# Patient Record
Sex: MALE | Race: WHITE | Employment: OTHER | URBAN - METROPOLITAN AREA
[De-identification: names, ages, dates, MRNs, and addresses within clinical notes are randomized per-mention and may not be internally consistent; named-entity substitution may affect disease eponyms.]

---

## 2017-01-06 RX ORDER — ALBUTEROL SULFATE 90 UG/1
AEROSOL, METERED RESPIRATORY (INHALATION)
Qty: 3 INHALER | Refills: 3 | Status: SHIPPED | OUTPATIENT
Start: 2017-01-06 | End: 2018-04-02 | Stop reason: SDUPTHER

## 2017-01-06 RX ORDER — BUDESONIDE AND FORMOTEROL FUMARATE DIHYDRATE 160; 4.5 UG/1; UG/1
AEROSOL RESPIRATORY (INHALATION)
Qty: 1 INHALER | Refills: 9 | Status: SHIPPED | OUTPATIENT
Start: 2017-01-06 | End: 2017-08-17 | Stop reason: CLARIF

## 2017-01-25 ENCOUNTER — OFFICE VISIT (OUTPATIENT)
Dept: CARDIOLOGY CLINIC | Age: 67
End: 2017-01-25

## 2017-01-25 VITALS
BODY MASS INDEX: 31.28 KG/M2 | SYSTOLIC BLOOD PRESSURE: 130 MMHG | DIASTOLIC BLOOD PRESSURE: 64 MMHG | HEART RATE: 80 BPM | WEIGHT: 218 LBS

## 2017-01-25 DIAGNOSIS — Z98.61 POST PTCA: ICD-10-CM

## 2017-01-25 DIAGNOSIS — I25.10 CAD IN NATIVE ARTERY: Primary | ICD-10-CM

## 2017-01-25 DIAGNOSIS — R00.0 TACHYCARDIA: ICD-10-CM

## 2017-01-25 DIAGNOSIS — I10 ESSENTIAL HYPERTENSION: ICD-10-CM

## 2017-01-25 DIAGNOSIS — I25.2 MI, OLD: ICD-10-CM

## 2017-01-25 PROCEDURE — 99214 OFFICE O/P EST MOD 30 MIN: CPT | Performed by: INTERNAL MEDICINE

## 2017-01-25 RX ORDER — ATORVASTATIN CALCIUM 80 MG/1
TABLET, FILM COATED ORAL
COMMUNITY
Start: 2017-01-19 | End: 2017-05-04 | Stop reason: SDUPTHER

## 2017-01-26 ENCOUNTER — TELEPHONE (OUTPATIENT)
Dept: INTERNAL MEDICINE CLINIC | Age: 67
End: 2017-01-26

## 2017-01-27 RX ORDER — ACLIDINIUM BROMIDE 400 UG/1
POWDER, METERED RESPIRATORY (INHALATION)
Qty: 1 EACH | Refills: 0 | Status: SHIPPED | OUTPATIENT
Start: 2017-01-27 | End: 2017-03-07 | Stop reason: SDUPTHER

## 2017-01-27 RX ORDER — PRAVASTATIN SODIUM 80 MG/1
TABLET ORAL
Qty: 28 TABLET | Refills: 0 | Status: SHIPPED | OUTPATIENT
Start: 2017-01-27 | End: 2017-02-15

## 2017-01-27 RX ORDER — GABAPENTIN 600 MG/1
TABLET ORAL
Qty: 84 TABLET | Refills: 0 | Status: SHIPPED | OUTPATIENT
Start: 2017-01-27 | End: 2017-03-24 | Stop reason: SDUPTHER

## 2017-01-27 RX ORDER — DULOXETIN HYDROCHLORIDE 60 MG/1
CAPSULE, DELAYED RELEASE ORAL
Qty: 28 CAPSULE | Refills: 0 | Status: SHIPPED | OUTPATIENT
Start: 2017-01-27 | End: 2017-03-24 | Stop reason: SDUPTHER

## 2017-01-27 RX ORDER — PANTOPRAZOLE SODIUM 40 MG/1
TABLET, DELAYED RELEASE ORAL
Qty: 28 TABLET | Refills: 0 | Status: SHIPPED | OUTPATIENT
Start: 2017-01-27 | End: 2017-03-07 | Stop reason: SDUPTHER

## 2017-02-15 ENCOUNTER — OFFICE VISIT (OUTPATIENT)
Dept: ORTHOPEDIC SURGERY | Age: 67
End: 2017-02-15

## 2017-02-15 VITALS
SYSTOLIC BLOOD PRESSURE: 127 MMHG | DIASTOLIC BLOOD PRESSURE: 74 MMHG | BODY MASS INDEX: 31.21 KG/M2 | HEART RATE: 79 BPM | HEIGHT: 70 IN | WEIGHT: 218.03 LBS

## 2017-02-15 DIAGNOSIS — M70.62 TROCHANTERIC BURSITIS, LEFT HIP: ICD-10-CM

## 2017-02-15 DIAGNOSIS — Z96.641 STATUS POST TOTAL REPLACEMENT OF RIGHT HIP: Primary | ICD-10-CM

## 2017-02-15 PROCEDURE — 99213 OFFICE O/P EST LOW 20 MIN: CPT | Performed by: ORTHOPAEDIC SURGERY

## 2017-02-15 PROCEDURE — 20610 DRAIN/INJ JOINT/BURSA W/O US: CPT | Performed by: ORTHOPAEDIC SURGERY

## 2017-03-01 ENCOUNTER — OFFICE VISIT (OUTPATIENT)
Dept: PULMONOLOGY | Age: 67
End: 2017-03-01

## 2017-03-01 VITALS
SYSTOLIC BLOOD PRESSURE: 128 MMHG | OXYGEN SATURATION: 95 % | HEART RATE: 71 BPM | HEIGHT: 70 IN | RESPIRATION RATE: 20 BRPM | WEIGHT: 219.6 LBS | DIASTOLIC BLOOD PRESSURE: 82 MMHG | BODY MASS INDEX: 31.44 KG/M2

## 2017-03-01 DIAGNOSIS — J44.9 COPD WITH CHRONIC BRONCHITIS (HCC): Primary | ICD-10-CM

## 2017-03-01 PROCEDURE — 99213 OFFICE O/P EST LOW 20 MIN: CPT | Performed by: INTERNAL MEDICINE

## 2017-03-07 RX ORDER — PANTOPRAZOLE SODIUM 40 MG/1
TABLET, DELAYED RELEASE ORAL
Qty: 28 TABLET | Refills: 0 | Status: SHIPPED | OUTPATIENT
Start: 2017-03-07 | End: 2017-03-24 | Stop reason: SDUPTHER

## 2017-03-07 RX ORDER — ACLIDINIUM BROMIDE 400 UG/1
POWDER, METERED RESPIRATORY (INHALATION)
Qty: 1 EACH | Refills: 0 | Status: SHIPPED | OUTPATIENT
Start: 2017-03-07 | End: 2017-03-24 | Stop reason: SDUPTHER

## 2017-03-07 RX ORDER — FUROSEMIDE 20 MG/1
TABLET ORAL
Qty: 28 TABLET | Refills: 0 | Status: SHIPPED | OUTPATIENT
Start: 2017-03-07 | End: 2017-03-24 | Stop reason: SDUPTHER

## 2017-03-10 ENCOUNTER — TELEPHONE (OUTPATIENT)
Dept: INTERNAL MEDICINE CLINIC | Age: 67
End: 2017-03-10

## 2017-03-10 RX ORDER — CLOPIDOGREL BISULFATE 75 MG/1
TABLET ORAL
Qty: 90 TABLET | Refills: 0 | Status: SHIPPED | OUTPATIENT
Start: 2017-03-10 | End: 2017-05-30 | Stop reason: SDUPTHER

## 2017-03-27 ENCOUNTER — TELEPHONE (OUTPATIENT)
Dept: PULMONOLOGY | Age: 67
End: 2017-03-27

## 2017-03-27 RX ORDER — ALLOPURINOL 100 MG/1
TABLET ORAL
Qty: 60 TABLET | Refills: 2 | Status: SHIPPED | OUTPATIENT
Start: 2017-03-27 | End: 2017-06-19 | Stop reason: SDUPTHER

## 2017-03-27 RX ORDER — PANTOPRAZOLE SODIUM 40 MG/1
TABLET, DELAYED RELEASE ORAL
Qty: 30 TABLET | Refills: 3 | Status: SHIPPED | OUTPATIENT
Start: 2017-03-27 | End: 2017-07-12 | Stop reason: SDUPTHER

## 2017-03-27 RX ORDER — GABAPENTIN 600 MG/1
TABLET ORAL
Qty: 84 TABLET | Refills: 0 | Status: SHIPPED | OUTPATIENT
Start: 2017-03-27 | End: 2017-04-21 | Stop reason: SDUPTHER

## 2017-03-27 RX ORDER — ACLIDINIUM BROMIDE 400 UG/1
POWDER, METERED RESPIRATORY (INHALATION)
Qty: 1 EACH | Refills: 3 | Status: SHIPPED | OUTPATIENT
Start: 2017-03-27 | End: 2017-07-07 | Stop reason: CLARIF

## 2017-03-27 RX ORDER — DULOXETIN HYDROCHLORIDE 60 MG/1
CAPSULE, DELAYED RELEASE ORAL
Qty: 30 CAPSULE | Refills: 3 | Status: SHIPPED | OUTPATIENT
Start: 2017-03-27 | End: 2017-07-12 | Stop reason: SDUPTHER

## 2017-03-27 RX ORDER — FUROSEMIDE 20 MG/1
TABLET ORAL
Qty: 30 TABLET | Refills: 3 | Status: SHIPPED | OUTPATIENT
Start: 2017-03-27 | End: 2017-07-12 | Stop reason: SDUPTHER

## 2017-03-29 ENCOUNTER — OFFICE VISIT (OUTPATIENT)
Dept: ORTHOPEDIC SURGERY | Age: 67
End: 2017-03-29

## 2017-03-29 VITALS
HEIGHT: 70 IN | HEART RATE: 60 BPM | SYSTOLIC BLOOD PRESSURE: 103 MMHG | BODY MASS INDEX: 31.44 KG/M2 | DIASTOLIC BLOOD PRESSURE: 72 MMHG | WEIGHT: 219.58 LBS

## 2017-03-29 DIAGNOSIS — Z96.641 STATUS POST TOTAL REPLACEMENT OF RIGHT HIP: Primary | ICD-10-CM

## 2017-03-29 DIAGNOSIS — M70.62 TROCHANTERIC BURSITIS, LEFT HIP: ICD-10-CM

## 2017-03-29 PROCEDURE — 99213 OFFICE O/P EST LOW 20 MIN: CPT | Performed by: ORTHOPAEDIC SURGERY

## 2017-04-13 ENCOUNTER — OFFICE VISIT (OUTPATIENT)
Dept: INTERNAL MEDICINE CLINIC | Age: 67
End: 2017-04-13

## 2017-04-13 VITALS
HEIGHT: 70 IN | SYSTOLIC BLOOD PRESSURE: 134 MMHG | DIASTOLIC BLOOD PRESSURE: 84 MMHG | HEART RATE: 64 BPM | TEMPERATURE: 97.7 F | WEIGHT: 217 LBS | BODY MASS INDEX: 31.07 KG/M2

## 2017-04-13 DIAGNOSIS — R91.1 PULMONARY NODULE: ICD-10-CM

## 2017-04-13 DIAGNOSIS — Z11.4 SCREENING FOR HIV (HUMAN IMMUNODEFICIENCY VIRUS): ICD-10-CM

## 2017-04-13 DIAGNOSIS — I73.9 PVD (PERIPHERAL VASCULAR DISEASE) (HCC): Chronic | ICD-10-CM

## 2017-04-13 DIAGNOSIS — Z11.59 NEED FOR HEPATITIS C SCREENING TEST: ICD-10-CM

## 2017-04-13 DIAGNOSIS — I10 ESSENTIAL HYPERTENSION: Chronic | ICD-10-CM

## 2017-04-13 DIAGNOSIS — E78.5 HYPERLIPIDEMIA, UNSPECIFIED HYPERLIPIDEMIA TYPE: Chronic | ICD-10-CM

## 2017-04-13 DIAGNOSIS — J43.2 CENTRILOBULAR EMPHYSEMA (HCC): Chronic | ICD-10-CM

## 2017-04-13 DIAGNOSIS — R73.02 GLUCOSE INTOLERANCE (IMPAIRED GLUCOSE TOLERANCE): Primary | ICD-10-CM

## 2017-04-13 LAB — HBA1C MFR BLD: 5.7 %

## 2017-04-13 PROCEDURE — 99214 OFFICE O/P EST MOD 30 MIN: CPT | Performed by: INTERNAL MEDICINE

## 2017-04-13 PROCEDURE — 83036 HEMOGLOBIN GLYCOSYLATED A1C: CPT | Performed by: INTERNAL MEDICINE

## 2017-04-13 ASSESSMENT — ENCOUNTER SYMPTOMS
SHORTNESS OF BREATH: 1
BACK PAIN: 1

## 2017-04-14 LAB
A/G RATIO: 1.5 (ref 1.1–2.2)
ALBUMIN SERPL-MCNC: 4.1 G/DL (ref 3.4–5)
ALP BLD-CCNC: 119 U/L (ref 40–129)
ALT SERPL-CCNC: 33 U/L (ref 10–40)
ANION GAP SERPL CALCULATED.3IONS-SCNC: 19 MMOL/L (ref 3–16)
AST SERPL-CCNC: 32 U/L (ref 15–37)
BILIRUB SERPL-MCNC: 0.7 MG/DL (ref 0–1)
BUN BLDV-MCNC: 11 MG/DL (ref 7–20)
CALCIUM SERPL-MCNC: 9.5 MG/DL (ref 8.3–10.6)
CHLORIDE BLD-SCNC: 100 MMOL/L (ref 99–110)
CO2: 23 MMOL/L (ref 21–32)
CREAT SERPL-MCNC: 0.7 MG/DL (ref 0.8–1.3)
GFR AFRICAN AMERICAN: >60
GFR NON-AFRICAN AMERICAN: >60
GLOBULIN: 2.8 G/DL
GLUCOSE BLD-MCNC: 102 MG/DL (ref 70–99)
HEPATITIS C ANTIBODY INTERPRETATION: NORMAL
HIV-1 AND HIV-2 ANTIBODIES: NORMAL
POTASSIUM SERPL-SCNC: 4 MMOL/L (ref 3.5–5.1)
SODIUM BLD-SCNC: 142 MMOL/L (ref 136–145)
TOTAL PROTEIN: 6.9 G/DL (ref 6.4–8.2)

## 2017-04-21 DIAGNOSIS — R00.0 TACHYCARDIA: ICD-10-CM

## 2017-04-21 DIAGNOSIS — I10 ESSENTIAL HYPERTENSION: ICD-10-CM

## 2017-04-21 DIAGNOSIS — I25.10 CORONARY ARTERY DISEASE INVOLVING NATIVE CORONARY ARTERY OF NATIVE HEART WITHOUT ANGINA PECTORIS: ICD-10-CM

## 2017-04-21 RX ORDER — GABAPENTIN 600 MG/1
TABLET ORAL
Qty: 84 TABLET | Refills: 0 | Status: SHIPPED | OUTPATIENT
Start: 2017-04-21 | End: 2017-05-30 | Stop reason: SDUPTHER

## 2017-04-24 RX ORDER — METOPROLOL TARTRATE 100 MG/1
TABLET ORAL
Qty: 56 TABLET | Refills: 0 | Status: SHIPPED | OUTPATIENT
Start: 2017-04-24 | End: 2017-05-19 | Stop reason: SDUPTHER

## 2017-04-26 ENCOUNTER — OFFICE VISIT (OUTPATIENT)
Dept: CARDIOLOGY CLINIC | Age: 67
End: 2017-04-26

## 2017-04-26 VITALS
WEIGHT: 220 LBS | HEART RATE: 72 BPM | SYSTOLIC BLOOD PRESSURE: 130 MMHG | BODY MASS INDEX: 31.57 KG/M2 | DIASTOLIC BLOOD PRESSURE: 60 MMHG

## 2017-04-26 DIAGNOSIS — Z98.61 POST PTCA: ICD-10-CM

## 2017-04-26 DIAGNOSIS — I25.10 CAD IN NATIVE ARTERY: Primary | ICD-10-CM

## 2017-04-26 DIAGNOSIS — I25.2 MI, OLD: ICD-10-CM

## 2017-04-26 DIAGNOSIS — R00.0 TACHYCARDIA: ICD-10-CM

## 2017-04-26 DIAGNOSIS — I10 ESSENTIAL HYPERTENSION: ICD-10-CM

## 2017-04-26 PROCEDURE — 99214 OFFICE O/P EST MOD 30 MIN: CPT | Performed by: INTERNAL MEDICINE

## 2017-05-02 ENCOUNTER — TELEPHONE (OUTPATIENT)
Dept: INTERNAL MEDICINE CLINIC | Age: 67
End: 2017-05-02

## 2017-05-04 RX ORDER — ATORVASTATIN CALCIUM 80 MG/1
TABLET, FILM COATED ORAL
Qty: 30 TABLET | Refills: 2 | Status: SHIPPED | OUTPATIENT
Start: 2017-05-04 | End: 2017-08-10 | Stop reason: SDUPTHER

## 2017-05-19 DIAGNOSIS — I10 ESSENTIAL HYPERTENSION: ICD-10-CM

## 2017-05-19 DIAGNOSIS — R00.0 TACHYCARDIA: ICD-10-CM

## 2017-05-19 DIAGNOSIS — I25.10 CORONARY ARTERY DISEASE INVOLVING NATIVE CORONARY ARTERY OF NATIVE HEART WITHOUT ANGINA PECTORIS: ICD-10-CM

## 2017-05-22 RX ORDER — METOPROLOL TARTRATE 100 MG/1
TABLET ORAL
Qty: 60 TABLET | Refills: 3 | Status: SHIPPED | OUTPATIENT
Start: 2017-05-22 | End: 2017-09-22 | Stop reason: SDUPTHER

## 2017-05-25 ENCOUNTER — OFFICE VISIT (OUTPATIENT)
Dept: INTERNAL MEDICINE CLINIC | Age: 67
End: 2017-05-25

## 2017-05-25 VITALS
HEIGHT: 70 IN | HEART RATE: 76 BPM | TEMPERATURE: 97.4 F | BODY MASS INDEX: 30.64 KG/M2 | SYSTOLIC BLOOD PRESSURE: 120 MMHG | DIASTOLIC BLOOD PRESSURE: 60 MMHG | WEIGHT: 214 LBS

## 2017-05-25 DIAGNOSIS — L03.116 CELLULITIS OF LEFT LOWER EXTREMITY: Primary | ICD-10-CM

## 2017-05-25 DIAGNOSIS — I87.2 VENOUS STASIS DERMATITIS OF BOTH LOWER EXTREMITIES: ICD-10-CM

## 2017-05-25 PROCEDURE — 99213 OFFICE O/P EST LOW 20 MIN: CPT | Performed by: INTERNAL MEDICINE

## 2017-05-25 RX ORDER — CEPHALEXIN 500 MG/1
500 CAPSULE ORAL 3 TIMES DAILY
COMMUNITY
End: 2017-08-10

## 2017-05-30 RX ORDER — GABAPENTIN 600 MG/1
TABLET ORAL
Qty: 84 TABLET | Refills: 0 | Status: SHIPPED | OUTPATIENT
Start: 2017-05-30 | End: 2017-06-19 | Stop reason: SDUPTHER

## 2017-05-30 RX ORDER — CLOPIDOGREL BISULFATE 75 MG/1
TABLET ORAL
Qty: 90 TABLET | Refills: 2 | Status: SHIPPED | OUTPATIENT
Start: 2017-05-30 | End: 2017-12-14 | Stop reason: SDUPTHER

## 2017-06-01 ENCOUNTER — TELEPHONE (OUTPATIENT)
Dept: INTERNAL MEDICINE CLINIC | Age: 67
End: 2017-06-01

## 2017-06-15 RX ORDER — LOSARTAN POTASSIUM 25 MG/1
TABLET ORAL
Qty: 28 TABLET | Refills: 0 | Status: SHIPPED | OUTPATIENT
Start: 2017-06-15 | End: 2017-07-12 | Stop reason: SDUPTHER

## 2017-07-13 RX ORDER — LOSARTAN POTASSIUM 25 MG/1
TABLET ORAL
Qty: 28 TABLET | Refills: 0 | Status: SHIPPED | OUTPATIENT
Start: 2017-07-13 | End: 2017-08-21 | Stop reason: SDUPTHER

## 2017-08-10 ENCOUNTER — OFFICE VISIT (OUTPATIENT)
Dept: PULMONOLOGY | Age: 67
End: 2017-08-10

## 2017-08-10 VITALS
HEART RATE: 67 BPM | RESPIRATION RATE: 18 BRPM | OXYGEN SATURATION: 97 % | WEIGHT: 217 LBS | HEIGHT: 70 IN | SYSTOLIC BLOOD PRESSURE: 142 MMHG | BODY MASS INDEX: 31.07 KG/M2 | DIASTOLIC BLOOD PRESSURE: 82 MMHG

## 2017-08-10 DIAGNOSIS — F17.219 NICOTINE DEPENDENCE, CIGARETTES, WITH UNSPECIFIED NICOTINE-INDUCED DISORDERS: Primary | ICD-10-CM

## 2017-08-10 DIAGNOSIS — J43.2 CENTRILOBULAR EMPHYSEMA (HCC): Chronic | ICD-10-CM

## 2017-08-10 DIAGNOSIS — G47.33 OSA ON CPAP: ICD-10-CM

## 2017-08-10 DIAGNOSIS — Z99.89 OSA ON CPAP: ICD-10-CM

## 2017-08-10 PROCEDURE — 4004F PT TOBACCO SCREEN RCVD TLK: CPT | Performed by: INTERNAL MEDICINE

## 2017-08-10 PROCEDURE — G8417 CALC BMI ABV UP PARAM F/U: HCPCS | Performed by: INTERNAL MEDICINE

## 2017-08-10 PROCEDURE — G0296 VISIT TO DETERM LDCT ELIG: HCPCS | Performed by: INTERNAL MEDICINE

## 2017-08-10 PROCEDURE — G8598 ASA/ANTIPLAT THER USED: HCPCS | Performed by: INTERNAL MEDICINE

## 2017-08-10 PROCEDURE — 3017F COLORECTAL CA SCREEN DOC REV: CPT | Performed by: INTERNAL MEDICINE

## 2017-08-10 PROCEDURE — G8926 SPIRO NO PERF OR DOC: HCPCS | Performed by: INTERNAL MEDICINE

## 2017-08-10 PROCEDURE — 4040F PNEUMOC VAC/ADMIN/RCVD: CPT | Performed by: INTERNAL MEDICINE

## 2017-08-10 PROCEDURE — 99213 OFFICE O/P EST LOW 20 MIN: CPT | Performed by: INTERNAL MEDICINE

## 2017-08-10 PROCEDURE — 3023F SPIROM DOC REV: CPT | Performed by: INTERNAL MEDICINE

## 2017-08-10 PROCEDURE — G8427 DOCREV CUR MEDS BY ELIG CLIN: HCPCS | Performed by: INTERNAL MEDICINE

## 2017-08-10 PROCEDURE — 1123F ACP DISCUSS/DSCN MKR DOCD: CPT | Performed by: INTERNAL MEDICINE

## 2017-08-10 RX ORDER — FLUTICASONE FUROATE AND VILANTEROL 200; 25 UG/1; UG/1
1 POWDER RESPIRATORY (INHALATION) DAILY
Qty: 1 EACH | Refills: 5 | Status: SHIPPED | OUTPATIENT
Start: 2017-08-10 | End: 2018-03-08 | Stop reason: SDUPTHER

## 2017-08-10 RX ORDER — ATORVASTATIN CALCIUM 80 MG/1
TABLET, FILM COATED ORAL
Qty: 30 TABLET | Refills: 1 | Status: SHIPPED | OUTPATIENT
Start: 2017-08-10 | End: 2017-10-21 | Stop reason: SDUPTHER

## 2017-08-17 ENCOUNTER — OFFICE VISIT (OUTPATIENT)
Dept: INTERNAL MEDICINE CLINIC | Age: 67
End: 2017-08-17

## 2017-08-17 VITALS
DIASTOLIC BLOOD PRESSURE: 70 MMHG | BODY MASS INDEX: 30.38 KG/M2 | SYSTOLIC BLOOD PRESSURE: 130 MMHG | HEIGHT: 70 IN | TEMPERATURE: 98.3 F | WEIGHT: 212.2 LBS | HEART RATE: 68 BPM

## 2017-08-17 DIAGNOSIS — I73.9 PVD (PERIPHERAL VASCULAR DISEASE) (HCC): Chronic | ICD-10-CM

## 2017-08-17 DIAGNOSIS — J43.2 CENTRILOBULAR EMPHYSEMA (HCC): Chronic | ICD-10-CM

## 2017-08-17 DIAGNOSIS — M48.062 LUMBAR STENOSIS WITH NEUROGENIC CLAUDICATION: ICD-10-CM

## 2017-08-17 DIAGNOSIS — Z23 NEEDS FLU SHOT: ICD-10-CM

## 2017-08-17 DIAGNOSIS — I10 ESSENTIAL HYPERTENSION: Chronic | ICD-10-CM

## 2017-08-17 DIAGNOSIS — R73.02 GLUCOSE INTOLERANCE (IMPAIRED GLUCOSE TOLERANCE): ICD-10-CM

## 2017-08-17 DIAGNOSIS — E78.5 HYPERLIPIDEMIA, UNSPECIFIED HYPERLIPIDEMIA TYPE: Primary | Chronic | ICD-10-CM

## 2017-08-17 LAB
ANION GAP SERPL CALCULATED.3IONS-SCNC: 19 MMOL/L (ref 3–16)
BUN BLDV-MCNC: 12 MG/DL (ref 7–20)
CALCIUM SERPL-MCNC: 9.5 MG/DL (ref 8.3–10.6)
CHLORIDE BLD-SCNC: 98 MMOL/L (ref 99–110)
CHOLESTEROL, TOTAL: 155 MG/DL (ref 0–199)
CO2: 22 MMOL/L (ref 21–32)
CREAT SERPL-MCNC: 0.8 MG/DL (ref 0.8–1.3)
GFR AFRICAN AMERICAN: >60
GFR NON-AFRICAN AMERICAN: >60
GLUCOSE BLD-MCNC: 94 MG/DL (ref 70–99)
HBA1C MFR BLD: 5.6 %
HDLC SERPL-MCNC: 27 MG/DL (ref 40–60)
LDL CHOLESTEROL CALCULATED: 86 MG/DL
POTASSIUM SERPL-SCNC: 4.1 MMOL/L (ref 3.5–5.1)
SODIUM BLD-SCNC: 139 MMOL/L (ref 136–145)
TRIGL SERPL-MCNC: 210 MG/DL (ref 0–150)
VLDLC SERPL CALC-MCNC: 42 MG/DL

## 2017-08-17 PROCEDURE — G8427 DOCREV CUR MEDS BY ELIG CLIN: HCPCS | Performed by: INTERNAL MEDICINE

## 2017-08-17 PROCEDURE — 4004F PT TOBACCO SCREEN RCVD TLK: CPT | Performed by: INTERNAL MEDICINE

## 2017-08-17 PROCEDURE — G8598 ASA/ANTIPLAT THER USED: HCPCS | Performed by: INTERNAL MEDICINE

## 2017-08-17 PROCEDURE — 4040F PNEUMOC VAC/ADMIN/RCVD: CPT | Performed by: INTERNAL MEDICINE

## 2017-08-17 PROCEDURE — G8926 SPIRO NO PERF OR DOC: HCPCS | Performed by: INTERNAL MEDICINE

## 2017-08-17 PROCEDURE — 83036 HEMOGLOBIN GLYCOSYLATED A1C: CPT | Performed by: INTERNAL MEDICINE

## 2017-08-17 PROCEDURE — 3023F SPIROM DOC REV: CPT | Performed by: INTERNAL MEDICINE

## 2017-08-17 PROCEDURE — G0008 ADMIN INFLUENZA VIRUS VAC: HCPCS | Performed by: INTERNAL MEDICINE

## 2017-08-17 PROCEDURE — 99214 OFFICE O/P EST MOD 30 MIN: CPT | Performed by: INTERNAL MEDICINE

## 2017-08-17 PROCEDURE — 1123F ACP DISCUSS/DSCN MKR DOCD: CPT | Performed by: INTERNAL MEDICINE

## 2017-08-17 PROCEDURE — 3017F COLORECTAL CA SCREEN DOC REV: CPT | Performed by: INTERNAL MEDICINE

## 2017-08-17 PROCEDURE — G8417 CALC BMI ABV UP PARAM F/U: HCPCS | Performed by: INTERNAL MEDICINE

## 2017-08-17 PROCEDURE — 90662 IIV NO PRSV INCREASED AG IM: CPT | Performed by: INTERNAL MEDICINE

## 2017-08-17 ASSESSMENT — PATIENT HEALTH QUESTIONNAIRE - PHQ9
1. LITTLE INTEREST OR PLEASURE IN DOING THINGS: 0
2. FEELING DOWN, DEPRESSED OR HOPELESS: 0
SUM OF ALL RESPONSES TO PHQ QUESTIONS 1-9: 0
SUM OF ALL RESPONSES TO PHQ9 QUESTIONS 1 & 2: 0

## 2017-08-17 ASSESSMENT — ENCOUNTER SYMPTOMS
WHEEZING: 0
SHORTNESS OF BREATH: 1
BACK PAIN: 1
CHEST TIGHTNESS: 0

## 2017-08-18 ENCOUNTER — OFFICE VISIT (OUTPATIENT)
Dept: CARDIOLOGY CLINIC | Age: 67
End: 2017-08-18

## 2017-08-18 ENCOUNTER — HOSPITAL ENCOUNTER (OUTPATIENT)
Dept: CT IMAGING | Age: 67
Discharge: HOME | End: 2017-08-18
Attending: INTERNAL MEDICINE | Admitting: INTERNAL MEDICINE

## 2017-08-18 VITALS
DIASTOLIC BLOOD PRESSURE: 70 MMHG | SYSTOLIC BLOOD PRESSURE: 132 MMHG | WEIGHT: 212 LBS | BODY MASS INDEX: 30.42 KG/M2 | HEART RATE: 76 BPM

## 2017-08-18 DIAGNOSIS — R00.0 TACHYCARDIA: ICD-10-CM

## 2017-08-18 DIAGNOSIS — Z98.61 POST PTCA: ICD-10-CM

## 2017-08-18 DIAGNOSIS — F17.219 NICOTINE DEPENDENCE, CIGARETTES, WITH UNSPECIFIED NICOTINE-INDUCED DISORDERS: ICD-10-CM

## 2017-08-18 DIAGNOSIS — I10 ESSENTIAL HYPERTENSION: ICD-10-CM

## 2017-08-18 DIAGNOSIS — F17.219 CIGARETTE NICOTINE DEPENDENCE WITH NICOTINE-INDUCED DISORDER: ICD-10-CM

## 2017-08-18 DIAGNOSIS — I25.10 CAD IN NATIVE ARTERY: Primary | ICD-10-CM

## 2017-08-18 DIAGNOSIS — I25.2 MI, OLD: ICD-10-CM

## 2017-08-18 PROCEDURE — 1123F ACP DISCUSS/DSCN MKR DOCD: CPT | Performed by: INTERNAL MEDICINE

## 2017-08-18 PROCEDURE — G8598 ASA/ANTIPLAT THER USED: HCPCS | Performed by: INTERNAL MEDICINE

## 2017-08-18 PROCEDURE — 4004F PT TOBACCO SCREEN RCVD TLK: CPT | Performed by: INTERNAL MEDICINE

## 2017-08-18 PROCEDURE — G8427 DOCREV CUR MEDS BY ELIG CLIN: HCPCS | Performed by: INTERNAL MEDICINE

## 2017-08-18 PROCEDURE — 4040F PNEUMOC VAC/ADMIN/RCVD: CPT | Performed by: INTERNAL MEDICINE

## 2017-08-18 PROCEDURE — G8417 CALC BMI ABV UP PARAM F/U: HCPCS | Performed by: INTERNAL MEDICINE

## 2017-08-18 PROCEDURE — 99214 OFFICE O/P EST MOD 30 MIN: CPT | Performed by: INTERNAL MEDICINE

## 2017-08-18 PROCEDURE — 3017F COLORECTAL CA SCREEN DOC REV: CPT | Performed by: INTERNAL MEDICINE

## 2017-08-18 RX ORDER — NITROGLYCERIN 0.4 MG/1
0.4 TABLET SUBLINGUAL EVERY 5 MIN PRN
Qty: 25 TABLET | Refills: 1 | Status: SHIPPED | OUTPATIENT
Start: 2017-08-18 | End: 2020-02-18 | Stop reason: SDUPTHER

## 2017-08-22 RX ORDER — LOSARTAN POTASSIUM 25 MG/1
TABLET ORAL
Qty: 30 TABLET | Refills: 5 | Status: SHIPPED | OUTPATIENT
Start: 2017-08-22 | End: 2018-01-03 | Stop reason: SDUPTHER

## 2017-08-28 ENCOUNTER — HOSPITAL ENCOUNTER (OUTPATIENT)
Dept: NON INVASIVE DIAGNOSTICS | Age: 67
Discharge: HOME | End: 2017-08-28
Attending: INTERNAL MEDICINE | Admitting: INTERNAL MEDICINE

## 2017-08-28 DIAGNOSIS — I25.10 ATHEROSCLEROTIC HEART DISEASE OF NATIVE CORONARY ARTERY WITHOUT ANGINA PECTORIS: ICD-10-CM

## 2017-08-28 DIAGNOSIS — I25.2 MI, OLD: ICD-10-CM

## 2017-08-28 DIAGNOSIS — Z98.61 POST PTCA: ICD-10-CM

## 2017-08-28 DIAGNOSIS — R00.0 TACHYCARDIA: ICD-10-CM

## 2017-08-28 DIAGNOSIS — I10 ESSENTIAL HYPERTENSION: ICD-10-CM

## 2017-08-28 DIAGNOSIS — I25.10 CAD IN NATIVE ARTERY: ICD-10-CM

## 2017-08-28 LAB
LV EF: 53 %
LVEF MODALITY: NORMAL

## 2017-09-05 ENCOUNTER — OFFICE VISIT (OUTPATIENT)
Dept: PAIN MANAGEMENT | Age: 67
End: 2017-09-05

## 2017-09-05 VITALS
BODY MASS INDEX: 29.78 KG/M2 | HEIGHT: 70 IN | DIASTOLIC BLOOD PRESSURE: 104 MMHG | HEART RATE: 92 BPM | WEIGHT: 208 LBS | SYSTOLIC BLOOD PRESSURE: 162 MMHG

## 2017-09-05 DIAGNOSIS — G89.29 OTHER CHRONIC PAIN: ICD-10-CM

## 2017-09-05 DIAGNOSIS — M25.552 PAIN OF BOTH HIP JOINTS: ICD-10-CM

## 2017-09-05 DIAGNOSIS — M25.551 PAIN OF BOTH HIP JOINTS: ICD-10-CM

## 2017-09-05 DIAGNOSIS — M96.1 POSTLAMINECTOMY SYNDROME, LUMBAR: Primary | ICD-10-CM

## 2017-09-05 PROCEDURE — G8427 DOCREV CUR MEDS BY ELIG CLIN: HCPCS | Performed by: ANESTHESIOLOGY

## 2017-09-05 PROCEDURE — 99204 OFFICE O/P NEW MOD 45 MIN: CPT | Performed by: ANESTHESIOLOGY

## 2017-09-05 PROCEDURE — 4004F PT TOBACCO SCREEN RCVD TLK: CPT | Performed by: ANESTHESIOLOGY

## 2017-09-05 PROCEDURE — 1123F ACP DISCUSS/DSCN MKR DOCD: CPT | Performed by: ANESTHESIOLOGY

## 2017-09-05 PROCEDURE — 3017F COLORECTAL CA SCREEN DOC REV: CPT | Performed by: ANESTHESIOLOGY

## 2017-09-05 PROCEDURE — G8598 ASA/ANTIPLAT THER USED: HCPCS | Performed by: ANESTHESIOLOGY

## 2017-09-05 PROCEDURE — 4040F PNEUMOC VAC/ADMIN/RCVD: CPT | Performed by: ANESTHESIOLOGY

## 2017-09-05 PROCEDURE — G8417 CALC BMI ABV UP PARAM F/U: HCPCS | Performed by: ANESTHESIOLOGY

## 2017-09-05 RX ORDER — HYDROCODONE BITARTRATE AND ACETAMINOPHEN 5; 325 MG/1; MG/1
1 TABLET ORAL DAILY PRN
Qty: 30 TABLET | Refills: 0 | Status: SHIPPED | OUTPATIENT
Start: 2017-09-05 | End: 2017-09-12

## 2017-09-05 ASSESSMENT — ENCOUNTER SYMPTOMS
COUGH: 0
HEMOPTYSIS: 0
EYE DISCHARGE: 0
ORTHOPNEA: 0
EYE PAIN: 0
VOMITING: 0
EYE REDNESS: 0
CONSTIPATION: 0
HEARTBURN: 0
NAUSEA: 0
BACK PAIN: 1
WHEEZING: 0
ABDOMINAL PAIN: 0
SHORTNESS OF BREATH: 0

## 2017-09-05 ASSESSMENT — PATIENT HEALTH QUESTIONNAIRE - PHQ9
SUM OF ALL RESPONSES TO PHQ QUESTIONS 1-9: 1
2. FEELING DOWN, DEPRESSED OR HOPELESS: 0
1. LITTLE INTEREST OR PLEASURE IN DOING THINGS: 1
SUM OF ALL RESPONSES TO PHQ9 QUESTIONS 1 & 2: 1

## 2017-09-13 ENCOUNTER — HOSPITAL ENCOUNTER (OUTPATIENT)
Dept: GENERAL RADIOLOGY | Facility: MEDICAL CENTER | Age: 67
Discharge: HOME | End: 2017-09-13
Attending: ANESTHESIOLOGY | Admitting: ANESTHESIOLOGY

## 2017-09-13 DIAGNOSIS — M25.552 PAIN OF BOTH HIP JOINTS: ICD-10-CM

## 2017-09-13 DIAGNOSIS — M25.551 PAIN OF BOTH HIP JOINTS: ICD-10-CM

## 2017-09-20 ENCOUNTER — HOSPITAL ENCOUNTER (OUTPATIENT)
Dept: PHYSICAL THERAPY | Facility: MEDICAL CENTER | Age: 67
Discharge: HOME | End: 2017-09-30
Attending: ANESTHESIOLOGY | Admitting: ANESTHESIOLOGY

## 2017-09-20 NOTE — FLOWSHEET NOTE
Minutes: 39'   Total Treatment Minutes: 10:15- 11:15  60'     [x] EVAL  [x] MS(99199) x  1   [] IONTO  [] NMR (08835) x      [] VASO  [x] Manual (02105) x  1    [] Other:  [] TA x       [] Mech Traction (97456)  [] ES(attended) (89391)      [] ES (un) (13181):     GOALS:  Patient stated goal: Pt. Would like to be able to sit or stand for longer periods of time without increased pain    Therapist goals for Patient:   Short Term Goals: To be achieved in: 2 weeks  1. Independent in HEP and progression per patient tolerance, in order to prevent re-injury. 2. Patient will have a decrease in pain to facilitate improvement in movement, function, and ADLs as indicated by Functional Deficits. Long Term Goals: To be achieved in: 6 weeks  1. Disability index score of 20% or less for the LEFS to assist with reaching prior level of function. 2. Patient will demonstrate increased AROM to WNL  to allow for proper joint functioning as indicated by patients Functional Deficits. 3. Patient will demonstrate an increase in Strength to 4+/5  to allow for proper functional mobility as indicated by patients Functional Deficits. 4. Patient will return to bending functional activities without increased symptoms or restriction. 5. Patient will be able to sit for 60 minutes without increased symptoms or restriction  6. Patient will be able to stand for 30 minutes without increased symptoms or restriction       Progression Towards Functional goals:  [] Patient is progressing as expected towards functional goals listed. [] Progression is slowed due to complexities listed. [] Progression has been slowed due to co-morbidities. [x] Plan just implemented, too soon to assess goals progression  [] Other:     ASSESSMENT:  See eval    Treatment/Activity Tolerance:  [x] Patient tolerated treatment well [] Patient limited by fatique  [] Patient limited by pain  [] Patient limited by other medical complications  [x] Other: Pt.  Was able

## 2017-09-22 ENCOUNTER — HOSPITAL ENCOUNTER (OUTPATIENT)
Dept: PHYSICAL THERAPY | Facility: MEDICAL CENTER | Age: 67
Discharge: HOME | End: 2017-09-23
Admitting: ANESTHESIOLOGY

## 2017-09-22 DIAGNOSIS — I10 ESSENTIAL HYPERTENSION: ICD-10-CM

## 2017-09-22 DIAGNOSIS — R00.0 TACHYCARDIA: ICD-10-CM

## 2017-09-22 DIAGNOSIS — I25.10 CORONARY ARTERY DISEASE INVOLVING NATIVE CORONARY ARTERY OF NATIVE HEART WITHOUT ANGINA PECTORIS: ICD-10-CM

## 2017-09-22 RX ORDER — ALLOPURINOL 100 MG/1
TABLET ORAL
Qty: 60 TABLET | Refills: 0 | Status: SHIPPED | OUTPATIENT
Start: 2017-09-22 | End: 2017-10-20 | Stop reason: SDUPTHER

## 2017-09-26 RX ORDER — METOPROLOL TARTRATE 100 MG/1
TABLET ORAL
Qty: 60 TABLET | Refills: 6 | Status: SHIPPED | OUTPATIENT
Start: 2017-09-26 | End: 2018-04-19 | Stop reason: SDUPTHER

## 2017-09-26 RX ORDER — GABAPENTIN 600 MG/1
TABLET ORAL
Qty: 90 TABLET | Refills: 0 | Status: SHIPPED | OUTPATIENT
Start: 2017-09-26 | End: 2017-10-23 | Stop reason: SDUPTHER

## 2017-09-27 ENCOUNTER — HOSPITAL ENCOUNTER (OUTPATIENT)
Dept: PHYSICAL THERAPY | Facility: MEDICAL CENTER | Age: 67
Discharge: HOME | End: 2017-09-28
Admitting: ANESTHESIOLOGY

## 2017-09-29 ENCOUNTER — HOSPITAL ENCOUNTER (OUTPATIENT)
Dept: PHYSICAL THERAPY | Facility: MEDICAL CENTER | Age: 67
Discharge: HOME | End: 2017-09-30
Admitting: ANESTHESIOLOGY

## 2017-10-02 ENCOUNTER — HOSPITAL ENCOUNTER (OUTPATIENT)
Dept: PHYSICAL THERAPY | Facility: MEDICAL CENTER | Age: 67
Discharge: HOME | End: 2017-10-03
Admitting: ANESTHESIOLOGY

## 2017-10-09 ENCOUNTER — HOSPITAL ENCOUNTER (OUTPATIENT)
Dept: PHYSICAL THERAPY | Facility: MEDICAL CENTER | Age: 67
Discharge: HOME | End: 2017-10-10
Admitting: ANESTHESIOLOGY

## 2017-10-16 ENCOUNTER — HOSPITAL ENCOUNTER (OUTPATIENT)
Dept: PHYSICAL THERAPY | Facility: MEDICAL CENTER | Age: 67
Discharge: HOME | End: 2017-10-17
Admitting: ANESTHESIOLOGY

## 2017-10-17 ENCOUNTER — OFFICE VISIT (OUTPATIENT)
Dept: PAIN MANAGEMENT | Age: 67
End: 2017-10-17

## 2017-10-17 VITALS
HEART RATE: 75 BPM | DIASTOLIC BLOOD PRESSURE: 81 MMHG | BODY MASS INDEX: 30.61 KG/M2 | WEIGHT: 213.8 LBS | SYSTOLIC BLOOD PRESSURE: 151 MMHG | HEIGHT: 70 IN

## 2017-10-17 DIAGNOSIS — M25.552 PAIN OF BOTH HIP JOINTS: ICD-10-CM

## 2017-10-17 DIAGNOSIS — F11.90 CHRONIC, CONTINUOUS USE OF OPIOIDS: ICD-10-CM

## 2017-10-17 DIAGNOSIS — M96.1 POSTLAMINECTOMY SYNDROME, LUMBAR: Primary | ICD-10-CM

## 2017-10-17 DIAGNOSIS — M25.551 PAIN OF BOTH HIP JOINTS: ICD-10-CM

## 2017-10-17 PROCEDURE — G8417 CALC BMI ABV UP PARAM F/U: HCPCS | Performed by: ANESTHESIOLOGY

## 2017-10-17 PROCEDURE — 4004F PT TOBACCO SCREEN RCVD TLK: CPT | Performed by: ANESTHESIOLOGY

## 2017-10-17 PROCEDURE — 1123F ACP DISCUSS/DSCN MKR DOCD: CPT | Performed by: ANESTHESIOLOGY

## 2017-10-17 PROCEDURE — 3017F COLORECTAL CA SCREEN DOC REV: CPT | Performed by: ANESTHESIOLOGY

## 2017-10-17 PROCEDURE — 4040F PNEUMOC VAC/ADMIN/RCVD: CPT | Performed by: ANESTHESIOLOGY

## 2017-10-17 PROCEDURE — 99214 OFFICE O/P EST MOD 30 MIN: CPT | Performed by: ANESTHESIOLOGY

## 2017-10-17 PROCEDURE — G8598 ASA/ANTIPLAT THER USED: HCPCS | Performed by: ANESTHESIOLOGY

## 2017-10-17 PROCEDURE — G8484 FLU IMMUNIZE NO ADMIN: HCPCS | Performed by: ANESTHESIOLOGY

## 2017-10-17 PROCEDURE — G8427 DOCREV CUR MEDS BY ELIG CLIN: HCPCS | Performed by: ANESTHESIOLOGY

## 2017-10-17 RX ORDER — HYDROCODONE BITARTRATE AND ACETAMINOPHEN 5; 325 MG/1; MG/1
1 TABLET ORAL 2 TIMES DAILY PRN
Qty: 60 TABLET | Refills: 0 | Status: SHIPPED | OUTPATIENT
Start: 2017-10-17 | End: 2017-11-21 | Stop reason: SDUPTHER

## 2017-10-17 ASSESSMENT — ENCOUNTER SYMPTOMS
EYE REDNESS: 0
VOMITING: 0
HEARTBURN: 0
WHEEZING: 0
ABDOMINAL PAIN: 0
ORTHOPNEA: 0
EYE DISCHARGE: 0
HEMOPTYSIS: 0
SHORTNESS OF BREATH: 0
COUGH: 0
BACK PAIN: 1
NAUSEA: 0
CONSTIPATION: 0
EYE PAIN: 0

## 2017-10-17 NOTE — PATIENT INSTRUCTIONS
1. Continue home exercises  2. I have ordered CT for the lower back. 3. May use Norco for severe pain as provided. 4. Follow with us after 1 month      Patient Education        Back Stretches: Exercises  Your Care Instructions  Here are some examples of exercises for stretching your back. Start each exercise slowly. Ease off the exercise if you start to have pain. Your doctor or physical therapist will tell you when you can start these exercises and which ones will work best for you. How to do the exercises  Overhead stretch    1. Stand comfortably with your feet shoulder-width apart. 2. Looking straight ahead, raise both arms over your head and reach toward the ceiling. Do not allow your head to tilt back. 3. Hold for 15 to 30 seconds, then lower your arms to your sides. 4. Repeat 2 to 4 times. Side stretch    1. Stand comfortably with your feet shoulder-width apart. 2. Raise one arm over your head, and then lean to the other side. 3. Slide your hand down your leg as you let the weight of your arm gently stretch your side muscles. Hold for 15 to 30 seconds. 4. Repeat 2 to 4 times on each side. Press-up    1. Lie on your stomach, supporting your body with your forearms. 2. Press your elbows down into the floor to raise your upper back. As you do this, relax your stomach muscles and allow your back to arch without using your back muscles. As your press up, do not let your hips or pelvis come off the floor. 3. Hold for 15 to 30 seconds, then relax. 4. Repeat 2 to 4 times. Relax and rest    1. Lie on your back with a rolled towel under your neck and a pillow under your knees. Extend your arms comfortably to your sides. 2. Relax and breathe normally. 3. Remain in this position for about 10 minutes. 4. If you can, do this 2 or 3 times each day. Follow-up care is a key part of your treatment and safety. Be sure to make and go to all appointments, and call your doctor if you are having problems.  It's also a good idea to know your test results and keep a list of the medicines you take. Where can you learn more? Go to https://chpepiceweb.[email or web address] org and sign in to your [employer] account. Enter P934 in the [employer] box to learn more about \"Back Stretches: Exercises. \"     If you do not have an account, please click on the \"Sign Up Now\" link. Current as of: March 21, 2017  Content Version: 11.3  © 0727-8740 [employer]. Care instructions adapted under license by [facility] ([facility]). If you have questions about a medical condition or this instruction, always ask your healthcare professional. [place] [address] any warranty or liability for your use of this information. Patient Education        Safe Use of Opioid Pain Medicine: Care Instructions  Your Care Instructions  Pain is your body's way of warning you that something is wrong. Pain feels different for everybody. Only you can describe your pain. A doctor can suggest or prescribe many types of medicines for pain. These range from nonprescription medicines like acetaminophen (Tylenol) to powerful medicines called opioids. Opioids work well to relieve pain. But they also can cause problems, especially if they are taken too often or in too large a dose. They can interact with other medicines, or they may make it hard for you to do your job or to think clearly. They can even cause death. For these reasons, doctors are very careful about how they prescribe opioids. The doctor carefully considered what pain medicine is right for you. You may not have received opioid pain medicine if your doctor was concerned about drug interactions or your safety, or if he or she had other concerns. It is best to have one doctor or clinic treat your pain. This way you will get the pain medicine that will help you the most, and a doctor will be able to watch for any problems that the medicine might cause.   The doctor has checked you carefully, but problems can develop later. If you notice any problems or new symptoms, get medical treatment right away. Follow-up care is a key part of your treatment and safety. Be sure to make and go to all appointments, and call your doctor if you are having problems. It's also a good idea to know your test results and keep a list of the medicines you take. How can you care for yourself at home? · Try other ways to reduce pain:  ¨ Relax, and reduce stress. Relaxation techniques such as deep breathing or meditation can help. ¨ Keep moving. Gentle, daily exercise can help reduce pain over the long run. Try low- or no-impact exercises such as walking, swimming, and stationary biking. Do stretches to stay flexible. ¨ Try heat, cold packs, and massage. ¨ Get enough sleep. Pain can make you tired and drain your energy. Talk with your doctor if you have trouble sleeping because of pain. ¨ Think positive. Your thoughts can affect your pain level. Do things that you enjoy to distract yourself when you have pain instead of focusing on the pain. See a movie, read a book, listen to music, or spend time with a friend. · If the doctor gave you a prescription medicine, take it as prescribed. · If you are not taking a prescription pain medicine, ask your doctor if you can take an over-the-counter medicine. When should you call for help? Call your doctor now or seek immediate medical care if:  · You have a new kind of pain. · You have new symptoms, such as a fever or rash, along with the pain. Watch closely for changes in your health, and be sure to contact your doctor if:  · You think you might be using too much pain medicine, and you need help to use less or stop. · Your pain gets worse. · You would like a referral to a doctor or clinic that specializes in pain management. Where can you learn more? Go to https://oswaldo.[email or web address] org and sign in to your [employer] account.  Enter R108 in the Search Health Information box to learn more about \"Safe Use of Opioid Pain Medicine: Care Instructions. \"     If you do not have an account, please click on the \"Sign Up Now\" link. Current as of: November 15, 2016  Content Version: 11.3  © 6118-2038 [employer]. Care instructions adapted under license by [facility] ([facility]). If you have questions about a medical condition or this instruction, always ask your healthcare professional. [address] any warranty or liability for your use of this information.

## 2017-10-17 NOTE — PROGRESS NOTES
[address] [facility] [address], [place], [address]  [phone] (P)  [phone] (f)    10/17/17        Kristian Frazier  1950      Zofia Soler MD      Chief Complain:   Chief Complaint   Patient presents with    Lower Back Pain     The patient complains of Lumbar pain.  Hip Pain     The patient complains of bilateral hip pain. History of Present Illness   HPI    Chronic low back pain for years - no acute changes. s/p Lumbar fusion (Dr Kong Wharton) L4-L5 in 11/2015.  s/p status post right total hip arthroplasty (Dr. Leroy Valenzuela) in 09/2016.  s/p multiple endovascular surgeries - Left iliac thrombectomy and stent in 10/2015 (Dr. Brandin Negro) - on PLAVIX. Low back is progressive, episodically worse, achy, sharp in nature with tingling in left leg; no weakness or acute change in bladder/bowel. Pain worsened by prolonged standing, walking and nothing seems to help.      RED FLAG symptoms (Symptoms may include, but not limited to, acute trauma, fever, drug use, malignancy, weakness, perianal numbness, bladder/bowel changes) since last visit - No    Changes since last visit:   Completed PT      Past Medical History:   Diagnosis Date    Arthritis     Balance problem     CAD (coronary artery disease)     CHF (congestive heart failure) ([address]     followed by cardiology     Chronic ulcer of right leg ([address]     COPD (chronic obstructive pulmonary disease) ([address]     followed by pulmonology     Depressive disorder, not elsewhere classified     DVT (deep venous thrombosis) ([address] 12/2015    ileofemoral    Heartburn     infrequent    History of MI (myocardial infarction) May 2013    \"mild\" in [place] Hyperlipidemia     Hypertension     Neuropathy ([address]     feet    KAVITA (obstructive sleep apnea)     cpap    Primary central sleep apnea     PVD (peripheral vascular disease) ([address]     Unspecified cerebral artery occlusion with cerebral infarction 4/2013 \"pt states mini stroke\"    Urgency of urination     and frequency, chronic    Vertebral fracture        Past Surgical History:   Procedure Laterality Date    APPENDECTOMY      CHOLECYSTECTOMY, LAPAROSCOPIC  6/23/2014    [facility]    CORONARY ANGIOPLASTY WITH STENT PLACEMENT  6/2013    EYE SURGERY      LAMINECTOMY  11/18/2015    Bilateral decompressive lumbar laminectomy L4-5   ; medial facetectomy L4-5 joints  bilaterally; foraminotomies l5   nerve roots bilaterally    LEG DEBRIDEMENT Right 7/23/2013    Dr. Johanna Foster - pretibial wound    OTHER SURGICAL HISTORY Right 6/25/2013    Dr. Johanna Foster - CFA Endarterectomy w/marsupialization; RLE wound excision & debridement     OTHER SURGICAL HISTORY Left 8/27/2013    Dr. Johanna Foster - femoral & profunda femoris endarterectomy w/marsupialization patch angioplasty using SFA    SKIN SPLIT GRAFT Right 7/25/2013    Dr. Johanna Foster - to non-healing R pretibial wound, 9 x 15 cm    TOTAL HIP ARTHROPLASTY Right 09/01/2016    Dr. Mikaela Weathers Left 12/22/2015    Dr. Johanna Foster - CFA exploration, thrombectomy of ileofemoral system, stenting of RAFAL in-stent stenosis w/8 x 37 mm Palmaz        Allergies   Allergen Reactions    Daliresp [Roflumilast] Other (See Comments)     Severe diarrhea and did not help    Asa [Aspirin] Other (See Comments)     Stomach ache       Current Outpatient Prescriptions   Medication Sig Dispense Refill    HYDROcodone-acetaminophen (NORCO) 5-325 MG per tablet Take 1 tablet by mouth 2 times daily as needed for Pain . Earliest Fill Date: 10/17/17 60 tablet 0    metoprolol (LOPRESSOR) 100 MG tablet TAKE ONE (1) TABLET BY MOUTH TWICE A DAY. 60 tablet 6    gabapentin (NEURONTIN) 600 MG tablet TAKE 1 TABLET BY MOUTH EACH MORNING TAKE TWO TABLETS BY MOUTH DAILY IN THE EVENING. 90 tablet 0    allopurinol (ZYLOPRIM) 100 MG tablet TAKE 2 TABLETS BY MOUTH DAILY. 60 tablet 0    losartan (COZAAR) 25 MG tablet TAKE 1 TABLET BY MOUTH ONCE DAILY.  27 tobacco: Never Used      Comment:  1 pack every week    Alcohol use 0.0 oz/week      Comment: 2-3 beers a month    Drug use: No    Sexual activity: No     Other Topics Concern    Not on file     Social History Narrative    No narrative on file       Imaging Studies:   XR Hips (09/13/2017)  \"Impression   1. No evidence of right hip fracture or dislocation status post   right hip arthroplasty. Heterotopic ossification lateral right hip   Joint. \"     \"Impression   1. No abnormality of left hip identified. \"           Review of Systems:   Review of Systems   Constitutional: Negative for chills, fever, malaise/fatigue and weight loss. HENT: Negative for hearing loss and tinnitus. Eyes: Negative for pain, discharge and redness. Respiratory: Negative for cough, hemoptysis, shortness of breath and wheezing. Cardiovascular: Negative for chest pain, palpitations and orthopnea. Gastrointestinal: Negative for abdominal pain, constipation, heartburn, nausea and vomiting. Genitourinary: Negative for frequency, hematuria and urgency. Musculoskeletal: Positive for back pain and joint pain (hips). Negative for falls, myalgias and neck pain. Skin: Negative for itching and rash. Neurological: Negative for dizziness, tingling, sensory change, focal weakness, seizures, weakness and headaches. Endo/Heme/Allergies: Does not bruise/bleed easily. Psychiatric/Behavioral: Negative for depression, substance abuse and suicidal ideas. The patient is not nervous/anxious and does not have insomnia. Physical Exam:   Physical Exam   Constitutional: He is oriented to person, place, and time. No distress. In mild distress, obese  Ambulates with walker   HENT:   Head: Normocephalic. Eyes: EOM are normal. Pupils are equal, round, and reactive to light. Neck: Normal range of motion. Neck supple. No thyromegaly present. Cardiovascular: Normal rate, regular rhythm, normal heart sounds and intact distal pulses. Pulmonary/Chest: Effort normal and breath sounds normal. No respiratory distress. He exhibits no tenderness. Abdominal: Soft. Bowel sounds are normal. He exhibits no mass. There is no tenderness. There is no guarding. Musculoskeletal: Normal range of motion. He exhibits no tenderness or deformity. Lymphadenopathy:     He has no cervical adenopathy. Neurological: He is alert and oriented to person, place, and time. He has normal strength. He displays normal reflexes. A sensory deficit (diminished in right leg) is present. No cranial nerve deficit. He exhibits normal muscle tone. Gait (compensated) abnormal. Coordination normal. He displays no Babinski's sign on the right side. He displays no Babinski's sign on the left side. Reflex Scores:       Tricep reflexes are 2+ on the right side and 2+ on the left side. Bicep reflexes are 2+ on the right side and 2+ on the left side. Brachioradialis reflexes are 2+ on the right side and 2+ on the left side. Patellar reflexes are 1+ on the right side and 1+ on the left side. Achilles reflexes are 1+ on the right side and 1+ on the left side. SLR indeterminate in legs, due to pain   Skin: Skin is warm. No rash noted. He is not diaphoretic. Psychiatric: He has a normal mood and affect. His behavior is normal. Thought content normal.       Vitals:    10/17/17 1356 10/17/17 1359   BP: (!) 157/82 (!) 151/81   Site: Left Arm    Position: Sitting    Cuff Size: Large Adult    Pulse: 75    Weight: 213 lb 12.8 oz (97 kg)    Height: 5' 10\" (1.778 m)        Body mass index is 30.68 kg/m². Pain Score:   8 /10      Medication Effects: Analgesia:     Average level of pain for the past month = 7/10    Percentage of pain relief = <30%    Activities Improved:    Physical functioning = 0%    Family relationships = 0%    Social relationships = 0%    Mood = 0%    Sleep = 0%    Overall functioning = 0%    Adverse Effects:     Any adverse effects: of risks, benefits, and alternatives, prescription of opioid appears appropriate for management of his chronic pain, as benefits outweigh risks. Goals of chronic opioid therapy:    Long-term vs. Short-term. Multi-disciplinary comprehensive pain management with functional improvement and reduced opioid use. Analgesic Plan:   Continue present regimen: Yes   Adjust dose of present analgesic: No   Switch analgesics: No   Add/Adjust concomitant therapy: No     - Encouraged regular home exercises and back strengthening as long-term goals. - Counseled on role and limitations of medications and injections. - Counseled on dual effects, limitations, side effects and possible complications of opioids for chronic pain, including dependence and addiction.  - Educational material provided on back exercises, safe opioid use. Controlled Substances Monitoring: Attestation: The Prescription Monitoring Report for this patient was reviewed today. Mallika Lazcano MD)  Documentation: Possible medication side effects, risk of tolerance and/or dependence, and alternative treatments discussed., Obtaining appropriate analgesic effect of treatment., No signs of potential drug abuse or diversion identified., Existing medication contract. Mallika Lazcaon MD)    The entire treatment plan was discussed with patient and agreed. More than 25 minutes spent on face-to-face encounter with the patient by the provider. More than 50% of the time spent on counseling/coordination of care regarding chronic pain.         Jamir Centeno MD  Board Certified in Anesthesiology and Pain Medicine

## 2017-10-18 ENCOUNTER — HOSPITAL ENCOUNTER (OUTPATIENT)
Dept: PHYSICAL THERAPY | Facility: MEDICAL CENTER | Age: 67
Discharge: HOME | End: 2017-10-19
Admitting: ANESTHESIOLOGY

## 2017-10-20 RX ORDER — ALLOPURINOL 100 MG/1
TABLET ORAL
Qty: 60 TABLET | Refills: 0 | Status: SHIPPED | OUTPATIENT
Start: 2017-10-20 | End: 2017-11-21 | Stop reason: SDUPTHER

## 2017-10-23 RX ORDER — ATORVASTATIN CALCIUM 80 MG/1
TABLET, FILM COATED ORAL
Qty: 30 TABLET | Refills: 5 | Status: SHIPPED | OUTPATIENT
Start: 2017-10-23 | End: 2018-05-08 | Stop reason: SDUPTHER

## 2017-10-23 RX ORDER — GABAPENTIN 600 MG/1
TABLET ORAL
Qty: 90 TABLET | Refills: 3 | Status: SHIPPED | OUTPATIENT
Start: 2017-10-23 | End: 2018-01-16 | Stop reason: SDUPTHER

## 2017-10-27 ENCOUNTER — HOSPITAL ENCOUNTER (OUTPATIENT)
Dept: OTHER | Age: 67
Discharge: HOME | End: 2017-10-27
Attending: ANESTHESIOLOGY | Admitting: ANESTHESIOLOGY

## 2017-10-27 ENCOUNTER — HOSPITAL ENCOUNTER (OUTPATIENT)
Dept: PHYSICAL THERAPY | Facility: MEDICAL CENTER | Age: 67
Discharge: HOME | End: 2017-10-28
Admitting: ANESTHESIOLOGY

## 2017-10-27 DIAGNOSIS — M96.1 POSTLAMINECTOMY SYNDROME, LUMBAR: ICD-10-CM

## 2017-11-01 ENCOUNTER — HOSPITAL ENCOUNTER (OUTPATIENT)
Dept: OTHER | Age: 67
Discharge: HOME | End: 2017-11-30
Attending: ANESTHESIOLOGY | Admitting: ANESTHESIOLOGY

## 2017-11-21 ENCOUNTER — OFFICE VISIT (OUTPATIENT)
Dept: PAIN MANAGEMENT | Age: 67
End: 2017-11-21

## 2017-11-21 VITALS
HEIGHT: 70 IN | SYSTOLIC BLOOD PRESSURE: 184 MMHG | HEART RATE: 61 BPM | BODY MASS INDEX: 31.35 KG/M2 | DIASTOLIC BLOOD PRESSURE: 90 MMHG | WEIGHT: 219 LBS

## 2017-11-21 DIAGNOSIS — M25.551 PAIN OF BOTH HIP JOINTS: ICD-10-CM

## 2017-11-21 DIAGNOSIS — F11.90 CHRONIC, CONTINUOUS USE OF OPIOIDS: ICD-10-CM

## 2017-11-21 DIAGNOSIS — M25.552 PAIN OF BOTH HIP JOINTS: ICD-10-CM

## 2017-11-21 DIAGNOSIS — M96.1 POSTLAMINECTOMY SYNDROME, LUMBAR: Primary | ICD-10-CM

## 2017-11-21 PROCEDURE — G8598 ASA/ANTIPLAT THER USED: HCPCS | Performed by: ANESTHESIOLOGY

## 2017-11-21 PROCEDURE — 4004F PT TOBACCO SCREEN RCVD TLK: CPT | Performed by: ANESTHESIOLOGY

## 2017-11-21 PROCEDURE — 3017F COLORECTAL CA SCREEN DOC REV: CPT | Performed by: ANESTHESIOLOGY

## 2017-11-21 PROCEDURE — G8427 DOCREV CUR MEDS BY ELIG CLIN: HCPCS | Performed by: ANESTHESIOLOGY

## 2017-11-21 PROCEDURE — G8484 FLU IMMUNIZE NO ADMIN: HCPCS | Performed by: ANESTHESIOLOGY

## 2017-11-21 PROCEDURE — 4040F PNEUMOC VAC/ADMIN/RCVD: CPT | Performed by: ANESTHESIOLOGY

## 2017-11-21 PROCEDURE — G8417 CALC BMI ABV UP PARAM F/U: HCPCS | Performed by: ANESTHESIOLOGY

## 2017-11-21 PROCEDURE — 1123F ACP DISCUSS/DSCN MKR DOCD: CPT | Performed by: ANESTHESIOLOGY

## 2017-11-21 PROCEDURE — 99214 OFFICE O/P EST MOD 30 MIN: CPT | Performed by: ANESTHESIOLOGY

## 2017-11-21 RX ORDER — ALLOPURINOL 100 MG/1
TABLET ORAL
Qty: 60 TABLET | Refills: 3 | Status: SHIPPED | OUTPATIENT
Start: 2017-11-21 | End: 2018-03-19 | Stop reason: SDUPTHER

## 2017-11-21 RX ORDER — HYDROCODONE BITARTRATE AND ACETAMINOPHEN 5; 325 MG/1; MG/1
1 TABLET ORAL 2 TIMES DAILY PRN
Qty: 60 TABLET | Refills: 0 | Status: SHIPPED | OUTPATIENT
Start: 2017-11-21 | End: 2018-01-19 | Stop reason: SDUPTHER

## 2017-11-21 ASSESSMENT — ENCOUNTER SYMPTOMS
EYE DISCHARGE: 0
VOMITING: 0
CONSTIPATION: 0
SHORTNESS OF BREATH: 0
COUGH: 0
EYE PAIN: 0
BACK PAIN: 1
HEARTBURN: 0
HEMOPTYSIS: 0
ORTHOPNEA: 0
EYE REDNESS: 0
NAUSEA: 0
WHEEZING: 0
ABDOMINAL PAIN: 0

## 2017-11-21 NOTE — PROGRESS NOTES
[facility]  [address], [place], [address]  [phone] (B)  [phone] (f)    11/21/17        Yueanika Youngblood  1950      Sonido Price MD      Chief Complain:   Chief Complaint   Patient presents with    Lower Back Pain     c/o lower back pain    Hip Pain     c/o b/l hip pain       History of Present Illness   HPI    Seen us since 09/2017 -  Chronic low back pain for years, unimproved by lumbar fusion in 11/2015  Pain worse in the lower back, constant, episodically worse, throbbing, sharp in nature with tingling down the legs; no acute changes. Pain worse with daily activities, standing, walking, changing position and helped somewhat by pain medications. RED FLAG symptoms (Symptoms may include, but not limited to, acute trauma, fever, drug use, malignancy, weakness, perianal numbness, bladder/bowel changes) since last visit - No    Chronic pain in multiple joints due to RA, right hip due to avascular necrosis, legs due to peripheral vascular disease. History of CAD - on PLAVIX therapy.     Changes since last visit:   Tried PT  Recent CT studies      Past Medical History:   Diagnosis Date    Arthritis     Balance problem     CAD (coronary artery disease)     CHF (congestive heart failure) ([address]     followed by cardiology     Chronic ulcer of right leg ([address]     COPD (chronic obstructive pulmonary disease) ([address]     followed by pulmonology     Depressive disorder, not elsewhere classified     DVT (deep venous thrombosis) ([address] 12/2015    ileofemoral    Heartburn     infrequent    History of MI (myocardial infarction) May 2013    \"mild\" in [place] Hyperlipidemia     Hypertension     Neuropathy ([address]     feet    KAVITA (obstructive sleep apnea)     cpap    Primary central sleep apnea     PVD (peripheral vascular disease) ([address]     Unspecified cerebral artery occlusion with cerebral infarction 4/2013    \"pt states mini stroke\"    Urgency of Normal rate, regular rhythm, normal heart sounds and intact distal pulses. Pulmonary/Chest: Effort normal and breath sounds normal. No respiratory distress. He exhibits no tenderness. Abdominal: Soft. Bowel sounds are normal. He exhibits no mass. There is no tenderness. There is no guarding. Musculoskeletal: Normal range of motion. He exhibits no tenderness or deformity. Lymphadenopathy:     He has no cervical adenopathy. Neurological: He is alert and oriented to person, place, and time. He has normal strength. A sensory deficit is present. No cranial nerve deficit (diminished in legs R>L). He exhibits normal muscle tone. Gait (compensated) abnormal. Coordination normal. He displays no Babinski's sign on the right side. He displays no Babinski's sign on the left side. Reflex Scores:       Tricep reflexes are 1+ on the right side and 1+ on the left side. Bicep reflexes are 1+ on the right side and 1+ on the left side. Brachioradialis reflexes are 1+ on the right side and 1+ on the left side. Patellar reflexes are 0 on the right side and 0 on the left side. Achilles reflexes are 0 on the right side and 0 on the left side. Skin: Skin is warm. No rash noted. He is not diaphoretic. Psychiatric: He has a normal mood and affect. His behavior is normal. Thought content normal.       Vitals:    11/21/17 1418 11/21/17 1433   BP: (!) 192/93 (!) 184/90   Site: Left Arm Left Arm   Position: Sitting Sitting   Cuff Size: Large Adult Large Adult   Pulse: 61    Weight: 219 lb (99.3 kg)    Height: 5' 10\" (1.778 m)        Body mass index is 31.42 kg/m². Pain Score:   7 /10      Medication Effects: Analgesia:     Average level of pain for the past month = 8/10    Percentage of pain relief = 30%    Activities Improved:    Physical functioning = 10%    Family relationships = 10%    Social relationships = 10%    Mood = 10%    Sleep = 10%    Overall functioning = 10%    Adverse Effects:     Any adverse effects: No    Type/Severity of side effect: None   Aberrant Behavior:    Requests for frequent early refills: No    Increased dose without authorization: No    Reports lost or stolen prescriptions: No    Attempts to obtain prescriptions from other providers: No    Use of pain medication in response to situational stressor: No     Increasingly unkempt or impaired: No    Negative mood changes: No    Abusing alcohol or illicit drugs: No    Appears intoxicated: No    Other: NA    Benefit from Other Treatments (since last visit):      Physical Therapy: Yes / Benefit <30%   Counseling: No / N/A   Injections: No / N/A      Compliance Monitoring:      ORT Score =  3   Current MEDD = 10   OARRS:    Checked today: Yes    Adverse report: No    Assessment:    1. Postlaminectomy syndrome, lumbar  Chronic.  - HYDROcodone-acetaminophen (NORCO) 5-325 MG per tablet; Take 1 tablet by mouth 2 times daily as needed for Pain . Earliest Fill Date: 11/21/17  Dispense: 60 tablet; Refill: 0    2. Pain of both hip joints  Chronic.  - HYDROcodone-acetaminophen (NORCO) 5-325 MG per tablet; Take 1 tablet by mouth 2 times daily as needed for Pain . Earliest Fill Date: 11/21/17  Dispense: 60 tablet; Refill: 0    3. Chronic, continuous use of opioids  Chronic - Gout, Arthritis, peripheral neuropathy. ORT score = low; risk factors - depression, sleep apnea, obesity. Plan:     1. Chronic low back pain with occasional radiation to legs due to failed back surgeries; no focal changes. 2. Reviewed results of CT lumbar spine with patient in detail - laminectomy at L4-5 and L5-S1 levels, diffuse spondylotic changes with variable spinal/canal stenosis and grade 1 spondylolisthesis of L4 on L5.  3. Discussed management option; continue home exercises, Neurontin and Cymbalta as before. 4. May continue Norco 5 mg BID PRN for severe pain; counseled on opioids. 5. Offered trial of caudal DAVIDA, if pain unimproved.  Discussed procedure benefits and possible complications - information provided. 6. He wishes to continue medications and call us regarding injections (on Plavix); F/U after 2 months. Failed conservative care:    - Failed NSAIDs (on Plavix), anti-depressants, anti-convulsants through other providers. - Based on an analysis of risks, benefits, and alternatives, prescription of opioid appears appropriate for management of chronic pain. Analgesic Plan:   Continue present regimen: Yes   Adjust dose of present analgesic: No   Switch analgesics: No   Add/Adjust concomitant therapy: No    - Reviewed OARRS report with patient in detail, including Narx Scores and Overdose Risk Score. - Encouraged regular home exercises and back strengthening as long-term goals. - Counseled on role and limitations of medications and injections. Counseled on dual effects, limitations, side effects and long-term side effects of opioids including but not limited to opioid induced hormonal suppression, hyperalgesia, altered immune system, elevated cardiac risk, dependence and addiction. Discussed proper use and potential life threatening side effects of inappropriate use. - Educational material provided on back exercises, epidural steroid injection. Controlled Substances Monitoring: Attestation: The Prescription Monitoring Report for this patient was reviewed today. Ghislaine Suazo MD)  Documentation: Possible medication side effects, risk of tolerance and/or dependence, and alternative treatments discussed., No signs of potential drug abuse or diversion identified., Existing medication contract. Ghislaine Suazo MD)    The entire treatment plan was discussed with patient and agreed. More than 25 minutes spent on face-to-face encounter with the patient by the provider. More than 50% of the time spent on counseling/coordination of care regarding chronic pain.         Norm Olivera MD  Board Certified in Anesthesiology and Pain Medicine

## 2017-11-21 NOTE — PATIENT INSTRUCTIONS
1. Continue home exercises, Neurontin and Cymbalta as before  2. May use Norco for severe pain as provided. 3. Follow with us after 2 months, or earlier if needed. Patient Education        Learning About Lumbar Epidural Steroid Injections  What is a lumbar epidural steroid injection? A doctor may give you a lumbar epidural steroid injection to try to decrease pain, tingling, or numbness in your back, buttock, or leg. These might be the result of a back or disc problem. The injection goes directly into your epidural space. This is the area in your back around your spinal cord. This injection may have both a local anesthetic and a steroid medicine. Or it may only have a steroid. Local anesthetic medicines numb your nerves right away for a short time. Steroids reduce swelling and pain. But they take a few days to start working. Some people get a series of these injections over weeks or months. How is a lumbar epidural done? The doctor may use an imaging test before or during your injection. This can be an MRI, a CT scan, or an X-ray. These tests can show where your nerve problems are. After finding the right spot, the doctor may inject a numbing medicine into the skin where you will get the steroid injection. Then he or she puts the needle for the steroid into the numbed area. You may feel some pressure. You could feel some stinging or burning during the injection. It takes about 10 to 15 minutes to get this injection. You will probably go home about 20 to 30 minutes after you get it. You will need someone to drive you home. What can you expect after a lumbar epidural?  If your injection had local anesthetic and a steroid, your legs may feel heavy or numb right after. You will probably be able to walk. But you may need to be extra careful. Take care not to lose your balance and be sure to follow your doctor's instructions. If your injection contained local anesthetic, you may feel better right away.  But this pain relief will last only a few hours. Your pain will probably return. This is because the steroids have not started working yet. Before the steroids start to work, your back may be sore for a few days. These injections don't always work. When they do, it takes 1 to 5 days. This pain relief can last for several days to a few months or longer. You may want to do less than normal for a few days. But you may also be able to return to your daily routine. Some people are dizzy or feel sick to their stomach after getting this injection. These symptoms usually do not last very long. If your pain is better, you may be able to keep doing your normal activities or physical therapy. But try not to overdo it, even if your back pain has improved a lot. If your pain is only a little better or if it comes back, your doctor may recommend another injection in a few weeks. If your pain has not changed, talk to your doctor about other treatment choices. Side effects from an epidural steroid injection include headache, fever, or infection. Serious side effects are rare. But they can include stroke, paralysis, or loss of vision. Follow-up care is a key part of your treatment and safety. Be sure to make and go to all appointments, and call your doctor if you are having problems. It's also a good idea to know your test results and keep a list of the medicines you take. Where can you learn more? Go to https://[email or web address].[email or web address]. org and sign in to your [employer] account. Enter Z802 in the [facility] box to learn more about \"Learning About Lumbar Epidural Steroid Injections. \"     If you do not have an account, please click on the \"Sign Up Now\" link. Current as of: March 21, 2017  Content Version: 11.3  © 9316-5439 [employer]. Care instructions adapted under license by [facility] ([facility]).  If you have questions about a medical condition or this instruction, always ask your healthcare professional. [employer], [facility] disclaims any warranty or liability for your use of this information. Patient Education        Low Back Arthritis: Exercises  Your Care Instructions  Here are some examples of typical rehabilitation exercises for your condition. Start each exercise slowly. Ease off the exercise if you start to have pain. Your doctor or physical therapist will tell you when you can start these exercises and which ones will work best for you. When you are not being active, find a comfortable position for rest. Some people are comfortable on the floor or a medium-firm bed with a small pillow under their head and another under their knees. Some people prefer to lie on their side with a pillow between their knees. Don't stay in one position for too long. Take short walks (10 to 20 minutes) every 2 to 3 hours. Avoid slopes, hills, and stairs until you feel better. Walk only distances you can manage without pain, especially leg pain. How to do the exercises  Pelvic tilt    1. Lie on your back with your knees bent. 2. \"Brace\" your stomachtighten your muscles by pulling in and imagining your belly button moving toward your spine. 3. Press your lower back into the floor. You should feel your hips and pelvis rock back. 4. Hold for 6 seconds while breathing smoothly. 5. Relax and allow your pelvis and hips to rock forward. 6. Repeat 8 to 12 times. Back stretches    1. Get down on your hands and knees on the floor. 2. Relax your head and allow it to droop. Round your back up toward the ceiling until you feel a nice stretch in your upper, middle, and lower back. Hold this stretch for as long as it feels comfortable, or about 15 to 30 seconds. 3. Return to the starting position with a flat back while you are on your hands and knees. 4. Let your back sway by pressing your stomach toward the floor. Lift your buttocks toward the ceiling. 5. Hold this position for 15 to 30 seconds.   6. Repeat 2 to 4 times.  Follow-up care is a key part of your treatment and safety. Be sure to make and go to all appointments, and call your doctor if you are having problems. It's also a good idea to know your test results and keep a list of the medicines you take. Where can you learn more? Go to https://chpepiceweb.[employer]. org and sign in to your [employer] account. Enter L281 in the [employer] box to learn more about \"Low Back Arthritis: Exercises. \"     If you do not have an account, please click on the \"Sign Up Now\" link. Current as of: March 21, 2017  Content Version: 11.3  © 3734-9507 [employer]. Care instructions adapted under license by [facility] ([facility]). If you have questions about a medical condition or this instruction, always ask your healthcare professional. [address] any warranty or liability for your use of this information.

## 2017-11-22 ENCOUNTER — TELEPHONE (OUTPATIENT)
Dept: PULMONOLOGY | Age: 67
End: 2017-11-22

## 2017-11-22 ENCOUNTER — OFFICE VISIT (OUTPATIENT)
Dept: CARDIOLOGY CLINIC | Age: 67
End: 2017-11-22

## 2017-11-22 VITALS
RESPIRATION RATE: 22 BRPM | SYSTOLIC BLOOD PRESSURE: 150 MMHG | HEART RATE: 78 BPM | BODY MASS INDEX: 31.58 KG/M2 | WEIGHT: 220.6 LBS | DIASTOLIC BLOOD PRESSURE: 80 MMHG | HEIGHT: 70 IN

## 2017-11-22 DIAGNOSIS — I25.2 MI, OLD: ICD-10-CM

## 2017-11-22 DIAGNOSIS — Z98.61 POST PTCA: ICD-10-CM

## 2017-11-22 DIAGNOSIS — I25.10 CAD IN NATIVE ARTERY: Primary | ICD-10-CM

## 2017-11-22 DIAGNOSIS — I10 ESSENTIAL HYPERTENSION: ICD-10-CM

## 2017-11-22 PROCEDURE — G8484 FLU IMMUNIZE NO ADMIN: HCPCS | Performed by: INTERNAL MEDICINE

## 2017-11-22 PROCEDURE — G8427 DOCREV CUR MEDS BY ELIG CLIN: HCPCS | Performed by: INTERNAL MEDICINE

## 2017-11-22 PROCEDURE — G8417 CALC BMI ABV UP PARAM F/U: HCPCS | Performed by: INTERNAL MEDICINE

## 2017-11-22 PROCEDURE — 99214 OFFICE O/P EST MOD 30 MIN: CPT | Performed by: INTERNAL MEDICINE

## 2017-11-22 PROCEDURE — 1123F ACP DISCUSS/DSCN MKR DOCD: CPT | Performed by: INTERNAL MEDICINE

## 2017-11-22 PROCEDURE — 3017F COLORECTAL CA SCREEN DOC REV: CPT | Performed by: INTERNAL MEDICINE

## 2017-11-22 PROCEDURE — 4004F PT TOBACCO SCREEN RCVD TLK: CPT | Performed by: INTERNAL MEDICINE

## 2017-11-22 PROCEDURE — 4040F PNEUMOC VAC/ADMIN/RCVD: CPT | Performed by: INTERNAL MEDICINE

## 2017-11-22 PROCEDURE — G8598 ASA/ANTIPLAT THER USED: HCPCS | Performed by: INTERNAL MEDICINE

## 2017-11-22 NOTE — PROGRESS NOTES
Subjective:      Patient ID: Denise Adan is a 74 y.o. male. HPI Mr. Dread Stephens is being seen today for a 3 month follow up for CAD. No changes. Not real active. No palp/tachy/syncope. Watches caffeine. No pnd. No orthopnea.           Past Medical History:   Diagnosis Date    Arthritis     Balance problem     CAD (coronary artery disease)     [facility]     followed by cardiology     [facility]     COPD (chronic obstructive pulmonary disease) ([facility]     followed by pulmonology     Depressive disorder, not elsewhere classified     DVT (deep venous thrombosis) ([facility] 12/2015    ileofemoral    Heartburn     infrequent    History of MI (myocardial infarction) May 2013    \"mild\" in [place] Hyperlipidemia     Hypertension     Neuropathy ([facility]     feet    KAVITA (obstructive sleep apnea)     cpap    Primary central sleep apnea     PVD (peripheral vascular disease) ([facility]     Unspecified cerebral artery occlusion with cerebral infarction 4/2013    \"pt states mini stroke\"    Urgency of urination     and frequency, chronic    Vertebral fracture      Past Surgical History:   Procedure Laterality Date    APPENDECTOMY      CHOLECYSTECTOMY, LAPAROSCOPIC  6/23/2014    with [facility]    CORONARY ANGIOPLASTY WITH STENT PLACEMENT  6/2013    EYE SURGERY      LAMINECTOMY  11/18/2015    Bilateral decompressive lumbar laminectomy L4-5   ; medial facetectomy L4-5 joints  bilaterally; foraminotomies l5   nerve roots bilaterally    LEG DEBRIDEMENT Right 7/23/2013    Dr. Latasha Olsen - pretibial wound    OTHER SURGICAL HISTORY Right 6/25/2013    Dr. Latasha Olsen - CFA Endarterectomy w/marsupialization; RLE wound excision & debridement     OTHER SURGICAL HISTORY Left 8/27/2013    Dr. Latasha Olsen - femoral & profunda femoris endarterectomy w/marsupialization patch angioplasty using SFA    SKIN SPLIT GRAFT Right 7/25/2013    Dr. Latasha Olsen - to non-healing R pretibial wound, 9 x 15 cm    TOTAL HIP ARTHROPLASTY Right 09/01/2016    Dr. Shonna Singh Left 12/22/2015    Dr. Oliveira Arm - CFA exploration, thrombectomy of ileofemoral system, stenting of RAFAL in-stent stenosis w/8 x 37 mm Palmaz        Allergies   Allergen Reactions    Daliresp [Roflumilast] Other (See Comments)     Severe diarrhea and did not help    Asa [Aspirin] Other (See Comments)     Stomach ache        Social History     Social History    Marital status:      Spouse name: N/A    Number of children: N/A    Years of education: N/A     Occupational History    Retired       3 Years     [employer] self employed       Social History Main Topics    Smoking status: Current Every Day Smoker     Packs/day: 0.10     Years: 48.00     Types: Cigarettes     Start date: 1/1/1967    Smokeless tobacco: Never Used      Comment:  1 pack every week    Alcohol use 0.0 oz/week      Comment: 2-3 beers a month    Drug use: No    Sexual activity: No     Other Topics Concern    Not on file     Social History Narrative    No narrative on file        Patient has a family history includes Cancer in his father and mother; Heart Disease in his father and mother. Patient  has a past medical history of Arthritis; Balance problem; CAD (coronary artery disease); CHF (congestive heart failure) ([address] Chronic ulcer of right leg ([address] COPD (chronic obstructive pulmonary disease) ([address] Depressive disorder, not elsewhere classified; DVT (deep venous thrombosis) ([address] Heartburn; History of MI (myocardial infarction); Hyperlipidemia; Hypertension; Neuropathy ([address] KAVITA (obstructive sleep apnea); Primary central sleep apnea; PVD (peripheral vascular disease) ([address] Unspecified cerebral artery occlusion with cerebral infarction; Urgency of urination; and Vertebral fracture.      Current Outpatient Prescriptions   Medication Sig Dispense Refill    HYDROcodone-acetaminophen (NORCO) 5-325 MG per tablet Take 1 tablet by mouth 2 times daily as needed for Pain . Earliest Fill Date: 11/21/17 60 tablet 0    allopurinol (ZYLOPRIM) 100 MG tablet TAKE 2 TABLETS BY MOUTH DAILY. 60 tablet 3    atorvastatin (LIPITOR) 80 MG tablet TAKE ONE TABLET BY MOUTH DAILY 30 tablet 5    gabapentin (NEURONTIN) 600 MG tablet TAKE 1 TABLET BY MOUTH EACH MORNING TAKE TWO TABLETS BY MOUTH DAILY IN THE EVENING. 90 tablet 3    metoprolol (LOPRESSOR) 100 MG tablet TAKE ONE (1) TABLET BY MOUTH TWICE A DAY. 60 tablet 6    losartan (COZAAR) 25 MG tablet TAKE 1 TABLET BY MOUTH ONCE DAILY. 30 tablet 5    nitroGLYCERIN (NITROSTAT) 0.4 MG SL tablet Place 1 tablet under the tongue every 5 minutes as needed for Chest pain 25 tablet 1    Umeclidinium Bromide (INCRUSE ELLIPTA) 62.5 MCG/INH AEPB Inhale 1 Inhaler into the lungs daily 1 each 5    Fluticasone Furoate-Vilanterol 200-25 MCG/INH AEPB Inhale 1 Inhaler into the lungs daily 1 each 5    DULoxetine (CYMBALTA) 60 MG extended release capsule TAKE ONE (1) CAPSULE BY MOUTH DAILY. 30 capsule 5    pantoprazole (PROTONIX) 40 MG tablet TAKE ONE TABLET BY MOUTH DAILY IN THE EVENING 1 HOUR BEFORE BEDTIME. 30 tablet 5    furosemide (LASIX) 20 MG tablet TAKE 1 TABLET BY MOUTH ONCE DAILY. 30 tablet 5    clopidogrel (PLAVIX) 75 MG tablet TAKE ONE TABLET BY MOUTH DAILY 90 tablet 2    albuterol (PROVENTIL) (5 MG/ML) 0.5% nebulizer solution Take 1 mL by nebulization 4 times daily as needed for Wheezing 120 each 3    albuterol sulfate HFA (VENTOLIN HFA) 108 (90 BASE) MCG/ACT inhaler INHALE TWO PUFFS BY MOUTH EVERY 6 HOURS AS NEEDED FOR WHEEZING 3 Inhaler 3    vitamin B-12 (CYANOCOBALAMIN) 1000 MCG tablet Take 1,000 mcg by mouth daily.  magnesium gluconate (MAGONATE) 500 MG tablet Take 500 mg by mouth daily. No current facility-administered medications for this visit. Vitals  Weight: 220 lb 9.6 oz (100.1 kg)    /80  P  78         Review of Systems   Constitutional: Negative for activity change and fatigue. Respiratory: Negative  for chest tightness. Negative for apnea, cough, choking and shortness of breath. Cardiovascular: Negative for chest pain, palpitations and leg swelling. No PND or orthopnea. No tachycardia. Gastrointestinal: Negative for abdominal distention. Musculoskeletal: Negative for myalgias. Neurological: Negative for dizziness, syncope and light-headedness. Psychiatric/Behavioral: Negative for behavioral problems, confusion and agitation. Other systems reviewed negative as done    Objective:   Physical Exam   Constitutional: He is oriented to person, place, and time. He appears well-developed and well-nourished. No distress. HENT:   Head: Normocephalic. Eyes: Conjunctivae and EOM are normal. Right eye exhibits no discharge. Left eye exhibits no discharge. Neck: Normal range of motion. Neck supple. No JVD present. Cardiovascular: Normal rate, regular rhythm, S1 normal, S2 normal and normal heart sounds. Exam reveals no gallop. Pulses:       Carotid pulses are 2+ on the right side, and 2+ on the left side. Decreased BS    Pulmonary/Chest: Effort normal and breath sounds normal. No respiratory distress. He has no wheezes. He has no rales. Abdominal: Soft. Bowel sounds are normal. There is no tenderness. Musculoskeletal: Normal range of motion. He exhibits no edema. Neurological: He is alert and oriented to person, place, and time. Skin: Skin is warm and dry. Psychiatric: He has a normal mood and affect. His behavior is normal. Thought content normal.       Assessment:      1. CAD in native artery     2. Post PTCA     3. MI, old     3. Essential hypertension               Plan:      CV stable. No angina. Watches bp at home. Call if bp remains up. Rhythm  stable. Wt stable. No further tachycardia. Watch caffeine. Smoking. Encouraged stop. Encouraged diet, wt loss. Reviewed previous records and testing including cath 8/13 and myoview 8/17.   No changes. Continue to monitor. Follow up 3 months.

## 2017-11-22 NOTE — TELEPHONE ENCOUNTER
Patient stopped in and would like a script written for a mask that covers his nose/mouth for a CPAP. His is really old and does not fit properly. Next time you are in office would you write a script and we can fax to it [facility]. Patient is also going to call [facility] to see if they will fax a order for mask but he was not confident on it being done.    Thank you

## 2017-12-14 RX ORDER — CLOPIDOGREL BISULFATE 75 MG/1
TABLET ORAL
Qty: 90 TABLET | Refills: 2 | Status: SHIPPED | OUTPATIENT
Start: 2017-12-14 | End: 2018-10-29 | Stop reason: SDUPTHER

## 2018-01-03 ENCOUNTER — OFFICE VISIT (OUTPATIENT)
Dept: INTERNAL MEDICINE CLINIC | Age: 68
End: 2018-01-03

## 2018-01-03 VITALS
TEMPERATURE: 97.4 F | BODY MASS INDEX: 34.69 KG/M2 | OXYGEN SATURATION: 99 % | DIASTOLIC BLOOD PRESSURE: 84 MMHG | WEIGHT: 221 LBS | HEART RATE: 67 BPM | SYSTOLIC BLOOD PRESSURE: 160 MMHG | HEIGHT: 67 IN

## 2018-01-03 DIAGNOSIS — I25.10 CORONARY ARTERY DISEASE INVOLVING NATIVE CORONARY ARTERY OF NATIVE HEART WITHOUT ANGINA PECTORIS: ICD-10-CM

## 2018-01-03 DIAGNOSIS — I73.9 PVD (PERIPHERAL VASCULAR DISEASE) (HCC): Chronic | ICD-10-CM

## 2018-01-03 DIAGNOSIS — L98.499 CHRONIC SKIN ULCER, UNSPECIFIED ULCER STAGE (HCC): ICD-10-CM

## 2018-01-03 DIAGNOSIS — H81.10 BENIGN PAROXYSMAL POSITIONAL VERTIGO, UNSPECIFIED LATERALITY: ICD-10-CM

## 2018-01-03 DIAGNOSIS — J43.9 PULMONARY EMPHYSEMA, UNSPECIFIED EMPHYSEMA TYPE (HCC): ICD-10-CM

## 2018-01-03 DIAGNOSIS — R73.02 GLUCOSE INTOLERANCE (IMPAIRED GLUCOSE TOLERANCE): Primary | ICD-10-CM

## 2018-01-03 DIAGNOSIS — Z23 NEED FOR PNEUMOCOCCAL VACCINATION: ICD-10-CM

## 2018-01-03 DIAGNOSIS — I50.32 HEART FAILURE, DIASTOLIC, CHRONIC (HCC): ICD-10-CM

## 2018-01-03 DIAGNOSIS — J43.2 CENTRILOBULAR EMPHYSEMA (HCC): Chronic | ICD-10-CM

## 2018-01-03 LAB — HBA1C MFR BLD: 5.7 %

## 2018-01-03 PROCEDURE — 4040F PNEUMOC VAC/ADMIN/RCVD: CPT | Performed by: INTERNAL MEDICINE

## 2018-01-03 PROCEDURE — 4004F PT TOBACCO SCREEN RCVD TLK: CPT | Performed by: INTERNAL MEDICINE

## 2018-01-03 PROCEDURE — 3023F SPIROM DOC REV: CPT | Performed by: INTERNAL MEDICINE

## 2018-01-03 PROCEDURE — G8417 CALC BMI ABV UP PARAM F/U: HCPCS | Performed by: INTERNAL MEDICINE

## 2018-01-03 PROCEDURE — G8926 SPIRO NO PERF OR DOC: HCPCS | Performed by: INTERNAL MEDICINE

## 2018-01-03 PROCEDURE — 90732 PPSV23 VACC 2 YRS+ SUBQ/IM: CPT | Performed by: INTERNAL MEDICINE

## 2018-01-03 PROCEDURE — 99214 OFFICE O/P EST MOD 30 MIN: CPT | Performed by: INTERNAL MEDICINE

## 2018-01-03 PROCEDURE — G8484 FLU IMMUNIZE NO ADMIN: HCPCS | Performed by: INTERNAL MEDICINE

## 2018-01-03 PROCEDURE — 1123F ACP DISCUSS/DSCN MKR DOCD: CPT | Performed by: INTERNAL MEDICINE

## 2018-01-03 PROCEDURE — G8427 DOCREV CUR MEDS BY ELIG CLIN: HCPCS | Performed by: INTERNAL MEDICINE

## 2018-01-03 PROCEDURE — G8598 ASA/ANTIPLAT THER USED: HCPCS | Performed by: INTERNAL MEDICINE

## 2018-01-03 PROCEDURE — 3017F COLORECTAL CA SCREEN DOC REV: CPT | Performed by: INTERNAL MEDICINE

## 2018-01-03 PROCEDURE — G0009 ADMIN PNEUMOCOCCAL VACCINE: HCPCS | Performed by: INTERNAL MEDICINE

## 2018-01-03 PROCEDURE — 83036 HEMOGLOBIN GLYCOSYLATED A1C: CPT | Performed by: INTERNAL MEDICINE

## 2018-01-03 RX ORDER — LOSARTAN POTASSIUM 50 MG/1
TABLET ORAL
Qty: 90 TABLET | Refills: 1 | Status: SHIPPED | OUTPATIENT
Start: 2018-01-03 | End: 2018-03-29 | Stop reason: SDUPTHER

## 2018-01-03 ASSESSMENT — ENCOUNTER SYMPTOMS: SHORTNESS OF BREATH: 1

## 2018-01-03 NOTE — PATIENT INSTRUCTIONS
Patient Education        [place] Exercises for Vertigo: Care Instructions  Your Care Instructions  Simple exercises can help you regain your balance when you have vertigo. If you have Ménière's disease, benign paroxysmal positional vertigo (BPPV), or another inner ear problem, you may have vertigo off and on. Do these exercises first thing in the morning and before you go to bed. You might get dizzy when you first start them. If this happens, try to do them for at least 5 minutes. Do a group of exercises at a time, starting at the top of the list. It may take several weeks before you can do all the exercises without feeling dizzy. Follow-up care is a key part of your treatment and safety. Be sure to make and go to all appointments, and call your doctor if you are having problems. It's also a good idea to know your test results and keep a list of the medicines you take. How can you care for yourself at home? Exercise 1  While sitting on the side of the bed and holding your head still:  · Look up as far as you can. · Look down as far as you can. · Look from side to side as far as you can. · Stretch your arm straight out in front of you. Focus on your index finger. Continue to focus on your finger while you bring it to your nose. Exercise 2  While sitting on the side of the bed:  · Bring your head as far back as you can. · Bring your head forward to touch your chin to your chest.  · Turn your head from side to side. · Do these exercises first with your eyes open. Then try with your eyes closed. Exercise 3  While sitting on the side of the bed:  · Shrug your shoulders straight upward, then relax them. · Bend over and try to touch the ground with your fingers. Then go back to a sitting position. · Toss a small ball from one hand to the other. Throw the ball higher than your eyes so you have to look up.   Exercise 4  While standing (with someone close by if you feel uncomfortable):  · Repeat Exercise

## 2018-01-03 NOTE — PROGRESS NOTES
Subjective:      Patient ID: Britt Stout is a 74 y.o. male. HPI  Breathing about the same   He is still smoking a pack to one and a half pack per week    Has been using his rescue inhaler a few tines a week   Review of Systems   Respiratory: Positive for shortness of breath (chronic ). Cardiovascular: Positive for chest pain (chronic but stable). Neurological:        Sometimes when he moves his head suddenly he gets dizzy        Objective:   Physical Exam   Constitutional: He is oriented to person, place, and time. He appears well-developed and well-nourished. Cardiovascular: Normal rate, regular rhythm and normal heart sounds. Exam reveals no gallop and no friction rub. No murmur heard. He has good doppler signal DP decreased but present PT  Both left and right    Foot is warm and also blue color left is gone  Clubbing nails   Pulmonary/Chest: Effort normal and breath sounds normal. No respiratory distress. He has no wheezes. He has no rales. Musculoskeletal: He exhibits no edema. Neurological: He is alert and oriented to person, place, and time. Skin: Skin is warm and dry. Psychiatric: He has a normal mood and affect. Assessment:         1. Glucose intolerance (impaired glucose tolerance)  POCT glycosylated hemoglobin (Hb A1C)    Comprehensive Metabolic Panel    Lipid Panel   2. Centrilobular emphysema ([address]     3. PVD (peripheral vascular disease) ([facility] [address]     4. Coronary artery disease involving native coronary artery of native heart without angina pectoris  Comprehensive Metabolic Panel    Lipid Panel   5. Benign paroxysmal positional vertigo, unspecified laterality     6.  Need for pneumococcal vaccination             Plan:       A 1 C 5.7 stable  Continue efforts diet and exercise    COPD stable    CAD stable  Check lipids    [place] exercises given    Blood pressure elevated   It has been on several other visits to other docs  Increase lorsartan 50mg    Follow up 4 months

## 2018-01-04 LAB
A/G RATIO: 1.6 (ref 1.1–2.2)
ALBUMIN SERPL-MCNC: 4.1 G/DL (ref 3.4–5)
ALP BLD-CCNC: 100 U/L (ref 40–129)
ALT SERPL-CCNC: 29 U/L (ref 10–40)
ANION GAP SERPL CALCULATED.3IONS-SCNC: 16 MMOL/L (ref 3–16)
AST SERPL-CCNC: 26 U/L (ref 15–37)
BILIRUB SERPL-MCNC: 0.5 MG/DL (ref 0–1)
BUN BLDV-MCNC: 10 MG/DL (ref 7–20)
CALCIUM SERPL-MCNC: 9.3 MG/DL (ref 8.3–10.6)
CHLORIDE BLD-SCNC: 103 MMOL/L (ref 99–110)
CHOLESTEROL, TOTAL: 160 MG/DL (ref 0–199)
CO2: 27 MMOL/L (ref 21–32)
CREAT SERPL-MCNC: 0.9 MG/DL (ref 0.8–1.3)
GFR AFRICAN AMERICAN: >60
GFR NON-AFRICAN AMERICAN: >60
GLOBULIN: 2.6 G/DL
GLUCOSE BLD-MCNC: 85 MG/DL (ref 70–99)
HDLC SERPL-MCNC: 25 MG/DL (ref 40–60)
LDL CHOLESTEROL CALCULATED: ABNORMAL MG/DL
LDL CHOLESTEROL DIRECT: 96 MG/DL
POTASSIUM SERPL-SCNC: 4.1 MMOL/L (ref 3.5–5.1)
SODIUM BLD-SCNC: 146 MMOL/L (ref 136–145)
TOTAL PROTEIN: 6.7 G/DL (ref 6.4–8.2)
TRIGL SERPL-MCNC: 372 MG/DL (ref 0–150)
VLDLC SERPL CALC-MCNC: ABNORMAL MG/DL

## 2018-01-16 RX ORDER — FUROSEMIDE 20 MG/1
TABLET ORAL
Qty: 31 TABLET | Refills: 5 | Status: SHIPPED | OUTPATIENT
Start: 2018-01-16 | End: 2018-09-06 | Stop reason: SDUPTHER

## 2018-01-16 RX ORDER — GABAPENTIN 600 MG/1
TABLET ORAL
Qty: 93 TABLET | Refills: 5 | Status: SHIPPED | OUTPATIENT
Start: 2018-01-16 | End: 2018-07-31 | Stop reason: SDUPTHER

## 2018-01-16 RX ORDER — PANTOPRAZOLE SODIUM 40 MG/1
TABLET, DELAYED RELEASE ORAL
Qty: 31 TABLET | Refills: 5 | Status: SHIPPED | OUTPATIENT
Start: 2018-01-16 | End: 2018-07-31 | Stop reason: SDUPTHER

## 2018-01-16 RX ORDER — DULOXETIN HYDROCHLORIDE 60 MG/1
CAPSULE, DELAYED RELEASE ORAL
Qty: 31 CAPSULE | Refills: 5 | Status: SHIPPED | OUTPATIENT
Start: 2018-01-16 | End: 2018-07-31 | Stop reason: SDUPTHER

## 2018-01-19 ENCOUNTER — OFFICE VISIT (OUTPATIENT)
Dept: PAIN MANAGEMENT | Age: 68
End: 2018-01-19

## 2018-01-19 VITALS
WEIGHT: 215.6 LBS | HEART RATE: 65 BPM | DIASTOLIC BLOOD PRESSURE: 104 MMHG | HEIGHT: 70 IN | SYSTOLIC BLOOD PRESSURE: 186 MMHG | BODY MASS INDEX: 30.86 KG/M2

## 2018-01-19 DIAGNOSIS — F11.90 CHRONIC, CONTINUOUS USE OF OPIOIDS: ICD-10-CM

## 2018-01-19 DIAGNOSIS — G89.29 OTHER CHRONIC PAIN: ICD-10-CM

## 2018-01-19 DIAGNOSIS — M96.1 POSTLAMINECTOMY SYNDROME, LUMBAR: Primary | ICD-10-CM

## 2018-01-19 PROCEDURE — G8484 FLU IMMUNIZE NO ADMIN: HCPCS | Performed by: ANESTHESIOLOGY

## 2018-01-19 PROCEDURE — 4004F PT TOBACCO SCREEN RCVD TLK: CPT | Performed by: ANESTHESIOLOGY

## 2018-01-19 PROCEDURE — 3017F COLORECTAL CA SCREEN DOC REV: CPT | Performed by: ANESTHESIOLOGY

## 2018-01-19 PROCEDURE — G8598 ASA/ANTIPLAT THER USED: HCPCS | Performed by: ANESTHESIOLOGY

## 2018-01-19 PROCEDURE — 99213 OFFICE O/P EST LOW 20 MIN: CPT | Performed by: ANESTHESIOLOGY

## 2018-01-19 PROCEDURE — G8427 DOCREV CUR MEDS BY ELIG CLIN: HCPCS | Performed by: ANESTHESIOLOGY

## 2018-01-19 PROCEDURE — G8417 CALC BMI ABV UP PARAM F/U: HCPCS | Performed by: ANESTHESIOLOGY

## 2018-01-19 PROCEDURE — 4040F PNEUMOC VAC/ADMIN/RCVD: CPT | Performed by: ANESTHESIOLOGY

## 2018-01-19 PROCEDURE — 1123F ACP DISCUSS/DSCN MKR DOCD: CPT | Performed by: ANESTHESIOLOGY

## 2018-01-19 RX ORDER — HYDROCODONE BITARTRATE AND ACETAMINOPHEN 5; 325 MG/1; MG/1
TABLET ORAL
Qty: 90 TABLET | Refills: 0 | Status: SHIPPED | OUTPATIENT
Start: 2018-01-19 | End: 2018-02-26 | Stop reason: SDUPTHER

## 2018-01-19 ASSESSMENT — ENCOUNTER SYMPTOMS
NAUSEA: 0
ABDOMINAL PAIN: 0
VOMITING: 0
HEARTBURN: 0
EYE PAIN: 0
WHEEZING: 0
BACK PAIN: 1
SHORTNESS OF BREATH: 0
HEMOPTYSIS: 0
EYE REDNESS: 0
ORTHOPNEA: 0
CONSTIPATION: 0
COUGH: 0
EYE DISCHARGE: 0

## 2018-01-19 NOTE — PROGRESS NOTES
[address] [facility] [address], [place], [address]  [phone] (L)  [phone] (f)    1/19/18        Jarod Glover  1950      Walker Perez MD      Chief Complain:   Chief Complaint   Patient presents with    Lower Back Pain     The patient complains of Lumbar pain that is radiating down both legs. The leg pain started one week ago. History of Present Illness   HPI    Seen us since 09/2017 -  Chronic low back pain, s/p fusion in 2015 - no acute changes. Pain constant in lower back, achy, sharp with tingling down the legs L>R side, no weakness or incontinence. Pain worse with daily activities and helped by pain medications. RED FLAG symptoms (Symptoms may include, but not limited to, acute trauma, fever, drug use, malignancy, weakness, perianal numbness, bladder/bowel changes) since last visit - No    On PLAVIX for CAD/PVD.     Changes since last visit:   None      Past Medical History:   Diagnosis Date    Arthritis     Balance problem     CAD (coronary artery disease)     CHF (congestive heart failure) ([address]     followed by cardiology     Chronic ulcer of right leg ([address]     COPD (chronic obstructive pulmonary disease) ([address]     followed by pulmonology     Depressive disorder, not elsewhere classified     DVT (deep venous thrombosis) ([address] 12/2015    ileofemoral    Heartburn     infrequent    History of MI (myocardial infarction) May 2013    \"mild\" in [place] Hyperlipidemia     Hypertension     Neuropathy ([address]     feet    KAVITA (obstructive sleep apnea)     cpap    Primary central sleep apnea     PVD (peripheral vascular disease) ([address]     Unspecified cerebral artery occlusion with cerebral infarction 4/2013    \"pt states mini stroke\"    Urgency of urination     and frequency, chronic    Vertebral fracture        Past Surgical History:   Procedure Laterality Date    APPENDECTOMY      CHOLECYSTECTOMY, LAPAROSCOPIC  6/23/2014  allopurinol (ZYLOPRIM) 100 MG tablet TAKE 2 TABLETS BY MOUTH DAILY. 60 tablet 3    atorvastatin (LIPITOR) 80 MG tablet TAKE ONE TABLET BY MOUTH DAILY 30 tablet 5    metoprolol (LOPRESSOR) 100 MG tablet TAKE ONE (1) TABLET BY MOUTH TWICE A DAY. 60 tablet 6    nitroGLYCERIN (NITROSTAT) 0.4 MG SL tablet Place 1 tablet under the tongue every 5 minutes as needed for Chest pain 25 tablet 1    Umeclidinium Bromide (INCRUSE ELLIPTA) 62.5 MCG/INH AEPB Inhale 1 Inhaler into the lungs daily 1 each 5    Fluticasone Furoate-Vilanterol 200-25 MCG/INH AEPB Inhale 1 Inhaler into the lungs daily 1 each 5    albuterol (PROVENTIL) (5 MG/ML) 0.5% nebulizer solution Take 1 mL by nebulization 4 times daily as needed for Wheezing 120 each 3    albuterol sulfate HFA (VENTOLIN HFA) 108 (90 BASE) MCG/ACT inhaler INHALE TWO PUFFS BY MOUTH EVERY 6 HOURS AS NEEDED FOR WHEEZING 3 Inhaler 3    vitamin B-12 (CYANOCOBALAMIN) 1000 MCG tablet Take 1,000 mcg by mouth daily.  magnesium gluconate (MAGONATE) 500 MG tablet Take 500 mg by mouth daily. No current facility-administered medications for this visit.         Family History   Problem Relation Age of Onset    Cancer Mother     Heart Disease Mother     Cancer Father     Heart Disease Father        Social History     Social History    Marital status:      Spouse name: N/A    Number of children: N/A    Years of education: N/A     Occupational History    Retired       3 Years     Drywall self employed       Social History Main Topics    Smoking status: Current Every Day Smoker     Packs/day: 0.10     Years: 48.00     Types: Cigarettes     Start date: 1/1/1967    Smokeless tobacco: Never Used      Comment:  1 pack every week    Alcohol use 0.0 oz/week      Comment: 2-3 beers a month    Drug use: No    Sexual activity: No     Other Topics Concern    Not on file     Social History Narrative    No narrative on file     No substance abuse; transportation issues. Imaging Studies:   CT LS (10/27/2017)  \"Impression       Prior laminectomy L4-5 and L5-S1.       L4-5 grade 1 spondylolisthesis.       Mild canal stenosis L2-3 and L3-4. \"      Results of the above studies were personally reviewed by me with the patient in detail along with the images, when available. The patient was instructed to contact the primary care provider regarding other unrelated findings not addressed during today's visit. Review of Systems:   Review of Systems   Constitutional: Negative for chills, fever, malaise/fatigue and weight loss. HENT: Negative for hearing loss and tinnitus. Eyes: Negative for pain, discharge and redness. Respiratory: Negative for cough, hemoptysis, shortness of breath and wheezing. Cardiovascular: Negative for chest pain, palpitations and orthopnea. Gastrointestinal: Negative for abdominal pain, constipation, heartburn, nausea and vomiting. Genitourinary: Negative for frequency, hematuria and urgency. Musculoskeletal: Positive for back pain. Negative for falls, joint pain, myalgias and neck pain. Skin: Negative for itching and rash. Neurological: Positive for tingling. Negative for dizziness, sensory change, focal weakness, seizures, weakness and headaches. Endo/Heme/Allergies: Does not bruise/bleed easily. Psychiatric/Behavioral: Negative for depression, substance abuse and suicidal ideas. The patient is not nervous/anxious and does not have insomnia. The patient was instructed to contact primary care physician regarding ROS positives not being addressed during today's visit. Physical Exam:   Physical Exam   Constitutional: He is oriented to person, place, and time. No distress. In mild distress; obese  Ambulates with walker   HENT:   Head: Normocephalic. Eyes: EOM are normal. Pupils are equal, round, and reactive to light. Neck: Normal range of motion. Neck supple. No thyromegaly present.    Cardiovascular: Normal rate, regular rhythm, normal heart sounds and intact distal pulses. Pulmonary/Chest: Effort normal and breath sounds normal. No respiratory distress. He exhibits no tenderness. Abdominal: Soft. Bowel sounds are normal. He exhibits no mass. There is no tenderness. There is no guarding. Musculoskeletal: Normal range of motion. He exhibits no tenderness or deformity. Lymphadenopathy:     He has no cervical adenopathy. Neurological: He is alert and oriented to person, place, and time. He has normal strength. A sensory deficit (diminished in left leg) is present. No cranial nerve deficit. He exhibits normal muscle tone. Gait (antalgic, compensated) abnormal. Coordination normal. He displays no Babinski's sign on the right side. He displays no Babinski's sign on the left side. Reflex Scores:       Tricep reflexes are 2+ on the right side and 2+ on the left side. Bicep reflexes are 2+ on the right side and 2+ on the left side. Brachioradialis reflexes are 2+ on the right side and 2+ on the left side. Patellar reflexes are 1+ on the right side and 1+ on the left side. Achilles reflexes are 1+ on the right side and 1+ on the left side. Skin: Skin is warm. No rash noted. He is not diaphoretic. Psychiatric: He has a normal mood and affect. His behavior is normal. Thought content normal.       Vitals:    01/19/18 1249 01/19/18 1251   BP: (!) 194/104 (!) 186/104   Site: Right Arm    Position: Sitting    Cuff Size: Large Adult    Pulse: 65    Weight: 215 lb 9.6 oz (97.8 kg)    Height: 5' 10\" (1.778 m)        Body mass index is 30.94 kg/m². VAS 7/10    Goals of chronic pain therapy:      Multi-disciplinary comprehensive pain management with functional improvement and reduced opioid use. Prescription pain medication monitoring:      ORT Score =  3   Risk factors - Depression, sleep apnea, obesity.      MEDD current = 10     OARRS:    Checked today: Yes    Adverse report: No       Medication Effects -        Analgesia:      Average level of pain for the past month = 7/10     Percentage of pain relief = 20%     Activities Improved: Activities of daily living = 0%     Sleep = 0%     Mood = 0%     Social functioning = 0%     Adverse Effects: Any adverse effects: No     Type/Severity of side effect: None    Aberrant Behavior:     Requests for frequent early refills: No     Increased dose without authorization: No     Reports lost or stolen prescriptions: No     Attempts to obtain prescriptions from other providers: No     Use of pain medication in response to situational stressor: No      Increasingly unkempt or impaired: No     Negative mood changes: No     Abusing alcohol or illicit drugs: No     Appears intoxicated: No     Other: NA     Controlled Substances Monitoring:    Attestation: The Prescription Monitoring Report for this patient was reviewed today. Maximino Melgar MD)  Documentation: Possible medication side effects, risk of tolerance and/or dependence, and alternative treatments discussed. , Random urine drug screen sent today., Obtaining appropriate analgesic effect of treatment., No signs of potential drug abuse or diversion identified. Maximino Melgar MD)     Failed NSAIDs (on Plavix), anti-depressants, anti-convulsants through other providers. Based on an analysis of risks, benefits, and alternatives, utilization of prescription drugs is medically reasonable for the treatment of chronic pain. Overall Progress:       PEG Score = 7 / 10     Physical Therapy: Last in 11/2017   Counseling: N/A   Injections: N/A     Patient compliance on plan of care:  Compliant   Progress on current plan:  Appropriate    Assessment:    1. Postlaminectomy syndrome, lumbar  Chronic.  - HYDROcodone-acetaminophen (NORCO) 5-325 MG per tablet; Take 1 tablet by mouth up to three times a day, as needed for severe pain. Abhay Tom Dispense: 90 tablet; Refill: 0    2.  Other chronic pain  Chronic  -

## 2018-01-19 NOTE — PATIENT INSTRUCTIONS
Continue home exercises and gabapentin  Use Norco for severe pain as discussed  Follow with us after 3 months, or earlier if needed    Patient Education        Safe Use of Opioid Pain Medicine: Care Instructions  Your Care Instructions  Pain is your body's way of warning you that something is wrong. Pain feels different for everybody. Only you can describe your pain. A doctor can suggest or prescribe many types of medicines for pain. These range from over-the-counter medicines like acetaminophen (Tylenol) to powerful medicines called opioids. Examples of opioids are fentanyl, hydrocodone, morphine, and oxycodone. Heroin is an illegal opioid  Opioids are strong medicines. They can help you manage pain when you use them the right way. But if you misuse them, they can cause serious harm and even death. For these reasons, doctors are very careful about how they prescribe opioids. If you decide to take opioids, here are some things to remember. · Keep your doctor informed. You can get addicted to opioids. The risk is higher if you have a history of substance use. Your doctor will monitor you closely for signs of misuse and addiction and to figure out when you no longer need to take opioids. · Make a treatment plan. The goal of your plan is to be able to function and do the things you need to do, even if you still have some pain. You might be able to manage your pain with other non-opioid options like physical therapy, relaxation, or over-the-counter pain medicines. · Be aware of the side effects. Opioids can cause serious side effects, such as constipation, dry mouth, and nausea. And over time, you may need a higher dose to get pain relief. This is called tolerance. Your body also gets used to opioids. This is called physical dependence. If you suddenly stop taking them, you may have withdrawal symptoms. The doctor carefully considered what pain medicine is right for you.  You may not have received opioids if your doctor was concerned about drug interactions or your safety, or if he or she had other concerns. It is best to have one doctor or clinic treat your pain. This way you will get the pain medicine that will help you the most. And a doctor will be able to watch for any problems that the medicine might cause. The doctor has checked you carefully, but problems can develop later. If you notice any problems or new symptoms,  get medical treatment right away. Follow-up care is a key part of your treatment and safety. Be sure to make and go to all appointments, and call your doctor if you are having problems. It's also a good idea to know your test results and keep a list of the medicines you take. How can you care for yourself at home? · If you need to take opioids to manage your pain, remember these safety tips. ¨ Follow directions carefully. It's easy to misuse opioids if you take a dose other than what's prescribed by your doctor. This can lead to overdose and even death. Even sharing them with someone they weren't meant for is misuse. ¨ Be cautious. Opioids may affect your judgment and decision making. Do not drive or operate machinery until you can think clearly. Talk with your doctor about when it is safe to drive. ¨ Reduce the risk of drug interactions. Opioids can be dangerous if you take them with alcohol or with certain drugs like sleeping pills and muscle relaxers. Make sure your doctor knows about all the other medicines you take, including over-the-counter medicines. Don't start any new medicines before you talk to your doctor or pharmacist.  Garrison Stevenson Keep others safe. Store opioids in a safe and secure place. Make sure that pets, children, friends, and family can't get to them. When you're done using opioids, make sure to properly dispose of them.  You can either use a community drug take-back program or your drugstore's mail-back program. If one of these programs isn't available, you can flush opioid skin patches and unused opioid pills down the toilet. ¨ Reduce the risk of overdose. Misuse of opioids can be very dangerous. Protect yourself by asking your doctor about a naloxone rescue kit. It can help you-and even save your life-if you take too much of an opioid. · Try other ways to reduce pain. ¨ Relax, and reduce stress. Relaxation techniques such as deep breathing or meditation can help. ¨ Keep moving. Gentle, daily exercise can help reduce pain over the long run. Try low- or no-impact exercises such as walking, swimming, and stationary biking. Do stretches to stay flexible. ¨ Try heat, cold packs, and massage. ¨ Get enough sleep. Pain can make you tired and drain your energy. Talk with your doctor if you have trouble sleeping because of pain. ¨ Think positive. Your thoughts can affect your pain level. Do things that you enjoy to distract yourself when you have pain instead of focusing on the pain. See a movie, read a book, listen to music, or spend time with a friend. · If you are not taking a prescription pain medicine, ask your doctor if you can take an over-the-counter medicine. When should you call for help? Call your doctor now or seek immediate medical care if:  ? · You have a new kind of pain. ? · You have new symptoms, such as a fever or rash, along with the pain. ? Watch closely for changes in your health, and be sure to contact your doctor if:  ? · You think you might be using too much pain medicine, and you need help to use less or stop. ? · Your pain gets worse. ? · You would like a referral to a doctor or clinic that specializes in pain management. Where can you learn more? Go to [email or web address] org and sign in to your [employer] account. Enter R108 in the Shortlist box to learn more about \"Safe Use of Opioid Pain Medicine: Care Instructions. \"     If you do not have an account, please click on the \"Sign Up Now\" link.   Current as of: October 14, 2016  Content Version: 11.5  © 2448-9681 [employer], [employer]. Care instructions adapted under license by [facility] ([facility]). If you have questions about a medical condition or this instruction, always ask your healthcare professional. [address] any warranty or liability for your use of this information.

## 2018-01-25 LAB
6-ACETYLMORPHINE: NOT DETECTED
7-AMINOCLONAZEPAM: NOT DETECTED
ALPHA-OH-ALPRAZOLAM: NOT DETECTED
ALPRAZOLAM: NOT DETECTED
AMPHETAMINE: NOT DETECTED
BARBITURATES: NOT DETECTED
BENZOYLECGONINE: NOT DETECTED
BUPRENORPHINE: NOT DETECTED
CARISOPRODOL: NOT DETECTED
CLONAZEPAM: NOT DETECTED
CODEINE: NOT DETECTED
CREATININE URINE: 115.4 MG/DL (ref 20–400)
DIAZEPAM: NOT DETECTED
DRUGS EXPECTED: NORMAL
EER PAIN MGT DRUG PANEL, HIGH RES/EMIT U: NORMAL
ETHYL GLUCURONIDE: PRESENT
FENTANYL: NOT DETECTED
HYDROCODONE: NOT DETECTED
HYDROMORPHONE: NOT DETECTED
LORAZEPAM: NOT DETECTED
MARIJUANA METABOLITE: NOT DETECTED
MDA: NOT DETECTED
MDEA: NOT DETECTED
MDMA URINE: NOT DETECTED
MEPERIDINE: NOT DETECTED
METHADONE: NOT DETECTED
METHAMPHETAMINE: NOT DETECTED
METHYLPHENIDATE: NOT DETECTED
MIDAZOLAM: NOT DETECTED
MORPHINE: NOT DETECTED
NORBUPRENORPHINE, FREE: NOT DETECTED
NORDIAZEPAM: NOT DETECTED
NORFENTANYL: NOT DETECTED
NORHYDROCODONE, URINE: NOT DETECTED
NOROXYCODONE: NOT DETECTED
NOROXYMORPHONE, URINE: NOT DETECTED
OXAZEPAM: NOT DETECTED
OXYCODONE: NOT DETECTED
OXYMORPHONE: NOT DETECTED
PAIN MANAGEMENT DRUG PANEL: NORMAL
PAIN MANAGEMENT DRUG PANEL: NORMAL
PCP: NOT DETECTED
PHENTERMINE: NOT DETECTED
PROPOXYPHENE: NOT DETECTED
TAPENTADOL, URINE: NOT DETECTED
TAPENTADOL-O-SULFATE, URINE: NOT DETECTED
TEMAZEPAM: NOT DETECTED
TRAMADOL: NOT DETECTED
ZOLPIDEM: NOT DETECTED

## 2018-02-12 ENCOUNTER — OFFICE VISIT (OUTPATIENT)
Dept: PULMONOLOGY | Age: 68
End: 2018-02-12

## 2018-02-12 VITALS
OXYGEN SATURATION: 97 % | WEIGHT: 214 LBS | SYSTOLIC BLOOD PRESSURE: 122 MMHG | RESPIRATION RATE: 16 BRPM | HEART RATE: 65 BPM | BODY MASS INDEX: 30.64 KG/M2 | HEIGHT: 70 IN | DIASTOLIC BLOOD PRESSURE: 68 MMHG

## 2018-02-12 DIAGNOSIS — J43.2 CENTRILOBULAR EMPHYSEMA (HCC): ICD-10-CM

## 2018-02-12 DIAGNOSIS — R07.9 CHEST PAIN, UNSPECIFIED TYPE: ICD-10-CM

## 2018-02-12 DIAGNOSIS — F17.219 NICOTINE DEPENDENCE, CIGARETTES, WITH UNSPECIFIED NICOTINE-INDUCED DISORDERS: ICD-10-CM

## 2018-02-12 DIAGNOSIS — Z99.89 OSA ON CPAP: Primary | ICD-10-CM

## 2018-02-12 DIAGNOSIS — G47.33 OSA ON CPAP: Primary | ICD-10-CM

## 2018-02-12 PROCEDURE — 99214 OFFICE O/P EST MOD 30 MIN: CPT | Performed by: INTERNAL MEDICINE

## 2018-02-12 PROCEDURE — 4004F PT TOBACCO SCREEN RCVD TLK: CPT | Performed by: INTERNAL MEDICINE

## 2018-02-12 PROCEDURE — 1123F ACP DISCUSS/DSCN MKR DOCD: CPT | Performed by: INTERNAL MEDICINE

## 2018-02-12 PROCEDURE — G8484 FLU IMMUNIZE NO ADMIN: HCPCS | Performed by: INTERNAL MEDICINE

## 2018-02-12 PROCEDURE — G8417 CALC BMI ABV UP PARAM F/U: HCPCS | Performed by: INTERNAL MEDICINE

## 2018-02-12 PROCEDURE — G8926 SPIRO NO PERF OR DOC: HCPCS | Performed by: INTERNAL MEDICINE

## 2018-02-12 PROCEDURE — G8598 ASA/ANTIPLAT THER USED: HCPCS | Performed by: INTERNAL MEDICINE

## 2018-02-12 PROCEDURE — G8427 DOCREV CUR MEDS BY ELIG CLIN: HCPCS | Performed by: INTERNAL MEDICINE

## 2018-02-12 PROCEDURE — 3017F COLORECTAL CA SCREEN DOC REV: CPT | Performed by: INTERNAL MEDICINE

## 2018-02-12 PROCEDURE — 3023F SPIROM DOC REV: CPT | Performed by: INTERNAL MEDICINE

## 2018-02-12 PROCEDURE — 4040F PNEUMOC VAC/ADMIN/RCVD: CPT | Performed by: INTERNAL MEDICINE

## 2018-02-12 NOTE — PROGRESS NOTES
Formerly [facility] Critical [facility]    Outpatient Follow Up Note    Subjective:   CHIEF COMPLAINT / HPI:     The patient is 74 y.o. male who presents today for follow up of COPD, CHF and KAVITA. He still smokes about 4-5 cigarettes a week. He is using his CPAP. He is currently on breo and incruse and tolerating well. Says he had a bad weekend where he had stabbing chest pain at his left sternal border which made it difficult to sleep and bothered him when he coughed. He also says he had bad heart burn and while the pain is currently gone he does still have some tenderness when he pushes. He says his blood pressure at the times he was having pain was running in the 200's over 100's. He didn't go to the ED or urgent care because he doesn't drive. Previous history: Back to wearing his CPAP. Has some dyspnea at times, without a clear trigger. He had an issue with his new pharmacy, [facility], and went without symbicort for 3 weeks. He noticed a difference. His hip is recovering well and he's more mobile. He's still smoking anywhere from a half pack to a quarter of a pack per week.     Past Medical History:    Past Medical History:   Diagnosis Date    Arthritis     Balance problem     CAD (coronary artery disease)     CHF (congestive heart failure) ([address]     followed by cardiology     Chronic ulcer of right leg ([address]     COPD (chronic obstructive pulmonary disease) ([address]     followed by pulmonology     Depressive disorder, not elsewhere classified     DVT (deep venous thrombosis) ([address] 12/2015    ileofemoral    Heartburn     infrequent    History of MI (myocardial infarction) May 2013    \"mild\" in [place] Hyperlipidemia     Hypertension     Neuropathy ([address]     feet    KAVITA (obstructive sleep apnea)     cpap    Primary central sleep apnea     PVD (peripheral vascular disease) ([address]     Unspecified cerebral artery occlusion with cerebral infarction 4/2013    \"pt states mini stroke\"    Urgency of urination     and frequency, chronic    Vertebral fracture        Social History:    History   Smoking Status    Current Every Day Smoker    Packs/day: 0.10    Years: 48.00    Types: Cigarettes    Start date: 1/1/1967   Smokeless Tobacco    Never Used     Comment:  1 pack every week       Current Medications:  Current Outpatient Prescriptions on File Prior to Visit   Medication Sig Dispense Refill    HYDROcodone-acetaminophen (NORCO) 5-325 MG per tablet Take 1 tablet by mouth up to three times a day, as needed for severe pain. . 90 tablet 0    DULoxetine (CYMBALTA) 60 MG extended release capsule TAKE ONE (1) CAPSULE BY MOUTH DAILY. 31 capsule 5    pantoprazole (PROTONIX) 40 MG tablet TAKE ONE TABLET BY MOUTH DAILY IN THE EVENING 1 HOUR BEFORE BEDTIME. 31 tablet 5    furosemide (LASIX) 20 MG tablet TAKE 1 TABLET BY MOUTH ONCE DAILY. 31 tablet 5    gabapentin (NEURONTIN) 600 MG tablet TAKE 1 TABLET BY MOUTH EACH MORNING TAKE TWO TABLETS BY MOUTH DAILY IN THE EVENING. 93 tablet 5    losartan (COZAAR) 50 MG tablet TAKE 1 TABLET BY MOUTH ONCE DAILY. 90 tablet 1    clopidogrel (PLAVIX) 75 MG tablet TAKE ONE TABLET BY MOUTH DAILY 90 tablet 2    allopurinol (ZYLOPRIM) 100 MG tablet TAKE 2 TABLETS BY MOUTH DAILY. 60 tablet 3    atorvastatin (LIPITOR) 80 MG tablet TAKE ONE TABLET BY MOUTH DAILY 30 tablet 5    metoprolol (LOPRESSOR) 100 MG tablet TAKE ONE (1) TABLET BY MOUTH TWICE A DAY.  60 tablet 6    nitroGLYCERIN (NITROSTAT) 0.4 MG SL tablet Place 1 tablet under the tongue every 5 minutes as needed for Chest pain 25 tablet 1    Umeclidinium Bromide (INCRUSE ELLIPTA) 62.5 MCG/INH AEPB Inhale 1 Inhaler into the lungs daily 1 each 5    Fluticasone Furoate-Vilanterol 200-25 MCG/INH AEPB Inhale 1 Inhaler into the lungs daily 1 each 5    albuterol (PROVENTIL) (5 MG/ML) 0.5% nebulizer solution Take 1 mL by nebulization 4 times daily as needed for Wheezing 120 each 3    albuterol sulfate moderately reduced diffusing capacity. Ct screenin2017  Lung RADS category 2 benign findings. Small pulmonary nodule right   lung as described unchanged.       Recommendation: Continued annual screening low dose CT       Lung RADS category S significant findings-marked coronary artery   calcification which is a risk factor for acute coronary syndrome. Marked fatty filtration liver. Emphysema. Assessment: This is a 74 y.o. male with COPD and KAVITA    Plan:     COPD:He's on appropriate COPD management with breo and incruse and doesn't appear to be in exacerbation    Patient attempted pulmonary rehab for 2-3 weeks but found he was in too much pain and stopped going. Screening CTs had no changes    The patient is currently smoking less than a pack week. The risks related to smoking were reviewed with the patient. Recommend maintaining a smoke-free lifestyle. Products available for smoking cessation were discussed in detail. Says he's close to quitting. Continue CPAP    CHEST PAIN:  I told Mr. Maren Caro that if he has chest pain and a blood pressure in the 200's again he needs to get to the hospital.  As it stands today his bp is controlled and the pain is essentially resolved. The etiology is unclear as it wasn't worsened with exertion nor relieved with rest.  He took baking soda by mouth which helped his heartburn but not the pain. The reproducibility on palpation is encouraging, as is a recent negative stress test, but patient definitely has risk factors for ischemia. Perhaps the bp of 200+ was causing the pain. More than half the time of this 20 minute visit was spent counseling the patient. RTC 4 months w/ MD. Call or RTC sooner if symptoms persist or worsen acutely.         Shanita Dunlow      Low Dose CT (LDCT) Lung Screening criteria met   Age 50-69   Pack year smoking >30   Still smoking or less than 15 year since quit   No sign or symptoms of lung cancer   > 11 months since last LDCT     Risks and benefits of lung cancer screening with LDCT scans discussed:    Significance of positive screen - False-positive LDCT results often occur. 95% of all positive results do not lead to a diagnosis of cancer. Usually further imaging can resolve most false-positive results; however, some patients may require invasive procedures. Over diagnosis risk - 10% to 12% of screen-detected lung cancer cases are over diagnosedthat is, the cancer would not have been detected in the patient's lifetime without the screening. Need for follow up screens annually to continue lung cancer screening effectiveness     Risks associated with radiation from annual LDCT- Radiation exposure is about the same as for a mammogram, which is about 1/3 of the annual background radiation exposure from everyday life. Starting screening at age 54 is not likely to increase cancer risk from radiation exposure. Patients with comorbidities resulting in life expectancy of < 10 years, or that would preclude treatment of an abnormality identified on CT, should not be screened due to lack of benefit.     To obtain maximal benefit from this screening, smoking cessation and long-term abstinence from smoking is critical

## 2018-02-12 NOTE — Clinical Note
Dr. Bandar Strickland, Just wanted to give you a heads up that Mr. Lepe Cousins spent this past weekend toughing out left sided chest pain and a reported blood pressure in the 200s. All of which was resolved when I saw him today.   Claudia Rhodes

## 2018-02-26 DIAGNOSIS — M96.1 POSTLAMINECTOMY SYNDROME, LUMBAR: ICD-10-CM

## 2018-02-26 DIAGNOSIS — G89.29 OTHER CHRONIC PAIN: ICD-10-CM

## 2018-02-26 RX ORDER — HYDROCODONE BITARTRATE AND ACETAMINOPHEN 5; 325 MG/1; MG/1
TABLET ORAL
Qty: 90 TABLET | Refills: 0 | Status: SHIPPED | OUTPATIENT
Start: 2018-02-26 | End: 2018-03-29 | Stop reason: SDUPTHER

## 2018-02-26 NOTE — TELEPHONE ENCOUNTER
MEDICATION ISSUES    ALL MEDICATION REFILLS NEED 48-HOUR ADVANCED NOTICE - Not filled on weekends or holidays.      Issue:  Medication Refill   Requesting Refill: Yes    Medication requested: Norco 5 mg     Reporting Side effects: No / If other, Describe: None    Last date filled: 1/20/1/   LUCA done: Yes   Follow-up Appointment date: Yes 4/19/18

## 2018-03-08 DIAGNOSIS — J44.9 CHRONIC OBSTRUCTIVE PULMONARY DISEASE, UNSPECIFIED COPD TYPE (HCC): Primary | ICD-10-CM

## 2018-03-09 RX ORDER — UMECLIDINIUM 62.5 UG/1
AEROSOL, POWDER ORAL
Qty: 3 EACH | Refills: 4 | Status: SHIPPED | OUTPATIENT
Start: 2018-03-09 | End: 2019-05-04 | Stop reason: SDUPTHER

## 2018-03-14 ENCOUNTER — TELEPHONE (OUTPATIENT)
Dept: CASE MANAGEMENT | Age: 68
End: 2018-03-14

## 2018-03-19 RX ORDER — ALLOPURINOL 100 MG/1
TABLET ORAL
Qty: 62 TABLET | Refills: 0 | Status: SHIPPED | OUTPATIENT
Start: 2018-03-19 | End: 2018-07-31 | Stop reason: SDUPTHER

## 2018-03-29 ENCOUNTER — TELEPHONE (OUTPATIENT)
Dept: INTERNAL MEDICINE CLINIC | Age: 68
End: 2018-03-29

## 2018-03-29 DIAGNOSIS — F11.90 CHRONIC, CONTINUOUS USE OF OPIOIDS: Primary | ICD-10-CM

## 2018-03-29 DIAGNOSIS — M96.1 POSTLAMINECTOMY SYNDROME, LUMBAR: ICD-10-CM

## 2018-03-29 DIAGNOSIS — G89.29 OTHER CHRONIC PAIN: ICD-10-CM

## 2018-03-29 RX ORDER — LOSARTAN POTASSIUM 100 MG/1
TABLET ORAL
Qty: 90 TABLET | Refills: 1 | Status: SHIPPED | OUTPATIENT
Start: 2018-03-29 | End: 2018-09-25 | Stop reason: SDUPTHER

## 2018-03-29 RX ORDER — HYDROCODONE BITARTRATE AND ACETAMINOPHEN 5; 325 MG/1; MG/1
TABLET ORAL
Qty: 90 TABLET | Refills: 0 | Status: SHIPPED | OUTPATIENT
Start: 2018-03-29 | End: 2018-04-19 | Stop reason: SDUPTHER

## 2018-03-29 NOTE — TELEPHONE ENCOUNTER
Called and let him know to have him increase his losartan to 100 mg a day  He may take 2 of the 50 mg tablets and I will send in new rx for 100 mg tablet  Also he needs office visit for follow up blood pressure in 2 weeks  Continue to monitor

## 2018-03-29 NOTE — TELEPHONE ENCOUNTER
Please have him increase his losartan to 100 mg a day  He may take 2 of the 50 mg tablets and I will send in new rx for 100 mg tablet  Also he needs office visit for follow up blood pressure in 2 weeks  Continue to monitor   This was sent to [facility]

## 2018-04-02 DIAGNOSIS — R06.2 WHEEZING: Primary | ICD-10-CM

## 2018-04-02 RX ORDER — ALBUTEROL SULFATE 90 UG/1
AEROSOL, METERED RESPIRATORY (INHALATION)
Qty: 3 INHALER | Refills: 3 | Status: SHIPPED | OUTPATIENT
Start: 2018-04-02 | End: 2018-05-02 | Stop reason: CLARIF

## 2018-04-05 LAB
6-ACETYLMORPHINE: NOT DETECTED
7-AMINOCLONAZEPAM: NOT DETECTED
ALPHA-OH-ALPRAZOLAM: NOT DETECTED
ALPRAZOLAM: NOT DETECTED
AMPHETAMINE: NOT DETECTED
BARBITURATES: NOT DETECTED
BENZOYLECGONINE: NOT DETECTED
BUPRENORPHINE: NOT DETECTED
CARISOPRODOL: NOT DETECTED
CLONAZEPAM: NOT DETECTED
CODEINE: NOT DETECTED
CREATININE URINE: 170.2 MG/DL (ref 20–400)
DIAZEPAM: NOT DETECTED
DRUGS EXPECTED: NORMAL
EER PAIN MGT DRUG PANEL, HIGH RES/EMIT U: NORMAL
ETHYL GLUCURONIDE: PRESENT
FENTANYL: NOT DETECTED
HYDROCODONE: PRESENT
HYDROMORPHONE: NOT DETECTED
LORAZEPAM: NOT DETECTED
MARIJUANA METABOLITE: NOT DETECTED
MDA: NOT DETECTED
MDEA: NOT DETECTED
MDMA URINE: NOT DETECTED
MEPERIDINE: NOT DETECTED
METHADONE: NOT DETECTED
METHAMPHETAMINE: NOT DETECTED
METHYLPHENIDATE: NOT DETECTED
MIDAZOLAM: NOT DETECTED
MORPHINE: NOT DETECTED
NORBUPRENORPHINE, FREE: NOT DETECTED
NORDIAZEPAM: NOT DETECTED
NORFENTANYL: NOT DETECTED
NORHYDROCODONE, URINE: PRESENT
NOROXYCODONE: NOT DETECTED
NOROXYMORPHONE, URINE: NOT DETECTED
OXAZEPAM: NOT DETECTED
OXYCODONE: NOT DETECTED
OXYMORPHONE: NOT DETECTED
PAIN MANAGEMENT DRUG PANEL: NORMAL
PAIN MANAGEMENT DRUG PANEL: NORMAL
PCP: NOT DETECTED
PHENTERMINE: NOT DETECTED
PROPOXYPHENE: NOT DETECTED
TAPENTADOL, URINE: NOT DETECTED
TAPENTADOL-O-SULFATE, URINE: NOT DETECTED
TEMAZEPAM: NOT DETECTED
TRAMADOL: NOT DETECTED
ZOLPIDEM: NOT DETECTED

## 2018-04-19 ENCOUNTER — OFFICE VISIT (OUTPATIENT)
Dept: PAIN MANAGEMENT | Age: 68
End: 2018-04-19

## 2018-04-19 VITALS
HEIGHT: 70 IN | WEIGHT: 222 LBS | SYSTOLIC BLOOD PRESSURE: 169 MMHG | DIASTOLIC BLOOD PRESSURE: 84 MMHG | BODY MASS INDEX: 31.78 KG/M2 | HEART RATE: 69 BPM

## 2018-04-19 DIAGNOSIS — F11.90 CHRONIC, CONTINUOUS USE OF OPIOIDS: ICD-10-CM

## 2018-04-19 DIAGNOSIS — M96.1 POSTLAMINECTOMY SYNDROME, LUMBAR: Primary | ICD-10-CM

## 2018-04-19 DIAGNOSIS — R00.0 TACHYCARDIA: ICD-10-CM

## 2018-04-19 DIAGNOSIS — I10 ESSENTIAL HYPERTENSION: ICD-10-CM

## 2018-04-19 DIAGNOSIS — I25.10 CORONARY ARTERY DISEASE INVOLVING NATIVE CORONARY ARTERY OF NATIVE HEART WITHOUT ANGINA PECTORIS: ICD-10-CM

## 2018-04-19 DIAGNOSIS — G89.29 OTHER CHRONIC PAIN: ICD-10-CM

## 2018-04-19 PROCEDURE — G8417 CALC BMI ABV UP PARAM F/U: HCPCS | Performed by: ANESTHESIOLOGY

## 2018-04-19 PROCEDURE — 3017F COLORECTAL CA SCREEN DOC REV: CPT | Performed by: ANESTHESIOLOGY

## 2018-04-19 PROCEDURE — 4004F PT TOBACCO SCREEN RCVD TLK: CPT | Performed by: ANESTHESIOLOGY

## 2018-04-19 PROCEDURE — G8427 DOCREV CUR MEDS BY ELIG CLIN: HCPCS | Performed by: ANESTHESIOLOGY

## 2018-04-19 PROCEDURE — 1123F ACP DISCUSS/DSCN MKR DOCD: CPT | Performed by: ANESTHESIOLOGY

## 2018-04-19 PROCEDURE — 99213 OFFICE O/P EST LOW 20 MIN: CPT | Performed by: ANESTHESIOLOGY

## 2018-04-19 PROCEDURE — 4040F PNEUMOC VAC/ADMIN/RCVD: CPT | Performed by: ANESTHESIOLOGY

## 2018-04-19 PROCEDURE — G8598 ASA/ANTIPLAT THER USED: HCPCS | Performed by: ANESTHESIOLOGY

## 2018-04-19 RX ORDER — HYDROCODONE BITARTRATE AND ACETAMINOPHEN 5; 325 MG/1; MG/1
TABLET ORAL
Qty: 90 TABLET | Refills: 0 | Status: SHIPPED | OUTPATIENT
Start: 2018-05-08 | End: 2018-06-07 | Stop reason: SDUPTHER

## 2018-04-24 ENCOUNTER — OFFICE VISIT (OUTPATIENT)
Dept: CARDIOLOGY CLINIC | Age: 68
End: 2018-04-24

## 2018-04-24 VITALS
BODY MASS INDEX: 32 KG/M2 | DIASTOLIC BLOOD PRESSURE: 70 MMHG | SYSTOLIC BLOOD PRESSURE: 122 MMHG | HEART RATE: 72 BPM | WEIGHT: 223 LBS

## 2018-04-24 DIAGNOSIS — Z98.61 HISTORY OF PTCA: ICD-10-CM

## 2018-04-24 DIAGNOSIS — I25.10 CAD IN NATIVE ARTERY: Primary | ICD-10-CM

## 2018-04-24 DIAGNOSIS — I10 ESSENTIAL HYPERTENSION: ICD-10-CM

## 2018-04-24 DIAGNOSIS — I25.2 MI, OLD: ICD-10-CM

## 2018-04-24 PROCEDURE — 3017F COLORECTAL CA SCREEN DOC REV: CPT | Performed by: INTERNAL MEDICINE

## 2018-04-24 PROCEDURE — G8417 CALC BMI ABV UP PARAM F/U: HCPCS | Performed by: INTERNAL MEDICINE

## 2018-04-24 PROCEDURE — 1123F ACP DISCUSS/DSCN MKR DOCD: CPT | Performed by: INTERNAL MEDICINE

## 2018-04-24 PROCEDURE — 4004F PT TOBACCO SCREEN RCVD TLK: CPT | Performed by: INTERNAL MEDICINE

## 2018-04-24 PROCEDURE — 4040F PNEUMOC VAC/ADMIN/RCVD: CPT | Performed by: INTERNAL MEDICINE

## 2018-04-24 PROCEDURE — G8427 DOCREV CUR MEDS BY ELIG CLIN: HCPCS | Performed by: INTERNAL MEDICINE

## 2018-04-24 PROCEDURE — 99214 OFFICE O/P EST MOD 30 MIN: CPT | Performed by: INTERNAL MEDICINE

## 2018-04-24 PROCEDURE — G8598 ASA/ANTIPLAT THER USED: HCPCS | Performed by: INTERNAL MEDICINE

## 2018-04-24 RX ORDER — METOPROLOL TARTRATE 100 MG/1
TABLET ORAL
Qty: 60 TABLET | Refills: 5 | Status: SHIPPED | OUTPATIENT
Start: 2018-04-24 | End: 2018-04-25

## 2018-04-25 RX ORDER — METOPROLOL TARTRATE 100 MG/1
150 TABLET ORAL 2 TIMES DAILY
Qty: 90 TABLET | Refills: 5 | Status: SHIPPED | OUTPATIENT
Start: 2018-04-25 | End: 2018-07-23 | Stop reason: SDUPTHER

## 2018-05-02 ENCOUNTER — OFFICE VISIT (OUTPATIENT)
Dept: INTERNAL MEDICINE CLINIC | Age: 68
End: 2018-05-02

## 2018-05-02 VITALS
BODY MASS INDEX: 31.52 KG/M2 | HEIGHT: 70 IN | TEMPERATURE: 98.1 F | HEART RATE: 73 BPM | DIASTOLIC BLOOD PRESSURE: 74 MMHG | SYSTOLIC BLOOD PRESSURE: 158 MMHG | WEIGHT: 220.2 LBS

## 2018-05-02 DIAGNOSIS — I10 ESSENTIAL HYPERTENSION: Primary | Chronic | ICD-10-CM

## 2018-05-02 DIAGNOSIS — R73.02 GLUCOSE INTOLERANCE (IMPAIRED GLUCOSE TOLERANCE): ICD-10-CM

## 2018-05-02 DIAGNOSIS — E78.5 HYPERLIPIDEMIA, UNSPECIFIED HYPERLIPIDEMIA TYPE: Chronic | ICD-10-CM

## 2018-05-02 DIAGNOSIS — Z23 NEED FOR PROPHYLACTIC VACCINATION AND INOCULATION AGAINST VARICELLA: ICD-10-CM

## 2018-05-02 DIAGNOSIS — J43.9 PULMONARY EMPHYSEMA, UNSPECIFIED EMPHYSEMA TYPE (HCC): Chronic | ICD-10-CM

## 2018-05-02 DIAGNOSIS — I73.9 PVD (PERIPHERAL VASCULAR DISEASE) (HCC): Chronic | ICD-10-CM

## 2018-05-02 LAB
ANION GAP SERPL CALCULATED.3IONS-SCNC: 16 MMOL/L (ref 3–16)
BUN BLDV-MCNC: 12 MG/DL (ref 7–20)
CALCIUM SERPL-MCNC: 9.3 MG/DL (ref 8.3–10.6)
CHLORIDE BLD-SCNC: 102 MMOL/L (ref 99–110)
CHOLESTEROL, TOTAL: 141 MG/DL (ref 0–199)
CO2: 24 MMOL/L (ref 21–32)
CREAT SERPL-MCNC: 0.8 MG/DL (ref 0.8–1.3)
GFR AFRICAN AMERICAN: >60
GFR NON-AFRICAN AMERICAN: >60
GLUCOSE BLD-MCNC: 100 MG/DL (ref 70–99)
HBA1C MFR BLD: 5.5 %
HDLC SERPL-MCNC: 33 MG/DL (ref 40–60)
LDL CHOLESTEROL CALCULATED: 62 MG/DL
POTASSIUM SERPL-SCNC: 3.8 MMOL/L (ref 3.5–5.1)
SODIUM BLD-SCNC: 142 MMOL/L (ref 136–145)
TRIGL SERPL-MCNC: 232 MG/DL (ref 0–150)
VLDLC SERPL CALC-MCNC: 46 MG/DL

## 2018-05-02 PROCEDURE — G8926 SPIRO NO PERF OR DOC: HCPCS | Performed by: INTERNAL MEDICINE

## 2018-05-02 PROCEDURE — G8417 CALC BMI ABV UP PARAM F/U: HCPCS | Performed by: INTERNAL MEDICINE

## 2018-05-02 PROCEDURE — 4040F PNEUMOC VAC/ADMIN/RCVD: CPT | Performed by: INTERNAL MEDICINE

## 2018-05-02 PROCEDURE — G8598 ASA/ANTIPLAT THER USED: HCPCS | Performed by: INTERNAL MEDICINE

## 2018-05-02 PROCEDURE — G8427 DOCREV CUR MEDS BY ELIG CLIN: HCPCS | Performed by: INTERNAL MEDICINE

## 2018-05-02 PROCEDURE — 1123F ACP DISCUSS/DSCN MKR DOCD: CPT | Performed by: INTERNAL MEDICINE

## 2018-05-02 PROCEDURE — 99214 OFFICE O/P EST MOD 30 MIN: CPT | Performed by: INTERNAL MEDICINE

## 2018-05-02 PROCEDURE — 3017F COLORECTAL CA SCREEN DOC REV: CPT | Performed by: INTERNAL MEDICINE

## 2018-05-02 PROCEDURE — 83036 HEMOGLOBIN GLYCOSYLATED A1C: CPT | Performed by: INTERNAL MEDICINE

## 2018-05-02 PROCEDURE — 4004F PT TOBACCO SCREEN RCVD TLK: CPT | Performed by: INTERNAL MEDICINE

## 2018-05-02 PROCEDURE — 3023F SPIROM DOC REV: CPT | Performed by: INTERNAL MEDICINE

## 2018-05-02 RX ORDER — NICOTINE 21 MG/24HR
1 PATCH, TRANSDERMAL 24 HOURS TRANSDERMAL EVERY 24 HOURS
Qty: 30 PATCH | Refills: 3 | Status: SHIPPED | OUTPATIENT
Start: 2018-05-02 | End: 2018-08-30

## 2018-05-02 RX ORDER — ALBUTEROL SULFATE 90 UG/1
2 AEROSOL, METERED RESPIRATORY (INHALATION) EVERY 6 HOURS PRN
Qty: 1 INHALER | Refills: 3 | COMMUNITY
Start: 2018-05-02 | End: 2019-04-17 | Stop reason: SDUPTHER

## 2018-05-02 ASSESSMENT — ENCOUNTER SYMPTOMS
CHOKING: 0
WHEEZING: 0
SHORTNESS OF BREATH: 1

## 2018-05-08 RX ORDER — ATORVASTATIN CALCIUM 80 MG/1
TABLET, FILM COATED ORAL
Qty: 30 TABLET | Refills: 4 | Status: SHIPPED | OUTPATIENT
Start: 2018-05-08 | End: 2018-10-10 | Stop reason: SDUPTHER

## 2018-06-07 DIAGNOSIS — M96.1 POSTLAMINECTOMY SYNDROME, LUMBAR: ICD-10-CM

## 2018-06-07 RX ORDER — HYDROCODONE BITARTRATE AND ACETAMINOPHEN 5; 325 MG/1; MG/1
TABLET ORAL
Qty: 90 TABLET | Refills: 0 | Status: SHIPPED | OUTPATIENT
Start: 2018-06-07 | End: 2018-07-09 | Stop reason: SDUPTHER

## 2018-06-25 ENCOUNTER — OFFICE VISIT (OUTPATIENT)
Dept: PULMONOLOGY | Age: 68
End: 2018-06-25

## 2018-06-25 VITALS
BODY MASS INDEX: 31.5 KG/M2 | SYSTOLIC BLOOD PRESSURE: 132 MMHG | RESPIRATION RATE: 18 BRPM | OXYGEN SATURATION: 96 % | HEIGHT: 70 IN | HEART RATE: 78 BPM | WEIGHT: 220 LBS | DIASTOLIC BLOOD PRESSURE: 70 MMHG

## 2018-06-25 DIAGNOSIS — Z87.891 PERSONAL HISTORY OF TOBACCO USE: Primary | ICD-10-CM

## 2018-06-25 DIAGNOSIS — G47.33 OSA ON CPAP: ICD-10-CM

## 2018-06-25 DIAGNOSIS — Z99.89 OSA ON CPAP: ICD-10-CM

## 2018-06-25 DIAGNOSIS — J44.9 CHRONIC OBSTRUCTIVE PULMONARY DISEASE, UNSPECIFIED COPD TYPE (HCC): ICD-10-CM

## 2018-06-25 PROCEDURE — 4004F PT TOBACCO SCREEN RCVD TLK: CPT | Performed by: INTERNAL MEDICINE

## 2018-06-25 PROCEDURE — 3017F COLORECTAL CA SCREEN DOC REV: CPT | Performed by: INTERNAL MEDICINE

## 2018-06-25 PROCEDURE — 4040F PNEUMOC VAC/ADMIN/RCVD: CPT | Performed by: INTERNAL MEDICINE

## 2018-06-25 PROCEDURE — 3023F SPIROM DOC REV: CPT | Performed by: INTERNAL MEDICINE

## 2018-06-25 PROCEDURE — G8598 ASA/ANTIPLAT THER USED: HCPCS | Performed by: INTERNAL MEDICINE

## 2018-06-25 PROCEDURE — G8427 DOCREV CUR MEDS BY ELIG CLIN: HCPCS | Performed by: INTERNAL MEDICINE

## 2018-06-25 PROCEDURE — G8926 SPIRO NO PERF OR DOC: HCPCS | Performed by: INTERNAL MEDICINE

## 2018-06-25 PROCEDURE — 99213 OFFICE O/P EST LOW 20 MIN: CPT | Performed by: INTERNAL MEDICINE

## 2018-06-25 PROCEDURE — G0296 VISIT TO DETERM LDCT ELIG: HCPCS | Performed by: INTERNAL MEDICINE

## 2018-06-25 PROCEDURE — 1123F ACP DISCUSS/DSCN MKR DOCD: CPT | Performed by: INTERNAL MEDICINE

## 2018-06-25 PROCEDURE — G8417 CALC BMI ABV UP PARAM F/U: HCPCS | Performed by: INTERNAL MEDICINE

## 2018-06-26 ENCOUNTER — OFFICE VISIT (OUTPATIENT)
Dept: CARDIOLOGY CLINIC | Age: 68
End: 2018-06-26

## 2018-06-26 VITALS
HEART RATE: 68 BPM | WEIGHT: 219 LBS | DIASTOLIC BLOOD PRESSURE: 60 MMHG | SYSTOLIC BLOOD PRESSURE: 110 MMHG | BODY MASS INDEX: 31.42 KG/M2

## 2018-06-26 DIAGNOSIS — I10 ESSENTIAL HYPERTENSION: ICD-10-CM

## 2018-06-26 DIAGNOSIS — Z98.61 HISTORY OF PTCA: ICD-10-CM

## 2018-06-26 DIAGNOSIS — I25.10 CAD IN NATIVE ARTERY: Primary | ICD-10-CM

## 2018-06-26 DIAGNOSIS — I25.2 MI, OLD: ICD-10-CM

## 2018-06-26 PROCEDURE — 4004F PT TOBACCO SCREEN RCVD TLK: CPT | Performed by: INTERNAL MEDICINE

## 2018-06-26 PROCEDURE — 4040F PNEUMOC VAC/ADMIN/RCVD: CPT | Performed by: INTERNAL MEDICINE

## 2018-06-26 PROCEDURE — G8417 CALC BMI ABV UP PARAM F/U: HCPCS | Performed by: INTERNAL MEDICINE

## 2018-06-26 PROCEDURE — 1123F ACP DISCUSS/DSCN MKR DOCD: CPT | Performed by: INTERNAL MEDICINE

## 2018-06-26 PROCEDURE — G8427 DOCREV CUR MEDS BY ELIG CLIN: HCPCS | Performed by: INTERNAL MEDICINE

## 2018-06-26 PROCEDURE — G8598 ASA/ANTIPLAT THER USED: HCPCS | Performed by: INTERNAL MEDICINE

## 2018-06-26 PROCEDURE — 99214 OFFICE O/P EST MOD 30 MIN: CPT | Performed by: INTERNAL MEDICINE

## 2018-06-26 PROCEDURE — 3017F COLORECTAL CA SCREEN DOC REV: CPT | Performed by: INTERNAL MEDICINE

## 2018-07-09 ENCOUNTER — TELEPHONE (OUTPATIENT)
Dept: PAIN MANAGEMENT | Age: 68
End: 2018-07-09

## 2018-07-09 DIAGNOSIS — M96.1 POSTLAMINECTOMY SYNDROME, LUMBAR: ICD-10-CM

## 2018-07-09 RX ORDER — HYDROCODONE BITARTRATE AND ACETAMINOPHEN 5; 325 MG/1; MG/1
TABLET ORAL
Qty: 90 TABLET | Refills: 0 | Status: SHIPPED | OUTPATIENT
Start: 2018-07-09 | End: 2018-08-07 | Stop reason: SDUPTHER

## 2018-07-19 ENCOUNTER — OFFICE VISIT (OUTPATIENT)
Dept: PAIN MANAGEMENT | Age: 68
End: 2018-07-19

## 2018-07-19 VITALS
HEART RATE: 70 BPM | SYSTOLIC BLOOD PRESSURE: 150 MMHG | HEIGHT: 70 IN | BODY MASS INDEX: 31.47 KG/M2 | WEIGHT: 219.8 LBS | DIASTOLIC BLOOD PRESSURE: 84 MMHG

## 2018-07-19 DIAGNOSIS — M96.1 POSTLAMINECTOMY SYNDROME, LUMBAR: Primary | ICD-10-CM

## 2018-07-19 DIAGNOSIS — G89.4 CHRONIC PAIN SYNDROME: ICD-10-CM

## 2018-07-19 PROCEDURE — 4004F PT TOBACCO SCREEN RCVD TLK: CPT | Performed by: ANESTHESIOLOGY

## 2018-07-19 PROCEDURE — 99213 OFFICE O/P EST LOW 20 MIN: CPT | Performed by: ANESTHESIOLOGY

## 2018-07-19 PROCEDURE — G8598 ASA/ANTIPLAT THER USED: HCPCS | Performed by: ANESTHESIOLOGY

## 2018-07-19 PROCEDURE — 1101F PT FALLS ASSESS-DOCD LE1/YR: CPT | Performed by: ANESTHESIOLOGY

## 2018-07-19 PROCEDURE — G8427 DOCREV CUR MEDS BY ELIG CLIN: HCPCS | Performed by: ANESTHESIOLOGY

## 2018-07-19 PROCEDURE — 3017F COLORECTAL CA SCREEN DOC REV: CPT | Performed by: ANESTHESIOLOGY

## 2018-07-19 PROCEDURE — G8417 CALC BMI ABV UP PARAM F/U: HCPCS | Performed by: ANESTHESIOLOGY

## 2018-07-19 PROCEDURE — 4040F PNEUMOC VAC/ADMIN/RCVD: CPT | Performed by: ANESTHESIOLOGY

## 2018-07-19 PROCEDURE — 1123F ACP DISCUSS/DSCN MKR DOCD: CPT | Performed by: ANESTHESIOLOGY

## 2018-07-19 RX ORDER — HYDROCODONE BITARTRATE AND ACETAMINOPHEN 5; 325 MG/1; MG/1
TABLET ORAL
Qty: 90 TABLET | Refills: 0 | Status: CANCELLED | OUTPATIENT
Start: 2018-08-09 | End: 2018-09-09

## 2018-07-19 ASSESSMENT — ENCOUNTER SYMPTOMS
COUGH: 0
NAUSEA: 0
EYE REDNESS: 0
BACK PAIN: 1
CONSTIPATION: 0
ABDOMINAL PAIN: 0
SHORTNESS OF BREATH: 0
EYE PAIN: 0
EYE DISCHARGE: 0
HEMOPTYSIS: 0
HEARTBURN: 0
VOMITING: 0
ORTHOPNEA: 0
WHEEZING: 0

## 2018-07-19 NOTE — PROGRESS NOTES
[address] [facility] [address] , [place], [address]  [phone] (C)  [phone] (f)    7/19/18        Dorie Harrell  1950      Paresh Matos MD      Chief Complaint:   Chief Complaint   Patient presents with    Lower Back Pain     3 Month F/u on Lumbar pain that is radiating down both legs. History of Present Illness   HPI    Seen us since 09/2017 -  Chronic low back pain due to failed L4-S1 fusion (2015); on chronic opioid - here for monitored care. Pain constant in lower back L>R side, achy, sharp with tingling down legs occasionally; no new weakness. Pain worse with prolonged sitting, walking and helped by pain medications. RED FLAG symptoms (Symptoms may include, but not limited to, acute trauma, fever, drug use, malignancy, weakness, perianal numbness, bladder/bowel changes) since last visit - No    On Plavix for PVD/CAD    Changes since last visit:   No new changes.       Past Medical History:   Diagnosis Date    Arthritis     Balance problem     CAD (coronary artery disease)     [facility]congest[facility] failure) ([facility])     followed by cardiology     Chronic ulcer of right leg ([address]     COPD (chronic obstructive pulmonary disease) ([address]     followed by pulmonology     Depressive disorder, not elsewhere classified     DVT (deep venous thrombosis) ([address] 12/2015    ileofemoral    Heartburn     infrequent    History of MI (myocardial infarction) May 2013    \"mild\" in [place] Hyperlipidemia     Hypertension     Neuropathy ([address]     feet    KAVITA (obstructive sleep apnea)     cpap    Primary central sleep apnea     PVD (peripheral vascular disease) ([address]     Unspecified cerebral artery occlusion with cerebral infarction 4/2013    \"pt states mini stroke\"    Urgency of urination     and frequency, chronic    Vertebral fracture        Past Surgical History:   Procedure Laterality Date    APPENDECTOMY      CHOLECYSTECTOMY, LAPAROSCOPIC ELLIPTA 62.5 MCG/INH AEPB INHALE ONE PUFF BY MOUTH DAILY 3 each 4    BREO ELLIPTA 200-25 MCG/INH AEPB INHALE ONE DOSE BY MOUTH DAILY 3 each 4    DULoxetine (CYMBALTA) 60 MG extended release capsule TAKE ONE (1) CAPSULE BY MOUTH DAILY. 31 capsule 5    pantoprazole (PROTONIX) 40 MG tablet TAKE ONE TABLET BY MOUTH DAILY IN THE EVENING 1 HOUR BEFORE BEDTIME. 31 tablet 5    furosemide (LASIX) 20 MG tablet TAKE 1 TABLET BY MOUTH ONCE DAILY. 31 tablet 5    clopidogrel (PLAVIX) 75 MG tablet TAKE ONE TABLET BY MOUTH DAILY 90 tablet 2    nitroGLYCERIN (NITROSTAT) 0.4 MG SL tablet Place 1 tablet under the tongue every 5 minutes as needed for Chest pain 25 tablet 1    albuterol (PROVENTIL) (5 MG/ML) 0.5% nebulizer solution Take 1 mL by nebulization 4 times daily as needed for Wheezing 120 each 3    vitamin B-12 (CYANOCOBALAMIN) 1000 MCG tablet Take 1,000 mcg by mouth daily.  magnesium gluconate (MAGONATE) 500 MG tablet Take 500 mg by mouth daily.  gabapentin (NEURONTIN) 600 MG tablet TAKE 1 TABLET BY MOUTH EACH MORNING TAKE TWO TABLETS BY MOUTH DAILY IN THE EVENING. 93 tablet 5     No current facility-administered medications for this visit.         Family History   Problem Relation Age of Onset    Cancer Mother     Heart Disease Mother     Cancer Father     Heart Disease Father        Social History     Social History    Marital status:      Spouse name: N/A    Number of children: N/A    Years of education: N/A     Occupational History    Retired       3 Years     Drywall self employed       Social History Main Topics    Smoking status: Current Every Day Smoker     Packs/day: 0.10     Years: 48.00     Types: Cigarettes     Start date: 1/1/1967    Smokeless tobacco: Never Used      Comment:  1 pack every week    Alcohol use 0.0 oz/week      Comment: 2-3 beers a month    Drug use: No    Sexual activity: No     Other Topics Concern    Not on file     Social History Narrative    No and intact distal pulses. Pulmonary/Chest: Effort normal and breath sounds normal. No respiratory distress. He exhibits no tenderness. Abdominal: Soft. Bowel sounds are normal. He exhibits no mass. There is no tenderness. There is no guarding. Musculoskeletal: Normal range of motion. He exhibits no tenderness or deformity. Lymphadenopathy:     He has no cervical adenopathy. Neurological: He is alert and oriented to person, place, and time. He has normal strength. No cranial nerve deficit or sensory deficit. He exhibits normal muscle tone. Gait (compensated) abnormal. Coordination normal. He displays no Babinski's sign on the right side. He displays no Babinski's sign on the left side. Reflex Scores:       Tricep reflexes are 2+ on the right side and 2+ on the left side. Bicep reflexes are 2+ on the right side and 2+ on the left side. Brachioradialis reflexes are 2+ on the right side and 2+ on the left side. Patellar reflexes are 1+ on the right side and 1+ on the left side. Achilles reflexes are 1+ on the right side and 1+ on the left side. Skin: Skin is warm. No rash noted. He is not diaphoretic. Psychiatric: He has a normal mood and affect. His behavior is normal. Thought content normal.       Vitals:    07/19/18 1246 07/19/18 1247   BP: (!) 142/77 (!) 150/84   Site: Left Arm    Position: Sitting    Cuff Size: Medium Adult    Pulse: 70    Weight: 219 lb 12.8 oz (99.7 kg)    Height: 5' 10\" (1.778 m)        Body mass index is 31.54 kg/m². Pain Score:   6 /10    Goals of chronic pain therapy:      Multi-disciplinary comprehensive pain management with functional improvement and reduced opioid use. Prescription pain medication monitoring:      MEDD current = 15   ORT Score =  3 (LOW)   Other Risk factors - depression, sleep apnea, obesity.       OARRS:    Checked today: Yes    Adverse report: No   UDS:    Date of last UDS: 04/02/2018    Adverse report: No      Medication Effects -        Analgesia:      Average level of pain for the past month = 7/10     Percentage of pain relief = 40%     Activities Improved: Activities of daily living = 30%     Sleep = 30%     Mood = 30%     Social functioning = 30%     Adverse Effects: Any adverse effects: No     Type/Severity of side effect: None    Aberrant Behavior:     Requests for frequent early refills: No     Increased dose without authorization: No     Reports lost or stolen prescriptions: No     Attempts to obtain prescriptions from other providers: No     Use of pain medication in response to situational stressor: No      Increasingly unkempt or impaired: No     Negative mood changes: No     Abusing alcohol or illicit drugs: No     Appears intoxicated: No     Other: NA     Controlled Substances Monitoring:    Attestation: The Prescription Monitoring Report was requested today but not available. Riccardo Ji MD)  Documentation: Possible medication side effects, risk of tolerance/dependence & alternative treatments discussed., No signs of potential drug abuse or diversion identified. Riccardo Ji MD)      Overall Progress:       PEG Score = 6 / 10     Physical Therapy: Benefit <30% - last in 11/2017   Counseling: N/A   Injections: N/A - declines     Patient compliance on plan of care:  Compliant   Progress on current plan:  Poor    Assessment:    1. Postlaminectomy syndrome, lumbar  Chronic    2. Chronic pain syndrome  polyarthropathy    Plan:     1. Chronic low back pain due to failed back surgeries; no acute changes. 2. Pain in legs due to PVD; still smokes and on Plavix therapy  3. Currently on gabapentin and Cymbalta (PCP); uses Norco for severe pain. 4. Reports pain control could be better; encouraged home exercises  5. Offered trial of caudal DAVIDA to help with pain; discussed procedure benefits and complications. 6. He wishes to continue medication and call us back regarding injections.      Analgesic Plan:   Continue present regimen: Yes   Adjust dose of present analgesic: No   Switch analgesics: No   Add/Adjust concomitant therapy: Yes - home exercise    Education:     - Reviewed OARRS report in detail, including Narx Scores and Overdose Risk Score. - Encouraged regular home exercises and strengthening as long-term goals. - Counseled on dual effects, limitations, side effects and long-term complications of opioids. Discussed proper use and potential life threatening side effects of inappropriate use. - Educational material provided on multidisciplinary chronic pain management, safe opioid use. Follow-up: RTC X 3 months    The entire treatment plan was discussed with patient and agreed. Thank you for allowing us to participate in the care of your patient - please do not hesitate to contact us if you have any questions or concerns.         Yasmin Davis MD  Board Certified in Anesthesiology and Pain Medicine

## 2018-07-19 NOTE — PATIENT INSTRUCTIONS
like:  ¨ Antidepressants, anti-seizure medicines, and anti-anxiety medicines. ¨ Nerve block injections. Medicines are the most common treatment for pain. But to feel better, you'll need to do more than take medicine. You can also do things like reducing your stress level and changing how you think. How can you take medicine safely? Medicines can help you get better. But they can also be dangerous, especially if you don't take them the right way. Be safe with medicines. Read and follow all instructions on the label. If the medicine you take causes side effects such as constipation or nausea, you may need to take other medicines for those problems. Talk to your doctor about any side effects you have. If you were prescribed an opioid pain reliever, your care team will give you information on how to use it safely. You will also get directions for how to safely store the medicine and how to get rid of any that's left over. Follow these instructions carefully. What physical treatments can help? Physical treatments can be an important part of managing chronic pain. You may find that combining more than one treatment helps the most.  These treatments can include:  · Heat or cold. This can help arthritis, sore muscles, and other aches. · Hydrotherapy. It uses flowing water to relax muscles. · Massage. Massage involves rubbing the soft tissues of the body. It eases tension and pain. · Transcutaneous electrical nerve stimulation (TENS). This treatment uses a gentle electric current applied to the skin for pain relief. · Acupuncture. This is a form of traditional [facility] medicine. It uses very thin needles inserted into certain points of the body. · Physical therapy. This treatment uses stretches and exercises to reduce pain and help you move better. If you get physical therapy, make sure to do any home exercises or stretching your therapist has prescribed. Stay as active as you can.  Try to get some physical activity every day. What other things can help? You can manage chronic pain by using things other than medicines or physical treatments. For example, you can keep track of your pain in a pain diary. It can help you understand how the things you do affect your pain. Reducing stress and tension can reduce pain. And being more aware of your thought patterns can be helpful. In some cases, shifting how you think about pain can affect how you feel. Here are some options to think about:  · Breathing exercises and meditation. These techniques can help you focus your attention, relax, and get rid of tension. · Guided imagery. This is a series of thoughts and images that can focus your attention away from your pain. · Hypnosis. It's a state of focused concentration that makes you less aware of your surroundings. · Cognitive behavioral therapy. This type of counseling helps you change your thought patterns. · Yoga. Stretching and exercises can reduce stress and improve flexibility. If what you're doing to control your pain isn't working, or if you're feeling depressed, talk to your doctor. He or she can help you change your pain management plan and find resources for emotional support. Where can you learn more? Go to https://VISUAL NAC[employer]Pand[employer] TEK.[employer]. org and sign in to your [employer] account. Enter P119 in the [employer] box to learn more about \"Learning About Managing Chronic Pain. \"     If you do not have an account, please click on the \"Sign Up Now\" link. Current as of: September 10, 2017  Content Version: 11.6  © 2269-2951 [employer], [employer]. Care instructions adapted under license by [facility] [employer] [facility] ([facility]). If you have questions about a medical condition or this instruction, always ask your healthcare professional. [place] [address] any warranty or liability for your use of this information.        Patient Education        Low Back Arthritis: Exercises  Your Care Instructions  Here

## 2018-07-20 ENCOUNTER — TELEPHONE (OUTPATIENT)
Dept: CARDIOLOGY CLINIC | Age: 68
End: 2018-07-20

## 2018-07-23 DIAGNOSIS — I10 ESSENTIAL HYPERTENSION: Primary | Chronic | ICD-10-CM

## 2018-07-23 RX ORDER — METOPROLOL TARTRATE 100 MG/1
150 TABLET ORAL 2 TIMES DAILY
Qty: 90 TABLET | Refills: 5 | Status: SHIPPED | OUTPATIENT
Start: 2018-07-23 | End: 2019-01-21 | Stop reason: SDUPTHER

## 2018-08-07 ENCOUNTER — TELEPHONE (OUTPATIENT)
Dept: PAIN MANAGEMENT | Age: 68
End: 2018-08-07

## 2018-08-07 DIAGNOSIS — M96.1 POSTLAMINECTOMY SYNDROME, LUMBAR: ICD-10-CM

## 2018-08-07 RX ORDER — HYDROCODONE BITARTRATE AND ACETAMINOPHEN 5; 325 MG/1; MG/1
TABLET ORAL
Qty: 90 TABLET | Refills: 0 | Status: SHIPPED | OUTPATIENT
Start: 2018-08-09 | End: 2018-09-07 | Stop reason: SDUPTHER

## 2018-08-13 ENCOUNTER — TELEPHONE (OUTPATIENT)
Dept: CASE MANAGEMENT | Age: 68
End: 2018-08-13

## 2018-08-13 NOTE — TELEPHONE ENCOUNTER
Call made to patient to verify smoking history. Per patient he used to smoke 2-3 packs/day (104 yr pack history) starting around 17years old until he recently became ill. Patient now smokes a few (2-3 cigarettes) per week. Smoking history updated.

## 2018-08-15 ENCOUNTER — HOSPITAL ENCOUNTER (OUTPATIENT)
Dept: CT IMAGING | Age: 68
Discharge: HOME | End: 2018-08-15
Payer: MEDICAID

## 2018-08-15 DIAGNOSIS — Z87.891 PERSONAL HISTORY OF NICOTINE DEPENDENCE: ICD-10-CM

## 2018-08-15 DIAGNOSIS — F17.200 SMOKER: ICD-10-CM

## 2018-08-15 PROCEDURE — G0297 LDCT FOR LUNG CA SCREEN: HCPCS

## 2018-08-24 ENCOUNTER — TELEPHONE (OUTPATIENT)
Dept: CASE MANAGEMENT | Age: 68
End: 2018-08-24

## 2018-08-30 ENCOUNTER — OFFICE VISIT (OUTPATIENT)
Dept: INTERNAL MEDICINE CLINIC | Age: 68
End: 2018-08-30

## 2018-08-30 VITALS
SYSTOLIC BLOOD PRESSURE: 120 MMHG | TEMPERATURE: 97.9 F | BODY MASS INDEX: 30.61 KG/M2 | WEIGHT: 213.8 LBS | DIASTOLIC BLOOD PRESSURE: 66 MMHG | HEART RATE: 68 BPM | HEIGHT: 70 IN

## 2018-08-30 DIAGNOSIS — Z23 NEEDS FLU SHOT: ICD-10-CM

## 2018-08-30 DIAGNOSIS — Z12.11 SCREENING FOR MALIGNANT NEOPLASM OF COLON: ICD-10-CM

## 2018-08-30 DIAGNOSIS — I73.9 PVD (PERIPHERAL VASCULAR DISEASE) (HCC): Chronic | ICD-10-CM

## 2018-08-30 DIAGNOSIS — R73.02 GLUCOSE INTOLERANCE (IMPAIRED GLUCOSE TOLERANCE): ICD-10-CM

## 2018-08-30 DIAGNOSIS — I10 ESSENTIAL HYPERTENSION: Chronic | ICD-10-CM

## 2018-08-30 DIAGNOSIS — J43.9 PULMONARY EMPHYSEMA, UNSPECIFIED EMPHYSEMA TYPE (HCC): Primary | Chronic | ICD-10-CM

## 2018-08-30 DIAGNOSIS — E78.5 HYPERLIPIDEMIA, UNSPECIFIED HYPERLIPIDEMIA TYPE: Chronic | ICD-10-CM

## 2018-08-30 LAB
A/G RATIO: 1.6 (ref 1.1–2.2)
ALBUMIN SERPL-MCNC: 4.4 G/DL (ref 3.4–5)
ALP BLD-CCNC: 106 U/L (ref 40–129)
ALT SERPL-CCNC: 40 U/L (ref 10–40)
ANION GAP SERPL CALCULATED.3IONS-SCNC: 18 MMOL/L (ref 3–16)
AST SERPL-CCNC: 38 U/L (ref 15–37)
BILIRUB SERPL-MCNC: 0.6 MG/DL (ref 0–1)
BUN BLDV-MCNC: 21 MG/DL (ref 7–20)
CALCIUM SERPL-MCNC: 10.1 MG/DL (ref 8.3–10.6)
CHLORIDE BLD-SCNC: 99 MMOL/L (ref 99–110)
CHOLESTEROL, TOTAL: 157 MG/DL (ref 0–199)
CO2: 20 MMOL/L (ref 21–32)
CREAT SERPL-MCNC: 1 MG/DL (ref 0.8–1.3)
GFR AFRICAN AMERICAN: >60
GFR NON-AFRICAN AMERICAN: >60
GLOBULIN: 2.8 G/DL
GLUCOSE BLD-MCNC: 90 MG/DL (ref 70–99)
HBA1C MFR BLD: 5.4 %
HDLC SERPL-MCNC: 30 MG/DL (ref 40–60)
LDL CHOLESTEROL CALCULATED: 74 MG/DL
POTASSIUM SERPL-SCNC: 4.8 MMOL/L (ref 3.5–5.1)
SODIUM BLD-SCNC: 137 MMOL/L (ref 136–145)
TOTAL PROTEIN: 7.2 G/DL (ref 6.4–8.2)
TRIGL SERPL-MCNC: 264 MG/DL (ref 0–150)
VLDLC SERPL CALC-MCNC: 53 MG/DL

## 2018-08-30 PROCEDURE — 3023F SPIROM DOC REV: CPT | Performed by: INTERNAL MEDICINE

## 2018-08-30 PROCEDURE — 90662 IIV NO PRSV INCREASED AG IM: CPT | Performed by: INTERNAL MEDICINE

## 2018-08-30 PROCEDURE — 1101F PT FALLS ASSESS-DOCD LE1/YR: CPT | Performed by: INTERNAL MEDICINE

## 2018-08-30 PROCEDURE — 1123F ACP DISCUSS/DSCN MKR DOCD: CPT | Performed by: INTERNAL MEDICINE

## 2018-08-30 PROCEDURE — G0008 ADMIN INFLUENZA VIRUS VAC: HCPCS | Performed by: INTERNAL MEDICINE

## 2018-08-30 PROCEDURE — 4040F PNEUMOC VAC/ADMIN/RCVD: CPT | Performed by: INTERNAL MEDICINE

## 2018-08-30 PROCEDURE — 99214 OFFICE O/P EST MOD 30 MIN: CPT | Performed by: INTERNAL MEDICINE

## 2018-08-30 PROCEDURE — G8427 DOCREV CUR MEDS BY ELIG CLIN: HCPCS | Performed by: INTERNAL MEDICINE

## 2018-08-30 PROCEDURE — 4004F PT TOBACCO SCREEN RCVD TLK: CPT | Performed by: INTERNAL MEDICINE

## 2018-08-30 PROCEDURE — G8417 CALC BMI ABV UP PARAM F/U: HCPCS | Performed by: INTERNAL MEDICINE

## 2018-08-30 PROCEDURE — 3017F COLORECTAL CA SCREEN DOC REV: CPT | Performed by: INTERNAL MEDICINE

## 2018-08-30 PROCEDURE — G8598 ASA/ANTIPLAT THER USED: HCPCS | Performed by: INTERNAL MEDICINE

## 2018-08-30 PROCEDURE — G8926 SPIRO NO PERF OR DOC: HCPCS | Performed by: INTERNAL MEDICINE

## 2018-08-30 PROCEDURE — 83036 HEMOGLOBIN GLYCOSYLATED A1C: CPT | Performed by: INTERNAL MEDICINE

## 2018-08-30 ASSESSMENT — ENCOUNTER SYMPTOMS
SHORTNESS OF BREATH: 0
COUGH: 0

## 2018-08-30 NOTE — PROGRESS NOTES
Subjective:      Patient ID: Joann Fournier is a 79 y.o. male. HPI here for follow up diabetes COPD, PVD   He uses rescue inhaler every so often    Sees DR Fabio Kearney for chronic pain management       Prior to Visit Medications    Medication Sig Taking? Authorizing Provider   pantoprazole (PROTONIX) 40 MG tablet TAKE ONE TABLET BY MOUTH DAILY IN THE EVENING 1 HOUR BEFORE BEDTIME. Yes Prasanth Howell MD   DULoxetine (CYMBALTA) 60 MG extended release capsule TAKE ONE (1) CAPSULE BY MOUTH DAILY. Yes Prasanth Howell MD   gabapentin (NEURONTIN) 600 MG tablet TAKE 1 TABLET BY MOUTH EACH MORNING TAKE TWO TABLETS BY MOUTH DAILY IN THE EVENING. Yes Prasanth Howell MD   allopurinol (ZYLOPRIM) 100 MG tablet TAKE 2 TABLETS BY MOUTH DAILY. Yes Prasanth Howell MD   metoprolol (LOPRESSOR) 100 MG tablet Take 1.5 tablets by mouth 2 times daily Yes Anthony Evangelista MD   atorvastatin (LIPITOR) 80 MG tablet TAKE ONE TABLET BY MOUTH DAILY Yes Prasanth Howell MD   albuterol sulfate HFA (PROAIR HFA) 108 (90 Base) MCG/ACT inhaler Inhale 2 puffs into the lungs every 6 hours as needed for Wheezing Yes Prasanth Howell MD   losartan (COZAAR) 100 MG tablet TAKE 1 TABLET BY MOUTH ONCE DAILY. Yes Prasanth Howell MD   INCRUSE ELLIPTA 62.5 MCG/INH AEPB INHALE ONE PUFF BY MOUTH DAILY Yes Claude Alfred MD   BREO ELLIPTA 200-25 MCG/INH AEPB INHALE ONE DOSE BY MOUTH DAILY Yes Claude Alfred MD   furosemide (LASIX) 20 MG tablet TAKE 1 TABLET BY MOUTH ONCE DAILY.  Yes Prasanth Howell MD   clopidogrel (PLAVIX) 75 MG tablet TAKE ONE TABLET BY MOUTH DAILY Yes Prasanth Howell MD   nitroGLYCERIN (NITROSTAT) 0.4 MG SL tablet Place 1 tablet under the tongue every 5 minutes as needed for Chest pain Yes Anthony Evangelista MD   albuterol (PROVENTIL) (5 MG/ML) 0.5% nebulizer solution Take 1 mL by nebulization 4 times daily as needed for Wheezing Yes Prasanth Howell MD   vitamin B-12 (CYANOCOBALAMIN) 1000 MCG tablet Take 1,000 mcg by Pulses present via doppler signal   Suspect that leg pain is neurogenic    Blood pressure stable    Check lipids    A 1 c 5.5  No longer prediabetic            Carole Tee MD

## 2018-09-06 ENCOUNTER — TELEPHONE (OUTPATIENT)
Dept: INTERNAL MEDICINE CLINIC | Age: 68
End: 2018-09-06

## 2018-09-06 RX ORDER — FUROSEMIDE 20 MG/1
TABLET ORAL
Qty: 31 TABLET | Refills: 5 | Status: SHIPPED | OUTPATIENT
Start: 2018-09-06 | End: 2019-03-15 | Stop reason: SDUPTHER

## 2018-09-07 ENCOUNTER — TELEPHONE (OUTPATIENT)
Dept: PAIN MANAGEMENT | Age: 68
End: 2018-09-07

## 2018-09-07 DIAGNOSIS — M96.1 POSTLAMINECTOMY SYNDROME, LUMBAR: ICD-10-CM

## 2018-09-07 RX ORDER — HYDROCODONE BITARTRATE AND ACETAMINOPHEN 5; 325 MG/1; MG/1
1 TABLET ORAL EVERY 8 HOURS PRN
Qty: 90 TABLET | Refills: 0 | Status: SHIPPED | OUTPATIENT
Start: 2018-09-08 | End: 2018-10-08 | Stop reason: SDUPTHER

## 2018-09-07 NOTE — TELEPHONE ENCOUNTER
MEDICATION ISSUES    ALL MEDICATION REFILLS NEED 48-HOUR ADVANCED NOTICE - Not filled on weekends or holidays.      Issue:  Medication Refill   Requesting Refill: Yes    Medication requested: Gari Prader   Reporting Side effects: No / If other, Describe: N/A   Last date filled:    OARRS done: Yes   Follow-up Appointment date: Yes, 10/8/18     Correct PHARMACY updated with patient: Yes, [facility] [place] [facility] [place]

## 2018-09-18 ENCOUNTER — OFFICE VISIT (OUTPATIENT)
Dept: PULMONOLOGY | Age: 68
End: 2018-09-18

## 2018-09-18 VITALS
BODY MASS INDEX: 31.5 KG/M2 | HEIGHT: 70 IN | HEART RATE: 69 BPM | WEIGHT: 220 LBS | OXYGEN SATURATION: 94 % | SYSTOLIC BLOOD PRESSURE: 144 MMHG | DIASTOLIC BLOOD PRESSURE: 80 MMHG | RESPIRATION RATE: 16 BRPM

## 2018-09-18 DIAGNOSIS — I50.32 CHRONIC DIASTOLIC CHF (CONGESTIVE HEART FAILURE) (HCC): ICD-10-CM

## 2018-09-18 DIAGNOSIS — J44.9 CHRONIC OBSTRUCTIVE PULMONARY DISEASE, UNSPECIFIED COPD TYPE (HCC): ICD-10-CM

## 2018-09-18 DIAGNOSIS — Z87.891 PERSONAL HISTORY OF TOBACCO USE: Primary | ICD-10-CM

## 2018-09-18 DIAGNOSIS — G47.33 OSA ON CPAP: ICD-10-CM

## 2018-09-18 DIAGNOSIS — Z99.89 OSA ON CPAP: ICD-10-CM

## 2018-09-18 PROCEDURE — 99214 OFFICE O/P EST MOD 30 MIN: CPT | Performed by: INTERNAL MEDICINE

## 2018-09-18 PROCEDURE — 4004F PT TOBACCO SCREEN RCVD TLK: CPT | Performed by: INTERNAL MEDICINE

## 2018-09-18 PROCEDURE — 4040F PNEUMOC VAC/ADMIN/RCVD: CPT | Performed by: INTERNAL MEDICINE

## 2018-09-18 PROCEDURE — G8417 CALC BMI ABV UP PARAM F/U: HCPCS | Performed by: INTERNAL MEDICINE

## 2018-09-18 PROCEDURE — G8427 DOCREV CUR MEDS BY ELIG CLIN: HCPCS | Performed by: INTERNAL MEDICINE

## 2018-09-18 PROCEDURE — 3023F SPIROM DOC REV: CPT | Performed by: INTERNAL MEDICINE

## 2018-09-18 PROCEDURE — G8926 SPIRO NO PERF OR DOC: HCPCS | Performed by: INTERNAL MEDICINE

## 2018-09-18 PROCEDURE — 1123F ACP DISCUSS/DSCN MKR DOCD: CPT | Performed by: INTERNAL MEDICINE

## 2018-09-18 PROCEDURE — 1101F PT FALLS ASSESS-DOCD LE1/YR: CPT | Performed by: INTERNAL MEDICINE

## 2018-09-18 PROCEDURE — 3017F COLORECTAL CA SCREEN DOC REV: CPT | Performed by: INTERNAL MEDICINE

## 2018-09-18 PROCEDURE — G8598 ASA/ANTIPLAT THER USED: HCPCS | Performed by: INTERNAL MEDICINE

## 2018-09-18 RX ORDER — FUROSEMIDE 20 MG/1
20 TABLET ORAL DAILY
Qty: 20 TABLET | Refills: 0 | Status: SHIPPED | OUTPATIENT
Start: 2018-09-18 | End: 2018-11-28

## 2018-09-18 NOTE — PROGRESS NOTES
daily.      gabapentin (NEURONTIN) 600 MG tablet TAKE 1 TABLET BY MOUTH EACH MORNING TAKE TWO TABLETS BY MOUTH DAILY IN THE EVENING. 93 tablet 3     No current facility-administered medications on file prior to visit. REVIEW OF SYSTEMS:    CONSTITUTIONAL: Negative for fevers and chills  HEENT: Negative for oropharyngeal exudate, post nasal drip, sinus pain / pressure, nasal congestion, ear pain  RESPIRATORY:  See HPI  CARDIOVASCULAR: Negative for palpitations, edema  GASTROINTESTINAL: Negative for nausea, vomiting, diarrhea, constipation and abdominal pain  HEMATOLOGICAL: Negative for adenopathy  SKIN: Negative for clubbing, cyanosis, skin lesions  EXTREMITIES: Negative for weakness, decreased ROM  NEUROLOGICAL: Negative for unilateral weakness, speech or gait abnormalities  PSYCH: Negative for anxiety, depression    Objective:   PHYSICAL EXAM:        VITALS:   Vitals:    09/18/18 1344   BP: (!) 150/88   Site: Left Upper Arm   Position: Sitting   Cuff Size: Large Adult   Pulse: 69   Resp: 16   SpO2: 94%   Weight: 220 lb (99.8 kg)   Height: 5' 10\" (1.778 m)       CONSTITUTIONAL:  Awake, alert, cooperative, no apparent distress, and appears stated age  HEENT: No oropharyngeal exudate, PERRL, no cervical adenopathy, no tracheal deviation, thyroid size normal  LUNGS:  No increased work of breathing and clear to auscultation. No crackles. No wheezing. CARDIOVASCULAR:  normal S1 and S2 and no JVD. 1+ edema  ABDOMEN:  Normal bowel sounds, non-distended and non-tender to palpation  EXT: Trace edema, no calf tenderness. Pulses are present bilaterally. NEUROLOGIC:  Mental Status Exam:  Level of Alertness:   awake  Orientation:   person, place, time. SKIN:  Numerous changes to the skin of the arms bilaterally with some dry scales, normal turgor, no redness, warmth. DATA:    Last PFTs:  mild obstructive defect without  bronchodilator response and a moderately reduced diffusing capacity.     Ct screening:

## 2018-09-26 RX ORDER — LOSARTAN POTASSIUM 100 MG/1
TABLET ORAL
Qty: 30 TABLET | Refills: 5 | Status: SHIPPED | OUTPATIENT
Start: 2018-09-26 | End: 2019-03-11 | Stop reason: SDUPTHER

## 2018-10-08 ENCOUNTER — OFFICE VISIT (OUTPATIENT)
Dept: PAIN MANAGEMENT | Age: 68
End: 2018-10-08
Payer: MEDICAID

## 2018-10-08 VITALS
BODY MASS INDEX: 31.64 KG/M2 | DIASTOLIC BLOOD PRESSURE: 90 MMHG | WEIGHT: 221 LBS | HEART RATE: 64 BPM | HEIGHT: 70 IN | SYSTOLIC BLOOD PRESSURE: 172 MMHG

## 2018-10-08 DIAGNOSIS — M96.1 POSTLAMINECTOMY SYNDROME, LUMBAR: Primary | ICD-10-CM

## 2018-10-08 DIAGNOSIS — M13.0 POLYARTHROPATHY, MULTIPLE SITES: ICD-10-CM

## 2018-10-08 DIAGNOSIS — F11.90 CHRONIC, CONTINUOUS USE OF OPIOIDS: ICD-10-CM

## 2018-10-08 PROCEDURE — G8417 CALC BMI ABV UP PARAM F/U: HCPCS | Performed by: ANESTHESIOLOGY

## 2018-10-08 PROCEDURE — 3017F COLORECTAL CA SCREEN DOC REV: CPT | Performed by: ANESTHESIOLOGY

## 2018-10-08 PROCEDURE — G8427 DOCREV CUR MEDS BY ELIG CLIN: HCPCS | Performed by: ANESTHESIOLOGY

## 2018-10-08 PROCEDURE — 4004F PT TOBACCO SCREEN RCVD TLK: CPT | Performed by: ANESTHESIOLOGY

## 2018-10-08 PROCEDURE — 99214 OFFICE O/P EST MOD 30 MIN: CPT | Performed by: ANESTHESIOLOGY

## 2018-10-08 PROCEDURE — G8482 FLU IMMUNIZE ORDER/ADMIN: HCPCS | Performed by: ANESTHESIOLOGY

## 2018-10-08 PROCEDURE — 1123F ACP DISCUSS/DSCN MKR DOCD: CPT | Performed by: ANESTHESIOLOGY

## 2018-10-08 PROCEDURE — 4040F PNEUMOC VAC/ADMIN/RCVD: CPT | Performed by: ANESTHESIOLOGY

## 2018-10-08 PROCEDURE — 1101F PT FALLS ASSESS-DOCD LE1/YR: CPT | Performed by: ANESTHESIOLOGY

## 2018-10-08 PROCEDURE — G8598 ASA/ANTIPLAT THER USED: HCPCS | Performed by: ANESTHESIOLOGY

## 2018-10-08 RX ORDER — HYDROCODONE BITARTRATE AND ACETAMINOPHEN 5; 325 MG/1; MG/1
1 TABLET ORAL EVERY 8 HOURS PRN
Qty: 90 TABLET | Refills: 0 | Status: SHIPPED | OUTPATIENT
Start: 2018-10-08 | End: 2018-11-07 | Stop reason: SDUPTHER

## 2018-10-08 ASSESSMENT — ENCOUNTER SYMPTOMS
WHEEZING: 0
ORTHOPNEA: 0
HEARTBURN: 0
CONSTIPATION: 0
SHORTNESS OF BREATH: 0
COUGH: 0
ABDOMINAL PAIN: 0
EYE PAIN: 0
HEMOPTYSIS: 0
EYE DISCHARGE: 0
VOMITING: 0
NAUSEA: 0
EYE REDNESS: 0
BACK PAIN: 1

## 2018-10-08 NOTE — PROGRESS NOTES
STENT PLACEMENT  6/2013    EYE SURGERY      LAMINECTOMY  11/18/2015    Bilateral decompressive lumbar laminectomy L4-5   ; medial facetectomy L4-5 joints  bilaterally; foraminotomies l5   nerve roots bilaterally    LEG DEBRIDEMENT Right 7/23/2013    Dr. Kendrick Alonzo - pretibial wound    OTHER SURGICAL HISTORY Right 6/25/2013    Dr. Kendrick Alonzo - CFA Endarterectomy w/marsupialization; RLE wound excision & debridement     OTHER SURGICAL HISTORY Left 8/27/2013    Dr. Kendrick Alonzo - femoral & profunda femoris endarterectomy w/marsupialization patch angioplasty using SFA    SKIN SPLIT GRAFT Right 7/25/2013    Dr. Kendrick Alonzo - to non-healing R pretibial wound, 9 x 15 cm    TOTAL HIP ARTHROPLASTY Right 09/01/2016    Dr. Tamara Bee Left 12/22/2015    Dr. Kendrick Alonzo - CFA exploration, thrombectomy of ileofemoral system, stenting of RAFAL in-stent stenosis w/8 x 37 mm Palmaz        Allergies   Allergen Reactions    Daliresp [Roflumilast] Other (See Comments) and Diarrhea     Severe diarrhea and did not help    Asa [Aspirin] Other (See Comments)     Stomach ache       Current Outpatient Prescriptions   Medication Sig Dispense Refill    HYDROcodone-acetaminophen (NORCO) 5-325 MG per tablet Take 1 tablet by mouth every 8 hours as needed for Pain for up to 30 days. [facility] Date: 10/8/18 90 tablet 0    losartan (COZAAR) 100 MG tablet TAKE 1 TABLET BY MOUTH ONCE DAILY. 30 tablet 5    furosemide (LASIX) 20 MG tablet Take 1 tablet by mouth daily for 20 days 20 tablet 0    furosemide (LASIX) 20 MG tablet TAKE 1 TABLET BY MOUTH ONCE DAILY. 31 tablet 5    pantoprazole (PROTONIX) 40 MG tablet TAKE ONE TABLET BY MOUTH DAILY IN THE EVENING 1 HOUR BEFORE BEDTIME. 31 tablet 3    DULoxetine (CYMBALTA) 60 MG extended release capsule TAKE ONE (1) CAPSULE BY MOUTH DAILY. 31 capsule 3    allopurinol (ZYLOPRIM) 100 MG tablet TAKE 2 TABLETS BY MOUTH DAILY.  62 tablet 3    metoprolol (LOPRESSOR) 100 MG tablet Take 1.5 tablets by mouth 2 times daily 90 tablet 5    atorvastatin (LIPITOR) 80 MG tablet TAKE ONE TABLET BY MOUTH DAILY 30 tablet 4    albuterol sulfate HFA (PROAIR HFA) 108 (90 Base) MCG/ACT inhaler Inhale 2 puffs into the lungs every 6 hours as needed for Wheezing 1 Inhaler 3    INCRUSE ELLIPTA 62.5 MCG/INH AEPB INHALE ONE PUFF BY MOUTH DAILY 3 each 4    BREO ELLIPTA 200-25 MCG/INH AEPB INHALE ONE DOSE BY MOUTH DAILY 3 each 4    clopidogrel (PLAVIX) 75 MG tablet TAKE ONE TABLET BY MOUTH DAILY 90 tablet 2    nitroGLYCERIN (NITROSTAT) 0.4 MG SL tablet Place 1 tablet under the tongue every 5 minutes as needed for Chest pain 25 tablet 1    albuterol (PROVENTIL) (5 MG/ML) 0.5% nebulizer solution Take 1 mL by nebulization 4 times daily as needed for Wheezing 120 each 3    vitamin B-12 (CYANOCOBALAMIN) 1000 MCG tablet Take 1,000 mcg by mouth daily.  magnesium gluconate (MAGONATE) 500 MG tablet Take 500 mg by mouth daily.  gabapentin (NEURONTIN) 600 MG tablet TAKE 1 TABLET BY MOUTH EACH MORNING TAKE TWO TABLETS BY MOUTH DAILY IN THE EVENING. 93 tablet 3     No current facility-administered medications for this visit.         Family History   Problem Relation Age of Onset    Cancer Mother     Heart Disease Mother     Cancer Father     Heart Disease Father        Social History     Social History    Marital status:      Spouse name: N/A    Number of children: N/A    Years of education: N/A     Occupational History    Retired       3 Years     Drywall self employed       Social History Main Topics    Smoking status: Current Every Day Smoker     Packs/day: 2.00     Years: 52.00     Types: Cigarettes     Start date: 1/1/1967    Smokeless tobacco: Never Used      Comment: per pt down to 2-3 cigarettes per week since becoming ill     Alcohol use 0.0 oz/week      Comment: 2-3 beers a month    Drug use: No    Sexual activity: No     Other Topics Concern    Not on file     Social History Narrative  No narrative on file         Imaging Studies:   CT LS (10/27/2017)  \"Impression       Prior laminectomy L4-5 and L5-S1.       L4-5 grade 1 spondylolisthesis.       Mild canal stenosis L2-3 and L3-4. \"      Results of the above studies were personally reviewed by me with the patient in detail along with the images, when available. The patient was instructed to contact the primary care provider regarding other unrelated findings not addressed during today's visit. Review of Systems:   Review of Systems   Constitutional: Negative for chills, fever, malaise/fatigue and weight loss. HENT: Negative for hearing loss and tinnitus. Eyes: Negative for pain, discharge and redness. Respiratory: Negative for cough, hemoptysis, shortness of breath and wheezing. Cardiovascular: Negative for chest pain, palpitations and orthopnea. Gastrointestinal: Negative for abdominal pain, constipation, heartburn, nausea and vomiting. Genitourinary: Negative for frequency, hematuria and urgency. Musculoskeletal: Positive for back pain and joint pain. Negative for falls, myalgias and neck pain. Skin: Negative for itching and rash. Neurological: Negative for dizziness, tingling, sensory change, focal weakness, seizures, weakness and headaches. Endo/Heme/Allergies: Does not bruise/bleed easily. Psychiatric/Behavioral: Negative for depression, substance abuse and suicidal ideas. The patient is not nervous/anxious and does not have insomnia. The patient was instructed to contact primary care physician regarding ROS positives not being addressed during today's visit. Physical Exam:   Physical Exam   Constitutional: He is oriented to person, place, and time. No distress. Mild distress, ambulates with walker   HENT:   Head: Normocephalic. Eyes: Pupils are equal, round, and reactive to light. EOM are normal.   Neck: Normal range of motion. Neck supple. No thyromegaly present.    Cardiovascular: Normal rate,

## 2018-10-11 RX ORDER — ATORVASTATIN CALCIUM 80 MG/1
TABLET, FILM COATED ORAL
Qty: 30 TABLET | Refills: 3 | Status: SHIPPED | OUTPATIENT
Start: 2018-10-11 | End: 2019-02-21 | Stop reason: SDUPTHER

## 2018-10-29 RX ORDER — CLOPIDOGREL BISULFATE 75 MG/1
TABLET ORAL
Qty: 90 TABLET | Refills: 2 | Status: SHIPPED | OUTPATIENT
Start: 2018-10-29 | End: 2019-04-05 | Stop reason: SDUPTHER

## 2018-11-02 DIAGNOSIS — Z12.11 SCREENING FOR MALIGNANT NEOPLASM OF COLON: ICD-10-CM

## 2018-11-02 LAB
CONTROL: ABNORMAL
HEMOCCULT STL QL: POSITIVE

## 2018-11-05 DIAGNOSIS — Z12.11 SCREENING FOR MALIGNANT NEOPLASM OF COLON: Primary | ICD-10-CM

## 2018-11-07 DIAGNOSIS — M96.1 POSTLAMINECTOMY SYNDROME, LUMBAR: ICD-10-CM

## 2018-11-07 DIAGNOSIS — M13.0 POLYARTHROPATHY, MULTIPLE SITES: ICD-10-CM

## 2018-11-07 RX ORDER — HYDROCODONE BITARTRATE AND ACETAMINOPHEN 5; 325 MG/1; MG/1
1 TABLET ORAL EVERY 8 HOURS PRN
Qty: 90 TABLET | Refills: 0 | Status: SHIPPED | OUTPATIENT
Start: 2018-11-07 | End: 2018-12-07 | Stop reason: SDUPTHER

## 2018-11-07 NOTE — TELEPHONE ENCOUNTER
MEDICATION ISSUES     Reported Issue:  Medication Refill    Medication Information     - Refill medication requested: Hydrocodone     - Medication dosage and quantity: 5/325 mg # 90     - Reporting side effects, if any: No     - Other issues, if any: NA     Controlled Substances     - OARRS done: Yes     - Last refill date (per OARRS): 10/09/2018      - Follow-up Appointment date: Yes 1/7/2018    Pharmacy Information      - PHARMACY updated with patient: Yes     - PHARMACY updated in Chart: Yes      NOTE for Controlled Substances     PLEASE READ TO PATIENTS:    1. For routine refills: ALL MEDICATION REFILLS NEED 2 WORKING DAYS (48-HOUR) ADVANCED NOTICE - Not filled on weekends or holidays.     - Patient informed about the above policy:  NA    2. If switching or changing opioid pain medications -    PATIENTS NEED TO BRING OLD MEDICATION FOR PILL COUNT AND POSSIBLE DESTRUCTION - before new medication could be filled.      - Patient informed about the above policy:  NA

## 2018-11-14 ENCOUNTER — TELEPHONE (OUTPATIENT)
Dept: PRIMARY CARE CLINIC | Age: 68
End: 2018-11-14

## 2018-11-26 RX ORDER — DULOXETIN HYDROCHLORIDE 60 MG/1
CAPSULE, DELAYED RELEASE ORAL
Qty: 31 CAPSULE | Refills: 0 | Status: SHIPPED | OUTPATIENT
Start: 2018-11-26 | End: 2018-12-24 | Stop reason: SDUPTHER

## 2018-11-26 RX ORDER — GABAPENTIN 600 MG/1
TABLET ORAL
Qty: 93 TABLET | Refills: 0 | Status: SHIPPED | OUTPATIENT
Start: 2018-11-26 | End: 2019-03-11 | Stop reason: SDUPTHER

## 2018-11-26 RX ORDER — ALLOPURINOL 100 MG/1
TABLET ORAL
Qty: 62 TABLET | Refills: 0 | Status: SHIPPED | OUTPATIENT
Start: 2018-11-26 | End: 2018-12-24 | Stop reason: SDUPTHER

## 2018-11-26 RX ORDER — PANTOPRAZOLE SODIUM 40 MG/1
TABLET, DELAYED RELEASE ORAL
Qty: 31 TABLET | Refills: 0 | Status: SHIPPED | OUTPATIENT
Start: 2018-11-26 | End: 2018-12-24 | Stop reason: SDUPTHER

## 2018-11-28 ENCOUNTER — OFFICE VISIT (OUTPATIENT)
Dept: PRIMARY CARE CLINIC | Age: 68
End: 2018-11-28
Payer: MEDICAID

## 2018-11-28 VITALS
DIASTOLIC BLOOD PRESSURE: 76 MMHG | RESPIRATION RATE: 16 BRPM | WEIGHT: 220 LBS | OXYGEN SATURATION: 97 % | SYSTOLIC BLOOD PRESSURE: 126 MMHG | BODY MASS INDEX: 31.5 KG/M2 | HEART RATE: 60 BPM | HEIGHT: 70 IN

## 2018-11-28 DIAGNOSIS — M48.062 LUMBAR STENOSIS WITH NEUROGENIC CLAUDICATION: ICD-10-CM

## 2018-11-28 DIAGNOSIS — E78.5 HYPERLIPIDEMIA, UNSPECIFIED HYPERLIPIDEMIA TYPE: Chronic | ICD-10-CM

## 2018-11-28 DIAGNOSIS — I73.9 PVD (PERIPHERAL VASCULAR DISEASE) (HCC): Chronic | ICD-10-CM

## 2018-11-28 DIAGNOSIS — J43.9 PULMONARY EMPHYSEMA, UNSPECIFIED EMPHYSEMA TYPE (HCC): Primary | Chronic | ICD-10-CM

## 2018-11-28 DIAGNOSIS — I10 ESSENTIAL HYPERTENSION: Chronic | ICD-10-CM

## 2018-11-28 PROCEDURE — 1101F PT FALLS ASSESS-DOCD LE1/YR: CPT | Performed by: INTERNAL MEDICINE

## 2018-11-28 PROCEDURE — 3017F COLORECTAL CA SCREEN DOC REV: CPT | Performed by: INTERNAL MEDICINE

## 2018-11-28 PROCEDURE — G8598 ASA/ANTIPLAT THER USED: HCPCS | Performed by: INTERNAL MEDICINE

## 2018-11-28 PROCEDURE — G8427 DOCREV CUR MEDS BY ELIG CLIN: HCPCS | Performed by: INTERNAL MEDICINE

## 2018-11-28 PROCEDURE — 99213 OFFICE O/P EST LOW 20 MIN: CPT | Performed by: INTERNAL MEDICINE

## 2018-11-28 PROCEDURE — 4040F PNEUMOC VAC/ADMIN/RCVD: CPT | Performed by: INTERNAL MEDICINE

## 2018-11-28 PROCEDURE — G8926 SPIRO NO PERF OR DOC: HCPCS | Performed by: INTERNAL MEDICINE

## 2018-11-28 PROCEDURE — 3023F SPIROM DOC REV: CPT | Performed by: INTERNAL MEDICINE

## 2018-11-28 PROCEDURE — G8482 FLU IMMUNIZE ORDER/ADMIN: HCPCS | Performed by: INTERNAL MEDICINE

## 2018-11-28 PROCEDURE — 1123F ACP DISCUSS/DSCN MKR DOCD: CPT | Performed by: INTERNAL MEDICINE

## 2018-11-28 PROCEDURE — 4004F PT TOBACCO SCREEN RCVD TLK: CPT | Performed by: INTERNAL MEDICINE

## 2018-11-28 PROCEDURE — G8417 CALC BMI ABV UP PARAM F/U: HCPCS | Performed by: INTERNAL MEDICINE

## 2018-11-28 ASSESSMENT — PATIENT HEALTH QUESTIONNAIRE - PHQ9
SUM OF ALL RESPONSES TO PHQ QUESTIONS 1-9: 2
1. LITTLE INTEREST OR PLEASURE IN DOING THINGS: 1
SUM OF ALL RESPONSES TO PHQ QUESTIONS 1-9: 2
2. FEELING DOWN, DEPRESSED OR HOPELESS: 1
SUM OF ALL RESPONSES TO PHQ9 QUESTIONS 1 & 2: 2

## 2018-11-28 ASSESSMENT — ENCOUNTER SYMPTOMS: SHORTNESS OF BREATH: 1

## 2018-11-28 NOTE — PROGRESS NOTES
Subjective:      Patient ID: Grayce Lombard. is a 79 y.o. male. HPI  Here for follow up blood pressure, PVD, COPD   He is not longer prediabetes   He has had his annual CT lung screen    Prior to Visit Medications    Medication Sig Taking? Authorizing Provider   albuterol (PROVENTIL) (5 MG/ML) 0.5% nebulizer solution Take 1 mL by nebulization 4 times daily as needed for Wheezing Yes Rodrick Hamm MD   allopurinol (ZYLOPRIM) 100 MG tablet TAKE 2 TABLETS BY MOUTH DAILY. Yes BRANDI Weinberg CNP   gabapentin (NEURONTIN) 600 MG tablet TAKE 1 TABLET BY MOUTH EACH MORNING TAKE TWO TABLETS BY MOUTH DAILY IN THE EVENING. Yes BRANDI Weinberg CNP   pantoprazole (PROTONIX) 40 MG tablet TAKE ONE TABLET BY MOUTH DAILY IN THE EVENING 1 HOUR BEFORE BEDTIME. Yes BRANDI Weinberg CNP   DULoxetine (CYMBALTA) 60 MG extended release capsule TAKE ONE (1) CAPSULE BY MOUTH DAILY. Yes BRANDI Weinberg CNP   HYDROcodone-acetaminophen (NORCO) 5-325 MG per tablet Take 1 tablet by mouth every 8 hours as needed for Pain for up to 30 days. [facility] Date: 11/7/18 Yes Benson Petit MD   clopidogrel (PLAVIX) 75 MG tablet TAKE ONE TABLET BY MOUTH DAILY Yes Rodrick Hamm MD   atorvastatin (LIPITOR) 80 MG tablet TAKE ONE TABLET BY MOUTH DAILY Yes Rodrick Hamm MD   losartan (COZAAR) 100 MG tablet TAKE 1 TABLET BY MOUTH ONCE DAILY. Yes Rodrick Hamm MD   furosemide (LASIX) 20 MG tablet TAKE 1 TABLET BY MOUTH ONCE DAILY.  Yes Rodrick Hamm MD   metoprolol (LOPRESSOR) 100 MG tablet Take 1.5 tablets by mouth 2 times daily Yes Forrest Hernandez MD   albuterol sulfate HFA (PROAIR HFA) 108 (90 Base) MCG/ACT inhaler Inhale 2 puffs into the lungs every 6 hours as needed for Wheezing Yes Rodrick Hamm MD   INCRUSE ELLIPTA 62.5 MCG/INH AEPB INHALE ONE PUFF BY MOUTH DAILY Yes Aditya Mccallum MD   BREO ELLIPTA 200-25 MCG/INH AEPB INHALE ONE DOSE BY MOUTH DAILY Yes Aditya Mccallum MD nitroGLYCERIN (NITROSTAT) 0.4 MG SL tablet Place 1 tablet under the tongue every 5 minutes as needed for Chest pain Yes Roberto Harrell MD   vitamin B-12 (CYANOCOBALAMIN) 1000 MCG tablet Take 1,000 mcg by mouth daily. Yes Historical Provider, MD   magnesium gluconate (MAGONATE) 500 MG tablet Take 500 mg by mouth daily. Yes Historical Provider, MD         Review of Systems   Respiratory: Positive for shortness of breath (baseline no change). Cardiovascular: Positive for chest pain. Objective:   Physical Exam   Constitutional: He is oriented to person, place, and time. He appears well-developed and well-nourished. Using rolling walker     Cardiovascular: Normal rate, regular rhythm and normal heart sounds. Exam reveals no gallop and no friction rub. No murmur heard. He has good doppler signal DP decreased but present PT  Both left and right    Foot is warm and also blue color left is gone  Clubbing nails        Pulmonary/Chest: Effort normal. No respiratory distress. He has no wheezes. He has no rales. Distant breath sounds     Musculoskeletal: He exhibits no edema. Neurological: He is alert and oriented to person, place, and time. Skin: Skin is warm and dry. Psychiatric: He has a normal mood and affect. Assessment:        Diagnosis Orders   1. Pulmonary emphysema, unspecified emphysema type ([address]     2. PVD (peripheral vascular disease) ([address]     3. Lumbar stenosis with neurogenic claudication     4. Hyperlipidemia, unspecified hyperlipidemia type     5.  Essential hypertension                Plan:       COPD stable  Discussed that best thing he can do for his COPD CAD and PVD is quit smoking  He is no longer prediabetic  On high dose statin and blood pressure is stable    Follow up 4 months         Kirill Perez MD

## 2018-11-28 NOTE — PATIENT INSTRUCTIONS
smoking, you improve the health of everyone who now breathes in your smoke. · Their heart, lung, and cancer risks drop, much like yours. · They are sick less. For babies and small children, living smoke-free means they're less likely to have ear infections, pneumonia, and bronchitis. · If you're a woman who is or will be pregnant someday, quitting smoking means a healthier . · Children who are close to you are less likely to become adult smokers. Where can you learn more? Go to https://P[employer]iessantos.[employer]. org and sign in to your [employer] account. Enter [phone] in the [place] box to learn more about \"Learning About Benefits From Quitting Smoking. \"     If you do not have an account, please click on the \"Sign Up Now\" link. Current as of: 2017  Content Version: 11.8  © 8539-4827 [employer]. Care instructions adapted under license by [facility] ([facility]). If you have questions about a medical condition or this instruction, always ask your healthcare professional. [place] [address] any warranty or liability for your use of this information. Patient Education        Stopping Smoking: Care Instructions  Your Care Instructions  Cigarette smokers crave the nicotine in cigarettes. Giving it up is much harder than simply changing a habit. Your body has to stop craving the nicotine. It is hard to quit, but you can do it. There are many tools that people use to quit smoking. You may find that combining tools works best for you. There are several steps to quitting. First you get ready to quit. Then you get support to help you. After that, you learn new skills and behaviors to become a nonsmoker. For many people, a necessary step is getting and using medicine. Your doctor will help you set up the plan that best meets your needs. You may want to attend a smoking cessation program to help you quit smoking.  When you choose a program, look for one that

## 2018-12-07 DIAGNOSIS — F11.90 CHRONIC, CONTINUOUS USE OF OPIOIDS: Primary | ICD-10-CM

## 2018-12-07 DIAGNOSIS — M13.0 POLYARTHROPATHY, MULTIPLE SITES: ICD-10-CM

## 2018-12-07 DIAGNOSIS — M96.1 POSTLAMINECTOMY SYNDROME, LUMBAR: ICD-10-CM

## 2018-12-07 RX ORDER — HYDROCODONE BITARTRATE AND ACETAMINOPHEN 5; 325 MG/1; MG/1
1 TABLET ORAL EVERY 8 HOURS PRN
Qty: 90 TABLET | Refills: 0 | Status: SHIPPED | OUTPATIENT
Start: 2018-12-07 | End: 2019-01-09 | Stop reason: SDUPTHER

## 2018-12-10 ENCOUNTER — TELEPHONE (OUTPATIENT)
Dept: PRIMARY CARE CLINIC | Age: 68
End: 2018-12-10

## 2018-12-10 RX ORDER — ALBUTEROL SULFATE 1.25 MG/3ML
1 SOLUTION RESPIRATORY (INHALATION) EVERY 6 HOURS PRN
Qty: 120 VIAL | Refills: 3 | Status: SHIPPED | OUTPATIENT
Start: 2018-12-10 | End: 2018-12-24 | Stop reason: SDUPTHER

## 2018-12-14 LAB
6-ACETYLMORPHINE: NOT DETECTED
7-AMINOCLONAZEPAM: NOT DETECTED
ALPHA-OH-ALPRAZOLAM: NOT DETECTED
ALPRAZOLAM: NOT DETECTED
AMPHETAMINE: NOT DETECTED
BARBITURATES: NOT DETECTED
BENZOYLECGONINE: NOT DETECTED
BUPRENORPHINE: NOT DETECTED
CARISOPRODOL: NOT DETECTED
CLONAZEPAM: NOT DETECTED
CODEINE: NOT DETECTED
CREATININE URINE: 157.1 MG/DL (ref 20–400)
DIAZEPAM: NOT DETECTED
DRUGS EXPECTED: NORMAL
EER PAIN MGT DRUG PANEL, HIGH RES/EMIT U: NORMAL
ETHYL GLUCURONIDE: PRESENT
FENTANYL: NOT DETECTED
HYDROCODONE: PRESENT
HYDROMORPHONE: NOT DETECTED
LORAZEPAM: NOT DETECTED
MARIJUANA METABOLITE: NOT DETECTED
MDA: NOT DETECTED
MDEA: NOT DETECTED
MDMA URINE: NOT DETECTED
MEPERIDINE: NOT DETECTED
METHADONE: NOT DETECTED
METHAMPHETAMINE: NOT DETECTED
METHYLPHENIDATE: NOT DETECTED
MIDAZOLAM: NOT DETECTED
MORPHINE: NOT DETECTED
NORBUPRENORPHINE, FREE: NOT DETECTED
NORDIAZEPAM: NOT DETECTED
NORFENTANYL: NOT DETECTED
NORHYDROCODONE, URINE: PRESENT
NOROXYCODONE: NOT DETECTED
NOROXYMORPHONE, URINE: NOT DETECTED
OXAZEPAM: NOT DETECTED
OXYCODONE: NOT DETECTED
OXYMORPHONE: NOT DETECTED
PAIN MANAGEMENT DRUG PANEL: NORMAL
PAIN MANAGEMENT DRUG PANEL: NORMAL
PCP: NOT DETECTED
PHENTERMINE: NOT DETECTED
PROPOXYPHENE: NOT DETECTED
TAPENTADOL, URINE: NOT DETECTED
TAPENTADOL-O-SULFATE, URINE: NOT DETECTED
TEMAZEPAM: NOT DETECTED
TRAMADOL: NOT DETECTED
ZOLPIDEM: NOT DETECTED

## 2018-12-24 RX ORDER — ALLOPURINOL 100 MG/1
TABLET ORAL
Qty: 62 TABLET | Refills: 3 | Status: SHIPPED | OUTPATIENT
Start: 2018-12-24 | End: 2019-04-05 | Stop reason: SDUPTHER

## 2018-12-24 RX ORDER — DULOXETIN HYDROCHLORIDE 60 MG/1
CAPSULE, DELAYED RELEASE ORAL
Qty: 31 CAPSULE | Refills: 3 | Status: SHIPPED | OUTPATIENT
Start: 2018-12-24 | End: 2019-04-05 | Stop reason: SDUPTHER

## 2018-12-24 RX ORDER — PANTOPRAZOLE SODIUM 40 MG/1
TABLET, DELAYED RELEASE ORAL
Qty: 31 TABLET | Refills: 3 | Status: SHIPPED | OUTPATIENT
Start: 2018-12-24 | End: 2019-04-17 | Stop reason: SDUPTHER

## 2018-12-24 RX ORDER — ALBUTEROL SULFATE 1.25 MG/3ML
1 SOLUTION RESPIRATORY (INHALATION) EVERY 6 HOURS PRN
Qty: 120 VIAL | Refills: 3 | Status: SHIPPED | OUTPATIENT
Start: 2018-12-24 | End: 2021-08-25

## 2018-12-28 ENCOUNTER — TELEPHONE (OUTPATIENT)
Dept: PRIMARY CARE CLINIC | Age: 68
End: 2018-12-28

## 2018-12-28 RX ORDER — AMLODIPINE BESYLATE 2.5 MG/1
2.5 TABLET ORAL DAILY
Qty: 30 TABLET | Refills: 3 | Status: SHIPPED | OUTPATIENT
Start: 2018-12-28 | End: 2019-04-05 | Stop reason: SDUPTHER

## 2019-01-09 ENCOUNTER — OFFICE VISIT (OUTPATIENT)
Dept: PAIN MANAGEMENT | Age: 69
End: 2019-01-09
Payer: MEDICAID

## 2019-01-09 VITALS
HEART RATE: 70 BPM | BODY MASS INDEX: 32.35 KG/M2 | SYSTOLIC BLOOD PRESSURE: 154 MMHG | DIASTOLIC BLOOD PRESSURE: 87 MMHG | WEIGHT: 226 LBS | HEIGHT: 70 IN

## 2019-01-09 DIAGNOSIS — M79.605 CHRONIC PAIN OF BOTH LOWER EXTREMITIES: ICD-10-CM

## 2019-01-09 DIAGNOSIS — F11.90 CHRONIC, CONTINUOUS USE OF OPIOIDS: ICD-10-CM

## 2019-01-09 DIAGNOSIS — M96.1 POSTLAMINECTOMY SYNDROME, LUMBAR: Primary | ICD-10-CM

## 2019-01-09 DIAGNOSIS — M13.0 POLYARTHROPATHY, MULTIPLE SITES: ICD-10-CM

## 2019-01-09 DIAGNOSIS — G89.29 CHRONIC PAIN OF BOTH LOWER EXTREMITIES: ICD-10-CM

## 2019-01-09 DIAGNOSIS — M79.604 CHRONIC PAIN OF BOTH LOWER EXTREMITIES: ICD-10-CM

## 2019-01-09 DIAGNOSIS — G89.4 CHRONIC PAIN SYNDROME: ICD-10-CM

## 2019-01-09 PROCEDURE — 1101F PT FALLS ASSESS-DOCD LE1/YR: CPT | Performed by: ANESTHESIOLOGY

## 2019-01-09 PROCEDURE — G8482 FLU IMMUNIZE ORDER/ADMIN: HCPCS | Performed by: ANESTHESIOLOGY

## 2019-01-09 PROCEDURE — 4004F PT TOBACCO SCREEN RCVD TLK: CPT | Performed by: ANESTHESIOLOGY

## 2019-01-09 PROCEDURE — 3017F COLORECTAL CA SCREEN DOC REV: CPT | Performed by: ANESTHESIOLOGY

## 2019-01-09 PROCEDURE — 1123F ACP DISCUSS/DSCN MKR DOCD: CPT | Performed by: ANESTHESIOLOGY

## 2019-01-09 PROCEDURE — 4040F PNEUMOC VAC/ADMIN/RCVD: CPT | Performed by: ANESTHESIOLOGY

## 2019-01-09 PROCEDURE — 99214 OFFICE O/P EST MOD 30 MIN: CPT | Performed by: ANESTHESIOLOGY

## 2019-01-09 PROCEDURE — G8427 DOCREV CUR MEDS BY ELIG CLIN: HCPCS | Performed by: ANESTHESIOLOGY

## 2019-01-09 PROCEDURE — G8598 ASA/ANTIPLAT THER USED: HCPCS | Performed by: ANESTHESIOLOGY

## 2019-01-09 PROCEDURE — G8417 CALC BMI ABV UP PARAM F/U: HCPCS | Performed by: ANESTHESIOLOGY

## 2019-01-09 RX ORDER — HYDROCODONE BITARTRATE AND ACETAMINOPHEN 5; 325 MG/1; MG/1
1 TABLET ORAL EVERY 8 HOURS PRN
Qty: 90 TABLET | Refills: 0 | Status: SHIPPED | OUTPATIENT
Start: 2019-01-09 | End: 2019-02-07 | Stop reason: SDUPTHER

## 2019-01-09 ASSESSMENT — ENCOUNTER SYMPTOMS
ABDOMINAL PAIN: 0
BACK PAIN: 1
VOMITING: 0
EYE DISCHARGE: 0
SHORTNESS OF BREATH: 0
CONSTIPATION: 0
EYE PAIN: 0
COUGH: 0
WHEEZING: 0
EYE REDNESS: 0
NAUSEA: 0

## 2019-01-22 RX ORDER — METOPROLOL TARTRATE 50 MG/1
50 TABLET, FILM COATED ORAL 2 TIMES DAILY
Qty: 180 TABLET | Refills: 1 | Status: SHIPPED | OUTPATIENT
Start: 2019-01-22 | End: 2019-02-23 | Stop reason: SDUPTHER

## 2019-01-24 ENCOUNTER — TELEPHONE (OUTPATIENT)
Dept: PRIMARY CARE CLINIC | Age: 69
End: 2019-01-24

## 2019-01-30 ENCOUNTER — TELEPHONE (OUTPATIENT)
Dept: PRIMARY CARE CLINIC | Age: 69
End: 2019-01-30

## 2019-01-31 ENCOUNTER — TELEPHONE (OUTPATIENT)
Dept: PRIMARY CARE CLINIC | Age: 69
End: 2019-01-31

## 2019-02-07 DIAGNOSIS — M96.1 POSTLAMINECTOMY SYNDROME, LUMBAR: ICD-10-CM

## 2019-02-07 DIAGNOSIS — G89.29 CHRONIC PAIN OF BOTH LOWER EXTREMITIES: ICD-10-CM

## 2019-02-07 DIAGNOSIS — M79.604 CHRONIC PAIN OF BOTH LOWER EXTREMITIES: ICD-10-CM

## 2019-02-07 DIAGNOSIS — M79.605 CHRONIC PAIN OF BOTH LOWER EXTREMITIES: ICD-10-CM

## 2019-02-07 DIAGNOSIS — M13.0 POLYARTHROPATHY, MULTIPLE SITES: ICD-10-CM

## 2019-02-07 RX ORDER — HYDROCODONE BITARTRATE AND ACETAMINOPHEN 5; 325 MG/1; MG/1
1 TABLET ORAL EVERY 8 HOURS PRN
Qty: 90 TABLET | Refills: 0 | Status: SHIPPED | OUTPATIENT
Start: 2019-02-08 | End: 2019-03-06 | Stop reason: SDUPTHER

## 2019-02-08 ENCOUNTER — TELEPHONE (OUTPATIENT)
Dept: PRIMARY CARE CLINIC | Age: 69
End: 2019-02-08

## 2019-02-19 ENCOUNTER — OFFICE VISIT (OUTPATIENT)
Dept: CARDIOLOGY CLINIC | Age: 69
End: 2019-02-19
Payer: MEDICAID

## 2019-02-19 VITALS
SYSTOLIC BLOOD PRESSURE: 144 MMHG | DIASTOLIC BLOOD PRESSURE: 80 MMHG | WEIGHT: 219 LBS | BODY MASS INDEX: 31.42 KG/M2 | HEART RATE: 76 BPM

## 2019-02-19 DIAGNOSIS — I25.10 CAD IN NATIVE ARTERY: Primary | ICD-10-CM

## 2019-02-19 DIAGNOSIS — Z98.61 HISTORY OF PTCA: ICD-10-CM

## 2019-02-19 DIAGNOSIS — I25.2 MI, OLD: ICD-10-CM

## 2019-02-19 DIAGNOSIS — I10 ESSENTIAL HYPERTENSION: ICD-10-CM

## 2019-02-19 PROCEDURE — 99214 OFFICE O/P EST MOD 30 MIN: CPT | Performed by: INTERNAL MEDICINE

## 2019-02-19 PROCEDURE — 1101F PT FALLS ASSESS-DOCD LE1/YR: CPT | Performed by: INTERNAL MEDICINE

## 2019-02-19 PROCEDURE — 3017F COLORECTAL CA SCREEN DOC REV: CPT | Performed by: INTERNAL MEDICINE

## 2019-02-19 PROCEDURE — G8598 ASA/ANTIPLAT THER USED: HCPCS | Performed by: INTERNAL MEDICINE

## 2019-02-19 PROCEDURE — 4004F PT TOBACCO SCREEN RCVD TLK: CPT | Performed by: INTERNAL MEDICINE

## 2019-02-19 PROCEDURE — 4040F PNEUMOC VAC/ADMIN/RCVD: CPT | Performed by: INTERNAL MEDICINE

## 2019-02-19 PROCEDURE — G8417 CALC BMI ABV UP PARAM F/U: HCPCS | Performed by: INTERNAL MEDICINE

## 2019-02-19 PROCEDURE — G8427 DOCREV CUR MEDS BY ELIG CLIN: HCPCS | Performed by: INTERNAL MEDICINE

## 2019-02-19 PROCEDURE — G8482 FLU IMMUNIZE ORDER/ADMIN: HCPCS | Performed by: INTERNAL MEDICINE

## 2019-02-19 PROCEDURE — 1123F ACP DISCUSS/DSCN MKR DOCD: CPT | Performed by: INTERNAL MEDICINE

## 2019-02-22 RX ORDER — ATORVASTATIN CALCIUM 80 MG/1
TABLET, FILM COATED ORAL
Qty: 30 TABLET | Refills: 1 | Status: SHIPPED | OUTPATIENT
Start: 2019-02-22 | End: 2019-04-17 | Stop reason: SDUPTHER

## 2019-02-25 RX ORDER — METOPROLOL TARTRATE 50 MG/1
TABLET, FILM COATED ORAL
Qty: 180 TABLET | Refills: 3 | Status: SHIPPED | OUTPATIENT
Start: 2019-02-25 | End: 2019-03-11 | Stop reason: SDUPTHER

## 2019-03-06 DIAGNOSIS — Z02.89 PAIN MEDICATION AGREEMENT: Primary | ICD-10-CM

## 2019-03-06 DIAGNOSIS — M96.1 POSTLAMINECTOMY SYNDROME, LUMBAR: ICD-10-CM

## 2019-03-06 DIAGNOSIS — G89.29 CHRONIC PAIN OF BOTH LOWER EXTREMITIES: ICD-10-CM

## 2019-03-06 DIAGNOSIS — M13.0 POLYARTHROPATHY, MULTIPLE SITES: ICD-10-CM

## 2019-03-06 DIAGNOSIS — M79.605 CHRONIC PAIN OF BOTH LOWER EXTREMITIES: ICD-10-CM

## 2019-03-06 DIAGNOSIS — M79.604 CHRONIC PAIN OF BOTH LOWER EXTREMITIES: ICD-10-CM

## 2019-03-06 RX ORDER — HYDROCODONE BITARTRATE AND ACETAMINOPHEN 5; 325 MG/1; MG/1
1 TABLET ORAL EVERY 8 HOURS PRN
Qty: 90 TABLET | Refills: 0 | Status: SHIPPED | OUTPATIENT
Start: 2019-03-13 | End: 2019-04-08 | Stop reason: SDUPTHER

## 2019-03-11 ENCOUNTER — TELEPHONE (OUTPATIENT)
Dept: PRIMARY CARE CLINIC | Age: 69
End: 2019-03-11

## 2019-03-11 NOTE — TELEPHONE ENCOUNTER
Spoke with patient, he states he takes 150 mg twice a day (this is made up of 100mg and 50 mg tablets)  He states [facility] only sent him 50 mg last time  I called [facility] they states they sent him 100 mg on 2.23.19. They have no script for the 50s since December, eventhough the computer shows Dr Spike Curtis refilled the 46s in 2.25.19. Mr Maryann Mares says he can see from his pill pack that he has metoprolol 50s. The two strengths of metoprolol plus his other requested meds have been pended to go to [place] [id number]. He will speak with Beau to pull his other scripts over from [facility].

## 2019-03-11 NOTE — TELEPHONE ENCOUNTER
SUZANNA FROM [address] [place] [address] REFILLS THAT DN HAS NOT WRITTEN FOR PT YET. GABAPENTIN  LOSARTAN  LASIX  METOPROLOL--PLEASE CHECK STRENGTH AND DIRECTIONS.   WHAT SHE HAS AND WHAT PT SAYS HE TAKES ARE DIFFERENT

## 2019-03-15 RX ORDER — GABAPENTIN 600 MG/1
TABLET ORAL
Qty: 93 TABLET | Refills: 0 | Status: SHIPPED | OUTPATIENT
Start: 2019-03-15 | End: 2019-05-11 | Stop reason: SDUPTHER

## 2019-03-15 RX ORDER — LOSARTAN POTASSIUM 100 MG/1
TABLET ORAL
Qty: 90 TABLET | Refills: 1 | Status: SHIPPED | OUTPATIENT
Start: 2019-03-15 | End: 2019-08-28 | Stop reason: SDUPTHER

## 2019-03-15 RX ORDER — FUROSEMIDE 20 MG/1
TABLET ORAL
Qty: 31 TABLET | Refills: 5 | Status: SHIPPED | OUTPATIENT
Start: 2019-03-15 | End: 2019-08-02 | Stop reason: SDUPTHER

## 2019-03-15 RX ORDER — METOPROLOL TARTRATE 100 MG/1
100 TABLET ORAL 2 TIMES DAILY
Qty: 180 TABLET | Refills: 3 | Status: SHIPPED | OUTPATIENT
Start: 2019-03-15 | End: 2020-03-09

## 2019-03-15 RX ORDER — METOPROLOL TARTRATE 50 MG/1
TABLET, FILM COATED ORAL
Qty: 180 TABLET | Refills: 3 | Status: SHIPPED | OUTPATIENT
Start: 2019-03-15 | End: 2020-03-09 | Stop reason: SDUPTHER

## 2019-03-19 ENCOUNTER — OFFICE VISIT (OUTPATIENT)
Dept: PULMONOLOGY | Age: 69
End: 2019-03-19
Payer: MEDICAID

## 2019-03-19 VITALS
WEIGHT: 223 LBS | SYSTOLIC BLOOD PRESSURE: 130 MMHG | OXYGEN SATURATION: 95 % | HEART RATE: 63 BPM | BODY MASS INDEX: 31.92 KG/M2 | DIASTOLIC BLOOD PRESSURE: 80 MMHG | HEIGHT: 70 IN

## 2019-03-19 DIAGNOSIS — J44.9 CHRONIC OBSTRUCTIVE PULMONARY DISEASE, UNSPECIFIED COPD TYPE (HCC): ICD-10-CM

## 2019-03-19 DIAGNOSIS — G47.33 OSA ON CPAP: ICD-10-CM

## 2019-03-19 DIAGNOSIS — Z99.89 OSA ON CPAP: ICD-10-CM

## 2019-03-19 DIAGNOSIS — Z87.891 PERSONAL HISTORY OF TOBACCO USE: Primary | ICD-10-CM

## 2019-03-19 PROCEDURE — G8417 CALC BMI ABV UP PARAM F/U: HCPCS | Performed by: INTERNAL MEDICINE

## 2019-03-19 PROCEDURE — 99214 OFFICE O/P EST MOD 30 MIN: CPT | Performed by: INTERNAL MEDICINE

## 2019-03-19 PROCEDURE — 3017F COLORECTAL CA SCREEN DOC REV: CPT | Performed by: INTERNAL MEDICINE

## 2019-03-19 PROCEDURE — G8482 FLU IMMUNIZE ORDER/ADMIN: HCPCS | Performed by: INTERNAL MEDICINE

## 2019-03-19 PROCEDURE — G0296 VISIT TO DETERM LDCT ELIG: HCPCS | Performed by: INTERNAL MEDICINE

## 2019-03-19 PROCEDURE — G8427 DOCREV CUR MEDS BY ELIG CLIN: HCPCS | Performed by: INTERNAL MEDICINE

## 2019-03-19 PROCEDURE — 1101F PT FALLS ASSESS-DOCD LE1/YR: CPT | Performed by: INTERNAL MEDICINE

## 2019-03-19 PROCEDURE — 1123F ACP DISCUSS/DSCN MKR DOCD: CPT | Performed by: INTERNAL MEDICINE

## 2019-03-19 PROCEDURE — G8598 ASA/ANTIPLAT THER USED: HCPCS | Performed by: INTERNAL MEDICINE

## 2019-03-19 PROCEDURE — 3023F SPIROM DOC REV: CPT | Performed by: INTERNAL MEDICINE

## 2019-03-19 PROCEDURE — G8926 SPIRO NO PERF OR DOC: HCPCS | Performed by: INTERNAL MEDICINE

## 2019-03-19 PROCEDURE — 4004F PT TOBACCO SCREEN RCVD TLK: CPT | Performed by: INTERNAL MEDICINE

## 2019-03-19 PROCEDURE — 4040F PNEUMOC VAC/ADMIN/RCVD: CPT | Performed by: INTERNAL MEDICINE

## 2019-04-05 RX ORDER — ALLOPURINOL 100 MG/1
TABLET ORAL
Qty: 62 TABLET | Refills: 3 | Status: SHIPPED | OUTPATIENT
Start: 2019-04-05 | End: 2019-06-06 | Stop reason: SDUPTHER

## 2019-04-05 RX ORDER — AMLODIPINE BESYLATE 2.5 MG/1
2.5 TABLET ORAL DAILY
Qty: 30 TABLET | Refills: 3 | Status: SHIPPED | OUTPATIENT
Start: 2019-04-05 | End: 2019-07-17

## 2019-04-05 RX ORDER — DULOXETIN HYDROCHLORIDE 60 MG/1
CAPSULE, DELAYED RELEASE ORAL
Qty: 31 CAPSULE | Refills: 3 | Status: SHIPPED | OUTPATIENT
Start: 2019-04-05 | End: 2019-06-09 | Stop reason: SDUPTHER

## 2019-04-05 RX ORDER — CLOPIDOGREL BISULFATE 75 MG/1
TABLET ORAL
Qty: 90 TABLET | Refills: 2 | Status: SHIPPED | OUTPATIENT
Start: 2019-04-05 | End: 2019-12-30 | Stop reason: SDUPTHER

## 2019-04-08 ENCOUNTER — OFFICE VISIT (OUTPATIENT)
Dept: PAIN MANAGEMENT | Age: 69
End: 2019-04-08
Payer: MEDICAID

## 2019-04-08 ENCOUNTER — TELEPHONE (OUTPATIENT)
Dept: CASE MANAGEMENT | Age: 69
End: 2019-04-08

## 2019-04-08 VITALS
SYSTOLIC BLOOD PRESSURE: 171 MMHG | DIASTOLIC BLOOD PRESSURE: 87 MMHG | BODY MASS INDEX: 31.5 KG/M2 | HEART RATE: 61 BPM | WEIGHT: 220 LBS | HEIGHT: 70 IN

## 2019-04-08 DIAGNOSIS — M96.1 POSTLAMINECTOMY SYNDROME, LUMBAR: ICD-10-CM

## 2019-04-08 DIAGNOSIS — M79.605 CHRONIC PAIN OF BOTH LOWER EXTREMITIES: ICD-10-CM

## 2019-04-08 DIAGNOSIS — G89.29 CHRONIC PAIN OF BOTH LOWER EXTREMITIES: ICD-10-CM

## 2019-04-08 DIAGNOSIS — M79.604 CHRONIC PAIN OF BOTH LOWER EXTREMITIES: ICD-10-CM

## 2019-04-08 DIAGNOSIS — F11.90 CHRONIC, CONTINUOUS USE OF OPIOIDS: ICD-10-CM

## 2019-04-08 DIAGNOSIS — M54.16 LUMBAR RADICULOPATHY: Primary | ICD-10-CM

## 2019-04-08 DIAGNOSIS — M13.0 POLYARTHROPATHY, MULTIPLE SITES: ICD-10-CM

## 2019-04-08 DIAGNOSIS — Z02.89 PAIN MEDICATION AGREEMENT: ICD-10-CM

## 2019-04-08 PROCEDURE — 4040F PNEUMOC VAC/ADMIN/RCVD: CPT | Performed by: ANESTHESIOLOGY

## 2019-04-08 PROCEDURE — 1123F ACP DISCUSS/DSCN MKR DOCD: CPT | Performed by: ANESTHESIOLOGY

## 2019-04-08 PROCEDURE — 3017F COLORECTAL CA SCREEN DOC REV: CPT | Performed by: ANESTHESIOLOGY

## 2019-04-08 PROCEDURE — G8417 CALC BMI ABV UP PARAM F/U: HCPCS | Performed by: ANESTHESIOLOGY

## 2019-04-08 PROCEDURE — G8427 DOCREV CUR MEDS BY ELIG CLIN: HCPCS | Performed by: ANESTHESIOLOGY

## 2019-04-08 PROCEDURE — G8598 ASA/ANTIPLAT THER USED: HCPCS | Performed by: ANESTHESIOLOGY

## 2019-04-08 PROCEDURE — 4004F PT TOBACCO SCREEN RCVD TLK: CPT | Performed by: ANESTHESIOLOGY

## 2019-04-08 PROCEDURE — 99214 OFFICE O/P EST MOD 30 MIN: CPT | Performed by: ANESTHESIOLOGY

## 2019-04-08 RX ORDER — IBUPROFEN 200 MG
200 TABLET ORAL EVERY 6 HOURS PRN
COMMUNITY
End: 2019-08-06

## 2019-04-08 RX ORDER — HYDROCODONE BITARTRATE AND ACETAMINOPHEN 5; 325 MG/1; MG/1
1 TABLET ORAL EVERY 8 HOURS PRN
Qty: 90 TABLET | Refills: 0 | Status: SHIPPED | OUTPATIENT
Start: 2019-04-11 | End: 2019-05-29 | Stop reason: DRUGHIGH

## 2019-04-08 ASSESSMENT — ENCOUNTER SYMPTOMS
WHEEZING: 0
BACK PAIN: 1
EYE PAIN: 0
SHORTNESS OF BREATH: 0
ABDOMINAL PAIN: 0
NAUSEA: 0
EYE REDNESS: 0
COUGH: 0
VOMITING: 0
EYE DISCHARGE: 0
CONSTIPATION: 0

## 2019-04-08 NOTE — TELEPHONE ENCOUNTER
Patient scheduled for CT Lung screening 04/09/19. Last CT Lung screening completed 08/15/18-Recommended annual screening in 12 months. Called patient to help reschedule screening for Aug. 2019-no answer-left voicemail.

## 2019-04-08 NOTE — PATIENT INSTRUCTIONS
Patient Education     Continue home exercises  Limit use of Norco for severe pain as provided. Call us back regarding epidural steroid injection      Learning About Lumbar Epidural Steroid Injections  What is a lumbar epidural steroid injection? A doctor may give you a lumbar epidural steroid injection to try to decrease pain, tingling, or numbness in your back, buttock, or leg. These might be the result of a back or disc problem. The injection goes directly into your epidural space. This is the area in your back around your spinal cord. This injection may have both a local anesthetic and a steroid medicine. Or it may only have a steroid. Local anesthetic medicines numb your nerves right away for a short time. Steroids reduce swelling and pain. But they take a few days to start working. Some people get a series of these injections over weeks or months. How is a lumbar epidural done? The doctor may use an imaging test before or during your injection. This can be an MRI, a CT scan, or an X-ray. These tests can show where your nerve problems are. After finding the right spot, the doctor may inject a numbing medicine into the skin where you will get the steroid injection. Then he or she puts the needle for the steroid into the numbed area. You may feel some pressure. You could feel some stinging or burning during the injection. It takes about 10 to 15 minutes to get this injection. You will probably go home about 20 to 30 minutes after you get it. You will need someone to drive you home. What can you expect after a lumbar epidural?  If your injection had local anesthetic and a steroid, your legs may feel heavy or numb right after. You will probably be able to walk. But you may need to be extra careful. Take care not to lose your balance and be sure to follow your doctor's instructions. If your injection contained local anesthetic, you may feel better right away.  But this pain relief will last only a few hours. Your pain will probably return. This is because the steroids have not started working yet. Before the steroids start to work, your back may be sore for a few days. These injections don't always work. When they do, it takes 1 to 5 days. This pain relief can last for several days to a few months or longer. You may want to do less than normal for a few days. But you may also be able to return to your daily routine. Some people are dizzy or feel sick to their stomach after getting this injection. These symptoms usually do not last very long. If your pain is better, you may be able to keep doing your normal activities or physical therapy. But try not to overdo it, even if your back pain has improved a lot. If your pain is only a little better or if it comes back, your doctor may recommend another injection in a few weeks. If your pain has not changed, talk to your doctor about other treatment choices. Side effects from an epidural steroid injection include headache, fever, or infection. Serious side effects are rare. But they can include stroke, paralysis, or loss of vision. Follow-up care is a key part of your treatment and safety. Be sure to make and go to all appointments, and call your doctor if you are having problems. It's also a good idea to know your test results and keep a list of the medicines you take. Where can you learn more? Go to https://PresentationTubefidelGarden Mate.[employer]Del[employer]al.Mx. org and sign in to your [employer] account. Enter H406 in the [facility] box to learn more about \"Learning About Lumbar Epidural Steroid Injections. \"     If you do not have an account, please click on the \"Sign Up Now\" link. Current as of: September 20, 2018  Content Version: 11.9  © 7876-6838 [employer]. Care instructions adapted under license by [facility] ([facility]).  If you have questions about a medical condition or this instruction, always ask your healthcare professional. [address] any warranty or liability for your use of this information.

## 2019-04-08 NOTE — PROGRESS NOTES
WITH STENT PLACEMENT  6/2013    EYE SURGERY      LAMINECTOMY  11/18/2015    Bilateral decompressive lumbar laminectomy L4-5   ; medial facetectomy L4-5 joints  bilaterally; foraminotomies l5   nerve roots bilaterally    LEG DEBRIDEMENT Right 7/23/2013    Dr. Tiff Barrientos - pretibial wound    OTHER SURGICAL HISTORY Right 6/25/2013    Dr. Tiff Barrientos - CFA Endarterectomy w/marsupialization; RLE wound excision & debridement     OTHER SURGICAL HISTORY Left 8/27/2013    Dr. Tiff Barrientos - femoral & profunda femoris endarterectomy w/marsupialization patch angioplasty using SFA    SKIN SPLIT GRAFT Right 7/25/2013    Dr. Tiff Barrientos - to non-healing R pretibial wound, 9 x 15 cm    TOTAL HIP ARTHROPLASTY Right 09/01/2016    Dr. Nik Resendez Left 12/22/2015    Dr. Tiff Barrientos - CFA exploration, thrombectomy of ileofemoral system, stenting of RAFAL in-stent stenosis w/8 x 37 mm Palmaz        Allergies   Allergen Reactions    Daliresp [Roflumilast] Other (See Comments) and Diarrhea     Severe diarrhea and did not help    Asa [Aspirin] Other (See Comments)     Stomach ache       Current Outpatient Medications   Medication Sig Dispense Refill    ibuprofen (ADVIL;MOTRIN) 200 MG tablet Take 200 mg by mouth every 6 hours as needed for Pain      [START ON 4/11/2019] HYDROcodone-acetaminophen (NORCO) 5-325 MG per tablet Take 1 tablet by mouth every 8 hours as needed for Pain for up to 30 days. 90 tablet 0    amLODIPine (NORVASC) 2.5 MG tablet Take 1 tablet by mouth daily 30 tablet 3    allopurinol (ZYLOPRIM) 100 MG tablet TAKE 2 TABLETS BY MOUTH DAILY. 62 tablet 3    clopidogrel (PLAVIX) 75 MG tablet TAKE ONE TABLET BY MOUTH DAILY 90 tablet 2    DULoxetine (CYMBALTA) 60 MG extended release capsule TAKE ONE (1) CAPSULE BY MOUTH DAILY. 31 capsule 3    gabapentin (NEURONTIN) 600 MG tablet TAKE 1 TABLET BY MOUTH EACH MORNING TAKE TWO TABLETS BY MOUTH DAILY IN THE EVENING.  93 tablet 0    losartan (COZAAR) 100 MG tablet TAKE 1 TABLET BY MOUTH ONCE DAILY. 90 tablet 1    furosemide (LASIX) 20 MG tablet TAKE 1 TABLET BY MOUTH ONCE DAILY. 31 tablet 5    metoprolol tartrate (LOPRESSOR) 50 MG tablet TAKE ONE (1) TABLET BY MOUTH TWICE A DAY. 180 tablet 3    metoprolol (LOPRESSOR) 100 MG tablet Take 1 tablet by mouth 2 times daily 180 tablet 3    atorvastatin (LIPITOR) 80 MG tablet TAKE ONE TABLET BY MOUTH DAILY 30 tablet 1    vitamin D (CHOLECALCIFEROL) 1000 UNIT TABS tablet Take 1,000 Units by mouth daily      albuterol (ACCUNEB) 1.25 MG/3ML nebulizer solution Inhale 3 mLs into the lungs every 6 hours as needed for Wheezing 120 vial 3    pantoprazole (PROTONIX) 40 MG tablet TAKE ONE TABLET BY MOUTH DAILY IN THE EVENING 1 HOUR BEFORE BEDTIME. 31 tablet 3    albuterol (PROVENTIL) (5 MG/ML) 0.5% nebulizer solution Take 1 mL by nebulization 4 times daily as needed for Wheezing 120 each 3    albuterol sulfate HFA (PROAIR HFA) 108 (90 Base) MCG/ACT inhaler Inhale 2 puffs into the lungs every 6 hours as needed for Wheezing 1 Inhaler 3    INCRUSE ELLIPTA 62.5 MCG/INH AEPB INHALE ONE PUFF BY MOUTH DAILY 3 each 4    BREO ELLIPTA 200-25 MCG/INH AEPB INHALE ONE DOSE BY MOUTH DAILY 3 each 4    nitroGLYCERIN (NITROSTAT) 0.4 MG SL tablet Place 1 tablet under the tongue every 5 minutes as needed for Chest pain 25 tablet 1    vitamin B-12 (CYANOCOBALAMIN) 1000 MCG tablet Take 1,000 mcg by mouth daily.  magnesium gluconate (MAGONATE) 500 MG tablet Take 500 mg by mouth daily. No current facility-administered medications for this visit.         Family History   Problem Relation Age of Onset    Cancer Mother     Heart Disease Mother     Cancer Father     Heart Disease Father        Social History     Socioeconomic History    Marital status:      Spouse name: Not on file    Number of children: Not on file    Years of education: Not on file    Highest education level: Not on file   Occupational History    Occupation: Retired Comment: 2014    Occupation: Drywall self employed    Social Needs    Financial resource strain: Not on file    Food insecurity:     Worry: Not on file     Inability: Not on file   [employer] needs:     Medical: Not on file     Non-medical: Not on file   Tobacco Use    Smoking status: Current Every Day Smoker     Packs/day: 2.00     Years: 52.00     Pack years: 104.00     Types: Cigarettes     Start date: 1/1/1967    Smokeless tobacco: Never Used    Tobacco comment:  one half pack a day   Substance and Sexual Activity    Alcohol use: Yes     Alcohol/week: 0.0 oz     Comment: 2-3 beers a month    Drug use: No    Sexual activity: Never   Lifestyle    Physical activity:     Days per week: Not on file     Minutes per session: Not on file    Stress: Not on file   Relationships    Social connections:     Talks on phone: Not on file     Gets together: Not on file     Attends Nondenominational service: Not on file     Active member of club or organization: Not on file     Attends meetings of clubs or organizations: Not on file     Relationship status: Not on file    Intimate partner violence:     Fear of current or ex partner: Not on file     Emotionally abused: Not on file     Physically abused: Not on file     Forced sexual activity: Not on file   Other Topics Concern    Not on file   Social History Narrative    Not on file         Imaging Studies:   CT LS (10/27/2017)  \"Impression       Prior laminectomy L4-5 and L5-S1.       L4-5 grade 1 spondylolisthesis.       Mild canal stenosis L2-3 and L3-4. \"          Results of the above studies were personally reviewed by me with the patient indetail along with the images, when available. The patient was instructed to contact the primary care provider regarding other unrelated findings not addressed during today's visit. Review of Systems:   Review of Systems   Constitutional: Negative for chills and fever. HENT: Negative for hearing loss and tinnitus. Eyes: Negative for pain, discharge and redness. Respiratory: Negative for cough, shortness of breath and wheezing. Cardiovascular: Negative for chest pain and palpitations. Gastrointestinal: Negative for abdominal pain, constipation, nausea and vomiting. Genitourinary: Negative for frequency, hematuria and urgency. Musculoskeletal: Positive for back pain. Negative for myalgias and neck pain. Skin: Negative for rash. Neurological: Negative for dizziness, seizures, weakness and headaches. Hematological: Does not bruise/bleed easily. Psychiatric/Behavioral: Negative for suicidal ideas. The patient is not nervous/anxious. The patient was instructed to contact primary care physician regarding ROS positives not being addressed during today's visit. Physical Exam:   Physical Exam   Constitutional: He is oriented to person, place, and time. No distress. Mild distress, ambulates with walker   HENT:   Head: Normocephalic. Eyes: Pupils are equal, round, and reactive to light. EOM are normal.   Neck: Normal range of motion. Neck supple. No thyromegaly present. Cardiovascular: Normal rate, regular rhythm, normal heart sounds and intact distal pulses. Pulmonary/Chest: Effort normal and breath sounds normal. No respiratory distress. He exhibits no tenderness. Abdominal: Soft. Bowel sounds are normal. He exhibits no mass. There is no tenderness. There is no guarding. Musculoskeletal: Normal range of motion. He exhibits no tenderness or deformity. Mild lumbar paraspinal tenderness on left side   Lymphadenopathy:     He has no cervical adenopathy. Neurological: He is alert and oriented to person, place, and time. He has normal strength. He displays normal reflexes. A sensory deficit (diminished in left leg) is present. No cranial nerve deficit. He exhibits normal muscle tone. Gait (antalgic, compensated) abnormal. Coordination normal. He displays no Babinski's sign on the right side. He displays no Babinski's sign on the left side. Reflex Scores:       Tricep reflexes are 2+ on the right side and 2+ on the left side. Bicep reflexes are 2+ on the right side and 2+ on the left side. Brachioradialis reflexes are 2+ on the right side and 2+ on the left side. Patellar reflexes are 1+ on the right side and 1+ on the left side. Achilles reflexes are 1+ on the right side and 1+ on the left side. Skin: Skin is warm. No rash noted. He is not diaphoretic. Psychiatric: He has a normal mood and affect. His behavior is normal. Thought content normal.       Vitals:    04/08/19 1257 04/08/19 1303   BP: (!) 185/83 (!) 171/87   Site: Left Upper Arm    Position: Sitting    Cuff Size: Large Adult    Pulse: 61    Weight: 220 lb (99.8 kg)    Height: 5' 10\" (1.778 m)        Body mass index is 31.57 kg/m². Pain Score:   7 /10    Goals of chronic pain therapy:      Multi-disciplinary comprehensive pain management with functional improvement and reduced opioid use. Prescription pain medication monitoring:      MEDD current = 15   ORT Score = 3   LOW     Other Risk factors - COPD, sleep apnea, depression. Date of Last Medication Agreement: 09/05/2017   Date Naloxone prescribed: NA     UDT:    Date of last UDT: 12/10/2018    Adverse report: Yes (EtOH)     OARRS:    Checked today: Yes    Adverse report: No     Medication Effects -        Analgesia:      Average level of pain for the past month = 9/10     Percentage of pain relief = 30%     Activities Improved: Activities of daily living = 0%     Sleep = 0%     Mood = 0%     Social functioning = 0%     Adverse Effects:      Any adverse effects: No     Type/Severity of side effect: None    Aberrant Behavior:     Requests for frequent early refills: No     Increased dose without authorization: No     Reports lost or stolen prescriptions: No     Attempts to obtain prescriptions from other providers: No     Use of pain medication in response to situational stressor: No      Increasingly unkempt or impaired: No     Negative mood changes: No     Abusing alcohol or illicit drugs: No     Appears intoxicated: No     Other: NA     Controlled Substances Monitoring:    Attestation: The Prescription Monitoring Report for this patient was reviewed today. Lolita Fleischer, MD)  Chronic Pain Routine Monitoring: Possible medication side effects, risk of tolerance/dependence & alternative treatments discussed., No signs of potential drug abuse or diversion identified: otherwise, see note documentation Lolita Fleischer, MD)      Overall Progress:       PEG Score = 9 / 10     Physical Therapy: last in 2017   Counseling: N/A   Injections: declines     Patient compliance on plan of care: Fair   Progress on current plan:  Poor    Assessment:      ICD-10-CM    1. Lumbar radiculopathy M54.16 HYDROcodone-acetaminophen (NORCO) 5-325 MG per tablet   2. Postlaminectomy syndrome, lumbar M96.1 HYDROcodone-acetaminophen (NORCO) 5-325 MG per tablet   3. Polyarthropathy, multiple sites M13.0 HYDROcodone-acetaminophen (NORCO) 5-325 MG per tablet   4. Chronic pain of both lower extremities M79.604 HYDROcodone-acetaminophen (NORCO) 5-325 MG per tablet    M79.605     G89.29    5. Chronic, continuous use of opioids F11.90    6. Pain medication agreement Z02.89        Plan:     1. Chronic low back pain s/p L4-S1 fusion in 2015; no acute changes. 2. Other issues - arthritis s/p right THR (2016), gout, PVD. 3. Intolerant of NSAIDs due to cardiac and anticoagulant therapy. 4. Reports pain flared over the past month - offered trial of caudal DAVIDA. Discussed procedure benefits possible complications and alternatives. 5. He is unsure about injections; wishes to check with his Cardiologist - no need to stop Plavix for this procedure. 6. Continues gabapentin and Cymbalta as before; counseled on limitation of opioid and refilled Norco 5 mg TID PRN.       Analgesic Plan:   Continue present regimen: Yes - Norco 5 mg PRN   Adjust dose of present analgesic: No   Switch analgesics: No   Add/Adjust concomitant therapy: Yes - gabapentin, Cymbalta    Education:    - Reviewed OARRS report in detail, including Narx Scores and Overdose Risk Score. - Encouraged regular home exercises and strengthening as long-termgoals. - Counseled on dual effects, limitations, side effects and long-term complications of opioids. Discussed proper use and potential life threatening side effects of inappropriate use. - Educational materialprovided on multidisciplinary chronic pain management, safe opioid use. Follow-up: RTC X 2 months. Patient engaged in shared decision making. The entire treatment plan was discussed with patient and agreed. Thank you for allowing us to participate in the care of your patient - please do not hesitate to contact us if you have any questions or concerns.         Greg Quan MD  Board Certified in Anesthesiology and Pain Medicine

## 2019-04-09 ENCOUNTER — TELEPHONE (OUTPATIENT)
Dept: CASE MANAGEMENT | Age: 69
End: 2019-04-09

## 2019-04-09 ENCOUNTER — TELEPHONE (OUTPATIENT)
Dept: CARDIOLOGY CLINIC | Age: 69
End: 2019-04-09

## 2019-04-17 ENCOUNTER — OFFICE VISIT (OUTPATIENT)
Dept: PRIMARY CARE CLINIC | Age: 69
End: 2019-04-17
Payer: MEDICAID

## 2019-04-17 VITALS
WEIGHT: 220 LBS | HEART RATE: 76 BPM | HEIGHT: 70 IN | BODY MASS INDEX: 31.5 KG/M2 | DIASTOLIC BLOOD PRESSURE: 76 MMHG | OXYGEN SATURATION: 93 % | SYSTOLIC BLOOD PRESSURE: 138 MMHG

## 2019-04-17 DIAGNOSIS — R07.89 LEFT-SIDED CHEST WALL PAIN: ICD-10-CM

## 2019-04-17 DIAGNOSIS — I10 ESSENTIAL HYPERTENSION: Chronic | ICD-10-CM

## 2019-04-17 DIAGNOSIS — E78.2 MIXED HYPERLIPIDEMIA: Primary | Chronic | ICD-10-CM

## 2019-04-17 LAB
A/G RATIO: 1.4 (ref 1.1–2.2)
ALBUMIN SERPL-MCNC: 4.1 G/DL (ref 3.4–5)
ALP BLD-CCNC: 102 U/L (ref 40–129)
ALT SERPL-CCNC: 24 U/L (ref 10–40)
ANION GAP SERPL CALCULATED.3IONS-SCNC: 15 MMOL/L (ref 3–16)
AST SERPL-CCNC: 29 U/L (ref 15–37)
BILIRUB SERPL-MCNC: 0.9 MG/DL (ref 0–1)
BUN BLDV-MCNC: 10 MG/DL (ref 7–20)
CALCIUM SERPL-MCNC: 9.6 MG/DL (ref 8.3–10.6)
CHLORIDE BLD-SCNC: 102 MMOL/L (ref 99–110)
CHOLESTEROL, TOTAL: 144 MG/DL (ref 0–199)
CO2: 24 MMOL/L (ref 21–32)
CREAT SERPL-MCNC: 0.9 MG/DL (ref 0.8–1.3)
GFR AFRICAN AMERICAN: >60
GFR NON-AFRICAN AMERICAN: >60
GLOBULIN: 2.9 G/DL
GLUCOSE BLD-MCNC: 113 MG/DL (ref 70–99)
HDLC SERPL-MCNC: 29 MG/DL (ref 40–60)
LDL CHOLESTEROL CALCULATED: 69 MG/DL
POTASSIUM SERPL-SCNC: 3.7 MMOL/L (ref 3.5–5.1)
SODIUM BLD-SCNC: 141 MMOL/L (ref 136–145)
TOTAL PROTEIN: 7 G/DL (ref 6.4–8.2)
TRIGL SERPL-MCNC: 230 MG/DL (ref 0–150)
VLDLC SERPL CALC-MCNC: 46 MG/DL

## 2019-04-17 PROCEDURE — G8598 ASA/ANTIPLAT THER USED: HCPCS | Performed by: NURSE PRACTITIONER

## 2019-04-17 PROCEDURE — 99213 OFFICE O/P EST LOW 20 MIN: CPT | Performed by: NURSE PRACTITIONER

## 2019-04-17 PROCEDURE — G8417 CALC BMI ABV UP PARAM F/U: HCPCS | Performed by: NURSE PRACTITIONER

## 2019-04-17 PROCEDURE — 4040F PNEUMOC VAC/ADMIN/RCVD: CPT | Performed by: NURSE PRACTITIONER

## 2019-04-17 PROCEDURE — 4004F PT TOBACCO SCREEN RCVD TLK: CPT | Performed by: NURSE PRACTITIONER

## 2019-04-17 PROCEDURE — 3017F COLORECTAL CA SCREEN DOC REV: CPT | Performed by: NURSE PRACTITIONER

## 2019-04-17 PROCEDURE — 36415 COLL VENOUS BLD VENIPUNCTURE: CPT | Performed by: NURSE PRACTITIONER

## 2019-04-17 PROCEDURE — G8427 DOCREV CUR MEDS BY ELIG CLIN: HCPCS | Performed by: NURSE PRACTITIONER

## 2019-04-17 PROCEDURE — 1123F ACP DISCUSS/DSCN MKR DOCD: CPT | Performed by: NURSE PRACTITIONER

## 2019-04-17 RX ORDER — BUPROPION HYDROCHLORIDE 150 MG/1
150 TABLET, EXTENDED RELEASE ORAL 2 TIMES DAILY
Qty: 60 TABLET | Refills: 3 | Status: SHIPPED | OUTPATIENT
Start: 2019-04-17 | End: 2019-06-27 | Stop reason: SDUPTHER

## 2019-04-17 RX ORDER — ATORVASTATIN CALCIUM 80 MG/1
TABLET, FILM COATED ORAL
Qty: 30 TABLET | Refills: 1 | Status: SHIPPED | OUTPATIENT
Start: 2019-04-17 | End: 2019-05-30 | Stop reason: SDUPTHER

## 2019-04-17 RX ORDER — PANTOPRAZOLE SODIUM 40 MG/1
TABLET, DELAYED RELEASE ORAL
Qty: 31 TABLET | Refills: 3 | Status: SHIPPED | OUTPATIENT
Start: 2019-04-17 | End: 2019-06-27 | Stop reason: SDUPTHER

## 2019-04-17 RX ORDER — ALBUTEROL SULFATE 90 UG/1
2 AEROSOL, METERED RESPIRATORY (INHALATION) EVERY 6 HOURS PRN
Qty: 1 INHALER | Refills: 3 | Status: SHIPPED | OUTPATIENT
Start: 2019-04-17 | End: 2020-06-09 | Stop reason: SDUPTHER

## 2019-04-17 RX ORDER — RANITIDINE 150 MG/1
150 TABLET ORAL 2 TIMES DAILY PRN
Qty: 60 TABLET | Refills: 3 | Status: SHIPPED | OUTPATIENT
Start: 2019-04-17 | End: 2019-11-27 | Stop reason: SDUPTHER

## 2019-04-17 NOTE — PROGRESS NOTES
Subjective:      Patient ID: Karyn Acharya is a 79 y.o. male. HPI 78 yo male here for htn f/u and hyperlipidemia f/u. Here to establish care. Previous pcp retired    Pain left side x 2 months. Hurts left chest. Pain with movement, and occasionally breathing. Thought it would go away but it hasnt. Review of Systems   Respiratory:        Pulmonologist Dr. Jesús Wilkerson     Musculoskeletal:        Chest wall/left side pain, just under rib cage   All other systems reviewed and are negative. Objective:   Physical Exam   Constitutional: He is oriented to person, place, and time. He appears well-developed and well-nourished. Cardiovascular: Normal rate, regular rhythm and normal heart sounds. Pulmonary/Chest: Effort normal and breath sounds normal. He exhibits no mass and no tenderness. Neurological: He is alert and oriented to person, place, and time. Skin: Skin is warm and dry. Assessment:       Diagnosis Orders   1. Mixed hyperlipidemia  Lipid Panel   2. Essential hypertension  Comprehensive Metabolic Panel   3.  Left-sided chest wall pain  XR CHEST STANDARD (2 VW)           Plan:      Plain film  Sheldon stable, continue current therapy  Labs as above          Tabitha Stevenson, APRN - CNP

## 2019-04-17 NOTE — PATIENT INSTRUCTIONS
smoking. Bupropion may cause seizures, especially if you have certain medical conditions or use certain drugs. Tell your doctor about all of your medical conditions and the drugs you use. To make sure bupropion is safe for you, tell your doctor if you have:  · a history of head injury, seizures, or brain or spinal cord tumor;  · narrow-angle glaucoma;  · heart disease, high blood pressure, history of heart attack;  · diabetes;  · kidney or liver disease (especially cirrhosis);  · bipolar disorder or other mental illness; or  · if you drink alcohol. Some young people have thoughts about suicide when first taking an antidepressant. Your doctor will need to check your progress at regular visits. Your family or other caregivers should also be alert to changes in your mood or symptoms. It is not known whether this medicine will harm an unborn baby. Tell your doctor if you are pregnant or plan to become pregnant. Bupropion can pass into breast milk and may harm a nursing baby. You should not breast-feed while using this medicine. Bupropion is not approved for use by anyone younger than 25years old. How should I take bupropion? Follow all directions on your prescription label. Do not take this medicine in larger or smaller amounts or for longer than recommended. Too much of this medicine can increase your risk of a seizure. You may take bupropion with or without food. Do not crush, chew, or break an extended-release tablet. Swallow it whole. You should not change your dose or stop using bupropion suddenly, unless you have a seizure while taking this medicine. Stopping suddenly can cause unpleasant withdrawal symptoms. Ask your doctor how to safely stop using bupropion. If you take Zyban to help you stop smoking, you may continue to smoke for about 1 week after you start the medicine. Set a date to quit smoking during the second week of treatment.  Talk to your doctor if you have trouble quitting after taking vitamins, and herbal products. Not all possible interactions are listed in this medication guide. Where can I get more information? Your pharmacist can provide more information about bupropion. Remember, keep this and all other medicines out of the reach of children, never share your medicines with others, and use this medication only for the indication prescribed. Every effort has been made to ensure that the information provided by [facility] is accurate, up-to-date, and complete, but no guarantee is made to that effect. Drug information contained herein may be time sensitive. [facility] information has been compiled for use by healthcare practitioners and consumers in the [place] and therefore [facility] does not warrant that uses outside of the [place] are appropriate, unless specifically indicated otherwise. [facility]'s drug information does not endorse drugs, diagnose patients or recommend therapy. [facility] drug information is an informational resource designed to assist licensed healthcare practitioners in caring for their patients and/or to serve consumers viewing this service as a supplement to, and not a substitute for, the expertise, skill, knowledge and judgment of healthcare practitioners. The absence of a warning for a given drug or drug combination in no way should be construed to indicate that the drug or drug combination is safe, effective or appropriate for any given patient. [facility] does not assume any responsibility for any aspect of healthcare administered with the aid of information [facility] provides. The information contained herein is not intended to cover all possible uses, directions, precautions, warnings, drug interactions, allergic reactions, or adverse effects. If you have questions about the drugs you are taking, check with your doctor, nurse or pharmacist.  Copyright 2874-9678 [employer] [address] [place] [address] Avenue: 20.01. Revision date: 5/9/2017.   Care instructions adapted under license by [facility] ([facility]). If you have questions about a medical condition or this instruction, always ask your healthcare professional. [address] any warranty or liability for your use of this information.

## 2019-04-22 ENCOUNTER — HOSPITAL ENCOUNTER (OUTPATIENT)
Dept: GENERAL RADIOLOGY | Age: 69
Discharge: HOME | End: 2019-04-22
Payer: MEDICAID

## 2019-04-22 ENCOUNTER — HOSPITAL ENCOUNTER (OUTPATIENT)
Age: 69
Discharge: HOME | End: 2019-04-22
Payer: MEDICAID

## 2019-04-22 DIAGNOSIS — R07.89 LEFT-SIDED CHEST WALL PAIN: ICD-10-CM

## 2019-04-22 PROCEDURE — 71046 X-RAY EXAM CHEST 2 VIEWS: CPT

## 2019-05-04 DIAGNOSIS — J44.9 CHRONIC OBSTRUCTIVE PULMONARY DISEASE, UNSPECIFIED COPD TYPE (HCC): ICD-10-CM

## 2019-05-06 RX ORDER — UMECLIDINIUM 62.5 UG/1
AEROSOL, POWDER ORAL
Qty: 1 EACH | Refills: 11 | Status: SHIPPED | OUTPATIENT
Start: 2019-05-06 | End: 2020-06-08

## 2019-05-09 ENCOUNTER — HOSPITAL ENCOUNTER (OUTPATIENT)
Dept: INTERVENTIONAL RADIOLOGY/VASCULAR | Age: 69
Discharge: HOME | End: 2019-05-09
Payer: MEDICAID

## 2019-05-09 DIAGNOSIS — M96.1 POSTLAMINECTOMY SYNDROME, CERVICAL REGION: ICD-10-CM

## 2019-05-09 PROCEDURE — 6360000004 HC RX CONTRAST MEDICATION: Performed by: ANESTHESIOLOGY

## 2019-05-09 PROCEDURE — 62323 NJX INTERLAMINAR LMBR/SAC: CPT

## 2019-05-09 PROCEDURE — 62323 NJX INTERLAMINAR LMBR/SAC: CPT | Performed by: ANESTHESIOLOGY

## 2019-05-09 PROCEDURE — 2709999900 HC NON-CHARGEABLE SUPPLY

## 2019-05-09 RX ADMIN — IOHEXOL 10 ML: 180 INJECTION INTRAVENOUS at 10:06

## 2019-05-13 RX ORDER — GABAPENTIN 600 MG/1
TABLET ORAL
Qty: 93 TABLET | Refills: 0 | Status: SHIPPED | OUTPATIENT
Start: 2019-05-13 | End: 2019-06-27 | Stop reason: SDUPTHER

## 2019-05-23 ENCOUNTER — TELEPHONE (OUTPATIENT)
Dept: PAIN MANAGEMENT | Age: 69
End: 2019-05-23

## 2019-05-23 NOTE — TELEPHONE ENCOUNTER
PROCEDURE FOLLOW-UP     Procedure done: Caudal Epidural steroid injection  under fluorsocopy     Date of procedure: 5/9/19    Pain relief:  No / N/A / 0   Side effects: No   Follow-up Appointment date: None

## 2019-05-29 ENCOUNTER — OFFICE VISIT (OUTPATIENT)
Dept: PAIN MANAGEMENT | Age: 69
End: 2019-05-29
Payer: MEDICAID

## 2019-05-29 VITALS
HEART RATE: 68 BPM | WEIGHT: 220 LBS | BODY MASS INDEX: 31.5 KG/M2 | SYSTOLIC BLOOD PRESSURE: 103 MMHG | HEIGHT: 70 IN | DIASTOLIC BLOOD PRESSURE: 68 MMHG

## 2019-05-29 DIAGNOSIS — G89.4 CHRONIC PAIN SYNDROME: ICD-10-CM

## 2019-05-29 DIAGNOSIS — M47.26 OTHER SPONDYLOSIS WITH RADICULOPATHY, LUMBAR REGION: ICD-10-CM

## 2019-05-29 DIAGNOSIS — M13.0 POLYARTHROPATHY, MULTIPLE SITES: ICD-10-CM

## 2019-05-29 DIAGNOSIS — Z02.89 PAIN MEDICATION AGREEMENT: ICD-10-CM

## 2019-05-29 DIAGNOSIS — M96.1 POSTLAMINECTOMY SYNDROME, LUMBAR: Primary | ICD-10-CM

## 2019-05-29 DIAGNOSIS — F11.90 CHRONIC, CONTINUOUS USE OF OPIOIDS: ICD-10-CM

## 2019-05-29 DIAGNOSIS — F45.42 PAIN DISORDER WITH PSYCHOLOGICAL FACTORS: ICD-10-CM

## 2019-05-29 PROCEDURE — G8417 CALC BMI ABV UP PARAM F/U: HCPCS | Performed by: ANESTHESIOLOGY

## 2019-05-29 PROCEDURE — G8427 DOCREV CUR MEDS BY ELIG CLIN: HCPCS | Performed by: ANESTHESIOLOGY

## 2019-05-29 PROCEDURE — G8598 ASA/ANTIPLAT THER USED: HCPCS | Performed by: ANESTHESIOLOGY

## 2019-05-29 PROCEDURE — 1123F ACP DISCUSS/DSCN MKR DOCD: CPT | Performed by: ANESTHESIOLOGY

## 2019-05-29 PROCEDURE — 99214 OFFICE O/P EST MOD 30 MIN: CPT | Performed by: ANESTHESIOLOGY

## 2019-05-29 PROCEDURE — 4004F PT TOBACCO SCREEN RCVD TLK: CPT | Performed by: ANESTHESIOLOGY

## 2019-05-29 PROCEDURE — 4040F PNEUMOC VAC/ADMIN/RCVD: CPT | Performed by: ANESTHESIOLOGY

## 2019-05-29 PROCEDURE — 3017F COLORECTAL CA SCREEN DOC REV: CPT | Performed by: ANESTHESIOLOGY

## 2019-05-29 RX ORDER — HYDROCODONE BITARTRATE AND ACETAMINOPHEN 7.5; 325 MG/1; MG/1
1 TABLET ORAL EVERY 8 HOURS PRN
Qty: 90 TABLET | Refills: 0 | Status: SHIPPED | OUTPATIENT
Start: 2019-05-29 | End: 2019-06-26 | Stop reason: ALTCHOICE

## 2019-05-29 ASSESSMENT — ENCOUNTER SYMPTOMS
ABDOMINAL PAIN: 0
VOMITING: 0
EYE REDNESS: 0
EYE PAIN: 0
CONSTIPATION: 0
NAUSEA: 0
BACK PAIN: 1
EYE DISCHARGE: 0
WHEEZING: 0
COUGH: 0
SHORTNESS OF BREATH: 0

## 2019-05-29 NOTE — PATIENT INSTRUCTIONS
Patient Education     Continue home exercises  May use Norco for severe pain as provided    Learning About Managing Chronic Pain  What is a plan for pain management? A pain management plan helps you find ways to control pain with side effects you can live with. Some diseases and injuries can cause pain that lasts a long time. Constant pain can make you depressed. It can cause stress and make it hard for you to eat and sleep. But you don't need to live with uncontrolled pain. How can you plan for managing your pain? You and your doctor will work to make your plan. Your plan can include more than one type of pain control. You may take prescription or over-the-counter drugs. You can also try physical treatments, like massage and acupuncture. Other things can help too, such as meditation or a type of therapy to change how you think about your pain. It's important to let your doctor know how you prefer to control your pain. Sometimes the goal of a pain management plan isn't to totally get rid of pain. Instead, it might be to reduce the pain enough that daily activities are easier. If your pain isn't controlled well enough, talk with your doctor. You may need to make a new plan. Or your doctor may refer you to a specialist.  What medicines are used? Your doctor may prescribe medicine to help with your pain. If you aren't taking a prescription medicine, you may be able to take an over-the-counter one. Here are the main types of medicine for chronic pain. · Non-opioids. These are things like acetaminophen, such as Tylenol, and non-steroidal anti-inflammatory drugs (NSAIDs), such as Advil. · Opioids. Morphine, codeine, and oxycodone are some examples. · Other medicines. Antidepressants and anti-seizure medicines may be used. These medicines seem to change the way your brain senses pain. Another option may be a nerve block injection. Medicines are the most common treatment for pain.  But to feel better, you'll need to do more than take medicine. You can also do things like reducing your stress level and changing how you think. How can you take medicine safely? Medicines can help you get better. But they can also be dangerous, especially if you don't take them the right way. Be safe with medicines. Read and follow all instructions on the label. If the medicine you take causes side effects such as constipation or nausea, you may need to take other medicines for those problems. Talk to your doctor about any side effects you have. If you were prescribed an opioid pain reliever, your care team will give you information on how to use it safely. You will also get directions for how to safely store the medicine and how to get rid of any that's left over. Follow these instructions carefully. What physical treatments can help? Physical treatments can be an important part of managing chronic pain. You may find that combining more than one treatment helps the most.  These treatments can include:  · Heat or cold. This can help arthritis, sore muscles, and other aches. · Hydrotherapy. It uses flowing water to relax muscles. · Massage. Massage involves rubbing the soft tissues of the body. It eases tension and pain. · Transcutaneous electrical nerve stimulation (TENS). This treatment uses a gentle electric current applied to the skin for pain relief. · Acupuncture. This is a form of traditional [facility] medicine. It uses very thin needles inserted into certain points of the body. · Physical therapy. This treatment uses stretches and exercises to reduce pain and help you move better. If you get physical therapy, make sure to do any home exercises or stretching your therapist has prescribed. Stay as active as you can. Try to get some physical activity every day. What other things can help? You can manage chronic pain by using things other than medicines or physical treatments.  For example, you can keep track of your pain in a pain diary. It can help you understand how the things you do affect your pain. Reducing stress and tension can reduce pain. And being more aware of your thought patterns can be helpful. In some cases, shifting how you think about pain can affect how you feel. Here are some options to think about:  · Breathing exercises and meditation. These techniques can help you focus your attention, relax, and get rid of tension. · Guided imagery. This is a series of thoughts and images that can focus your attention away from your pain. · Hypnosis. It's a state of focused concentration that makes you less aware of your surroundings. · Cognitive behavioral therapy. This type of counseling helps you change your thought patterns. · Yoga. Stretching and exercises can reduce stress and improve flexibility. If what you're doing to control your pain isn't working, or if you're feeling depressed, talk to your doctor. He or she can help you change your pain management plan and find resources for emotional support. Where can you learn more? Go to [email or web address] org and sign in to your [employer] account. Enter P119 in the [employer] box to learn more about \"Learning About Managing Chronic Pain. \"     If you do not have an account, please click on the \"Sign Up Now\" link. Current as of: Parisa 3, 2018  Content Version: 12.0  © 3788-5309 Healthwise, Incorporated. Care instructions adapted under license by [facility] ([facility]). If you have questions about a medical condition or this instruction, always ask your healthcare professional. [place] [address] any warranty or liability for your use of this information.

## 2019-05-29 NOTE — PROGRESS NOTES
CORONARY ANGIOPLASTY WITH STENT PLACEMENT  6/2013    EYE SURGERY      LAMINECTOMY  11/18/2015    Bilateral decompressive lumbar laminectomy L4-5   ; medial facetectomy L4-5 joints  bilaterally; foraminotomies l5   nerve roots bilaterally    LEG DEBRIDEMENT Right 7/23/2013    Dr. Shree Ellington - pretibial wound    OTHER SURGICAL HISTORY Right 6/25/2013    Dr. Shree Ellington - CFA Endarterectomy w/marsupialization; RLE wound excision & debridement     OTHER SURGICAL HISTORY Left 8/27/2013    Dr. Shree Ellington - femoral & profunda femoris endarterectomy w/marsupialization patch angioplasty using SFA    SKIN SPLIT GRAFT Right 7/25/2013    Dr. Shree Ellington - to non-healing R pretibial wound, 9 x 15 cm    TOTAL HIP ARTHROPLASTY Right 09/01/2016    Dr. Edmundo Castro Left 12/22/2015    Dr. Shree Ellington - CFA exploration, thrombectomy of ileofemoral system, stenting of RAFAL in-stent stenosis w/8 x 37 mm Palmaz        Allergies   Allergen Reactions    Daliresp [Roflumilast] Other (See Comments) and Diarrhea     Severe diarrhea and did not help    Asa [Aspirin] Other (See Comments)     Stomach ache       Current Outpatient Medications   Medication Sig Dispense Refill    HYDROcodone-acetaminophen (NORCO) 7.5-325 MG per tablet Take 1 tablet by mouth every 8 hours as needed for Pain for up to 30 days. Intended supply: 30 days 90 tablet 0    gabapentin (NEURONTIN) 600 MG tablet TAKE 1 TABLET BY MOUTH EACH MORNING AND 2 TABLETS IN THE EVENING 93 tablet 0    INCRUSE ELLIPTA 62.5 MCG/INH AEPB INHALE ONE PUFF BY MOUTH DAILY 1 each 11    atorvastatin (LIPITOR) 80 MG tablet TAKE ONE TABLET BY MOUTH DAILY 30 tablet 1    albuterol sulfate HFA (PROAIR HFA) 108 (90 Base) MCG/ACT inhaler Inhale 2 puffs into the lungs every 6 hours as needed for Wheezing 1 Inhaler 3    pantoprazole (PROTONIX) 40 MG tablet TAKE ONE TABLET BY MOUTH DAILY IN THE EVENING 1 HOUR BEFORE BEDTIME.  31 tablet 3    ranitidine (ZANTAC) 150 MG tablet Take 1 tablet by mouth 2 times daily as needed for Heartburn 60 tablet 3    buPROPion (WELLBUTRIN SR) 150 MG extended release tablet Take 1 tablet by mouth 2 times daily 60 tablet 3    ibuprofen (ADVIL;MOTRIN) 200 MG tablet Take 200 mg by mouth every 6 hours as needed for Pain      amLODIPine (NORVASC) 2.5 MG tablet Take 1 tablet by mouth daily 30 tablet 3    allopurinol (ZYLOPRIM) 100 MG tablet TAKE 2 TABLETS BY MOUTH DAILY. 62 tablet 3    clopidogrel (PLAVIX) 75 MG tablet TAKE ONE TABLET BY MOUTH DAILY 90 tablet 2    DULoxetine (CYMBALTA) 60 MG extended release capsule TAKE ONE (1) CAPSULE BY MOUTH DAILY. 31 capsule 3    losartan (COZAAR) 100 MG tablet TAKE 1 TABLET BY MOUTH ONCE DAILY. 90 tablet 1    furosemide (LASIX) 20 MG tablet TAKE 1 TABLET BY MOUTH ONCE DAILY. 31 tablet 5    metoprolol tartrate (LOPRESSOR) 50 MG tablet TAKE ONE (1) TABLET BY MOUTH TWICE A DAY. 180 tablet 3    metoprolol (LOPRESSOR) 100 MG tablet Take 1 tablet by mouth 2 times daily 180 tablet 3    vitamin D (CHOLECALCIFEROL) 1000 UNIT TABS tablet Take 1,000 Units by mouth daily      albuterol (ACCUNEB) 1.25 MG/3ML nebulizer solution Inhale 3 mLs into the lungs every 6 hours as needed for Wheezing 120 vial 3    albuterol (PROVENTIL) (5 MG/ML) 0.5% nebulizer solution Take 1 mL by nebulization 4 times daily as needed for Wheezing 120 each 3    BREO ELLIPTA 200-25 MCG/INH AEPB INHALE ONE DOSE BY MOUTH DAILY 3 each 4    nitroGLYCERIN (NITROSTAT) 0.4 MG SL tablet Place 1 tablet under the tongue every 5 minutes as needed for Chest pain 25 tablet 1    vitamin B-12 (CYANOCOBALAMIN) 1000 MCG tablet Take 1,000 mcg by mouth daily.  magnesium gluconate (MAGONATE) 500 MG tablet Take 500 mg by mouth daily. No current facility-administered medications for this visit.         Family History   Problem Relation Age of Onset    Cancer Mother     Heart Disease Mother     Cancer Father     Heart Disease Father        Social History Socioeconomic History    Marital status:      Spouse name: Not on file    Number of children: Not on file    Years of education: Not on file    Highest education level: Not on file   Occupational History    Occupation: Retired      Comment: 2014    Occupation: Drywall self employed    Social Needs    Financial resource strain: Not on file    Food insecurity:     Worry: Not on file     Inability: Not on file   PromoteSocial needs:     Medical: Not on file     Non-medical: Not on file   Tobacco Use    Smoking status: Current Every Day Smoker     Packs/day: 2.00     Years: 52.00     Pack years: 104.00     Types: Cigarettes     Start date: 1/1/1967    Smokeless tobacco: Never Used    Tobacco comment:  one half pack a day   Substance and Sexual Activity    Alcohol use: Yes     Alcohol/week: 0.0 oz     Comment: 2-3 beers a month    Drug use: No    Sexual activity: Never   Lifestyle    Physical activity:     Days per week: Not on file     Minutes per session: Not on file    Stress: Not on file   Relationships    Social connections:     Talks on phone: Not on file     Gets together: Not on file     Attends Alevism service: Not on file     Active member of club or organization: Not on file     Attends meetings of clubs or organizations: Not on file     Relationship status: Not on file    Intimate partner violence:     Fear of current or ex partner: Not on file     Emotionally abused: Not on file     Physically abused: Not on file     Forced sexual activity: Not on file   Other Topics Concern    Not on file   Social History Narrative    Not on file         Imaging Studies:   CT LS (10/27/2017)  \"Impression       Prior laminectomy L4-5 and L5-S1.       L4-5 grade 1 spondylolisthesis.       Mild canal stenosis L2-3 and L3-4. \"        Results of the above studies were personally reviewed by me with the patient indetail along with the images, when available.  The patient was instructed to contact the primary care provider regarding other unrelated findings not addressed during today's visit. Review of Systems:   Review of Systems   Constitutional: Negative for chills and fever. HENT: Negative for hearing loss and tinnitus. Eyes: Negative for pain, discharge and redness. Respiratory: Negative for cough, shortness of breath and wheezing. Cardiovascular: Negative for chest pain and palpitations. Gastrointestinal: Negative for abdominal pain, constipation, nausea and vomiting. Genitourinary: Negative for frequency, hematuria and urgency. Musculoskeletal: Positive for back pain. Negative for myalgias and neck pain. Skin: Negative for rash. Neurological: Positive for numbness. Negative for dizziness, seizures, weakness and headaches. Hematological: Does not bruise/bleed easily. Psychiatric/Behavioral: Negative for suicidal ideas. The patient is not nervous/anxious. The patient was instructed to contact primary care physician regarding ROS positives not being addressed during today's visit. Physical Exam:   Physical Exam   Constitutional: He is oriented to person, place, and time. No distress. HENT:   Head: Normocephalic. Eyes: Pupils are equal, round, and reactive to light. EOM are normal.   Neck: Normal range of motion. Neck supple. No thyromegaly present. Cardiovascular: Normal rate, regular rhythm, normal heart sounds and intact distal pulses. Pulmonary/Chest: Effort normal and breath sounds normal. No respiratory distress. He exhibits no tenderness. Abdominal: Soft. Bowel sounds are normal. He exhibits no mass. There is no tenderness. There is no guarding. Musculoskeletal: Normal range of motion. He exhibits no tenderness or deformity. Lymphadenopathy:     He has no cervical adenopathy. Neurological: He is alert and oriented to person, place, and time. He has normal strength and normal reflexes. He displays normal reflexes.  No cranial nerve deficit prescriptions from other providers: No     Use of pain medication in response to situational stressor: No      Increasingly unkempt or impaired: No     Negative mood changes: No     Abusing alcohol or illicit drugs: No     Appears intoxicated: No     Other: NA     Controlled Substances Monitoring:    Attestation: The Prescription Monitoring Report for this patient was reviewed today. Nga Cheng MD)  Chronic Pain Routine Monitoring: Possible medication side effects, risk of tolerance/dependence & alternative treatments discussed., No signs of potential drug abuse or diversion identified: otherwise, see note documentation Nga Cheng MD)      Overall Progress:       PEG Score = 7 / 10     Physical Therapy: last in 2017   Counseling: N/A   Injections: DAVIDA -  caudal in 05/19 (<30% help)     Patient compliance on plan of care: Fair   Progress on current plan:  Poor    Assessment:      ICD-10-CM    1. Postlaminectomy syndrome, lumbar M96.1 HYDROcodone-acetaminophen (NORCO) 7.5-325 MG per tablet   2. Other spondylosis with radiculopathy, lumbar region M47.26    3. Polyarthropathy, multiple sites M13.0 HYDROcodone-acetaminophen (NORCO) 7.5-325 MG per tablet   4. Chronic pain syndrome G89.4    5. Chronic, continuous use of opioids F11.90    6. Pain medication agreement Z02.89    7. Pain disorder with psychological factors F45.41    -  counseling for chronic pain (Dr Zheng Shown)    Plan:     1. Chronic low back pain s/p L4-S1 fusion in 2015  2. Pain in joints deejay right hip (s/p THR in 2016), gout and PVD. 3. Intolerant of NSAIDs due to anticoagulant therapy and cardiac issues; on Neurontin, Cymbalta through PCP. 4. Reports minimal help from the caudal DAVIDA; pain is more axial at this time. 5. Discussed further options - he wishes to continue conservative care with medications. 6. Offered counseling with  (Dr Ewelina Black) in our office - he wishes to try next time.   7. May increase Norco to 7.5 mg TID PRN and follow with us in a month. Analgesic Plan:   Continue present regimen: No   Adjust dose of present analgesic: Yes - increase Norco to 7.5 mg TID PRN   Switch analgesics: No   Add/Adjust concomitant therapy: No - BH, home exercises (Neurontin, Cymbalta)     Education:    - Reviewed OARRS report in detail, including Narx Scores and Overdose Risk Score. - Encouraged regular home exercises and strengthening as long-termgoals. - Counseled on dual effects, limitations, side effects and long-term complications of opioids. Discussed proper use and potential life threatening side effects of inappropriate use. - Educational materialprovided on multidisciplinary chronic pain management, safe opioid use. Follow-up: RTC X 1 month. Patient engaged in shared decision making. The entire treatment plan was discussed with patient and agreed. Thank you for allowing us to participate in the care of your patient - please do not hesitate to contact us if you have any questions or concerns.         Janes Benedict MD  Board Certified in Anesthesiology and Pain Medicine

## 2019-05-29 NOTE — PROGRESS NOTES
Pain Medication Review -    Last Pain Medication Agreement date: 3/11/2019    Last UDT date: 12/10/2018    Last Opioid fill date (per [facility] Report): 4/11/2019   Days filled: 30    Expected Opioid pill count: 0     Actual Opiod pill count: 0    Notes -

## 2019-05-30 RX ORDER — ATORVASTATIN CALCIUM 80 MG/1
TABLET, FILM COATED ORAL
Qty: 30 TABLET | Refills: 2 | Status: SHIPPED | OUTPATIENT
Start: 2019-05-30 | End: 2019-08-29 | Stop reason: SDUPTHER

## 2019-06-06 RX ORDER — ALLOPURINOL 100 MG/1
TABLET ORAL
Qty: 180 TABLET | Refills: 0 | Status: SHIPPED | OUTPATIENT
Start: 2019-06-06 | End: 2019-12-04 | Stop reason: SDUPTHER

## 2019-06-10 RX ORDER — DULOXETIN HYDROCHLORIDE 60 MG/1
CAPSULE, DELAYED RELEASE ORAL
Qty: 90 CAPSULE | Refills: 0 | Status: SHIPPED | OUTPATIENT
Start: 2019-06-10 | End: 2019-11-19 | Stop reason: SDUPTHER

## 2019-06-26 ENCOUNTER — OFFICE VISIT (OUTPATIENT)
Dept: PAIN MANAGEMENT | Age: 69
End: 2019-06-26
Payer: MEDICAID

## 2019-06-26 VITALS
SYSTOLIC BLOOD PRESSURE: 123 MMHG | WEIGHT: 215 LBS | BODY MASS INDEX: 30.78 KG/M2 | HEIGHT: 70 IN | DIASTOLIC BLOOD PRESSURE: 71 MMHG | HEART RATE: 65 BPM

## 2019-06-26 DIAGNOSIS — M47.26 OTHER SPONDYLOSIS WITH RADICULOPATHY, LUMBAR REGION: ICD-10-CM

## 2019-06-26 DIAGNOSIS — F11.90 CHRONIC, CONTINUOUS USE OF OPIOIDS: ICD-10-CM

## 2019-06-26 DIAGNOSIS — F45.42 PAIN DISORDER WITH PSYCHOLOGICAL FACTORS: ICD-10-CM

## 2019-06-26 DIAGNOSIS — Z02.89 PAIN MEDICATION AGREEMENT: ICD-10-CM

## 2019-06-26 DIAGNOSIS — M96.1 POSTLAMINECTOMY SYNDROME, LUMBAR: ICD-10-CM

## 2019-06-26 DIAGNOSIS — G89.4 CHRONIC PAIN SYNDROME: ICD-10-CM

## 2019-06-26 DIAGNOSIS — M46.1 BILATERAL SACROILIITIS (HCC): Primary | ICD-10-CM

## 2019-06-26 PROCEDURE — 4004F PT TOBACCO SCREEN RCVD TLK: CPT | Performed by: ANESTHESIOLOGY

## 2019-06-26 PROCEDURE — 99214 OFFICE O/P EST MOD 30 MIN: CPT | Performed by: ANESTHESIOLOGY

## 2019-06-26 PROCEDURE — 1123F ACP DISCUSS/DSCN MKR DOCD: CPT | Performed by: ANESTHESIOLOGY

## 2019-06-26 PROCEDURE — 3017F COLORECTAL CA SCREEN DOC REV: CPT | Performed by: ANESTHESIOLOGY

## 2019-06-26 PROCEDURE — G8427 DOCREV CUR MEDS BY ELIG CLIN: HCPCS | Performed by: ANESTHESIOLOGY

## 2019-06-26 PROCEDURE — 4040F PNEUMOC VAC/ADMIN/RCVD: CPT | Performed by: ANESTHESIOLOGY

## 2019-06-26 PROCEDURE — G8417 CALC BMI ABV UP PARAM F/U: HCPCS | Performed by: ANESTHESIOLOGY

## 2019-06-26 PROCEDURE — G8598 ASA/ANTIPLAT THER USED: HCPCS | Performed by: ANESTHESIOLOGY

## 2019-06-26 RX ORDER — OXYCODONE HYDROCHLORIDE AND ACETAMINOPHEN 5; 325 MG/1; MG/1
1 TABLET ORAL EVERY 8 HOURS PRN
Qty: 20 TABLET | Refills: 0 | Status: SHIPPED | OUTPATIENT
Start: 2019-06-26 | End: 2019-07-02 | Stop reason: SDUPTHER

## 2019-06-26 ASSESSMENT — ENCOUNTER SYMPTOMS
NAUSEA: 0
COUGH: 0
VOMITING: 0
WHEEZING: 0
SHORTNESS OF BREATH: 0
EYE REDNESS: 0
ABDOMINAL PAIN: 0
BACK PAIN: 1
CONSTIPATION: 0
EYE DISCHARGE: 0
EYE PAIN: 0

## 2019-06-26 NOTE — PATIENT INSTRUCTIONS
Continue home exercises and medications as before. May try Percocet (instead of Norco) for pain - call us back for refill. Sacroiliac Joint Pain: Care Instructions  Your Care Instructions    The sacroiliac joints connect the spine and each side of the pelvis. These joints bear the weight and stress of your torso. This makes them easy to injure. Injury or overuse of these joints may cause low back pain. Stress on these joints can cause joint pain. Sacroiliac joint pain is more common in pregnant women. Certain kinds of arthritis also may cause this type of joint pain. Home treatment may help you feel better. So can avoiding activities that stress your back. Your doctor also may recommend physical therapy. This may include doing exercises and stretches to help with pain. You may also learn to use good posture. Follow-up care is a key part of your treatment and safety. Be sure to make and go to all appointments, and call your doctor if you are having problems. It's also a good idea to know your test results and keep a list of the medicines you take. How can you care for yourself at home? · Ask your doctor about light exercises that may help your back pain. Try to do light activity throughout the day. But make sure to take rests as needed. Find a comfortable position for rest, but don't stay in one position for too long. Avoid activities that cause pain. · To apply heat, put a warm water bottle, a heating pad set on low, or a warm cloth on your back. Do not go to sleep with a heating pad on your skin. · Put ice or a cold pack on your back for 10 to 20 minutes at a time. Put a thin cloth between the ice and your skin. · If the doctor gave you a prescription medicine for pain, take it as prescribed. · If you are not taking a prescription pain medicine, ask your doctor if you can take an over-the-counter pain medicine, such as acetaminophen (Tylenol), ibuprofen (Advil, Motrin), or naproxen (Aleve).  Read and important information I should know about acetaminophen and oxycodone? MISUSE OF OPIOID MEDICINE CAN CAUSE ADDICTION, OVERDOSE, OR DEATH. Keep the medication in a place where others cannot get to it. An overdose of acetaminophen can damage your liver or cause death. Call your doctor at once if you have pain in your upper stomach, loss of appetite, dark urine, or jaundice (yellowing of your skin or eyes). Taking opioid medicine during pregnancy may cause life-threatening withdrawal symptoms in the . Fatal side effects can occur if you use opioid medicine with alcohol, or with other drugs that cause drowsiness or slow your breathing. Stop taking this medicine and call your doctor right away if you have skin redness or a rash that spreads and causes blistering and peeling. What is acetaminophen and oxycodone? Oxycodone is an opioid pain medication, sometimes called a narcotic. Acetaminophen is a less potent pain reliever that increases the effects of oxycodone. Acetaminophen and oxycodone is a combination medicine used to relieve moderate to severe pain. Acetaminophen and oxycodone may also be used for purposes not listed in this medication guide. What should I discuss with my healthcare provider before taking acetaminophen and oxycodone? You should not use this medicine if you are allergic to acetaminophen or oxycodone, or if you have:  · severe asthma or breathing problems; or  · a blockage in your stomach or intestines. Tell your doctor if you have ever had:  · liver disease;  · a drug or alcohol addiction;  · kidney disease;  · a head injury or seizures;  · urination problems; or  · problems with your thyroid, pancreas, or gallbladder. If you use opioid medicine while you are pregnant, your baby could become dependent on the drug. This can cause life-threatening withdrawal symptoms in the baby after it is born. Babies born dependent on opioids may need medical treatment for several weeks.   Do appetite, nausea, vomiting, stomach pain, sweating, and confusion or weakness. Later symptoms may include pain in your upper stomach, dark urine, and yellowing of your skin or the whites of your eyes. Overdose can also cause severe muscle weakness, pinpoint pupils, very slow breathing, extreme drowsiness, or coma. What should I avoid while taking acetaminophen and oxycodone? Avoid driving or operating machinery until you know how this medicine will affect you. Dizziness or drowsiness can cause falls, accidents, or severe injuries. Do not drink alcohol. Dangerous side effects or death could occur. Ask a doctor or pharmacist before using any other medicine that may contain acetaminophen (sometimes abbreviated as APAP). Taking certain medications together can lead to a fatal overdose. What are the possible side effects of acetaminophen and oxycodone? Get emergency medical help if you have signs of an allergic reaction: hives; difficulty breathing; swelling of your face, lips, tongue, or throat. Opioid medicine can slow or stop your breathing, and death may occur. A person caring for you should seek emergency medical attention if you have slow breathing with long pauses, blue colored lips, or if you are hard to wake up. In rare cases, acetaminophen may cause a severe skin reaction that can be fatal. This could occur even if you have taken acetaminophen in the past and had no reaction. Stop taking this medicine and call your doctor right away if you have skin redness or a rash that spreads and causes blistering and peeling.    Call your doctor at once if you have:  · noisy breathing, sighing, shallow breathing;  · a light-headed feeling, like you might pass out;  · weakness, tiredness, fever, unusual bruising or bleeding;  · confusion, unusual thoughts or behavior;  · problems with urination;  · liver problems --nausea, upper stomach pain, tiredness, loss of appetite, dark urine, prabhu-colored stools, jaundice (yellowing of the skin or eyes); or  · low cortisol levels -- nausea, vomiting, loss of appetite, dizziness, worsening tiredness or weakness. Seek medical attention right away if you have symptoms of serotonin syndrome, such as: agitation, hallucinations, fever, sweating, shivering, fast heart rate, muscle stiffness, twitching, loss of coordination, nausea, vomiting, or diarrhea. Serious side effects may be more likely in older adults and those who are overweight, malnourished, or debilitated. Long-term use of opioid medication may affect fertility (ability to have children) in men or women. It is not known whether opioid effects on fertility are permanent. Common side effects include:  · dizziness, drowsiness, feeling tired;  · feelings of extreme happiness or sadness;  · nausea, vomiting, stomach pain;  · constipation; or  · headache. This is not a complete list of side effects and others may occur. Call your doctor for medical advice about side effects. You may report side effects to FDA at 5-743-INB-9298. What other drugs will affect acetaminophen and oxycodone? You may have breathing problems or withdrawal symptoms if you start or stop taking certain other medicines. Tell your doctor if you also use an antibiotic, antifungal medication, heart or blood pressure medication, seizure medication, or medicine to treat HIV or hepatitis C. Opioid medication can interact with many other drugs and cause dangerous side effects or death.  Be sure your doctor knows if you also use:  · cold or allergy medicines, bronchodilator asthma/COPD medication, or a diuretic (\"water pill\");  · medicines for motion sickness, irritable bowel syndrome, or overactive bladder;  · other narcotic medications --opioid pain medicine or prescription cough medicine;  · a sedative like Valium --diazepam, alprazolam, lorazepam, Xanax, Klonopin, Versed, and others;  · drugs that make you sleepy or slow your breathing --a sleeping pill, muscle provides. The information contained herein is not intended to cover all possible uses, directions, precautions, warnings, drug interactions, allergic reactions, or adverse effects. If you have questions about the drugs you are taking, check with your doctor, nurse or pharmacist.  Copyright 6620-4982 [address]. Version: 18.02. Revision date: 11/28/2018. Care instructions adapted under license by [facility] ([facility]). If you have questions about a medical condition or this instruction, always ask your healthcare professional. [place] [address] any warranty or liability for your use of this information.

## 2019-06-26 NOTE — PROGRESS NOTES
[facility] ([place], [place])  [address], [place], [address]  [phone] (X)  [phone] (f)    6/26/19        Sharda Becerra Kiara.  1950      BRANDI Sandoval - CNP      Chief Complaint:   Chief Complaint   Patient presents with    Lower Back Pain     F/u on Low Back pain that is radiating down both legs; left side worse. History of Present Illness   HPI    Seen us since 09/2017 -  Chronic low back pain s/p fusion; on chronic opioid. Pain constant in lower back achy sharp throbbing with occasional radiation to left leg up to knee level. Pain worse with prolonged sitting standing walking and helped minimally by medications. RED FLAG symptoms (Symptoms may include, but not limited to, acute trauma,fever, drug use, malignancy, weakness, perianal numbness, bladder/bowel changes) since last visit - No    Changes since last visit:  Recent flare of left buttock pain; Norco not helping.       Past Medical History:   Diagnosis Date    Arthritis     Balance problem     CAD (coronary artery disease)     [facility]est[facility]     followed by cardiology     [facility] ulcer of right leg [facility]     COPD (chronic obstructive pulmonary disease) ([facility]     followed by pulmonology     Depressive disorder, not elsewhere classified     DVT (deep venous thrombosis) ([facility] 12/2015    ileofemoral    Heartburn     infrequent    History of MI (myocardial infarction) May 2013    \"mild\" in [place] Hyperlipidemia     Hypertension     Neuropathy     feet    KAVITA (obstructive sleep apnea)     cpap    Primary central sleep apnea     PVD (peripheral vascular disease) ([facility]     Unspecified cerebral artery occlusion with cerebral infarction 4/2013    \"pt states mini stroke\"    Urgency of urination     and frequency, chronic    Vertebral fracture        Past Surgical History:   Procedure Laterality Date    APPENDECTOMY      CHOLECYSTECTOMY, LAPAROSCOPIC  6/23/2014    with [facility]    CORONARY ANGIOPLASTY WITH STENT PLACEMENT  6/2013    EYE SURGERY      LAMINECTOMY  11/18/2015    Bilateral decompressive lumbar laminectomy L4-5   ; medial facetectomy L4-5 joints  bilaterally; foraminotomies l5   nerve roots bilaterally    LEG DEBRIDEMENT Right 7/23/2013    Dr. Noemi Flynn - pretibial wound    OTHER SURGICAL HISTORY Right 6/25/2013    Dr. Noemi Flynn - CFA Endarterectomy w/marsupialization; RLE wound excision & debridement     OTHER SURGICAL HISTORY Left 8/27/2013    Dr. Noemi Flynn - femoral & profunda femoris endarterectomy w/marsupialization patch angioplasty using SFA    SKIN SPLIT GRAFT Right 7/25/2013    Dr. Noemi Flynn - to non-healing R pretibial wound, 9 x 15 cm    TOTAL HIP ARTHROPLASTY Right 09/01/2016    Dr. Kel Henderson Left 12/22/2015    Dr. Noemi Flynn - CFA exploration, thrombectomy of ileofemoral system, stenting of RAFAL in-stent stenosis w/8 x 37 mm Palmaz        Allergies   Allergen Reactions    Daliresp [Roflumilast] Other (See Comments) and Diarrhea     Severe diarrhea and did not help    Asa [Aspirin] Other (See Comments)     Stomach ache       Current Outpatient Medications   Medication Sig Dispense Refill    oxyCODONE-acetaminophen (PERCOCET) 5-325 MG per tablet Take 1 tablet by mouth every 8 hours as needed for Pain for up to 7 days.  (STOP NORCO) 20 tablet 0    DULoxetine (CYMBALTA) 60 MG extended release capsule TAKE 1 CAPSULE BY MOUTH DAILY 90 capsule 0    allopurinol (ZYLOPRIM) 100 MG tablet TAKE 2 TABLETS BY MOUTH DAILY 180 tablet 0    BREO ELLIPTA 200-25 MCG/INH AEPB INHALE ONE PUFF BY MOUTH DAILY 1 each 11    atorvastatin (LIPITOR) 80 MG tablet TAKE 1 TABLET BY MOUTH DAILY 30 tablet 2    gabapentin (NEURONTIN) 600 MG tablet TAKE 1 TABLET BY MOUTH EACH MORNING AND 2 TABLETS IN THE EVENING 93 tablet 0    INCRUSE ELLIPTA 62.5 MCG/INH AEPB INHALE ONE PUFF BY MOUTH DAILY 1 each 11    albuterol sulfate HFA (PROAIR HFA) 108 (90 Base) MCG/ACT inhaler Inhale 2 puffs into the lungs every 6 hours as needed for Wheezing 1 Inhaler 3    pantoprazole (PROTONIX) 40 MG tablet TAKE ONE TABLET BY MOUTH DAILY IN THE EVENING 1 HOUR BEFORE BEDTIME. 31 tablet 3    ranitidine (ZANTAC) 150 MG tablet Take 1 tablet by mouth 2 times daily as needed for Heartburn 60 tablet 3    buPROPion (WELLBUTRIN SR) 150 MG extended release tablet Take 1 tablet by mouth 2 times daily 60 tablet 3    ibuprofen (ADVIL;MOTRIN) 200 MG tablet Take 200 mg by mouth every 6 hours as needed for Pain      amLODIPine (NORVASC) 2.5 MG tablet Take 1 tablet by mouth daily 30 tablet 3    clopidogrel (PLAVIX) 75 MG tablet TAKE ONE TABLET BY MOUTH DAILY 90 tablet 2    losartan (COZAAR) 100 MG tablet TAKE 1 TABLET BY MOUTH ONCE DAILY. 90 tablet 1    furosemide (LASIX) 20 MG tablet TAKE 1 TABLET BY MOUTH ONCE DAILY. 31 tablet 5    metoprolol tartrate (LOPRESSOR) 50 MG tablet TAKE ONE (1) TABLET BY MOUTH TWICE A DAY. 180 tablet 3    metoprolol (LOPRESSOR) 100 MG tablet Take 1 tablet by mouth 2 times daily 180 tablet 3    vitamin D (CHOLECALCIFEROL) 1000 UNIT TABS tablet Take 1,000 Units by mouth daily      albuterol (ACCUNEB) 1.25 MG/3ML nebulizer solution Inhale 3 mLs into the lungs every 6 hours as needed for Wheezing 120 vial 3    albuterol (PROVENTIL) (5 MG/ML) 0.5% nebulizer solution Take 1 mL by nebulization 4 times daily as needed for Wheezing 120 each 3    nitroGLYCERIN (NITROSTAT) 0.4 MG SL tablet Place 1 tablet under the tongue every 5 minutes as needed for Chest pain 25 tablet 1    vitamin B-12 (CYANOCOBALAMIN) 1000 MCG tablet Take 1,000 mcg by mouth daily.  magnesium gluconate (MAGONATE) 500 MG tablet Take 500 mg by mouth daily. No current facility-administered medications for this visit.         Family History   Problem Relation Age of Onset    Cancer Mother     Heart Disease Mother     Cancer Father     Heart Disease Father        Social History     Socioeconomic History    Marital status:      Spouse name: Not on file    Number of children: Not on file    Years of education: Not on file    Highest education level: Not on file   Occupational History    Occupation: Retired      Comment: 2014    Occupation: Drywall self employed    Social Needs    Financial resource strain: Not on file    Food insecurity:     Worry: Not on file     Inability: Not on file   [employer] needs:     Medical: Not on file     Non-medical: Not on file   Tobacco Use    Smoking status: Current Every Day Smoker     Packs/day: 2.00     Years: 52.00     Pack years: 104.00     Types: Cigarettes     Start date: 1/1/1967    Smokeless tobacco: Never Used    Tobacco comment:  one half pack a day   Substance and Sexual Activity    Alcohol use: Yes     Alcohol/week: 0.0 oz     Comment: 2-3 beers a month    Drug use: No    Sexual activity: Never   Lifestyle    Physical activity:     Days per week: Not on file     Minutes per session: Not on file    Stress: Not on file   Relationships    Social connections:     Talks on phone: Not on file     Gets together: Not on file     Attends [employer] service: Not on file     Active member of club or organization: Not on file     Attends meetings of clubs or organizations: Not on file     Relationship status: Not on file    Intimate partner violence:     Fear of current or ex partner: Not on file     Emotionally abused: Not on file     Physically abused: Not on file     Forced sexual activity: Not on file   Other Topics Concern    Not on file   Social History Narrative    Not on file         Imaging Studies:   CT LS (10/27/2017)  \"Impression       Prior laminectomy L4-5 and L5-S1.       L4-5 grade 1 spondylolisthesis.       Mild canal stenosis L2-3 and L3-4. \"      Results of the above studies were personally reviewed by me with the patient indetail along with the images, when available.  The patient was instructed to contact the primary care provider regarding other unrelated findings not addressed during today's visit. Review of Systems:   Review of Systems   Constitutional: Negative for chills and fever. HENT: Negative for hearing loss and tinnitus. Eyes: Negative for pain, discharge and redness. Respiratory: Negative for cough, shortness of breath and wheezing. Cardiovascular: Negative for chest pain and palpitations. Gastrointestinal: Negative for abdominal pain, constipation, nausea and vomiting. Genitourinary: Negative for frequency, hematuria and urgency. Musculoskeletal: Positive for back pain. Negative for myalgias and neck pain. Skin: Negative for rash. Neurological: Positive for numbness. Negative for dizziness, seizures, weakness and headaches. Hematological: Does not bruise/bleed easily. Psychiatric/Behavioral: Negative for suicidal ideas. The patient is not nervous/anxious. The patient was instructed to contact primary care physician regarding ROS positives not being addressed during today's visit. Physical Exam:   Physical Exam   Constitutional: He is oriented to person, place, and time. No distress. Mild distress, ambulates with walker   HENT:   Head: Normocephalic. Eyes: Pupils are equal, round, and reactive to light. EOM are normal.   Neck: Normal range of motion. Neck supple. No thyromegaly present. Cardiovascular: Normal rate, regular rhythm, normal heart sounds and intact distal pulses. Pulmonary/Chest: Effort normal and breath sounds normal. No respiratory distress. He exhibits no tenderness. Abdominal: Soft. Bowel sounds are normal. He exhibits no mass. There is no tenderness. There is no guarding. Musculoskeletal: Normal range of motion. He exhibits no tenderness or deformity. Mild to moderate sacroiliac joint tenderness L>R side; Riccardo's test positive on left side   Lymphadenopathy:     He has no cervical adenopathy.    Neurological: He is alert and oriented to person, place, and time. He has normal strength. He displays normal reflexes. No cranial nerve deficit or sensory deficit. He exhibits normal muscle tone. Gait (antalgic) abnormal. Coordination normal. He displays no Babinski's sign on the right side. He displays no Babinski's sign on the left side. Reflex Scores:       Tricep reflexes are 2+ on the right side and 2+ on the left side. Bicep reflexes are 2+ on the right side and 2+ on the left side. Brachioradialis reflexes are 2+ on the right side and 2+ on the left side. Patellar reflexes are 1+ on the right side and 1+ on the left side. Achilles reflexes are 1+ on the right side and 1+ on the left side. Skin: Skin is warm. No rash noted. He is not diaphoretic. Psychiatric: He has a normal mood and affect. His behavior is normal. Thought content normal.       Vitals:    06/26/19 1419   BP: 123/71   Site: Left Upper Arm   Position: Sitting   Cuff Size: Medium Adult   Pulse: 65   Weight: 215 lb (97.5 kg)   Height: 5' 10\" (1.778 m)       Body mass index is 30.85 kg/m². Pain Score:   8 /10    Goals of chronic pain therapy:      Multi-disciplinary comprehensive pain management with functional improvement and reduced opioid use. Prescription pain medication monitoring:      MEDD current = 22.50   ORT Score = 3   LOW     Other Risk factors - COPD, sleep apnea, depression. Date of Last Medication Agreement: 03/11/2019   Date Naloxone prescribed: NA     UDT:    Date of last UDT: 12/10/2018    Adverse report: No     OARRS:    Checked today: Yes    Adverse report: No     Medication Effects -        Analgesia:      Average level of pain for the past month = 7/10     Percentage of pain relief = 30%     Activities Improved: Activities of daily living = 0%     Sleep = 10%     Mood = 10%     Social functioning = 10%     Adverse Effects:      Any adverse effects: No     Type/Severity of side effect: None    Aberrant Behavior:     Requests for frequent early options including home exercises and SI joint injection - provided information. 6. He reports no help from [address]; may switch to Percocet 5 mg TID PRN - trial script provided. 7. He wishes to try medication first, before considering any additional injections. Analgesic Plan:   Continue present regimen: No - stop Norco   Adjust dose of present analgesic: No   Switch analgesics: Yes - trial Percocet 5 mg TID PRN   Add/Adjust concomitant therapy: Yes - home exercises (Neurontin, Cymbalta)    Education:    - Reviewed OARRS report in detail, including Narx Scores and Overdose Risk Score. - Encouraged regular home exercises and strengthening as long-termgoals. - Counseled on dual effects, limitations, side effects and long-term complications of opioids. Discussed proper use and potential life threatening side effects of inappropriate use. - Educational materialprovided on multidisciplinary chronic pain management, safe opioid use. Follow-up: RTC X 1 month. Patient engaged in shared decision making. The entire treatment plan was discussed with patient and agreed. Thank you for allowing us to participate in the care of your patient - please do not hesitate to contact us if you have any questions or concerns.           Greg Quan MD  Board Certified in Anesthesiology and Pain Medicine

## 2019-06-26 NOTE — PROGRESS NOTES
Pain Medication Review -    Last Pain Medication Agreement date: 3/11/2019    Last UDT date: 3/11/2019    Last Opioid fill date (per [facility] Report): NA   Days filled: NA    Expected Opioid pill count: NA     Actual Opiod pill count: 10    Notes -

## 2019-06-27 RX ORDER — GABAPENTIN 600 MG/1
TABLET ORAL
Qty: 93 TABLET | Refills: 0 | Status: SHIPPED | OUTPATIENT
Start: 2019-06-27 | End: 2019-07-24 | Stop reason: SDUPTHER

## 2019-06-27 RX ORDER — BUPROPION HYDROCHLORIDE 150 MG/1
TABLET, EXTENDED RELEASE ORAL
Qty: 180 TABLET | Refills: 0 | Status: SHIPPED | OUTPATIENT
Start: 2019-06-27 | End: 2019-10-17 | Stop reason: ALTCHOICE

## 2019-06-27 RX ORDER — PANTOPRAZOLE SODIUM 40 MG/1
TABLET, DELAYED RELEASE ORAL
Qty: 90 TABLET | Refills: 0 | Status: SHIPPED | OUTPATIENT
Start: 2019-06-27 | End: 2019-12-30 | Stop reason: SDUPTHER

## 2019-06-27 NOTE — TELEPHONE ENCOUNTER
Medication:   Requested Prescriptions     Pending Prescriptions Disp Refills    gabapentin (NEURONTIN) 600 MG tablet [Pharmacy Med Name: GABAPENTIN 600MG TABLETS] 93 tablet 0     Sig: TAKE ONE TABLET BY MOUTH EVERY MORNING AND 2 TABLETS BY MOUTH EVERY EVENING    pantoprazole (PROTONIX) 40 MG tablet [Pharmacy Med Name: PANTOPRAZOLE 40MG TABLETS] 90 tablet 3     Sig: TAKE 1 TABLET BY MOUTH EVERY NIGHT IN THE EVENING 1 HOUR BEFORE BEDTIME    buPROPion (WELLBUTRIN SR) 150 MG extended release tablet [Pharmacy Med Name: BUPROPION SR 150MG TABLETS (12 H)] 180 tablet 3     Sig: TAKE 1 TABLET BY MOUTH TWICE DAILY       Last Filled:      Patient Phone Number: [phone] (home)     Last appt: 4/17/2019   Next appt: 7/17/2019    Last BMP:   Lab Results   Component Value Date     04/17/2019    K 3.7 04/17/2019     04/17/2019    CO2 24 04/17/2019    ANIONGAP 15 04/17/2019    GLUCOSE 113 04/17/2019    BUN 10 04/17/2019    CREATININE 0.9 04/17/2019    LABGLOM >60 04/17/2019    GFRAA >60 04/17/2019    CALCIUM 9.6 04/17/2019      Last CMP:   Lab Results   Component Value Date     04/17/2019    K 3.7 04/17/2019     04/17/2019    CO2 24 04/17/2019    ANIONGAP 15 04/17/2019    GLUCOSE 113 04/17/2019    BUN 10 04/17/2019    CREATININE 0.9 04/17/2019    LABGLOM >60 04/17/2019    GFRAA >60 04/17/2019    PROT 7.0 04/17/2019    LABALBU 4.1 04/17/2019    AGRATIO 1.4 04/17/2019    BILITOT 0.9 04/17/2019    ALKPHOS 102 04/17/2019    ALT 24 04/17/2019    AST 29 04/17/2019    GLOB 2.9 04/17/2019     Last Renal Function:   Lab Results   Component Value Date     04/17/2019    K 3.7 04/17/2019     04/17/2019    CO2 24 04/17/2019    GLUCOSE 113 04/17/2019    BUN 10 04/17/2019    CREATININE 0.9 04/17/2019    PHOS 3.8 12/23/2015    LABALBU 4.1 04/17/2019    CALCIUM 9.6 04/17/2019    GFRAA >60 04/17/2019     Last Labs DM:   Lab Results   Component Value Date    LABA1C 5.4 08/30/2018     Last Lipid:   Lab Results

## 2019-06-29 LAB
6-ACETYLMORPHINE: NOT DETECTED
7-AMINOCLONAZEPAM: NOT DETECTED
ALPHA-OH-ALPRAZOLAM: NOT DETECTED
ALPRAZOLAM: NOT DETECTED
AMPHETAMINE: NOT DETECTED
BARBITURATES: NOT DETECTED
BENZOYLECGONINE: NOT DETECTED
BUPRENORPHINE: NOT DETECTED
CARISOPRODOL: NOT DETECTED
CLONAZEPAM: NOT DETECTED
CODEINE: NOT DETECTED
CREATININE URINE: 272.4 MG/DL (ref 20–400)
DIAZEPAM: NOT DETECTED
DRUGS EXPECTED: NORMAL
EER PAIN MGT DRUG PANEL, HIGH RES/EMIT U: NORMAL
ETHYL GLUCURONIDE: PRESENT
FENTANYL: NOT DETECTED
HYDROCODONE: PRESENT
HYDROMORPHONE: NOT DETECTED
LORAZEPAM: NOT DETECTED
MARIJUANA METABOLITE: NOT DETECTED
MDA: NOT DETECTED
MDEA: NOT DETECTED
MDMA URINE: NOT DETECTED
MEPERIDINE: NOT DETECTED
METHADONE: NOT DETECTED
METHAMPHETAMINE: NOT DETECTED
METHYLPHENIDATE: NOT DETECTED
MIDAZOLAM: NOT DETECTED
MORPHINE: NOT DETECTED
NORBUPRENORPHINE, FREE: NOT DETECTED
NORDIAZEPAM: NOT DETECTED
NORFENTANYL: NOT DETECTED
NORHYDROCODONE, URINE: PRESENT
NOROXYCODONE: NOT DETECTED
NOROXYMORPHONE, URINE: NOT DETECTED
OXAZEPAM: NOT DETECTED
OXYCODONE: NOT DETECTED
OXYMORPHONE: NOT DETECTED
PAIN MANAGEMENT DRUG PANEL: NORMAL
PAIN MANAGEMENT DRUG PANEL: NORMAL
PCP: NOT DETECTED
PHENTERMINE: NOT DETECTED
PROPOXYPHENE: NOT DETECTED
TAPENTADOL, URINE: NOT DETECTED
TAPENTADOL-O-SULFATE, URINE: NOT DETECTED
TEMAZEPAM: NOT DETECTED
TRAMADOL: NOT DETECTED
ZOLPIDEM: NOT DETECTED

## 2019-07-02 ENCOUNTER — TELEPHONE (OUTPATIENT)
Dept: PAIN MANAGEMENT | Age: 69
End: 2019-07-02

## 2019-07-02 DIAGNOSIS — M96.1 POSTLAMINECTOMY SYNDROME, LUMBAR: ICD-10-CM

## 2019-07-02 DIAGNOSIS — M46.1 BILATERAL SACROILIITIS (HCC): ICD-10-CM

## 2019-07-02 DIAGNOSIS — Z02.89 PAIN MEDICATION AGREEMENT: ICD-10-CM

## 2019-07-02 DIAGNOSIS — M47.26 OTHER SPONDYLOSIS WITH RADICULOPATHY, LUMBAR REGION: ICD-10-CM

## 2019-07-02 RX ORDER — OXYCODONE HYDROCHLORIDE AND ACETAMINOPHEN 5; 325 MG/1; MG/1
1 TABLET ORAL EVERY 8 HOURS PRN
Qty: 63 TABLET | Refills: 0 | Status: SHIPPED | OUTPATIENT
Start: 2019-07-03 | End: 2019-07-24 | Stop reason: SDUPTHER

## 2019-07-12 RX ORDER — DULOXETIN HYDROCHLORIDE 60 MG/1
CAPSULE, DELAYED RELEASE ORAL
Qty: 90 CAPSULE | Refills: 0 | Status: SHIPPED | OUTPATIENT
Start: 2019-07-12 | End: 2019-07-24

## 2019-07-17 ENCOUNTER — OFFICE VISIT (OUTPATIENT)
Dept: PRIMARY CARE CLINIC | Age: 69
End: 2019-07-17
Payer: MEDICAID

## 2019-07-17 VITALS
BODY MASS INDEX: 30.92 KG/M2 | HEIGHT: 70 IN | HEART RATE: 60 BPM | DIASTOLIC BLOOD PRESSURE: 81 MMHG | WEIGHT: 216 LBS | SYSTOLIC BLOOD PRESSURE: 152 MMHG

## 2019-07-17 DIAGNOSIS — I10 ESSENTIAL HYPERTENSION: Primary | Chronic | ICD-10-CM

## 2019-07-17 DIAGNOSIS — E55.9 VITAMIN D DEFICIENCY: ICD-10-CM

## 2019-07-17 DIAGNOSIS — E78.5 HYPERLIPIDEMIA, UNSPECIFIED HYPERLIPIDEMIA TYPE: Chronic | ICD-10-CM

## 2019-07-17 DIAGNOSIS — E53.8 VITAMIN B12 DEFICIENCY: ICD-10-CM

## 2019-07-17 DIAGNOSIS — R53.83 FATIGUE, UNSPECIFIED TYPE: ICD-10-CM

## 2019-07-17 PROCEDURE — 36415 COLL VENOUS BLD VENIPUNCTURE: CPT | Performed by: NURSE PRACTITIONER

## 2019-07-17 PROCEDURE — G8427 DOCREV CUR MEDS BY ELIG CLIN: HCPCS | Performed by: NURSE PRACTITIONER

## 2019-07-17 PROCEDURE — 4040F PNEUMOC VAC/ADMIN/RCVD: CPT | Performed by: NURSE PRACTITIONER

## 2019-07-17 PROCEDURE — 99214 OFFICE O/P EST MOD 30 MIN: CPT | Performed by: NURSE PRACTITIONER

## 2019-07-17 PROCEDURE — 1123F ACP DISCUSS/DSCN MKR DOCD: CPT | Performed by: NURSE PRACTITIONER

## 2019-07-17 PROCEDURE — 3017F COLORECTAL CA SCREEN DOC REV: CPT | Performed by: NURSE PRACTITIONER

## 2019-07-17 PROCEDURE — G8417 CALC BMI ABV UP PARAM F/U: HCPCS | Performed by: NURSE PRACTITIONER

## 2019-07-17 PROCEDURE — G8598 ASA/ANTIPLAT THER USED: HCPCS | Performed by: NURSE PRACTITIONER

## 2019-07-17 PROCEDURE — 4004F PT TOBACCO SCREEN RCVD TLK: CPT | Performed by: NURSE PRACTITIONER

## 2019-07-17 RX ORDER — AMLODIPINE BESYLATE 5 MG/1
5 TABLET ORAL DAILY
Qty: 90 TABLET | Refills: 0 | Status: SHIPPED | OUTPATIENT
Start: 2019-07-17 | End: 2019-08-12 | Stop reason: SDUPTHER

## 2019-07-17 ASSESSMENT — PATIENT HEALTH QUESTIONNAIRE - PHQ9
2. FEELING DOWN, DEPRESSED OR HOPELESS: 0
SUM OF ALL RESPONSES TO PHQ9 QUESTIONS 1 & 2: 0
1. LITTLE INTEREST OR PLEASURE IN DOING THINGS: 0
SUM OF ALL RESPONSES TO PHQ QUESTIONS 1-9: 0
SUM OF ALL RESPONSES TO PHQ QUESTIONS 1-9: 0

## 2019-07-18 LAB
CHOLESTEROL, TOTAL: 147 MG/DL (ref 0–199)
FOLATE: 2.94 NG/ML (ref 4.78–24.2)
HDLC SERPL-MCNC: 32 MG/DL (ref 40–60)
IRON SATURATION: 54 % (ref 20–50)
IRON: 167 UG/DL (ref 59–158)
LDL CHOLESTEROL CALCULATED: 74 MG/DL
T4 FREE: 0.9 NG/DL (ref 0.9–1.8)
TOTAL IRON BINDING CAPACITY: 308 UG/DL (ref 260–445)
TRIGL SERPL-MCNC: 204 MG/DL (ref 0–150)
TSH REFLEX: 4.39 UIU/ML (ref 0.27–4.2)
VITAMIN B-12: 1068 PG/ML (ref 211–911)
VITAMIN D 25-HYDROXY: 38.8 NG/ML
VLDLC SERPL CALC-MCNC: 41 MG/DL

## 2019-07-24 ENCOUNTER — OFFICE VISIT (OUTPATIENT)
Dept: PAIN MANAGEMENT | Age: 69
End: 2019-07-24
Payer: MEDICAID

## 2019-07-24 VITALS
BODY MASS INDEX: 31.12 KG/M2 | SYSTOLIC BLOOD PRESSURE: 128 MMHG | HEART RATE: 66 BPM | WEIGHT: 217.4 LBS | DIASTOLIC BLOOD PRESSURE: 67 MMHG | HEIGHT: 70 IN

## 2019-07-24 DIAGNOSIS — M47.26 OTHER SPONDYLOSIS WITH RADICULOPATHY, LUMBAR REGION: ICD-10-CM

## 2019-07-24 DIAGNOSIS — M13.0 POLYARTHROPATHY, MULTIPLE SITES: ICD-10-CM

## 2019-07-24 DIAGNOSIS — M96.1 POSTLAMINECTOMY SYNDROME, LUMBAR: Primary | ICD-10-CM

## 2019-07-24 DIAGNOSIS — G89.4 CHRONIC PAIN SYNDROME: ICD-10-CM

## 2019-07-24 DIAGNOSIS — F45.42 PAIN DISORDER WITH PSYCHOLOGICAL FACTORS: ICD-10-CM

## 2019-07-24 DIAGNOSIS — Z02.89 PAIN MEDICATION AGREEMENT: ICD-10-CM

## 2019-07-24 DIAGNOSIS — F11.90 CHRONIC, CONTINUOUS USE OF OPIOIDS: ICD-10-CM

## 2019-07-24 PROCEDURE — 1123F ACP DISCUSS/DSCN MKR DOCD: CPT | Performed by: ANESTHESIOLOGY

## 2019-07-24 PROCEDURE — G8417 CALC BMI ABV UP PARAM F/U: HCPCS | Performed by: ANESTHESIOLOGY

## 2019-07-24 PROCEDURE — 99214 OFFICE O/P EST MOD 30 MIN: CPT | Performed by: ANESTHESIOLOGY

## 2019-07-24 PROCEDURE — 3017F COLORECTAL CA SCREEN DOC REV: CPT | Performed by: ANESTHESIOLOGY

## 2019-07-24 PROCEDURE — G8427 DOCREV CUR MEDS BY ELIG CLIN: HCPCS | Performed by: ANESTHESIOLOGY

## 2019-07-24 PROCEDURE — 4004F PT TOBACCO SCREEN RCVD TLK: CPT | Performed by: ANESTHESIOLOGY

## 2019-07-24 PROCEDURE — G8598 ASA/ANTIPLAT THER USED: HCPCS | Performed by: ANESTHESIOLOGY

## 2019-07-24 PROCEDURE — 4040F PNEUMOC VAC/ADMIN/RCVD: CPT | Performed by: ANESTHESIOLOGY

## 2019-07-24 RX ORDER — OXYCODONE HYDROCHLORIDE AND ACETAMINOPHEN 5; 325 MG/1; MG/1
1 TABLET ORAL EVERY 8 HOURS PRN
Qty: 90 TABLET | Refills: 0 | Status: SHIPPED | OUTPATIENT
Start: 2019-07-24 | End: 2019-08-21 | Stop reason: SDUPTHER

## 2019-07-24 RX ORDER — GABAPENTIN 600 MG/1
TABLET ORAL
Qty: 90 TABLET | Refills: 1 | Status: SHIPPED | OUTPATIENT
Start: 2019-07-24 | End: 2019-10-22 | Stop reason: SDUPTHER

## 2019-07-24 ASSESSMENT — ENCOUNTER SYMPTOMS
EYE REDNESS: 0
COUGH: 0
SHORTNESS OF BREATH: 0
EYE PAIN: 0
VOMITING: 0
ABDOMINAL PAIN: 0
CONSTIPATION: 0
BACK PAIN: 1
EYE DISCHARGE: 0
NAUSEA: 0
WHEEZING: 0

## 2019-07-24 NOTE — PATIENT INSTRUCTIONS
the most common treatment for pain. But to feel better, you'll need to do more than take medicine. You can also do things like reducing your stress level and changing how you think. How can you take medicine safely? Medicines can help you get better. But they can also be dangerous, especially if you don't take them the right way. Be safe with medicines. Read and follow all instructions on the label. If the medicine you take causes side effects such as constipation or nausea, you may need to take other medicines for those problems. Talk to your doctor about any side effects you have. If you were prescribed an opioid pain reliever, your care team will give you information on how to use it safely. You will also get directions for how to safely store the medicine and how to get rid of any that's left over. Follow these instructions carefully. What physical treatments can help? Physical treatments can be an important part of managing chronic pain. You may find that combining more than one treatment helps the most.  These treatments can include:  · Heat or cold. This can help arthritis, sore muscles, and other aches. · Hydrotherapy. It uses flowing water to relax muscles. · Massage. Massage involves rubbing the soft tissues of the body. It eases tension and pain. · Transcutaneous electrical nerve stimulation (TENS). This treatment uses a gentle electric current applied to the skin for pain relief. · Acupuncture. This is a form of traditional [facility] medicine. It uses very thin needles inserted into certain points of the body. · Physical therapy. This treatment uses stretches and exercises to reduce pain and help you move better. If you get physical therapy, make sure to do any home exercises or stretching your therapist has prescribed. Stay as active as you can. Try to get some physical activity every day. What other things can help?   You can manage chronic pain by using things other than medicines or

## 2019-07-24 NOTE — PROGRESS NOTES
Pain Medication Review -    Last Pain Medication Agreement date: 3/6/19    Last UDT date: 6/26/19    Last Opioid fill date (per [facility]): Na   Days filled:7/02/19    Expected Opioid pill count: 0     Actual Opiod pill count: 2    Notes -
Social History     Socioeconomic History    Marital status:      Spouse name: Not on file    Number of children: Not on file    Years of education: Not on file    Highest education level: Not on file   Occupational History    Occupation: Retired      Comment: 2014    Occupation: Drywall self employed    Social Needs    Financial resource strain: Not on file    Food insecurity:     Worry: Not on file     Inability: Not on file   [employer] needs:     Medical: Not on file     Non-medical: Not on file   Tobacco Use    Smoking status: Current Every Day Smoker     Packs/day: 2.00     Years: 52.00     Pack years: 104.00     Types: Cigarettes     Start date: 1/1/1967    Smokeless tobacco: Never Used    Tobacco comment:  one half pack a day   Substance and Sexual Activity    Alcohol use: Yes     Alcohol/week: 0.0 standard drinks     Comment: 2-3 beers a month    Drug use: No    Sexual activity: Never   Lifestyle    Physical activity:     Days per week: Not on file     Minutes per session: Not on file    Stress: Not on file   Relationships    Social connections:     Talks on phone: Not on file     Gets together: Not on file     Attends Mandaeism service: Not on file     Active member of club or organization: Not on file     Attends meetings of clubs or organizations: Not on file     Relationship status: Not on file    Intimate partner violence:     Fear of current or ex partner: Not on file     Emotionally abused: Not on file     Physically abused: Not on file     Forced sexual activity: Not on file   Other Topics Concern    Not on file   Social History Narrative    Not on file         Imaging Studies:   CT LS (10/27/2017)  \"Impression       Prior laminectomy L4-5 and L5-S1.       L4-5 grade 1 spondylolisthesis.       Mild canal stenosis L2-3 and L3-4. \"         Results of the above studies were personally reviewed by me with the patient indetail along with the images, when available.

## 2019-08-02 RX ORDER — FUROSEMIDE 20 MG/1
TABLET ORAL
Qty: 90 TABLET | Refills: 0 | Status: SHIPPED | OUTPATIENT
Start: 2019-08-02 | End: 2019-08-19 | Stop reason: CLARIF

## 2019-08-06 ENCOUNTER — APPOINTMENT (OUTPATIENT)
Dept: GENERAL RADIOLOGY | Age: 69
End: 2019-08-06
Payer: MEDICAID

## 2019-08-06 ENCOUNTER — HOSPITAL ENCOUNTER (OUTPATIENT)
Age: 69
Setting detail: OBSERVATION
LOS: 1 days | Discharge: HOME | End: 2019-08-08
Attending: EMERGENCY MEDICINE | Admitting: INTERNAL MEDICINE
Payer: MEDICAID

## 2019-08-06 ENCOUNTER — TELEPHONE (OUTPATIENT)
Dept: PRIMARY CARE CLINIC | Age: 69
End: 2019-08-06

## 2019-08-06 DIAGNOSIS — I50.9 ACUTE ON CHRONIC CONGESTIVE HEART FAILURE, UNSPECIFIED HEART FAILURE TYPE (HCC): Primary | ICD-10-CM

## 2019-08-06 PROBLEM — I50.43 CHF (CONGESTIVE HEART FAILURE), NYHA CLASS I, ACUTE ON CHRONIC, COMBINED (HCC): Status: ACTIVE | Noted: 2019-08-06

## 2019-08-06 LAB
A/G RATIO: 1.1 (ref 1.1–2.2)
ALBUMIN SERPL-MCNC: 3.9 G/DL (ref 3.4–5)
ALP BLD-CCNC: 90 U/L (ref 40–129)
ALT SERPL-CCNC: 17 U/L (ref 10–40)
ANION GAP SERPL CALCULATED.3IONS-SCNC: 14 MMOL/L (ref 3–16)
AST SERPL-CCNC: 18 U/L (ref 15–37)
BACTERIA: ABNORMAL /HPF
BILIRUB SERPL-MCNC: 1 MG/DL (ref 0–1)
BILIRUBIN URINE: ABNORMAL
BLOOD, URINE: NEGATIVE
BUN BLDV-MCNC: 20 MG/DL (ref 7–20)
CALCIUM SERPL-MCNC: 9.2 MG/DL (ref 8.3–10.6)
CASTS: ABNORMAL /LPF
CHLORIDE BLD-SCNC: 102 MMOL/L (ref 99–110)
CLARITY: CLEAR
CO2: 25 MMOL/L (ref 21–32)
COLOR: YELLOW
CREAT SERPL-MCNC: 1.1 MG/DL (ref 0.8–1.3)
D DIMER: 275 NG/ML DDU (ref 0–229)
EPITHELIAL CELLS, UA: ABNORMAL /HPF
GFR AFRICAN AMERICAN: >60
GFR NON-AFRICAN AMERICAN: >60
GLOBULIN: 3.4 G/DL
GLUCOSE BLD-MCNC: 110 MG/DL (ref 70–99)
GLUCOSE URINE: NEGATIVE MG/DL
HCT VFR BLD CALC: 43.3 % (ref 40.5–52.5)
HEMOGLOBIN: 14.6 G/DL (ref 13.5–17.5)
KETONES, URINE: NEGATIVE MG/DL
LEUKOCYTE ESTERASE, URINE: ABNORMAL
MCH RBC QN AUTO: 35.9 PG (ref 26–34)
MCHC RBC AUTO-ENTMCNC: 33.6 G/DL (ref 31–36)
MCV RBC AUTO: 106.7 FL (ref 80–100)
MICROSCOPIC EXAMINATION: YES
NITRITE, URINE: NEGATIVE
PDW BLD-RTO: 14.9 % (ref 12.4–15.4)
PH UA: 5.5 (ref 5–8)
PLATELET # BLD: 177 K/UL (ref 135–450)
PMV BLD AUTO: 9.7 FL (ref 5–10.5)
POTASSIUM REFLEX MAGNESIUM: 4.1 MMOL/L (ref 3.5–5.1)
PRO-BNP: 256 PG/ML (ref 0–124)
PROTEIN UA: NEGATIVE MG/DL
RBC # BLD: 4.06 M/UL (ref 4.2–5.9)
RBC UA: ABNORMAL /HPF (ref 0–2)
SODIUM BLD-SCNC: 141 MMOL/L (ref 136–145)
SPECIFIC GRAVITY UA: 1.02 (ref 1–1.03)
TOTAL PROTEIN: 7.3 G/DL (ref 6.4–8.2)
TROPONIN: <0.01 NG/ML
URINE REFLEX TO CULTURE: YES
URINE TYPE: ABNORMAL
UROBILINOGEN, URINE: 0.2 E.U./DL
WBC # BLD: 11.8 K/UL (ref 4–11)
WBC UA: ABNORMAL /HPF (ref 0–5)

## 2019-08-06 PROCEDURE — 96374 THER/PROPH/DIAG INJ IV PUSH: CPT

## 2019-08-06 PROCEDURE — 87086 URINE CULTURE/COLONY COUNT: CPT

## 2019-08-06 PROCEDURE — 93005 ELECTROCARDIOGRAM TRACING: CPT | Performed by: INTERNAL MEDICINE

## 2019-08-06 PROCEDURE — 94640 AIRWAY INHALATION TREATMENT: CPT

## 2019-08-06 PROCEDURE — G0378 HOSPITAL OBSERVATION PER HR: HCPCS

## 2019-08-06 PROCEDURE — 80053 COMPREHEN METABOLIC PANEL: CPT

## 2019-08-06 PROCEDURE — 99284 EMERGENCY DEPT VISIT MOD MDM: CPT

## 2019-08-06 PROCEDURE — 85027 COMPLETE CBC AUTOMATED: CPT

## 2019-08-06 PROCEDURE — 6360000002 HC RX W HCPCS: Performed by: EMERGENCY MEDICINE

## 2019-08-06 PROCEDURE — 71046 X-RAY EXAM CHEST 2 VIEWS: CPT

## 2019-08-06 PROCEDURE — 81001 URINALYSIS AUTO W/SCOPE: CPT

## 2019-08-06 PROCEDURE — 94150 VITAL CAPACITY TEST: CPT

## 2019-08-06 PROCEDURE — 2580000003 HC RX 258: Performed by: STUDENT IN AN ORGANIZED HEALTH CARE EDUCATION/TRAINING PROGRAM

## 2019-08-06 PROCEDURE — 84484 ASSAY OF TROPONIN QUANT: CPT

## 2019-08-06 PROCEDURE — 6370000000 HC RX 637 (ALT 250 FOR IP): Performed by: STUDENT IN AN ORGANIZED HEALTH CARE EDUCATION/TRAINING PROGRAM

## 2019-08-06 PROCEDURE — 83880 ASSAY OF NATRIURETIC PEPTIDE: CPT

## 2019-08-06 PROCEDURE — 85379 FIBRIN DEGRADATION QUANT: CPT

## 2019-08-06 PROCEDURE — 6360000002 HC RX W HCPCS: Performed by: STUDENT IN AN ORGANIZED HEALTH CARE EDUCATION/TRAINING PROGRAM

## 2019-08-06 PROCEDURE — 93005 ELECTROCARDIOGRAM TRACING: CPT | Performed by: EMERGENCY MEDICINE

## 2019-08-06 RX ORDER — DULOXETIN HYDROCHLORIDE 60 MG/1
60 CAPSULE, DELAYED RELEASE ORAL DAILY
Status: DISCONTINUED | OUTPATIENT
Start: 2019-08-07 | End: 2019-08-08 | Stop reason: HOSPADM

## 2019-08-06 RX ORDER — ALLOPURINOL 100 MG/1
200 TABLET ORAL DAILY
Status: DISCONTINUED | OUTPATIENT
Start: 2019-08-07 | End: 2019-08-08 | Stop reason: HOSPADM

## 2019-08-06 RX ORDER — ALBUTEROL SULFATE 2.5 MG/3ML
1.25 SOLUTION RESPIRATORY (INHALATION) EVERY 6 HOURS PRN
Status: DISCONTINUED | OUTPATIENT
Start: 2019-08-06 | End: 2019-08-08 | Stop reason: HOSPADM

## 2019-08-06 RX ORDER — LOSARTAN POTASSIUM 100 MG/1
100 TABLET ORAL DAILY
Status: DISCONTINUED | OUTPATIENT
Start: 2019-08-07 | End: 2019-08-08 | Stop reason: HOSPADM

## 2019-08-06 RX ORDER — ATORVASTATIN CALCIUM 80 MG/1
80 TABLET, FILM COATED ORAL DAILY
Status: DISCONTINUED | OUTPATIENT
Start: 2019-08-07 | End: 2019-08-08 | Stop reason: HOSPADM

## 2019-08-06 RX ORDER — FUROSEMIDE 10 MG/ML
20 INJECTION INTRAMUSCULAR; INTRAVENOUS ONCE
Status: COMPLETED | OUTPATIENT
Start: 2019-08-06 | End: 2019-08-06

## 2019-08-06 RX ORDER — OXYCODONE HYDROCHLORIDE AND ACETAMINOPHEN 5; 325 MG/1; MG/1
1 TABLET ORAL EVERY 8 HOURS PRN
Status: DISCONTINUED | OUTPATIENT
Start: 2019-08-06 | End: 2019-08-08 | Stop reason: HOSPADM

## 2019-08-06 RX ORDER — METOPROLOL TARTRATE 100 MG/1
100 TABLET ORAL 2 TIMES DAILY
Status: DISCONTINUED | OUTPATIENT
Start: 2019-08-06 | End: 2019-08-08 | Stop reason: HOSPADM

## 2019-08-06 RX ORDER — FOLIC ACID 1 MG/1
1 TABLET ORAL DAILY
Status: DISCONTINUED | OUTPATIENT
Start: 2019-08-07 | End: 2019-08-08 | Stop reason: HOSPADM

## 2019-08-06 RX ORDER — SENNA PLUS 8.6 MG/1
1 TABLET ORAL DAILY PRN
Status: DISCONTINUED | OUTPATIENT
Start: 2019-08-06 | End: 2019-08-08 | Stop reason: HOSPADM

## 2019-08-06 RX ORDER — GABAPENTIN 600 MG/1
600 TABLET ORAL DAILY
Status: DISCONTINUED | OUTPATIENT
Start: 2019-08-07 | End: 2019-08-08 | Stop reason: HOSPADM

## 2019-08-06 RX ORDER — FORMOTEROL FUMARATE 20 UG/2ML
20 SOLUTION RESPIRATORY (INHALATION) 2 TIMES DAILY
Status: DISCONTINUED | OUTPATIENT
Start: 2019-08-06 | End: 2019-08-08 | Stop reason: HOSPADM

## 2019-08-06 RX ORDER — ONDANSETRON 2 MG/ML
4 INJECTION INTRAMUSCULAR; INTRAVENOUS EVERY 6 HOURS PRN
Status: DISCONTINUED | OUTPATIENT
Start: 2019-08-06 | End: 2019-08-08 | Stop reason: HOSPADM

## 2019-08-06 RX ORDER — SODIUM CHLORIDE 0.9 % (FLUSH) 0.9 %
10 SYRINGE (ML) INJECTION EVERY 12 HOURS SCHEDULED
Status: DISCONTINUED | OUTPATIENT
Start: 2019-08-06 | End: 2019-08-08 | Stop reason: HOSPADM

## 2019-08-06 RX ORDER — FLUTICASONE FUROATE AND VILANTEROL 200; 25 UG/1; UG/1
1 POWDER RESPIRATORY (INHALATION) DAILY
Status: DISCONTINUED | OUTPATIENT
Start: 2019-08-07 | End: 2019-08-06

## 2019-08-06 RX ORDER — PANTOPRAZOLE SODIUM 40 MG/1
40 TABLET, DELAYED RELEASE ORAL EVERY EVENING
Status: DISCONTINUED | OUTPATIENT
Start: 2019-08-06 | End: 2019-08-08 | Stop reason: HOSPADM

## 2019-08-06 RX ORDER — BUDESONIDE 0.5 MG/2ML
0.5 INHALANT ORAL 2 TIMES DAILY
Status: DISCONTINUED | OUTPATIENT
Start: 2019-08-06 | End: 2019-08-08 | Stop reason: HOSPADM

## 2019-08-06 RX ORDER — BUPROPION HYDROCHLORIDE 150 MG/1
150 TABLET, EXTENDED RELEASE ORAL 2 TIMES DAILY
Status: DISCONTINUED | OUTPATIENT
Start: 2019-08-06 | End: 2019-08-08 | Stop reason: HOSPADM

## 2019-08-06 RX ORDER — SODIUM CHLORIDE 0.9 % (FLUSH) 0.9 %
10 SYRINGE (ML) INJECTION PRN
Status: DISCONTINUED | OUTPATIENT
Start: 2019-08-06 | End: 2019-08-08 | Stop reason: HOSPADM

## 2019-08-06 RX ORDER — NICOTINE 21 MG/24HR
1 PATCH, TRANSDERMAL 24 HOURS TRANSDERMAL DAILY
Status: DISCONTINUED | OUTPATIENT
Start: 2019-08-07 | End: 2019-08-08 | Stop reason: HOSPADM

## 2019-08-06 RX ORDER — CLOPIDOGREL BISULFATE 75 MG/1
75 TABLET ORAL DAILY
Status: DISCONTINUED | OUTPATIENT
Start: 2019-08-07 | End: 2019-08-08 | Stop reason: HOSPADM

## 2019-08-06 RX ORDER — FOLIC ACID 1 MG/1
1 TABLET ORAL DAILY
COMMUNITY
End: 2021-09-16

## 2019-08-06 RX ADMIN — BUPROPION HYDROCHLORIDE 150 MG: 150 TABLET, EXTENDED RELEASE ORAL at 23:42

## 2019-08-06 RX ADMIN — OXYCODONE HYDROCHLORIDE AND ACETAMINOPHEN 1 TABLET: 5; 325 TABLET ORAL at 23:42

## 2019-08-06 RX ADMIN — FUROSEMIDE 20 MG: 10 INJECTION, SOLUTION INTRAMUSCULAR; INTRAVENOUS at 15:24

## 2019-08-06 RX ADMIN — PANTOPRAZOLE SODIUM 40 MG: 40 TABLET, DELAYED RELEASE ORAL at 23:42

## 2019-08-06 RX ADMIN — METOPROLOL TARTRATE 100 MG: 100 TABLET ORAL at 23:42

## 2019-08-06 RX ADMIN — Medication 10 ML: at 23:44

## 2019-08-06 RX ADMIN — FORMOTEROL FUMARATE DIHYDRATE 20 MCG: 20 SOLUTION RESPIRATORY (INHALATION) at 23:19

## 2019-08-06 RX ADMIN — BUDESONIDE 500 MCG: 0.5 SUSPENSION RESPIRATORY (INHALATION) at 23:19

## 2019-08-06 ASSESSMENT — ENCOUNTER SYMPTOMS
NAUSEA: 0
CHEST TIGHTNESS: 1
VOMITING: 0
COUGH: 1
WHEEZING: 0
BACK PAIN: 0
SORE THROAT: 0
ABDOMINAL PAIN: 0
EYE ITCHING: 0
EYE REDNESS: 0
SHORTNESS OF BREATH: 1
DIARRHEA: 0

## 2019-08-06 ASSESSMENT — PAIN SCALES - GENERAL
PAINLEVEL_OUTOF10: 5
PAINLEVEL_OUTOF10: 6

## 2019-08-06 ASSESSMENT — PAIN DESCRIPTION - ONSET: ONSET: ON-GOING

## 2019-08-06 ASSESSMENT — PAIN DESCRIPTION - PAIN TYPE: TYPE: CHRONIC PAIN

## 2019-08-06 ASSESSMENT — PAIN DESCRIPTION - FREQUENCY
FREQUENCY: CONTINUOUS
FREQUENCY: CONTINUOUS

## 2019-08-06 ASSESSMENT — PAIN DESCRIPTION - ORIENTATION
ORIENTATION: RIGHT;LEFT
ORIENTATION: RIGHT;LEFT

## 2019-08-06 ASSESSMENT — PAIN DESCRIPTION - DESCRIPTORS
DESCRIPTORS: ACHING
DESCRIPTORS: ACHING

## 2019-08-06 ASSESSMENT — PAIN DESCRIPTION - LOCATION
LOCATION: FOOT;LEG
LOCATION: FOOT;LEG

## 2019-08-06 ASSESSMENT — PAIN DESCRIPTION - PROGRESSION: CLINICAL_PROGRESSION: NOT CHANGED

## 2019-08-06 ASSESSMENT — PAIN - FUNCTIONAL ASSESSMENT: PAIN_FUNCTIONAL_ASSESSMENT: ACTIVITIES ARE NOT PREVENTED

## 2019-08-06 NOTE — ED PROVIDER NOTES
obstructive pulmonary disease) ([facility]     followed by pulmonology     Depressive disorder, not elsewhere classified     DVT (deep venous thrombosis) ([facility] 12/2015    ileofemoral    Heartburn     infrequent    History of MI (myocardial infarction) May 2013    \"mild\" in [place] Hyperlipidemia     Hypertension     Neuropathy     feet    KAVITA (obstructive sleep apnea)     cpap    Primary central sleep apnea     PVD (peripheral vascular disease) ([facility]     Unspecified cerebral artery occlusion with cerebral infarction 4/2013    \"pt states mini stroke\"    Urgency of urination     and frequency, chronic    Vertebral fracture      Past Surgical History:   Procedure Laterality Date    APPENDECTOMY      CHOLECYSTECTOMY, LAPAROSCOPIC  6/23/2014    with [facility]    CORONARY ANGIOPLASTY WITH STENT PLACEMENT  6/2013    EYE SURGERY      LAMINECTOMY  11/18/2015    Bilateral decompressive lumbar laminectomy L4-5   ; medial facetectomy L4-5 joints  bilaterally; foraminotomies l5   nerve roots bilaterally    LEG DEBRIDEMENT Right 7/23/2013    Dr. Cyn Trejo - pretibial wound    OTHER SURGICAL HISTORY Right 6/25/2013    Dr. Cyn Trejo - CFA Endarterectomy w/marsupialization; RLE wound excision & debridement     OTHER SURGICAL HISTORY Left 8/27/2013    Dr. Cyn Trejo - femoral & profunda femoris endarterectomy w/marsupialization patch angioplasty using SFA    SKIN SPLIT GRAFT Right 7/25/2013    Dr. Cyn Trejo - to non-healing R pretibial wound, 9 x 15 cm    TOTAL HIP ARTHROPLASTY Right 09/01/2016    Dr. Cata Mathews Left 12/22/2015    Dr. Cyn Trejo - CFA exploration, thrombectomy of ileofemoral system, stenting of RAFAL in-stent stenosis w/8 x 37 mm [facility]      Family History   Problem Relation Age of Onset    Cancer Mother     Heart Disease Mother     Cancer Father     Heart Disease Father      Social History     Socioeconomic History    Marital status:      Spouse name: Not on file    Number of NORCO) 90 tablet 0    gabapentin (NEURONTIN) 600 MG tablet TAKE ONE TABLET BY MOUTH EVERY MORNING AND 2 TABLETS BY MOUTH EVERY EVENING 90 tablet 1    amLODIPine (NORVASC) 5 MG tablet Take 1 tablet by mouth daily 90 tablet 0    pantoprazole (PROTONIX) 40 MG tablet TAKE 1 TABLET BY MOUTH EVERY NIGHT IN THE EVENING 1 HOUR BEFORE BEDTIME 90 tablet 0    buPROPion (WELLBUTRIN SR) 150 MG extended release tablet TAKE 1 TABLET BY MOUTH TWICE DAILY 180 tablet 0    DULoxetine (CYMBALTA) 60 MG extended release capsule TAKE 1 CAPSULE BY MOUTH DAILY 90 capsule 0    allopurinol (ZYLOPRIM) 100 MG tablet TAKE 2 TABLETS BY MOUTH DAILY 180 tablet 0    BREO ELLIPTA 200-25 MCG/INH AEPB INHALE ONE PUFF BY MOUTH DAILY 1 each 11    atorvastatin (LIPITOR) 80 MG tablet TAKE 1 TABLET BY MOUTH DAILY 30 tablet 2    INCRUSE ELLIPTA 62.5 MCG/INH AEPB INHALE ONE PUFF BY MOUTH DAILY 1 each 11    albuterol sulfate HFA (PROAIR HFA) 108 (90 Base) MCG/ACT inhaler Inhale 2 puffs into the lungs every 6 hours as needed for Wheezing 1 Inhaler 3    ranitidine (ZANTAC) 150 MG tablet Take 1 tablet by mouth 2 times daily as needed for Heartburn 60 tablet 3    clopidogrel (PLAVIX) 75 MG tablet TAKE ONE TABLET BY MOUTH DAILY 90 tablet 2    losartan (COZAAR) 100 MG tablet TAKE 1 TABLET BY MOUTH ONCE DAILY. 90 tablet 1    metoprolol tartrate (LOPRESSOR) 50 MG tablet TAKE ONE (1) TABLET BY MOUTH TWICE A DAY. 180 tablet 3    metoprolol (LOPRESSOR) 100 MG tablet Take 1 tablet by mouth 2 times daily 180 tablet 3    albuterol (ACCUNEB) 1.25 MG/3ML nebulizer solution Inhale 3 mLs into the lungs every 6 hours as needed for Wheezing 120 vial 3    albuterol (PROVENTIL) (5 MG/ML) 0.5% nebulizer solution Take 1 mL by nebulization 4 times daily as needed for Wheezing 120 each 3    magnesium gluconate (MAGONATE) 500 MG tablet Take 500 mg by mouth daily.       nitroGLYCERIN (NITROSTAT) 0.4 MG SL tablet Place 1 tablet under the tongue every 5 minutes as needed

## 2019-08-07 ENCOUNTER — APPOINTMENT (OUTPATIENT)
Dept: VASCULAR LAB | Age: 69
End: 2019-08-07
Payer: MEDICAID

## 2019-08-07 LAB
ALBUMIN SERPL-MCNC: 4 G/DL (ref 3.4–5)
ANION GAP SERPL CALCULATED.3IONS-SCNC: 13 MMOL/L (ref 3–16)
BASOPHILS ABSOLUTE: 0.1 K/UL (ref 0–0.2)
BASOPHILS RELATIVE PERCENT: 1 %
BUN BLDV-MCNC: 19 MG/DL (ref 7–20)
CALCIUM SERPL-MCNC: 9.2 MG/DL (ref 8.3–10.6)
CHLORIDE BLD-SCNC: 101 MMOL/L (ref 99–110)
CO2: 28 MMOL/L (ref 21–32)
CREAT SERPL-MCNC: 0.9 MG/DL (ref 0.8–1.3)
EKG ATRIAL RATE: 59 BPM
EKG ATRIAL RATE: 71 BPM
EKG DIAGNOSIS: NORMAL
EKG DIAGNOSIS: NORMAL
EKG P AXIS: 39 DEGREES
EKG P AXIS: 41 DEGREES
EKG P-R INTERVAL: 192 MS
EKG P-R INTERVAL: 196 MS
EKG Q-T INTERVAL: 430 MS
EKG Q-T INTERVAL: 468 MS
EKG QRS DURATION: 100 MS
EKG QRS DURATION: 106 MS
EKG QTC CALCULATION (BAZETT): 463 MS
EKG QTC CALCULATION (BAZETT): 467 MS
EKG R AXIS: 51 DEGREES
EKG R AXIS: 53 DEGREES
EKG T AXIS: 62 DEGREES
EKG T AXIS: 69 DEGREES
EKG VENTRICULAR RATE: 59 BPM
EKG VENTRICULAR RATE: 71 BPM
EOSINOPHILS ABSOLUTE: 0.1 K/UL (ref 0–0.6)
EOSINOPHILS RELATIVE PERCENT: 1.8 %
GFR AFRICAN AMERICAN: >60
GFR NON-AFRICAN AMERICAN: >60
GLUCOSE BLD-MCNC: 93 MG/DL (ref 70–99)
HCT VFR BLD CALC: 42.3 % (ref 40.5–52.5)
HEMOGLOBIN: 14.1 G/DL (ref 13.5–17.5)
LV EF: 58 %
LVEF MODALITY: NORMAL
LYMPHOCYTES ABSOLUTE: 1.4 K/UL (ref 1–5.1)
LYMPHOCYTES RELATIVE PERCENT: 20.9 %
MAGNESIUM: 1.7 MG/DL (ref 1.8–2.4)
MCH RBC QN AUTO: 35.8 PG (ref 26–34)
MCHC RBC AUTO-ENTMCNC: 33.4 G/DL (ref 31–36)
MCV RBC AUTO: 107.2 FL (ref 80–100)
MONOCYTES ABSOLUTE: 0.4 K/UL (ref 0–1.3)
MONOCYTES RELATIVE PERCENT: 6.5 %
NEUTROPHILS ABSOLUTE: 4.8 K/UL (ref 1.7–7.7)
NEUTROPHILS RELATIVE PERCENT: 69.8 %
PDW BLD-RTO: 15.1 % (ref 12.4–15.4)
PHOSPHORUS: 3.4 MG/DL (ref 2.5–4.9)
PLATELET # BLD: 145 K/UL (ref 135–450)
PMV BLD AUTO: 9.7 FL (ref 5–10.5)
POTASSIUM SERPL-SCNC: 4.2 MMOL/L (ref 3.5–5.1)
RBC # BLD: 3.95 M/UL (ref 4.2–5.9)
SODIUM BLD-SCNC: 142 MMOL/L (ref 136–145)
TSH REFLEX: 3.44 UIU/ML (ref 0.27–4.2)
WBC # BLD: 6.9 K/UL (ref 4–11)

## 2019-08-07 PROCEDURE — G0378 HOSPITAL OBSERVATION PER HR: HCPCS

## 2019-08-07 PROCEDURE — 85025 COMPLETE CBC W/AUTO DIFF WBC: CPT

## 2019-08-07 PROCEDURE — 93010 ELECTROCARDIOGRAM REPORT: CPT | Performed by: INTERNAL MEDICINE

## 2019-08-07 PROCEDURE — 80069 RENAL FUNCTION PANEL: CPT

## 2019-08-07 PROCEDURE — 94640 AIRWAY INHALATION TREATMENT: CPT

## 2019-08-07 PROCEDURE — 83735 ASSAY OF MAGNESIUM: CPT

## 2019-08-07 PROCEDURE — 97116 GAIT TRAINING THERAPY: CPT

## 2019-08-07 PROCEDURE — 6370000000 HC RX 637 (ALT 250 FOR IP): Performed by: STUDENT IN AN ORGANIZED HEALTH CARE EDUCATION/TRAINING PROGRAM

## 2019-08-07 PROCEDURE — 6360000002 HC RX W HCPCS: Performed by: STUDENT IN AN ORGANIZED HEALTH CARE EDUCATION/TRAINING PROGRAM

## 2019-08-07 PROCEDURE — 96372 THER/PROPH/DIAG INJ SC/IM: CPT

## 2019-08-07 PROCEDURE — 93926 LOWER EXTREMITY STUDY: CPT

## 2019-08-07 PROCEDURE — 84443 ASSAY THYROID STIM HORMONE: CPT

## 2019-08-07 PROCEDURE — 2580000003 HC RX 258: Performed by: STUDENT IN AN ORGANIZED HEALTH CARE EDUCATION/TRAINING PROGRAM

## 2019-08-07 PROCEDURE — 6360000004 HC RX CONTRAST MEDICATION: Performed by: INTERNAL MEDICINE

## 2019-08-07 PROCEDURE — 36415 COLL VENOUS BLD VENIPUNCTURE: CPT

## 2019-08-07 PROCEDURE — C8929 TTE W OR WO FOL WCON,DOPPLER: HCPCS

## 2019-08-07 PROCEDURE — 97162 PT EVAL MOD COMPLEX 30 MIN: CPT

## 2019-08-07 RX ORDER — FUROSEMIDE 40 MG/1
40 TABLET ORAL 2 TIMES DAILY
Status: DISCONTINUED | OUTPATIENT
Start: 2019-08-07 | End: 2019-08-07

## 2019-08-07 RX ORDER — FUROSEMIDE 40 MG/1
40 TABLET ORAL DAILY
Status: DISCONTINUED | OUTPATIENT
Start: 2019-08-08 | End: 2019-08-08 | Stop reason: HOSPADM

## 2019-08-07 RX ADMIN — METOPROLOL TARTRATE 100 MG: 100 TABLET ORAL at 20:07

## 2019-08-07 RX ADMIN — ENOXAPARIN SODIUM 40 MG: 40 INJECTION SUBCUTANEOUS at 08:56

## 2019-08-07 RX ADMIN — GABAPENTIN 600 MG: 600 TABLET, FILM COATED ORAL at 08:56

## 2019-08-07 RX ADMIN — FORMOTEROL FUMARATE DIHYDRATE 20 MCG: 20 SOLUTION RESPIRATORY (INHALATION) at 21:18

## 2019-08-07 RX ADMIN — FUROSEMIDE 40 MG: 40 TABLET ORAL at 08:56

## 2019-08-07 RX ADMIN — CLOPIDOGREL 75 MG: 75 TABLET, FILM COATED ORAL at 08:56

## 2019-08-07 RX ADMIN — TIOTROPIUM BROMIDE 18 MCG: 18 CAPSULE ORAL; RESPIRATORY (INHALATION) at 08:40

## 2019-08-07 RX ADMIN — ATORVASTATIN CALCIUM 80 MG: 80 TABLET, FILM COATED ORAL at 08:56

## 2019-08-07 RX ADMIN — BUPROPION HYDROCHLORIDE 150 MG: 150 TABLET, EXTENDED RELEASE ORAL at 20:07

## 2019-08-07 RX ADMIN — PANTOPRAZOLE SODIUM 40 MG: 40 TABLET, DELAYED RELEASE ORAL at 17:27

## 2019-08-07 RX ADMIN — Medication 10 ML: at 08:57

## 2019-08-07 RX ADMIN — METOPROLOL TARTRATE 100 MG: 100 TABLET ORAL at 08:56

## 2019-08-07 RX ADMIN — LOSARTAN POTASSIUM 100 MG: 100 TABLET ORAL at 08:56

## 2019-08-07 RX ADMIN — PERFLUTREN 2.2 MG: 6.52 INJECTION, SUSPENSION INTRAVENOUS at 13:51

## 2019-08-07 RX ADMIN — DULOXETINE HYDROCHLORIDE 60 MG: 60 CAPSULE, DELAYED RELEASE ORAL at 08:56

## 2019-08-07 RX ADMIN — BUDESONIDE 500 MCG: 0.5 SUSPENSION RESPIRATORY (INHALATION) at 21:18

## 2019-08-07 RX ADMIN — FORMOTEROL FUMARATE DIHYDRATE 20 MCG: 20 SOLUTION RESPIRATORY (INHALATION) at 08:40

## 2019-08-07 RX ADMIN — ALLOPURINOL 200 MG: 100 TABLET ORAL at 08:56

## 2019-08-07 RX ADMIN — FOLIC ACID 1 MG: 1 TABLET ORAL at 08:56

## 2019-08-07 RX ADMIN — BUPROPION HYDROCHLORIDE 150 MG: 150 TABLET, EXTENDED RELEASE ORAL at 08:56

## 2019-08-07 RX ADMIN — OXYCODONE HYDROCHLORIDE AND ACETAMINOPHEN 1 TABLET: 5; 325 TABLET ORAL at 09:09

## 2019-08-07 RX ADMIN — Medication 10 ML: at 20:08

## 2019-08-07 RX ADMIN — OXYCODONE HYDROCHLORIDE AND ACETAMINOPHEN 1 TABLET: 5; 325 TABLET ORAL at 20:07

## 2019-08-07 RX ADMIN — BUDESONIDE 500 MCG: 0.5 SUSPENSION RESPIRATORY (INHALATION) at 08:39

## 2019-08-07 ASSESSMENT — PAIN DESCRIPTION - DESCRIPTORS
DESCRIPTORS: ACHING
DESCRIPTORS: ACHING

## 2019-08-07 ASSESSMENT — PAIN DESCRIPTION - LOCATION
LOCATION: BACK
LOCATION: BACK

## 2019-08-07 ASSESSMENT — PAIN DESCRIPTION - ONSET
ONSET: ON-GOING
ONSET: ON-GOING

## 2019-08-07 ASSESSMENT — ENCOUNTER SYMPTOMS
NAUSEA: 0
SHORTNESS OF BREATH: 0
COUGH: 0
ABDOMINAL PAIN: 0
ABDOMINAL DISTENTION: 0
CHEST TIGHTNESS: 0
VOMITING: 0

## 2019-08-07 ASSESSMENT — PAIN SCALES - GENERAL
PAINLEVEL_OUTOF10: 6
PAINLEVEL_OUTOF10: 0
PAINLEVEL_OUTOF10: 0
PAINLEVEL_OUTOF10: 3
PAINLEVEL_OUTOF10: 6

## 2019-08-07 ASSESSMENT — PULMONARY FUNCTION TESTS: PEFR_L/MIN: 16

## 2019-08-07 ASSESSMENT — PAIN DESCRIPTION - PAIN TYPE
TYPE: CHRONIC PAIN
TYPE: CHRONIC PAIN

## 2019-08-07 ASSESSMENT — PAIN DESCRIPTION - PROGRESSION
CLINICAL_PROGRESSION: NOT CHANGED

## 2019-08-07 ASSESSMENT — PAIN DESCRIPTION - FREQUENCY
FREQUENCY: CONTINUOUS
FREQUENCY: CONTINUOUS

## 2019-08-07 ASSESSMENT — PAIN - FUNCTIONAL ASSESSMENT: PAIN_FUNCTIONAL_ASSESSMENT: PREVENTS OR INTERFERES SOME ACTIVE ACTIVITIES AND ADLS

## 2019-08-07 ASSESSMENT — PAIN DESCRIPTION - ORIENTATION
ORIENTATION: MID
ORIENTATION: LOWER;MID

## 2019-08-07 NOTE — PROGRESS NOTES
[facility]), Chronic ulcer of right leg ([address] COPD (chronic obstructive pulmonary disease) ([address] Depressive disorder, not elsewhere classified, DVT (deep venous thrombosis) ([address] Heartburn, History of MI (myocardial infarction), Hyperlipidemia, Hypertension, Neuropathy, KAVITA (obstructive sleep apnea), Primary central sleep apnea, PVD (peripheral vascular disease) ([address] Unspecified cerebral artery occlusion with cerebral infarction, Urgency of urination, and Vertebral fracture. has a past surgical history that includes Appendectomy; eye surgery; Coronary angioplasty with stent (6/2013); other surgical history (Right, 6/25/2013); Leg Debridement (Right, 7/23/2013); skin split graft (Right, 7/25/2013); other surgical history (Left, 8/27/2013); Cholecystectomy, laparoscopic (6/23/2014); laminectomy (11/18/2015); transluminal angioplasty (Left, 12/22/2015); and Total hip arthroplasty (Right, 09/01/2016). Treatment Diagnosis: decreased ADLS and transfers secondary to CHF      Restrictions  Position Activity Restriction  Other position/activity restrictions: up with assist    Subjective   General  Chart Reviewed: Yes  Patient assessed for rehabilitation services?: Yes  Additional Pertinent Hx: Pt admitted to ED s/p c/o increased L LE swelling and pain. MRI: pending PMHx includes:  has a past medical history of Arthritis, Balance problem, CAD (coronary artery disease), CHF (congestive heart failure) ([address] Chronic ulcer of right leg ([address] COPD (chronic obstructive pulmonary disease) ([address] Depressive disorder, not elsewhere classified, DVT (deep venous thrombosis) ([address] (12/2015), Heartburn, History of MI (myocardial infarction) (May 2013), Hyperlipidemia, Hypertension, Neuropathy, KAVITA (obstructive sleep apnea), Primary central sleep apnea, PVD (peripheral vascular disease) ([address] Unspecified cerebral artery occlusion with cerebral infarction (4/2013), Urgency of urination, and Vertebral fracture.   Family / Caregiver Present: No  Referring Practitioner: Janelle Vazquez MD  Diagnosis: CHF  Subjective  Subjective: Pt just returning from testing, agreeable to OT/ PT eval and treat. Social/Functional History  Social/Functional History  Lives With: Alone  Type of Home: Apartment  Home Layout: One level(laundry on his basement level)  Home Access: Stairs to enter with rails  Entrance Stairs - Number of Steps: 2-3, plus 4-5  Bathroom Shower/Tub: Tub/Shower unit  [employer]: Standard(pt pushes up from tub)  Bathroom Equipment: Grab bars in shower, Shower chair, Hand-held shower  Home Equipment: Aldona Donn, 4 wheeled walker(4ww outdoors, SC in home)  Receives Help From: Neighbor  ADL Assistance: [address] [place] [address] Avenue: Independent  Homemaking Responsibilities: (pt has assistance from [address] [place] [address] for cleaning only)  Ambulation Assistance: Independent  Transfer Assistance: Independent  Active : No(pt was walking to grocery sometimes, at other times taking bus)  Additional Comments: Pt reported multiple falls at home, reported most recent fall was a few weeks ago in his home, unsure how he fell. Objective   Vision: Impaired  Vision Exceptions: (pt reports he has glasses to wear at all times, but recently lost them. wears readers now)  Hearing: Within functional limits    Orientation  Overall Orientation Status: Within Functional Limits     Balance  Sitting Balance: Supervision(seated EOB)  Standing Balance: Stand by assistance  Standing Balance  Time: 10 mins total  Activity: funcitonal mobiltiy in room, in hallway  Comment: with 4ww  Functional Mobility  Functional - Mobility Device: Rolling Walker  Activity: Other; To/from bathroom(in hallway)  Assist Level: Stand by assistance  Functional Mobility Comments: no LOB, noted some limping pt reported R knee pain at baseline  Toilet Transfers  Toilet - Technique: Ambulating  Equipment Used: Standard toilet  Toilet Transfer: Stand by assistance  Toilet Transfers Comments:

## 2019-08-07 NOTE — CARE COORDINATION
The [address] [facility],[address]  CHF Team Conference Note      Patient ID: Niels Zarate. 81 y.o. 1950    Admission Date: 8/6/2019   Admission Weight: 211lb  Discharge Date: 8/8/2019  Discharge Weight: 209lb    30 Day Readmit? No    Pt History and Precipitating Cause of Decompensation:   PMH Chronic low back pain (s/p laminectomy, facetectomy, foraminotomies), s/p Right MELANI (9/2016 for AVN), severe PAD (s/p multiple endovascular surgeries, last was Left Iliac Thrombectomy & stent 10/2015 by Dr. Lee Siemens Mesh -on Plavix), CAD (s/p stent LAD); who presented to [facility] ED with left foot pain and swelling.     Acute onset Sunday during dinner, left foot pain. Called PCP and noted to have swelling of feet, Left greater than right, and was told to go to ED for evaluation. Ejection Fraction: ECHO 8/7/2019  Left ventricular cavity size is normal. There is mild to moderate concentric   left ventricular hypertrophy. Overall left ventricular systolic function   appears normal with an ejection fraction of 55-60%. No regional wall motion   abnormalities are noted. Indeterminate diastolic function. Trivial mitral   and tricuspid regurgitation is present. Estimated pulmonary artery systolic   pressure is normal at 26 mmHg assuming a right atrial pressure of 3 mmHg.   Bi-atrial enlargement.     Has patient been diagnosed with KAVITA: No  Is patient being treated: No    Cardiology Consulted: No  Heart Failure Order Set Used: Yes      BMP:  Lab Results   Component Value Date     08/07/2019     08/06/2019     04/17/2019    K 4.2 08/07/2019    K 4.1 08/06/2019    K 3.7 04/17/2019    K 4.8 08/30/2018     08/07/2019     08/06/2019     04/17/2019    CO2 28 08/07/2019    CO2 25 08/06/2019    CO2 24 04/17/2019    PHOS 3.4 08/07/2019    PHOS 3.8 12/23/2015    PHOS 3.2 12/17/2015    BUN 19 08/07/2019    BUN 20 08/06/2019    BUN 10 04/17/2019    CREATININE 0.9 08/07/2019    CREATININE 1.1 [facility] f/u scheduled  Margarette Opitz, MD  Go on 8/14/2019  [facility] follow up @ 2:45 PM  [address] [place] [address]  [phone]         I approve the established CHF interdisciplinary plan of care as documented within the medical record of [facility]. Nikki Cross RN  EXT [id number]

## 2019-08-07 NOTE — CONSULTS
on file     Relationship status: Not on file    Intimate partner violence:     Fear of current or ex partner: Not on file     Emotionally abused: Not on file     Physically abused: Not on file     Forced sexual activity: Not on file   Other Topics Concern    Not on file   Social History Narrative    Not on file       Family History:  Heart Disease:   Stroke:   Cancer:   Diabetes:   Hypertension:   Aneurysm/PVD:         Problem Relation Age of Onset    Cancer Mother     Heart Disease Mother     Cancer Father     Heart Disease Father        Review of Systems:  Constitutional:  No night sweats, headaches, weight loss. Neurological:  No stroke, TIAs, seizures. Psychiatric:  No depression, anxiety. Eyes:  No glaucoma, cataracts. ENT:  No nosebleeds, deviated septum. Integumentary:  No dermatitis, itching, rash. Cardiovascular:  No arrhythmias, previous MI. Respiratory:  No SOB, emphysema, asthma. GI:  No PUD, heartburn. :  No kidney stones, frequent UTIs  Vascular:  No claudication, varicosities. Hematologic:  No bleeding, easy bruising. Immunologic:  No known cancer, steroid therapies. Musculoskeletal:  No arthritis, gout. Endocrine: No diabetes, thyroid issues. Physical Examination:    BP (!) 155/78   Pulse 61   Temp 97.4 °F (36.3 °C) (Oral)   Resp 18   Ht 5' 10\" (1.778 m)   Wt 205 lb (93 kg)   SpO2 93%   BMI 29.41 kg/m²    BP RUE:  BP LUE:      Admission Weight: 211 lb 9.6 oz (96 kg)   Hand dominance:    General appearance: NAD  Eyes: PERRLA  Neck: no JVD, no lymphadenopathy. Respiratory: effort is unlabored  Cardiovascular: regular, no murmur. Pulses:    carotid brachial radial femoral popliteal DP PT   RIGHT    +  -/+ -/+   LEFT    +  -/+ -/+   GI: abdomen soft, nondistended, no organomegaly. Musculoskeletal: strength and tone normal.  Extremities: warm and pink, no edema or swelling noted   Skin: no dermatitis or ulceration. Neuro/psychiatric: grossly intact.       Labs:   CBC: Recent Labs     08/06/19  1408 08/07/19  0713   WBC 11.8* 6.9   HGB 14.6 14.1   HCT 43.3 42.3   .7* 107.2*    145     BMP:   Recent Labs     08/06/19  1408 08/07/19  0713    142   K 4.1 4.2    101   CO2 25 28   PHOS  --  3.4   BUN 20 19   CREATININE 1.1 0.9   CALCIUM 9.2 9.2   MG  --  1.70*     Cardiac Enzymes:   Recent Labs     08/06/19  1408   TROPONINI <0.01     PT/INR: No results for input(s): PROTIME, INR in the last 72 hours. APTT: No results for input(s): APTT in the last 72 hours. Liver Profile:  Lab Results   Component Value Date    AST 18 08/06/2019    ALT 17 08/06/2019    BILIDIR 0.0 06/22/2013    BILITOT 1.0 08/06/2019    ALKPHOS 90 08/06/2019     Lab Results   Component Value Date    CHOL 147 07/17/2019    HDL 32 07/17/2019    TRIG 204 07/17/2019     TSH:  No results found for: TSH  UA:   Lab Results   Component Value Date    COLORU Yellow 08/06/2019    PHUR 5.5 08/06/2019    LABCAST 10-20 Hyaline 08/06/2019    WBCUA 10-20 08/06/2019    RBCUA 0-2 08/06/2019    RBCUA NEGATIVE 08/10/2016    MUCUS PRESENT 08/10/2016    BACTERIA 1+ 08/06/2019    CLARITYU Clear 08/06/2019    SPECGRAV 1.020 08/06/2019    LEUKOCYTESUR SMALL 08/06/2019    UROBILINOGEN 0.2 08/06/2019    BILIRUBINUR SMALL 08/06/2019    BILIRUBINUR NEGATIVE 08/10/2016    BLOODU Negative 08/06/2019    GLUCOSEU Negative 08/06/2019       Assessment and Plan:   This is a 82 yo male with multiple medical co-morbidities and h/o multiple vascular surgery interventions who presents with left foot pain and swelling     - Will f/u arterial Duplex results   - Ok to follow up with Dr. Angel Hatfield as an outpatient   - Medical management per primary team  - Discussed with staff     Natalia Craft MD PGY 5          Addendum    - Reviewed results of arterial Dupplex and discussed with staff  - Please follow up with Dr. Angel Hatfield as an outpatient for possible surgical intervention/planning   - Please call with questions     Mary Ann Irene

## 2019-08-07 NOTE — PROGRESS NOTES
medical history of Arthritis, Balance problem, CAD (coronary artery disease), CHF (congestive heart failure) ([facility] [address] Chronic ulcer of right leg ([facility] [address] COPD (chronic obstructive pulmonary disease) ([address] Depressive disorder, not elsewhere classified, DVT (deep venous thrombosis) ([facility] [address] Heartburn, History of MI (myocardial infarction), Hyperlipidemia, Hypertension, Neuropathy, KAVITA (obstructive sleep apnea), Primary central sleep apnea, PVD (peripheral vascular disease) ([facility] [address] Unspecified cerebral artery occlusion with cerebral infarction, Urgency of urination, and Vertebral fracture. has a past surgical history that includes Appendectomy; eye surgery; Coronary angioplasty with stent (6/2013); other surgical history (Right, 6/25/2013); Leg Debridement (Right, 7/23/2013); skin split graft (Right, 7/25/2013); other surgical history (Left, 8/27/2013); Cholecystectomy, laparoscopic (6/23/2014); laminectomy (11/18/2015); transluminal angioplasty (Left, 12/22/2015); and Total hip arthroplasty (Right, 09/01/2016). Restrictions  Position Activity Restriction  Other position/activity restrictions: up with assist  Vision/Hearing  Vision: Within Functional Limits(wears glasses, but lost them)  Hearing: Within functional limits     Subjective  General  Chart Reviewed: Yes  Patient assessed for rehabilitation services?: Yes  Additional Pertinent Hx: arthritis, balance problem, CAD, CHF, chronic ulcer right leg, COPD, depression,  DVT, HLD, HTN, MI, neuropathy,  KAVITA, sleep apnea,  PVD,  CVA, vertebral fx,  chronic frequent urination. Referring Practitioner: Collin Lara  Diagnosis: Pt adm 8/6 with CHF exacerbation. Subjective  Subjective: Pt getting off stretcher with tranporter upon PT entry, agreeable to working with PT, but notes he is tired from testing all day.   Pain Screening  Patient Currently in Pain: Denies  Vital Signs  Patient Currently in Pain: Denies       Orientation  Orientation  Overall Orientation Status: Within Functional Limits  Social/Functional History  Social/Functional History  Lives With: Alone  Type of Home: Apartment  Home Layout: One level(laundry on his basement level)  Home Access: Stairs to enter with rails  Entrance Stairs - Number of Steps: 2-3, plus 4-5  Bathroom Shower/Tub: Tub/Shower unit  [employer]: Standard(pt pushes up from tub)  Bathroom Equipment: Grab bars in shower, Shower chair, Hand-held shower  Home Equipment: [place], 4 wheeled walker(4ww outdoors, SC in home)  Receives Help From: Neighbor  ADL Assistance: [address] [place] [address] Avenue: Independent  Homemaking Responsibilities: (pt has assistance from [address] [place] [address] for cleaning only)  Ambulation Assistance: Independent  Transfer Assistance: Independent  Active : No(pt was walking to grocery sometimes, at other times taking bus)  Additional Comments: Pt reported multiple falls at home, reported most recent fall was a few weeks ago in his home, unsure how he fell. Cognition        Objective  AROM RLE (degrees)  RLE AROM: WFL  AROM LLE (degrees)  LLE AROM : WFL  Strength RLE  Strength RLE: WFL  Strength LLE  Strength LLE: WFL     Sensation  Overall Sensation Status: WFL  Bed mobility  Sit to Supine: Stand by assistance  Transfers  Sit to Stand: Stand by assistance(from chair, toilet)  Stand to sit: Stand by assistance(to toilet, chair)  Ambulation  Ambulation?: Yes  Ambulation 1  Surface: level tile  Device: Rollator  Assistance: Stand by assistance  Quality of Gait: initial CG, then SBA, no difficulty with maneuvering  Distance: 100 x 2  Stairs/Curb  Stairs?: No     Balance  Standing - Static: Good  Standing - Dynamic: Good  Comments: good stability with RW      Education  Patient educated regarding safety with functional mobility, transfers and gait. Pt will benefit from reinforcement. Pt also educated regarding use of call light for safety with mobility. Pt expresses understanding.     Treatment rendered and includes bed mobility, transfers and gait training, and education regarding safety with functional mobility. Plan   Plan  Times per week: 2-5  Times per day: Daily  Current Treatment Recommendations: Functional Mobility Training, Endurance Training, Stair training, Gait Training  Safety Devices  Type of devices: Bed alarm in place, Left in bed, Nurse notified, Call light within reach    AM-PAC Score  AM-PAC Inpatient Mobility Raw Score : 21 (08/07/19 1541)  AM-PAC Inpatient T-Scale Score : 50.25 (08/07/19 1541)  Mobility Inpatient CMS 0-100% Score: 28.97 (08/07/19 1541)  Mobility Inpatient CMS G-Code Modifier : Metta David (08/07/19 1541)          Goals  Short term goals  Time Frame for Short term goals: Discharge  Short term goal 1: indep bed mobility  Short term goal 2: indep transfers  Short term goal 3: ambulate 200 ft with rollator and supervision  Short term goal 4: up and down 4-5 steps with rail and SBA  Patient Goals   Patient goals : To return home       Therapy Time   Individual Concurrent Group Co-treatment   Time In 1413         Time Out 1440         Minutes 27               Timed Code Treatment Minutes:   15    Total Treatment Minutes:  [address] [facility], [id number]     This note will serve as a discharge summary in the event that the patient is discharged prior to the next treatment session.

## 2019-08-07 NOTE — H&P
Internal Medicine Resident  [facility] Medicine History & Physical      PCP: Cassi Mcneal, APRN - CNP    Date of Admission: 8/6/2019    Date of Service: Pt seen/examined on 8/6/2019 and Admitted to Inpatient with expected LOS greater than two midnights due to medical therapy. Chief Complaint:   Foot swelling, Left foot pain  Chief Complaint   Patient presents with    Foot Swelling       History Of Present Illness:   81 y.o. male PMH Chronic low back pain (s/p laminectomy, facetectomy, foraminotomies), s/p Right MELANI (9/2016 for AVN), severe PAD (s/p multiple endovascular surgeries, last was Left Iliac Thrombectomy & stent 10/2015 by Dr. Aminta Carmona Mesh -on Plavix), CAD (s/p stent LAD); who presented to [facility] ED with left foot pain and swelling. Acute onset Sunday during dinner, left foot pain. Called PCP and noted to have swelling of feet, Left greater than right, and was told to go to ED for evaluation. ED course: lasix 20mg IV x1. Transfer to [facility] for [address] [place] [address]. The patient denies fever, chills, chest pain, palpitations, shortness of breath, orthopnea, abdominal pain, nausea, vomiting. Has been compliant with home regimen. Current smoker 1 pack per week. Smoking since age 16yo. No EtOH.     Past Medical History:          Diagnosis Date    Arthritis     Balance problem     CAD (coronary artery disease)     CH[facility]congestive heart failure) ([facility])     followed by cardiology     Chronic ulcer of right leg ([facility] [address]     COPD (chronic obstructive pulmonary disease) ([facility] [address]     followed by pulmonology     Depressive disorder, not elsewhere classified     DVT (deep venous thrombosis) ([facility] [address] 12/2015    ileofemoral    Heartburn     infrequent    History of MI (myocardial infarction) May 2013    \"mild\" in [place] Hyperlipidemia     Hypertension     Neuropathy     feet    KAVITA (obstructive sleep apnea)     cpap    Primary central sleep apnea     PVD (peripheral vascular disease) ([address]     Unspecified cerebral artery occlusion with cerebral infarction 4/2013    \"pt states mini stroke\"    Urgency of urination     and frequency, chronic    Vertebral fracture        Past Surgical History:          Procedure Laterality Date    APPENDECTOMY      CHOLECYSTECTOMY, LAPAROSCOPIC  6/23/2014    with [facility]    CORONARY ANGIOPLASTY WITH STENT PLACEMENT  6/2013    EYE SURGERY      LAMINECTOMY  11/18/2015    Bilateral decompressive lumbar laminectomy L4-5   ; medial facetectomy L4-5 joints  bilaterally; foraminotomies l5   nerve roots bilaterally    LEG DEBRIDEMENT Right 7/23/2013    Dr. Roger Betancur - pretibial wound    OTHER SURGICAL HISTORY Right 6/25/2013    Dr. Roger Betancur - CFA Endarterectomy w/marsupialization; RLE wound excision & debridement     OTHER SURGICAL HISTORY Left 8/27/2013    Dr. Roger Betancur - femoral & profunda femoris endarterectomy w/marsupialization patch angioplasty using SFA    SKIN SPLIT GRAFT Right 7/25/2013    Dr. Roger Betancur - to non-healing R pretibial wound, 9 x 15 cm    TOTAL HIP ARTHROPLASTY Right 09/01/2016    Dr. Tereza Blunt Left 12/22/2015    Dr. Roger Betancur - CFA exploration, thrombectomy of ileofemoral system, stenting of RAFAL in-stent stenosis w/8 x 37 mm Palmaz        Medications Prior to Admission:      Prior to Admission medications    Medication Sig Start Date End Date Taking? Authorizing Provider   folic acid (FOLVITE) 1 MG tablet Take 1 mg by mouth daily   Yes Historical Provider, MD   furosemide (LASIX) 20 MG tablet TAKE 1 TABLET BY MOUTH EVERY DAY 8/2/19  Yes Diana Mariscal MD   oxyCODONE-acetaminophen (PERCOCET) 5-325 MG per tablet Take 1 tablet by mouth every 8 hours as needed (severe pain) for up to 30 days.  (STOP [address]) 7/24/19 8/23/19 Yes Constance Dance, MD   gabapentin (NEURONTIN) 600 MG tablet TAKE ONE TABLET BY MOUTH EVERY MORNING AND 2 TABLETS BY MOUTH EVERY EVENING 7/24/19 8/23/19 Yes Constance Dance, MD   amLODIPine (NORVASC) 5 MG tablet Take 1 tablet by tenderness. There is no rebound and no guarding. Musculoskeletal: He exhibits edema. He exhibits no tenderness. Neurological: He is alert and oriented to person, place, and time. No cranial nerve deficit. Skin: He is not diaphoretic. Psychiatric: He has a normal mood and affect. His behavior is normal. Thought content normal.         Labs:     Recent Labs     08/06/19  1408   WBC 11.8*   HGB 14.6   HCT 43.3        Recent Labs     08/06/19  1408      K 4.1      CO2 25   BUN 20   CREATININE 1.1   CALCIUM 9.2     Recent Labs     08/06/19  1408   AST 18   ALT 17   BILITOT 1.0   ALKPHOS 90     No results for input(s): INR in the last 72 hours. Recent Labs     08/06/19  1408   TROPONINI <0.01       Urinalysis:     Lab Results   Component Value Date    NITRU Negative 08/06/2019    WBCUA 10-20 08/06/2019    BACTERIA 1+ 08/06/2019    RBCUA 0-2 08/06/2019    RBCUA NEGATIVE 08/10/2016    BLOODU Negative 08/06/2019    SPECGRAV 1.020 08/06/2019    GLUCOSEU Negative 08/06/2019       Radiology:     CXR: I have reviewed the CXR with the following interpretation: no cardiomegaly, no effusions. EKG:  I have reviewed the EKG with the following interpretation: NSR, normal ecg    XR CHEST STANDARD (2 VW)   Final Result      No acute cardiopulmonary findings. ASSESSMENT:  Patient Active Hospital Problem List:  1. Left foot edema, pain:    Assessment: PMH severe PAD (s/p multiple endovascular surgeries, last was Left Iliac Thrombectomy & stent 10/2015 by Dr. Rickie Major Mesh -on Plavix). Pain is new since Sunday. Edema not new, but worsening. Foot is cold with prolonged capillary refill. DP dopplerable with sluggish flow. No PT on doppler. No phlegmasia cerulea shahla/dolens. Neurovascularly intact. per prior notes this is not far from baseline.  Age corrected D-Dimer negative at <335.    2. CHF (congestive heart failure), NYHA class I, acute on chronic    Assessment:  No chest pain, dizziness, nausea, vomiting. Dyspnea is at baseline. Still smoking. Very mild ADHF. Near euvolemic after 20mg IV lasix prior to transfer. No echo since 2014. 3. Asymptomatic Bacteriuria:    Assessment: no Sx of UTI. No LUTS. Urine culture sent, follow clinically. 4. Tobacco use disorder:    Assessment: will offer NRT. Encourage cessation. 5. Obesity: Body mass index is 30.36 kg/m². Assessment: Complicating assessment and treatment. Placing patient at risk for multiple co-morbidities as well as early death and contributing to the patient's presentation. Counseled on diet, weight loss. 6. Chronic macrocytic anemia:     Assessment: finished course of B12. On folate. 7. KAVITA    Assessment: cont home cpap      PLAN:  -Diuresis  -Echo  -LLE Doppler ROD. Arterial doppler. -CHF nurse consult  -Dietitian consult   -NRT, encourage tobacco cessation  -SW consulted, patient willing to consider SNF if he would benefit from rehabilitation  -follow-up urine culture. No indication for treatment at this time    DVT Prophylaxis: Lovenox  Diet: DIET CARDIAC;  Code Status: Full Code  PT/OT Eval Status: Ordered  Dispo - Telemetry. Will discuss with Attending.   Ivana Castillo MD

## 2019-08-07 NOTE — PROGRESS NOTES
Progress Note    Admit Date: 8/6/2019  Diet: DIET CARDIAC;    CC:  foot swelling, left foot pain    Interval history:    No acute events overnight. Patient continues to complain of pain in the left calf radiating down to left foot. He endorses decreased swelling in his lower extremities bilaterally. He denies any CP, palpitations, SOB, n/v, fevers, chills. HPI:   82 yo M with PMHx of chronic lower back pain (s/p laminectomy, facetectomy, foraminotomies) s/p right MELANI (9/2016 for AVN), severe PAD (s/p multiple endovascular surgeries, last was left iliac thrombectomy & stent 10/2015 by Dr. Noreen Enamorado Mesh - on plavix), CAD (s/p stent LAD) presented to [place] [address] ED with left foot pain and swelling. Per patient, left foot pain was acute onset on Sunday. Called PCP and noted to have swelling of feet (L>R) and was told to go to ED for evaluation. Pt denied any fevers, chills, CP, palpitations, SOB, orthopnea, abdominal pain, n/v. Has been compliant with home regiment. He has a 104 pack year hx of smoking. No EtOH.     Medications:     Scheduled Meds:   [START ON 8/8/2019] furosemide  40 mg Oral Daily    sodium chloride flush  10 mL Intravenous 2 times per day    enoxaparin  40 mg Subcutaneous Daily    nicotine  1 patch Transdermal Daily    allopurinol  200 mg Oral Daily    atorvastatin  80 mg Oral Daily    buPROPion  150 mg Oral BID    clopidogrel  75 mg Oral Daily    DULoxetine  60 mg Oral Daily    folic acid  1 mg Oral Daily    gabapentin  600 mg Oral Daily    losartan  100 mg Oral Daily    metoprolol  100 mg Oral BID    pantoprazole  40 mg Oral QPM    tiotropium  18 mcg Inhalation Daily    budesonide  0.5 mg Nebulization BID    And    formoterol  20 mcg Nebulization BID     Continuous Infusions:  PRN Meds:sodium chloride flush, ondansetron, senna, nicotine polacrilex, albuterol, oxyCODONE-acetaminophen    Objective:   Vitals:   T-max:  Patient Vitals for the past 8 hrs:   BP Temp Temp src Pulse Resp &Os  - telemetry  - outpt cardiologist: Charlie Mejia    ## Bacteruria - UA positive for bacteria; pt asx.  - urine clx pending  - no indication for tx. ## Chronic lower back pain   - Gabapentin 600 mg   - duloxetine 60 mg  - percoset q8h PRN    ## Chronic Obstructive Pulmonary Disease  - nebulizer tx  - Duonebs PRN  - outpt pulmonologist Dr. Weston Curiel    ## Hypertension  - losartan 100 qD    ## Hyperlipidemia  - last lipid panel 07/19; LDL 74, HDL 32,   - lipitor 80 qD    ## Gout  - allopurinol 200 mg    ## Tobacco use  - tobacco cessation counselling  - NRT    ## Obesity - BMI 30.36  - dietician consult    ## Chronic macrocytic anemia - .7, last folate levels (7/19) 294.  - home Folate.     ## KAVITA  - home CPAP    Code Status: full code  FEN: cardiac diet  PPX: lovenox  DISPO: JING Lobo MD, MSIV  08/07/19  6:00 PM    This patient has been staffed and discussed with Mendy Watkins MD.

## 2019-08-07 NOTE — PROGRESS NOTES
PT/OT    CHF, NYHA class I, acute on chronic, combined   - last echo 2014 (EF 04% and no diastolic dysfunction); CXR negative; EKG NSR on admission. Pt denied SOB, orthopnea. - repeat echo pending  - metoprolol 100 BID, losartan 100 qD, lipitor 80 PO  - lasix 40 qD PO  - incentive spirometery  - CHF nurse consult, appreciate recs. - strict I &Os  - telemetry  - outpt cardiologist:     Bacteruria  - UA positive for bacteria; pt asx  - urine clx pending  - no indication for tx. Chronic problems:    Chronic lower back pain   - Gabapentin 600 mg   - duloxetine 60 mg  - percoset q8h PRN    COPD  - nebulizer tx  - Duonebs PRN  - outpt pulmonologist Dr. [facility]    HTN  - losartan 100 qD    HLD  - last lipid panel 07/19; LDL 74, HDL 32,   - lipitor 80 qD    Gout  - allopurinol 200 mg    Tobacco use  - tobacco cessation counselling  - NRT    Obesity   - BMI 30.36  - dietician consult    Chronic macrocytic anemia  - .7  - last folate levels (7/19) 294.  - on folate.     KAVITA  - continue home CPAP    Code Status:full code  FEN: cardiac diet  PPX: lovenox  DISPO: EVA Bowen  08/07/19  11:32 AM    This patient has been staffed and discussed with Jenise Claude, MD.

## 2019-08-07 NOTE — PLAN OF CARE
Problem: Falls - Risk of:  Goal: Will remain free from falls  Description  Will remain free from falls  8/7/2019 0938 by Wil Draper RN  Outcome: Ongoing  Note:   Patient is a risk for falls. Bed locked in the lowest position. Bed alarm in use. Call light and personal belongings within reach. Instructed to call for help before getting up, patient verbalizes understanding. Door to room left open. Wearing non-skid socks. Fall sign posted. Will continue to monitor.

## 2019-08-07 NOTE — PROGRESS NOTES
RESPIRATORY THERAPY ASSESSMENT    Name:  Stefani Bertrand. Medical Record Number:  [id number]  Age: 81 y.o. Gender: male  : 1950  Today's Date:  2019  Room:  6314/6314-01    Assessment     Is the patient being admitted for a COPD or Asthma exacerbation? No   (If yes the patient will be seen every 4 hours for the first 24 hours and then reassessed)    Patient Admission Diagnosis CHF      Allergies  Allergies   Allergen Reactions    Daliresp [Roflumilast] Other (See Comments) and Diarrhea     Severe diarrhea and did not help    Asa [Aspirin] Other (See Comments)     Stomach ache       Minimum Predicted Vital Capacity:     1129          Actual Vital Capacity:      1500              Pulmonary History:COPD and CHF/Pulmonary Edema  Home Oxygen Therapy:  room air  Home Respiratory Therapy:Albuterol and dulera,incruse   Current Respiratory Therapy:  Albuterol HHN q6hr prn, pulmicort/perforomist BID  Treatment Type: HHN  Medications: Budesonide    Respiratory Severity Index(RSI)   Patients with orders for inhalation medications, oxygen, or any therapeutic treatment modality will be placed on Respiratory Protocol. They will be assessed with the first treatment and at least every 72 hours thereafter. The following severity scale will be used to determine frequency of treatment intervention. Smoking History: Pulmonary Disease or Smoking History, Greater than 15 pack year = 2    Social History  Social History     Tobacco Use    Smoking status: Current Every Day Smoker     Packs/day: 2.00     Years: 52.00     Pack years: 104.00     Types: Cigarettes     Start date: 1967    Smokeless tobacco: Never Used    Tobacco comment:  one half pack a day   Substance Use Topics    Alcohol use:  Yes     Alcohol/week: 0.0 standard drinks     Comment: 2-3 beers a month    Drug use: No       Recent Surgical History: None = 0  Past Surgical History  Past Surgical History:   Procedure Laterality Date    APPENDECTOMY bronchospasm worsens (increased RSI), then the bronchodilator frequency can be increased to a maximum of every 4 hours. If greater than every 4 hours is required, the physician will be contacted. 4. If the bronchospasm improves, the frequency of the bronchodilator can be decreased, based on the patient's RSI, but not less than home treatment regimen frequency. 5. Bronchodilator(s) will be discontinued if patient has a RSI less than 9 and has received no scheduled or as needed treatment for 72  Hrs. Patients Ordered on a Mucolytic Agent:    1. Must always be administered with a bronchodilator. 2. Discontinue if patient experiences worsened bronchospasm, or secretions have lessened to the point that the patient is able to clear them with a cough. Anti-inflammatory and Combination Medications:    1. If the patient lacks prior history of lung disease, is not using inhaled anti-inflammatory medication at home, and lacks wheezing by examination or by history for at least 24 hours, contact physician for possible discontinuation.

## 2019-08-08 ENCOUNTER — APPOINTMENT (OUTPATIENT)
Dept: VASCULAR LAB | Age: 69
End: 2019-08-08
Payer: MEDICAID

## 2019-08-08 ENCOUNTER — APPOINTMENT (OUTPATIENT)
Dept: GENERAL RADIOLOGY | Age: 69
End: 2019-08-08
Payer: MEDICAID

## 2019-08-08 VITALS
HEART RATE: 64 BPM | OXYGEN SATURATION: 90 % | HEIGHT: 70 IN | RESPIRATION RATE: 17 BRPM | WEIGHT: 209.88 LBS | DIASTOLIC BLOOD PRESSURE: 82 MMHG | TEMPERATURE: 96.9 F | SYSTOLIC BLOOD PRESSURE: 152 MMHG | BODY MASS INDEX: 30.05 KG/M2

## 2019-08-08 LAB
ALBUMIN SERPL-MCNC: 3.6 G/DL (ref 3.4–5)
ANION GAP SERPL CALCULATED.3IONS-SCNC: 15 MMOL/L (ref 3–16)
BASOPHILS ABSOLUTE: 0 K/UL (ref 0–0.2)
BASOPHILS RELATIVE PERCENT: 0.7 %
BUN BLDV-MCNC: 16 MG/DL (ref 7–20)
CALCIUM SERPL-MCNC: 9.1 MG/DL (ref 8.3–10.6)
CHLORIDE BLD-SCNC: 101 MMOL/L (ref 99–110)
CO2: 24 MMOL/L (ref 21–32)
CREAT SERPL-MCNC: 0.8 MG/DL (ref 0.8–1.3)
EOSINOPHILS ABSOLUTE: 0.1 K/UL (ref 0–0.6)
EOSINOPHILS RELATIVE PERCENT: 1.9 %
GFR AFRICAN AMERICAN: >60
GFR NON-AFRICAN AMERICAN: >60
GLUCOSE BLD-MCNC: 117 MG/DL (ref 70–99)
HCT VFR BLD CALC: 44.1 % (ref 40.5–52.5)
HEMOGLOBIN: 14.5 G/DL (ref 13.5–17.5)
LYMPHOCYTES ABSOLUTE: 1.6 K/UL (ref 1–5.1)
LYMPHOCYTES RELATIVE PERCENT: 26.4 %
MCH RBC QN AUTO: 36 PG (ref 26–34)
MCHC RBC AUTO-ENTMCNC: 32.9 G/DL (ref 31–36)
MCV RBC AUTO: 109.3 FL (ref 80–100)
MONOCYTES ABSOLUTE: 0.4 K/UL (ref 0–1.3)
MONOCYTES RELATIVE PERCENT: 7 %
NEUTROPHILS ABSOLUTE: 4 K/UL (ref 1.7–7.7)
NEUTROPHILS RELATIVE PERCENT: 64 %
ORGANISM: ABNORMAL
PDW BLD-RTO: 15.2 % (ref 12.4–15.4)
PHOSPHORUS: 3.7 MG/DL (ref 2.5–4.9)
PLATELET # BLD: 154 K/UL (ref 135–450)
PMV BLD AUTO: 9.5 FL (ref 5–10.5)
POTASSIUM SERPL-SCNC: 3.7 MMOL/L (ref 3.5–5.1)
RBC # BLD: 4.03 M/UL (ref 4.2–5.9)
SODIUM BLD-SCNC: 140 MMOL/L (ref 136–145)
URINE CULTURE, ROUTINE: ABNORMAL
URINE CULTURE, ROUTINE: ABNORMAL
WBC # BLD: 6.2 K/UL (ref 4–11)

## 2019-08-08 PROCEDURE — 6370000000 HC RX 637 (ALT 250 FOR IP): Performed by: STUDENT IN AN ORGANIZED HEALTH CARE EDUCATION/TRAINING PROGRAM

## 2019-08-08 PROCEDURE — 73590 X-RAY EXAM OF LOWER LEG: CPT

## 2019-08-08 PROCEDURE — 85025 COMPLETE CBC W/AUTO DIFF WBC: CPT

## 2019-08-08 PROCEDURE — 97530 THERAPEUTIC ACTIVITIES: CPT

## 2019-08-08 PROCEDURE — 2580000003 HC RX 258: Performed by: STUDENT IN AN ORGANIZED HEALTH CARE EDUCATION/TRAINING PROGRAM

## 2019-08-08 PROCEDURE — 93970 EXTREMITY STUDY: CPT

## 2019-08-08 PROCEDURE — 96372 THER/PROPH/DIAG INJ SC/IM: CPT

## 2019-08-08 PROCEDURE — G0378 HOSPITAL OBSERVATION PER HR: HCPCS

## 2019-08-08 PROCEDURE — 94640 AIRWAY INHALATION TREATMENT: CPT

## 2019-08-08 PROCEDURE — 80069 RENAL FUNCTION PANEL: CPT

## 2019-08-08 PROCEDURE — 73630 X-RAY EXAM OF FOOT: CPT

## 2019-08-08 PROCEDURE — 6360000002 HC RX W HCPCS: Performed by: STUDENT IN AN ORGANIZED HEALTH CARE EDUCATION/TRAINING PROGRAM

## 2019-08-08 PROCEDURE — 97535 SELF CARE MNGMENT TRAINING: CPT

## 2019-08-08 PROCEDURE — 97110 THERAPEUTIC EXERCISES: CPT

## 2019-08-08 PROCEDURE — 97116 GAIT TRAINING THERAPY: CPT

## 2019-08-08 PROCEDURE — 94150 VITAL CAPACITY TEST: CPT

## 2019-08-08 PROCEDURE — 36415 COLL VENOUS BLD VENIPUNCTURE: CPT

## 2019-08-08 PROCEDURE — 94799 UNLISTED PULMONARY SVC/PX: CPT

## 2019-08-08 RX ORDER — LIDOCAINE 4 G/G
1 PATCH TOPICAL DAILY
Status: DISCONTINUED | OUTPATIENT
Start: 2019-08-08 | End: 2019-08-08 | Stop reason: HOSPADM

## 2019-08-08 RX ORDER — AMLODIPINE BESYLATE 5 MG/1
5 TABLET ORAL DAILY
Status: DISCONTINUED | OUTPATIENT
Start: 2019-08-08 | End: 2019-08-08 | Stop reason: HOSPADM

## 2019-08-08 RX ORDER — IBUPROFEN 200 MG
200 TABLET ORAL EVERY 8 HOURS PRN
Qty: 30 TABLET | Refills: 0 | Status: ON HOLD | OUTPATIENT
Start: 2019-08-08 | End: 2020-12-10 | Stop reason: HOSPADM

## 2019-08-08 RX ADMIN — CLOPIDOGREL 75 MG: 75 TABLET, FILM COATED ORAL at 08:57

## 2019-08-08 RX ADMIN — METOPROLOL TARTRATE 100 MG: 100 TABLET ORAL at 08:57

## 2019-08-08 RX ADMIN — FOLIC ACID 1 MG: 1 TABLET ORAL at 08:57

## 2019-08-08 RX ADMIN — FUROSEMIDE 40 MG: 40 TABLET ORAL at 08:57

## 2019-08-08 RX ADMIN — GABAPENTIN 600 MG: 600 TABLET, FILM COATED ORAL at 08:57

## 2019-08-08 RX ADMIN — FORMOTEROL FUMARATE DIHYDRATE 20 MCG: 20 SOLUTION RESPIRATORY (INHALATION) at 09:20

## 2019-08-08 RX ADMIN — TIOTROPIUM BROMIDE 18 MCG: 18 CAPSULE ORAL; RESPIRATORY (INHALATION) at 09:20

## 2019-08-08 RX ADMIN — OXYCODONE HYDROCHLORIDE AND ACETAMINOPHEN 1 TABLET: 5; 325 TABLET ORAL at 09:12

## 2019-08-08 RX ADMIN — LOSARTAN POTASSIUM 100 MG: 100 TABLET ORAL at 08:57

## 2019-08-08 RX ADMIN — BUDESONIDE 500 MCG: 0.5 SUSPENSION RESPIRATORY (INHALATION) at 09:20

## 2019-08-08 RX ADMIN — Medication 10 ML: at 08:58

## 2019-08-08 RX ADMIN — AMLODIPINE BESYLATE 5 MG: 5 TABLET ORAL at 10:36

## 2019-08-08 RX ADMIN — BUPROPION HYDROCHLORIDE 150 MG: 150 TABLET, EXTENDED RELEASE ORAL at 08:57

## 2019-08-08 RX ADMIN — ALLOPURINOL 200 MG: 100 TABLET ORAL at 08:57

## 2019-08-08 RX ADMIN — DULOXETINE HYDROCHLORIDE 60 MG: 60 CAPSULE, DELAYED RELEASE ORAL at 08:57

## 2019-08-08 RX ADMIN — ATORVASTATIN CALCIUM 80 MG: 80 TABLET, FILM COATED ORAL at 08:57

## 2019-08-08 RX ADMIN — ENOXAPARIN SODIUM 40 MG: 40 INJECTION SUBCUTANEOUS at 08:57

## 2019-08-08 ASSESSMENT — PAIN DESCRIPTION - PROGRESSION
CLINICAL_PROGRESSION: NOT CHANGED

## 2019-08-08 ASSESSMENT — PAIN SCALES - GENERAL
PAINLEVEL_OUTOF10: 7
PAINLEVEL_OUTOF10: 4

## 2019-08-08 ASSESSMENT — PAIN DESCRIPTION - ONSET: ONSET: ON-GOING

## 2019-08-08 ASSESSMENT — PAIN DESCRIPTION - LOCATION: LOCATION: GENERALIZED

## 2019-08-08 ASSESSMENT — PAIN DESCRIPTION - FREQUENCY: FREQUENCY: CONTINUOUS

## 2019-08-08 ASSESSMENT — PAIN DESCRIPTION - DESCRIPTORS: DESCRIPTORS: ACHING

## 2019-08-08 NOTE — DISCHARGE INSTR - COC
stenting of RAFAL in-stent stenosis w/8 x 37 mm Palmaz        Immunization History:   Immunization History   Administered Date(s) Administered    Influenza 10/14/2013    Influenza Virus Vaccine 10/14/2014    Influenza, High Dose (Fluzone 65 yrs and older) 10/22/2015, 11/18/2016, 08/17/2017, 08/30/2018    Pneumococcal Conjugate 13-valent ([id number]) 10/22/2015    Pneumococcal Conjugate 7-valent (Prevnar7) 03/01/2013    Pneumococcal Polysaccharide (Bdgumbrmc22) 01/03/2018    Tdap (Boostrix, Adacel) 10/14/2013    Zoster Live (Zostavax) 06/07/2014    Zoster Recombinant (Shingrix) 05/08/2018       Active Problems:  Patient Active Problem List   Diagnosis Code    CAD (coronary artery disease) I25.10    Peripheral vascular disease ([facility] [address] I73.9    HTN (hypertension) I10    Hyperlipemia E78.5    [facility] L97.209    Chronic diastolic CHF (congestive heart failure) ([facility]) I50.32    Hypoxia R09.02    Status post skin graft Z94.5    Vertebral fracture [id number]    Chronic lung disease J98.4    Acid reflux K21.9    COPD (chronic obstructive pulmonary disease) ([facility]) J44.9    Sleep-disordered breathing G47.30    PVD (peripheral vascular disease) (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) I73.9    Coronary artery disease involving native coronary artery of native heart without angina pectoris I25.10   

## 2019-08-08 NOTE — DISCHARGE SUMMARY
INTERNAL MEDICINE DEPARTMENT AT [address] [place] [address]  DISCHARGE SUMMARY    Patient ID: Amanda Abram. Discharge Date: 8/8/2019   Patient's PCP: BRANDI Balderas - [facility]                                          Discharge Physician: Dr. Alicia Schuster MD  Admit Date: 8/6/2019   Admitting Physician: Art Osei MD    PROBLEMS DURING HOSPITALIZATION:  · Left foot swelling and pain 2/2 to PAD vs DVT   · Acute on Chronic Diastolic Congestive Heart Failure, NYHA class I.  · Bacteruria   · Chronic lower back pain   · Chronic Obstructive Pulmonary Disease  · Hypertension  · Hyperlipidemia  · Gout  · Tobacco use  · Obesity   · Chronic macrocytic anemia   · Obstructive Sleep Apnea    DISCHARGE DIAGNOSES:  1)  Peripheral Artery Disease, bilateral  2)  Lower extremity edema, bilateral  3)  Lower extremity pain, bilateral    Hospital Course:  Patient is a 80 yo M with PMHx of chronic lower back pain (s/p laminectomy, facetectomy, foraminotomies) s/p right MELANI (9/2016 for AVN), severe PAD (s/p multiple endovascular surgeries, last was left iliac thrombectomy & stent 10/2015 by Dr. Vee Mera Mesh - on plavix), CAD (s/p stent LAD) presented to [facility] ED with left foot pain and swelling, HTN, HLD, COPD, CHF. Per patient, left foot pain was acute onset on Sunday. Called PCP and noted to have swelling of feet (L>R) and was told to go to ED for evaluation. Pt denied any fevers, chills, CP, palpitations, SOB, orthopnea, abdominal pain, n/v. Has been compliant with home regiment. He has a 104 pack year hx of smoking. No EtOH. In the ED, he was bradycardic to 58, bp 153/86. His CXR was negative for pulmonary congestion and his EKG showed sinus bradycardia. He was admitted for acute on chronic combined CHF exacerbation and left limb pain. He received a one time dose of Lasix 20 IV in the ED and transferred to the floor.  Vascular surgery was consulted for his limb pain for further

## 2019-08-08 NOTE — PROGRESS NOTES
Occupational Therapy  [address]  Daily Treatment Note  NAME: Dread Carrasquillo. : 1950  MRN: [id number]    Date of Service: 2019       Discharge Recommendations:  Dread Carrasquillo. scored a 21/24 on the AM-PAC ADL Inpatient form. Current research shows that an AM-PAC score of 18 or greater is typically associated with a discharge to the patient's home setting. Based on the patients AM-PAC score and their current ADL deficits, it is recommended that the patient have 2-3 sessions per week of Occupational Therapy at d/c to increase the patients independence. HOME HEALTH CARE: LEVEL 1 STANDARD    - Initial home health evaluation to occur within 24-48 hours, in patient home   - Therapy to evaluate with goal of regaining prior level of functioning   - Therapy to evaluate if patient has [address] needs for personal care    Assessment   Performance deficits / Impairments: Decreased functional mobility ; Decreased high-level IADLs;Decreased ADL status; Decreased endurance;Decreased coordination;Decreased strength;Decreased balance  Assessment: Pt demonstrating functional mobitliy with 4WW and CGA - SBA. Pt reporting falls at home. Pt comleting ADLs in stance at sink with SBA and good endurance for all tasks. Pt lives alone and would benefit from [facility] and increased assist upon return home. Continue OT per POC  Treatment Diagnosis: decreased ADLS and transfers secondary to CHF  Prognosis: Fair  OT Education: OT Role;Plan of Care  Patient Education: safe home set up, safe 4WW use  REQUIRES OT FOLLOW UP: Yes  Activity Tolerance  Activity Tolerance: Patient Tolerated treatment well  Safety Devices  Safety Devices in place: Yes  Type of devices: Bed alarm in place; Left in bed;Call light within reach;Gait belt           Restrictions  Position Activity Restriction  Other position/activity restrictions: up with assist  Subjective   General  Chart Reviewed: Yes  Patient

## 2019-08-08 NOTE — PROGRESS NOTES
Inpatient T-Scale Score : 45.44 (08/08/19 1527)  Mobility Inpatient CMS 0-100% Score: 41.77 (08/08/19 1527)  Mobility Inpatient CMS G-Code Modifier : CK (08/08/19 1527)          Goals  Short term goals  Time Frame for Short term goals: Discharge  Short term goal 1: indep bed mobility. Ongoing  Short term goal 2: indep transfers. Ongoing  Short term goal 3: ambulate 200 ft with rollator and supervision. Ongoing  Short term goal 4: up and down 4-5 steps with rail and SBA. Ongoing  Patient Goals   Patient goals : To return home    Plan    Plan  Times per week: 2-5  Times per day: Daily  Current Treatment Recommendations: Functional Mobility Training, Endurance Training, Stair training, Gait Training  Safety Devices  Type of devices: Bed alarm in place, Left in bed, Nurse notified, Call light within reach     Therapy Time   Individual Concurrent Group Co-treatment   Time In 1502         Time Out 1525         Minutes 23               Timed Code Treatment Minutes: 23      Total Treatment Minutes:  23  If pt d/c'd prior to next treatment, this note serves as a discharge note.     Claudeen Patten, PT

## 2019-08-08 NOTE — DISCHARGE SUMMARY
INTERNAL MEDICINE DEPARTMENT AT [address] [place] [address]  DISCHARGE SUMMARY    Patient ID: Pattricia Guy. Discharge Date: 8/8/2019   Patient's PCP: BRANDI Mistry - CNP                                          Discharge Physician: Dr. Julian Wells MD  Admit Date: 8/6/2019   Admitting Physician: León Rodriguez MD    PROBLEMS DURING HOSPITALIZATION:  Present on Admission:   CHF (congestive heart failure), NYHA class I, acute on chronic, combined ([facility] [address]      DISCHARGE DIAGNOSES:    HPI: 80 yo M with PMHx of chronic lower back pain (s/p laminectomy, facetectomy, foraminotomies) s/p right MELANI (9/2016 for AVN), severe PAD (s/p multiple endovascular surgeries, last was left iliac thrombectomy & stent 10/2015 by Dr. Jolly Sanchez Mesh - on plavix), CAD (s/p stent LAD) presented to [place] [address] ED with left foot pain and swelling, HTN, HLD, COPD, CHF. Per patient, left foot pain was acute onset on Sunday. Called PCP and noted to have swelling of feet (L>R) and was told to go to ED for evaluation. Pt denied any fevers, chills, CP, palpitations, SOB, orthopnea, abdominal pain, n/v. Has been compliant with home regiment. He has a 104 pack year hx of smoking. No EtOH. In the ED, he was bradycardic to 58, bp 153/86. His CXR was negative for pulmonary congestion and his EKG showed sinus bradycardia. He was admitted for acute on chronic combined CHF exacerbation and left limb pain. He received a one time dose of Lasix 20 IV in the ED and transferred to the floor. Vascular surgery was consulted for his limb pain for further management of his PAD. Day 2: He continued to endorse pain in his left foot. He denied any SOB or CP. He was started on lasix 40 BID and transitioned to lasix 40 qD for his CHF. His echocardiogram showed 55-60% EF unchanged from 2014 and indeterminate diastolic function. Arterial dopplers of his Left lower extremity showed R.  ROD 0.6, L ROD 0.6; significant aortoiliac tablet  Commonly known as:  NITROSTAT  Place 1 tablet under the tongue every 5 minutes as needed for Chest pain     oxyCODONE-acetaminophen 5-325 MG per tablet  Commonly known as:  PERCOCET  Take 1 tablet by mouth every 8 hours as needed (severe pain) for up to 30 days. (STOP NORCO)     pantoprazole 40 MG tablet  Commonly known as:  PROTONIX  TAKE 1 TABLET BY MOUTH EVERY NIGHT IN THE EVENING 1 HOUR BEFORE BEDTIME     ranitidine 150 MG tablet  Commonly known as:  ZANTAC  Take 1 tablet by mouth 2 times daily as needed for Heartburn         * This list has 4 medication(s) that are the same as other medications prescribed for you. Read the directions carefully, and ask your doctor or other care provider to review them with you.               Activity: activity as tolerated  Diet: cardiac diet  Wound Care: none needed    Time Spent on discharge is more than {Time; 15 min - 8 hours:22945}    Andrea Oseguera  Pager [phone]   8/8/2019

## 2019-08-12 RX ORDER — AMLODIPINE BESYLATE 5 MG/1
5 TABLET ORAL DAILY
Qty: 90 TABLET | Refills: 1 | Status: SHIPPED | OUTPATIENT
Start: 2019-08-12 | End: 2019-10-17

## 2019-08-15 ENCOUNTER — TELEPHONE (OUTPATIENT)
Dept: CARDIOLOGY CLINIC | Age: 69
End: 2019-08-15

## 2019-08-15 ENCOUNTER — TELEPHONE (OUTPATIENT)
Dept: PRIMARY CARE CLINIC | Age: 69
End: 2019-08-15

## 2019-08-15 NOTE — TELEPHONE ENCOUNTER
Spoke with pt. His BP now is 102/58, HR 82, dizziness has improved. He denies any syncope, CP, or SOB. Advised that he hold his evening dose of metoprolol and take his BP in the morning before he takes his morning meds (metoprolol, amlodipine, and losartan). Advised that he call our office for direction if his SBP is 110 mmHg or less. Pt verbalized understanding.

## 2019-08-19 ENCOUNTER — OFFICE VISIT (OUTPATIENT)
Dept: CARDIOLOGY CLINIC | Age: 69
End: 2019-08-19
Payer: MEDICAID

## 2019-08-19 ENCOUNTER — HOSPITAL ENCOUNTER (OUTPATIENT)
Dept: CT IMAGING | Age: 69
Discharge: HOME | End: 2019-08-19
Payer: MEDICAID

## 2019-08-19 VITALS
WEIGHT: 210 LBS | DIASTOLIC BLOOD PRESSURE: 80 MMHG | HEART RATE: 76 BPM | BODY MASS INDEX: 30.13 KG/M2 | SYSTOLIC BLOOD PRESSURE: 142 MMHG

## 2019-08-19 DIAGNOSIS — I25.10 CAD IN NATIVE ARTERY: ICD-10-CM

## 2019-08-19 DIAGNOSIS — Z98.61 HISTORY OF PTCA: ICD-10-CM

## 2019-08-19 DIAGNOSIS — Z87.891 PERSONAL HISTORY OF TOBACCO USE: ICD-10-CM

## 2019-08-19 DIAGNOSIS — I50.33 ACUTE ON CHRONIC DIASTOLIC CONGESTIVE HEART FAILURE (HCC): Primary | ICD-10-CM

## 2019-08-19 DIAGNOSIS — I10 ESSENTIAL HYPERTENSION: ICD-10-CM

## 2019-08-19 PROCEDURE — 3017F COLORECTAL CA SCREEN DOC REV: CPT | Performed by: INTERNAL MEDICINE

## 2019-08-19 PROCEDURE — 4040F PNEUMOC VAC/ADMIN/RCVD: CPT | Performed by: INTERNAL MEDICINE

## 2019-08-19 PROCEDURE — G8427 DOCREV CUR MEDS BY ELIG CLIN: HCPCS | Performed by: INTERNAL MEDICINE

## 2019-08-19 PROCEDURE — 99214 OFFICE O/P EST MOD 30 MIN: CPT | Performed by: INTERNAL MEDICINE

## 2019-08-19 PROCEDURE — G8598 ASA/ANTIPLAT THER USED: HCPCS | Performed by: INTERNAL MEDICINE

## 2019-08-19 PROCEDURE — G0297 LDCT FOR LUNG CA SCREEN: HCPCS

## 2019-08-19 PROCEDURE — 4004F PT TOBACCO SCREEN RCVD TLK: CPT | Performed by: INTERNAL MEDICINE

## 2019-08-19 PROCEDURE — 1111F DSCHRG MED/CURRENT MED MERGE: CPT | Performed by: INTERNAL MEDICINE

## 2019-08-19 PROCEDURE — G8417 CALC BMI ABV UP PARAM F/U: HCPCS | Performed by: INTERNAL MEDICINE

## 2019-08-19 PROCEDURE — 1123F ACP DISCUSS/DSCN MKR DOCD: CPT | Performed by: INTERNAL MEDICINE

## 2019-08-19 RX ORDER — FUROSEMIDE 40 MG/1
40 TABLET ORAL DAILY
Qty: 90 TABLET | Refills: 3 | Status: SHIPPED | OUTPATIENT
Start: 2019-08-19 | End: 2021-08-25 | Stop reason: SDUPTHER

## 2019-08-19 NOTE — PROGRESS NOTES
w/marsupialization patch angioplasty using SFA    SKIN SPLIT GRAFT Right 7/25/2013    Dr. Erman Libman - to non-healing R pretibial wound, 9 x 15 cm    TOTAL HIP ARTHROPLASTY Right 09/01/2016    Dr. Nancy Botello Left 12/22/2015    Dr. Erman Libman - CFA exploration, thrombectomy of ileofemoral system, stenting of RAFAL in-stent stenosis w/8 x 37 mm Palmaz        Allergies   Allergen Reactions    Daliresp [Roflumilast] Other (See Comments) and Diarrhea     Severe diarrhea and did not help    Asa [Aspirin] Other (See Comments)     Stomach ache        Social History     Socioeconomic History    Marital status:      Spouse name: Not on file    Number of children: Not on file    Years of education: Not on file    Highest education level: Not on file   Occupational History    Occupation: Retired      Comment: 2014    Occupation: [employer] self employed    Social Needs    Financial resource strain: Not on file    Food insecurity:     Worry: Not on file     Inability: Not on file   [employer] needs:     Medical: Not on file     Non-medical: Not on file   Tobacco Use    Smoking status: Current Every Day Smoker     Packs/day: 2.00     Years: 52.00     Pack years: 104.00     Types: Cigarettes     Start date: 1/1/1967    Smokeless tobacco: Never Used    Tobacco comment:  one half pack a day   Substance and Sexual Activity    Alcohol use:  Yes     Alcohol/week: 0.0 standard drinks     Comment: 2-3 beers a month    Drug use: No    Sexual activity: Never   Lifestyle    Physical activity:     Days per week: Not on file     Minutes per session: Not on file    Stress: Not on file   Relationships    Social connections:     Talks on phone: Not on file     Gets together: Not on file     Attends Faith service: Not on file     Active member of club or organization: Not on file     Attends meetings of clubs or organizations: Not on file     Relationship status: Not on file    Intimate TAKE 2 TABLETS BY MOUTH DAILY 180 tablet 0    BREO ELLIPTA 200-25 MCG/INH AEPB INHALE ONE PUFF BY MOUTH DAILY 1 each 11    atorvastatin (LIPITOR) 80 MG tablet TAKE 1 TABLET BY MOUTH DAILY 30 tablet 2    INCRUSE ELLIPTA 62.5 MCG/INH AEPB INHALE ONE PUFF BY MOUTH DAILY 1 each 11    albuterol sulfate HFA (PROAIR HFA) 108 (90 Base) MCG/ACT inhaler Inhale 2 puffs into the lungs every 6 hours as needed for Wheezing 1 Inhaler 3    ranitidine (ZANTAC) 150 MG tablet Take 1 tablet by mouth 2 times daily as needed for Heartburn 60 tablet 3    clopidogrel (PLAVIX) 75 MG tablet TAKE ONE TABLET BY MOUTH DAILY 90 tablet 2    losartan (COZAAR) 100 MG tablet TAKE 1 TABLET BY MOUTH ONCE DAILY. 90 tablet 1    metoprolol tartrate (LOPRESSOR) 50 MG tablet TAKE ONE (1) TABLET BY MOUTH TWICE A DAY. 180 tablet 3    metoprolol (LOPRESSOR) 100 MG tablet Take 1 tablet by mouth 2 times daily 180 tablet 3    albuterol (ACCUNEB) 1.25 MG/3ML nebulizer solution Inhale 3 mLs into the lungs every 6 hours as needed for Wheezing 120 vial 3    nitroGLYCERIN (NITROSTAT) 0.4 MG SL tablet Place 1 tablet under the tongue every 5 minutes as needed for Chest pain 25 tablet 1    magnesium gluconate (MAGONATE) 500 MG tablet Take 500 mg by mouth daily.  ibuprofen (ADVIL;MOTRIN) 200 MG tablet Take 1 tablet by mouth every 8 hours as needed for Pain 30 tablet 0     No current facility-administered medications for this visit. Vitals:    08/19/19 1336   BP: (!) 142/80   Pulse: 76   wt 219       Review of Systems   Constitutional: Negative for activity change and fatigue. Respiratory: Negative  for chest tightness. Negative for apnea, cough, choking and shortness of breath. Cardiovascular: Negative for chest pain, palpitations and leg swelling. No PND or orthopnea. No tachycardia. Gastrointestinal: Negative for abdominal distention. Musculoskeletal: Negative for myalgias.    Neurological: Negative for dizziness, syncope and light-headedness. Psychiatric/Behavioral: Negative for behavioral problems, confusion and agitation. Other systems reviewed negative as done    Objective:   Physical Exam   Constitutional: He is oriented to person, place, and time. He appears well-developed and well-nourished. No distress. HENT:   Head: Normocephalic. Eyes: Conjunctivae and EOM are normal. Right eye exhibits no discharge. Left eye exhibits no discharge. Neck: Normal range of motion. Neck supple. No JVD present. Cardiovascular: Normal rate, regular rhythm, S1 normal, S2 normal and normal heart sounds. Exam reveals no gallop. Pulses:       Carotid pulses are 2+ on the right side, and 2+ on the left side. Decreased BS  Pulmonary/Chest: Effort normal and breath sounds normal. No respiratory distress. He has no wheezes. He has no rales. Abdominal: Soft. Bowel sounds are normal. There is no tenderness. Musculoskeletal: Normal range of motion. He exhibits no edema. Neurological: He is alert and oriented to person, place, and time. Skin: Skin is warm and dry. Psychiatric: He has a normal mood and affect. His behavior is normal. Thought content normal.       Assessment:       Diagnosis Orders   1. Acute on chronic diastolic congestive heart failure ([address]     2. CAD in native artery     3. History of PTCA     4. Essential hypertension             Plan:      CV stable. BP reasonable and good at home off amlodipine and losartan. Decrease losartan to 50 qd and stop norvasc since bp yesterday without either was 120s. Increase lasix to 40 mg qd. EPl in l wk. No angina. Rhythm  stable. Wt stable. Watch caffeine. Smoking. Encouraged stop. Encouraged diet, wt loss. Reviewed previous records and testing including cath 8/13 and myoview 8/17. No changes. Continue to monitor. Follow up 4-6 wks. Lisbet Jameson

## 2019-08-20 ENCOUNTER — TELEPHONE (OUTPATIENT)
Dept: CASE MANAGEMENT | Age: 69
End: 2019-08-20

## 2019-08-21 DIAGNOSIS — M13.0 POLYARTHROPATHY, MULTIPLE SITES: ICD-10-CM

## 2019-08-21 DIAGNOSIS — M47.26 OTHER SPONDYLOSIS WITH RADICULOPATHY, LUMBAR REGION: ICD-10-CM

## 2019-08-21 DIAGNOSIS — M96.1 POSTLAMINECTOMY SYNDROME, LUMBAR: ICD-10-CM

## 2019-08-21 RX ORDER — OXYCODONE HYDROCHLORIDE AND ACETAMINOPHEN 5; 325 MG/1; MG/1
1 TABLET ORAL EVERY 8 HOURS PRN
Qty: 90 TABLET | Refills: 0 | Status: SHIPPED | OUTPATIENT
Start: 2019-08-24 | End: 2019-09-23 | Stop reason: SDUPTHER

## 2019-08-26 DIAGNOSIS — I25.10 CAD IN NATIVE ARTERY: ICD-10-CM

## 2019-08-26 DIAGNOSIS — I10 ESSENTIAL HYPERTENSION: ICD-10-CM

## 2019-08-26 DIAGNOSIS — Z98.61 HISTORY OF PTCA: ICD-10-CM

## 2019-08-26 DIAGNOSIS — I50.33 ACUTE ON CHRONIC DIASTOLIC CONGESTIVE HEART FAILURE (HCC): ICD-10-CM

## 2019-08-26 LAB
ANION GAP SERPL CALCULATED.3IONS-SCNC: 10 MMOL/L (ref 3–16)
BUN BLDV-MCNC: 10 MG/DL (ref 7–20)
CALCIUM SERPL-MCNC: 9.6 MG/DL (ref 8.3–10.6)
CHLORIDE BLD-SCNC: 106 MMOL/L (ref 99–110)
CO2: 27 MMOL/L (ref 21–32)
CREAT SERPL-MCNC: 0.8 MG/DL (ref 0.8–1.3)
GFR AFRICAN AMERICAN: >60
GFR NON-AFRICAN AMERICAN: >60
GLUCOSE BLD-MCNC: 103 MG/DL (ref 70–99)
POTASSIUM SERPL-SCNC: 4.1 MMOL/L (ref 3.5–5.1)
SODIUM BLD-SCNC: 143 MMOL/L (ref 136–145)

## 2019-08-29 RX ORDER — ATORVASTATIN CALCIUM 80 MG/1
TABLET, FILM COATED ORAL
Qty: 90 TABLET | Refills: 1 | Status: SHIPPED | OUTPATIENT
Start: 2019-08-29 | End: 2020-04-06 | Stop reason: SDUPTHER

## 2019-08-29 RX ORDER — ATORVASTATIN CALCIUM 80 MG/1
TABLET, FILM COATED ORAL
Qty: 30 TABLET | Refills: 2 | Status: SHIPPED | OUTPATIENT
Start: 2019-08-29 | End: 2019-08-29 | Stop reason: SDUPTHER

## 2019-08-29 RX ORDER — LOSARTAN POTASSIUM 100 MG/1
TABLET ORAL
Qty: 90 TABLET | Refills: 1 | Status: SHIPPED | OUTPATIENT
Start: 2019-08-29 | End: 2019-10-17

## 2019-09-23 ENCOUNTER — OFFICE VISIT (OUTPATIENT)
Dept: PAIN MANAGEMENT | Age: 69
End: 2019-09-23
Payer: MEDICAID

## 2019-09-23 ENCOUNTER — OFFICE VISIT (OUTPATIENT)
Dept: PAIN MANAGEMENT | Age: 69
End: 2019-09-23

## 2019-09-23 VITALS
WEIGHT: 209 LBS | DIASTOLIC BLOOD PRESSURE: 77 MMHG | BODY MASS INDEX: 29.92 KG/M2 | HEART RATE: 61 BPM | HEIGHT: 70 IN | SYSTOLIC BLOOD PRESSURE: 129 MMHG

## 2019-09-23 DIAGNOSIS — M13.0 POLYARTHROPATHY, MULTIPLE SITES: ICD-10-CM

## 2019-09-23 DIAGNOSIS — G89.4 CHRONIC PAIN SYNDROME: Primary | ICD-10-CM

## 2019-09-23 DIAGNOSIS — F45.42 PAIN DISORDER WITH PSYCHOLOGICAL FACTORS: ICD-10-CM

## 2019-09-23 DIAGNOSIS — F11.90 CHRONIC, CONTINUOUS USE OF OPIOIDS: ICD-10-CM

## 2019-09-23 DIAGNOSIS — M96.1 POSTLAMINECTOMY SYNDROME, LUMBAR: Primary | ICD-10-CM

## 2019-09-23 DIAGNOSIS — G89.4 CHRONIC PAIN SYNDROME: ICD-10-CM

## 2019-09-23 PROCEDURE — G8598 ASA/ANTIPLAT THER USED: HCPCS | Performed by: ANESTHESIOLOGY

## 2019-09-23 PROCEDURE — G8417 CALC BMI ABV UP PARAM F/U: HCPCS | Performed by: ANESTHESIOLOGY

## 2019-09-23 PROCEDURE — G8427 DOCREV CUR MEDS BY ELIG CLIN: HCPCS | Performed by: ANESTHESIOLOGY

## 2019-09-23 PROCEDURE — 3017F COLORECTAL CA SCREEN DOC REV: CPT | Performed by: ANESTHESIOLOGY

## 2019-09-23 PROCEDURE — 4040F PNEUMOC VAC/ADMIN/RCVD: CPT | Performed by: ANESTHESIOLOGY

## 2019-09-23 PROCEDURE — 1123F ACP DISCUSS/DSCN MKR DOCD: CPT | Performed by: ANESTHESIOLOGY

## 2019-09-23 PROCEDURE — 4004F PT TOBACCO SCREEN RCVD TLK: CPT | Performed by: ANESTHESIOLOGY

## 2019-09-23 PROCEDURE — 99214 OFFICE O/P EST MOD 30 MIN: CPT | Performed by: ANESTHESIOLOGY

## 2019-09-23 PROCEDURE — 99999 PR OFFICE/OUTPT VISIT,PROCEDURE ONLY: CPT | Performed by: PSYCHOLOGIST

## 2019-09-23 RX ORDER — OXYCODONE HYDROCHLORIDE AND ACETAMINOPHEN 5; 325 MG/1; MG/1
1 TABLET ORAL EVERY 8 HOURS PRN
Qty: 90 TABLET | Refills: 0 | Status: SHIPPED | OUTPATIENT
Start: 2019-09-23 | End: 2019-10-18 | Stop reason: SDUPTHER

## 2019-09-23 ASSESSMENT — ENCOUNTER SYMPTOMS
EYE REDNESS: 0
COUGH: 0
WHEEZING: 0
VOMITING: 0
ABDOMINAL PAIN: 0
EYE PAIN: 0
BACK PAIN: 1
SHORTNESS OF BREATH: 0
NAUSEA: 0
CONSTIPATION: 0
EYE DISCHARGE: 0

## 2019-09-23 NOTE — PATIENT INSTRUCTIONS
pain in a pain diary. It can help you understand how the things you do affect your pain. Reducing stress and tension can reduce pain. And being more aware of your thought patterns can be helpful. In some cases, shifting how you think about pain can affect how you feel. Here are some options to think about:  · Breathing exercises and meditation. These techniques can help you focus your attention, relax, and get rid of tension. · Guided imagery. This is a series of thoughts and images that can focus your attention away from your pain. · Hypnosis. It's a state of focused concentration that makes you less aware of your surroundings. · Cognitive behavioral therapy. This type of counseling helps you change your thought patterns. · Yoga. Stretching and exercises can reduce stress and improve flexibility. If what you're doing to control your pain isn't working, or if you're feeling depressed, talk to your doctor. He or she can help you change your pain management plan and find resources for emotional support. Where can you learn more? Go to https://PhotowhoapePodio.Fieldbook. org and sign in to your MiniLuxe account. Enter P119 in the Goodybag box to learn more about \"Learning About Managing Chronic Pain. \"     If you do not have an account, please click on the \"Sign Up Now\" link. Current as of: March 28, 2019  Content Version: 12.1  © 4819-0648 Healthwise, Incorporated. Care instructions adapted under license by [facility] ([facility]). If you have questions about a medical condition or this instruction, always ask your healthcare professional. [place] [address] any warranty or liability for your use of this information.

## 2019-09-23 NOTE — PROGRESS NOTES
(antalgic) abnormal. Coordination normal. He displays no Babinski's sign on the right side. He displays no Babinski's sign on the left side. Reflex Scores:       Tricep reflexes are 2+ on the right side and 2+ on the left side. Bicep reflexes are 2+ on the right side and 2+ on the left side. Brachioradialis reflexes are 2+ on the right side and 2+ on the left side. Patellar reflexes are 1+ on the right side and 1+ on the left side. Achilles reflexes are 1+ on the right side and 1+ on the left side. Skin: Skin is warm. No rash noted. He is not diaphoretic. Psychiatric: His speech is normal and behavior is normal. Thought content normal. He exhibits a depressed mood. Vitals:    09/23/19 0925   BP: 129/77   Site: Right Upper Arm   Position: Sitting   Cuff Size: Medium Adult   Pulse: 61   Weight: 209 lb (94.8 kg)   Height: 5' 10\" (1.778 m)       Body mass index is 29.99 kg/m². Pain Score:   5 /10    Goals of chronic pain therapy:      Multi-disciplinary comprehensive pain management with functional improvement and reduced opioid use. Prescription pain medication monitoring:      MEDD current = 22.50   ORT Score = 3   LOW     Other Risk factors - COPD, sleep apnea, depression. Date of Last Medication Agreement: 03/11/2019   Date Naloxone prescribed: NA     UDT:    Date of last UDT: 06/26/2019    Adverse report: Yes     OARRS:    Checked today: Yes    Adverse report: No     Medication Effects -        Analgesia:      Average level of pain for the past month = 6/10     Percentage of pain relief = 30%     Activities Improved: Activities of daily living = 40%     Sleep = 40%     Mood = 40%     Social functioning = 40%     Adverse Effects:      Any adverse effects: No     Type/Severity of side effect: None6    Aberrant Behavior:     Requests for frequent early refills: No     Increased dose without authorization: No     Reports lost or stolen prescriptions: No     Attempts to

## 2019-09-25 ENCOUNTER — OFFICE VISIT (OUTPATIENT)
Dept: CARDIOLOGY CLINIC | Age: 69
End: 2019-09-25
Payer: MEDICAID

## 2019-09-25 VITALS
DIASTOLIC BLOOD PRESSURE: 80 MMHG | SYSTOLIC BLOOD PRESSURE: 124 MMHG | BODY MASS INDEX: 30.13 KG/M2 | HEART RATE: 60 BPM | WEIGHT: 210 LBS

## 2019-09-25 DIAGNOSIS — I25.10 CAD IN NATIVE ARTERY: ICD-10-CM

## 2019-09-25 DIAGNOSIS — I10 ESSENTIAL HYPERTENSION: ICD-10-CM

## 2019-09-25 DIAGNOSIS — I50.32 CHRONIC DIASTOLIC CONGESTIVE HEART FAILURE (HCC): Primary | ICD-10-CM

## 2019-09-25 DIAGNOSIS — Z98.61 HISTORY OF PTCA: ICD-10-CM

## 2019-09-25 PROCEDURE — G8427 DOCREV CUR MEDS BY ELIG CLIN: HCPCS | Performed by: INTERNAL MEDICINE

## 2019-09-25 PROCEDURE — 4040F PNEUMOC VAC/ADMIN/RCVD: CPT | Performed by: INTERNAL MEDICINE

## 2019-09-25 PROCEDURE — 1123F ACP DISCUSS/DSCN MKR DOCD: CPT | Performed by: INTERNAL MEDICINE

## 2019-09-25 PROCEDURE — G8417 CALC BMI ABV UP PARAM F/U: HCPCS | Performed by: INTERNAL MEDICINE

## 2019-09-25 PROCEDURE — 3017F COLORECTAL CA SCREEN DOC REV: CPT | Performed by: INTERNAL MEDICINE

## 2019-09-25 PROCEDURE — 99214 OFFICE O/P EST MOD 30 MIN: CPT | Performed by: INTERNAL MEDICINE

## 2019-09-25 PROCEDURE — G8598 ASA/ANTIPLAT THER USED: HCPCS | Performed by: INTERNAL MEDICINE

## 2019-09-25 PROCEDURE — 4004F PT TOBACCO SCREEN RCVD TLK: CPT | Performed by: INTERNAL MEDICINE

## 2019-09-25 NOTE — PROGRESS NOTES
Not on file     Emotionally abused: Not on file     Physically abused: Not on file     Forced sexual activity: Not on file   Other Topics Concern    Not on file   Social History Narrative    Not on file        Patient has a family history includes Cancer in his father and mother; Heart Disease in his father and mother. Patient  has a past medical history of Arthritis, Balance problem, CAD (coronary artery disease), CHF (congestive heart failure) ([facility] [address] Chronic ulcer of right leg ([facility] [address] COPD (chronic obstructive pulmonary disease) ([facility] [address] Depressive disorder, not elsewhere classified, DVT (deep venous thrombosis) ([facility] [address] Heartburn, History of MI (myocardial infarction), Hyperlipidemia, Hypertension, Neuropathy, KAVITA (obstructive sleep apnea), Primary central sleep apnea, PVD (peripheral vascular disease) ([facility] [address] Unspecified cerebral artery occlusion with cerebral infarction, Urgency of urination, and Vertebral fracture. Current Outpatient Medications   Medication Sig Dispense Refill    oxyCODONE-acetaminophen (PERCOCET) 5-325 MG per tablet Take 1 tablet by mouth every 8 hours as needed (severe pain) for up to 30 days.  (STOP NORCO) 90 tablet 0    losartan (COZAAR) 100 MG tablet TAKE 1 TABLET BY MOUTH EVERY DAY 90 tablet 1    atorvastatin (LIPITOR) 80 MG tablet TAKE 1 TABLET BY MOUTH DAILY 90 tablet 1    furosemide (LASIX) 40 MG tablet Take 1 tablet by mouth daily 90 tablet 3    amLODIPine (NORVASC) 5 MG tablet Take 1 tablet by mouth daily (Patient not taking: Reported on 9/23/2019) 90 tablet 1    ibuprofen (ADVIL;MOTRIN) 200 MG tablet Take 1 tablet by mouth every 8 hours as needed for Pain 30 tablet 0    folic acid (FOLVITE) 1 MG tablet Take 1 mg by mouth daily      gabapentin (NEURONTIN) 600 MG tablet TAKE ONE TABLET BY MOUTH EVERY MORNING AND 2 TABLETS BY MOUTH EVERY EVENING 90 tablet 1    pantoprazole (PROTONIX) 40 MG tablet TAKE 1 TABLET BY MOUTH EVERY NIGHT IN THE EVENING 1 HOUR BEFORE BEDTIME 90 problems, confusion and agitation. All other systems reviewed negative as done    Objective:   Physical Exam   Constitutional: He is oriented to person, place, and time. He appears well-developed and well-nourished. No distress. HENT:   Head: Normocephalic. Eyes: Conjunctivae and EOM are normal. Right eye exhibits no discharge. Left eye exhibits no discharge. Neck: Normal range of motion. Neck supple. No JVD present. Cardiovascular: Normal rate, regular rhythm, S1 normal, S2 normal and normal heart sounds. Exam reveals no gallop. Pulses:       Carotid pulses are 2+ on the right side, and 2+ on the left side. Decreased BS  Pulmonary/Chest: Effort normal and breath sounds normal. No respiratory distress. He has no wheezes. He has no rales. Abdominal: Soft. Bowel sounds are normal. There is no tenderness. Musculoskeletal: Normal range of motion. He exhibits no edema. Neurological: He is alert and oriented to person, place, and time. Skin: Skin is warm and dry. Psychiatric: He has a normal mood and affect. His behavior is normal. Thought content normal.       Assessment:       Diagnosis Orders   1. Chronic diastolic congestive heart failure ([address]     2. CAD in native artery     3. History of PTCA     4. Essential hypertension             Plan:      CV stable. BP reasonable  jContinue losartan to 50 qd and continue off amlodipine. Swelling stable. Continue lasix to 40 mg qd. No angina. Rhythm  stable. Wt down. Watch caffeine. Smoking. Encouraged stop. Encouraged diet, wt loss. Reviewed previous records and testing including cath 8/13 and myoview 8/17. No changes. Continue to monitor.  Follow up 3 months

## 2019-09-26 LAB
6-ACETYLMORPHINE: NOT DETECTED
7-AMINOCLONAZEPAM: NOT DETECTED
ALPHA-OH-ALPRAZOLAM: NOT DETECTED
ALPRAZOLAM: NOT DETECTED
AMPHETAMINE: NOT DETECTED
BARBITURATES: NOT DETECTED
BENZOYLECGONINE: NOT DETECTED
BUPRENORPHINE: NOT DETECTED
CARISOPRODOL: NOT DETECTED
CLONAZEPAM: NOT DETECTED
CODEINE: NOT DETECTED
CREATININE URINE: 271.4 MG/DL (ref 20–400)
DIAZEPAM: NOT DETECTED
DRUGS EXPECTED: NORMAL
EER PAIN MGT DRUG PANEL, HIGH RES/EMIT U: NORMAL
ETHYL GLUCURONIDE: PRESENT
FENTANYL: NOT DETECTED
HYDROCODONE: NOT DETECTED
HYDROMORPHONE: NOT DETECTED
LORAZEPAM: NOT DETECTED
MARIJUANA METABOLITE: NOT DETECTED
MDA: NOT DETECTED
MDEA: NOT DETECTED
MDMA URINE: NOT DETECTED
MEPERIDINE: NOT DETECTED
METHADONE: NOT DETECTED
METHAMPHETAMINE: NOT DETECTED
METHYLPHENIDATE: NOT DETECTED
MIDAZOLAM: NOT DETECTED
MORPHINE: NOT DETECTED
NORBUPRENORPHINE, FREE: NOT DETECTED
NORDIAZEPAM: NOT DETECTED
NORFENTANYL: NOT DETECTED
NORHYDROCODONE, URINE: NOT DETECTED
NOROXYCODONE: PRESENT
NOROXYMORPHONE, URINE: NOT DETECTED
OXAZEPAM: NOT DETECTED
OXYCODONE: PRESENT
OXYMORPHONE: NOT DETECTED
PAIN MANAGEMENT DRUG PANEL: NORMAL
PAIN MANAGEMENT DRUG PANEL: NORMAL
PCP: NOT DETECTED
PHENTERMINE: NOT DETECTED
PROPOXYPHENE: NOT DETECTED
TAPENTADOL, URINE: NOT DETECTED
TAPENTADOL-O-SULFATE, URINE: NOT DETECTED
TEMAZEPAM: NOT DETECTED
TRAMADOL: NOT DETECTED
ZOLPIDEM: NOT DETECTED

## 2019-10-02 ENCOUNTER — TELEPHONE (OUTPATIENT)
Dept: CASE MANAGEMENT | Age: 69
End: 2019-10-02

## 2019-10-10 ENCOUNTER — OFFICE VISIT (OUTPATIENT)
Dept: PULMONOLOGY | Age: 69
End: 2019-10-10
Payer: MEDICAID

## 2019-10-10 VITALS
HEIGHT: 70 IN | BODY MASS INDEX: 31.5 KG/M2 | DIASTOLIC BLOOD PRESSURE: 74 MMHG | WEIGHT: 220 LBS | SYSTOLIC BLOOD PRESSURE: 150 MMHG | OXYGEN SATURATION: 95 % | HEART RATE: 66 BPM

## 2019-10-10 DIAGNOSIS — G47.33 OSA ON CPAP: ICD-10-CM

## 2019-10-10 DIAGNOSIS — R91.1 PULMONARY NODULE SEEN ON IMAGING STUDY: Primary | ICD-10-CM

## 2019-10-10 DIAGNOSIS — I50.32 CHRONIC DIASTOLIC CHF (CONGESTIVE HEART FAILURE) (HCC): ICD-10-CM

## 2019-10-10 DIAGNOSIS — J44.9 CHRONIC OBSTRUCTIVE PULMONARY DISEASE, UNSPECIFIED COPD TYPE (HCC): ICD-10-CM

## 2019-10-10 DIAGNOSIS — Z99.89 OSA ON CPAP: ICD-10-CM

## 2019-10-10 PROCEDURE — 4040F PNEUMOC VAC/ADMIN/RCVD: CPT | Performed by: INTERNAL MEDICINE

## 2019-10-10 PROCEDURE — 4004F PT TOBACCO SCREEN RCVD TLK: CPT | Performed by: INTERNAL MEDICINE

## 2019-10-10 PROCEDURE — 1123F ACP DISCUSS/DSCN MKR DOCD: CPT | Performed by: INTERNAL MEDICINE

## 2019-10-10 PROCEDURE — G8598 ASA/ANTIPLAT THER USED: HCPCS | Performed by: INTERNAL MEDICINE

## 2019-10-10 PROCEDURE — 3023F SPIROM DOC REV: CPT | Performed by: INTERNAL MEDICINE

## 2019-10-10 PROCEDURE — G8427 DOCREV CUR MEDS BY ELIG CLIN: HCPCS | Performed by: INTERNAL MEDICINE

## 2019-10-10 PROCEDURE — 3017F COLORECTAL CA SCREEN DOC REV: CPT | Performed by: INTERNAL MEDICINE

## 2019-10-10 PROCEDURE — G8417 CALC BMI ABV UP PARAM F/U: HCPCS | Performed by: INTERNAL MEDICINE

## 2019-10-10 PROCEDURE — 99214 OFFICE O/P EST MOD 30 MIN: CPT | Performed by: INTERNAL MEDICINE

## 2019-10-10 PROCEDURE — G8926 SPIRO NO PERF OR DOC: HCPCS | Performed by: INTERNAL MEDICINE

## 2019-10-10 PROCEDURE — G8484 FLU IMMUNIZE NO ADMIN: HCPCS | Performed by: INTERNAL MEDICINE

## 2019-10-17 ENCOUNTER — OFFICE VISIT (OUTPATIENT)
Dept: PRIMARY CARE CLINIC | Age: 69
End: 2019-10-17
Payer: MEDICAID

## 2019-10-17 VITALS
WEIGHT: 210 LBS | HEART RATE: 67 BPM | BODY MASS INDEX: 30.06 KG/M2 | HEIGHT: 70 IN | SYSTOLIC BLOOD PRESSURE: 132 MMHG | OXYGEN SATURATION: 95 % | DIASTOLIC BLOOD PRESSURE: 76 MMHG

## 2019-10-17 DIAGNOSIS — G89.4 CHRONIC PAIN SYNDROME: ICD-10-CM

## 2019-10-17 DIAGNOSIS — Z23 NEED FOR IMMUNIZATION AGAINST INFLUENZA: ICD-10-CM

## 2019-10-17 DIAGNOSIS — J43.9 PULMONARY EMPHYSEMA, UNSPECIFIED EMPHYSEMA TYPE (HCC): Chronic | ICD-10-CM

## 2019-10-17 DIAGNOSIS — L98.9 SKIN LESIONS: ICD-10-CM

## 2019-10-17 DIAGNOSIS — I10 ESSENTIAL HYPERTENSION: Primary | ICD-10-CM

## 2019-10-17 DIAGNOSIS — Z72.0 TOBACCO ABUSE: ICD-10-CM

## 2019-10-17 PROCEDURE — G0008 ADMIN INFLUENZA VIRUS VAC: HCPCS | Performed by: FAMILY MEDICINE

## 2019-10-17 PROCEDURE — 90653 IIV ADJUVANT VACCINE IM: CPT | Performed by: FAMILY MEDICINE

## 2019-10-17 PROCEDURE — 99213 OFFICE O/P EST LOW 20 MIN: CPT | Performed by: FAMILY MEDICINE

## 2019-10-17 RX ORDER — LOSARTAN POTASSIUM 100 MG/1
50 TABLET ORAL DAILY
Qty: 90 TABLET | Refills: 1
Start: 2019-10-17 | End: 2020-04-10 | Stop reason: SDUPTHER

## 2019-10-17 ASSESSMENT — ENCOUNTER SYMPTOMS
COUGH: 0
SORE THROAT: 0
ABDOMINAL PAIN: 0
SHORTNESS OF BREATH: 0
NAUSEA: 0

## 2019-10-18 DIAGNOSIS — M13.0 POLYARTHROPATHY, MULTIPLE SITES: ICD-10-CM

## 2019-10-18 DIAGNOSIS — M96.1 POSTLAMINECTOMY SYNDROME, LUMBAR: ICD-10-CM

## 2019-10-18 RX ORDER — OXYCODONE HYDROCHLORIDE AND ACETAMINOPHEN 5; 325 MG/1; MG/1
1 TABLET ORAL EVERY 8 HOURS PRN
Qty: 90 TABLET | Refills: 0 | Status: SHIPPED | OUTPATIENT
Start: 2019-10-21 | End: 2019-11-20 | Stop reason: SDUPTHER

## 2019-10-18 ASSESSMENT — ENCOUNTER SYMPTOMS: BACK PAIN: 1

## 2019-10-21 ENCOUNTER — TELEPHONE (OUTPATIENT)
Dept: PAIN MANAGEMENT | Age: 69
End: 2019-10-21

## 2019-10-21 DIAGNOSIS — G89.4 CHRONIC PAIN SYNDROME: ICD-10-CM

## 2019-10-21 DIAGNOSIS — M96.1 POSTLAMINECTOMY SYNDROME, LUMBAR: ICD-10-CM

## 2019-10-22 RX ORDER — GABAPENTIN 600 MG/1
TABLET ORAL
Qty: 90 TABLET | Refills: 1 | Status: SHIPPED | OUTPATIENT
Start: 2019-10-22 | End: 2019-12-30 | Stop reason: SDUPTHER

## 2019-10-23 ENCOUNTER — TELEPHONE (OUTPATIENT)
Dept: PRIMARY CARE CLINIC | Age: 69
End: 2019-10-23

## 2019-10-24 ENCOUNTER — OFFICE VISIT (OUTPATIENT)
Dept: PAIN MANAGEMENT | Age: 69
End: 2019-10-24
Payer: MEDICAID

## 2019-10-24 DIAGNOSIS — G89.4 CHRONIC PAIN SYNDROME: Primary | ICD-10-CM

## 2019-10-24 DIAGNOSIS — F51.04 PSYCHOPHYSIOLOGICAL INSOMNIA: ICD-10-CM

## 2019-10-24 PROCEDURE — 4004F PT TOBACCO SCREEN RCVD TLK: CPT | Performed by: PSYCHOLOGIST

## 2019-10-24 PROCEDURE — 90832 PSYTX W PT 30 MINUTES: CPT | Performed by: PSYCHOLOGIST

## 2019-10-24 ASSESSMENT — ANXIETY QUESTIONNAIRES
6. BECOMING EASILY ANNOYED OR IRRITABLE: 0-NOT AT ALL
1. FEELING NERVOUS, ANXIOUS, OR ON EDGE: 0-NOT AT ALL
7. FEELING AFRAID AS IF SOMETHING AWFUL MIGHT HAPPEN: 0-NOT AT ALL
3. WORRYING TOO MUCH ABOUT DIFFERENT THINGS: 0-NOT AT ALL
5. BEING SO RESTLESS THAT IT IS HARD TO SIT STILL: 1-SEVERAL DAYS
4. TROUBLE RELAXING: 1-SEVERAL DAYS
2. NOT BEING ABLE TO STOP OR CONTROL WORRYING: 1-SEVERAL DAYS
GAD7 TOTAL SCORE: 3

## 2019-10-24 ASSESSMENT — PATIENT HEALTH QUESTIONNAIRE - PHQ9
10. IF YOU CHECKED OFF ANY PROBLEMS, HOW DIFFICULT HAVE THESE PROBLEMS MADE IT FOR YOU TO DO YOUR WORK, TAKE CARE OF THINGS AT HOME, OR GET ALONG WITH OTHER PEOPLE: 1
2. FEELING DOWN, DEPRESSED OR HOPELESS: 1
7. TROUBLE CONCENTRATING ON THINGS, SUCH AS READING THE NEWSPAPER OR WATCHING TELEVISION: 1
5. POOR APPETITE OR OVEREATING: 0
4. FEELING TIRED OR HAVING LITTLE ENERGY: 1
SUM OF ALL RESPONSES TO PHQ QUESTIONS 1-9: 7
8. MOVING OR SPEAKING SO SLOWLY THAT OTHER PEOPLE COULD HAVE NOTICED. OR THE OPPOSITE, BEING SO FIGETY OR RESTLESS THAT YOU HAVE BEEN MOVING AROUND A LOT MORE THAN USUAL: 1
SUM OF ALL RESPONSES TO PHQ9 QUESTIONS 1 & 2: 2
6. FEELING BAD ABOUT YOURSELF - OR THAT YOU ARE A FAILURE OR HAVE LET YOURSELF OR YOUR FAMILY DOWN: 0
SUM OF ALL RESPONSES TO PHQ QUESTIONS 1-9: 7
9. THOUGHTS THAT YOU WOULD BE BETTER OFF DEAD, OR OF HURTING YOURSELF: 0
1. LITTLE INTEREST OR PLEASURE IN DOING THINGS: 1
3. TROUBLE FALLING OR STAYING ASLEEP: 2

## 2019-11-19 RX ORDER — DULOXETIN HYDROCHLORIDE 60 MG/1
CAPSULE, DELAYED RELEASE ORAL
Qty: 90 CAPSULE | Refills: 1 | Status: SHIPPED | OUTPATIENT
Start: 2019-11-19 | End: 2020-05-22

## 2019-11-20 ENCOUNTER — OFFICE VISIT (OUTPATIENT)
Dept: PAIN MANAGEMENT | Age: 69
End: 2019-11-20
Payer: MEDICAID

## 2019-11-20 VITALS
DIASTOLIC BLOOD PRESSURE: 76 MMHG | SYSTOLIC BLOOD PRESSURE: 150 MMHG | BODY MASS INDEX: 31.75 KG/M2 | HEART RATE: 56 BPM | WEIGHT: 221.8 LBS | HEIGHT: 70 IN

## 2019-11-20 DIAGNOSIS — M96.1 POSTLAMINECTOMY SYNDROME, LUMBAR: ICD-10-CM

## 2019-11-20 DIAGNOSIS — G89.4 CHRONIC PAIN SYNDROME: ICD-10-CM

## 2019-11-20 DIAGNOSIS — F11.90 CHRONIC, CONTINUOUS USE OF OPIOIDS: ICD-10-CM

## 2019-11-20 DIAGNOSIS — F45.42 PAIN DISORDER WITH PSYCHOLOGICAL FACTORS: ICD-10-CM

## 2019-11-20 DIAGNOSIS — G89.29 CHRONIC HIP PAIN, LEFT: Primary | ICD-10-CM

## 2019-11-20 DIAGNOSIS — G89.4 CHRONIC PAIN SYNDROME: Primary | ICD-10-CM

## 2019-11-20 DIAGNOSIS — M13.0 POLYARTHROPATHY, MULTIPLE SITES: ICD-10-CM

## 2019-11-20 DIAGNOSIS — M25.552 CHRONIC HIP PAIN, LEFT: Primary | ICD-10-CM

## 2019-11-20 PROCEDURE — 96152 PR HEAL & BEHAV INTERVENT,EA 15 MIN,INDIV: CPT | Performed by: PSYCHOLOGIST

## 2019-11-20 PROCEDURE — G8598 ASA/ANTIPLAT THER USED: HCPCS | Performed by: ANESTHESIOLOGY

## 2019-11-20 PROCEDURE — 99214 OFFICE O/P EST MOD 30 MIN: CPT | Performed by: ANESTHESIOLOGY

## 2019-11-20 PROCEDURE — G8417 CALC BMI ABV UP PARAM F/U: HCPCS | Performed by: ANESTHESIOLOGY

## 2019-11-20 PROCEDURE — 3017F COLORECTAL CA SCREEN DOC REV: CPT | Performed by: ANESTHESIOLOGY

## 2019-11-20 PROCEDURE — 1123F ACP DISCUSS/DSCN MKR DOCD: CPT | Performed by: ANESTHESIOLOGY

## 2019-11-20 PROCEDURE — G8427 DOCREV CUR MEDS BY ELIG CLIN: HCPCS | Performed by: ANESTHESIOLOGY

## 2019-11-20 PROCEDURE — 4004F PT TOBACCO SCREEN RCVD TLK: CPT | Performed by: ANESTHESIOLOGY

## 2019-11-20 PROCEDURE — 4040F PNEUMOC VAC/ADMIN/RCVD: CPT | Performed by: ANESTHESIOLOGY

## 2019-11-20 PROCEDURE — G8482 FLU IMMUNIZE ORDER/ADMIN: HCPCS | Performed by: ANESTHESIOLOGY

## 2019-11-20 RX ORDER — OXYCODONE HYDROCHLORIDE AND ACETAMINOPHEN 5; 325 MG/1; MG/1
1 TABLET ORAL EVERY 8 HOURS PRN
Qty: 90 TABLET | Refills: 0 | Status: SHIPPED | OUTPATIENT
Start: 2019-11-20 | End: 2019-12-17 | Stop reason: SDUPTHER

## 2019-11-20 ASSESSMENT — ANXIETY QUESTIONNAIRES
GAD7 TOTAL SCORE: 7
4. TROUBLE RELAXING: 1-SEVERAL DAYS
2. NOT BEING ABLE TO STOP OR CONTROL WORRYING: 1-SEVERAL DAYS
7. FEELING AFRAID AS IF SOMETHING AWFUL MIGHT HAPPEN: 0-NOT AT ALL
6. BECOMING EASILY ANNOYED OR IRRITABLE: 2-OVER HALF THE DAYS
5. BEING SO RESTLESS THAT IT IS HARD TO SIT STILL: 2-OVER HALF THE DAYS
1. FEELING NERVOUS, ANXIOUS, OR ON EDGE: 1-SEVERAL DAYS
3. WORRYING TOO MUCH ABOUT DIFFERENT THINGS: 0-NOT AT ALL

## 2019-11-20 ASSESSMENT — ENCOUNTER SYMPTOMS
EYE PAIN: 0
VOMITING: 0
BACK PAIN: 1
NAUSEA: 0
EYE REDNESS: 0
WHEEZING: 0
ABDOMINAL PAIN: 0
CONSTIPATION: 0
EYE DISCHARGE: 0
SHORTNESS OF BREATH: 0
COUGH: 0

## 2019-11-20 ASSESSMENT — PATIENT HEALTH QUESTIONNAIRE - PHQ9
9. THOUGHTS THAT YOU WOULD BE BETTER OFF DEAD, OR OF HURTING YOURSELF: 0
7. TROUBLE CONCENTRATING ON THINGS, SUCH AS READING THE NEWSPAPER OR WATCHING TELEVISION: 1
3. TROUBLE FALLING OR STAYING ASLEEP: 1
5. POOR APPETITE OR OVEREATING: 0
SUM OF ALL RESPONSES TO PHQ9 QUESTIONS 1 & 2: 0
1. LITTLE INTEREST OR PLEASURE IN DOING THINGS: 0
SUM OF ALL RESPONSES TO PHQ QUESTIONS 1-9: 4
4. FEELING TIRED OR HAVING LITTLE ENERGY: 2
6. FEELING BAD ABOUT YOURSELF - OR THAT YOU ARE A FAILURE OR HAVE LET YOURSELF OR YOUR FAMILY DOWN: 0
2. FEELING DOWN, DEPRESSED OR HOPELESS: 0
SUM OF ALL RESPONSES TO PHQ QUESTIONS 1-9: 4
10. IF YOU CHECKED OFF ANY PROBLEMS, HOW DIFFICULT HAVE THESE PROBLEMS MADE IT FOR YOU TO DO YOUR WORK, TAKE CARE OF THINGS AT HOME, OR GET ALONG WITH OTHER PEOPLE: 0
8. MOVING OR SPEAKING SO SLOWLY THAT OTHER PEOPLE COULD HAVE NOTICED. OR THE OPPOSITE, BEING SO FIGETY OR RESTLESS THAT YOU HAVE BEEN MOVING AROUND A LOT MORE THAN USUAL: 0

## 2019-11-27 RX ORDER — RANITIDINE 150 MG/1
150 TABLET ORAL 2 TIMES DAILY PRN
Qty: 60 TABLET | Refills: 5 | Status: SHIPPED | OUTPATIENT
Start: 2019-11-27 | End: 2019-11-29 | Stop reason: SDUPTHER

## 2019-12-02 RX ORDER — RANITIDINE 150 MG/1
TABLET ORAL
Qty: 180 TABLET | Refills: 1 | Status: SHIPPED | OUTPATIENT
Start: 2019-12-02 | End: 2019-12-03

## 2019-12-03 ENCOUNTER — TELEPHONE (OUTPATIENT)
Dept: PRIMARY CARE CLINIC | Age: 69
End: 2019-12-03

## 2019-12-03 DIAGNOSIS — K21.9 GASTROESOPHAGEAL REFLUX DISEASE WITHOUT ESOPHAGITIS: Primary | ICD-10-CM

## 2019-12-04 RX ORDER — ALLOPURINOL 100 MG/1
TABLET ORAL
Qty: 180 TABLET | Refills: 1 | Status: SHIPPED | OUTPATIENT
Start: 2019-12-04 | End: 2020-06-15

## 2019-12-05 ENCOUNTER — HOSPITAL ENCOUNTER (OUTPATIENT)
Dept: GENERAL RADIOLOGY | Age: 69
Discharge: HOME | End: 2019-12-05
Payer: MEDICAID

## 2019-12-05 ENCOUNTER — HOSPITAL ENCOUNTER (OUTPATIENT)
Age: 69
Discharge: HOME | End: 2019-12-05
Payer: MEDICAID

## 2019-12-05 DIAGNOSIS — M25.552 CHRONIC HIP PAIN, LEFT: ICD-10-CM

## 2019-12-05 DIAGNOSIS — G89.29 CHRONIC HIP PAIN, LEFT: ICD-10-CM

## 2019-12-05 PROCEDURE — 73502 X-RAY EXAM HIP UNI 2-3 VIEWS: CPT

## 2019-12-10 RX ORDER — FAMOTIDINE 20 MG/1
20 TABLET, FILM COATED ORAL NIGHTLY PRN
Qty: 30 TABLET | Refills: 3 | Status: SHIPPED | OUTPATIENT
Start: 2019-12-10 | End: 2020-01-16

## 2019-12-11 ENCOUNTER — OFFICE VISIT (OUTPATIENT)
Dept: ORTHOPEDIC SURGERY | Age: 69
End: 2019-12-11
Payer: MEDICAID

## 2019-12-11 VITALS
SYSTOLIC BLOOD PRESSURE: 154 MMHG | WEIGHT: 211 LBS | HEART RATE: 68 BPM | BODY MASS INDEX: 30.21 KG/M2 | DIASTOLIC BLOOD PRESSURE: 81 MMHG | HEIGHT: 70 IN

## 2019-12-11 DIAGNOSIS — M51.36 DEGENERATION OF INTERVERTEBRAL DISC OF LUMBAR REGION: Primary | ICD-10-CM

## 2019-12-11 PROCEDURE — G8427 DOCREV CUR MEDS BY ELIG CLIN: HCPCS | Performed by: PHYSICIAN ASSISTANT

## 2019-12-11 PROCEDURE — G8417 CALC BMI ABV UP PARAM F/U: HCPCS | Performed by: PHYSICIAN ASSISTANT

## 2019-12-11 PROCEDURE — G8598 ASA/ANTIPLAT THER USED: HCPCS | Performed by: PHYSICIAN ASSISTANT

## 2019-12-11 PROCEDURE — 99204 OFFICE O/P NEW MOD 45 MIN: CPT | Performed by: PHYSICIAN ASSISTANT

## 2019-12-11 PROCEDURE — 1123F ACP DISCUSS/DSCN MKR DOCD: CPT | Performed by: PHYSICIAN ASSISTANT

## 2019-12-11 PROCEDURE — 4004F PT TOBACCO SCREEN RCVD TLK: CPT | Performed by: PHYSICIAN ASSISTANT

## 2019-12-11 PROCEDURE — G8482 FLU IMMUNIZE ORDER/ADMIN: HCPCS | Performed by: PHYSICIAN ASSISTANT

## 2019-12-11 PROCEDURE — 4040F PNEUMOC VAC/ADMIN/RCVD: CPT | Performed by: PHYSICIAN ASSISTANT

## 2019-12-11 PROCEDURE — 3017F COLORECTAL CA SCREEN DOC REV: CPT | Performed by: PHYSICIAN ASSISTANT

## 2019-12-12 ENCOUNTER — TELEPHONE (OUTPATIENT)
Dept: PAIN MANAGEMENT | Age: 69
End: 2019-12-12

## 2019-12-17 ENCOUNTER — OFFICE VISIT (OUTPATIENT)
Dept: PAIN MANAGEMENT | Age: 69
End: 2019-12-17
Payer: MEDICAID

## 2019-12-17 VITALS
WEIGHT: 219.2 LBS | BODY MASS INDEX: 31.38 KG/M2 | SYSTOLIC BLOOD PRESSURE: 161 MMHG | DIASTOLIC BLOOD PRESSURE: 84 MMHG | HEART RATE: 70 BPM | HEIGHT: 70 IN

## 2019-12-17 DIAGNOSIS — G89.29 CHRONIC HIP PAIN, LEFT: ICD-10-CM

## 2019-12-17 DIAGNOSIS — M46.1 SACROILIITIS, NOT ELSEWHERE CLASSIFIED (HCC): Primary | ICD-10-CM

## 2019-12-17 DIAGNOSIS — M47.897 OTHER SPONDYLOSIS, LUMBOSACRAL REGION: ICD-10-CM

## 2019-12-17 DIAGNOSIS — M25.552 CHRONIC HIP PAIN, LEFT: ICD-10-CM

## 2019-12-17 DIAGNOSIS — G89.4 CHRONIC PAIN SYNDROME: ICD-10-CM

## 2019-12-17 DIAGNOSIS — F45.42 PAIN DISORDER WITH PSYCHOLOGICAL FACTORS: ICD-10-CM

## 2019-12-17 DIAGNOSIS — F11.90 CHRONIC, CONTINUOUS USE OF OPIOIDS: ICD-10-CM

## 2019-12-17 DIAGNOSIS — M13.0 POLYARTHROPATHY, MULTIPLE SITES: ICD-10-CM

## 2019-12-17 DIAGNOSIS — M96.1 POSTLAMINECTOMY SYNDROME, LUMBAR: ICD-10-CM

## 2019-12-17 PROCEDURE — G8417 CALC BMI ABV UP PARAM F/U: HCPCS | Performed by: ANESTHESIOLOGY

## 2019-12-17 PROCEDURE — 3017F COLORECTAL CA SCREEN DOC REV: CPT | Performed by: ANESTHESIOLOGY

## 2019-12-17 PROCEDURE — 4004F PT TOBACCO SCREEN RCVD TLK: CPT | Performed by: PSYCHOLOGIST

## 2019-12-17 PROCEDURE — 4004F PT TOBACCO SCREEN RCVD TLK: CPT | Performed by: ANESTHESIOLOGY

## 2019-12-17 PROCEDURE — 1123F ACP DISCUSS/DSCN MKR DOCD: CPT | Performed by: ANESTHESIOLOGY

## 2019-12-17 PROCEDURE — 90832 PSYTX W PT 30 MINUTES: CPT | Performed by: PSYCHOLOGIST

## 2019-12-17 PROCEDURE — G8482 FLU IMMUNIZE ORDER/ADMIN: HCPCS | Performed by: ANESTHESIOLOGY

## 2019-12-17 PROCEDURE — 99214 OFFICE O/P EST MOD 30 MIN: CPT | Performed by: ANESTHESIOLOGY

## 2019-12-17 PROCEDURE — G8598 ASA/ANTIPLAT THER USED: HCPCS | Performed by: ANESTHESIOLOGY

## 2019-12-17 PROCEDURE — G8427 DOCREV CUR MEDS BY ELIG CLIN: HCPCS | Performed by: ANESTHESIOLOGY

## 2019-12-17 PROCEDURE — 4040F PNEUMOC VAC/ADMIN/RCVD: CPT | Performed by: ANESTHESIOLOGY

## 2019-12-17 RX ORDER — OXYCODONE HYDROCHLORIDE AND ACETAMINOPHEN 5; 325 MG/1; MG/1
1 TABLET ORAL EVERY 8 HOURS PRN
Qty: 90 TABLET | Refills: 0 | Status: SHIPPED | OUTPATIENT
Start: 2019-12-18 | End: 2020-01-15 | Stop reason: SDUPTHER

## 2019-12-17 ASSESSMENT — PATIENT HEALTH QUESTIONNAIRE - PHQ9
7. TROUBLE CONCENTRATING ON THINGS, SUCH AS READING THE NEWSPAPER OR WATCHING TELEVISION: 1
3. TROUBLE FALLING OR STAYING ASLEEP: 1
SUM OF ALL RESPONSES TO PHQ QUESTIONS 1-9: 6
9. THOUGHTS THAT YOU WOULD BE BETTER OFF DEAD, OR OF HURTING YOURSELF: 0
1. LITTLE INTEREST OR PLEASURE IN DOING THINGS: 1
6. FEELING BAD ABOUT YOURSELF - OR THAT YOU ARE A FAILURE OR HAVE LET YOURSELF OR YOUR FAMILY DOWN: 0
5. POOR APPETITE OR OVEREATING: 0
8. MOVING OR SPEAKING SO SLOWLY THAT OTHER PEOPLE COULD HAVE NOTICED. OR THE OPPOSITE, BEING SO FIGETY OR RESTLESS THAT YOU HAVE BEEN MOVING AROUND A LOT MORE THAN USUAL: 0
SUM OF ALL RESPONSES TO PHQ QUESTIONS 1-9: 6
4. FEELING TIRED OR HAVING LITTLE ENERGY: 2
2. FEELING DOWN, DEPRESSED OR HOPELESS: 1
SUM OF ALL RESPONSES TO PHQ9 QUESTIONS 1 & 2: 2

## 2019-12-17 ASSESSMENT — ENCOUNTER SYMPTOMS
WHEEZING: 0
EYE PAIN: 0
CONSTIPATION: 0
EYE DISCHARGE: 0
NAUSEA: 0
BACK PAIN: 1
COUGH: 0
EYE REDNESS: 0
VOMITING: 0
ABDOMINAL PAIN: 0
SHORTNESS OF BREATH: 0

## 2019-12-17 ASSESSMENT — ANXIETY QUESTIONNAIRES
2. NOT BEING ABLE TO STOP OR CONTROL WORRYING: 1-SEVERAL DAYS
6. BECOMING EASILY ANNOYED OR IRRITABLE: 2-OVER HALF THE DAYS
4. TROUBLE RELAXING: 2-OVER HALF THE DAYS
1. FEELING NERVOUS, ANXIOUS, OR ON EDGE: 0-NOT AT ALL
3. WORRYING TOO MUCH ABOUT DIFFERENT THINGS: 1-SEVERAL DAYS
7. FEELING AFRAID AS IF SOMETHING AWFUL MIGHT HAPPEN: 1-SEVERAL DAYS
5. BEING SO RESTLESS THAT IT IS HARD TO SIT STILL: 1-SEVERAL DAYS
GAD7 TOTAL SCORE: 8

## 2019-12-17 NOTE — PROGRESS NOTES
[address] [facility] [address], [place], [address]  [phone] (o)  [phone] (f)    Emory Daily, Lisandro Clinical Psychologist/ Behavioral Health Consultant  12/17/2019  10:02 AM      Time spent with Patient: 25 minutes  Reason for [address] F/U:       Pt provided informed consent for the behavioral health program. The pt was informed of the psychologists role and availability in the pain clinic. Pt indicated understanding. Feedback given to Dr. Ana Calderón, pain management physician. S:        HPI: The pt has been followed by Dr. Jorge Rivera since 9/2017 for chronic low back and leg pain. The pt has had surgery in the back and hips, PT and injections.  The pt lives alone, has cleaning help on fridays, cooking and laundry independently. Good social support from son and neighbors          Current Visit: Sleep continues to be significant concern. The pt reported difficulty both falling and staying asleep. Dizzy spells have not resolved, has not spoken to PCP yet, but is scheduled to see cardiology 1/3. Irritability has been more manageable, withdraws socially so that it doesn't effect other people. O:  The pt continues to have difficulty sleeping. Irritability has not been effecting communication and behavior with others due to social withdrawal but continues to feel increased irritability.      A:  MSE:  Appearance: good hygiene and appropriate attire  Attitude: cooperative, friendly and no distress  Consciousness: alert  Orientation: oriented to person, place, time, general circumstance  Memory: recent and remote memory intact  Attention/Concentration: intact during session  Psychomotor Activity: normal  Eye Contact: normal  Speech: normal rate and volume, well-articulated  Mood: euthymic  Affect: full range  Perception: within normal limits  Thought Content: within normal limits   Thought Process: logical, goal-directed, coherent  Insight: good  Judgment: intact  Morbid ideation: no  Suicide Assessment: no suicidal ideation    MARKUS 7 SCORE 12/17/2019 11/20/2019 10/24/2019   MARKUS-7 Total Score 8 7 3     Interpretation of MARKUS-7 score: 5-9 = mild anxiety, 10-14 = moderate anxiety, 15+ = severe anxiety. Recommend referral to behavioral health for scores 10 or greater. PHQ Scores 12/17/2019 11/20/2019 10/24/2019 7/17/2019 11/28/2018 9/5/2017 8/17/2017   PHQ2 Score 2 0 2 0 2 1 0   PHQ9 Score 6 4 7 0 2 1 0     Interpretation of Total Score Depression Severity: 1-4 = Minimal depression, 5-9 = Mild depression, 10-14 = Moderate depression, 15-19 = Moderately severe depression, 20-27 = Severe depression    Diagnosis:    1. Chronic pain syndrome    2. Psychophysiological insomnia    3.  Adjustment disorder with mixed anxiety and depressed mood        Patient Active Problem List   Diagnosis    Essential hypertension    Hyperlipemia    Status post skin graft    [facility] obstructive pulmonary disease) ([facility])    S/P PTCA (percutaneous transluminal coronary angioplasty)    Degeneration of intervertebral disc of lumbar region    KAVITA on CPAP    Claudication of left lower extremity ([address]    Coronary artery disease involving native coronary artery of native heart without angina pectoris    Tobacco abuse    Idiopathic peripheral neuropathy    Pulmonary nodule    Postlaminectomy syndrome, lumbar    Chronic, continuous use of opioids    Polyarthropathy, multiple sites    Chronic pain syndrome    Pain medication agreement    Pain disorder with psychological factors    CHF (congestive heart failure), NYHA class I, acute on chronic, combined ([address]    Fracture of vertebra    Gastroesophageal reflux disease    Chronic hip pain, left    Sacroiliitis, not elsewhere classified ([address]    Other spondylosis, lumbosacral region    Actinic keratoses    Skin cancer screening       Plan:  Pt interventions:  Continued to provide the pt with psycho-education regarding biopsychosocial model of pain.   Continued to establish rapport. Normalized and validated pt experience and provided reassurance. Emphasized self-care as important for managing overall health Discussed sleeping pattern, stimulus control, relaxation techniques   Discussed TIPP skills to reduce irritability     Pt Behavioral Change Plan:  Pt set the following goals:     The pt will continue to f/u with Dr. Lira Schaefer recommendations    The pt will follow sleep recommendations and keep a sleep diary    The pt will utilize TIPP skills

## 2019-12-30 DIAGNOSIS — M96.1 POSTLAMINECTOMY SYNDROME, LUMBAR: ICD-10-CM

## 2019-12-30 DIAGNOSIS — G89.4 CHRONIC PAIN SYNDROME: ICD-10-CM

## 2019-12-30 RX ORDER — CLOPIDOGREL BISULFATE 75 MG/1
TABLET ORAL
Qty: 90 TABLET | Refills: 1 | Status: SHIPPED | OUTPATIENT
Start: 2019-12-30 | End: 2020-07-13

## 2019-12-30 RX ORDER — GABAPENTIN 600 MG/1
TABLET ORAL
Qty: 90 TABLET | Refills: 1 | Status: SHIPPED | OUTPATIENT
Start: 2019-12-30 | End: 2020-03-02

## 2019-12-30 RX ORDER — PANTOPRAZOLE SODIUM 40 MG/1
TABLET, DELAYED RELEASE ORAL
Qty: 90 TABLET | Refills: 1 | Status: SHIPPED | OUTPATIENT
Start: 2019-12-30 | End: 2020-06-29

## 2020-01-06 RX ORDER — BUPROPION HYDROCHLORIDE 150 MG/1
150 TABLET, EXTENDED RELEASE ORAL 2 TIMES DAILY
COMMUNITY
Start: 2019-12-20 | End: 2021-09-16

## 2020-01-06 RX ORDER — BUPROPION HYDROCHLORIDE 150 MG/1
150 TABLET, EXTENDED RELEASE ORAL 2 TIMES DAILY
Qty: 180 TABLET | OUTPATIENT
Start: 2020-01-06

## 2020-01-09 ENCOUNTER — HOSPITAL ENCOUNTER (OUTPATIENT)
Dept: INTERVENTIONAL RADIOLOGY/VASCULAR | Age: 70
Discharge: HOME | End: 2020-01-09
Attending: ANESTHESIOLOGY | Admitting: ANESTHESIOLOGY
Payer: MEDICAID

## 2020-01-09 PROCEDURE — 6360000004 HC RX CONTRAST MEDICATION: Performed by: ANESTHESIOLOGY

## 2020-01-09 PROCEDURE — 6360000002 HC RX W HCPCS

## 2020-01-09 PROCEDURE — 27096 INJECT SACROILIAC JOINT: CPT | Performed by: ANESTHESIOLOGY

## 2020-01-09 PROCEDURE — 62323 NJX INTERLAMINAR LMBR/SAC: CPT

## 2020-01-09 PROCEDURE — 2500000003 HC RX 250 WO HCPCS

## 2020-01-09 RX ADMIN — IOHEXOL 10 ML: 180 INJECTION INTRAVENOUS at 08:30

## 2020-01-14 ENCOUNTER — OFFICE VISIT (OUTPATIENT)
Dept: DERMATOLOGY | Age: 70
End: 2020-01-14
Payer: MEDICAID

## 2020-01-14 PROBLEM — Z12.83 SKIN CANCER SCREENING: Status: ACTIVE | Noted: 2020-01-14

## 2020-01-14 PROBLEM — L57.0 ACTINIC KERATOSES: Status: ACTIVE | Noted: 2020-01-14

## 2020-01-14 PROCEDURE — 17000 DESTRUCT PREMALG LESION: CPT | Performed by: DERMATOLOGY

## 2020-01-14 PROCEDURE — 4004F PT TOBACCO SCREEN RCVD TLK: CPT | Performed by: DERMATOLOGY

## 2020-01-14 PROCEDURE — G8482 FLU IMMUNIZE ORDER/ADMIN: HCPCS | Performed by: DERMATOLOGY

## 2020-01-14 PROCEDURE — G8427 DOCREV CUR MEDS BY ELIG CLIN: HCPCS | Performed by: DERMATOLOGY

## 2020-01-14 PROCEDURE — G8417 CALC BMI ABV UP PARAM F/U: HCPCS | Performed by: DERMATOLOGY

## 2020-01-14 PROCEDURE — 4040F PNEUMOC VAC/ADMIN/RCVD: CPT | Performed by: DERMATOLOGY

## 2020-01-14 PROCEDURE — 17003 DESTRUCT PREMALG LES 2-14: CPT | Performed by: DERMATOLOGY

## 2020-01-14 PROCEDURE — 1123F ACP DISCUSS/DSCN MKR DOCD: CPT | Performed by: DERMATOLOGY

## 2020-01-14 PROCEDURE — 3017F COLORECTAL CA SCREEN DOC REV: CPT | Performed by: DERMATOLOGY

## 2020-01-14 PROCEDURE — 99202 OFFICE O/P NEW SF 15 MIN: CPT | Performed by: DERMATOLOGY

## 2020-01-14 NOTE — LETTER
[facility]  [address] [place] [address]  Phone: [phone]  Fax: [phone]    Moraima Thomas MD        January 14, 2020     [place] [address]    Patient: Julia Olvera  MR Number: [id number]  YOB: 1950  Date of Visit: 1/14/2020    Dear Dr. Annmarie Carter: Thank you for the request for consultation for [facility] to me for the evaluation of AKs. Below are the relevant portions of my assessment and plan of care. If you have questions, please do not hesitate to call me. I look forward to following Ernestina Thorne along with you.     Sincerely,        Moraima Thomas MD

## 2020-01-14 NOTE — PATIENT INSTRUCTIONS
Please be mindful of your sun protection strategies, including use of broad spectrum sunscreen with SPF of at least 30, sun guard clothing with UPF (available at most sporting [employer] stores), broad brimmed hat, sunglasses, and sun avoidance during peak hours of the day. ABCDE's of melanoma to take note of:     ASYMMETRY OF MOLE,   BORDER STRANGE,   COLOR CHANGING OR BECOMING DARKER,   DIAMETER ENLARGING,   EVOLUTION (EXISTING MOLE IS CHANGING WITH ANY OF THE AFOREMENTIONED CHANGES, MOLE IS BLEEDING OR TENDER, OR NEW MOLES DEVELOPING SUDDENLY)    Also, please be sure to see dentist twice yearly and ensure someone (whether it's me,  your PCP, or gynecologist) is looking at genitals once yearly. Melanoma and other skin cancers can occur anywhere! Please email me through [place] or call office if any spot or mole changes, defined as becoming larger, darker, multi-colored, or becomes symptomatic (bleeding, tender, etc).

## 2020-01-14 NOTE — PROGRESS NOTES
[address] [facility], [place] [facility] ([facility]) Physicians    Previous clinic visit:  none    CC:  lesions on arms and L ear    HPI:    1.)  Here today for problem focused visit. Has at least a few year duration of several scaly asx lesions on dorsal arms and 1 on L ear helix. No tx to date. Denies h/o skin cancers personally. Retired from [address]:   Personal history of NMSC or MM- no    Family history of NMSC or MM- yes - paternal grandmother MM   Sunburns easily- Yes   Uses sunscreen- No  History of tanning bed use- No    ADDITIONAL HISTORY:    I have reviewed past medical and surgical histories, current medications, allergies, social and family histories as documented in the patient's electronic medical record. Current Outpatient Medications   Medication Sig Dispense Refill    buPROPion (WELLBUTRIN SR) 150 MG extended release tablet Take 150 mg by mouth 2 times daily      clopidogrel (PLAVIX) 75 MG tablet TAKE ONE TABLET BY MOUTH DAILY 90 tablet 1    pantoprazole (PROTONIX) 40 MG tablet TAKE 1 TABLET BY MOUTH EVERY NIGHT IN THE EVENING 1 HOUR BEFORE BEDTIME 90 tablet 1    gabapentin (NEURONTIN) 600 MG tablet TAKE ONE TABLET BY MOUTH EVERY MORNING AND 2 TABLETS BY MOUTH EVERY EVENING 90 tablet 1    oxyCODONE-acetaminophen (PERCOCET) 5-325 MG per tablet Take 1 tablet by mouth every 8 hours as needed (severe pain) for up to 30 days.  90 tablet 0    allopurinol (ZYLOPRIM) 100 MG tablet TAKE 2 TABLETS BY MOUTH DAILY 180 tablet 1    DULoxetine (CYMBALTA) 60 MG extended release capsule TAKE 1 CAPSULE BY MOUTH DAILY 90 capsule 1    losartan (COZAAR) 100 MG tablet Take 0.5 tablets by mouth daily 90 tablet 1    atorvastatin (LIPITOR) 80 MG tablet TAKE 1 TABLET BY MOUTH DAILY 90 tablet 1    furosemide (LASIX) 40 MG tablet Take 1 tablet by mouth daily 90 tablet 3    folic acid (FOLVITE) 1 MG tablet Take 1 mg by mouth daily      BREO ELLIPTA 200-25 MCG/INH AEPB INHALE ONE PUFF BY MOUTH DAILY 1 each 11    INCRUSE ELLIPTA 62.5 MCG/INH AEPB INHALE ONE PUFF BY MOUTH DAILY 1 each 11    albuterol sulfate HFA (PROAIR HFA) 108 (90 Base) MCG/ACT inhaler Inhale 2 puffs into the lungs every 6 hours as needed for Wheezing 1 Inhaler 3    metoprolol tartrate (LOPRESSOR) 50 MG tablet TAKE ONE (1) TABLET BY MOUTH TWICE A DAY. 180 tablet 3    metoprolol (LOPRESSOR) 100 MG tablet Take 1 tablet by mouth 2 times daily 180 tablet 3    albuterol (ACCUNEB) 1.25 MG/3ML nebulizer solution Inhale 3 mLs into the lungs every 6 hours as needed for Wheezing 120 vial 3    nitroGLYCERIN (NITROSTAT) 0.4 MG SL tablet Place 1 tablet under the tongue every 5 minutes as needed for Chest pain 25 tablet 1    magnesium gluconate (MAGONATE) 500 MG tablet Take 500 mg by mouth daily.  famotidine (PEPCID) 20 MG tablet Take 1 tablet by mouth nightly as needed (Uncontrolled GERD symptoms. ) 30 tablet 3    ibuprofen (ADVIL;MOTRIN) 200 MG tablet Take 1 tablet by mouth every 8 hours as needed for Pain 30 tablet 0     No current facility-administered medications for this visit.         ROS:    General: No fevers, chills, sweats, unexplained weight loss or weight gain, fatigue, malaise   Skin: Denies additional lesions    Heme: No history of bleeding diatheses    Allergy: Denies seasonal or environmental allergies or other medication allergies     PHYSICAL EXAM:    General: Well-appearing, NAD    Neurologic/psychiatric: Patient is AOx3; mood and affect are appropriate and congruent  Eyes: conjunctivae and eyelids without erythema, injection, or discharge   Integument: Examination was performed of the following and were unremarkable except as otherwise noted below:scalp, hair, face, ears, mucosa of gums/teeth/cutaneous and mucosal lips, buccal mucosa, oropharynx, neck, breast/axilla/chest, abdomen,back, RUE, LUE, digits/nails, apocrine/eccrine glands; genital exam deferred per pt request    Abnormalities noted include:    1.) Dorsal arms, L ear helix with several ill defined pink scaly macules    ASSESSMENT AND PLAN:    1.)  AKs:  - LN2 x 15 sec x 2 cycles x  7 thicker lesions; edu: irritation, dyspigmentation, redness, possible recurrence, use of Vaseline until healed  - can consider field tx in the future but patient's debility may preclude this  - Edu: patient regarding sun protection strategies, including use of broad spectrum sunscreen with SPF of at least 30, sun guard clothing, broad brimmed hat, sunglasses, and sun avoidance during peak hours of the day. ABCDE's of melanoma also reviewed      Return to Clinic:  1 yr and prn changes; reviewed signs and sx of skin cancer extensively with patient  Discussed plan with patient and/or primary caretaker. Patient to call clinic with any questions / concerns. Reviewed side effects of treatment(s) and/or medication(s) with patient and/or primary caretaker.    AVS provided or is available on [employer]   ____________________________________________________________________________   Lorene Britton MD, MPH, FAAD  [facility], [address] [address]

## 2020-01-15 ENCOUNTER — TELEPHONE (OUTPATIENT)
Dept: PRIMARY CARE CLINIC | Age: 70
End: 2020-01-15

## 2020-01-15 RX ORDER — OXYCODONE HYDROCHLORIDE AND ACETAMINOPHEN 5; 325 MG/1; MG/1
1 TABLET ORAL EVERY 8 HOURS PRN
Qty: 90 TABLET | Refills: 0 | Status: SHIPPED | OUTPATIENT
Start: 2020-01-16 | End: 2020-02-15

## 2020-01-15 NOTE — TELEPHONE ENCOUNTER
Patient is requesting alternative medication be prescribed to replace famotidine (PEPCID) 20 MG tablet . Patient states that pharmacy is all out of Famotidine and they are unsure when they will be back in stock.     Pharmacy: [facility] [address]

## 2020-01-16 RX ORDER — RANITIDINE 150 MG/1
150 TABLET ORAL 2 TIMES DAILY PRN
Qty: 60 TABLET | Refills: 3 | Status: SHIPPED | OUTPATIENT
Start: 2020-01-16 | End: 2020-01-16

## 2020-01-16 RX ORDER — RANITIDINE 150 MG/1
TABLET ORAL
Qty: 180 TABLET | Refills: 1 | Status: SHIPPED | OUTPATIENT
Start: 2020-01-16 | End: 2021-08-25

## 2020-01-22 ENCOUNTER — OFFICE VISIT (OUTPATIENT)
Dept: CARDIOLOGY CLINIC | Age: 70
End: 2020-01-22
Payer: MEDICAID

## 2020-01-22 VITALS
WEIGHT: 215.6 LBS | DIASTOLIC BLOOD PRESSURE: 80 MMHG | SYSTOLIC BLOOD PRESSURE: 139 MMHG | BODY MASS INDEX: 30.94 KG/M2 | HEART RATE: 62 BPM

## 2020-01-22 PROCEDURE — 4040F PNEUMOC VAC/ADMIN/RCVD: CPT | Performed by: NURSE PRACTITIONER

## 2020-01-22 PROCEDURE — 3017F COLORECTAL CA SCREEN DOC REV: CPT | Performed by: NURSE PRACTITIONER

## 2020-01-22 PROCEDURE — 1123F ACP DISCUSS/DSCN MKR DOCD: CPT | Performed by: NURSE PRACTITIONER

## 2020-01-22 PROCEDURE — G8482 FLU IMMUNIZE ORDER/ADMIN: HCPCS | Performed by: NURSE PRACTITIONER

## 2020-01-22 PROCEDURE — 93000 ELECTROCARDIOGRAM COMPLETE: CPT | Performed by: INTERNAL MEDICINE

## 2020-01-22 PROCEDURE — G8427 DOCREV CUR MEDS BY ELIG CLIN: HCPCS | Performed by: NURSE PRACTITIONER

## 2020-01-22 PROCEDURE — G8417 CALC BMI ABV UP PARAM F/U: HCPCS | Performed by: NURSE PRACTITIONER

## 2020-01-22 PROCEDURE — 99214 OFFICE O/P EST MOD 30 MIN: CPT | Performed by: NURSE PRACTITIONER

## 2020-01-22 PROCEDURE — 4004F PT TOBACCO SCREEN RCVD TLK: CPT | Performed by: NURSE PRACTITIONER

## 2020-01-22 NOTE — PROGRESS NOTES
TRANSLUMINAL ANGIOPLASTY Left 12/22/2015    Dr. Asmita Bergeron - [facility] exploration, thrombectomy of ileofemoral system, stenting of RAFAL in-stent stenosis w/8 x 37 mm [place]      Family History   Problem Relation Age of Onset    Cancer Mother         Breast    Heart Disease Mother     Cancer Father         Bladder    Heart Disease Father     Cancer Paternal Grandmother         melanoma      Social History     Tobacco Use    Smoking status: Current Every Day Smoker     Packs/day: 0.50     Years: 52.00     Pack years: 26.00     Types: Cigarettes     Start date: 1/1/1967    Smokeless tobacco: Never Used   Substance Use Topics    Alcohol use: Yes     Alcohol/week: 0.0 standard drinks     Comment: 2-3 beers a month    Drug use: No       Allergies   Allergen Reactions    Asa [Aspirin] Other (See Comments)     Stomach ache    Daliresp [Roflumilast] Diarrhea and Other (See Comments)     Severe diarrhea and did not help     Current Outpatient Medications   Medication Sig Dispense Refill    ranitidine (ZANTAC) 150 MG tablet TAKE 1 TABLET BY MOUTH TWICE DAILY AS NEEDED FOR HEARTBURN 180 tablet 1    oxyCODONE-acetaminophen (PERCOCET) 5-325 MG per tablet Take 1 tablet by mouth every 8 hours as needed (severe pain) for up to 30 days.  90 tablet 0    buPROPion (WELLBUTRIN SR) 150 MG extended release tablet Take 150 mg by mouth 2 times daily      clopidogrel (PLAVIX) 75 MG tablet TAKE ONE TABLET BY MOUTH DAILY 90 tablet 1    pantoprazole (PROTONIX) 40 MG tablet TAKE 1 TABLET BY MOUTH EVERY NIGHT IN THE EVENING 1 HOUR BEFORE BEDTIME 90 tablet 1    gabapentin (NEURONTIN) 600 MG tablet TAKE ONE TABLET BY MOUTH EVERY MORNING AND 2 TABLETS BY MOUTH EVERY EVENING 90 tablet 1    allopurinol (ZYLOPRIM) 100 MG tablet TAKE 2 TABLETS BY MOUTH DAILY 180 tablet 1    DULoxetine (CYMBALTA) 60 MG extended release capsule TAKE 1 CAPSULE BY MOUTH DAILY 90 capsule 1    losartan (COZAAR) 100 MG tablet Take 0.5 tablets by mouth daily 90 tablet nose teeth & gums   Eyes:PERRLA EOM's intact. Neck: Neck supple. No JVD present. Carotids without bruits. No mass and no thyromegaly present. No lymphadenopathy present. Cardiovascular: RRR, normal S1 and S2; no murmur/gallop or rub, PMI nondisplaced  Pulmonary/Chest: Effort normal.  Lungs clear to auscultation. Chest wall nontender  Abdominal: soft, nontender, nondistended. + bowel sounds; no organomegaly or bruits. Aorta normal  Extremities: No edema, cyanosis, or clubbing. Pulses are 2+ radial/carotid/dorsalis pedis bilaterally. Cap refill brisk. Neurological: No cranial nerve deficit. Psychiatric: He has a normal mood and affect. His speech is normal and behavior is normal.     Lab Review:   Lab Results   Component Value Date    TRIG 204 07/17/2019    HDL 32 07/17/2019    LDLCALC 74 07/17/2019    LDLDIRECT 96 01/03/2018    LABVLDL 41 07/17/2019     Lab Results   Component Value Date     08/26/2019    K 4.1 08/26/2019    K 4.1 08/06/2019     08/26/2019    CO2 27 08/26/2019    BUN 10 08/26/2019    CREATININE 0.8 08/26/2019    GLUCOSE 103 08/26/2019    CALCIUM 9.6 08/26/2019      Lab Results   Component Value Date    WBC 6.2 08/08/2019    HGB 14.5 08/08/2019    HCT 44.1 08/08/2019    .3 (H) 08/08/2019     08/08/2019         I have reviewed any available labs, images, any stress test, [facility] on this pt       Assessment:    CAD/ stents  In 2013 / stable / stress test 8/2017 / on GDMT (on plavix not on asa)  Tachy/ in NSR rate 62    KAVITA states wears CPAP     HTN b/p optimal    HLD controlled     neuropathy Cymbalta     CHF / HFpEF appears euvolemic   / lasix daily     Echo 8/2019  Left ventricular cavity size is normal. There is mild to moderate concentric   left ventricular hypertrophy. Overall left ventricular systolic function   appears normal with an ejection fraction of 55-60%. No regional wall motion   abnormalities are noted. Indeterminate diastolic function.  Trivial mitral   and

## 2020-02-07 ENCOUNTER — HOSPITAL ENCOUNTER (OUTPATIENT)
Dept: CT IMAGING | Age: 70
Discharge: HOME | End: 2020-02-07
Payer: MEDICAID

## 2020-02-07 DIAGNOSIS — I50.43 CHF (CONGESTIVE HEART FAILURE), NYHA CLASS I, ACUTE ON CHRONIC, COMBINED (HCC): ICD-10-CM

## 2020-02-07 DIAGNOSIS — R07.9 CHEST PAIN, UNSPECIFIED TYPE: ICD-10-CM

## 2020-02-07 LAB
A/G RATIO: 1.7 (ref 1.1–2.2)
ALBUMIN SERPL-MCNC: 4.2 G/DL (ref 3.4–5)
ALP BLD-CCNC: 92 U/L (ref 40–129)
ALT SERPL-CCNC: 27 U/L (ref 10–40)
ANION GAP SERPL CALCULATED.3IONS-SCNC: 14 MMOL/L (ref 3–16)
AST SERPL-CCNC: 23 U/L (ref 15–37)
BILIRUB SERPL-MCNC: 0.9 MG/DL (ref 0–1)
BILIRUBIN DIRECT: <0.2 MG/DL (ref 0–0.3)
BILIRUBIN, INDIRECT: NORMAL MG/DL (ref 0–1)
BUN BLDV-MCNC: 15 MG/DL (ref 7–20)
CALCIUM SERPL-MCNC: 9.1 MG/DL (ref 8.3–10.6)
CHLORIDE BLD-SCNC: 102 MMOL/L (ref 99–110)
CO2: 24 MMOL/L (ref 21–32)
CREAT SERPL-MCNC: 0.9 MG/DL (ref 0.8–1.3)
GFR AFRICAN AMERICAN: >60
GFR NON-AFRICAN AMERICAN: >60
GLOBULIN: 2.5 G/DL
GLUCOSE BLD-MCNC: 124 MG/DL (ref 70–99)
HCT VFR BLD CALC: 47.6 % (ref 40.5–52.5)
HEMOGLOBIN: 16 G/DL (ref 13.5–17.5)
MAGNESIUM: 1.7 MG/DL (ref 1.8–2.4)
MCH RBC QN AUTO: 34.8 PG (ref 26–34)
MCHC RBC AUTO-ENTMCNC: 33.6 G/DL (ref 31–36)
MCV RBC AUTO: 103.4 FL (ref 80–100)
PDW BLD-RTO: 13.9 % (ref 12.4–15.4)
PLATELET # BLD: 156 K/UL (ref 135–450)
PMV BLD AUTO: 9.3 FL (ref 5–10.5)
POTASSIUM SERPL-SCNC: 4.1 MMOL/L (ref 3.5–5.1)
RBC # BLD: 4.6 M/UL (ref 4.2–5.9)
SODIUM BLD-SCNC: 140 MMOL/L (ref 136–145)
TOTAL PROTEIN: 6.7 G/DL (ref 6.4–8.2)
TSH REFLEX FT4: 3.33 UIU/ML (ref 0.27–4.2)
VITAMIN D 25-HYDROXY: 19.4 NG/ML
WBC # BLD: 7.9 K/UL (ref 4–11)

## 2020-02-07 PROCEDURE — 71250 CT THORAX DX C-: CPT

## 2020-02-11 ENCOUNTER — TELEPHONE (OUTPATIENT)
Dept: CARDIOLOGY CLINIC | Age: 70
End: 2020-02-11

## 2020-02-13 PROBLEM — Z12.83 SKIN CANCER SCREENING: Status: RESOLVED | Noted: 2020-01-14 | Resolved: 2020-02-13

## 2020-02-17 ENCOUNTER — OFFICE VISIT (OUTPATIENT)
Dept: PAIN MANAGEMENT | Age: 70
End: 2020-02-17
Payer: MEDICAID

## 2020-02-17 VITALS
WEIGHT: 215 LBS | BODY MASS INDEX: 30.78 KG/M2 | HEIGHT: 70 IN | DIASTOLIC BLOOD PRESSURE: 82 MMHG | SYSTOLIC BLOOD PRESSURE: 142 MMHG

## 2020-02-17 PROCEDURE — G8417 CALC BMI ABV UP PARAM F/U: HCPCS | Performed by: ANESTHESIOLOGY

## 2020-02-17 PROCEDURE — 4004F PT TOBACCO SCREEN RCVD TLK: CPT | Performed by: ANESTHESIOLOGY

## 2020-02-17 PROCEDURE — G8427 DOCREV CUR MEDS BY ELIG CLIN: HCPCS | Performed by: ANESTHESIOLOGY

## 2020-02-17 PROCEDURE — G8482 FLU IMMUNIZE ORDER/ADMIN: HCPCS | Performed by: ANESTHESIOLOGY

## 2020-02-17 PROCEDURE — 99214 OFFICE O/P EST MOD 30 MIN: CPT | Performed by: ANESTHESIOLOGY

## 2020-02-17 PROCEDURE — 3017F COLORECTAL CA SCREEN DOC REV: CPT | Performed by: ANESTHESIOLOGY

## 2020-02-17 PROCEDURE — 1123F ACP DISCUSS/DSCN MKR DOCD: CPT | Performed by: ANESTHESIOLOGY

## 2020-02-17 PROCEDURE — 4040F PNEUMOC VAC/ADMIN/RCVD: CPT | Performed by: ANESTHESIOLOGY

## 2020-02-17 RX ORDER — OXYCODONE HYDROCHLORIDE AND ACETAMINOPHEN 5; 325 MG/1; MG/1
1 TABLET ORAL EVERY 8 HOURS PRN
Qty: 90 TABLET | Refills: 0 | Status: SHIPPED | OUTPATIENT
Start: 2020-02-17 | End: 2020-03-18

## 2020-02-17 ASSESSMENT — ENCOUNTER SYMPTOMS
CONSTIPATION: 0
NAUSEA: 0
WHEEZING: 0
EYE PAIN: 0
COUGH: 0
SHORTNESS OF BREATH: 0
ABDOMINAL PAIN: 0
EYE REDNESS: 0
EYE DISCHARGE: 0
BACK PAIN: 1
VOMITING: 0

## 2020-02-17 NOTE — PATIENT INSTRUCTIONS
Patient Education      continue home exercises and gabapentin  Limit use of Percocet for severe pain    Learning About Managing Chronic Pain  What is a plan for pain management? A pain management plan helps you find ways to control pain with side effects you can live with. Some diseases and injuries can cause pain that lasts a long time. Constant pain can make you depressed. It can cause stress and make it hard for you to eat and sleep. But you don't need to live with uncontrolled pain. How can you plan for managing your pain? You and your doctor will work to make your plan. Your plan can include more than one type of pain control. You may take prescription or over-the-counter drugs. You can also use physical treatments, like massage and acupuncture. Other things can help too, such as meditation or a type of counseling to change how you think about your pain. It's important to let your doctor know how your pain is affecting your life. The goal of a pain management plan isn't to totally get rid of pain. Instead, the goal is to control the pain enough so that daily activities are easier. If your pain isn't controlled well enough, talk with your doctor. You may need to make a new plan. Or your doctor may refer you to a specialist.  What medicines are used? Your doctor may prescribe medicine to help with your pain. If you aren't taking a prescription medicine, you may be able to take an over-the-counter one. Here are the main types of medicine for chronic pain. · Non-opioids. These are things like acetaminophen, such as Tylenol, and non-steroidal anti-inflammatory drugs (NSAIDs), such as Advil. · Opioids. Morphine, codeine, and oxycodone are some examples. · Other medicines. Antidepressants and anti-seizure medicines may be used. These medicines seem to change the way your brain senses pain. Another option may be a nerve block injection. Medicines are the most common treatment for pain.  But to feel better, you'll need to do more than take medicine. You can also do things like reducing your stress level and changing how you think. How can you take medicine safely? Medicines can help you get better. But they can also be dangerous, especially if you don't take them the right way. Be safe with medicines. Read and follow all instructions on the label. If the medicine you take causes side effects such as constipation or nausea, you may need to take other medicines for those problems. Talk to your doctor about any side effects you have. If you were prescribed an opioid pain reliever, your care team will give you information on how to use it safely. You will also get directions for how to safely store the medicine and how to get rid of any that's left over. Follow these instructions carefully. What physical treatments can help? Physical treatments can be an important part of managing chronic pain. You may find that combining more than one treatment helps the most.  These treatments can include:  · Heat or cold. This can help arthritis, sore muscles, and other aches. · Hydrotherapy. It uses flowing water to relax muscles. · Massage. Massage involves rubbing the soft tissues of the body. It eases tension and pain. · Transcutaneous electrical nerve stimulation (TENS). This treatment uses a gentle electric current applied to the skin for pain relief. · Acupuncture. This is a form of traditional [facility] medicine. It uses very thin needles inserted into certain points of the body. · Physical therapy. This treatment uses stretches and exercises to reduce pain and help you move better. If you get physical therapy, make sure to do any home exercises or stretching your therapist has prescribed. Stay as active as you can. Try to get some physical activity every day. What other things can help? You can manage chronic pain by using things other than medicines or physical treatments.  For example, you can keep track of your pain in a pain diary. It can help you understand how the things you do affect your pain. Reducing stress and tension can reduce pain. And being more aware of your thought patterns can be helpful. In some cases, shifting how you think about pain can affect how you feel. Here are some options to think about:  · Breathing exercises and meditation. These techniques can help you focus your attention, relax, and get rid of tension. · Guided imagery. This is a series of thoughts and images that can focus your attention away from your pain. · Hypnosis. It's a state of focused concentration that makes you less aware of your surroundings. · Cognitive behavioral therapy. This type of counseling helps you change your thought patterns. · Yoga. Stretching and exercises can reduce stress and improve flexibility. If what you're doing to control your pain isn't working, or if you're feeling depressed, talk to your doctor. He or she can help you change your pain management plan and find resources for emotional support. Where can you learn more? Go to https://[employer]. org and sign in to your [employer] account. Enter P119 in the [employer] box to learn more about \"Learning About Managing Chronic Pain. \"     If you do not have an account, please click on the \"Sign Up Now\" link. Current as of: March 28, 2019  Content Version: 12.3  © 9177-8284 Healthwise, Incorporated. Care instructions adapted under license by [facility] ([facility]). If you have questions about a medical condition or this instruction, always ask your healthcare professional. [address] [address] any warranty or liability for your use of this information.

## 2020-02-17 NOTE — PROGRESS NOTES
bilaterally; foraminotomies l5   nerve roots bilaterally    LEG DEBRIDEMENT Right 7/23/2013    Dr. Coreen Meade - pretibial wound    OTHER SURGICAL HISTORY Right 6/25/2013    Dr. Coreen Meade - CFA Endarterectomy w/marsupialization; RLE wound excision & debridement     OTHER SURGICAL HISTORY Left 8/27/2013    Dr. Coreen Meade - femoral & profunda femoris endarterectomy w/marsupialization patch angioplasty using SFA    SKIN SPLIT GRAFT Right 7/25/2013    Dr. Coreen Meade - to non-healing R pretibial wound, 9 x 15 cm    TOTAL HIP ARTHROPLASTY Right 09/01/2016    Dr. Jarett Crane Left 12/22/2015    Dr. Coreen Meade - CFA exploration, thrombectomy of ileofemoral system, stenting of RAFAL in-stent stenosis w/8 x 37 mm Palmaz        Allergies   Allergen Reactions    Asa [Aspirin] Other (See Comments)     Stomach ache    Daliresp [Roflumilast] Diarrhea and Other (See Comments)     Severe diarrhea and did not help       Current Outpatient Medications   Medication Sig Dispense Refill    oxyCODONE-acetaminophen (PERCOCET) 5-325 MG per tablet Take 1 tablet by mouth every 8 hours as needed (severe pan) for up to 30 days. Intended supply: 3 days.  Take lowest dose possible to manage pain 90 tablet 0    ranitidine (ZANTAC) 150 MG tablet TAKE 1 TABLET BY MOUTH TWICE DAILY AS NEEDED FOR HEARTBURN 180 tablet 1    buPROPion (WELLBUTRIN SR) 150 MG extended release tablet Take 150 mg by mouth 2 times daily      clopidogrel (PLAVIX) 75 MG tablet TAKE ONE TABLET BY MOUTH DAILY 90 tablet 1    pantoprazole (PROTONIX) 40 MG tablet TAKE 1 TABLET BY MOUTH EVERY NIGHT IN THE EVENING 1 HOUR BEFORE BEDTIME 90 tablet 1    gabapentin (NEURONTIN) 600 MG tablet TAKE ONE TABLET BY MOUTH EVERY MORNING AND 2 TABLETS BY MOUTH EVERY EVENING 90 tablet 1    allopurinol (ZYLOPRIM) 100 MG tablet TAKE 2 TABLETS BY MOUTH DAILY 180 tablet 1    DULoxetine (CYMBALTA) 60 MG extended release capsule TAKE 1 CAPSULE BY MOUTH DAILY 90 capsule 1    losartan Transportation needs:     Medical: Not on file     Non-medical: Not on file   Tobacco Use    Smoking status: Current Every Day Smoker     Packs/day: 0.50     Years: 52.00     Pack years: 26.00     Types: Cigarettes     Start date: 1/1/1967    Smokeless tobacco: Never Used   Substance and Sexual Activity    Alcohol use: Yes     Alcohol/week: 0.0 standard drinks     Comment: 2-3 beers a month    Drug use: No    Sexual activity: Never   Lifestyle    Physical activity:     Days per week: Not on file     Minutes per session: Not on file    Stress: Not on file   Relationships    Social connections:     Talks on phone: Not on file     Gets together: Not on file     Attends Mu-ism service: Not on file     Active member of club or organization: Not on file     Attends meetings of clubs or organizations: Not on file     Relationship status: Not on file    Intimate partner violence:     Fear of current or ex partner: Not on file     Emotionally abused: Not on file     Physically abused: Not on file     Forced sexual activity: Not on file   Other Topics Concern    Not on file   Social History Narrative    Not on file         Imaging Studies:   XR Left hip (12/05/2019)  \"Impression   1. Mild osteoarthritis in the left hip. \"             CT LS (10/27/2017)  \"Impression       Prior laminectomy L4-5 and L5-S1.       L4-5 grade 1 spondylolisthesis.       Mild canal stenosis L2-3 and L3-4. \"          Results of the above studies were personally reviewed by me with the patient indetail along with the images, when available. The patient was instructed to contact the primary care provider regarding other unrelated findings not addressed during today's visit. Review of Systems:   Review of Systems   Constitutional: Negative for chills and fever. HENT: Negative for hearing loss and tinnitus. Eyes: Negative for pain, discharge and redness. Respiratory: Negative for cough, shortness of breath and wheezing.

## 2020-02-17 NOTE — LETTER
disease. Overdose or dangerous interactions with alcohol and other medications may occur, leading to death. Hyperalgesia may develop, in which patients receiving opioids for the treatment of pain may actually become more sensitive to certain painful stimuli, and in some cases, experience pain from ordinarily non-painful stimuli. Women between the ages of 14-53 who could become pregnant should carefully weigh the risks and benefits of opioids with their physicians, as these medications increase the risk of pregnancy complications, including miscarriage,  delivery and stillbirth. It is also possible for babies to be born addicted to opioids. Opioid dependence withdrawal symptoms may include; feelings of uneasiness, increased pain, irritability, belly pain, diarrhea, sweats and goose-flesh. Benzodiazepines and non-benzodiazepine sleep medications: These medications can lead to problems such as addiction/dependence, sedation, fatigue, lightheadedness, dizziness, incoordination, falls, depression, hallucinations, and impaired judgment, memory and concentration. The ability to drive and operate machinery may also be affected. Abnormal sleep-related behaviors have been reported, including sleep walking, driving, making telephone calls, eating, or having sex while not fully awake. These medications can suppress breathing and worsen sleep apnea, particularly when combined with alcohol or other sedating medications, potentially leading to death. Dependence withdrawal symptoms may include tremors, anxiety, hallucinations and seizures. Stimulants:  Common adverse effects include addiction/dependence, increased blood pressure and heart rate, decreased appetite, nausea, involuntary weight loss, insomnia, irritability, and headaches.   These risks may increase when these medications are combined with other stimulants, such as caffeine pills or energy drinks, certain weight loss which may also result in my being prevented from receiving further care from this office. · Other:____________________________________________________________________    AGREEMENT:    I have read the above and have had all of my questions answered. For chronic disease management, I know that my symptoms can be managed with many types of treatments. A chronic medication trial may be part of my treatment, but I must be an active participant in my care. Medication therapy is only one part of my symptom management plan. In some cases, there may be limited scientific evidence to support the chronic use of certain medications to improve symptoms and daily function. Furthermore, in certain circumstances, there may be scientific information that suggests that use of chronic controlled substances may actually worsen my symptoms and increase my risk of unintentional death directly related to this medication therapy. I know that if my provider feels my risk from controlled medications is greater than my benefit, I will have my controlled substance medication(s) compassionately lowered or removed altogether. I agree to a controlled substance medication trial.      I further agree to allow this office to contact my HIPAA contact on file if there are concerns about my safety and use of controlled medications. I have agreed to use the following medications above as instructed by my physician and as stated in this [place] [id number].      Patient Signature:  ______________________  LTCO:1/61/4299    Provider Signature:______________________  Date:2/17/2020

## 2020-02-18 ENCOUNTER — OFFICE VISIT (OUTPATIENT)
Dept: PULMONOLOGY | Age: 70
End: 2020-02-18
Payer: MEDICAID

## 2020-02-18 VITALS
BODY MASS INDEX: 31.5 KG/M2 | DIASTOLIC BLOOD PRESSURE: 70 MMHG | HEIGHT: 70 IN | SYSTOLIC BLOOD PRESSURE: 126 MMHG | WEIGHT: 220 LBS | HEART RATE: 77 BPM | RESPIRATION RATE: 16 BRPM | OXYGEN SATURATION: 97 %

## 2020-02-18 PROCEDURE — 99214 OFFICE O/P EST MOD 30 MIN: CPT | Performed by: INTERNAL MEDICINE

## 2020-02-18 PROCEDURE — G8926 SPIRO NO PERF OR DOC: HCPCS | Performed by: INTERNAL MEDICINE

## 2020-02-18 PROCEDURE — 4004F PT TOBACCO SCREEN RCVD TLK: CPT | Performed by: INTERNAL MEDICINE

## 2020-02-18 PROCEDURE — 1123F ACP DISCUSS/DSCN MKR DOCD: CPT | Performed by: INTERNAL MEDICINE

## 2020-02-18 PROCEDURE — G8417 CALC BMI ABV UP PARAM F/U: HCPCS | Performed by: INTERNAL MEDICINE

## 2020-02-18 PROCEDURE — 3023F SPIROM DOC REV: CPT | Performed by: INTERNAL MEDICINE

## 2020-02-18 PROCEDURE — 3017F COLORECTAL CA SCREEN DOC REV: CPT | Performed by: INTERNAL MEDICINE

## 2020-02-18 PROCEDURE — G8427 DOCREV CUR MEDS BY ELIG CLIN: HCPCS | Performed by: INTERNAL MEDICINE

## 2020-02-18 PROCEDURE — G8482 FLU IMMUNIZE ORDER/ADMIN: HCPCS | Performed by: INTERNAL MEDICINE

## 2020-02-18 PROCEDURE — 4040F PNEUMOC VAC/ADMIN/RCVD: CPT | Performed by: INTERNAL MEDICINE

## 2020-02-18 RX ORDER — NITROGLYCERIN 0.4 MG/1
0.4 TABLET SUBLINGUAL EVERY 5 MIN PRN
Qty: 25 TABLET | Refills: 1 | Status: SHIPPED | OUTPATIENT
Start: 2020-02-18 | End: 2021-08-25 | Stop reason: SDUPTHER

## 2020-02-18 NOTE — PROGRESS NOTES
ischemic attack (TIA)     Hyperlipidemia     Hypertension     Neuropathy     KAVITA (obstructive sleep apnea)     On CPAP    Personal history of DVT (deep vein thrombosis)     PVD (peripheral vascular disease) (HCC)     Vertebral fracture        Social History:    Social History     Tobacco Use   Smoking Status Current Every Day Smoker    Packs/day: 0.50    Years: 52.00    Pack years: 26.00    Types: Cigarettes    Start date: 1/1/1967   Smokeless Tobacco Never Used       Current Medications:  Current Outpatient Medications on File Prior to Visit   Medication Sig Dispense Refill    oxyCODONE-acetaminophen (PERCOCET) 5-325 MG per tablet Take 1 tablet by mouth every 8 hours as needed (severe pan) for up to 30 days. Intended supply: 3 days.  Take lowest dose possible to manage pain 90 tablet 0    ranitidine (ZANTAC) 150 MG tablet TAKE 1 TABLET BY MOUTH TWICE DAILY AS NEEDED FOR HEARTBURN 180 tablet 1    buPROPion (WELLBUTRIN SR) 150 MG extended release tablet Take 150 mg by mouth 2 times daily      clopidogrel (PLAVIX) 75 MG tablet TAKE ONE TABLET BY MOUTH DAILY 90 tablet 1    pantoprazole (PROTONIX) 40 MG tablet TAKE 1 TABLET BY MOUTH EVERY NIGHT IN THE EVENING 1 HOUR BEFORE BEDTIME 90 tablet 1    gabapentin (NEURONTIN) 600 MG tablet TAKE ONE TABLET BY MOUTH EVERY MORNING AND 2 TABLETS BY MOUTH EVERY EVENING 90 tablet 1    allopurinol (ZYLOPRIM) 100 MG tablet TAKE 2 TABLETS BY MOUTH DAILY 180 tablet 1    DULoxetine (CYMBALTA) 60 MG extended release capsule TAKE 1 CAPSULE BY MOUTH DAILY 90 capsule 1    losartan (COZAAR) 100 MG tablet Take 0.5 tablets by mouth daily 90 tablet 1    atorvastatin (LIPITOR) 80 MG tablet TAKE 1 TABLET BY MOUTH DAILY 90 tablet 1    furosemide (LASIX) 40 MG tablet Take 1 tablet by mouth daily 90 tablet 3    folic acid (FOLVITE) 1 MG tablet Take 1 mg by mouth daily      BREO ELLIPTA 200-25 MCG/INH AEPB INHALE ONE PUFF BY MOUTH DAILY 1 each 11    INCRUSE ELLIPTA 62.5 MCG/INH AEPB INHALE ONE PUFF BY MOUTH DAILY 1 each 11    albuterol sulfate HFA (PROAIR HFA) 108 (90 Base) MCG/ACT inhaler Inhale 2 puffs into the lungs every 6 hours as needed for Wheezing 1 Inhaler 3    metoprolol tartrate (LOPRESSOR) 50 MG tablet TAKE ONE (1) TABLET BY MOUTH TWICE A DAY. 180 tablet 3    metoprolol (LOPRESSOR) 100 MG tablet Take 1 tablet by mouth 2 times daily 180 tablet 3    albuterol (ACCUNEB) 1.25 MG/3ML nebulizer solution Inhale 3 mLs into the lungs every 6 hours as needed for Wheezing 120 vial 3    magnesium gluconate (MAGONATE) 500 MG tablet Take 500 mg by mouth daily.  ibuprofen (ADVIL;MOTRIN) 200 MG tablet Take 1 tablet by mouth every 8 hours as needed for Pain 30 tablet 0     No current facility-administered medications on file prior to visit. REVIEW OF SYSTEMS:    CONSTITUTIONAL: Negative for fevers and chills  HEENT: Negative for oropharyngeal exudate, post nasal drip, sinus pain / pressure, nasal congestion, ear pain  RESPIRATORY:  See HPI  CARDIOVASCULAR: Negative for palpitations. No edema  GASTROINTESTINAL: Negative for nausea, vomiting, diarrhea, constipation and abdominal pain  HEMATOLOGICAL: Negative for adenopathy  SKIN: Negative for clubbing, cyanosis, skin lesions  EXTREMITIES: Negative for weakness, decreased ROM  NEUROLOGICAL: Negative for unilateral weakness, speech or gait abnormalities  PSYCH: Negative for anxiety, depression    Objective:   PHYSICAL EXAM:        VITALS:   Vitals:    02/18/20 1247   BP: 126/70   Site: Left Upper Arm   Position: Sitting   Cuff Size: Large Adult   Pulse: 77   Resp: 16   SpO2: 97%   Weight: 220 lb (99.8 kg)   Height: 5' 10\" (1.778 m)       CONSTITUTIONAL:  Awake, alert, cooperative, no apparent distress, and appears stated age  HEENT: No oropharyngeal exudate, PERRL, no cervical adenopathy, no tracheal deviation, thyroid size normal  LUNGS:  No increased work of breathing and clear to auscultation. No crackles. No wheezing. CARDIOVASCULAR:  normal S1 and S2 and no JVD. Trace LLE edema  ABDOMEN:  Normal bowel sounds, non-distended and non-tender to palpation  EXT: Trace edema, no calf tenderness. Pulses are present bilaterally. NEUROLOGIC:  Mental Status Exam:  Level of Alertness:   awake  Orientation:   person, place, time. SKIN:  Numerous changes to the skin of the arms bilaterally with some dry scales, normal turgor, no redness, warmth. DATA:    Last PFTs:  mild obstructive defect without  bronchodilator response and a moderately reduced diffusing capacity. Ct screenin2017  Lung RADS category 2 benign findings. Small pulmonary nodule right   lung as described unchanged.       Recommendation: Continued annual screening low dose CT       Lung RADS category S significant findings-marked coronary artery   calcification which is a risk factor for acute coronary syndrome. Marked fatty filtration liver. Emphysema. 2018     Lung RADS category 2-positive. Specifically, 4 mm and 3 mm stable pulmonary nodules in the right hemithorax       Lung RADS category S-negative, no new or unknown potentially significant incidental findings requiring urgent additional evaluation       Ydcropokvtcryu-riqkrd-gq low-dose CT lung screen in one year     2020      Impression       Continued stable appearance of previous seen noted stable nodules. The previously noted new larger nodules have resolved.       Cardiomegaly with coronary calcification.       No acute infiltrates seen. Assessment: This is a 81 y.o. male with COPD, CHF and KAVITA    Plan:     COPD:  Continue breo and incruse plus rescue inhaler. Needs to stop smoking. Did not need refills  Patient previously attempted pulmonary rehab, but he was in too much pain and stopped going. Screening CT in August showed a new 9mm RLL nodule. Repeat CT in February showed it had resolved. Resume annual surveillance from this February.     CHF:  Still 220 lbs but he doesn't have any peripheral edema. The patient is currently smoking less than a pack week. The risks related to smoking were reviewed with the patient. Recommend maintaining a smoke-free lifestyle. Products available for smoking cessation were discussed in detail. Says he's close to quitting and he's going to have patches from his insurance company soon. KAVITA:  Needs a new device. Previously on CPAP of 9, but he's 20lbs heavier than in 2014 when he had his original study. Wrote for an auto pap with range of 8-15 cmH2O. Orders Placed This Encounter   Medications    nitroGLYCERIN (NITROSTAT) 0.4 MG SL tablet     Sig: Place 1 tablet under the tongue every 5 minutes as needed for Chest pain     Dispense:  25 tablet     Refill:  1         Diagnosis Orders   1. Simple chronic bronchitis (HCC)  MT DME SUPPLY OR ACCESSORY, NOS   2. KAVITA on CPAP     3. Pulmonary nodule seen on imaging study     4. Chronic diastolic CHF (congestive heart failure) (HCC)           More than half the time of this 25 minute visit was spent counseling the patient. RTC 4 months w/ MD. Call or RTC sooner if symptoms persist or worsen acutely.         Edy Rangel

## 2020-02-21 LAB

## 2020-03-02 RX ORDER — GABAPENTIN 600 MG/1
TABLET ORAL
Qty: 90 TABLET | Refills: 0 | Status: SHIPPED | OUTPATIENT
Start: 2020-03-02 | End: 2020-04-06

## 2020-03-09 RX ORDER — METOPROLOL TARTRATE 100 MG/1
TABLET ORAL
Qty: 180 TABLET | Refills: 1 | Status: SHIPPED | OUTPATIENT
Start: 2020-03-09 | End: 2020-04-03 | Stop reason: SDUPTHER

## 2020-04-03 RX ORDER — ATORVASTATIN CALCIUM 80 MG/1
80 TABLET, FILM COATED ORAL DAILY
Qty: 90 TABLET | Refills: 1 | Status: SHIPPED | OUTPATIENT
Start: 2020-04-03 | End: 2020-07-22

## 2020-04-03 RX ORDER — METOPROLOL TARTRATE 100 MG/1
100 TABLET ORAL 2 TIMES DAILY
Qty: 180 TABLET | Refills: 1 | Status: SHIPPED | OUTPATIENT
Start: 2020-04-03 | End: 2020-05-05 | Stop reason: SDUPTHER

## 2020-04-06 RX ORDER — ATORVASTATIN CALCIUM 80 MG/1
TABLET, FILM COATED ORAL
Qty: 90 TABLET | Refills: 1 | Status: SHIPPED | OUTPATIENT
Start: 2020-04-06 | End: 2021-08-25 | Stop reason: SDUPTHER

## 2020-04-06 RX ORDER — GABAPENTIN 600 MG/1
TABLET ORAL
Qty: 90 TABLET | Refills: 0 | Status: SHIPPED | OUTPATIENT
Start: 2020-04-06 | End: 2020-05-07

## 2020-04-09 ENCOUNTER — TELEPHONE (OUTPATIENT)
Dept: PAIN MANAGEMENT | Age: 70
End: 2020-04-09

## 2020-04-10 ENCOUNTER — TELEPHONE (OUTPATIENT)
Dept: PRIMARY CARE CLINIC | Age: 70
End: 2020-04-10

## 2020-04-10 ENCOUNTER — NURSE TRIAGE (OUTPATIENT)
Dept: OTHER | Facility: CLINIC | Age: 70
End: 2020-04-10

## 2020-04-10 ENCOUNTER — VIRTUAL VISIT (OUTPATIENT)
Dept: PRIMARY CARE CLINIC | Age: 70
End: 2020-04-10
Payer: MEDICAID

## 2020-04-10 PROCEDURE — G2012 BRIEF CHECK IN BY MD/QHP: HCPCS | Performed by: FAMILY MEDICINE

## 2020-04-10 RX ORDER — LOSARTAN POTASSIUM 100 MG/1
100 TABLET ORAL DAILY
Qty: 90 TABLET | Refills: 1
Start: 2020-04-10 | End: 2020-07-22

## 2020-04-10 RX ORDER — METOPROLOL TARTRATE 50 MG/1
50 TABLET, FILM COATED ORAL 2 TIMES DAILY
COMMUNITY
End: 2020-05-04

## 2020-04-10 RX ORDER — BUTALBITAL, ACETAMINOPHEN AND CAFFEINE 50; 325; 40 MG/1; MG/1; MG/1
1 TABLET ORAL EVERY 4 HOURS PRN
Qty: 30 TABLET | Refills: 0 | Status: SHIPPED | OUTPATIENT
Start: 2020-04-10 | End: 2021-08-25

## 2020-04-13 ENCOUNTER — TELEPHONE (OUTPATIENT)
Dept: PRIMARY CARE CLINIC | Age: 70
End: 2020-04-13

## 2020-04-13 NOTE — TELEPHONE ENCOUNTER
Patient wanted you to know about his BP for today. After he took his meds he waited for almost an hour then his BP was 190/96.

## 2020-04-17 ENCOUNTER — VIRTUAL VISIT (OUTPATIENT)
Dept: PAIN MANAGEMENT | Age: 70
End: 2020-04-17
Payer: MEDICAID

## 2020-04-17 PROCEDURE — 99441 PR PHYS/QHP TELEPHONE EVALUATION 5-10 MIN: CPT | Performed by: ANESTHESIOLOGY

## 2020-04-17 RX ORDER — OXYCODONE HYDROCHLORIDE AND ACETAMINOPHEN 5; 325 MG/1; MG/1
1 TABLET ORAL EVERY 8 HOURS PRN
Qty: 90 TABLET | Refills: 0 | Status: SHIPPED | OUTPATIENT
Start: 2020-04-17 | End: 2020-05-15 | Stop reason: SDUPTHER

## 2020-04-17 NOTE — PROGRESS NOTES
medial facetectomy L4-5 joints  bilaterally; foraminotomies l5   nerve roots bilaterally    LEG DEBRIDEMENT Right 7/23/2013    Dr. Rubin Fournier - pretibial wound    OTHER SURGICAL HISTORY Right 6/25/2013    Dr. Rubin Fournier - CFA Endarterectomy w/marsupialization; RLE wound excision & debridement     OTHER SURGICAL HISTORY Left 8/27/2013    Dr. Rubin Fournier - femoral & profunda femoris endarterectomy w/marsupialization patch angioplasty using SFA    SKIN SPLIT GRAFT Right 7/25/2013    Dr. Rubin Fournier - to non-healing R pretibial wound, 9 x 15 cm    TOTAL HIP ARTHROPLASTY Right 09/01/2016    Dr. Mack Ha Left 12/22/2015    Dr. Rubin Fournier - CFA exploration, thrombectomy of ileofemoral system, stenting of RAFAL in-stent stenosis w/8 x 37 mm Palmaz        Allergies   Allergen Reactions    Asa [Aspirin] Other (See Comments)     Stomach ache    Daliresp [Roflumilast] Diarrhea and Other (See Comments)     Severe diarrhea and did not help       Current Outpatient Medications   Medication Sig Dispense Refill    oxyCODONE-acetaminophen (PERCOCET) 5-325 MG per tablet Take 1 tablet by mouth every 8 hours as needed for Pain for up to 30 days.  Take lowest dose possible to manage pain 90 tablet 0    metoprolol tartrate (LOPRESSOR) 50 MG tablet Take 50 mg by mouth 2 times daily      losartan (COZAAR) 100 MG tablet Take 1 tablet by mouth daily 90 tablet 1    butalbital-acetaminophen-caffeine (FIORICET, ESGIC) -40 MG per tablet Take 1 tablet by mouth every 4 hours as needed for Headaches 30 tablet 0    atorvastatin (LIPITOR) 80 MG tablet TAKE 1 TABLET BY MOUTH DAILY 90 tablet 1    gabapentin (NEURONTIN) 600 MG tablet TAKE 1 TABLET BY MOUTH EVERY MORNING AND 2 TABLETS BY MOUTH EVERY EVENING 90 tablet 0    metoprolol tartrate (LOPRESSOR) 100 MG tablet Take 1 tablet by mouth 2 times daily 180 tablet 1    atorvastatin (LIPITOR) 80 MG tablet Take 1 tablet by mouth daily 90 tablet 1    nitroGLYCERIN (NITROSTAT) 0.4

## 2020-04-24 ENCOUNTER — TELEPHONE (OUTPATIENT)
Dept: PRIMARY CARE CLINIC | Age: 70
End: 2020-04-24

## 2020-04-24 ENCOUNTER — NURSE TRIAGE (OUTPATIENT)
Dept: OTHER | Facility: CLINIC | Age: 70
End: 2020-04-24

## 2020-04-24 NOTE — TELEPHONE ENCOUNTER
Pt called stating that he has blood in Urine and  frequency. Having stings of  blood clots yesterday. Denies a fever    Reason for Disposition   Urinating more frequently than usual (i.e., frequency)    Answer Assessment - Initial Assessment Questions  1. SYMPTOM: \"What's the main symptom you're concerned about? \" (e.g., frequency, incontinence)     Frequency, and blood in urine 2 days ago  2. ONSET: \"When did the  4/22  start? \"      2 days ago  3. PAIN: \"Is there any pain? \" If so, ask: \"How bad is it? \" (Scale: 1-10; mild, moderate, severe)      No, burning  4. CAUSE: \"What do you think is causing the symptoms? \"      UTI  5. OTHER SYMPTOMS: \"Do you have any other symptoms? \" (e.g., fever, flank pain, blood in urine, pain with urination)      Blood in urine, stings of clots  6. PREGNANCY: \"Is there any chance you are pregnant? \" \"When was your last menstrual period? \"      No    Protocols used: URINARY SYMPTOMS-ADULT-    Spoke with Russ Merida for [facility] she will assist pt in scheduling at his PCP office today or a [place] clinic. Call transferred.

## 2020-04-24 NOTE — TELEPHONE ENCOUNTER
PER MICHEL @ TRIAGE NURSE -     The patient has the probable UTI with burning and blood in his urine. He does NOT have the capability to do a virtual visit so needs to been seen and/or drop off a urine sample. Please call the patient and discuss the options.     Thank you

## 2020-04-30 ENCOUNTER — TELEPHONE (OUTPATIENT)
Dept: PRIMARY CARE CLINIC | Age: 70
End: 2020-04-30

## 2020-04-30 RX ORDER — AMLODIPINE BESYLATE 10 MG/1
10 TABLET ORAL DAILY
Qty: 90 TABLET | Refills: 1 | Status: ON HOLD | OUTPATIENT
Start: 2020-04-30 | End: 2020-12-10 | Stop reason: HOSPADM

## 2020-04-30 NOTE — TELEPHONE ENCOUNTER
BP remains elevated and uncontrolled. At this time I want to start another BP medication - I have sent in Norvasc (Amlodipine) 10mg tablets. I want him to keep another BP log like and drop off in 7-10 days.

## 2020-05-05 RX ORDER — METOPROLOL TARTRATE 75 MG/1
150 TABLET, FILM COATED ORAL 2 TIMES DAILY
Qty: 360 TABLET | Refills: 1 | Status: SHIPPED | OUTPATIENT
Start: 2020-05-05 | End: 2020-05-12 | Stop reason: SDUPTHER

## 2020-05-07 RX ORDER — GABAPENTIN 600 MG/1
TABLET ORAL
Qty: 90 TABLET | Refills: 0 | Status: SHIPPED | OUTPATIENT
Start: 2020-05-07 | End: 2021-08-25

## 2020-05-12 ENCOUNTER — TELEPHONE (OUTPATIENT)
Dept: PRIMARY CARE CLINIC | Age: 70
End: 2020-05-12

## 2020-05-12 RX ORDER — METOPROLOL TARTRATE 37.5 MG/1
75 TABLET, FILM COATED ORAL 2 TIMES DAILY
Qty: 360 TABLET | Refills: 1 | Status: SHIPPED | OUTPATIENT
Start: 2020-05-12 | End: 2020-05-13 | Stop reason: SDUPTHER

## 2020-05-12 NOTE — TELEPHONE ENCOUNTER
Because of insurance issues is it okay for patient to take 1.5 tablets of metorprolol 100 mg? Patient states that Melissajulee Brannon tells him that the 75 mg metoprolol is not covered by his insurance. I called [facility]. The tech first said they had no order for the 75 mg strength. Then she states that the insurance did not cover the 75 mg strength. (they did not call us for a prior auth though). She then stated that they received authorization to fill 100 mg take 1.5 tablets. (we show no such authorization from us). She could not tell me who the pharmacist talked with.

## 2020-05-13 RX ORDER — METOPROLOL TARTRATE 100 MG/1
150 TABLET ORAL 2 TIMES DAILY
Qty: 270 TABLET | Refills: 1 | Status: ON HOLD | OUTPATIENT
Start: 2020-05-13 | End: 2020-12-10

## 2020-05-15 ENCOUNTER — VIRTUAL VISIT (OUTPATIENT)
Dept: PAIN MANAGEMENT | Age: 70
End: 2020-05-15
Payer: MEDICAID

## 2020-05-15 PROCEDURE — 99442 PR PHYS/QHP TELEPHONE EVALUATION 11-20 MIN: CPT | Performed by: ANESTHESIOLOGY

## 2020-05-15 RX ORDER — OXYCODONE HYDROCHLORIDE AND ACETAMINOPHEN 5; 325 MG/1; MG/1
1 TABLET ORAL EVERY 8 HOURS PRN
Qty: 90 TABLET | Refills: 0 | Status: SHIPPED | OUTPATIENT
Start: 2020-05-15 | End: 2020-06-12 | Stop reason: SDUPTHER

## 2020-05-15 NOTE — PROGRESS NOTES
[address] [facility] [address], [place], [address]  [phone] (E)  [phone] (f)        5/15/20      Joseph Morgan is a 78 y.o. male evaluated via TELEPHONE on 5/15/2020 using HIPPA compliant software (Epic/Perfect Serve). Patient declined video visit due to comm issues at his/her end. Consent:  He and/or health care decision maker is aware that that he may receive a bill for this telephone service, depending on his insurance coverage, and has provided verbal consent to proceed: Yes  Due to nature of the virtual visit, evaluation of organ systems was limited to presenting complains. Chief Complaint   Patient presents with    Lower Back Pain     virtual visit due to [employer] lock down       Reason for call: low back pain    Any changes since last visit:   Pain constant in lower back, achy sharp throbbing with radiation up to knee level; no new weakness. Pain worse with daily activities standing walking changes in weather and helped by pain medications. Unable to get out due to [employer]19 outbreak; ups and downs with the pain.       Past Medical History:   Diagnosis Date    CAD (coronary artery disease)     [facility]est[facility]     COPD (chronic obstructive pulmonary disease) ([facility]     Depressive disorder, not elsewhere classified     GERD (gastroesophageal reflux disease)     History of MI (myocardial infarction) 05/2013    History of skin ulcer of lower extremity     R anterior shin    History of transient ischemic attack (TIA)     Hyperlipidemia     Hypertension     Neuropathy     KAVITA (obstructive sleep apnea)     On CPAP    Personal history of DVT (deep vein thrombosis)     PVD (peripheral vascular disease) ([facility])     Vertebral fracture        Past Surgical History:   Procedure Laterality Date    APPENDECTOMY      CHOLECYSTECTOMY, LAPAROSCOPIC      CORONARY ANGIOPLASTY WITH STENT PLACEMENT  6/2013    EYE SURGERY Left    

## 2020-05-22 RX ORDER — DULOXETIN HYDROCHLORIDE 60 MG/1
CAPSULE, DELAYED RELEASE ORAL
Qty: 90 CAPSULE | Refills: 1 | Status: SHIPPED | OUTPATIENT
Start: 2020-05-22 | End: 2020-11-23

## 2020-06-09 ENCOUNTER — VIRTUAL VISIT (OUTPATIENT)
Dept: PULMONOLOGY | Age: 70
End: 2020-06-09
Payer: MEDICAID

## 2020-06-09 PROCEDURE — 99442 PR PHYS/QHP TELEPHONE EVALUATION 11-20 MIN: CPT | Performed by: INTERNAL MEDICINE

## 2020-06-09 RX ORDER — ALBUTEROL SULFATE 90 UG/1
2 AEROSOL, METERED RESPIRATORY (INHALATION) EVERY 6 HOURS PRN
Qty: 1 INHALER | Refills: 3 | Status: SHIPPED | OUTPATIENT
Start: 2020-06-09 | End: 2020-09-17

## 2020-06-09 NOTE — PROGRESS NOTES
Pulmonology Telephone Visit    Pursuant to the emergency declaration under the [facility], [place] [employer] [place] and the [employer] General Act this Telephone Visit was insisted, with patient's consent, to reduce the patient's risk of exposure to COVID-19 and provide continuity of care for an established patient. The patient was at home, while the provider was at the clinic. Services were provided through a synchronous discussion through a Telephone Call to substitute for in-person clinic visit, and coded as such. Total time spent with patient was 20 minutes. [facility] Pulmonary and Critical Care    Outpatient Follow Up Note    Subjective:   CHIEF COMPLAINT / HPI:     The patient is 78 y.o. male who presents today for follow up of COPD, CHF and KAVITA. Phone visit today because of the pandemic and Inder's risk factors. Arianne Mera notes that he has shortness of breath and cough which are a bit worse than baseline. Arianne Mera continues to roll his own cigarettes and smoke. He gets his supplies from the \"[employer]\" down the street. It is a convenient store that is owned by [employer]. He reports his weight is 223 pounds that he has some swelling by the end of the day in his ankles but it is gone by morning. He is compliant with his Breo and Incruse as well as his new auto titrating CPAP. He rarely uses his albuterol inhaler and in reviewing his med list the prescription has . Previous visit:  Arianne Mera comes in today complaining of being both sleepy and tired. He states that his CPAP \"shorted out\" and melted the water reservoir so he had to throw it out. He's using his breo and incruse that cost $8.50/month. He's still smoking. He also said he needed a refill on his nitroglycerin since what he has left is .      Previous visit:  Arianne Mera comes in today saying he feels about the same as usual.  He did point stable appearance of previous seen noted stable nodules. The previously noted new larger nodules have resolved.       Cardiomegaly with coronary calcification.       No acute infiltrates seen. Assessment: This is a 78 y.o. male with COPD, CHF and KAVITA    Plan:     COPD:  Continue breo and incruse plus rescue inhaler. Needs to stop smoking but he remains pre-contemplative. Refill does albuterol today and prescription for Breo and Incruse were written yesterday. Unclear if his subacute worsening dyspnea is related to a new issue or decompensation of some of his chronic issues or progression of his chronic issues. I favor progression. Patient previously attempted pulmonary rehab, but he was in too much pain and stopped going. Tried and failed daliresp in the past 2/2 GI side effects    Screening CT in August showed a new 9mm RLL nodule. Repeat CT in February showed it had resolved. Resume annual surveillance in February 2021. CHF:  Still 220 lbs but he doesn't have any peripheral edema. The patient is currently smoking less than a pack week. The risks related to smoking were reviewed with the patient. Recommend maintaining a smoke-free lifestyle. Products available for smoking cessation were discussed in detail. Says he's close to quitting and he's going to have patches from his insurance company soon. KAVITA:  Got his auto pap with range of 8-15 cmH2O and it's working well. Orders Placed This Encounter   Medications    albuterol sulfate HFA (PROAIR HFA) 108 (90 Base) MCG/ACT inhaler     Sig: Inhale 2 puffs into the lungs every 6 hours as needed for Wheezing     Dispense:  1 Inhaler     Refill:  3        More than half the time of this 20 minute visit was spent counseling the patient. RTC 4 months w/ MD. Call or RTC sooner if symptoms persist or worsen acutely.         Tl Ahr

## 2020-06-12 RX ORDER — OXYCODONE HYDROCHLORIDE AND ACETAMINOPHEN 5; 325 MG/1; MG/1
1 TABLET ORAL EVERY 8 HOURS PRN
Qty: 90 TABLET | Refills: 0 | Status: SHIPPED | OUTPATIENT
Start: 2020-06-13 | End: 2020-07-13

## 2020-06-15 RX ORDER — ALLOPURINOL 100 MG/1
TABLET ORAL
Qty: 180 TABLET | Refills: 1 | Status: SHIPPED | OUTPATIENT
Start: 2020-06-15 | End: 2020-12-11

## 2020-06-29 RX ORDER — PANTOPRAZOLE SODIUM 40 MG/1
TABLET, DELAYED RELEASE ORAL
Qty: 90 TABLET | Refills: 1 | Status: SHIPPED | OUTPATIENT
Start: 2020-06-29 | End: 2021-09-16

## 2020-07-13 RX ORDER — CLOPIDOGREL BISULFATE 75 MG/1
TABLET ORAL
Qty: 90 TABLET | Refills: 1 | Status: SHIPPED | OUTPATIENT
Start: 2020-07-13 | End: 2021-08-25 | Stop reason: SDUPTHER

## 2020-07-15 ENCOUNTER — VIRTUAL VISIT (OUTPATIENT)
Dept: PAIN MANAGEMENT | Age: 70
End: 2020-07-15
Payer: MEDICAID

## 2020-07-15 PROCEDURE — 99442 PR PHYS/QHP TELEPHONE EVALUATION 11-20 MIN: CPT | Performed by: ANESTHESIOLOGY

## 2020-07-15 RX ORDER — OXYCODONE HYDROCHLORIDE AND ACETAMINOPHEN 5; 325 MG/1; MG/1
1 TABLET ORAL EVERY 8 HOURS PRN
Qty: 90 TABLET | Refills: 0 | Status: SHIPPED | OUTPATIENT
Start: 2020-07-15 | End: 2020-08-14 | Stop reason: SDUPTHER

## 2020-07-15 RX ORDER — GABAPENTIN 600 MG/1
600 TABLET ORAL 3 TIMES DAILY
Qty: 90 TABLET | Refills: 1 | Status: SHIPPED | OUTPATIENT
Start: 2020-07-15 | End: 2021-08-25

## 2020-07-15 NOTE — PROGRESS NOTES
[address] [facility] [address], [place], [address]  [phone] (W)  [phone] (f)        7/15/20      Uriah Upton is a 78 y.o. male evaluated via TELEPHONE on 7/15/2020 using HIPPA compliant software (Epic/Perfect Serve). Patient declined video visit due to comm issues at his/her end. Consent:  He and/or health care decision maker is aware that that he may receive a bill for this telephone service, depending on his insurance coverage, and has provided verbal consent to proceed: Yes  Due to nature of the virtual visit, evaluation of organ systems was limited to presenting complaints. Chief Complaint   Patient presents with    Lower Back Pain     virtual visit due to [employer] lock down         Reason for call: low back and joint pain    Any changes since last visit:   Pain constant in multiple joints and lower back, achy sharp with tingling in legs; no new weakness. Pain worse with daily activities and changes in weather; helped by medications. Reports he missed out on gabapentin last month, needs it refilled.       Past Medical History:   Diagnosis Date    CAD (coronary artery disease)     CH[facility]congestive [facility] failure[facility]     COPD (chronic obstructive pulmonary disease) ([facility])     Depressive disorder, not elsewhere classified     GERD (gastroesophageal reflux disease)     History of MI (myocardial infarction) 05/2013    History of skin ulcer of lower extremity     R anterior shin    History of transient ischemic attack (TIA)     Hyperlipidemia     Hypertension     Neuropathy     KAVITA (obstructive sleep apnea)     On CPAP    Personal history of DVT (deep vein thrombosis)     PVD (peripheral vascular disease) ([facility])     Vertebral fracture        Past Surgical History:   Procedure Laterality Date    APPENDECTOMY      CHOLECYSTECTOMY, LAPAROSCOPIC      CORONARY ANGIOPLASTY WITH STENT PLACEMENT  6/2013    EYE SURGERY Left     LAMINECTOMY 11/18/2015    Bilateral decompressive lumbar laminectomy L4-5   ; medial facetectomy L4-5 joints  bilaterally; foraminotomies l5   nerve roots bilaterally    LEG DEBRIDEMENT Right 7/23/2013    Dr. Hernandez Ask - pretibial wound    OTHER SURGICAL HISTORY Right 6/25/2013    Dr. Hernandez Ask - CFA Endarterectomy w/marsupialization; RLE wound excision & debridement     OTHER SURGICAL HISTORY Left 8/27/2013    Dr. Hernandez Ask - femoral & profunda femoris endarterectomy w/marsupialization patch angioplasty using SFA    SKIN SPLIT GRAFT Right 7/25/2013    Dr. Hernandez Ask - to non-healing R pretibial wound, 9 x 15 cm    TOTAL HIP ARTHROPLASTY Right 09/01/2016    Dr. Jayy Rosa Left 12/22/2015    Dr. Hernandez Ask - CFA exploration, thrombectomy of ileofemoral system, stenting of RAFAL in-stent stenosis w/8 x 37 mm Palmaz        Allergies   Allergen Reactions    Asa [Aspirin] Other (See Comments)     Stomach ache    Daliresp [Roflumilast] Diarrhea and Other (See Comments)     Severe diarrhea and did not help       Current Outpatient Medications   Medication Sig Dispense Refill    gabapentin (NEURONTIN) 600 MG tablet Take 1 tablet by mouth 3 times daily for 60 days. Take 1 tab qam and 2 tab at night, as tolerated 90 tablet 1    oxyCODONE-acetaminophen (PERCOCET) 5-325 MG per tablet Take 1 tablet by mouth every 8 hours as needed for Pain for up to 30 days. Take lowest dose possible to manage pain 90 tablet 0    clopidogrel (PLAVIX) 75 MG tablet TAKE 1 TABLET BY MOUTH DAILY 90 tablet 1    pantoprazole (PROTONIX) 40 MG tablet TAKE 1 TABLET BY MOUTH EVERY EVENING. TAKE 1 HOUR BEFORE BEDTIME.  90 tablet 1    allopurinol (ZYLOPRIM) 100 MG tablet TAKE 2 TABLETS BY MOUTH EVERY  tablet 1    albuterol sulfate HFA (PROAIR HFA) 108 (90 Base) MCG/ACT inhaler Inhale 2 puffs into the lungs every 6 hours as needed for Wheezing 1 Inhaler 3    INCRUSE ELLIPTA 62.5 MCG/INH AEPB INHALE 1 PUFF BY MOUTH DAILY 1 each 11    BREO ELLIPTA 200-25 MCG/INH AEPB inhaler INHALE 1 PUFF BY MOUTH DAILY 1 each 11    DULoxetine (CYMBALTA) 60 MG extended release capsule TAKE ONE CAPSULE BY MOUTH EVERY DAY 90 capsule 1    metoprolol (LOPRESSOR) 100 MG tablet Take 1.5 tablets by mouth 2 times daily 270 tablet 1    gabapentin (NEURONTIN) 600 MG tablet TAKE 1 TABLET BY MOUTH EVERY MORNING AND 2 TABLETS EVERY EVENING 90 tablet 0    amLODIPine (NORVASC) 10 MG tablet Take 1 tablet by mouth daily 90 tablet 1    losartan (COZAAR) 100 MG tablet Take 1 tablet by mouth daily 90 tablet 1    butalbital-acetaminophen-caffeine (FIORICET, ESGIC) -40 MG per tablet Take 1 tablet by mouth every 4 hours as needed for Headaches 30 tablet 0    atorvastatin (LIPITOR) 80 MG tablet TAKE 1 TABLET BY MOUTH DAILY 90 tablet 1    atorvastatin (LIPITOR) 80 MG tablet Take 1 tablet by mouth daily 90 tablet 1    nitroGLYCERIN (NITROSTAT) 0.4 MG SL tablet Place 1 tablet under the tongue every 5 minutes as needed for Chest pain 25 tablet 1    ranitidine (ZANTAC) 150 MG tablet TAKE 1 TABLET BY MOUTH TWICE DAILY AS NEEDED FOR HEARTBURN 180 tablet 1    buPROPion (WELLBUTRIN SR) 150 MG extended release tablet Take 150 mg by mouth 2 times daily      furosemide (LASIX) 40 MG tablet Take 1 tablet by mouth daily 90 tablet 3    ibuprofen (ADVIL;MOTRIN) 200 MG tablet Take 1 tablet by mouth every 8 hours as needed for Pain 30 tablet 0    folic acid (FOLVITE) 1 MG tablet Take 1 mg by mouth daily      albuterol (ACCUNEB) 1.25 MG/3ML nebulizer solution Inhale 3 mLs into the lungs every 6 hours as needed for Wheezing 120 vial 3    magnesium gluconate (MAGONATE) 500 MG tablet Take 500 mg by mouth daily. No current facility-administered medications for this visit.          Prescription pain medication monitoring:      MEDD current = 22.50   ORT Score = 3   LOW     Other Risk factors - COPD, sleep apnea, depression, h/o regular EtOH use       Date of Last Medication Agreement: 02/17/2020   Date Naloxone prescribed: NA     UDT:    Date of last UDT: 02/17/2020    Adverse report: Yes     OARRS:    Checked today: Yes    Adverse report: No     Medication Effects -        Analgesia:      Average level of pain for the past month = 8/10     Percentage of pain relief = 50%     Activities Improved: Activities of daily living = 40%     Adverse Effects: Any adverse effects: No     Type/Severity of side effect: None    Aberrant Behavior:     Any aberrant behavior: No  If yes, describe: None     Controlled Substances Monitoring:    Periodic Controlled Substance Monitoring: Possible medication side effects, risk of tolerance/dependence & alternative treatments discussed. , Assessed functional status. Iman Malave MD)    - Informed patient that opioid pain medications may not be phoned into the pharmacy or refilled without being seen. Assessment:      ICD-10-CM    1. Postlaminectomy syndrome, lumbar  M96.1 gabapentin (NEURONTIN) 600 MG tablet     oxyCODONE-acetaminophen (PERCOCET) 5-325 MG per tablet   2. Polyarthropathy, multiple sites  M13.0 gabapentin (NEURONTIN) 600 MG tablet     oxyCODONE-acetaminophen (PERCOCET) 5-325 MG per tablet   3. Chronic pain syndrome  G89.4 gabapentin (NEURONTIN) 600 MG tablet   4. Chronic, continuous use of opioids  F11.90    5. Pain medication agreement  Z02.89 oxyCODONE-acetaminophen (PERCOCET) 5-325 MG per tablet       Plan:     1. Chronic low back pain unimproved by fusion in 2015; no focal symptoms. 2. Chronic joint pain due to OA/RA and gout. 3. Intolerant of NSAIDs due to cardiac issues (anticoagulant therapy); on Cymbalta through PCP. 4. Reports he missed out on gabapentin last month and did not call us. Encouraged regular use of gabapentin; declines spinal injections. 5. Counseled on limitation of opioid and refilled Percocet 5 mg TID PRN as before.   6. Medication sent by e-script due to continued lock down of our office for [address] outbreak. Informed that he/she needs to pursue new pain provider, as I am not available after next month - patient expressed understanding the same. Analgesic Plan:   Continue present regimen: Yes   Adjust dose of present analgesic: No   Switch analgesics: No   Add/Adjust concomitant therapy: No    Follow up:    RTC X 1 month     Documentation:    Details of this discussion including any medical advice provided: as above. I affirm this is a Patient Initiated Episode with an Established Patient who has not had a related appointment within my department in the past 7 days or scheduled within the next 24 hours. Total Time: minutes: 11-20 minutes    This visit was completed VIRTUALLY using Telephone encounter as above. Services were provided through a telephone synchronous discussion virtually to substitute for in-person clinic visit. Patient and provider were located at their individual homes. Jered Lopez MD  Board Certified in Anesthesiology and Pain Medicine    Pursuant to the emergency declaration under the [facility], [facility]5 waiver authority and the Coronavirus Preparedness and Response Supplemental Appropriations Act, this Virtual Visit was conducted with patient's (and/or legal guardian's) consent, to reduce the patient's risk of exposure to COVID-19 and provide necessary medical care. The patient (and/or legal guardian) has also been advised to contact this office for worsening conditions or problems, and seek emergency medical treatment and/or call 911 if deemed necessary.

## 2020-07-22 ENCOUNTER — OFFICE VISIT (OUTPATIENT)
Dept: CARDIOLOGY CLINIC | Age: 70
End: 2020-07-22
Payer: MEDICAID

## 2020-07-22 VITALS
DIASTOLIC BLOOD PRESSURE: 70 MMHG | BODY MASS INDEX: 30.22 KG/M2 | SYSTOLIC BLOOD PRESSURE: 110 MMHG | WEIGHT: 210.6 LBS | TEMPERATURE: 97.3 F | HEART RATE: 78 BPM

## 2020-07-22 DIAGNOSIS — I50.43 CHF (CONGESTIVE HEART FAILURE), NYHA CLASS I, ACUTE ON CHRONIC, COMBINED (HCC): ICD-10-CM

## 2020-07-22 DIAGNOSIS — I10 ESSENTIAL HYPERTENSION: ICD-10-CM

## 2020-07-22 PROCEDURE — 99214 OFFICE O/P EST MOD 30 MIN: CPT | Performed by: INTERNAL MEDICINE

## 2020-07-22 PROCEDURE — 4040F PNEUMOC VAC/ADMIN/RCVD: CPT | Performed by: INTERNAL MEDICINE

## 2020-07-22 PROCEDURE — 3017F COLORECTAL CA SCREEN DOC REV: CPT | Performed by: INTERNAL MEDICINE

## 2020-07-22 PROCEDURE — G8417 CALC BMI ABV UP PARAM F/U: HCPCS | Performed by: INTERNAL MEDICINE

## 2020-07-22 PROCEDURE — G8427 DOCREV CUR MEDS BY ELIG CLIN: HCPCS | Performed by: INTERNAL MEDICINE

## 2020-07-22 PROCEDURE — 4004F PT TOBACCO SCREEN RCVD TLK: CPT | Performed by: INTERNAL MEDICINE

## 2020-07-22 PROCEDURE — 1123F ACP DISCUSS/DSCN MKR DOCD: CPT | Performed by: INTERNAL MEDICINE

## 2020-07-22 RX ORDER — LOSARTAN POTASSIUM 100 MG/1
TABLET ORAL
Qty: 90 TABLET | Refills: 1 | Status: SHIPPED | OUTPATIENT
Start: 2020-07-22 | End: 2021-08-25 | Stop reason: SDUPTHER

## 2020-07-22 NOTE — PROGRESS NOTES
[address]   Dr Army Jenkins. Enid Hylton MD, [facility]    Outpatient Follow Up Note    7/22/2020,12:05 PM  Subjective:   [address] [facility] / [facility]als in [place]:  Follow Up secondary to CAD     Simon Gonzales. is 78 y.o. male who presents today for a routine follow up. This patient is clinically stable today without any current issues or complaints. He still has nodules in his lungs and is being followed by Dr. Rena Meeks. He did have a a CAT scan that was stable. Coronary artery disease with stent in June 2013. Also has peripheral vascular disease obstructive sleep apnea and uses CPAP. He is stable from our perspective. Apparently his Lasix was discontinued because of frequency of urination. He states he has burning sometimes. Last LDL was 74 in September 2019. We will continue current therapy. He is due for another CMP and an A1c.     LDL: 74 on 9/2019        CAD with coronary stents June 2013  Obstructive sleep apnea on CPAP  Peripheral vascular disease  Pulmonary nodules followed by Dr. Rena Meeks  Past Medical History:    Past Medical History:   Diagnosis Date    CAD (coronary artery disease)     CHF (congestive heart failure) (HCC)     COPD (chronic obstructive pulmonary disease) (HCC)     Depressive disorder, not elsewhere classified     GERD (gastroesophageal reflux disease)     History of MI (myocardial infarction) 05/2013    History of skin ulcer of lower extremity     R anterior shin    History of transient ischemic attack (TIA)     Hyperlipidemia     Hypertension     Neuropathy     KAVITA (obstructive sleep apnea)     On CPAP    Personal history of DVT (deep vein thrombosis)     [facility]VD [facility]per[facility] vascular disease) ([facility]     Vertebral fracture      Past Surgical History  Past Surgical History:   Procedure Laterality Date    APPENDECTOMY      CHOLECYSTECTOMY, LAPAROSCOPIC      CORONARY ANGIOPLASTY WITH STENT PLACEMENT  6/2013    EYE SURGERY Left     LAMINECTOMY  11/18/2015 Bilateral decompressive lumbar laminectomy L4-5   ; medial facetectomy L4-5 joints  bilaterally; foraminotomies l5   nerve roots bilaterally    LEG DEBRIDEMENT Right 7/23/2013    Dr. Colt Vyas - pretibial wound    OTHER SURGICAL HISTORY Right 6/25/2013    Dr. Colt Vyas - CFA Endarterectomy w/marsupialization; RLE wound excision & debridement     OTHER SURGICAL HISTORY Left 8/27/2013    Dr. Colt Vyas - femoral & profunda femoris endarterectomy w/marsupialization patch angioplasty using SFA    SKIN SPLIT GRAFT Right 7/25/2013    Dr. Colt Vyas - to non-healing R pretibial wound, 9 x 15 cm    TOTAL HIP ARTHROPLASTY Right 09/01/2016    Dr. Shemar Kaur Left 12/22/2015    Dr. Colt Vyas - CFA exploration, thrombectomy of ileofemoral system, stenting of RAFAL in-stent stenosis w/8 x 37 mm Palmaz      Social History:       Social History     Tobacco Use   Smoking Status Current Every Day Smoker    Packs/day: 0.50    Years: 52.00    Pack years: 26.00    Types: Cigarettes    Start date: 1/1/1967   Smokeless Tobacco Never Used     Current Medications:  Prior to Visit Medications    Medication Sig Taking? Authorizing Provider   gabapentin (NEURONTIN) 600 MG tablet Take 1 tablet by mouth 3 times daily for 60 days. Take 1 tab qam and 2 tab at night, as tolerated Yes Juan Mabry MD   oxyCODONE-acetaminophen (PERCOCET) 5-325 MG per tablet Take 1 tablet by mouth every 8 hours as needed for Pain for up to 30 days. Take lowest dose possible to manage pain Yes Juan Mabry MD   clopidogrel (PLAVIX) 75 MG tablet TAKE 1 TABLET BY MOUTH DAILY Yes Meghan Cabrera DO   pantoprazole (PROTONIX) 40 MG tablet TAKE 1 TABLET BY MOUTH EVERY EVENING. TAKE 1 HOUR BEFORE BEDTIME.  Yes Meghan Cabrera DO   allopurinol (ZYLOPRIM) 100 MG tablet TAKE 2 TABLETS BY MOUTH EVERY DAY Yes Meghan Cabrera DO   albuterol sulfate HFA (PROAIR HFA) 108 (90 Base) MCG/ACT inhaler Inhale 2 puffs into the lungs every 6 hours as needed for Wheezing Yes Humaira Lakhani MD   INCRUSE ELLIPTA 62.5 MCG/INH AEPB INHALE 1 PUFF BY MOUTH DAILY Yes Humaira Lakhani MD   BREO ELLIPTA 200-25 MCG/INH AEPB inhaler INHALE 1 PUFF BY MOUTH DAILY Yes Humaira Lakhani MD   DULoxetine (CYMBALTA) 60 MG extended release capsule TAKE ONE CAPSULE BY MOUTH EVERY DAY Yes [address] Boy, DO   metoprolol (LOPRESSOR) 100 MG tablet Take 1.5 tablets by mouth 2 times daily Yes [address] Boy, DO   gabapentin (NEURONTIN) 600 MG tablet TAKE 1 TABLET BY MOUTH EVERY MORNING AND 2 TABLETS EVERY EVENING Yes Bridgette Gonsalves MD   amLODIPine (NORVASC) 10 MG tablet Take 1 tablet by mouth daily Yes Rob Boy, DO   losartan (COZAAR) 100 MG tablet Take 1 tablet by mouth daily Yes [address] Boy, DO   butalbital-acetaminophen-caffeine (FIORICET, ESGIC) -40 MG per tablet Take 1 tablet by mouth every 4 hours as needed for Headaches Yes Maurice Dafne Cabrera, DO   atorvastatin (LIPITOR) 80 MG tablet TAKE 1 TABLET BY MOUTH DAILY Yes Rob Boy, DO   nitroGLYCERIN (NITROSTAT) 0.4 MG SL tablet Place 1 tablet under the tongue every 5 minutes as needed for Chest pain Yes Humaira Lakhani MD   ranitidine (ZANTAC) 150 MG tablet TAKE 1 TABLET BY MOUTH TWICE DAILY AS NEEDED FOR HEARTBURN Yes Rob Boy, DO   buPROPion (WELLBUTRIN SR) 150 MG extended release tablet Take 150 mg by mouth 2 times daily Yes Historical Provider, MD   ibuprofen (ADVIL;MOTRIN) 200 MG tablet Take 1 tablet by mouth every 8 hours as needed for Pain Yes Shahida Magdaleno MD   folic acid (FOLVITE) 1 MG tablet Take 1 mg by mouth daily Yes Historical Provider, MD   albuterol (ACCUNEB) 1.25 MG/3ML nebulizer solution Inhale 3 mLs into the lungs every 6 hours as needed for Wheezing Yes Lai Cagle MD   magnesium gluconate (MAGONATE) 500 MG tablet Take 500 mg by mouth daily.  Yes Historical Provider, MD   furosemide (LASIX) 40 MG tablet Take 1 tablet by mouth daily  Patient not taking: Reported on 7/22/2020  Nick Koch MD     Family History  Family History   Problem Relation Age of Onset    Cancer Mother         Breast    Heart Disease Mother     Cancer Father         Bladder    Heart Disease Father     Cancer Paternal Grandmother         melanoma        Current Medications  Current Outpatient Medications   Medication Sig Dispense Refill    gabapentin (NEURONTIN) 600 MG tablet Take 1 tablet by mouth 3 times daily for 60 days. Take 1 tab qam and 2 tab at night, as tolerated 90 tablet 1    oxyCODONE-acetaminophen (PERCOCET) 5-325 MG per tablet Take 1 tablet by mouth every 8 hours as needed for Pain for up to 30 days. Take lowest dose possible to manage pain 90 tablet 0    clopidogrel (PLAVIX) 75 MG tablet TAKE 1 TABLET BY MOUTH DAILY 90 tablet 1    pantoprazole (PROTONIX) 40 MG tablet TAKE 1 TABLET BY MOUTH EVERY EVENING. TAKE 1 HOUR BEFORE BEDTIME.  90 tablet 1    allopurinol (ZYLOPRIM) 100 MG tablet TAKE 2 TABLETS BY MOUTH EVERY  tablet 1    albuterol sulfate HFA (PROAIR HFA) 108 (90 Base) MCG/ACT inhaler Inhale 2 puffs into the lungs every 6 hours as needed for Wheezing 1 Inhaler 3    INCRUSE ELLIPTA 62.5 MCG/INH AEPB INHALE 1 PUFF BY MOUTH DAILY 1 each 11    BREO ELLIPTA 200-25 MCG/INH AEPB inhaler INHALE 1 PUFF BY MOUTH DAILY 1 each 11    DULoxetine (CYMBALTA) 60 MG extended release capsule TAKE ONE CAPSULE BY MOUTH EVERY DAY 90 capsule 1    metoprolol (LOPRESSOR) 100 MG tablet Take 1.5 tablets by mouth 2 times daily 270 tablet 1    gabapentin (NEURONTIN) 600 MG tablet TAKE 1 TABLET BY MOUTH EVERY MORNING AND 2 TABLETS EVERY EVENING 90 tablet 0    amLODIPine (NORVASC) 10 MG tablet Take 1 tablet by mouth daily 90 tablet 1    losartan (COZAAR) 100 MG tablet Take 1 tablet by mouth daily 90 tablet 1    butalbital-acetaminophen-caffeine (FIORICET, ESGIC) -40 MG per tablet Take 1 tablet by mouth every 4 hours as needed for Headaches 30 tablet 0    atorvastatin (LIPITOR) 80 MG tablet TAKE 1 TABLET BY MOUTH DAILY 90 tablet 1    nitroGLYCERIN (NITROSTAT) 0.4 MG SL tablet Place 1 tablet under the tongue every 5 minutes as needed for Chest pain 25 tablet 1    ranitidine (ZANTAC) 150 MG tablet TAKE 1 TABLET BY MOUTH TWICE DAILY AS NEEDED FOR HEARTBURN 180 tablet 1    buPROPion (WELLBUTRIN SR) 150 MG extended release tablet Take 150 mg by mouth 2 times daily      ibuprofen (ADVIL;MOTRIN) 200 MG tablet Take 1 tablet by mouth every 8 hours as needed for Pain 30 tablet 0    folic acid (FOLVITE) 1 MG tablet Take 1 mg by mouth daily      albuterol (ACCUNEB) 1.25 MG/3ML nebulizer solution Inhale 3 mLs into the lungs every 6 hours as needed for Wheezing 120 vial 3    magnesium gluconate (MAGONATE) 500 MG tablet Take 500 mg by mouth daily.  furosemide (LASIX) 40 MG tablet Take 1 tablet by mouth daily (Patient not taking: Reported on 7/22/2020) 90 tablet 3     No current facility-administered medications for this visit. REVIEW OF SYSTEMS:    CONSTITUTIONAL: No major weight gain or loss, fatigue, weakness, night sweats or fever. HEENT: No new vision difficulties or ringing in the ears. RESPIRATORY: No new SOB, PND, orthopnea or cough. CARDIOVASCULAR: See HPI  GI: No nausea, vomiting, diarrhea, constipation, abdominal pain or changes in bowel habits. : No urinary frequency, urgency, incontinence hematuria or dysuria. SKIN: No cyanosis or skin lesions. MUSCULOSKELETAL: No new muscle or joint pain. NEUROLOGICAL: No syncope or TIA-like symptoms.   PSYCHIATRIC: No anxiety, pain, insomnia or depression    Objective:   PHYSICAL EXAM:        VITALS:    Wt Readings from Last 3 Encounters:   07/22/20 210 lb 9.6 oz (95.5 kg)   02/18/20 220 lb (99.8 kg)   02/17/20 215 lb (97.5 kg)     BP Readings from Last 3 Encounters:   07/22/20 110/70   02/18/20 126/70   02/17/20 (!) 142/82     Pulse Readings from Last 3 Encounters:   07/22/20 78   02/18/20 77   01/22/20 62       CONSTITUTIONAL: Cooperative, no apparent distress, and appears well nourished / developed  NEUROLOGIC:  Awake and orientated to person, place and time. PSYCH: Calm affect. SKIN: Warm and dry. HEENT: Sclera non-icteric, normocephalic, neck supple, no elevation of JVP, normal carotid pulses with no bruits and thyroid normal size. LUNGS:  No increased work of breathing and clear to auscultation, no crackles or wheezing  CARDIOVASCULAR:  Regular rate and rhythm with no murmurs, gallops, rubs, or abnormal heart sounds, normal PMI. The apical impulses not displaced  Heart tones are crisp and normal  Cervical veins are not engorged  The carotid upstroke is normal in amplitude and contour without delay or bruit  JVP is not elevated  ABDOMEN:  Normal bowel sounds, non-distended and non-tender to palpation  EXT: No edema, no calf tenderness. Pulses are present bilaterally.     DATA:    Lab Results   Component Value Date    ALT 27 02/07/2020    AST 23 02/07/2020    ALKPHOS 92 02/07/2020    BILITOT 0.9 02/07/2020     Lab Results   Component Value Date    CREATININE 0.9 02/07/2020    BUN 15 02/07/2020     02/07/2020    K 4.1 02/07/2020     02/07/2020    CO2 24 02/07/2020     No results found for: TSH, Q9AQGRE, E1TAQSS, THYROIDAB  Lab Results   Component Value Date    WBC 7.9 02/07/2020    HGB 16.0 02/07/2020    HCT 47.6 02/07/2020    .4 (H) 02/07/2020     02/07/2020     No components found for: CHLPL  Lab Results   Component Value Date    TRIG 204 (H) 07/17/2019    TRIG 230 (H) 04/17/2019    TRIG 264 (H) 08/30/2018     Lab Results   Component Value Date    HDL 32 (L) 07/17/2019    HDL 29 (L) 04/17/2019    HDL 30 (L) 08/30/2018     Lab Results   Component Value Date    LDLCALC 74 07/17/2019    LDLCALC 69 04/17/2019    LDLCALC 74 08/30/2018     Lab Results   Component Value Date    LABVLDL 41 07/17/2019    LABVLDL 46 04/17/2019    LABVLDL 53 08/30/2018     Radiology Review:  Pertinent images / reports were reviewed as a part of this visit and reveals the following:    Last Echo:    Last Stress Test / Angiogram:  Impression      2/7/20 ct lungs    Continued stable appearance of previous seen noted stable nodules. The previously noted new larger nodules have resolved.         Cardiomegaly with coronary calcification.         No acute infiltrates seen. Last ECG:  This patient was educated using the patient point room wall mount device. Absence from smokers and smoking and diet and exercising are important. Assessment:   Problems as listed above. CAD seems to be stable. Plan:   Fasting lipid and CMP. Also get an A1c and a urinalysis with culture to rule out infections. Also he states that he occasionally runs out of his Neurontin and he has no change in his general feeling of wellbeing and I suggested that may be Neurontin is of no value to him at this point in time and may be no reason to continue taking it. He will discuss this further with his PCP Dr. Satish Garza. Please call if we can assist further [phone]. Nishi Guerra. Marie Little MD, [facility] [place]  Return in 1 year.     This note was likely completed using voice recognition technology and may contain unintended errors

## 2020-07-23 LAB
A/G RATIO: 1.4 (ref 1.1–2.2)
ALBUMIN SERPL-MCNC: 3.8 G/DL (ref 3.4–5)
ALP BLD-CCNC: 87 U/L (ref 40–129)
ALT SERPL-CCNC: 17 U/L (ref 10–40)
ANION GAP SERPL CALCULATED.3IONS-SCNC: 13 MMOL/L (ref 3–16)
AST SERPL-CCNC: 17 U/L (ref 15–37)
BILIRUB SERPL-MCNC: 0.4 MG/DL (ref 0–1)
BUN BLDV-MCNC: 19 MG/DL (ref 7–20)
CALCIUM SERPL-MCNC: 10 MG/DL (ref 8.3–10.6)
CHLORIDE BLD-SCNC: 106 MMOL/L (ref 99–110)
CO2: 24 MMOL/L (ref 21–32)
CREAT SERPL-MCNC: 0.9 MG/DL (ref 0.8–1.3)
ESTIMATED AVERAGE GLUCOSE: 128.4 MG/DL
GFR AFRICAN AMERICAN: >60
GFR NON-AFRICAN AMERICAN: >60
GLOBULIN: 2.8 G/DL
GLUCOSE BLD-MCNC: 105 MG/DL (ref 70–99)
HBA1C MFR BLD: 6.1 %
POTASSIUM SERPL-SCNC: 4.8 MMOL/L (ref 3.5–5.1)
SODIUM BLD-SCNC: 143 MMOL/L (ref 136–145)
TOTAL PROTEIN: 6.6 G/DL (ref 6.4–8.2)

## 2020-08-14 ENCOUNTER — VIRTUAL VISIT (OUTPATIENT)
Dept: PAIN MANAGEMENT | Age: 70
End: 2020-08-14
Payer: MEDICAID

## 2020-08-14 PROCEDURE — 99442 PR PHYS/QHP TELEPHONE EVALUATION 11-20 MIN: CPT | Performed by: ANESTHESIOLOGY

## 2020-08-14 RX ORDER — OXYCODONE HYDROCHLORIDE AND ACETAMINOPHEN 5; 325 MG/1; MG/1
1 TABLET ORAL EVERY 8 HOURS PRN
Qty: 90 TABLET | Refills: 0 | Status: SHIPPED | OUTPATIENT
Start: 2020-08-14 | End: 2020-09-13

## 2020-08-14 NOTE — PROGRESS NOTES
[address] [facility] [place] [address], [address]  [phone] (S)  [phone] (f)        8/14/20      Racheal Bautista is a 78 y.o. male evaluated via TELEPHONE on 8/14/2020 using HIPPA compliant software (Epic/Perfect Serve). Patient declined video visit due to comm issues at his/her end. Consent:  He and/or health care decision maker is aware that that he may receive a bill for this telephone service, depending on his insurance coverage, and has provided verbal consent to proceed: Yes  Due to nature of the virtual visit, evaluation of organ systems was limited to presenting complaints. Chief Complaint   Patient presents with    Lower Back Pain     virtual visit due to CoVID19 lock down         Reason for call: low back and joint pain    Any changes since last visit:   Pain constant in multiple joints, deejay hips and lower back, achy sharp with radiation to legs L>R side; no new weakness. Pain worse with prolonged standing walking changes in weather and helped somewhat by medications.       Past Medical History:   Diagnosis Date    CAD (coronary artery disease)     [facility] ([facility])     COPD (chronic obstructive pulmonary disease) ([facility])     Depressive disorder, not elsewhere classified     GERD (gastroesophageal reflux disease)     History of MI (myocardial infarction) 05/2013    History of skin ulcer of lower extremity     R anterior shin    History of transient ischemic attack (TIA)     Hyperlipidemia     Hypertension     Neuropathy     KAVITA (obstructive sleep apnea)     On CPAP    Personal history of DVT (deep vein thrombosis)     PVD (peripheral vascular disease) ([facility])     Vertebral fracture        Past Surgical History:   Procedure Laterality Date    APPENDECTOMY      CHOLECYSTECTOMY, LAPAROSCOPIC      CORONARY ANGIOPLASTY WITH STENT PLACEMENT  6/2013    EYE SURGERY Left     LAMINECTOMY  11/18/2015    Bilateral decompressive lumbar laminectomy L4-5   ; medial facetectomy L4-5 joints  bilaterally; foraminotomies l5   nerve roots bilaterally    LEG DEBRIDEMENT Right 7/23/2013    Dr. Artice Spatz - pretibial wound    OTHER SURGICAL HISTORY Right 6/25/2013    Dr. Artice Spatz - CFA Endarterectomy w/marsupialization; RLE wound excision & debridement     OTHER SURGICAL HISTORY Left 8/27/2013    Dr. Artice Spatz - femoral & profunda femoris endarterectomy w/marsupialization patch angioplasty using SFA    SKIN SPLIT GRAFT Right 7/25/2013    Dr. Artice Spatz - to non-healing R pretibial wound, 9 x 15 cm    TOTAL HIP ARTHROPLASTY Right 09/01/2016    Dr. Pau Smith Left 12/22/2015    Dr. Artice Spatz - CFA exploration, thrombectomy of ileofemoral system, stenting of RAFAL in-stent stenosis w/8 x 37 mm Palmaz        Allergies   Allergen Reactions    Asa [Aspirin] Other (See Comments)     Stomach ache    Daliresp [Roflumilast] Diarrhea and Other (See Comments)     Severe diarrhea and did not help       Current Outpatient Medications   Medication Sig Dispense Refill    oxyCODONE-acetaminophen (PERCOCET) 5-325 MG per tablet Take 1 tablet by mouth every 8 hours as needed for Pain for up to 30 days. Take lowest dose possible to manage pain 90 tablet 0    losartan (COZAAR) 100 MG tablet TAKE 1 TABLET BY MOUTH EVERY DAY 90 tablet 1    gabapentin (NEURONTIN) 600 MG tablet Take 1 tablet by mouth 3 times daily for 60 days. Take 1 tab qam and 2 tab at night, as tolerated 90 tablet 1    clopidogrel (PLAVIX) 75 MG tablet TAKE 1 TABLET BY MOUTH DAILY 90 tablet 1    pantoprazole (PROTONIX) 40 MG tablet TAKE 1 TABLET BY MOUTH EVERY EVENING. TAKE 1 HOUR BEFORE BEDTIME.  90 tablet 1    allopurinol (ZYLOPRIM) 100 MG tablet TAKE 2 TABLETS BY MOUTH EVERY  tablet 1    albuterol sulfate HFA (PROAIR HFA) 108 (90 Base) MCG/ACT inhaler Inhale 2 puffs into the lungs every 6 hours as needed for Wheezing 1 Inhaler 3    INCRUSE ELLIPTA 62.5 last UDT: 02/17/2020    Adverse report: No     OARRS:    Checked today: Yes    Adverse report: No     Medication Effects -        Analgesia:      Average level of pain for the past month = 7/10     Percentage of pain relief = 40%     Activities Improved: Activities of daily living = 30%     Adverse Effects: Any adverse effects: No     Type/Severity of side effect: None    Aberrant Behavior:     Any aberrant behavior: No  If yes, describe: None     Controlled Substances Monitoring:    Periodic Controlled Substance Monitoring: Possible medication side effects, risk of tolerance/dependence & alternative treatments discussed. , Assessed functional status., Obtaining appropriate analgesic effect of treatment. Jessie Meyer MD)    - Informed patient that opioid pain medications may not be phoned into the pharmacy or refilled without being seen. Assessment:      ICD-10-CM    1. Postlaminectomy syndrome, lumbar  M96.1 oxyCODONE-acetaminophen (PERCOCET) 5-325 MG per tablet     External Referral To Pain Clinic   2. Polyarthropathy, multiple sites  M13.0 oxyCODONE-acetaminophen (PERCOCET) 5-325 MG per tablet     External Referral To Pain Clinic   3. Chronic, continuous use of opioids  F11.90    4. Chronic pain syndrome  G89.4 External Referral To Pain Clinic   5. Pain medication agreement  Z02.89 oxyCODONE-acetaminophen (PERCOCET) 5-325 MG per tablet   6. Pain disorder with psychological factors  F45.41        Plan:     1. Chronic pain in multiple joints due to OA/RA and and gout - no new changes. 2. He is s/p right THR in 2016 for avascular necrosis; history of severe PVD. 3. History of L4-L5 laminectomy in 2015; persistent pain radiating to legs; no focal symptoms. 4. Failed PT and spinal injections; intolerant of NSAIDs due to anticoagulant therapy. 5. Continues gabapentin and Percocet 5 mg TID PRN with moderate help.   6. He is aware I am unavailable next month to continue care - pain referral in place. Discussed weaning protocol for exigencies. 7. Counseled on opioid and refilled Percocet 5 mg TID PRN. Medication sent by e-script due to continued lock down of this office for CoVID19 outbreak. Discussed opioid weaning protocol (reduce by 1 pill/day/week) for exigencies. Informed that I am available for rest of the month for emergencies only. Otherwise, care returned to PCP - patient expressed understanding the same. Analgesic Plan:   Continue present regimen: Yes   Adjust dose of present analgesic: No   Switch analgesics: No   Add/Adjust concomitant therapy: No    Follow up:    Care returned to PCP     Documentation:    Details of this discussion including any medical advice provided: as above. I affirm this is a Patient Initiated Episode with an Established Patient who has not had a related appointment within my department in the past 7 days or scheduled within the next 24 hours. Total Time: minutes: 11-20 minutes    This visit was completed VIRTUALLY using Telephone encounter as above. Services were provided through a telephone synchronous discussion virtually to substitute for in-person clinic visit. Patient and provider were located at their individual homes. Katherine Cope MD  Board Certified in Anesthesiology and Pain Medicine    Pursuant to the emergency declaration under the [facility] waiver authority and the Coronavirus Preparedness and Response Supplemental Appropriations Act, this Virtual Visit was conducted with patient's (and/or legal guardian's) consent, to reduce the patient's risk of exposure to COVID-19 and provide necessary medical care. The patient (and/or legal guardian) has also been advised to contact this office for worsening conditions or problems, and seek emergency medical treatment and/or call 911 if deemed necessary.

## 2020-08-18 ENCOUNTER — TELEPHONE (OUTPATIENT)
Dept: PRIMARY CARE CLINIC | Age: 70
End: 2020-08-18

## 2020-08-18 NOTE — TELEPHONE ENCOUNTER
----- Message from [facility] sent at 8/18/2020 11:35 AM EDT -----  Subject: Message to Provider    QUESTIONS  Information for Provider? patient pain management doctor is leaving [facility]   he would like for his pcp to refer him to another pain management doctor   please advise  ---------------------------------------------------------------------------  --------------  CALL BACK INFO  What is the best way for the office to contact you? OK to leave message on   voicemail  Preferred Call Back Phone Number? [phone]  ---------------------------------------------------------------------------  --------------  SCRIPT ANSWERS  Relationship to Patient?  Self

## 2020-09-21 ENCOUNTER — TELEPHONE (OUTPATIENT)
Dept: PAIN MANAGEMENT | Age: 70
End: 2020-09-21

## 2020-10-06 ENCOUNTER — TELEPHONE (OUTPATIENT)
Dept: PRIMARY CARE CLINIC | Age: 70
End: 2020-10-06

## 2020-10-06 NOTE — TELEPHONE ENCOUNTER
PT CALLING SAYING [address] [facility] IS NOT FILLING HIS GABAPENTIN    TOLD  Bharath Jackson IS CALLING HIS PAIN MGMT DR, NOT THIS OFFICE FOR THE REFILLS. PAIN MGMT IS RX'ING     PT SAYS DR [address] HAS LEFT THAT PRACTICE AND I TOLD HIM IT LOOKS LIKE THEY HAVE REFERRED HIM ON TO SOMEONE ELSE AND HE NEEDS TO CALL AND SEE WHO THAT DR IS    PT SAYS HE HAS BEEN CALLING DR MENARD'S OFFICE FOR 2 WKS AND NO ONE IS CALLING HIM BACK.   TOLD HIM HE NEEDS KEEP TRYING AND HE SAYS HE WILL

## 2020-12-05 ENCOUNTER — APPOINTMENT (OUTPATIENT)
Dept: GENERAL RADIOLOGY | Age: 70
DRG: 287 | End: 2020-12-05
Payer: MEDICARE

## 2020-12-05 ENCOUNTER — HOSPITAL ENCOUNTER (INPATIENT)
Age: 70
LOS: 5 days | Discharge: HOME HEALTH | DRG: 287 | End: 2020-12-10
Attending: EMERGENCY MEDICINE | Admitting: INTERNAL MEDICINE
Payer: MEDICARE

## 2020-12-05 PROBLEM — I50.33 ACUTE ON CHRONIC DIASTOLIC HEART FAILURE (HCC): Status: ACTIVE | Noted: 2020-12-05

## 2020-12-05 LAB
A/G RATIO: 1.3 (ref 1.1–2.2)
ALBUMIN SERPL-MCNC: 4.1 G/DL (ref 3.4–5)
ALP BLD-CCNC: 110 U/L (ref 40–129)
ALT SERPL-CCNC: 15 U/L (ref 10–40)
ANION GAP SERPL CALCULATED.3IONS-SCNC: 15 MMOL/L (ref 3–16)
AST SERPL-CCNC: 16 U/L (ref 15–37)
BASOPHILS ABSOLUTE: 0.1 K/UL (ref 0–0.2)
BASOPHILS RELATIVE PERCENT: 1.1 %
BILIRUB SERPL-MCNC: 1 MG/DL (ref 0–1)
BUN BLDV-MCNC: 13 MG/DL (ref 7–20)
C-REACTIVE PROTEIN: 65.4 MG/L (ref 0–5.1)
CALCIUM SERPL-MCNC: 9.9 MG/DL (ref 8.3–10.6)
CHLORIDE BLD-SCNC: 99 MMOL/L (ref 99–110)
CO2: 25 MMOL/L (ref 21–32)
CREAT SERPL-MCNC: 0.7 MG/DL (ref 0.8–1.3)
EOSINOPHILS ABSOLUTE: 0.2 K/UL (ref 0–0.6)
EOSINOPHILS RELATIVE PERCENT: 2.4 %
GFR AFRICAN AMERICAN: >60
GFR NON-AFRICAN AMERICAN: >60
GLOBULIN: 3.2 G/DL
GLUCOSE BLD-MCNC: 152 MG/DL (ref 70–99)
HCT VFR BLD CALC: 45.9 % (ref 40.5–52.5)
HEMOGLOBIN: 15.3 G/DL (ref 13.5–17.5)
LYMPHOCYTES ABSOLUTE: 1.5 K/UL (ref 1–5.1)
LYMPHOCYTES RELATIVE PERCENT: 15.3 %
MCH RBC QN AUTO: 34.2 PG (ref 26–34)
MCHC RBC AUTO-ENTMCNC: 33.4 G/DL (ref 31–36)
MCV RBC AUTO: 102.6 FL (ref 80–100)
MONOCYTES ABSOLUTE: 0.7 K/UL (ref 0–1.3)
MONOCYTES RELATIVE PERCENT: 7 %
NEUTROPHILS ABSOLUTE: 7.4 K/UL (ref 1.7–7.7)
NEUTROPHILS RELATIVE PERCENT: 74.2 %
PDW BLD-RTO: 13.8 % (ref 12.4–15.4)
PLATELET # BLD: 181 K/UL (ref 135–450)
PMV BLD AUTO: 8.8 FL (ref 5–10.5)
POTASSIUM REFLEX MAGNESIUM: 3.9 MMOL/L (ref 3.5–5.1)
PRO-BNP: 3880 PG/ML (ref 0–124)
RBC # BLD: 4.47 M/UL (ref 4.2–5.9)
SODIUM BLD-SCNC: 139 MMOL/L (ref 136–145)
T4 FREE: 1.1 NG/DL (ref 0.9–1.8)
TOTAL PROTEIN: 7.3 G/DL (ref 6.4–8.2)
TROPONIN: <0.01 NG/ML
TSH SERPL DL<=0.05 MIU/L-ACNC: 2.39 UIU/ML (ref 0.27–4.2)
WBC # BLD: 10 K/UL (ref 4–11)

## 2020-12-05 PROCEDURE — 94664 DEMO&/EVAL PT USE INHALER: CPT

## 2020-12-05 PROCEDURE — 87449 NOS EACH ORGANISM AG IA: CPT

## 2020-12-05 PROCEDURE — 2700000000 HC OXYGEN THERAPY PER DAY

## 2020-12-05 PROCEDURE — 6360000002 HC RX W HCPCS: Performed by: INTERNAL MEDICINE

## 2020-12-05 PROCEDURE — 83880 ASSAY OF NATRIURETIC PEPTIDE: CPT

## 2020-12-05 PROCEDURE — 94761 N-INVAS EAR/PLS OXIMETRY MLT: CPT

## 2020-12-05 PROCEDURE — 85025 COMPLETE CBC W/AUTO DIFF WBC: CPT

## 2020-12-05 PROCEDURE — 84439 ASSAY OF FREE THYROXINE: CPT

## 2020-12-05 PROCEDURE — 6360000002 HC RX W HCPCS: Performed by: EMERGENCY MEDICINE

## 2020-12-05 PROCEDURE — U0003 INFECTIOUS AGENT DETECTION BY NUCLEIC ACID (DNA OR RNA); SEVERE ACUTE RESPIRATORY SYNDROME CORONAVIRUS 2 (SARS-COV-2) (CORONAVIRUS DISEASE [COVID-19]), AMPLIFIED PROBE TECHNIQUE, MAKING USE OF HIGH THROUGHPUT TECHNOLOGIES AS DESCRIBED BY CMS-2020-01-R: HCPCS

## 2020-12-05 PROCEDURE — 36415 COLL VENOUS BLD VENIPUNCTURE: CPT

## 2020-12-05 PROCEDURE — 94150 VITAL CAPACITY TEST: CPT

## 2020-12-05 PROCEDURE — 6370000000 HC RX 637 (ALT 250 FOR IP): Performed by: INTERNAL MEDICINE

## 2020-12-05 PROCEDURE — 6370000000 HC RX 637 (ALT 250 FOR IP): Performed by: EMERGENCY MEDICINE

## 2020-12-05 PROCEDURE — 84484 ASSAY OF TROPONIN QUANT: CPT

## 2020-12-05 PROCEDURE — 96374 THER/PROPH/DIAG INJ IV PUSH: CPT

## 2020-12-05 PROCEDURE — 2580000003 HC RX 258: Performed by: INTERNAL MEDICINE

## 2020-12-05 PROCEDURE — 71045 X-RAY EXAM CHEST 1 VIEW: CPT

## 2020-12-05 PROCEDURE — 99284 EMERGENCY DEPT VISIT MOD MDM: CPT

## 2020-12-05 PROCEDURE — 94640 AIRWAY INHALATION TREATMENT: CPT

## 2020-12-05 PROCEDURE — 84443 ASSAY THYROID STIM HORMONE: CPT

## 2020-12-05 PROCEDURE — 2060000000 HC ICU INTERMEDIATE R&B

## 2020-12-05 PROCEDURE — 93005 ELECTROCARDIOGRAM TRACING: CPT | Performed by: EMERGENCY MEDICINE

## 2020-12-05 PROCEDURE — 86140 C-REACTIVE PROTEIN: CPT

## 2020-12-05 PROCEDURE — 80053 COMPREHEN METABOLIC PANEL: CPT

## 2020-12-05 RX ORDER — ALBUTEROL SULFATE 2.5 MG/3ML
2.5 SOLUTION RESPIRATORY (INHALATION) 2 TIMES DAILY
Status: DISCONTINUED | OUTPATIENT
Start: 2020-12-05 | End: 2020-12-05

## 2020-12-05 RX ORDER — DEXAMETHASONE 4 MG/1
6 TABLET ORAL DAILY
Status: DISCONTINUED | OUTPATIENT
Start: 2020-12-05 | End: 2020-12-07

## 2020-12-05 RX ORDER — ONDANSETRON 2 MG/ML
4 INJECTION INTRAMUSCULAR; INTRAVENOUS EVERY 6 HOURS PRN
Status: DISCONTINUED | OUTPATIENT
Start: 2020-12-05 | End: 2020-12-10 | Stop reason: HOSPADM

## 2020-12-05 RX ORDER — FUROSEMIDE 10 MG/ML
20 INJECTION INTRAMUSCULAR; INTRAVENOUS ONCE
Status: COMPLETED | OUTPATIENT
Start: 2020-12-05 | End: 2020-12-05

## 2020-12-05 RX ORDER — ATORVASTATIN CALCIUM 80 MG/1
80 TABLET, FILM COATED ORAL DAILY
Status: DISCONTINUED | OUTPATIENT
Start: 2020-12-05 | End: 2020-12-10 | Stop reason: HOSPADM

## 2020-12-05 RX ORDER — ACETAMINOPHEN 325 MG/1
650 TABLET ORAL EVERY 6 HOURS PRN
Status: DISCONTINUED | OUTPATIENT
Start: 2020-12-05 | End: 2020-12-09 | Stop reason: SDUPTHER

## 2020-12-05 RX ORDER — BUDESONIDE AND FORMOTEROL FUMARATE DIHYDRATE 160; 4.5 UG/1; UG/1
2 AEROSOL RESPIRATORY (INHALATION) 2 TIMES DAILY
Status: DISCONTINUED | OUTPATIENT
Start: 2020-12-05 | End: 2020-12-05

## 2020-12-05 RX ORDER — BUDESONIDE 0.5 MG/2ML
0.5 INHALANT ORAL 2 TIMES DAILY
Status: DISCONTINUED | OUTPATIENT
Start: 2020-12-05 | End: 2020-12-05

## 2020-12-05 RX ORDER — NICOTINE 21 MG/24HR
1 PATCH, TRANSDERMAL 24 HOURS TRANSDERMAL DAILY
Status: DISCONTINUED | OUTPATIENT
Start: 2020-12-05 | End: 2020-12-10 | Stop reason: HOSPADM

## 2020-12-05 RX ORDER — AMLODIPINE BESYLATE 10 MG/1
10 TABLET ORAL DAILY
Status: DISCONTINUED | OUTPATIENT
Start: 2020-12-05 | End: 2020-12-05

## 2020-12-05 RX ORDER — FUROSEMIDE 10 MG/ML
20 INJECTION INTRAMUSCULAR; INTRAVENOUS 2 TIMES DAILY
Status: DISCONTINUED | OUTPATIENT
Start: 2020-12-05 | End: 2020-12-06

## 2020-12-05 RX ORDER — ARFORMOTEROL TARTRATE 15 UG/2ML
15 SOLUTION RESPIRATORY (INHALATION) 2 TIMES DAILY
Status: DISCONTINUED | OUTPATIENT
Start: 2020-12-05 | End: 2020-12-05

## 2020-12-05 RX ORDER — FUROSEMIDE 10 MG/ML
20 INJECTION INTRAMUSCULAR; INTRAVENOUS 2 TIMES DAILY
Status: DISCONTINUED | OUTPATIENT
Start: 2020-12-05 | End: 2020-12-05

## 2020-12-05 RX ORDER — NITROGLYCERIN 0.4 MG/1
0.4 TABLET SUBLINGUAL EVERY 5 MIN PRN
Status: DISCONTINUED | OUTPATIENT
Start: 2020-12-05 | End: 2020-12-10 | Stop reason: HOSPADM

## 2020-12-05 RX ORDER — CLOPIDOGREL BISULFATE 75 MG/1
75 TABLET ORAL DAILY
Status: DISCONTINUED | OUTPATIENT
Start: 2020-12-05 | End: 2020-12-10 | Stop reason: HOSPADM

## 2020-12-05 RX ORDER — ALLOPURINOL 100 MG/1
200 TABLET ORAL DAILY
Status: DISCONTINUED | OUTPATIENT
Start: 2020-12-06 | End: 2020-12-10 | Stop reason: HOSPADM

## 2020-12-05 RX ORDER — DOXAZOSIN MESYLATE 4 MG/1
4 TABLET ORAL DAILY
Status: DISCONTINUED | OUTPATIENT
Start: 2020-12-05 | End: 2020-12-10 | Stop reason: HOSPADM

## 2020-12-05 RX ORDER — GABAPENTIN 300 MG/1
600 CAPSULE ORAL EVERY MORNING
Status: DISCONTINUED | OUTPATIENT
Start: 2020-12-05 | End: 2020-12-10 | Stop reason: HOSPADM

## 2020-12-05 RX ORDER — GABAPENTIN 600 MG/1
600 TABLET ORAL 3 TIMES DAILY
Status: DISCONTINUED | OUTPATIENT
Start: 2020-12-05 | End: 2020-12-05

## 2020-12-05 RX ORDER — HYDRALAZINE HYDROCHLORIDE 20 MG/ML
10 INJECTION INTRAMUSCULAR; INTRAVENOUS EVERY 6 HOURS PRN
Status: DISCONTINUED | OUTPATIENT
Start: 2020-12-05 | End: 2020-12-10 | Stop reason: HOSPADM

## 2020-12-05 RX ORDER — ALBUTEROL SULFATE 2.5 MG/3ML
2.5 SOLUTION RESPIRATORY (INHALATION) EVERY 6 HOURS PRN
Status: DISCONTINUED | OUTPATIENT
Start: 2020-12-05 | End: 2020-12-10 | Stop reason: HOSPADM

## 2020-12-05 RX ORDER — DULOXETIN HYDROCHLORIDE 60 MG/1
60 CAPSULE, DELAYED RELEASE ORAL DAILY
Status: DISCONTINUED | OUTPATIENT
Start: 2020-12-05 | End: 2020-12-10 | Stop reason: HOSPADM

## 2020-12-05 RX ORDER — BUDESONIDE AND FORMOTEROL FUMARATE DIHYDRATE 160; 4.5 UG/1; UG/1
2 AEROSOL RESPIRATORY (INHALATION) 2 TIMES DAILY
Status: DISCONTINUED | OUTPATIENT
Start: 2020-12-06 | End: 2020-12-06

## 2020-12-05 RX ORDER — ACETAMINOPHEN 650 MG/1
650 SUPPOSITORY RECTAL EVERY 6 HOURS PRN
Status: DISCONTINUED | OUTPATIENT
Start: 2020-12-05 | End: 2020-12-10 | Stop reason: HOSPADM

## 2020-12-05 RX ORDER — PROMETHAZINE HYDROCHLORIDE 12.5 MG/1
12.5 TABLET ORAL EVERY 6 HOURS PRN
Status: DISCONTINUED | OUTPATIENT
Start: 2020-12-05 | End: 2020-12-10 | Stop reason: HOSPADM

## 2020-12-05 RX ORDER — METOPROLOL TARTRATE 100 MG/1
100 TABLET ORAL 2 TIMES DAILY
Status: DISCONTINUED | OUTPATIENT
Start: 2020-12-05 | End: 2020-12-10 | Stop reason: HOSPADM

## 2020-12-05 RX ORDER — TRAMADOL HYDROCHLORIDE 50 MG/1
50 TABLET ORAL 2 TIMES DAILY PRN
Status: DISCONTINUED | OUTPATIENT
Start: 2020-12-05 | End: 2020-12-10 | Stop reason: HOSPADM

## 2020-12-05 RX ORDER — ALBUTEROL SULFATE 90 UG/1
2 AEROSOL, METERED RESPIRATORY (INHALATION) 2 TIMES DAILY
Status: DISCONTINUED | OUTPATIENT
Start: 2020-12-06 | End: 2020-12-06

## 2020-12-05 RX ORDER — ALBUTEROL SULFATE 90 UG/1
2 AEROSOL, METERED RESPIRATORY (INHALATION) EVERY 6 HOURS PRN
Status: DISCONTINUED | OUTPATIENT
Start: 2020-12-05 | End: 2020-12-06

## 2020-12-05 RX ORDER — BUPROPION HYDROCHLORIDE 150 MG/1
150 TABLET, EXTENDED RELEASE ORAL 2 TIMES DAILY
Status: DISCONTINUED | OUTPATIENT
Start: 2020-12-05 | End: 2020-12-05

## 2020-12-05 RX ORDER — SODIUM CHLORIDE 0.9 % (FLUSH) 0.9 %
10 SYRINGE (ML) INJECTION PRN
Status: DISCONTINUED | OUTPATIENT
Start: 2020-12-05 | End: 2020-12-10 | Stop reason: HOSPADM

## 2020-12-05 RX ORDER — SODIUM CHLORIDE 0.9 % (FLUSH) 0.9 %
10 SYRINGE (ML) INJECTION EVERY 12 HOURS SCHEDULED
Status: DISCONTINUED | OUTPATIENT
Start: 2020-12-05 | End: 2020-12-10 | Stop reason: HOSPADM

## 2020-12-05 RX ORDER — TRAMADOL HYDROCHLORIDE 50 MG/1
50 TABLET ORAL 2 TIMES DAILY PRN
COMMUNITY
End: 2021-08-25

## 2020-12-05 RX ORDER — LOSARTAN POTASSIUM 25 MG/1
100 TABLET ORAL DAILY
Status: DISCONTINUED | OUTPATIENT
Start: 2020-12-05 | End: 2020-12-10 | Stop reason: HOSPADM

## 2020-12-05 RX ORDER — POLYETHYLENE GLYCOL 3350 17 G/17G
17 POWDER, FOR SOLUTION ORAL DAILY PRN
Status: DISCONTINUED | OUTPATIENT
Start: 2020-12-05 | End: 2020-12-10 | Stop reason: HOSPADM

## 2020-12-05 RX ORDER — GABAPENTIN 400 MG/1
1200 CAPSULE ORAL NIGHTLY
Status: DISCONTINUED | OUTPATIENT
Start: 2020-12-05 | End: 2020-12-10 | Stop reason: HOSPADM

## 2020-12-05 RX ADMIN — ATORVASTATIN CALCIUM 80 MG: 80 TABLET, FILM COATED ORAL at 13:30

## 2020-12-05 RX ADMIN — Medication 10000 UNITS: at 13:30

## 2020-12-05 RX ADMIN — NITROGLYCERIN 1 INCH: 20 OINTMENT TOPICAL at 23:41

## 2020-12-05 RX ADMIN — METOPROLOL TARTRATE 100 MG: 100 TABLET, FILM COATED ORAL at 13:30

## 2020-12-05 RX ADMIN — FUROSEMIDE 20 MG: 10 INJECTION, SOLUTION INTRAMUSCULAR; INTRAVENOUS at 08:58

## 2020-12-05 RX ADMIN — NITROGLYCERIN 0.5 INCH: 20 OINTMENT TOPICAL at 08:57

## 2020-12-05 RX ADMIN — NITROGLYCERIN 1 INCH: 20 OINTMENT TOPICAL at 13:30

## 2020-12-05 RX ADMIN — TIOTROPIUM BROMIDE INHALATION SPRAY 2 PUFF: 3.12 SPRAY, METERED RESPIRATORY (INHALATION) at 15:11

## 2020-12-05 RX ADMIN — GABAPENTIN 1200 MG: 400 CAPSULE ORAL at 21:01

## 2020-12-05 RX ADMIN — LOSARTAN POTASSIUM 100 MG: 25 TABLET, FILM COATED ORAL at 14:52

## 2020-12-05 RX ADMIN — NITROGLYCERIN 0.4 MG: 0.4 TABLET, ORALLY DISINTEGRATING SUBLINGUAL at 08:51

## 2020-12-05 RX ADMIN — ENOXAPARIN SODIUM 40 MG: 40 INJECTION SUBCUTANEOUS at 21:01

## 2020-12-05 RX ADMIN — DULOXETINE HYDROCHLORIDE 60 MG: 60 CAPSULE, DELAYED RELEASE ORAL at 13:30

## 2020-12-05 RX ADMIN — DOXAZOSIN 4 MG: 4 TABLET ORAL at 16:49

## 2020-12-05 RX ADMIN — Medication 10 ML: at 21:01

## 2020-12-05 RX ADMIN — CLOPIDOGREL BISULFATE 75 MG: 75 TABLET ORAL at 13:30

## 2020-12-05 RX ADMIN — FUROSEMIDE 20 MG: 10 INJECTION, SOLUTION INTRAMUSCULAR; INTRAVENOUS at 16:49

## 2020-12-05 RX ADMIN — DEXAMETHASONE 6 MG: 4 TABLET ORAL at 18:40

## 2020-12-05 RX ADMIN — ENOXAPARIN SODIUM 40 MG: 40 INJECTION, SOLUTION INTRAVENOUS; SUBCUTANEOUS at 13:29

## 2020-12-05 RX ADMIN — METOPROLOL TARTRATE 100 MG: 100 TABLET, FILM COATED ORAL at 21:01

## 2020-12-05 RX ADMIN — ALBUTEROL SULFATE 2.5 MG: 2.5 SOLUTION RESPIRATORY (INHALATION) at 15:18

## 2020-12-05 RX ADMIN — NITROGLYCERIN 1 INCH: 20 OINTMENT TOPICAL at 17:46

## 2020-12-05 ASSESSMENT — PAIN DESCRIPTION - DESCRIPTORS: DESCRIPTORS: TIGHTNESS

## 2020-12-05 ASSESSMENT — PAIN SCALES - GENERAL
PAINLEVEL_OUTOF10: 0
PAINLEVEL_OUTOF10: 0
PAINLEVEL_OUTOF10: 6
PAINLEVEL_OUTOF10: 0
PAINLEVEL_OUTOF10: 0

## 2020-12-05 ASSESSMENT — PAIN DESCRIPTION - PAIN TYPE: TYPE: ACUTE PAIN

## 2020-12-05 ASSESSMENT — PAIN DESCRIPTION - LOCATION: LOCATION: CHEST

## 2020-12-05 NOTE — PROGRESS NOTES
It appears the pt will be going to room 4456 however the room isn't clean yet, and they do not know which nurse will be taking the pt. Since it is almost shift change I will end up giving report to the oncoming RN on this floor and they will give report to the night shift nurse on the 4th floor once that room is cleaned. In the mean time the charge nurse is in with the pt getting the pt's Covid test done, she is also giving the pt his ordered medications. The pt's BP is now improved so we will not need to give the IV hydralazine per MD order at this time. I will continue to follow and pass report off to the oncoming RN.   Adilene Glover

## 2020-12-05 NOTE — PROGRESS NOTES
Hospital Medicine History & Physical      PCP: Yuriy Narayanan DO    Date of Admission: 12/5/2020    Date of Service: Pt seen/examined on 12/05/20 and Admitted to Inpatient with expected LOS greater than two midnights due to medical therapy. Chief Complaint: shortness of breath, chest pain      History Of Present Illness:     78 y.o. male Caren Richard. who is a current smoker 4 to 5 cigarettes/day, chronic pain syndrome, polyarthralgia, hypertension, lung nodules, COPD, follows Dr. Hernan Marshall, CAD s/p s/p BMS 5/24/13 to [facility] in [place], peripheral vascular disease rt fem endarterectomy 07/2013, KAVITA on CPAP  -Presents to ED with shortness of breath x3 days, chest pain midsternal started last night, associated with nocturnal dyspnea, needing more pillows and propped up position to sleep  Noted with 92% on room air, found to be hypertensive in the /105  Chest x-ray shows pulmonary edema  EKG with sinus rhythm without ST elevation or depression  Patient was transferred to [facility], [facility]. for the management of hypertensive urgency and acute on chronic diastolic congestive heart failure  Covid test was not done as low suspicion in the ED for COVID-19. Per review of notes he was on Lasix previously but note 07/2020 states Lasix was stopped due to increased urinary frequency. Last Echo 08/2019 --   Summary   Left ventricular cavity size is normal. There is mild to moderate concentric   left ventricular hypertrophy. Overall left ventricular systolic function   appears normal with an ejection fraction of 55-60%. No regional wall motion   abnormalities are noted. Indeterminate diastolic function. Trivial mitral   and tricuspid regurgitation is present. Estimated pulmonary artery systolic   pressure is normal at 26 mmHg assuming a right atrial pressure of 3 mmHg. Bi-atrial enlargement.     06/25/2013 -   PROCEDURES PERFORMED:   CFA Endarterectomy with marsupialization ; RLE wound excision and debridement   1. Right common femoral and profunda femoral endarterectomy with   marsupialization patch angioplasty using the superficial femoral artery. 2. Debridement of right calf, skin, subcutaneous tissue and fascia measuring   approximately 5 x 10 cm).       Past Medical History:          Diagnosis Date    CAD (coronary artery disease)     [facility])     COPD (chronic obstructive pulmonary disease) ([facility])     Depressive disorder, not elsewhere classified     GERD (gastroesophageal reflux disease)     History of MI (myocardial infarction) 05/2013    History of skin ulcer of lower extremity     R anterior shin    History of transient ischemic attack (TIA)     Hyperlipidemia     Hypertension     Neuropathy     KAVITA (obstructive sleep apnea)     On CPAP    Personal history of DVT (deep vein thrombosis)     PVD (peripheral vascular disease) ([facility])     Vertebral fracture        Past Surgical History:          Procedure Laterality Date    APPENDECTOMY      CHOLECYSTECTOMY, LAPAROSCOPIC      CORONARY ANGIOPLASTY WITH STENT PLACEMENT  6/2013    EYE SURGERY Left     LAMINECTOMY  11/18/2015    Bilateral decompressive lumbar laminectomy L4-5   ; medial facetectomy L4-5 joints  bilaterally; foraminotomies l5   nerve roots bilaterally    LEG DEBRIDEMENT Right 7/23/2013    Dr. Balbina Hatfield - pretibial wound    OTHER SURGICAL HISTORY Right 6/25/2013    Dr. Balbina Hatfield - CFA Endarterectomy w/marsupialization; RLE wound excision & debridement     OTHER SURGICAL HISTORY Left 8/27/2013    Dr. Balbina Hatfield - femoral & profunda femoris endarterectomy w/marsupialization patch angioplasty using SFA    SKIN SPLIT GRAFT Right 7/25/2013    Dr. Balbina Hatfield - to non-healing R pretibial wound, 9 x 15 cm    TOTAL HIP ARTHROPLASTY Right 09/01/2016    Dr. Asmita Taylor Left 12/22/2015    Dr. Balbina Hatfield - CFA exploration, thrombectomy of ileofemoral system, stenting of RAFAL in-stent stenosis w/8 x 37 mm [facility]        Medications Prior to Admission:      Prior to Admission medications    Medication Sig Start Date End Date Taking? Authorizing Provider   vitamin D (CHOLECALCIFEROL) 125 MCG (5000 UT) CAPS capsule Take 10,000 Units by mouth daily   Yes Historical Provider, MD   traMADol (ULTRAM) 50 MG tablet Take 50 mg by mouth 2 times daily as needed for Pain. Yes Historical Provider, MD   DULoxetine (CYMBALTA) 60 MG extended release capsule TAKE 1 CAPSULE BY MOUTH EVERY DAY 11/23/20  Yes Anne-Marie Mckeon DO   losartan (COZAAR) 100 MG tablet TAKE 1 TABLET BY MOUTH EVERY DAY 7/22/20  Yes Anne-Marie Mckeon DO   clopidogrel (PLAVIX) 75 MG tablet TAKE 1 TABLET BY MOUTH DAILY 7/13/20  Yes Anne-Marie Mckeon DO   pantoprazole (PROTONIX) 40 MG tablet TAKE 1 TABLET BY MOUTH EVERY EVENING. TAKE 1 HOUR BEFORE BEDTIME. 6/29/20  Yes Anne-Marie Mckeon DO   allopurinol (ZYLOPRIM) 100 MG tablet TAKE 2 TABLETS BY MOUTH EVERY DAY 6/15/20  Yes Serafin Cabrera DO   INCRUSE ELLIPTA 62.5 MCG/INH AEPB INHALE 1 PUFF BY MOUTH DAILY 6/8/20  Yes Neil Rehman MD   BREO ELLIPTA 200-25 MCG/INH AEPB inhaler INHALE 1 PUFF BY MOUTH DAILY 6/8/20  Yes Neil Rehman MD   metoprolol (LOPRESSOR) 100 MG tablet Take 1.5 tablets by mouth 2 times daily  Patient taking differently: Take 100 mg by mouth 2 times daily Take 2 tablets in morning and 1.5 in evening.  5/13/20 12/5/20 Yes Serafin Cabrera DO   atorvastatin (LIPITOR) 80 MG tablet TAKE 1 TABLET BY MOUTH DAILY 4/6/20  Yes Anne-Marie Mckeon DO   nitroGLYCERIN (NITROSTAT) 0.4 MG SL tablet Place 1 tablet under the tongue every 5 minutes as needed for Chest pain 2/18/20  Yes Neil Rehman MD   folic acid (FOLVITE) 1 MG tablet Take 1 mg by mouth daily   Yes Historical Provider, MD   albuterol (ACCUNEB) 1.25 MG/3ML nebulizer solution Inhale 3 mLs into the lungs every 6 hours as needed for Wheezing 12/24/18  Yes Mariluz Turcios MD Dom   magnesium gluconate (MAGONATE) 500 MG tablet Take 500 mg by mouth daily. Yes Historical Provider, MD   albuterol sulfate  (90 Base) MCG/ACT inhaler INHALE 2 PUFFS INTO THE LUNGS EVERY 6 HOURS AS NEEDED FOR WHEEZING 9/17/20   Hazel Oconnor MD   gabapentin (NEURONTIN) 600 MG tablet Take 1 tablet by mouth 3 times daily for 60 days. Take 1 tab qam and 2 tab at night, as tolerated 7/15/20 9/13/20  Anna Liu MD   gabapentin (NEURONTIN) 600 MG tablet TAKE 1 TABLET BY MOUTH EVERY MORNING AND 2 TABLETS EVERY EVENING 5/7/20 7/22/20  Anna Liu MD   amLODIPine (NORVASC) 10 MG tablet Take 1 tablet by mouth daily 4/30/20 7/29/20  Carol Elena DO   butalbital-acetaminophen-caffeine (FIORICET, ESGIC) -74 MG per tablet Take 1 tablet by mouth every 4 hours as needed for Headaches 4/10/20 7/22/20  Darline Cabrera DO   ranitidine (ZANTAC) 150 MG tablet TAKE 1 TABLET BY MOUTH TWICE DAILY AS NEEDED FOR HEARTBURN 1/16/20   Darline Cabrera DO   buPROPion [facility] SR) 150 MG extended release tablet Take 150 mg by mouth 2 times daily 12/20/19   Historical Provider, MD   furosemide (LASIX) 40 MG tablet Take 1 tablet by mouth daily  Patient not taking: Reported on 7/22/2020 8/19/19   Noy Landeros MD   ibuprofen (ADVIL;MOTRIN) 200 MG tablet Take 1 tablet by mouth every 8 hours as needed for Pain 8/8/19 7/22/20  Nav Strickland MD       Allergies:  Leroy Neche [aspirin] and Daliresp [roflumilast]    Social History:      The patient currently lives at home    TOBACCO:   reports that he has been smoking cigarettes. He started smoking about 53 years ago. He has a 26.00 pack-year smoking history. He has never used smokeless tobacco.  ETOH:   reports current alcohol use. Family History:      Reviewed in detail and negative for DM, CAD, Cancer, CVA.  Positive as follows:        Problem Relation Age of Onset    Cancer Mother         Breast    Heart Disease Mother    Emily Allyson Cancer Father         Bladder    Heart Disease Father     Cancer Paternal Grandmother         melanoma        REVIEW OF SYSTEMS:   Pertinent positives as noted in the HPI. All other systems reviewed and negative. PHYSICAL EXAM PERFORMED:    BP (!) 197/104   Pulse 77   Temp 97.9 °F (36.6 °C) (Oral)   Resp 20   Ht 5' 10\" (1.778 m)   Wt 203 lb 11.3 oz (92.4 kg) Comment: on bed scale. SpO2 92%   BMI 29.23 kg/m²     General appearance:  No apparent distress, appears stated age and cooperative. HEENT:  Normal cephalic, atraumatic without obvious deformity. Pupils equal, round, and reactive to light. Extra ocular muscles intact. Conjunctivae/corneas clear. Neck: Supple, with full range of motion. Unable to assess JVD with underlying body habitus. Trachea midline. Respiratory:  Normal respiratory effort. Bibasilar crackles  Cardiovascular:  Regular rate and rhythm with normal S1/S2 without murmurs, rubs or gallops. Abdomen: Soft, non-tender, non-distended with normal bowel sounds. Musculoskeletal:  No clubbing, cyanosis, trace edema   full range of motion without deformity. Skin: Skin color, texture, turgor normal.  No rashes or lesions. Neurologic:  Neurovascularly intact without any focal sensory/motor deficits. Cranial nerves: II-XII intact, grossly non-focal.  Psychiatric:  Alert and oriented, thought content appropriate, normal insight  Capillary Refill: Brisk,< 3 seconds   Peripheral Pulses: +2 palpable, equal bilaterally       Labs:     Recent Labs     12/05/20  0852   WBC 10.0   HGB 15.3   HCT 45.9        Recent Labs     12/05/20  0852      K 3.9   CL 99   CO2 25   BUN 13   CREATININE 0.7*   CALCIUM 9.9     Recent Labs     12/05/20  0852   AST 16   ALT 15   BILITOT 1.0   ALKPHOS 110     No results for input(s): INR in the last 72 hours.   Recent Labs     12/05/20  0852 12/05/20  1257 12/05/20  1538   TROPONINI <0.01 <0.01 <0.01       Urinalysis:      Lab Results   Component Value Date    NITRU Negative 08/06/2019    WBCUA 10-20 08/06/2019    BACTERIA 1+ 08/06/2019    RBCUA 0-2 08/06/2019    RBCUA NEGATIVE 08/10/2016    BLOODU Negative 08/06/2019    SPECGRAV 1.020 08/06/2019    GLUCOSEU Negative 08/06/2019       Radiology:     CXR: I have reviewed the CXR with the following interpretation: Bilateral airspace interstitial opacities, concerning for fluid overload  EKG:  I have reviewed the EKG with the following interpretation:   EKG with left ventricular hypertrophy changes, no ST elevation ST depression noted    XR CHEST PORTABLE   Final Result      1. Borderline cardiomegaly with diffuse bilateral interstitial airspace opacities. Correlate for signs of congestive failure or fluid overload. Underlying pneumonitis not excluded. ASSESSMENT:    Active Hospital Problems    Diagnosis Date Noted    Acute on chronic diastolic heart failure ([facility] [address] [I50.33] 12/05/2020    Tobacco abuse [Z72.0] 12/29/2015    KAVITA on CPAP [G47.33, Z99.89] 10/22/2015    COPD (chronic obstructive pulmonary disease) ([facility] [address] [J44.9] 12/18/2013    Chest pain [R07.9] 08/22/2013     Acute on chronic diastolic congestive heart failure, shortness of breath progressively worse, proBNP 3880, trace edema, bibasilar crackles, stop Lasix and 7/2020 he was told he does not need it  Hypertensive urgency  Chest pain, this could be in association with the uncontrolled hypertension, on arrival to the hospital he does not have chest pain, his EKG shows left ventricle hypertrophy but otherwise no ST-T wave changes.   CAD s/p s/p BMS 5/24/13 to [facility] in [place]  Peripheral vascular disease rt fem endarterectomy 07/2013  COPD without acute exacerbation no wheezing heard on exam  Hypoxemia, after admission to hospital needing 2 to 4 L ankle oxygen  KAVITA on CPAP  Nicotine dependence, current smoker 4-5 cigs/day    PLAN:  With his hypoxemia needing 2 to 4 L oxygen, I will rule out COVID-19, although low suspicion as he does not have fever, no reported sick contacts  Check inflammatory markers including CRP D-dimer  COVID-19 PCR sent, droplet plus precautions  Continue Lasix 20 mg IV twice daily  did not bring his CPAP, will ask RT evaluation for home hospital CPAP while inpatient  Continue home Plavix  Until COVID-19 ruled out I will start him on Decadron 6 mg  Continue breathing treatments  Continue Spiriva  Vitamin D supplementation  Continue home blood pressure medications  Add doxazosin for better blood pressure control, continue losartan 100 mg once daily, metoprolol 100 mg twice daily, Nitropaste 1 inch every 6 hours  Cardiology consult for further evaluation and recommendations    DVT Prophylaxis: Lovenox  Diet: DIET LOW SODIUM 2 GM; 1800 ml  Code Status: Full Code    PT/OT Eval Status: order once acute issues resolved    Dispo - Admit as inpatient. I anticipate hospitalization spanning more than two midnights for investigation and treatment of the above medically necessary diagnoses. Bryan Felipe MD   Hospitalist    Thank you Gideon Cabrera DO for the opportunity to be involved in this patient's care. If you have any questions or concerns please feel free to contact me at [phone].

## 2020-12-05 NOTE — ED PROVIDER NOTES
CHIEF COMPLAINT  Chest Pain (started last night right side pain tighness in chest ) and Shortness of Breath (two days )      HISTORY OF PRESENT ILLNESS  Tiffanie Johnson is a 78 y.o. male presents to the ED with dyspnea, nonproductive cough, chest pain, PND and orthopnea. This is over 3 nights getting worse and now having constant dyspnea and chest pain. Said no fever no production of sputum, no swelling in his legs. He has history of COPD and CHF. He is not on home oxygen. .  No other complaints, modifying factors or associated symptoms. I have reviewed the following from the nursing documentation.     Past Medical History:   Diagnosis Date    CAD (coronary artery disease)     CHF (congestive heart failure) (HCC)     [facility]) [facility])     Depressive disorder, not elsewhere classified     GERD (gastroesophageal reflux disease)     History of MI (myocardial infarction) 05/2013    History of skin ulcer of lower extremity     R anterior shin    History of transient ischemic attack (TIA)     Hyperlipidemia     Hypertension     Neuropathy     KAVITA (obstructive sleep apnea)     On CPAP    Personal history of DVT (deep vein thrombosis)     PVD (peripheral vascular disease) ([facility])     Vertebral fracture      Past Surgical History:   Procedure Laterality Date    APPENDECTOMY      CHOLECYSTECTOMY, LAPAROSCOPIC      CORONARY ANGIOPLASTY WITH STENT PLACEMENT  6/2013    EYE SURGERY Left     LAMINECTOMY  11/18/2015    Bilateral decompressive lumbar laminectomy L4-5   ; medial facetectomy L4-5 joints  bilaterally; foraminotomies l5   nerve roots bilaterally    LEG DEBRIDEMENT Right 7/23/2013    Dr. Lizet Ramirez - pretibial wound    OTHER SURGICAL HISTORY Right 6/25/2013    Dr. Lizet Ramirez - CFA Endarterectomy w/marsupialization; RLE wound excision & debridement     OTHER SURGICAL HISTORY Left 8/27/2013    Dr. Lizet Ramirez - femoral & profunda femoris endarterectomy w/marsupialization patch angioplasty using SFA    SKIN SPLIT GRAFT Right 7/25/2013    Dr. Jimbo Gamboa - to non-healing R pretibial wound, 9 x 15 cm    TOTAL HIP ARTHROPLASTY Right 09/01/2016    Dr. Kandis Schofield Left 12/22/2015    Dr. Jimbo Gamboa - CFA exploration, thrombectomy of ileofemoral system, stenting of RAFAL in-stent stenosis w/8 x 37 mm Palmaz      Family History   Problem Relation Age of Onset    Cancer Mother         Breast    Heart Disease Mother     Cancer Father         Bladder    Heart Disease Father     Cancer Paternal Grandmother         melanoma      Social History     Socioeconomic History    Marital status:      Spouse name: Not on file    Number of children: 3    Years of education: Not on file    Highest education level: Not on file   Occupational History    Occupation: Retired      Comment: 2014   Social Needs    Financial resource strain: Not on file    Food insecurity     Worry: Not on file     Inability: Not on file   [place] needs     Medical: Not on file     Non-medical: Not on file   Tobacco Use    Smoking status: Current Every Day Smoker     Packs/day: 0.50     Years: 52.00     Pack years: 26.00     Types: Cigarettes     Start date: 1/1/1967    Smokeless tobacco: Never Used   Substance and Sexual Activity    Alcohol use:  Yes     Alcohol/week: 0.0 standard drinks     Comment: 2-3 beers a month    Drug use: No    Sexual activity: Never   Lifestyle    Physical activity     Days per week: Not on file     Minutes per session: Not on file    Stress: Not on file   Relationships    Social connections     Talks on phone: Not on file     Gets together: Not on file     Attends Worship service: Not on file     Active member of club or organization: Not on file     Attends meetings of clubs or organizations: Not on file     Relationship status: Not on file    Intimate partner violence     Fear of current or ex partner: Not on file Emotionally abused: Not on file     Physically abused: Not on file     Forced sexual activity: Not on file   Other Topics Concern    Not on file   Social History Narrative    Not on file     Current Facility-Administered Medications   Medication Dose Route Frequency Provider Last Rate Last Dose    nitroGLYCERIN (NITROSTAT) SL tablet 0.4 mg  0.4 mg Sublingual Q5 Min PRN Jina Scott MD        nitroglycerin (NITRO-BID) 2 % ointment 0.5 inch  0.5 inch Topical Once Jina Scott MD        furosemide (LASIX) injection 20 mg  20 mg Intravenous Once Jina Scott MD         Current Outpatient Medications   Medication Sig Dispense Refill    DULoxetine (CYMBALTA) 60 MG extended release capsule TAKE 1 CAPSULE BY MOUTH EVERY DAY 90 capsule 0    albuterol sulfate  (90 Base) MCG/ACT inhaler INHALE 2 PUFFS INTO THE LUNGS EVERY 6 HOURS AS NEEDED FOR WHEEZING 6.7 g 11    losartan (COZAAR) 100 MG tablet TAKE 1 TABLET BY MOUTH EVERY DAY 90 tablet 1    gabapentin (NEURONTIN) 600 MG tablet Take 1 tablet by mouth 3 times daily for 60 days. Take 1 tab qam and 2 tab at night, as tolerated 90 tablet 1    clopidogrel (PLAVIX) 75 MG tablet TAKE 1 TABLET BY MOUTH DAILY 90 tablet 1    pantoprazole (PROTONIX) 40 MG tablet TAKE 1 TABLET BY MOUTH EVERY EVENING. TAKE 1 HOUR BEFORE BEDTIME.  90 tablet 1    allopurinol (ZYLOPRIM) 100 MG tablet TAKE 2 TABLETS BY MOUTH EVERY  tablet 1    INCRUSE ELLIPTA 62.5 MCG/INH AEPB INHALE 1 PUFF BY MOUTH DAILY 1 each 11    BREO ELLIPTA 200-25 MCG/INH AEPB inhaler INHALE 1 PUFF BY MOUTH DAILY 1 each 11    metoprolol (LOPRESSOR) 100 MG tablet Take 1.5 tablets by mouth 2 times daily 270 tablet 1    gabapentin (NEURONTIN) 600 MG tablet TAKE 1 TABLET BY MOUTH EVERY MORNING AND 2 TABLETS EVERY EVENING 90 tablet 0    amLODIPine (NORVASC) 10 MG tablet Take 1 tablet by mouth daily 90 tablet 1    butalbital-acetaminophen-caffeine (FIORICET, ESGIC) -40 MG per tablet Take 1 tablet by mouth every 4 hours as needed for Headaches 30 tablet 0    atorvastatin (LIPITOR) 80 MG tablet TAKE 1 TABLET BY MOUTH DAILY 90 tablet 1    nitroGLYCERIN (NITROSTAT) 0.4 MG SL tablet Place 1 tablet under the tongue every 5 minutes as needed for Chest pain 25 tablet 1    ranitidine (ZANTAC) 150 MG tablet TAKE 1 TABLET BY MOUTH TWICE DAILY AS NEEDED FOR HEARTBURN 180 tablet 1    buPROPion (WELLBUTRIN SR) 150 MG extended release tablet Take 150 mg by mouth 2 times daily      furosemide (LASIX) 40 MG tablet Take 1 tablet by mouth daily (Patient not taking: Reported on 7/22/2020) 90 tablet 3    ibuprofen (ADVIL;MOTRIN) 200 MG tablet Take 1 tablet by mouth every 8 hours as needed for Pain 30 tablet 0    folic acid (FOLVITE) 1 MG tablet Take 1 mg by mouth daily      albuterol (ACCUNEB) 1.25 MG/3ML nebulizer solution Inhale 3 mLs into the lungs every 6 hours as needed for Wheezing 120 vial 3    magnesium gluconate (MAGONATE) 500 MG tablet Take 500 mg by mouth daily. Allergies   Allergen Reactions    Asa [Aspirin] Other (See Comments)     Stomach ache    Daliresp [Roflumilast] Diarrhea and Other (See Comments)     Severe diarrhea and did not help       REVIEW OF SYSTEMS  10 systems reviewed, pertinent positives per HPI otherwise noted to be negative. PHYSICAL EXAM  Pulse 79   Temp (!) 0.4 °F (-17.6 °C) (Oral)   Resp 22   SpO2 95%   GENERAL APPEARANCE: Awake and alert. Cooperative. Mild respiratory distress  HEAD: Normocephalic. Atraumatic. EYES: PERRL. EOM's grossly intact. ENT: Mucous membranes are moist.   NECK: Supple. Positive JVD  HEART: RRR. No murmurs  LUNGS: Respirations labored, rales at the bases bilaterally. ABDOMEN: Soft. Non-distended. Non-tender. No guarding or rebound. Normal bowel sounds. EXTREMITIES: No peripheral edema. Moves all extremities equally. All extremities neurovascularly intact. SKIN: Warm and dry. No acute rashes.    NEUROLOGICAL: Alert and Portable    Result Date: 12/5/2020  PORTABLE CHEST Comparison exam 8/6/2019 HISTORY: Chest pain, short of breath FINDINGS: Heart size patient is rotated in this exam.  Heart size appears upper limits of normal.  There is diffuse bilateral interstitial airspace opacity. No localized consolidation pleural effusion. Costophrenic granuloma left lung base unchanged. Bony structures are intact. 1.  Borderline cardiomegaly with diffuse bilateral interstitial airspace opacities. Correlate for signs of congestive failure or fluid overload. Underlying pneumonitis not excluded. ED COURSE/MDM  Patient seen and evaluated. Old records reviewed. Labs and imaging reviewed and results discussed with patient. Patient came in with chest pain, shortness of breath he was tachypneic with rales on exam, EKG did not show acute ischemic changes, troponin is negative BNP is over 3000 with a baseline that is normal and chest x-ray is consistent with congestive heart failure. The patient was immediately started on oxygen, given sublingual nitroglycerin that did help his chest discomfort he had nitroglycerin ointment than placed he was given 20 of Lasix IV and is beginning to diurese. He is feeling better at this time. He will need to be admitted for further diuresis, blood pressure control, his blood pressure has come down now. He will be admitted to [facility]. have contacted hospital medicine through the access center. Total critical care time excluding separately billable procedures is 35 minutes. Patient was given scripts for the following medications. I counseled patient how to take these medications. New Prescriptions    No medications on file       CLINICAL IMPRESSION  1. Acute systolic congestive heart failure (HCC)        Blood pressure (!) 187/93, pulse 71, temperature 97.4 °F (36.3 °C), temperature source Oral, resp.  rate 20, height 5' 10\" (1.778 m), weight 215 lb (97.5 kg), SpO2 92

## 2020-12-05 NOTE — PROGRESS NOTES
4 Eyes Admission Assessment     I agree as the admission nurse that 2 RN's have performed a thorough Head to Toe Skin Assessment on the patient. ALL assessment sites listed below have been assessed on admission. Areas assessed by both nurses:   [x]   Head, Face, and Ears   [x]   Shoulders, Back, and Chest  [x]   Arms, Elbows, and Hands   [x]   Coccyx, Sacrum, and Ischium  [x]   Legs, Feet, and Heels        Does the Patient have Skin Breakdown? No open wounds noted, the pt does have multiple scattered scabs on his lower legs. On the left shin he has two scabs and on the right shin he has 6 scabs some big some small. The big scabs are about the size of a dime.          Isidro Prevention initiated:  No   Wound Care Orders initiated:  No      [facility] nurse consulted for Pressure Injury (Stage 3,4, Unstageable, DTI, NWPT, and Complex wounds) or Isidro score 18 or lower:  No      Nurse 1 eSignature: Electronically signed by Nicola Carrillo RN on 12/5/20 at 3:51 PM EST        Nurse 2 eSignature: Electronically signed by Toma Forrester RN on 12/5/20 at 4:15 PM EST

## 2020-12-05 NOTE — PROGRESS NOTES
bp only went down slightly to 197/104, pt has no complaints, I did message the MD updating him on the pt's current bp. I will continue to follow throughout the shift.   Gm Horner

## 2020-12-05 NOTE — PROGRESS NOTES
RESPIRATORY THERAPY ASSESSMENT    Name:  Nestor Vargas. Medical Record Number:  [id number]  Age: 78 y.o. Gender: male  : 1950  Today's Date:  2020  Room:  5510/5510-01    Assessment     Is the patient being admitted for a COPD or Asthma exacerbation? No   (If yes the patient will be seen every 4 hours for the first 24 hours and then reassessed)    Patient Admission Diagnosis      Allergies  Allergies   Allergen Reactions    Asa [Aspirin] Other (See Comments)     Stomach ache    Daliresp [Roflumilast] Diarrhea and Other (See Comments)     Severe diarrhea and did not help       Minimum Predicted Vital Capacity:     1020          Actual Vital Capacity:      1000              Pulmonary History:COPD and CHF/Pulmonary Edema  Home Oxygen Therapy:  room air  Home Respiratory Therapy:Albuterol and Breo Ellipta   Current Respiratory Therapy:  Albuterol Q 6 prn, Pulmicort/Brovana BID HHN  Treatment Type: EKG       Respiratory Severity Index(RSI)   Patients with orders for inhalation medications, oxygen, or any therapeutic treatment modality will be placed on Respiratory Protocol. They will be assessed with the first treatment and at least every 72 hours thereafter. The following severity scale will be used to determine frequency of treatment intervention. Smoking History: Pulmonary Disease or Smoking History, Greater than 15 pack year = 2    Social History  Social History     Tobacco Use    Smoking status: Current Every Day Smoker     Packs/day: 0.50     Years: 52.00     Pack years: 26.00     Types: Cigarettes     Start date: 1967    Smokeless tobacco: Never Used   Substance Use Topics    Alcohol use:  Yes     Alcohol/week: 0.0 standard drinks     Comment: 2-3 beers a month    Drug use: No       Recent Surgical History: None = 0  Past Surgical History  Past Surgical History:   Procedure Laterality Date    APPENDECTOMY      CHOLECYSTECTOMY, LAPAROSCOPIC      CORONARY ANGIOPLASTY WITH STENT PLACEMENT  6/2013    EYE SURGERY Left     LAMINECTOMY  11/18/2015    Bilateral decompressive lumbar laminectomy L4-5   ; medial facetectomy L4-5 joints  bilaterally; foraminotomies l5   nerve roots bilaterally    LEG DEBRIDEMENT Right 7/23/2013    Dr. Marcello Marcos - pretibial wound    OTHER SURGICAL HISTORY Right 6/25/2013    Dr. Marcello Marcos - CFA Endarterectomy w/marsupialization; RLE wound excision & debridement     OTHER SURGICAL HISTORY Left 8/27/2013    Dr. Marcello Marcos - femoral & profunda femoris endarterectomy w/marsupialization patch angioplasty using SFA    SKIN SPLIT GRAFT Right 7/25/2013    Dr. Marcello Marcos - to non-healing R pretibial wound, 9 x 15 cm    TOTAL HIP ARTHROPLASTY Right 09/01/2016    Dr. May David Left 12/22/2015    Dr. Marcello Marcos - CFA exploration, thrombectomy of ileofemoral system, stenting of RAFAL in-stent stenosis w/8 x 37 mm Palmaz        Level of Consciousness: Alert, Oriented, and Cooperative = 0    Level of Activity: Walking with assistance = 1    Respiratory Pattern: Increased; RR 21-30 = 1    Breath Sounds: Diminshed bilaterally and/or crackles = 2    Sputum   ,  ,    Cough: Strong, spontaneous, non-productive = 0    Vital Signs   BP (!) 191/89   Pulse 68   Temp 97.7 °F (36.5 °C) (Oral)   Resp 20   Ht 5' 10\" (1.778 m)   Wt 203 lb 11.3 oz (92.4 kg) Comment: on bed scale. SpO2 91%   BMI 29.23 kg/m²   SPO2 (COPD values may differ): 88-89% on room air or greater than 92% on FiO2 28- 35% = 2    Peak Flow (asthma only): not applicable = 0    RSI: 7-8 = BID and Q4HPRN (every four hours as needed) for dyspnea        Plan       Goals: medication delivery, volume expansion and improve oxygenation    Patient/caregiver was educated on the proper method of use for Respiratory Care Devices:  Yes      Level of patient/caregiver understanding able to:   ? Verbalize understanding   ? Demonstrate understanding       ? Teach back        ? Needs reinforcement       ?   No available caregiver               ? Other:     Response to education:  Very Good     Is patient being placed on Home Treatment Regimen? Yes     Does the patient have everything they need prior to discharge? NA     Comments: Tx as per home reg. Albuterol HHN BID and prn. Plan of Care: Albuterol HHN BID and prn. Pulmicort/Brovana HHN BID    Electronically signed by Rosalva Tay RCP on 12/5/2020 at 2:53 PM    Respiratory Protocol Guidelines     1. Assessment and treatment by Respiratory Therapy will be initiated for medication and therapeutic interventions upon initiation of aerosolized medication. 2. Physician will be contacted for respiratory rate (RR) greater than 35 breaths per minute. Therapy will be held for heart rate (HR) greater than 140 beats per minute, pending direction from physician. 3. Bronchodilators will be administered via Metered Dose Inhaler (MDI) with spacer when the following criteria are met:  a. Alert and cooperative     b. HR < 140 bpm  c. RR < 30 bpm                d. Can demonstrate a 2-3 second inspiratory hold  4. Bronchodilators will be administered via Hand Held Nebulizer [facility]) to patients when ANY of the following criteria are met  a. Incognizant or uncooperative          b. Patients treated with HHN at Home        c. Unable to demonstrate proper use of MDI with spacer     d. RR > 30 bpm   5. Bronchodilators will be delivered via Metered Dose Inhaler (MDI), HHN, Aerogen to intubated patients on mechanical ventilation. 6. Inhalation medication orders will be delivered and/or substituted as outlined below. Aerosolized Medications Ordering and Administration Guidelines:    1. All Medications will be ordered by a physician, and their frequency and/or modality will be adjusted as defined by the patients Respiratory Severity Index (RSI) score.   2. If the patient does not have documented COPD, consider discontinuing anticholinergics when RSI is less than 9.  3. If the bronchospasm worsens (increased RSI), then the bronchodilator frequency can be increased to a maximum of every 4 hours. If greater than every 4 hours is required, the physician will be contacted. 4. If the bronchospasm improves, the frequency of the bronchodilator can be decreased, based on the patient's RSI, but not less than home treatment regimen frequency. 5. Bronchodilator(s) will be discontinued if patient has a RSI less than 9 and has received no scheduled or as needed treatment for 72  Hrs. Patients Ordered on a Mucolytic Agent:    1. Must always be administered with a bronchodilator. 2. Discontinue if patient experiences worsened bronchospasm, or secretions have lessened to the point that the patient is able to clear them with a cough. Anti-inflammatory and Combination Medications:    1. If the patient lacks prior history of lung disease, is not using inhaled anti-inflammatory medication at home, and lacks wheezing by examination or by history for at least 24 hours, contact physician for possible discontinuation.

## 2020-12-05 NOTE — ED NOTES
Report called to [place] RN at [facility] for transport to [address]  12/05/20 9876

## 2020-12-05 NOTE — PROGRESS NOTES
Pt. States that he rarely uses his home cpap unit and that he does not want us to provide one for him.

## 2020-12-05 NOTE — PROGRESS NOTES
The doctor would like to test the pt for Covid 19. The charge nurse has been made aware and will do the test on the pt since she is fit tested for a N95. The charge nurse will also give the pt his due/new medications that were ordered since she is fit tested for a N95 and this nurse is not. This nurse could use a PAPR but there is none available on this unit. The pt will need to be transferred to the 4th floor for covid rule out. The MD is aware of the need for transfer and it ok with that. I will continue to follow and give report to the oncoming RN.   Nicola Carrillo

## 2020-12-06 LAB
ANION GAP SERPL CALCULATED.3IONS-SCNC: 13 MMOL/L (ref 3–16)
BUN BLDV-MCNC: 18 MG/DL (ref 7–20)
CALCIUM SERPL-MCNC: 10.1 MG/DL (ref 8.3–10.6)
CHLORIDE BLD-SCNC: 98 MMOL/L (ref 99–110)
CHOLESTEROL, TOTAL: 141 MG/DL (ref 0–199)
CO2: 23 MMOL/L (ref 21–32)
CREAT SERPL-MCNC: 0.9 MG/DL (ref 0.8–1.3)
D DIMER: 299 NG/ML DDU (ref 0–229)
GFR AFRICAN AMERICAN: >60
GFR NON-AFRICAN AMERICAN: >60
GLUCOSE BLD-MCNC: 160 MG/DL (ref 70–99)
HDLC SERPL-MCNC: 30 MG/DL (ref 40–60)
L. PNEUMOPHILA SEROGP 1 UR AG: NORMAL
LDL CHOLESTEROL CALCULATED: 85 MG/DL
MAGNESIUM: 1.7 MG/DL (ref 1.8–2.4)
POTASSIUM SERPL-SCNC: 3.9 MMOL/L (ref 3.5–5.1)
PROCALCITONIN: 0.1 NG/ML (ref 0–0.15)
SARS-COV-2, PCR: NOT DETECTED
SODIUM BLD-SCNC: 134 MMOL/L (ref 136–145)
STREP PNEUMONIAE ANTIGEN, URINE: NORMAL
TRIGL SERPL-MCNC: 128 MG/DL (ref 0–150)
VLDLC SERPL CALC-MCNC: 26 MG/DL

## 2020-12-06 PROCEDURE — 6370000000 HC RX 637 (ALT 250 FOR IP): Performed by: INTERNAL MEDICINE

## 2020-12-06 PROCEDURE — 80048 BASIC METABOLIC PNL TOTAL CA: CPT

## 2020-12-06 PROCEDURE — 85379 FIBRIN DEGRADATION QUANT: CPT

## 2020-12-06 PROCEDURE — 84145 PROCALCITONIN (PCT): CPT

## 2020-12-06 PROCEDURE — 2700000000 HC OXYGEN THERAPY PER DAY

## 2020-12-06 PROCEDURE — 83735 ASSAY OF MAGNESIUM: CPT

## 2020-12-06 PROCEDURE — 6360000002 HC RX W HCPCS: Performed by: INTERNAL MEDICINE

## 2020-12-06 PROCEDURE — 80061 LIPID PANEL: CPT

## 2020-12-06 PROCEDURE — 2580000003 HC RX 258: Performed by: INTERNAL MEDICINE

## 2020-12-06 PROCEDURE — 2060000000 HC ICU INTERMEDIATE R&B

## 2020-12-06 PROCEDURE — 94640 AIRWAY INHALATION TREATMENT: CPT

## 2020-12-06 PROCEDURE — 99451 NTRPROF PH1/NTRNET/EHR 5/>: CPT | Performed by: INTERNAL MEDICINE

## 2020-12-06 PROCEDURE — 94761 N-INVAS EAR/PLS OXIMETRY MLT: CPT

## 2020-12-06 PROCEDURE — 94150 VITAL CAPACITY TEST: CPT

## 2020-12-06 RX ORDER — BUDESONIDE 0.5 MG/2ML
0.5 INHALANT ORAL 2 TIMES DAILY
Status: DISCONTINUED | OUTPATIENT
Start: 2020-12-06 | End: 2020-12-10 | Stop reason: HOSPADM

## 2020-12-06 RX ORDER — ALBUTEROL SULFATE 2.5 MG/3ML
2.5 SOLUTION RESPIRATORY (INHALATION) 2 TIMES DAILY
Status: DISCONTINUED | OUTPATIENT
Start: 2020-12-06 | End: 2020-12-10 | Stop reason: HOSPADM

## 2020-12-06 RX ORDER — FUROSEMIDE 10 MG/ML
20 INJECTION INTRAMUSCULAR; INTRAVENOUS 3 TIMES DAILY
Status: DISCONTINUED | OUTPATIENT
Start: 2020-12-06 | End: 2020-12-06

## 2020-12-06 RX ORDER — FUROSEMIDE 10 MG/ML
40 INJECTION INTRAMUSCULAR; INTRAVENOUS ONCE
Status: COMPLETED | OUTPATIENT
Start: 2020-12-06 | End: 2020-12-06

## 2020-12-06 RX ORDER — ARFORMOTEROL TARTRATE 15 UG/2ML
15 SOLUTION RESPIRATORY (INHALATION) 2 TIMES DAILY
Status: DISCONTINUED | OUTPATIENT
Start: 2020-12-06 | End: 2020-12-10 | Stop reason: HOSPADM

## 2020-12-06 RX ORDER — MAGNESIUM SULFATE IN WATER 40 MG/ML
2 INJECTION, SOLUTION INTRAVENOUS ONCE
Status: COMPLETED | OUTPATIENT
Start: 2020-12-06 | End: 2020-12-06

## 2020-12-06 RX ADMIN — BUDESONIDE AND FORMOTEROL FUMARATE DIHYDRATE 2 PUFF: 160; 4.5 AEROSOL RESPIRATORY (INHALATION) at 09:22

## 2020-12-06 RX ADMIN — DOXAZOSIN 4 MG: 4 TABLET ORAL at 10:07

## 2020-12-06 RX ADMIN — NITROGLYCERIN 1 INCH: 20 OINTMENT TOPICAL at 11:13

## 2020-12-06 RX ADMIN — MAGNESIUM SULFATE HEPTAHYDRATE 2 G: 40 INJECTION, SOLUTION INTRAVENOUS at 11:02

## 2020-12-06 RX ADMIN — Medication 10 ML: at 20:25

## 2020-12-06 RX ADMIN — TRAMADOL HYDROCHLORIDE 50 MG: 50 TABLET, COATED ORAL at 20:28

## 2020-12-06 RX ADMIN — DEXAMETHASONE 6 MG: 4 TABLET ORAL at 10:06

## 2020-12-06 RX ADMIN — ARFORMOTEROL TARTRATE 15 MCG: 15 SOLUTION RESPIRATORY (INHALATION) at 22:53

## 2020-12-06 RX ADMIN — DULOXETINE HYDROCHLORIDE 60 MG: 60 CAPSULE, DELAYED RELEASE ORAL at 10:09

## 2020-12-06 RX ADMIN — PROMETHAZINE HYDROCHLORIDE 12.5 MG: 12.5 TABLET ORAL at 20:28

## 2020-12-06 RX ADMIN — ALLOPURINOL 200 MG: 100 TABLET ORAL at 10:06

## 2020-12-06 RX ADMIN — GABAPENTIN 600 MG: 300 CAPSULE ORAL at 10:09

## 2020-12-06 RX ADMIN — ENOXAPARIN SODIUM 40 MG: 40 INJECTION SUBCUTANEOUS at 10:06

## 2020-12-06 RX ADMIN — LOSARTAN POTASSIUM 100 MG: 25 TABLET, FILM COATED ORAL at 10:08

## 2020-12-06 RX ADMIN — ALBUTEROL SULFATE 2 PUFF: 90 AEROSOL, METERED RESPIRATORY (INHALATION) at 09:22

## 2020-12-06 RX ADMIN — BUDESONIDE INHALATION 500 MCG: 0.5 SUSPENSION RESPIRATORY (INHALATION) at 22:53

## 2020-12-06 RX ADMIN — METOPROLOL TARTRATE 100 MG: 100 TABLET, FILM COATED ORAL at 20:23

## 2020-12-06 RX ADMIN — Medication 10 ML: at 10:09

## 2020-12-06 RX ADMIN — ALBUTEROL SULFATE 2.5 MG: 2.5 SOLUTION RESPIRATORY (INHALATION) at 22:53

## 2020-12-06 RX ADMIN — GABAPENTIN 1200 MG: 400 CAPSULE ORAL at 20:23

## 2020-12-06 RX ADMIN — TIOTROPIUM BROMIDE INHALATION SPRAY 2 PUFF: 3.12 SPRAY, METERED RESPIRATORY (INHALATION) at 09:25

## 2020-12-06 RX ADMIN — ENOXAPARIN SODIUM 40 MG: 40 INJECTION SUBCUTANEOUS at 20:22

## 2020-12-06 RX ADMIN — ATORVASTATIN CALCIUM 80 MG: 80 TABLET, FILM COATED ORAL at 10:06

## 2020-12-06 RX ADMIN — FUROSEMIDE 40 MG: 10 INJECTION, SOLUTION INTRAMUSCULAR; INTRAVENOUS at 15:25

## 2020-12-06 RX ADMIN — CLOPIDOGREL BISULFATE 75 MG: 75 TABLET ORAL at 10:09

## 2020-12-06 RX ADMIN — Medication 10000 UNITS: at 11:22

## 2020-12-06 RX ADMIN — NITROGLYCERIN 1 INCH: 20 OINTMENT TOPICAL at 05:55

## 2020-12-06 RX ADMIN — METOPROLOL TARTRATE 100 MG: 100 TABLET, FILM COATED ORAL at 10:08

## 2020-12-06 ASSESSMENT — PAIN SCALES - GENERAL
PAINLEVEL_OUTOF10: 7
PAINLEVEL_OUTOF10: 0

## 2020-12-06 ASSESSMENT — PAIN DESCRIPTION - PAIN TYPE: TYPE: ACUTE PAIN

## 2020-12-06 ASSESSMENT — PAIN DESCRIPTION - FREQUENCY: FREQUENCY: CONTINUOUS

## 2020-12-06 ASSESSMENT — PAIN DESCRIPTION - LOCATION: LOCATION: BACK

## 2020-12-06 ASSESSMENT — PAIN DESCRIPTION - ORIENTATION: ORIENTATION: LOWER

## 2020-12-06 ASSESSMENT — PAIN - FUNCTIONAL ASSESSMENT: PAIN_FUNCTIONAL_ASSESSMENT: ACTIVITIES ARE NOT PREVENTED

## 2020-12-06 ASSESSMENT — PAIN DESCRIPTION - ONSET: ONSET: ON-GOING

## 2020-12-06 ASSESSMENT — PAIN DESCRIPTION - DESCRIPTORS: DESCRIPTORS: ACHING;CONSTANT

## 2020-12-06 ASSESSMENT — PAIN DESCRIPTION - PROGRESSION: CLINICAL_PROGRESSION: NOT CHANGED

## 2020-12-06 NOTE — PROGRESS NOTES
Intravenous 2 times per day    nicotine  1 patch Transdermal Daily    nitroglycerin  1 inch Topical 4 times per day    allopurinol  200 mg Oral Daily    atorvastatin  80 mg Oral Daily    clopidogrel  75 mg Oral Daily    DULoxetine  60 mg Oral Daily    tiotropium  2 puff Inhalation Daily    losartan  100 mg Oral Daily    metoprolol  100 mg Oral BID    vitamin D  10,000 Units Oral Daily    gabapentin  600 mg Oral QAM    And    gabapentin  1,200 mg Oral Nightly    doxazosin  4 mg Oral Daily    dexamethasone  6 mg Oral Daily    enoxaparin  40 mg Subcutaneous BID    budesonide-formoterol  2 puff Inhalation BID    albuterol sulfate HFA  2 puff Inhalation BID     PRN Meds: nitroGLYCERIN, sodium chloride flush, acetaminophen **OR** acetaminophen, polyethylene glycol, promethazine **OR** ondansetron, perflutren lipid microspheres, albuterol, traMADol, hydrALAZINE, albuterol sulfate HFA      Intake/Output Summary (Last 24 hours) at 12/6/2020 1003  Last data filed at 12/5/2020 2034  Gross per 24 hour   Intake 240 ml   Output 1425 ml   Net -1185 ml       Physical Exam Performed:    BP (!) 148/96   Pulse 67   Temp 97.8 °F (36.6 °C) (Oral)   Resp 18   Ht 5' 10\" (1.778 m)   Wt 203 lb 11.3 oz (92.4 kg) Comment: on bed scale. SpO2 93%   BMI 29.23 kg/m²     General appearance:  No apparent distress, appears stated age and cooperative. HEENT:  Normal cephalic, atraumatic without obvious deformity. Pupils equal, round, and reactive to light. Extra ocular muscles intact. Conjunctivae/corneas clear. Neck: Supple, with full range of motion. Unable to assess JVD with underlying body habitus. Trachea midline. Respiratory:  Normal respiratory effort. Bibasilar crackles, Right > Left  Cardiovascular:  Regular rate and rhythm with normal S1/S2 without murmurs, rubs or gallops. Abdomen: Soft, non-tender, non-distended with normal bowel sounds.   Musculoskeletal:  No clubbing, cyanosis, trace edema, bilateral lower was told he does not need it  2. Hypertensive urgency POA  3. Chest pain, this could be in association with the uncontrolled hypertension, on arrival to the hospital he does not have chest pain, his EKG shows left ventricle hypertrophy but otherwise no ST-T wave changes. 4. CAD s/p s/p BMS 5/24/13 to [address]  5. Peripheral vascular disease rt fem endarterectomy 07/2013  6. COPD without acute exacerbation no wheezing heard on exam  7. Hypoxemia, after admission to hospital needing 2 to 4 L NC oxygen  8. KAVITA on CPAP  9.  Nicotine dependence, current smoker 4-5 cigs/day    PLAN:  With his hypoxemia needing 2 to 4 L oxygen, COVID-19 PCR was sent, although low suspicion as he does not have fever, no reported sick contacts  CRP/D-dimer mild elevation  Droplet +  20 mg IV lasix not much response in am, will give 40 mg IV lasix afternoon  did not bring his CPAP, will ask RT evaluation for home hospital CPAP while inpatient  Continue home Plavix  Until COVID-19 ruled out continue Decadron 6 mg  Continue breathing treatments  Continue Spiriva  Vitamin D supplementation  Continue continue losartan 100 mg once daily, metoprolol 100 mg twice daily, Nitropaste 1 inch every 6 hours, Doxazosin added 12/05  Check Echo for evaluation of heart failure and wall motion abnormalities  Cardiology consult for further evaluation and recommendations       DVT Prophylaxis: Lovenox  Diet: DIET LOW SODIUM 2 GM; 1800 ml  Code Status: Full Code    PT/OT Eval Status: N/A    Dispo - Echo planned for Monday, if not remarkable can be dc on oral diuretics    Sunny Altman MD  Hospitalist

## 2020-12-06 NOTE — PROGRESS NOTES
Patient admitted to [address] 2035 from PCU. Patient A&Ox4. VSS. Patient oriented to room and call light. Patient denies any needs at this time. Will continue to monitor.

## 2020-12-06 NOTE — PROGRESS NOTES
.4 Eyes Admission Assessment     I agree as the admission nurse that 2 RN's have performed a thorough Head to Toe Skin Assessment on the patient. ALL assessment sites listed below have been assessed on admission. Areas assessed by both nurses:   [x]   Head, Face, and Ears   [x]   Shoulders, Back, and Chest  [x]   Arms, Elbows, and Hands   [x]   Coccyx, Sacrum, and Ischium  [x]   Legs, Feet, and Heels        Does the Patient have Skin Breakdown?   No         Isidro Prevention initiated:  No   Wound Care Orders initiated:  No      [facility] nurse consulted for Pressure Injury (Stage 3,4, Unstageable, DTI, NWPT, and Complex wounds) or Isidro score 18 or lower:  No      Nurse 1 eSignature: Electronically signed by Atif Ohara RN on 12/6/20 at 5:50 PM EST    **SHARE this note so that the co-signing nurse is able to place an eSignature**    Nurse 2 eSignature: Electronically signed by Rick Matos RN on 12/6/20 at 5:50 PM EST

## 2020-12-06 NOTE — PLAN OF CARE
Problem: Gas Exchange - Impaired:  Goal: Levels of oxygenation will improve  Description: Levels of oxygenation will improve  Outcome: Ongoing     Problem: Falls - Risk of:  Goal: Will remain free from falls  Description: Will remain free from falls  12/6/2020 1509 by Jim Russ RN  Outcome: On going  Pt's oxygen is being decreased as tolerated. Pt is now on 3 liters from 5 liters at start of shift, 91 to 93 percent oxygen saturation.

## 2020-12-06 NOTE — CONSULTS
[facility]  Cardiology Inpatient Consult Service                                                                                          Pt Name: Zaire Slade. Age: 78 y.o. Sex: male  : 1950  Location: [address] [place], [place] [address]    Referring Physician: Luis Chaudhry MD      Reason for Consult:       Reason for Consultation/Chief Complaint:   See AVS/acute on chronic heart failure/hypertension/KAVITA    HPI:      Zaire Givens is a 78 y.o. male with a past medical history of hypertension, hyperlipidemia, coronary disease who presented to the hospital with shortness of breath for 3 days along with substernal chest pain and what it seems to be orthopnea. On arrival he was found to be significantly hypertensive with a systolic blood pressure of 220. He is chest x-ray is concerning for pulmonary edema. We are consulted for further management of acute on chronic diastolic heart failure along with uncontrolled hypertension. Past Medical History:   has a past medical history of CAD (coronary artery disease), CHF (congestive heart failure) ([address] COPD (chronic obstructive pulmonary disease) ([address] Depressive disorder, not elsewhere classified, GERD (gastroesophageal reflux disease), History of MI (myocardial infarction), History of skin ulcer of lower extremity, History of transient ischemic attack (TIA), Hyperlipidemia, Hypertension, Neuropathy, KAVITA (obstructive sleep apnea), Personal history of DVT (deep vein thrombosis), PVD (peripheral vascular disease) ([facility] and Vertebral fracture. Surgical History:   has a past surgical history that includes Appendectomy; Coronary angioplasty with stent (2013); other surgical history (Right, 2013); Leg Debridement (Right, 2013); skin split graft (Right, 2013); other surgical history (Left, 2013); laminectomy (2015); transluminal angioplasty (Left, 2015);  Total hip arthroplasty (Right, 2016); Cholecystectomy, laparoscopic; and eye surgery (Left). Social History:   reports that he has been smoking cigarettes. He started smoking about 53 years ago. He has a 26.00 pack-year smoking history. He has never used smokeless tobacco. He reports current alcohol use. He reports that he does not use drugs. Family History:  No evidence for sudden cardiac death or premature CAD      Medications:       Home Medications  Were reviewed and are listed in nursing record. and/or listed below  Prior to Admission medications    Medication Sig Start Date End Date Taking? Authorizing Provider   vitamin D (CHOLECALCIFEROL) 125 MCG (5000 UT) CAPS capsule Take 10,000 Units by mouth daily   Yes Historical Provider, MD   traMADol (ULTRAM) 50 MG tablet Take 50 mg by mouth 2 times daily as needed for Pain. Yes Historical Provider, MD   DULoxetine (CYMBALTA) 60 MG extended release capsule TAKE 1 CAPSULE BY MOUTH EVERY DAY 11/23/20  Yes Gillian Bynum DO   losartan (COZAAR) 100 MG tablet TAKE 1 TABLET BY MOUTH EVERY DAY 7/22/20  Yes Gillian Bynum DO   clopidogrel (PLAVIX) 75 MG tablet TAKE 1 TABLET BY MOUTH DAILY 7/13/20  Yes Gillian Bynum DO   pantoprazole (PROTONIX) 40 MG tablet TAKE 1 TABLET BY MOUTH EVERY EVENING. TAKE 1 HOUR BEFORE BEDTIME. 6/29/20  Yes Gillian Bynum DO   allopurinol (ZYLOPRIM) 100 MG tablet TAKE 2 TABLETS BY MOUTH EVERY DAY 6/15/20  Yes Courtney Cabrera DO   INCRUSE ELLIPTA 62.5 MCG/INH AEPB INHALE 1 PUFF BY MOUTH DAILY 6/8/20  Yes Neftali Mills MD   BREO ELLIPTA 200-25 MCG/INH AEPB inhaler INHALE 1 PUFF BY MOUTH DAILY 6/8/20  Yes Neftali Mills MD   metoprolol (LOPRESSOR) 100 MG tablet Take 1.5 tablets by mouth 2 times daily  Patient taking differently: Take 100 mg by mouth 2 times daily Take 2 tablets in morning and 1.5 in evening.  5/13/20 12/5/20 Yes Courtney Cabrera DO   atorvastatin (LIPITOR) 80 MG tablet TAKE 1 TABLET BY MOUTH DAILY 4/6/20  Yes Tayler Bellamy DO   nitroGLYCERIN (NITROSTAT) 0.4 MG SL tablet Place 1 tablet under the tongue every 5 minutes as needed for Chest pain 2/18/20  Yes Sahil Mitchell MD   folic acid (FOLVITE) 1 MG tablet Take 1 mg by mouth daily   Yes Historical Provider, MD   albuterol (ACCUNEB) 1.25 MG/3ML nebulizer solution Inhale 3 mLs into the lungs every 6 hours as needed for Wheezing 12/24/18  Yes Sonal Ritchie MD   magnesium gluconate (MAGONATE) 500 MG tablet Take 500 mg by mouth daily. Yes Historical Provider, MD   albuterol sulfate  (90 Base) MCG/ACT inhaler INHALE 2 PUFFS INTO THE LUNGS EVERY 6 HOURS AS NEEDED FOR WHEEZING 9/17/20   Sahil Mitchell MD   gabapentin (NEURONTIN) 600 MG tablet Take 1 tablet by mouth 3 times daily for 60 days.  Take 1 tab qam and 2 tab at night, as tolerated 7/15/20 9/13/20  Maty Anderson MD   gabapentin (NEURONTIN) 600 MG tablet TAKE 1 TABLET BY MOUTH EVERY MORNING AND 2 TABLETS EVERY EVENING 5/7/20 7/22/20  Maty Anderson MD   amLODIPine (NORVASC) 10 MG tablet Take 1 tablet by mouth daily 4/30/20 7/29/20  Tayler Bellamy DO   butalbital-acetaminophen-caffeine (FIORICET, ESGIC) -79 MG per tablet Take 1 tablet by mouth every 4 hours as needed for Headaches 4/10/20 7/22/20  Korin Cabrera DO   ranitidine (ZANTAC) 150 MG tablet TAKE 1 TABLET BY MOUTH TWICE DAILY AS NEEDED FOR HEARTBURN 1/16/20   Korin Cabrera DO   buPROPion [facility]) 150 MG extended release tablet Take 150 mg by mouth 2 times daily 12/20/19   Historical Provider, MD   furosemide (LASIX) 40 MG tablet Take 1 tablet by mouth daily  Patient not taking: Reported on 7/22/2020 8/19/19   Omar Cantu MD   ibuprofen (ADVIL;MOTRIN) 200 MG tablet Take 1 tablet by mouth every 8 hours as needed for Pain 8/8/19 7/22/20  Denita Fitzpatrick MD          Inpatient Medications:   sodium chloride flush  10 mL Intravenous 2 times per day    nicotine  1 patch Transdermal Daily    nitroglycerin  1 inch Topical 4 times per day    allopurinol  200 mg Oral Daily    atorvastatin  80 mg Oral Daily    clopidogrel  75 mg Oral Daily    DULoxetine  60 mg Oral Daily    tiotropium  2 puff Inhalation Daily    losartan  100 mg Oral Daily    metoprolol  100 mg Oral BID    vitamin D  10,000 Units Oral Daily    gabapentin  600 mg Oral QAM    And    gabapentin  1,200 mg Oral Nightly    doxazosin  4 mg Oral Daily    furosemide  20 mg Intravenous BID    dexamethasone  6 mg Oral Daily    enoxaparin  40 mg Subcutaneous BID    budesonide-formoterol  2 puff Inhalation BID    albuterol sulfate HFA  2 puff Inhalation BID       IV drips:      PRN:  nitroGLYCERIN, sodium chloride flush, acetaminophen **OR** acetaminophen, polyethylene glycol, promethazine **OR** ondansetron, perflutren lipid microspheres, albuterol, traMADol, hydrALAZINE, albuterol sulfate HFA    Allergy:     Asa [aspirin] and Daliresp [roflumilast]       Review of Systems:     Covid rule out precautions    Physical Examination:     Vitals:    12/05/20 1838 12/05/20 2034 12/05/20 2345 12/06/20 0357   BP: 127/70 118/82 127/80 (!) 148/96   Pulse: 77 87 73 67   Resp: 22 24 20 18   Temp: 98.1 °F (36.7 °C) 97.5 °F (36.4 °C) 97.6 °F (36.4 °C) 97.8 °F (36.6 °C)   TempSrc: Oral Oral Oral Oral   SpO2: 90% 94% 92% 93%   Weight:       Height:           Wt Readings from Last 3 Encounters:   12/05/20 203 lb 11.3 oz (92.4 kg)   07/22/20 210 lb 9.6 oz (95.5 kg)   02/18/20 220 lb (99.8 kg)       Objective    Covid rule out precautions        Labs:     Recent Labs     12/05/20  0852 12/06/20  0405    134*   K 3.9 3.9   BUN 13 18   CREATININE 0.7* 0.9   CL 99 98*   CO2 25 23   GLUCOSE 152* 160*   CALCIUM 9.9 10.1   MG  --  1.70*     Recent Labs     12/05/20  0852   WBC 10.0   HGB 15.3   HCT 45.9      .6*     No results for input(s): CHOLTOT, TRIG, HDL in the last 72 hours.     Invalid input(s): [facility], CHOLHDL, VLDCHOL, LDL  No results for input(s): PTT, INR in the last 72 hours. Invalid input(s): PT  Recent Labs     12/05/20  0852 12/05/20  1257 12/05/20  1538   TROPONINI <0.01 <0.01 <0.01     No results for input(s): BNP in the last 72 hours. Recent Labs     12/05/20  1257   TSH 2.39     No results for input(s): CHOL, HDL, LDLCALC, TRIG in the last 72 hours.]    Lab Results   Component Value Date    CKTOTAL 73 04/11/2014    CKMB 0.7 08/22/2013    TROPONINI <0.01 12/05/2020         Imaging:     I personally reviewed imaging studies including CXR, Stress test, TTE/VIV. Last ECG (if available) - EKG:  I have reviewed EKG with the following interpretation  Sinus rhythm with LVH    Telemetry:  Sinus    Last Stress (if available):    Last TTE/VIV(if available):  Preserved EF of 55-60. Last Cath (if available):    Last CMR  (if available):      Assessment / Plan:     Hypertensive urgency/acute on chronic diastolic dysfunction/coronary disease  -Continue aspirin, Plavix, statin. -BNP elevated at almost 4000, continue IV Lasix today, transition to p.o. diuretics tomorrow. -BP is better controlled, resume home medications  -Negative troponins and EKG is not concerning for ischemia.  -Once off oxygen and back on p.o. medications okay to DC and follow-up as an outpatient    Tobacco use was discussed with the patient and educated on the negative effects. I have asked the patient to not utilize these agents. Due to limiting exposure to COVID-19 and minimizing use of PPE, note was completed solely using EMR and from personal conversations with primary medical team and/or consultants involved in case. Patient was not interviewed or examined. I have personally reviewed the reports and images of labs, radiological studies, cardiac studies including ECG's and telemetry, current and old medical records. The note was completed using EMR and Dragon dictation system.  Every effort was made to ensure accuracy; however, inadvertent computerized transcription errors may be present. I have spent 35 minutes reviewing EMR as above and formulating my assessment and plan. Thank you for allowing to us to participate in the care or L[address]. . Further evaluation will be based upon the patient's clinical course and testing results. Deejay Hodges MD, [facility], [facility]

## 2020-12-07 LAB
ANION GAP SERPL CALCULATED.3IONS-SCNC: 15 MMOL/L (ref 3–16)
BACTERIA: ABNORMAL /HPF
BILIRUBIN URINE: NEGATIVE
BLOOD, URINE: NEGATIVE
BUN BLDV-MCNC: 36 MG/DL (ref 7–20)
CALCIUM SERPL-MCNC: 10.1 MG/DL (ref 8.3–10.6)
CHLORIDE BLD-SCNC: 104 MMOL/L (ref 99–110)
CLARITY: CLEAR
CO2: 23 MMOL/L (ref 21–32)
COLOR: YELLOW
CREAT SERPL-MCNC: 1.4 MG/DL (ref 0.8–1.3)
EKG ATRIAL RATE: 62 BPM
EKG ATRIAL RATE: 75 BPM
EKG DIAGNOSIS: NORMAL
EKG DIAGNOSIS: NORMAL
EKG P AXIS: 36 DEGREES
EKG P AXIS: 4 DEGREES
EKG P-R INTERVAL: 188 MS
EKG P-R INTERVAL: 194 MS
EKG Q-T INTERVAL: 434 MS
EKG Q-T INTERVAL: 494 MS
EKG QRS DURATION: 100 MS
EKG QRS DURATION: 104 MS
EKG QTC CALCULATION (BAZETT): 484 MS
EKG QTC CALCULATION (BAZETT): 501 MS
EKG R AXIS: -9 DEGREES
EKG R AXIS: 64 DEGREES
EKG T AXIS: -37 DEGREES
EKG T AXIS: 73 DEGREES
EKG VENTRICULAR RATE: 62 BPM
EKG VENTRICULAR RATE: 75 BPM
EPITHELIAL CELLS, UA: ABNORMAL /HPF (ref 0–5)
GFR AFRICAN AMERICAN: >60
GFR NON-AFRICAN AMERICAN: 50
GLUCOSE BLD-MCNC: 137 MG/DL (ref 70–99)
GLUCOSE URINE: NEGATIVE MG/DL
KETONES, URINE: NEGATIVE MG/DL
LEUKOCYTE ESTERASE, URINE: ABNORMAL
LV EF: 58 %
LVEF MODALITY: NORMAL
MAGNESIUM: 1.9 MG/DL (ref 1.8–2.4)
MICROSCOPIC EXAMINATION: YES
NITRITE, URINE: NEGATIVE
PH UA: 6 (ref 5–8)
POTASSIUM SERPL-SCNC: 4.3 MMOL/L (ref 3.5–5.1)
PROTEIN PROTEIN: 13.3 MG/DL
PROTEIN UA: NEGATIVE MG/DL
RBC UA: ABNORMAL /HPF (ref 0–4)
SODIUM BLD-SCNC: 142 MMOL/L (ref 136–145)
SODIUM URINE: 53 MMOL/L
SPECIFIC GRAVITY UA: 1.02 (ref 1–1.03)
TROPONIN: <0.01 NG/ML
TROPONIN: <0.01 NG/ML
URINE TYPE: ABNORMAL
UROBILINOGEN, URINE: 0.2 E.U./DL
WBC UA: >100 /HPF (ref 0–5)

## 2020-12-07 PROCEDURE — 2060000000 HC ICU INTERMEDIATE R&B

## 2020-12-07 PROCEDURE — 84484 ASSAY OF TROPONIN QUANT: CPT

## 2020-12-07 PROCEDURE — 6360000002 HC RX W HCPCS: Performed by: INTERNAL MEDICINE

## 2020-12-07 PROCEDURE — 94761 N-INVAS EAR/PLS OXIMETRY MLT: CPT

## 2020-12-07 PROCEDURE — 99233 SBSQ HOSP IP/OBS HIGH 50: CPT | Performed by: INTERNAL MEDICINE

## 2020-12-07 PROCEDURE — 2580000003 HC RX 258: Performed by: INTERNAL MEDICINE

## 2020-12-07 PROCEDURE — 99233 SBSQ HOSP IP/OBS HIGH 50: CPT | Performed by: NURSE PRACTITIONER

## 2020-12-07 PROCEDURE — 80048 BASIC METABOLIC PNL TOTAL CA: CPT

## 2020-12-07 PROCEDURE — 94640 AIRWAY INHALATION TREATMENT: CPT

## 2020-12-07 PROCEDURE — 84156 ASSAY OF PROTEIN URINE: CPT

## 2020-12-07 PROCEDURE — 51798 US URINE CAPACITY MEASURE: CPT

## 2020-12-07 PROCEDURE — 6370000000 HC RX 637 (ALT 250 FOR IP): Performed by: INTERNAL MEDICINE

## 2020-12-07 PROCEDURE — 93010 ELECTROCARDIOGRAM REPORT: CPT | Performed by: INTERNAL MEDICINE

## 2020-12-07 PROCEDURE — 94664 DEMO&/EVAL PT USE INHALER: CPT

## 2020-12-07 PROCEDURE — 83735 ASSAY OF MAGNESIUM: CPT

## 2020-12-07 PROCEDURE — 93005 ELECTROCARDIOGRAM TRACING: CPT | Performed by: INTERNAL MEDICINE

## 2020-12-07 PROCEDURE — 82088 ASSAY OF ALDOSTERONE: CPT

## 2020-12-07 PROCEDURE — 2700000000 HC OXYGEN THERAPY PER DAY

## 2020-12-07 PROCEDURE — 94150 VITAL CAPACITY TEST: CPT

## 2020-12-07 PROCEDURE — 36415 COLL VENOUS BLD VENIPUNCTURE: CPT

## 2020-12-07 PROCEDURE — 84300 ASSAY OF URINE SODIUM: CPT

## 2020-12-07 PROCEDURE — 84244 ASSAY OF RENIN: CPT

## 2020-12-07 PROCEDURE — 93306 TTE W/DOPPLER COMPLETE: CPT

## 2020-12-07 PROCEDURE — 81001 URINALYSIS AUTO W/SCOPE: CPT

## 2020-12-07 RX ORDER — PANTOPRAZOLE SODIUM 40 MG/1
40 TABLET, DELAYED RELEASE ORAL EVERY EVENING
Status: DISCONTINUED | OUTPATIENT
Start: 2020-12-07 | End: 2020-12-10 | Stop reason: HOSPADM

## 2020-12-07 RX ORDER — MAGNESIUM SULFATE 1 G/100ML
1 INJECTION INTRAVENOUS ONCE
Status: COMPLETED | OUTPATIENT
Start: 2020-12-07 | End: 2020-12-07

## 2020-12-07 RX ADMIN — METOPROLOL TARTRATE 100 MG: 100 TABLET, FILM COATED ORAL at 21:39

## 2020-12-07 RX ADMIN — ARFORMOTEROL TARTRATE 15 MCG: 15 SOLUTION RESPIRATORY (INHALATION) at 08:20

## 2020-12-07 RX ADMIN — CLOPIDOGREL BISULFATE 75 MG: 75 TABLET ORAL at 09:11

## 2020-12-07 RX ADMIN — PROMETHAZINE HYDROCHLORIDE 12.5 MG: 12.5 TABLET ORAL at 21:39

## 2020-12-07 RX ADMIN — NITROGLYCERIN 1 INCH: 20 OINTMENT TOPICAL at 06:17

## 2020-12-07 RX ADMIN — TRAMADOL HYDROCHLORIDE 50 MG: 50 TABLET, COATED ORAL at 21:39

## 2020-12-07 RX ADMIN — BUDESONIDE INHALATION 500 MCG: 0.5 SUSPENSION RESPIRATORY (INHALATION) at 08:21

## 2020-12-07 RX ADMIN — Medication 10 ML: at 09:10

## 2020-12-07 RX ADMIN — PANTOPRAZOLE SODIUM 40 MG: 40 TABLET, DELAYED RELEASE ORAL at 22:22

## 2020-12-07 RX ADMIN — NITROGLYCERIN 1 INCH: 20 OINTMENT TOPICAL at 00:03

## 2020-12-07 RX ADMIN — ALBUTEROL SULFATE 2.5 MG: 2.5 SOLUTION RESPIRATORY (INHALATION) at 19:59

## 2020-12-07 RX ADMIN — GABAPENTIN 1200 MG: 400 CAPSULE ORAL at 22:22

## 2020-12-07 RX ADMIN — Medication 10000 UNITS: at 09:13

## 2020-12-07 RX ADMIN — NITROGLYCERIN 1 INCH: 20 OINTMENT TOPICAL at 18:45

## 2020-12-07 RX ADMIN — ENOXAPARIN SODIUM 40 MG: 40 INJECTION SUBCUTANEOUS at 21:39

## 2020-12-07 RX ADMIN — LOSARTAN POTASSIUM 100 MG: 25 TABLET, FILM COATED ORAL at 09:11

## 2020-12-07 RX ADMIN — GABAPENTIN 600 MG: 300 CAPSULE ORAL at 09:11

## 2020-12-07 RX ADMIN — ARFORMOTEROL TARTRATE 15 MCG: 15 SOLUTION RESPIRATORY (INHALATION) at 19:59

## 2020-12-07 RX ADMIN — Medication 10 ML: at 21:40

## 2020-12-07 RX ADMIN — ATORVASTATIN CALCIUM 80 MG: 80 TABLET, FILM COATED ORAL at 09:11

## 2020-12-07 RX ADMIN — TIOTROPIUM BROMIDE INHALATION SPRAY 2 PUFF: 3.12 SPRAY, METERED RESPIRATORY (INHALATION) at 08:20

## 2020-12-07 RX ADMIN — ENOXAPARIN SODIUM 40 MG: 40 INJECTION SUBCUTANEOUS at 09:10

## 2020-12-07 RX ADMIN — ALBUTEROL SULFATE 2.5 MG: 2.5 SOLUTION RESPIRATORY (INHALATION) at 08:20

## 2020-12-07 RX ADMIN — NITROGLYCERIN 1 INCH: 20 OINTMENT TOPICAL at 12:47

## 2020-12-07 RX ADMIN — BUDESONIDE INHALATION 500 MCG: 0.5 SUSPENSION RESPIRATORY (INHALATION) at 19:59

## 2020-12-07 RX ADMIN — DULOXETINE HYDROCHLORIDE 60 MG: 60 CAPSULE, DELAYED RELEASE ORAL at 09:11

## 2020-12-07 RX ADMIN — METOPROLOL TARTRATE 100 MG: 100 TABLET, FILM COATED ORAL at 09:11

## 2020-12-07 RX ADMIN — MAGNESIUM SULFATE HEPTAHYDRATE 1 G: 1 INJECTION, SOLUTION INTRAVENOUS at 11:02

## 2020-12-07 RX ADMIN — DEXAMETHASONE 6 MG: 4 TABLET ORAL at 09:11

## 2020-12-07 RX ADMIN — DOXAZOSIN 4 MG: 4 TABLET ORAL at 09:11

## 2020-12-07 RX ADMIN — ALLOPURINOL 200 MG: 100 TABLET ORAL at 09:10

## 2020-12-07 ASSESSMENT — PAIN DESCRIPTION - LOCATION: LOCATION: BACK

## 2020-12-07 ASSESSMENT — PAIN DESCRIPTION - PAIN TYPE: TYPE: CHRONIC PAIN

## 2020-12-07 ASSESSMENT — PAIN DESCRIPTION - ONSET: ONSET: ON-GOING

## 2020-12-07 ASSESSMENT — PAIN SCALES - GENERAL
PAINLEVEL_OUTOF10: 2
PAINLEVEL_OUTOF10: 0
PAINLEVEL_OUTOF10: 5

## 2020-12-07 ASSESSMENT — PAIN DESCRIPTION - DESCRIPTORS: DESCRIPTORS: ACHING

## 2020-12-07 ASSESSMENT — PAIN DESCRIPTION - FREQUENCY: FREQUENCY: CONTINUOUS

## 2020-12-07 ASSESSMENT — PAIN - FUNCTIONAL ASSESSMENT: PAIN_FUNCTIONAL_ASSESSMENT: PREVENTS OR INTERFERES SOME ACTIVE ACTIVITIES AND ADLS

## 2020-12-07 ASSESSMENT — PAIN DESCRIPTION - PROGRESSION: CLINICAL_PROGRESSION: NOT CHANGED

## 2020-12-07 ASSESSMENT — PAIN DESCRIPTION - ORIENTATION: ORIENTATION: LOWER

## 2020-12-07 NOTE — PROGRESS NOTES
Hospitalist Progress Note      PCP: Rolando Whalen DO    Date of Admission: 12/5/2020    Chief Complaint: shortness of breath, chest pain    Hospital Course:   78 y.o. male Yoandy Pradhan. who is a current smoker 4 to 5 cigarettes/day, chronic pain syndrome, polyarthralgia, hypertension, lung nodules, COPD, follows Dr. Prince Hall, CAD s/p s/p BMS 5/24/13 to [address], peripheral vascular disease rt fem endarterectomy 07/2013, KAVITA on CPAP  -Presents to ED with shortness of breath x3 days, chest pain midsternal started last night, associated with nocturnal dyspnea, needing more pillows and propped up position to sleep  Noted with 92% on room air, found to be hypertensive in the /105  Chest x-ray shows pulmonary edema  EKG with sinus rhythm without ST elevation or depression  Patient was transferred to [facility], [facility]. for the management of hypertensive urgency and acute on chronic diastolic congestive heart failure  Covid test was not done as low suspicion in the ED for COVID-19.     Per review of notes he was on Lasix previously but note 07/2020 states Lasix was stopped due to increased urinary frequency.     Last Echo 08/2019 --   Summary   Left ventricular cavity size is normal. There is mild to moderate concentric   left ventricular hypertrophy. Overall left ventricular systolic function   appears normal with an ejection fraction of 55-60%. No regional wall motion   abnormalities are noted. Indeterminate diastolic function. Trivial mitral   and tricuspid regurgitation is present. Estimated pulmonary artery systolic   pressure is normal at 26 mmHg assuming a right atrial pressure of 3 mmHg.   Bi-atrial enlargement.     Subjective:   Patient is on 2 liters of oxygen he was short of breath last night, had episode of NSVT on tele. Denies chest pain now but felt chest pressure last night. No cough.        Medications:  Reviewed    Infusion Medications   Scheduled Medications    magnesium normal.  No rashes or lesions. Neurologic:  Neurovascularly intact without any focal sensory/motor deficits. Cranial nerves: II-XII intact, grossly non-focal.  Psychiatric:  Alert and oriented, thought content appropriate, normal insight  Capillary Refill: Brisk,< 3 seconds   Peripheral Pulses: +2 palpable, equal bilaterally        Labs:   Recent Labs     12/05/20  0852   WBC 10.0   HGB 15.3   HCT 45.9        Recent Labs     12/05/20  0852 12/06/20  0405 12/07/20  0617    134* 142   K 3.9 3.9 4.3   CL 99 98* 104   CO2 25 23 23   BUN 13 18 36*   CREATININE 0.7* 0.9 1.4*   CALCIUM 9.9 10.1 10.1     Recent Labs     12/05/20  0852   AST 16   ALT 15   BILITOT 1.0   ALKPHOS 110     No results for input(s): INR in the last 72 hours. Recent Labs     12/05/20  1257 12/05/20  1538 12/07/20  0929   TROPONINI <0.01 <0.01 <0.01       Urinalysis:      Lab Results   Component Value Date    NITRU Negative 08/06/2019    WBCUA 10-20 08/06/2019    BACTERIA 1+ 08/06/2019    RBCUA 0-2 08/06/2019    RBCUA NEGATIVE 08/10/2016    BLOODU Negative 08/06/2019    SPECGRAV 1.020 08/06/2019    GLUCOSEU Negative 08/06/2019       Radiology:  XR CHEST PORTABLE   Final Result      1. Borderline cardiomegaly with diffuse bilateral interstitial airspace opacities. Correlate for signs of congestive failure or fluid overload. Underlying pneumonitis not excluded. Assessment/Plan:    Active Hospital Problems    Diagnosis Date Noted    Acute on chronic diastolic heart failure ([facility] [I50.33] 12/05/2020    Tobacco abuse [Z72.0] 12/29/2015    KAVITA on CPAP [G47.33, Z99.89] 10/22/2015    COPD (chronic obstructive pulmonary disease) ([facility] [J44.9] 12/18/2013    Chest pain [R07.9] 08/22/2013       1. Acute on chronic diastolic congestive heart failure, shortness of breath progressively worse, proBNP 3880, edema, bibasilar crackles,   2. Hypertensive urgency POA  3.  Chest pain, this could be in association with the uncontrolled hypertension, on arrival to the hospital he does not have chest pain, his EKG shows left ventricle hypertrophy but otherwise no ST-T wave changes. 4. CAD s/p s/p BMS 5/24/13 to [address]  5. Peripheral vascular disease rt fem endarterectomy 07/2013  6. COPD without acute exacerbation no wheezing heard on exam  7. Hypoxemia, after admission to hospital needing 2 to 4 L NC oxygen  8. KAVITA on CPAP  9.  Nicotine dependence, current smoker 4-5 cigs/day    PLAN:  With his hypoxemia needing 2 to 4 L oxygen, COVID-19 PCR was sent and negative  Ws given IV lasix but creatinine is up today  Will get FeNA labs and check post void bladder scan  Consult nephrology  Continue home Plavix  Continue breathing treatments  Continue Spiriva  Vitamin D supplementation  Continue  metoprolol 100 mg twice daily, Nitropaste 1 inch every 6 hours, Doxazosin  Will hold losartan for now given rise in creatinine  Check Echo for evaluation of heart failure and wall motion abnormalities  Cardiology consult for further evaluation and recommendations  Nephrology consultation       DVT Prophylaxis: Lovenox  Diet: DIET LOW SODIUM 2 GM; 1800 ml  Code Status: Full Code    PT/OT Eval Status: N/A    Dispo - inpatient    Vitaly Duff MD  Hospitalist

## 2020-12-07 NOTE — PLAN OF CARE
Gas Exchange - Impaired:  Goal: Levels of oxygenation will improve  Outcome: Ongoing  Note: O2 is 91% on 2L. Will attempt to wean on next set of vitals. Activity:  Goal: Will verbalize the importance of balancing activity with adequate rest periods  Outcome: Ongoing  Note: Mrs. Guzman verbalizes awareness of his capacity for exercise; discussed his use of using a specialized walker that allows him to sit and rest when fatigued.

## 2020-12-07 NOTE — PROGRESS NOTES
Continuing to discuss plan of care with  Manuelito Chantale. Bladder scan PVR revealed 0 mL. Awaiting for need to urinate to send down urine results.    Lungs continue to have crackles in BL lobes; more significant in R than L.

## 2020-12-07 NOTE — CONSULTS
Nephrology Consult Note                                                                                                                                                                                                                                                                                                                                                               Office : [phone]     Fax :[phone]              Patient's Name: Tara Rubio  9:47 AM  12/7/2020    Reason for Consult: LYDIA  Requesting Physician:  Tauna Fleischer, DO      Chief Complaint:  Chest Pain     History of Present Ilness:    Tara Rubio is a 78 y.o. male with past medical history chronic pain syndrome w/ polyarthralgia, HTN, COPD, CAD s/p BMS to LAD '13, PVD s/p R fem endarterectomy '13, and KAVITA on CPAP p/w 3d worsening chest pain, dyspnea, non-productive cough and was found to have HTN Urgency w/ SBP in the 220s and pulmonary edema and likely AoCHF. States normally gets swelling in his feet w/ variable shoe sizes and had been on water pill previously    While admitted pt BP have been variable, 200s on day of admission to 110s over several hours and developed LYDIA w/ Cr 1.4 (baseline 0.8-1.0)    No fever, chills, or swelling in legs    Denies any recent NSAID use, states compliance with his BP meds, but only on losartan and metoprolol.     Has not been on NSAIDs, lasix, or amlodipine for \"months\"    Interval History:  Chest pain better, SOB improved  BP still variable 291G-943T systolic  Recorded UO decreased, but some unmeasured, non-oliguric  Cr rising     Past Medical History:   Diagnosis Date    CAD (coronary artery disease)     CHF (congestive heart failure) (HCC)     COPD (chronic obstructive pulmonary disease) (HCC)     Depressive disorder, not elsewhere classified     GERD (gastroesophageal reflux disease)     History of MI (myocardial infarction) 05/2013    History of skin ulcer of lower extremity     R anterior shin    History of transient ischemic attack (TIA)     Hyperlipidemia     Hypertension     Neuropathy     KAVITA (obstructive sleep apnea)     On CPAP    Personal history of DVT (deep vein thrombosis)     PVD (peripheral vascular disease) (HCC)     Vertebral fracture        Past Surgical History:   Procedure Laterality Date    APPENDECTOMY      CHOLECYSTECTOMY, LAPAROSCOPIC      CORONARY ANGIOPLASTY WITH STENT PLACEMENT  6/2013    EYE SURGERY Left     LAMINECTOMY  11/18/2015    Bilateral decompressive lumbar laminectomy L4-5   ; medial facetectomy L4-5 joints  bilaterally; foraminotomies l5   nerve roots bilaterally    LEG DEBRIDEMENT Right 7/23/2013    Dr. Marguerite Snyder - pretibial wound    OTHER SURGICAL HISTORY Right 6/25/2013    Dr. Marguerite Snyder - CFA Endarterectomy w/marsupialization; RLE wound excision & debridement     OTHER SURGICAL HISTORY Left 8/27/2013    Dr. Marguerite Snyder - femoral & profunda femoris endarterectomy w/marsupialization patch angioplasty using SFA    SKIN SPLIT GRAFT Right 7/25/2013    Dr. Marguerite Snyder - to non-healing R pretibial wound, 9 x 15 cm    TOTAL HIP ARTHROPLASTY Right 09/01/2016    Dr. Sasha Craft Left 12/22/2015    Dr. Marguerite Snyder - CFA exploration, thrombectomy of ileofemoral system, stenting of RAFAL in-stent stenosis w/8 x 37 mm Palmaz        Family History   Problem Relation Age of Onset    Cancer Mother         Breast    Heart Disease Mother     Cancer Father         Bladder    Heart Disease Father     Cancer Paternal Grandmother         melanoma         reports that he has been smoking cigarettes. He started smoking about 53 years ago. He has a 26.00 pack-year smoking history. He has never used smokeless tobacco. He reports current alcohol use. He reports that he does not use drugs.     Allergies:  Asa [aspirin] and Daliresp [roflumilast]    Current Medications:    magnesium sulfate 1 g in dextrose 5% 100 mL IVPB, Once  albuterol (PROVENTIL) nebulizer solution 2.5 mg, BID  budesonide (PULMICORT) nebulizer suspension 500 mcg, BID    And  Arformoterol Tartrate (BROVANA) nebulizer solution 15 mcg, BID  nitroGLYCERIN (NITROSTAT) SL tablet 0.4 mg, Q5 Min PRN  sodium chloride flush 0.9 % injection 10 mL, 2 times per day  sodium chloride flush 0.9 % injection 10 mL, PRN  acetaminophen (TYLENOL) tablet 650 mg, Q6H PRN    Or  acetaminophen (TYLENOL) suppository 650 mg, Q6H PRN  polyethylene glycol (GLYCOLAX) packet 17 g, Daily PRN  promethazine (PHENERGAN) tablet 12.5 mg, Q6H PRN    Or  ondansetron (ZOFRAN) injection 4 mg, Q6H PRN  nicotine (NICODERM CQ) 14 MG/24HR 1 patch, Daily  nitroglycerin (NITRO-BID) 2 % ointment 1 inch, 4 times per day  perflutren lipid microspheres (DEFINITY) injection 1.65 mg, ONCE PRN  albuterol (PROVENTIL) nebulizer solution 2.5 mg, Q6H PRN  allopurinol (ZYLOPRIM) tablet 200 mg, Daily  atorvastatin (LIPITOR) tablet 80 mg, Daily  clopidogrel (PLAVIX) tablet 75 mg, Daily  DULoxetine (CYMBALTA) extended release capsule 60 mg, Daily  tiotropium (SPIRIVA RESPIMAT) 2.5 MCG/ACT inhaler 2 puff, Daily  [Held by provider] losartan (COZAAR) tablet 100 mg, Daily  metoprolol (LOPRESSOR) tablet 100 mg, BID  traMADol (ULTRAM) tablet 50 mg, BID PRN  vitamin D (CHOLECALCIFEROL) capsule 10,000 Units, Daily  gabapentin (NEURONTIN) capsule 600 mg, QAM    And  gabapentin (NEURONTIN) capsule 1,200 mg, Nightly  doxazosin (CARDURA) tablet 4 mg, Daily  hydrALAZINE (APRESOLINE) injection 10 mg, Q6H PRN  dexamethasone (DECADRON) tablet 6 mg, Daily  enoxaparin (LOVENOX) injection 40 mg, BID        Review of Systems:   14 point ROS obtained but were negative except mentioned in HPI      Physical exam:     Vitals:  BP (!) 161/90   Pulse 69   Temp 97.8 °F (36.6 °C) (Axillary)   Resp 18   Ht 5' 10\" (1.778 m)   Wt 203 lb 11.3 oz (92.4 kg) Comment: on bed scale.   SpO2 95%   BMI 29.23 kg/m²   Constitutional:  OAA X3 NAD  Skin: no rash, turgor wnl  Heent:  eomi, mmm  Neck: no bruits or jvd noted  Cardiovascular:  S1, S2 without m/r/g  Respiratory: Mild crackles in the bases  Abdomen:  +bs, soft, mild LLQ TTP  Ext: Minimal lower extremity edema  Psychiatric: mood and affect appropriate  Musculoskeletal:  Rom, muscular strength intact    Data:   Labs:  CBC:   Recent Labs     12/05/20  0852   WBC 10.0   HGB 15.3        BMP:    Recent Labs     12/05/20  0852 12/06/20  0405 12/07/20  0617    134* 142   K 3.9 3.9 4.3   CL 99 98* 104   CO2 25 23 23   BUN 13 18 36*   CREATININE 0.7* 0.9 1.4*   GLUCOSE 152* 160* 137*     Ca/Mg/Phos:   Recent Labs     12/05/20  0852 12/06/20  0405 12/07/20  0617   CALCIUM 9.9 10.1 10.1   MG  --  1.70* 1.90     Hepatic:   Recent Labs     12/05/20  0852   AST 16   ALT 15   BILITOT 1.0   ALKPHOS 110     Troponin:   Recent Labs     12/05/20  0852 12/05/20  1257 12/05/20  1538   TROPONINI <0.01 <0.01 <0.01     BNP: No results for input(s): BNP in the last 72 hours. Lipids:   Recent Labs     12/06/20  0405   CHOL 141   TRIG 128   HDL 30*   LDLCALC 85   LABVLDL 26     ABGs: No results for input(s): PHART, PO2ART, XID0ZCV in the last 72 hours. INR: No results for input(s): INR in the last 72 hours. UA:No results for input(s): Anjana Ogren, GLUCOSEU, BILIRUBINUR, Stefan Gills, BLOODU, PHUR, PROTEINU, UROBILINOGEN, NITRU, LEUKOCYTESUR, LABMICR, URINETYPE in the last 72 hours. Urine Microscopic: No results for input(s): LABCAST, BACTERIA, COMU, HYALCAST, WBCUA, RBCUA, EPIU in the last 72 hours. Urine Culture: No results for input(s): LABURIN in the last 72 hours. Urine Chemistry: No results for input(s): Debbe Ao, PROTEINUR, NAUR in the last 72 hours. IMAGING:  XR CHEST PORTABLE   Final Result      1. Borderline cardiomegaly with diffuse bilateral interstitial airspace opacities. Correlate for signs of congestive failure or fluid overload. Underlying pneumonitis not excluded. Assessment/Plan   1. LYDIA    2. HTN Urgency    3. Anemia    4. Acid- base/ Electrolyte imbalance     5. CHF    LYDIA 2/2 relative hypoperfusion w/ drops in BP  Cr worse today, but UO adequate  Hold Losartan for now  Presumed STEFFANIE, will restart ARB when Cr improved  2ndary HTN labs sent  Lifestyle Modifications needed  Urine studies ordered  Monitor I/O and RenalFx  Bladder Scan to r/o obstruction  Variable BP, keep map >65    WDWA Dr. Susanne Dickey    Thank you for allowing us to participate in care of Lilibeth Baker [facility]        [address] [facility] free to contact me   [facility] [address]  Office : [phone]  Fax :[phone]      Pt seen and examined     LYDIA sec to BP changes   Ur studies   R/o sec HTN     Dewight Severin, MD

## 2020-12-07 NOTE — DISCHARGE INSTR - COC
Continuity of Care Form    Patient Name: René Jackson    :  1950  MRN:  [id number]    Admit date:  2020  Discharge date:  ***    Code Status Order: Full Code   Advance Directives:   Advance Care Flowsheet Documentation     Date/Time Healthcare Directive Type of Healthcare Directive Copy in [address] Agent's Name Healthcare Agent's Phone Number    [phone]  Yes, patient has an advance directive for healthcare treatment  Living will;Durable power of  for health care  Yes, copy in chart pt states he brought it in the past.  Adult Children  --  --          Admitting Physician:  Ashtyn Guidry MD  PCP: Christina Junior DO    Discharging Nurse: [facility] Unit/Room#: 2391/0504-23  Discharging Unit Phone Number: ***    Emergency Contact:   Extended Emergency Contact Information  Primary Emergency Contact: Aaron Guzman   [address] Phone: [phone]  Relation: Child    Past Surgical History:  Past Surgical History:   Procedure Laterality Date    APPENDECTOMY      CHOLECYSTECTOMY, LAPAROSCOPIC      CORONARY ANGIOPLASTY WITH STENT PLACEMENT  2013    EYE SURGERY Left     LAMINECTOMY  2015    Bilateral decompressive lumbar laminectomy L4-5   ; medial facetectomy L4-5 joints  bilaterally; foraminotomies l5   nerve roots bilaterally    LEG DEBRIDEMENT Right 2013    Dr. Solange Hunt - pretibial wound    OTHER SURGICAL HISTORY Right 2013    Dr. Solange Hunt - CFA Endarterectomy w/marsupialization; RLE wound excision & debridement     OTHER SURGICAL HISTORY Left 2013    Dr. Solange Hunt - femoral & profunda femoris endarterectomy w/marsupialization patch angioplasty using SFA    SKIN SPLIT GRAFT Right 2013    Dr. Solange Hunt - to non-healing R pretibial wound, 9 x 15 cm    TOTAL HIP ARTHROPLASTY Right 2016    Dr. Lashell Barrett Left 2015    Dr. Solange Hunt - CFA exploration, thrombectomy of ileofemoral system, stenting of RAFAL in-stent stenosis w/8 x 37 mm Palmaz        Immunization History:   Immunization History   Administered Date(s) Administered    Influenza 10/14/2013    Influenza Vaccine, unspecified formulation 10/14/2013, 10/22/2015    Influenza Virus Vaccine 10/14/2014    Influenza, High Dose (Fluzone 65 yrs and older) 10/22/2015, 11/18/2016, 08/17/2017, 08/30/2018, 10/17/2019    Pneumococcal Conjugate 13-valent (Zrdpfae82) 10/22/2015    Pneumococcal Conjugate 7-valent (Prevnar7) 03/01/2013    Pneumococcal Polysaccharide (Frldsceya79) 01/03/2018    Tdap (Boostrix, Adacel) 10/14/2013    Zoster Live (Zostavax) 06/07/2014    Zoster Recombinant (Shingrix) 05/08/2018       Active Problems:  Patient Active Problem List   Diagnosis Code    Essential hypertension I10    Hyperlipemia E78.5    Status post skin graft Z94.5    Chest pain R07.9    COPD (chronic obstructive pulmonary disease) ([facility] J44.9    S/P PTCA (percutaneous transluminal coronary angioplasty) Z98.61    Degeneration of intervertebral disc of lumbar region M51.36    KAVITA on CPAP G47.33, Z99.89    Claudication of left lower extremity ([facility] I73.9    Coronary artery disease involving native coronary artery of native heart without angina pectoris I25.10    Tobacco abuse Z72.0    Idiopathic peripheral neuropathy G60.9    Pulmonary nodule R91.1    Postlaminectomy syndrome, lumbar M96.1    Chronic, continuous use of opioids F11.90    Polyarthropathy, multiple sites M13.0    Chronic pain syndrome G89.4    Pain medication agreement Z02.89    Pain disorder with psychological factors F45.41    CHF (congestive heart failure), NYHA class I, acute on chronic, combined ([facility]) I50.43    Fracture of vertebra PFS5869    Gastroesophageal reflux disease K21.9    Chronic hip pain, left M25.552, G89.29    Sacroiliitis, not elsewhere classified ([facility] M46.1    Other spondylosis, lumbosacral region M47.897    Actinic keratoses L57.0    Acute on chronic diastolic heart failure (HCC) I50.33       Isolation/Infection:   Isolation          No Isolation        Patient Infection Status     Infection Onset Added Last Indicated Last Indicated By Review Planned Expiration Resolved Resolved By    None active    Resolved    COVID-19 Rule Out 12/05/20 12/05/20 12/05/20 COVID-19 (Ordered)   12/06/20 Rule-Out Test Resulted          Nurse Assessment:  Last Vital Signs: BP (!) 150/81   Pulse 67   Temp 97.7 °F (36.5 °C) (Oral)   Resp 18   Ht 5' 10\" (1.778 m)   Wt 207 lb 3.7 oz (94 kg)   SpO2 90%   BMI 29.73 kg/m²     Last documented pain score (0-10 scale): Pain Level: 7  Last Weight:   Wt Readings from Last 1 Encounters:   12/07/20 207 lb 3.7 oz (94 kg)     Mental Status:  oriented, alert, coherent, logical, thought processes intact and able to concentrate and follow conversation    IV Access:  - None    Nursing Mobility/ADLs:  Walking   Assisted (uses cane)   Transfer  Independent  [address] [place] [address] Delivery   whole    Wound Care Documentation and Therapy:  Incision 11/18/15 Back Posterior;Mid (Active)   Number of days: 2945       Incision 12/22/15 Groin Left (Active)   Number of days: 1812        Elimination:  Continence:   · Bowel: Yes  · Bladder: Yes  Urinary Catheter: None   Colostomy/Ileostomy/Ileal Conduit: No       Date of Last BM: 12/09/2020    Intake/Output Summary (Last 24 hours) at 12/7/2020 1425  Last data filed at 12/7/2020 1300  Gross per 24 hour   Intake 800 ml   Output 680 ml   Net 120 ml     I/O last 3 completed shifts: In: 5 [P.O.:720]  Out: 980 [Urine:980]    Safety Concerns: At Risk for Falls    Impairments/Disabilities:      Vision and Hearing    Nutrition Therapy:  Current Nutrition Therapy:   - Oral Diet:  Cardiac    Routes of Feeding: Oral  Liquids:  Thin Liquids  Daily Fluid Restriction: yes - amount Worker signature: Electronically signed by Sarbjit Cruz RN on 12/10/20 at 10:39 AM EST    PHYSICIAN SECTION    Prognosis: {Prognosis:6535507247}    Condition at Discharge: Alonzo Anderson Patient Condition:234992854}    Rehab Potential (if transferring to Rehab): {Prognosis:1175964430}    Recommended Labs or Other Treatments After Discharge: ***    Physician Certification: I certify the above information and transfer of Torri Nils.  is necessary for the continuing treatment of the diagnosis listed and that he requires {Admit to Appropriate Level of Care:77351} for {GREATER/LESS:376777632} 30 days.      Update Admission H&P: {CHP DME Changes in DIUIT:894028684}    PHYSICIAN SIGNATURE:  {Esignature:782253314}

## 2020-12-07 NOTE — PROGRESS NOTES
Physician Progress Note      PATIENTCristophre NELSON #:                  [id number]  :                       1950  ADMIT DATE:       2020 8:38 AM  DISCH DATE:  RESPONDING  PROVIDER #:        Hanane Garcia MD          QUERY TEXT:    Pt admitted with acute on chronic diastolic CHF. Pt noted to also have HTN   urgency and LYDIA. If possible, please document in progress notes and discharge   summary the etiology of CHF, if able to be determined. The medical record reflects the following:  Risk Factors: HTN urgency, CHF, LYDIA  Clinical Indicators: B/P 205/119, 220/105, 187/93, 190/94. Per Nephrology   consult note on : While admitted pt BP have been variable, 200s on day of   admission to 110s over several hours and developed LYDIA w/ Cr 1.4 (baseline   0.8-1.0). LYDIA 2/2 relative hypoperfusion w/ drops in BP-Cr worse today, but UO   adequate  Treatment: Cardura, Hydralazine, Cozaar, Lopressor, Nitro-Bid, Furosemide,   Nephrology consult, ECHO, Cardiology consult  Options provided:  -- CHF due to Hypertensive Heart Disease  -- CHF due to Hypertensive Heart Disease and LYDIA on CKD  -- Other - I will add my own diagnosis  -- Disagree - Not applicable / Not valid  -- Disagree - Clinically unable to determine / Unknown  -- Refer to Clinical Documentation Reviewer    PROVIDER RESPONSE TEXT:    This patient has CHF due to hypertensive heart disease.     Query created by: Maryellen Schulz on 2020 10:38 AM      Electronically signed by:  Hanane Garcia MD 2020 5:30 PM

## 2020-12-07 NOTE — CARE COORDINATION
Case Management Assessment           Initial Evaluation                Date / Time of Evaluation: 12/7/2020 6:10 PM                 Assessment Completed by: Marco Field    Patient Name: Tara Rubio YOB: 1950  Diagnosis: Acute on chronic diastolic heart failure ([facility] [address] [I50.33]     Date / Time: 12/5/2020  8:38 AM    Patient Admission Status: Inpatient    If patient is discharged prior to next notation, then this note serves as note for discharge by case management. Current PCP: Tauna Fleischer,   Clinic Patient: No    Chart Reviewed: Yes  Patient/ Family Interviewed: Yes    Initial assessment completed at bedside with: stoney    Hospitalization in the last 30 days: No    Emergency Contacts:  Extended Emergency Contact Information  Primary Emergency Contact: [address] [place] [address] Phone: [phone]  Relation: Child    Advance Directives:   Code Status: Full [address]: No  Agent:   Contact Number:     Copy present: No     In paper Chart: No    Scanned into EMR No    Financial  Payor: MEDICAID [place] / Plan: Jacobo Castro DEPT OF JOB / Product Type: *No Product type* /     Pre-cert required for SNF: No    Pharmacy    Joanna Robin [address] [place] [address] [phone]  [place] [id number]  Phone: [phone] Fax: [phone]      Potential assistance Purchasing Medications: Potential Assistance Purchasing Medications: No  Does Patient want to participate in local refill/ meds to beds program?: No    Meds To Beds General Rules:  1. Can ONLY be done Monday- Friday between 8:30am-5pm  2. Prescription(s) must be in pharmacy by 3pm to be filled same day  3. Copy of patient's insurance/ prescription drug card and patient face sheet must be sent along with the prescription(s)  4. Cost of Rx cannot be added to hospital bill.  If financial assistance is needed, please contact unit  or ;  or  CANNOT provide pharmacy voucher for patients co-pays  5. Patients can then  the prescription on their way out of the hospital at discharge, or pharmacy can deliver to the bedside if staff is available. (payment due at time of pick-up or delivery - cash, check, or card accepted)     Able to afford home medications/ co-pay costs: Yes    ADLS  Support Systems: Family Members    PT AM-PAC:   /24  OT AM-PAC:   /24    [place]: home alone  Steps: 4    Plans to RETURN to current housing: Yes  Barriers to RETURNING to current housing: safety,     Dwayne Torres [address]  Currently ACTIVE with [address]: No  Home Care Agency: Not Applicable    Currently ACTIVE with [place] [facility]: No  Passport/ Waiver: No  Passport/ Waiver Services: Not Applicable    :  Phone:       [address] [employer]  [facility] Provider: WISAM  Equipment: NA    Home Oxygen and [address] [place] prior to admission: No  [address]: Not Applicable  Other Respiratory Equipment: New O2     Informed of need to bring portable home O2 tank on day of DISCHARGE for nursing to connect prior to leaving: No  Verbalized agreement/Understanding: No  Person to bring portable tank at discharge:     Dialysis  Active with HD/PD prior to admission: No  Nephrologist:     HD Center:  Not Applicable    DISCHARGE PLAN:  Disposition: Home with [address]: if rec at discharge     Transportation PLAN for discharge: family     Factors facilitating achievement of predicted outcomes: Family support    Barriers to discharge: Decreased endurance    Additional Case Management Notes:   Patient here from home alone. Has a son that lives nearby that can assist if needed. Patient stated his son would also be transporting at discharge. Patient currently on O2. Oxygen is new with admission.   If patient is discharged to home will need home care.  Patient is agreeable to [address] if recommended. CM will continue to follow. The Plan for Transition of Care is related to the following treatment goals of Acute on chronic diastolic heart failure ([address] [I50.33]    The Patient and/or patient representative Tripp Bellamy and his family were provided with a choice of provider and agrees with the discharge plan Yes    Freedom of choice list was provided with basic dialogue that supports the patient's individualized plan of care/goals and shares the quality data associated with the providers.  Yes    Care Transition patient: No    Sarbjit Quitter, RN  The [address]  Case Management Department  Ph: [phone]   Fax: [phone]

## 2020-12-07 NOTE — PLAN OF CARE
Problem: Falls - Risk of:  Goal: Will remain free from falls  12/6/2020 2219 by Ru Fishman RN  Outcome: Ongoing   Calls out appropriately. Bed locked in lowest position. Bed alarm on. Call light/belongings within reach. Will continue to monitor. Problem: Gas Exchange - Impaired:  Goal: Levels of oxygenation will improve  12/6/2020 2219 by Ru Fishman RN  Outcome: Ongoing   Patient SpO2 >90% on 2L NC. Patient encouraged to use IS, cough and deep breathe. Will continue to monitor. Problem: Activity:  Goal: Capacity to carry out activities will improve  12/6/2020 2219 by Ru Fishman RN  Outcome: Ongoing   Patient has dyspnea with exertion and during activities. Patient educated on breathing techniques. Will continue to monitor. Problem: Fluid Volume:  Goal: Risk for excess fluid volume will decrease  12/6/2020 2219 by Ru Fishman RN  Outcome: Ongoing   Fluid restriction in place. Patient aware and compliant. Voiding adequately. Crackles to lungs. Will continue to monitor.

## 2020-12-07 NOTE — PROGRESS NOTES
The [address]       In Patient  Progress note        Fernanda Hodgson MD,  [address] FNP [facility]   Daily Progress Note      Admit Date:  12/5/2020  Primary Cardiologist : Dr. Dowd Batch     CC: Interval Hx: admitted with acute CHF/ HTN AKVITA /pul edema / b/p elevation   with CAD,HTN,COPD, KAVITA, LHC with stent (6/2013); tobacco use / PVD   Pulmonary nodules followed by Dr. Lisa Ramesh test neg   Trop neg x 2     Today no c/o angina / VSS afebrile SV02 90% nasal cannula 002   sCr 0.9>1.4  / net -1.6 liters     TTE 12/7/20 Normal left ventricle size. There is mild concentric left ventricular   hypertrophy. Overall left ventricular systolic function appears normal with an ejection   fraction of 55-60%. No regional wall motion abnormalities are noted. Diastolic filling parameters suggests normal diastolic function. Mild mitral regurgitation is present. Trivial tricuspid regurgitation. Estimated pulmonary artery systolic pressure is 34 mmHg assuming a right   atrial pressure of 3 mmHg. The left atrium is moderately dilated. 2017 stress test   1.  NORMAL PHARMACOLOGIC SPECT STRESS AND REST MYOCARDIAL    PERFUSION SCAN WITH NO EVIDENCE OF PHARMACOLOGIC-INDUCED    MYOCARDIAL ISCHEMIA. 2.  NORMAL-APPEARING LEFT VENTRICULAR WALL MOTION AND EJECTION    FRACTION AT REST.        I have examined pt and reviewed notes / any lab work EKGs stress test, angiograms, & images reviewed    Objective:   /77   Pulse 66   Temp 97.8 °F (36.6 °C) (Oral)   Resp 22   Ht 5' 10\" (1.778 m)   Wt 207 lb 3.7 oz (94 kg)   SpO2 90%   BMI 29.73 kg/m²       Intake/Output Summary (Last 24 hours) at 12/7/2020 1647  Last data filed at 12/7/2020 1300  Gross per 24 hour   Intake 800 ml   Output 580 ml   Net 220 ml     Wt Readings from Last 3 Encounters:   12/07/20 207 lb 3.7 oz (94 kg)   07/22/20 210 lb 9.6 oz (95.5 kg)   02/18/20 220 lb (99.8 kg) Physical Exam:   /77   Pulse 66   Temp 97.8 °F (36.6 °C) (Oral)   Resp 22   Ht 5' 10\" (1.778 m)   Wt 207 lb 3.7 oz (94 kg)   SpO2 90%   BMI 29.73 kg/m²   BP Readings from Last 3 Encounters:   12/07/20 132/77   07/22/20 110/70   02/18/20 126/70     Pulse Readings from Last 3 Encounters:   12/07/20 66   07/22/20 78   02/18/20 77       Intake/Output Summary (Last 24 hours) at 12/7/2020 1647  Last data filed at 12/7/2020 1300  Gross per 24 hour   Intake 800 ml   Output 580 ml   Net 220 ml     Wt Readings from Last 2 Encounters:   12/07/20 207 lb 3.7 oz (94 kg)   07/22/20 210 lb 9.6 oz (95.5 kg)     Constitutional:  In no acute distress. Head: Normocephalic and atraumatic. Cardiovascular: Normal rate   Pulmonary/Chest: Effort normal and breath sounds normal. No respiratory distress. He has no wheezes, rhonchi or rales. Extremities: No edema, cyanosis, or clubbing. Pulses are 2+ radial/carotid/dorsalis pedis and posterior tibial bilaterally. Neurological: oriented to person place    Skin: Skin is warm and dry. There is no rash or diaphoresis. Psychiatric: He has a normal mood and affect.  His speech is normal and behavior is normal.     Medications:    albuterol  2.5 mg Nebulization BID    budesonide  0.5 mg Nebulization BID    And    Arformoterol Tartrate  15 mcg Nebulization BID    sodium chloride flush  10 mL Intravenous 2 times per day    nicotine  1 patch Transdermal Daily    nitroglycerin  1 inch Topical 4 times per day    allopurinol  200 mg Oral Daily    atorvastatin  80 mg Oral Daily    clopidogrel  75 mg Oral Daily    DULoxetine  60 mg Oral Daily    tiotropium  2 puff Inhalation Daily    [Held by provider] losartan  100 mg Oral Daily    metoprolol  100 mg Oral BID    vitamin D  10,000 Units Oral Daily    gabapentin  600 mg Oral QAM    And    gabapentin  1,200 mg Oral Nightly    doxazosin  4 mg Oral Daily    enoxaparin  40 mg Subcutaneous BID       Recent Labs 12/05/20  0852   WBC 10.0   HGB 15.3        BMP:    Recent Labs     12/05/20  0852 12/06/20  0405 12/07/20  0617    134* 142   K 3.9 3.9 4.3   CO2 25 23 23   BUN 13 18 36*   CREATININE 0.7* 0.9 1.4*     LIVR:   Recent Labs     12/05/20  0852   AST 16   ALT 15     Reviewed  available lab work,  EKGs, images, [facility]     Assessment    acute CHF/ pul edema  ProBNP 3880   Negative troponins and EKG is not concerning for ischemia. TTE 12/7/20 Normal left ventricle size. There is mild concentric left ventricular   hypertrophy. Overall left ventricular systolic function appears normal with an ejection   fraction of 55-60%. No regional wall motion abnormalities are noted. Diastolic filling parameters suggests normal diastolic function. Mild mitral regurgitation is present. Trivial tricuspid regurgitation. Estimated pulmonary artery systolic pressure is 34 mmHg assuming a right   atrial pressure of 3 mmHg. The left atrium is moderately dilated. Hx CAD    [facility] with stent 2013 2017 stress test   1.  NORMAL PHARMACOLOGIC SPECT STRESS AND REST MYOCARDIAL    PERFUSION SCAN WITH NO EVIDENCE OF PHARMACOLOGIC-INDUCED    MYOCARDIAL ISCHEMIA. 2.  NORMAL-APPEARING LEFT VENTRICULAR WALL MOTION AND EJECTION    FRACTION AT REST. HTN   was elevated improving     KAVITA  /  COPD / Tobacco use  / pulmonary nodules followed by Dr. Leydi Suero  CPAP/  Stop smoking    KARLI  sCr 0.9>1.4  / net -1.6 liters   Hold ace/arb/arni    PAD   s/p R fem endarterectomy '13     Plan:  Negative troponins and EKG is not concerning for ischemia. Card meds  Cardura Plavix Lipitor cozaar/hold  Lopressor Nicoderm nitrobid   Will follow    Kat Polanco APRN, CVNP   This patient is seen at the bedside. He looks very stable awake and alert and in no acute distress. Somewhat confused. Apparently had acute heart failure on admission sleep apnea pulmonary edema and hypertensive episode. Coronary artery disease with previous stents in 2013.   Has experienced a good diuresis so far and is generally better. There is no peripheral edema. At this time all else looks stable from a cardiac perspective. The enzymes are unremarkable and EKG looks stable. We will continue his current therapy. Not much need for any more diuresis seemingly. The creatinine has gone up to 1.4 and will be need to be followed. The echocardiogram did show ejection fraction of 55%. There is diastolic dysfunction. On monitor there are short runs of ventricular tachycardia this will be monitored and he may need additional beta-blocker therapy.   Lavern Marcial MD, [facility]

## 2020-12-08 ENCOUNTER — APPOINTMENT (OUTPATIENT)
Dept: CT IMAGING | Age: 70
DRG: 287 | End: 2020-12-08
Payer: MEDICARE

## 2020-12-08 ENCOUNTER — APPOINTMENT (OUTPATIENT)
Dept: GENERAL RADIOLOGY | Age: 70
DRG: 287 | End: 2020-12-08
Payer: MEDICARE

## 2020-12-08 LAB
ANION GAP SERPL CALCULATED.3IONS-SCNC: 13 MMOL/L (ref 3–16)
BUN BLDV-MCNC: 34 MG/DL (ref 7–20)
CALCIUM SERPL-MCNC: 9.7 MG/DL (ref 8.3–10.6)
CHLORIDE BLD-SCNC: 102 MMOL/L (ref 99–110)
CO2: 23 MMOL/L (ref 21–32)
CREAT SERPL-MCNC: 1 MG/DL (ref 0.8–1.3)
D DIMER: 351 NG/ML DDU (ref 0–229)
GFR AFRICAN AMERICAN: >60
GFR NON-AFRICAN AMERICAN: >60
GLUCOSE BLD-MCNC: 119 MG/DL (ref 70–99)
MAGNESIUM: 1.9 MG/DL (ref 1.8–2.4)
POTASSIUM SERPL-SCNC: 4 MMOL/L (ref 3.5–5.1)
PRO-BNP: 180 PG/ML (ref 0–124)
SODIUM BLD-SCNC: 138 MMOL/L (ref 136–145)

## 2020-12-08 PROCEDURE — 6360000002 HC RX W HCPCS: Performed by: INTERNAL MEDICINE

## 2020-12-08 PROCEDURE — 83835 ASSAY OF METANEPHRINES: CPT

## 2020-12-08 PROCEDURE — 6370000000 HC RX 637 (ALT 250 FOR IP): Performed by: NURSE PRACTITIONER

## 2020-12-08 PROCEDURE — 2060000000 HC ICU INTERMEDIATE R&B

## 2020-12-08 PROCEDURE — 36415 COLL VENOUS BLD VENIPUNCTURE: CPT

## 2020-12-08 PROCEDURE — 2580000003 HC RX 258: Performed by: INTERNAL MEDICINE

## 2020-12-08 PROCEDURE — 99233 SBSQ HOSP IP/OBS HIGH 50: CPT | Performed by: INTERNAL MEDICINE

## 2020-12-08 PROCEDURE — 83880 ASSAY OF NATRIURETIC PEPTIDE: CPT

## 2020-12-08 PROCEDURE — 6370000000 HC RX 637 (ALT 250 FOR IP): Performed by: INTERNAL MEDICINE

## 2020-12-08 PROCEDURE — 6360000004 HC RX CONTRAST MEDICATION: Performed by: INTERNAL MEDICINE

## 2020-12-08 PROCEDURE — 83735 ASSAY OF MAGNESIUM: CPT

## 2020-12-08 PROCEDURE — 71275 CT ANGIOGRAPHY CHEST: CPT

## 2020-12-08 PROCEDURE — APPSS30 APP SPLIT SHARED TIME 16-30 MINUTES: Performed by: NURSE PRACTITIONER

## 2020-12-08 PROCEDURE — 2700000000 HC OXYGEN THERAPY PER DAY

## 2020-12-08 PROCEDURE — 71046 X-RAY EXAM CHEST 2 VIEWS: CPT

## 2020-12-08 PROCEDURE — 94640 AIRWAY INHALATION TREATMENT: CPT

## 2020-12-08 PROCEDURE — 80048 BASIC METABOLIC PNL TOTAL CA: CPT

## 2020-12-08 PROCEDURE — 85379 FIBRIN DEGRADATION QUANT: CPT

## 2020-12-08 RX ORDER — SENNA AND DOCUSATE SODIUM 50; 8.6 MG/1; MG/1
2 TABLET, FILM COATED ORAL DAILY
Status: DISCONTINUED | OUTPATIENT
Start: 2020-12-08 | End: 2020-12-09

## 2020-12-08 RX ORDER — CIPROFLOXACIN 500 MG/1
500 TABLET, FILM COATED ORAL EVERY 12 HOURS SCHEDULED
Status: DISCONTINUED | OUTPATIENT
Start: 2020-12-08 | End: 2020-12-10 | Stop reason: HOSPADM

## 2020-12-08 RX ORDER — FAMOTIDINE 20 MG/1
20 TABLET, FILM COATED ORAL NIGHTLY
Status: DISCONTINUED | OUTPATIENT
Start: 2020-12-08 | End: 2020-12-10 | Stop reason: HOSPADM

## 2020-12-08 RX ADMIN — ENOXAPARIN SODIUM 40 MG: 40 INJECTION SUBCUTANEOUS at 20:45

## 2020-12-08 RX ADMIN — BUDESONIDE INHALATION 500 MCG: 0.5 SUSPENSION RESPIRATORY (INHALATION) at 09:51

## 2020-12-08 RX ADMIN — SENNOSIDES AND DOCUSATE SODIUM 2 TABLET: 8.6; 5 TABLET ORAL at 14:19

## 2020-12-08 RX ADMIN — NITROGLYCERIN 1 INCH: 20 OINTMENT TOPICAL at 00:32

## 2020-12-08 RX ADMIN — ENOXAPARIN SODIUM 40 MG: 40 INJECTION SUBCUTANEOUS at 08:25

## 2020-12-08 RX ADMIN — GABAPENTIN 1200 MG: 400 CAPSULE ORAL at 22:57

## 2020-12-08 RX ADMIN — Medication 10 ML: at 08:27

## 2020-12-08 RX ADMIN — METOPROLOL TARTRATE 100 MG: 100 TABLET, FILM COATED ORAL at 08:25

## 2020-12-08 RX ADMIN — IOPAMIDOL 80 ML: 755 INJECTION, SOLUTION INTRAVENOUS at 17:33

## 2020-12-08 RX ADMIN — CIPROFLOXACIN HYDROCHLORIDE 500 MG: 500 TABLET, FILM COATED ORAL at 14:19

## 2020-12-08 RX ADMIN — CIPROFLOXACIN HYDROCHLORIDE 500 MG: 500 TABLET, FILM COATED ORAL at 20:45

## 2020-12-08 RX ADMIN — CLOPIDOGREL BISULFATE 75 MG: 75 TABLET ORAL at 08:26

## 2020-12-08 RX ADMIN — ARFORMOTEROL TARTRATE 15 MCG: 15 SOLUTION RESPIRATORY (INHALATION) at 21:15

## 2020-12-08 RX ADMIN — BUDESONIDE INHALATION 500 MCG: 0.5 SUSPENSION RESPIRATORY (INHALATION) at 21:15

## 2020-12-08 RX ADMIN — Medication 10000 UNITS: at 08:26

## 2020-12-08 RX ADMIN — ATORVASTATIN CALCIUM 80 MG: 80 TABLET, FILM COATED ORAL at 08:26

## 2020-12-08 RX ADMIN — GABAPENTIN 600 MG: 300 CAPSULE ORAL at 08:26

## 2020-12-08 RX ADMIN — METOPROLOL TARTRATE 100 MG: 100 TABLET, FILM COATED ORAL at 20:45

## 2020-12-08 RX ADMIN — FAMOTIDINE 20 MG: 20 TABLET, FILM COATED ORAL at 20:45

## 2020-12-08 RX ADMIN — NITROGLYCERIN 1 INCH: 20 OINTMENT TOPICAL at 06:42

## 2020-12-08 RX ADMIN — ALBUTEROL SULFATE 2.5 MG: 2.5 SOLUTION RESPIRATORY (INHALATION) at 21:15

## 2020-12-08 RX ADMIN — ARFORMOTEROL TARTRATE 15 MCG: 15 SOLUTION RESPIRATORY (INHALATION) at 09:50

## 2020-12-08 RX ADMIN — DOXAZOSIN 4 MG: 4 TABLET ORAL at 08:26

## 2020-12-08 RX ADMIN — PANTOPRAZOLE SODIUM 40 MG: 40 TABLET, DELAYED RELEASE ORAL at 18:34

## 2020-12-08 RX ADMIN — DULOXETINE HYDROCHLORIDE 60 MG: 60 CAPSULE, DELAYED RELEASE ORAL at 08:27

## 2020-12-08 RX ADMIN — ALBUTEROL SULFATE 2.5 MG: 2.5 SOLUTION RESPIRATORY (INHALATION) at 09:51

## 2020-12-08 RX ADMIN — TIOTROPIUM BROMIDE INHALATION SPRAY 2 PUFF: 3.12 SPRAY, METERED RESPIRATORY (INHALATION) at 09:53

## 2020-12-08 RX ADMIN — ALLOPURINOL 200 MG: 100 TABLET ORAL at 08:25

## 2020-12-08 ASSESSMENT — PAIN SCALES - GENERAL
PAINLEVEL_OUTOF10: 0

## 2020-12-08 NOTE — PROGRESS NOTES
Hospitalist Progress Note      PCP: Bernardino Parish DO    Date of Admission: 12/5/2020    Chief Complaint: chest pain, shortness of breath    Hospital Course:     78 y. o. male Kong Almanzar is a current smoker 4 to 5 cigarettes/day, chronic pain syndrome, polyarthralgia, hypertension, lung nodules, COPD, follows Dr. David Roque, CAD s/p s/p BMS 5/24/13 to [address], peripheral vascular disease rt fem endarterectomy 07/2013, KAVITA on CPAP  -Presents to ED with shortness of breath x3 days, chest pain midsternal started last night, associated with nocturnal dyspnea, needing more pillows and propped up position to sleep  Noted with 92% on room air, found to be hypertensive in the /105  Chest x-ray shows pulmonary edema  EKG with sinus rhythm without ST elevation or depression  Patient was transferred to [facility], [facility]. for the management of hypertensive urgency and acute on chronic diastolic congestive heart failure  Covid test was not done as low suspicion in the ED for COVID-19.     Per review of notes he was on Lasix previously but note 07/2020 states Lasix was stopped due to increased urinary frequency.     Last Echo 08/2019 --   Summary   Left ventricular cavity size is normal. There is mild to moderate concentric   left ventricular hypertrophy. Overall left ventricular systolic function   appears normal with an ejection fraction of 55-60%. No regional wall motion   abnormalities are noted. Indeterminate diastolic function. Trivial mitral   and tricuspid regurgitation is present. Estimated pulmonary artery systolic   pressure is normal at 26 mmHg assuming a right atrial pressure of 3 mmHg.   Bi-atrial enlargement. Subjective:     Patient complains of episodic chest pain, most recently yesterday evening while lying in bed.  The pain is located in the center of his sternum and radiates to the left and right side of his chest. His pain self-resolved after several minutes, and was associated with some shortness of breath. It was not associated with activity. He also complains of new onset headaches, beginning during his hospitalization. Headaches are bilateral and  located primarily around his eyes; he denies photophobia, sensitivity to noise, lacrimation, or runny nose. He denies any abdominal pain, nausea, or vomitting. Medications:  Reviewed    Infusion Medications   Scheduled Medications    pantoprazole  40 mg Oral QPM    albuterol  2.5 mg Nebulization BID    budesonide  0.5 mg Nebulization BID    And    Arformoterol Tartrate  15 mcg Nebulization BID    sodium chloride flush  10 mL Intravenous 2 times per day    nicotine  1 patch Transdermal Daily    nitroglycerin  1 inch Topical 4 times per day    allopurinol  200 mg Oral Daily    atorvastatin  80 mg Oral Daily    clopidogrel  75 mg Oral Daily    DULoxetine  60 mg Oral Daily    tiotropium  2 puff Inhalation Daily    [Held by provider] losartan  100 mg Oral Daily    metoprolol  100 mg Oral BID    vitamin D  10,000 Units Oral Daily    gabapentin  600 mg Oral QAM    And    gabapentin  1,200 mg Oral Nightly    doxazosin  4 mg Oral Daily    enoxaparin  40 mg Subcutaneous BID     PRN Meds: nitroGLYCERIN, sodium chloride flush, acetaminophen **OR** acetaminophen, polyethylene glycol, promethazine **OR** ondansetron, perflutren lipid microspheres, albuterol, traMADol, hydrALAZINE      Intake/Output Summary (Last 24 hours) at 12/8/2020 0921  Last data filed at 12/8/2020 0825  Gross per 24 hour   Intake 1210 ml   Output 600 ml   Net 610 ml       Physical Exam Performed:    BP (!) 146/74   Pulse 62   Temp 97.6 °F (36.4 °C) (Oral)   Resp 18   Ht 5' 10\" (1.778 m)   Wt 207 lb 3.7 oz (94 kg)   SpO2 92%   BMI 29.73 kg/m²     General appearance: No apparent distress, appears stated age and cooperative. Manuel Vasquez Respiratory:  Normal respiratory effort.  Mostly clear bilaterally with very minimal dry rales at bases  Cardiovascular: Regular rate and rhythm with normal S1/S2 without murmurs  Abdomen: Soft, non-tender, non-distended with normal bowel sounds. Musculoskeletal: clubbing B/L upper extremities  Neurologic:  Neurovascularly intact without any focal sensory/motor deficits  Peripheral Pulses: +2 palpable, equal bilaterally     Labs:  No results for input(s): WBC, HGB, HCT, PLT in the last 72 hours. Recent Labs     12/06/20  0405 12/07/20  0617 12/08/20  0632   * 142 138   K 3.9 4.3 4.0   CL 98* 104 102   CO2 23 23 23   BUN 18 36* 34*   CREATININE 0.9 1.4* 1.0   CALCIUM 10.1 10.1 9.7     No results for input(s): AST, ALT, BILIDIR, BILITOT, ALKPHOS in the last 72 hours. No results for input(s): INR in the last 72 hours. Recent Labs     12/05/20  1538 12/07/20  0929 12/07/20  1505   TROPONINI <0.01 <0.01 <0.01       Urinalysis:      Lab Results   Component Value Date    NITRU Negative 12/07/2020    WBCUA >100 12/07/2020    BACTERIA 4+ 12/07/2020    RBCUA 3-4 12/07/2020    RBCUA NEGATIVE 08/10/2016    BLOODU Negative 12/07/2020    SPECGRAV 1.025 12/07/2020    GLUCOSEU Negative 12/07/2020       Radiology:  XR CHEST PORTABLE   Final Result      1. Borderline cardiomegaly with diffuse bilateral interstitial airspace opacities. Correlate for signs of congestive failure or fluid overload. Underlying pneumonitis not excluded. XR CHEST (2 VW)    (Results Pending)           Assessment/Plan:    Mr. Jessee Munoz is a 78 y. o. male with a PMH of current tobacco use, chronic pain syndrome, polyarthralgia, hypertension, lung nodules, COPD, follows Dr. Mari Soares, CAD s/p s/p BMS 5/24/13 to [facility], peripheral vascular disease rt fem endarterectomy 07/2013, KAVITA on CPAP with complaintss of shortness of breath and chest pain.        Hypertensive urgency in the setting of acute on chronic diastolic congestive heart failure  -Continue Cardura Plavix Lipitor Lopressor Nicoderm   - hold cozaar, lasix due to elevated Cr   -proBNP: 3880 on admission; proBNP 180 today   - variable BP, keep MAP >65  - monitor I/Os  - Negative troponins and EKG is not concerning for ischemia. - secondary hypertension labs ordered   - TTE 12/7/2020: normal LVEF (55-60%), no regional wall abnormalities   - proteinuria (13.30)   - monitor shows short run on Vtach; continue monitoring   - nephro and cardio consulted  - BP today improved. Will remove nitropaste  - continue holding lasix until results of CXR     LYDIA  - Cr 1.4 on 12/7 from, up from 0.9 on 12/6;  - Cr improved to 1.0 today   - monitor renal function and urine output  - hold ACE-I/ARB (cozaar) until Cr improves  - continue Cardura and Lopressor  - bladder scan ordered to r/o obstruction (12/7): bladder scan PVR revealed 0mL  - nephro consulted    COPD without Acute Exacerbation  - hypoxemia needing 2-4L O2  - COVID 19 PCR sent and negative  - current smoker/tobacco use  - hx of pulmonary nodules followed by Dr. Mandie Alexandre  - respiratory therapy ordered; continue breathing treatments  - Continue Spiriva    CAD  - s/p BMS 5/24/23 to [facility]  - peripheral vascular disease - right femoral endarterectomy 7/2013  - cont plavix and statin     KAVITA on CPAP  - patient refuses CPAP, encourage IS during the day    Nicotine dependence  - current smoker 4-5 cigs per day  - counseling during this admission    Headaches  - hold nitrobid  - monitor for changes    UTI  - moderate leukocytosis in UA, elevated WBC's, with 4+ bacteria   - started on ciprofloxacin       DVT Prophylaxis: Lovenox  Diet: DIET LOW SODIUM 2 GM; 1800 ml  Code Status: Full Code    PT/OT Eval Status: N/A    Dispo - inpatient     Deborah Vasquez (MS4), scribe    Patient was seen and examined by me, Deborah Vasquez acting as my scribe for the above note, which was reviewed and corrections were made as needed. Agree witih above note with following addendum:  Patient appears euvolemic but continues to require supplemental O2 and also having intermittent chest pains.  Ddimer slightly but elevated. Will rule out PE. If that is negative- he may need stress test. Will order CTA chest and make NPO P MN for possible stress test in am. Called his son and gave updates.      Ernestina Jones

## 2020-12-08 NOTE — CARE COORDINATION
Case Management Assessment           Daily Note                 Date/ Time of Note: 12/8/2020 1:46 PM         Note completed by: Marco Field    Patient Name: Tara Grubbs. YOB: 1950    Diagnosis:Acute on chronic diastolic heart failure ([address] [I50.33]  Patient Admission Status: Inpatient    Date of Admission:12/5/2020  8:38 AM Length of Stay: 3 GLOS:      Current Plan of Care: Home with [facility]  ________________________________________________________________________________________  PT AM-PAC:   / 24 per last evaluation on: pending    OT AM-PAC:   / 24 per last evaluation on: pending    DME Needs for discharge: pending  ________________________________________________________________________________________  Discharge Plan: Home with Home Health Care: .    Tentative discharge date: 1 day    Current barriers to discharge: therapy pending/UTI     Referrals completed: [facility] [employer]: . Resources/ information provided: [address] [facility] List  ________________________________________________________________________________________  Case Management Notes:  Patient here from home alone. Plans to discharge to home. Patient is active with [facility] and elderly services program. Gets delivered meals and has life alert . CM for [facility] is Tiffanie Silva. She can be reached at [phone], and she would like to be updated regarding the patients discharge. Patient is agreeable to [facility] if needed. Is currently on O2 which is new. CM spoke to Lazaro/Holger. Lazaro will set patient up with O2 at home if needed. Gladys Rockwell and his family were provided with choice of provider; he and his family are in agreement with the discharge plan.     Care Transition Patient: Xin Field RN  [facility]  Case Management Department  Ph: [phone]  Fax: [phone]

## 2020-12-08 NOTE — PLAN OF CARE
Problem: Falls - Risk of:  Goal: Will remain free from falls  Description: Will remain free from falls  12/8/2020 1056 by Miranda Mcduffie RN  Outcome: Ongoing   Fall risk precautions in place. Bed is locked and in lowest position. 2/4 side rails up. Call light within reach. Fall risk Bracelet in place. Frequent checks on patient. Free from falls at this time. Will continue to monitor. Problem: Activity:  Goal: Capacity to carry out activities will improve  Description: Capacity to carry out activities will improve  Outcome: Ongoing   Patient ambulating in room with assist x1, tolerating well.

## 2020-12-08 NOTE — PROGRESS NOTES
The [address]       In Patient  Progress note        Josué Benson MD,  [address] FNP [address] [facility]   Daily Progress Note      Admit Date:  12/5/2020  Primary Cardiologist : Allie Monte       CC: Interval Hx:  admitted with acute CHF/ HTN KAVITA /pul edema / b/p elevation   with CAD,HTN,COPD, KAVITA, LHC with stent (6/2013); tobacco use / PVD   Pulmonary nodules followed by Dr. Reginald Zayasumbling test neg   Trop neg x 2 / Pro  / sCr 1.0     today sitting up in bed, no c/o cp/SOB on 02 n/c SV02 93% /  NSR IVCD   Net-1.1 liters  / recommends he sit up in chair and walk as much as able to walk    ua reveals UTI  /start abx     I have examined pt and reviewed notes / any lab work EKGs stress test, angiograms, & images reviewed    I  have spent >15 minutes of face to face time with the patient with more than 50% spent counseling and coordinating care this patient.         Objective:   BP (!) 146/74   Pulse 62   Temp 97.6 °F (36.4 °C) (Oral)   Resp 18   Ht 5' 10\" (1.778 m)   Wt 207 lb 3.7 oz (94 kg)   SpO2 93%   BMI 29.73 kg/m²       Intake/Output Summary (Last 24 hours) at 12/8/2020 1032  Last data filed at 12/8/2020 0825  Gross per 24 hour   Intake 850 ml   Output 600 ml   Net 250 ml     Wt Readings from Last 3 Encounters:   12/07/20 207 lb 3.7 oz (94 kg)   07/22/20 210 lb 9.6 oz (95.5 kg)   02/18/20 220 lb (99.8 kg)       Physical Exam:   BP (!) 146/74   Pulse 62   Temp 97.6 °F (36.4 °C) (Oral)   Resp 18   Ht 5' 10\" (1.778 m)   Wt 207 lb 3.7 oz (94 kg)   SpO2 93%   BMI 29.73 kg/m²   BP Readings from Last 3 Encounters:   12/08/20 (!) 146/74   07/22/20 110/70   02/18/20 126/70     Pulse Readings from Last 3 Encounters:   12/08/20 62   07/22/20 78   02/18/20 77       Intake/Output Summary (Last 24 hours) at 12/8/2020 1032  Last data filed at 12/8/2020 0825  Gross per 24 hour   Intake 850 ml   Output 600 ml   Net 250 ml     Wt Readings from Last 2 Encounters:   12/07/20 207 lb 3.7 oz (94 kg)   07/22/20 210 lb 9.6 oz (95.5 kg)     Constitutional:In no acute distress. Head: Normocephalic and atraumatic. Eyes: PEERL   Neck: Neck supple. No JVD present. Carotid bruit is not present. No mass and no thyromegaly present. No lymphadenopathy present. Cardiovascular: Normal rate, regular rhythm, normal heart sounds and intact distal pulses. Exam reveals no gallop and no friction rub. No murmur heard. Pulmonary/Chest: Effort normal and breath sounds normal. No respiratory distress. He has no wheezes, rhonchi or rales. Abdominal: Soft, non-tender. Bowel sounds and aorta are normal. He exhibits no organomegaly, mass or bruit. Extremities: No edema, cyanosis, or clubbing. Pulses are 2+ radial/carotid/dorsalis pedis and posterior tibial bilaterally. Neurological: oriented to person place    Skin: Skin is warm and dry. There is no rash or diaphoresis. Psychiatric: He has a normal mood and affect.  His speech is normal and behavior is normal.      Medications:    pantoprazole  40 mg Oral QPM    albuterol  2.5 mg Nebulization BID    budesonide  0.5 mg Nebulization BID    And    Arformoterol Tartrate  15 mcg Nebulization BID    sodium chloride flush  10 mL Intravenous 2 times per day    nicotine  1 patch Transdermal Daily    nitroglycerin  1 inch Topical 4 times per day    allopurinol  200 mg Oral Daily    atorvastatin  80 mg Oral Daily    clopidogrel  75 mg Oral Daily    DULoxetine  60 mg Oral Daily    tiotropium  2 puff Inhalation Daily    [Held by provider] losartan  100 mg Oral Daily    metoprolol  100 mg Oral BID    vitamin D  10,000 Units Oral Daily    gabapentin  600 mg Oral QAM    And    gabapentin  1,200 mg Oral Nightly    doxazosin  4 mg Oral Daily    enoxaparin  40 mg Subcutaneous BID       Recent Labs     12/06/20  0405 12/07/20  0617 12/08/20  0632   * 142 138   K 3.9 4.3 4.0   CO2 23 23 23   BUN 18 36* 34*   CREATININE 0.9 1.4* 1.0     Reviewed  available lab work,  EKGs, images, [facility]       Assessment    acute CHF/ pul edema  ProBNP 3880 >>180   Negative troponins and EKG is not concerning for ischemia. TTE 12/7/20 Normal left ventricle size. There is mild concentric left ventricular   hypertrophy. Overall left ventricular systolic function appears normal with an ejection   fraction of 55-60%. No regional wall motion abnormalities are noted.   Diastolic filling parameters suggests normal diastolic function.   Mild mitral regurgitation is present. Trivial tricuspid regurgitation.   Estimated pulmonary artery systolic pressure is 34 mmHg assuming a right   atrial pressure of 3 mmHg. The left atrium is moderately dilated.     Hx CAD    [facility] with stent 2013 2017 stress test   1.  NORMAL PHARMACOLOGIC SPECT STRESS AND REST MYOCARDIAL    PERFUSION SCAN WITH NO EVIDENCE OF PHARMACOLOGIC-INDUCED    MYOCARDIAL ISCHEMIA. 2.  NORMAL-APPEARING LEFT VENTRICULAR WALL MOTION AND EJECTION    FRACTION AT REST.       HTN   was elevated improving      KAVITA  /  COPD / Tobacco use  / pulmonary nodules followed by Dr. Fina Abebe  CPAP/  Stop smoking     KARLI  Hold ace/arb/arni  sCr 1.4>1.0 improved      PAD   s/p R fem endarterectomy '13    HLD  On lipitor     UTI   ua reveals questionable UTI with moderate leukocytes  with discomfort   start cipro 500mg BID for 10 days     Vit d low  Supplement  / taking        Plan:  sitting up in bed, no c/o cp/SOB on 02 n/c SV02 93% /  NSR IVCD   HR is 60s  / b/p stable and he is on lopressor 100mg BID / would not increase dose    Net-1.1 liters sCr is wnl   recommends he sit up in chair and walk as much as able to walk    Negative troponins and EKG is not concerning for ischemia.   Card meds  Cardura Plavix Lipitor cozaar/hold  Lopressor Nicoderm nitrobid   PT to assist with walking & assessing SV02   Tobacco use cessation encouraged   ua reveals questionable UTI with moderate leukocytes / with discomfort start cipro 500mg BID for 10 days   Will follow     Perlaanika Rob APRN, CVNP     Generally better. Feeling better breath. Breathing better. Had good diuresis since admission. All troponins are negative and EKG is stable. Would recommend continuation of current therapy. Follow intermittently.   Alison Santos MD, [facility]

## 2020-12-08 NOTE — PLAN OF CARE
Problem: Falls - Risk of:  Goal: Will remain free from falls  Outcome: Ongoing   Calls out appropriately. Bed locked in lowest position. Bed alarm on. Call light/belongings within reach. Will continue to monitor. Problem: Gas Exchange - Impaired:  Goal: Levels of oxygenation will improve  12/8/2020 0101 by Rohith Remy RN  Outcome: Ongoing   SpO2 >90% on 2L. Patient encouraged to use IS as well as cough and deep breathe. Will continue to monitor. Problem: Fluid Volume:  Goal: Risk for excess fluid volume will decrease  Outcome: Ongoing  Fluid restriction in place. Voiding adequately. Will continue to monitor.

## 2020-12-08 NOTE — PROGRESS NOTES
Patient a/o x4. Vitals stable. Chronic back pain treated with oral medication. No complaints of chest pain overnight. Resting well. SpO2 >90% on 2L NC. Will continue to monitor.

## 2020-12-08 NOTE — CONSULTS
Nutrition Education    Pt seen per [facility] for CHF diet education. Provided pt with written and verbal instruction on HF nutrition therapy. Discussed low sodium diet, daily weights, mg of sodium for low sodium foods, and high vs low sodium food options. Pt voiced understanding. Encouraged pt to contact RD with any questions/concerns should they arise. Time spent: 15 minutes      · Verbally reviewed information with Patient  · Educated on CHF diet  · Written educational materials provided. · Contact name and number provided. · Refer to Patient Education activity for more details.     Electronically signed by Drew Armenta RD, MYKEL on 12/8/20 at 3:56 PM EST    Contact: [phone]

## 2020-12-09 LAB
ANION GAP SERPL CALCULATED.3IONS-SCNC: 12 MMOL/L (ref 3–16)
BUN BLDV-MCNC: 36 MG/DL (ref 7–20)
CALCIUM SERPL-MCNC: 9.5 MG/DL (ref 8.3–10.6)
CHLORIDE BLD-SCNC: 107 MMOL/L (ref 99–110)
CO2: 21 MMOL/L (ref 21–32)
CREAT SERPL-MCNC: 1 MG/DL (ref 0.8–1.3)
GFR AFRICAN AMERICAN: >60
GFR NON-AFRICAN AMERICAN: >60
GLUCOSE BLD-MCNC: 86 MG/DL (ref 70–99)
MAGNESIUM: 1.6 MG/DL (ref 1.8–2.4)
POC ACT LR: 265 SEC
POTASSIUM SERPL-SCNC: 4.1 MMOL/L (ref 3.5–5.1)
SODIUM BLD-SCNC: 140 MMOL/L (ref 136–145)

## 2020-12-09 PROCEDURE — 85347 COAGULATION TIME ACTIVATED: CPT

## 2020-12-09 PROCEDURE — 6360000002 HC RX W HCPCS: Performed by: INTERNAL MEDICINE

## 2020-12-09 PROCEDURE — C1894 INTRO/SHEATH, NON-LASER: HCPCS

## 2020-12-09 PROCEDURE — B2111ZZ FLUOROSCOPY OF MULTIPLE CORONARY ARTERIES USING LOW OSMOLAR CONTRAST: ICD-10-PCS | Performed by: INTERNAL MEDICINE

## 2020-12-09 PROCEDURE — 6360000004 HC RX CONTRAST MEDICATION: Performed by: INTERNAL MEDICINE

## 2020-12-09 PROCEDURE — B2151ZZ FLUOROSCOPY OF LEFT HEART USING LOW OSMOLAR CONTRAST: ICD-10-PCS | Performed by: INTERNAL MEDICINE

## 2020-12-09 PROCEDURE — 2709999900 HC NON-CHARGEABLE SUPPLY

## 2020-12-09 PROCEDURE — 94640 AIRWAY INHALATION TREATMENT: CPT

## 2020-12-09 PROCEDURE — 83735 ASSAY OF MAGNESIUM: CPT

## 2020-12-09 PROCEDURE — 94761 N-INVAS EAR/PLS OXIMETRY MLT: CPT

## 2020-12-09 PROCEDURE — 2500000003 HC RX 250 WO HCPCS

## 2020-12-09 PROCEDURE — 2060000000 HC ICU INTERMEDIATE R&B

## 2020-12-09 PROCEDURE — 99233 SBSQ HOSP IP/OBS HIGH 50: CPT | Performed by: NURSE PRACTITIONER

## 2020-12-09 PROCEDURE — 99152 MOD SED SAME PHYS/QHP 5/>YRS: CPT

## 2020-12-09 PROCEDURE — 36415 COLL VENOUS BLD VENIPUNCTURE: CPT

## 2020-12-09 PROCEDURE — 99153 MOD SED SAME PHYS/QHP EA: CPT

## 2020-12-09 PROCEDURE — 2580000003 HC RX 258: Performed by: INTERNAL MEDICINE

## 2020-12-09 PROCEDURE — 93458 L HRT ARTERY/VENTRICLE ANGIO: CPT | Performed by: INTERNAL MEDICINE

## 2020-12-09 PROCEDURE — C1769 GUIDE WIRE: HCPCS

## 2020-12-09 PROCEDURE — 4A023N7 MEASUREMENT OF CARDIAC SAMPLING AND PRESSURE, LEFT HEART, PERCUTANEOUS APPROACH: ICD-10-PCS | Performed by: INTERNAL MEDICINE

## 2020-12-09 PROCEDURE — 93458 L HRT ARTERY/VENTRICLE ANGIO: CPT

## 2020-12-09 PROCEDURE — 6370000000 HC RX 637 (ALT 250 FOR IP): Performed by: INTERNAL MEDICINE

## 2020-12-09 PROCEDURE — 6360000002 HC RX W HCPCS

## 2020-12-09 PROCEDURE — 2700000000 HC OXYGEN THERAPY PER DAY

## 2020-12-09 PROCEDURE — 99233 SBSQ HOSP IP/OBS HIGH 50: CPT | Performed by: INTERNAL MEDICINE

## 2020-12-09 PROCEDURE — 80048 BASIC METABOLIC PNL TOTAL CA: CPT

## 2020-12-09 PROCEDURE — C1887 CATHETER, GUIDING: HCPCS

## 2020-12-09 PROCEDURE — 6370000000 HC RX 637 (ALT 250 FOR IP): Performed by: NURSE PRACTITIONER

## 2020-12-09 RX ORDER — ISOSORBIDE MONONITRATE 30 MG/1
30 TABLET, EXTENDED RELEASE ORAL DAILY
Status: DISCONTINUED | OUTPATIENT
Start: 2020-12-09 | End: 2020-12-10 | Stop reason: HOSPADM

## 2020-12-09 RX ORDER — MAGNESIUM SULFATE IN WATER 40 MG/ML
2 INJECTION, SOLUTION INTRAVENOUS ONCE
Status: COMPLETED | OUTPATIENT
Start: 2020-12-09 | End: 2020-12-09

## 2020-12-09 RX ORDER — SODIUM CHLORIDE 0.9 % (FLUSH) 0.9 %
10 SYRINGE (ML) INJECTION PRN
Status: CANCELLED | OUTPATIENT
Start: 2020-12-09

## 2020-12-09 RX ORDER — LANOLIN ALCOHOL/MO/W.PET/CERES
400 CREAM (GRAM) TOPICAL DAILY
Status: DISCONTINUED | OUTPATIENT
Start: 2020-12-10 | End: 2020-12-10 | Stop reason: HOSPADM

## 2020-12-09 RX ORDER — SODIUM CHLORIDE 9 MG/ML
INJECTION, SOLUTION INTRAVENOUS CONTINUOUS
Status: ACTIVE | OUTPATIENT
Start: 2020-12-09 | End: 2020-12-09

## 2020-12-09 RX ORDER — ACETAMINOPHEN 325 MG/1
650 TABLET ORAL EVERY 4 HOURS PRN
Status: DISCONTINUED | OUTPATIENT
Start: 2020-12-09 | End: 2020-12-10 | Stop reason: HOSPADM

## 2020-12-09 RX ORDER — SENNA AND DOCUSATE SODIUM 50; 8.6 MG/1; MG/1
2 TABLET, FILM COATED ORAL 2 TIMES DAILY
Status: DISCONTINUED | OUTPATIENT
Start: 2020-12-09 | End: 2020-12-10 | Stop reason: HOSPADM

## 2020-12-09 RX ORDER — POLYETHYLENE GLYCOL 3350 17 G/17G
17 POWDER, FOR SOLUTION ORAL DAILY
Status: DISCONTINUED | OUTPATIENT
Start: 2020-12-09 | End: 2020-12-10 | Stop reason: HOSPADM

## 2020-12-09 RX ORDER — SODIUM CHLORIDE 0.9 % (FLUSH) 0.9 %
10 SYRINGE (ML) INJECTION EVERY 12 HOURS SCHEDULED
Status: CANCELLED | OUTPATIENT
Start: 2020-12-09

## 2020-12-09 RX ORDER — SODIUM CHLORIDE 9 MG/ML
INJECTION, SOLUTION INTRAVENOUS CONTINUOUS
Status: CANCELLED | OUTPATIENT
Start: 2020-12-09

## 2020-12-09 RX ADMIN — CIPROFLOXACIN HYDROCHLORIDE 500 MG: 500 TABLET, FILM COATED ORAL at 21:57

## 2020-12-09 RX ADMIN — PANTOPRAZOLE SODIUM 40 MG: 40 TABLET, DELAYED RELEASE ORAL at 18:39

## 2020-12-09 RX ADMIN — BUDESONIDE INHALATION 500 MCG: 0.5 SUSPENSION RESPIRATORY (INHALATION) at 20:07

## 2020-12-09 RX ADMIN — Medication 10000 UNITS: at 08:32

## 2020-12-09 RX ADMIN — DULOXETINE HYDROCHLORIDE 60 MG: 60 CAPSULE, DELAYED RELEASE ORAL at 08:32

## 2020-12-09 RX ADMIN — SENNOSIDES AND DOCUSATE SODIUM 2 TABLET: 8.6; 5 TABLET ORAL at 08:31

## 2020-12-09 RX ADMIN — ENOXAPARIN SODIUM 40 MG: 40 INJECTION SUBCUTANEOUS at 21:56

## 2020-12-09 RX ADMIN — ALBUTEROL SULFATE 2.5 MG: 2.5 SOLUTION RESPIRATORY (INHALATION) at 07:54

## 2020-12-09 RX ADMIN — Medication 10 ML: at 02:40

## 2020-12-09 RX ADMIN — Medication 10 ML: at 08:32

## 2020-12-09 RX ADMIN — ISOSORBIDE MONONITRATE 30 MG: 30 TABLET, EXTENDED RELEASE ORAL at 18:43

## 2020-12-09 RX ADMIN — METOPROLOL TARTRATE 100 MG: 100 TABLET, FILM COATED ORAL at 21:57

## 2020-12-09 RX ADMIN — Medication 10 ML: at 21:57

## 2020-12-09 RX ADMIN — ENOXAPARIN SODIUM 40 MG: 40 INJECTION SUBCUTANEOUS at 08:32

## 2020-12-09 RX ADMIN — CLOPIDOGREL BISULFATE 75 MG: 75 TABLET ORAL at 08:31

## 2020-12-09 RX ADMIN — METOPROLOL TARTRATE 100 MG: 100 TABLET, FILM COATED ORAL at 08:31

## 2020-12-09 RX ADMIN — ALLOPURINOL 200 MG: 100 TABLET ORAL at 08:31

## 2020-12-09 RX ADMIN — ALBUTEROL SULFATE 2.5 MG: 2.5 SOLUTION RESPIRATORY (INHALATION) at 20:07

## 2020-12-09 RX ADMIN — IOHEXOL 150 ML: 350 INJECTION, SOLUTION INTRAVENOUS at 11:00

## 2020-12-09 RX ADMIN — POLYETHYLENE GLYCOL (3350) 17 G: 17 POWDER, FOR SOLUTION ORAL at 18:40

## 2020-12-09 RX ADMIN — FAMOTIDINE 20 MG: 20 TABLET, FILM COATED ORAL at 21:56

## 2020-12-09 RX ADMIN — MAGNESIUM SULFATE HEPTAHYDRATE 2 G: 40 INJECTION, SOLUTION INTRAVENOUS at 08:34

## 2020-12-09 RX ADMIN — DOCUSATE SODIUM 50 MG AND SENNOSIDES 8.6 MG 2 TABLET: 8.6; 5 TABLET, FILM COATED ORAL at 21:56

## 2020-12-09 RX ADMIN — SODIUM CHLORIDE: 9 INJECTION, SOLUTION INTRAVENOUS at 16:46

## 2020-12-09 RX ADMIN — ARFORMOTEROL TARTRATE 15 MCG: 15 SOLUTION RESPIRATORY (INHALATION) at 07:54

## 2020-12-09 RX ADMIN — DOXAZOSIN 4 MG: 4 TABLET ORAL at 08:31

## 2020-12-09 RX ADMIN — ATORVASTATIN CALCIUM 80 MG: 80 TABLET, FILM COATED ORAL at 08:31

## 2020-12-09 RX ADMIN — GABAPENTIN 1200 MG: 400 CAPSULE ORAL at 22:27

## 2020-12-09 RX ADMIN — ARFORMOTEROL TARTRATE 15 MCG: 15 SOLUTION RESPIRATORY (INHALATION) at 20:07

## 2020-12-09 RX ADMIN — BUDESONIDE INHALATION 500 MCG: 0.5 SUSPENSION RESPIRATORY (INHALATION) at 07:54

## 2020-12-09 RX ADMIN — CIPROFLOXACIN HYDROCHLORIDE 500 MG: 500 TABLET, FILM COATED ORAL at 08:31

## 2020-12-09 RX ADMIN — TIOTROPIUM BROMIDE INHALATION SPRAY 2 PUFF: 3.12 SPRAY, METERED RESPIRATORY (INHALATION) at 07:54

## 2020-12-09 ASSESSMENT — PAIN SCALES - GENERAL: PAINLEVEL_OUTOF10: 0

## 2020-12-09 NOTE — PROGRESS NOTES
Patient back from cath lab. All 3 access sites assessed at bedside. Patient denies at pain at sites. +2 pulses in each site. Patient educated on bedrest orders. Will continue to monitor.

## 2020-12-09 NOTE — PROGRESS NOTES
NPO since midnight. Expects possible Stress test  Today. See CTA results. VSS. Voids WNL. Getting lasix per orders this admission. Denies pain. Bed alarm set. Call light in reach. Will continue to monitor.

## 2020-12-09 NOTE — CARE COORDINATION
Case Management Assessment           Daily Note                 Date/ Time of Note: 12/9/2020 9:52 AM         Note completed by: Any Wall    Patient Name: Adrianne Winters. YOB: 1950    Diagnosis:Acute on chronic diastolic heart failure ([address] [I50.33]  Patient Admission Status: Inpatient    Date of Admission:12/5/2020  8:38 AM Length of Stay: 4 GLOS:      Current Plan of Care: Home with [address]  ________________________________________________________________________________________  PT AM-PAC:   / 24 per last evaluation on: pending    OT AM-PAC:   / 24 per last evaluation on: pending    DME Needs for discharge: NA  ________________________________________________________________________________________  Discharge Plan: Home with [address]: TBD    Tentative discharge date: 1 day    Current barriers to discharge: safety, PT/OT evals    Referrals completed: [address]: . Resources/ information provided: Not indicated at this time  ________________________________________________________________________________________  Case Management Notes:  Patient here from home alone. Plans to discharge to home with [address]. Patient is active with [employer] for meals. Rito Narayanan is  [phone]. Darshana would like to be notified of discharge. NOELLE spoke with Lazaro/Holger. Patient would have to go through [employer] for oxygen if he is discharged with it. The script will need to be sent to [phone]. They will also need chart notes and demographics. Efren Head and his family were provided with choice of provider; he and his family are in agreement with the discharge plan.     Care Transition Patient: Xin Wall RN  [facility]  Case Management Department  Ph: [phone]  Fax: [phone]

## 2020-12-09 NOTE — PLAN OF CARE
Full note to follow. Patient continues to require supplemental O2 and intermittent chest pressures. CTA ruled out PE. Does not appear overloaded. Coronary calcifications are noted. Consulted pulmonology to help with ongoing hypoxia. Discussed with cardiology- patient may have angina and ischemic work up might be needed.  They will discuss with patient either cath or stress test.   Uyen Suresh

## 2020-12-09 NOTE — PROCEDURES
The [address]                                                LEFT HEART CATH    Himanshu Matta.   78 y.o., male  1950 12/9/2020      Procedure  Selective Coronary Angiography  Cardiac Catheterization for Coronary Anatomy  Left Heart Catheterization  Left Ventriculogram  Arterial Access right radial artery doing ultrasound  Arterial Access left radial artery using ultrasound  Arterial Access right femoral artery using ultrasound  Arterial Access right and left radial Artery after a negative Wilmer test  TR Band    CPT code 73376  CPT code 58023  CPT code 06067    Indication:Cardiac cath to rule out ischemic CAD, Possible angioplasty, The procedure and risks described to patient including risk of CVA, MI, bleeding, emergency surgery, death, continuing to have some chest discomfort. His shortness of breath and failure have improved. Diastolic failure. Or Consent signed  Unspecified Angina  Anesthesia: Moderate sedation with Versed and Fentanyl IV  Anesthesia Start time 4720  Anesthesia end time 12:34 PM  Estimated blood loss :minimal  Abnormal Stress Test      Procedure:  After written informed consent was obtained, the patient was   brought to the cardiac catheterization suite, where patient was prepped and   draped in the usual sterile fashion. Local anesthesia was achieved in the   right wrist with 2% lidocaine. A 5-Tajik hemostasis sheath was placed into   the right radial artery. We did approach the patient from the right radial artery and gained access fairly easily using ultrasound. However at the area of the brachiocephalic region it was completely blocked. We were not able to pass a guidewire no other catheter.   After making injections to the determine that we really did truly have complete occlusion at this point we were concerned about where the blood supply to the right arm was coming from. It did not appear to be compromised we cannot access the ascending aorta. At this juncture we left that area and removed the catheter. We then used ultrasound to access the  right femoral artery. We became aware that he had had previous aortobifemoral bypasses. We did get into the right femoral artery but we could not pass the catheter. At this juncture we then approach from the left radial with easy success. Using ultrasound. We used cocktail again in the left radial.  We were able to complete our procedure from that point. The pre cocktail of heparin, verapamil and nitroglycerin was injected into the sheath. A 5-Khmer Shahram catheter was introduced; used to engage the left main   coronary artery. Radiographic  images were obtained. The catheter was pulled back then rotated. The Shahram catheter was introduced  to engage the right coronary   artery. Radiographic  images were obtained. The catheter was removed and exchanged over an exchange length 0.35 soft guide wire. The Shahram  catheter was then positioned in the left ventricle. Left ventriculogram was performed. Hand-injection after these images were obtained. , the   catheter was flushed, pulled back across the aortic valve revealing no gradient. The catheter was removed. The hemostasis sheath was removed and hemostasis was achieved using a TR Band on the left wrist and a another TR band on the right wrist and the access approach from the right femoral artery was manually trolled. There were no complications. Patient tolerated the procedure well. The   patient was transferred to the holding area in stable condition.      Contrast consumed 110 cc  Flouro time 8.9 minutes  Radiation exposure 529.37 mGy    Results:  Left ventricular pressure 4 mmHg  Aortic pressure 115 mmHg    Coronary anatomy:   The left main coronary artery has significant calcification. No significant focal stenoses. Left anterior descending artery is has significant calcification. No significant focal stenoses identified. Previous stent is patent. Circumflex artery i significant calcification proximally. No significant stenoses. The right coronary artery is a dominant vessel and has calcification but no significant stenoses. Left ventriculogram shows ejection fraction of 55%. Normal wall motion. Impression:  Diffuse coronary artery calcification with previously patent stent. No significant focal stenoses. No opportunities or need for intervention. Plan:  Primary prevention  Medical management      This note was likely completed using voice recognition technology and may contain unintended errors  Susana Brooks M.D., [address]     Cc;  Khris Shanks DO

## 2020-12-09 NOTE — CONSULTS
Pulmonology Consult Note  PGY-1    PCP: Bernardino Parish DO    Code:Full Code  Admit Date: 12/5/2020  Diet: DIET CARDIAC; Daily Fluid Restriction: 1800 ml      History of present illness:      CC: Persistent hypoxia    Patient is a 78 y.o. male who is a current smoker half to one pack tobbacco/day, chronic pain syndrome, polyarthralgia, hypertension, lung nodules, COPD, follows Dr. David Roque, CAD s/p s/p BMS 5/24/13 to [address], peripheral vascular disease right fem endarterectomy 07/2013, KAVITA on CPAP presents to ED with shortness of breath x3 days. Pt mentions that his SOB started on Wednesday and started getting worse. He tried to rest, but was SOB. Tried to go out and get some fresh air which did not improve his SOB. Also started to have mid-sternal chest pain started on Friday night. Endorsed nocturnal dyspnea, needing more pillows and propped up position to sleep. Pt mentions that he has history of COPD, but is not on home O2. He tried his rescue inhaler, but did not help. He endorses chronic cough with sputum production. Denied having increase in sputum production, changes in color of his sputum, fever, nausea, vomiting, constipation, diarrhea, and abdominal pain, changes in urination. In ED, Pt found to be hypertensive in the /105. Chest x-ray shows pulmonary edema. EKG with sinus rhythm without ST elevation or depression. Patient was transferred to [facility] for the management of hypertensive urgency and acute on chronic diastolic congestive heart failure. Per review of notes he was on Lasix previously but note 07/2020 states Lasix was stopped due to increased urinary frequency. Pt was admitted to the hospital. Pt was started on 5L of HFNC. ProBNP was 3880. Pt was started on Lasix. Add doxazosin for better blood pressure control, continue losartan 100 mg once daily, metoprolol 100 mg twice daily, Nitropaste 1 inch every 6 hours. COVID test was negative.  Losartan was held and lasix was stopped due to rise in creatinine. Pt was seen and examined this morning. Pt is on 2L of HFNC with SpO2 of 90. Denies having SOB. Endorses chronic cough with white mucus production. Denies having chest pain, palpitation, abdominal pain, nausea, vomiting, diarrhea. ROS: Review of Systems -   All other systems reviewed and are negative.     Past Medical / Surgical History:    Past Medical History:   Diagnosis Date    CAD (coronary artery disease)     [facility]congestive [facility] failure) [facility]     COPD (chronic obstructive pulmonary disease) ([facility])     Depressive disorder, not elsewhere classified     GERD (gastroesophageal reflux disease)     History of MI (myocardial infarction) 05/2013    History of skin ulcer of lower extremity     R anterior shin    History of transient ischemic attack (TIA)     Hyperlipidemia     Hypertension     Neuropathy     KAVITA (obstructive sleep apnea)     On CPAP    Personal history of DVT (deep vein thrombosis)     PVD (peripheral vascular disease) ([facility])     Vertebral fracture      Past Surgical History:   Procedure Laterality Date    APPENDECTOMY      CHOLECYSTECTOMY, LAPAROSCOPIC      CORONARY ANGIOPLASTY WITH STENT PLACEMENT  6/2013    EYE SURGERY Left     LAMINECTOMY  11/18/2015    Bilateral decompressive lumbar laminectomy L4-5   ; medial facetectomy L4-5 joints  bilaterally; foraminotomies l5   nerve roots bilaterally    LEG DEBRIDEMENT Right 7/23/2013    Dr. Brian Patton - pretibial wound    OTHER SURGICAL HISTORY Right 6/25/2013    Dr. Brian Patton - CFA Endarterectomy w/marsupialization; RLE wound excision & debridement     OTHER SURGICAL HISTORY Left 8/27/2013    Dr. Brian Patton - femoral & profunda femoris endarterectomy w/marsupialization patch angioplasty using SFA    SKIN SPLIT GRAFT Right 7/25/2013    Dr. Brian Patton - to non-healing R pretibial wound, 9 x 15 cm    TOTAL HIP ARTHROPLASTY Right 09/01/2016    Dr. Mary Moreno Left 12/22/2015    Dr. Esposito  - [facility] exploration, thrombectomy of ileofemoral system, stenting of RAFAL in-stent stenosis w/8 x 37 mm Palmaz        Medications Prior to Admission:    No current facility-administered medications on file prior to encounter. Current Outpatient Medications on File Prior to Encounter   Medication Sig Dispense Refill    vitamin D (CHOLECALCIFEROL) 125 MCG (5000 UT) CAPS capsule Take 10,000 Units by mouth daily      traMADol (ULTRAM) 50 MG tablet Take 50 mg by mouth 2 times daily as needed for Pain.  DULoxetine (CYMBALTA) 60 MG extended release capsule TAKE 1 CAPSULE BY MOUTH EVERY DAY 90 capsule 0    losartan (COZAAR) 100 MG tablet TAKE 1 TABLET BY MOUTH EVERY DAY 90 tablet 1    clopidogrel (PLAVIX) 75 MG tablet TAKE 1 TABLET BY MOUTH DAILY 90 tablet 1    pantoprazole (PROTONIX) 40 MG tablet TAKE 1 TABLET BY MOUTH EVERY EVENING. TAKE 1 HOUR BEFORE BEDTIME. 90 tablet 1    allopurinol (ZYLOPRIM) 100 MG tablet TAKE 2 TABLETS BY MOUTH EVERY  tablet 1    INCRUSE ELLIPTA 62.5 MCG/INH AEPB INHALE 1 PUFF BY MOUTH DAILY 1 each 11    BREO ELLIPTA 200-25 MCG/INH AEPB inhaler INHALE 1 PUFF BY MOUTH DAILY 1 each 11    metoprolol (LOPRESSOR) 100 MG tablet Take 1.5 tablets by mouth 2 times daily (Patient taking differently: Take 100 mg by mouth 2 times daily Take 2 tablets in morning and 1.5 in evening.) 270 tablet 1    atorvastatin (LIPITOR) 80 MG tablet TAKE 1 TABLET BY MOUTH DAILY 90 tablet 1    nitroGLYCERIN (NITROSTAT) 0.4 MG SL tablet Place 1 tablet under the tongue every 5 minutes as needed for Chest pain 25 tablet 1    folic acid (FOLVITE) 1 MG tablet Take 1 mg by mouth daily      albuterol (ACCUNEB) 1.25 MG/3ML nebulizer solution Inhale 3 mLs into the lungs every 6 hours as needed for Wheezing 120 vial 3    magnesium gluconate (MAGONATE) 500 MG tablet Take 500 mg by mouth daily.       albuterol sulfate  (90 Base) MCG/ACT inhaler INHALE 2 PUFFS INTO THE LUNGS EVERY function, Estimated pulmonary artery systolic pressure is 34 mmHg assuming a right atrial pressure of 3 mmHg. - I/O -985 since admission.   - Pt has improved significantly with diuresis.    - Cardiology on board: [facility] today  - Continue with cardiology suggestion   - Continue Albuterol, Pulmicort      Code Status: Full Code  FEN:  DIET CARDIAC; Daily Fluid Restriction: 1800 ml  DVT PPX: Lovenox       This patient will be discussed with attending, Dr. Lubna Garcia MD.    Claritza Mckeon MD, PGY- 1  Contact via [employer]  12/9/2020,  8:47 AM

## 2020-12-09 NOTE — PROGRESS NOTES
[address]       In Patient  Progress note        Chad Valdivia MD,  [address] APRN FNP [facility]   Daily Progress Note      Admit Date:  12/5/2020  Primary Cardiologist : [facility]       CC: Interval Hx:  admitted with acute CHF/ HTN KAVITA /pul edema / b/p elevation   with CAD,HTN,COPD, KAVITA, LHC with stent (6/2013); tobacco use / PVD   Pulmonary nodules followed by Dr. Rizzo Shape test neg   Trop neg x 2 / Pro  / sCr 1.0   Today continuing having cp / b/p wnl SV02 90% on n/c 02 /sCr 1.0   CT pulm no PE / appears euvolemic /sCr 1.0   Plan LHC today     I have examined pt and reviewed notes / any lab work EKGs stress test, angiograms, & images reviewed    Objective:   BP (!) 154/88   Pulse 62   Temp 97.7 °F (36.5 °C) (Oral)   Resp 18   Ht 5' 10\" (1.778 m)   Wt 205 lb 0.4 oz (93 kg)   SpO2 90%   BMI 29.42 kg/m²       Intake/Output Summary (Last 24 hours) at 12/9/2020 1312  Last data filed at 12/9/2020 1004  Gross per 24 hour   Intake 900 ml   Output 1100 ml   Net -200 ml     Wt Readings from Last 3 Encounters:   12/09/20 205 lb 0.4 oz (93 kg)   07/22/20 210 lb 9.6 oz (95.5 kg)   02/18/20 220 lb (99.8 kg)       Physical Exam:   BP (!) 154/88   Pulse 62   Temp 97.7 °F (36.5 °C) (Oral)   Resp 18   Ht 5' 10\" (1.778 m)   Wt 205 lb 0.4 oz (93 kg)   SpO2 90%   BMI 29.42 kg/m²   BP Readings from Last 3 Encounters:   12/09/20 (!) 154/88   07/22/20 110/70   02/18/20 126/70     Pulse Readings from Last 3 Encounters:   12/09/20 62   07/22/20 78   02/18/20 77       Intake/Output Summary (Last 24 hours) at 12/9/2020 1312  Last data filed at 12/9/2020 1004  Gross per 24 hour   Intake 900 ml   Output 1100 ml   Net -200 ml     Wt Readings from Last 2 Encounters:   12/09/20 205 lb 0.4 oz (93 kg)   07/22/20 210 lb 9.6 oz (95.5 kg)     Constitutional c/o cp / w/d VSS   Cardiovascular: Normal rate, regular rhythm, normal heart for ischemia. TTE 12/7/20 Normal left ventricle size. There is mild concentric left ventricular   hypertrophy. Overall left ventricular systolic function appears normal with an ejection   fraction of 55-60%. No regional wall motion abnormalities are noted.   Diastolic filling parameters suggests normal diastolic function.   Mild mitral regurgitation is present. Trivial tricuspid regurgitation.   Estimated pulmonary artery systolic pressure is 34 mmHg assuming a right   atrial pressure of 3 mmHg. The left atrium is moderately dilated.     Hx CAD    [facility] with stent 2013 2017 stress test   1.  NORMAL PHARMACOLOGIC SPECT STRESS AND REST MYOCARDIAL    PERFUSION SCAN WITH NO EVIDENCE OF PHARMACOLOGIC-INDUCED    MYOCARDIAL ISCHEMIA. 2.  NORMAL-APPEARING LEFT VENTRICULAR WALL MOTION AND EJECTION    FRACTION AT REST.       HTN   was elevated improving      KAVITA  /  COPD / Tobacco use  / pulmonary nodules followed by Dr. Prince Hall  CPAP/  Stop smoking     KARLI  Hold ace/arb/arni  sCr 1.4>1.0 improved      PAD   s/p R fem endarterectomy '13     HLD  On lipitor      UTI   ua reveals questionable UTI with moderate leukocytes  with discomfort   start cipro 500mg BID for 10 days      Vit d low  Supplement  / taking        Plan:  continuing having cp / b/p wnl SV02 90% on n/c 02 /sCr 1.0   CT pulm no PE / appears euvolemic /sCr 1.0   Plan [facility] today     Dio Shearer APRN, CVNP   Patient still having some vague chest discomfort. Has known CAD. Spoke with Dr. Marcy Wang today and felt that he should have cardiac cath. I do not think a stress test will answer all questions. We will proceed accordingly.   Thony Patel MD, [facility]

## 2020-12-09 NOTE — PROGRESS NOTES
Nephrology  Note                                                                                                                                                                                                                                                                                                                                                               Office : [phone]     Fax :[phone]              Patient's Name: Tara Grubbs. Reason for Consult:   LYDIA  Requesting Physician:  Tauna Fleischer, DO        Interval History:  Chest pain better, SOB improved  BP controlled   Feels fair   Cr better       Past Medical History:   Diagnosis Date    CAD (coronary artery disease)     [facility] failure) [facility]     COPD [facility]chronic obstructive pulmonary disease) [facility])     Depressive disorder, not elsewhere classified     GERD (gastroesophageal reflux disease)     History of MI (myocardial infarction) 05/2013    History of skin ulcer of lower extremity     R anterior shin    History of transient ischemic attack (TIA)     Hyperlipidemia     Hypertension     Neuropathy     KAVITA (obstructive sleep apnea)     On CPAP    Personal history of DVT (deep vein thrombosis)     PVD (peripheral vascular disease) ([facility])     Vertebral fracture        Past Surgical History:   Procedure Laterality Date    APPENDECTOMY      CHOLECYSTECTOMY, LAPAROSCOPIC      CORONARY ANGIOPLASTY WITH STENT PLACEMENT  6/2013    EYE SURGERY Left     LAMINECTOMY  11/18/2015    Bilateral decompressive lumbar laminectomy L4-5   ; medial facetectomy L4-5 joints  bilaterally; foraminotomies l5   nerve roots bilaterally    LEG DEBRIDEMENT Right 7/23/2013    Dr. German Velasquez - pretibial wound    OTHER SURGICAL HISTORY Right 6/25/2013    Dr. German Velasquez - CFA Endarterectomy w/marsupialization; RLE wound excision & debridement     OTHER SURGICAL HISTORY Left 8/27/2013    Dr. German Velasquez - femoral & profunda femoris 1.65 mg, ONCE PRN  albuterol (PROVENTIL) nebulizer solution 2.5 mg, Q6H PRN  allopurinol (ZYLOPRIM) tablet 200 mg, Daily  atorvastatin (LIPITOR) tablet 80 mg, Daily  clopidogrel (PLAVIX) tablet 75 mg, Daily  DULoxetine (CYMBALTA) extended release capsule 60 mg, Daily  tiotropium (SPIRIVA RESPIMAT) 2.5 MCG/ACT inhaler 2 puff, Daily  [Held by provider] losartan (COZAAR) tablet 100 mg, Daily  metoprolol (LOPRESSOR) tablet 100 mg, BID  traMADol (ULTRAM) tablet 50 mg, BID PRN  vitamin D (CHOLECALCIFEROL) capsule 10,000 Units, Daily  gabapentin (NEURONTIN) capsule 600 mg, QAM    And  gabapentin (NEURONTIN) capsule 1,200 mg, Nightly  doxazosin (CARDURA) tablet 4 mg, Daily  hydrALAZINE (APRESOLINE) injection 10 mg, Q6H PRN  enoxaparin (LOVENOX) injection 40 mg, BID        Review of Systems:   14 point ROS obtained but were negative except mentioned in HPI      Physical exam:     Vitals:  BP (!) 154/88   Pulse 62   Temp 97.7 °F (36.5 °C) (Oral)   Resp 18   Ht 5' 10\" (1.778 m)   Wt 205 lb 0.4 oz (93 kg)   SpO2 94%   BMI 29.42 kg/m²   Constitutional:  OAA X3 NAD  Skin: no rash, turgor wnl  Heent:  eomi, mmm  Neck: no bruits or jvd noted  Cardiovascular:  S1, S2 without m/r/g  Respiratory: Mild crackles in the bases  Abdomen:  +bs, soft, mild LLQ TTP  Ext: Minimal lower extremity edema  Psychiatric: mood and affect appropriate  Musculoskeletal:  Rom, muscular strength intact    Data:   Labs:  CBC:   No results for input(s): WBC, HGB, PLT in the last 72 hours. BMP:    Recent Labs     12/07/20  0617 12/08/20  0632    138   K 4.3 4.0    102   CO2 23 23   BUN 36* 34*   CREATININE 1.4* 1.0   GLUCOSE 137* 119*     Ca/Mg/Phos:   Recent Labs     12/07/20  0617 12/08/20  0632   CALCIUM 10.1 9.7   MG 1.90 1.90     Hepatic:   No results for input(s): AST, ALT, ALB, BILITOT, ALKPHOS in the last 72 hours.   Troponin:   Recent Labs     12/07/20  0929 12/07/20  1505   TROPONINI <0.01 <0.01     BNP: No results for input(s): for allowing us to participate in care of [facility].          Edel Andrews MD

## 2020-12-09 NOTE — PROGRESS NOTES
Hospitalist Progress Note      PCP: Christina Junior DO    Date of Admission: 12/5/2020    Chief Complaint: chest pain, shortness of breath    Hospital Course:     78 y. o. male Kong Alejandro is a current smoker 4 to 5 cigarettes/day, chronic pain syndrome, polyarthralgia, hypertension, lung nodules, COPD, follows Dr. Dennis Robles, CAD s/p s/p BMS 5/24/13 to [address], peripheral vascular disease rt fem endarterectomy 07/2013, KAVITA on CPAP  -Presents to ED with shortness of breath x3 days, chest pain midsternal started last night, associated with nocturnal dyspnea, needing more pillows and propped up position to sleep  Noted with 92% on room air, found to be hypertensive in the /105  Chest x-ray shows pulmonary edema  EKG with sinus rhythm without ST elevation or depression  Patient was transferred to [facility]. for the management of hypertensive urgency and acute on chronic diastolic congestive heart failure  Covid test was not done as low suspicion in the ED for COVID-19.     Per review of notes he was on Lasix previously but note 07/2020 states Lasix was stopped due to increased urinary frequency.     Last Echo 08/2019 --   Summary   Left ventricular cavity size is normal. There is mild to moderate concentric   left ventricular hypertrophy. Overall left ventricular systolic function   appears normal with an ejection fraction of 55-60%. No regional wall motion   abnormalities are noted. Indeterminate diastolic function. Trivial mitral   and tricuspid regurgitation is present. Estimated pulmonary artery systolic   pressure is normal at 26 mmHg assuming a right atrial pressure of 3 mmHg.   Bi-atrial enlargement    Subjective:     Patient reports that they are doing well today. He denies any episodes of chest pain or shortness of breath, and headaches since yesterday. His last bowel movement was Friday. He continues to receive oxygen via nasal cannuli.  Additionally, he does continue to mention that he has had palpitations, shortness of breath, and chest pain during this hospitalization that are unrelated to activity, self resolves, and that have been going on for at least the past year.           Medications:  Reviewed    Infusion Medications   Scheduled Medications    magnesium sulfate  2 g Intravenous Once    [START ON 12/10/2020] magnesium oxide  400 mg Oral Daily    polyethylene glycol  17 g Oral Daily    ciprofloxacin  500 mg Oral 2 times per day    famotidine  20 mg Oral Nightly    sennosides-docusate sodium  2 tablet Oral Daily    pantoprazole  40 mg Oral QPM    albuterol  2.5 mg Nebulization BID    budesonide  0.5 mg Nebulization BID    And    Arformoterol Tartrate  15 mcg Nebulization BID    sodium chloride flush  10 mL Intravenous 2 times per day    nicotine  1 patch Transdermal Daily    [Held by provider] nitroglycerin  1 inch Topical 4 times per day    allopurinol  200 mg Oral Daily    atorvastatin  80 mg Oral Daily    clopidogrel  75 mg Oral Daily    DULoxetine  60 mg Oral Daily    tiotropium  2 puff Inhalation Daily    [Held by provider] losartan  100 mg Oral Daily    metoprolol  100 mg Oral BID    vitamin D  10,000 Units Oral Daily    gabapentin  600 mg Oral QAM    And    gabapentin  1,200 mg Oral Nightly    doxazosin  4 mg Oral Daily    enoxaparin  40 mg Subcutaneous BID     PRN Meds: nitroGLYCERIN, sodium chloride flush, acetaminophen **OR** acetaminophen, polyethylene glycol, promethazine **OR** ondansetron, perflutren lipid microspheres, albuterol, traMADol, hydrALAZINE      Intake/Output Summary (Last 24 hours) at 12/9/2020 0952  Last data filed at 12/9/2020 0543  Gross per 24 hour   Intake 1140 ml   Output 1100 ml   Net 40 ml       Physical Exam Performed:    BP (!) 154/88   Pulse 62   Temp 97.7 °F (36.5 °C) (Oral)   Resp 18   Ht 5' 10\" (1.778 m)   Wt 205 lb 0.4 oz (93 kg)   SpO2 90%   BMI 29.42 kg/m²     General appearance: No apparent distress, appears stated age and cooperative. Graylon Solomon Respiratory:  Normal respiratory effort. Crackles in right lower lung base > left lower lung base   Cardiovascular: Regular rate and rhythm with normal S1/S2 without murmurs  Abdomen: Soft, non-tender, non-distended with normal bowel sounds. Musculoskeletal: Clubbing b/l upper extremities, no cyanosis or edema bilaterally. Neurologic:  Neurovascularly intact without any focal sensory/motor deficits  Peripheral Pulses: +2 palpable, equal bilaterally       Labs:   No results for input(s): WBC, HGB, HCT, PLT in the last 72 hours. Recent Labs     12/07/20  0617 12/08/20  0632 12/09/20  0533    138 140   K 4.3 4.0 4.1    102 107   CO2 23 23 21   BUN 36* 34* 36*   CREATININE 1.4* 1.0 1.0   CALCIUM 10.1 9.7 9.5     No results for input(s): AST, ALT, BILIDIR, BILITOT, ALKPHOS in the last 72 hours. No results for input(s): INR in the last 72 hours. Recent Labs     12/07/20  0929 12/07/20  1505   TROPONINI <0.01 <0.01       Urinalysis:      Lab Results   Component Value Date    NITRU Negative 12/07/2020    WBCUA >100 12/07/2020    BACTERIA 4+ 12/07/2020    RBCUA 3-4 12/07/2020    RBCUA NEGATIVE 08/10/2016    BLOODU Negative 12/07/2020    SPECGRAV 1.025 12/07/2020    GLUCOSEU Negative 12/07/2020       Radiology:  CTA PULMONARY W CONTRAST   Final Result      1. No evidence of pulmonary embolus. 2. Mild bibasilar atelectasis. 3. No acute findings. XR CHEST (2 VW)   Final Result      Diffuse prominence of interstitial markings seen on the study of 12/5/2020 has resolved, and the appearance has returned to baseline up chest x-ray 8/6/2019. No focal consolidation. Mild cardiomegaly. T8 compression fracture without change since CT of 2/7/2020. XR CHEST PORTABLE   Final Result      1. Borderline cardiomegaly with diffuse bilateral interstitial airspace opacities. Correlate for signs of congestive failure or fluid overload. Underlying pneumonitis not excluded. Assessment/Plan:    Mr. Magda Purvis is a 78 y. o. male with a PMH of current tobacco use, chronic pain syndrome, polyarthralgia, hypertension, lung nodules, COPD, follows Dr. Gilles Tavarez, CAD s/p s/p BMS 5/24/13 to [facility], peripheral vascular disease rt fem endarterectomy 07/2013, KAVITA on CPAP with complaintss of shortness of breath and chest pain    Hypertensive urgency in the setting of acute on chronic diastolic congestive heart failure  -Continue Cardura Plavix Lipitor Lopressor Nicoderm   - hold cozaar, lasix due to elevated Cr   -proBNP: 3880 on admission; proBNP 180 today   - variable BP, keep MAP >65  - monitor I/Os  - nutrition education   - Negative troponins and EKG is not concerning for ischemia. - secondary hypertension labs ordered   - TTE 12/7/2020: normal LVEF (55-60%), no regional wall abnormalities   - proteinuria (13.30)   - monitor shows short run on Vtach; continue monitoring   - nephro and cardio consulted  - BP today improved.  Will remove nitropaste  - continue holding lasix until results of CXR   - D dimer elevated 351  - daily fluid restriction: 1800 mL   - CTA scan ordered (12/8): no evidence of PE, mild bibasilar atelectasis, no acute findings   - CXR (12/8): diffuse interstitial markings seen in study on 12/5 has resolved; CXR appearance returned to baseline (8/6/2019), no focal consolidation  - continue holding lasix, cozaar due to cath procedure today  - [facility] today (12/9)  - imdur started       LYDIA  - Cr 1.4 on 12/7 from, up from 0.9 on 12/6;  - Cr improved to 1.0 today   - monitor renal function and urine output  - hold ACE-I/ARB (cozaar) until Cr improves  - continue Cardura and Lopressor  - bladder scan ordered to r/o obstruction (12/7): bladder scan PVR revealed 0mL  - nephro consulted  - resume cozaar as Cr is 1.0     COPD without Acute Exacerbation  - hypoxemia needing 2-4L O2  - COVID 19 PCR sent and negative  - current smoker/tobacco use  - hx of pulmonary nodules followed by Dr. Mychal Mora  - respiratory therapy ordered; continue breathing treatments  - Continue Spiriva     CAD  - s/p BMS 5/24/23 to [facility]  - peripheral vascular disease - right femoral endarterectomy 7/2013  - cont plavix and statin      KAVITA on CPAP  - patient refuses CPAP, encourage IS during the day     Nicotine dependence  - current smoker 4-5 cigs per day  - counseling during this admission     Headaches  - hold nitrobid  - monitor for changes     UTI  - moderate leukocytosis in UA, elevated WBC's, with 4+ bacteria   - started on ciprofloxacin     Vitamin D insufficiency  - continue Vit D supplement       DVT Prophylaxis: Lovenox  Diet: DIET CARDIAC; Daily Fluid Restriction: 1800 ml  Code Status: Full Code    PT/OT Eval Status: N/A    Dispo - inpatient for next 24 - 48 hours    Sera Solorio (MS4), scribe  Patient was seen and examined by me, Sera Solorio acting as my scribe for the above note, which was reviewed and corrections were made as needed. Agree witih above note with following addendum:  Discussed with cardiology today and patient had cath- no intervention was needed. Medical management. imdur added. Still on O2. If creat stable in am- will resume lasix.  Continue with IS and try to wean off O2.      May Palma

## 2020-12-10 VITALS
BODY MASS INDEX: 29.35 KG/M2 | TEMPERATURE: 97.5 F | WEIGHT: 205.03 LBS | RESPIRATION RATE: 18 BRPM | SYSTOLIC BLOOD PRESSURE: 100 MMHG | HEART RATE: 73 BPM | DIASTOLIC BLOOD PRESSURE: 65 MMHG | OXYGEN SATURATION: 92 % | HEIGHT: 70 IN

## 2020-12-10 PROBLEM — Z92.89 H/O ANGIOGRAPHY: Status: ACTIVE | Noted: 2020-12-10

## 2020-12-10 LAB
ALDOSTERONE: <3 NG/DL
ANION GAP SERPL CALCULATED.3IONS-SCNC: 10 MMOL/L (ref 3–16)
BUN BLDV-MCNC: 31 MG/DL (ref 7–20)
CALCIUM SERPL-MCNC: 9.6 MG/DL (ref 8.3–10.6)
CHLORIDE BLD-SCNC: 104 MMOL/L (ref 99–110)
CO2: 26 MMOL/L (ref 21–32)
CREAT SERPL-MCNC: 1 MG/DL (ref 0.8–1.3)
GFR AFRICAN AMERICAN: >60
GFR NON-AFRICAN AMERICAN: >60
GLUCOSE BLD-MCNC: 109 MG/DL (ref 70–99)
MAGNESIUM: 2 MG/DL (ref 1.8–2.4)
POTASSIUM SERPL-SCNC: 4.4 MMOL/L (ref 3.5–5.1)
SODIUM BLD-SCNC: 140 MMOL/L (ref 136–145)

## 2020-12-10 PROCEDURE — 99233 SBSQ HOSP IP/OBS HIGH 50: CPT | Performed by: INTERNAL MEDICINE

## 2020-12-10 PROCEDURE — 6370000000 HC RX 637 (ALT 250 FOR IP): Performed by: INTERNAL MEDICINE

## 2020-12-10 PROCEDURE — 36415 COLL VENOUS BLD VENIPUNCTURE: CPT

## 2020-12-10 PROCEDURE — 80048 BASIC METABOLIC PNL TOTAL CA: CPT

## 2020-12-10 PROCEDURE — 97166 OT EVAL MOD COMPLEX 45 MIN: CPT

## 2020-12-10 PROCEDURE — 97116 GAIT TRAINING THERAPY: CPT

## 2020-12-10 PROCEDURE — 99223 1ST HOSP IP/OBS HIGH 75: CPT | Performed by: INTERNAL MEDICINE

## 2020-12-10 PROCEDURE — 6360000002 HC RX W HCPCS: Performed by: INTERNAL MEDICINE

## 2020-12-10 PROCEDURE — 97535 SELF CARE MNGMENT TRAINING: CPT

## 2020-12-10 PROCEDURE — APPSS30 APP SPLIT SHARED TIME 16-30 MINUTES: Performed by: NURSE PRACTITIONER

## 2020-12-10 PROCEDURE — 6370000000 HC RX 637 (ALT 250 FOR IP): Performed by: NURSE PRACTITIONER

## 2020-12-10 PROCEDURE — 2580000003 HC RX 258: Performed by: INTERNAL MEDICINE

## 2020-12-10 PROCEDURE — 97530 THERAPEUTIC ACTIVITIES: CPT

## 2020-12-10 PROCEDURE — 94640 AIRWAY INHALATION TREATMENT: CPT

## 2020-12-10 PROCEDURE — 97161 PT EVAL LOW COMPLEX 20 MIN: CPT

## 2020-12-10 PROCEDURE — 94680 O2 UPTK RST&XERS DIR SIMPLE: CPT

## 2020-12-10 PROCEDURE — 83735 ASSAY OF MAGNESIUM: CPT

## 2020-12-10 RX ORDER — DOXAZOSIN MESYLATE 4 MG/1
4 TABLET ORAL NIGHTLY
Qty: 30 TABLET | Refills: 3 | Status: SHIPPED | OUTPATIENT
Start: 2020-12-10 | End: 2021-08-25

## 2020-12-10 RX ORDER — ISOSORBIDE MONONITRATE 30 MG/1
30 TABLET, EXTENDED RELEASE ORAL DAILY
Qty: 30 TABLET | Refills: 3 | Status: SHIPPED | OUTPATIENT
Start: 2020-12-11 | End: 2021-08-25 | Stop reason: SDUPTHER

## 2020-12-10 RX ORDER — FUROSEMIDE 40 MG/1
40 TABLET ORAL DAILY
Status: DISCONTINUED | OUTPATIENT
Start: 2020-12-10 | End: 2020-12-10 | Stop reason: HOSPADM

## 2020-12-10 RX ORDER — NICOTINE 21 MG/24HR
1 PATCH, TRANSDERMAL 24 HOURS TRANSDERMAL DAILY
Qty: 30 PATCH | Refills: 3 | Status: SHIPPED | OUTPATIENT
Start: 2020-12-10 | End: 2021-08-25

## 2020-12-10 RX ORDER — CIPROFLOXACIN 500 MG/1
500 TABLET, FILM COATED ORAL EVERY 12 HOURS SCHEDULED
Qty: 8 TABLET | Refills: 0 | Status: SHIPPED | OUTPATIENT
Start: 2020-12-10 | End: 2020-12-14

## 2020-12-10 RX ORDER — METOPROLOL TARTRATE 100 MG/1
100 TABLET ORAL 2 TIMES DAILY
Qty: 270 TABLET | Refills: 1 | Status: SHIPPED | OUTPATIENT
Start: 2020-12-10 | End: 2021-08-25 | Stop reason: SDUPTHER

## 2020-12-10 RX ADMIN — BUDESONIDE INHALATION 500 MCG: 0.5 SUSPENSION RESPIRATORY (INHALATION) at 11:35

## 2020-12-10 RX ADMIN — Medication 10 ML: at 08:59

## 2020-12-10 RX ADMIN — METOPROLOL TARTRATE 100 MG: 100 TABLET, FILM COATED ORAL at 08:57

## 2020-12-10 RX ADMIN — ALLOPURINOL 200 MG: 100 TABLET ORAL at 08:58

## 2020-12-10 RX ADMIN — DULOXETINE HYDROCHLORIDE 60 MG: 60 CAPSULE, DELAYED RELEASE ORAL at 08:58

## 2020-12-10 RX ADMIN — ISOSORBIDE MONONITRATE 30 MG: 30 TABLET, EXTENDED RELEASE ORAL at 08:59

## 2020-12-10 RX ADMIN — FUROSEMIDE 40 MG: 40 TABLET ORAL at 08:58

## 2020-12-10 RX ADMIN — GABAPENTIN 600 MG: 300 CAPSULE ORAL at 11:17

## 2020-12-10 RX ADMIN — CIPROFLOXACIN HYDROCHLORIDE 500 MG: 500 TABLET, FILM COATED ORAL at 08:58

## 2020-12-10 RX ADMIN — ALBUTEROL SULFATE 2.5 MG: 2.5 SOLUTION RESPIRATORY (INHALATION) at 11:35

## 2020-12-10 RX ADMIN — Medication 10000 UNITS: at 08:57

## 2020-12-10 RX ADMIN — ARFORMOTEROL TARTRATE 15 MCG: 15 SOLUTION RESPIRATORY (INHALATION) at 11:35

## 2020-12-10 RX ADMIN — DOXAZOSIN 4 MG: 4 TABLET ORAL at 08:59

## 2020-12-10 RX ADMIN — CLOPIDOGREL BISULFATE 75 MG: 75 TABLET ORAL at 08:58

## 2020-12-10 RX ADMIN — Medication 400 MG: at 08:58

## 2020-12-10 RX ADMIN — TIOTROPIUM BROMIDE INHALATION SPRAY 2 PUFF: 3.12 SPRAY, METERED RESPIRATORY (INHALATION) at 11:36

## 2020-12-10 RX ADMIN — ATORVASTATIN CALCIUM 80 MG: 80 TABLET, FILM COATED ORAL at 08:58

## 2020-12-10 RX ADMIN — ENOXAPARIN SODIUM 40 MG: 40 INJECTION SUBCUTANEOUS at 08:56

## 2020-12-10 ASSESSMENT — PAIN SCALES - GENERAL: PAINLEVEL_OUTOF10: 0

## 2020-12-10 NOTE — PROGRESS NOTES
[facility]    Respiratory Therapy     Home Oxygen Evaluation        Name: Annamarie Palmer. Medical Record Number: [id number]  Age: 78 y.o. Gender:  male   : 1950  Today's date: 12/10/2020  [facility]: [facility]-      Assessment        /65   Pulse 73   Temp 97.5 °F (36.4 °C) (Oral)   Resp 18   Ht 5' 10\" (1.778 m)   Wt 205 lb 0.4 oz (93 kg)   SpO2 92%   BMI 29.42 kg/m²     Patient Active Problem List   Diagnosis    Essential hypertension    Hyperlipemia    Status post skin graft    Chest pain    COPD (chronic obstructive pulmonary disease) ([facility]    S/P PTCA (percutaneous transluminal coronary angioplasty)    Degeneration of intervertebral disc of lumbar region    KAVITA on CPAP    Claudication of left lower extremity ([facility])    Coronary artery disease involving native coronary artery of native heart without angina pectoris    Tobacco abuse    Idiopathic peripheral neuropathy    Pulmonary nodule    Postlaminectomy syndrome, lumbar    Chronic, continuous use of opioids    Polyarthropathy, multiple sites    Chronic pain syndrome    Pain medication agreement    Pain disorder with psychological factors    CHF (congestive heart failure), NYHA class I, acute on chronic, combined ([facility])    Fracture of vertebra    Gastroesophageal reflux disease    Chronic hip pain, left    Sacroiliitis, not elsewhere classified ([facility] [address]    Other spondylosis, lumbosacral region    Actinic keratoses    Acute on chronic diastolic heart failure ([facility])    H/O angiography       Social History:  Social History     Tobacco Use    Smoking status: Current Every Day Smoker     Packs/day: 0.50     Years: 52.00     Pack years: 26.00     Types: Cigarettes     Start date: 1967    Smokeless tobacco: Never Used   Substance Use Topics    Alcohol use:  Yes     Alcohol/week: 0.0 standard drinks     Comment: 2-3 beers a month    Drug use: No       Patient Room Air saturation at rest 94  %  Patient Room Air saturation upon ambulation 90 %    Pt ambulated w gait belt and assisted w his cane for test. Pt with steadt poace,gait. HR 77-85, RR 19-22, Pt states feeling good no subjective SOB. Ra SpO2 not consistently belwo 90% ambulated x traditional 6 minute walk. Patient resting on 0  lmp  with an oxygen saturation of  94   %         Qualifying patient for home oxygen with ambulation and continuous flow  @ 0 lpm.      In your clinical assessment does the Patient Require Portable Oxygen Tanks?     No: SEE above comments                 Patient/caregiver was educated on Home Oxygen process:  Yes      Level of patient/caregiver understanding able to:   [] Verbalize understanding   [] Demonstrate understanding       [] Teach back        [] Needs reinforcement        []  No available caregiver               []  Other:     Response to education:  Very Good     Time Spent with Home O2 Set Up:  20  minutes     Danii Bailey RCP on 12/10/2020 at 11:39 AM                 .

## 2020-12-10 NOTE — PROGRESS NOTES
FOLLOW UP: Yes  Activity Tolerance  Activity Tolerance: Patient Tolerated treatment well  Activity Tolerance: Patient initially on 2L of O2 and O2 stats staying stable in mid 90's while seated and with functional mobility. Then patient on room air and destating to mid 80's with quick recovery to mid 90's following functional ambulation. Pt with no complaints of SOB throughout and patient on room air at end of session with O2 at 95%  Safety Devices  Safety Devices in place: Yes  Type of devices: Left in chair;Nurse notified; All fall risk precautions in place;Call light within reach           Patient Diagnosis(es): The encounter diagnosis was Acute systolic congestive heart failure ([facility] [address] has a past medical history of CAD (coronary artery disease), CHF (congestive heart failure) ([facility] [address] COPD (chronic obstructive pulmonary disease) ([facility] [address] Depressive disorder, not elsewhere classified, GERD (gastroesophageal reflux disease), History of MI (myocardial infarction), History of skin ulcer of lower extremity, History of transient ischemic attack (TIA), Hyperlipidemia, Hypertension, Neuropathy, KAVITA (obstructive sleep apnea), Personal history of DVT (deep vein thrombosis), PVD (peripheral vascular disease) ([facility] [address] and Vertebral fracture. has a past surgical history that includes Appendectomy; Coronary angioplasty with stent (6/2013); other surgical history (Right, 6/25/2013); Leg Debridement (Right, 7/23/2013); skin split graft (Right, 7/25/2013); other surgical history (Left, 8/27/2013); laminectomy (11/18/2015); transluminal angioplasty (Left, 12/22/2015); Total hip arthroplasty (Right, 09/01/2016); Cholecystectomy, laparoscopic; and eye surgery (Left).     Treatment Diagnosis: Decreased functional mobility and ADL status      Restrictions       Subjective   General  Chart Reviewed: Yes  Patient assessed for rehabilitation services?: Yes  Additional Pertinent Hx: Caren Richard. is a 78 y.o. male presents to the ED with dyspnea, nonproductive cough, chest pain, PND and orthopnea. Chest x-ray shows pulmonary edema. Patient was transferred to [facility] for the management of hypertensive urgency and acute on chronic diastolic congestive heart failure. PMHx of COPD and CHF. Referring Practitioner: Gwen Merritt MD  Diagnosis: acute on chronic diastolic heart failure  Subjective  Subjective: Pt seated upright in bed upon OT arrival with breakfast. Pt educated on role of OT in [address] and patient agreeable to OT evaluation.   Vital Signs  Temp: 97.7 °F (36.5 °C)  Temp Source: Oral  Pulse: 64  Heart Rate Source: Monitor  Resp: 18  BP: (!) 144/81  BP Location: Left upper arm  Patient Position: Semi fowlers  Level of Consciousness: Alert (0)  MEWS Score: 1  Oxygen Therapy  SpO2: 91 %  Pulse Oximeter Device Mode: Intermittent  Pulse Oximeter Device Location: Finger  O2 Device: Nasal cannula  O2 Flow Rate (L/min): 2 L/min  Social/Functional History  Social/Functional History  Lives With: Alone  Type of Home: Apartment(basement apartment)  Home Layout: One level  Home Access: Stairs to enter with rails  Entrance Stairs - Number of Steps: 4 steps with railing on one side  Bathroom Shower/Tub: Tub/Shower unit  Bathroom Toilet: Standard  Bathroom Equipment: Grab bars in shower, Grab bars around toilet, Shower chair, Hand-held shower  Bathroom Accessibility: Accessible  Home Equipment: 4 wheeled walker, Cane, Alert [employer] Help From: Family(\"only if I need it\")  ADL Assistance: Independent  Homemaking Assistance: (COA provides meals)  Homemaking Responsibilities: Yes  Ambulation Assistance: Independent(Independent around apt but use of device when in community)  Transfer Assistance: Independent  Active : No  Patient's  Info: \"I gave that up years ago\"- medical transportation takes him to doctors appointments  Mode of Transportation: Bus  Occupation: Retired  Type of occupation:   Leisure & Hobbies: Walk around the building and watch TV  Additional Comments: fell 3-4 weeks ago walking down the steps; his foot got caught on loose carpet but friend with him could help him up       Objective   Vision: Impaired  Vision Exceptions: Wears glasses for reading(But reports he lost his reading glasses)  Hearing: Exceptions to [facility]  Hearing Exceptions: Hard of hearing/hearing concerns          Balance  Sitting Balance: Modified independent   Standing Balance: Contact guard assistance  Standing Balance  Time: ~3 min  Activity: At edge of bed and at sink  Functional Mobility  Functional - Mobility Device: Cane  Activity: To/from bathroom  Assist Level: Contact guard assistance  Functional Mobility Comments: CGA with cane in left hand; patient with decresed speed and CGA to ensure balance on BLE  Toilet Transfers  Toilet - Technique: Ambulating  Equipment Used: Grab bars  Toilet Transfer: Contact guard assistance  Toilet Transfers Comments: Pt with use of grab bars this date, which pt reports having in home.   ADL  LE Dressing: Contact guard assistance(To don socks and keep PJ pants up around rear)  Toileting: Contact guard assistance(CGA to ensure safety)  Coordination  Movements Are Fluid And Coordinated: Yes  Coordination and Movement description: Decreased speed     Bed mobility  Supine to Sit: Modified independent  Scooting: Modified independent  Transfers  Sit to stand: Stand by assistance  Stand to sit: Stand by assistance  Transfer Comments: SBA from bed for sit to stand and then SBa to stand to sit at chair     Cognition  Overall Cognitive Status: WNL        Sensation  Overall Sensation Status: Impaired(Pt has reported neuropathy in BLE)        LUE AROM (degrees)  LUE AROM : WNL  Left Hand AROM (degrees)  Left Hand AROM: WNL  RUE AROM (degrees)  RUE AROM : WNL  Right Hand AROM (degrees)  Right Hand AROM: WNL  LUE Strength  Gross LUE Strength: WFL  RUE Strength  Gross RUE Strength: WFL     Hand Dominance  Hand

## 2020-12-10 NOTE — CARE COORDINATION
[facility]    DC order noted, all docs needed have been faxed to [facility] for home care services.         Yoan Oswald  Work mobile: [phone]  [facility] office: [phone]

## 2020-12-10 NOTE — PROGRESS NOTES
[id number]  Last data filed at 12/10/2020 0446  Gross per 24 hour   Intake 600 ml   Output --   Net 600 ml       PE:  Physical Exam  Constitutional:       Appearance: He is obese. HENT:      Head: Normocephalic and atraumatic. Nose: Nose normal.      Mouth/Throat:      Mouth: Mucous membranes are moist.   Eyes:      Extraocular Movements: Extraocular movements intact. Conjunctiva/sclera: Conjunctivae normal.      Pupils: Pupils are equal, round, and reactive to light. Neck:      Musculoskeletal: Normal range of motion and neck supple. Cardiovascular:      Rate and Rhythm: Normal rate and regular rhythm. Pulses: Normal pulses. Heart sounds: Normal heart sounds. Pulmonary:      Effort: Pulmonary effort is normal.      Breath sounds: Normal breath sounds. Abdominal:      Palpations: Abdomen is soft. Skin:     General: Skin is warm. Neurological:      General: No focal deficit present. Mental Status: He is alert and oriented to person, place, and time. Labs & Imaging:   LABS:  Renal:   Recent Labs     12/08/20  0632 12/09/20  0533 12/10/20  0640    140 140   K 4.0 4.1 4.4    107 104   CO2 23 21 26   BUN 34* 36* 31*   CREATININE 1.0 1.0 1.0   GLUCOSE 119* 86 109*   ANIONGAP 13 12 10     CBC: No results for input(s): WBC, HGB, HCT, PLT, MCV in the last 72 hours. Hepatic: No results for input(s): AST, ALT, ALB, BILITOT, ALKPHOS in the last 72 hours. Troponin:   Recent Labs     12/07/20  0929 12/07/20  1505   TROPONINI <0.01 <0.01     BNP: No results for input(s): BNP in the last 72 hours. Lipids: No results for input(s): CHOL, HDL in the last 72 hours. Invalid input(s): LDLCALCU, TRIGLYCERIDE  INR: No results for input(s): INR in the last 72 hours. Lactate: No results for input(s): LACTATE in the last 72 hours. ABGs:No results for input(s): PHART, AMP3IVB, PO2ART, RWO7XVH, BEART, THGBART, V9VCBKZJ, OZI2FNE in the last 72 hours.     UA:  Recent Labs     12/07/20  1750   COLORU Yellow   PHUR 6.0   WBCUA >100*   RBCUA 3-4   BACTERIA 4+*   CLARITYU Clear   SPECGRAV 1.025   LEUKOCYTESUR MODERATE*   UROBILINOGEN 0.2   BILIRUBINUR Negative   BLOODU Negative   GLUCOSEU Negative        IMAGING:  CTA PULMONARY W CONTRAST   Final Result      1. No evidence of pulmonary embolus. 2. Mild bibasilar atelectasis. 3. No acute findings. XR CHEST (2 VW)   Final Result      Diffuse prominence of interstitial markings seen on the study of 12/5/2020 has resolved, and the appearance has returned to baseline up chest x-ray 8/6/2019. No focal consolidation. Mild cardiomegaly. T8 compression fracture without change since CT of 2/7/2020. XR CHEST PORTABLE   Final Result      1. Borderline cardiomegaly with diffuse bilateral interstitial airspace opacities. Correlate for signs of congestive failure or fluid overload. Underlying pneumonitis not excluded. Assessment & Plan:    Ana Rosa Brown is a 78 y.o. male with PMHx  is a current smoker half to one pack tobbacco/day, chronic pain syndrome, polyarthralgia, hypertension, lung nodules, COPD, follows Dr. Raheel Wills, CAD s/p s/p BMS 5/24/13 to [address], peripheral vascular disease right fem endarterectomy 07/2013, KAVITA on CPAP presents to ED with shortness of breath x3 days. Acute CHF/Pulmonary edema:  - Pt presented with SOB for 3days.  - /105, Afebrile,  Required 5L of O2 on admission.   -  Pro BNP was 3880 on admission. Chest X ray on admission: Borderline cardiomegaly with diffuse bilateral interstitial airspace opacities.  Correlate for signs of congestive failure or fluid overload. - COVID negative. - Started on lasix and losartan. - Pt symptoms improved with diuresis.  And chest X ray: Diffuse prominence of interstitial markings seen on the study of 12/5/2020 has resolved, and the appearance has returned to baseline up chest x-ray 8/6/2019.    - CT chest: No evidence of pulmonary embolus, Mild bibasilar atelectasis, and no acute changes. - Echo on 12/07/2020: EF of 93-24%, Diastolic filling parameters suggests normal diastolic function, Estimated pulmonary artery systolic pressure is 34 mmHg assuming a right atrial pressure of 3 mmHg. - I/O -985 since admission.   - Pt has improved significantly with diuresis. - Cardiology on board: had [facility] yesterday  - Continue with cardiology suggestion   - Continue Albuterol, Pulmicort    Code Status: Full Code  DIET CARDIAC; Daily Fluid Restriction: 1800 ml  DVT PPX: Lovenox         This patient will be discussed with attending, Dr. Nancy Griggs MD.    Juli Walters MD, PGY- 1  Contact via [facility]  12/10/2020,  8:17 AM  Patient examined, findings as discussed with Dr. Jovita Torres. Agree with assessment and plan as above. Problem present on admission was diastolic heart failure, and he has improved with diuresis. No coronary lesions evident that would require intervention. He clearly would benefit from better self-care, including smoking cessation (currently 1 pack/day), consistent use of CPAP, and taking recommended medication, especially diuretic. No other acute problems evident on this admission.   Agree with discharge plan

## 2020-12-10 NOTE — PROGRESS NOTES
[address]       In Patient  Progress note        Debbie Rubio MD,  [address] FNP [facility]   Daily Progress Note      Admit Date:  12/5/2020  Primary Cardiologist : Brenna November     CC: Interval Hx: admitted with acute CHF/ HTN KAVITA /pul edema / b/p elevation   with CAD,HTN,COPD, KAVITA doesn't use CPAP, LHC with stent (6/2013); tobacco use / PVD /tobacco use   Pulmonary nodules followed by Dr. Jaramillo Barthel test neg   Trop neg x 2 / Pro  / sCr 1.0   On  10/9/20 had continuing having cp / b/p wnl SV02 90% on n/c 02 /sCr 1.0   CT pulm no PE / appears euvolemic / with LHC completed no intervention needed with EF     Today VSS / afebrile / HR sinus 70s SV02 93% RA  / sCr 1.0    Suspect discharge soon   Cont lipitor plavix cardura mag oxide lopressor Nicoderm lasix imdur   restart cozaar recommend cozaar 50mg instead of 100mg     TTE 12/7/2020: normal LVEF (55-60%), no regional wall abnormalities     C 12/9/20 diffuse coronary artery calcification with previously patent stent. No significant focal stenoses. No opportunities or need for intervention. Left ventriculogram shows ejection fraction of 55%. Normal wall motion.   Plan:  Primary prevention / medical management    I have examined pt and reviewed notes / any lab work EKGs stress test, angiograms, & images reviewed    Objective:   /65   Pulse 73   Temp 97.5 °F (36.4 °C) (Oral)   Resp 18   Ht 5' 10\" (1.778 m)   Wt 205 lb 0.4 oz (93 kg)   SpO2 93%   BMI 29.42 kg/m²       Intake/Output Summary (Last 24 hours) at 12/10/2020 1120  Last data filed at 12/10/2020 0906  Gross per 24 hour   Intake 610 ml   Output --   Net 610 ml     Wt Readings from Last 3 Encounters:   12/09/20 205 lb 0.4 oz (93 kg)   07/22/20 210 lb 9.6 oz (95.5 kg)   02/18/20 220 lb (99.8 kg)       Physical Exam:   /65   Pulse 73   Temp 97.5 °F (36.4 °C) (Oral)   Resp 18   Ht 5' 10\" 75 mg Oral Daily    DULoxetine  60 mg Oral Daily    tiotropium  2 puff Inhalation Daily    [Held by provider] losartan  100 mg Oral Daily    metoprolol  100 mg Oral BID    vitamin D  10,000 Units Oral Daily    gabapentin  600 mg Oral QAM    And    gabapentin  1,200 mg Oral Nightly    doxazosin  4 mg Oral Daily    enoxaparin  40 mg Subcutaneous BID       Recent Labs     12/08/20  0632 12/09/20  0533 12/10/20  0640    140 140   K 4.0 4.1 4.4   CO2 23 21 26   BUN 34* 36* 31*   CREATININE 1.0 1.0 1.0     Reviewed  available lab work,  EKGs, images, [facility]       Assessment    acute CHF/ pul edema  ProBNP 3880 >>180   Negative troponins and EKG is not concerning for ischemia. TTE 12/7/20 Normal left ventricle size. There is mild concentric left ventricular   hypertrophy. Overall left ventricular systolic function appears normal with an ejection   fraction of 55-60%. No regional wall motion abnormalities are noted.   Diastolic filling parameters suggests normal diastolic function.   Mild mitral regurgitation is present. Trivial tricuspid regurgitation.   Estimated pulmonary artery systolic pressure is 34 mmHg assuming a right   atrial pressure of 3 mmHg. The left atrium is moderately dilated.     Hx CAD    [facility] with stent 2013 2017 stress test   1.  NORMAL PHARMACOLOGIC SPECT STRESS AND REST MYOCARDIAL    PERFUSION SCAN WITH NO EVIDENCE OF PHARMACOLOGIC-INDUCED    MYOCARDIAL ISCHEMIA.     2.  NORMAL-APPEARING LEFT VENTRICULAR WALL MOTION AND EJECTION    FRACTION AT REST.       HTN   was elevated improving      KAVITA  /  COPD / Tobacco use  / pulmonary nodules followed by Dr. Anastasia Vidal  CPAP/ smoking cessation recommended      KARLI  Hold ace/arb/arni  sCr 1.4>1.0 improved   restart arb if ok with neph      PAD   s/p R fem endarterectomy '13     HLD  On lipitor      UTI   ua reveals questionable UTI with moderate leukocytes  with discomfort  Started cipro 500mg BID for 10 days      Vit D low  Supplement  / taking       Plan: Today VSS / afebrile / HR sinus 70s SV02 93% RA  / sCr 1.0    Cont lipitor plavix cardura mag oxide lopressor Nicoderm lasix imdur   restart cozaar when ok with Dr. Angela Miles  recommend cozaar 50mg instead of 100mg   Suspect discharge soon   Fu with Dr. Alin Baldwin in 1-2 weeks after discharge     [facility] APRN, CVNP   This patient is awake and alert. No chest discomfort and breathing has improved significantly since admission. The cath access site from the right radial and the left radial are in good shape without compromise. The right groin access site looks good without compromise. From our perspective he is stable to go home. We would recommend continuation of his current therapy and will add isosorbide. On the cath yesterday we did not find significant reversible lesions although the previous stent is patent and there is significant calcification of the coronary arteries but no significant focal stenosis that needed attention. He does have the potential for having coronary ischemia. For future reference I think that a cardiac cath from the right radial is not to be done because of occlusion of the subcu subclavian and the right femoral artery has had previous endarterectomy in the left femoral artery has had previous severe amount of stenting. Veronica Reynoso MD, [facility] [place] [facility]

## 2020-12-10 NOTE — PROGRESS NOTES
Nephrology  Note                                                                                                                                                                                                                                                                                                                                                               Office : [phone]     Fax :[phone]              Patient's Name: Adrianne Winters. Reason for Consult:   LYDIA  Requesting Physician:  Doreen Yarbrough DO        Interval History:  Chest pain better, SOB improved  BP controlled   Feels fair   Cr better       Past Medical History:   Diagnosis Date    CAD (coronary artery disease)     [facility]     COPD (chronic obstructive pulmonary disease) ([facility]     Depressive disorder, not elsewhere classified     GERD (gastroesophageal reflux disease)     History of MI (myocardial infarction) 05/2013    History of skin ulcer of lower extremity     R anterior shin    History of transient ischemic attack (TIA)     Hyperlipidemia     Hypertension     Neuropathy     KAVITA (obstructive sleep apnea)     On CPAP    Personal history of DVT (deep vein thrombosis)     PVD (peripheral vascular disease) ([facility])     Vertebral fracture        Past Surgical History:   Procedure Laterality Date    APPENDECTOMY      CHOLECYSTECTOMY, LAPAROSCOPIC      CORONARY ANGIOPLASTY WITH STENT PLACEMENT  6/2013    EYE SURGERY Left     LAMINECTOMY  11/18/2015    Bilateral decompressive lumbar laminectomy L4-5   ; medial facetectomy L4-5 joints  bilaterally; foraminotomies l5   nerve roots bilaterally    LEG DEBRIDEMENT Right 7/23/2013    Dr. Manuelito Herrera - pretibial wound    OTHER SURGICAL HISTORY Right 6/25/2013    Dr. Manuelito Herrera - CFA Endarterectomy w/marsupialization; RLE wound excision & debridement     OTHER SURGICAL HISTORY Left 8/27/2013    Dr. Manuelito Herrera - femoral & profunda femoris endarterectomy w/marsupialization patch angioplasty using SFA    SKIN SPLIT GRAFT Right 7/25/2013    Dr. Abimbola Wilhelm - to non-healing R pretibial wound, 9 x 15 cm    TOTAL HIP ARTHROPLASTY Right 09/01/2016    Dr. Arielle Koch Left 12/22/2015    Dr. Abimbola Wilhelm - CFA exploration, thrombectomy of ileofemoral system, stenting of RAFAL in-stent stenosis w/8 x 37 mm Palmaz        Family History   Problem Relation Age of Onset    Cancer Mother         Breast    Heart Disease Mother     Cancer Father         Bladder    Heart Disease Father     Cancer Paternal Grandmother         melanoma         reports that he has been smoking cigarettes. He started smoking about 53 years ago. He has a 26.00 pack-year smoking history. He has never used smokeless tobacco. He reports current alcohol use. He reports that he does not use drugs.     Allergies:  Asa [aspirin] and Daliresp [roflumilast]    Current Medications:    magnesium oxide (MAG-OX) tablet 400 mg, Daily  polyethylene glycol (GLYCOLAX) packet 17 g, Daily  acetaminophen (TYLENOL) tablet 650 mg, Q4H PRN  isosorbide mononitrate (IMDUR) extended release tablet 30 mg, Daily  sennosides-docusate sodium (SENOKOT-S) 8.6-50 MG tablet 2 tablet, BID  ciprofloxacin (CIPRO) tablet 500 mg, 2 times per day  famotidine (PEPCID) tablet 20 mg, Nightly  pantoprazole (PROTONIX) tablet 40 mg, QPM  albuterol (PROVENTIL) nebulizer solution 2.5 mg, BID  budesonide (PULMICORT) nebulizer suspension 500 mcg, BID    And  Arformoterol Tartrate (BROVANA) nebulizer solution 15 mcg, BID  nitroGLYCERIN (NITROSTAT) SL tablet 0.4 mg, Q5 Min PRN  sodium chloride flush 0.9 % injection 10 mL, 2 times per day  sodium chloride flush 0.9 % injection 10 mL, PRN  acetaminophen (TYLENOL) suppository 650 mg, Q6H PRN  polyethylene glycol (GLYCOLAX) packet 17 g, Daily PRN  promethazine (PHENERGAN) tablet 12.5 mg, Q6H PRN    Or  ondansetron (ZOFRAN) injection 4 mg, Q6H PRN  nicotine (NICODERM CQ) 14 the last 72 hours. Troponin:   Recent Labs     12/07/20  0929 12/07/20  1505   TROPONINI <0.01 <0.01     BNP: No results for input(s): BNP in the last 72 hours. Lipids:   No results for input(s): CHOL, TRIG, HDL, LDLCALC, LABVLDL in the last 72 hours. ABGs: No results for input(s): PHART, PO2ART, [id number] in the last 72 hours. INR: No results for input(s): INR in the last 72 hours. UA:  Recent Labs     12/07/20  1750   COLORU Yellow   CLARITYU Clear   GLUCOSEU Negative   BILIRUBINUR Negative   KETUA Negative   SPECGRAV 1.025   BLOODU Negative   PHUR 6.0   PROTEINU Negative   UROBILINOGEN 0.2   NITRU Negative   LEUKOCYTESUR MODERATE*   LABMICR YES   URINETYPE NotGiven      Urine Microscopic:   Recent Labs     12/07/20  1750   BACTERIA 4+*   WBCUA >100*   RBCUA 3-4   EPIU 2-5     Urine Culture: No results for input(s): LABURIN in the last 72 hours. Urine Chemistry:   Recent Labs     12/07/20  1750   PROTEINUR 13.30*   NAUR 53             IMAGING:  CTA PULMONARY W CONTRAST   Final Result      1. No evidence of pulmonary embolus. 2. Mild bibasilar atelectasis. 3. No acute findings. XR CHEST (2 VW)   Final Result      Diffuse prominence of interstitial markings seen on the study of 12/5/2020 has resolved, and the appearance has returned to baseline up chest x-ray 8/6/2019. No focal consolidation. Mild cardiomegaly. T8 compression fracture without change since CT of 2/7/2020. XR CHEST PORTABLE   Final Result      1. Borderline cardiomegaly with diffuse bilateral interstitial airspace opacities. Correlate for signs of congestive failure or fluid overload. Underlying pneumonitis not excluded. Assessment/Plan   1. LYDIA    2. HTN Urgency    3. Anemia    4. Acid- base/ Electrolyte imbalance     5.  CHF    LYDIA 2/2 relative hypoperfusion w/ drops in BP  Cr better   Cath today   Hold Losartan for now  Presumed STEFFANIE, will restart ARB when Cr improved  2ndary HTN labs sent  Lifestyle Modifications needed  Urine studies ordered  Monitor I/O and RenalFx  Bladder Scan to r/o obstruction  Variable BP, keep map >65        Thank you for allowing us to participate in care of [facility].          Devon Yeboah MD

## 2020-12-10 NOTE — PROGRESS NOTES
Patient alert and oriented x4. VSS. Denies chest pain. SOB with ambulation at times. Continued need for oxygen. All 3 access sites are CDI and pulses are +2 distally. Tolerating ambulation well. Fall precautions in place, will continue to monitor.

## 2020-12-10 NOTE — PROGRESS NOTES
Physical Therapy    Facility/Department: [facility] 5T ORTHO/NEURO  Initial Assessment    NAME: Raphael Lopez. : 1950  MRN: [id number]    Date of Service: 12/10/2020    Discharge Recommendations:Kong Gallegos. scored a 19/24 on the AM-PAC short mobility form. Current research shows that an AM-PAC score of 18 or greater is typically associated with a discharge to the patient's home setting. Based on the patient's AM-PAC score and their current functional mobility deficits, it is recommended that the patient have 2-3 sessions per week of Physical Therapy at d/c to increase the patient's independence. At this time, this patient demonstrates the endurance and safety to discharge home with home PT and a follow up treatment frequency of 2-3x/wk. Please see assessment section for further patient specific details. If patient discharges prior to next session this note will serve as a discharge summary. Please see below for the latest assessment towards goals. PT Equipment Recommendations  Equipment Needed: No    Assessment   Body structures, Functions, Activity limitations: Decreased functional mobility ; Decreased endurance  Assessment: Pt with slightly decreased independent mobility from baseline. Pt reports normally ambulates with cane or walker depending on what he is doing. Currently needing CGA with cane and CG/SBA with walker. Slightly improved stability with walker at this time. Pt reports he can use walker inside apartment initially if needed. Pt plans to return home and has no concerns. Rec home with assist.  Would benefit from continued skilled PT however pt declines need. Treatment Diagnosis: impaired functional mobility  Decision Making: Low Complexity  PT Education: Goals;PT Role;Plan of Care;General Safety; Functional Mobility Training  Patient Education: Activity at home - pt verbalized understanding  REQUIRES PT FOLLOW UP: Yes       Patient Diagnosis(es): The encounter diagnosis was Acute systolic congestive heart failure ([facility] [address] has a past medical history of CAD (coronary artery disease), CHF (congestive heart failure) ([address] COPD (chronic obstructive pulmonary disease) ([facility] [address] Depressive disorder, not elsewhere classified, GERD (gastroesophageal reflux disease), History of MI (myocardial infarction), History of skin ulcer of lower extremity, History of transient ischemic attack (TIA), Hyperlipidemia, Hypertension, Neuropathy, KAVITA (obstructive sleep apnea), Personal history of DVT (deep vein thrombosis), PVD (peripheral vascular disease) ([facility] [address] and Vertebral fracture. has a past surgical history that includes Appendectomy; Coronary angioplasty with stent (6/2013); other surgical history (Right, 6/25/2013); Leg Debridement (Right, 7/23/2013); skin split graft (Right, 7/25/2013); other surgical history (Left, 8/27/2013); laminectomy (11/18/2015); transluminal angioplasty (Left, 12/22/2015); Total hip arthroplasty (Right, 09/01/2016); Cholecystectomy, laparoscopic; and eye surgery (Left). Restrictions     Vision/Hearing  Vision: Impaired  Vision Exceptions: Wears glasses for reading(But reports he lost his reading glasses)  Hearing: Exceptions to [facility] Exceptions: Hard of hearing/hearing concerns     Subjective  General  Chart Reviewed: Yes  Patient assessed for rehabilitation services?: Yes  Additional Pertinent Hx: Pt is a 78 y.o. male adm 12/5 with acute on chronic diastolic heart failure. presents to the ED with dyspnea, nonproductive cough, chest pain, PND and orthopnea. CXR:Borderline cardiomegaly with diffuse bilateral interstitial airspace opacities. Correlate for signs of congestive failure or fluid overload. Underlying pneumonitis not excluded. Chest CT: neg PE. Cardiac cath 12/9:Diffuse coronary artery calcification with previously patent stent. No significant focal stenoses. No opportunities or need for intervention.    PMH:  HTN, hyperlipidemia, COPD, CHF, CAD, PVD, DVT, TIA, MI, GERD, R THR, coronary stent, back surgery  Referring Practitioner: BRANDI Bowden CNP  Referral Date : 12/08/20  Subjective  Subjective: Pt found supine. Agreeable to PT. \"I haven't walked in a few days. \"  Pain Screening  Patient Currently in Pain: Denies  Vital Signs  Patient Currently in Pain: Denies       Orientation  Orientation  Overall Orientation Status: Within Functional Limits  Social/Functional History  Social/Functional History  Lives With: Alone  Type of Home: Apartment(basement apartment)  Home Layout: One level  Home Access: Stairs to enter with rails  Entrance Stairs - Number of Steps: 4 steps with railing on one side  Bathroom Shower/Tub: Tub/Shower unit  Bathroom Toilet: Standard  Bathroom Equipment: Grab bars in shower, Grab bars around toilet, Shower chair, Hand-held shower  Bathroom Accessibility: Accessible  Home Equipment: 4 wheeled walker, Cane, Alert Button, Rolling walker  Receives Help From: Family(\"only if I need it\")  ADL Assistance: Independent  Homemaking Assistance: (COA provides meals)  Homemaking Responsibilities: Yes  Ambulation Assistance: Independent(Independent around apt but use of device when in community- uses cane for short distances, rollator for longer distances)  Transfer Assistance: Independent  Active : No  Patient's  Info: \"I gave that up years ago\"- medical transportation takes him to doctors appointments  Mode of Transportation: Bus  Occupation: Retired  Type of occupation:   Leisure & Hobbies: Walk around the building and watch TV  Additional Comments: fell 3-4 weeks ago walking down the steps; his foot got caught on loose carpet but friend with him could help him up.   Reports son able to assist and has neighbor that checks on him frequently  Cognition        Objective          AROM RLE (degrees)  RLE AROM: WFL  AROM LLE (degrees)  LLE AROM : WFL  Strength RLE  Strength RLE: WFL  Strength LLE  Strength LLE: WFL     Sensation  Overall Sensation Status: Impaired(Pt has reported neuropathy in BLE)  Bed mobility  Supine to Sit: Modified independent  Scooting: Modified independent  Transfers  Sit to Stand: Stand by assistance(from bed and chair)  Stand to sit: Stand by assistance  Ambulation  Ambulation?: Yes  Ambulation 1  Device: Single point cane  Other Apparatus: O2(2L)  Assistance: Contact guard assistance  Quality of Gait: decreased star, no LOB  Distance: 12'x 2, 100'  Comments: Pulse ox before and after ambulation on 2L was 97%. O2 removed and pt ambulated with rolling walker x 30' with CG/SBA. Pulse ox 94% after ambulation. O2 kept off and RN informed.   MD came at end of session and stated goal is to wean pt off O2          Treatment included: bed mobility, transfers, gt, pt education      Plan   Plan  Times per week: 2-5  Current Treatment Recommendations: Functional Mobility Training, Gait Training, Stair training, Endurance Training, Safety Education & Training, Patient/Caregiver Education & Training  Safety Devices  Type of devices: Call light within reach, Chair alarm in place, Nurse notified, Left in chair    G-Code       OutComes Score                                                  AM-PAC Score  AM-PAC Inpatient Mobility Raw Score : 19 (12/10/20 0919)  AM-PAC Inpatient T-Scale Score : 45.44 (12/10/20 0919)  Mobility Inpatient CMS 0-100% Score: 41.77 (12/10/20 0919)  Mobility Inpatient CMS G-Code Modifier : CK (12/10/20 0919)          Goals  Short term goals  Time Frame for Short term goals: By discharge  Short term goal 1: Sit to stand independent  Short term goal 2: Pt will amb >100' with LRAD supervision  Short term goal 3: Pt will up/down 3 steps with rail and LRAD SBA       Therapy Time   Individual Concurrent Group Co-treatment   Time In 0808         Time Out 0849         Minutes 41              Timed Code Treatment Minutes:26       Total Treatment Minutes:  Baljinder Barton, PT

## 2020-12-10 NOTE — PLAN OF CARE
Problem: Falls - Risk of:  Goal: Will remain free from falls  Description: Will remain free from falls  Outcome: Ongoing   Patient has remained free of falls. 2/4 bed rails up, bed locked and in lowest position, call light within reach. Patient instructed on use of call light and uses appropriately. Bed alarm on. Non-skid footwear and fall band on. Will continue to monitor.

## 2020-12-10 NOTE — PROGRESS NOTES
Pt discharged with all belongings & prescriptions to home with son with home health set up by [place]. CHF/ Cardiac catheterization discharge instructions provided along with general discharge instructions. Pt verbalized understanding of instructions as well as follow up appointment requirements.

## 2020-12-10 NOTE — PROGRESS NOTES
Hospitalist Progress Note      PCP: Tauna Fleischer, DO    Date of Admission: 12/5/2020    Chief Complaint: chest pain, shortness of breath    Hospital Course:     78 y. o. male Kong Quinonez is a current smoker 4 to 5 cigarettes/day, chronic pain syndrome, polyarthralgia, hypertension, lung nodules, COPD, follows Dr. Leydi Suero, CAD s/p s/p BMS 5/24/13 to [address], peripheral vascular disease rt fem endarterectomy 07/2013, KAVITA on CPAP  -Presents to ED with shortness of breath x3 days, chest pain midsternal started last night, associated with nocturnal dyspnea, needing more pillows and propped up position to sleep  Noted with 92% on room air, found to be hypertensive in the /105  Chest x-ray shows pulmonary edema  EKG with sinus rhythm without ST elevation or depression  Patient was transferred to [facility]. for the management of hypertensive urgency and acute on chronic diastolic congestive heart failure  Covid test was not done as low suspicion in the ED for COVID-19.     Per review of notes he was on Lasix previously but note 07/2020 states Lasix was stopped due to increased urinary frequency.     Last Echo 08/2019 --   Summary   Left ventricular cavity size is normal. There is mild to moderate concentric   left ventricular hypertrophy. Overall left ventricular systolic function   appears normal with an ejection fraction of 55-60%. No regional wall motion   abnormalities are noted. Indeterminate diastolic function. Trivial mitral   and tricuspid regurgitation is present. Estimated pulmonary artery systolic   pressure is normal at 26 mmHg assuming a right atrial pressure of 3 mmHg.   Bi-atrial enlargement     Subjective:      Patient reports that they are feeling well today. Patient trained with PT/OT earlier today and states that he was able to ambulate with assistance. His last bowel movement was earlier this morning. He denies any chest pain, dyspnea, and headaches. His oxygen (nasal cannuli) was removed by PT during his session earlier today. Patient has been without oxygen for last 1.5 hours when I saw him. He states that he was feeling fine.  He denied shortness of breath, difficulty breathing, light-headedness, and weakness.        Medications:  Reviewed    Infusion Medications   Scheduled Medications    furosemide  40 mg Oral Daily    magnesium oxide  400 mg Oral Daily    polyethylene glycol  17 g Oral Daily    isosorbide mononitrate  30 mg Oral Daily    sennosides-docusate sodium  2 tablet Oral BID    ciprofloxacin  500 mg Oral 2 times per day    famotidine  20 mg Oral Nightly    pantoprazole  40 mg Oral QPM    albuterol  2.5 mg Nebulization BID    budesonide  0.5 mg Nebulization BID    And    Arformoterol Tartrate  15 mcg Nebulization BID    sodium chloride flush  10 mL Intravenous 2 times per day    nicotine  1 patch Transdermal Daily    allopurinol  200 mg Oral Daily    atorvastatin  80 mg Oral Daily    clopidogrel  75 mg Oral Daily    DULoxetine  60 mg Oral Daily    tiotropium  2 puff Inhalation Daily    [Held by provider] losartan  100 mg Oral Daily    metoprolol  100 mg Oral BID    vitamin D  10,000 Units Oral Daily    gabapentin  600 mg Oral QAM    And    gabapentin  1,200 mg Oral Nightly    doxazosin  4 mg Oral Daily    enoxaparin  40 mg Subcutaneous BID     PRN Meds: acetaminophen, nitroGLYCERIN, sodium chloride flush, [DISCONTINUED] acetaminophen **OR** acetaminophen, polyethylene glycol, promethazine **OR** ondansetron, perflutren lipid microspheres, albuterol, traMADol, hydrALAZINE      Intake/Output Summary (Last 24 hours) at 12/10/2020 0914  Last data filed at 12/10/2020 0906  Gross per 24 hour   Intake 850 ml   Output --   Net 850 ml       Physical Exam Performed:    BP (!) 144/81   Pulse 64   Temp 97.7 °F (36.5 °C) (Oral)   Resp 18   Ht 5' 10\" (1.778 m)   Wt 205 lb 0.4 oz (93 kg)   SpO2 91%   BMI 29.42 kg/m²     General appearance: No apparent distress, appears stated age and cooperative. [facility] Respiratory:  Normal respiratory effort. Mild crackles in left lower lung base > right lower lung base, otherwise clear to auscultation throughout, bilaterally   Cardiovascular: Regular rate and rhythm with normal S1/S2 without murmurs  Abdomen: Soft, non-tender, non-distended with normal bowel sounds. Musculoskeletal: No cyanosis or edema bilaterally. Clubbing B/L upper extremities Full range of motion without deformity. Neurologic:  Neurovascularly intact without any focal sensory/motor deficits. .  Peripheral Pulses: +2 palpable, equal bilaterally       Labs:   No results for input(s): WBC, HGB, HCT, PLT in the last 72 hours. Recent Labs     12/08/20  0632 12/09/20  0533 12/10/20  0640    140 140   K 4.0 4.1 4.4    107 104   CO2 23 21 26   BUN 34* 36* 31*   CREATININE 1.0 1.0 1.0   CALCIUM 9.7 9.5 9.6     No results for input(s): AST, ALT, BILIDIR, BILITOT, ALKPHOS in the last 72 hours. No results for input(s): INR in the last 72 hours. Recent Labs     12/07/20  0929 12/07/20  1505   TROPONINI <0.01 <0.01       Urinalysis:      Lab Results   Component Value Date    NITRU Negative 12/07/2020    WBCUA >100 12/07/2020    BACTERIA 4+ 12/07/2020    RBCUA 3-4 12/07/2020    RBCUA NEGATIVE 08/10/2016    BLOODU Negative 12/07/2020    SPECGRAV 1.025 12/07/2020    GLUCOSEU Negative 12/07/2020       Radiology:  CTA PULMONARY W CONTRAST   Final Result      1. No evidence of pulmonary embolus. 2. Mild bibasilar atelectasis. 3. No acute findings. XR CHEST (2 VW)   Final Result      Diffuse prominence of interstitial markings seen on the study of 12/5/2020 has resolved, and the appearance has returned to baseline up chest x-ray 8/6/2019. No focal consolidation. Mild cardiomegaly. T8 compression fracture without change since CT of 2/7/2020. XR CHEST PORTABLE   Final Result      1.   Borderline cardiomegaly with diffuse bilateral interstitial airspace opacities. Correlate for signs of congestive failure or fluid overload. Underlying pneumonitis not excluded. Assessment/Plan:  Mr. Leticia Davis is a 76 y. o. male with a PMH of current tobacco use, chronic pain syndrome, polyarthralgia, hypertension, lung nodules, COPD, follows Dr. Baron Rodriguez, CAD s/p s/p BMS 5/24/13 to [facility], peripheral vascular disease rt fem endarterectomy 07/2013, KAVITA on CPAP with complaintss of shortness of breath and chest pain       Hypertensive urgency in the setting of acute on chronic diastolic congestive heart failure   - BP on first arrival to /93  - proBNP: 3880 on admission; proBNP 180 today   - monitor I/Os  - nutrition education   - Negative troponins and EKG is not concerning for ischemia.  -Continue Cardura Plavix Lipitor Lopressor Nicoderm   - hold cozaar, lasix due to elevated Cr  - secondary hypertension labs ordered   - TTE 12/7/2020: normal LVEF (55-60%), no regional wall abnormalities   - proteinuria (13.30)   - variable BP, keep MAP >65  - monitor shows short run on Vtach; continue monitoring   - nephro and cardio consulted  - BP today improved. Will remove nitropaste  - continue holding lasix until results of CXR   - daily fluid restriction: 1800 mL   - imdur started       Hypoxemia   - patient needing 2-4L O2 nasal cannuli (no oxygen required at his baseline)  - hx of pulmonary nodules followed by Dr. Baron Rodriguez  - current smoker/tobacco use  - respiratory therapy ordered; continue breathing treatments  - D dimer elevated 351  - CTA scan ordered (12/8): no evidence of PE, mild bibasilar atelectasis, no acute findings   - CXR (12/8): diffuse interstitial markings seen in study on 12/5 has resolved; CXR appearance returned to baseline (8/6/2019), no focal consolidation  - continue holding lasix, cozaar due to cath procedure today  - [facility] today (12/9): Diffuse coronary artery calcification with previously patent stent.   No patient have 2-5 sessions per week of Occupational Therapy at d/c (Current Treatment Recommendations: Strengthening, Balance Training, Self-Care / ADL)    Dispo - discharge today     Darnell Rojas (MS4), char

## 2020-12-10 NOTE — CARE COORDINATION
Case Management Assessment            Discharge Note                    Date / Time of Note: 12/10/2020 10:34 AM                  Discharge Note Completed by: Mario Chowdhury    Patient Name: Lai Cole. YOB: 1950  Diagnosis: Acute on chronic diastolic heart failure ([facility] [address] [I50.33]   Date / Time: 12/5/2020  8:38 AM    Current PCP: Bernardino Parish DO  Clinic patient: No    Hospitalization in the last 30 days: No    Advance Directives:  Code Status: Full Code  [place] DNR form completed and on chart: Not Indicated    Financial:  Payor: Mario Wilder / Plan: [facility] HMO / Product Type: *No Product type* /      Pharmacy:    [place] [id number] [id number] [address] [facility] [phone] - F [phone]  [place] [id number]  Phone: [phone] Fax: [phone]      Assistance purchasing medications?: Potential Assistance Purchasing Medications: No  Assistance provided by Case Management: None at this time    Does patient want to participate in local refill/ meds to beds program?: No    Meds To Beds General Rules:  1. Can ONLY be done Monday- Friday between 8:30am-5pm  2. Prescription(s) must be in pharmacy by 3pm to be filled same day  3. Copy of patient's insurance/ prescription drug card and patient face sheet must be sent along with the prescription(s)  4. Cost of Rx cannot be added to hospital bill. If financial assistance is needed, please contact unit  or ;  or  CANNOT provide pharmacy voucher for patients co-pays  5.  Patients can then  the prescription on their way out of the hospital at discharge, or pharmacy can deliver to the bedside if staff is available. (payment due at time of pick-up or delivery - cash, check, or card accepted)     Able to afford home medications/ co-pay costs: Yes    ADLS:  Current PT AM-PAC Score: 19 /24  Current OT AM-PAC Score: 22 /24      DISCHARGE Disposition: Home with Home Health Care: [facility]     LOC at discharge: Not Applicable  NICA Completed: Yes    Notification completed in HENS/PAS?:  Not Applicable    IMM Completed:   Yes, Case management has presented and reviewed IMM letter #2 to the patient and/or family/ POA. Patient and/or family/POA verbalized understanding of their medicare rights and appeal process if needed. Patient and/or family/POA has signed, initialed and placed today's date (12/10/2020) and time (1030am) on IMM letter #2 on the the appropriate lines. Patient and/or family/POA, copy of letter offered and they are aware that this original copy of IMM letter #2 is available prior to discharge from the paper chart on the unit. Electronic documentation has been entered into epic for IMM letter #2 and original paper copy has been added to the paper chart at the nurses station. Transportation:  Transportation PLAN for discharge: family   Mode of Transport: Private Car  Reason for medical transport: Not Applicable  Name of [address] [place] [address], [address]: Not Applicable  Time of Transport: when available    Transport form completed: Not Indicated    Home Care:  [address] ordered at discharge: Yes  [address]: Jorge Garcia  Phone: [phone]  Fax: [phone]  Orders faxed: Yes    Durable Medical Equipment:  DME Provider: NA  Equipment obtained during hospitalization: NA    Home Oxygen and Respiratory Equipment:  Oxygen needed at discharge?: Not [address]: Not Applicable  Portable tank available for discharge?: Not Indicated    Dialysis:  Dialysis patient: No    Dialysis Center:  Not Applicable    Hospice Services:  Location: Not Applicable  Agency: Not Applicable    Consents signed: Not Indicated    Referrals made at [facility] for outpatient continued care:  Not Applicable    Additional CM Notes:   Patient here from home. Plans to discharge to home with Shawn Whitten through [employer].   CM spoke to [facility] to verify they will be following. Patient stated son will be transporting at discharge. Patient denied having any other needs.  these medications from any pharmacy with your printed prescription    ciprofloxacin    doxazosin    isosorbide mononitrate    metoprolol    nicotine      Schedule an appointment with Heidi Vanegas MD as soon as possible for a visit in 1 week(s)    [address] [facility]   [phone]      Schedule an appointment with Natividad Meléndez DO as soon as possible for a visit in 1 week(s)    [facility])   [phone]        The Plan for Transition of Care is related to the following treatment goals of Acute on chronic diastolic heart failure ([facility] [address] [I50.33]    The Patient and/or patient representative Beatrice Chowdhury and his family were provided with a choice of provider and agrees with the discharge plan Yes    Freedom of choice list was provided with basic dialogue that supports the patient's individualized plan of care/goals and shares the quality data associated with the providers.  Yes    Care Transitions patient: No    Martha Araya RN  [facility] [employer]  Case Management Department  Ph: [phone]  Fax: [phone]

## 2020-12-11 LAB
METANEPH/PLASMA INTERP: NORMAL
METANEPHRINE FREE PLASMA: 0.23 NMOL/L (ref 0–0.49)
NORMETANEPHRINE FREE PLASMA: 0.81 NMOL/L (ref 0–0.89)

## 2020-12-11 RX ORDER — ALLOPURINOL 100 MG/1
TABLET ORAL
Qty: 180 TABLET | Refills: 1 | Status: SHIPPED | OUTPATIENT
Start: 2020-12-11 | End: 2021-08-25 | Stop reason: SDUPTHER

## 2020-12-11 NOTE — DISCHARGE SUMMARY
Hospital Medicine Discharge Summary      Patient ID: Goldie Kwok. Patient's PCP: Noah Mendez DO    Admit Date: 12/5/2020     Discharge Date: 12/10/2020      Admitting Physician: Concepcion Wilson MD    Discharge Physician: Maida Cerrato MD     Discharge Diagnoses: Active Hospital Problems    Diagnosis Date Noted    H/O angiography [Z92.89] 12/10/2020    Acute on chronic diastolic heart failure ([facility] [address] [I50.33] 12/05/2020    Tobacco abuse [Z72.0] 12/29/2015    KAVITA on CPAP [G47.33, Z99.89] 10/22/2015    COPD (chronic obstructive pulmonary disease) ([facility] [address] [J44.9] 12/18/2013    Chest pain [R07.9] 08/22/2013         The patient was seen and examined on day of discharge and this discharge summary is in conjunction with any daily progress note from day of discharge. Hospital Course:     Patient is a 78 y.o. male, current smoker 4 to 5 cigarettes/day, chronic pain syndrome, polyarthralgia, hypertension, lung nodules, COPD, follows Dr. Citlaly Moise, CAD s/p BMS 5/24/13 to [address], peripheral vascular disease rt fem endarterectomy 07/2013, KAVITA on CPAP who presented to ED with shortness of breath x3 days and chest pain. He was found to be hypertensive in the ED to  220/105, hypoxic. Patient was admitted for heart failure and hypertensive emergency. Hospital course:  Hypertensive emergency in the setting of acute on chronic diastolic congestive heart failure  Patient was diuresed with IV lasix. Blood pressure medications were adjusted. ECHO was repeated and was stable. Due to c/o chest pain and hypoxia patient had CTA chest which ruled out PE.  LHC done 12/9- no opportunities for intervention, continue medical management.      LYDIA  Creatinine went up due to hemodynamic changes and diuresis and gradually improved to baseline on discharge.      COPD without Acute Exacerbation  COVID 19 PCR sent and negative     CAD  s/p BMS 5/24/23 to [facility] 12/9- no opportunities for intervention, cont medical management. Chest pain resolved with addition of imdur.      KAVITA on CPAP  patient declined CPAP, encourage IS during the day     Nicotine dependence  Encouraged smoking cessation     UTI  ciprofloxacin      Consults:     IP CONSULT TO HEART FAILURE NURSE/COORDINATOR  IP CONSULT TO DIETITIAN  IP CONSULT TO CARDIOLOGY  IP CONSULT TO NEPHROLOGY  IP CONSULT TO PULMONOLOGY  IP CONSULT TO HOME CARE NEEDS  IP CONSULT TO HOME CARE NEEDS  IP CONSULT TO HOME CARE NEEDS    Disposition: Home     Discharged Condition: stable    Code Status: full    Activity: as tolerated    Diet: cardiac    Follow Up: Primary Care Physician in 1 week    Exam:     General appearance: No apparent distress, appears stated age and cooperative. Lungs: Clear to ascultation, bilaterally without Rales/Wheezes/Rhonchi with good respiratory effort. Heart: Regular rate and rhythm with Normal S1/S2 without  murmurs, rubs or gallops, point of maximum impulse non-displaced  Abdomen: Soft, non-tender or non-distended without rigidity or guarding and positive bowel sounds all four quadrants. Extremities: No clubbing, cyanosis, or edema bilaterally. Skin: Skin color, texture, turgor normal.    Neurologic: Alert and oriented X 3,  , grossly non-focal.  Mental status: Alert, oriented, thought content appropriate      Labs:  For convenience and continuity at follow-up the following most recent labs are provided:    CBC:   Lab Results   Component Value Date    WBC 10.0 12/05/2020    HGB 15.3 12/05/2020    HCT 45.9 12/05/2020     12/05/2020       RENAL:   Lab Results   Component Value Date     12/10/2020    K 4.4 12/10/2020    K 3.9 12/05/2020     12/10/2020    CO2 26 12/10/2020    BUN 31 12/10/2020    CREATININE 1.0 12/10/2020           Discharge Medications:   Discharge Medication List as of 12/10/2020  1:07 PM           Details   isosorbide mononitrate (IMDUR) 30 MG extended release tablet Take 1 tablet by mouth daily, Disp-30 tablet,R-3Print      doxazosin (CARDURA) 4 MG tablet Take 1 tablet by mouth nightly, Disp-30 tablet,R-3Print      nicotine (NICODERM CQ) 14 MG/24HR Place 1 patch onto the skin daily, Disp-30 patch,R-3Print      ciprofloxacin (CIPRO) 500 MG tablet Take 1 tablet by mouth every 12 hours for 4 days, Disp-8 tablet,R-0Print              Details   metoprolol (LOPRESSOR) 100 MG tablet Take 1 tablet by mouth 2 times daily, Disp-270 tablet,R-1Print              Details   vitamin D (CHOLECALCIFEROL) 125 MCG (5000 UT) CAPS capsule Take 10,000 Units by mouth dailyHistorical Med      traMADol (ULTRAM) 50 MG tablet Take 50 mg by mouth 2 times daily as needed for Pain. Historical Med      DULoxetine (CYMBALTA) 60 MG extended release capsule TAKE 1 CAPSULE BY MOUTH EVERY DAY, Disp-90 capsule,R-0Normal      albuterol sulfate  (90 Base) MCG/ACT inhaler INHALE 2 PUFFS INTO THE LUNGS EVERY 6 HOURS AS NEEDED FOR WHEEZING, Disp-6.7 g,R-11Normal      losartan (COZAAR) 100 MG tablet TAKE 1 TABLET BY MOUTH EVERY DAY, Disp-90 tablet,R-1Normal      gabapentin (NEURONTIN) 600 MG tablet Take 1 tablet by mouth 3 times daily for 60 days. Take 1 tab qam and 2 tab at night, as tolerated, Disp-90 tablet,R-1Normal      clopidogrel (PLAVIX) 75 MG tablet TAKE 1 TABLET BY MOUTH DAILY, Disp-90 tablet,R-1Normal      pantoprazole (PROTONIX) 40 MG tablet TAKE 1 TABLET BY MOUTH EVERY EVENING.  TAKE 1 HOUR BEFORE BEDTIME., Disp-90 tablet,R-1Normal      allopurinol (ZYLOPRIM) 100 MG tablet TAKE 2 TABLETS BY MOUTH EVERY DAY, Disp-180 tablet,R-1Normal      INCRUSE ELLIPTA 62.5 MCG/INH AEPB INHALE 1 PUFF BY MOUTH DAILY, Disp-1 each, R-11Normal      BREO ELLIPTA 200-25 MCG/INH AEPB inhaler INHALE 1 PUFF BY MOUTH DAILY, Disp-1 each, R-11Normal      gabapentin (NEURONTIN) 600 MG tablet TAKE 1 TABLET BY MOUTH EVERY MORNING AND 2 TABLETS EVERY EVENING, Disp-90 tablet,R-0Normal      butalbital-acetaminophen-caffeine (FIORICET, ESGIC) -40 MG per tablet Take 1 tablet by mouth every 4 hours as needed for Headaches, Disp-30 tablet,R-0Normal      atorvastatin (LIPITOR) 80 MG tablet TAKE 1 TABLET BY MOUTH DAILY, Disp-90 tablet, R-1Normal      nitroGLYCERIN (NITROSTAT) 0.4 MG SL tablet Place 1 tablet under the tongue every 5 minutes as needed for Chest pain, Disp-25 tablet, R-1Normal      ranitidine (ZANTAC) 150 MG tablet TAKE 1 TABLET BY MOUTH TWICE DAILY AS NEEDED FOR HEARTBURN, Disp-180 tablet, R-1**Patient requests 90 days supply**Normal      buPROPion (WELLBUTRIN SR) 150 MG extended release tablet Take 150 mg by mouth 2 times dailyHistorical Med      furosemide (LASIX) 40 MG tablet Take 1 tablet by mouth daily, Disp-90 tablet, B-[id number]      folic acid (FOLVITE) 1 MG tablet Take 1 mg by mouth dailyHistorical Med      albuterol (ACCUNEB) 1.25 MG/3ML nebulizer solution Inhale 3 mLs into the lungs every 6 hours as needed for Wheezing, Disp-120 vial, R-3Normal      magnesium gluconate (MAGONATE) 500 MG tablet Take 500 mg by mouth daily. Time Spent on discharge is more than 30 minutes in the examination, evaluation, counseling and review of medications and discharge plan. Signed:  Aminata Diana MD   12/10/2020      Thank you Shaila Pike DO for the opportunity to be involved in this patient's care. If you have any questions or concerns please feel free to contact me at [phone].

## 2020-12-11 NOTE — PROGRESS NOTES
Nephrology  Note                                                                                                                                                                                                                                                                                                                                                               Office : [phone]     Fax :[phone]              Patient's Name: Zaire Slade. Reason for Consult:   LYDIA  Requesting Physician:  Natividad Meléndez DO        Interval History:  Chest pain better, SOB improved  BP controlled   Feels fair   Cr better       Past Medical History:   Diagnosis Date    CAD (coronary artery disease)     [facility]     COPD [facility]chronic obstructive pulmonary disease) ([facility]     Depressive disorder, not elsewhere classified     GERD (gastroesophageal reflux disease)     History of MI (myocardial infarction) 05/2013    History of skin ulcer of lower extremity     R anterior shin    History of transient ischemic attack (TIA)     Hyperlipidemia     Hypertension     Neuropathy     KAVITA (obstructive sleep apnea)     On CPAP    Personal history of DVT (deep vein thrombosis)     PVD (peripheral vascular disease) ([facility])     Vertebral fracture        Past Surgical History:   Procedure Laterality Date    APPENDECTOMY      CHOLECYSTECTOMY, LAPAROSCOPIC      CORONARY ANGIOPLASTY WITH STENT PLACEMENT  6/2013    EYE SURGERY Left     LAMINECTOMY  11/18/2015    Bilateral decompressive lumbar laminectomy L4-5   ; medial facetectomy L4-5 joints  bilaterally; foraminotomies l5   nerve roots bilaterally    LEG DEBRIDEMENT Right 7/23/2013    Dr. Mary Lowry - pretibial wound    OTHER SURGICAL HISTORY Right 6/25/2013    Dr. Mary Lowry - CFA Endarterectomy w/marsupialization; RLE wound excision & debridement     OTHER SURGICAL HISTORY Left 8/27/2013    Dr. Mary Lowry - femoral & profunda femoris endarterectomy w/marsupialization patch angioplasty using SFA    SKIN SPLIT GRAFT Right 7/25/2013    Dr. Daniella Gusman - to non-healing R pretibial wound, 9 x 15 cm    TOTAL HIP ARTHROPLASTY Right 09/01/2016    Dr. Kamila Gamez Left 12/22/2015    Dr. Daniella Gusman - CFA exploration, thrombectomy of ileofemoral system, stenting of RAFAL in-stent stenosis w/8 x 37 mm Palmaz        Family History   Problem Relation Age of Onset    Cancer Mother         Breast    Heart Disease Mother     Cancer Father         Bladder    Heart Disease Father     Cancer Paternal Grandmother         melanoma         reports that he has been smoking cigarettes. He started smoking about 53 years ago. He has a 26.00 pack-year smoking history. He has never used smokeless tobacco. He reports current alcohol use. He reports that he does not use drugs. Allergies:  Asa [aspirin] and Daliresp [roflumilast]    Current Medications:    No current facility-administered medications for this encounter. Review of Systems:   14 point ROS obtained but were negative except mentioned in HPI      Physical exam:     Vitals:  /65   Pulse 73   Temp 97.5 °F (36.4 °C) (Oral)   Resp 18   Ht 5' 10\" (1.778 m)   Wt 205 lb 0.4 oz (93 kg)   SpO2 92%   BMI 29.42 kg/m²   Constitutional:  OAA X3 NAD  Skin: no rash, turgor wnl  Heent:  eomi, mmm  Neck: no bruits or jvd noted  Cardiovascular:  S1, S2 without m/r/g  Respiratory: Mild crackles in the bases  Abdomen:  +bs, soft, mild LLQ TTP  Ext: Minimal lower extremity edema  Psychiatric: mood and affect appropriate  Musculoskeletal:  Rom, muscular strength intact    Data:   Labs:  CBC:   No results for input(s): WBC, HGB, PLT in the last 72 hours.   BMP:    Recent Labs     12/08/20  0632 12/09/20  0533 12/10/20  0640    140 140   K 4.0 4.1 4.4    107 104   CO2 23 21 26   BUN 34* 36* 31*   CREATININE 1.0 1.0 1.0   GLUCOSE 119* 86 109*     Ca/Mg/Phos:   Recent Labs 12/08/20  2779 12/09/20  0533 12/10/20  0640   CALCIUM 9.7 9.5 9.6   MG 1.90 1.60* 2.00     Hepatic:   No results for input(s): AST, ALT, ALB, BILITOT, ALKPHOS in the last 72 hours. Troponin:   No results for input(s): TROPONINI in the last 72 hours. BNP: No results for input(s): BNP in the last 72 hours. Lipids:   No results for input(s): CHOL, TRIG, HDL, LDLCALC, LABVLDL in the last 72 hours. ABGs: No results for input(s): PHART, PO2ART, JOQ0CYM in the last 72 hours. INR: No results for input(s): INR in the last 72 hours. UA:  No results for input(s): [place], GLUCOSEU, BILIRUBINUR, KETUA, SPECGRAV, BLOODU, PHUR, PROTEINU, UROBILINOGEN, NITRU, LEUKOCYTESUR, [place] in the last 72 hours. Urine Microscopic:   No results for input(s): LABCAST, BACTERIA, COMU, HYALCAST, WBCUA, RBCUA, EPIU in the last 72 hours. Urine Culture: No results for input(s): LABURIN in the last 72 hours. Urine Chemistry:   No results for input(s): Triana Gitelman, PROTEINUR, NAUR in the last 72 hours. IMAGING:  CTA PULMONARY W CONTRAST   Final Result      1. No evidence of pulmonary embolus. 2. Mild bibasilar atelectasis. 3. No acute findings. XR CHEST (2 VW)   Final Result      Diffuse prominence of interstitial markings seen on the study of 12/5/2020 has resolved, and the appearance has returned to baseline up chest x-ray 8/6/2019. No focal consolidation. Mild cardiomegaly. T8 compression fracture without change since CT of 2/7/2020. XR CHEST PORTABLE   Final Result      1. Borderline cardiomegaly with diffuse bilateral interstitial airspace opacities. Correlate for signs of congestive failure or fluid overload. Underlying pneumonitis not excluded. Assessment/Plan   1. LYDIA    2. HTN Urgency    3. Anemia    4. Acid- base/ Electrolyte imbalance     5.  CHF    LYDIA 2/2 relative hypoperfusion w/ drops in BP  Cr better   Cath today   Hold Losartan for now  Presumed STEFFANIE, will restart ARB when Cr improved  2ndary HTN labs sent  Lifestyle Modifications needed  Urine studies ordered  Monitor I/O and RenalFx  Bladder Scan to r/o obstruction  Variable BP, keep map >65        Thank you for allowing us to participate in care of 20x200.          Diego Yanes MD

## 2020-12-12 LAB — RENIN ACTIVITY: 3.4 NG/ML/HR

## 2020-12-14 ENCOUNTER — TELEPHONE (OUTPATIENT)
Dept: CARDIOLOGY CLINIC | Age: 70
End: 2020-12-14

## 2020-12-14 ENCOUNTER — TELEPHONE (OUTPATIENT)
Dept: PRIMARY CARE CLINIC | Age: 70
End: 2020-12-14

## 2020-12-14 NOTE — TELEPHONE ENCOUNTER
Joelle Fisher from [facility] called stating that the patient hasn't been taking his Lasix medication. Joelle Fisher states that the patient complains of lightheadedness, blurry vision, and neck pain when his B/P is low. The patient B/P has been running low 100/62. Please call Joelle Fisher back at [phone] to advise.

## 2020-12-14 NOTE — TELEPHONE ENCOUNTER
[phone] [facility] ELROY CALLING TO LET DR Keren Moralez ADMITTED PT FOR HOME CARE THIS PAST Saturday, 12-12-20. SHE SAYS SHE ALSO WANTS TO UPDATE THE DR ON SOME OF HIS MEDS. SHE SAYS PT REPORTS HE IS NOT TAKING HIS LASIX BUT SHE HAS A CALL IN TO DR Zaldivar ABOUT THAT. AND PT REPORTS HE IS NOT TAKING HIS GABAPENTIN SAYING IT DOES NOT WORK.     SHE HAS A QUESTION IF PT SHOULD BE TAKING HIS BUPROPRION 150MG BID--IF DR WILL LET HER KNOW ABOUT THAT

## 2020-12-15 NOTE — TELEPHONE ENCOUNTER
I have not seen him in over 1 year. He is scheduled next week - I will see him first and can update after that.

## 2020-12-23 ENCOUNTER — TELEPHONE (OUTPATIENT)
Dept: PRIMARY CARE CLINIC | Age: 70
End: 2020-12-23

## 2020-12-23 NOTE — TELEPHONE ENCOUNTER
[facility] calling to let us know that there are some home health care orders that need to be signed and faxed over to them.  -I informed the lady at [phone] ext. [phone] that we do not have them, but to please re fax them and we will get them to doctors desk. Woman also informed that we have no dr's until the week of January 4th. But Dr's may stop by and sign papers, and they may not , we do not know for sure at this time.

## 2020-12-30 ENCOUNTER — TELEPHONE (OUTPATIENT)
Dept: PRIMARY CARE CLINIC | Age: 70
End: 2020-12-30

## 2020-12-30 ENCOUNTER — TELEPHONE (OUTPATIENT)
Dept: PULMONOLOGY | Age: 70
End: 2020-12-30

## 2020-12-30 NOTE — TELEPHONE ENCOUNTER
Patient called to inform us that after the first of the year his insurance will not covering be incruse ellipta.   Alternative that will be covered is Spiriva respimat   Please advise

## 2020-12-30 NOTE — TELEPHONE ENCOUNTER
Noted.  I have discontinued the Incruse and wrote for the Spiriva Respimat.   Orders Placed This Encounter   Medications    tiotropium (SPIRIVA RESPIMAT) 2.5 MCG/ACT AERS inhaler     Sig: Inhale 2 puffs into the lungs daily     Dispense:  1 Inhaler     Refill:  11

## 2021-01-11 RX ORDER — CLOPIDOGREL BISULFATE 75 MG/1
TABLET ORAL
Qty: 90 TABLET | Refills: 1 | OUTPATIENT
Start: 2021-01-11

## 2021-01-14 ENCOUNTER — OFFICE VISIT (OUTPATIENT)
Dept: PULMONOLOGY | Age: 71
End: 2021-01-14
Payer: MEDICARE

## 2021-01-14 VITALS
TEMPERATURE: 97 F | BODY MASS INDEX: 29.79 KG/M2 | WEIGHT: 208.1 LBS | RESPIRATION RATE: 18 BRPM | HEART RATE: 86 BPM | OXYGEN SATURATION: 95 % | HEIGHT: 70 IN

## 2021-01-14 DIAGNOSIS — Z72.0 TOBACCO ABUSE: ICD-10-CM

## 2021-01-14 DIAGNOSIS — G47.33 OSA ON CPAP: ICD-10-CM

## 2021-01-14 DIAGNOSIS — G60.9 IDIOPATHIC PERIPHERAL NEUROPATHY: ICD-10-CM

## 2021-01-14 DIAGNOSIS — J44.9 CHRONIC OBSTRUCTIVE PULMONARY DISEASE, UNSPECIFIED COPD TYPE (HCC): Primary | ICD-10-CM

## 2021-01-14 DIAGNOSIS — Z99.89 OSA ON CPAP: ICD-10-CM

## 2021-01-14 DIAGNOSIS — I50.32 CHRONIC DIASTOLIC CHF (CONGESTIVE HEART FAILURE) (HCC): ICD-10-CM

## 2021-01-14 PROCEDURE — 4040F PNEUMOC VAC/ADMIN/RCVD: CPT | Performed by: INTERNAL MEDICINE

## 2021-01-14 PROCEDURE — 3023F SPIROM DOC REV: CPT | Performed by: INTERNAL MEDICINE

## 2021-01-14 PROCEDURE — G8417 CALC BMI ABV UP PARAM F/U: HCPCS | Performed by: INTERNAL MEDICINE

## 2021-01-14 PROCEDURE — 1123F ACP DISCUSS/DSCN MKR DOCD: CPT | Performed by: INTERNAL MEDICINE

## 2021-01-14 PROCEDURE — 3017F COLORECTAL CA SCREEN DOC REV: CPT | Performed by: INTERNAL MEDICINE

## 2021-01-14 PROCEDURE — G8926 SPIRO NO PERF OR DOC: HCPCS | Performed by: INTERNAL MEDICINE

## 2021-01-14 PROCEDURE — 4004F PT TOBACCO SCREEN RCVD TLK: CPT | Performed by: INTERNAL MEDICINE

## 2021-01-14 PROCEDURE — 99214 OFFICE O/P EST MOD 30 MIN: CPT | Performed by: INTERNAL MEDICINE

## 2021-01-14 PROCEDURE — G8484 FLU IMMUNIZE NO ADMIN: HCPCS | Performed by: INTERNAL MEDICINE

## 2021-01-14 PROCEDURE — G8427 DOCREV CUR MEDS BY ELIG CLIN: HCPCS | Performed by: INTERNAL MEDICINE

## 2021-01-14 NOTE — PROGRESS NOTES
[facility]    Outpatient Follow Up Note    Subjective:   CHIEF COMPLAINT / HPI:     The patient is 78 y.o. male who presents today for follow up of COPD, CHF and KAVITA. Jazmin Guidry comes in with his granddaughter today. Says his breathing gets difficult at times. He mentioned that sometimes it happens all of a sudden when he's just sitting in a chair and then it stops. He thinks it might be his heart, because it feels like it's racing. He also had a lot of complaints about his neuropathy. He recently had to get new tubing for his auto CPAP because the tubing broke. Incruse is no longer going to be covered by his insurance. Previous visit:  Phone visit today because of the pandemic and Inder's risk factors. Jazmin Guidry notes that he has shortness of breath and cough which are a bit worse than baseline. Jazmin Guidry continues to roll his own cigarettes and smoke. He gets his supplies from the \"[employer]\" down the street. It is a convenient store that is owned by [employer]. He reports his weight is 223 pounds that he has some swelling by the end of the day in his ankles but it is gone by morning. He is compliant with his Breo and Incruse as well as his new auto titrating CPAP. He rarely uses his albuterol inhaler and in reviewing his med list the prescription has . Previous visit:  Jazmin Guidry comes in today complaining of being both sleepy and tired. He states that his CPAP \"shorted out\" and melted the water reservoir so he had to throw it out. He's using his breo and incruse that cost $8.50/month. He's still smoking. He also said he needed a refill on his nitroglycerin since what he has left is . Previous visit:  Jazmin Guidry comes in today saying he feels about the same as usual.  He did point out he had to be admitted to the hospital in August for heart failure. He still smokes about 4-5 cigarettes a day. He is using his CPAP of 9 but it's difficult to sleep.   He is currently on breo and incruse and tolerating well. Says his breathing is about the same as always and is limiting. He's also limited by joint pain which impedes mobility and his ability to sleep. He weighed 210 lbs about a week ago and weighed in at 220 today. He's not been weighing himself at home, but did say that his left leg is swollen and tender. Previous history: Back to wearing his CPAP. Has some dyspnea at times, without a clear trigger. He had an issue with his new pharmacy, [facility], and went without symbicort for 3 weeks. He noticed a difference. His hip is recovering well and he's more mobile. He's still smoking anywhere from a half pack to a quarter of a pack per week.     Past Medical History:    Past Medical History:   Diagnosis Date    CAD (coronary artery disease)     [facility]     COPD (chronic obstructive pulmonary disease) [facility]     Depressive disorder, not elsewhere classified     GERD (gastroesophageal reflux disease)     History of MI (myocardial infarction) 05/2013    History of skin ulcer of lower extremity     R anterior shin    History of transient ischemic attack (TIA)     Hyperlipidemia     Hypertension     Neuropathy     KAVITA (obstructive sleep apnea)     On CPAP    Personal history of DVT (deep vein thrombosis)     PVD (peripheral vascular disease) ([facility])     Vertebral fracture        Social History:    Social History     Tobacco Use   Smoking Status Current Every Day Smoker    Packs/day: 0.50    Years: 52.00    Pack years: 26.00    Types: Cigarettes    Start date: 1/1/1967   Smokeless Tobacco Never Used       Current Medications:  Current Outpatient Medications on File Prior to Visit   Medication Sig Dispense Refill    allopurinol (ZYLOPRIM) 100 MG tablet TAKE 2 TABLETS BY MOUTH EVERY  tablet 1    isosorbide mononitrate (IMDUR) 30 MG extended release tablet Take 1 tablet by mouth daily 30 tablet 3    metoprolol  gabapentin (NEURONTIN) 600 MG tablet TAKE 1 TABLET BY MOUTH EVERY MORNING AND 2 TABLETS EVERY EVENING 90 tablet 0    butalbital-acetaminophen-caffeine (FIORICET, ESGIC) -40 MG per tablet Take 1 tablet by mouth every 4 hours as needed for Headaches 30 tablet 0     No current facility-administered medications on file prior to visit. REVIEW OF SYSTEMS:    CONSTITUTIONAL: Negative for fevers and chills  HEENT: Negative for oropharyngeal exudate, post nasal drip, sinus pain / pressure, nasal congestion, ear pain  RESPIRATORY:  See HPI  CARDIOVASCULAR: Negative for palpitations. No edema  GASTROINTESTINAL: Negative for nausea, vomiting, diarrhea, constipation and abdominal pain  HEMATOLOGICAL: Negative for adenopathy  SKIN: Negative for clubbing, cyanosis, skin lesions  EXTREMITIES: Negative for weakness, decreased ROM  NEUROLOGICAL: Negative for unilateral weakness, speech or gait abnormalities  PSYCH: Negative for anxiety, depression    Objective:   PHYSICAL EXAM:        VITALS:   Vitals:    01/14/21 0947   Pulse: 86   Resp: 18   Temp: 97 °F (36.1 °C)   TempSrc: Infrared   SpO2: 95%   Weight: 208 lb 1.6 oz (94.4 kg)   Height: 5' 10\" (1.778 m)       CONSTITUTIONAL:  Awake, alert, cooperative, no apparent distress, and appears stated age  HEENT: No oropharyngeal exudate, PERRL, no cervical adenopathy, no tracheal deviation, thyroid size normal  LUNGS:  No increased work of breathing and clear to auscultation, no crackles or wheezing  CARDIOVASCULAR:  normal S1 and S2 and no JVD  ABDOMEN:  Normal bowel sounds, non-distended and non-tender to palpation  EXT: No edema, no calf tenderness. Pulses are present bilaterally. NEUROLOGIC:  Mental Status Exam:  Level of Alertness:   awake  Orientation:   person, place, time.   SKIN:  normal skin color, texture, turgor, no redness, warmth, or swelling     DATA:    Last PFTs:  mild obstructive defect without  bronchodilator response and a moderately reduced diffusing capacity. Ct screenin2017  Lung RADS category 2 benign findings. Small pulmonary nodule right   lung as described unchanged.       Recommendation: Continued annual screening low dose CT       Lung RADS category S significant findings-marked coronary artery   calcification which is a risk factor for acute coronary syndrome. Marked fatty filtration liver. Emphysema. 2018     Lung RADS category 2-positive. Specifically, 4 mm and 3 mm stable pulmonary nodules in the right hemithorax       Lung RADS category S-negative, no new or unknown potentially significant incidental findings requiring urgent additional evaluation       [id number]-[id number]-th low-dose CT lung screen in one year     2020      Impression       Continued stable appearance of previous seen noted stable nodules. The previously noted new larger nodules have resolved.       Cardiomegaly with coronary calcification.       No acute infiltrates seen. Assessment: This is a 78 y.o. male with COPD, CHF and KAVITA    Plan:     COPD:  Continue breo and switched incruse to spiriva respimat since it's covered. Continue rescue inhaler. Needs to stop smoking but he remains pre-contemplative. Ordered new nebulizer tubing. Patient previously attempted pulmonary rehab, but he was in too much pain and stopped going. Tried and failed daliresp in the past 2/2 GI side effects    Screening CT in August showed a new 9mm RLL nodule. Repeat CT in February showed it had resolved. Resume annual surveillance this year. CHF:  Not in acute overload. May be having arrhythmias so I told him to contact his cardiologist and to put his pulse oximeter on when he has the episodes he describes. KAVITA:  Got his auto pap with range of 8-15 cmH2O and it's working well. He just got his new tubing as well. The patient is currently smoking less than a pack week. The risks related to smoking were reviewed with the patient.  Recommend maintaining a smoke-free lifestyle. Products available for smoking cessation were discussed in detail. Orders Placed This Encounter   Medications    tiotropium (SPIRIVA RESPIMAT) 2.5 MCG/ACT AERS inhaler     Sig: Inhale 2 puffs into the lungs daily     Dispense:  1 Inhaler     Refill:  11        More than half the time of this 30 minute visit was spent counseling the patient. RTC 4 months w/ MD. Call or RTC sooner if symptoms persist or worsen acutely.         Stephanie Lopez

## 2021-01-23 DIAGNOSIS — I10 ESSENTIAL HYPERTENSION: ICD-10-CM

## 2021-01-23 DIAGNOSIS — R03.0 ELEVATED BLOOD PRESSURE READING: ICD-10-CM

## 2021-01-25 RX ORDER — LOSARTAN POTASSIUM 100 MG/1
TABLET ORAL
Qty: 90 TABLET | Refills: 1 | OUTPATIENT
Start: 2021-01-25

## 2021-01-25 NOTE — TELEPHONE ENCOUNTER
Last 10/17/19  No next- called and lm to return call to make an appt- looks like he was supposed to comeback for [address] [facility] in April?

## 2021-04-16 NOTE — PROGRESS NOTES
ENDOSCOPY PREOP PHONE INTERVIEW  INSTRUCTIONS:   Covid test was . Please continue to quarantine until your procedure    All patients having a procedure with sedation / anesthesia are required to be Covid tested. You will need to quarantine from the time you are tested until your procedure. Where:   Date tested: COMING TO [facility] 4/19    Follow all instructions / preps given to you from your doctor's office. If you have not received these instructions yet, please call the office immediately.  Enter the MAIN entrance located on [address] and report to the desk on left side of the lobby. Arrival Time: 0815 for your procedure scheduled at: [address] your insurance & photo ID with you.  Dress comfortably. No lotion, powders or jewelry   Bring an accurate list of your medications with doses/ frequency with you day of the procedure, including over the counter medications. If you are taking blood thinners, ASA or diabetic medication, make sure to call Dr. Malathi Tillman  or your PCP for instructions prior to your procedure.  Arrange for someone to be with you and sign you out & drive you home after your procedure.  We are allowing 1 visitor with you in the hospital.    [address] AGE: Please make sure to be able to give a urine sample on arrival      If you have further questions, you may contact your Endoscopist's office or Pre Admission Testing staff at [phone]  Lesa Nash. 4/16/2021 .1:08 PM

## 2021-04-19 ENCOUNTER — NURSE ONLY (OUTPATIENT)
Dept: PRIMARY CARE CLINIC | Age: 71
End: 2021-04-19
Payer: MEDICARE

## 2021-04-19 DIAGNOSIS — Z01.818 PRE-OP EXAMINATION: Primary | ICD-10-CM

## 2021-04-19 PROCEDURE — 99421 OL DIG E/M SVC 5-10 MIN: CPT | Performed by: NURSE PRACTITIONER

## 2021-04-20 LAB — SARS-COV-2: NOT DETECTED

## 2021-04-22 ENCOUNTER — ANESTHESIA EVENT (OUTPATIENT)
Dept: ENDOSCOPY | Age: 71
End: 2021-04-22
Payer: MEDICARE

## 2021-04-23 ENCOUNTER — ANESTHESIA (OUTPATIENT)
Dept: ENDOSCOPY | Age: 71
End: 2021-04-23
Payer: MEDICARE

## 2021-04-23 ENCOUNTER — HOSPITAL ENCOUNTER (OUTPATIENT)
Age: 71
Setting detail: OUTPATIENT SURGERY
Discharge: HOME | End: 2021-04-23
Attending: INTERNAL MEDICINE | Admitting: INTERNAL MEDICINE
Payer: MEDICARE

## 2021-04-23 VITALS
TEMPERATURE: 97.1 F | OXYGEN SATURATION: 93 % | HEIGHT: 70 IN | WEIGHT: 211 LBS | BODY MASS INDEX: 30.21 KG/M2 | HEART RATE: 70 BPM | RESPIRATION RATE: 16 BRPM | SYSTOLIC BLOOD PRESSURE: 112 MMHG | DIASTOLIC BLOOD PRESSURE: 68 MMHG

## 2021-04-23 VITALS — TEMPERATURE: 96.8 F | OXYGEN SATURATION: 90 % | SYSTOLIC BLOOD PRESSURE: 97 MMHG | DIASTOLIC BLOOD PRESSURE: 55 MMHG

## 2021-04-23 DIAGNOSIS — K63.5 POLYP OF COLON, UNSPECIFIED PART OF COLON, UNSPECIFIED TYPE: ICD-10-CM

## 2021-04-23 PROCEDURE — 2709999900 HC NON-CHARGEABLE SUPPLY: Performed by: INTERNAL MEDICINE

## 2021-04-23 PROCEDURE — 7100000001 HC PACU RECOVERY - ADDTL 15 MIN: Performed by: INTERNAL MEDICINE

## 2021-04-23 PROCEDURE — 3700000001 HC ADD 15 MINUTES (ANESTHESIA): Performed by: INTERNAL MEDICINE

## 2021-04-23 PROCEDURE — 2580000003 HC RX 258: Performed by: ANESTHESIOLOGY

## 2021-04-23 PROCEDURE — 2720000010 HC SURG SUPPLY STERILE: Performed by: INTERNAL MEDICINE

## 2021-04-23 PROCEDURE — 88305 TISSUE EXAM BY PATHOLOGIST: CPT

## 2021-04-23 PROCEDURE — 2500000003 HC RX 250 WO HCPCS: Performed by: NURSE ANESTHETIST, CERTIFIED REGISTERED

## 2021-04-23 PROCEDURE — 6360000002 HC RX W HCPCS: Performed by: NURSE ANESTHETIST, CERTIFIED REGISTERED

## 2021-04-23 PROCEDURE — 3700000000 HC ANESTHESIA ATTENDED CARE: Performed by: INTERNAL MEDICINE

## 2021-04-23 PROCEDURE — 3609010600 HC COLONOSCOPY POLYPECTOMY SNARE/COLD BIOPSY: Performed by: INTERNAL MEDICINE

## 2021-04-23 PROCEDURE — 3609009900 HC COLONOSCOPY W/CONTROL BLEEDING ANY METHOD: Performed by: INTERNAL MEDICINE

## 2021-04-23 PROCEDURE — 7100000000 HC PACU RECOVERY - FIRST 15 MIN: Performed by: INTERNAL MEDICINE

## 2021-04-23 PROCEDURE — 6360000002 HC RX W HCPCS: Performed by: ANESTHESIOLOGY

## 2021-04-23 PROCEDURE — 7100000010 HC PHASE II RECOVERY - FIRST 15 MIN: Performed by: INTERNAL MEDICINE

## 2021-04-23 PROCEDURE — 2700000000 HC OXYGEN THERAPY PER DAY

## 2021-04-23 PROCEDURE — 94640 AIRWAY INHALATION TREATMENT: CPT

## 2021-04-23 PROCEDURE — 94761 N-INVAS EAR/PLS OXIMETRY MLT: CPT

## 2021-04-23 PROCEDURE — 3609010200 HC COLONOSCOPY ABLATION TUMOR POLYP/OTHER LES: Performed by: INTERNAL MEDICINE

## 2021-04-23 PROCEDURE — 7100000011 HC PHASE II RECOVERY - ADDTL 15 MIN: Performed by: INTERNAL MEDICINE

## 2021-04-23 RX ORDER — LABETALOL HYDROCHLORIDE 5 MG/ML
5 INJECTION, SOLUTION INTRAVENOUS EVERY 10 MIN PRN
Status: DISCONTINUED | OUTPATIENT
Start: 2021-04-23 | End: 2021-04-23 | Stop reason: HOSPADM

## 2021-04-23 RX ORDER — ALBUTEROL SULFATE 2.5 MG/3ML
2.5 SOLUTION RESPIRATORY (INHALATION) EVERY 6 HOURS PRN
Status: DISCONTINUED | OUTPATIENT
Start: 2021-04-23 | End: 2021-04-23 | Stop reason: HOSPADM

## 2021-04-23 RX ORDER — FENTANYL CITRATE 50 UG/ML
50 INJECTION, SOLUTION INTRAMUSCULAR; INTRAVENOUS EVERY 5 MIN PRN
Status: DISCONTINUED | OUTPATIENT
Start: 2021-04-23 | End: 2021-04-23 | Stop reason: HOSPADM

## 2021-04-23 RX ORDER — ONDANSETRON 2 MG/ML
4 INJECTION INTRAMUSCULAR; INTRAVENOUS
Status: DISCONTINUED | OUTPATIENT
Start: 2021-04-23 | End: 2021-04-23 | Stop reason: HOSPADM

## 2021-04-23 RX ORDER — SODIUM CHLORIDE, SODIUM LACTATE, POTASSIUM CHLORIDE, CALCIUM CHLORIDE 600; 310; 30; 20 MG/100ML; MG/100ML; MG/100ML; MG/100ML
INJECTION, SOLUTION INTRAVENOUS CONTINUOUS
Status: DISCONTINUED | OUTPATIENT
Start: 2021-04-23 | End: 2021-04-23 | Stop reason: HOSPADM

## 2021-04-23 RX ORDER — ROCURONIUM BROMIDE 10 MG/ML
INJECTION, SOLUTION INTRAVENOUS PRN
Status: DISCONTINUED | OUTPATIENT
Start: 2021-04-23 | End: 2021-04-23 | Stop reason: SDUPTHER

## 2021-04-23 RX ORDER — ONDANSETRON 2 MG/ML
INJECTION INTRAMUSCULAR; INTRAVENOUS PRN
Status: DISCONTINUED | OUTPATIENT
Start: 2021-04-23 | End: 2021-04-23 | Stop reason: SDUPTHER

## 2021-04-23 RX ORDER — GLYCOPYRROLATE 0.2 MG/ML
INJECTION INTRAMUSCULAR; INTRAVENOUS PRN
Status: DISCONTINUED | OUTPATIENT
Start: 2021-04-23 | End: 2021-04-23 | Stop reason: SDUPTHER

## 2021-04-23 RX ORDER — PROPOFOL 10 MG/ML
INJECTION, EMULSION INTRAVENOUS PRN
Status: DISCONTINUED | OUTPATIENT
Start: 2021-04-23 | End: 2021-04-23 | Stop reason: SDUPTHER

## 2021-04-23 RX ORDER — SUCCINYLCHOLINE CHLORIDE 20 MG/ML
INJECTION INTRAMUSCULAR; INTRAVENOUS PRN
Status: DISCONTINUED | OUTPATIENT
Start: 2021-04-23 | End: 2021-04-23 | Stop reason: SDUPTHER

## 2021-04-23 RX ORDER — DEXAMETHASONE SODIUM PHOSPHATE 4 MG/ML
INJECTION, SOLUTION INTRA-ARTICULAR; INTRALESIONAL; INTRAMUSCULAR; INTRAVENOUS; SOFT TISSUE PRN
Status: DISCONTINUED | OUTPATIENT
Start: 2021-04-23 | End: 2021-04-23 | Stop reason: SDUPTHER

## 2021-04-23 RX ORDER — ENALAPRILAT 2.5 MG/2ML
1.25 INJECTION INTRAVENOUS
Status: DISCONTINUED | OUTPATIENT
Start: 2021-04-23 | End: 2021-04-23 | Stop reason: HOSPADM

## 2021-04-23 RX ORDER — LIDOCAINE HYDROCHLORIDE 20 MG/ML
INJECTION, SOLUTION INTRAVENOUS PRN
Status: DISCONTINUED | OUTPATIENT
Start: 2021-04-23 | End: 2021-04-23 | Stop reason: SDUPTHER

## 2021-04-23 RX ORDER — PHENYLEPHRINE HYDROCHLORIDE 10 MG/ML
INJECTION INTRAVENOUS PRN
Status: DISCONTINUED | OUTPATIENT
Start: 2021-04-23 | End: 2021-04-23 | Stop reason: SDUPTHER

## 2021-04-23 RX ORDER — FENTANYL CITRATE 50 UG/ML
INJECTION, SOLUTION INTRAMUSCULAR; INTRAVENOUS PRN
Status: DISCONTINUED | OUTPATIENT
Start: 2021-04-23 | End: 2021-04-23 | Stop reason: SDUPTHER

## 2021-04-23 RX ORDER — FENTANYL CITRATE 50 UG/ML
25 INJECTION, SOLUTION INTRAMUSCULAR; INTRAVENOUS EVERY 5 MIN PRN
Status: DISCONTINUED | OUTPATIENT
Start: 2021-04-23 | End: 2021-04-23 | Stop reason: HOSPADM

## 2021-04-23 RX ORDER — HYDRALAZINE HYDROCHLORIDE 20 MG/ML
5 INJECTION INTRAMUSCULAR; INTRAVENOUS EVERY 5 MIN PRN
Status: DISCONTINUED | OUTPATIENT
Start: 2021-04-23 | End: 2021-04-23 | Stop reason: HOSPADM

## 2021-04-23 RX ADMIN — FENTANYL CITRATE 100 MCG: 50 INJECTION, SOLUTION INTRAMUSCULAR; INTRAVENOUS at 10:26

## 2021-04-23 RX ADMIN — SUCCINYLCHOLINE CHLORIDE 140 MG: 20 INJECTION, SOLUTION INTRAMUSCULAR; INTRAVENOUS; PARENTERAL at 10:26

## 2021-04-23 RX ADMIN — ROCURONIUM BROMIDE 5 MG: 10 INJECTION INTRAVENOUS at 10:26

## 2021-04-23 RX ADMIN — SODIUM CHLORIDE, POTASSIUM CHLORIDE, SODIUM LACTATE AND CALCIUM CHLORIDE: 600; 310; 30; 20 INJECTION, SOLUTION INTRAVENOUS at 08:45

## 2021-04-23 RX ADMIN — LIDOCAINE HYDROCHLORIDE 100 MG: 20 INJECTION, SOLUTION INTRAVENOUS at 10:26

## 2021-04-23 RX ADMIN — PHENYLEPHRINE HYDROCHLORIDE 100 MCG: 10 INJECTION INTRAVENOUS at 10:29

## 2021-04-23 RX ADMIN — PHENYLEPHRINE HYDROCHLORIDE 100 MCG: 10 INJECTION INTRAVENOUS at 10:40

## 2021-04-23 RX ADMIN — SODIUM CHLORIDE, POTASSIUM CHLORIDE, SODIUM LACTATE AND CALCIUM CHLORIDE: 600; 310; 30; 20 INJECTION, SOLUTION INTRAVENOUS at 10:16

## 2021-04-23 RX ADMIN — GLYCOPYRROLATE 0.2 MG: 0.2 INJECTION, SOLUTION INTRAMUSCULAR; INTRAVENOUS at 10:41

## 2021-04-23 RX ADMIN — PHENYLEPHRINE HYDROCHLORIDE 100 MCG: 10 INJECTION INTRAVENOUS at 10:35

## 2021-04-23 RX ADMIN — PROPOFOL 150 MG: 10 INJECTION, EMULSION INTRAVENOUS at 10:26

## 2021-04-23 RX ADMIN — ONDANSETRON 4 MG: 2 INJECTION INTRAMUSCULAR; INTRAVENOUS at 10:31

## 2021-04-23 RX ADMIN — ALBUTEROL SULFATE 2.5 MG: 2.5 SOLUTION RESPIRATORY (INHALATION) at 12:38

## 2021-04-23 RX ADMIN — DEXAMETHASONE SODIUM PHOSPHATE 4 MG: 4 INJECTION, SOLUTION INTRAMUSCULAR; INTRAVENOUS at 10:31

## 2021-04-23 ASSESSMENT — PULMONARY FUNCTION TESTS
PIF_VALUE: 5
PIF_VALUE: 4
PIF_VALUE: 15
PIF_VALUE: 17
PIF_VALUE: 3
PIF_VALUE: 17
PIF_VALUE: 6
PIF_VALUE: 3
PIF_VALUE: 16
PIF_VALUE: 3
PIF_VALUE: 1
PIF_VALUE: 5
PIF_VALUE: 4
PIF_VALUE: 18
PIF_VALUE: 4
PIF_VALUE: 4
PIF_VALUE: 6
PIF_VALUE: 18
PIF_VALUE: 22
PIF_VALUE: 6
PIF_VALUE: 19
PIF_VALUE: 4
PIF_VALUE: 4
PIF_VALUE: 2
PIF_VALUE: 4
PIF_VALUE: 1
PIF_VALUE: 3
PIF_VALUE: 16
PIF_VALUE: 3
PIF_VALUE: 4
PIF_VALUE: 4
PIF_VALUE: 24
PIF_VALUE: 4
PIF_VALUE: 1
PIF_VALUE: 0
PIF_VALUE: 4
PIF_VALUE: 4
PIF_VALUE: 19
PIF_VALUE: 5

## 2021-04-23 ASSESSMENT — PAIN - FUNCTIONAL ASSESSMENT
PAIN_FUNCTIONAL_ASSESSMENT: 0-10
PAIN_FUNCTIONAL_ASSESSMENT: 0-10

## 2021-04-23 ASSESSMENT — PAIN SCALES - GENERAL
PAINLEVEL_OUTOF10: 0
PAINLEVEL_OUTOF10: 0

## 2021-04-23 ASSESSMENT — PAIN DESCRIPTION - PAIN TYPE: TYPE: CHRONIC PAIN

## 2021-04-23 ASSESSMENT — PAIN DESCRIPTION - DESCRIPTORS: DESCRIPTORS: ACHING;CONSTANT;DISCOMFORT

## 2021-04-23 ASSESSMENT — PAIN DESCRIPTION - LOCATION: LOCATION: BACK

## 2021-04-23 NOTE — ANESTHESIA PRE PROCEDURE
Department of Anesthesiology  Preprocedure Note       Name:  Bhanu Frazier. Age:  78 y.o.  :  1950                                          MRN:  [id number]         Date:  2021      Surgeon: Jerry Chaudhari):  Goyo Cueva MD    Procedure: Procedure(s):  COLONOSCOPY    Medications prior to admission:   Prior to Admission medications    Medication Sig Start Date End Date Taking? Authorizing Provider   tiotropium (SPIRIVA RESPIMAT) 2.5 MCG/ACT AERS inhaler Inhale 2 puffs into the lungs daily 21  Yes Eliza Noriega MD   allopurinol (ZYLOPRIM) 100 MG tablet TAKE 2 TABLETS BY MOUTH EVERY DAY 20  Yes Andrew Cuellar DO   isosorbide mononitrate (IMDUR) 30 MG extended release tablet Take 1 tablet by mouth daily 20  Yes Jerrell Almonte MD   metoprolol (LOPRESSOR) 100 MG tablet Take 1 tablet by mouth 2 times daily 12/10/20 4/23/21 Yes Jerrell Almonte MD   doxazosin (CARDURA) 4 MG tablet Take 1 tablet by mouth nightly 12/10/20  Yes Jerrell Almonte MD   DULoxetine (CYMBALTA) 60 MG extended release capsule TAKE 1 CAPSULE BY MOUTH EVERY DAY 20  Yes Andrew Cuellar DO   albuterol sulfate  (90 Base) MCG/ACT inhaler INHALE 2 PUFFS INTO THE LUNGS EVERY 6 HOURS AS NEEDED FOR WHEEZING 20  Yes Eliza Noriega MD   losartan (COZAAR) 100 MG tablet TAKE 1 TABLET BY MOUTH EVERY DAY 20  Yes Andrew Cuellar DO   clopidogrel (PLAVIX) 75 MG tablet TAKE 1 TABLET BY MOUTH DAILY 20  Yes Andrew Cuellar DO   pantoprazole (PROTONIX) 40 MG tablet TAKE 1 TABLET BY MOUTH EVERY EVENING.  TAKE 1 HOUR BEFORE BEDTIME. 20  Yes Lesley Cabrera DO   BREO ELLIPTA 200-25 MCG/INH AEPB inhaler INHALE 1 PUFF BY MOUTH DAILY 20  Yes Eliza Noriega MD   atorvastatin (LIPITOR) 80 MG tablet TAKE 1 TABLET BY MOUTH DAILY 20  Yes Lesley Cabrera DO   ranitidine (ZANTAC) 150 MG tablet TAKE 1 TABLET BY MOUTH TWICE DAILY AS NEEDED FOR HEARTBURN 1/16/20  Yes Melany Arce DO   buPROPion [facility] SR) 150 MG extended release tablet Take 150 mg by mouth 2 times daily 12/20/19  Yes Historical Provider, MD   furosemide (LASIX) 40 MG tablet Take 1 tablet by mouth daily 8/19/19  Yes Marvin Jones MD   albuterol (ACCUNEB) 1.25 MG/3ML nebulizer solution Inhale 3 mLs into the lungs every 6 hours as needed for Wheezing 12/24/18  Yes Haroldo Naylor MD   nicotine (NICODERM CQ) 14 MG/24HR Place 1 patch onto the skin daily 12/10/20   Ramo Castellanos MD   vitamin D (CHOLECALCIFEROL) 125 MCG (5000 UT) CAPS capsule Take 10,000 Units by mouth daily    Historical Provider, MD   traMADol (ULTRAM) 50 MG tablet Take 50 mg by mouth 2 times daily as needed for Pain. Historical Provider, MD   gabapentin (NEURONTIN) 600 MG tablet Take 1 tablet by mouth 3 times daily for 60 days. Take 1 tab qam and 2 tab at night, as tolerated 7/15/20 9/13/20  Nan Ojeda MD   gabapentin (NEURONTIN) 600 MG tablet TAKE 1 TABLET BY MOUTH EVERY MORNING AND 2 TABLETS EVERY EVENING 5/7/20 7/22/20  Nan Ojeda MD   butalbital-acetaminophen-caffeine (FIORICET, ESGIC) -51 MG per tablet Take 1 tablet by mouth every 4 hours as needed for Headaches 4/10/20 7/22/20  Lorrane Staff DO Rick   nitroGLYCERIN (NITROSTAT) 0.4 MG SL tablet Place 1 tablet under the tongue every 5 minutes as needed for Chest pain 2/18/20   Beatriz Vyas MD   folic acid (FOLVITE) 1 MG tablet Take 1 mg by mouth daily    Historical Provider, MD   magnesium gluconate (MAGONATE) 500 MG tablet Take 500 mg by mouth daily. Historical Provider, MD       Current medications:    Current Facility-Administered Medications   Medication Dose Route Frequency Provider Last Rate Last Admin    lactated ringers infusion   Intravenous Continuous Ed Tyrone, DO           Allergies:     Allergies   Allergen Reactions   Mari Bitters [Aspirin] Other (See Comments)     Stomach ache    Daliresp [Roflumilast] Diarrhea and Other (See Comments)     Severe diarrhea and did not help       Problem List:    Patient Active Problem List   Diagnosis Code    Essential hypertension I10    Hyperlipemia E78.5    Status post skin graft Z94.5    Chest pain R07.9    COPD (chronic obstructive pulmonary disease) ([facility]) J44.9    S/P PTCA (percutaneous transluminal coronary angioplasty) Z98.61    Degeneration of intervertebral disc of lumbar region M51.36    KAVITA on CPAP G47.33, Z99.89    Claudication of left lower extremity ([facility]) I73.9    Coronary artery disease involving native coronary artery of native heart without angina pectoris I25.10    Tobacco abuse Z72.0    Idiopathic peripheral neuropathy G60.9    Pulmonary nodule R91.1    Postlaminectomy syndrome, lumbar M96.1    Chronic, continuous use of opioids F11.90    Polyarthropathy, multiple sites M13.0    Chronic pain syndrome G89.4    Pain medication agreement Z02.89    Pain disorder with psychological factors F45.41    CHF (congestive heart failure), NYHA class I, acute on chronic, combined ([facility] I50.43    Fracture of vertebra [id number]    Gastroesophageal reflux disease K21.9    Chronic hip pain, left M25.552, G89.29    Sacroiliitis, not elsewhere classified ([facility] M46.1    Other spondylosis, lumbosacral region M47.897    Actinic keratoses L57.0    Acute on chronic diastolic heart failure ([facility]) I50.33    H/O angiography Z92.89       Past Medical History:        Diagnosis Date    CAD (coronary artery disease)     CHF (congestive heart failure) ([facility])     COPD (chronic obstructive pulmonary disease) ([facility])     Depressive disorder, not elsewhere classified     GERD (gastroesophageal reflux disease)     History of MI (myocardial infarction) 05/2013    History of skin ulcer of lower extremity     R anterior shin    History of transient ischemic attack (TIA)     Hyperlipidemia     Hypertension     Neuropathy     KAVITA (obstructive sleep apnea) BP Readings from Last 3 Encounters:   04/23/21 117/68   01/11/21 126/81   12/10/20 100/65       NPO Status:                                                                                 BMI:   Wt Readings from Last 3 Encounters:   04/23/21 211 lb (95.7 kg)   01/14/21 208 lb 1.6 oz (94.4 kg)   01/11/21 210 lb 9.6 oz (95.5 kg)     Body mass index is 30.28 kg/m². CBC:   Lab Results   Component Value Date    WBC 10.0 12/05/2020    RBC 4.47 12/05/2020    HGB 15.3 12/05/2020    HCT 45.9 12/05/2020    .6 12/05/2020    RDW 13.8 12/05/2020     12/05/2020       CMP:   Lab Results   Component Value Date     12/10/2020    K 4.4 12/10/2020    K 3.9 12/05/2020     12/10/2020    CO2 26 12/10/2020    BUN 31 12/10/2020    CREATININE 1.0 12/10/2020    GFRAA >60 12/10/2020    AGRATIO 1.3 12/05/2020    LABGLOM >60 12/10/2020    GLUCOSE 109 12/10/2020    PROT 7.3 12/05/2020    CALCIUM 9.6 12/10/2020    BILITOT 1.0 12/05/2020    ALKPHOS 110 12/05/2020    AST 16 12/05/2020    ALT 15 12/05/2020       POC Tests: No results for input(s): POCGLU, POCNA, POCK, POCCL, POCBUN, POCHEMO, POCHCT in the last 72 hours.     Coags:   Lab Results   Component Value Date    PROTIME 11.8 12/21/2015    INR 1.03 12/21/2015    APTT 34.2 12/14/2015       HCG (If Applicable): No results found for: PREGTESTUR, PREGSERUM, HCG, HCGQUANT     ABGs:   Lab Results   Component Value Date    PHART 7.32 12/22/2015    PO2ART 63 12/22/2015    EXJ6QTT 44 12/22/2015    RFQ1EEQ 22 12/22/2015    BEART -3.8 12/22/2015    B7OBVJEA 90 12/22/2015        Type & Screen (If Applicable):  Lab Results   Component Value Date    LABABO B%POS^^POSITIVE 08/10/2016       Drug/Infectious Status (If Applicable):  No results found for: HIV, HEPCAB    COVID-19 Screening (If Applicable):   Lab Results   Component Value Date    COVID19 Not Detected 04/19/2021    COVID19 Not Detected 12/05/2020           Anesthesia Evaluation  Patient summary reviewed  Airway: Mallampati: III  TM distance: >3 FB   Neck ROM: full  Mouth opening: > = 3 FB Dental:      Comment: A few teeth    Pulmonary:   (+) COPD:  sleep apnea:                            ROS comment: Smokes 1 ppd   Cardiovascular:    (+) hypertension:, past MI:, CAD:, CHF:,     CABG/stent: PTCA  stent              ROS comment:  Summary   Normal left ventricle size. There is mild concentric left ventricular   hypertrophy. Overall left ventricular systolic function appears normal with an ejection   fraction of 55-60%. No regional wall motion abnormalities are noted. Diastolic filling parameters suggests normal diastolic function. Mild mitral regurgitation is present. Trivial tricuspid regurgitation. Estimated pulmonary artery systolic pressure is 34 mmHg assuming a right   atrial pressure of 3 mmHg. The left atrium is moderately dilated. Signature      ------------------------------------------------------------------   Electronically signed by Ernesto Kaur MD (Interpreting   physician) on 12/07/2020 at 11:02 AM   ------------------------------------------------------------------   PVD     Neuro/Psych:   (+) TIA,              ROS comment: Lumbar disc   neuropathy GI/Hepatic/Renal:   (+) GERD:,           Endo/Other:    (+) : arthritis:., .                 Abdominal:           Vascular:                                        Anesthesia Plan      general and MAC     ASA 3       Induction: intravenous. Anesthetic plan and risks discussed with patient.                       Ramona Blackwood MD   4/23/2021

## 2021-04-23 NOTE — PROGRESS NOTES
Patient admitted to PACU # 7 from [facility] at 1155 post COLONOSCOPY POLYPECTOMY SNARE/COLD BIOPSY  Per Jarek Puentes MD .  Attached to PACU monitoring system and report received from anesthesia provider. Patient was reported to be hemodynamically stable during procedure, but required a longer time under anesthesia. Patient drowsy on admission and c/o chronic back and leg pain.

## 2021-04-23 NOTE — H&P
Gastroenterology Note                 Pre-operative History and Physical    Patient: Khushboo Mina. : 1950  CSN:     History Obtained From:   Patient or guardian. HISTORY OF PRESENT ILLNESS:    The patient is a 78 y.o. male here for colonoscopy for + FIT test and for polypectomies. > 20 polyps last colon and none removed d/t Plavix use and inadeq prep. Off Plavix x 1 wk.        Past Medical History:    Past Medical History:   Diagnosis Date    CAD (coronary artery disease)     CHF (congestive heart failure) ([facility])     COPD (chronic obstructive pulmonary disease) ([facility])     Depressive disorder, not elsewhere classified     GERD (gastroesophageal reflux disease)     History of MI (myocardial infarction) 2013    History of skin ulcer of lower extremity     R anterior shin    History of transient ischemic attack (TIA)     Hyperlipidemia     Hypertension     Neuropathy     KAVITA (obstructive sleep apnea)     On CPAP    Personal history of DVT (deep vein thrombosis)     PVD (peripheral vascular disease) ([facility])     TIA (transient ischemic attack)     Vertebral fracture      Past Surgical History:    Past Surgical History:   Procedure Laterality Date    APPENDECTOMY      CHOLECYSTECTOMY, LAPAROSCOPIC      CORONARY ANGIOPLASTY WITH STENT PLACEMENT  2013    EYE SURGERY Left     LAMINECTOMY  2015    Bilateral decompressive lumbar laminectomy L4-5   ; medial facetectomy L4-5 joints  bilaterally; foraminotomies l5   nerve roots bilaterally    LEG DEBRIDEMENT Right 2013    Dr. Stephanie Dove - pretibial wound    OTHER SURGICAL HISTORY Right 2013    Dr. Stephanie Dove - CFA Endarterectomy w/marsupialization; RLE wound excision & debridement     OTHER SURGICAL HISTORY Left 2013    Dr. Stephanie Dove - femoral & profunda femoris endarterectomy w/marsupialization patch angioplasty using SFA    SKIN SPLIT GRAFT Right 2013    Dr. Stephanie Dove - to non-healing R pretibial Soft, nt nd. ASSESSMENT AND PLAN:    1. Patient is a 78 y.o. male here for endoscopy with general anesthesia. 2.  Procedure options, risks and benefits reviewed with patient and/or guardian. They express understanding. I did inform the patient because he has many polyps removed, some of which are large, he has an increased risk of post polypectomy bleed. He also has an increased risk of post polypectomy bleed since he needs to restart his Plavix after the procedure.   He expressed understanding of the increased risk and wished to proceed    Nikki Kaminski MD  [facility]

## 2021-04-23 NOTE — PROGRESS NOTES
PACU Transfer to [facility]  Pt's Current Allergies: Asa [aspirin] and Daliresp [roflumilast]    Pt meets criteria to transfer to next phase of care per [facility] Delatorre and [facility] standards    No results for input(s): POCGLU in the last 72 hours. Vitals:    04/23/21 1320   BP:    Pulse: 71   Resp: 12   Temp:    SpO2: 93%           Intake/Output Summary (Last 24 hours) at 4/23/2021 1340  Last data filed at 4/23/2021 1335  Gross per 24 hour   Intake 1150 ml   Output --   Net 1150 ml       Pt given albuterol treatment per RT. Pt instructed on use of IS. Pt ambulated with use of walker in PACU. Pt encouraged to cough and deep breath. After ambulation, while sitting on edge of bed and encouraged to breath with mouth closed, pt maintained O2 sats at 92% on room air. Pain assessment:  present - pt has chronic back pain    Patient was assessed for unknown alterations to skin integrity. There were not unknown alterations observed. Patient transferred to care of [facility] RN.   Family updated and directed to [facility]    4/23/2021 1:40 PM

## 2021-04-23 NOTE — PROGRESS NOTES
Dr. Garrison Meckel notified of oxygen sats 87-88 on 4lnc and clear lung sounds. New order for albuterol placed.

## 2021-04-23 NOTE — PROCEDURES
[address] [place] [address]  Colonoscopy Note    Patient: Meagan Marcial. : 1950  Acct#:     Procedure: Colonoscopy with biopsy, polypectomy (cold snare), polypectomy (snare cautery)    Date:  2021    Surgeon:  Fern Carter MD    Referring Physician:  Haydee Rosario MD    Anesthesia: general anesthesia     EBL: <50 mL    Indications: This is a 78y.o. year old male who presents today with for multiple polypectomies. Procedure: An informed consent was obtained from the patient after explanation of indications, benefits, possible risks and complications of the procedure. The patient was then taken to the endoscopy suite, placed in the left lateral decubitus position, and the above IV anesthesia was administered. A digital rectal examination was performed and revealed negative without mass, lesions or tenderness. The Olympus PCFQ-H190 video colonoscope was placed in the patient's rectum under digital direction and advanced to the cecum. The cecum was identified by characteristic anatomy and ballottment. The prep was adequate. Findings:    5 sessile polyps were found in the cecum measuring 2 to 6 mm in size. The polyps were removed via hot and cold snare polypectomy. 6 sessile polyps measuring 3 to 15 mm were found in the ascending colon and were removed via hot and cold snare polypectomy. 6 sessile polyps measuring 2 to 20 mm were found at the hepatic flexure were removed via hot snare polypectomy. 9 sessile polyps measuring 2 to 10 mm were found the transverse colon removed via hot and cold snare polypectomy  1 sessile polyp measuring 3 mm was found in the descending colon and was removed via cold snare polypectomy. 1 large pedunculated polyp measuring 25 mm was found in the sigmoid colon and was removed via hot snare polypectomy. An Endo Clip was placed over the polypectomy site prophylactically since he is going to restart anticoagulation.     2 sessile polyps measuring 2 to 3 mm were found in the sigmoid colon removed via cold snare polypectomy. Diverticulosis      The scope was then withdrawn into the rectum and retroflexed. The retroflexed view of the anal verge and rectum demonstrates moderate internal hemorrhoidshemorrhoids. The scope was straightened, the colon was decompressed and the scope was withdrawn from the patient. The patient tolerated the procedure well and was taken to the PACU in good condition. Biopsies: Yes. Impression:    30 POLYPS REMOVED. SEE ABOVE FOR DETAILS. Diverticulosis. Internal hemorrhoids. Recommendations:    Await pathology. Repeat colonoscopy in 1 year. Restart Plavix tomorrow.        Monica Torres MD, MSc  [address]  4/23/2021

## 2021-04-24 PROBLEM — Z86.0100 HISTORY OF COLON POLYPS: Status: ACTIVE | Noted: 2021-04-24

## 2021-04-24 PROBLEM — Z86.010 HISTORY OF COLON POLYPS: Status: ACTIVE | Noted: 2021-04-24

## 2021-06-08 ENCOUNTER — OFFICE VISIT (OUTPATIENT)
Dept: PULMONOLOGY | Age: 71
End: 2021-06-08
Payer: MEDICARE

## 2021-06-08 VITALS
BODY MASS INDEX: 30.64 KG/M2 | DIASTOLIC BLOOD PRESSURE: 60 MMHG | OXYGEN SATURATION: 94 % | SYSTOLIC BLOOD PRESSURE: 110 MMHG | TEMPERATURE: 97.1 F | HEIGHT: 70 IN | RESPIRATION RATE: 16 BRPM | WEIGHT: 214 LBS | HEART RATE: 72 BPM

## 2021-06-08 DIAGNOSIS — G47.33 OSA ON CPAP: ICD-10-CM

## 2021-06-08 DIAGNOSIS — Z72.0 TOBACCO ABUSE DISORDER: Primary | ICD-10-CM

## 2021-06-08 DIAGNOSIS — Z99.89 OSA ON CPAP: ICD-10-CM

## 2021-06-08 DIAGNOSIS — J44.9 CHRONIC OBSTRUCTIVE PULMONARY DISEASE, UNSPECIFIED COPD TYPE (HCC): ICD-10-CM

## 2021-06-08 PROCEDURE — G8427 DOCREV CUR MEDS BY ELIG CLIN: HCPCS | Performed by: INTERNAL MEDICINE

## 2021-06-08 PROCEDURE — G8417 CALC BMI ABV UP PARAM F/U: HCPCS | Performed by: INTERNAL MEDICINE

## 2021-06-08 PROCEDURE — 99213 OFFICE O/P EST LOW 20 MIN: CPT | Performed by: INTERNAL MEDICINE

## 2021-06-08 PROCEDURE — 3017F COLORECTAL CA SCREEN DOC REV: CPT | Performed by: INTERNAL MEDICINE

## 2021-06-08 PROCEDURE — 3023F SPIROM DOC REV: CPT | Performed by: INTERNAL MEDICINE

## 2021-06-08 PROCEDURE — 1123F ACP DISCUSS/DSCN MKR DOCD: CPT | Performed by: INTERNAL MEDICINE

## 2021-06-08 PROCEDURE — 4040F PNEUMOC VAC/ADMIN/RCVD: CPT | Performed by: INTERNAL MEDICINE

## 2021-06-08 PROCEDURE — 4004F PT TOBACCO SCREEN RCVD TLK: CPT | Performed by: INTERNAL MEDICINE

## 2021-06-08 PROCEDURE — G8926 SPIRO NO PERF OR DOC: HCPCS | Performed by: INTERNAL MEDICINE

## 2021-06-08 RX ORDER — PENTOXIFYLLINE 400 MG/1
TABLET, EXTENDED RELEASE ORAL
COMMUNITY
Start: 2021-04-29 | End: 2021-09-16

## 2021-06-08 RX ORDER — OXYCODONE HYDROCHLORIDE AND ACETAMINOPHEN 5; 325 MG/1; MG/1
TABLET ORAL
COMMUNITY
Start: 2021-06-04 | End: 2021-08-25

## 2021-06-08 NOTE — PROGRESS NOTES
[facility] Pulmonary and Critical Care    Outpatient Follow Up Note    Subjective:   CHIEF COMPLAINT / HPI:     The patient is 73 y.o. male who presents today for follow up of COPD, CHF and KAVITA. Carolann Healy comes in by himself today. He continues to be plagued by neuropathy in his legs and barely leaves his house. He continues to smoke. Breathing is otherwise stable. Last visit:  Carolann Healy comes in with his granddaughter today. Says his breathing gets difficult at times. He mentioned that sometimes it happens all of a sudden when he's just sitting in a chair and then it stops. He thinks it might be his heart, because it feels like it's racing. He also had a lot of complaints about his neuropathy. He recently had to get new tubing for his auto CPAP because the tubing broke. Incruse is no longer going to be covered by his insurance. Previous visit:  Phone visit today because of the pandemic and Inder's risk factors. Carolann Healy notes that he has shortness of breath and cough which are a bit worse than baseline. Carolann Healy continues to roll his own cigarettes and smoke. He gets his supplies from the \"[employer] store\" down the street. It is a convenient store that is owned by [employer]. He reports his weight is 223 pounds that he has some swelling by the end of the day in his ankles but it is gone by morning. He is compliant with his Breo and Incruse as well as his new auto titrating CPAP. He rarely uses his albuterol inhaler and in reviewing his med list the prescription has . Previous visit:  Carolann Healy comes in today complaining of being both sleepy and tired. He states that his CPAP \"shorted out\" and melted the water reservoir so he had to throw it out. He's using his breo and incruse that cost $8.50/month. He's still smoking. He also said he needed a refill on his nitroglycerin since what he has left is .      Previous visit:  Carolann Healy comes in today saying he feels about the same as usual.  He did point out he had to be admitted to the hospital in August for heart failure. He still smokes about 4-5 cigarettes a day. He is using his CPAP of 9 but it's difficult to sleep. He is currently on breo and incruse and tolerating well. Says his breathing is about the same as always and is limiting. He's also limited by joint pain which impedes mobility and his ability to sleep. He weighed 210 lbs about a week ago and weighed in at 220 today. He's not been weighing himself at home, but did say that his left leg is swollen and tender. Previous history: Back to wearing his CPAP. Has some dyspnea at times, without a clear trigger. He had an issue with his new pharmacy, [facility], and went without symbicort for 3 weeks. He noticed a difference. His hip is recovering well and he's more mobile. He's still smoking anywhere from a half pack to a quarter of a pack per week.     Past Medical History:    Past Medical History:   Diagnosis Date    CAD (coronary artery disease)     [facility]     COPD [facility]chronic obstructive pulmonary disease) [facility]     Depressive disorder, not elsewhere classified     GERD (gastroesophageal reflux disease)     History of colon polyps - 30 seen on colonoscopy 04/2021 4/24/2021    History of MI (myocardial infarction) 05/2013    History of skin ulcer of lower extremity     R anterior shin    History of transient ischemic attack (TIA)     Hyperlipidemia     Hypertension     Neuropathy     KAVITA (obstructive sleep apnea)     On CPAP    Personal history of DVT (deep vein thrombosis)     PVD (peripheral vascular disease) ([address]     TIA (transient ischemic attack) 2013    Vertebral fracture        Social History:    Social History     Tobacco Use   Smoking Status Current Every Day Smoker    Packs/day: 0.50    Years: 52.00    Pack years: 26.00    Types: Cigarettes    Start date: 1/1/1967   Smokeless Tobacco Never Used       Current Medications:  Current Outpatient Medications on File Prior to Visit   Medication Sig Dispense Refill    pentoxifylline (TRENTAL) 400 MG extended release tablet       oxyCODONE-acetaminophen (PERCOCET) 5-325 MG per tablet TAKE 1 TABLET BY MOUTH FOUR TIMES DAILY AS NEEDED FOR PAIN      tiotropium (SPIRIVA RESPIMAT) 2.5 MCG/ACT AERS inhaler Inhale 2 puffs into the lungs daily 1 Inhaler 11    allopurinol (ZYLOPRIM) 100 MG tablet TAKE 2 TABLETS BY MOUTH EVERY  tablet 1    isosorbide mononitrate (IMDUR) 30 MG extended release tablet Take 1 tablet by mouth daily 30 tablet 3    doxazosin (CARDURA) 4 MG tablet Take 1 tablet by mouth nightly 30 tablet 3    nicotine (NICODERM CQ) 14 MG/24HR Place 1 patch onto the skin daily 30 patch 3    vitamin D (CHOLECALCIFEROL) 125 MCG (5000 UT) CAPS capsule Take 10,000 Units by mouth daily      traMADol (ULTRAM) 50 MG tablet Take 50 mg by mouth 2 times daily as needed for Pain.  DULoxetine (CYMBALTA) 60 MG extended release capsule TAKE 1 CAPSULE BY MOUTH EVERY DAY 90 capsule 0    albuterol sulfate  (90 Base) MCG/ACT inhaler INHALE 2 PUFFS INTO THE LUNGS EVERY 6 HOURS AS NEEDED FOR WHEEZING 6.7 g 11    losartan (COZAAR) 100 MG tablet TAKE 1 TABLET BY MOUTH EVERY DAY 90 tablet 1    clopidogrel (PLAVIX) 75 MG tablet TAKE 1 TABLET BY MOUTH DAILY 90 tablet 1    pantoprazole (PROTONIX) 40 MG tablet TAKE 1 TABLET BY MOUTH EVERY EVENING. TAKE 1 HOUR BEFORE BEDTIME.  90 tablet 1    BREO ELLIPTA 200-25 MCG/INH AEPB inhaler INHALE 1 PUFF BY MOUTH DAILY 1 each 11    atorvastatin (LIPITOR) 80 MG tablet TAKE 1 TABLET BY MOUTH DAILY 90 tablet 1    nitroGLYCERIN (NITROSTAT) 0.4 MG SL tablet Place 1 tablet under the tongue every 5 minutes as needed for Chest pain 25 tablet 1    ranitidine (ZANTAC) 150 MG tablet TAKE 1 TABLET BY MOUTH TWICE DAILY AS NEEDED FOR HEARTBURN 180 tablet 1    buPROPion (WELLBUTRIN SR) 150 MG extended release tablet Take 150 mg by mouth 2 times tubing to the company that supplies his CPAP. Patient previously attempted pulmonary rehab, but he was in too much pain and stopped going. Tried and failed daliresp in the past 2/2 GI side effects    Screening CT due in December 2021 ordered today    CHF:  Not in acute overload. KAVITA:  Controlled with auto pap, with range of 8-15 cmH2O and it's working well. The patient is currently smoking less than a pack week. The risks related to smoking were reviewed with the patient. Recommend maintaining a smoke-free lifestyle. Products available for smoking cessation were discussed in detail. Diagnosis Orders   1. Tobacco abuse disorder  CT Lung Screen (Initial or Annual)          More than half the time of this 20 minute visit was spent counseling the patient. RTC 6 months w/ MD. Call or RTC sooner if symptoms persist or worsen acutely.         Karissa Orta

## 2021-06-16 DIAGNOSIS — R06.02 SOB (SHORTNESS OF BREATH): Primary | ICD-10-CM

## 2021-06-16 RX ORDER — ALBUTEROL SULFATE 90 UG/1
2 AEROSOL, METERED RESPIRATORY (INHALATION) EVERY 6 HOURS PRN
Qty: 3 INHALER | Refills: 4 | Status: SHIPPED | OUTPATIENT
Start: 2021-06-16 | End: 2021-08-25 | Stop reason: SDUPTHER

## 2021-06-22 RX ORDER — ALLOPURINOL 100 MG/1
TABLET ORAL
Qty: 180 TABLET | Refills: 1 | OUTPATIENT
Start: 2021-06-22

## 2021-08-25 ENCOUNTER — OFFICE VISIT (OUTPATIENT)
Dept: PRIMARY CARE CLINIC | Age: 71
End: 2021-08-25
Payer: MEDICARE

## 2021-08-25 VITALS
WEIGHT: 207.8 LBS | HEIGHT: 70 IN | DIASTOLIC BLOOD PRESSURE: 80 MMHG | SYSTOLIC BLOOD PRESSURE: 139 MMHG | BODY MASS INDEX: 29.75 KG/M2 | HEART RATE: 70 BPM

## 2021-08-25 DIAGNOSIS — M1A.0710 CHRONIC IDIOPATHIC GOUT INVOLVING TOE OF RIGHT FOOT WITHOUT TOPHUS: ICD-10-CM

## 2021-08-25 DIAGNOSIS — I73.9 PVD (PERIPHERAL VASCULAR DISEASE) (HCC): ICD-10-CM

## 2021-08-25 DIAGNOSIS — E78.2 MIXED HYPERLIPIDEMIA: Chronic | ICD-10-CM

## 2021-08-25 DIAGNOSIS — F17.210 CIGARETTE NICOTINE DEPENDENCE WITHOUT COMPLICATION: ICD-10-CM

## 2021-08-25 DIAGNOSIS — Z76.89 ENCOUNTER TO ESTABLISH CARE WITH NEW DOCTOR: Primary | ICD-10-CM

## 2021-08-25 DIAGNOSIS — I25.10 CORONARY ARTERY DISEASE INVOLVING NATIVE CORONARY ARTERY OF NATIVE HEART WITHOUT ANGINA PECTORIS: ICD-10-CM

## 2021-08-25 DIAGNOSIS — I50.32 CHRONIC DIASTOLIC HEART FAILURE (HCC): ICD-10-CM

## 2021-08-25 DIAGNOSIS — J44.9 OSA AND COPD OVERLAP SYNDROME (HCC): ICD-10-CM

## 2021-08-25 DIAGNOSIS — I10 ESSENTIAL HYPERTENSION: ICD-10-CM

## 2021-08-25 DIAGNOSIS — G47.33 OSA AND COPD OVERLAP SYNDROME (HCC): ICD-10-CM

## 2021-08-25 PROBLEM — Z86.010 HISTORY OF COLON POLYPS: Status: RESOLVED | Noted: 2021-04-24 | Resolved: 2021-08-25

## 2021-08-25 PROBLEM — M1A.00X0 IDIOPATHIC CHRONIC GOUT WITHOUT TOPHUS: Status: ACTIVE | Noted: 2021-08-25

## 2021-08-25 PROBLEM — M25.552 CHRONIC HIP PAIN, LEFT: Status: RESOLVED | Noted: 2019-11-20 | Resolved: 2021-08-25

## 2021-08-25 PROBLEM — Z92.89 H/O ANGIOGRAPHY: Status: RESOLVED | Noted: 2020-12-10 | Resolved: 2021-08-25

## 2021-08-25 PROBLEM — G89.29 CHRONIC HIP PAIN, LEFT: Status: RESOLVED | Noted: 2019-11-20 | Resolved: 2021-08-25

## 2021-08-25 PROBLEM — M96.1 POSTLAMINECTOMY SYNDROME, LUMBAR: Status: RESOLVED | Noted: 2017-09-05 | Resolved: 2021-08-25

## 2021-08-25 PROBLEM — Z86.0100 HISTORY OF COLON POLYPS: Status: RESOLVED | Noted: 2021-04-24 | Resolved: 2021-08-25

## 2021-08-25 PROBLEM — L57.0 ACTINIC KERATOSES: Status: RESOLVED | Noted: 2020-01-14 | Resolved: 2021-08-25

## 2021-08-25 PROCEDURE — 4040F PNEUMOC VAC/ADMIN/RCVD: CPT | Performed by: STUDENT IN AN ORGANIZED HEALTH CARE EDUCATION/TRAINING PROGRAM

## 2021-08-25 PROCEDURE — 3023F SPIROM DOC REV: CPT | Performed by: STUDENT IN AN ORGANIZED HEALTH CARE EDUCATION/TRAINING PROGRAM

## 2021-08-25 PROCEDURE — 1123F ACP DISCUSS/DSCN MKR DOCD: CPT | Performed by: STUDENT IN AN ORGANIZED HEALTH CARE EDUCATION/TRAINING PROGRAM

## 2021-08-25 PROCEDURE — 3017F COLORECTAL CA SCREEN DOC REV: CPT | Performed by: STUDENT IN AN ORGANIZED HEALTH CARE EDUCATION/TRAINING PROGRAM

## 2021-08-25 PROCEDURE — G8427 DOCREV CUR MEDS BY ELIG CLIN: HCPCS | Performed by: STUDENT IN AN ORGANIZED HEALTH CARE EDUCATION/TRAINING PROGRAM

## 2021-08-25 PROCEDURE — 4004F PT TOBACCO SCREEN RCVD TLK: CPT | Performed by: STUDENT IN AN ORGANIZED HEALTH CARE EDUCATION/TRAINING PROGRAM

## 2021-08-25 PROCEDURE — G8926 SPIRO NO PERF OR DOC: HCPCS | Performed by: STUDENT IN AN ORGANIZED HEALTH CARE EDUCATION/TRAINING PROGRAM

## 2021-08-25 PROCEDURE — G8417 CALC BMI ABV UP PARAM F/U: HCPCS | Performed by: STUDENT IN AN ORGANIZED HEALTH CARE EDUCATION/TRAINING PROGRAM

## 2021-08-25 PROCEDURE — 99215 OFFICE O/P EST HI 40 MIN: CPT | Performed by: STUDENT IN AN ORGANIZED HEALTH CARE EDUCATION/TRAINING PROGRAM

## 2021-08-25 RX ORDER — ATORVASTATIN CALCIUM 80 MG/1
80 TABLET, FILM COATED ORAL DAILY
Qty: 90 TABLET | Refills: 1 | Status: SHIPPED | OUTPATIENT
Start: 2021-08-25 | End: 2021-12-03 | Stop reason: SDUPTHER

## 2021-08-25 RX ORDER — ISOSORBIDE MONONITRATE 30 MG/1
30 TABLET, EXTENDED RELEASE ORAL DAILY
Qty: 30 TABLET | Refills: 3 | Status: ON HOLD | OUTPATIENT
Start: 2021-08-25 | End: 2021-09-20 | Stop reason: HOSPADM

## 2021-08-25 RX ORDER — METOPROLOL TARTRATE 100 MG/1
100 TABLET ORAL 2 TIMES DAILY
Qty: 270 TABLET | Refills: 1 | Status: ON HOLD | OUTPATIENT
Start: 2021-08-25 | End: 2021-12-15

## 2021-08-25 RX ORDER — ALLOPURINOL 100 MG/1
100 TABLET ORAL DAILY
Qty: 180 TABLET | Refills: 1 | Status: SHIPPED | OUTPATIENT
Start: 2021-08-25 | End: 2022-06-14 | Stop reason: SDUPTHER

## 2021-08-25 RX ORDER — NITROGLYCERIN 0.4 MG/1
0.4 TABLET SUBLINGUAL EVERY 5 MIN PRN
Qty: 25 TABLET | Refills: 1 | Status: SHIPPED | OUTPATIENT
Start: 2021-08-25 | End: 2022-07-29

## 2021-08-25 RX ORDER — ALBUTEROL SULFATE 90 UG/1
2 AEROSOL, METERED RESPIRATORY (INHALATION) EVERY 6 HOURS PRN
Qty: 3 INHALER | Refills: 4 | Status: SHIPPED | OUTPATIENT
Start: 2021-08-25 | End: 2022-06-20 | Stop reason: SDUPTHER

## 2021-08-25 RX ORDER — FUROSEMIDE 40 MG/1
40 TABLET ORAL DAILY
Qty: 90 TABLET | Refills: 3 | Status: ON HOLD | OUTPATIENT
Start: 2021-08-25 | End: 2021-12-15 | Stop reason: SDUPTHER

## 2021-08-25 RX ORDER — LOSARTAN POTASSIUM 100 MG/1
TABLET ORAL
Qty: 90 TABLET | Refills: 1 | Status: SHIPPED | OUTPATIENT
Start: 2021-08-25 | End: 2022-03-24 | Stop reason: SDUPTHER

## 2021-08-25 RX ORDER — CLOPIDOGREL BISULFATE 75 MG/1
TABLET ORAL
Qty: 90 TABLET | Refills: 1 | Status: ON HOLD | OUTPATIENT
Start: 2021-08-25 | End: 2021-09-20 | Stop reason: HOSPADM

## 2021-08-25 SDOH — ECONOMIC STABILITY: FOOD INSECURITY: WITHIN THE PAST 12 MONTHS, YOU WORRIED THAT YOUR FOOD WOULD RUN OUT BEFORE YOU GOT MONEY TO BUY MORE.: NEVER TRUE

## 2021-08-25 SDOH — ECONOMIC STABILITY: FOOD INSECURITY: WITHIN THE PAST 12 MONTHS, THE FOOD YOU BOUGHT JUST DIDN'T LAST AND YOU DIDN'T HAVE MONEY TO GET MORE.: NEVER TRUE

## 2021-08-25 ASSESSMENT — SOCIAL DETERMINANTS OF HEALTH (SDOH): HOW HARD IS IT FOR YOU TO PAY FOR THE VERY BASICS LIKE FOOD, HOUSING, MEDICAL CARE, AND HEATING?: NOT HARD AT ALL

## 2021-08-25 ASSESSMENT — PATIENT HEALTH QUESTIONNAIRE - PHQ9
1. LITTLE INTEREST OR PLEASURE IN DOING THINGS: 0
SUM OF ALL RESPONSES TO PHQ QUESTIONS 1-9: 0
SUM OF ALL RESPONSES TO PHQ9 QUESTIONS 1 & 2: 0
2. FEELING DOWN, DEPRESSED OR HOPELESS: 0
SUM OF ALL RESPONSES TO PHQ QUESTIONS 1-9: 0
SUM OF ALL RESPONSES TO PHQ QUESTIONS 1-9: 0

## 2021-08-25 ASSESSMENT — ENCOUNTER SYMPTOMS
SHORTNESS OF BREATH: 0
WHEEZING: 1
COUGH: 1
BLOOD IN STOOL: 0

## 2021-08-25 NOTE — PATIENT INSTRUCTIONS
Patient Education        Well Visit, Over 72: Care Instructions  Overview     Well visits can help you stay healthy. Your doctor has checked your overall health and may have suggested ways to take good care of yourself. Your doctor also may have recommended tests. At home, you can help prevent illness with healthy eating, regular exercise, and other steps. Follow-up care is a key part of your treatment and safety. Be sure to make and go to all appointments, and call your doctor if you are having problems. It's also a good idea to know your test results and keep a list of the medicines you take. How can you care for yourself at home? · Get screening tests that you and your doctor decide on. Screening helps find diseases before any symptoms appear. · Eat healthy foods. Choose fruits, vegetables, whole grains, protein, and low-fat dairy foods. Limit fat, especially saturated fat. Reduce salt in your diet. · Limit alcohol. If you are a man, have no more than 2 drinks a day or 14 drinks a week. If you are a woman, have no more than 1 drink a day or 7 drinks a week. Since alcohol affects older adults differently, you may want to limit alcohol even more. Or you may not want to drink at all. · Get at least 30 minutes of exercise on most days of the week. Walking is a good choice. You also may want to do other activities, such as running, swimming, cycling, or playing tennis or team sports. · Reach and stay at a healthy weight. This will lower your risk for many problems, such as obesity, diabetes, heart disease, and high blood pressure. · Do not smoke. Smoking can make health problems worse. If you need help quitting, talk to your doctor about stop-smoking programs and medicines. These can increase your chances of quitting for good. · Care for your mental health. It is easy to get weighed down by worry and stress.  Learn strategies to manage stress, like deep breathing and mindfulness, and stay connected with your family and community. If you find you often feel sad or hopeless, talk with your doctor. Treatment can help. · Talk to your doctor about whether you have any risk factors for sexually transmitted infections (STIs). You can help prevent STIs if you wait to have sex with a new partner (or partners) until you've each been tested for STIs. It also helps if you use condoms (male or female condoms) and if you limit your sex partners to one person who only has sex with you. Vaccines are available for some STIs. · If you think you may have a problem with alcohol or drug use, talk to your doctor. This includes prescription medicines (such as amphetamines and opioids) and illegal drugs (such as cocaine and methamphetamine). Your doctor can help you figure out what type of treatment is best for you. · Protect your skin from too much sun. When you're outdoors from 10 a.m. to 4 p.m., stay in the shade or cover up with clothing and a hat with a wide brim. Wear sunglasses that block UV rays. Even when it's cloudy, put broad-spectrum sunscreen (SPF 30 or higher) on any exposed skin. · See a dentist one or two times a year for checkups and to have your teeth cleaned. · Wear a seat belt in the car. When should you call for help? Watch closely for changes in your health, and be sure to contact your doctor if you have any problems or symptoms that concern you. Where can you learn more? Go to [email or web address] org and sign in to your [employer] account. Enter Y986 in the [facility] box to learn more about \"Well Visit, Over 65: Care Instructions. \"     If you do not have an account, please click on the \"Sign Up Now\" link. Current as of: May 27, 2020               Content Version: 12.9  © 2731-2014 [employer]wise, Inc[employer]. Care instructions adapted under license by [facility] ([facility]).  If you have questions about a medical condition or this instruction, always ask your healthcare professional. [employer] disclaims any warranty or liability for your use of this information.

## 2021-08-25 NOTE — PROGRESS NOTES
2021    Yesika Cintron (:  1950) is a 73 y.o. male, here for evaluation of the following medical concerns:    HPI    Well Adult Physical: Patient here for a comprehensive physical exam.The patient reports problems - chest pain, leg pain  Do you take any herbs or supplements that were not prescribed by a doctor? no Are you taking calcium supplements? not applicable Are you taking aspirin daily? yes    Sexual activity: none   Diet: Unhealthy  Exercise: no regular exercise  Seatbelt use: yes    Review of Systems   Constitutional: Negative for activity change, fatigue and unexpected weight change. HENT: Negative for hearing loss. Eyes: Negative for visual disturbance. Respiratory: Positive for cough and wheezing (mainly after smoking). Negative for shortness of breath. Cardiovascular: Positive for chest pain. Negative for leg swelling. Gastrointestinal: Negative for blood in stool. Endocrine: Negative for polydipsia and polyphagia. Genitourinary: Negative for discharge, dysuria, frequency, penile pain, scrotal swelling and testicular pain. Musculoskeletal: Positive for myalgias (leg cramping occurs in evening). Negative for arthralgias. Skin: Negative for rash. Allergic/Immunologic: Negative for environmental allergies. Neurological: Negative for dizziness and headaches. Hematological: Does not bruise/bleed easily. Psychiatric/Behavioral: Negative for dysphoric mood and sleep disturbance. The patient is not nervous/anxious. Prior to Visit Medications    Medication Sig Taking?  Authorizing Provider   albuterol sulfate  (90 Base) MCG/ACT inhaler Inhale 2 puffs into the lungs every 6 hours as needed for Wheezing Yes Reyes Handler, DO   isosorbide mononitrate (IMDUR) 30 MG extended release tablet Take 1 tablet by mouth daily Yes Reyes Handler, DO   allopurinol (ZYLOPRIM) 100 MG tablet Take 1 tablet by mouth daily Yes Reyes Handler, DO   losartan (COZAAR) 100 MG tablet TAKE 1 TABLET BY MOUTH EVERY DAY Yes Noralee Merlin, DO   clopidogrel (PLAVIX) 75 MG tablet TAKE 1 TABLET BY MOUTH DAILY Yes Noralee Merlin, DO   Fluticasone furoate-vilanterol (BREO ELLIPTA) 200-25 MCG/INH AEPB inhaler INHALE 1 PUFF BY MOUTH DAILY Yes Noralee Merlin, DO   atorvastatin (LIPITOR) 80 MG tablet Take 1 tablet by mouth daily Yes Noralee Merlin, DO   nitroGLYCERIN (NITROSTAT) 0.4 MG SL tablet Place 1 tablet under the tongue every 5 minutes as needed for Chest pain Yes Noralee Merlin, DO   furosemide (LASIX) 40 MG tablet Take 1 tablet by mouth daily Yes Noralee Merlin, DO   metoprolol (LOPRESSOR) 100 MG tablet Take 1 tablet by mouth 2 times daily Yes Noralee Merlin, DO   tiotropium (SPIRIVA RESPIMAT) 2.5 MCG/ACT AERS inhaler Inhale 2 puffs into the lungs daily Yes Jalil Bennett MD   pentoxifylline (TRENTAL) 400 MG extended release tablet  Yes Historical Provider, MD   pantoprazole (PROTONIX) 40 MG tablet TAKE 1 TABLET BY MOUTH EVERY EVENING. TAKE 1 HOUR BEFORE BEDTIME. Yes Lenny Cabrera DO   buPROPion [facility]) 150 MG extended release tablet Take 150 mg by mouth 2 times daily Yes Historical Provider, MD   magnesium gluconate (MAGONATE) 500 MG tablet Take 500 mg by mouth daily.  Yes Historical Provider, MD   folic acid (FOLVITE) 1 MG tablet Take 1 mg by mouth daily  Historical Provider, MD        Allergies   Allergen Reactions    Asa [Aspirin] Other (See Comments)     Stomach ache    Daliresp [Roflumilast] Diarrhea and Other (See Comments)     Severe diarrhea and did not help       Past Medical History:   Diagnosis Date    CAD (coronary artery disease)     [facility]     COPD (chronic obstructive pulmonary disease) [facility]     Depressive disorder, not elsewhere classified     GERD (gastroesophageal reflux disease)     History of colon polyps - 30 seen on colonoscopy 04/2021 4/24/2021    History of MI (myocardial infarction) 05/2013    History of skin ulcer of lower extremity     R anterior shin    History of transient ischemic attack (TIA)     Hyperlipidemia     Hypertension     Neuropathy     KAVITA (obstructive sleep apnea)     On CPAP    Personal history of DVT (deep vein thrombosis)     PVD (peripheral vascular disease) ([facility])     TIA (transient ischemic attack) 2013    Vertebral fracture        Past Surgical History:   Procedure Laterality Date    APPENDECTOMY      CHOLECYSTECTOMY, LAPAROSCOPIC      COLONOSCOPY  4/23/2021    COLONOSCOPY POLYPECTOMY SNARE/COLD BIOPSY performed by Brandee Crawley MD at [facility]  4/23/2021    COLONOSCOPY POLYPECTOMY ABLATION performed by Brandee Crawley MD at [facility]  4/23/2021    COLONOSCOPY CONTROL HEMORRHAGE performed by Brandee Crawley MD at [address]  6/2013    EYE SURGERY Left     LAMINECTOMY  11/18/2015    Bilateral decompressive lumbar laminectomy L4-5   ; medial facetectomy L4-5 joints  bilaterally; foraminotomies l5   nerve roots bilaterally    LEG DEBRIDEMENT Right 7/23/2013    Dr. Cate Mcclellan - pretibial wound    OTHER SURGICAL HISTORY Right 6/25/2013    Dr. Cate Mcclellan - CFA Endarterectomy w/marsupialization; RLE wound excision & debridement     OTHER SURGICAL HISTORY Left 8/27/2013    Dr. Cate Mcclellan - femoral & profunda femoris endarterectomy w/marsupialization patch angioplasty using SFA    SKIN SPLIT GRAFT Right 7/25/2013    Dr. Esposito Fu - to non-healing R pretibial wound, 9 x 15 cm    TOTAL HIP ARTHROPLASTY Right 09/01/2016    Dr. Heidi Ty Left 12/22/2015    Dr. Cate Mcclellan - CFA exploration, thrombectomy of ileofemoral system, stenting of RAFAL in-stent stenosis w/8 x 37 mm Palmaz        Social History     Socioeconomic History    Marital status:      Spouse name: Not on file    Number of children: 3    Years of education: Not on file    Highest education level: Not on file   Occupational History    encounter: 5' 10\" (1.778 m). Weight as of this encounter: 207 lb 12.8 oz (94.3 kg). Physical Exam  Vitals reviewed. Constitutional:       Appearance: Normal appearance. He is obese. HENT:      Head: Normocephalic and atraumatic. Right Ear: Tympanic membrane, ear canal and external ear normal.      Left Ear: Tympanic membrane, ear canal and external ear normal.      Nose: Nose normal.      Mouth/Throat:      Mouth: Mucous membranes are moist.      Pharynx: Oropharynx is clear. Eyes:      Extraocular Movements: Extraocular movements intact. Conjunctiva/sclera: Conjunctivae normal.   Cardiovascular:      Rate and Rhythm: Normal rate and regular rhythm. Heart sounds: Normal heart sounds. Comments: Pulses DP/PT un appreciable bilaterally  Pulmonary:      Effort: Pulmonary effort is normal.      Breath sounds: Wheezing (scattered throughout) present. Abdominal:      General: Abdomen is flat. Bowel sounds are normal.      Palpations: Abdomen is soft. Musculoskeletal:         General: Normal range of motion. Cervical back: Normal range of motion and neck supple. Right lower leg: Edema (1+) present. Left lower leg: Edema (trace) present. Skin:     General: Skin is warm and dry. Capillary Refill: Capillary refill takes less than 2 seconds. Neurological:      General: No focal deficit present. Mental Status: He is alert and oriented to person, place, and time. Mental status is at baseline. Psychiatric:         Mood and Affect: Mood normal.         Behavior: Behavior normal.         Thought Content: Thought content normal.         Judgment: Judgment normal.        Separate Identifiable issues addressed today:  Chest Pain  Kong ARNOLDO Ahsan Argueta. complains of chest pain. Onset was 3 months ago. Symptoms have worsened since that time. The patient's pain is intermittent. The patient describes the pain as sharp and does not radiate.  Patient rates pain as a 4/10 in intensity. Associated symptoms are: shortness of breath. Aggravating factors are: housework and walking. Alleviating factors are: nitroglycerin 0.4 mg tablets and rest. Patient's cardiac risk factors are: advanced age (older than 54 for men, 72 for women), dyslipidemia, hypertension, male gender, obesity (BMI >= 30 kg/m2), sedentary lifestyle and smoking/ tobacco exposure. Patient's risk factors for DVT/PE: none. Previous cardiac testing: history of cath with stent. Leg pain   Rosa Cunha is a 73 y.o. male who presents with bilateral lower leg pain. Onset of the symptoms was gradual, starting about 1 year ago. Pain is currently located calves bilaterally, R > L. Pain is described as aching and cramping, with intensity at worst 6 on a 0-10 pain scale. The pain is intermittent and occurs daily, lasting 2-4 minutes. Associated  symptoms include: cramps . Impact of symptoms on Roberto Carlos Moss has been that he has been unable to walk for prolonged amounts of time. Symptoms have gradually worsened. Patient has had prior leg problems. Evaluation to date: venous doppler. Treatment to date: history of PVD with stenting in [place]. Past musculoskeletal history: known OA of hips. ASSESSMENT/PLAN:  Roberto Carlos Moss was seen today for establish care, annual exam and other. Diagnoses and all orders for this visit:    Encounter to establish care with new doctor: Vitals reviewed with normal limits. BMI is obese. Reviewed diet and exercise regimen patient record appropriate lifestyle modifications. Essential hypertension: Blood pressure goal today. Tolerating current regimen well without side effects.  -     losartan (COZAAR) 100 MG tablet; TAKE 1 TABLET BY MOUTH EVERY DAY  -     metoprolol (LOPRESSOR) 100 MG tablet; Take 1 tablet by mouth 2 times daily    Mixed hyperlipidemia: On high-dose statin. -     atorvastatin (LIPITOR) 80 MG tablet;  Take 1 tablet by mouth daily    Coronary artery disease involving native coronary artery of native heart without angina pectoris: Patient has a known history of CAD. Complaining of typical sounding chest pain, which sounds like stable angina. Occurs with activity and relieved with nitro. Referring to Dr. Andriy Hernandez. Doubt he would tolerate a stress; suspect he may need a cath. -     isosorbide mononitrate (IMDUR) 30 MG extended release tablet; Take 1 tablet by mouth daily  -     clopidogrel (PLAVIX) 75 MG tablet; TAKE 1 TABLET BY MOUTH DAILY  -     atorvastatin (LIPITOR) 80 MG tablet; Take 1 tablet by mouth daily  -     nitroGLYCERIN (NITROSTAT) 0.4 MG SL tablet; Place 1 tablet under the tongue every 5 minutes as needed for Chest pain  -     metoprolol (LOPRESSOR) 100 MG tablet; Take 1 tablet by mouth 2 times daily  -     Jaswinder Pineda MD, Cardiology, [facility]    KAVITA and COPD overlap syndrome [facility]): Follows with Dr. Dennis Robles from pulmonology. On triple therapy in the form of Spiriva and Breo. May be nice to get this patient on Trelegy as it would be 1 puff a day. We will see if it is covered by his insurance. -     albuterol sulfate  (90 Base) MCG/ACT inhaler; Inhale 2 puffs into the lungs every 6 hours as needed for Wheezing  -     Fluticasone furoate-vilanterol (BREO ELLIPTA) 200-25 MCG/INH AEPB inhaler; INHALE 1 PUFF BY MOUTH DAILY    Chronic diastolic heart failure ([address]): Appears euvolemic today. -     losartan (COZAAR) 100 MG tablet; TAKE 1 TABLET BY MOUTH EVERY DAY  -     furosemide (LASIX) 40 MG tablet; Take 1 tablet by mouth daily  -     metoprolol (LOPRESSOR) 100 MG tablet; Take 1 tablet by mouth 2 times daily  -     Jaswinder Pineda MD, Cardiology, [facility]    PVD (peripheral vascular disease) [facility]): Previous history of peripheral vascular disease with stenting in [place]. He is having some vague symptoms suggestive of neuropathic pain; however, he is also having some cramping pain with walking.   I am worried his stents may have occluded or he has had additional blockages since he has been lost to follow-up by vascular surgery. Will refer to Dr. Ewelina Camara for evaluation.  Robbie Miguel - Bartolo Lundborg, MD, Vascular Surgery, [facility]    Chronic idiopathic gout involving toe of right foot without tophus: No recent flares. -     allopurinol (ZYLOPRIM) 100 MG tablet; Take 1 tablet by mouth daily    Cigarette nicotine dependence without complication: Discussed health benefits with patient of smoking cessation. He is not interested in assistance today. Greater than 40 minutes spent. Return in about 6 weeks (around 10/6/2021). An  electronic signature was used to authenticate this note.     --Rosa Altamirano DO on 8/25/2021 at 2:09 PM

## 2021-08-26 DIAGNOSIS — I73.9 PVD (PERIPHERAL VASCULAR DISEASE) (HCC): Primary | ICD-10-CM

## 2021-08-27 ENCOUNTER — HOSPITAL ENCOUNTER (OUTPATIENT)
Dept: VASCULAR LAB | Age: 71
Discharge: HOME | End: 2021-08-27
Payer: MEDICARE

## 2021-08-27 DIAGNOSIS — I73.9 PVD (PERIPHERAL VASCULAR DISEASE) (HCC): ICD-10-CM

## 2021-08-27 PROCEDURE — 93925 LOWER EXTREMITY STUDY: CPT

## 2021-09-14 NOTE — PROGRESS NOTES
[facility] Vascular Surgery  Darshana Gillis MD, [facility], [address]    OUTPATIENT CONSULTATION          Date of Consultation:  9/16/2021    PCP:  Terri Butler DO       Chief Complaint: rest pain    History of Present Illness:   Veronica Ray Jr.is a 73 y.o. male who presents today for evaluation of peripheral artery disease. He is an everyday smoker, approximately 1 pack/day. Reports limited ambulation secondary to fatigue and pain in the bilateral calves and up into the right hip and thigh, as well. Reports rest pain at night when he reclines, improved when he dangles the foot. Also states that the right foot in particular becomes bright red when he dangles it. Reports that he is sleeping in his recliner secondary to the pain at night. He was previously trying to cut back on the smoking, but smoking more recently because of the pain. Reports that his right leg is worse though symptoms are present bilaterally. He has a history of CHF and COPD. Is not on supplemental oxygen but uses inhalers daily. Denies dyspnea on exertion; however, activity is very limited. He walks with a rolling walker. I personally reviewed images the following study:  VL DUP LOWER EXTREMITY ARTERIES BILATERAL  8/27/2021  Summary    Right ROD 0.59; Left ROD 0.35    Abnormal bilateral ABIs - left in the range of significant arterial    insufficiency; right in the range of severe arterial insufficiency.        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        Right SFA occlusion with distal reconstitution.        Left SFA occlusion with distal reconstitution.         PastMedical History:  Past Medical History:   Diagnosis Date    CAD (coronary artery disease)     CHF (congestive heart failure) (HCC)     COPD (chronic obstructive pulmonary disease) (HCC)     Depressive disorder, not elsewhere classified     GERD (gastroesophageal reflux disease)     History of colon polyps - 30 seen on Date Taking? Authorizing Provider   albuterol sulfate  (90 Base) MCG/ACT inhaler Inhale 2 puffs into the lungs every 6 hours as needed for Wheezing 8/25/21  Yes Khoi Lopez DO   isosorbide mononitrate (IMDUR) 30 MG extended release tablet Take 1 tablet by mouth daily 8/25/21  Yes Khoi Lopez DO   allopurinol (ZYLOPRIM) 100 MG tablet Take 1 tablet by mouth daily 8/25/21  Yes Khoi Lopez DO   losartan (COZAAR) 100 MG tablet TAKE 1 TABLET BY MOUTH EVERY DAY 8/25/21  Yes Khoi Lopez DO   clopidogrel (PLAVIX) 75 MG tablet TAKE 1 TABLET BY MOUTH DAILY 8/25/21  Yes Khoi Lopez DO   atorvastatin (LIPITOR) 80 MG tablet Take 1 tablet by mouth daily 8/25/21  Yes Khoi Lopez DO   nitroGLYCERIN (NITROSTAT) 0.4 MG SL tablet Place 1 tablet under the tongue every 5 minutes as needed for Chest pain 8/25/21  Yes Khoi Lopez DO   furosemide (LASIX) 40 MG tablet Take 1 tablet by mouth daily 8/25/21  Yes Khoi Lopez DO   metoprolol (LOPRESSOR) 100 MG tablet Take 1 tablet by mouth 2 times daily 8/25/21 11/23/21 Yes Khoi Lopez DO   tiotropium (SPIRIVA RESPIMAT) 2.5 MCG/ACT AERS inhaler Inhale 2 puffs into the lungs daily 6/16/21  Yes Roxanne Abbott MD   magnesium gluconate (MAGONATE) 500 MG tablet Take 500 mg by mouth daily. Yes Historical Provider, MD      Allergies:  Asa [aspirin] and Aleman Dura [roflumilast]     Social History:    Social History     Socioeconomic History    Marital status:      Spouse name: Not on file    Number of children: 3    Years of education: Not on file    Highest education level: Not on file   Occupational History    Occupation: Retired      Comment: 2014   Tobacco Use    Smoking status: Current Every Day Smoker     Packs/day: 0.50     Years: 52.00     Pack years: 26.00     Types: Cigarettes     Start date: 1/1/1967    Smokeless tobacco: Never Used   Substance and Sexual Activity    Alcohol use: Yes     Alcohol/week: 0.0 standard drinks     Comment: 2-3 beers a month    Drug use:  No friction rub. No gallop. Pulmonary:      Effort: Pulmonary effort is normal. No respiratory distress. Breath sounds: No stridor. No wheezing, rhonchi or rales. Comments: Mildly diminished throughout. Abdominal:      General: Bowel sounds are normal. There is no distension. Palpations: Abdomen is soft. There is no mass. Tenderness: There is no abdominal tenderness. There is no guarding or rebound. Hernia: No hernia is present. Musculoskeletal:         General: No deformity. Normal range of motion. Cervical back: Normal range of motion and neck supple. Comments: Bilateral healed groin incisions. Right foot with 1+ non-pitting edema and dependent rubor. No discoloration of left foot. No palpable cords. Negative Wilfred's. Skin:     General: Skin is warm and dry. Capillary Refill: Capillary refill takes less than 2 seconds. Coloration: Skin is not jaundiced. Comments: Right foot dependent rubor   Neurological:      General: No focal deficit present. Mental Status: He is alert. Cranial Nerves: No cranial nerve deficit. Gait: Gait normal.   Psychiatric:         Mood and Affect: Mood normal.         Behavior: Behavior normal.         Thought Content: Thought content normal.         Judgment: Judgment normal.         Pulses:    carotid brachial radial femoral popliteal DP PT   RIGHT 2 2 2 dopp dopp Mono Faint mono   LEFT 2 2 2 2 dopp Faint mono Faint mono     Labs:   Last BMP in December 2020 showed a BUN and creatinine of 31 and 1.0, respectively. Diagnosis:    Atherosclerosis native arteries of bilateral lower extremities with rest pain  Tobacco abuse  Congestive heart failure  COPD. Plan:   1. Given the presence of rest pain, will likely need arterial intervention  2. Avoiding going straight to arteriogram at this time due to his other comorbidities. Check CTA aorta with runoff first and further recommendations to follow.     3. Smoking cessation:  I advised patient to quit, and offered support. Educational material distributed. Discussed current use pattern.

## 2021-09-16 ENCOUNTER — OFFICE VISIT (OUTPATIENT)
Dept: VASCULAR SURGERY | Age: 71
End: 2021-09-16
Payer: MEDICARE

## 2021-09-16 ENCOUNTER — HOSPITAL ENCOUNTER (INPATIENT)
Age: 71
LOS: 3 days | Discharge: HOME HEALTH | DRG: 175 | End: 2021-09-20
Attending: EMERGENCY MEDICINE | Admitting: INTERNAL MEDICINE
Payer: MEDICARE

## 2021-09-16 ENCOUNTER — APPOINTMENT (OUTPATIENT)
Dept: CT IMAGING | Age: 71
DRG: 175 | End: 2021-09-16
Payer: MEDICARE

## 2021-09-16 ENCOUNTER — APPOINTMENT (OUTPATIENT)
Dept: GENERAL RADIOLOGY | Age: 71
DRG: 175 | End: 2021-09-16
Payer: MEDICARE

## 2021-09-16 VITALS
DIASTOLIC BLOOD PRESSURE: 70 MMHG | TEMPERATURE: 98.1 F | BODY MASS INDEX: 29.35 KG/M2 | WEIGHT: 205 LBS | SYSTOLIC BLOOD PRESSURE: 125 MMHG | HEIGHT: 70 IN | HEART RATE: 72 BPM

## 2021-09-16 DIAGNOSIS — J44.9 OSA AND COPD OVERLAP SYNDROME (HCC): ICD-10-CM

## 2021-09-16 DIAGNOSIS — G47.33 OSA AND COPD OVERLAP SYNDROME (HCC): ICD-10-CM

## 2021-09-16 DIAGNOSIS — I74.09 AORTOILIAC OCCLUSIVE DISEASE (HCC): Primary | ICD-10-CM

## 2021-09-16 DIAGNOSIS — I26.99 PULMONARY INFARCT (HCC): ICD-10-CM

## 2021-09-16 DIAGNOSIS — I26.99 OTHER ACUTE PULMONARY EMBOLISM WITHOUT ACUTE COR PULMONALE (HCC): Primary | ICD-10-CM

## 2021-09-16 LAB
ALBUMIN SERPL-MCNC: 3.6 G/DL (ref 3.4–5)
ALP BLD-CCNC: 89 U/L (ref 40–129)
ALT SERPL-CCNC: 20 U/L (ref 10–40)
ANION GAP SERPL CALCULATED.3IONS-SCNC: 16 MMOL/L (ref 3–16)
AST SERPL-CCNC: 18 U/L (ref 15–37)
BASE EXCESS VENOUS: -4.3 MMOL/L (ref -2–3)
BASOPHILS ABSOLUTE: 0.1 K/UL (ref 0–0.2)
BASOPHILS RELATIVE PERCENT: 0.8 %
BILIRUB SERPL-MCNC: 0.7 MG/DL (ref 0–1)
BILIRUBIN DIRECT: <0.2 MG/DL (ref 0–0.3)
BILIRUBIN, INDIRECT: NORMAL MG/DL (ref 0–1)
BUN BLDV-MCNC: 9 MG/DL (ref 7–20)
CALCIUM SERPL-MCNC: 8.6 MG/DL (ref 8.3–10.6)
CARBOXYHEMOGLOBIN: 5.3 % (ref 0–1.5)
CHLORIDE BLD-SCNC: 99 MMOL/L (ref 99–110)
CO2: 17 MMOL/L (ref 21–32)
CREAT SERPL-MCNC: 1 MG/DL (ref 0.8–1.3)
EOSINOPHILS ABSOLUTE: 0.2 K/UL (ref 0–0.6)
EOSINOPHILS RELATIVE PERCENT: 2.4 %
GFR AFRICAN AMERICAN: >60
GFR NON-AFRICAN AMERICAN: >60
GLUCOSE BLD-MCNC: 107 MG/DL (ref 70–99)
HCO3 VENOUS: 20.5 MMOL/L (ref 24–28)
HCT VFR BLD CALC: 41.2 % (ref 40.5–52.5)
HEMOGLOBIN, VEN, REDUCED: 34.4 %
HEMOGLOBIN: 13.8 G/DL (ref 13.5–17.5)
LIPASE: 16 U/L (ref 13–60)
LYMPHOCYTES ABSOLUTE: 1.4 K/UL (ref 1–5.1)
LYMPHOCYTES RELATIVE PERCENT: 15.1 %
MAGNESIUM: 1.3 MG/DL (ref 1.8–2.4)
MCH RBC QN AUTO: 36.8 PG (ref 26–34)
MCHC RBC AUTO-ENTMCNC: 33.6 G/DL (ref 31–36)
MCV RBC AUTO: 109.6 FL (ref 80–100)
METHEMOGLOBIN VENOUS: 0.2 % (ref 0–1.5)
MONOCYTES ABSOLUTE: 0.8 K/UL (ref 0–1.3)
MONOCYTES RELATIVE PERCENT: 8.9 %
NEUTROPHILS ABSOLUTE: 6.9 K/UL (ref 1.7–7.7)
NEUTROPHILS RELATIVE PERCENT: 72.8 %
O2 SAT, VEN: 64 %
PCO2, VEN: 35.9 MMHG (ref 41–51)
PDW BLD-RTO: 15.1 % (ref 12.4–15.4)
PH VENOUS: 7.37 (ref 7.35–7.45)
PHOSPHORUS: 2.9 MG/DL (ref 2.5–4.9)
PLATELET # BLD: 136 K/UL (ref 135–450)
PMV BLD AUTO: 8.9 FL (ref 5–10.5)
PO2, VEN: 32.5 MMHG (ref 25–40)
POTASSIUM SERPL-SCNC: 3.6 MMOL/L (ref 3.5–5.1)
PRO-BNP: 319 PG/ML (ref 0–124)
RBC # BLD: 3.76 M/UL (ref 4.2–5.9)
SODIUM BLD-SCNC: 132 MMOL/L (ref 136–145)
TCO2 CALC VENOUS: 22 MMOL/L
TOTAL PROTEIN: 6.7 G/DL (ref 6.4–8.2)
TROPONIN: <0.01 NG/ML
WBC # BLD: 9.5 K/UL (ref 4–11)

## 2021-09-16 PROCEDURE — 96375 TX/PRO/DX INJ NEW DRUG ADDON: CPT

## 2021-09-16 PROCEDURE — 85730 THROMBOPLASTIN TIME PARTIAL: CPT

## 2021-09-16 PROCEDURE — 71260 CT THORAX DX C+: CPT

## 2021-09-16 PROCEDURE — 96365 THER/PROPH/DIAG IV INF INIT: CPT

## 2021-09-16 PROCEDURE — 82803 BLOOD GASES ANY COMBINATION: CPT

## 2021-09-16 PROCEDURE — 83690 ASSAY OF LIPASE: CPT

## 2021-09-16 PROCEDURE — 99285 EMERGENCY DEPT VISIT HI MDM: CPT

## 2021-09-16 PROCEDURE — 3017F COLORECTAL CA SCREEN DOC REV: CPT | Performed by: SURGERY

## 2021-09-16 PROCEDURE — 85610 PROTHROMBIN TIME: CPT

## 2021-09-16 PROCEDURE — 96367 TX/PROPH/DG ADDL SEQ IV INF: CPT

## 2021-09-16 PROCEDURE — 4040F PNEUMOC VAC/ADMIN/RCVD: CPT | Performed by: SURGERY

## 2021-09-16 PROCEDURE — 80048 BASIC METABOLIC PNL TOTAL CA: CPT

## 2021-09-16 PROCEDURE — 83735 ASSAY OF MAGNESIUM: CPT

## 2021-09-16 PROCEDURE — 93005 ELECTROCARDIOGRAM TRACING: CPT | Performed by: EMERGENCY MEDICINE

## 2021-09-16 PROCEDURE — 36415 COLL VENOUS BLD VENIPUNCTURE: CPT

## 2021-09-16 PROCEDURE — 80076 HEPATIC FUNCTION PANEL: CPT

## 2021-09-16 PROCEDURE — G8417 CALC BMI ABV UP PARAM F/U: HCPCS | Performed by: SURGERY

## 2021-09-16 PROCEDURE — 71045 X-RAY EXAM CHEST 1 VIEW: CPT

## 2021-09-16 PROCEDURE — 84484 ASSAY OF TROPONIN QUANT: CPT

## 2021-09-16 PROCEDURE — 85025 COMPLETE CBC W/AUTO DIFF WBC: CPT

## 2021-09-16 PROCEDURE — 99215 OFFICE O/P EST HI 40 MIN: CPT | Performed by: SURGERY

## 2021-09-16 PROCEDURE — 1123F ACP DISCUSS/DSCN MKR DOCD: CPT | Performed by: SURGERY

## 2021-09-16 PROCEDURE — 83880 ASSAY OF NATRIURETIC PEPTIDE: CPT

## 2021-09-16 PROCEDURE — G8427 DOCREV CUR MEDS BY ELIG CLIN: HCPCS | Performed by: SURGERY

## 2021-09-16 PROCEDURE — 84100 ASSAY OF PHOSPHORUS: CPT

## 2021-09-16 PROCEDURE — 6360000002 HC RX W HCPCS: Performed by: EMERGENCY MEDICINE

## 2021-09-16 PROCEDURE — 6360000004 HC RX CONTRAST MEDICATION: Performed by: EMERGENCY MEDICINE

## 2021-09-16 PROCEDURE — 74177 CT ABD & PELVIS W/CONTRAST: CPT

## 2021-09-16 PROCEDURE — 4004F PT TOBACCO SCREEN RCVD TLK: CPT | Performed by: SURGERY

## 2021-09-16 RX ORDER — MORPHINE SULFATE 4 MG/ML
4 INJECTION, SOLUTION INTRAMUSCULAR; INTRAVENOUS ONCE
Status: COMPLETED | OUTPATIENT
Start: 2021-09-16 | End: 2021-09-16

## 2021-09-16 RX ADMIN — IOPAMIDOL 80 ML: 755 INJECTION, SOLUTION INTRAVENOUS at 23:48

## 2021-09-16 RX ADMIN — MORPHINE SULFATE 4 MG: 4 INJECTION INTRAVENOUS at 22:55

## 2021-09-16 ASSESSMENT — PAIN DESCRIPTION - DESCRIPTORS: DESCRIPTORS: STABBING

## 2021-09-16 ASSESSMENT — PAIN DESCRIPTION - LOCATION: LOCATION: CHEST

## 2021-09-16 ASSESSMENT — PAIN SCALES - GENERAL
PAINLEVEL_OUTOF10: 9
PAINLEVEL_OUTOF10: 9

## 2021-09-16 ASSESSMENT — ENCOUNTER SYMPTOMS
RESPIRATORY NEGATIVE: 1
GASTROINTESTINAL NEGATIVE: 1
EYES NEGATIVE: 1

## 2021-09-16 ASSESSMENT — PAIN DESCRIPTION - ORIENTATION: ORIENTATION: RIGHT

## 2021-09-17 ENCOUNTER — APPOINTMENT (OUTPATIENT)
Dept: VASCULAR LAB | Age: 71
DRG: 175 | End: 2021-09-17
Payer: MEDICARE

## 2021-09-17 ENCOUNTER — APPOINTMENT (OUTPATIENT)
Dept: CT IMAGING | Age: 71
DRG: 175 | End: 2021-09-17
Payer: MEDICARE

## 2021-09-17 ENCOUNTER — APPOINTMENT (OUTPATIENT)
Dept: GENERAL RADIOLOGY | Age: 71
DRG: 175 | End: 2021-09-17
Payer: MEDICARE

## 2021-09-17 PROBLEM — I26.99 PULMONARY EMBOLISM WITHOUT ACUTE COR PULMONALE (HCC): Status: ACTIVE | Noted: 2021-09-17

## 2021-09-17 LAB
ALBUMIN SERPL-MCNC: 3.2 G/DL (ref 3.4–5)
ANION GAP SERPL CALCULATED.3IONS-SCNC: 10 MMOL/L (ref 3–16)
ANION GAP SERPL CALCULATED.3IONS-SCNC: 12 MMOL/L (ref 3–16)
ANTI-XA UNFRAC HEPARIN: 0.28 IU/ML (ref 0.3–0.7)
ANTI-XA UNFRAC HEPARIN: 0.43 IU/ML (ref 0.3–0.7)
ANTI-XA UNFRAC HEPARIN: 0.43 IU/ML (ref 0.3–0.7)
ANTI-XA UNFRAC HEPARIN: 0.61 IU/ML (ref 0.3–0.7)
APTT: 32.2 SEC (ref 26.2–38.6)
BASE EXCESS ARTERIAL: ABNORMAL MMOL/L (ref -3–3)
BUN BLDV-MCNC: 10 MG/DL (ref 7–20)
BUN BLDV-MCNC: 11 MG/DL (ref 7–20)
CALCIUM SERPL-MCNC: 8.6 MG/DL (ref 8.3–10.6)
CALCIUM SERPL-MCNC: 8.6 MG/DL (ref 8.3–10.6)
CARBOXYHEMOGLOBIN ARTERIAL: ABNORMAL % (ref 0–1.5)
CHLORIDE BLD-SCNC: 101 MMOL/L (ref 99–110)
CHLORIDE BLD-SCNC: 98 MMOL/L (ref 99–110)
CO2: 21 MMOL/L (ref 21–32)
CO2: 23 MMOL/L (ref 21–32)
CREAT SERPL-MCNC: 0.8 MG/DL (ref 0.8–1.3)
CREAT SERPL-MCNC: 0.9 MG/DL (ref 0.8–1.3)
EKG ATRIAL RATE: 83 BPM
EKG ATRIAL RATE: 85 BPM
EKG DIAGNOSIS: NORMAL
EKG DIAGNOSIS: NORMAL
EKG P AXIS: 38 DEGREES
EKG P AXIS: 68 DEGREES
EKG P-R INTERVAL: 178 MS
EKG P-R INTERVAL: 182 MS
EKG Q-T INTERVAL: 384 MS
EKG Q-T INTERVAL: 402 MS
EKG QRS DURATION: 84 MS
EKG QRS DURATION: 96 MS
EKG QTC CALCULATION (BAZETT): 451 MS
EKG QTC CALCULATION (BAZETT): 478 MS
EKG R AXIS: 60 DEGREES
EKG R AXIS: 75 DEGREES
EKG T AXIS: 64 DEGREES
EKG T AXIS: 72 DEGREES
EKG VENTRICULAR RATE: 83 BPM
EKG VENTRICULAR RATE: 85 BPM
FOLATE: 3.03 NG/ML (ref 4.78–24.2)
GFR AFRICAN AMERICAN: >60
GFR AFRICAN AMERICAN: >60
GFR NON-AFRICAN AMERICAN: >60
GFR NON-AFRICAN AMERICAN: >60
GLUCOSE BLD-MCNC: 101 MG/DL (ref 70–99)
GLUCOSE BLD-MCNC: 122 MG/DL (ref 70–99)
GLUCOSE BLD-MCNC: 144 MG/DL (ref 70–99)
HCO3 ARTERIAL: 24 MMOL/L (ref 21–29)
HEMOGLOBIN, ART, EXTENDED: ABNORMAL G/DL
INR BLD: 1.03 (ref 0.88–1.12)
INR BLD: 1.09 (ref 0.88–1.12)
LACTIC ACID: 1.2 MMOL/L (ref 0.4–2)
LV EF: 58 %
LVEF MODALITY: NORMAL
MAGNESIUM: 1.7 MG/DL (ref 1.8–2.4)
METHEMOGLOBIN ARTERIAL: ABNORMAL % (ref 0–1.4)
O2 SAT, ARTERIAL: ABNORMAL % (ref 93–100)
PCO2 ARTERIAL: 37.7 MMHG (ref 35–45)
PERFORMED ON: ABNORMAL
PH ARTERIAL: 7.4 (ref 7.35–7.45)
PHOSPHORUS: 3.1 MG/DL (ref 2.5–4.9)
PO2 ARTERIAL: 64.3 MMHG (ref 75–108)
POTASSIUM REFLEX MAGNESIUM: 4.3 MMOL/L (ref 3.5–5.1)
POTASSIUM SERPL-SCNC: 4.7 MMOL/L (ref 3.5–5.1)
PROCALCITONIN: 0.1 NG/ML (ref 0–0.15)
PROCALCITONIN: 0.17 NG/ML (ref 0–0.15)
PROTHROMBIN TIME: 11.6 SEC (ref 9.9–12.7)
PROTHROMBIN TIME: 12.4 SEC (ref 9.9–12.7)
SODIUM BLD-SCNC: 131 MMOL/L (ref 136–145)
SODIUM BLD-SCNC: 134 MMOL/L (ref 136–145)
TCO2 ARTERIAL: 25 MMOL/L
TROPONIN: 0.01 NG/ML
TROPONIN: 0.03 NG/ML
TROPONIN: <0.01 NG/ML
VITAMIN B-12: 1163 PG/ML (ref 211–911)

## 2021-09-17 PROCEDURE — 85610 PROTHROMBIN TIME: CPT

## 2021-09-17 PROCEDURE — 71045 X-RAY EXAM CHEST 1 VIEW: CPT

## 2021-09-17 PROCEDURE — 82803 BLOOD GASES ANY COMBINATION: CPT

## 2021-09-17 PROCEDURE — 84145 PROCALCITONIN (PCT): CPT

## 2021-09-17 PROCEDURE — 6370000000 HC RX 637 (ALT 250 FOR IP): Performed by: STUDENT IN AN ORGANIZED HEALTH CARE EDUCATION/TRAINING PROGRAM

## 2021-09-17 PROCEDURE — 71275 CT ANGIOGRAPHY CHEST: CPT

## 2021-09-17 PROCEDURE — XW03396 INTRODUCTION OF CEFTOLOZANE/TAZOBACTAM ANTI-INFECTIVE INTO PERIPHERAL VEIN, PERCUTANEOUS APPROACH, NEW TECHNOLOGY GROUP 6: ICD-10-PCS | Performed by: INTERNAL MEDICINE

## 2021-09-17 PROCEDURE — 94761 N-INVAS EAR/PLS OXIMETRY MLT: CPT

## 2021-09-17 PROCEDURE — 87040 BLOOD CULTURE FOR BACTERIA: CPT

## 2021-09-17 PROCEDURE — 85520 HEPARIN ASSAY: CPT

## 2021-09-17 PROCEDURE — 93970 EXTREMITY STUDY: CPT

## 2021-09-17 PROCEDURE — 2580000003 HC RX 258: Performed by: STUDENT IN AN ORGANIZED HEALTH CARE EDUCATION/TRAINING PROGRAM

## 2021-09-17 PROCEDURE — C8929 TTE W OR WO FOL WCON,DOPPLER: HCPCS

## 2021-09-17 PROCEDURE — 84484 ASSAY OF TROPONIN QUANT: CPT

## 2021-09-17 PROCEDURE — 80069 RENAL FUNCTION PANEL: CPT

## 2021-09-17 PROCEDURE — 83605 ASSAY OF LACTIC ACID: CPT

## 2021-09-17 PROCEDURE — 94640 AIRWAY INHALATION TREATMENT: CPT

## 2021-09-17 PROCEDURE — 6360000002 HC RX W HCPCS: Performed by: EMERGENCY MEDICINE

## 2021-09-17 PROCEDURE — 6360000002 HC RX W HCPCS: Performed by: STUDENT IN AN ORGANIZED HEALTH CARE EDUCATION/TRAINING PROGRAM

## 2021-09-17 PROCEDURE — 82607 VITAMIN B-12: CPT

## 2021-09-17 PROCEDURE — 93010 ELECTROCARDIOGRAM REPORT: CPT | Performed by: INTERNAL MEDICINE

## 2021-09-17 PROCEDURE — 83735 ASSAY OF MAGNESIUM: CPT

## 2021-09-17 PROCEDURE — 6370000000 HC RX 637 (ALT 250 FOR IP)

## 2021-09-17 PROCEDURE — 2700000000 HC OXYGEN THERAPY PER DAY

## 2021-09-17 PROCEDURE — 87641 MR-STAPH DNA AMP PROBE: CPT

## 2021-09-17 PROCEDURE — 2000000000 HC ICU R&B

## 2021-09-17 PROCEDURE — 36415 COLL VENOUS BLD VENIPUNCTURE: CPT

## 2021-09-17 PROCEDURE — 82746 ASSAY OF FOLIC ACID SERUM: CPT

## 2021-09-17 PROCEDURE — 6360000004 HC RX CONTRAST MEDICATION: Performed by: STUDENT IN AN ORGANIZED HEALTH CARE EDUCATION/TRAINING PROGRAM

## 2021-09-17 PROCEDURE — 93005 ELECTROCARDIOGRAM TRACING: CPT | Performed by: STUDENT IN AN ORGANIZED HEALTH CARE EDUCATION/TRAINING PROGRAM

## 2021-09-17 PROCEDURE — 36600 WITHDRAWAL OF ARTERIAL BLOOD: CPT

## 2021-09-17 PROCEDURE — 2580000003 HC RX 258: Performed by: INTERNAL MEDICINE

## 2021-09-17 PROCEDURE — 6360000002 HC RX W HCPCS: Performed by: INTERNAL MEDICINE

## 2021-09-17 PROCEDURE — C9113 INJ PANTOPRAZOLE SODIUM, VIA: HCPCS | Performed by: STUDENT IN AN ORGANIZED HEALTH CARE EDUCATION/TRAINING PROGRAM

## 2021-09-17 PROCEDURE — 6360000004 HC RX CONTRAST MEDICATION: Performed by: INTERNAL MEDICINE

## 2021-09-17 PROCEDURE — 99222 1ST HOSP IP/OBS MODERATE 55: CPT | Performed by: SURGERY

## 2021-09-17 PROCEDURE — 99223 1ST HOSP IP/OBS HIGH 75: CPT | Performed by: INTERNAL MEDICINE

## 2021-09-17 RX ORDER — HEPARIN SODIUM 1000 [USP'U]/ML
80 INJECTION, SOLUTION INTRAVENOUS; SUBCUTANEOUS PRN
Status: DISCONTINUED | OUTPATIENT
Start: 2021-09-17 | End: 2021-09-19

## 2021-09-17 RX ORDER — KETOROLAC TROMETHAMINE 15 MG/ML
15 INJECTION, SOLUTION INTRAMUSCULAR; INTRAVENOUS ONCE
Status: COMPLETED | OUTPATIENT
Start: 2021-09-17 | End: 2021-09-17

## 2021-09-17 RX ORDER — SODIUM CHLORIDE 0.9 % (FLUSH) 0.9 %
5-40 SYRINGE (ML) INJECTION PRN
Status: DISCONTINUED | OUTPATIENT
Start: 2021-09-17 | End: 2021-09-20 | Stop reason: HOSPADM

## 2021-09-17 RX ORDER — MAGNESIUM SULFATE IN WATER 40 MG/ML
2000 INJECTION, SOLUTION INTRAVENOUS ONCE
Status: COMPLETED | OUTPATIENT
Start: 2021-09-17 | End: 2021-09-17

## 2021-09-17 RX ORDER — ALBUTEROL SULFATE 2.5 MG/3ML
2.5 SOLUTION RESPIRATORY (INHALATION) EVERY 4 HOURS PRN
Status: DISCONTINUED | OUTPATIENT
Start: 2021-09-17 | End: 2021-09-20 | Stop reason: HOSPADM

## 2021-09-17 RX ORDER — NITROGLYCERIN 0.4 MG/1
TABLET SUBLINGUAL
Status: COMPLETED
Start: 2021-09-17 | End: 2021-09-17

## 2021-09-17 RX ORDER — HEPARIN SODIUM 10000 [USP'U]/100ML
5-30 INJECTION, SOLUTION INTRAVENOUS CONTINUOUS
Status: DISCONTINUED | OUTPATIENT
Start: 2021-09-17 | End: 2021-09-19

## 2021-09-17 RX ORDER — LOSARTAN POTASSIUM 100 MG/1
1 TABLET ORAL DAILY
Status: CANCELLED | OUTPATIENT
Start: 2021-09-17

## 2021-09-17 RX ORDER — ISOSORBIDE MONONITRATE 30 MG/1
30 TABLET, EXTENDED RELEASE ORAL DAILY
Status: CANCELLED | OUTPATIENT
Start: 2021-09-17

## 2021-09-17 RX ORDER — ACETAMINOPHEN 325 MG/1
650 TABLET ORAL EVERY 6 HOURS PRN
Status: DISCONTINUED | OUTPATIENT
Start: 2021-09-17 | End: 2021-09-20 | Stop reason: HOSPADM

## 2021-09-17 RX ORDER — KETOROLAC TROMETHAMINE 15 MG/ML
15 INJECTION, SOLUTION INTRAMUSCULAR; INTRAVENOUS ONCE
Status: DISCONTINUED | OUTPATIENT
Start: 2021-09-17 | End: 2021-09-17 | Stop reason: SDUPTHER

## 2021-09-17 RX ORDER — ALBUTEROL SULFATE 2.5 MG/3ML
2.5 SOLUTION RESPIRATORY (INHALATION) 4 TIMES DAILY
Status: DISCONTINUED | OUTPATIENT
Start: 2021-09-17 | End: 2021-09-19

## 2021-09-17 RX ORDER — OXYCODONE HYDROCHLORIDE AND ACETAMINOPHEN 5; 325 MG/1; MG/1
1 TABLET ORAL EVERY 4 HOURS PRN
Status: CANCELLED | OUTPATIENT
Start: 2021-09-17

## 2021-09-17 RX ORDER — METHYLPREDNISOLONE SODIUM SUCCINATE 125 MG/2ML
125 INJECTION, POWDER, LYOPHILIZED, FOR SOLUTION INTRAMUSCULAR; INTRAVENOUS ONCE
Status: COMPLETED | OUTPATIENT
Start: 2021-09-17 | End: 2021-09-17

## 2021-09-17 RX ORDER — KETOROLAC TROMETHAMINE 30 MG/ML
30 INJECTION, SOLUTION INTRAMUSCULAR; INTRAVENOUS ONCE
Status: DISCONTINUED | OUTPATIENT
Start: 2021-09-17 | End: 2021-09-17

## 2021-09-17 RX ORDER — LINEZOLID 2 MG/ML
600 INJECTION, SOLUTION INTRAVENOUS EVERY 12 HOURS
Status: DISCONTINUED | OUTPATIENT
Start: 2021-09-17 | End: 2021-09-19

## 2021-09-17 RX ORDER — METHYLPREDNISOLONE SODIUM SUCCINATE 40 MG/ML
40 INJECTION, POWDER, LYOPHILIZED, FOR SOLUTION INTRAMUSCULAR; INTRAVENOUS EVERY 12 HOURS
Status: DISCONTINUED | OUTPATIENT
Start: 2021-09-18 | End: 2021-09-20 | Stop reason: HOSPADM

## 2021-09-17 RX ORDER — ONDANSETRON 2 MG/ML
4 INJECTION INTRAMUSCULAR; INTRAVENOUS EVERY 6 HOURS PRN
Status: DISCONTINUED | OUTPATIENT
Start: 2021-09-17 | End: 2021-09-20 | Stop reason: HOSPADM

## 2021-09-17 RX ORDER — SODIUM CHLORIDE 0.9 % (FLUSH) 0.9 %
5-40 SYRINGE (ML) INJECTION EVERY 12 HOURS SCHEDULED
Status: DISCONTINUED | OUTPATIENT
Start: 2021-09-17 | End: 2021-09-20 | Stop reason: HOSPADM

## 2021-09-17 RX ORDER — PANTOPRAZOLE SODIUM 40 MG/10ML
40 INJECTION, POWDER, LYOPHILIZED, FOR SOLUTION INTRAVENOUS DAILY
Status: DISCONTINUED | OUTPATIENT
Start: 2021-09-17 | End: 2021-09-20 | Stop reason: HOSPADM

## 2021-09-17 RX ORDER — NICOTINE 21 MG/24HR
1 PATCH, TRANSDERMAL 24 HOURS TRANSDERMAL DAILY
Status: DISCONTINUED | OUTPATIENT
Start: 2021-09-17 | End: 2021-09-20 | Stop reason: HOSPADM

## 2021-09-17 RX ORDER — HEPARIN SODIUM 1000 [USP'U]/ML
80 INJECTION, SOLUTION INTRAVENOUS; SUBCUTANEOUS ONCE
Status: COMPLETED | OUTPATIENT
Start: 2021-09-17 | End: 2021-09-17

## 2021-09-17 RX ORDER — LIDOCAINE 4 G/G
1 PATCH TOPICAL DAILY
Status: DISCONTINUED | OUTPATIENT
Start: 2021-09-17 | End: 2021-09-20 | Stop reason: HOSPADM

## 2021-09-17 RX ORDER — POLYETHYLENE GLYCOL 3350 17 G/17G
17 POWDER, FOR SOLUTION ORAL DAILY PRN
Status: DISCONTINUED | OUTPATIENT
Start: 2021-09-17 | End: 2021-09-20 | Stop reason: HOSPADM

## 2021-09-17 RX ORDER — HEPARIN SODIUM 1000 [USP'U]/ML
40 INJECTION, SOLUTION INTRAVENOUS; SUBCUTANEOUS PRN
Status: DISCONTINUED | OUTPATIENT
Start: 2021-09-17 | End: 2021-09-19

## 2021-09-17 RX ORDER — ALLOPURINOL 100 MG/1
100 TABLET ORAL DAILY
Status: DISCONTINUED | OUTPATIENT
Start: 2021-09-17 | End: 2021-09-20 | Stop reason: HOSPADM

## 2021-09-17 RX ORDER — ONDANSETRON 4 MG/1
4 TABLET, ORALLY DISINTEGRATING ORAL EVERY 8 HOURS PRN
Status: DISCONTINUED | OUTPATIENT
Start: 2021-09-17 | End: 2021-09-20 | Stop reason: HOSPADM

## 2021-09-17 RX ORDER — ACETAMINOPHEN 650 MG/1
650 SUPPOSITORY RECTAL EVERY 6 HOURS PRN
Status: DISCONTINUED | OUTPATIENT
Start: 2021-09-17 | End: 2021-09-20 | Stop reason: HOSPADM

## 2021-09-17 RX ORDER — IPRATROPIUM BROMIDE AND ALBUTEROL SULFATE 2.5; .5 MG/3ML; MG/3ML
1 SOLUTION RESPIRATORY (INHALATION)
Status: DISCONTINUED | OUTPATIENT
Start: 2021-09-17 | End: 2021-09-17

## 2021-09-17 RX ORDER — FUROSEMIDE 40 MG/1
40 TABLET ORAL DAILY
Status: CANCELLED | OUTPATIENT
Start: 2021-09-17

## 2021-09-17 RX ORDER — NITROGLYCERIN 0.4 MG/1
0.4 TABLET SUBLINGUAL
Status: ACTIVE | OUTPATIENT
Start: 2021-09-17 | End: 2021-09-17

## 2021-09-17 RX ORDER — FUROSEMIDE 10 MG/ML
40 INJECTION INTRAMUSCULAR; INTRAVENOUS ONCE
Status: COMPLETED | OUTPATIENT
Start: 2021-09-17 | End: 2021-09-17

## 2021-09-17 RX ORDER — OXYCODONE HYDROCHLORIDE AND ACETAMINOPHEN 5; 325 MG/1; MG/1
1 TABLET ORAL EVERY 6 HOURS PRN
Status: DISCONTINUED | OUTPATIENT
Start: 2021-09-17 | End: 2021-09-20 | Stop reason: HOSPADM

## 2021-09-17 RX ORDER — ATORVASTATIN CALCIUM 80 MG/1
80 TABLET, FILM COATED ORAL DAILY
Status: DISCONTINUED | OUTPATIENT
Start: 2021-09-17 | End: 2021-09-20 | Stop reason: HOSPADM

## 2021-09-17 RX ORDER — SODIUM CHLORIDE 9 MG/ML
25 INJECTION, SOLUTION INTRAVENOUS PRN
Status: DISCONTINUED | OUTPATIENT
Start: 2021-09-17 | End: 2021-09-20 | Stop reason: HOSPADM

## 2021-09-17 RX ADMIN — MAGNESIUM SULFATE HEPTAHYDRATE 2000 MG: 2 INJECTION, SOLUTION INTRAVENOUS at 01:50

## 2021-09-17 RX ADMIN — HEPARIN SODIUM 18 UNITS/KG/HR: 10000 INJECTION, SOLUTION INTRAVENOUS at 01:33

## 2021-09-17 RX ADMIN — TIOTROPIUM BROMIDE INHALATION SPRAY 2 PUFF: 3.12 SPRAY, METERED RESPIRATORY (INHALATION) at 10:10

## 2021-09-17 RX ADMIN — ALLOPURINOL 100 MG: 100 TABLET ORAL at 08:51

## 2021-09-17 RX ADMIN — Medication 10 ML: at 20:18

## 2021-09-17 RX ADMIN — ATORVASTATIN CALCIUM 80 MG: 80 TABLET, FILM COATED ORAL at 08:51

## 2021-09-17 RX ADMIN — Medication 10 ML: at 08:47

## 2021-09-17 RX ADMIN — KETOROLAC TROMETHAMINE 15 MG: 15 INJECTION, SOLUTION INTRAMUSCULAR; INTRAVENOUS at 13:48

## 2021-09-17 RX ADMIN — AZITHROMYCIN MONOHYDRATE 500 MG: 500 INJECTION, POWDER, LYOPHILIZED, FOR SOLUTION INTRAVENOUS at 19:37

## 2021-09-17 RX ADMIN — OXYCODONE HYDROCHLORIDE AND ACETAMINOPHEN 1 TABLET: 5; 325 TABLET ORAL at 20:59

## 2021-09-17 RX ADMIN — METHYLPREDNISOLONE SODIUM SUCCINATE 125 MG: 125 INJECTION, POWDER, FOR SOLUTION INTRAMUSCULAR; INTRAVENOUS at 17:03

## 2021-09-17 RX ADMIN — PIPERACILLIN AND TAZOBACTAM 3375 MG: 3; .375 INJECTION, POWDER, LYOPHILIZED, FOR SOLUTION INTRAVENOUS at 17:09

## 2021-09-17 RX ADMIN — LINEZOLID 600 MG: 600 INJECTION, SOLUTION INTRAVENOUS at 18:04

## 2021-09-17 RX ADMIN — PANTOPRAZOLE SODIUM 40 MG: 40 INJECTION, POWDER, FOR SOLUTION INTRAVENOUS at 14:18

## 2021-09-17 RX ADMIN — PERFLUTREN 1.65 MG: 6.52 INJECTION, SUSPENSION INTRAVENOUS at 15:46

## 2021-09-17 RX ADMIN — IOPAMIDOL 80 ML: 755 INJECTION, SOLUTION INTRAVENOUS at 16:43

## 2021-09-17 RX ADMIN — FUROSEMIDE 40 MG: 10 INJECTION, SOLUTION INTRAMUSCULAR; INTRAVENOUS at 13:49

## 2021-09-17 RX ADMIN — HEPARIN SODIUM 3720 UNITS: 1000 INJECTION INTRAVENOUS; SUBCUTANEOUS at 19:42

## 2021-09-17 RX ADMIN — MAGNESIUM SULFATE HEPTAHYDRATE 2000 MG: 2 INJECTION, SOLUTION INTRAVENOUS at 08:53

## 2021-09-17 RX ADMIN — HEPARIN SODIUM 7440 UNITS: 1000 INJECTION INTRAVENOUS; SUBCUTANEOUS at 01:30

## 2021-09-17 RX ADMIN — NITROGLYCERIN: 0.4 TABLET SUBLINGUAL at 13:48

## 2021-09-17 ASSESSMENT — ENCOUNTER SYMPTOMS
COUGH: 0
NAUSEA: 1
ABDOMINAL DISTENTION: 0
ABDOMINAL PAIN: 0
VOMITING: 0
CHEST TIGHTNESS: 1
SHORTNESS OF BREATH: 1
COUGH: 1
ABDOMINAL PAIN: 1
NAUSEA: 0
CONSTIPATION: 1
DIARRHEA: 0

## 2021-09-17 ASSESSMENT — PAIN SCALES - GENERAL
PAINLEVEL_OUTOF10: 7
PAINLEVEL_OUTOF10: 6
PAINLEVEL_OUTOF10: 6
PAINLEVEL_OUTOF10: 3
PAINLEVEL_OUTOF10: 0

## 2021-09-17 ASSESSMENT — PAIN DESCRIPTION - PROGRESSION
CLINICAL_PROGRESSION: NOT CHANGED
CLINICAL_PROGRESSION: NOT CHANGED

## 2021-09-17 ASSESSMENT — PAIN DESCRIPTION - ORIENTATION: ORIENTATION: RIGHT;MID

## 2021-09-17 ASSESSMENT — PAIN DESCRIPTION - PAIN TYPE: TYPE: ACUTE PAIN

## 2021-09-17 ASSESSMENT — PAIN - FUNCTIONAL ASSESSMENT: PAIN_FUNCTIONAL_ASSESSMENT: PREVENTS OR INTERFERES SOME ACTIVE ACTIVITIES AND ADLS

## 2021-09-17 ASSESSMENT — PAIN DESCRIPTION - LOCATION: LOCATION: CHEST

## 2021-09-17 ASSESSMENT — PAIN DESCRIPTION - DESCRIPTORS: DESCRIPTORS: STABBING

## 2021-09-17 ASSESSMENT — PAIN DESCRIPTION - FREQUENCY: FREQUENCY: CONTINUOUS

## 2021-09-17 ASSESSMENT — PAIN DESCRIPTION - ONSET: ONSET: ON-GOING

## 2021-09-17 NOTE — H&P
Internal Medicine  PGY 1  History & Physical      CC: Chest Pain    History Obtained From:  patient    HISTORY OF PRESENT ILLNESS:   Pt is a 72 y/o M w/ a PMHx of HTN, HLD, CAD, KAVITA-COPD syndrome, HFpEF, PVD, and Gout p/w pleuritic chest pain. Pt states that he woke up this morning and felt some mild R-sided discomfort which he initially attributed to \"sleeping funny\". Pt says that this discomfort persisted over the day and acutely worsened around dinner time when he began to have sharp pain in his right side that worsened with deep breaths. Pt said at this time we also started to have some light-headedness & SOB which prompted him to come to the ED. Upon arrival to the ED, patient was noted to be hypoxic to 88% on RA and still having ongoing pleuritic chest pain. CTPA revealed acute R-sided pulmonary emboli in the R-middle and R-lower lobes w/ no evidence of R-heart strain on imaging. Pt endorses no recent surgery/trauma/immobilization. Pt admitted for IV heparin gtt and further mgmt of his acute unprovoked PE.     Past Medical History:        Diagnosis Date    CAD (coronary artery disease)     CHF (congestive heart failure) (HCC)     COPD (chronic obstructive pulmonary disease) (HCC)     Depressive disorder, not elsewhere classified     GERD (gastroesophageal reflux disease)     History of colon polyps - 30 seen on colonoscopy 04/2021 4/24/2021    History of MI (myocardial infarction) 05/2013    History of skin ulcer of lower extremity     R anterior shin    History of transient ischemic attack (TIA)     Hyperlipidemia     Hypertension     Neuropathy     KAVITA (obstructive sleep apnea)     On CPAP    Personal history of DVT (deep vein thrombosis)     PVD (peripheral vascular disease) (HCC)     TIA (transient ischemic attack) 2013    Vertebral fracture    ·     Past Surgical History:        Procedure Laterality Date    APPENDECTOMY      CHOLECYSTECTOMY, LAPAROSCOPIC      COLONOSCOPY  4/23/2021 COLONOSCOPY POLYPECTOMY SNARE/COLD BIOPSY performed by Cele Thompson MD at [address] [facility]  4/23/2021    COLONOSCOPY POLYPECTOMY ABLATION performed by Cele Thompson MD at [address] [facility]  4/23/2021    COLONOSCOPY CONTROL HEMORRHAGE performed by Cele Thompson MD at [address]  6/2013    EYE SURGERY Left     LAMINECTOMY  11/18/2015    Bilateral decompressive lumbar laminectomy L4-5   ; medial facetectomy L4-5 joints  bilaterally; foraminotomies l5   nerve roots bilaterally    LEG DEBRIDEMENT Right 7/23/2013    Dr. Danyel Cordova - pretibial wound    OTHER SURGICAL HISTORY Right 6/25/2013    Dr. Danyel Cordova - CFA Endarterectomy w/marsupialization; RLE wound excision & debridement     OTHER SURGICAL HISTORY Left 8/27/2013    Dr. Danyel Cordova - femoral & profunda femoris endarterectomy w/marsupialization patch angioplasty using SFA    SKIN SPLIT GRAFT Right 7/25/2013    Dr. Danyel Cordova - to non-healing R pretibial wound, 9 x 15 cm    TOTAL HIP ARTHROPLASTY Right 09/01/2016    Dr. John Youngblood Left 12/22/2015    Dr. Danyel Cordova - CFA exploration, thrombectomy of ileofemoral system, stenting of RAFAL in-stent stenosis w/8 x 37 mm Palmaz    ·     Medications Priorto Admission:    · Not in a hospital admission. Allergies:  Asa [aspirin] and Daliresp [roflumilast]    Social History:   · TOBACCO: Pt reports current smoking use of a pack a day. Has a pack of [employer] cigarettes in his breast pocket. 40+ pack year smoking history  · ETOH:   reports current alcohol use. · DRUGS : Remote use of MJ  · Patient currently lives alone  ·   Family History:       Problem Relation Age of Onset    Cancer Mother         Breast    Heart Disease Mother     Cancer Father         Bladder    Heart Disease Father     Cancer Paternal Grandmother         melanoma    ·     Review of Systems   Constitutional: Positive for fatigue (chronic).  Negative for Mood and Affect: Mood normal.         Behavior: Behavior normal.             Vitals:    09/17/21 0136   BP: 119/67   Pulse: 90   Resp: 24   Temp: 98.1 °F (36.7 °C)   SpO2: 91%       DATA:    Labs:  CBC:   Recent Labs     09/16/21 2252   WBC 9.5   HGB 13.8   HCT 41.2          BMP:   Recent Labs     09/16/21  1145 09/16/21 2252   * 132*   K 4.3 3.6   CL 96* 99   CO2 23 17*   BUN 10 9   CREATININE 0.9 1.0   GLUCOSE 111* 107*   PHOS  --  2.9     LFT's:   Recent Labs     09/16/21 2252   AST 18   ALT 20   BILITOT 0.7   ALKPHOS 89     Troponin:   Recent Labs     09/16/21 2252   TROPONINI <0.01     BNP:No results for input(s): BNP in the last 72 hours. ABGs: No results for input(s): PHART, AUE2YPH, PO2ART in the last 72 hours. INR:   Recent Labs     09/16/21 2251   INR 1.03       U/A:No results for input(s): NITRITE, COLORU, PHUR, LABCAST, WBCUA, RBCUA, MUCUS, TRICHOMONAS, YEAST, BACTERIA, CLARITYU, SPECGRAV, LEUKOCYTESUR, UROBILINOGEN, BILIRUBINUR, BLOODU, GLUCOSEU, AMORPHOUS in the last 72 hours. Invalid input(s): [facility]    CT CHEST PULMONARY EMBOLISM W CONTRAST   Final Result      Acute pulmonary emboli most extensively involving the right middle lobe and to a lesser degree right lower lobe. Right middle lobe and right lower lobe airspace consolidation, potentially secondary to a component of infarction, particularly of the right middle lobe given the most extensive clot burden. Trace right pleural effusion. Emphysema. Critical result called to the patient's nurse in the emergency department at time of dictation. EXAM: CT ABDOMEN AND PELVIS WITH CONTRAST      INDICATION: Right-sided abdominal pain      COMPARISON: CTA of the abdomen and pelvis from December 14, 2015      TECHNIQUE: Axial CT imaging obtained from lung bases through pelvis. Axial images and multiplanar reformatted images are provided for review.    Individualized dose optimization technique was used in order to meet ALARA standards for radiation dose    reduction. In addition to vendor specific dose reduction algorithms, the dose reduction techniques vary based on the specific scanner utilized but frequently include automated exposure control, adjustment of the mA and/or kV according to patient size,    and use of iterative reconstruction technique. IV Contrast: 80 mL Isovue-370   Oral Contrast: None      FINDINGS:      LIVER: Liver mildly, diffusely hypodense consistent with fatty infiltration. GALLBLADDER AND BILIARY TREE: Prior cholecystectomy. No intra- or extrahepatic biliary dilatation. PANCREAS: Normal.      SPLEEN: Normal.      ADRENAL GLANDS: Normal.      KIDNEYS AND URETERS: Hypodense simple appearing cysts throughout the left kidney, largest within the upper portion measuring nearly 2 cm. . No suspicious cortical lesions. 4 mm calcification within the lower left kidney felt to be a small nonobstructing    calculus. No ureteral calculi or hydronephrosis. URINARY BLADDER: Normal.      REPRODUCTIVE ORGANS: Prostate normal in size. BOWEL: Normal caliber. No suspicious bowel wall thickening or surrounding inflammatory fat stranding. LYMPH NODES: No abnormally enlarged nodes. PERITONEUM/RETROPERITONEUM: No ascites or free air. VESSELS: Extensive atherosclerotic calcification of the abdominal aorta which remains normal in caliber. Stents within the iliac vasculature. The inferior vena cava, hepatic veins and portal venous system are patent. ABDOMINAL WALL: Bilateral fat-containing inguinal hernias. No acute findings. BONES: Multilevel degenerative change of the lumbar spine status post multilevel dorsal decompression. Partially visualized right hip arthroplasty hardware in place without complicating features. IMPRESSION:      No acute intra-abdominal process. Fatty infiltration of the liver. Nonobstructive left-sided nephrolithiasis. Fat-containing inguinal hernias. CT ABDOMEN PELVIS W IV CONTRAST Additional Contrast? None   Final Result      Acute pulmonary emboli most extensively involving the right middle lobe and to a lesser degree right lower lobe. Right middle lobe and right lower lobe airspace consolidation, potentially secondary to a component of infarction, particularly of the right middle lobe given the most extensive clot burden. Trace right pleural effusion. Emphysema. Critical result called to the patient's nurse in the emergency department at time of dictation. EXAM: CT ABDOMEN AND PELVIS WITH CONTRAST      INDICATION: Right-sided abdominal pain      COMPARISON: CTA of the abdomen and pelvis from December 14, 2015      TECHNIQUE: Axial CT imaging obtained from lung bases through pelvis. Axial images and multiplanar reformatted images are provided for review. Individualized dose optimization technique was used in order to meet ALARA standards for radiation dose    reduction. In addition to vendor specific dose reduction algorithms, the dose reduction techniques vary based on the specific scanner utilized but frequently include automated exposure control, adjustment of the mA and/or kV according to patient size,    and use of iterative reconstruction technique. IV Contrast: 80 mL Isovue-370   Oral Contrast: None      FINDINGS:      LIVER: Liver mildly, diffusely hypodense consistent with fatty infiltration. GALLBLADDER AND BILIARY TREE: Prior cholecystectomy. No intra- or extrahepatic biliary dilatation. PANCREAS: Normal.      SPLEEN: Normal.      ADRENAL GLANDS: Normal.      KIDNEYS AND URETERS: Hypodense simple appearing cysts throughout the left kidney, largest within the upper portion measuring nearly 2 cm. . No suspicious cortical lesions. 4 mm calcification within the lower left kidney felt to be a small nonobstructing    calculus. No ureteral calculi or hydronephrosis. URINARY BLADDER: Normal.      REPRODUCTIVE ORGANS: Prostate normal in size. BOWEL: Normal caliber. No suspicious bowel wall thickening or surrounding inflammatory fat stranding. LYMPH NODES: No abnormally enlarged nodes. PERITONEUM/RETROPERITONEUM: No ascites or free air. VESSELS: Extensive atherosclerotic calcification of the abdominal aorta which remains normal in caliber. Stents within the iliac vasculature. The inferior vena cava, hepatic veins and portal venous system are patent. ABDOMINAL WALL: Bilateral fat-containing inguinal hernias. No acute findings. BONES: Multilevel degenerative change of the lumbar spine status post multilevel dorsal decompression. Partially visualized right hip arthroplasty hardware in place without complicating features. IMPRESSION:      No acute intra-abdominal process. Fatty infiltration of the liver. Nonobstructive left-sided nephrolithiasis. Fat-containing inguinal hernias. XR CHEST PORTABLE   Final Result      Right basilar airspace disease, atelectasis versus developing infiltrate. Increased interstitial opacities throughout both lungs favored to be a component of volume overload/interstitial edema. ASSESSMENT AND PLAN:    Unprovoked Pulmonary Embolism w/o Evidence of Cor Pulmonale  Possible Pulmonary Infarct  Emboli noted in R-middle and R-Lower lobe. No evidence of R-heart strain on imaging and Trop < 0.01. Pt on eliquis w/ no recent trauma/surgery/imbolization.   -Hep Gtt  -Supplemental O2 PRN, now on 3L NC  -Vascular Surgery C/s - known clinic patient, request for anti-coag rec's  -Bilateral Lower Extremity Doppler ordered   -Hypercoagulable Work-up deferred to primary team's discretion  -Dilaudid 0.5 mg Q4H PRN for Pleuritic Pain     Macrocytosis w/o Anemia  MCV of 109.6, MCH 36.8.  Significance unclear.  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 Carey Higuera    Code Status: FULL  FEN: Regular  PPX: Hep Gtt  DISPO: GMSMITH Waters MD  9/17/2021,  2:38 AM

## 2021-09-17 NOTE — CONSULTS
Vascular Surgery   Resident Consult Note    Reason for Consult: Anticoagulation recommendations for PE    History of Present Illness:   Earl Gomez. is a 73 y.o. male with Hx of CAD and MI (s/p stent in 2013) with CHF, PAD s/p common iliac stents (right in [place], left here in 2015, currently on Plavix), COPD, TIA who was seen with Dr. Clair Carmona in clinic 9/16 for outpatient consultation of PAD. He was then admitted to the hospital later that evening with stabbing chest pain, and admitted with unprovoked pulmonary embolism with pulmonary infarct, and was started on a heparin drip at that time. Vascular surgery was consulted to recommend anticoagulation for the pulmonary embolism. A rapid response was called on the patient today for episode of 10 /10 chest pain and desaturation. Nitro x2 was administered without resolution of chest pain. The patient's blood pressure dropped from the 989B to 803 systolic. Chest x-ray showed worsening pulmonary edema and atelectasis especially at the right lung base. On interview with the patient, the patient had dyspnea and right-sided chest pain. He reports history of rest pain and claudication up to his groin. He sleeps in a chair with his legs dangling below him. He still smokes 1 pack/day.      Past Medical History:        Diagnosis Date    CAD (coronary artery disease)     CHF (congestive heart failure) (HCC)     COPD (chronic obstructive pulmonary disease) (HCC)     Depressive disorder, not elsewhere classified     GERD (gastroesophageal reflux disease)     History of colon polyps - 30 seen on colonoscopy 04/2021 4/24/2021    History of MI (myocardial infarction) 05/2013    History of skin ulcer of lower extremity     R anterior shin    History of transient ischemic attack (TIA)     Hyperlipidemia     Hypertension     Neuropathy     KAVITA (obstructive sleep apnea)     On CPAP    Personal history of DVT (deep vein thrombosis)     PVD (peripheral vascular disease) ([facility] [address]     TIA (transient ischemic attack) 2013    Vertebral fracture        Past Surgical History:        Procedure Laterality Date    APPENDECTOMY      CHOLECYSTECTOMY, LAPAROSCOPIC      COLONOSCOPY  4/23/2021    COLONOSCOPY POLYPECTOMY SNARE/COLD BIOPSY performed by Hanna Yost MD at [facility]  4/23/2021    COLONOSCOPY POLYPECTOMY ABLATION performed by Hanna Yost MD at [address] [place] [address]  4/23/2021    COLONOSCOPY CONTROL HEMORRHAGE performed by Hanna Yost MD at [place] [address]  6/2013    EYE SURGERY Left     LAMINECTOMY  11/18/2015    Bilateral decompressive lumbar laminectomy L4-5   ; medial facetectomy L4-5 joints  bilaterally; foraminotomies l5   nerve roots bilaterally    LEG DEBRIDEMENT Right 7/23/2013    Dr. Brian Mancini - pretibial wound    OTHER SURGICAL HISTORY Right 6/25/2013    Dr. Brian Mancini - CFA Endarterectomy w/marsupialization; RLE wound excision & debridement     OTHER SURGICAL HISTORY Left 8/27/2013    Dr. Brian Mancini - femoral & profunda femoris endarterectomy w/marsupialization patch angioplasty using SFA    SKIN SPLIT GRAFT Right 7/25/2013    Dr. Brian Mancini - to non-healing R pretibial wound, 9 x 15 cm    TOTAL HIP ARTHROPLASTY Right 09/01/2016    Dr. Seena Gitelman Left 12/22/2015    Dr. Brian Mancini - CFA exploration, thrombectomy of ileofemoral system, stenting of RAFAL in-stent stenosis w/8 x 37 mm Palmaz        Allergies:  Asa [aspirin] and Daliresp [roflumilast]    Medications:   Home Meds  No current facility-administered medications on file prior to encounter.      Current Outpatient Medications on File Prior to Encounter   Medication Sig Dispense Refill    albuterol sulfate  (90 Base) MCG/ACT inhaler Inhale 2 puffs into the lungs every 6 hours as needed for Wheezing 3 Inhaler 4    isosorbide mononitrate (IMDUR) 30 MG extended release tablet Take 1 tablet by mouth daily 30 tablet 3    allopurinol (ZYLOPRIM) 100 MG tablet Take 1 tablet by mouth daily 180 tablet 1    losartan (COZAAR) 100 MG tablet TAKE 1 TABLET BY MOUTH EVERY DAY 90 tablet 1    clopidogrel (PLAVIX) 75 MG tablet TAKE 1 TABLET BY MOUTH DAILY 90 tablet 1    atorvastatin (LIPITOR) 80 MG tablet Take 1 tablet by mouth daily 90 tablet 1    nitroGLYCERIN (NITROSTAT) 0.4 MG SL tablet Place 1 tablet under the tongue every 5 minutes as needed for Chest pain 25 tablet 1    furosemide (LASIX) 40 MG tablet Take 1 tablet by mouth daily 90 tablet 3    metoprolol (LOPRESSOR) 100 MG tablet Take 1 tablet by mouth 2 times daily 270 tablet 1    tiotropium (SPIRIVA RESPIMAT) 2.5 MCG/ACT AERS inhaler Inhale 2 puffs into the lungs daily 3 Inhaler 4    magnesium gluconate (MAGONATE) 500 MG tablet Take 500 mg by mouth daily.          Current Meds  heparin (porcine) injection 7,440 Units, PRN  heparin (porcine) injection 3,720 Units, PRN  heparin 25,000 units in dextrose 5% 250 mL (premix) infusion, Continuous  tiotropium (SPIRIVA RESPIMAT) 2.5 MCG/ACT inhaler 2 puff, Daily  allopurinol (ZYLOPRIM) tablet 100 mg, Daily  albuterol (PROVENTIL) nebulizer solution 2.5 mg, Q4H PRN  atorvastatin (LIPITOR) tablet 80 mg, Daily  sodium chloride flush 0.9 % injection 5-40 mL, 2 times per day  sodium chloride flush 0.9 % injection 5-40 mL, PRN  0.9 % sodium chloride infusion, PRN  ondansetron (ZOFRAN-ODT) disintegrating tablet 4 mg, Q8H PRN   Or  ondansetron (ZOFRAN) injection 4 mg, Q6H PRN  polyethylene glycol (GLYCOLAX) packet 17 g, Daily PRN  acetaminophen (TYLENOL) tablet 650 mg, Q6H PRN   Or  acetaminophen (TYLENOL) suppository 650 mg, Q6H PRN  nicotine (NICODERM CQ) 21 MG/24HR 1 patch, Daily  perflutren lipid microspheres (DEFINITY) injection 1.65 mg, ONCE PRN  lidocaine 4 % external patch 1 patch, Daily  pantoprazole (PROTONIX) injection 40 mg, Daily  nitroGLYCERIN (NITROSTAT) SL tablet 0.4 mg, Once PRN        Family vendor specific dose reduction algorithms, the dose reduction techniques vary based on the specific scanner utilized but frequently include automated exposure control, adjustment of the mA and/or kV according to patient size,    and use of iterative reconstruction technique. IV Contrast: 80 mL Isovue-370   Oral Contrast: None      FINDINGS:      LIVER: Liver mildly, diffusely hypodense consistent with fatty infiltration. GALLBLADDER AND BILIARY TREE: Prior cholecystectomy. No intra- or extrahepatic biliary dilatation. PANCREAS: Normal.      SPLEEN: Normal.      ADRENAL GLANDS: Normal.      KIDNEYS AND URETERS: Hypodense simple appearing cysts throughout the left kidney, largest within the upper portion measuring nearly 2 cm. . No suspicious cortical lesions. 4 mm calcification within the lower left kidney felt to be a small nonobstructing    calculus. No ureteral calculi or hydronephrosis. URINARY BLADDER: Normal.      REPRODUCTIVE ORGANS: Prostate normal in size. BOWEL: Normal caliber. No suspicious bowel wall thickening or surrounding inflammatory fat stranding. LYMPH NODES: No abnormally enlarged nodes. PERITONEUM/RETROPERITONEUM: No ascites or free air. VESSELS: Extensive atherosclerotic calcification of the abdominal aorta which remains normal in caliber. Stents within the iliac vasculature. The inferior vena cava, hepatic veins and portal venous system are patent. ABDOMINAL WALL: Bilateral fat-containing inguinal hernias. No acute findings. BONES: Multilevel degenerative change of the lumbar spine status post multilevel dorsal decompression. Partially visualized right hip arthroplasty hardware in place without complicating features. IMPRESSION:      No acute intra-abdominal process. Fatty infiltration of the liver. Nonobstructive left-sided nephrolithiasis. Fat-containing inguinal hernias.       CT ABDOMEN PELVIS W IV CONTRAST Additional Contrast? None   Final Result      Acute pulmonary emboli most extensively involving the right middle lobe and to a lesser degree right lower lobe. Right middle lobe and right lower lobe airspace consolidation, potentially secondary to a component of infarction, particularly of the right middle lobe given the most extensive clot burden. Trace right pleural effusion. Emphysema. Critical result called to the patient's nurse in the emergency department at time of dictation. EXAM: CT ABDOMEN AND PELVIS WITH CONTRAST      INDICATION: Right-sided abdominal pain      COMPARISON: CTA of the abdomen and pelvis from December 14, 2015      TECHNIQUE: Axial CT imaging obtained from lung bases through pelvis. Axial images and multiplanar reformatted images are provided for review. Individualized dose optimization technique was used in order to meet ALARA standards for radiation dose    reduction. In addition to vendor specific dose reduction algorithms, the dose reduction techniques vary based on the specific scanner utilized but frequently include automated exposure control, adjustment of the mA and/or kV according to patient size,    and use of iterative reconstruction technique. IV Contrast: 80 mL Isovue-370   Oral Contrast: None      FINDINGS:      LIVER: Liver mildly, diffusely hypodense consistent with fatty infiltration. GALLBLADDER AND BILIARY TREE: Prior cholecystectomy. No intra- or extrahepatic biliary dilatation. PANCREAS: Normal.      SPLEEN: Normal.      ADRENAL GLANDS: Normal.      KIDNEYS AND URETERS: Hypodense simple appearing cysts throughout the left kidney, largest within the upper portion measuring nearly 2 cm. . No suspicious cortical lesions. 4 mm calcification within the lower left kidney felt to be a small nonobstructing    calculus. No ureteral calculi or hydronephrosis. URINARY BLADDER: Normal.      REPRODUCTIVE ORGANS: Prostate normal in size. right heart strain, no tricuspid vegetation or valve replacement. The patient is stable on non rebreather mask. Patient's PESI score is 119.    - Continue heparin drip transition to PO anticoagulation at discharge   - Repeat CTA with stable segmental and subsegmetnal PE on the right and no new or progressive pulmonary embolus   - Medical management per primary  - Patient discussed with Dr. James Samuel.  Continue outpatient follow up for known atherosclerosis of his bilateral lower extremities   - Vascular surgery will sign off, please call with questions     Jaad Gomez DO  09/17/21  1:51 PM

## 2021-09-17 NOTE — ED NOTES
Bed: A09-09  Expected date:   Expected time:   Means of arrival:   Comments:  Simi Merlos RN  09/16/21 [id number]

## 2021-09-17 NOTE — SIGNIFICANT EVENT
Rapid response called on patient while around 1:10pm for chest pain. On arrival pt complaining of w/ R sided sharp chest pain. /103, sats 86% on 3L NC. Lungs clear to ausculation bilaterally, no JVD. 100% NRB placed with improvement in sats to 91%. ABG and CXR ordered. STAT EKG non-ischemic and w/o significant change from prior. On discussion with RN pt admitted for right sided PE, has been on heparin gtt and has been therapeutic. Earlier troponin with rise to 0.3. concern for possible progression of PE and development of right heart strain. Prior imagining reviewed. Repeat BP stable in 110-130s/80s. Pt given Toradol for pain with improvement. Lasix 40 mg once for possible fluid overload. Currently patient's vitals stable and chest pain improved. Continue to trend troponin and heparin gtt. Will transfer to PCU for close monitoring given acute PE. Case discussed with primary team and hospitalist Dr Fabio Piper.

## 2021-09-17 NOTE — DISCHARGE SUMMARY
INTERNAL MEDICINE DEPARTMENT AT [address] [place] [address]  DISCHARGE SUMMARY    Patient ID: Marycruz Clinton. Discharge Date: 9/20/21  Patient's PCP: Noralee Merlin,                                           Discharge Physician: Tom Kelley MD MD  Admit Date: 9/16/2021   Admitting Physician: Saul Wells MD    PROBLEMS DURING HOSPITALIZATION:  Present on Admission:   Pulmonary embolism without acute cor pulmonale ([address]      DISCHARGE DIAGNOSES:    HPI: Pt is a 74 y/o M w/ a PMHx of HTN, HLD, CAD, KAVITA-COPD syndrome, HFpEF, PVD, and Gout p/w pleuritic chest pain. Pt states that he woke up this morning and felt some mild R-sided discomfort which he initially attributed to \"sleeping funny\". Pt says that this discomfort persisted over the day and acutely worsened around dinner time when he began to have sharp pain in his right side that worsened with deep breaths. Pt said at this time we also started to have some light-headedness & SOB which prompted him to come to the ED. Upon arrival to the ED, patient was noted to be hypoxic to 88% on RA and still having ongoing pleuritic chest pain. CTPA revealed acute R-sided pulmonary emboli in the R-middle and R-lower lobes w/ no evidence of R-heart strain on imaging. Pt endorses no recent surgery/trauma/immobilization. Pt admitted for IV heparin gtt and further mgmt of his acute unprovoked PE. Continued on heparin gtt. He had increasing R-sided CP with increased O2 requirement. Was given toradol and lasix. Repeat CTPA showed no extension of PE but worsening RML/RLL consolidation. Was transferred to ICU for further monitoring. Started on linezolid/zosyn/azithro to cover for possible pneumonia as well as solumedrol. He progressed well over the next 24 hours and was transferred back to the floor. He continued to improve with decreasing O2 and improved CP.  Was discharged with steroid taper and zithro to finish COPD exacerbation course. Home O2 eval was completed and pt was discharged with home oxygen and IS as well as eliquis. The following issues were addressed during hospitalization:  Pulmonary embolism  Macrocytosis  COPD  CAD  PVD  KAVITA  HTN  HLD  Gout  Nicotine addiction    Physical Exam:  /85   Pulse 80   Temp 98.5 °F (36.9 °C) (Oral)   Resp 22   Ht 5' 10\" (1.778 m)   Wt 205 lb (93 kg)   SpO2 90%   BMI 29.41 kg/m²     Physical Exam  Constitutional:       Appearance: Normal appearance. HENT:      Mouth/Throat:      Mouth: Mucous membranes are moist.      Pharynx: Oropharynx is clear. Eyes:      Extraocular Movements: Extraocular movements intact. Pupils: Pupils are equal, round, and reactive to light. Cardiovascular:      Rate and Rhythm: Normal rate and regular rhythm. Pulses: Normal pulses. Heart sounds: Normal heart sounds. Pulmonary:      Effort: Pulmonary effort is normal.      Breath sounds: Wheezing (diffuse) and rales (bibasilar) present. Abdominal:      General: Abdomen is flat. Bowel sounds are normal.      Palpations: Abdomen is soft. Musculoskeletal:      Right lower leg: No edema. Left lower leg: No edema. Neurological:      General: No focal deficit present. Mental Status: He is alert and oriented to person, place, and time.            Consults: pulmonary/intensive care and vascular surgery  Significant Diagnostic Studies:  CTPA, CXR, CT abdomen pelvis, LE venous duplex  Treatments: heparin, eliquis, solumedrol, azithromycin, albuterol, tiotropium  Disposition: home  Discharged Condition: Stable  Follow Up: Primary Care Physician in one week    DISCHARGE MEDICATION:     Medication List      ASK your doctor about these medications    albuterol sulfate  (90 Base) MCG/ACT inhaler  Inhale 2 puffs into the lungs every 6 hours as needed for Wheezing     allopurinol 100 MG tablet  Commonly known as: ZYLOPRIM  Take 1 tablet by mouth daily     atorvastatin 80 MG tablet  Commonly known as: LIPITOR  Take 1 tablet by mouth daily     clopidogrel 75 MG tablet  Commonly known as: PLAVIX  TAKE 1 TABLET BY MOUTH DAILY     furosemide 40 MG tablet  Commonly known as: Lasix  Take 1 tablet by mouth daily     isosorbide mononitrate 30 MG extended release tablet  Commonly known as: IMDUR  Take 1 tablet by mouth daily     losartan 100 MG tablet  Commonly known as: COZAAR  TAKE 1 TABLET BY MOUTH EVERY DAY     magnesium gluconate 500 MG tablet  Commonly known as: MAGONATE     metoprolol 100 MG tablet  Commonly known as: LOPRESSOR  Take 1 tablet by mouth 2 times daily     nitroGLYCERIN 0.4 MG SL tablet  Commonly known as: NITROSTAT  Place 1 tablet under the tongue every 5 minutes as needed for Chest pain     tiotropium 2.5 MCG/ACT Aers inhaler  Commonly known as: Spiriva Respimat  Inhale 2 puffs into the lungs daily          Activity: activity as tolerated  Diet: cardiac diet  Wound Care: none needed    Time Spent on discharge is more than 30 minutes    Signed:  Sonido Hagen MD,  MD, PGY-1  9/20/21

## 2021-09-17 NOTE — ED PROVIDER NOTES
[address] [facility] 73 ENCOUNTER          ATTENDING PHYSICIAN NOTE       Date of evaluation: 9/16/2021    Chief Complaint     Chest Pain      History of Present Illness     Beryle Davidson. is a 73 y.o. male who presents to the emergency department for evaluation of chest pain. Patient states his pain started approximately an hour and a half prior to presentation. Patient describes it as a sharp pain located in the right side of the chest.  He states the pain is worse if he takes a deep breath in. He denies any fevers or chills. He does note a cough is nonproductive sputum. He denies any nausea, vomiting, or diarrhea. He denies any swelling in his extremities and denies any change in his chronic leg pain from peripheral artery disease. He denies ever having pain like this in the past.  He states this is different from the pain he has had with acute coronary syndrome or symptoms he has had with COPD exacerbations. He did not try taking anything for his pain. On arrival, patient was noted to be hypoxic at 88% on room air. Review of Systems     Review of Systems   Constitutional: Negative. HENT: Negative. Eyes: Negative. Respiratory: Negative. Cardiovascular: Negative. Gastrointestinal: Negative. Genitourinary: Negative. Musculoskeletal: Negative. Neurological: Negative. All other systems reviewed and are negative.       Past Medical, Surgical, Family, and Social History     He has a past medical history of CAD (coronary artery disease), CHF (congestive heart failure) ([facility] [address] COPD (chronic obstructive pulmonary disease) ([facility] [address] Depressive disorder, not elsewhere classified, GERD (gastroesophageal reflux disease), History of colon polyps - 30 seen on colonoscopy 04/2021, History of MI (myocardial infarction), History of skin ulcer of lower extremity, History of transient ischemic attack (TIA), Hyperlipidemia, Hypertension, Neuropathy, KAVITA (obstructive sleep apnea), Personal history of DVT (deep vein thrombosis), PVD (peripheral vascular disease) ([facility] [address] TIA (transient ischemic attack), and Vertebral fracture. He has a past surgical history that includes Appendectomy; Coronary angioplasty with stent (6/2013); other surgical history (Right, 6/25/2013); Leg Debridement (Right, 7/23/2013); skin split graft (Right, 7/25/2013); other surgical history (Left, 8/27/2013); laminectomy (11/18/2015); transluminal angioplasty (Left, 12/22/2015); Total hip arthroplasty (Right, 09/01/2016); Cholecystectomy, laparoscopic; eye surgery (Left); Colonoscopy (4/23/2021); Colonoscopy (4/23/2021); and Colonoscopy (4/23/2021). His family history includes Cancer in his father, mother, and paternal grandmother; Heart Disease in his father and mother. He reports that he has been smoking cigarettes. He started smoking about 54 years ago. He has a 26.00 pack-year smoking history. He has never used smokeless tobacco. He reports current alcohol use. He reports that he does not use drugs. Medications     Previous Medications    ALBUTEROL SULFATE  (90 BASE) MCG/ACT INHALER    Inhale 2 puffs into the lungs every 6 hours as needed for Wheezing    ALLOPURINOL (ZYLOPRIM) 100 MG TABLET    Take 1 tablet by mouth daily    ATORVASTATIN (LIPITOR) 80 MG TABLET    Take 1 tablet by mouth daily    CLOPIDOGREL (PLAVIX) 75 MG TABLET    TAKE 1 TABLET BY MOUTH DAILY    FUROSEMIDE (LASIX) 40 MG TABLET    Take 1 tablet by mouth daily    ISOSORBIDE MONONITRATE (IMDUR) 30 MG EXTENDED RELEASE TABLET    Take 1 tablet by mouth daily    LOSARTAN (COZAAR) 100 MG TABLET    TAKE 1 TABLET BY MOUTH EVERY DAY    MAGNESIUM GLUCONATE (MAGONATE) 500 MG TABLET    Take 500 mg by mouth daily.     METOPROLOL (LOPRESSOR) 100 MG TABLET    Take 1 tablet by mouth 2 times daily    NITROGLYCERIN (NITROSTAT) 0.4 MG SL TABLET    Place 1 tablet under the tongue every 5 minutes as needed for Chest pain    TIOTROPIUM (SPIRIVA RESPIMAT) 2. 5 MCG/ACT AERS INHALER    Inhale 2 puffs into the lungs daily       Allergies     He is allergic to asa [aspirin] and daliresp [roflumilast]. Physical Exam     INITIAL VITALS: BP: 118/62, Temp: 98.2 °F (36.8 °C), Pulse: 87, Resp: 22, SpO2: 96 %   Physical Exam  Vitals and nursing note reviewed. Constitutional:       General: He is not in acute distress. HENT:      Head: Normocephalic and atraumatic. Mouth/Throat:      Mouth: Mucous membranes are moist.      Pharynx: No oropharyngeal exudate. Eyes:      General: No scleral icterus. Extraocular Movements: Extraocular movements intact. Conjunctiva/sclera: Conjunctivae normal.      Pupils: Pupils are equal, round, and reactive to light. Cardiovascular:      Rate and Rhythm: Normal rate and regular rhythm. Heart sounds: Normal heart sounds. Pulmonary:      Effort: Pulmonary effort is normal.      Breath sounds: Normal breath sounds. No wheezing, rhonchi or rales. Comments: Tender to palpation to the right costal margin with no crepitus or step-offs noted. Chest:      Chest wall: Tenderness present. Abdominal:      General: Bowel sounds are normal.      Palpations: Abdomen is soft. Tenderness: There is abdominal tenderness. There is no guarding or rebound. Musculoskeletal:         General: No swelling. Normal range of motion. Cervical back: Normal range of motion and neck supple. Skin:     General: Skin is warm and dry. Neurological:      General: No focal deficit present. Mental Status: He is alert and oriented to person, place, and time. Cranial Nerves: No cranial nerve deficit. Motor: No weakness. Coordination: Coordination normal.         Diagnostic Results     EKG   EKG as interpreted by me shows patient to be in a normal sinus rhythm with a normal axis, normal SC and QT intervals, normal QRS duration, no ST segment abnormalities, no T wave abnormalities.     RADIOLOGY:  CT CHEST PULMONARY EMBOLISM W CONTRAST   Final Result      Acute pulmonary emboli most extensively involving the right middle lobe and to a lesser degree right lower lobe. Right middle lobe and right lower lobe airspace consolidation, potentially secondary to a component of infarction, particularly of the right middle lobe given the most extensive clot burden. Trace right pleural effusion. Emphysema. Critical result called to the patient's nurse in the emergency department at time of dictation. EXAM: CT ABDOMEN AND PELVIS WITH CONTRAST      INDICATION: Right-sided abdominal pain      COMPARISON: CTA of the abdomen and pelvis from December 14, 2015      TECHNIQUE: Axial CT imaging obtained from lung bases through pelvis. Axial images and multiplanar reformatted images are provided for review. Individualized dose optimization technique was used in order to meet ALARA standards for radiation dose    reduction. In addition to vendor specific dose reduction algorithms, the dose reduction techniques vary based on the specific scanner utilized but frequently include automated exposure control, adjustment of the mA and/or kV according to patient size,    and use of iterative reconstruction technique. IV Contrast: 80 mL Isovue-370   Oral Contrast: None      FINDINGS:      LIVER: Liver mildly, diffusely hypodense consistent with fatty infiltration. GALLBLADDER AND BILIARY TREE: Prior cholecystectomy. No intra- or extrahepatic biliary dilatation. PANCREAS: Normal.      SPLEEN: Normal.      ADRENAL GLANDS: Normal.      KIDNEYS AND URETERS: Hypodense simple appearing cysts throughout the left kidney, largest within the upper portion measuring nearly 2 cm. . No suspicious cortical lesions. 4 mm calcification within the lower left kidney felt to be a small nonobstructing    calculus. No ureteral calculi or hydronephrosis. URINARY BLADDER: Normal.      REPRODUCTIVE ORGANS: Prostate normal in size. BOWEL: Normal caliber. No suspicious bowel wall thickening or surrounding inflammatory fat stranding. LYMPH NODES: No abnormally enlarged nodes. PERITONEUM/RETROPERITONEUM: No ascites or free air. VESSELS: Extensive atherosclerotic calcification of the abdominal aorta which remains normal in caliber. Stents within the iliac vasculature. The inferior vena cava, hepatic veins and portal venous system are patent. ABDOMINAL WALL: Bilateral fat-containing inguinal hernias. No acute findings. BONES: Multilevel degenerative change of the lumbar spine status post multilevel dorsal decompression. Partially visualized right hip arthroplasty hardware in place without complicating features. IMPRESSION:      No acute intra-abdominal process. Fatty infiltration of the liver. Nonobstructive left-sided nephrolithiasis. Fat-containing inguinal hernias. CT ABDOMEN PELVIS W IV CONTRAST Additional Contrast? None   Final Result      Acute pulmonary emboli most extensively involving the right middle lobe and to a lesser degree right lower lobe. Right middle lobe and right lower lobe airspace consolidation, potentially secondary to a component of infarction, particularly of the right middle lobe given the most extensive clot burden. Trace right pleural effusion. Emphysema. Critical result called to the patient's nurse in the emergency department at time of dictation. EXAM: CT ABDOMEN AND PELVIS WITH CONTRAST      INDICATION: Right-sided abdominal pain      COMPARISON: CTA of the abdomen and pelvis from December 14, 2015      TECHNIQUE: Axial CT imaging obtained from lung bases through pelvis. Axial images and multiplanar reformatted images are provided for review. Individualized dose optimization technique was used in order to meet ALARA standards for radiation dose    reduction.   In addition to vendor specific dose reduction algorithms, the dose reduction techniques vary based on the specific scanner utilized but frequently include automated exposure control, adjustment of the mA and/or kV according to patient size,    and use of iterative reconstruction technique. IV Contrast: 80 mL Isovue-370   Oral Contrast: None      FINDINGS:      LIVER: Liver mildly, diffusely hypodense consistent with fatty infiltration. GALLBLADDER AND BILIARY TREE: Prior cholecystectomy. No intra- or extrahepatic biliary dilatation. PANCREAS: Normal.      SPLEEN: Normal.      ADRENAL GLANDS: Normal.      KIDNEYS AND URETERS: Hypodense simple appearing cysts throughout the left kidney, largest within the upper portion measuring nearly 2 cm. . No suspicious cortical lesions. 4 mm calcification within the lower left kidney felt to be a small nonobstructing    calculus. No ureteral calculi or hydronephrosis. URINARY BLADDER: Normal.      REPRODUCTIVE ORGANS: Prostate normal in size. BOWEL: Normal caliber. No suspicious bowel wall thickening or surrounding inflammatory fat stranding. LYMPH NODES: No abnormally enlarged nodes. PERITONEUM/RETROPERITONEUM: No ascites or free air. VESSELS: Extensive atherosclerotic calcification of the abdominal aorta which remains normal in caliber. Stents within the iliac vasculature. The inferior vena cava, hepatic veins and portal venous system are patent. ABDOMINAL WALL: Bilateral fat-containing inguinal hernias. No acute findings. BONES: Multilevel degenerative change of the lumbar spine status post multilevel dorsal decompression. Partially visualized right hip arthroplasty hardware in place without complicating features. IMPRESSION:      No acute intra-abdominal process. Fatty infiltration of the liver. Nonobstructive left-sided nephrolithiasis. Fat-containing inguinal hernias.       XR CHEST PORTABLE   Final Result      Right basilar airspace disease, atelectasis versus developing infiltrate. Increased interstitial opacities throughout both lungs favored to be a component of volume overload/interstitial edema.           LABS:   Results for orders placed or performed during the hospital encounter of 09/16/21   CBC auto differential   Result Value Ref Range    WBC 9.5 4.0 - 11.0 K/uL    RBC 3.76 (L) 4.20 - 5.90 M/uL    Hemoglobin 13.8 13.5 - 17.5 g/dL    Hematocrit 41.2 40.5 - 52.5 %    .6 (H) 80.0 - 100.0 fL    MCH 36.8 (H) 26.0 - 34.0 pg    MCHC 33.6 31.0 - 36.0 g/dL    RDW 15.1 12.4 - 15.4 %    Platelets 319 465 - 921 K/uL    MPV 8.9 5.0 - 10.5 fL    Neutrophils % 72.8 %    Lymphocytes % 15.1 %    Monocytes % 8.9 %    Eosinophils % 2.4 %    Basophils % 0.8 %    Neutrophils Absolute 6.9 1.7 - 7.7 K/uL    Lymphocytes Absolute 1.4 1.0 - 5.1 K/uL    Monocytes Absolute 0.8 0.0 - 1.3 K/uL    Eosinophils Absolute 0.2 0.0 - 0.6 K/uL    Basophils Absolute 0.1 0.0 - 0.2 K/uL   Basic Metabolic Panel (EP - 1)   Result Value Ref Range    Sodium 132 (L) 136 - 145 mmol/L    Potassium 3.6 3.5 - 5.1 mmol/L    Chloride 99 99 - 110 mmol/L    CO2 17 (L) 21 - 32 mmol/L    Anion Gap 16 3 - 16    Glucose 107 (H) 70 - 99 mg/dL    BUN 9 7 - 20 mg/dL    CREATININE 1.0 0.8 - 1.3 mg/dL    GFR Non-African American >60 >60    GFR African American >60 >60    Calcium 8.6 8.3 - 10.6 mg/dL   Magnesium   Result Value Ref Range    Magnesium 1.30 (L) 1.80 - 2.40 mg/dL   Phosphorus   Result Value Ref Range    Phosphorus 2.9 2.5 - 4.9 mg/dL   Troponin   Result Value Ref Range    Troponin <0.01 <0.01 ng/mL   Brain Natriuretic Peptide   Result Value Ref Range    Pro- (H) 0 - 124 pg/mL   Blood Gas, Venous   Result Value Ref Range    pH, Kan 7.365 7.350 - 7.450    pCO2, Kan 35.9 (L) 41.0 - 51.0 mmHg    pO2, Kan 32.5 25 - 40 mmHg    HCO3, Venous 20.5 (L) 24.0 - 28.0 mmol/L    Base Excess, Kan -4.3 (L) -2.0 - 3.0 mmol/L    O2 Sat, Kan 64 Not established %    Carboxyhemoglobin 5.3 (H) 0.0 - 1.5 %    MetHgb, Kan 0.2 0.0 - 1.5 %    TC02 (Calc), Kan 22 mmol/L    Hemoglobin, Kan, Reduced 34.40 %   Hepatic function panel (LFTs)   Result Value Ref Range    Total Protein 6.7 6.4 - 8.2 g/dL    Albumin 3.6 3.4 - 5.0 g/dL    Alkaline Phosphatase 89 40 - 129 U/L    ALT 20 10 - 40 U/L    AST 18 15 - 37 U/L    Total Bilirubin 0.7 0.0 - 1.0 mg/dL    Bilirubin, Direct <0.2 0.0 - 0.3 mg/dL    Bilirubin, Indirect see below 0.0 - 1.0 mg/dL   Lipase   Result Value Ref Range    Lipase 16.0 13.0 - 60.0 U/L     RECENT VITALS:  BP: 118/62,Temp: 98.2 °F (36.8 °C), Pulse: 87, Resp: 22, SpO2: 96 %     Procedures     N/A    ED Course     Nursing Notes, Past Medical Hx, Past Surgical Hx, Social Hx,Allergies, and Family Hx were reviewed. patient was given the following medications:  Orders Placed This Encounter   Medications    morphine injection 4 mg    iopamidol (ISOVUE-370) 76 % injection 80 mL    heparin (porcine) injection 7,440 Units    heparin (porcine) injection 7,440 Units    heparin (porcine) injection 3,720 Units    heparin 25,000 units in dextrose 5% 250 mL (premix) infusion    magnesium sulfate 2000 mg in 50 mL IVPB premix       CONSULTS:  IP CONSULT TO HOSPITALIST    MEDICAL DECISIONMAKING / ASSESSMENT / Herlinda Ahr. is a 73 y.o. male with remote history of prostate cancer as well as history of peripheral vascular disease presents to the emergency department complaining of chest pain. Patient describes acute onset of right-sided chest pain that started approximately an hour and a half prior to arrival.  On arrival, patient was noted to be hypoxic but otherwise hemodynamically stable. He has clear breath sounds and normal heart sounds. He does have reproducible tenderness to the right anterior chest wall as well as tenderness to the right upper quadrant of the abdomen. Laboratory studies are unremarkable except for a magnesium of 1.3. He was written for magnesium supplementation.   Chest x-ray shows right lower lobe airspace disease with questionable pulmonary edema. CTPA was obtained for concern for pulmonary embolism which does show segmental and subsegmental pulmonary emboli on the right side with an evolving pulmonary infarct. With these findings, patient was started on heparin drip. Patient has no indication at this time for thrombolytic therapy. Patient will be admitted to the hospital service for further management. Clinical Impression     1. Other acute pulmonary embolism without acute cor pulmonale (HCC)    2. Pulmonary infarct (HCC)        Disposition     PATIENT REFERRED TO:  No follow-up provider specified.     DISCHARGE MEDICATIONS:  New Prescriptions    No medications on file       DISPOSITION Decision To Admit 09/17/2021 12:33:09 AM        Guy Beltran MD  09/17/21 [id number]

## 2021-09-17 NOTE — PROGRESS NOTES
1310:  Pt returned from dopplers and upon transferring from stretcher to bed, pt started to endorse 10/10 chest pain. Vitals taken, /78 oxygen 88% on 4 liters nasal cannula, HR in the 80s. Stat EKG ordered and rapid response called. 1315: Residents to bedside, sublingual nitro administered x1 with little improvement in chest pain or sob. Non rebreather placed with improvement to 90%. wob still up. Second dose of nitro administered. Vitals 162/98 HR 82 Respirations 33. Glucose 144. ABG ordered, chest xray performed. 1320:   /109   1326: toradol 15 mg administered. Pt reports pain is now a 9/10.    1328: /64. Oxygen 90% on non rebreather. 1335: 40 mg of lasix administered IV. /63. Decision made to transfer pt to PCU . Pt reports feeling better but cannot talk in a complete sentence without having sob.

## 2021-09-17 NOTE — CONSULTS
Initial Pulmonary & Critical Care Consult Note      Reason for Consult: Pulmonary embolism with worsening chest pain and increase O2 requirement   Requesting Physician: Lorelei Rodríguez      Subjective:   CHIEF COMPLAINT / HPI:               Sahil Avila. is a 73 y.o. male with significant past medical history of hypertension, COPD, HFpEF (ECHO 9/17: EF 55-60%), CAD (right heart cath 2013)  Peripheral vascular disease (s/p right femoral endarterectomy 2013, iliac thrombectomy and stent in 2015), KAVITA on CPAP - sees pulmonology Dr Allison Mcgovern, chronic lower back pain (h/o laminectomy). Patient presented to the ED complaining of right-sided chest discomfort for 1 day which felt sharp in nature and worsened with deep inspiration. He was noted to be hypoxic with oxygen saturation of 88% on room air and had persistent pleuritic chest pain. A CTPA was done which revealed an acute right-sided pulmonary embolism to the right middle and lower lobes with no evidence of right heart strain. Patient was then admitted on heparin drip for his pulmonary embolism. Patient had gone down to obtain a Doppler ultrasound of bilateral lower extremities and upon transport to his room he desaturated. He endorsed pain to chest 10/10 and his oxygen saturation was 88% on 4 L O2 with heart rate in the 80s. /78. He was initially on 4 L O2 and had to be increased up to 15 L of O2 with a nonrebreather. A rapid response was called. He was seen at the bedside and given sublingual nitroglycerin x2. Vitals at a time revealed a /98, HR: 82, RR: 33.  Patient still complained of chest pain so he was given Toradol. Patient was seen with the pulmonary team and he reported that chest pain had improved but he was still on 15 L of oxygen via nonrebreather mask and still having SOB. An echo was done which revealed borderline right ventricular enlargement.   Stat CTPA ordered with concern of a worsening pulmonary embolism which revealed a stable segmental and subsegmental pulmonary emboli on the right no new or progressive pulmonary embolus however did showed interval increase in consolidative opacity of the right lower lobe with hazy consolidation.      Past Medical History:      Diagnosis Date    CAD (coronary artery disease)     CHF (congestive heart failure) (HCC)     COPD (chronic obstructive pulmonary disease) (HCC)     Depressive disorder, not elsewhere classified     GERD (gastroesophageal reflux disease)     History of colon polyps - 30 seen on colonoscopy 04/2021 4/24/2021    History of MI (myocardial infarction) 05/2013    History of skin ulcer of lower extremity     R anterior shin    History of transient ischemic attack (TIA)     Hyperlipidemia     Hypertension     Neuropathy     KAVITA (obstructive sleep apnea)     On CPAP    Personal history of DVT (deep vein thrombosis)     PVD (peripheral vascular disease) ([facility] [address]     TIA (transient ischemic attack) 2013    Vertebral fracture       Past Surgical History:        Procedure Laterality Date    APPENDECTOMY      CHOLECYSTECTOMY, LAPAROSCOPIC      COLONOSCOPY  4/23/2021    COLONOSCOPY POLYPECTOMY SNARE/COLD BIOPSY performed by Mike Wagner MD at [facility]  4/23/2021    COLONOSCOPY POLYPECTOMY ABLATION performed by Mike Wagner MD at [facility]  4/23/2021    COLONOSCOPY CONTROL HEMORRHAGE performed by Mike Wagner MD at [address] [facility]  6/2013    EYE SURGERY Left     LAMINECTOMY  11/18/2015    Bilateral decompressive lumbar laminectomy L4-5   ; medial facetectomy L4-5 joints  bilaterally; foraminotomies l5   nerve roots bilaterally    LEG DEBRIDEMENT Right 7/23/2013    Dr. Mary Lowry - pretibial wound    OTHER SURGICAL HISTORY Right 6/25/2013    Dr. Mary Lowry - CFA Endarterectomy w/marsupialization; RLE wound excision & debridement     OTHER SURGICAL HISTORY Left 8/27/2013 Dr. Rene Kwong - femoral & profunda femoris endarterectomy w/marsupialization patch angioplasty using SFA    SKIN SPLIT GRAFT Right 7/25/2013    Dr. Rene Kwong - to non-healing R pretibial wound, 9 x 15 cm    TOTAL HIP ARTHROPLASTY Right 09/01/2016    Dr. James Avila Left 12/22/2015    Dr. Rene Kwong - CFA exploration, thrombectomy of ileofemoral system, stenting of RAFAL in-stent stenosis w/8 x 37 mm Palmaz      Current Medications:     tiotropium  2 puff Inhalation Daily    allopurinol  100 mg Oral Daily    atorvastatin  80 mg Oral Daily    sodium chloride flush  5-40 mL IntraVENous 2 times per day    nicotine  1 patch TransDERmal Daily    lidocaine  1 patch TransDERmal Daily    pantoprazole  40 mg IntraVENous Daily    albuterol  2.5 mg Nebulization 4x daily    piperacillin-tazobactam  3,375 mg IntraVENous Q8H    azithromycin  500 mg IntraVENous Q24H    linezolid  600 mg IntraVENous Q12H    [START ON 9/18/2021] methylPREDNISolone  40 mg IntraVENous Q12H        Social History:        Family History:       REVIEW OF SYSTEMS:    CONSTITUTIONAL:  negative for fevers, chills, diaphoresis, activity change, appetite change, fatigue, night sweats and unexpected weight change.    EYES:  negative for blurred vision, eye discharge, visual disturbance and icterus  HEENT:  negative for hearing loss, tinnitus, ear drainage, sinus pressure, nasal congestion, epistaxis and snoring  RESPIRATORY:  See HPI  CARDIOVASCULAR:  negative for chest pain, palpitations, exertional chest pressure/discomfort, edema, syncope  GASTROINTESTINAL:  negative for nausea, vomiting, diarrhea, constipation, blood in stool and abdominal pain  GENITOURINARY:  negative for frequency, dysuria, urinary incontinence, decreased urine volume, and hematuria  HEMATOLOGIC/LYMPHATIC:  negative for easy bruising, bleeding and lymphadenopathy  ALLERGIC/IMMUNOLOGIC:  negative for recurrent infections, angioedema, anaphylaxis and drug reactions  ENDOCRINE:  negative for weight changes and diabetic symptoms including polyuria, polydipsia and polyphagia  MUSCULOSKELETAL:  negative for  pain in all extremities, joint swelling, decreased range of motion in all extremities and muscle weakness  NEUROLOGICAL:  negative for headaches, slurred speech, unilateral weakness  PSYCHIATRIC/BEHAVIORAL: negative for hallucinations, behavioral problems, confusion and agitation. Objective:   PHYSICAL EXAM:      VITALS:  /78   Pulse 80   Temp 98.2 °F (36.8 °C) (Oral)   Resp 22   Ht 5' 10\" (1.778 m)   Wt 205 lb (93 kg)   SpO2 90%   BMI 29.41 kg/m²   24HR INTAKE/OUTPUT:      Intake/Output Summary (Last 24 hours) at 2021 174  Last data filed at 2021 0723  Gross per 24 hour   Intake --   Output 300 ml   Net -300 ml     CURRENT PULSE OXIMETRY:  SpO2: 90 %  24HR PULSE OXIMETRY RANGE:  SpO2  Av %  Min: 90 %  Max: 96 %    CONSTITUTIONAL:  Elderly male in semi cunningham's position, in some respiratory distress. on 15 L via NRB. Tachypnea  EYES: Pupils equal round and reactive to light. Normal conjunctiva  EARS, NOSE, MOUTH & THROAT: Normal oropharynx. Ears and nose appear normal  NECK:  Supple, symmetrical, trachea midline, no adenopathy, thyroid symmetric, not enlarged and no tenderness, skin normal  LUNGS:  Tachypnea. Air entry with some wheezing. +rales R lower lung base. CARDIOVASCULAR:  normal S1 and S2, no edema and no JVD  ABDOMEN:  Increased abdominal girth but normal bowel sounds, non-distended and no masses palpated, and no tenderness to palpation. No hepatospleenomegaly  LYMPHADENOPATHY:  no axillary or supraclavicular adenopathy. No cervical adnenopathy  PSYCHIATRIC: Oriented to person place and time. No obvious depression or anxiety. MUSCULOSKELETAL: mild pedal edema. +pitting  SKIN:  normal skin color, texture, turgor and no redness, warmth, or swelling.  No palpable nodules    DATA:    Old records have been reviewed  CBC with Differential:    Lab Results   Component Value Date    WBC 9.5 09/16/2021    RBC 3.76 09/16/2021    HGB 13.8 09/16/2021    HCT 41.2 09/16/2021     09/16/2021    .6 09/16/2021    MCH 36.8 09/16/2021    MCHC 33.6 09/16/2021    RDW 15.1 09/16/2021    LYMPHOPCT 15.1 09/16/2021    MONOPCT 8.9 09/16/2021    BASOPCT 0.8 09/16/2021    MONOSABS 0.8 09/16/2021    LYMPHSABS 1.4 09/16/2021    EOSABS 0.2 09/16/2021    BASOSABS 0.1 09/16/2021     BMP:    Lab Results   Component Value Date     09/17/2021    K 4.3 09/17/2021     09/17/2021    CO2 23 09/17/2021    BUN 10 09/17/2021    CREATININE 0.8 09/17/2021    CALCIUM 8.6 09/17/2021    GFRAA >60 09/17/2021    LABGLOM >60 09/17/2021    GLUCOSE 122 09/17/2021     Hepatic Function Panel:    Lab Results   Component Value Date    ALKPHOS 89 09/16/2021    ALT 20 09/16/2021    AST 18 09/16/2021    PROT 6.7 09/16/2021    BILITOT 0.7 09/16/2021    BILIDIR <0.2 09/16/2021    IBILI see below 09/16/2021     ABG:    Lab Results   Component Value Date    LVA9HVO 24 09/17/2021    BEART cannot calc 09/17/2021    G9ILEFVT cannot calc 09/17/2021    PHART 7.402 09/17/2021    TRX5ZAI 37.7 09/17/2021    PO2ART 64.3 09/17/2021    LBX1EPX 25 09/17/2021       Echo: [place]a [place]ven Left Ventricle   Normal left ventricle size. There is mild to moderate concentric left   ventricular hypertrophy. Overall left ventricular systolic function appears   normal with an ejection fraction of 55-60%. No regional wall motion   abnormalities are noted. Diastolic filling parameters suggests normal   diastolic function. Right Ventricle   The right ventricle is borderline enlarged. RV function is normal.   TAPSE 2.44 cm   RV S velocity 17.3 cm/s    Doppler Studies:  Conclusions  No evidence of deep or superficial venous thrombosis in the bilateral lower extremity. Pulsatile venous flow most commonly associated with fluid volume overload.     Assessment/Plan     Torri Wray. is a 73 y.o. male with PMHx HTN, COPD, HFpEF (ECHO 9/17: EF 55-60%), CAD who presented c/o right-sided chest discomfort. Acute Pulmonary embolism  CTPA (9/16): Acute pulmonary emboli most extensively involving the right middle lobe and to a lesser degree right lower lobe. CTPA (9/17): Stable segmental and subsegmental pulmonary emboli on the right. No new or progressive pulmonary embolus. Interval increase in consolidative opacity of the right lower lobe with hazy consolidation also noted of the right middle lobe and into the right upper lobe  Lower limb doppler ultrasound (9/17) was unremarkable  Pt had a rapid increase in oxygen requirement. There was a concern for a worsening PE however repeat CTPA revealed stable results but hazy consolidation in right lower lobe. Questionable R lung pneumonia. - Continue heparin gtt  - Supplemental O2 to keep Spo2 above 88%  - PT/OT   - Pending troponin  - Incentive spirometry    R. Pneumonia  Pt with increasing oxygen requirements. CTPA revealed Interval increase in consolidative opacity of the right lower lobe with hazy consolidation also noted of the right middle lobe and into the right upper lobe. - pro calcitonin  - Strept pneumonia antigen  - MRSA swab  -Legionella ag  - Blood Cx x2 pending.   - ABx: Azithromycin, Zyvox + Zosyn     COPD  -Solumedrol 125 mg given initially. Continue 40mg daily  - Albuterol Neb Q4hrs  - Tiotropium neb      This pt has been staffed with MD Hernando Godinez MD   Internal medicine Resident  PGY-1  6:17 PM     Pulm    Patient seen and examined. I agree with Dr. Edgar Mcdaniels history, physical, lab findings, assessment and plan. I was called this afternoon for urgent consult as he had abrupt CP and worsening FRANCO with FIO2 requirement jumped from 4 L to 15 L HFNC and 100% NRB with paO2 on ABG of 62. When I saw him he looked ashen with increased WOB. He had wheezes and looked unwell.      He was admitted last night for PE with significant burden on right but no saddle or L/R pulmonary artery clot/ RV/LV < 0.9 at that time and was started on heparin. Doppler LE showed no DVT and shortly after test is when he decompensated. BP was normal and he was not tachycardic. Stat ECHO showed some RV enlargement but no decreased RV function. Discussion about EKOS was had with IR and since creatinine was 0.8 we decided to emergently repeat CTPA. There was no new PE and RV/LV ration was < 0.9. No EKOS indicated at this time. He did have increased RLL consolidation but was not febrile or have leukocytosis. I started solumedrol 40 IV q 12 hrs, duonebs q 4 hrs WA and started broad spectrum ABx with Zosyn, Zyvox and Azithromycin. Blood Cx obtained on admission. Procal mildly elevated at 0.17    Given how toxic he appeared I thought it was best to monitor in ICU at [facility] overnight. Case D/W Intensivist, Dr. Leona Ritchie.     Full COde    John Lucas MD

## 2021-09-17 NOTE — RT PROTOCOL NOTE
RT Inhaler-Nebulizer Bronchodilator Protocol Note    There is a bronchodilator order in the chart from a provider indicating to follow the RT Bronchodilator Protocol and there is an Initiate RT Bronchodilator Protocol order as well (see protocol at bottom of note). The findings from the last RT Protocol Assessment were as follows:  Smoking: >15 Pack years  Surgical Status: No surgery  Xray: Chronic changes  Respiratory Pattern: RR <12 or 21-30  Mental Status: Alert and Oriented  Breath Sounds: Diminished and/or crackles  Cough: Strong, spontaneous, non-productive  Activity Level: Walking unassisted  Oxygen Requirement: 29% or 3LNC - 5LNC/40%  Indication for Bronchodilator Therapy: On home bronchodilators  Bronchodilator Assessment Score: 5    Aerosolized bronchodilator medication orders have been revised according to the RT Bronchodilator Protocol. RT Inhaler-Nebulizer Bronchodilator Protocol:    Respiratory Therapist to perform RT Therapy Protocol Assessment then follow the protocol. No Indications - adjust the frequency to every 6 hours PRN wheezing or bronchospasm, if no treatments needed after 48 hours then discontinue using Per Protocol order mode. If indication present, adjust the RT bronchodilator orders based on the Bronchodilator Assessment Score as follows:    0-6 - enter or revise RT bronchodilator order to Albuterol Inhaler order with frequency of every 2 hours PRN for wheezing or increased work of breathing using Per Protocol order mode. If Albuterol Inhaler not tolerated or not effective, then discontinue the Albuterol Inhaler order and enter Albuterol Nebulizer order with same frequency and PRN reasons. Repeat RT Therapy Protocol Assessment as needed.     7-10 - discontinue any other Inpatient aerosolized bronchodilator medication orders and enter or revise two Albuterol Inhaler orders, one with BID frequency and one with frequency of every 2 hours PRN wheezing or increased work of breathing using Per Protocol order mode. Repeat RT Therapy Protocol Assessment with second treatment then BID and as needed. If Albuterol Inhaler not tolerated or not effective, then discontinue the Albuterol Inhaler orders and enter two Albuterol Nebulizer orders with same frequencies and PRN reasons. 11-13 - discontinue any other Inpatient aerosolized bronchodilator medication orders and enter DuoNeb Nebulizer orders QID frequency and an Albuterol Nebulizer order every 2 hours PRN wheezing or increased work of breathing using Per Protocol order mode. Repeat RT Therapy Protocol Assessment with second treatment then QID and as needed. Greater than 13 - discontinue any other Inpatient bronchodilator aerosolized medication orders and enter DuoNeb Nebulizer order every 4 hours frequency and Albuterol Nebulizer every 2 hours PRN wheezing or increased work of breathing using Per Protocol order mode. Repeat RT Therapy Protocol Assessment with second treatment then every 4 hours and as needed. RT to enter RT Home Evaluation for COPD & MDI Assessment order using Per Protocol order mode.     Electronically signed by Luis Fraga RCP on 9/17/2021 at 5:25 AM

## 2021-09-17 NOTE — FLOWSHEET NOTE
09/17/21 1345   Encounter Summary   Services provided to: Patient not available   Continue Visiting   (es 9/17 rap resp)   Complexity of Encounter High   Length of Encounter 15 minutes   Crisis   Type Rapid response   Assessment Passive   Intervention Sustaining presence/ Ministry of presence   Outcome De-escalated

## 2021-09-17 NOTE — CARE COORDINATION
CM attempted to meet with pt  : Unable to meet at bedside,  patient with staff in room , Rapid response called . Will meet at later time  when more appropriate. Electronically signed by Magdy Curran RN on 9/17/2021 at 1:18 PM         Magdy Curran RN Case Manager  [facility]  [address] [place] [address].   [facility] [address]  [phone]  Fax [phone]

## 2021-09-17 NOTE — PROGRESS NOTES
Progress Note    Admit Date: 9/16/2021  Diet: ADULT DIET; Regular; 3 carb choices (45 gm/meal)    CC: R chest pain    Interval history:  - started on hep gtt  - R chest pain still significant but improving  - O2 requirement stable at 2-4L, VSS    HPI: Pt is a 72 y/o M w/ a PMHx of HTN, HLD, CAD s/p stent, KAVITA-COPD syndrome, HFpEF, PVD, and Gout p/w pleuritic chest pain. Pt states that he woke up this morning and felt some mild R-sided discomfort which he initially attributed to \"sleeping funny\". Pt says that this discomfort persisted over the day and acutely worsened around dinner time when he began to have sharp pain in his right side that worsened with deep breaths. Pt said at this time we also started to have some light-headedness & SOB which prompted him to come to the ED. Upon arrival to the ED, patient was noted to be hypoxic to 88% on RA and still having ongoing pleuritic chest pain. CTPA revealed acute R-sided pulmonary emboli in the R-middle and R-lower lobes w/ no evidence of R-heart strain on imaging. Pt endorses no recent surgery/trauma/immobilization. Pt admitted for IV heparin gtt and further mgmt of his acute unprovoked PE.     Medications:     Scheduled Meds:   tiotropium  2 puff Inhalation Daily    allopurinol  100 mg Oral Daily    atorvastatin  80 mg Oral Daily    sodium chloride flush  5-40 mL IntraVENous 2 times per day    magnesium sulfate  2,000 mg IntraVENous Once     Continuous Infusions:   heparin (PORCINE) Infusion 18 Units/kg/hr (09/17/21 0133)    sodium chloride       PRN Meds:heparin (porcine), heparin (porcine), albuterol, sodium chloride flush, sodium chloride, ondansetron **OR** ondansetron, polyethylene glycol, acetaminophen **OR** acetaminophen, HYDROmorphone    Objective:   Vitals:   T-max:  Patient Vitals for the past 8 hrs:   BP Temp Temp src Pulse Resp SpO2   09/17/21 0329 124/73 97.4 °F (36.3 °C) Oral 87 22 --   09/17/21 0136 119/67 98.1 °F (36.7 °C) Oral 90 24 91 % Intake/Output Summary (Last 24 hours) at 9/17/2021 0841  Last data filed at 9/17/2021 0723  Gross per 24 hour   Intake --   Output 300 ml   Net -300 ml       Review of Systems   Constitutional: Negative for chills and fever. Respiratory: Positive for cough and shortness of breath. Cardiovascular: Positive for chest pain and leg swelling. Gastrointestinal: Positive for abdominal pain and nausea. Negative for abdominal distention and vomiting. Neurological: Positive for light-headedness. Negative for syncope and headaches. Physical Exam  Constitutional:       Appearance: He is ill-appearing and diaphoretic. HENT:      Mouth/Throat:      Mouth: Mucous membranes are moist.      Pharynx: Oropharynx is clear. Eyes:      Extraocular Movements: Extraocular movements intact. Cardiovascular:      Rate and Rhythm: Normal rate and regular rhythm. Pulses: Normal pulses. Heart sounds: Normal heart sounds. Pulmonary:      Effort: Respiratory distress present. Breath sounds: Normal breath sounds. No wheezing or rales. Abdominal:      General: Abdomen is flat. Bowel sounds are normal.      Palpations: Abdomen is soft. Tenderness: There is abdominal tenderness (lower R chest wall and RUQ). There is guarding. There is no rebound. Musculoskeletal:      Right lower leg: Edema present. Left lower leg: Edema present. Comments: 1+ pitting edema bilaterally   Neurological:      General: No focal deficit present. Mental Status: He is alert and oriented to person, place, and time.          LABS:    CBC:   Recent Labs     09/16/21  2252   WBC 9.5   HGB 13.8   HCT 41.2      .6*     Renal:    Recent Labs     09/16/21  1145 09/16/21  2252 09/17/21  0630 09/17/21  0642   * 132* 134*  --    K 4.3 3.6 4.3  --    CL 96* 99 101  --    CO2 23 17* 23  --    BUN 10 9 10  --    CREATININE 0.9 1.0 0.8  --    GLUCOSE 111* 107* 122*  --    CALCIUM 9.3 8.6 8.6  --    MG  -- 1.30*  --  1.70*   PHOS  --  2.9  --   --    ANIONGAP 14 16 10  --      Hepatic:   Recent Labs     09/16/21 2252   AST 18   ALT 20   BILITOT 0.7   BILIDIR <0.2   PROT 6.7   LABALBU 3.6   ALKPHOS 89     Troponin:   Recent Labs     09/16/21 2252   TROPONINI <0.01     BNP: No results for input(s): BNP in the last 72 hours. Lipids: No results for input(s): CHOL, HDL in the last 72 hours. Invalid input(s): LDLCALCU, TRIGLYCERIDE  ABGs:  No results for input(s): PHART, ONV0JWT, PO2ART, SPN7KQS, BEART, THGBART, L3KWWAUK, RTJ8CBP in the last 72 hours. INR:   Recent Labs     09/16/21 2251   INR 1.03     Lactate: No results for input(s): LACTATE in the last 72 hours. Cultures:  -----------------------------------------------------------------  RAD:   CT CHEST PULMONARY EMBOLISM W CONTRAST   Final Result      Acute pulmonary emboli most extensively involving the right middle lobe and to a lesser degree right lower lobe. Right middle lobe and right lower lobe airspace consolidation, potentially secondary to a component of infarction, particularly of the right middle lobe given the most extensive clot burden. Trace right pleural effusion. Emphysema. Critical result called to the patient's nurse in the emergency department at time of dictation. EXAM: CT ABDOMEN AND PELVIS WITH CONTRAST      INDICATION: Right-sided abdominal pain      COMPARISON: CTA of the abdomen and pelvis from December 14, 2015      TECHNIQUE: Axial CT imaging obtained from lung bases through pelvis. Axial images and multiplanar reformatted images are provided for review. Individualized dose optimization technique was used in order to meet ALARA standards for radiation dose    reduction.   In addition to vendor specific dose reduction algorithms, the dose reduction techniques vary based on the specific scanner utilized but frequently include automated exposure control, adjustment of the mA and/or kV according to patient size,    and use of iterative reconstruction technique. IV Contrast: 80 mL Isovue-370   Oral Contrast: None      FINDINGS:      LIVER: Liver mildly, diffusely hypodense consistent with fatty infiltration. GALLBLADDER AND BILIARY TREE: Prior cholecystectomy. No intra- or extrahepatic biliary dilatation. PANCREAS: Normal.      SPLEEN: Normal.      ADRENAL GLANDS: Normal.      KIDNEYS AND URETERS: Hypodense simple appearing cysts throughout the left kidney, largest within the upper portion measuring nearly 2 cm. . No suspicious cortical lesions. 4 mm calcification within the lower left kidney felt to be a small nonobstructing    calculus. No ureteral calculi or hydronephrosis. URINARY BLADDER: Normal.      REPRODUCTIVE ORGANS: Prostate normal in size. BOWEL: Normal caliber. No suspicious bowel wall thickening or surrounding inflammatory fat stranding. LYMPH NODES: No abnormally enlarged nodes. PERITONEUM/RETROPERITONEUM: No ascites or free air. VESSELS: Extensive atherosclerotic calcification of the abdominal aorta which remains normal in caliber. Stents within the iliac vasculature. The inferior vena cava, hepatic veins and portal venous system are patent. ABDOMINAL WALL: Bilateral fat-containing inguinal hernias. No acute findings. BONES: Multilevel degenerative change of the lumbar spine status post multilevel dorsal decompression. Partially visualized right hip arthroplasty hardware in place without complicating features. IMPRESSION:      No acute intra-abdominal process. Fatty infiltration of the liver. Nonobstructive left-sided nephrolithiasis. Fat-containing inguinal hernias. CT ABDOMEN PELVIS W IV CONTRAST Additional Contrast? None   Final Result      Acute pulmonary emboli most extensively involving the right middle lobe and to a lesser degree right lower lobe.       Right middle lobe and right lower lobe airspace consolidation, potentially secondary to a component of infarction, particularly of the right middle lobe given the most extensive clot burden. Trace right pleural effusion. Emphysema. Critical result called to the patient's nurse in the emergency department at time of dictation. EXAM: CT ABDOMEN AND PELVIS WITH CONTRAST      INDICATION: Right-sided abdominal pain      COMPARISON: CTA of the abdomen and pelvis from December 14, 2015      TECHNIQUE: Axial CT imaging obtained from lung bases through pelvis. Axial images and multiplanar reformatted images are provided for review. Individualized dose optimization technique was used in order to meet ALARA standards for radiation dose    reduction. In addition to vendor specific dose reduction algorithms, the dose reduction techniques vary based on the specific scanner utilized but frequently include automated exposure control, adjustment of the mA and/or kV according to patient size,    and use of iterative reconstruction technique. IV Contrast: 80 mL Isovue-370   Oral Contrast: None      FINDINGS:      LIVER: Liver mildly, diffusely hypodense consistent with fatty infiltration. GALLBLADDER AND BILIARY TREE: Prior cholecystectomy. No intra- or extrahepatic biliary dilatation. PANCREAS: Normal.      SPLEEN: Normal.      ADRENAL GLANDS: Normal.      KIDNEYS AND URETERS: Hypodense simple appearing cysts throughout the left kidney, largest within the upper portion measuring nearly 2 cm. . No suspicious cortical lesions. 4 mm calcification within the lower left kidney felt to be a small nonobstructing    calculus. No ureteral calculi or hydronephrosis. URINARY BLADDER: Normal.      REPRODUCTIVE ORGANS: Prostate normal in size. BOWEL: Normal caliber. No suspicious bowel wall thickening or surrounding inflammatory fat stranding. LYMPH NODES: No abnormally enlarged nodes. PERITONEUM/RETROPERITONEUM: No ascites or free air.       VESSELS: Extensive atherosclerotic calcification of the abdominal aorta which remains normal in caliber. Stents within the iliac vasculature. The inferior vena cava, hepatic veins and portal venous system are patent. ABDOMINAL WALL: Bilateral fat-containing inguinal hernias. No acute findings. BONES: Multilevel degenerative change of the lumbar spine status post multilevel dorsal decompression. Partially visualized right hip arthroplasty hardware in place without complicating features. IMPRESSION:      No acute intra-abdominal process. Fatty infiltration of the liver. Nonobstructive left-sided nephrolithiasis. Fat-containing inguinal hernias. XR CHEST PORTABLE   Final Result      Right basilar airspace disease, atelectasis versus developing infiltrate. Increased interstitial opacities throughout both lungs favored to be a component of volume overload/interstitial edema. VL Extremity Venous Bilateral    (Results Pending)       Assessment/Plan:   Karina Pearl is a 73 y.o. male PMHx HLD, CAD, KAVITA-COPD syndrome, HFpEF, PVD, and Gout p/w pleuritic chest pain and PE on CTPA    Provoked RML and RLL PE w/o cor pulmonale: trops uptrending with BNP close to baseline. No clinical signs of RHF but need to obtain echo to further evaluate. Vitals stable and does not appear high risk with need for thrombolytic therapy/intervention at this time. Concern for pulmonary infarction with consolidation. Considering likely provoked based on limited mobility, does not need full hypercoagulable workup.  CT C/A/P negative for malignancy  - hep gtt: will transition to DOAC on d/c  - supplemental O2: wean as tolerated, goal sats >88% in setting of COPD  - vascular surgery c/s: known to Dr. Aguila Courser for PVD  - position on L side to maximize V/Q  - repeat trop  - LE dopplers  - echo  - will obtain COVID PCR  - will require regular OP cancer screening: due for colonoscopy soon  - toradol 15mg x1  - IS  - PT/OT    Macrocytosis: no anemia present. Has had folate deficiency in the past not currently on supplement. In setting of PE want to rule out MDS or hematologic malignancy  - peripheral smear  - folate, B12  - start folate supp after labs    Chronic diastolic CHF: likely minimally volume up based on pitting edema  - IV lasix 40mg x1    Chronic Issues:   COPD-KAVITA Overlap Syndrome- spiriva  CAD/PVD- hold plavix, on hep gtt  HTN- hold Imdur, Lopressor, & Losartan  HLD- home atorvastatin  Gout- home allopurinol   Nicotine use: nicotine patch    Code Status: Full code  FEN: Regular diet, 3 carb choices  PPX: heparin  DISPO: Zamzam Fu MD, PGY-1  09/17/21  8:41 AM    This patient has been staffed and discussed with Dr. Andrés Esparza.

## 2021-09-18 LAB
ANION GAP SERPL CALCULATED.3IONS-SCNC: 12 MMOL/L (ref 3–16)
ANTI-XA UNFRAC HEPARIN: 0.64 IU/ML (ref 0.3–0.7)
ANTI-XA UNFRAC HEPARIN: 0.68 IU/ML (ref 0.3–0.7)
BASE EXCESS ARTERIAL: -1 (ref -3–3)
BASOPHILS ABSOLUTE: 0 K/UL (ref 0–0.2)
BASOPHILS RELATIVE PERCENT: 0.2 %
BLOOD SMEAR REVIEW: NORMAL
BUN BLDV-MCNC: 16 MG/DL (ref 7–20)
CALCIUM SERPL-MCNC: 8.6 MG/DL (ref 8.3–10.6)
CHLORIDE BLD-SCNC: 100 MMOL/L (ref 99–110)
CO2: 21 MMOL/L (ref 21–32)
CREAT SERPL-MCNC: 1 MG/DL (ref 0.8–1.3)
EOSINOPHILS ABSOLUTE: 0 K/UL (ref 0–0.6)
EOSINOPHILS RELATIVE PERCENT: 0 %
GFR AFRICAN AMERICAN: >60
GFR NON-AFRICAN AMERICAN: >60
GLUCOSE BLD-MCNC: 177 MG/DL (ref 70–99)
HCO3 ARTERIAL: 23.4 MMOL/L (ref 21–29)
HCT VFR BLD CALC: 41.3 % (ref 40.5–52.5)
HEMOGLOBIN: 14.1 G/DL (ref 13.5–17.5)
LYMPHOCYTES ABSOLUTE: 0.6 K/UL (ref 1–5.1)
LYMPHOCYTES RELATIVE PERCENT: 5.8 %
MCH RBC QN AUTO: 37.3 PG (ref 26–34)
MCHC RBC AUTO-ENTMCNC: 34.1 G/DL (ref 31–36)
MCV RBC AUTO: 109.4 FL (ref 80–100)
MONOCYTES ABSOLUTE: 0.4 K/UL (ref 0–1.3)
MONOCYTES RELATIVE PERCENT: 3.6 %
MRSA SCREEN RT-PCR: NORMAL
NEUTROPHILS ABSOLUTE: 9 K/UL (ref 1.7–7.7)
NEUTROPHILS RELATIVE PERCENT: 90.4 %
O2 SAT, ARTERIAL: 83 % (ref 93–100)
PCO2 ARTERIAL: 36.2 MM HG (ref 35–45)
PDW BLD-RTO: 15.3 % (ref 12.4–15.4)
PERFORMED ON: ABNORMAL
PH ARTERIAL: 7.42 (ref 7.35–7.45)
PLATELET # BLD: 112 K/UL (ref 135–450)
PMV BLD AUTO: 9.2 FL (ref 5–10.5)
PO2 ARTERIAL: 46.6 MM HG (ref 75–108)
POC SAMPLE TYPE: ABNORMAL
POTASSIUM REFLEX MAGNESIUM: 4.4 MMOL/L (ref 3.5–5.1)
RBC # BLD: 3.78 M/UL (ref 4.2–5.9)
SODIUM BLD-SCNC: 133 MMOL/L (ref 136–145)
TCO2 ARTERIAL: 25 MMOL/L
WBC # BLD: 10 K/UL (ref 4–11)

## 2021-09-18 PROCEDURE — 6370000000 HC RX 637 (ALT 250 FOR IP): Performed by: STUDENT IN AN ORGANIZED HEALTH CARE EDUCATION/TRAINING PROGRAM

## 2021-09-18 PROCEDURE — 99232 SBSQ HOSP IP/OBS MODERATE 35: CPT | Performed by: INTERNAL MEDICINE

## 2021-09-18 PROCEDURE — 97530 THERAPEUTIC ACTIVITIES: CPT

## 2021-09-18 PROCEDURE — 80048 BASIC METABOLIC PNL TOTAL CA: CPT

## 2021-09-18 PROCEDURE — C9113 INJ PANTOPRAZOLE SODIUM, VIA: HCPCS | Performed by: STUDENT IN AN ORGANIZED HEALTH CARE EDUCATION/TRAINING PROGRAM

## 2021-09-18 PROCEDURE — 2700000000 HC OXYGEN THERAPY PER DAY

## 2021-09-18 PROCEDURE — 97166 OT EVAL MOD COMPLEX 45 MIN: CPT

## 2021-09-18 PROCEDURE — 2580000003 HC RX 258: Performed by: INTERNAL MEDICINE

## 2021-09-18 PROCEDURE — 97162 PT EVAL MOD COMPLEX 30 MIN: CPT

## 2021-09-18 PROCEDURE — 94640 AIRWAY INHALATION TREATMENT: CPT

## 2021-09-18 PROCEDURE — 6360000002 HC RX W HCPCS: Performed by: INTERNAL MEDICINE

## 2021-09-18 PROCEDURE — 94761 N-INVAS EAR/PLS OXIMETRY MLT: CPT

## 2021-09-18 PROCEDURE — 94150 VITAL CAPACITY TEST: CPT

## 2021-09-18 PROCEDURE — 2580000003 HC RX 258: Performed by: STUDENT IN AN ORGANIZED HEALTH CARE EDUCATION/TRAINING PROGRAM

## 2021-09-18 PROCEDURE — 85520 HEPARIN ASSAY: CPT

## 2021-09-18 PROCEDURE — 85025 COMPLETE CBC W/AUTO DIFF WBC: CPT

## 2021-09-18 PROCEDURE — 87449 NOS EACH ORGANISM AG IA: CPT

## 2021-09-18 PROCEDURE — 6360000002 HC RX W HCPCS: Performed by: STUDENT IN AN ORGANIZED HEALTH CARE EDUCATION/TRAINING PROGRAM

## 2021-09-18 PROCEDURE — 97535 SELF CARE MNGMENT TRAINING: CPT

## 2021-09-18 PROCEDURE — 82803 BLOOD GASES ANY COMBINATION: CPT

## 2021-09-18 PROCEDURE — 1200000000 HC SEMI PRIVATE

## 2021-09-18 PROCEDURE — 36415 COLL VENOUS BLD VENIPUNCTURE: CPT

## 2021-09-18 PROCEDURE — 97116 GAIT TRAINING THERAPY: CPT

## 2021-09-18 RX ORDER — FOLIC ACID 1 MG/1
1 TABLET ORAL DAILY
Status: DISCONTINUED | OUTPATIENT
Start: 2021-09-18 | End: 2021-09-20 | Stop reason: HOSPADM

## 2021-09-18 RX ORDER — METOPROLOL TARTRATE 100 MG/1
100 TABLET ORAL 2 TIMES DAILY
Status: DISCONTINUED | OUTPATIENT
Start: 2021-09-18 | End: 2021-09-20 | Stop reason: HOSPADM

## 2021-09-18 RX ORDER — METOPROLOL TARTRATE 100 MG/1
100 TABLET ORAL 2 TIMES DAILY
Status: DISCONTINUED | OUTPATIENT
Start: 2021-09-18 | End: 2021-09-18

## 2021-09-18 RX ADMIN — HEPARIN SODIUM 20 UNITS/KG/HR: 10000 INJECTION, SOLUTION INTRAVENOUS at 05:15

## 2021-09-18 RX ADMIN — PANTOPRAZOLE SODIUM 40 MG: 40 INJECTION, POWDER, FOR SOLUTION INTRAVENOUS at 08:47

## 2021-09-18 RX ADMIN — METOPROLOL TARTRATE 100 MG: 100 TABLET, FILM COATED ORAL at 14:49

## 2021-09-18 RX ADMIN — ALLOPURINOL 100 MG: 100 TABLET ORAL at 08:42

## 2021-09-18 RX ADMIN — ALBUTEROL SULFATE 2.5 MG: 2.5 SOLUTION RESPIRATORY (INHALATION) at 12:42

## 2021-09-18 RX ADMIN — ACETAMINOPHEN 650 MG: 325 TABLET ORAL at 00:14

## 2021-09-18 RX ADMIN — LINEZOLID 600 MG: 600 INJECTION, SOLUTION INTRAVENOUS at 05:15

## 2021-09-18 RX ADMIN — Medication 10 ML: at 09:00

## 2021-09-18 RX ADMIN — AZITHROMYCIN MONOHYDRATE 500 MG: 500 INJECTION, POWDER, LYOPHILIZED, FOR SOLUTION INTRAVENOUS at 21:34

## 2021-09-18 RX ADMIN — FOLIC ACID 1 MG: 1 TABLET ORAL at 08:45

## 2021-09-18 RX ADMIN — LINEZOLID 600 MG: 600 INJECTION, SOLUTION INTRAVENOUS at 17:27

## 2021-09-18 RX ADMIN — PIPERACILLIN AND TAZOBACTAM 3375 MG: 3; .375 INJECTION, POWDER, LYOPHILIZED, FOR SOLUTION INTRAVENOUS at 00:10

## 2021-09-18 RX ADMIN — PIPERACILLIN AND TAZOBACTAM 3375 MG: 3; .375 INJECTION, POWDER, LYOPHILIZED, FOR SOLUTION INTRAVENOUS at 17:02

## 2021-09-18 RX ADMIN — OXYCODONE HYDROCHLORIDE AND ACETAMINOPHEN 1 TABLET: 5; 325 TABLET ORAL at 07:30

## 2021-09-18 RX ADMIN — ATORVASTATIN CALCIUM 80 MG: 80 TABLET, FILM COATED ORAL at 08:43

## 2021-09-18 RX ADMIN — PIPERACILLIN AND TAZOBACTAM 3375 MG: 3; .375 INJECTION, POWDER, LYOPHILIZED, FOR SOLUTION INTRAVENOUS at 08:48

## 2021-09-18 RX ADMIN — ONDANSETRON 4 MG: 4 TABLET, ORALLY DISINTEGRATING ORAL at 20:26

## 2021-09-18 RX ADMIN — METHYLPREDNISOLONE SODIUM SUCCINATE 40 MG: 40 INJECTION, POWDER, FOR SOLUTION INTRAMUSCULAR; INTRAVENOUS at 20:24

## 2021-09-18 RX ADMIN — HEPARIN SODIUM 20 UNITS/KG/HR: 10000 INJECTION, SOLUTION INTRAVENOUS at 19:13

## 2021-09-18 RX ADMIN — ALBUTEROL SULFATE 2.5 MG: 2.5 SOLUTION RESPIRATORY (INHALATION) at 09:13

## 2021-09-18 RX ADMIN — Medication 10 ML: at 20:27

## 2021-09-18 RX ADMIN — METHYLPREDNISOLONE SODIUM SUCCINATE 40 MG: 40 INJECTION, POWDER, FOR SOLUTION INTRAMUSCULAR; INTRAVENOUS at 08:46

## 2021-09-18 RX ADMIN — ALBUTEROL SULFATE 2.5 MG: 2.5 SOLUTION RESPIRATORY (INHALATION) at 17:30

## 2021-09-18 RX ADMIN — OXYCODONE HYDROCHLORIDE AND ACETAMINOPHEN 1 TABLET: 5; 325 TABLET ORAL at 15:39

## 2021-09-18 RX ADMIN — TIOTROPIUM BROMIDE INHALATION SPRAY 2 PUFF: 3.12 SPRAY, METERED RESPIRATORY (INHALATION) at 09:19

## 2021-09-18 ASSESSMENT — PAIN DESCRIPTION - PROGRESSION
CLINICAL_PROGRESSION: NOT CHANGED
CLINICAL_PROGRESSION: NOT CHANGED
CLINICAL_PROGRESSION: GRADUALLY WORSENING
CLINICAL_PROGRESSION: NOT CHANGED
CLINICAL_PROGRESSION: GRADUALLY WORSENING
CLINICAL_PROGRESSION: NOT CHANGED
CLINICAL_PROGRESSION: NOT CHANGED

## 2021-09-18 ASSESSMENT — PAIN DESCRIPTION - PAIN TYPE
TYPE: ACUTE PAIN

## 2021-09-18 ASSESSMENT — PAIN SCALES - GENERAL
PAINLEVEL_OUTOF10: 3
PAINLEVEL_OUTOF10: 6
PAINLEVEL_OUTOF10: 4
PAINLEVEL_OUTOF10: 3
PAINLEVEL_OUTOF10: 3
PAINLEVEL_OUTOF10: 0
PAINLEVEL_OUTOF10: 0
PAINLEVEL_OUTOF10: 6
PAINLEVEL_OUTOF10: 0
PAINLEVEL_OUTOF10: 7

## 2021-09-18 ASSESSMENT — PAIN DESCRIPTION - ORIENTATION
ORIENTATION: RIGHT
ORIENTATION: RIGHT
ORIENTATION: RIGHT;MID
ORIENTATION: RIGHT

## 2021-09-18 ASSESSMENT — PAIN DESCRIPTION - LOCATION
LOCATION: CHEST

## 2021-09-18 ASSESSMENT — PAIN DESCRIPTION - DESCRIPTORS
DESCRIPTORS: DISCOMFORT

## 2021-09-18 ASSESSMENT — ENCOUNTER SYMPTOMS
SHORTNESS OF BREATH: 1
VOMITING: 0
ABDOMINAL PAIN: 0
DIARRHEA: 0

## 2021-09-18 ASSESSMENT — PAIN DESCRIPTION - ONSET
ONSET: ON-GOING

## 2021-09-18 ASSESSMENT — PAIN - FUNCTIONAL ASSESSMENT
PAIN_FUNCTIONAL_ASSESSMENT: ACTIVITIES ARE NOT PREVENTED
PAIN_FUNCTIONAL_ASSESSMENT: ACTIVITIES ARE NOT PREVENTED
PAIN_FUNCTIONAL_ASSESSMENT: PREVENTS OR INTERFERES SOME ACTIVE ACTIVITIES AND ADLS
PAIN_FUNCTIONAL_ASSESSMENT: PREVENTS OR INTERFERES SOME ACTIVE ACTIVITIES AND ADLS

## 2021-09-18 ASSESSMENT — PAIN DESCRIPTION - FREQUENCY
FREQUENCY: CONTINUOUS
FREQUENCY: CONTINUOUS
FREQUENCY: INTERMITTENT
FREQUENCY: INTERMITTENT

## 2021-09-18 NOTE — PROGRESS NOTES
Occupational Therapy   Occupational Therapy Initial Assessment  Date: 2021   Patient Name: Ailyn Eisenberg MRN: [id number]     : 1950    Date of Service: 2021    Discharge Recommendations:  Ailyn Eisenberg scored a 16/24 on the AM-PAC ADL Inpatient form. Current research shows that an AM-PAC score of 17 or less is typically not associated with a discharge to the patient's home setting. Based on the patient's AM-PAC score and their current ADL deficits, it is recommended that the patient have 3-5 sessions per week of Occupational Therapy at d/c to increase the patient's independence. Please see assessment section for further patient specific details. If patient discharges prior to next session this note will serve as a discharge summary. Please see below for the latest assessment towards goals. OT Equipment Recommendations  Equipment Needed: No (defer to next level of care)    Assessment   Performance deficits / Impairments: Decreased functional mobility ; Decreased ADL status; Decreased balance;Decreased endurance;Decreased high-level IADLs    Assessment: Pt is a 73 y.o. M who presents below baseline for ADL performance and functional mobility/transfers. Pt reports living alone in a basement apartment. Pt was previously independent w/ functional mobility and transfers w/ use of single point cane. Pt also reports being previously independent w/ all ADL tasks. Pt required Min A x1 for limited functional mobility and transfers this date. Pt is significantly limited by decreased endurance as evident by pt's O2 sats decrease to 84% with activity on a high amount of oxygen (10L). Pt would ebenfit from cont inpt OT services upon d/c to increase safety and maximize funcitonal independence. If pt were to return home at d/c, rec 24hr A.  Will cont to follow    Treatment Diagnosis: impaired ADL performance and functional mobility/transfers  Prognosis: Good  Decision Making: Medium Complexity  OT Education: OT Role;Plan of Care;Transfer Training  Patient Education: pt verb understanding  REQUIRES OT FOLLOW UP: Yes  Activity Tolerance  Activity Tolerance: Patient Tolerated treatment well;Patient limited by fatigue  Safety Devices  Safety Devices in place: Yes  Type of devices: Left in chair;Nurse notified;Call light within reach; Chair alarm in place           Patient Diagnosis(es): The primary encounter diagnosis was Other acute pulmonary embolism without acute [facility] A diagnosis of Pulmonary infarct [facility]) was also pertinent to this visit. has a past medical history of CAD (coronary artery disease), CHF (congestive heart failure) ([facility] COPD (chronic obstructive pulmonary disease) ([facility] Depressive disorder, not elsewhere classified, GERD (gastroesophageal reflux disease), History of colon polyps - 30 seen on colonoscopy 04/2021, History of MI (myocardial infarction), History of skin ulcer of lower extremity, History of transient ischemic attack (TIA), Hyperlipidemia, Hypertension, Neuropathy, KAVITA (obstructive sleep apnea), Personal history of DVT (deep vein thrombosis), PVD (peripheral vascular disease) ([facility] TIA (transient ischemic attack), and Vertebral fracture. has a past surgical history that includes Appendectomy; Coronary angioplasty with stent (6/2013); other surgical history (Right, 6/25/2013); Leg Debridement (Right, 7/23/2013); skin split graft (Right, 7/25/2013); other surgical history (Left, 8/27/2013); laminectomy (11/18/2015); transluminal angioplasty (Left, 12/22/2015); Total hip arthroplasty (Right, 09/01/2016); Cholecystectomy, laparoscopic; eye surgery (Left); Colonoscopy (4/23/2021); Colonoscopy (4/23/2021); and Colonoscopy (4/23/2021). Treatment Diagnosis: impaired ADL performance and functional mobility/transfers      Restrictions  Position Activity Restriction  Other position/activity restrictions:  Up with assist    Subjective   General  Chart Reviewed: Yes  Patient assessed for rehabilitation services?: Yes  Additional Pertinent Hx: Pt admitted on 9/17/21 with pulmonary infarct. H/o CAD, CHF, COPD, HTN, depression, GERD, neuropathy, TIA,, MI, neuropathy, lami, R THR  Family / Caregiver Present: Yes (pt's grandson present)  Referring Practitioner: Shane Aguirre MD  Diagnosis: Pulmonary infarct  Subjective  Subjective: Pt supine in bed upon arrival and agreeable to OT/PT eval and treat  Patient Currently in Pain: Yes (Chest pain, recently medicated)  Vital Signs  Resp: 10  Patient Currently in Pain: Yes (Chest pain, recently medicated)  Oxygen Therapy  SpO2: 92 %  Pulse Oximeter Device Mode: Continuous  Pulse Oximeter Device Location: Finger  O2 Device: High flow nasal cannula  O2 Flow Rate (L/min): 8 L/min     Social/Functional History  Social/Functional History  Lives With: Alone  Type of Home: Apartment (basement apartment)  Home Layout: Laundry in basement  Home Access: Stairs to enter with rails  Entrance Stairs - Number of Steps: 10-15 kylie front door, back door is 3 kylie and 2 steps to basement  Entrance Stairs - Rails: Both  Bathroom Shower/Tub: Tub/Shower unit  Bathroom Toilet: Standard (w/ a raised seat w/ handles)  Bathroom Equipment: Grab bars in shower, Hand-held shower, Toilet raiser, Tub transfer bench  Home Equipment: Cane, 4 wheeled walker, Standard walker  ADL Assistance: Independent  Homemaking Assistance: Needs assistance (gets 1x week help for cleaning. He does his own cooking and laundry)  Homemaking Responsibilities: Yes  Ambulation Assistance: Needs assistance (uses cane inside and rollator outside the house)  Transfer Assistance: Independent  Active : No  Patient's  Info: Takes the bus or grandson or son take him to appointments  Occupation: Retired  Type of occupation:   Leisure & Hobbies: watch TV  Additional Comments: \"I had a few falls but none lately\". \"I think it was last year\".   Pt does have an upstairs neighbor who assists pt at times. Objective   Vision: Impaired  Vision Exceptions: Wears glasses for reading  Hearing: Exceptions to [facility]  Hearing Exceptions: Hard of hearing/hearing concerns    Orientation  Overall Orientation Status: Within Functional Limits        Balance  Sitting Balance: Supervision  Standing Balance: Minimal assistance (-CGA)  Standing Balance  Time: ~ 1 min total  Activity: functional mobility from EOB to bedside chair; standing trial at EOB  Comment: O2 sats decreased to 84% with gait and increased to 92% following rest  Functional Mobility  Functional - Mobility Device: Cane  Activity: Other (EOB > bedside chair)  Assist Level: Minimal assistance     ADL  LE Dressing: Setup; Increased time to complete (Pt simulated donning/doffing socks while seated in bedside chair. pt utilized figure 4 tech to bring foot onto opposite knee.  pt declined changing socks at this time)  Additional Comments: Pt declined participation in additional ADL tasks, only agreeing to simulate donning/doffing socks           Bed mobility  Supine to Sit: Stand by assistance (HOB elevated, using rail, slow and effortful)  Scooting: Stand by assistance     Transfers  Sit to stand: Minimal assistance  Stand to sit: Minimal assistance        Cognition  Overall Cognitive Status: WFL  Perception  Overall Perceptual Status: WFL        Sensation  Overall Sensation Status: Impaired (N and T B feet)        LUE AROM (degrees)  LUE AROM : WFL  Left Hand AROM (degrees)  Left Hand AROM: WFL  RUE AROM (degrees)  RUE AROM : WFL  Right Hand AROM (degrees)  Right Hand AROM: WFL  LUE Strength  Gross LUE Strength: WFL  RUE Strength  Gross RUE Strength: WFL        Hand Dominance  Hand Dominance: Left             Plan   Plan  Times per week: 2-5x  Current Treatment Recommendations: Patient/Caregiver Education & Training, Balance Training, Functional Mobility Training, Endurance Training, Self-Care / ADL, Safety Education & Training, Strengthening    G-Code     OutComes Score                                                  AM-PAC Score        AM-PAC Inpatient Daily Activity Raw Score: 16 (09/18/21 1436)  AM-PAC Inpatient ADL T-Scale Score : 35.96 (09/18/21 1436)  ADL Inpatient CMS 0-100% Score: 53.32 (09/18/21 1436)  ADL Inpatient CMS G-Code Modifier : CK (09/18/21 1436)    Goals  Short term goals  Time Frame for Short term goals: by d/c  Short term goal 1: Pt will complete toilet transfer assessment  Short term goal 2: Pt will complete LB dressing assessment  Short term goal 3: Pt will maintain stance for 2 min to complete an ADL task w/ CGA  Patient Goals   Patient goals : not stated       Therapy Time   Individual Concurrent Group Co-treatment   Time In 1000         Time Out 1040         Minutes 40                 Timed Code Treatment Minutes:  25    Total Treatment Minutes:  [address] [place], [place]

## 2021-09-18 NOTE — PROGRESS NOTES
Physical Therapy    Facility/Department: [facility] ICU  Initial Assessment and Treatment    NAME: Ana Rosa Samuel. : 1950  MRN: [id number]    Date of Service: 2021    Discharge Recommendations:    Ana Rosa Samuel. scored a  16/24 on the AM-PAC short mobility form. Current research shows that an AM-PAC score of 17 or less is typically not associated with a discharge to the patient's home setting. Based on the patient's AM-PAC score and their current functional mobility deficits, it is recommended that the patient have 3-5 sessions per week of Physical Therapy at d/c to increase the patient's independence. Please see assessment section for further patient specific details. If patient discharges prior to next session this note will serve as a discharge summary. Please see below for the latest assessment towards goals. PT Equipment Recommendations  Equipment Needed: No    Assessment   Assessment: Pt is 72 yo M with limited mobility due to decreased endurance for activity and decreased dynamic balance. Pt's O2 sats decrease to 84% with actiivty on a high amount of oxygen. Rec continued inpt PT at d/c as pt reports he is much weaker than his baseline, is normally not on O2, and lives alone. If home, rec 24 hour assist initially and home PT/HHA. No DME needs. Will follow. Treatment Diagnosis: Decreased endurance for activity  Prognosis: Good  Decision Making: Medium Complexity  PT Education: PT Role;General Safety; Energy Conservation  Patient Education: Pt verbalized understanding  REQUIRES PT FOLLOW UP: Yes  Activity Tolerance  Activity Tolerance: Patient Tolerated treatment well;Patient limited by endurance       Patient Diagnosis(es): The primary encounter diagnosis was Other acute pulmonary embolism without acute cor pulmonale ([facility] A diagnosis of Pulmonary infarct [facility]) was also pertinent to this visit.      has a past medical history of CAD (coronary artery disease), CHF (congestive heart failure) ([facility] COPD (chronic obstructive pulmonary disease) ([facility] Depressive disorder, not elsewhere classified, GERD (gastroesophageal reflux disease), History of colon polyps - 30 seen on colonoscopy 04/2021, History of MI (myocardial infarction), History of skin ulcer of lower extremity, History of transient ischemic attack (TIA), Hyperlipidemia, Hypertension, Neuropathy, KAVITA (obstructive sleep apnea), Personal history of DVT (deep vein thrombosis), PVD (peripheral vascular disease) ([facility] TIA (transient ischemic attack), and Vertebral fracture. has a past surgical history that includes Appendectomy; Coronary angioplasty with stent (6/2013); other surgical history (Right, 6/25/2013); Leg Debridement (Right, 7/23/2013); skin split graft (Right, 7/25/2013); other surgical history (Left, 8/27/2013); laminectomy (11/18/2015); transluminal angioplasty (Left, 12/22/2015); Total hip arthroplasty (Right, 09/01/2016); Cholecystectomy, laparoscopic; eye surgery (Left); Colonoscopy (4/23/2021); Colonoscopy (4/23/2021); and Colonoscopy (4/23/2021). Restrictions  Position Activity Restriction  Other position/activity restrictions: Up with assist     Vision/Hearing  Vision: Impaired  Vision Exceptions: Wears glasses for reading  Hearing: Exceptions to [facility]  Hearing Exceptions: Hard of hearing/hearing concerns       Subjective  General  Chart Reviewed: Yes  Additional Pertinent Hx: Pt admitted on 9/17/21 with pulmonary infarct. H/o CAD, CHF, COPD, HTN, depression, GERD, neuropathy, TIA,, MI, neuropathy, lami, R THR.   Family / Caregiver Present: Yes Leisa Liang  Referring Practitioner: Dr. Yandel Grant  Diagnosis: Pulmonary infarct  Subjective  Subjective: Pt supine in bed and agreeable to PT  Pain Screening  Patient Currently in Pain: Yes (Chest pain, recently medicated)  Pain Assessment  Pain Level: 6    Orientation  Orientation  Overall Orientation Status: Within Functional Limits     Social/Functional Balance  Sitting - Static: Good  Standing - Static: Fair (With cane)  Standing - Dynamic: Fair;- (With cane)  Comments: Pt performed static stance with cane  x 1 min with CGA    Treatment:  Functional mobility training and pt education    Plan   Plan  Times per week: 2-5  Current Treatment Recommendations: Functional Mobility Training, Balance Training, Gait Training, Transfer Training, Safety Education & Training, Patient/Caregiver Education & Training, Endurance Training  Safety Devices  Type of devices: Call light within reach, Chair alarm in place, Left in chair, Nurse notified    Goals  Short term goals  Time Frame for Short term goals: by discharge  Short term goal 1: Pt will perform bed mobility with supervision  Short term goal 2: Pt will transfer sit to and from stand with SBA  Short term goal 3: Pt will ambulate 50 feet with appropriate AD and SBA  Short term goal 4: Pt will ambulate up and down 3 steps with rail and CGA  Patient Goals   Patient goals : Return home    Therapy Time   Individual Concurrent Group Co-treatment   Time In 1000         Time Out 1040         Minutes 40           Timed Code Treatment Minutes:   25    Total Treatment Minutes:  [address], PT

## 2021-09-18 NOTE — PROGRESS NOTES
Hospitalist Progress Note      PCP: Jerman Magaña DO    Date of Admission: 9/16/2021    Hospital Course:   74 yo male admitted for acute pe diagnosed by ct chest after presenting to the ed w/ pleuritic cp and sob. Yesterday agfter returning from [facility] pt's respiratory status worsened and a rapid response was called. Pt w/ increased left sided pleuritic chest pain and was requiring nrb. Pt did improve w/ lasix iv and pain control but as he was still requiring 15 liters of o2 and bp was low there was a concern for right heart strain and cor pulmonale w/ possible need for ekos therefore he was transferred to the icu. Subjective:   Now doing better and down to 8 liters of 02. Has heparin gtt.      Medications:  Reviewed    Infusion Medications    heparin (PORCINE) Infusion 20 Units/kg/hr (09/18/21 0515)    sodium chloride       Scheduled Medications    folic acid  1 mg Oral Daily    tiotropium  2 puff Inhalation Daily    allopurinol  100 mg Oral Daily    atorvastatin  80 mg Oral Daily    sodium chloride flush  5-40 mL IntraVENous 2 times per day    nicotine  1 patch TransDERmal Daily    lidocaine  1 patch TransDERmal Daily    pantoprazole  40 mg IntraVENous Daily    albuterol  2.5 mg Nebulization 4x daily    piperacillin-tazobactam  3,375 mg IntraVENous Q8H    azithromycin  500 mg IntraVENous Q24H    linezolid  600 mg IntraVENous Q12H    methylPREDNISolone  40 mg IntraVENous Q12H     PRN Meds: heparin (porcine), heparin (porcine), albuterol, sodium chloride flush, sodium chloride, ondansetron **OR** ondansetron, polyethylene glycol, acetaminophen **OR** acetaminophen, oxyCODONE-acetaminophen      Intake/Output Summary (Last 24 hours) at 9/18/2021 1158  Last data filed at 9/18/2021 0900  Gross per 24 hour   Intake 1954 ml   Output 1550 ml   Net 404 ml       Physical Exam Performed:    BP (!) 121/58   Pulse 90   Temp 98 °F (36.7 °C) (Oral)   Resp 24   Ht 5' 10\" (1.778 m)   Wt 205 lb (93 kg)   SpO2 93%   BMI 29.41 kg/m²     General appearance: No apparent distress, appears stated age and cooperative. Respiratory:  Normal respiratory effort. No resp distress  Cardiovascular: Regular rate and rhythm  Skin:  No rashes or lesions. Neurologic:   grossly non-focal.  Psychiatric: Alert and oriented, thought content appropriate, normal insight    Labs:   Recent Labs     09/16/21 2252 09/18/21  0459   WBC 9.5 10.0   HGB 13.8 14.1   HCT 41.2 41.3    112*     Recent Labs     09/16/21  2252 09/16/21  2252 09/17/21  0630 09/17/21  1732 09/18/21  0459   *   < > 134* 131* 133*   K 3.6  --  4.3 4.7 4.4   CL 99   < > 101 98* 100   CO2 17*   < > 23 21 21   BUN 9   < > 10 11 16   CREATININE 1.0   < > 0.8 0.9 1.0   CALCIUM 8.6   < > 8.6 8.6 8.6   PHOS 2.9  --   --  3.1  --     < > = values in this interval not displayed. Recent Labs     09/16/21  2252   AST 18   ALT 20   BILIDIR <0.2   BILITOT 0.7   ALKPHOS 89     Recent Labs     09/16/21  2251 09/17/21  1358   INR 1.03 1.09     Recent Labs     09/17/21  0630 09/17/21  1358 09/17/21  1732   TROPONINI 0.03* <0.01 0.01         Assessment/Plan:    Active Hospital Problems    Diagnosis     Pulmonary embolism without acute cor pulmonale (HCC) [I26.99]    w/ worsening hypoxia and hemodynamic instabiliy transferred to the icu yesterday- now improving. Cont heparin iv/ o2- wean as tolerated. Received one dose of lasix- hold further for now. Primarily being managed by pulm/ cc- appreciate help. DVT Prophylaxis: on heparin gg  Diet: ADULT DIET; Regular; 3 carb choices (45 gm/meal)  Code Status: Full Code    PT/OT Eval Status: once clinically stable    Dispo - inpatient.       Annie Pedro MD

## 2021-09-18 NOTE — PROGRESS NOTES
ventricular enlargement.   Stat CTPA ordered with concern of a worsening pulmonary embolism which revealed a stable segmental and subsegmental pulmonary emboli on the right no new or progressive pulmonary embolus however did showed interval increase in consolidative opacity of the right lower lobe with hazy consolidation.        Medications:     Scheduled Meds:   tiotropium  2 puff Inhalation Daily    allopurinol  100 mg Oral Daily    atorvastatin  80 mg Oral Daily    sodium chloride flush  5-40 mL IntraVENous 2 times per day    nicotine  1 patch TransDERmal Daily    lidocaine  1 patch TransDERmal Daily    pantoprazole  40 mg IntraVENous Daily    albuterol  2.5 mg Nebulization 4x daily    piperacillin-tazobactam  3,375 mg IntraVENous Q8H    azithromycin  500 mg IntraVENous Q24H    linezolid  600 mg IntraVENous Q12H    methylPREDNISolone  40 mg IntraVENous Q12H     Continuous Infusions:   heparin (PORCINE) Infusion 20 Units/kg/hr (09/18/21 0515)    sodium chloride       PRN Meds:heparin (porcine), heparin (porcine), albuterol, sodium chloride flush, sodium chloride, ondansetron **OR** ondansetron, polyethylene glycol, acetaminophen **OR** acetaminophen, oxyCODONE-acetaminophen    Objective:   Vitals:   T-max:  Patient Vitals for the past 8 hrs:   BP Temp Temp src Pulse Resp SpO2   09/18/21 0600 (!) 158/84 -- -- 68 22 92 %   09/18/21 0545 -- -- -- 58 25 91 %   09/18/21 0530 -- -- -- 64 25 (!) 89 %   09/18/21 0515 -- -- -- 69 21 (!) 85 %   09/18/21 0500 (!) 154/76 -- -- 65 20 (!) 88 %   09/18/21 0445 -- -- -- 62 17 91 %   09/18/21 0430 -- -- -- 72 21 90 %   09/18/21 0415 -- -- -- 83 22 (!) 89 %   09/18/21 0400 (!) 138/57 97.7 °F (36.5 °C) Oral 79 18 (!) 89 %   09/18/21 0345 -- -- -- 62 18 91 %   09/18/21 0330 -- -- -- 61 18 91 %   09/18/21 0315 -- -- -- 64 18 91 %   09/18/21 0300 (!) 151/75 -- -- 64 20 92 %   09/18/21 0245 -- -- -- 66 17 92 %   09/18/21 0230 -- -- -- 81 14 (!) 89 %   09/18/21 0215 -- -- -- 69 20 92 %   09/18/21 0200 (!) 149/76 -- -- 70 28 92 %   09/18/21 0145 -- -- -- 69 21 92 %   09/18/21 0130 (!) 150/87 -- -- 73 27 95 %   09/18/21 0115 -- -- -- 73 21 91 %   09/18/21 0100 (!) 145/75 -- -- 71 28 92 %   09/18/21 0045 -- -- -- 84 24 (!) 87 %   09/18/21 0030 (!) 145/74 -- -- 72 22 90 %       Intake/Output Summary (Last 24 hours) at 9/18/2021 0818  Last data filed at 9/18/2021 0727  Gross per 24 hour   Intake 1604 ml   Output 1550 ml   Net 54 ml       Review of Systems   Respiratory: Positive for shortness of breath. Cardiovascular: Positive for chest pain. Gastrointestinal: Negative for abdominal pain, diarrhea and vomiting. Genitourinary: Negative for difficulty urinating, frequency and urgency. Neurological: Negative for weakness and headaches. Physical Exam  Constitutional:       General: He is not in acute distress. Appearance: He is ill-appearing. HENT:      Head: Normocephalic and atraumatic. Eyes:      General: No scleral icterus. Conjunctiva/sclera: Conjunctivae normal.   Cardiovascular:      Rate and Rhythm: Normal rate and regular rhythm. Heart sounds: No murmur heard. No gallop. Pulmonary:      Effort: Respiratory distress present. Breath sounds: Wheezing and rales present. Abdominal:      General: There is no distension. Tenderness: There is no abdominal tenderness. There is no guarding. Musculoskeletal:      Right lower leg: No edema. Left lower leg: No edema. Skin:     Coloration: Skin is not pale. Findings: No rash. Neurological:      Mental Status: He is oriented to person, place, and time.            LABS:    CBC:   Recent Labs     09/16/21 2252 09/18/21  0459   WBC 9.5 10.0   HGB 13.8 14.1   HCT 41.2 41.3    112*   .6* 109.4*     Renal:    Recent Labs     09/16/21 2252 09/16/21  2252 09/17/21  0630 09/17/21  0642 09/17/21  1732 09/18/21  0459   *   < > 134*  --  131* 133*   K 3.6  --  4.3  --  4.7 4.4 CL 99   < > 101  --  98* 100   CO2 17*   < > 23  --  21 21   BUN 9   < > 10  --  11 16   CREATININE 1.0   < > 0.8  --  0.9 1.0   GLUCOSE 107*   < > 122*  --  101* 177*   CALCIUM 8.6   < > 8.6  --  8.6 8.6   MG 1.30*  --   --  1.70*  --   --    PHOS 2.9  --   --   --  3.1  --    ANIONGAP 16   < > 10  --  12 12    < > = values in this interval not displayed. Hepatic:   Recent Labs     09/16/21  2252 09/17/21  1732   AST 18  --    ALT 20  --    BILITOT 0.7  --    BILIDIR <0.2  --    PROT 6.7  --    LABALBU 3.6 3.2*   ALKPHOS 89  --      Troponin:   Recent Labs     09/17/21  0630 09/17/21  1358 09/17/21  1732   TROPONINI 0.03* <0.01 0.01     BNP: No results for input(s): BNP in the last 72 hours. Lipids: No results for input(s): CHOL, HDL in the last 72 hours. Invalid input(s): LDLCALCU, TRIGLYCERIDE  ABGs:    Recent Labs     09/17/21  1320 09/18/21  0101   PHART 7.402 7.418   YPY8WIB 37.7 36.2   PO2ART 64.3* 46.6*   DQZ1GPV 24 23.4   BEART cannot calc* -1   [id number] cannot calc* 83*   NQE1OEF 25 25       INR:   Recent Labs     09/16/21  2251 09/17/21  1358   INR 1.03 1.09     Lactate: No results for input(s): LACTATE in the last 72 hours. Cultures:  -----------------------------------------------------------------  RAD:   CTA PULMONARY W CONTRAST   Final Result   1. Stable segmental and subsegmental pulmonary emboli on the right. No new or progressive pulmonary embolus. 2. Interval increase in consolidative opacity of the right lower lobe with hazy consolidation also noted of the right middle lobe and into the right upper lobe. XR CHEST PORTABLE   Final Result      More prominent right basilar airspace disease. Slightly more prominent increased interstitial opacities throughout both lungs.       VL Extremity Venous Bilateral   Final Result      CT CHEST PULMONARY EMBOLISM W CONTRAST   Final Result      Acute pulmonary emboli most extensively involving the right middle lobe and to a lesser degree right lower lobe. Right middle lobe and right lower lobe airspace consolidation, potentially secondary to a component of infarction, particularly of the right middle lobe given the most extensive clot burden. Trace right pleural effusion. Emphysema. Critical result called to the patient's nurse in the emergency department at time of dictation. EXAM: CT ABDOMEN AND PELVIS WITH CONTRAST      INDICATION: Right-sided abdominal pain      COMPARISON: CTA of the abdomen and pelvis from December 14, 2015      TECHNIQUE: Axial CT imaging obtained from lung bases through pelvis. Axial images and multiplanar reformatted images are provided for review. Individualized dose optimization technique was used in order to meet ALARA standards for radiation dose    reduction. In addition to vendor specific dose reduction algorithms, the dose reduction techniques vary based on the specific scanner utilized but frequently include automated exposure control, adjustment of the mA and/or kV according to patient size,    and use of iterative reconstruction technique. IV Contrast: 80 mL Isovue-370   Oral Contrast: None      FINDINGS:      LIVER: Liver mildly, diffusely hypodense consistent with fatty infiltration. GALLBLADDER AND BILIARY TREE: Prior cholecystectomy. No intra- or extrahepatic biliary dilatation. PANCREAS: Normal.      SPLEEN: Normal.      ADRENAL GLANDS: Normal.      KIDNEYS AND URETERS: Hypodense simple appearing cysts throughout the left kidney, largest within the upper portion measuring nearly 2 cm. . No suspicious cortical lesions. 4 mm calcification within the lower left kidney felt to be a small nonobstructing    calculus. No ureteral calculi or hydronephrosis. URINARY BLADDER: Normal.      REPRODUCTIVE ORGANS: Prostate normal in size. BOWEL: Normal caliber. No suspicious bowel wall thickening or surrounding inflammatory fat stranding.       LYMPH NODES: No abnormally enlarged nodes. PERITONEUM/RETROPERITONEUM: No ascites or free air. VESSELS: Extensive atherosclerotic calcification of the abdominal aorta which remains normal in caliber. Stents within the iliac vasculature. The inferior vena cava, hepatic veins and portal venous system are patent. ABDOMINAL WALL: Bilateral fat-containing inguinal hernias. No acute findings. BONES: Multilevel degenerative change of the lumbar spine status post multilevel dorsal decompression. Partially visualized right hip arthroplasty hardware in place without complicating features. IMPRESSION:      No acute intra-abdominal process. Fatty infiltration of the liver. Nonobstructive left-sided nephrolithiasis. Fat-containing inguinal hernias. CT ABDOMEN PELVIS W IV CONTRAST Additional Contrast? None   Final Result      Acute pulmonary emboli most extensively involving the right middle lobe and to a lesser degree right lower lobe. Right middle lobe and right lower lobe airspace consolidation, potentially secondary to a component of infarction, particularly of the right middle lobe given the most extensive clot burden. Trace right pleural effusion. Emphysema. Critical result called to the patient's nurse in the emergency department at time of dictation. EXAM: CT ABDOMEN AND PELVIS WITH CONTRAST      INDICATION: Right-sided abdominal pain      COMPARISON: CTA of the abdomen and pelvis from December 14, 2015      TECHNIQUE: Axial CT imaging obtained from lung bases through pelvis. Axial images and multiplanar reformatted images are provided for review. Individualized dose optimization technique was used in order to meet ALARA standards for radiation dose    reduction.   In addition to vendor specific dose reduction algorithms, the dose reduction techniques vary based on the specific scanner utilized but frequently include automated exposure control, adjustment of the mA edema. Assessment/Plan:      Cyndie Albright is a 73 y.o. male with PMHx HTN, COPD, HFpEF (ECHO 9/17: EF 55-60%), CAD who presented c/o right-sided chest discomfort.         Acute Pulmonary embolism  CTPA (9/16): Acute pulmonary emboli most extensively involving the right middle lobe and to a lesser degree right lower lobe. CTPA (9/17): Stable segmental and subsegmental pulmonary emboli on the right. No new or progressive pulmonary embolus. Interval increase in consolidative opacity of the right lower lobe with hazy consolidation also noted of the right middle lobe and into the right upper lobe  Lower limb doppler ultrasound (9/17) was unremarkable  Pt had a rapid increase in oxygen requirement. There was a concern for a worsening PE however repeat CTPA revealed stable results but hazy consolidation in right lower lobe. Questionable R lung pneumonia. - Continue heparin gtt  - Supplemental O2 to keep Spo2 above 88%  - PT/OT   - Continue Incentive spirometry     R. Pneumonia  Pt with increasing oxygen requirements. CTPA revealed Interval increase in consolidative opacity of the right lower lobe with hazy consolidation also noted of the right middle lobe and into the right upper lobe. Pro calcitonin 0.17  - Strept pneumonia antigen  - MRSA swab pending  -Legionella ag pending  - Blood Cx x2 NGTD  - ABx: Azithromycin, Zyvox + Zosyn      COPD  -Solumedrol 125 mg given initially. Continue 40mg daily  - Albuterol Neb Q4hrs  - Tiotropium neb      Code Status: Full Code  FEN: ADULT DIET; Regular; 3 carb choices (45 gm/meal)  PPX: Heparin  DISPO: ICU    Jennifer Salvador MD, PGY-1  09/18/21  8:18 AM    This patient has been staffed and discussed with Yamilex Vann MD    Patient seen, examined and discussed with the resident and I agree with the assessment and plan. Briefly, this is a 73 y.o. male  with COPD, KAVITA and PE with pulmonary infarct.     Patient transferred to ICU yesterday because he had worsening oxygen requirements and borderline blood pressures. Overnight he has improved and his oxygen requirements have down trended. Remains on 8 L currently down from 10 L. He did not need a thrombectomy for EKOS procedure yesterday. He continues on heparin drip. He does still have pleuritic chest pain on the right but that is slightly better as well. He is on broad-spectrum antibiotics for pneumonia although his procalcitonin is indeterminate. I suspect the changes on his imaging are related to splinting from pleuritic chest pain and less so infection, but as I stated to him I had rather treat infection if not there and do not treat an infection that is. He appears to be well enough that he can transfer back to telemetry.         Derrek Ormond, MD

## 2021-09-18 NOTE — PROGRESS NOTES
Transfer From ICU to Medical Floor    The patient was seen and examined. Originally transferred to the ICU for monitoring for expansion of PE, acute cor pulmonale requiring thrombectomy/thrombolysis, or pneumonia. CT showed increased consolidation which was likely related to the infarcting lung. Pt remained stable and pain improved. Likely had atelectasis from decreased ventilation related to pain. Now is stable and transferring back to the [facility]. Vitals:    09/18/21 1449   BP: 129/63   Pulse: 100   Resp:    Temp:    SpO2:        Physical Exam  · General appearance: alert, appears stated age and cooperative  · Skin: Skin color, texture, turgor normal.   · HEENT: Head: Normocephalic, no lesions, without obvious abnormality. · Pharynx: Dental Hygiene adequate. Normal buccal mucosa. Normal pharynx. · Neck: no adenopathy, no carotid bruit, no JVD, supple, symmetrical, trachea midline and thyroid not enlarged, symmetric, no tenderness/mass/nodules  · Lungs: clear to auscultation bilaterally  · Heart: regular rate and rhythm, S1, S2 normal, no murmur, click, rub or gallop  · Abdomen: soft, RUQ tenderness; bowel sounds normal; no masses,  no organomegaly  · Extremities: extremities normal, atraumatic, no cyanosis. 1+ pitting edema bilaterallu  · Neurologic: CN II-XII grossly intact.   Mental status: Alert, oriented, thought content appropriate    @    Intake/Output Summary (Last 24 hours) at 9/18/2021 1520  Last data filed at 9/18/2021 1300  Gross per 24 hour   Intake 2570 ml   Output 2050 ml   Net 520 ml     Lab Results   Component Value Date    CREATININE 1.0 09/18/2021    BUN 16 09/18/2021     (L) 09/18/2021    K 4.4 09/18/2021     09/18/2021    CO2 21 09/18/2021     Lab Results   Component Value Date    WBC 10.0 09/18/2021    HGB 14.1 09/18/2021    HCT 41.3 09/18/2021    .4 (H) 09/18/2021     (L) 09/18/2021          Scheduled Meds:   folic acid  1 mg Oral Daily    metoprolol  100 mg Oral

## 2021-09-18 NOTE — PROGRESS NOTES
4 Eyes Admission Assessment     I agree as the admission nurse that 2 RN's have performed a thorough Head to Toe Skin Assessment on the patient. ALL assessment sites listed below have been assessed on admission. Areas assessed by both nurses:   [x]   Head, Face, and Ears   [x]   Shoulders, Back, and Chest  [x]   Arms, Elbows, and Hands   [x]   Coccyx, Sacrum, and Ischium  [x]   Legs, Feet, and Heels        Does the Patient have Skin Breakdown?   No         Isidro Prevention initiated:  No   Wound Care Orders initiated:  No      [facility] nurse consulted for Pressure Injury (Stage 3,4, Unstageable, DTI, NWPT, and Complex wounds) or Isidro score 18 or lower:  No      Nurse 1 eSignature: Electronically signed by Colin Gandhi RN on 9/17/21 at 10:02 PM EDT    **SHARE this note so that the co-signing nurse is able to place an eSignature**    Nurse 2 eSignature: Navi Mcarthur RN

## 2021-09-18 NOTE — PROGRESS NOTES
Pt transferred to Rm. 4513 from 6S. Transitioned to high flow NC for increasing 02 requirements. Currently on 10 L. satting 92%. Prn percocet given for 6/10 pain. States pain is improved. Pt breathing 25 breaths/min. Pt's color is appropriate for ethnicity. Skin is warm and dry. NSR on monitor. 'K systolic. Pt is currently sleeping-respirations even and regular. Denies further needs at this time. Will monitor closely.

## 2021-09-19 LAB
ANION GAP SERPL CALCULATED.3IONS-SCNC: 10 MMOL/L (ref 3–16)
ANTI-XA UNFRAC HEPARIN: 0.61 IU/ML (ref 0.3–0.7)
BASOPHILS ABSOLUTE: 0 K/UL (ref 0–0.2)
BASOPHILS RELATIVE PERCENT: 0.1 %
BUN BLDV-MCNC: 14 MG/DL (ref 7–20)
CALCIUM SERPL-MCNC: 8.8 MG/DL (ref 8.3–10.6)
CHLORIDE BLD-SCNC: 102 MMOL/L (ref 99–110)
CO2: 25 MMOL/L (ref 21–32)
CREAT SERPL-MCNC: 0.9 MG/DL (ref 0.8–1.3)
EOSINOPHILS ABSOLUTE: 0 K/UL (ref 0–0.6)
EOSINOPHILS RELATIVE PERCENT: 0 %
GFR AFRICAN AMERICAN: >60
GFR NON-AFRICAN AMERICAN: >60
GLUCOSE BLD-MCNC: 166 MG/DL (ref 70–99)
HCT VFR BLD CALC: 39.4 % (ref 40.5–52.5)
HEMOGLOBIN: 13.2 G/DL (ref 13.5–17.5)
L. PNEUMOPHILA SEROGP 1 UR AG: NORMAL
LYMPHOCYTES ABSOLUTE: 0.6 K/UL (ref 1–5.1)
LYMPHOCYTES RELATIVE PERCENT: 5.1 %
MCH RBC QN AUTO: 36.9 PG (ref 26–34)
MCHC RBC AUTO-ENTMCNC: 33.6 G/DL (ref 31–36)
MCV RBC AUTO: 109.8 FL (ref 80–100)
MONOCYTES ABSOLUTE: 0.6 K/UL (ref 0–1.3)
MONOCYTES RELATIVE PERCENT: 4.9 %
NEUTROPHILS ABSOLUTE: 10.1 K/UL (ref 1.7–7.7)
NEUTROPHILS RELATIVE PERCENT: 89.9 %
PDW BLD-RTO: 15.3 % (ref 12.4–15.4)
PLATELET # BLD: 150 K/UL (ref 135–450)
PMV BLD AUTO: 8.8 FL (ref 5–10.5)
POTASSIUM REFLEX MAGNESIUM: 5 MMOL/L (ref 3.5–5.1)
RBC # BLD: 3.59 M/UL (ref 4.2–5.9)
SODIUM BLD-SCNC: 137 MMOL/L (ref 136–145)
STREP PNEUMONIAE ANTIGEN, URINE: NORMAL
WBC # BLD: 11.2 K/UL (ref 4–11)

## 2021-09-19 PROCEDURE — 80048 BASIC METABOLIC PNL TOTAL CA: CPT

## 2021-09-19 PROCEDURE — 85025 COMPLETE CBC W/AUTO DIFF WBC: CPT

## 2021-09-19 PROCEDURE — 94150 VITAL CAPACITY TEST: CPT

## 2021-09-19 PROCEDURE — 6370000000 HC RX 637 (ALT 250 FOR IP): Performed by: STUDENT IN AN ORGANIZED HEALTH CARE EDUCATION/TRAINING PROGRAM

## 2021-09-19 PROCEDURE — 6360000002 HC RX W HCPCS: Performed by: STUDENT IN AN ORGANIZED HEALTH CARE EDUCATION/TRAINING PROGRAM

## 2021-09-19 PROCEDURE — 1200000000 HC SEMI PRIVATE

## 2021-09-19 PROCEDURE — 6360000002 HC RX W HCPCS: Performed by: INTERNAL MEDICINE

## 2021-09-19 PROCEDURE — 94640 AIRWAY INHALATION TREATMENT: CPT

## 2021-09-19 PROCEDURE — 6370000000 HC RX 637 (ALT 250 FOR IP): Performed by: INTERNAL MEDICINE

## 2021-09-19 PROCEDURE — 85520 HEPARIN ASSAY: CPT

## 2021-09-19 PROCEDURE — C9113 INJ PANTOPRAZOLE SODIUM, VIA: HCPCS | Performed by: STUDENT IN AN ORGANIZED HEALTH CARE EDUCATION/TRAINING PROGRAM

## 2021-09-19 PROCEDURE — 2700000000 HC OXYGEN THERAPY PER DAY

## 2021-09-19 PROCEDURE — 36415 COLL VENOUS BLD VENIPUNCTURE: CPT

## 2021-09-19 PROCEDURE — 2580000003 HC RX 258: Performed by: STUDENT IN AN ORGANIZED HEALTH CARE EDUCATION/TRAINING PROGRAM

## 2021-09-19 PROCEDURE — 94761 N-INVAS EAR/PLS OXIMETRY MLT: CPT

## 2021-09-19 PROCEDURE — 2580000003 HC RX 258: Performed by: INTERNAL MEDICINE

## 2021-09-19 RX ORDER — ALBUTEROL SULFATE 2.5 MG/3ML
2.5 SOLUTION RESPIRATORY (INHALATION) 2 TIMES DAILY
Status: DISCONTINUED | OUTPATIENT
Start: 2021-09-20 | End: 2021-09-20 | Stop reason: HOSPADM

## 2021-09-19 RX ADMIN — ALBUTEROL SULFATE 2.5 MG: 2.5 SOLUTION RESPIRATORY (INHALATION) at 16:51

## 2021-09-19 RX ADMIN — HEPARIN SODIUM 20 UNITS/KG/HR: 10000 INJECTION, SOLUTION INTRAVENOUS at 09:34

## 2021-09-19 RX ADMIN — ATORVASTATIN CALCIUM 80 MG: 80 TABLET, FILM COATED ORAL at 09:07

## 2021-09-19 RX ADMIN — PIPERACILLIN AND TAZOBACTAM 3375 MG: 3; .375 INJECTION, POWDER, LYOPHILIZED, FOR SOLUTION INTRAVENOUS at 00:57

## 2021-09-19 RX ADMIN — TIOTROPIUM BROMIDE INHALATION SPRAY 2 PUFF: 3.12 SPRAY, METERED RESPIRATORY (INHALATION) at 10:14

## 2021-09-19 RX ADMIN — ALBUTEROL SULFATE 2.5 MG: 2.5 SOLUTION RESPIRATORY (INHALATION) at 10:13

## 2021-09-19 RX ADMIN — FOLIC ACID 1 MG: 1 TABLET ORAL at 09:07

## 2021-09-19 RX ADMIN — Medication 10 ML: at 20:36

## 2021-09-19 RX ADMIN — APIXABAN 10 MG: 5 TABLET, FILM COATED ORAL at 20:36

## 2021-09-19 RX ADMIN — METOPROLOL TARTRATE 100 MG: 100 TABLET, FILM COATED ORAL at 09:07

## 2021-09-19 RX ADMIN — PANTOPRAZOLE SODIUM 40 MG: 40 INJECTION, POWDER, FOR SOLUTION INTRAVENOUS at 09:12

## 2021-09-19 RX ADMIN — METHYLPREDNISOLONE SODIUM SUCCINATE 40 MG: 40 INJECTION, POWDER, FOR SOLUTION INTRAMUSCULAR; INTRAVENOUS at 20:36

## 2021-09-19 RX ADMIN — METHYLPREDNISOLONE SODIUM SUCCINATE 40 MG: 40 INJECTION, POWDER, FOR SOLUTION INTRAMUSCULAR; INTRAVENOUS at 09:08

## 2021-09-19 RX ADMIN — APIXABAN 10 MG: 5 TABLET, FILM COATED ORAL at 12:10

## 2021-09-19 RX ADMIN — LINEZOLID 600 MG: 600 INJECTION, SOLUTION INTRAVENOUS at 06:17

## 2021-09-19 RX ADMIN — AZITHROMYCIN MONOHYDRATE 500 MG: 500 INJECTION, POWDER, LYOPHILIZED, FOR SOLUTION INTRAVENOUS at 20:47

## 2021-09-19 RX ADMIN — ALLOPURINOL 100 MG: 100 TABLET ORAL at 09:07

## 2021-09-19 RX ADMIN — Medication 10 ML: at 09:11

## 2021-09-19 RX ADMIN — PIPERACILLIN AND TAZOBACTAM 3375 MG: 3; .375 INJECTION, POWDER, LYOPHILIZED, FOR SOLUTION INTRAVENOUS at 09:15

## 2021-09-19 RX ADMIN — METOPROLOL TARTRATE 100 MG: 100 TABLET, FILM COATED ORAL at 20:36

## 2021-09-19 ASSESSMENT — PAIN SCALES - GENERAL
PAINLEVEL_OUTOF10: 0

## 2021-09-19 NOTE — PROGRESS NOTES
Internal Medicine Progress Note    Admit Date: 9/16/2021  Day: 3  Diet: ADULT DIET; Regular; 3 carb choices (45 gm/meal)    CC: Pleurisy    Interval history: Pt seen and examined this morning. Resting comfortably with no new complaints. Oxygen requirement improving, down to 6L nasal cannula, off high flow. No EKOS given or tPA given this stay. Feeling slightly better. Pain persists. Denies any fevers, chills, chest pain, palpitations, leg swelling, cough, shortness of breath, difficulty breathing, wheezing, abdominal pain, nausea, vomiting, diarrhea. Medications:     Scheduled Meds:   apixaban  10 mg Oral BID    folic acid  1 mg Oral Daily    metoprolol  100 mg Oral BID    tiotropium  2 puff Inhalation Daily    allopurinol  100 mg Oral Daily    atorvastatin  80 mg Oral Daily    sodium chloride flush  5-40 mL IntraVENous 2 times per day    nicotine  1 patch TransDERmal Daily    lidocaine  1 patch TransDERmal Daily    pantoprazole  40 mg IntraVENous Daily    albuterol  2.5 mg Nebulization 4x daily    azithromycin  500 mg IntraVENous Q24H    methylPREDNISolone  40 mg IntraVENous Q12H     Continuous Infusions:   sodium chloride       PRN Meds:albuterol, sodium chloride flush, sodium chloride, ondansetron **OR** ondansetron, polyethylene glycol, acetaminophen **OR** acetaminophen, oxyCODONE-acetaminophen    Objective:   Vitals:   T-max:  Patient Vitals for the past 8 hrs:   BP Temp Temp src Pulse Resp SpO2   09/19/21 1014 -- -- -- -- 14 94 %   09/19/21 1000 -- -- -- 68 16 (!) 86 %   09/19/21 0907 (!) 166/84 -- -- 75 -- --   09/19/21 0800 (!) 166/84 98 °F (36.7 °C) Oral 63 18 95 %   09/19/21 0700 -- -- -- 67 -- --       Intake/Output Summary (Last 24 hours) at 9/19/2021 1101  Last data filed at 9/19/2021 1000  Gross per 24 hour   Intake 1366 ml   Output 2450 ml   Net -1084 ml       Physical Exam  Vitals and nursing note reviewed. Constitutional:       General: He is not in acute distress. Appearance: Normal appearance. He is ill-appearing. He is not toxic-appearing. HENT:      Head: Normocephalic and atraumatic. Right Ear: External ear normal.      Left Ear: External ear normal.      Nose: Nose normal.      Mouth/Throat:      Mouth: Mucous membranes are moist.      Pharynx: Oropharynx is clear. Eyes:      General: No scleral icterus. Extraocular Movements: Extraocular movements intact. Conjunctiva/sclera: Conjunctivae normal.      Pupils: Pupils are equal, round, and reactive to light. Cardiovascular:      Rate and Rhythm: Normal rate and regular rhythm. Pulses: Normal pulses. Heart sounds: Normal heart sounds. No murmur heard. Pulmonary:      Effort: Pulmonary effort is normal. No respiratory distress. Breath sounds: Normal breath sounds. No stridor. No wheezing, rhonchi or rales. Abdominal:      General: Abdomen is flat. Bowel sounds are normal. There is no distension. Palpations: Abdomen is soft. Tenderness: There is no abdominal tenderness. There is no guarding or rebound. Musculoskeletal:         General: No swelling. Normal range of motion. Cervical back: Normal range of motion and neck supple. No rigidity. Right lower leg: Edema present. Left lower leg: Edema present. Comments: 1+ pitting edema bilaterally   Lymphadenopathy:      Cervical: No cervical adenopathy. Skin:     General: Skin is warm and dry. Capillary Refill: Capillary refill takes less than 2 seconds. Coloration: Skin is not jaundiced or pale. Findings: No bruising. Neurological:      General: No focal deficit present. Mental Status: He is alert and oriented to person, place, and time. Cranial Nerves: No cranial nerve deficit. Sensory: No sensory deficit. Motor: No weakness. Gait: Gait normal.   Psychiatric:         Mood and Affect: Mood normal.         Behavior: Behavior normal.         Thought Content:  Thought content normal.         Judgment: Judgment normal.       LABS:    CBC:   Recent Labs     09/16/21 2252 09/18/21 0459 09/19/21  0512   WBC 9.5 10.0 11.2*   HGB 13.8 14.1 13.2*   HCT 41.2 41.3 39.4*    112* 150   .6* 109.4* 109.8*     Renal:    Recent Labs     09/16/21 2252 09/17/21  0630 09/17/21  0642 09/17/21  1732 09/18/21 0459 09/19/21  0512   *   < >  --  131* 133* 137   K 3.6   < >  --  4.7 4.4 5.0   CL 99   < >  --  98* 100 102   CO2 17*   < >  --  21 21 25   BUN 9   < >  --  11 16 14   CREATININE 1.0   < >  --  0.9 1.0 0.9   GLUCOSE 107*   < >  --  101* 177* 166*   CALCIUM 8.6   < >  --  8.6 8.6 8.8   MG 1.30*  --  1.70*  --   --   --    PHOS 2.9  --   --  3.1  --   --    ANIONGAP 16   < >  --  12 12 10    < > = values in this interval not displayed. Hepatic:   Recent Labs     09/16/21 2252 09/17/21  1732   AST 18  --    ALT 20  --    BILITOT 0.7  --    BILIDIR <0.2  --    PROT 6.7  --    LABALBU 3.6 3.2*   ALKPHOS 89  --      Troponin:   Recent Labs     09/17/21  0630 09/17/21  1358 09/17/21  1732   TROPONINI 0.03* <0.01 0.01     BNP: No results for input(s): BNP in the last 72 hours. Lipids: No results for input(s): CHOL, HDL in the last 72 hours. Invalid input(s): LDLCALCU, TRIGLYCERIDE  ABGs:    Recent Labs     09/17/21  1320 09/18/21  0101   PHART 7.402 7.418   HGI2ZYU 37.7 36.2   PO2ART 64.3* 46.6*   JYK9EOS 24 23.4   BEART cannot calc* -1   X9JECLSY cannot calc* 83*   LZI6XOK 25 25       INR:   Recent Labs     09/16/21  2251 09/17/21  1358   INR 1.03 1.09     Lactate: No results for input(s): LACTATE in the last 72 hours. Cultures:  -----------------------------------------------------------------  RAD:   CTA PULMONARY W CONTRAST   Final Result   1. Stable segmental and subsegmental pulmonary emboli on the right. No new or progressive pulmonary embolus.    2. Interval increase in consolidative opacity of the right lower lobe with hazy consolidation also noted of the right middle lobe and into the right upper lobe. XR CHEST PORTABLE   Final Result      More prominent right basilar airspace disease. Slightly more prominent increased interstitial opacities throughout both lungs. VL Extremity Venous Bilateral   Final Result      CT CHEST PULMONARY EMBOLISM W CONTRAST   Final Result      Acute pulmonary emboli most extensively involving the right middle lobe and to a lesser degree right lower lobe. Right middle lobe and right lower lobe airspace consolidation, potentially secondary to a component of infarction, particularly of the right middle lobe given the most extensive clot burden. Trace right pleural effusion. Emphysema. Critical result called to the patient's nurse in the emergency department at time of dictation. EXAM: CT ABDOMEN AND PELVIS WITH CONTRAST      INDICATION: Right-sided abdominal pain      COMPARISON: CTA of the abdomen and pelvis from December 14, 2015      TECHNIQUE: Axial CT imaging obtained from lung bases through pelvis. Axial images and multiplanar reformatted images are provided for review. Individualized dose optimization technique was used in order to meet ALARA standards for radiation dose    reduction. In addition to vendor specific dose reduction algorithms, the dose reduction techniques vary based on the specific scanner utilized but frequently include automated exposure control, adjustment of the mA and/or kV according to patient size,    and use of iterative reconstruction technique. IV Contrast: 80 mL Isovue-370   Oral Contrast: None      FINDINGS:      LIVER: Liver mildly, diffusely hypodense consistent with fatty infiltration. GALLBLADDER AND BILIARY TREE: Prior cholecystectomy. No intra- or extrahepatic biliary dilatation.       PANCREAS: Normal.      SPLEEN: Normal.      ADRENAL GLANDS: Normal.      KIDNEYS AND URETERS: Hypodense simple appearing cysts throughout the left kidney, largest within the upper portion measuring nearly 2 cm. . No suspicious cortical lesions. 4 mm calcification within the lower left kidney felt to be a small nonobstructing    calculus. No ureteral calculi or hydronephrosis. URINARY BLADDER: Normal.      REPRODUCTIVE ORGANS: Prostate normal in size. BOWEL: Normal caliber. No suspicious bowel wall thickening or surrounding inflammatory fat stranding. LYMPH NODES: No abnormally enlarged nodes. PERITONEUM/RETROPERITONEUM: No ascites or free air. VESSELS: Extensive atherosclerotic calcification of the abdominal aorta which remains normal in caliber. Stents within the iliac vasculature. The inferior vena cava, hepatic veins and portal venous system are patent. ABDOMINAL WALL: Bilateral fat-containing inguinal hernias. No acute findings. BONES: Multilevel degenerative change of the lumbar spine status post multilevel dorsal decompression. Partially visualized right hip arthroplasty hardware in place without complicating features. IMPRESSION:      No acute intra-abdominal process. Fatty infiltration of the liver. Nonobstructive left-sided nephrolithiasis. Fat-containing inguinal hernias. CT ABDOMEN PELVIS W IV CONTRAST Additional Contrast? None   Final Result      Acute pulmonary emboli most extensively involving the right middle lobe and to a lesser degree right lower lobe. Right middle lobe and right lower lobe airspace consolidation, potentially secondary to a component of infarction, particularly of the right middle lobe given the most extensive clot burden. Trace right pleural effusion. Emphysema. Critical result called to the patient's nurse in the emergency department at time of dictation.       EXAM: CT ABDOMEN AND PELVIS WITH CONTRAST      INDICATION: Right-sided abdominal pain      COMPARISON: CTA of the abdomen and pelvis from December 14, 2015      TECHNIQUE: Axial CT imaging obtained from lung bases through pelvis. Axial images and multiplanar reformatted images are provided for review. Individualized dose optimization technique was used in order to meet ALARA standards for radiation dose    reduction. In addition to vendor specific dose reduction algorithms, the dose reduction techniques vary based on the specific scanner utilized but frequently include automated exposure control, adjustment of the mA and/or kV according to patient size,    and use of iterative reconstruction technique. IV Contrast: 80 mL Isovue-370   Oral Contrast: None      FINDINGS:      LIVER: Liver mildly, diffusely hypodense consistent with fatty infiltration. GALLBLADDER AND BILIARY TREE: Prior cholecystectomy. No intra- or extrahepatic biliary dilatation. PANCREAS: Normal.      SPLEEN: Normal.      ADRENAL GLANDS: Normal.      KIDNEYS AND URETERS: Hypodense simple appearing cysts throughout the left kidney, largest within the upper portion measuring nearly 2 cm. . No suspicious cortical lesions. 4 mm calcification within the lower left kidney felt to be a small nonobstructing    calculus. No ureteral calculi or hydronephrosis. URINARY BLADDER: Normal.      REPRODUCTIVE ORGANS: Prostate normal in size. BOWEL: Normal caliber. No suspicious bowel wall thickening or surrounding inflammatory fat stranding. LYMPH NODES: No abnormally enlarged nodes. PERITONEUM/RETROPERITONEUM: No ascites or free air. VESSELS: Extensive atherosclerotic calcification of the abdominal aorta which remains normal in caliber. Stents within the iliac vasculature. The inferior vena cava, hepatic veins and portal venous system are patent. ABDOMINAL WALL: Bilateral fat-containing inguinal hernias. No acute findings. BONES: Multilevel degenerative change of the lumbar spine status post multilevel dorsal decompression.  Partially visualized right hip arthroplasty hardware in place without complicating features. IMPRESSION:      No acute intra-abdominal process. Fatty infiltration of the liver. Nonobstructive left-sided nephrolithiasis. Fat-containing inguinal hernias. XR CHEST PORTABLE   Final Result      Right basilar airspace disease, atelectasis versus developing infiltrate. Increased interstitial opacities throughout both lungs favored to be a component of volume overload/interstitial edema. Assessment/Plan:   Viktor Cruz Jr. is D 89 y. o. male with PMHx HTN, COPD, HFpEF (ECHO 9/17: EF 55-60%), CAD who presented c/o right-sided chest discomfort. Acute Segmental/Subsegmental Pulmonary Embolism  - in the RML and RLL, with probable infarction of the parenchyma  - transferred out of ICU 9/18 after BIPAP  - continue heparin gtt  - satting well on room air, titrate supplemental O2 to sat 88%  - IS  - PT OT    Possible RLL Pneumonia  - imaging findings of consolidation in the PE territory, could be infarction and edema or lobar bacterial PNA  - current regimen: Azithromycin, Zosyn, and Zyvox   - deescalate abx to just Azithromycin    COPD  - albuterol nebs  - tiotropium nebs    Other Chronic Problems  HLD - Lipitor 80mg daily  HTN - Lopressor 100mg BID  Gout - Allopurinol 100mg daily  Nicotine Dependence - Nicotine Patch 21mg patch daily     Code Status:Full Code  FEN: ADULT DIET;  Regular; 3 carb choices (45 gm/meal)  PPX:  Therapeutic heparin gtt and Protonix  DISPO: [facility]       Brittany Escobar DO, PGY-3  09/19/21  11:01 AM    This patient will be staffed and discussed with Ngoc Matta MD.

## 2021-09-19 NOTE — RT PROTOCOL NOTE
RT Nebulizer Bronchodilator Protocol Note    There is a bronchodilator order in the chart from a provider indicating to follow the RT Bronchodilator Protocol and there is an Initiate RT Bronchodilator Protocol order as well (see protocol at bottom of note). The findings from the last RT Protocol Assessment were as follows:  Smoking: >15 Pack years  Surgical Status: No surgery  Xray: Chronic changes  Respiratory Pattern: RR 12-20  Mental Status: Alert and Oriented  Breath Sounds: Clear  Cough: Strong, spontaneous, non-productive  Activity Level: Mostly sedentary, minimal walking  Oxygen Requirement: 29% or 3LNC - 5LNC/40%  Indication for Bronchodilator Therapy: On home bronchodilators  Bronchodilator Assessment Score: 3  Pt wants tx BID  Aerosolized bronchodilator medication orders have been revised according to the RT Bronchodilator Protocol. RT Bronchodilator Protocol:    Respiratory Therapist to perform RT Therapy Protocol Assessment then follow the protocol. No Indications - adjust the frequency to every 6 hours PRN wheezing or bronchospasm, if no treatments needed after 48 hours then discontinue using Per Protocol order mode. If indication present, adjust the RT bronchodilator orders based on the Bronchodilator Assessment Score as follows:    0-6 - enter or revise RT Bronchodilator order to Albuterol Nebulizer order with frequency of every 2 hours PRN for wheezing or increased work of breathing using Per Protocol order mode. Repeat RT Therapy Protocol Assessment as needed. 7-10 - discontinue any other Inpatient aerosolized bronchodilator medication orders and enter or revise two Albuterol Nebulizer orders, one with BID frequency and one with frequency of every 2 hours PRN wheezing or increased work of breathing using Per Protocol order mode. Repeat RT Therapy Protocol Assessment with second treatment then BID and as needed.       11-13 - discontinue any other Inpatient aerosolized bronchodilator medication orders and enter DuoNeb Nebulizer order with QID frequency and an Albuterol Nebulizer order with frequency of every 2 hours PRN wheezing or increased work of breathing using Per Protocol order mode. Repeat RT Therapy Protocol Assessment with second treatment then QID and as needed. Greater than 13 - discontinue any other Inpatient aerosolized bronchodilator medication orders and enter a DuoNeb Nebulizer order with every 4 hours frequency and an Albuterol Nebulizer order with frequency of every 2 hours PRN wheezing or increased work of breathing using Per Protocol order mode. Repeat RT Therapy Protocol Assessment with second treatment then every 4 hours and as needed. RT to enter RT Home Evaluation for COPD & MDI Assessment order using Per Protocol order mode.     Electronically signed by Mattie Dee RCP on 9/19/2021 at 5:00 PM

## 2021-09-20 VITALS
HEART RATE: 65 BPM | TEMPERATURE: 97.2 F | RESPIRATION RATE: 16 BRPM | WEIGHT: 205 LBS | DIASTOLIC BLOOD PRESSURE: 73 MMHG | BODY MASS INDEX: 29.35 KG/M2 | HEIGHT: 70 IN | SYSTOLIC BLOOD PRESSURE: 158 MMHG | OXYGEN SATURATION: 93 %

## 2021-09-20 LAB
ANION GAP SERPL CALCULATED.3IONS-SCNC: 7 MMOL/L (ref 3–16)
BASOPHILS ABSOLUTE: 0 K/UL (ref 0–0.2)
BASOPHILS RELATIVE PERCENT: 0.2 %
BUN BLDV-MCNC: 18 MG/DL (ref 7–20)
CALCIUM SERPL-MCNC: 8.7 MG/DL (ref 8.3–10.6)
CHLORIDE BLD-SCNC: 106 MMOL/L (ref 99–110)
CO2: 26 MMOL/L (ref 21–32)
CREAT SERPL-MCNC: 0.8 MG/DL (ref 0.8–1.3)
EOSINOPHILS ABSOLUTE: 0 K/UL (ref 0–0.6)
EOSINOPHILS RELATIVE PERCENT: 0 %
GFR AFRICAN AMERICAN: >60
GFR NON-AFRICAN AMERICAN: >60
GLUCOSE BLD-MCNC: 116 MG/DL (ref 70–99)
HCT VFR BLD CALC: 38.9 % (ref 40.5–52.5)
HEMOGLOBIN: 12.9 G/DL (ref 13.5–17.5)
LYMPHOCYTES ABSOLUTE: 0.6 K/UL (ref 1–5.1)
LYMPHOCYTES RELATIVE PERCENT: 7.7 %
MCH RBC QN AUTO: 36.9 PG (ref 26–34)
MCHC RBC AUTO-ENTMCNC: 33.1 G/DL (ref 31–36)
MCV RBC AUTO: 111.5 FL (ref 80–100)
MONOCYTES ABSOLUTE: 0.4 K/UL (ref 0–1.3)
MONOCYTES RELATIVE PERCENT: 5 %
NEUTROPHILS ABSOLUTE: 6.5 K/UL (ref 1.7–7.7)
NEUTROPHILS RELATIVE PERCENT: 87.1 %
PDW BLD-RTO: 15 % (ref 12.4–15.4)
PLATELET # BLD: 155 K/UL (ref 135–450)
PMV BLD AUTO: 8.5 FL (ref 5–10.5)
POTASSIUM REFLEX MAGNESIUM: 4.7 MMOL/L (ref 3.5–5.1)
RBC # BLD: 3.49 M/UL (ref 4.2–5.9)
SODIUM BLD-SCNC: 139 MMOL/L (ref 136–145)
WBC # BLD: 7.4 K/UL (ref 4–11)

## 2021-09-20 PROCEDURE — 36415 COLL VENOUS BLD VENIPUNCTURE: CPT

## 2021-09-20 PROCEDURE — C9113 INJ PANTOPRAZOLE SODIUM, VIA: HCPCS | Performed by: STUDENT IN AN ORGANIZED HEALTH CARE EDUCATION/TRAINING PROGRAM

## 2021-09-20 PROCEDURE — 2580000003 HC RX 258: Performed by: STUDENT IN AN ORGANIZED HEALTH CARE EDUCATION/TRAINING PROGRAM

## 2021-09-20 PROCEDURE — 6370000000 HC RX 637 (ALT 250 FOR IP): Performed by: INTERNAL MEDICINE

## 2021-09-20 PROCEDURE — 6370000000 HC RX 637 (ALT 250 FOR IP): Performed by: STUDENT IN AN ORGANIZED HEALTH CARE EDUCATION/TRAINING PROGRAM

## 2021-09-20 PROCEDURE — 94761 N-INVAS EAR/PLS OXIMETRY MLT: CPT

## 2021-09-20 PROCEDURE — 2700000000 HC OXYGEN THERAPY PER DAY

## 2021-09-20 PROCEDURE — 80048 BASIC METABOLIC PNL TOTAL CA: CPT

## 2021-09-20 PROCEDURE — 85025 COMPLETE CBC W/AUTO DIFF WBC: CPT

## 2021-09-20 PROCEDURE — 6360000002 HC RX W HCPCS: Performed by: INTERNAL MEDICINE

## 2021-09-20 PROCEDURE — 6360000002 HC RX W HCPCS: Performed by: STUDENT IN AN ORGANIZED HEALTH CARE EDUCATION/TRAINING PROGRAM

## 2021-09-20 PROCEDURE — 94640 AIRWAY INHALATION TREATMENT: CPT

## 2021-09-20 RX ORDER — FUROSEMIDE 40 MG/1
40 TABLET ORAL DAILY
Status: DISCONTINUED | OUTPATIENT
Start: 2021-09-20 | End: 2021-09-20

## 2021-09-20 RX ORDER — FUROSEMIDE 40 MG/1
40 TABLET ORAL DAILY
Status: DISCONTINUED | OUTPATIENT
Start: 2021-09-21 | End: 2021-09-20 | Stop reason: HOSPADM

## 2021-09-20 RX ORDER — ISOSORBIDE MONONITRATE 30 MG/1
15 TABLET, EXTENDED RELEASE ORAL DAILY
Status: DISCONTINUED | OUTPATIENT
Start: 2021-09-20 | End: 2021-09-20 | Stop reason: HOSPADM

## 2021-09-20 RX ORDER — PREDNISONE 1 MG/1
TABLET ORAL
Qty: 52 TABLET | Refills: 0 | Status: SHIPPED | OUTPATIENT
Start: 2021-09-20 | End: 2021-09-28

## 2021-09-20 RX ORDER — LOSARTAN POTASSIUM 100 MG/1
100 TABLET ORAL DAILY
Status: DISCONTINUED | OUTPATIENT
Start: 2021-09-20 | End: 2021-09-20 | Stop reason: HOSPADM

## 2021-09-20 RX ORDER — AZITHROMYCIN 500 MG/1
500 TABLET, FILM COATED ORAL DAILY
Qty: 2 TABLET | Refills: 0 | Status: SHIPPED | OUTPATIENT
Start: 2021-09-20 | End: 2021-09-22

## 2021-09-20 RX ORDER — PANTOPRAZOLE SODIUM 40 MG/1
40 TABLET, DELAYED RELEASE ORAL DAILY
Qty: 10 TABLET | Refills: 0 | Status: ON HOLD | OUTPATIENT
Start: 2021-09-20 | End: 2021-12-15 | Stop reason: HOSPADM

## 2021-09-20 RX ORDER — ISOSORBIDE MONONITRATE 30 MG/1
15 TABLET, EXTENDED RELEASE ORAL DAILY
Qty: 30 TABLET | Refills: 3 | Status: SHIPPED | OUTPATIENT
Start: 2021-09-20 | End: 2021-12-08

## 2021-09-20 RX ORDER — FOLIC ACID 1 MG/1
1 TABLET ORAL DAILY
Qty: 30 TABLET | Refills: 3 | Status: SHIPPED | OUTPATIENT
Start: 2021-09-21 | End: 2022-07-29

## 2021-09-20 RX ADMIN — LOSARTAN POTASSIUM 100 MG: 100 TABLET, FILM COATED ORAL at 12:54

## 2021-09-20 RX ADMIN — OXYCODONE HYDROCHLORIDE AND ACETAMINOPHEN 1 TABLET: 5; 325 TABLET ORAL at 00:10

## 2021-09-20 RX ADMIN — METOPROLOL TARTRATE 100 MG: 100 TABLET, FILM COATED ORAL at 08:39

## 2021-09-20 RX ADMIN — METHYLPREDNISOLONE SODIUM SUCCINATE 40 MG: 40 INJECTION, POWDER, FOR SOLUTION INTRAMUSCULAR; INTRAVENOUS at 08:43

## 2021-09-20 RX ADMIN — ISOSORBIDE MONONITRATE 15 MG: 30 TABLET, EXTENDED RELEASE ORAL at 12:54

## 2021-09-20 RX ADMIN — ATORVASTATIN CALCIUM 80 MG: 80 TABLET, FILM COATED ORAL at 08:43

## 2021-09-20 RX ADMIN — ALLOPURINOL 100 MG: 100 TABLET ORAL at 08:43

## 2021-09-20 RX ADMIN — APIXABAN 10 MG: 5 TABLET, FILM COATED ORAL at 08:43

## 2021-09-20 RX ADMIN — ALBUTEROL SULFATE 2.5 MG: 2.5 SOLUTION RESPIRATORY (INHALATION) at 08:38

## 2021-09-20 RX ADMIN — Medication 10 ML: at 08:43

## 2021-09-20 RX ADMIN — TIOTROPIUM BROMIDE INHALATION SPRAY 2 PUFF: 3.12 SPRAY, METERED RESPIRATORY (INHALATION) at 08:39

## 2021-09-20 RX ADMIN — FOLIC ACID 1 MG: 1 TABLET ORAL at 08:43

## 2021-09-20 RX ADMIN — PANTOPRAZOLE SODIUM 40 MG: 40 INJECTION, POWDER, FOR SOLUTION INTRAVENOUS at 08:43

## 2021-09-20 ASSESSMENT — ENCOUNTER SYMPTOMS
ABDOMINAL DISTENTION: 0
COUGH: 1
ABDOMINAL PAIN: 1
SHORTNESS OF BREATH: 1

## 2021-09-20 ASSESSMENT — PAIN SCALES - GENERAL
PAINLEVEL_OUTOF10: 0
PAINLEVEL_OUTOF10: 9

## 2021-09-20 NOTE — PROGRESS NOTES
Progress Note    Admit Date: 9/16/2021  Diet: ADULT DIET; Regular; 3 carb choices (45 gm/meal)    CC: R chest pain    Interval history:  - transitioned to eliquis  - on zithro + solumderol: off linezolid and zosyn  - PT/OT 16: likely will need 24hr assist or rehab  - CP 5/10, SOB improved    HPI: Pt is a 74 y/o M w/ a PMHx of HTN, HLD, CAD s/p stent, KAVITA-COPD syndrome, HFpEF, PVD, and Gout p/w pleuritic chest pain. Pt states that he woke up this morning and felt some mild R-sided discomfort which he initially attributed to \"sleeping funny\". Pt says that this discomfort persisted over the day and acutely worsened around dinner time when he began to have sharp pain in his right side that worsened with deep breaths. Pt said at this time we also started to have some light-headedness & SOB which prompted him to come to the ED. Upon arrival to the ED, patient was noted to be hypoxic to 88% on RA and still having ongoing pleuritic chest pain. CTPA revealed acute R-sided pulmonary emboli in the R-middle and R-lower lobes w/ no evidence of R-heart strain on imaging. Pt endorses no recent surgery/trauma/immobilization. Pt admitted for IV heparin gtt and further mgmt of his acute unprovoked PE.     Medications:     Scheduled Meds:   apixaban  10 mg Oral BID    albuterol  2.5 mg Nebulization BID    folic acid  1 mg Oral Daily    metoprolol  100 mg Oral BID    tiotropium  2 puff Inhalation Daily    allopurinol  100 mg Oral Daily    atorvastatin  80 mg Oral Daily    sodium chloride flush  5-40 mL IntraVENous 2 times per day    nicotine  1 patch TransDERmal Daily    lidocaine  1 patch TransDERmal Daily    pantoprazole  40 mg IntraVENous Daily    azithromycin  500 mg IntraVENous Q24H    methylPREDNISolone  40 mg IntraVENous Q12H     Continuous Infusions:   sodium chloride       PRN Meds:albuterol, sodium chloride flush, sodium chloride, ondansetron **OR** ondansetron, polyethylene glycol, acetaminophen **OR** acetaminophen, oxyCODONE-acetaminophen    Objective:   Vitals:   T-max:  Patient Vitals for the past 8 hrs:   BP Temp Temp src Pulse Resp SpO2   09/20/21 0839 -- -- -- 65 -- 91 %   09/20/21 0835 (!) 171/88 97.6 °F (36.4 °C) Oral 58 18 91 %   09/20/21 0346 (!) 160/82 97.8 °F (36.6 °C) Oral 70 22 93 %       Intake/Output Summary (Last 24 hours) at 9/20/2021 9301  Last data filed at 9/20/2021 0023  Gross per 24 hour   Intake 1725 ml   Output 1200 ml   Net 525 ml       Review of Systems   Constitutional: Positive for fatigue. Negative for chills and fever. Respiratory: Positive for cough and shortness of breath. Cardiovascular: Positive for chest pain. Negative for leg swelling. Gastrointestinal: Positive for abdominal pain. Negative for abdominal distention. Neurological: Negative for dizziness and light-headedness. Physical Exam  Constitutional:       General: He is not in acute distress. HENT:      Mouth/Throat:      Mouth: Mucous membranes are moist.      Pharynx: Oropharynx is clear. Eyes:      Extraocular Movements: Extraocular movements intact. Cardiovascular:      Rate and Rhythm: Normal rate and regular rhythm. Heart sounds: Normal heart sounds. Pulmonary:      Effort: Pulmonary effort is normal. No respiratory distress. Breath sounds: Wheezing (diffuse) present. Abdominal:      General: Abdomen is flat. Bowel sounds are normal.      Palpations: Abdomen is soft. Musculoskeletal:      Right lower leg: No edema. Left lower leg: No edema. Neurological:      General: No focal deficit present. Mental Status: He is alert and oriented to person, place, and time.          LABS:    CBC:   Recent Labs     09/18/21 0459 09/19/21  0512 09/20/21  0728   WBC 10.0 11.2* 7.4   HGB 14.1 13.2* 12.9*   HCT 41.3 39.4* 38.9*   * 150 155   .4* 109.8* 111.5*     Renal:    Recent Labs     09/17/21  1732 09/17/21  1732 09/18/21  0459 09/19/21  0512 09/20/21  0728   * most extensive clot burden. Trace right pleural effusion. Emphysema. Critical result called to the patient's nurse in the emergency department at time of dictation. EXAM: CT ABDOMEN AND PELVIS WITH CONTRAST      INDICATION: Right-sided abdominal pain      COMPARISON: CTA of the abdomen and pelvis from December 14, 2015      TECHNIQUE: Axial CT imaging obtained from lung bases through pelvis. Axial images and multiplanar reformatted images are provided for review. Individualized dose optimization technique was used in order to meet ALARA standards for radiation dose    reduction. In addition to vendor specific dose reduction algorithms, the dose reduction techniques vary based on the specific scanner utilized but frequently include automated exposure control, adjustment of the mA and/or kV according to patient size,    and use of iterative reconstruction technique. IV Contrast: 80 mL Isovue-370   Oral Contrast: None      FINDINGS:      LIVER: Liver mildly, diffusely hypodense consistent with fatty infiltration. GALLBLADDER AND BILIARY TREE: Prior cholecystectomy. No intra- or extrahepatic biliary dilatation. PANCREAS: Normal.      SPLEEN: Normal.      ADRENAL GLANDS: Normal.      KIDNEYS AND URETERS: Hypodense simple appearing cysts throughout the left kidney, largest within the upper portion measuring nearly 2 cm. . No suspicious cortical lesions. 4 mm calcification within the lower left kidney felt to be a small nonobstructing    calculus. No ureteral calculi or hydronephrosis. URINARY BLADDER: Normal.      REPRODUCTIVE ORGANS: Prostate normal in size. BOWEL: Normal caliber. No suspicious bowel wall thickening or surrounding inflammatory fat stranding. LYMPH NODES: No abnormally enlarged nodes. PERITONEUM/RETROPERITONEUM: No ascites or free air. VESSELS: Extensive atherosclerotic calcification of the abdominal aorta which remains normal in caliber. Stents within the iliac vasculature. The inferior vena cava, hepatic veins and portal venous system are patent. ABDOMINAL WALL: Bilateral fat-containing inguinal hernias. No acute findings. BONES: Multilevel degenerative change of the lumbar spine status post multilevel dorsal decompression. Partially visualized right hip arthroplasty hardware in place without complicating features. IMPRESSION:      No acute intra-abdominal process. Fatty infiltration of the liver. Nonobstructive left-sided nephrolithiasis. Fat-containing inguinal hernias. CT ABDOMEN PELVIS W IV CONTRAST Additional Contrast? None   Final Result      Acute pulmonary emboli most extensively involving the right middle lobe and to a lesser degree right lower lobe. Right middle lobe and right lower lobe airspace consolidation, potentially secondary to a component of infarction, particularly of the right middle lobe given the most extensive clot burden. Trace right pleural effusion. Emphysema. Critical result called to the patient's nurse in the emergency department at time of dictation. EXAM: CT ABDOMEN AND PELVIS WITH CONTRAST      INDICATION: Right-sided abdominal pain      COMPARISON: CTA of the abdomen and pelvis from December 14, 2015      TECHNIQUE: Axial CT imaging obtained from lung bases through pelvis. Axial images and multiplanar reformatted images are provided for review. Individualized dose optimization technique was used in order to meet ALARA standards for radiation dose    reduction. In addition to vendor specific dose reduction algorithms, the dose reduction techniques vary based on the specific scanner utilized but frequently include automated exposure control, adjustment of the mA and/or kV according to patient size,    and use of iterative reconstruction technique.       IV Contrast: 80 mL Isovue-370   Oral Contrast: None      FINDINGS:      LIVER: Liver mildly, diffusely hypodense consistent with fatty infiltration. GALLBLADDER AND BILIARY TREE: Prior cholecystectomy. No intra- or extrahepatic biliary dilatation. PANCREAS: Normal.      SPLEEN: Normal.      ADRENAL GLANDS: Normal.      KIDNEYS AND URETERS: Hypodense simple appearing cysts throughout the left kidney, largest within the upper portion measuring nearly 2 cm. . No suspicious cortical lesions. 4 mm calcification within the lower left kidney felt to be a small nonobstructing    calculus. No ureteral calculi or hydronephrosis. URINARY BLADDER: Normal.      REPRODUCTIVE ORGANS: Prostate normal in size. BOWEL: Normal caliber. No suspicious bowel wall thickening or surrounding inflammatory fat stranding. LYMPH NODES: No abnormally enlarged nodes. PERITONEUM/RETROPERITONEUM: No ascites or free air. VESSELS: Extensive atherosclerotic calcification of the abdominal aorta which remains normal in caliber. Stents within the iliac vasculature. The inferior vena cava, hepatic veins and portal venous system are patent. ABDOMINAL WALL: Bilateral fat-containing inguinal hernias. No acute findings. BONES: Multilevel degenerative change of the lumbar spine status post multilevel dorsal decompression. Partially visualized right hip arthroplasty hardware in place without complicating features. IMPRESSION:      No acute intra-abdominal process. Fatty infiltration of the liver. Nonobstructive left-sided nephrolithiasis. Fat-containing inguinal hernias. XR CHEST PORTABLE   Final Result      Right basilar airspace disease, atelectasis versus developing infiltrate. Increased interstitial opacities throughout both lungs favored to be a component of volume overload/interstitial edema.           Assessment/Plan:   Goldie Kwok. is a 73 y.o. male PMHx HLD, CAD, KAVITA-COPD syndrome, HFpEF, PVD, and Gout p/w pleuritic chest pain and PE on CTPA     Provoked RML and RLL PE w/o cor pulmonale: CP and O2 requirement improving. Low concern for pneumonia at this time  - eliquis 10mg BID  - supplemental O2: wean as tolerated, goal sats >88% in setting of COPD  - IS  - PT/OT  - home O2 eval     Macrocytosis: no anemia present. Has had folate deficiency in the past not currently on supplement. In setting of PE want to rule out MDS or hematologic malignancy  - FU smear  - folate supp     Chronic diastolic CHF: likely minimally volume up based on pitting edema. Improved without edema    COPD: unlikely in COPD exacerbation but does have wheezing and O2 requirement so will finish treatment course  - spiriva and albuterol  - finish 5d course of solumedrol + zithro: taper solumedrol     Chronic Issues:   COPD-KAVITA Overlap Syndrome- spiriva  CAD/PVD- hold plavix, on hep gtt  HTN- lopressor 100 BID. Restart home losartan and imdur dose halved to 15mg   HLD- home atorvastatin  Gout- home allopurinol   Nicotine use: nicotine patch     Code Status: Full code  FEN: Regular diet, 3 carb choices  PPX: eliquis, protonix  DISPO: Olivia Javed MD, PGY-1  09/20/21  9:37 AM    This patient has been staffed and discussed with Dr. Mary Srivastava. I have personally seen and evaluated this patient. I discussed findings and management with the resident,, on 09/20/21  and agree as documented in this note     78 y. o. male Kong Oseguera is a current smoker 4 to 5 cigarettes/day, chronic pain syndrome, polyarthralgia, hypertension, lung nodules, COPD, follows Dr. Anastasia Vidal, CAD s/p s/p BMS 5/24/13 to [address], peripheral vascular disease rt fem endarterectomy 07/2013, KAVITA on CPAP  - presented to the ED with sob, work up with PE, on Heparin gtt and transitioned to apixaban  He is on 4L Oxygen, stable, will do Home O2 eval and dc home. He will need cade for age appropriate cancer screening as outpatient.     1. Acute respiratory faiure w/ hypoxemia due to acute RML, RLL PE w/o cor-pulmonale  2. Hypertensive urgency POA  3. Chest pain, pleuritic due to Acute PE  4. CAD s/p s/p BMS 5/24/13 to [address]  5. Peripheral vascular disease rt fem endarterectomy 07/2013  6. COPD without acute exacerbation no wheezing heard on exam  7. KAVITA on CPAP  8.  Nicotine dependence, current smoker 4-5 cigs/day     My time spent reviewing discharge plan and follow ups with resident, along with performing independent review of discharge medicines along with counseling the patient exceeds 30 minutes    Concepcion Wilson MD  Hospitalist  Attending Physician

## 2021-09-20 NOTE — PROGRESS NOTES
Pt has remained A&Ox4 and VSS on 4 L O2. Pt voiding freely, ambulates to restroom with x1 assistance. Pt remained free of falls, called out appropriately    Discharge order acknowledged and peripheral IVs removed. Pt and Pt's son received discharge instructions/education at bedside. All questions answered.  Pt escorted off unit safely by wheelchair to hospital lobby around 1600    Electronically signed by César Cleveland RN on 9/20/2021 at 4:46 PM

## 2021-09-20 NOTE — PROGRESS NOTES
4 Eyes Skin Assessment     The patient is being assess for   Transfer to New Unit    I agree that 2 RN's have performed a thorough Head to Toe Skin Assessment on the patient. ALL assessment sites listed below have been assessed. Areas assessed for pressure by both nurses:   [x]   Head, Face, and Ears   [x]   Shoulders, Back, and Chest, Abdomen  [x]   Arms, Elbows, and Hands   [x]   Coccyx, Sacrum, and Ischium  [x]   Legs, Feet, and Heels          Patient has some redness to his right foot. Patient's skin, especially BUE are dry. Scattered bruising noted. Patient has a preventative to his sacrum. No other skin issues noted. Skin Assessed Under all Medical Devices by both nurses:  O2 device tubing              All Mepilex Borders were peeled back and area peeked at by both nurses:  Yes  Please list where Mepilex Borders are located:  sacrum             **SHARE this note so that the co-signing nurse is able to place an eSignature**    Co-signer eSignature: Electronically signed by Kristin Upton RN on 9/20/21 at 12:40 AM EDT    Does the Patient have Skin Breakdown related to pressure?   No     (Insert Photo here)         Isidro Prevention initiated:  Yes   Wound Care Orders initiated:  NA       [facility] nurse consulted for Pressure Injury (Stage 3,4, Unstageable, DTI, NWPT, Complex wounds)and New or Established Ostomies:  NA      Primary Nurse eSignature: Electronically signed by Ynoy Brandt RN on 9/20/21 at 12:28 AM EDT

## 2021-09-20 NOTE — PROGRESS NOTES
RT Evaluation for Home Oxygen    Resting (Room Air):  SpO2: 83  HR: 54    Resting (On O2):  SpO2:  91  HR: 55  O2 Device: Nasal cannula  O2 Flow Rate (L/min): 4 l/min  FIO2 (%):    Comments:      During Walk (Room Air):  SpO2:  (unable)  HR:    Walk/Assistance Device: Cane (patient unsteady using cane from home.)  Rate of Dyspnea:    Symptoms: Shortness of breath  Comments: 54    During Walk (On O2):  SpO2: 91  HR: 54  O2 Device: Nasal cannula  O2 Flow Rate (L/min): 6 l/min  O2 Flow Rate:    O2 Saturation:    O2 Flow Rate:    O2 Saturation:    O2 Flow Rate:    O2 Saturation:    O2 Flow Rate:    O2 Saturation:    O2 Flow Rate:    O2 Saturation:    Walk/Assistance Device: Cane  Rate of Dyspnea: Increased RR 20-27  Symptoms:    Comments:      After Walk:  SpO2: 92  HR: 54  O2 Device: Nasal cannula  O2 Flow Rate (L/min): 4 l/min  FIO2 (%):    Rate of Dyspnea:    Symptoms:    Comments:    Does the Patient Qualify for Home O2: Yes  Liter Flow at Rest: 4  Liter Flow on Exertion: 6  Does the Patient Need Portable Oxygen Tanks: Yes      Additional Comments: patient unsteady.     Electronically signed by Mike Pickett RCP on 9/20/2021 at 9:51 AM

## 2021-09-20 NOTE — CARE COORDINATION
Case Management Assessment            Discharge Note                    Date / Time of Note: 9/20/2021 12:20 PM                  Discharge Note Completed by: Mariluz Lazo RN    Patient Name: Tiffanie Johnson YOB: 1950  Diagnosis: Pulmonary infarct ([facility] [address] [I26.99]  Other acute pulmonary embolism without acute cor pulmonale (HCC) [I26.99]  Pulmonary embolism without acute cor pulmonale, unspecified chronicity, unspecified pulmonary embolism type ([facility] [address] [I26.99]   Date / Time: 9/16/2021 10:23 PM    Current PCP: Yesy Lima DO  Clinic patient: No    Hospitalization in the last 30 days: No    Advance Directives:  Code Status: Full Code  [place] DNR form completed and on chart: Not Indicated    Financial:  Payor: Simin Vázquez / Plan: [facility] HMO / Product Type: *No Product type* /      Pharmacy:    Carie Wade [address] [place] [address] [phone] - F [phone]  [place]  [id number]  Phone: [phone] Fax: [phone]    [address] [facility] [phone] - f [phone]  [address] [place] [address]  Phone: [phone] Fax: [phone]      Assistance purchasing medications?:    Assistance provided by Case Management: None at this time    Does patient want to participate in local refill/ meds to beds program?:      Meds To Beds General Rules:  1. Can ONLY be done Monday- Friday between 8:30am-5pm  2. Prescription(s) must be in pharmacy by 3pm to be filled same day  3. Copy of patient's insurance/ prescription drug card and patient face sheet must be sent along with the prescription(s)  4. Cost of Rx cannot be added to hospital bill. If financial assistance is needed, please contact unit  or ;  or  CANNOT provide pharmacy voucher for patients co-pays  5.  Patients can then  the prescription on their way out of the hospital at discharge, or pharmacy can deliver to the bedside if staff is available. (payment due at time of pick-up or delivery - cash, check, or card accepted)     Able to afford home medications/ co-pay costs: Yes    ADLS:  Current PT AM-PAC Score: 16 /24  Current OT AM-PAC Score: 16 /24      DISCHARGE Disposition: [address]: [facility]     LOC at discharge: Not Applicable  NICA Completed: Not Indicated    Notification completed in HENS/PAS?:  Not Applicable    IMM Completed:   Yes, Case management has presented and reviewed IMM letter #2 to the patient and/or family/ POA. Patient and/or family/POA verbalized understanding of their medicare rights and appeal process if needed. Patient and/or family/POA has signed, initialed and placed today's date (9/20/21) and time ([phone]) on IMM letter #2 on the the appropriate lines. Patient and/or family/POA, copy of letter offered and they are aware that this original copy of IMM letter #2 is available prior to discharge from the paper chart on the unit. Electronic documentation has been entered into epic for IMM letter #2 and original paper copy has been added to the paper chart at the nurses station. Transportation:  Transportation PLAN for discharge: family   Mode of Transport: Private Car Son Floyd Lawler will drive home      Home Care:  [address] ordered at discharge: Yes  [address]: Jorge Garcia  Phone: [phone]  Fax: [phone]  Orders faxed: Horacio Christopher will fax orders and follow at [phone]:  DME Provider: none  Equipment obtained during hospitalization:     Home Oxygen and Respiratory Equipment:  Oxygen needed at discharge?: Yes  [address]  Phone: [phone]   Portable tank available for discharge?: Yes    Dialysis:  Dialysis patient: No    Dialysis Center:  Not Applicable      Additional CM Notes: Pt will DC home with [facility] and support from son.   [place] O2 with [facility], Joann Jett will bring portable O2    The Plan for Transition of Care is related to the following treatment goals of Pulmonary infarct [facility]) [I26.99]  Other acute pulmonary embolism without acute cor pulmonale ([facility] [address] [I26.99]  Pulmonary embolism without acute cor pulmonale, unspecified chronicity, unspecified pulmonary embolism type ([facility] [I26.99]    The Patient and/or patient representative Benjamin Child and his family were provided with a choice of provider and agrees with the discharge plan Yes    Freedom of choice list was provided with basic dialogue that supports the patient's individualized plan of care/goals and shares the quality data associated with the providers.  Yes    Care Transitions patient: No    Harleen Ordonez RN  [facility]  Case Management Department  Ph: [phone]  Fax: [phone]

## 2021-09-20 NOTE — PROGRESS NOTES
Patient AOx4. Patient is an ICU transfer. Audible wheezing heard. Patient is 93% on 4L oxygen [place]. Patient using a urinal to void at this time. Patient c/o pain of 9/10 in his feet and legs, PRN Percocet given. Call light within reach. Bed alarm on. Will continue to monitor.

## 2021-09-20 NOTE — DISCHARGE INSTR - COC
stenosis w/8 x 37 mm Palmaz        Immunization History:   Immunization History   Administered Date(s) Administered    COVID-19, Pfizer, PF, 30mcg/0.3mL 03/17/2021, 04/07/2021    Influenza 10/14/2013    Influenza Vaccine, unspecified formulation 10/14/2013, 10/22/2015    Influenza Virus Vaccine 10/14/2014    Influenza, High Dose (Fluzone 65 yrs and older) 10/22/2015, 11/18/2016, 08/17/2017, 08/30/2018, 10/17/2019    Pneumococcal Conjugate 13-valent ([id number]) 10/22/2015    Pneumococcal Conjugate 7-valent (Prevnar7) 03/01/2013    Pneumococcal Polysaccharide ([id number]) 01/03/2018    Tdap (Boostrix, Adacel) 10/14/2013    Zoster Live (Zostavax) 06/07/2014    Zoster Recombinant (Shingrix) 05/08/2018       Active Problems:  Patient Active Problem List   Diagnosis Code    Essential hypertension I10    Hyperlipemia E78.5    Degeneration of intervertebral disc of lumbar region M51.36    KAVITA and COPD overlap syndrome ([facility] [address] G47.33, J44.9    PVD (peripheral vascular disease) ([facility] [address] I73.9    Coronary artery disease involving native coronary artery of native heart without angina pectoris I25.10    Idiopathic peripheral neuropathy G60.9    Pulmonary nodule R91.1    Chronic, continuous use of opioids F11.90    Polyarthropathy, multiple sites M13.0    Chronic pain syndrome G89.4    Pain medication agreement Z02.89    Pain disorder with psychological factors F45.41    Gastroesophageal reflux disease K21.9    Sacroiliitis, not elsewhere classified ([facility] [address] M46.1    Other spondylosis, lumbosacral region M47.897    Chronic diastolic heart failure (HCC) I50.32    Cigarette nicotine dependence without complication Q76.536    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    Pulmonary embolism without acute cor pulmonale (HCC) I26.99       Isolation/Infection:   Isolation            No Isolation          Patient Infection Status       Infection Onset Added Last Indicated Last Indicated By Review Planned Expiration Resolved Resolved By    None active    Resolved    COVID-19 Rule Out 12/05/20 12/05/20 12/05/20 COVID-19 (Ordered)   12/06/20 Rule-Out Test Resulted            Nurse Assessment:  Last Vital Signs: BP (!) 158/73   Pulse 65   Temp 97.2 °F (36.2 °C) (Axillary)   Resp 16   Ht 5' 10\" (1.778 m)   Wt 205 lb (93 kg)   SpO2 93%   BMI 29.41 kg/m²     Last documented pain score (0-10 scale): Pain Level: 0  Last Weight:   Wt Readings from Last 1 Encounters:   09/16/21 205 lb (93 kg)     Mental Status:  oriented, alert, coherent, logical, thought processes intact and able to concentrate and follow conversation    IV Access:  - None    Nursing Mobility/ADLs:  Walking   Independent  Transfer  Assisted  Bathing  Independent  Dressing  Independent  [address]  Independent  Med Delivery   whole    Wound Care Documentation and Therapy:        Elimination:  Continence:   · Bowel: Yes  · Bladder: Yes  Urinary Catheter: None   Colostomy/Ileostomy/Ileal Conduit: No       Date of Last BM: unknown    Intake/Output Summary (Last 24 hours) at 9/20/2021 1225  Last data filed at 9/20/2021 0023  Gross per 24 hour   Intake 1325 ml   Output 600 ml   Net 725 ml     I/O last 3 completed shifts: In: 6903 [P.O.:850; I.V.:375; Other:500]  Out: 1200 [Urine:1200]    Safety Concerns: At Risk for Falls    Impairments/Disabilities:      None    Nutrition Therapy:  Current Nutrition Therapy:   - Oral Diet:  Carb Control 3 carbs/meal (1500kcals/day)    Routes of Feeding: Oral  Liquids: No Restrictions  Daily Fluid Restriction: no  Last Modified Barium Swallow with Video (Video Swallowing Test): not done    Treatments at the Time of Hospital Discharge:   Respiratory Treatments: nebulizer, home oxygen  Oxygen Therapy:  is on oxygen at 4-6 L/min per nasal cannula.   Ventilator:    - No ventilator support     Heart Failure Instructions for Daily Management  Patient was treated for chronic PT/OT - therapy goal is to regaining prior level of functioning   Evaluations in home within 24-48 hours of discharge to include DME and home safety   OT to evaluate if patient has [address] needs for personal care     [place] on Aging Referral     Dietician evaluation within five days of discharge     [address] to discuss home support and care needs to occur within two weeks of discharge   Recommend for home care vitals  Weight Bearing Status/Restrictions: No weight bearing restirctions  Other Medical Equipment (for information only, NOT a DME order):  walker  Other Treatments: n/a    Patient's personal belongings (please select all that are sent with patient):  None    RN SIGNATURE:  Electronically signed by Carlee Franco RN on 9/20/21 at 2:58 PM EDT    CASE MANAGEMENT/SOCIAL WORK SECTION    Inpatient Status Date: ***    Readmission Risk Assessment Score:  Readmission Risk              Risk of Unplanned Readmission:  23           Discharging to Facility/ Agency   · Name:   · Address:  · Phone:  · Fax:    Dialysis Facility (if applicable)   · Name:  · Address:  · Dialysis Schedule:  · Phone:  · Fax:    / signature: {Esignature:[id number]}    PHYSICIAN SECTION    Prognosis: Fair    Condition at Discharge: Stable    Rehab Potential (if transferring to Rehab): N/A    Recommended Labs or Other Treatments After Discharge: N/A    Physician Certification: I certify the above information and transfer of [facility].  is necessary for the continuing treatment of the diagnosis listed and that he requires Home Care for less 30 days.      Update Admission H&P: No change in H&P    PHYSICIAN SIGNATURE:  Electronically signed by Tom Kelley MD on 9/20/21 at 1:09 PM EDT

## 2021-09-20 NOTE — PROGRESS NOTES
Pulmonary Followup Note    Indication for visit: Pulmonary embolism with worsening chest pain and increase O2 requirement     Subjective:  No acute overnight events. Patient on 4 L oxygen via nasal cannula with saturations in 90s. Has nil complaints today. Evaluation for home oxygen done, Pt may require 4L at rest and 6L with exertion. Afebrile. Denies any Chest pain, SOB, palpitations     apixaban  10 mg Oral BID    albuterol  2.5 mg Nebulization BID    folic acid  1 mg Oral Daily    metoprolol  100 mg Oral BID    tiotropium  2 puff Inhalation Daily    allopurinol  100 mg Oral Daily    atorvastatin  80 mg Oral Daily    sodium chloride flush  5-40 mL IntraVENous 2 times per day    nicotine  1 patch TransDERmal Daily    lidocaine  1 patch TransDERmal Daily    pantoprazole  40 mg IntraVENous Daily    azithromycin  500 mg IntraVENous Q24H    methylPREDNISolone  40 mg IntraVENous Q12H       Continuous Infusions:   sodium chloride         ROS:    PHYSICAL EXAMINATION:  BP (!) 171/88   Pulse 65   Temp 97.6 °F (36.4 °C) (Oral)   Resp 18   Ht 5' 10\" (1.778 m)   Wt 205 lb (93 kg)   SpO2 91%   BMI 29.41 kg/m²     Gen: Elderly male in high fowlers position in nil acute distress. AOx4   Eyes: PERRL, EOMI, normal conjunctivae  Ears, Nose, Mouth and Throat: Hearing is normal. Oropharynx is normal  Neck: No lymphadenopathy  Respiratory: Air entry equal b/l. Mild wheezing   CV: RRR without M/R/R  Abd: +BS, soft, NT/ND  Musculoskeletal: No cyanosis, clubbing, or edema.   Skin: No rashes, ulcers, or subcutaneous nodules  Psychiatric: Alert and oriented to time place and person    DATA  CBC:   Recent Labs     09/18/21 0459 09/19/21  0512 09/20/21  0728   WBC 10.0 11.2* 7.4   HGB 14.1 13.2* 12.9*   HCT 41.3 39.4* 38.9*   .4* 109.8* 111.5*   * 150 155     BMP:   Recent Labs     09/17/21  1732 09/17/21  1732 09/18/21  0459 09/19/21  0512 09/20/21  0728   NA nights     CODE STATUS: full      This patient was staffed with MD Kevin Johnson MD   Internal medicine resident PGY 1  10:32 AM

## 2021-09-21 ENCOUNTER — HOSPITAL ENCOUNTER (OUTPATIENT)
Dept: CT IMAGING | Age: 71
Discharge: HOME | End: 2021-09-21
Payer: MEDICARE

## 2021-09-21 DIAGNOSIS — I74.09 AORTOILIAC OCCLUSIVE DISEASE (HCC): ICD-10-CM

## 2021-09-21 LAB
BLOOD CULTURE, ROUTINE: NORMAL
CULTURE, BLOOD 2: NORMAL

## 2021-09-21 PROCEDURE — 6360000004 HC RX CONTRAST MEDICATION: Performed by: STUDENT IN AN ORGANIZED HEALTH CARE EDUCATION/TRAINING PROGRAM

## 2021-09-21 PROCEDURE — 75635 CT ANGIO ABDOMINAL ARTERIES: CPT

## 2021-09-21 RX ADMIN — IOPAMIDOL 120 ML: 755 INJECTION, SOLUTION INTRAVENOUS at 15:35

## 2021-09-22 ENCOUNTER — TELEPHONE (OUTPATIENT)
Dept: PRIMARY CARE CLINIC | Age: 71
End: 2021-09-22

## 2021-09-22 NOTE — TELEPHONE ENCOUNTER
[facility] Transitions Initial Follow Up Call    Outreach made within 2 business days of discharge: Yes    Patient: Nestor Vargas. Patient : 1950   MRN: [id number]  Reason for Admission: There are no discharge diagnoses documented for the most recent discharge. Discharge Date: 21       Spoke with: patient    Discharge department/facility: [facility] Interactive Patient Contact:  Was patient able to fill all prescriptions:   Was patient instructed to bring all medications to the follow-up visit:   Is patient taking all medications as directed in the discharge summary?    Does patient understand their discharge instructions:   Does patient have questions or concerns that need addressed prior to 7-14 day follow up office visit:     Scheduled appointment with PCP within 7-14 days    Follow Up  Future Appointments   Date Time Provider Orlando Colmenares   2021 11:00 AM MD Edd Bonner [facility]   [facility]21 11[facility]   10/11/2021 10:00 AM Rohit Neil MD [facility]   10/26/2021  1:20 PM Ludmila Wiggins MD [facility] P/CC [facility]   2021 10:30 AM [facility] CT  [address] [place], [place]

## 2021-09-23 ENCOUNTER — OFFICE VISIT (OUTPATIENT)
Dept: VASCULAR SURGERY | Age: 71
End: 2021-09-23
Payer: MEDICARE

## 2021-09-23 VITALS
DIASTOLIC BLOOD PRESSURE: 89 MMHG | WEIGHT: 207.6 LBS | HEART RATE: 57 BPM | BODY MASS INDEX: 29.72 KG/M2 | TEMPERATURE: 97.2 F | SYSTOLIC BLOOD PRESSURE: 182 MMHG | HEIGHT: 70 IN

## 2021-09-23 DIAGNOSIS — I26.99 ACUTE PULMONARY EMBOLISM WITHOUT ACUTE COR PULMONALE, UNSPECIFIED PULMONARY EMBOLISM TYPE (HCC): ICD-10-CM

## 2021-09-23 DIAGNOSIS — I73.9 PVD (PERIPHERAL VASCULAR DISEASE) (HCC): Primary | ICD-10-CM

## 2021-09-23 PROCEDURE — 99212 OFFICE O/P EST SF 10 MIN: CPT | Performed by: SURGERY

## 2021-09-23 PROCEDURE — 3017F COLORECTAL CA SCREEN DOC REV: CPT | Performed by: SURGERY

## 2021-09-23 PROCEDURE — 1123F ACP DISCUSS/DSCN MKR DOCD: CPT | Performed by: SURGERY

## 2021-09-23 PROCEDURE — 4040F PNEUMOC VAC/ADMIN/RCVD: CPT | Performed by: SURGERY

## 2021-09-23 PROCEDURE — 4004F PT TOBACCO SCREEN RCVD TLK: CPT | Performed by: SURGERY

## 2021-09-23 PROCEDURE — G8417 CALC BMI ABV UP PARAM F/U: HCPCS | Performed by: SURGERY

## 2021-09-23 PROCEDURE — 1111F DSCHRG MED/CURRENT MED MERGE: CPT | Performed by: SURGERY

## 2021-09-23 PROCEDURE — G8427 DOCREV CUR MEDS BY ELIG CLIN: HCPCS | Performed by: SURGERY

## 2021-09-23 NOTE — PROGRESS NOTES
Mr. Cong Gomes returns today in follow-up of his CTA runoff. After his appointment with me last week, he was admitted the following day for acute PE. Currently now on 5 to 6 L supplemental O2 around-the-clock. He is on Eliquis. Shortness of breath is improving, but certainly still requiring supplemental oxygen. Legs are stable. I did review the CTA images with him. Advised him that as long as his leg symptoms are stable, will hold off on any elective intervention for now until his PE is treated and his oxygen requirements are improved. He has an appointment with Dr. Trent Robles near the end of October, and I will see him back the following week. I asked him to call me in the meantime, should he have any worsening symptoms.

## 2021-09-24 ENCOUNTER — OFFICE VISIT (OUTPATIENT)
Dept: PRIMARY CARE CLINIC | Age: 71
End: 2021-09-24
Payer: MEDICARE

## 2021-09-24 VITALS
OXYGEN SATURATION: 99 % | DIASTOLIC BLOOD PRESSURE: 96 MMHG | WEIGHT: 209.2 LBS | HEIGHT: 70 IN | SYSTOLIC BLOOD PRESSURE: 154 MMHG | HEART RATE: 72 BPM | TEMPERATURE: 97.2 F | BODY MASS INDEX: 29.95 KG/M2

## 2021-09-24 DIAGNOSIS — Z23 ENCOUNTER FOR IMMUNIZATION: ICD-10-CM

## 2021-09-24 DIAGNOSIS — J44.9 OSA AND COPD OVERLAP SYNDROME (HCC): ICD-10-CM

## 2021-09-24 DIAGNOSIS — F17.210 CIGARETTE NICOTINE DEPENDENCE WITHOUT COMPLICATION: ICD-10-CM

## 2021-09-24 DIAGNOSIS — G47.33 OSA AND COPD OVERLAP SYNDROME (HCC): ICD-10-CM

## 2021-09-24 DIAGNOSIS — I26.99 OTHER ACUTE PULMONARY EMBOLISM WITHOUT ACUTE COR PULMONALE (HCC): Primary | ICD-10-CM

## 2021-09-24 DIAGNOSIS — I10 ESSENTIAL HYPERTENSION: ICD-10-CM

## 2021-09-24 DIAGNOSIS — I50.32 CHRONIC DIASTOLIC HEART FAILURE (HCC): ICD-10-CM

## 2021-09-24 PROBLEM — M13.0 POLYARTHROPATHY, MULTIPLE SITES: Status: RESOLVED | Noted: 2018-10-08 | Resolved: 2021-09-24

## 2021-09-24 PROBLEM — F45.42 PAIN DISORDER WITH PSYCHOLOGICAL FACTORS: Status: RESOLVED | Noted: 2019-06-26 | Resolved: 2021-09-24

## 2021-09-24 PROCEDURE — 1111F DSCHRG MED/CURRENT MED MERGE: CPT | Performed by: STUDENT IN AN ORGANIZED HEALTH CARE EDUCATION/TRAINING PROGRAM

## 2021-09-24 PROCEDURE — 90694 VACC AIIV4 NO PRSRV 0.5ML IM: CPT | Performed by: STUDENT IN AN ORGANIZED HEALTH CARE EDUCATION/TRAINING PROGRAM

## 2021-09-24 PROCEDURE — 99495 TRANSJ CARE MGMT MOD F2F 14D: CPT | Performed by: STUDENT IN AN ORGANIZED HEALTH CARE EDUCATION/TRAINING PROGRAM

## 2021-09-24 PROCEDURE — G0008 ADMIN INFLUENZA VIRUS VAC: HCPCS | Performed by: STUDENT IN AN ORGANIZED HEALTH CARE EDUCATION/TRAINING PROGRAM

## 2021-09-24 PROCEDURE — 99406 BEHAV CHNG SMOKING 3-10 MIN: CPT | Performed by: STUDENT IN AN ORGANIZED HEALTH CARE EDUCATION/TRAINING PROGRAM

## 2021-09-24 RX ORDER — NICOTINE 21 MG/24HR
1 PATCH, TRANSDERMAL 24 HOURS TRANSDERMAL EVERY 24 HOURS
Qty: 30 PATCH | Refills: 1 | Status: SHIPPED | OUTPATIENT
Start: 2021-09-24 | End: 2021-12-30

## 2021-09-24 RX ORDER — FLUTICASONE FUROATE, UMECLIDINIUM BROMIDE AND VILANTEROL TRIFENATATE 100; 62.5; 25 UG/1; UG/1; UG/1
1 POWDER RESPIRATORY (INHALATION) DAILY
Qty: 28 EACH | Refills: 2 | Status: SHIPPED | OUTPATIENT
Start: 2021-09-24 | End: 2021-12-22 | Stop reason: SDUPTHER

## 2021-09-24 RX ORDER — OMEPRAZOLE 40 MG/1
CAPSULE, DELAYED RELEASE ORAL
COMMUNITY
Start: 2021-09-04 | End: 2022-02-09 | Stop reason: SDUPTHER

## 2021-09-24 RX ORDER — AMLODIPINE BESYLATE 5 MG/1
5 TABLET ORAL DAILY
Qty: 30 TABLET | Refills: 5 | Status: SHIPPED | OUTPATIENT
Start: 2021-09-24 | End: 2021-10-08

## 2021-09-24 RX ORDER — COLCHICINE 0.6 MG/1
0.6 TABLET ORAL PRN
COMMUNITY
End: 2022-04-19

## 2021-09-24 ASSESSMENT — ENCOUNTER SYMPTOMS
COUGH: 0
CHEST TIGHTNESS: 0
SHORTNESS OF BREATH: 1
WHEEZING: 0

## 2021-09-24 NOTE — PROGRESS NOTES
Post-Discharge Transitional Care Management Services or Hospital Follow Up      Lilibeth Baugh. YOB: 1950    Date of Office Visit:  9/24/2021  Date of Hospital Admission: 9/16/21  Date of Hospital Discharge: 9/20/21  Risk of hospital readmission (high >=14%. Medium >=10%) :Readmission Risk Score: 23      Care management risk score Rising risk (score 2-5) and Complex Care (Scores >=6): 3     Non face to face  following discharge, date last encounter closed (first attempt may have been earlier): 9/22/2021  4:10 PM    Call initiated 2 business days of discharge: Yes    Patient Active Problem List   Diagnosis    Essential hypertension    Hyperlipemia    Degeneration of intervertebral disc of lumbar region    KAVITA and COPD overlap syndrome ([address]    PVD (peripheral vascular disease) ([address]    Coronary artery disease involving native coronary artery of native heart without angina pectoris    Idiopathic peripheral neuropathy    Pulmonary nodule    Chronic, continuous use of opioids    Chronic pain syndrome    Pain medication agreement    Gastroesophageal reflux disease    Sacroiliitis, not elsewhere classified ([address]    Other spondylosis, lumbosacral region    Chronic diastolic heart failure ([address]    Cigarette nicotine dependence without complication    Idiopathic chronic gout without tophus    Pulmonary embolism without acute cor pulmonale (HCC)       Allergies   Allergen Reactions    Asa [Aspirin] Other (See Comments)     Stomach ache    Daliresp [Roflumilast] Diarrhea and Other (See Comments)     Severe diarrhea and did not help       Medications listed as ordered at the time of discharge from hospital   Geni Mccord.    Home Medication Instructions LEIGH:    Printed on:09/24/21 1126   Medication Information                      albuterol sulfate  (90 Base) MCG/ACT inhaler  Inhale 2 puffs into the lungs every 6 hours as needed for Wheezing             allopurinol (ZYLOPRIM) 100 MG tablet  Take 1 tablet by mouth daily             amLODIPine (NORVASC) 5 MG tablet  Take 1 tablet by mouth daily             apixaban (ELIQUIS) 5 MG TABS tablet  Take 2 tablets by mouth 2 times daily for 11 doses             atorvastatin (LIPITOR) 80 MG tablet  Take 1 tablet by mouth daily             colchicine (COLCRYS) 0.6 MG tablet  Take 0.6 mg by mouth as needed             fluticasone-umeclidin-vilant (TRELEGY ELLIPTA) 100-62.5-25 MCG/INH AEPB  Inhale 1 puff into the lungs daily             folic acid (FOLVITE) 1 MG tablet  Take 1 tablet by mouth daily             furosemide (LASIX) 40 MG tablet  Take 1 tablet by mouth daily             isosorbide mononitrate (IMDUR) 30 MG extended release tablet  Take 0.5 tablets by mouth daily             losartan (COZAAR) 100 MG tablet  TAKE 1 TABLET BY MOUTH EVERY DAY             magnesium gluconate (MAGONATE) 500 MG tablet  Take 500 mg by mouth daily. metoprolol (LOPRESSOR) 100 MG tablet  Take 1 tablet by mouth 2 times daily             nicotine (NICODERM CQ) 21 MG/24HR  Place 1 patch onto the skin every 24 hours             nitroGLYCERIN (NITROSTAT) 0.4 MG SL tablet  Place 1 tablet under the tongue every 5 minutes as needed for Chest pain             omeprazole (PRILOSEC) 40 MG delayed release capsule               pantoprazole (PROTONIX) 40 MG tablet  Take 1 tablet by mouth daily for 10 days             predniSONE (DELTASONE) 5 MG tablet  Take 6 tablets by mouth 2 times daily for 2 days, THEN 4 tablets 2 times daily for 2 days, THEN 2 tablets 2 times daily for 2 days, THEN 1 tablet 2 times daily for 2 days.                    Medications marked \"taking\" at this time  Outpatient Medications Marked as Taking for the 9/24/21 encounter (Office Visit) with Mikey Brandon,    Medication Sig Dispense Refill    colchicine (COLCRYS) 0.6 MG tablet Take 0.6 mg by mouth as needed      omeprazole (PRILOSEC) 40 MG delayed release capsule       fluticasone-umeclidin-vilant (TRELEGY ELLIPTA) 100-62.5-25 MCG/INH AEPB Inhale 1 puff into the lungs daily 28 each 2    amLODIPine (NORVASC) 5 MG tablet Take 1 tablet by mouth daily 30 tablet 5    nicotine (NICODERM CQ) 21 MG/24HR Place 1 patch onto the skin every 24 hours 30 patch 1    isosorbide mononitrate (IMDUR) 30 MG extended release tablet Take 0.5 tablets by mouth daily 30 tablet 3    apixaban (ELIQUIS) 5 MG TABS tablet Take 2 tablets by mouth 2 times daily for 11 doses 22 tablet 0    folic acid (FOLVITE) 1 MG tablet Take 1 tablet by mouth daily 30 tablet 3    predniSONE (DELTASONE) 5 MG tablet Take 6 tablets by mouth 2 times daily for 2 days, THEN 4 tablets 2 times daily for 2 days, THEN 2 tablets 2 times daily for 2 days, THEN 1 tablet 2 times daily for 2 days. 52 tablet 0    pantoprazole (PROTONIX) 40 MG tablet Take 1 tablet by mouth daily for 10 days 10 tablet 0    albuterol sulfate  (90 Base) MCG/ACT inhaler Inhale 2 puffs into the lungs every 6 hours as needed for Wheezing 3 Inhaler 4    allopurinol (ZYLOPRIM) 100 MG tablet Take 1 tablet by mouth daily 180 tablet 1    losartan (COZAAR) 100 MG tablet TAKE 1 TABLET BY MOUTH EVERY DAY 90 tablet 1    atorvastatin (LIPITOR) 80 MG tablet Take 1 tablet by mouth daily 90 tablet 1    nitroGLYCERIN (NITROSTAT) 0.4 MG SL tablet Place 1 tablet under the tongue every 5 minutes as needed for Chest pain 25 tablet 1    furosemide (LASIX) 40 MG tablet Take 1 tablet by mouth daily 90 tablet 3    metoprolol (LOPRESSOR) 100 MG tablet Take 1 tablet by mouth 2 times daily 270 tablet 1    magnesium gluconate (MAGONATE) 500 MG tablet Take 500 mg by mouth daily.           Medications patient taking as of now reconciled against medications ordered at time of hospital discharge: Yes    Chief Complaint   Patient presents with   [address] from Hospital       History of Present illness - Follow up of Hospital diagnosis(es): COPD exacerbation, acute pulmonary embolism    Inpatient course: Discharge summary reviewed- see chart. Interval history/Current status: Patient woke up Thursday morning with right-sided chest pain. He did not think anything of it, tried IcyHot and lidocaine patches. As the day progressed his right-sided pain worsened and by the evening he was experiencing right-sided shortness of breath. He presented to the emergency department for evaluation of shortness of breath. His oxygen saturation was found to be in the mid to high 80s. He had a CTA which demonstrated right middle and upper lobe pulmonary embolus. He was also found to have a COPD exacerbation. He was started on heparin and evaluated for signs of heart strain. There was no sign of heart strain. There was no source found for his blood clots. He was transitioned to Eliquis. He was weaned to 5 or 6 L of oxygen, which is what he is using at home today. He is able to breathe comfortably on this oxygen. He is on a steroid taper. Today he denies chest pain, hemoptysis or shortness of breath, as long as he is wearing his oxygen. Review of Systems   Constitutional: Negative for fatigue. Eyes: Negative for visual disturbance. Respiratory: Positive for shortness of breath. Negative for cough, chest tightness and wheezing. Cardiovascular: Negative for chest pain, palpitations and leg swelling. Endocrine: Negative for polydipsia, polyphagia and polyuria. Genitourinary: Negative for frequency. Skin: Negative for rash. Neurological: Negative for dizziness, syncope, weakness and light-headedness. Vitals:    09/24/21 1050 09/24/21 1118   BP: (!) 174/88 (!) 154/96   Site: Right Upper Arm    Position: Sitting    Cuff Size: Medium Adult    Pulse: 72    Temp: 97.2 °F (36.2 °C)    SpO2: 99%    Weight: 209 lb 3.2 oz (94.9 kg)    Height: 5' 10\" (1.778 m)      Body mass index is 30.02 kg/m².    Wt Readings from Last 3 Encounters:   09/24/21 209 lb 3.2 oz (94.9 kg)   09/23/21 207 lb 9.6 oz (94.2 kg)   09/16/21 205 lb (93 kg)     BP Readings from Last 3 Encounters:   09/24/21 (!) 154/96   09/23/21 (!) 182/89   09/20/21 (!) 158/73      Physical Exam  Constitutional:       Appearance: Normal appearance. He is obese. HENT:      Head: Normocephalic and atraumatic. Nose: Nose normal.   Eyes:      Extraocular Movements: Extraocular movements intact. Cardiovascular:      Rate and Rhythm: Normal rate and regular rhythm. Pulmonary:      Effort: Pulmonary effort is normal.      Breath sounds: Normal breath sounds. Comments: examined on 5 L  Musculoskeletal:         General: Normal range of motion. Cervical back: Normal range of motion and neck supple. Right lower leg: No edema. Left lower leg: No edema. Lymphadenopathy:      Cervical: No cervical adenopathy. Skin:     General: Skin is warm and dry. Neurological:      Mental Status: He is alert. Mental status is at baseline. Psychiatric:         Mood and Affect: Mood normal.         Behavior: Behavior normal.         Thought Content: Thought content normal.         Judgment: Judgment normal.       Assessment/Plan:  1. Other acute pulmonary embolism without acute cor pulmonale [facility]): Patient appears quite stable today. He has multiple pulmonary embolism but was him of unexplained origin. No evidence of DVT. He is currently on Eliquis. No documentation of telemetry demonstrating arrhythmia. Plan on 6 months anticoagulation.  - [place] DISCHARGE MEDS RECONCILED W/ CURRENT OUTPATIENT MED LIST    2. KAVITA and COPD overlap syndrome ([facility] [address]): Diagnosed with COPD exacerbation while inpatient. Currently on a steroid taper. Adjusting his COPD regimen as below. We will follow-up to reevaluate his breathing in 2 weeks as he moves with a steroid taper.  - fluticasone-umeclidin-vilant (TRELEGY ELLIPTA) 100-62.5-25 MCG/INH AEPB; Inhale 1 puff into the lungs daily  Dispense: 28 each; Refill: 2    3.  Essential hypertension: Could be related to steroid taper or physiologic given hypoxia in the setting of a DVT. Will start low-dose amlodipine as below and follow blood pressure as we wean the steroids. - amLODIPine (NORVASC) 5 MG tablet; Take 1 tablet by mouth daily  Dispense: 30 tablet; Refill: 5    4. Chronic diastolic heart failure ([facility] [address]): Appears euvolemic. Do not suspect this is contributing to his hypoxia. 5. Cigarette nicotine dependence without complication: With no other explanation for his DVT, this could be the cause. Discussed this with him and he is receptive. Will do NicoDerm as below. Titrate as tolerated. Goal is smoking cessation obviously. > 3 min  - nicotine (NICODERM CQ) 21 MG/24HR; Place 1 patch onto the skin every 24 hours  Dispense: 30 patch; Refill: 1    6.  Encounter for immunization  - INFLUENZA, QUADV, ADJUVANTED, 65 YRS =, IM, PF, PREFILL SYR, 0.5ML (FLUAD)        Medical Decision Making: high complexity

## 2021-09-24 NOTE — PATIENT INSTRUCTIONS
ask your healthcare professional. [place] [id number] any warranty or liability for your use of this information.

## 2021-09-29 ENCOUNTER — TELEPHONE (OUTPATIENT)
Dept: PRIMARY CARE CLINIC | Age: 71
End: 2021-09-29

## 2021-10-05 ENCOUNTER — TELEPHONE (OUTPATIENT)
Dept: PRIMARY CARE CLINIC | Age: 71
End: 2021-10-05

## 2021-10-08 ENCOUNTER — OFFICE VISIT (OUTPATIENT)
Dept: PRIMARY CARE CLINIC | Age: 71
End: 2021-10-08
Payer: MEDICARE

## 2021-10-08 VITALS
HEIGHT: 70 IN | HEART RATE: 90 BPM | WEIGHT: 213.6 LBS | SYSTOLIC BLOOD PRESSURE: 102 MMHG | BODY MASS INDEX: 30.58 KG/M2 | DIASTOLIC BLOOD PRESSURE: 58 MMHG | OXYGEN SATURATION: 98 %

## 2021-10-08 DIAGNOSIS — G47.33 OSA AND COPD OVERLAP SYNDROME (HCC): ICD-10-CM

## 2021-10-08 DIAGNOSIS — I73.9 PVD (PERIPHERAL VASCULAR DISEASE) (HCC): ICD-10-CM

## 2021-10-08 DIAGNOSIS — I50.32 CHRONIC DIASTOLIC HEART FAILURE (HCC): ICD-10-CM

## 2021-10-08 DIAGNOSIS — I26.99 OTHER ACUTE PULMONARY EMBOLISM WITHOUT ACUTE COR PULMONALE (HCC): Primary | ICD-10-CM

## 2021-10-08 DIAGNOSIS — J44.9 OSA AND COPD OVERLAP SYNDROME (HCC): ICD-10-CM

## 2021-10-08 PROBLEM — Z02.89 PAIN MEDICATION AGREEMENT: Status: RESOLVED | Noted: 2019-06-26 | Resolved: 2021-10-08

## 2021-10-08 PROCEDURE — 4004F PT TOBACCO SCREEN RCVD TLK: CPT | Performed by: STUDENT IN AN ORGANIZED HEALTH CARE EDUCATION/TRAINING PROGRAM

## 2021-10-08 PROCEDURE — 1123F ACP DISCUSS/DSCN MKR DOCD: CPT | Performed by: STUDENT IN AN ORGANIZED HEALTH CARE EDUCATION/TRAINING PROGRAM

## 2021-10-08 PROCEDURE — 4040F PNEUMOC VAC/ADMIN/RCVD: CPT | Performed by: STUDENT IN AN ORGANIZED HEALTH CARE EDUCATION/TRAINING PROGRAM

## 2021-10-08 PROCEDURE — G8926 SPIRO NO PERF OR DOC: HCPCS | Performed by: STUDENT IN AN ORGANIZED HEALTH CARE EDUCATION/TRAINING PROGRAM

## 2021-10-08 PROCEDURE — 3017F COLORECTAL CA SCREEN DOC REV: CPT | Performed by: STUDENT IN AN ORGANIZED HEALTH CARE EDUCATION/TRAINING PROGRAM

## 2021-10-08 PROCEDURE — G8427 DOCREV CUR MEDS BY ELIG CLIN: HCPCS | Performed by: STUDENT IN AN ORGANIZED HEALTH CARE EDUCATION/TRAINING PROGRAM

## 2021-10-08 PROCEDURE — 3023F SPIROM DOC REV: CPT | Performed by: STUDENT IN AN ORGANIZED HEALTH CARE EDUCATION/TRAINING PROGRAM

## 2021-10-08 PROCEDURE — 1111F DSCHRG MED/CURRENT MED MERGE: CPT | Performed by: STUDENT IN AN ORGANIZED HEALTH CARE EDUCATION/TRAINING PROGRAM

## 2021-10-08 PROCEDURE — G8417 CALC BMI ABV UP PARAM F/U: HCPCS | Performed by: STUDENT IN AN ORGANIZED HEALTH CARE EDUCATION/TRAINING PROGRAM

## 2021-10-08 PROCEDURE — 99214 OFFICE O/P EST MOD 30 MIN: CPT | Performed by: STUDENT IN AN ORGANIZED HEALTH CARE EDUCATION/TRAINING PROGRAM

## 2021-10-08 PROCEDURE — G8484 FLU IMMUNIZE NO ADMIN: HCPCS | Performed by: STUDENT IN AN ORGANIZED HEALTH CARE EDUCATION/TRAINING PROGRAM

## 2021-10-08 RX ORDER — CILOSTAZOL 50 MG/1
50 TABLET ORAL 2 TIMES DAILY
Qty: 60 TABLET | Refills: 3 | Status: SHIPPED | OUTPATIENT
Start: 2021-10-08 | End: 2021-12-08

## 2021-10-08 RX ORDER — APIXABAN 5 MG (74)
KIT ORAL 2 TIMES DAILY
COMMUNITY
Start: 2021-09-20 | End: 2021-12-08

## 2021-10-08 ASSESSMENT — ENCOUNTER SYMPTOMS
WHEEZING: 0
SHORTNESS OF BREATH: 1
CHEST TIGHTNESS: 0
COUGH: 0

## 2021-10-08 NOTE — PATIENT INSTRUCTIONS
Patient Education        furosemide (oral/injection)  Pronunciation:  dagmar lópez  Brand:  Lasix  What is the most important information I should know about furosemide? You should not use this medicine if you are unable to urinate. Do not take more than your recommended dose. High doses of furosemide may cause irreversible hearing loss. What is furosemide? Furosemide is a loop diuretic (water pill) that prevents your body from absorbing too much salt. This allows the salt to instead be passed in your urine. Furosemide is used to treat fluid retention (edema) in people with congestive heart failure, liver disease, or a kidney disorder such as nephrotic syndrome. Furosemide is also used to treat high blood pressure (hypertension). Furosemide may also be used for purposes not listed in this medication guide. What should I discuss with my healthcare provider before taking furosemide? You should not use furosemide if you are allergic to it, or if you are unable to urinate. Tell your doctor if you have ever had:  · kidney disease;  · enlarged prostate, bladder obstruction, urination problems;  · cirrhosis or other liver disease;  · an electrolyte imbalance (such as low levels of potassium or magnesium in your blood);  · gout;  · lupus;  · diabetes; or  · a sulfa drug allergy. Tell your doctor if you have an MRI (magnetic resonance imaging) or any type of scan using a radioactive dye that is injected into your veins. Both contrast dyes and furosemide can harm your kidneys. It is not known whether this medicine will harm an unborn baby. Tell your doctor if you are pregnant or plan to become pregnant. It may not be safe to breastfeed while using this medicine. Ask your doctor about any risk. Furosemide may slow breast milk production. How should I take furosemide? Follow all directions on your prescription label and read all medication guides or instruction sheets.  Your doctor may occasionally change your dose. Use the medicine exactly as directed. Furosemide oral is taken by mouth. Furosemide injection is injected into a muscle or given as an infusion into a vein. A healthcare provider will give you this injection if you are unable to take the medicine by mouth. You may receive your first dose in a hospital or clinic setting if you have severe liver disease. Do not take more than your recommended dose. High doses of furosemide may cause irreversible hearing loss. Measure liquid medicine carefully. Use the dosing syringe provided, or use a medicine dose-measuring device (not a kitchen spoon). Furosemide doses are based on weight in children. Your child's dose needs may change if the child gains or loses weight. Furosemide will make you urinate more often and you may get dehydrated easily. Follow your doctor's instructions about using potassium supplements or getting enough salt and potassium in your diet. Your blood pressure will need to be checked often and you may need other medical tests. If you have high blood pressure, keep using this medicine even if you feel well. High blood pressure often has no symptoms. You may need to use blood pressure medicine for the rest of your life. If you need surgery, tell the surgeon ahead of time that you are using furosemide. Store at room temperature away from moisture, heat, and light. Throw away any unused oral liquid  after 90 days. What happens if I miss a dose? Furosemide is sometimes used only once, so you may not be on a dosing schedule. If you are using the medication regularly, take the medicine as soon as you can, but skip the missed dose if it is almost time for your next dose. Do not take two doses at one time. What happens if I overdose? Seek emergency medical attention or call the Poison Help line at 1-690.611.2220. Overdose symptoms may include feeling very thirsty or hot, heavy sweating, hot and dry skin, extreme weakness, or fainting.   What should I avoid while taking furosemide? Avoid getting up too fast from a sitting or lying position, or you may feel dizzy. Avoid becoming dehydrated. Follow your doctor's instructions about the type and amount of liquids you should drink while you are taking furosemide. Drinking alcohol with this medicine can cause side effects. If you have high blood pressure, ask a doctor or pharmacist before taking any medicines that can raise your blood pressure, such as diet pills or cough-and-cold medicine. What are the possible side effects of furosemide? Get emergency medical help if you have signs of an allergic reaction (hives, difficult breathing, swelling in your face or throat) or a severe skin reaction (fever, sore throat, burning in your eyes, skin pain, red or purple skin rash that spreads and causes blistering and peeling). Call your doctor at once if you have:  · a light-headed feeling, like you might pass out;  · ringing in your ears, hearing loss;  · muscle spasms or contractions;  · pale skin, easy bruising, unusual bleeding;  · high blood sugar --increased thirst, increased urination, dry mouth, fruity breath odor;  · kidney problems --little or no urination, swelling in your feet or ankles, feeling tired or short of breath;  · signs of liver or pancreas problems --loss of appetite, upper stomach pain (that may spread to your back), nausea or vomiting, dark urine, jaundice (yellowing of the skin or eyes); or  · signs of an electrolyte imbalance --dry mouth, thirst, weakness, drowsiness, feeling jittery or unsteady, vomiting, irregular heartbeats, fluttering in your chest, numbness or tingling, muscle cramps, muscle weakness or limp feeling. Common side effects may include:  · diarrhea, constipation, loss of appetite;  · numbness or tingling;  · headache, dizziness; or  · blurred vision. This is not a complete list of side effects and others may occur.  Call your doctor for medical advice about side effects. You may report side effects to FDA at 9-923-FDA-4320. What other drugs will affect furosemide? Sometimes it is not safe to use certain medications at the same time. Some drugs can affect your blood levels of other drugs you take, which may increase side effects or make the medications less effective. If you also take sucralfate, take your furosemide dose 2 hours before or 2 hours after you take sucralfate. Tell your doctor about all your other medicines, especially:  · another diuretic, especially ethacrynic acid;  · chloral hydrate;  · lithium;  · phenytoin;  · an injected antibiotic;  · cancer medicine, such as cisplatin;  · heart or blood pressure medicine; or  · salicylates such as aspirin, Nuprin Backache Caplet, Kaopectate, KneeRelief, Pamprin Cramp Formula, Pepto-Bismol, Tricosal, Trilisate, and others. This list is not complete. Other drugs may affect furosemide, including prescription and over-the-counter medicines, vitamins, and herbal products. Not all possible drug interactions are listed here. Where can I get more information? Your pharmacist can provide more information about furosemide. Remember, keep this and all other medicines out of the reach of children, never share your medicines with others, and use this medication only for the indication prescribed. Every effort has been made to ensure that the information provided by [address] is accurate, up-to-date, and complete, but no guarantee is made to that effect. Drug information contained herein may be time sensitive. [facility] information has been compiled for use by healthcare practitioners and consumers in the [place] and therefore [facility] does not warrant that uses outside of the [place] are appropriate, unless specifically indicated otherwise. [facility] information does not endorse drugs, diagnose patients or recommend therapy.  [facility]'s drug information is an informational resource designed to assist licensed healthcare practitioners in caring for their patients and/or to serve consumers viewing this service as a supplement to, and not a substitute for, the expertise, skill, knowledge and judgment of healthcare practitioners. The absence of a warning for a given drug or drug combination in no way should be construed to indicate that the drug or drug combination is safe, effective or appropriate for any given patient. [facility] does not assume any responsibility for any aspect of healthcare administered with the aid of information [facility] provides. The information contained herein is not intended to cover all possible uses, directions, precautions, warnings, drug interactions, allergic reactions, or adverse effects. If you have questions about the drugs you are taking, check with your doctor, nurse or pharmacist.  Copyright 6753-4302 [address]. Version: 16.01. Revision date: 5/22/2019. Care instructions adapted under license by [facility] ([facility]). If you have questions about a medical condition or this instruction, always ask your healthcare professional. [place] [address] any warranty or liability for your use of this information.

## 2021-10-08 NOTE — PROGRESS NOTES
10/8/2021     Yesika Napoles 13 (:  1950) is a 73 y.o. male, here for evaluation of the following medical concerns:    HPI  COPD: Batsheva Arguelles presents today for evaluation of his breathing. About 2 weeks ago, he was seen in the emergency department and diagnosed with an acute pulmonary embolism. He was admitted due to chest pain and shortness of breath. He was discharged from the hospital on anticoagulation for the PE, but also on steroids as there was thought to be a coexisting COPD exacerbation. He is seen today, because we wanted to make sure he was breathing better off the steroids. He is still on 6 L nasal cannula, but he is breathing comfortably. His main complaint today is some new onset lower extremity swelling. At his most recent visit his blood pressure was elevated and he was started on Norvasc, which she has been compliant with taking. He has had no fevers, chills, nausea, vomiting or diarrhea. His shortness of breath is not positional.    Review of Systems   Constitutional: Negative for fatigue. Eyes: Negative for visual disturbance. Respiratory: Positive for shortness of breath. Negative for cough, chest tightness and wheezing. Cardiovascular: Negative for chest pain, palpitations and leg swelling. Endocrine: Negative for polydipsia, polyphagia and polyuria. Genitourinary: Negative for frequency. Skin: Negative for rash. Neurological: Negative for dizziness, syncope, weakness and light-headedness. Prior to Visit Medications    Medication Sig Taking?  Authorizing Provider   MARIZA DVT/PE STARTER PACK 5 MG TBPK tablet  Yes Historical Provider, MD   cilostazol (PLETAL) 50 MG tablet Take 1 tablet by mouth 2 times daily Yes Thom Simons DO   colchicine (COLCRYS) 0.6 MG tablet Take 0.6 mg by mouth as needed Yes Historical Provider, MD   omeprazole (PRILOSEC) 40 MG delayed release capsule  Yes Historical Provider, MD   fluticasone-umeclidin-vilant (Vimal Garcia) 100-62.5-25 MCG/INH AEPB Inhale 1 puff into the lungs daily Yes Earleen Froid, DO   nicotine (NICODERM CQ) 21 MG/24HR Place 1 patch onto the skin every 24 hours Yes Earleen Froid, DO   isosorbide mononitrate (IMDUR) 30 MG extended release tablet Take 0.5 tablets by mouth daily Yes Norberto Llanos MD   folic acid (FOLVITE) 1 MG tablet Take 1 tablet by mouth daily Yes Norberto Llanos MD   albuterol sulfate  (90 Base) MCG/ACT inhaler Inhale 2 puffs into the lungs every 6 hours as needed for Wheezing Yes Earleen Froid, DO   allopurinol (ZYLOPRIM) 100 MG tablet Take 1 tablet by mouth daily Yes Earleen Froid, DO   losartan (COZAAR) 100 MG tablet TAKE 1 TABLET BY MOUTH EVERY DAY Yes Earleen Froid, DO   atorvastatin (LIPITOR) 80 MG tablet Take 1 tablet by mouth daily Yes Earleen Froid, DO   nitroGLYCERIN (NITROSTAT) 0.4 MG SL tablet Place 1 tablet under the tongue every 5 minutes as needed for Chest pain Yes Earleen Froid, DO   furosemide (LASIX) 40 MG tablet Take 1 tablet by mouth daily Yes Earleen Froid, DO   metoprolol (LOPRESSOR) 100 MG tablet Take 1 tablet by mouth 2 times daily Yes Earleen Froid, DO   magnesium gluconate (MAGONATE) 500 MG tablet Take 500 mg by mouth daily. Yes Historical Provider, MD   pantoprazole (PROTONIX) 40 MG tablet Take 1 tablet by mouth daily for 10 days  Norberto Llanos MD        Social History     Tobacco Use    Smoking status: Current Every Day Smoker     Packs/day: 0.50     Years: 52.00     Pack years: 26.00     Types: Cigarettes     Start date: 1/1/1967    Smokeless tobacco: Never Used   Substance Use Topics    Alcohol use:  Yes     Alcohol/week: 0.0 standard drinks     Comment: 2-3 beers a month        Vitals:    10/08/21 1112   BP: (!) 102/58   Site: Right Upper Arm   Position: Sitting   Cuff Size: Medium Adult   Pulse: 90   SpO2: 98%   Weight: 213 lb 9.6 oz (96.9 kg)   Height: 5' 10\" (1.778 m)     Estimated body mass index is 30.65 kg/m² as calculated from the following:    Height as of this encounter: 5' 10\" (1.778 m). Weight as of this encounter: 213 lb 9.6 oz (96.9 kg). Physical Exam  Constitutional:       Appearance: Normal appearance. He is obese. HENT:      Head: Normocephalic and atraumatic. Nose: Nose normal.   Eyes:      Extraocular Movements: Extraocular movements intact. Cardiovascular:      Rate and Rhythm: Normal rate and regular rhythm. Pulmonary:      Effort: Pulmonary effort is normal.      Breath sounds: Normal breath sounds. Comments: examined on 5 L  Musculoskeletal:         General: Normal range of motion. Cervical back: Normal range of motion and neck supple. Right lower leg: Edema (2) present. Left lower leg: Edema (2) present. Lymphadenopathy:      Cervical: No cervical adenopathy. Skin:     General: Skin is warm and dry. Neurological:      Mental Status: He is alert. Mental status is at baseline. Psychiatric:         Mood and Affect: Mood normal.         Behavior: Behavior normal.         Thought Content: Thought content normal.         Judgment: Judgment normal.         ASSESSMENT/PLAN:  1. Other acute pulmonary embolism without acute cor pulmonale (HCC) to [facility] for 3 months. Still no cause identified. He is still smoking about 1/2 pack a day. He is not interested assist with cessation. I am curious if this is the cause. He will continue to working on cessation. 2. KAVITA and COPD overlap syndrome ([address]): Respiratory status appears unchanged now that he is off the steroids. 3. Chronic diastolic heart failure ([address]): Having some acute worsening of his lower extremity edema. We will have him hold his Norvasc and double his Lasix over the weekend. He has an appointment with cardiology on Monday. I would like him to reevaluate his fluid status and blood pressure control at this appointment. We will send him a message.     4. PVD (peripheral vascular disease) [facility]): Patient has lower extremity cramping and pain in the evening time worse on his right. I suspect this is likely related to his peripheral vascular disease. Walking regimen is not really appropriate given his acute DVT and his baseline respiratory distress. Although I doubt will make significant difference. We will start Pletal today. Appointment with vascular surgery next month. - cilostazol (PLETAL) 50 MG tablet; Take 1 tablet by mouth 2 times daily  Dispense: 60 tablet; Refill: 3      Return in about 4 weeks (around 11/5/2021). An electronic signature was used to authenticate this note.     --Lisa Comment, DO on 10/8/2021 at 12:17 PM

## 2021-10-11 ENCOUNTER — OFFICE VISIT (OUTPATIENT)
Dept: CARDIOLOGY CLINIC | Age: 71
End: 2021-10-11
Payer: MEDICARE

## 2021-10-11 VITALS
SYSTOLIC BLOOD PRESSURE: 110 MMHG | HEART RATE: 86 BPM | HEIGHT: 70 IN | WEIGHT: 209 LBS | DIASTOLIC BLOOD PRESSURE: 58 MMHG | BODY MASS INDEX: 29.92 KG/M2

## 2021-10-11 DIAGNOSIS — I25.5 ISCHEMIC CARDIOMYOPATHY: ICD-10-CM

## 2021-10-11 DIAGNOSIS — I25.10 CORONARY ARTERY DISEASE INVOLVING NATIVE CORONARY ARTERY OF NATIVE HEART WITHOUT ANGINA PECTORIS: Primary | ICD-10-CM

## 2021-10-11 DIAGNOSIS — E78.00 PURE HYPERCHOLESTEROLEMIA: ICD-10-CM

## 2021-10-11 PROCEDURE — 1123F ACP DISCUSS/DSCN MKR DOCD: CPT | Performed by: INTERNAL MEDICINE

## 2021-10-11 PROCEDURE — 3017F COLORECTAL CA SCREEN DOC REV: CPT | Performed by: INTERNAL MEDICINE

## 2021-10-11 PROCEDURE — G8427 DOCREV CUR MEDS BY ELIG CLIN: HCPCS | Performed by: INTERNAL MEDICINE

## 2021-10-11 PROCEDURE — G8417 CALC BMI ABV UP PARAM F/U: HCPCS | Performed by: INTERNAL MEDICINE

## 2021-10-11 PROCEDURE — G8484 FLU IMMUNIZE NO ADMIN: HCPCS | Performed by: INTERNAL MEDICINE

## 2021-10-11 PROCEDURE — 4004F PT TOBACCO SCREEN RCVD TLK: CPT | Performed by: INTERNAL MEDICINE

## 2021-10-11 PROCEDURE — 1111F DSCHRG MED/CURRENT MED MERGE: CPT | Performed by: INTERNAL MEDICINE

## 2021-10-11 PROCEDURE — 99214 OFFICE O/P EST MOD 30 MIN: CPT | Performed by: INTERNAL MEDICINE

## 2021-10-11 PROCEDURE — 4040F PNEUMOC VAC/ADMIN/RCVD: CPT | Performed by: INTERNAL MEDICINE

## 2021-10-11 NOTE — PROGRESS NOTES
[address]   Dr Kj Sánchez . Gurjit Gonzales MD, [facility]    Outpatient Follow Up Note    10/11/2021,10:10 AM  Subjective:   CHIEF COMPLAINT / HPI:  Follow Up secondary to CAD     Bettie Chu. is 73 y.o. male who presents today for a routine follow up. He was in hospital in September with shortness of breath and found to have pulmonary emboli and DVT and has been started on Eliquis which she is tolerating now without problems. Denies chest pain but does have some back pain from lying in bed at night. Otherwise still feeling great and moving about. Coronavirus vaccine Pfizer x2 and waiting for booster    Last cardiac cath December 2020. Stable coronary anatomy. Po stent was patent. No intervention was necessary.     LDL: 74 on 9/2019     DVT and pulmonary emboli September 2021   CAD with coronary stents June 2013  Obstructive sleep apnea on CPAP not tolerated the CPAP another BiPAP and currently does not use  Peripheral vascular disease  Pulmonary nodules followed by Dr. Dallin Bill  Past Medical History:    Past Medical History:   Diagnosis Date    CAD (coronary artery disease)     CHF (congestive heart failure) (HCC)     COPD (chronic obstructive pulmonary disease) (HCC)     Depressive disorder, not elsewhere classified     GERD (gastroesophageal reflux disease)     History of colon polyps - 30 seen on colonoscopy 04/2021 4/24/2021    History of MI (myocardial infarction) 05/2013    History of skin ulcer of lower extremity     R anterior shin    History of transient ischemic attack (TIA)     Hyperlipidemia     Hypertension     Neuropathy     KAVITA (obstructive sleep apnea)     On CPAP    Personal history of DVT (deep vein thrombosis)     [facility]     TIA (transient ischemic attack) 2013    Vertebral fracture      Past Surgical History  Past Surgical History:   Procedure Laterality Date    APPENDECTOMY      CHOLECYSTECTOMY, Historical Provider, MD   fluticasone-umeclidin-vilant (Peggye Natacha) 100-62.5-25 MCG/INH AEPB Inhale 1 puff into the lungs daily Yes Asad Drilling, DO   nicotine (NICODERM CQ) 21 MG/24HR Place 1 patch onto the skin every 24 hours Yes Asad Drilling, DO   isosorbide mononitrate (IMDUR) 30 MG extended release tablet Take 0.5 tablets by mouth daily Yes Julia Cowan MD   folic acid (FOLVITE) 1 MG tablet Take 1 tablet by mouth daily Yes Julia Cowan MD   albuterol sulfate  (90 Base) MCG/ACT inhaler Inhale 2 puffs into the lungs every 6 hours as needed for Wheezing Yes Asad Drilling, DO   allopurinol (ZYLOPRIM) 100 MG tablet Take 1 tablet by mouth daily Yes Asad Drilling, DO   losartan (COZAAR) 100 MG tablet TAKE 1 TABLET BY MOUTH EVERY DAY Yes Asad Drilling, DO   atorvastatin (LIPITOR) 80 MG tablet Take 1 tablet by mouth daily Yes Asad Drilling, DO   nitroGLYCERIN (NITROSTAT) 0.4 MG SL tablet Place 1 tablet under the tongue every 5 minutes as needed for Chest pain Yes Asad Drilling, DO   furosemide (LASIX) 40 MG tablet Take 1 tablet by mouth daily Yes Asad Drilling, DO   metoprolol (LOPRESSOR) 100 MG tablet Take 1 tablet by mouth 2 times daily Yes Asad Drilling, DO   magnesium gluconate (MAGONATE) 500 MG tablet Take 500 mg by mouth daily.  Yes Historical Provider, MD   pantoprazole (PROTONIX) 40 MG tablet Take 1 tablet by mouth daily for 10 days  Julia Cowan MD     Family History  Family History   Problem Relation Age of Onset    Cancer Mother         Breast    Heart Disease Mother     Cancer Father         Bladder    Heart Disease Father     Cancer Paternal Grandmother         melanoma        Current Medications  Current Outpatient Medications   Medication Sig Dispense Refill    ELIQUIS DVT/PE STARTER PACK 5 MG TBPK tablet       cilostazol (PLETAL) 50 MG tablet Take 1 tablet by mouth 2 times daily 60 tablet 3    colchicine (COLCRYS) 0.6 MG tablet Take 0.6 mg by mouth as needed      omeprazole (PRILOSEC) 40 MG delayed release capsule       fluticasone-umeclidin-vilant (TRELEGY ELLIPTA) 100-62.5-25 MCG/INH AEPB Inhale 1 puff into the lungs daily 28 each 2    nicotine (NICODERM CQ) 21 MG/24HR Place 1 patch onto the skin every 24 hours 30 patch 1    isosorbide mononitrate (IMDUR) 30 MG extended release tablet Take 0.5 tablets by mouth daily 30 tablet 3    folic acid (FOLVITE) 1 MG tablet Take 1 tablet by mouth daily 30 tablet 3    albuterol sulfate  (90 Base) MCG/ACT inhaler Inhale 2 puffs into the lungs every 6 hours as needed for Wheezing 3 Inhaler 4    allopurinol (ZYLOPRIM) 100 MG tablet Take 1 tablet by mouth daily 180 tablet 1    losartan (COZAAR) 100 MG tablet TAKE 1 TABLET BY MOUTH EVERY DAY 90 tablet 1    atorvastatin (LIPITOR) 80 MG tablet Take 1 tablet by mouth daily 90 tablet 1    nitroGLYCERIN (NITROSTAT) 0.4 MG SL tablet Place 1 tablet under the tongue every 5 minutes as needed for Chest pain 25 tablet 1    furosemide (LASIX) 40 MG tablet Take 1 tablet by mouth daily 90 tablet 3    metoprolol (LOPRESSOR) 100 MG tablet Take 1 tablet by mouth 2 times daily 270 tablet 1    magnesium gluconate (MAGONATE) 500 MG tablet Take 500 mg by mouth daily.  pantoprazole (PROTONIX) 40 MG tablet Take 1 tablet by mouth daily for 10 days 10 tablet 0     No current facility-administered medications for this visit. REVIEW OF SYSTEMS:    CONSTITUTIONAL: No major weight gain or loss, fatigue, weakness, night sweats or fever. HEENT: No new vision difficulties or ringing in the ears. RESPIRATORY: No new SOB, PND, orthopnea or cough. CARDIOVASCULAR: See HPI  GI: No nausea, vomiting, diarrhea, constipation, abdominal pain or changes in bowel habits. : No urinary frequency, urgency, incontinence hematuria or dysuria. SKIN: No cyanosis or skin lesions. MUSCULOSKELETAL: No new muscle or joint pain. NEUROLOGICAL: No syncope or TIA-like symptoms.   PSYCHIATRIC: No anxiety, pain, insomnia or depression    Objective:   PHYSICAL EXAM:        VITALS:    Wt Readings from Last 3 Encounters:   10/11/21 209 lb (94.8 kg)   10/08/21 213 lb 9.6 oz (96.9 kg)   09/24/21 209 lb 3.2 oz (94.9 kg)     BP Readings from Last 3 Encounters:   10/11/21 (!) 110/58   10/08/21 (!) 102/58   09/24/21 (!) 154/96     Pulse Readings from Last 3 Encounters:   10/11/21 86   10/08/21 90   09/24/21 72       CONSTITUTIONAL: Cooperative, no apparent distress, and appears well nourished / developed  NEUROLOGIC:  Awake and orientated to person, place and time. PSYCH: Calm affect. SKIN: Warm and dry. HEENT: Sclera non-icteric, normocephalic, neck supple, no elevation of JVP, normal carotid pulses with no bruits and thyroid normal size. LUNGS:  No increased work of breathing and clear to auscultation, no crackles or wheezing  CARDIOVASCULAR:  Regular rate and rhythm with no murmurs, gallops, rubs, or abnormal heart sounds, normal PMI. The apical impulses not displaced  Heart tones are crisp and normal  Cervical veins are not engorged  The carotid upstroke is normal in amplitude and contour without delay or bruit  JVP is not elevated  ABDOMEN:  Normal bowel sounds, non-distended and non-tender to palpation  EXT: No edema, no calf tenderness. Pulses are present bilaterally.     DATA:    Lab Results   Component Value Date    ALT 20 09/16/2021    AST 18 09/16/2021    ALKPHOS 89 09/16/2021    BILITOT 0.7 09/16/2021     Lab Results   Component Value Date    CREATININE 0.8 09/20/2021    BUN 18 09/20/2021     09/20/2021    K 4.7 09/20/2021     09/20/2021    CO2 26 09/20/2021     Lab Results   Component Value Date    TSH 2.39 12/05/2020     Lab Results   Component Value Date    WBC 7.4 09/20/2021    HGB 12.9 (L) 09/20/2021    HCT 38.9 (L) 09/20/2021    .5 (H) 09/20/2021     09/20/2021     No components found for: CHLPL  Lab Results   Component Value Date    TRIG 128 12/06/2020    TRIG 204 (H) 07/17/2019    TRIG 230 (H) 04/17/2019     Lab Results   Component Value Date    HDL 30 (L) 12/06/2020    HDL 32 (L) 07/17/2019    HDL 29 (L) 04/17/2019     Lab Results   Component Value Date    LDLCALC 85 12/06/2020    LDLCALC 74 07/17/2019    LDLCALC 69 04/17/2019     Lab Results   Component Value Date    LABVLDL 26 12/06/2020    LABVLDL 41 07/17/2019    LABVLDL 46 04/17/2019     Radiology Review:  Pertinent images / reports were reviewed as a part of this visit and reveals the following:    Last Echo:   Summary 9/17/21   Normal left ventricle size. There is mild to moderate concentric left   ventricular hypertrophy. Overall left ventricular systolic function appears   normal with an ejection fraction of 55-60%. No regional wall motion   abnormalities are noted. Diastolic filling parameters suggests normal   diastolic function. Estimated pulmonary artery systolic pressure is 39 mmHg assuming a right   atrial pressure of 8 mmHg. Last Stress Test / Angiogram:  Impression      2/7/20 ct lungs    Continued stable appearance of previous seen noted stable nodules. The previously noted new larger nodules have resolved.         Cardiomegaly with coronary calcification.         No acute infiltrates seen. Coronary anatomy:  12/9/20  The left main coronary artery has significant calcification. No significant focal stenoses.     Left anterior descending artery is has significant calcification. No significant focal stenoses identified. Previous stent is patent.     Circumflex artery i significant calcification proximally. No significant stenoses.     The right coronary artery is a dominant vessel and has calcification but no significant stenoses.     Left ventriculogram shows ejection fraction of 55%. Normal wall motion.        Impression:  Diffuse coronary artery calcification with previously patent stent. No significant focal stenoses. No opportunities or need for intervention. Last ECG: On 9/17/2021 sinus rhythm at 83/min. Nonspecific ST segment changes. This patient was educated using the patient point room wall mount device. Absence from smokers and smoking and diet and exercising are important. Assessment:   CAD is stable. Old stent in 2013. Heart cath December 2020 with stable anatomy and patent stent. DVT and pulmonary emboli in September 2021 currently on anticoagulant therapy. Has sleep apnea and does not tolerate the CPAP or BiPAP. Plan:   Continue current therapy. I did have discussion with him about the anticoagulant and what to be continually vigilant about. Return to see us in a month. Please call if we can assist further [phone]. Sisi Ochoa. Zev Espinoza MD, [facility]  Return in 1 year.     This note was likely completed using voice recognition technology and may contain unintended errors

## 2021-10-26 ENCOUNTER — OFFICE VISIT (OUTPATIENT)
Dept: PULMONOLOGY | Age: 71
End: 2021-10-26
Payer: MEDICARE

## 2021-10-26 VITALS
SYSTOLIC BLOOD PRESSURE: 93 MMHG | TEMPERATURE: 96.7 F | HEART RATE: 77 BPM | BODY MASS INDEX: 30.06 KG/M2 | RESPIRATION RATE: 16 BRPM | OXYGEN SATURATION: 100 % | HEIGHT: 70 IN | WEIGHT: 210 LBS | DIASTOLIC BLOOD PRESSURE: 54 MMHG

## 2021-10-26 DIAGNOSIS — I26.99 PULMONARY INFARCTION (HCC): Primary | ICD-10-CM

## 2021-10-26 PROCEDURE — 99213 OFFICE O/P EST LOW 20 MIN: CPT | Performed by: INTERNAL MEDICINE

## 2021-10-26 NOTE — PROGRESS NOTES
[facility]    Outpatient Follow Up Note    Subjective:   CHIEF COMPLAINT / HPI:     The patient is 73 y.o. male who presents today for follow up of COPD, CHF and KAVITA. Sara Bradshaw was seen in the hospital a month ago. He was admitted for leg pain and actually transferred to the ICU at one point because he had a pulmonary embolism and possible pneumonia. He was treated for both and is currently on Eliquis. He had a CT of the lungs that showed multifocal airspace disease, but it was in the region of the clots. He also had a procalcitonin that was 0.17. 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 He is being followed actively by vascular surgery. Today he says his breathing feels normal but his leg is causing him considerable pain. He states that he cannot even lay down in bed because the pain from becoming horizontal is intense. He has to sleep in a recliner if he can sleep at all. He has significant pain in the toes on his right foot such that he cut a hole in his shoe so that they do not have to touch anything. Last visit:  Sara Bradshaw comes in with his granddaughter today. Says his breathing gets difficult at times. He mentioned that sometimes it happens all of a sudden when he's just sitting in a chair and then it stops. He thinks it might be his heart, because it feels like it's racing. He also had a lot of complaints about his neuropathy. He recently had to get new tubing for his auto CPAP because the tubing broke. Incruse is no longer going to be covered by his insurance. Previous visit:  Phone visit today because of the pandemic and Inder's risk factors. Sara Bradshaw notes that he has shortness of breath and cough which are a bit worse than baseline. Sara Bradshaw continues to roll his own cigarettes and smoke.   He gets his supplies from the \"[employer] store\" down the [address]. It is a convenient store that is owned by [employer]. He reports his weight is 223 pounds that he has some swelling by the end of the day in his ankles but it is gone by morning. He is compliant with his Breo and Incruse as well as his new auto titrating CPAP. He rarely uses his albuterol inhaler and in reviewing his med list the prescription has . Previous visit:  Sara Bradshaw comes in today complaining of being both sleepy and tired. He states that his CPAP \"shorted out\" and melted the water reservoir so he had to throw it out. He's using his breo and incruse that cost $8.50/month. He's still smoking. He also said he needed a refill on his nitroglycerin since what he has left is . Previous visit:  Sara Bradshaw comes in today saying he feels about the same as usual.  He did point out he had to be admitted to the hospital in August for heart failure. He still smokes about 4-5 cigarettes a day. He is using his CPAP of 9 but it's difficult to sleep. He is currently on breo and incruse and tolerating well. Says his breathing is about the same as always and is limiting. He's also limited by joint pain which impedes mobility and his ability to sleep. He weighed 210 lbs about a week ago and weighed in at 220 today. He's not been weighing himself at home, but did say that his left leg is swollen and tender. Previous history: Back to wearing his CPAP. Has some dyspnea at times, without a clear trigger. He had an issue with his new pharmacy, [facility], and went without symbicort for 3 weeks. He noticed a difference. His hip is recovering well and he's more mobile. He's still smoking anywhere from a half pack to a quarter of a pack per week.     Past Medical History:    Past Medical History:   Diagnosis Date    CAD (coronary artery disease)     CHF (congestive heart failure) (HCC)     COPD (chronic obstructive pulmonary disease) (HCC)     Depressive disorder, not elsewhere classified  GERD (gastroesophageal reflux disease)     History of colon polyps - 30 seen on colonoscopy 04/2021 4/24/2021    History of MI (myocardial infarction) 05/2013    History of skin ulcer of lower extremity     R anterior shin    History of transient ischemic attack (TIA)     Hyperlipidemia     Hypertension     Neuropathy     KAVITA (obstructive sleep apnea)     On CPAP    Personal history of DVT (deep vein thrombosis)     [facility] ([facility] [address]     TIA (transient ischemic attack) 2013    Vertebral fracture        Social History:    Social History     Tobacco Use   Smoking Status Current Every Day Smoker    Packs/day: 0.50    Years: 52.00    Pack years: 26.00    Types: Cigarettes    Start date: 1/1/1967   Smokeless Tobacco Never Used       Current Medications:  Current Outpatient Medications on File Prior to Visit   Medication Sig Dispense Refill    ELIQUIS DVT/PE STARTER PACK 5 MG TBPK tablet       cilostazol (PLETAL) 50 MG tablet Take 1 tablet by mouth 2 times daily 60 tablet 3    colchicine (COLCRYS) 0.6 MG tablet Take 0.6 mg by mouth as needed      omeprazole (PRILOSEC) 40 MG delayed release capsule       fluticasone-umeclidin-vilant (TRELEGY ELLIPTA) 100-62.5-25 MCG/INH AEPB Inhale 1 puff into the lungs daily 28 each 2    nicotine (NICODERM CQ) 21 MG/24HR Place 1 patch onto the skin every 24 hours 30 patch 1    isosorbide mononitrate (IMDUR) 30 MG extended release tablet Take 0.5 tablets by mouth daily 30 tablet 3    folic acid (FOLVITE) 1 MG tablet Take 1 tablet by mouth daily 30 tablet 3    albuterol sulfate  (90 Base) MCG/ACT inhaler Inhale 2 puffs into the lungs every 6 hours as needed for Wheezing 3 Inhaler 4    allopurinol (ZYLOPRIM) 100 MG tablet Take 1 tablet by mouth daily 180 tablet 1    losartan (COZAAR) 100 MG tablet TAKE 1 TABLET BY MOUTH EVERY DAY 90 tablet 1    atorvastatin (LIPITOR) 80 MG tablet Take 1 tablet by mouth daily 90 tablet 1    nitroGLYCERIN (NITROSTAT) 0.4 MG SL tablet Place 1 tablet under the tongue every 5 minutes as needed for Chest pain 25 tablet 1    furosemide (LASIX) 40 MG tablet Take 1 tablet by mouth daily 90 tablet 3    metoprolol (LOPRESSOR) 100 MG tablet Take 1 tablet by mouth 2 times daily 270 tablet 1    magnesium gluconate (MAGONATE) 500 MG tablet Take 500 mg by mouth daily.  pantoprazole (PROTONIX) 40 MG tablet Take 1 tablet by mouth daily for 10 days 10 tablet 0     No current facility-administered medications on file prior to visit. REVIEW OF SYSTEMS:    CONSTITUTIONAL: Negative for fevers and chills  HEENT: Negative for oropharyngeal exudate, post nasal drip, sinus pain / pressure, nasal congestion, ear pain  RESPIRATORY:  See HPI  CARDIOVASCULAR: Negative for palpitations.   No edema  GASTROINTESTINAL: Negative for nausea, vomiting, diarrhea, constipation and abdominal pain  HEMATOLOGICAL: Negative for adenopathy  SKIN: Negative for clubbing, cyanosis, skin lesions  EXTREMITIES: Negative for weakness, decreased ROM  NEUROLOGICAL: Negative for unilateral weakness, speech or gait abnormalities  PSYCH: Negative for anxiety, depression    Objective:   PHYSICAL EXAM:        VITALS:   Vitals:    10/26/21 1320   BP: (!) 93/54   Site: Right Upper Arm   Position: Sitting   Cuff Size: Large Adult   Pulse: 77   Resp: 16   Temp: 96.7 °F (35.9 °C)   TempSrc: Infrared   SpO2: 100%   Weight: 210 lb (95.3 kg)   Height: 5' 10\" (1.778 m)       CONSTITUTIONAL:  Awake, alert, cooperative, no apparent distress, and appears stated age  HEENT: No oropharyngeal exudate, PERRL, no cervical adenopathy, no tracheal deviation, thyroid size normal  LUNGS:  No increased work of breathing and clear to auscultation, no crackles or wheezing  CARDIOVASCULAR:  normal S1 and S2 and no JVD  ABDOMEN:  Normal bowel sounds, non-distended and non-tender to palpation  EXT: Marked erythema of bilateral lower extremities with diminished pulses bilaterally. NEUROLOGIC:  Mental Status Exam:  Level of Alertness:   awake  Orientation:   person, place, time. SKIN:  normal skin color, texture, turgor, no redness, warmth, or swelling     DATA:    Last PFTs:  mild obstructive defect without  bronchodilator response and a moderately reduced diffusing capacity. Ct screenin2017  Lung RADS category 2 benign findings. Small pulmonary nodule right   lung as described unchanged.       Recommendation: Continued annual screening low dose CT       Lung RADS category S significant findings-marked coronary artery   calcification which is a risk factor for acute coronary syndrome. Marked fatty filtration liver. Emphysema. 2018     Lung RADS category 2-positive. Specifically, 4 mm and 3 mm stable pulmonary nodules in the right hemithorax       Lung RADS category S-negative, no new or unknown potentially significant incidental findings requiring urgent additional evaluation       [id number] low-dose CT lung screen in one year     2020      Impression       Continued stable appearance of previous seen noted stable nodules. The previously noted new larger nodules have resolved.       Cardiomegaly with coronary calcification.       No acute infiltrates seen. Assessment: This is a 73 y.o. male with COPD, CHF and KAVITA    Plan:     COPD:  Stable on breo and spiriva respimat. Continue rescue inhaler. Needs to stop smoking but he remains pre-contemplative. Patient previously attempted pulmonary rehab, but he was in too much pain and stopped going. Tried and failed daliresp in the past 2/2 GI side effects    Pulmonary embolism:  Currently on eliquis. Lesions in lung more likely were pulmonary infarcts than pneumonia, but he got treated for both. A repeat image is needed to ensure that the lesions have cleared and no issue remains. He no longer needs his screening CT ordered last visit.   I placed the order for a CT chest, but told him to wait until he's seen vascular surgery so, if they need additional imaging they could possibly be bundled together. CHF:  Not in acute overload. KAVITA:  Controlled with auto pap, with range of 8-15 cmH2O and it's working well. PAD:  Considerable rest pain in the RLE with severe stenosis. Seeing vascular surgery in 9 days if he can't get in sooner. The patient is currently smoking less than a pack week. The risks related to smoking were reviewed with the patient. Recommend maintaining a smoke-free lifestyle. Products available for smoking cessation were discussed in detail. Diagnosis Orders   1. Pulmonary infarction [facility])  CT CHEST WO CONTRAST          More than half the time of this 22 minute visit was spent counseling the patient. RTC 5 months w/ MD. Call or RTC sooner if symptoms persist or worsen acutely.         Brando Davenport

## 2021-10-27 ENCOUNTER — TELEPHONE (OUTPATIENT)
Dept: PRIMARY CARE CLINIC | Age: 71
End: 2021-10-27

## 2021-10-27 ENCOUNTER — HOSPITAL ENCOUNTER (EMERGENCY)
Age: 71
Discharge: HOME | End: 2021-10-27
Attending: EMERGENCY MEDICINE
Payer: MEDICARE

## 2021-10-27 VITALS
HEIGHT: 70 IN | DIASTOLIC BLOOD PRESSURE: 65 MMHG | HEART RATE: 78 BPM | SYSTOLIC BLOOD PRESSURE: 135 MMHG | WEIGHT: 210.8 LBS | TEMPERATURE: 97.6 F | BODY MASS INDEX: 30.18 KG/M2 | RESPIRATION RATE: 18 BRPM | OXYGEN SATURATION: 100 %

## 2021-10-27 DIAGNOSIS — Z53.21 PATIENT LEFT WITHOUT BEING SEEN: Primary | ICD-10-CM

## 2021-10-27 PROBLEM — Z99.81 DEPENDENCE ON SUPPLEMENTAL OXYGEN: Status: ACTIVE | Noted: 2021-09-22

## 2021-10-27 PROBLEM — I11.0 HYPERTENSIVE HEART DISEASE WITH HEART FAILURE (HCC): Status: ACTIVE | Noted: 2021-09-20

## 2021-10-27 PROBLEM — Z95.5 PRESENCE OF CORONARY ANGIOPLASTY IMPLANT AND GRAFT: Status: ACTIVE | Noted: 2021-09-22

## 2021-10-27 PROBLEM — I50.32 DIASTOLIC DYSFUNCTION WITH CHRONIC HEART FAILURE (HCC): Status: ACTIVE | Noted: 2021-09-20

## 2021-10-27 PROBLEM — G60.9 HEREDITARY AND IDIOPATHIC NEUROPATHY, UNSPECIFIED: Status: ACTIVE | Noted: 2021-09-22

## 2021-10-27 PROBLEM — Z60.2 PROBLEMS RELATED TO LIVING ALONE: Status: ACTIVE | Noted: 2021-09-22

## 2021-10-27 PROBLEM — Z99.89 DEPENDENCE ON OTHER ENABLING MACHINES AND DEVICES: Status: ACTIVE | Noted: 2021-09-22

## 2021-10-27 PROBLEM — Z79.01 LONG TERM (CURRENT) USE OF ANTICOAGULANTS: Status: ACTIVE | Noted: 2021-09-22

## 2021-10-27 PROBLEM — I25.10 ATHSCL HEART DISEASE OF NATIVE CORONARY ARTERY W/O ANG PCTRS: Status: ACTIVE | Noted: 2021-09-22

## 2021-10-27 RX ORDER — PYRIDOXINE HCL (VITAMIN B6) 50 MG
TABLET ORAL
COMMUNITY
Start: 2021-09-22 | End: 2021-12-22

## 2021-10-27 RX ORDER — CHOLECALCIFEROL (VITAMIN D3) 25 MCG
CAPSULE ORAL
COMMUNITY
Start: 2021-09-22 | End: 2021-12-22

## 2021-10-27 RX ORDER — PREDNISONE 10 MG/1
TABLET ORAL
Status: ON HOLD | COMMUNITY
Start: 2021-09-20 | End: 2021-11-22 | Stop reason: HOSPADM

## 2021-10-27 RX ORDER — DOXAZOSIN MESYLATE 1 MG/1
TABLET ORAL
Status: ON HOLD | COMMUNITY
Start: 2021-08-24 | End: 2021-11-30 | Stop reason: HOSPADM

## 2021-10-27 RX ORDER — POTASSIUM CHLORIDE 750 MG/1
CAPSULE, EXTENDED RELEASE ORAL
COMMUNITY
Start: 2021-09-04 | End: 2022-02-09 | Stop reason: SDUPTHER

## 2021-10-27 RX ORDER — DULOXETIN HYDROCHLORIDE 20 MG/1
CAPSULE, DELAYED RELEASE ORAL
Status: ON HOLD | COMMUNITY
Start: 2021-07-30 | End: 2021-12-15 | Stop reason: SDUPTHER

## 2021-10-27 ASSESSMENT — PAIN DESCRIPTION - PAIN TYPE: TYPE: ACUTE PAIN

## 2021-10-27 ASSESSMENT — PAIN SCALES - GENERAL: PAINLEVEL_OUTOF10: 10

## 2021-10-27 ASSESSMENT — PAIN DESCRIPTION - ORIENTATION: ORIENTATION: RIGHT

## 2021-10-27 ASSESSMENT — PAIN DESCRIPTION - LOCATION: LOCATION: LEG;TOE (COMMENT WHICH ONE);FOOT

## 2021-10-27 ASSESSMENT — PAIN DESCRIPTION - DESCRIPTORS: DESCRIPTORS: BURNING;ACHING

## 2021-10-27 ASSESSMENT — PAIN DESCRIPTION - FREQUENCY: FREQUENCY: CONTINUOUS

## 2021-10-27 NOTE — TELEPHONE ENCOUNTER
This writer call the pt and stated that Dr. Francisca Ulloa wants him to go to the ED. He stated that he was ok with this.

## 2021-10-27 NOTE — TELEPHONE ENCOUNTER
Pt called and his right leg in severe pain. its red and swollen  He cant sleep due to it. He has appt with surgeon but they are not in office this week. He would like to know what to do.

## 2021-10-27 NOTE — ED PROVIDER NOTES
This patient left without being seen in the emergency department today, unfortunately I did not get a chance to see him prior to being told that he had left the emergency department. I was unable to find him for evaluation at that time.      Thad Ji MD  10/27/21 0045

## 2021-10-30 ENCOUNTER — HOSPITAL ENCOUNTER (EMERGENCY)
Age: 71
Discharge: HOME | End: 2021-10-30
Attending: STUDENT IN AN ORGANIZED HEALTH CARE EDUCATION/TRAINING PROGRAM
Payer: MEDICARE

## 2021-10-30 VITALS
TEMPERATURE: 97.7 F | SYSTOLIC BLOOD PRESSURE: 132 MMHG | BODY MASS INDEX: 30.06 KG/M2 | HEIGHT: 70 IN | HEART RATE: 67 BPM | DIASTOLIC BLOOD PRESSURE: 72 MMHG | OXYGEN SATURATION: 97 % | RESPIRATION RATE: 16 BRPM | WEIGHT: 210 LBS

## 2021-10-30 DIAGNOSIS — I73.9 PERIPHERAL VASCULAR DISEASE (HCC): ICD-10-CM

## 2021-10-30 DIAGNOSIS — M79.671 RIGHT FOOT PAIN: Primary | ICD-10-CM

## 2021-10-30 LAB
ABO/RH: NORMAL
ANION GAP SERPL CALCULATED.3IONS-SCNC: 11 MMOL/L (ref 3–16)
ANTIBODY SCREEN: NORMAL
BASOPHILS ABSOLUTE: 0.1 K/UL (ref 0–0.2)
BASOPHILS RELATIVE PERCENT: 0.7 %
BUN BLDV-MCNC: 12 MG/DL (ref 7–20)
CALCIUM SERPL-MCNC: 9.5 MG/DL (ref 8.3–10.6)
CHLORIDE BLD-SCNC: 100 MMOL/L (ref 99–110)
CO2: 24 MMOL/L (ref 21–32)
CREAT SERPL-MCNC: 0.8 MG/DL (ref 0.8–1.3)
EOSINOPHILS ABSOLUTE: 0.2 K/UL (ref 0–0.6)
EOSINOPHILS RELATIVE PERCENT: 2.2 %
GFR AFRICAN AMERICAN: >60
GFR NON-AFRICAN AMERICAN: >60
GLUCOSE BLD-MCNC: 92 MG/DL (ref 70–99)
HCT VFR BLD CALC: 41.6 % (ref 40.5–52.5)
HEMOGLOBIN: 14 G/DL (ref 13.5–17.5)
LACTIC ACID: 1 MMOL/L (ref 0.4–2)
LYMPHOCYTES ABSOLUTE: 1.8 K/UL (ref 1–5.1)
LYMPHOCYTES RELATIVE PERCENT: 21.4 %
MCH RBC QN AUTO: 36.3 PG (ref 26–34)
MCHC RBC AUTO-ENTMCNC: 33.5 G/DL (ref 31–36)
MCV RBC AUTO: 108.2 FL (ref 80–100)
MONOCYTES ABSOLUTE: 0.5 K/UL (ref 0–1.3)
MONOCYTES RELATIVE PERCENT: 5.9 %
NEUTROPHILS ABSOLUTE: 5.7 K/UL (ref 1.7–7.7)
NEUTROPHILS RELATIVE PERCENT: 69.8 %
PDW BLD-RTO: 13.8 % (ref 12.4–15.4)
PLATELET # BLD: 171 K/UL (ref 135–450)
PMV BLD AUTO: 8.7 FL (ref 5–10.5)
POTASSIUM REFLEX MAGNESIUM: 4.7 MMOL/L (ref 3.5–5.1)
RBC # BLD: 3.84 M/UL (ref 4.2–5.9)
SODIUM BLD-SCNC: 135 MMOL/L (ref 136–145)
TOTAL CK: 66 U/L (ref 39–308)
WBC # BLD: 8.2 K/UL (ref 4–11)

## 2021-10-30 PROCEDURE — 85025 COMPLETE CBC W/AUTO DIFF WBC: CPT

## 2021-10-30 PROCEDURE — 82550 ASSAY OF CK (CPK): CPT

## 2021-10-30 PROCEDURE — 36415 COLL VENOUS BLD VENIPUNCTURE: CPT

## 2021-10-30 PROCEDURE — 86901 BLOOD TYPING SEROLOGIC RH(D): CPT

## 2021-10-30 PROCEDURE — 80048 BASIC METABOLIC PNL TOTAL CA: CPT

## 2021-10-30 PROCEDURE — 86900 BLOOD TYPING SEROLOGIC ABO: CPT

## 2021-10-30 PROCEDURE — 83605 ASSAY OF LACTIC ACID: CPT

## 2021-10-30 PROCEDURE — 99285 EMERGENCY DEPT VISIT HI MDM: CPT

## 2021-10-30 PROCEDURE — 6370000000 HC RX 637 (ALT 250 FOR IP): Performed by: STUDENT IN AN ORGANIZED HEALTH CARE EDUCATION/TRAINING PROGRAM

## 2021-10-30 PROCEDURE — 86850 RBC ANTIBODY SCREEN: CPT

## 2021-10-30 RX ORDER — OXYCODONE HYDROCHLORIDE 5 MG/1
5 TABLET ORAL EVERY 6 HOURS PRN
Qty: 6 TABLET | Refills: 0 | Status: SHIPPED | OUTPATIENT
Start: 2021-10-30 | End: 2021-11-01

## 2021-10-30 RX ORDER — OXYCODONE HYDROCHLORIDE 5 MG/1
5 TABLET ORAL ONCE
Status: COMPLETED | OUTPATIENT
Start: 2021-10-30 | End: 2021-10-30

## 2021-10-30 RX ADMIN — OXYCODONE 5 MG: 5 TABLET ORAL at 16:52

## 2021-10-30 ASSESSMENT — PAIN DESCRIPTION - DESCRIPTORS: DESCRIPTORS: SHARP;BURNING

## 2021-10-30 ASSESSMENT — PAIN SCALES - GENERAL
PAINLEVEL_OUTOF10: 8
PAINLEVEL_OUTOF10: 8

## 2021-10-30 ASSESSMENT — PAIN DESCRIPTION - PAIN TYPE: TYPE: ACUTE PAIN;CHRONIC PAIN

## 2021-10-30 ASSESSMENT — PAIN DESCRIPTION - ONSET: ONSET: AWAKENED FROM SLEEP

## 2021-10-30 ASSESSMENT — PAIN DESCRIPTION - LOCATION: LOCATION: FOOT;LEG

## 2021-10-30 NOTE — CONSULTS
Vascular Surgery   Resident Consult Note      Chief Complaint: RLE rest pain    History of Present Illness :   Kristyn Hanna is a 73 y.o. male with history of COPD, CAD, CHF, recently-diagnosed pulmonary emboli, and PVD who presents to [facility] for persisting RLE rest pain. He reports that the pain in his right foot started a few months ago during the summertime and has somewhat worsened over the past several weeks. He notes that it frequently wakes him up at night, improving when placing his leg in a more dependent position and worsening when elevated. His pain is confined mostly to a chair at home, but he is able to ambulate with the help of a walker. In addition, he notes redness across the top of his right foot and ankle that he noticed several weeks ago as well. He otherwise denies any new symptoms, including fevers, chills, chest pain, or dyspnea. He also denies these symptoms within the left leg at this time.       Past Medical History:        Diagnosis Date    CAD (coronary artery disease)     [facility]     COPD (chronic obstructive pulmonary disease) ([facility])     Depressive disorder, not elsewhere classified     GERD (gastroesophageal reflux disease)     History of colon polyps - 30 seen on colonoscopy 04/2021 4/24/2021    History of MI (myocardial infarction) 05/2013    History of skin ulcer of lower extremity     R anterior shin    History of transient ischemic attack (TIA)     Hyperlipidemia     Hypertension     Neuropathy     KAVITA (obstructive sleep apnea)     On CPAP    Personal history of DVT (deep vein thrombosis)     PVD [facility]peripheral vascular disease) ([facility]     TIA (transient ischemic attack) 2013    Vertebral fracture        Past Surgical History:        Procedure Laterality Date    APPENDECTOMY      CHOLECYSTECTOMY, LAPAROSCOPIC      COLONOSCOPY  4/23/2021    COLONOSCOPY POLYPECTOMY SNARE/COLD BIOPSY performed by Agnes Helms MD at [place] [address]  COLONOSCOPY  4/23/2021    COLONOSCOPY POLYPECTOMY ABLATION performed by Faustina Cabezas MD at [address]  4/23/2021    COLONOSCOPY CONTROL HEMORRHAGE performed by Faustina Cabezas MD at [address]  6/2013    EYE SURGERY Left     LAMINECTOMY  11/18/2015    Bilateral decompressive lumbar laminectomy L4-5   ; medial facetectomy L4-5 joints  bilaterally; foraminotomies l5   nerve roots bilaterally    LEG DEBRIDEMENT Right 7/23/2013    Dr. Nieves Epperson - pretibial wound    OTHER SURGICAL HISTORY Right 6/25/2013    Dr. Nieves Epperson - CFA Endarterectomy w/marsupialization; RLE wound excision & debridement     OTHER SURGICAL HISTORY Left 8/27/2013    Dr. Nieves Epperson - femoral & profunda femoris endarterectomy w/marsupialization patch angioplasty using SFA    SKIN SPLIT GRAFT Right 7/25/2013    Dr. Nieves Epperson - to non-healing R pretibial wound, 9 x 15 cm    TOTAL HIP ARTHROPLASTY Right 09/01/2016    Dr. Fonda Gosselin Left 12/22/2015    Dr. Nieves Epperson - CFA exploration, thrombectomy of ileofemoral system, stenting of RAFAL in-stent stenosis w/8 x 37 mm Palmaz        Allergies:   Asa [aspirin] and Daliresp [roflumilast]    Medications:   Home Meds  No current facility-administered medications on file prior to encounter.      Current Outpatient Medications on File Prior to Encounter   Medication Sig Dispense Refill    ELIQUIS DVT/PE STARTER PACK 5 MG TBPK tablet       cilostazol (PLETAL) 50 MG tablet Take 1 tablet by mouth 2 times daily 60 tablet 3    colchicine (COLCRYS) 0.6 MG tablet Take 0.6 mg by mouth as needed      omeprazole (PRILOSEC) 40 MG delayed release capsule       fluticasone-umeclidin-vilant (TRELEGY ELLIPTA) 100-62.5-25 MCG/INH AEPB Inhale 1 puff into the lungs daily 28 each 2    nicotine (NICODERM CQ) 21 MG/24HR Place 1 patch onto the skin every 24 hours 30 patch 1    isosorbide mononitrate (IMDUR) 30 MG extended release tablet Take 0.5 tablets by mouth daily 30 tablet 3    folic acid (FOLVITE) 1 MG tablet Take 1 tablet by mouth daily 30 tablet 3    pantoprazole (PROTONIX) 40 MG tablet Take 1 tablet by mouth daily for 10 days 10 tablet 0    albuterol sulfate  (90 Base) MCG/ACT inhaler Inhale 2 puffs into the lungs every 6 hours as needed for Wheezing 3 Inhaler 4    allopurinol (ZYLOPRIM) 100 MG tablet Take 1 tablet by mouth daily 180 tablet 1    losartan (COZAAR) 100 MG tablet TAKE 1 TABLET BY MOUTH EVERY DAY 90 tablet 1    atorvastatin (LIPITOR) 80 MG tablet Take 1 tablet by mouth daily 90 tablet 1    nitroGLYCERIN (NITROSTAT) 0.4 MG SL tablet Place 1 tablet under the tongue every 5 minutes as needed for Chest pain 25 tablet 1    furosemide (LASIX) 40 MG tablet Take 1 tablet by mouth daily 90 tablet 3    metoprolol (LOPRESSOR) 100 MG tablet Take 1 tablet by mouth 2 times daily 270 tablet 1    magnesium gluconate (MAGONATE) 500 MG tablet Take 500 mg by mouth daily. Current Meds  No current facility-administered medications for this encounter. Family History:   Family History   Problem Relation Age of Onset    Cancer Mother         Breast    Heart Disease Mother    [employer] Cancer Father         Bladder    Heart Disease Father     Cancer Paternal Grandmother         melanoma        Social History:   TOBACCO:   reports that he has been smoking cigarettes. He started smoking about 54 years ago. He has a 26.00 pack-year smoking history. He has never used smokeless tobacco.  ETOH:   reports current alcohol use. DRUGS:   reports no history of drug use. Review of Systems:      Constitutional: Negative. HENT: Negative. Eyes: Negative. Respiratory: Negative. Cardiovascular: Negative except for R leg rest pain, redness. Gastrointestinal: Negative. Genitourinary: Negative. Musculoskeletal: Negative. Skin: Negative. Endocrine: Negative. Allergic/Immunologic: Negative. Neurological: Negative.   Hematological: eccentric thrombus. Just distal to the origin of the hypogastric artery, there is 60% diameter stenosis of the proximal external iliac artery suspected       ON THE RIGHT:   Common femoral artery: Is patent, the distal segment shows a focal segmental high-grade stenosis, near occlusion greater than 90% diameter.       Profunda femoris is patent   Superficial femoral artery is occluded    Proximal popliteal atherosclerosis and severe disease with reconstitution of the distal segment. .   Infrapopliteal trifurcation is patent with three-vessel runoff to the foot       ON THE LEFT:   Common femoral artery:   Chronic occlusion of the left superficial femoral artery. Profunda femoris is patent. Popliteal artery: Severe proximal popliteal artery atherosclerosis. The popliteal artery is patent with three-vessel runoff to the foot           Assessment/Plan:  George Borden is a 73 y.o. male with history of significant PVD and baseline RLE rest pain who presents to [facility] ED with concern for continuing pain and redness. Redness consistent with vascular rubor rather than erythema secondary to infection. Hemodynamically stable with unremarkable laboratory work-up. Pedal signals audible with Doppler ultrasound, remains consistent with previous exams. At this time, no indication for urgent/emergent intervention or admission. Patient has scheduled follow-up appointment with his regular vascular surgeon Dr Olga Hou later this week. Okay for dispo per ED and vascular surgery follow up later this week. Patient, plan discussed with surgical team, vascular surgical staff on-call, Dr Jame Shields MD  PGY-2, General Surgery  10/30/21  7:49 PM  [phone]

## 2021-10-30 NOTE — ED PROVIDER NOTES
ED Attending Attestation Note     Date of evaluation: 10/30/2021    This patient was seen by the resident. I have seen and examined the patient, agree with the workup, evaluation, management and diagnosis. The care plan has been discussed. My assessment reveals a pleasant gentleman who was recently mated for pulmonary embolism is on anticoagulation, last took his dose this morning. He is currently on twice daily dosing of his Eliquis. He presents with worsening right lower leg and right foot pain over the past week. He has known vascular disease and does follow with vascular surgery. Difficulty finding dopplerable signals in his right DP and PT. He reports some diminished sensation in that foot as well but is also experiencing a lot of pain. He is currently on nasal cannula for his pulmonary emboli. We will obtain labs and consult vascular surgery.      Freddie Mejía MD  10/30/21 [id number]

## 2021-10-30 NOTE — ED PROVIDER NOTES
The patient is a 65-year-old male with history of severe peripheral vascular disease. He also is history of hypertension, hyperlipidemia, CAD, history of recent PE on apixaban with home oxygen requirement of 5 to 6 L nasal cannula, and heart failure who presents to the emergency department for right lower extremity pain. Patient states that over the past week he has been experiencing acute on chronic worsening of his right dorsal foot pain. He also has some associated redness to the skin but no warmth and no fever or chills. He has been able to ambulate on the affected extremity. According to my off going colleagues assessment, patient has monophasic pulses auscultated on Doppler but no palpable pulses in the lower extremities. Vascular surgery was consulted who evaluated the patient and their recommendations are pending at this time. On my assessment, the patient is resting comfortably with stable vital signs. He is on his home oxygen via nasal cannula. I am unable to palpate pulses but the extremities feel warm and do not have evidence of cellulitis. I spoke with the surgery team who does not feel the patient requires any acute interventions at this time. He will be instructed to continue his home medications and follow-up with vascular surgery in clinic on Monday. He has an appointment scheduled for later in the week but was instructed to call as soon as their office opens to make a sooner appointment. I reviewed strict return precautions with the patient at the bedside and he was also given printed discharge instructions. He was given a short course of oxycodone for breakthrough pain and was given instructions to avoid any sedatives with this medication.     MD Obdulio Bolivar MD  Resident  10/30/21 [id number]

## 2021-10-30 NOTE — ED PROVIDER NOTES
[address] [facility] RESIDENT NOTE       Date of evaluation: 10/30/2021    Chief Complaint     Leg Pain (right leg has hx PAD gout and neuropathy)      History of Present Illness     Lauren Armenta is a 73 y.o. male with history of cardiovascular disease, CAD, PVD, hypertension hyperlipidemia who presents for evaluation of leg pain. Patient reports that he at baseline, has right lower extremity and left lower extremity pain, and significant cramping and pain with ambulation, lower extremity claudication at baseline however had acute on chronic worsening of last week in the right lower extremity particular over the dorsal aspect of the right foot. He has had no recent trauma the area. He endorses worsening numbness, pain, and difficulty ambulating secondary to pain in the right lower extremity. He was notably recently discharged from hospital after a complicated course secondary to respiratory failure and pulmonary embolism. He is anticoagulated on Eliquis and has not missed any doses of this recently. He follows with vascular surgery on outpatient basis however has not had any recent interventions for his severe PVD. Severely reduced arterial insufficiency bilaterally on recent ABIs. No recent fevers or chills. Summary   Normal left ventricle size. There is mild to moderate concentric left   ventricular hypertrophy. Overall left ventricular systolic function appears   normal with an ejection fraction of 55-60%. No regional wall motion   abnormalities are noted. Diastolic filling parameters suggests normal   diastolic function. Estimated pulmonary artery systolic pressure is 39 mmHg assuming a right   atrial pressure of 8 mmHg.       Signature      ------------------------------------------------------------------   Electronically signed by Victorino Jeter MD (Interpreting   physician) on 09/17/2021 at 03:43 PM ------------------------------------------------------------------      Review of Systems     A complete review of systems was performed and is otherwise negative. Past Medical, Surgical, Family, and Social History     He has a past medical history of CAD (coronary artery disease), CHF (congestive heart failure) ([facility] COPD (chronic obstructive pulmonary disease) ([facility] Depressive disorder, not elsewhere classified, GERD (gastroesophageal reflux disease), History of colon polyps - 30 seen on colonoscopy 04/2021, History of MI (myocardial infarction), History of skin ulcer of lower extremity, History of transient ischemic attack (TIA), Hyperlipidemia, Hypertension, Neuropathy, KAVITA (obstructive sleep apnea), Personal history of DVT (deep vein thrombosis), PVD (peripheral vascular disease) ([facility] TIA (transient ischemic attack), and Vertebral fracture. He has a past surgical history that includes Appendectomy; Coronary angioplasty with stent (6/2013); other surgical history (Right, 6/25/2013); Leg Debridement (Right, 7/23/2013); skin split graft (Right, 7/25/2013); other surgical history (Left, 8/27/2013); laminectomy (11/18/2015); transluminal angioplasty (Left, 12/22/2015); Total hip arthroplasty (Right, 09/01/2016); Cholecystectomy, laparoscopic; eye surgery (Left); Colonoscopy (4/23/2021); Colonoscopy (4/23/2021); and Colonoscopy (4/23/2021). His family history includes Cancer in his father, mother, and paternal grandmother; Heart Disease in his father and mother. He reports that he has been smoking cigarettes. He started smoking about 54 years ago. He has a 26.00 pack-year smoking history. He has never used smokeless tobacco. He reports current alcohol use. He reports that he does not use drugs.     Medications     Previous Medications    ALBUTEROL SULFATE  (90 BASE) MCG/ACT INHALER    Inhale 2 puffs into the lungs every 6 hours as needed for Wheezing    ALLOPURINOL (ZYLOPRIM) 100 MG TABLET    Take 1 20, SpO2: 100 %      General:   Chronically ill appearing. No acute distress    Eyes:  Pupils equal, round, reactive to light. No discharge from eyes    ENT:  No discharge from nose. OP clear   Neck:  Supple, trachea midline   Pulmonary:   Non-labored breathing. Expiratory wheezing   Cardiac:  Regular rate and rhythm. No rubs, murmurs, gallops   Abdomen:  Soft. Non-tender. Musculoskeletal:  No long bone deformity. Vascular:   Nonpalpable pulses at PT, DP and bilateral lower extremities; monophasic dopplerable DP and PT signals on the right lower, has biphasic left PT signal. Right lower extremity from the ankle distally is cooler to the touch relative to the left. Palpable right femoral pulse   Skin:  Dry, homogeneous erythema overlying the dorsal R foot with midfoot appearing excoriated dry ulcerative lesion, no exposed bone, subcutaneous tissue, no heat to palpation  Extremities:  No peripheral edema in bilateral lower extremities. Relative muscle tone loss, bulk loss in the right lower extremity compared to the left  Neuro:  Alert. Moves all four extremities to command.  No focal deficit     DiagnosticResults     EKG   Interpreted in conjunction with emergencydepartment physician Morgan Hooper MD    RADIOLOGY:  No orders to display       LABS:   Results for orders placed or performed during the hospital encounter of 10/30/21   CBC Auto Differential   Result Value Ref Range    WBC 8.2 4.0 - 11.0 K/uL    RBC 3.84 (L) 4.20 - 5.90 M/uL    Hemoglobin 14.0 13.5 - 17.5 g/dL    Hematocrit 41.6 40.5 - 52.5 %    .2 (H) 80.0 - 100.0 fL    MCH 36.3 (H) 26.0 - 34.0 pg    MCHC 33.5 31.0 - 36.0 g/dL    RDW 13.8 12.4 - 15.4 %    Platelets 429 169 - 615 K/uL    MPV 8.7 5.0 - 10.5 fL    Neutrophils % 69.8 %    Lymphocytes % 21.4 %    Monocytes % 5.9 %    Eosinophils % 2.2 %    Basophils % 0.7 %    Neutrophils Absolute 5.7 1.7 - 7.7 K/uL    Lymphocytes Absolute 1.8 1.0 - 5.1 K/uL    Monocytes Absolute 0.5 0.0 - 1.3 K/uL Eosinophils Absolute 0.2 0.0 - 0.6 K/uL    Basophils Absolute 0.1 0.0 - 0.2 K/uL   Basic Metabolic Panel w/ Reflex to MG   Result Value Ref Range    Sodium 135 (L) 136 - 145 mmol/L    Potassium reflex Magnesium 4.7 3.5 - 5.1 mmol/L    Chloride 100 99 - 110 mmol/L    CO2 24 21 - 32 mmol/L    Anion Gap 11 3 - 16    Glucose 92 70 - 99 mg/dL    BUN 12 7 - 20 mg/dL    CREATININE 0.8 0.8 - 1.3 mg/dL    GFR Non-African American >60 >60    GFR African American >60 >60    Calcium 9.5 8.3 - 10.6 mg/dL   CK (Lab)   Result Value Ref Range    Total CK 66 39 - 308 U/L   Lactate, plasma   Result Value Ref Range    Lactic Acid 1.0 0.4 - 2.0 mmol/L   TYPE AND SCREEN   Result Value Ref Range    ABO/Rh B POS     Antibody Screen NEG        ED BEDSIDE ULTRASOUND:    n/a    RECENT VITALS:  BP: 132/71, Temp: 97.3 °F (36.3 °C), Pulse: 67,Resp: 20, SpO2: 100 %     Procedures      n/a    ED Course     Nursing Notes, Past Medical Hx, Past Surgical Hx, Social Hx, Allergies, and Family Hx were reviewed. The patient was given the followingmedications:  Orders Placed This Encounter   Medications    oxyCODONE (ROXICODONE) immediate release tablet 5 mg       CONSULTS:  IP CONSULT TO VASCULAR SURGERY  IP CONSULT TO [address] / ASSESSMENT / Adilene Albarran. is a 73 y.o. male with significant past medical history per above, presenting for evaluation of acute on chronic right lower extremity pain. Symptoms are concerning for acute on chronic ischemia, the setting of severe peripheral vascular disease with worsening pain, pulselessness including a dopplerable signals which was monophasic at best, paresthesias and decreased sensation, pallor with poikilothermia relative to the LLE.  Other considerations, less likely, include DVT however is currently anticoagulated after his recent hospitalization and would not resent the pulsatile changes, less likely compartment syndrome however remains in the differential, has not had any recent trauma to suggest bony fracture, dislocation and has been able to bear weight. Lower suspicion for septic arthritis of the ankle as well given clinical appearance and absence of significant palpable effusion. Consulted vascular surgery, discussed with attending per below and and subsequently the general surgery resident regarding further recommendations; reassuringly, CK was normal and lactate was normal with otherwise unremarkable CBC, BMP. Awaiting surgery recommendations. This patient was also evaluated by the attending physician. All care plans werediscussed and agreed upon. ED Course as of Oct 30 1921   Sat Oct 30, 2021   1647 Prior CT w/ runoff 9/21: Impression  1. Proximal right external iliac artery stenosis, 60% diameter  2. High-grade severe critical stenosis, near occlusion of the distal right common femoral artery and junction with the profunda femoris, flow-limiting. 3. Bilateral chronic occlusions of the superficial femoral artery  4. Reconstituted distal popliteal artery with moderate infrapopliteal arterial insufficiency, three-vessel runoff to the foot with proximal calcification noted as described above. [LG]   1700 Discussed with vascular attending; awaiting resident callback      [LG]   [id number] Discussed with surgery resident in person Ari Negrete)    [LG]      ED Course User Index  [LG] Hood Charles MD         Clinical Impression     1. Right foot pain    2. Peripheral vascular disease ([address]        Disposition     PATIENT REFERRED TO:  No follow-up provider specified.     DISCHARGE MEDICATIONS:  New Prescriptions    No medications on file       Nicole Plascencia MD    PGY-2, [facility]   Emergency Medicine       Hood Charles MD  Resident  10/30/21 8411

## 2021-11-04 ENCOUNTER — OFFICE VISIT (OUTPATIENT)
Dept: VASCULAR SURGERY | Age: 71
End: 2021-11-04
Payer: MEDICARE

## 2021-11-04 VITALS
SYSTOLIC BLOOD PRESSURE: 104 MMHG | HEART RATE: 81 BPM | DIASTOLIC BLOOD PRESSURE: 58 MMHG | HEIGHT: 70 IN | TEMPERATURE: 98.8 F | WEIGHT: 209 LBS | BODY MASS INDEX: 29.92 KG/M2

## 2021-11-04 DIAGNOSIS — I70.221 ATHEROSCLEROSIS OF NATIVE ARTERY OF RIGHT LOWER EXTREMITY WITH REST PAIN (HCC): Primary | ICD-10-CM

## 2021-11-04 PROCEDURE — 99215 OFFICE O/P EST HI 40 MIN: CPT | Performed by: SURGERY

## 2021-11-04 NOTE — PROGRESS NOTES
[facility]) Vascular Surgery--[facility] Bowling [facility], [facility] [address]    Follow-up    Referring Provider:  Leni Maharaj DO     Patient Name: Andrea Muñoz. YOB: 1950  Date: 11/04/21    History:  Andrea Muñoz. is a 73 y.o. male being followed for PAD. His last visit on 9/16/2021, he was admitted for acute PE. He was started on Eliquis. Continues smoke about a pack a day. His oxygen requirement is now up to 5 to 6 L by NC. Recently seen in the ED on 10/30/2021 for worsening right foot rest pain. This has progressed since my initial evaluation with him. Reports he has to sit up at night to sleep, unable sleep more than 2 to 3 hours at a time. Pain is improved only by hanging it dependently and moving around a little bit. Ambulation has worsened and claudication occurs at approximately 20 feet. Denies chest pain. Shortness of breath and FRANCO are at baseline. Vital Signs  BP (!) 104/58   Pulse 81   Temp 98.8 °F (37.1 °C)   Ht 5' 10\" (1.778 m)   Wt 209 lb (94.8 kg)   BMI 29.99 kg/m²     Physical Examination:      General:  alert and oriented to person, place and time, well-developed and well nourished, appears frail, chronically ill appearing, appears older than stated age. Heart: RRR no m/r/g  Lungs:  Decreased BS throughout. No w/r/r. Abdomen: soft, nontender, no pulsatile mass palpable. Extremities (musculoskeletal and neurologic): Dependent rubor noted of the distal right leg and foot. There is a developing dry ulceration on the anterior surface of the dorsal aspect of the proximal foot. No drainage. No evidence of cellulitis. Toenails are dry and thickened. 2 to 3-second capillary refill. The foot slightly cool to touch but equal in temperature to the left. There is no ischemic rubor noted of the left foot. No ulcerations. Vascular:  Right femoral non-palp; left fem 2+. Bilateral PT barely audible.   Bilateral DP monophasic      Impression: Atherosclerosis bilateral lower extremity with worsening right rest pain    Plan:  Unfortunately he has worsening symptoms of critical limb ischemia of the right lower extremity. Will require revascularization. Recommend right femoral endarterectomy with iliac stent, possible femoral angioplasty and stent. He will require general versus spinal anesthesia, depending upon his cardiac evaluation by Dr. Kishore Trejo and recommendations from Dr. Neda Cross of pulmonology. We will also require holding Eliquis for 2 to 3 days prior to the procedure, which is concerning given his recent pulmonary embolus. Will likely need to admit him preoperatively for bridging heparin drip. He is currently on the schedule for surgery on Wednesday, 11/17/21.

## 2021-11-09 ENCOUNTER — TELEPHONE (OUTPATIENT)
Dept: PULMONOLOGY | Age: 71
End: 2021-11-09

## 2021-11-09 NOTE — TELEPHONE ENCOUNTER
Pulm clearance is needed for surgery on 11/17/2021. Please supply appropriate documentation. Thank you.

## 2021-11-15 ENCOUNTER — ANESTHESIA EVENT (OUTPATIENT)
Dept: OPERATING ROOM | Age: 71
DRG: 270 | End: 2021-11-15
Payer: MEDICARE

## 2021-11-15 ENCOUNTER — TELEPHONE (OUTPATIENT)
Dept: VASCULAR SURGERY | Age: 71
End: 2021-11-15

## 2021-11-15 ENCOUNTER — APPOINTMENT (OUTPATIENT)
Dept: GENERAL RADIOLOGY | Age: 71
DRG: 270 | End: 2021-11-15
Attending: SURGERY
Payer: MEDICARE

## 2021-11-15 ENCOUNTER — HOSPITAL ENCOUNTER (INPATIENT)
Age: 71
LOS: 7 days | Discharge: HOME HEALTH | DRG: 270 | End: 2021-11-22
Attending: SURGERY | Admitting: SURGERY
Payer: MEDICARE

## 2021-11-15 DIAGNOSIS — G89.18 POST-OP PAIN: Primary | ICD-10-CM

## 2021-11-15 DIAGNOSIS — I70.221 ATHEROSCLEROSIS OF NATIVE ARTERY OF RIGHT LOWER EXTREMITY WITH REST PAIN (HCC): ICD-10-CM

## 2021-11-15 PROBLEM — I26.99 PULMONARY EMBOLISM (HCC): Status: ACTIVE | Noted: 2021-11-15

## 2021-11-15 LAB
ABO/RH: NORMAL
ALBUMIN SERPL-MCNC: 3.5 G/DL (ref 3.4–5)
ANION GAP SERPL CALCULATED.3IONS-SCNC: 12 MMOL/L (ref 3–16)
ANTIBODY SCREEN: NORMAL
BASOPHILS ABSOLUTE: 0.1 K/UL (ref 0–0.2)
BASOPHILS RELATIVE PERCENT: 1.2 %
BUN BLDV-MCNC: 11 MG/DL (ref 7–20)
CALCIUM SERPL-MCNC: 8.8 MG/DL (ref 8.3–10.6)
CHLORIDE BLD-SCNC: 101 MMOL/L (ref 99–110)
CO2: 22 MMOL/L (ref 21–32)
CREAT SERPL-MCNC: 0.8 MG/DL (ref 0.8–1.3)
EOSINOPHILS ABSOLUTE: 0.3 K/UL (ref 0–0.6)
EOSINOPHILS RELATIVE PERCENT: 3.3 %
GFR AFRICAN AMERICAN: >60
GFR NON-AFRICAN AMERICAN: >60
GLUCOSE BLD-MCNC: 92 MG/DL (ref 70–99)
HCT VFR BLD CALC: 39.6 % (ref 40.5–52.5)
HEMOGLOBIN: 13.5 G/DL (ref 13.5–17.5)
INR BLD: 1.13 (ref 0.88–1.12)
LYMPHOCYTES ABSOLUTE: 1.6 K/UL (ref 1–5.1)
LYMPHOCYTES RELATIVE PERCENT: 20.4 %
MAGNESIUM: 1.5 MG/DL (ref 1.8–2.4)
MCH RBC QN AUTO: 36.1 PG (ref 26–34)
MCHC RBC AUTO-ENTMCNC: 34.2 G/DL (ref 31–36)
MCV RBC AUTO: 105.7 FL (ref 80–100)
MONOCYTES ABSOLUTE: 0.6 K/UL (ref 0–1.3)
MONOCYTES RELATIVE PERCENT: 7.6 %
NEUTROPHILS ABSOLUTE: 5.5 K/UL (ref 1.7–7.7)
NEUTROPHILS RELATIVE PERCENT: 67.5 %
PDW BLD-RTO: 13.6 % (ref 12.4–15.4)
PHOSPHORUS: 3.6 MG/DL (ref 2.5–4.9)
PLATELET # BLD: 133 K/UL (ref 135–450)
PMV BLD AUTO: 9.2 FL (ref 5–10.5)
POTASSIUM SERPL-SCNC: 4.2 MMOL/L (ref 3.5–5.1)
PROTHROMBIN TIME: 12.8 SEC (ref 9.9–12.7)
RBC # BLD: 3.74 M/UL (ref 4.2–5.9)
SODIUM BLD-SCNC: 135 MMOL/L (ref 136–145)
WBC # BLD: 8.1 K/UL (ref 4–11)

## 2021-11-15 PROCEDURE — 99223 1ST HOSP IP/OBS HIGH 75: CPT | Performed by: SURGERY

## 2021-11-15 PROCEDURE — 36415 COLL VENOUS BLD VENIPUNCTURE: CPT

## 2021-11-15 PROCEDURE — 71045 X-RAY EXAM CHEST 1 VIEW: CPT

## 2021-11-15 PROCEDURE — 1200000000 HC SEMI PRIVATE

## 2021-11-15 PROCEDURE — 6370000000 HC RX 637 (ALT 250 FOR IP): Performed by: STUDENT IN AN ORGANIZED HEALTH CARE EDUCATION/TRAINING PROGRAM

## 2021-11-15 PROCEDURE — 6360000002 HC RX W HCPCS: Performed by: STUDENT IN AN ORGANIZED HEALTH CARE EDUCATION/TRAINING PROGRAM

## 2021-11-15 PROCEDURE — 85610 PROTHROMBIN TIME: CPT

## 2021-11-15 PROCEDURE — 85025 COMPLETE CBC W/AUTO DIFF WBC: CPT

## 2021-11-15 PROCEDURE — 83735 ASSAY OF MAGNESIUM: CPT

## 2021-11-15 PROCEDURE — 2580000003 HC RX 258: Performed by: STUDENT IN AN ORGANIZED HEALTH CARE EDUCATION/TRAINING PROGRAM

## 2021-11-15 PROCEDURE — 6370000000 HC RX 637 (ALT 250 FOR IP)

## 2021-11-15 PROCEDURE — 93005 ELECTROCARDIOGRAM TRACING: CPT | Performed by: STUDENT IN AN ORGANIZED HEALTH CARE EDUCATION/TRAINING PROGRAM

## 2021-11-15 PROCEDURE — P9016 RBC LEUKOCYTES REDUCED: HCPCS

## 2021-11-15 PROCEDURE — 86850 RBC ANTIBODY SCREEN: CPT

## 2021-11-15 PROCEDURE — 86923 COMPATIBILITY TEST ELECTRIC: CPT

## 2021-11-15 PROCEDURE — 86901 BLOOD TYPING SEROLOGIC RH(D): CPT

## 2021-11-15 PROCEDURE — 80069 RENAL FUNCTION PANEL: CPT

## 2021-11-15 PROCEDURE — 2700000000 HC OXYGEN THERAPY PER DAY

## 2021-11-15 PROCEDURE — 94761 N-INVAS EAR/PLS OXIMETRY MLT: CPT

## 2021-11-15 PROCEDURE — 86900 BLOOD TYPING SEROLOGIC ABO: CPT

## 2021-11-15 PROCEDURE — 94640 AIRWAY INHALATION TREATMENT: CPT

## 2021-11-15 RX ORDER — LANOLIN ALCOHOL/MO/W.PET/CERES
400 CREAM (GRAM) TOPICAL DAILY
Status: DISCONTINUED | OUTPATIENT
Start: 2021-11-15 | End: 2021-11-16

## 2021-11-15 RX ORDER — SODIUM CHLORIDE 9 MG/ML
25 INJECTION, SOLUTION INTRAVENOUS PRN
Status: DISCONTINUED | OUTPATIENT
Start: 2021-11-15 | End: 2021-11-22 | Stop reason: HOSPADM

## 2021-11-15 RX ORDER — LOSARTAN POTASSIUM 100 MG/1
100 TABLET ORAL DAILY
Status: DISCONTINUED | OUTPATIENT
Start: 2021-11-15 | End: 2021-11-22 | Stop reason: HOSPADM

## 2021-11-15 RX ORDER — ALBUTEROL SULFATE 2.5 MG/3ML
2.5 SOLUTION RESPIRATORY (INHALATION) 4 TIMES DAILY
Status: DISCONTINUED | OUTPATIENT
Start: 2021-11-15 | End: 2021-11-22 | Stop reason: HOSPADM

## 2021-11-15 RX ORDER — PANTOPRAZOLE SODIUM 40 MG/1
40 TABLET, DELAYED RELEASE ORAL
Status: DISCONTINUED | OUTPATIENT
Start: 2021-11-16 | End: 2021-11-22 | Stop reason: HOSPADM

## 2021-11-15 RX ORDER — ACETAMINOPHEN 325 MG/1
650 TABLET ORAL EVERY 4 HOURS PRN
Status: DISCONTINUED | OUTPATIENT
Start: 2021-11-15 | End: 2021-11-22 | Stop reason: HOSPADM

## 2021-11-15 RX ORDER — ATORVASTATIN CALCIUM 80 MG/1
80 TABLET, FILM COATED ORAL DAILY
Status: DISCONTINUED | OUTPATIENT
Start: 2021-11-15 | End: 2021-11-22 | Stop reason: HOSPADM

## 2021-11-15 RX ORDER — ONDANSETRON 2 MG/ML
4 INJECTION INTRAMUSCULAR; INTRAVENOUS EVERY 6 HOURS PRN
Status: DISCONTINUED | OUTPATIENT
Start: 2021-11-15 | End: 2021-11-22 | Stop reason: HOSPADM

## 2021-11-15 RX ORDER — ONDANSETRON 4 MG/1
4 TABLET, ORALLY DISINTEGRATING ORAL EVERY 8 HOURS PRN
Status: DISCONTINUED | OUTPATIENT
Start: 2021-11-15 | End: 2021-11-22 | Stop reason: HOSPADM

## 2021-11-15 RX ORDER — METOPROLOL TARTRATE 100 MG/1
100 TABLET ORAL 2 TIMES DAILY
Status: DISCONTINUED | OUTPATIENT
Start: 2021-11-15 | End: 2021-11-18

## 2021-11-15 RX ORDER — FUROSEMIDE 40 MG/1
40 TABLET ORAL DAILY
Status: DISCONTINUED | OUTPATIENT
Start: 2021-11-15 | End: 2021-11-22 | Stop reason: HOSPADM

## 2021-11-15 RX ORDER — OXYCODONE HYDROCHLORIDE 5 MG/1
5 TABLET ORAL EVERY 4 HOURS PRN
Status: DISCONTINUED | OUTPATIENT
Start: 2021-11-15 | End: 2021-11-16

## 2021-11-15 RX ORDER — SODIUM CHLORIDE 0.9 % (FLUSH) 0.9 %
10 SYRINGE (ML) INJECTION PRN
Status: DISCONTINUED | OUTPATIENT
Start: 2021-11-15 | End: 2021-11-22 | Stop reason: HOSPADM

## 2021-11-15 RX ORDER — OXYCODONE HYDROCHLORIDE 5 MG/1
2.5 TABLET ORAL EVERY 4 HOURS PRN
Status: DISCONTINUED | OUTPATIENT
Start: 2021-11-15 | End: 2021-11-16

## 2021-11-15 RX ORDER — SODIUM CHLORIDE 0.9 % (FLUSH) 0.9 %
10 SYRINGE (ML) INJECTION EVERY 12 HOURS SCHEDULED
Status: DISCONTINUED | OUTPATIENT
Start: 2021-11-15 | End: 2021-11-22 | Stop reason: HOSPADM

## 2021-11-15 RX ORDER — BUDESONIDE 0.25 MG/2ML
0.25 INHALANT ORAL 2 TIMES DAILY
Status: DISCONTINUED | OUTPATIENT
Start: 2021-11-15 | End: 2021-11-22 | Stop reason: HOSPADM

## 2021-11-15 RX ORDER — DOXAZOSIN 2 MG/1
1 TABLET ORAL DAILY
Status: DISCONTINUED | OUTPATIENT
Start: 2021-11-15 | End: 2021-11-22 | Stop reason: HOSPADM

## 2021-11-15 RX ORDER — ALLOPURINOL 100 MG/1
100 TABLET ORAL DAILY
Status: DISCONTINUED | OUTPATIENT
Start: 2021-11-15 | End: 2021-11-22 | Stop reason: HOSPADM

## 2021-11-15 RX ORDER — POTASSIUM CHLORIDE 750 MG/1
10 TABLET, EXTENDED RELEASE ORAL DAILY
Status: DISCONTINUED | OUTPATIENT
Start: 2021-11-15 | End: 2021-11-22 | Stop reason: HOSPADM

## 2021-11-15 RX ORDER — ISOSORBIDE MONONITRATE 30 MG/1
15 TABLET, EXTENDED RELEASE ORAL DAILY
Status: DISCONTINUED | OUTPATIENT
Start: 2021-11-15 | End: 2021-11-21

## 2021-11-15 RX ORDER — CILOSTAZOL 50 MG/1
50 TABLET ORAL 2 TIMES DAILY
Status: DISCONTINUED | OUTPATIENT
Start: 2021-11-15 | End: 2021-11-22 | Stop reason: HOSPADM

## 2021-11-15 RX ORDER — DULOXETIN HYDROCHLORIDE 20 MG/1
20 CAPSULE, DELAYED RELEASE ORAL DAILY
Status: DISCONTINUED | OUTPATIENT
Start: 2021-11-15 | End: 2021-11-22 | Stop reason: HOSPADM

## 2021-11-15 RX ORDER — AMLODIPINE BESYLATE 5 MG/1
5 TABLET ORAL DAILY
Status: DISCONTINUED | OUTPATIENT
Start: 2021-11-15 | End: 2021-11-21

## 2021-11-15 RX ADMIN — LOSARTAN POTASSIUM 100 MG: 100 TABLET, FILM COATED ORAL at 22:52

## 2021-11-15 RX ADMIN — ALBUTEROL SULFATE 2.5 MG: 2.5 SOLUTION RESPIRATORY (INHALATION) at 20:59

## 2021-11-15 RX ADMIN — CILOSTAZOL 50 MG: 50 TABLET ORAL at 22:53

## 2021-11-15 RX ADMIN — SODIUM CHLORIDE, PRESERVATIVE FREE 10 ML: 5 INJECTION INTRAVENOUS at 22:51

## 2021-11-15 RX ADMIN — ALLOPURINOL 100 MG: 100 TABLET ORAL at 22:53

## 2021-11-15 RX ADMIN — FUROSEMIDE 40 MG: 40 TABLET ORAL at 22:53

## 2021-11-15 RX ADMIN — METOPROLOL TARTRATE 100 MG: 100 TABLET, FILM COATED ORAL at 22:52

## 2021-11-15 RX ADMIN — ATORVASTATIN CALCIUM 80 MG: 80 TABLET, FILM COATED ORAL at 22:51

## 2021-11-15 RX ADMIN — AMLODIPINE BESYLATE 5 MG: 5 TABLET ORAL at 22:52

## 2021-11-15 RX ADMIN — ISOSORBIDE MONONITRATE 15 MG: 30 TABLET, EXTENDED RELEASE ORAL at 22:56

## 2021-11-15 RX ADMIN — DULOXETINE HYDROCHLORIDE 20 MG: 20 CAPSULE, DELAYED RELEASE ORAL at 22:53

## 2021-11-15 RX ADMIN — POTASSIUM CHLORIDE 10 MEQ: 10 TABLET, EXTENDED RELEASE ORAL at 22:51

## 2021-11-15 RX ADMIN — OXYCODONE 5 MG: 5 TABLET ORAL at 22:09

## 2021-11-15 RX ADMIN — DOXAZOSIN 1 MG: 2 TABLET ORAL at 22:56

## 2021-11-15 RX ADMIN — Medication 400 MG: at 22:52

## 2021-11-15 ASSESSMENT — PAIN - FUNCTIONAL ASSESSMENT
PAIN_FUNCTIONAL_ASSESSMENT: PREVENTS OR INTERFERES SOME ACTIVE ACTIVITIES AND ADLS
PAIN_FUNCTIONAL_ASSESSMENT: PREVENTS OR INTERFERES SOME ACTIVE ACTIVITIES AND ADLS

## 2021-11-15 ASSESSMENT — PAIN DESCRIPTION - ORIENTATION
ORIENTATION: RIGHT;LEFT

## 2021-11-15 ASSESSMENT — PAIN DESCRIPTION - PAIN TYPE
TYPE: CHRONIC PAIN

## 2021-11-15 ASSESSMENT — PAIN DESCRIPTION - LOCATION
LOCATION: FOOT

## 2021-11-15 ASSESSMENT — PAIN DESCRIPTION - PROGRESSION
CLINICAL_PROGRESSION: NOT CHANGED
CLINICAL_PROGRESSION: NOT CHANGED

## 2021-11-15 ASSESSMENT — PAIN DESCRIPTION - FREQUENCY
FREQUENCY: CONTINUOUS

## 2021-11-15 ASSESSMENT — PAIN SCALES - GENERAL
PAINLEVEL_OUTOF10: 9

## 2021-11-15 ASSESSMENT — PAIN DESCRIPTION - ONSET
ONSET: ON-GOING
ONSET: ON-GOING

## 2021-11-15 ASSESSMENT — PAIN DESCRIPTION - DESCRIPTORS
DESCRIPTORS: SHARP

## 2021-11-15 NOTE — H&P
Vascular Surgery   Resident H&P    Reason for consult: RLE critical limb ischemia    Chief Complaint: RLE worsening pain    History obtained from: patient/EMR    History of Present Illness :    Robert Khan is a 73 y.o. male with CAD, CHF, COPD, previous MI, previous TIAs, HLD, HTN, previous DVTs, hospital admission for PE on 9/16/21 and PVD who is direct admitted for planned RLE femoral endarterectomy with iliac stent, possible femoral angioplasty and stent (11/17). Patient had recently been seen in ED on 10/30/21 for worsening R foot pain. Patient was found to have vascular rubor and was discharged with plans to follow up with vascular surgery later in the week. During office visit on 11/4/21, patient reports resting leg pain that improved by hanging leg off of side of the bed and worsening claudication with walking approximately 20 feet. Today, patient reports his RLE pain is worse than it was a week ago. Patient reports his RLE constantly feels cold and is painful to touch. Patient denies any fever or chills.      Past Medical History:        Diagnosis Date    CAD (coronary artery disease)     CHF (congestive heart failure) (HCC)     COPD (chronic obstructive pulmonary disease) (HCC)     Depressive disorder, not elsewhere classified     GERD (gastroesophageal reflux disease)     History of colon polyps - 30 seen on colonoscopy 04/2021 4/24/2021    History of MI (myocardial infarction) 05/2013    History of skin ulcer of lower extremity     R anterior shin    History of transient ischemic attack (TIA)     Hyperlipidemia     Hypertension     Neuropathy     KAVITA (obstructive sleep apnea)     On CPAP    Personal history of DVT (deep vein thrombosis)     PVD (peripheral vascular disease) ([facility])     TIA (transient ischemic attack) 2013    Vertebral fracture        Past Surgical History:           Procedure Laterality Date    APPENDECTOMY      CHOLECYSTECTOMY, LAPAROSCOPIC      COLONOSCOPY 4/23/2021    COLONOSCOPY POLYPECTOMY SNARE/COLD BIOPSY performed by Chayito Reeves MD at [address] [facility]  4/23/2021    COLONOSCOPY POLYPECTOMY ABLATION performed by Chayito Reeves MD at [facility]  4/23/2021    COLONOSCOPY CONTROL HEMORRHAGE performed by Chayito Reeves MD at [address]  6/2013    EYE SURGERY Left     LAMINECTOMY  11/18/2015    Bilateral decompressive lumbar laminectomy L4-5   ; medial facetectomy L4-5 joints  bilaterally; foraminotomies l5   nerve roots bilaterally    LEG DEBRIDEMENT Right 7/23/2013    Dr. Marycarmen Willard - pretibial wound    OTHER SURGICAL HISTORY Right 6/25/2013    Dr. Marycarmen Willard - CFA Endarterectomy w/marsupialization; RLE wound excision & debridement     OTHER SURGICAL HISTORY Left 8/27/2013    Dr. Mraycarmen Willard - femoral & profunda femoris endarterectomy w/marsupialization patch angioplasty using SFA    SKIN SPLIT GRAFT Right 7/25/2013    Dr. Marycarmen Willard - to non-healing R pretibial wound, 9 x 15 cm    TOTAL HIP ARTHROPLASTY Right 09/01/2016    Dr. Prabhu Amezquita Left 12/22/2015    Dr. Marycarmen Willard - CFA exploration, thrombectomy of ileofemoral system, stenting of RAFAL in-stent stenosis w/8 x 37 mm Palmaz        Allergies:  Asa [aspirin] and Daliresp [roflumilast]    Medications:   Home Meds  No current facility-administered medications on file prior to encounter.      Current Outpatient Medications on File Prior to Encounter   Medication Sig Dispense Refill    AMLODIPINE BENZOATE PO 5 mg: TAKE 1 TAB DAILY      Cyanocobalamin (B-12) 100 MCG TABS 1 tablet      doxazosin (CARDURA) 1 MG tablet 1 tablet      DULoxetine (CYMBALTA) 20 MG extended release capsule 1 capsule      potassium chloride (MICRO-K) 10 MEQ extended release capsule 1 tablet      predniSONE (DELTASONE) 10 MG tablet Per instructions: 6 TABLETS 2 TIMES DAILY FOR 2 DAYS 4 TABLETS 2 TIMES DAILY FOR 2 DAYS 2 TABLETS 2 TIMES melanoma        Social History:   TOBACCO:   reports that he has been smoking cigarettes. He started smoking about 54 years ago. He has a 26.00 pack-year smoking history. He has never used smokeless tobacco.  ETOH:   reports current alcohol use. DRUGS:   reports no history of drug use. Review of Systems:   A 14 point review of systems was conducted, significant findings as noted in HPI      Physical exam:    There were no vitals filed for this visit. General appearance: alert, resting in bed, chronically ill-appearing  Neuro: A&Ox3, no focal deficits  HENT: trachea midline, no JVD, no LAD  Eyes: PERRLA, no scleral icterus  Chest/Lungs: slightly increased work of breathing, on 6LNC  Cardiovascular: RRR  Skin: RLE with erythema, cool to touch, pulse exam as detailed below  Extremities: as pictured below    Pulses    F P AT PT DP   R -/+ -/+ -/- -/+ -/-   L -/+ -/+ -/- -/+ -/+    +, -/+ ,-/-     Weakly monphasic pulses for L PT/DP and R PT            Labs:    CBC: No results for input(s): WBC, HGB, HCT, MCV, PLT in the last 72 hours. BMP: No results for input(s): NA, K, CL, CO2, PHOS, BUN, CREATININE in the last 72 hours. Invalid input(s): CA  PT/INR: No results for input(s): PROTIME, INR in the last 72 hours. APTT: No results for input(s): APTT in the last 72 hours.   Liver Profile:  Lab Results   Component Value Date    AST 18 09/16/2021    ALT 20 09/16/2021    BILIDIR <0.2 09/16/2021    BILITOT 0.7 09/16/2021    ALKPHOS 89 09/16/2021     Lab Results   Component Value Date    CHOL 141 12/06/2020    HDL 30 12/06/2020    TRIG 128 12/06/2020     UA:   Lab Results   Component Value Date    COLORU Yellow 12/07/2020    PHUR 6.0 12/07/2020    LABCAST 10-20 Hyaline 08/06/2019    WBCUA >100 12/07/2020    RBCUA 3-4 12/07/2020    RBCUA NEGATIVE 08/10/2016    MUCUS PRESENT 08/10/2016    BACTERIA 4+ 12/07/2020    CLARITYU Clear 12/07/2020    SPECGRAV 1.025 12/07/2020    LEUKOCYTESUR MODERATE 12/07/2020 UROBILINOGEN 0.2 12/07/2020    BILIRUBINUR Negative 12/07/2020    BILIRUBINUR NEGATIVE 08/10/2016    BLOODU Negative 12/07/2020    GLUCOSEU Negative 12/07/2020       Imaging:   No orders to display          Assessment/Plan:  Dontrell Tapia. is a 73 y.o. male with CAD, CHF, COPD, previous MI, previous TIAs, HLD, HTN, previous DVTs, hospital admission for PE on 9/16/21 and PVD who is direct admitted for planned RLE femoral endarterectomy with iliac stent, possible femoral angioplasty and stent (11/17). Patient is afebrile and HDS. Patient to be admitted for optimization prior to surgery.     - admit to vascular surgery service; procedure as listed above planned for Wednesday, 11/17/21; patient will need to be pre-op'd and consented  - Dr. Andrea Rosenberg (pulmonology) to be contacted for anesthesia recommendations; patient cleared from cardiology standpoint  - CXR and EKG ordered, will hold off on IVFs  - eliquis to be held  - patient with severe COPD; scheduled pulmonary treatments  - patient seen with senior resident and discussed with attending, Dr. Chirag Green DO  PGY-1  11/15/21  4:32 PM  [phone]

## 2021-11-15 NOTE — TELEPHONE ENCOUNTER
Pt is being direct admitted today to [facility]. He will be available at the time requested by anesthesia.

## 2021-11-15 NOTE — PROGRESS NOTES
4 Eyes Admission Assessment     I agree as the admission nurse that 2 RN's have performed a thorough Head to Toe Skin Assessment on the patient. ALL assessment sites listed below have been assessed on admission. Areas assessed by both nurses:   [x]   Head, Face, and Ears   [x]   Shoulders, Back, and Chest  [x]   Arms, Elbows, and Hands   [x]   Coccyx, Sacrum, and Ischium  [x]   Legs, Feet, and Heels        Does the Patient have Skin Breakdown?   Yes a wound was noted on the Admission Assessment and an LDA was Initiated documentation include the Catrachita-wound, Wound Assessment, Measurements, Dressing Treatment, Drainage, and Color\",   4 venous ulcers noted to DIGNA Felix Prevention initiated:  No   Wound Care Orders initiated:  WISAM Ventura consulted for Pressure Injury (Stage 3,4, Unstageable, DTI, NWPT, and Complex wounds) or Isidro score 18 or lower:  NA      Nurse 1 eSignature: Electronically signed by Richard Forte RN on 11/15/21 at 5:41 PM EST    **SHARE this note so that the co-signing nurse is able to place an eSignature**    Nurse 2 eSignature: Electronically signed by José Luis Dupree RN on 11/15/21 at 5:45 PM EST

## 2021-11-15 NOTE — TELEPHONE ENCOUNTER
Megan with [facility] called to advise that the pt was told by Dr. Kiana Noguera to meet with anesthesia before surgery. The anesthesiologist advised to have the pt come in an additional hour early and they would meet with them the day of. This looks to be an arrival time of 0900. She is asking that you confirm that three hours early is 0900 and notify the pt.      If you have questions you can reach Otilia Cowan at [phone]

## 2021-11-16 LAB
ALBUMIN SERPL-MCNC: 3.7 G/DL (ref 3.4–5)
ANION GAP SERPL CALCULATED.3IONS-SCNC: 13 MMOL/L (ref 3–16)
BASOPHILS ABSOLUTE: 0.1 K/UL (ref 0–0.2)
BASOPHILS RELATIVE PERCENT: 0.8 %
BUN BLDV-MCNC: 10 MG/DL (ref 7–20)
CALCIUM SERPL-MCNC: 8.7 MG/DL (ref 8.3–10.6)
CHLORIDE BLD-SCNC: 102 MMOL/L (ref 99–110)
CO2: 23 MMOL/L (ref 21–32)
CREAT SERPL-MCNC: 0.9 MG/DL (ref 0.8–1.3)
EKG ATRIAL RATE: 73 BPM
EKG DIAGNOSIS: NORMAL
EKG P AXIS: 52 DEGREES
EKG P-R INTERVAL: 190 MS
EKG Q-T INTERVAL: 428 MS
EKG QRS DURATION: 98 MS
EKG QTC CALCULATION (BAZETT): 471 MS
EKG R AXIS: 66 DEGREES
EKG T AXIS: 66 DEGREES
EKG VENTRICULAR RATE: 73 BPM
EOSINOPHILS ABSOLUTE: 0.3 K/UL (ref 0–0.6)
EOSINOPHILS RELATIVE PERCENT: 3.7 %
GFR AFRICAN AMERICAN: >60
GFR NON-AFRICAN AMERICAN: >60
GLUCOSE BLD-MCNC: 96 MG/DL (ref 70–99)
HCT VFR BLD CALC: 38.9 % (ref 40.5–52.5)
HEMOGLOBIN: 13.2 G/DL (ref 13.5–17.5)
LYMPHOCYTES ABSOLUTE: 1.6 K/UL (ref 1–5.1)
LYMPHOCYTES RELATIVE PERCENT: 22.3 %
MAGNESIUM: 1.5 MG/DL (ref 1.8–2.4)
MCH RBC QN AUTO: 35.9 PG (ref 26–34)
MCHC RBC AUTO-ENTMCNC: 34 G/DL (ref 31–36)
MCV RBC AUTO: 105.5 FL (ref 80–100)
MONOCYTES ABSOLUTE: 0.4 K/UL (ref 0–1.3)
MONOCYTES RELATIVE PERCENT: 5.9 %
NEUTROPHILS ABSOLUTE: 4.8 K/UL (ref 1.7–7.7)
NEUTROPHILS RELATIVE PERCENT: 67.3 %
PDW BLD-RTO: 13.3 % (ref 12.4–15.4)
PHOSPHORUS: 4.3 MG/DL (ref 2.5–4.9)
PLATELET # BLD: 130 K/UL (ref 135–450)
PMV BLD AUTO: 9.4 FL (ref 5–10.5)
POTASSIUM SERPL-SCNC: 4 MMOL/L (ref 3.5–5.1)
RBC # BLD: 3.68 M/UL (ref 4.2–5.9)
SODIUM BLD-SCNC: 138 MMOL/L (ref 136–145)
WBC # BLD: 7.1 K/UL (ref 4–11)

## 2021-11-16 PROCEDURE — 1200000000 HC SEMI PRIVATE

## 2021-11-16 PROCEDURE — 94640 AIRWAY INHALATION TREATMENT: CPT

## 2021-11-16 PROCEDURE — 2580000003 HC RX 258: Performed by: STUDENT IN AN ORGANIZED HEALTH CARE EDUCATION/TRAINING PROGRAM

## 2021-11-16 PROCEDURE — 2700000000 HC OXYGEN THERAPY PER DAY

## 2021-11-16 PROCEDURE — 36415 COLL VENOUS BLD VENIPUNCTURE: CPT

## 2021-11-16 PROCEDURE — 94761 N-INVAS EAR/PLS OXIMETRY MLT: CPT

## 2021-11-16 PROCEDURE — 6360000002 HC RX W HCPCS: Performed by: STUDENT IN AN ORGANIZED HEALTH CARE EDUCATION/TRAINING PROGRAM

## 2021-11-16 PROCEDURE — 6370000000 HC RX 637 (ALT 250 FOR IP): Performed by: STUDENT IN AN ORGANIZED HEALTH CARE EDUCATION/TRAINING PROGRAM

## 2021-11-16 PROCEDURE — 83735 ASSAY OF MAGNESIUM: CPT

## 2021-11-16 PROCEDURE — 85025 COMPLETE CBC W/AUTO DIFF WBC: CPT

## 2021-11-16 PROCEDURE — 80069 RENAL FUNCTION PANEL: CPT

## 2021-11-16 PROCEDURE — 93010 ELECTROCARDIOGRAM REPORT: CPT | Performed by: INTERNAL MEDICINE

## 2021-11-16 PROCEDURE — 6370000000 HC RX 637 (ALT 250 FOR IP)

## 2021-11-16 RX ORDER — OXYCODONE HYDROCHLORIDE 5 MG/1
5 TABLET ORAL EVERY 4 HOURS PRN
Status: DISCONTINUED | OUTPATIENT
Start: 2021-11-16 | End: 2021-11-22 | Stop reason: HOSPADM

## 2021-11-16 RX ORDER — OXYCODONE HYDROCHLORIDE 5 MG/1
2.5 TABLET ORAL EVERY 4 HOURS PRN
Status: DISCONTINUED | OUTPATIENT
Start: 2021-11-16 | End: 2021-11-22 | Stop reason: HOSPADM

## 2021-11-16 RX ORDER — MAGNESIUM SULFATE IN WATER 40 MG/ML
2000 INJECTION, SOLUTION INTRAVENOUS ONCE
Status: COMPLETED | OUTPATIENT
Start: 2021-11-16 | End: 2021-11-16

## 2021-11-16 RX ORDER — SODIUM CHLORIDE, SODIUM LACTATE, POTASSIUM CHLORIDE, CALCIUM CHLORIDE 600; 310; 30; 20 MG/100ML; MG/100ML; MG/100ML; MG/100ML
INJECTION, SOLUTION INTRAVENOUS CONTINUOUS
Status: DISCONTINUED | OUTPATIENT
Start: 2021-11-17 | End: 2021-11-17

## 2021-11-16 RX ADMIN — DOXAZOSIN 1 MG: 2 TABLET ORAL at 09:50

## 2021-11-16 RX ADMIN — ALBUTEROL SULFATE 2.5 MG: 2.5 SOLUTION RESPIRATORY (INHALATION) at 08:29

## 2021-11-16 RX ADMIN — AMLODIPINE BESYLATE 5 MG: 5 TABLET ORAL at 09:49

## 2021-11-16 RX ADMIN — OXYCODONE 5 MG: 5 TABLET ORAL at 10:00

## 2021-11-16 RX ADMIN — ALBUTEROL SULFATE 2.5 MG: 2.5 SOLUTION RESPIRATORY (INHALATION) at 11:33

## 2021-11-16 RX ADMIN — CILOSTAZOL 50 MG: 50 TABLET ORAL at 09:50

## 2021-11-16 RX ADMIN — SODIUM CHLORIDE, PRESERVATIVE FREE 10 ML: 5 INJECTION INTRAVENOUS at 09:51

## 2021-11-16 RX ADMIN — METOPROLOL TARTRATE 100 MG: 100 TABLET, FILM COATED ORAL at 20:12

## 2021-11-16 RX ADMIN — FUROSEMIDE 40 MG: 40 TABLET ORAL at 09:50

## 2021-11-16 RX ADMIN — DULOXETINE HYDROCHLORIDE 20 MG: 20 CAPSULE, DELAYED RELEASE ORAL at 09:49

## 2021-11-16 RX ADMIN — ATORVASTATIN CALCIUM 80 MG: 80 TABLET, FILM COATED ORAL at 09:49

## 2021-11-16 RX ADMIN — ISOSORBIDE MONONITRATE 15 MG: 30 TABLET, EXTENDED RELEASE ORAL at 09:50

## 2021-11-16 RX ADMIN — POTASSIUM CHLORIDE 10 MEQ: 10 TABLET, EXTENDED RELEASE ORAL at 09:50

## 2021-11-16 RX ADMIN — ALLOPURINOL 100 MG: 100 TABLET ORAL at 09:49

## 2021-11-16 RX ADMIN — LOSARTAN POTASSIUM 100 MG: 100 TABLET, FILM COATED ORAL at 09:49

## 2021-11-16 RX ADMIN — ALBUTEROL SULFATE 2.5 MG: 2.5 SOLUTION RESPIRATORY (INHALATION) at 23:00

## 2021-11-16 RX ADMIN — BUDESONIDE 250 MCG: 0.25 SUSPENSION RESPIRATORY (INHALATION) at 08:29

## 2021-11-16 RX ADMIN — SODIUM CHLORIDE, PRESERVATIVE FREE 10 ML: 5 INJECTION INTRAVENOUS at 20:12

## 2021-11-16 RX ADMIN — METOPROLOL TARTRATE 100 MG: 100 TABLET, FILM COATED ORAL at 09:49

## 2021-11-16 RX ADMIN — OXYCODONE 5 MG: 5 TABLET ORAL at 16:13

## 2021-11-16 RX ADMIN — OXYCODONE 5 MG: 5 TABLET ORAL at 03:17

## 2021-11-16 RX ADMIN — OXYCODONE 5 MG: 5 TABLET ORAL at 20:12

## 2021-11-16 RX ADMIN — ENOXAPARIN SODIUM 40 MG: 100 INJECTION SUBCUTANEOUS at 09:50

## 2021-11-16 RX ADMIN — MAGNESIUM SULFATE HEPTAHYDRATE 2000 MG: 2 INJECTION, SOLUTION INTRAVENOUS at 09:57

## 2021-11-16 RX ADMIN — BUDESONIDE 250 MCG: 0.25 SUSPENSION RESPIRATORY (INHALATION) at 23:00

## 2021-11-16 RX ADMIN — CILOSTAZOL 50 MG: 50 TABLET ORAL at 20:12

## 2021-11-16 RX ADMIN — PANTOPRAZOLE SODIUM 40 MG: 40 TABLET, DELAYED RELEASE ORAL at 06:35

## 2021-11-16 RX ADMIN — SODIUM CHLORIDE, POTASSIUM CHLORIDE, SODIUM LACTATE AND CALCIUM CHLORIDE: 600; 310; 30; 20 INJECTION, SOLUTION INTRAVENOUS at 23:16

## 2021-11-16 ASSESSMENT — PAIN SCALES - GENERAL
PAINLEVEL_OUTOF10: 9
PAINLEVEL_OUTOF10: 7

## 2021-11-16 ASSESSMENT — PAIN DESCRIPTION - PAIN TYPE
TYPE: CHRONIC PAIN
TYPE: CHRONIC PAIN

## 2021-11-16 ASSESSMENT — PAIN DESCRIPTION - ONSET: ONSET: ON-GOING

## 2021-11-16 ASSESSMENT — PAIN DESCRIPTION - DESCRIPTORS: DESCRIPTORS: SHARP

## 2021-11-16 ASSESSMENT — PAIN DESCRIPTION - ORIENTATION
ORIENTATION: RIGHT;LEFT
ORIENTATION: RIGHT;LEFT

## 2021-11-16 ASSESSMENT — PAIN DESCRIPTION - LOCATION
LOCATION: FOOT
LOCATION: FOOT

## 2021-11-16 ASSESSMENT — PAIN DESCRIPTION - PROGRESSION: CLINICAL_PROGRESSION: NOT CHANGED

## 2021-11-16 ASSESSMENT — PAIN DESCRIPTION - FREQUENCY: FREQUENCY: CONTINUOUS

## 2021-11-16 ASSESSMENT — PAIN - FUNCTIONAL ASSESSMENT: PAIN_FUNCTIONAL_ASSESSMENT: PREVENTS OR INTERFERES SOME ACTIVE ACTIVITIES AND ADLS

## 2021-11-16 NOTE — PROGRESS NOTES
PRE-OP NOTE  Department of Surgery      Chief Complaint or Reason for Surgery: RLE PAD    Procedure: Right femoral endarterectomy, iliac stent placement  Expected time: 1200 on     Plan  1. Diet: NPO at midnight,  IVF:  @ 000  2. Antibiotics: ancef OCTOR  3. Labs to be drawn: Type and Screen, INR both drawn 11/15  4. Anesthesia to see patient   5. Consent: signed and placed in chart  6. CXR: 11/15  7. EK/15      The risks, benefits, anticipated post-operative course and alternative therapies were discussed with the patient and all questions were answered.      Eligio Melton MD  21  1:18 PM

## 2021-11-16 NOTE — PROGRESS NOTES
Surgery Daily Progress Note      CC: RLE critical limb ischemia    SUBJECTIVE:  No issues. Complaining of some pain to the RLE. AF,  HDS.     ROS:   A 14 point review of systems was conducted, significant findings as noted above. All other systems negative. OBJECTIVE:    PHYSICAL EXAM:  Vitals:    11/15/21 2248 11/15/21 2305 11/15/21 2351 11/16/21 0312   BP: (!) 182/82  131/74 (!) 142/80   Pulse: 92   83   Resp: 18 18  18   Temp: 97.7 °F (36.5 °C)   97.9 °F (36.6 °C)   TempSrc: Oral   Oral   SpO2: 93% 98%  93%   Weight:       Height:           General appearance: alert, resting in bed, chronically ill-appearing  Neuro: A&Ox3, no focal deficits  HENT: trachea midline, no JVD, no LAD  Eyes: PERRLA, no scleral icterus  Chest/Lungs: slightly increased work of breathing, on 6LNC  Cardiovascular: RRR  Skin: RLE with erythema, cool to touch, pulse exam as detailed below  Extremities: as pictured below     Pulses     F P AT PT DP   R -/+ -/+ -/- -/+ -/-   L -/+ -/+ -/- -/+ -/+    +, -/+ ,-/-      Weakly monphasic pulses for L PT/DP and R PT                ASSESSMENT & PLAN:   Rivera Baker is a 73 y.o. male with CAD, CHF, COPD, previous MI, previous TIAs, HLD, HTN, previous DVTs, hospital admission for PE on 9/16/21 and PVD who is direct admitted for planned RLE femoral endarterectomy with iliac stent, possible femoral angioplasty and stent (11/17). Patient is afebrile and HDS.  Patient to be admitted for optimization prior to surgery.     - procedure as listed above planned for Wednesday, 11/17/21; patient pre-op'd and consented  - CXR and EKG ordered, will hold off on IVFs  - eliquis to be held  - patient with severe COPD; scheduled pulmonary treatments on baseline 6L  - encourage OOB/ambulation      Ronald Kaur MD, PGY-1  11/16/21 6:20 AM  Pager: [phone]

## 2021-11-16 NOTE — CARE COORDINATION
Mehnaz Sorensenider from [employer] [facility] called to let us know that the patient is active with their program. He currently gets the following services:  Home care assistance with [facility] on Wheels with [address] [employer] alert medical monitoring with [employer]    If you have any questions you can call Darshana at [phone].     Thank you  Vicky Sterling

## 2021-11-16 NOTE — CARE COORDINATION
Cm following, pt from home active with [facility] and [facility] Winters are. Plan OR 11/17 with vascular for Rt Fem Endarterectomy and iliac stent placement. Will need PT OT evals post-op for DC recs.   Electronically signed by Radha Concepcion RN on 11/16/2021 at 3:02 PM  [phone]

## 2021-11-16 NOTE — PROGRESS NOTES
Patient AOx4. VSS, except BP was elevated, but after nightly medication BP came down. Patient has venous ulcers to his RLE. Patient using a urinal.  Patient given pain medication per order for pain 9/10. Patient has decreased sensation to his BLE, especially the right. No pulses even with doppler. Bed in lowest position. Bedside table and call light within reach. Bed alarm on. Will continue to monitor.

## 2021-11-16 NOTE — PLAN OF CARE
Problem: Falls - Risk of:  Goal: Will remain free from falls  Outcome: Met This Shift     Problem: Falls - Risk of:  Goal: Absence of physical injury  Outcome: Met This Shift     Problem: Pain:  Goal: Pain level will decrease  Outcome: Ongoing

## 2021-11-16 NOTE — ANESTHESIA PRE PROCEDURE
Department of Anesthesiology  Preprocedure Note       Name:  Andrea Muñoz. Age:  73 y.o.  :  1950                                          MRN:  [id number]         Date:  2021      Surgeon: Eneida Kurtz):  Jose Larose MD    Procedure: Procedure(s):  RIGHT FEMORAL ENDARTERECTOMY AND ILIAC ARTERY STENT    Medications prior to admission:   Prior to Admission medications    Medication Sig Start Date End Date Taking?  Authorizing Provider   Cyanocobalamin (B-12) 100 MCG TABS 1 tablet 21  Yes Historical Provider, MD   doxazosin (CARDURA) 1 MG tablet 1 tablet 21  Yes Historical Provider, MD   DULoxetine (CYMBALTA) 20 MG extended release capsule 1 capsule 21  Yes Historical Provider, MD   potassium chloride (MICRO-K) 10 MEQ extended release capsule 1 tablet 21  Yes Historical Provider, MD   Cholecalciferol (VITAMIN D-3) 25 MCG (1000 UT) CAPS 1 tablet 21  Yes Historical Provider, MD DAWKINS DVT/PE STARTER PACK 5 MG TBPK tablet  21  Yes Historical Provider, MD   cilostazol (PLETAL) 50 MG tablet Take 1 tablet by mouth 2 times daily 10/8/21  Yes Leni Maharaj DO   colchicine (COLCRYS) 0.6 MG tablet Take 0.6 mg by mouth as needed   Yes Historical Provider, MD   omeprazole (PRILOSEC) 40 MG delayed release capsule  21  Yes Historical Provider, MD   fluticasone-umeclidin-vilant (TRELEGY ELLIPTA) 100-62.5-25 MCG/INH AEPB Inhale 1 puff into the lungs daily 21  Yes Leni Maharaj DO   isosorbide mononitrate (IMDUR) 30 MG extended release tablet Take 0.5 tablets by mouth daily 21  Yes Karlos Fernandez MD   folic acid (FOLVITE) 1 MG tablet Take 1 tablet by mouth daily 21  Yes Karlos Fernandez MD   albuterol sulfate  (90 Base) MCG/ACT inhaler Inhale 2 puffs into the lungs every 6 hours as needed for Wheezing 21  Yes Leni Maharaj DO   allopurinol (ZYLOPRIM) 100 MG tablet Take 1 tablet by mouth daily 21  Yes Leni Maharaj DO   losartan (COZAAR) 100 MG tablet TAKE 1 TABLET BY MOUTH EVERY DAY 8/25/21  Yes Russel Mendez DO   atorvastatin (LIPITOR) 80 MG tablet Take 1 tablet by mouth daily 8/25/21  Yes Russel Mendez DO   nitroGLYCERIN (NITROSTAT) 0.4 MG SL tablet Place 1 tablet under the tongue every 5 minutes as needed for Chest pain 8/25/21  Yes Russel Mendez DO   furosemide (LASIX) 40 MG tablet Take 1 tablet by mouth daily 8/25/21  Yes Russel Mendez DO   metoprolol (LOPRESSOR) 100 MG tablet Take 1 tablet by mouth 2 times daily 8/25/21 11/23/21 Yes Russel Mendez DO   magnesium gluconate (MAGONATE) 500 MG tablet Take 500 mg by mouth daily.    Yes Historical Provider, MD   AMLODIPINE BENZOATE PO 5 mg: TAKE 1 TAB DAILY 9/30/21   Historical Provider, MD   predniSONE (DELTASONE) 10 MG tablet Per instructions: 6 TABLETS 2 TIMES DAILY FOR 2 DAYS 4 TABLETS 2 TIMES DAILY FOR 2 DAYS 2 TABLETS 2 TIMES DAILY FOR 2 DAYS 1 TABLET 2 TIMES DAILY FOR 2 DAYS 9/20/21   Historical Provider, MD   nicotine (NICODERM CQ) 21 MG/24HR Place 1 patch onto the skin every 24 hours 9/24/21 9/24/22  Russel Mendez DO   pantoprazole (PROTONIX) 40 MG tablet Take 1 tablet by mouth daily for 10 days 9/20/21 9/30/21  Timur Koch MD       Current medications:    Current Facility-Administered Medications   Medication Dose Route Frequency Provider Last Rate Last Admin    oxyCODONE (ROXICODONE) immediate release tablet 2.5 mg  2.5 mg Oral Q4H PRN Davi Lebron,         Or    oxyCODONE (ROXICODONE) immediate release tablet 5 mg  5 mg Oral Q4H PRN Angel Sepulveda, DO   5 mg at 11/16/21 1000    HYDROmorphone (DILAUDID) injection 0.25 mg  0.25 mg IntraVENous Q3H PRN Angel Sepulveda, DO        Or    HYDROmorphone (DILAUDID) injection 0.5 mg  0.5 mg IntraVENous Q3H PRN Angel Sepulveda, DO        [START ON 11/17/2021] ceFAZolin (ANCEF) 2000 mg in dextrose 5 % 50 mL IVPB  2,000 mg IntraVENous On Call to MD Stacey Pineda ON 11/17/2021] lactated ringers infusion   IntraVENous Continuous Antonio Rangel MD  albuterol (PROVENTIL) nebulizer solution 2.5 mg  2.5 mg Nebulization 4x daily Rosezena Siemens, MD   2.5 mg at 11/16/21 1133    allopurinol (ZYLOPRIM) tablet 100 mg  100 mg Oral Daily Rosezena Siemens, MD   100 mg at 11/16/21 0949    amLODIPine (NORVASC) tablet 5 mg  5 mg Oral Daily Rosezena Siemens, MD   5 mg at 11/16/21 0949    atorvastatin (LIPITOR) tablet 80 mg  80 mg Oral Daily Rosezena Siemens, MD   80 mg at 11/16/21 0949    cilostazol (PLETAL) tablet 50 mg  50 mg Oral BID Rosezena Siemens, MD   50 mg at 11/16/21 0950    DULoxetine (CYMBALTA) extended release capsule 20 mg  20 mg Oral Daily Rosezena Siemens, MD   20 mg at 11/16/21 0949    furosemide (LASIX) tablet 40 mg  40 mg Oral Daily Rosezena Siemens, MD   40 mg at 11/16/21 0950    isosorbide mononitrate (IMDUR) extended release tablet 15 mg  15 mg Oral Daily Rosezena Siemens, MD   15 mg at 11/16/21 0950    losartan (COZAAR) tablet 100 mg  100 mg Oral Daily Rosezena Siemens, MD   100 mg at 11/16/21 0949    metoprolol (LOPRESSOR) tablet 100 mg  100 mg Oral BID Rosezena Siemens, MD   100 mg at 11/16/21 0949    pantoprazole (PROTONIX) tablet 40 mg  40 mg Oral QAM AC Rosezena Siemens, MD   40 mg at 11/16/21 9692    potassium chloride (KLOR-CON M) extended release tablet 10 mEq  10 mEq Oral Daily Rosezena Siemens, MD   10 mEq at 11/16/21 0950    doxazosin (CARDURA) tablet 1 mg  1 mg Oral Daily Rosezena Siemens, MD   1 mg at 11/16/21 0950    sodium chloride flush 0.9 % injection 10 mL  10 mL IntraVENous 2 times per day Rosezena Siemens, MD   10 mL at 11/16/21 0951    sodium chloride flush 0.9 % injection 10 mL  10 mL IntraVENous PRN Rosezena Siemens, MD        0.9 % sodium chloride infusion  25 mL IntraVENous PRN Rosezena Siemens, MD        ondansetron (ZOFRAN-ODT) disintegrating tablet 4 mg  4 mg Oral Q8H PRN Rosezena Siemens, MD        Or    ondansetron [facility]) injection 4 mg  4 mg IntraVENous Q6H PRN Rosezena Siemens, MD        enoxaparin (LOVENOX) injection 40 mg  40 mg SubCUTAneous Daily Julian Metcalf MD   40 mg at 11/16/21 0950    acetaminophen (TYLENOL) tablet 650 mg  650 mg Oral Q4H PRN Julian Metcalf MD        tiotropium-olodaterol (STIOLTO) 2.5-2.5 MCG/ACT inhaler 2 puff  2 puff Inhalation Daily Julian Metcalf MD        And    budesonide (PULMICORT) nebulizer suspension 250 mcg  0.25 mg Nebulization BID Julian Metcalf MD   250 mcg at 11/16/21 0858       Allergies: Allergies   Allergen Reactions    Asa [Aspirin] Other (See Comments)     Stomach ache    Daliresp [Roflumilast] Diarrhea and Other (See Comments)     Severe diarrhea and did not help       Problem List:    Patient Active Problem List   Diagnosis Code    Essential hypertension I10    Hyperlipemia E78.5    Degeneration of intervertebral disc of lumbar region M51.36    KAVITA and COPD overlap syndrome ([facility] G47.33, J44.9    PVD [facility]peripheral vascular disease) [facility] I73.9    Coronary artery disease involving native coronary artery of native heart without angina pectoris I25.10    Idiopathic peripheral neuropathy G60.9    Pulmonary nodule R91.1    Chronic, continuous use of opioids F11.90    Chronic pain syndrome G89.4    Gastroesophageal reflux disease K21.9    Sacroiliitis, not elsewhere classified ([facility] M46.1    Other spondylosis, lumbosacral region M47.897    Chronic diastolic heart failure (HCC) I50.32    Cigarette nicotine dependence without complication I25.941    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    Pulmonary embolism without acute cor pulmonale (HCC) N78.18    Diastolic dysfunction with chronic heart failure (HCC) I50.32    Dependence on other enabling machines and devices Z99.89    Dependence on supplemental oxygen Z99.81    Hereditary and idiopathic neuropathy, unspecified G60.9    Hypertensive heart disease with heart failure (HCC) I11.0    Long term (current) use of anticoagulants Z79.01    Problems related to living alone Z60.2    Athscl heart disease of native coronary artery w/o ang pctrs I25.10    Presence of coronary angioplasty implant and graft Z95.5    Pulmonary embolism (HCC) I26.99    Critical ischemia of lower extremity (HCC) I70.229       Past Medical History:        Diagnosis Date    CAD (coronary artery disease)     CHF (congestive heart failure) (HCC)     COPD (chronic obstructive pulmonary disease) (HCC)     Depressive disorder, not elsewhere classified     GERD (gastroesophageal reflux disease)     History of colon polyps - 30 seen on colonoscopy 04/2021 4/24/2021    History of MI (myocardial infarction) 05/2013    History of skin ulcer of lower extremity     R anterior shin    History of transient ischemic attack (TIA)     Hyperlipidemia     Hypertension     Neuropathy     KAVITA (obstructive sleep apnea)     On CPAP    Personal history of DVT (deep vein thrombosis)     PVD (peripheral vascular disease) ([facility]     TIA (transient ischemic attack) 2013    Vertebral fracture        Past Surgical History:        Procedure Laterality Date    APPENDECTOMY      CHOLECYSTECTOMY, LAPAROSCOPIC      COLONOSCOPY  4/23/2021    COLONOSCOPY POLYPECTOMY SNARE/COLD BIOPSY performed by Amie Pineda MD at [facility]  4/23/2021    COLONOSCOPY POLYPECTOMY ABLATION performed by Amie Pineda MD at [address] [place] [address]  4/23/2021    COLONOSCOPY CONTROL HEMORRHAGE performed by Amie Pineda MD at [address] [place] [address]  6/2013    EYE SURGERY Left     LAMINECTOMY  11/18/2015    Bilateral decompressive lumbar laminectomy L4-5   ; medial facetectomy L4-5 joints  bilaterally; foraminotomies l5   nerve roots bilaterally    LEG DEBRIDEMENT Right 7/23/2013    Dr. Erick Worley - pretibial wound    OTHER SURGICAL HISTORY Right 6/25/2013    Dr. Erick Worley - CFA Endarterectomy w/marsupialization; RLE wound excision & debridement     OTHER SURGICAL HISTORY Left 8/27/2013    Dr. Erick Worley - femoral & profunda femoris endarterectomy w/marsupialization patch angioplasty using SFA    SKIN SPLIT GRAFT Right 7/25/2013    Dr. Marnell Canavan - to non-healing R pretibial wound, 9 x 15 cm    TOTAL HIP ARTHROPLASTY Right 09/01/2016    Dr. Anna Schmidt Left 12/22/2015    Dr. Marnell Canavan - CFA exploration, thrombectomy of ileofemoral system, stenting of RAFAL in-stent stenosis w/8 x 37 mm Palmaz        Social History:    Social History     Tobacco Use    Smoking status: Current Every Day Smoker     Packs/day: 0.50     Years: 52.00     Pack years: 26.00     Types: Cigarettes     Start date: 1/1/1967    Smokeless tobacco: Never Used   Substance Use Topics    Alcohol use: Yes     Alcohol/week: 0.0 standard drinks     Comment: 2-3 beers a month                                Ready to quit: Not Answered  Counseling given: Not Answered      Vital Signs (Current):   Vitals:    11/16/21 0312 11/16/21 0746 11/16/21 0832 11/16/21 1108   BP: (!) 142/80 132/74  (!) 94/48   Pulse: 83 81  92   Resp: 18 18  18   Temp: 97.9 °F (36.6 °C) 97.6 °F (36.4 °C)  97.7 °F (36.5 °C)   TempSrc: Oral Oral  Oral   SpO2: 93% 96% 96% 90%   Weight:       Height:                                                  BP Readings from Last 3 Encounters:   11/16/21 (!) 94/48   11/04/21 (!) 104/58   10/30/21 132/72       NPO Status:                                                                                 BMI:   Wt Readings from Last 3 Encounters:   11/15/21 212 lb 11.9 oz (96.5 kg)   11/04/21 209 lb (94.8 kg)   10/30/21 210 lb (95.3 kg)     Body mass index is 30.53 kg/m².     CBC:   Lab Results   Component Value Date    WBC 7.1 11/16/2021    RBC 3.68 11/16/2021    HGB 13.2 11/16/2021    HCT 38.9 11/16/2021    .5 11/16/2021    RDW 13.3 11/16/2021     11/16/2021       CMP:   Lab Results   Component Value Date     11/16/2021    K 4.0 11/16/2021    K 4.7 10/30/2021     11/16/2021    CO2 23 11/16/2021    BUN 10 11/16/2021 CREATININE 0.9 11/16/2021    GFRAA >60 11/16/2021    AGRATIO 1.3 12/05/2020    LABGLOM >60 11/16/2021    GLUCOSE 96 11/16/2021    PROT 6.7 09/16/2021    CALCIUM 8.7 11/16/2021    BILITOT 0.7 09/16/2021    ALKPHOS 89 09/16/2021    AST 18 09/16/2021    ALT 20 09/16/2021       POC Tests: No results for input(s): POCGLU, POCNA, POCK, POCCL, POCBUN, POCHEMO, POCHCT in the last 72 hours. Coags:   Lab Results   Component Value Date    PROTIME 12.8 11/15/2021    INR 1.13 11/15/2021    APTT 32.2 09/16/2021       HCG (If Applicable): No results found for: PREGTESTUR, PREGSERUM, HCG, HCGQUANT     ABGs:   Lab Results   Component Value Date    PHART 7.418 09/18/2021    PO2ART 46.6 09/18/2021    GLD0JWY 36.2 09/18/2021    XYU9IRY 23.4 09/18/2021    BEART -1 09/18/2021    P8VKSGGM 83 09/18/2021        Type & Screen (If Applicable):  Lab Results   Component Value Date    LABABO B%POS^^POSITIVE 08/10/2016       Drug/Infectious Status (If Applicable):  No results found for: HIV, HEPCAB    COVID-19 Screening (If Applicable):   Lab Results   Component Value Date    COVID19 Not Detected 04/19/2021    COVID19 Not Detected 12/05/2020           Anesthesia Evaluation  Patient summary reviewed no history of anesthetic complications:   Airway: Mallampati: IV        Dental:    (+) edentulous      Pulmonary:   (+) COPD:  sleep apnea: on noncompliant,                            ROS comment: Smokes 2 ppd up to a few months ago  On home O2  6 liters   Cardiovascular:    (+) hypertension:, past MI:, CAD:, CHF (3 episodes of CHF this yr):,     CABG/stent: PTCA  with stent              ROS comment:    Normal left ventricle size. There is mild to moderate concentric left   ventricular hypertrophy. Overall left ventricular systolic function appears   normal with an ejection fraction of 55-60%. No regional wall motion   abnormalities are noted. Diastolic filling parameters suggests normal   diastolic function.    Estimated pulmonary artery systolic pressure is 39 mmHg assuming a right   atrial pressure of 8 mmHg. Signature   PVD   ------------------------------------------------------------------   Electronically signed by Kodi Harrison MD (Interpreting   physician) on 09/17/2021 at 03:43 PM   ------------------------------------------------------------------     Neuro/Psych:   (+) TIA,              ROS comment: Neuropathy and back pain  GI/Hepatic/Renal:   (+) GERD:,           Endo/Other:    (+) : arthritis (gout):., .                 Abdominal:             Vascular: Other Findings:             Anesthesia Plan      general     ASA 3       Induction: intravenous. Anesthetic plan and risks discussed with patient.                       Jesu Ramires MD   11/16/2021

## 2021-11-16 NOTE — PROGRESS NOTES
Physician Progress Note      Rain Ashby  [facility] #:                  [id number]  :                       1950  ADMIT DATE:       11/15/2021 5:15 PM  DISCH DATE:  RESPONDING  PROVIDER #:        Angle Kohli TEXT:    Pt admitted with RLE pain and was diagnosed with acute pulmonary embolism on   . Home Eliquis held currently for [facility]. If possible, please   document in progress notes and discharge summary if you are evaluating and/or   treating any of the following: The medical record reflects the following:  Risk Factors: Smoker  Clinical Indicators: Sep 16, 2021 CTA: Acute pulmonary emboli most extensively   involving the right middle lobe and to a lesser degree right lower lobe. Treatment: Home Eliquis held currently for [facility] OR, Home O2- 6L n.c.  Options provided:  -- Subacute pulmonary embolism  -- Acute pulmonary embolism  -- Chronic pulmonary embolism  -- Other - I will add my own diagnosis  -- Disagree - Not applicable / Not valid  -- Disagree - Clinically unable to determine / Unknown  -- Refer to Clinical Documentation Reviewer    PROVIDER RESPONSE TEXT:    Provider disagreed with this query. Patient has a history of DVT and PE. We are holding Eliquis for procedure, not   as treatment of PE. Query created by: Ritu Linda on 2021 11:41 AM      QUERY TEXT:    Pt noted to have severe COPD and continues on home O2-6L. If possible, please   document in the progress notes and discharge summary if you are evaluating   and/or treating any of the following: The medical record reflects the following:  Risk Factors: Severe COPD, Smoker, KAVITA on CPAP  Clinical Indicators: Per H&P \"patient with severe COPD; scheduled pulmonary   treatments\"; Per Nursing \"6L per use at home\";  Per PCP office visit 10/8 \"He   is still on 6 L nasal cannula;  KAVITA and COPD overlap syndrome\"  Treatment: Continues on home O2- 6L, Scheduled HHNs/ MDI  Options provided:  -- Chronic respiratory failure with hypoxia  -- Chronic respiratory failure with hypercapnia  -- Chronic respiratory failure with hypoxia and hypercapnia  -- Other - I will add my own diagnosis  -- Disagree - Not applicable / Not valid  -- Disagree - Clinically unable to determine / Unknown  -- Refer to Clinical Documentation Reviewer    PROVIDER RESPONSE TEXT:    This patient has chronic respiratory failure with hypoxia. Query created by: Calvin Linda on 11/16/2021 11:52 AM      QUERY TEXT:    Pt noted to have diastolic CHF, continues on home meds Norvasc, Cardura,   Lasix, Imdur, Lopressor. If possible, please document in progress notes and   discharge summary if you are evaluating and/or treating any of the following: The medical record reflects the following:  Risk Factors: HTN, CAD  Clinical Indicators: Per PCP office visit 10/8 \" Chronic diastolic heart   failure\"; \"HFpEF\" noted on recent admission in September. Treatment:  continues on home meds Norvasc, Cardura, Lasix, Imdur, Lopressor  Options provided:  -- Chronic Diastolic CHF/HFpEF  -- Other - I will add my own diagnosis  -- Disagree - Not applicable / Not valid  -- Disagree - Clinically unable to determine / Unknown  -- Refer to Clinical Documentation Reviewer    PROVIDER RESPONSE TEXT:    This patient has chronic diastolic CHF/HFpEF. Query created by:  Dulce Junior on 11/16/2021 11:57 AM      Electronically signed by:  Venkatesh Lazo 11/16/2021 12:45 PM

## 2021-11-16 NOTE — PROGRESS NOTES
Patient alert and oriented x4. vSS Patient continues to complain of pain in R lower leg, medicated with prn pain medication with benefit. Patient tolerating diet with no c/o nausea throughout shift. Patient resting comfortably. Patient denies any other needs at this time. Bed is in the lowest position, call light and bedside table within reach. Patient bed alarm is on. Will continue to monitor for changes in patient status.      Electronically signed by Ely Sanabria RN on 11/16/2021 at 12:27 PM

## 2021-11-17 ENCOUNTER — ANESTHESIA (OUTPATIENT)
Dept: OPERATING ROOM | Age: 71
DRG: 270 | End: 2021-11-17
Payer: MEDICARE

## 2021-11-17 ENCOUNTER — APPOINTMENT (OUTPATIENT)
Dept: GENERAL RADIOLOGY | Age: 71
DRG: 270 | End: 2021-11-17
Attending: SURGERY
Payer: MEDICARE

## 2021-11-17 VITALS — TEMPERATURE: 98.2 F | DIASTOLIC BLOOD PRESSURE: 67 MMHG | OXYGEN SATURATION: 91 % | SYSTOLIC BLOOD PRESSURE: 122 MMHG

## 2021-11-17 LAB
ALBUMIN SERPL-MCNC: 3.6 G/DL (ref 3.4–5)
ANION GAP SERPL CALCULATED.3IONS-SCNC: 12 MMOL/L (ref 3–16)
APTT: 48.5 SEC (ref 26.2–38.6)
BASE EXCESS ARTERIAL: -1 (ref -3–3)
BASOPHILS ABSOLUTE: 0.1 K/UL (ref 0–0.2)
BASOPHILS RELATIVE PERCENT: 1 %
BLOOD BANK DISPENSE STATUS: NORMAL
BLOOD BANK DISPENSE STATUS: NORMAL
BLOOD BANK PRODUCT CODE: NORMAL
BLOOD BANK PRODUCT CODE: NORMAL
BPU ID: NORMAL
BPU ID: NORMAL
BUN BLDV-MCNC: 14 MG/DL (ref 7–20)
CALCIUM IONIZED: 1.42 MMOL/L (ref 1.12–1.32)
CALCIUM SERPL-MCNC: 8.9 MG/DL (ref 8.3–10.6)
CHLORIDE BLD-SCNC: 101 MMOL/L (ref 99–110)
CO2: 23 MMOL/L (ref 21–32)
CREAT SERPL-MCNC: 0.9 MG/DL (ref 0.8–1.3)
DESCRIPTION BLOOD BANK: NORMAL
DESCRIPTION BLOOD BANK: NORMAL
EOSINOPHILS ABSOLUTE: 0.2 K/UL (ref 0–0.6)
EOSINOPHILS RELATIVE PERCENT: 2.9 %
GFR AFRICAN AMERICAN: >60
GFR AFRICAN AMERICAN: >60
GFR NON-AFRICAN AMERICAN: >60
GFR NON-AFRICAN AMERICAN: >60
GLUCOSE BLD-MCNC: 100 MG/DL (ref 70–99)
GLUCOSE BLD-MCNC: 121 MG/DL (ref 70–99)
HCO3 ARTERIAL: 24.4 MMOL/L (ref 21–29)
HCT VFR BLD CALC: 38.7 % (ref 40.5–52.5)
HEMOGLOBIN: 13.4 G/DL (ref 13.5–17.5)
INR BLD: 1.08 (ref 0.88–1.12)
LYMPHOCYTES ABSOLUTE: 1.7 K/UL (ref 1–5.1)
LYMPHOCYTES RELATIVE PERCENT: 22.1 %
MAGNESIUM: 1.6 MG/DL (ref 1.8–2.4)
MCH RBC QN AUTO: 36 PG (ref 26–34)
MCHC RBC AUTO-ENTMCNC: 34.6 G/DL (ref 31–36)
MCV RBC AUTO: 104 FL (ref 80–100)
MONOCYTES ABSOLUTE: 0.6 K/UL (ref 0–1.3)
MONOCYTES RELATIVE PERCENT: 7.3 %
NEUTROPHILS ABSOLUTE: 5.1 K/UL (ref 1.7–7.7)
NEUTROPHILS RELATIVE PERCENT: 66.7 %
O2 SAT, ARTERIAL: 89 % (ref 93–100)
PCO2 ARTERIAL: 42.9 MM HG (ref 35–45)
PDW BLD-RTO: 13.6 % (ref 12.4–15.4)
PERFORMED ON: ABNORMAL
PH ARTERIAL: 7.36 (ref 7.35–7.45)
PHOSPHORUS: 4.4 MG/DL (ref 2.5–4.9)
PLATELET # BLD: 129 K/UL (ref 135–450)
PMV BLD AUTO: 9 FL (ref 5–10.5)
PO2 ARTERIAL: 59.4 MM HG (ref 75–108)
POC ACT LR: 205 SEC
POC CREATININE: 0.9 MG/DL (ref 0.8–1.3)
POC POTASSIUM: 4 MMOL/L (ref 3.5–5.1)
POC SAMPLE TYPE: ABNORMAL
POC SODIUM: 139 MMOL/L (ref 136–145)
POTASSIUM SERPL-SCNC: 4.3 MMOL/L (ref 3.5–5.1)
PROTHROMBIN TIME: 12.3 SEC (ref 9.9–12.7)
RBC # BLD: 3.72 M/UL (ref 4.2–5.9)
SODIUM BLD-SCNC: 136 MMOL/L (ref 136–145)
TCO2 ARTERIAL: 26 MMOL/L
WBC # BLD: 7.7 K/UL (ref 4–11)

## 2021-11-17 PROCEDURE — 2580000003 HC RX 258: Performed by: SURGERY

## 2021-11-17 PROCEDURE — 85610 PROTHROMBIN TIME: CPT

## 2021-11-17 PROCEDURE — 94761 N-INVAS EAR/PLS OXIMETRY MLT: CPT

## 2021-11-17 PROCEDURE — 6360000002 HC RX W HCPCS: Performed by: STUDENT IN AN ORGANIZED HEALTH CARE EDUCATION/TRAINING PROGRAM

## 2021-11-17 PROCEDURE — 2580000003 HC RX 258: Performed by: NURSE ANESTHETIST, CERTIFIED REGISTERED

## 2021-11-17 PROCEDURE — 94150 VITAL CAPACITY TEST: CPT

## 2021-11-17 PROCEDURE — 2500000003 HC RX 250 WO HCPCS: Performed by: NURSE ANESTHETIST, CERTIFIED REGISTERED

## 2021-11-17 PROCEDURE — 04CH0ZZ EXTIRPATION OF MATTER FROM RIGHT EXTERNAL ILIAC ARTERY, OPEN APPROACH: ICD-10-PCS | Performed by: SURGERY

## 2021-11-17 PROCEDURE — 82565 ASSAY OF CREATININE: CPT

## 2021-11-17 PROCEDURE — 2580000003 HC RX 258: Performed by: STUDENT IN AN ORGANIZED HEALTH CARE EDUCATION/TRAINING PROGRAM

## 2021-11-17 PROCEDURE — 047H0DZ DILATION OF RIGHT EXTERNAL ILIAC ARTERY WITH INTRALUMINAL DEVICE, OPEN APPROACH: ICD-10-PCS | Performed by: SURGERY

## 2021-11-17 PROCEDURE — 80069 RENAL FUNCTION PANEL: CPT

## 2021-11-17 PROCEDURE — 85730 THROMBOPLASTIN TIME PARTIAL: CPT

## 2021-11-17 PROCEDURE — 6360000002 HC RX W HCPCS: Performed by: NURSE ANESTHETIST, CERTIFIED REGISTERED

## 2021-11-17 PROCEDURE — C1887 CATHETER, GUIDING: HCPCS | Performed by: SURGERY

## 2021-11-17 PROCEDURE — 84295 ASSAY OF SERUM SODIUM: CPT

## 2021-11-17 PROCEDURE — 82947 ASSAY GLUCOSE BLOOD QUANT: CPT

## 2021-11-17 PROCEDURE — 6360000004 HC RX CONTRAST MEDICATION: Performed by: SURGERY

## 2021-11-17 PROCEDURE — 7100000000 HC PACU RECOVERY - FIRST 15 MIN: Performed by: SURGERY

## 2021-11-17 PROCEDURE — 041H0JH BYPASS RIGHT EXTERNAL ILIAC ARTERY TO RIGHT FEMORAL ARTERY WITH SYNTHETIC SUBSTITUTE, OPEN APPROACH: ICD-10-PCS | Performed by: SURGERY

## 2021-11-17 PROCEDURE — 3600000004 HC SURGERY LEVEL 4 BASE: Performed by: SURGERY

## 2021-11-17 PROCEDURE — 88311 DECALCIFY TISSUE: CPT

## 2021-11-17 PROCEDURE — C1757 CATH, THROMBECTOMY/EMBOLECT: HCPCS | Performed by: SURGERY

## 2021-11-17 PROCEDURE — 04CK0ZZ EXTIRPATION OF MATTER FROM RIGHT FEMORAL ARTERY, OPEN APPROACH: ICD-10-PCS | Performed by: SURGERY

## 2021-11-17 PROCEDURE — C1725 CATH, TRANSLUMIN NON-LASER: HCPCS | Performed by: SURGERY

## 2021-11-17 PROCEDURE — 2000000000 HC ICU R&B

## 2021-11-17 PROCEDURE — 88304 TISSUE EXAM BY PATHOLOGIST: CPT

## 2021-11-17 PROCEDURE — 94669 MECHANICAL CHEST WALL OSCILL: CPT

## 2021-11-17 PROCEDURE — 84132 ASSAY OF SERUM POTASSIUM: CPT

## 2021-11-17 PROCEDURE — 2709999900 HC NON-CHARGEABLE SUPPLY: Performed by: SURGERY

## 2021-11-17 PROCEDURE — 7100000001 HC PACU RECOVERY - ADDTL 15 MIN: Performed by: SURGERY

## 2021-11-17 PROCEDURE — C1769 GUIDE WIRE: HCPCS | Performed by: SURGERY

## 2021-11-17 PROCEDURE — 2700000000 HC OXYGEN THERAPY PER DAY

## 2021-11-17 PROCEDURE — 2780000010 HC IMPLANT OTHER: Performed by: SURGERY

## 2021-11-17 PROCEDURE — P9041 ALBUMIN (HUMAN),5%, 50ML: HCPCS | Performed by: NURSE ANESTHETIST, CERTIFIED REGISTERED

## 2021-11-17 PROCEDURE — 85347 COAGULATION TIME ACTIVATED: CPT

## 2021-11-17 PROCEDURE — C1894 INTRO/SHEATH, NON-LASER: HCPCS | Performed by: SURGERY

## 2021-11-17 PROCEDURE — 82803 BLOOD GASES ANY COMBINATION: CPT

## 2021-11-17 PROCEDURE — 6360000002 HC RX W HCPCS: Performed by: SURGERY

## 2021-11-17 PROCEDURE — 82330 ASSAY OF CALCIUM: CPT

## 2021-11-17 PROCEDURE — C1760 CLOSURE DEV, VASC: HCPCS | Performed by: SURGERY

## 2021-11-17 PROCEDURE — 83735 ASSAY OF MAGNESIUM: CPT

## 2021-11-17 PROCEDURE — 6370000000 HC RX 637 (ALT 250 FOR IP): Performed by: STUDENT IN AN ORGANIZED HEALTH CARE EDUCATION/TRAINING PROGRAM

## 2021-11-17 PROCEDURE — 3209999900 FLUORO FOR SURGICAL PROCEDURES

## 2021-11-17 PROCEDURE — C1876 STENT, NON-COA/NON-COV W/DEL: HCPCS | Performed by: SURGERY

## 2021-11-17 PROCEDURE — 6370000000 HC RX 637 (ALT 250 FOR IP)

## 2021-11-17 PROCEDURE — 72170 X-RAY EXAM OF PELVIS: CPT

## 2021-11-17 PROCEDURE — 3700000001 HC ADD 15 MINUTES (ANESTHESIA): Performed by: SURGERY

## 2021-11-17 PROCEDURE — 85025 COMPLETE CBC W/AUTO DIFF WBC: CPT

## 2021-11-17 PROCEDURE — 6360000002 HC RX W HCPCS

## 2021-11-17 PROCEDURE — C1768 GRAFT, VASCULAR: HCPCS | Performed by: SURGERY

## 2021-11-17 PROCEDURE — 6370000000 HC RX 637 (ALT 250 FOR IP): Performed by: NURSE ANESTHETIST, CERTIFIED REGISTERED

## 2021-11-17 PROCEDURE — 3600000014 HC SURGERY LEVEL 4 ADDTL 15MIN: Performed by: SURGERY

## 2021-11-17 PROCEDURE — 3700000000 HC ANESTHESIA ATTENDED CARE: Performed by: SURGERY

## 2021-11-17 PROCEDURE — 36415 COLL VENOUS BLD VENIPUNCTURE: CPT

## 2021-11-17 PROCEDURE — 94640 AIRWAY INHALATION TREATMENT: CPT

## 2021-11-17 DEVICE — IMPLANTABLE DEVICE: Type: IMPLANTABLE DEVICE | Site: GROIN | Status: FUNCTIONAL

## 2021-11-17 DEVICE — XENOSURE BIOLOGIC PATCH, 0.8CM X 8CM, EIFU
Type: IMPLANTABLE DEVICE | Site: GROIN | Status: FUNCTIONAL
Brand: XENOSURE BIOLOGIC PATCH

## 2021-11-17 RX ORDER — IODIXANOL 320 MG/ML
INJECTION, SOLUTION INTRAVASCULAR PRN
Status: DISCONTINUED | OUTPATIENT
Start: 2021-11-17 | End: 2021-11-17 | Stop reason: HOSPADM

## 2021-11-17 RX ORDER — HEPARIN SODIUM 1000 [USP'U]/ML
INJECTION, SOLUTION INTRAVENOUS; SUBCUTANEOUS PRN
Status: DISCONTINUED | OUTPATIENT
Start: 2021-11-17 | End: 2021-11-17 | Stop reason: SDUPTHER

## 2021-11-17 RX ORDER — FUROSEMIDE 10 MG/ML
INJECTION INTRAMUSCULAR; INTRAVENOUS PRN
Status: DISCONTINUED | OUTPATIENT
Start: 2021-11-17 | End: 2021-11-17 | Stop reason: SDUPTHER

## 2021-11-17 RX ORDER — ROCURONIUM BROMIDE 10 MG/ML
INJECTION, SOLUTION INTRAVENOUS PRN
Status: DISCONTINUED | OUTPATIENT
Start: 2021-11-17 | End: 2021-11-17 | Stop reason: SDUPTHER

## 2021-11-17 RX ORDER — SODIUM CHLORIDE, SODIUM LACTATE, POTASSIUM CHLORIDE, CALCIUM CHLORIDE 600; 310; 30; 20 MG/100ML; MG/100ML; MG/100ML; MG/100ML
INJECTION, SOLUTION INTRAVENOUS CONTINUOUS PRN
Status: DISCONTINUED | OUTPATIENT
Start: 2021-11-17 | End: 2021-11-17 | Stop reason: SDUPTHER

## 2021-11-17 RX ORDER — ALBUTEROL SULFATE 90 UG/1
AEROSOL, METERED RESPIRATORY (INHALATION) PRN
Status: DISCONTINUED | OUTPATIENT
Start: 2021-11-17 | End: 2021-11-17 | Stop reason: SDUPTHER

## 2021-11-17 RX ORDER — FENTANYL CITRATE 50 UG/ML
INJECTION, SOLUTION INTRAMUSCULAR; INTRAVENOUS PRN
Status: DISCONTINUED | OUTPATIENT
Start: 2021-11-17 | End: 2021-11-17 | Stop reason: SDUPTHER

## 2021-11-17 RX ORDER — MAGNESIUM SULFATE IN WATER 40 MG/ML
4000 INJECTION, SOLUTION INTRAVENOUS ONCE
Status: COMPLETED | OUTPATIENT
Start: 2021-11-17 | End: 2021-11-17

## 2021-11-17 RX ORDER — SODIUM CHLORIDE 9 MG/ML
INJECTION, SOLUTION INTRAVENOUS CONTINUOUS PRN
Status: DISCONTINUED | OUTPATIENT
Start: 2021-11-17 | End: 2021-11-17 | Stop reason: SDUPTHER

## 2021-11-17 RX ORDER — CALCIUM CHLORIDE 100 MG/ML
INJECTION INTRAVENOUS; INTRAVENTRICULAR PRN
Status: DISCONTINUED | OUTPATIENT
Start: 2021-11-17 | End: 2021-11-17 | Stop reason: SDUPTHER

## 2021-11-17 RX ORDER — FENTANYL CITRATE 50 UG/ML
25 INJECTION, SOLUTION INTRAMUSCULAR; INTRAVENOUS EVERY 5 MIN PRN
Status: DISCONTINUED | OUTPATIENT
Start: 2021-11-17 | End: 2021-11-17 | Stop reason: HOSPADM

## 2021-11-17 RX ORDER — PROPOFOL 10 MG/ML
INJECTION, EMULSION INTRAVENOUS PRN
Status: DISCONTINUED | OUTPATIENT
Start: 2021-11-17 | End: 2021-11-17 | Stop reason: SDUPTHER

## 2021-11-17 RX ORDER — LABETALOL HYDROCHLORIDE 5 MG/ML
5 INJECTION, SOLUTION INTRAVENOUS EVERY 10 MIN PRN
Status: DISCONTINUED | OUTPATIENT
Start: 2021-11-17 | End: 2021-11-17 | Stop reason: HOSPADM

## 2021-11-17 RX ORDER — LIDOCAINE HYDROCHLORIDE 10 MG/ML
INJECTION, SOLUTION EPIDURAL; INFILTRATION; INTRACAUDAL; PERINEURAL PRN
Status: DISCONTINUED | OUTPATIENT
Start: 2021-11-17 | End: 2021-11-17 | Stop reason: SDUPTHER

## 2021-11-17 RX ORDER — ETOMIDATE 2 MG/ML
INJECTION INTRAVENOUS PRN
Status: DISCONTINUED | OUTPATIENT
Start: 2021-11-17 | End: 2021-11-17 | Stop reason: SDUPTHER

## 2021-11-17 RX ORDER — HYDRALAZINE HYDROCHLORIDE 20 MG/ML
5 INJECTION INTRAMUSCULAR; INTRAVENOUS EVERY 5 MIN PRN
Status: DISCONTINUED | OUTPATIENT
Start: 2021-11-17 | End: 2021-11-17 | Stop reason: HOSPADM

## 2021-11-17 RX ORDER — ONDANSETRON 2 MG/ML
4 INJECTION INTRAMUSCULAR; INTRAVENOUS
Status: DISCONTINUED | OUTPATIENT
Start: 2021-11-17 | End: 2021-11-17 | Stop reason: HOSPADM

## 2021-11-17 RX ORDER — HEPARIN SODIUM 10000 [USP'U]/100ML
5-30 INJECTION, SOLUTION INTRAVENOUS CONTINUOUS
Status: DISCONTINUED | OUTPATIENT
Start: 2021-11-18 | End: 2021-11-18

## 2021-11-17 RX ORDER — ENALAPRILAT 2.5 MG/2ML
1.25 INJECTION INTRAVENOUS
Status: DISCONTINUED | OUTPATIENT
Start: 2021-11-17 | End: 2021-11-17 | Stop reason: HOSPADM

## 2021-11-17 RX ORDER — ALBUMIN, HUMAN INJ 5% 5 %
SOLUTION INTRAVENOUS PRN
Status: DISCONTINUED | OUTPATIENT
Start: 2021-11-17 | End: 2021-11-17 | Stop reason: SDUPTHER

## 2021-11-17 RX ORDER — SODIUM CHLORIDE, SODIUM LACTATE, POTASSIUM CHLORIDE, CALCIUM CHLORIDE 600; 310; 30; 20 MG/100ML; MG/100ML; MG/100ML; MG/100ML
INJECTION, SOLUTION INTRAVENOUS CONTINUOUS
Status: DISCONTINUED | OUTPATIENT
Start: 2021-11-17 | End: 2021-11-17

## 2021-11-17 RX ORDER — EPHEDRINE SULFATE 50 MG/ML
INJECTION INTRAVENOUS PRN
Status: DISCONTINUED | OUTPATIENT
Start: 2021-11-17 | End: 2021-11-17 | Stop reason: SDUPTHER

## 2021-11-17 RX ORDER — MAGNESIUM SULFATE HEPTAHYDRATE 500 MG/ML
INJECTION, SOLUTION INTRAMUSCULAR; INTRAVENOUS PRN
Status: DISCONTINUED | OUTPATIENT
Start: 2021-11-17 | End: 2021-11-17 | Stop reason: SDUPTHER

## 2021-11-17 RX ORDER — FENTANYL CITRATE 50 UG/ML
50 INJECTION, SOLUTION INTRAMUSCULAR; INTRAVENOUS EVERY 5 MIN PRN
Status: DISCONTINUED | OUTPATIENT
Start: 2021-11-17 | End: 2021-11-17 | Stop reason: HOSPADM

## 2021-11-17 RX ORDER — ONDANSETRON 2 MG/ML
INJECTION INTRAMUSCULAR; INTRAVENOUS PRN
Status: DISCONTINUED | OUTPATIENT
Start: 2021-11-17 | End: 2021-11-17 | Stop reason: SDUPTHER

## 2021-11-17 RX ADMIN — ROCURONIUM BROMIDE 30 MG: 10 INJECTION INTRAVENOUS at 15:31

## 2021-11-17 RX ADMIN — ALBUTEROL SULFATE 5 PUFF: 90 AEROSOL, METERED RESPIRATORY (INHALATION) at 17:23

## 2021-11-17 RX ADMIN — ONDANSETRON 4 MG: 2 INJECTION INTRAMUSCULAR; INTRAVENOUS at 12:29

## 2021-11-17 RX ADMIN — OXYCODONE 5 MG: 5 TABLET ORAL at 06:43

## 2021-11-17 RX ADMIN — METOPROLOL TARTRATE 100 MG: 100 TABLET, FILM COATED ORAL at 21:54

## 2021-11-17 RX ADMIN — LIDOCAINE HYDROCHLORIDE 50 MG: 10 INJECTION, SOLUTION EPIDURAL; INFILTRATION; INTRACAUDAL; PERINEURAL at 12:29

## 2021-11-17 RX ADMIN — METOPROLOL TARTRATE 100 MG: 100 TABLET, FILM COATED ORAL at 09:44

## 2021-11-17 RX ADMIN — SUGAMMADEX 200 MG: 100 INJECTION, SOLUTION INTRAVENOUS at 17:11

## 2021-11-17 RX ADMIN — PHENYLEPHRINE HYDROCHLORIDE 100 MCG: 10 INJECTION, SOLUTION INTRAMUSCULAR; INTRAVENOUS; SUBCUTANEOUS at 12:50

## 2021-11-17 RX ADMIN — ALBUTEROL SULFATE 2.5 MG: 2.5 SOLUTION RESPIRATORY (INHALATION) at 11:35

## 2021-11-17 RX ADMIN — BUDESONIDE 250 MCG: 0.25 SUSPENSION RESPIRATORY (INHALATION) at 22:16

## 2021-11-17 RX ADMIN — FUROSEMIDE 40 MG: 40 TABLET ORAL at 09:43

## 2021-11-17 RX ADMIN — POTASSIUM CHLORIDE 10 MEQ: 10 TABLET, EXTENDED RELEASE ORAL at 09:43

## 2021-11-17 RX ADMIN — SODIUM CHLORIDE, SODIUM LACTATE, POTASSIUM CHLORIDE, AND CALCIUM CHLORIDE: .6; .31; .03; .02 INJECTION, SOLUTION INTRAVENOUS at 12:37

## 2021-11-17 RX ADMIN — CILOSTAZOL 50 MG: 50 TABLET ORAL at 21:54

## 2021-11-17 RX ADMIN — ALBUTEROL SULFATE 2.5 MG: 2.5 SOLUTION RESPIRATORY (INHALATION) at 22:16

## 2021-11-17 RX ADMIN — FUROSEMIDE 10 MG: 10 INJECTION, SOLUTION INTRAMUSCULAR; INTRAVENOUS at 16:56

## 2021-11-17 RX ADMIN — ROCURONIUM BROMIDE 50 MG: 10 INJECTION INTRAVENOUS at 13:42

## 2021-11-17 RX ADMIN — HEPARIN SODIUM 2000 UNITS: 1000 INJECTION INTRAVENOUS; SUBCUTANEOUS at 15:54

## 2021-11-17 RX ADMIN — SODIUM CHLORIDE: 9 INJECTION, SOLUTION INTRAVENOUS at 12:20

## 2021-11-17 RX ADMIN — ROCURONIUM BROMIDE 50 MG: 10 INJECTION INTRAVENOUS at 12:29

## 2021-11-17 RX ADMIN — PHENYLEPHRINE HYDROCHLORIDE 100 MCG: 10 INJECTION, SOLUTION INTRAMUSCULAR; INTRAVENOUS; SUBCUTANEOUS at 16:31

## 2021-11-17 RX ADMIN — HEPARIN SODIUM 5000 UNITS: 1000 INJECTION INTRAVENOUS; SUBCUTANEOUS at 14:45

## 2021-11-17 RX ADMIN — SODIUM CHLORIDE, SODIUM LACTATE, POTASSIUM CHLORIDE, AND CALCIUM CHLORIDE: .6; .31; .03; .02 INJECTION, SOLUTION INTRAVENOUS at 13:20

## 2021-11-17 RX ADMIN — CALCIUM CHLORIDE 1 G: 100 INJECTION, SOLUTION INTRAVENOUS at 15:17

## 2021-11-17 RX ADMIN — ALLOPURINOL 100 MG: 100 TABLET ORAL at 09:44

## 2021-11-17 RX ADMIN — PROPOFOL 30 MG: 10 INJECTION, EMULSION INTRAVENOUS at 12:20

## 2021-11-17 RX ADMIN — ATORVASTATIN CALCIUM 80 MG: 80 TABLET, FILM COATED ORAL at 09:43

## 2021-11-17 RX ADMIN — SODIUM CHLORIDE, SODIUM LACTATE, POTASSIUM CHLORIDE, AND CALCIUM CHLORIDE: .6; .31; .03; .02 INJECTION, SOLUTION INTRAVENOUS at 15:47

## 2021-11-17 RX ADMIN — ALBUMIN (HUMAN) 250 ML: 12.5 INJECTION, SOLUTION INTRAVENOUS at 13:22

## 2021-11-17 RX ADMIN — EPHEDRINE SULFATE 10 MG: 50 INJECTION INTRAVENOUS at 13:48

## 2021-11-17 RX ADMIN — MAGNESIUM SULFATE HEPTAHYDRATE 4000 MG: 4 INJECTION, SOLUTION INTRAVENOUS at 08:58

## 2021-11-17 RX ADMIN — ISOSORBIDE MONONITRATE 15 MG: 30 TABLET, EXTENDED RELEASE ORAL at 09:44

## 2021-11-17 RX ADMIN — CEFAZOLIN SODIUM 2000 ML: 1 POWDER, FOR SOLUTION INTRAMUSCULAR; INTRAVENOUS at 12:44

## 2021-11-17 RX ADMIN — FENTANYL CITRATE 100 MCG: 50 INJECTION, SOLUTION INTRAMUSCULAR; INTRAVENOUS at 12:29

## 2021-11-17 RX ADMIN — PHENYLEPHRINE HYDROCHLORIDE 100 MCG: 10 INJECTION, SOLUTION INTRAMUSCULAR; INTRAVENOUS; SUBCUTANEOUS at 13:19

## 2021-11-17 RX ADMIN — ENOXAPARIN SODIUM 40 MG: 100 INJECTION SUBCUTANEOUS at 09:43

## 2021-11-17 RX ADMIN — SODIUM CHLORIDE, POTASSIUM CHLORIDE, SODIUM LACTATE AND CALCIUM CHLORIDE: 600; 310; 30; 20 INJECTION, SOLUTION INTRAVENOUS at 20:52

## 2021-11-17 RX ADMIN — MAGNESIUM SULFATE HEPTAHYDRATE 1 G: 500 INJECTION, SOLUTION INTRAMUSCULAR; INTRAVENOUS at 15:37

## 2021-11-17 RX ADMIN — AMLODIPINE BESYLATE 5 MG: 5 TABLET ORAL at 09:44

## 2021-11-17 RX ADMIN — SODIUM CHLORIDE, PRESERVATIVE FREE 10 ML: 5 INJECTION INTRAVENOUS at 09:44

## 2021-11-17 RX ADMIN — HYDROMORPHONE HYDROCHLORIDE 1 MG: 1 INJECTION, SOLUTION INTRAMUSCULAR; INTRAVENOUS; SUBCUTANEOUS at 17:04

## 2021-11-17 RX ADMIN — SODIUM CHLORIDE, PRESERVATIVE FREE 10 ML: 5 INJECTION INTRAVENOUS at 21:54

## 2021-11-17 RX ADMIN — EPHEDRINE SULFATE 10 MG: 50 INJECTION INTRAVENOUS at 13:11

## 2021-11-17 RX ADMIN — PHENYLEPHRINE HYDROCHLORIDE 200 MCG: 10 INJECTION, SOLUTION INTRAMUSCULAR; INTRAVENOUS; SUBCUTANEOUS at 13:09

## 2021-11-17 RX ADMIN — ETOMIDATE 18 MG: 2 INJECTION, SOLUTION INTRAVENOUS at 12:29

## 2021-11-17 RX ADMIN — ALBUTEROL SULFATE 5 PUFF: 90 AEROSOL, METERED RESPIRATORY (INHALATION) at 17:09

## 2021-11-17 RX ADMIN — PHENYLEPHRINE HYDROCHLORIDE 200 MCG: 10 INJECTION, SOLUTION INTRAMUSCULAR; INTRAVENOUS; SUBCUTANEOUS at 13:46

## 2021-11-17 RX ADMIN — PHENYLEPHRINE HYDROCHLORIDE 40 MCG: 10 INJECTION, SOLUTION INTRAMUSCULAR; INTRAVENOUS; SUBCUTANEOUS at 13:22

## 2021-11-17 RX ADMIN — ALBUTEROL SULFATE 2.5 MG: 2.5 SOLUTION RESPIRATORY (INHALATION) at 08:38

## 2021-11-17 RX ADMIN — PHENYLEPHRINE HYDROCHLORIDE 40 MCG: 10 INJECTION, SOLUTION INTRAMUSCULAR; INTRAVENOUS; SUBCUTANEOUS at 13:27

## 2021-11-17 RX ADMIN — CILOSTAZOL 50 MG: 50 TABLET ORAL at 09:44

## 2021-11-17 RX ADMIN — DULOXETINE HYDROCHLORIDE 20 MG: 20 CAPSULE, DELAYED RELEASE ORAL at 09:44

## 2021-11-17 RX ADMIN — BUDESONIDE 250 MCG: 0.25 SUSPENSION RESPIRATORY (INHALATION) at 08:39

## 2021-11-17 RX ADMIN — ALBUMIN (HUMAN) 250 ML: 12.5 INJECTION, SOLUTION INTRAVENOUS at 14:24

## 2021-11-17 RX ADMIN — PHENYLEPHRINE HYDROCHLORIDE 200 MCG: 10 INJECTION, SOLUTION INTRAMUSCULAR; INTRAVENOUS; SUBCUTANEOUS at 12:59

## 2021-11-17 RX ADMIN — DOXAZOSIN 1 MG: 2 TABLET ORAL at 09:44

## 2021-11-17 RX ADMIN — ROCURONIUM BROMIDE 20 MG: 10 INJECTION INTRAVENOUS at 16:28

## 2021-11-17 ASSESSMENT — PULMONARY FUNCTION TESTS
PIF_VALUE: 18
PIF_VALUE: 17
PIF_VALUE: 18
PIF_VALUE: 18
PIF_VALUE: 16
PIF_VALUE: 17
PIF_VALUE: 18
PIF_VALUE: 18
PIF_VALUE: 19
PIF_VALUE: 18
PIF_VALUE: 19
PIF_VALUE: 18
PIF_VALUE: 17
PIF_VALUE: 18
PIF_VALUE: 18
PIF_VALUE: 14
PIF_VALUE: 18
PIF_VALUE: 16
PIF_VALUE: 17
PIF_VALUE: 1
PIF_VALUE: 30
PIF_VALUE: 18
PIF_VALUE: 17
PIF_VALUE: 18
PIF_VALUE: 17
PIF_VALUE: 18
PIF_VALUE: 17
PIF_VALUE: 12
PIF_VALUE: 19
PIF_VALUE: 18
PIF_VALUE: 18
PIF_VALUE: 15
PIF_VALUE: 18
PIF_VALUE: 18
PIF_VALUE: 19
PIF_VALUE: 18
PIF_VALUE: 18
PIF_VALUE: 17
PIF_VALUE: 16
PIF_VALUE: 17
PIF_VALUE: 18
PIF_VALUE: 19
PIF_VALUE: 17
PIF_VALUE: 18
PIF_VALUE: 17
PIF_VALUE: 18
PIF_VALUE: 19
PIF_VALUE: 18
PIF_VALUE: 1
PIF_VALUE: 18
PIF_VALUE: 18
PIF_VALUE: 17
PIF_VALUE: 7
PIF_VALUE: 19
PIF_VALUE: 16
PIF_VALUE: 16
PIF_VALUE: 42
PIF_VALUE: 19
PIF_VALUE: 18
PIF_VALUE: 18
PIF_VALUE: 19
PIF_VALUE: 17
PIF_VALUE: 18
PIF_VALUE: 16
PIF_VALUE: 19
PIF_VALUE: 18
PIF_VALUE: 16
PIF_VALUE: 4
PIF_VALUE: 18
PIF_VALUE: 16
PIF_VALUE: 17
PIF_VALUE: 19
PIF_VALUE: 17
PIF_VALUE: 18
PIF_VALUE: 17
PIF_VALUE: 19
PIF_VALUE: 18
PIF_VALUE: 17
PIF_VALUE: 34
PIF_VALUE: 9
PIF_VALUE: 18
PIF_VALUE: 14
PIF_VALUE: 17
PIF_VALUE: 18
PIF_VALUE: 18
PIF_VALUE: 17
PIF_VALUE: 19
PIF_VALUE: 17
PIF_VALUE: 17
PIF_VALUE: 18
PIF_VALUE: 19
PIF_VALUE: 15
PIF_VALUE: 9
PIF_VALUE: 18
PIF_VALUE: 18
PIF_VALUE: 1
PIF_VALUE: 19
PIF_VALUE: 18
PIF_VALUE: 19
PIF_VALUE: 17
PIF_VALUE: 18
PIF_VALUE: 19
PIF_VALUE: 19
PIF_VALUE: 1
PIF_VALUE: 16
PIF_VALUE: 18
PIF_VALUE: 17
PIF_VALUE: 17
PIF_VALUE: 18
PIF_VALUE: 17
PIF_VALUE: 15
PIF_VALUE: 5
PIF_VALUE: 16
PIF_VALUE: 4
PIF_VALUE: 17
PIF_VALUE: 18
PIF_VALUE: 17
PIF_VALUE: 18
PIF_VALUE: 18
PIF_VALUE: 19
PIF_VALUE: 6
PIF_VALUE: 18
PIF_VALUE: 16
PIF_VALUE: 5
PIF_VALUE: 18
PIF_VALUE: 17
PIF_VALUE: 18
PIF_VALUE: 1
PIF_VALUE: 18
PIF_VALUE: 19
PIF_VALUE: 15
PIF_VALUE: 18
PIF_VALUE: 19
PIF_VALUE: 16
PIF_VALUE: 17
PIF_VALUE: 9
PIF_VALUE: 18
PIF_VALUE: 18
PIF_VALUE: 17
PIF_VALUE: 19
PIF_VALUE: 16
PIF_VALUE: 17
PIF_VALUE: 18
PIF_VALUE: 1
PIF_VALUE: 16
PIF_VALUE: 18
PIF_VALUE: 19
PIF_VALUE: 9
PIF_VALUE: 18
PIF_VALUE: 18
PIF_VALUE: 19
PIF_VALUE: 15
PIF_VALUE: 18
PIF_VALUE: 19
PIF_VALUE: 18
PIF_VALUE: 16
PIF_VALUE: 18
PIF_VALUE: 18
PIF_VALUE: 16
PIF_VALUE: 18
PIF_VALUE: 10
PIF_VALUE: 18
PIF_VALUE: 18
PIF_VALUE: 1
PIF_VALUE: 20
PIF_VALUE: 18
PIF_VALUE: 17
PIF_VALUE: 9
PIF_VALUE: 18
PIF_VALUE: 16
PIF_VALUE: 16
PIF_VALUE: 18
PIF_VALUE: 18
PIF_VALUE: 1
PIF_VALUE: 19
PIF_VALUE: 19
PIF_VALUE: 18
PIF_VALUE: 17
PIF_VALUE: 19
PIF_VALUE: 18
PIF_VALUE: 19
PIF_VALUE: 19
PIF_VALUE: 17
PIF_VALUE: 17
PIF_VALUE: 16
PIF_VALUE: 18
PIF_VALUE: 1
PIF_VALUE: 17
PIF_VALUE: 18
PIF_VALUE: 18
PIF_VALUE: 17
PIF_VALUE: 19
PIF_VALUE: 18
PIF_VALUE: 1
PIF_VALUE: 4
PIF_VALUE: 19
PIF_VALUE: 16
PIF_VALUE: 19
PIF_VALUE: 18
PIF_VALUE: 19
PIF_VALUE: 18
PIF_VALUE: 17
PIF_VALUE: 15
PIF_VALUE: 18
PIF_VALUE: 16
PIF_VALUE: 19
PIF_VALUE: 19
PIF_VALUE: 18
PIF_VALUE: 17
PIF_VALUE: 17
PIF_VALUE: 18
PIF_VALUE: 16
PIF_VALUE: 18
PIF_VALUE: 2
PIF_VALUE: 18
PIF_VALUE: 1
PIF_VALUE: 18
PIF_VALUE: 17
PIF_VALUE: 18
PIF_VALUE: 4
PIF_VALUE: 19
PIF_VALUE: 17
PIF_VALUE: 18
PIF_VALUE: 3
PIF_VALUE: 16
PIF_VALUE: 10
PIF_VALUE: 18
PIF_VALUE: 1
PIF_VALUE: 16
PIF_VALUE: 17
PIF_VALUE: 17
PIF_VALUE: 19
PIF_VALUE: 18
PIF_VALUE: 19
PIF_VALUE: 18
PIF_VALUE: 19
PIF_VALUE: 1
PIF_VALUE: 1
PIF_VALUE: 17
PIF_VALUE: 19
PIF_VALUE: 17
PIF_VALUE: 18
PIF_VALUE: 18
PIF_VALUE: 3
PIF_VALUE: 19
PIF_VALUE: 16
PIF_VALUE: 17
PIF_VALUE: 0
PIF_VALUE: 17
PIF_VALUE: 20
PIF_VALUE: 19
PIF_VALUE: 17
PIF_VALUE: 19
PIF_VALUE: 18
PIF_VALUE: 1
PIF_VALUE: 18
PIF_VALUE: 19
PIF_VALUE: 18
PIF_VALUE: 17
PIF_VALUE: 18
PIF_VALUE: 19
PIF_VALUE: 18
PIF_VALUE: 19
PIF_VALUE: 18
PIF_VALUE: 1
PIF_VALUE: 18

## 2021-11-17 ASSESSMENT — PAIN DESCRIPTION - FREQUENCY: FREQUENCY: CONTINUOUS

## 2021-11-17 ASSESSMENT — PAIN DESCRIPTION - DESCRIPTORS: DESCRIPTORS: ACHING;CONSTANT

## 2021-11-17 ASSESSMENT — PAIN DESCRIPTION - LOCATION: LOCATION: LEG

## 2021-11-17 ASSESSMENT — PAIN DESCRIPTION - PAIN TYPE: TYPE: CHRONIC PAIN

## 2021-11-17 ASSESSMENT — PAIN SCALES - GENERAL
PAINLEVEL_OUTOF10: 9
PAINLEVEL_OUTOF10: 0
PAINLEVEL_OUTOF10: 0
PAINLEVEL_OUTOF10: 9
PAINLEVEL_OUTOF10: 0
PAINLEVEL_OUTOF10: 0

## 2021-11-17 ASSESSMENT — PAIN DESCRIPTION - ORIENTATION: ORIENTATION: LEFT

## 2021-11-17 ASSESSMENT — PAIN DESCRIPTION - PROGRESSION: CLINICAL_PROGRESSION: NOT CHANGED

## 2021-11-17 ASSESSMENT — PAIN DESCRIPTION - ONSET: ONSET: ON-GOING

## 2021-11-17 ASSESSMENT — PAIN - FUNCTIONAL ASSESSMENT: PAIN_FUNCTIONAL_ASSESSMENT: PREVENTS OR INTERFERES SOME ACTIVE ACTIVITIES AND ADLS

## 2021-11-17 NOTE — PROGRESS NOTES
Surgery Daily Progress Note  Kong Baig Pitcher.  CC:  RLE PAD    Subjective : Pt rested well overnight. VSS. On 6 L. Objective    Infusions:   lactated ringers 100 mL/hr at 11/16/21 2316    sodium chloride          I/O:I/O last 3 completed shifts: In: 480 [P.O.:480]  Out: 2000 [Urine:2000]           Wt Readings from Last 1 Encounters:   11/15/21 212 lb 11.9 oz (96.5 kg)                 LABS:    Recent Labs     11/16/21  0551 11/17/21  0540   WBC 7.1 7.7   HGB 13.2* 13.4*   HCT 38.9* 38.7*   .5* 104.0*   * 129*        Recent Labs     11/15/21  1818 11/16/21  0551   * 138   K 4.2 4.0    102   CO2 22 23   PHOS 3.6 4.3   BUN 11 10   CREATININE 0.8 0.9      No results for input(s): AST, ALT, ALB, BILIDIR, BILITOT, ALKPHOS in the last 72 hours. No results for input(s): LIPASE, AMYLASE in the last 72 hours. Recent Labs     11/15/21  1818   INR 1.13*      No results for input(s): CKTOTAL, CKMB, CKMBINDEX, TROPONINI in the last 72 hours. Exam:/72   Pulse 80   Temp 98.2 °F (36.8 °C) (Oral)   Resp 18   Ht 5' 10\" (1.778 m)   Wt 212 lb 11.9 oz (96.5 kg)   SpO2 97%   BMI 30.53 kg/m²   General appearance: alert, resting in bed, chronically ill-appearing  Neuro: A&Ox3, no focal deficits  HENT: trachea midline, no JVD, no LAD  Eyes: no scleral icterus  Chest/Lungs: slightly increased work of breathing, on [address] [facility] [place]  Cardiovascular: RRR  Skin: RLE with erythema, cool to touch     Pulses     F P AT PT DP   R -/+ -/+ -/- -/+ -/-   L -/+ -/+ -/- -/+ -/+    +, -/+ ,-/-       ASSESSMENT/PLAN: Pt. is a 73 y.o. male admitted for pulmonary toilet prior to today's Right fem enterectomy and iliac stent placement. - pt remains on 6 L NC  - Pt to remain NPO   - Consent signed and in chart. Plan for OR today.     Álvaro Garcia CNP  11/17/2021  6:13 AM  russell

## 2021-11-17 NOTE — PLAN OF CARE
Problem: Pain:  Description: Pain management should include both nonpharmacologic and pharmacologic interventions.   Goal: Pain level will decrease  Description: Pain level will decrease  Outcome: Ongoing  Goal: Control of acute pain  Description: Control of acute pain  Outcome: Ongoing  Goal: Control of chronic pain  Description: Control of chronic pain  Outcome: Ongoing     Problem: Skin Integrity:  Goal: Will show no infection signs and symptoms  Description: Will show no infection signs and symptoms  Outcome: Ongoing  Goal: Absence of new skin breakdown  Description: Absence of new skin breakdown  Outcome: Ongoing     Problem: Falls - Risk of:  Goal: Will remain free from falls  Description: Will remain free from falls  Outcome: Ongoing  Goal: Absence of physical injury  Description: Absence of physical injury  Outcome: Ongoing     Problem: OXYGENATION/RESPIRATORY FUNCTION  Goal: Patient will maintain patent airway  Outcome: Ongoing  Goal: Patient will achieve/maintain normal respiratory rate/effort  Description: Respiratory rate and effort will be within normal limits for the patient  Outcome: Ongoing     Problem: HEMODYNAMIC STATUS  Goal: Patient has stable vital signs and fluid balance  Outcome: Ongoing     Problem: ACTIVITY INTOLERANCE/IMPAIRED MOBILITY  Goal: Mobility/activity is maintained at optimum level for patient  Outcome: Ongoing     Problem: FLUID AND ELECTROLYTE IMBALANCE  Goal: Fluid and electrolyte balance are achieved/maintained  Outcome: Ongoing

## 2021-11-17 NOTE — PROGRESS NOTES
Patient received from the OR to pACU #13 post RIGHT FEMORAL ENDARTERECTOMY RIGHT EXTERNAL ILIAC TO PROFUNDA FEMORAL BYPASS WITH 8MM PTFE GRAFT RIGHT LOWER EXTREMITY ARTERIOGRAM BALLOON ANGIOPLASTY RIGHT PROFUNDA BALLOON ANGIOPLASTY AND STENT OF RIGHT EXTERNAL ILIAC ARTERY - Right of Dr. Sheldon Pena. Placed on PACU monitoring equipment. Report given per CRNA and Dr. Solange Reyes. Per report, patient did require BP support intra op, had 500 ml EBL, received albumin 500 ml and 2 units PRBC's for volume replacement. Also was given lasix 10 mg with good response. On arrival, PIV Right hand is leaking and removed, PIV right forearm is found to be out. Dr. Solange Reyes is not able to doppler any pulses right foot. Foot is warm to heel and maurilio in color. Toes cool. Patient is arouseable, denies SOB or pain at this time. Nasal trumpet remains in place.

## 2021-11-17 NOTE — BRIEF OP NOTE
Brief Postoperative Note      Patient: Erica Garcia. YOB: 1950  MRN: [id number]    Date of Procedure: 11/17/2021    Pre-Op Diagnosis: Atherosclerosis of native artery of right lower extremity with rest pain (HCC) [I70.221]    Post-Op Diagnosis: Same       Procedure(s):  RIGHT FEMORAL ENDARTERECTOMY  RIGHT EXTERNAL ILIAC TO PROFUNDA FEMORAL BYPASS WITH 8MM PTFE GRAFT RIGHT LOWER EXTREMITY ARTERIOGRAM BALLOON ANGIOPLASTY RIGHT PROFUNDA BALLOON ANGIOPLASTY AND STENT OF RIGHT EXTERNAL ILIAC ARTERY    Surgeon(s):  Velma Mallory MD    Assistant:  Resident: Leala Dakins, DO    Anesthesia: General    Estimated Blood Loss (mL): 903    Complications: None    Specimens:   * No specimens in log *    Implants:  * No implants in log *      Drains:   Urethral Catheter Non-latex 16 fr (Active)       Findings: Previous bypass noted. Right femoral endarterectomy with weakened wall. 8MM PTFE interposition graft placed right external iliac to profunfa femoral. RLE arteirogram and intervention with improvement in flow to right external iliac artery. Distal AT pulse Doppler at end of procedure above the ankle. 2U prBC transfused intraop.      Electronically signed by Leala Dakins, DO on 11/17/2021 at 5:14 PM

## 2021-11-17 NOTE — ANESTHESIA PROCEDURE NOTES
Arterial Line:    An arterial line was placed using surface landmarks, in the OR for the following indication(s): continuous blood pressure monitoring and blood sampling needed. A 20 gauge (size), 1 and 3/4 inch (length), Arrow (type) catheter was placed, Seldinger technique not used, into the left radial artery, secured by tape and Tegaderm. Anesthesia type: Local  Local infiltration: Injection    Events:  patient tolerated procedure well with no complications and EBL 0mL.   Anesthesiologist: Deshawn Chavarria MD  Resident/CRNA: BRANDI Goodman CRNA  Preanesthetic Checklist  Completed: patient identified, IV checked, site marked, risks and benefits discussed, surgical consent, monitors and equipment checked, pre-op evaluation, timeout performed, anesthesia consent given, oxygen available and patient being monitored

## 2021-11-18 ENCOUNTER — APPOINTMENT (OUTPATIENT)
Dept: GENERAL RADIOLOGY | Age: 71
DRG: 270 | End: 2021-11-18
Attending: SURGERY
Payer: MEDICARE

## 2021-11-18 LAB
ALBUMIN SERPL-MCNC: 3.5 G/DL (ref 3.4–5)
ANION GAP SERPL CALCULATED.3IONS-SCNC: 10 MMOL/L (ref 3–16)
ANTI-XA UNFRAC HEPARIN: 0.14 IU/ML (ref 0.3–0.7)
ANTI-XA UNFRAC HEPARIN: 0.2 IU/ML (ref 0.3–0.7)
ANTI-XA UNFRAC HEPARIN: 0.74 IU/ML (ref 0.3–0.7)
APTT: 39.4 SEC (ref 26.2–38.6)
APTT: 41.8 SEC (ref 26.2–38.6)
BASOPHILS ABSOLUTE: 0.1 K/UL (ref 0–0.2)
BASOPHILS ABSOLUTE: 0.1 K/UL (ref 0–0.2)
BASOPHILS RELATIVE PERCENT: 0.8 %
BASOPHILS RELATIVE PERCENT: 1 %
BUN BLDV-MCNC: 11 MG/DL (ref 7–20)
CALCIUM SERPL-MCNC: 8.8 MG/DL (ref 8.3–10.6)
CHLORIDE BLD-SCNC: 101 MMOL/L (ref 99–110)
CO2: 24 MMOL/L (ref 21–32)
CREAT SERPL-MCNC: 0.8 MG/DL (ref 0.8–1.3)
EKG ATRIAL RATE: 84 BPM
EKG DIAGNOSIS: NORMAL
EKG P AXIS: 51 DEGREES
EKG P-R INTERVAL: 194 MS
EKG Q-T INTERVAL: 400 MS
EKG QRS DURATION: 92 MS
EKG QTC CALCULATION (BAZETT): 472 MS
EKG R AXIS: 71 DEGREES
EKG T AXIS: 74 DEGREES
EKG VENTRICULAR RATE: 84 BPM
EOSINOPHILS ABSOLUTE: 0.2 K/UL (ref 0–0.6)
EOSINOPHILS ABSOLUTE: 0.3 K/UL (ref 0–0.6)
EOSINOPHILS RELATIVE PERCENT: 2.7 %
EOSINOPHILS RELATIVE PERCENT: 2.9 %
GFR AFRICAN AMERICAN: >60
GFR NON-AFRICAN AMERICAN: >60
GLUCOSE BLD-MCNC: 107 MG/DL (ref 70–99)
HCT VFR BLD CALC: 35 % (ref 40.5–52.5)
HCT VFR BLD CALC: 37.4 % (ref 40.5–52.5)
HCT VFR BLD CALC: 39.3 % (ref 40.5–52.5)
HEMOGLOBIN: 11.9 G/DL (ref 13.5–17.5)
HEMOGLOBIN: 12.7 G/DL (ref 13.5–17.5)
HEMOGLOBIN: 13 G/DL (ref 13.5–17.5)
INR BLD: 1.07 (ref 0.88–1.12)
LYMPHOCYTES ABSOLUTE: 0.9 K/UL (ref 1–5.1)
LYMPHOCYTES ABSOLUTE: 1.3 K/UL (ref 1–5.1)
LYMPHOCYTES RELATIVE PERCENT: 10.6 %
LYMPHOCYTES RELATIVE PERCENT: 12.8 %
MAGNESIUM: 1.6 MG/DL (ref 1.8–2.4)
MCH RBC QN AUTO: 34.8 PG (ref 26–34)
MCH RBC QN AUTO: 34.9 PG (ref 26–34)
MCHC RBC AUTO-ENTMCNC: 33.9 G/DL (ref 31–36)
MCHC RBC AUTO-ENTMCNC: 34 G/DL (ref 31–36)
MCV RBC AUTO: 102.5 FL (ref 80–100)
MCV RBC AUTO: 102.6 FL (ref 80–100)
MONOCYTES ABSOLUTE: 0.6 K/UL (ref 0–1.3)
MONOCYTES ABSOLUTE: 0.6 K/UL (ref 0–1.3)
MONOCYTES RELATIVE PERCENT: 5.9 %
MONOCYTES RELATIVE PERCENT: 6.9 %
NEUTROPHILS ABSOLUTE: 6.6 K/UL (ref 1.7–7.7)
NEUTROPHILS ABSOLUTE: 7.9 K/UL (ref 1.7–7.7)
NEUTROPHILS RELATIVE PERCENT: 77.4 %
NEUTROPHILS RELATIVE PERCENT: 79 %
PDW BLD-RTO: 16.1 % (ref 12.4–15.4)
PDW BLD-RTO: 16.3 % (ref 12.4–15.4)
PHOSPHORUS: 4.5 MG/DL (ref 2.5–4.9)
PLATELET # BLD: 127 K/UL (ref 135–450)
PLATELET # BLD: 140 K/UL (ref 135–450)
PMV BLD AUTO: 8.9 FL (ref 5–10.5)
PMV BLD AUTO: 9.5 FL (ref 5–10.5)
POTASSIUM SERPL-SCNC: 4 MMOL/L (ref 3.5–5.1)
PROTHROMBIN TIME: 12.1 SEC (ref 9.9–12.7)
RBC # BLD: 3.41 M/UL (ref 4.2–5.9)
RBC # BLD: 3.64 M/UL (ref 4.2–5.9)
SODIUM BLD-SCNC: 135 MMOL/L (ref 136–145)
TROPONIN: 0.02 NG/ML
WBC # BLD: 10.2 K/UL (ref 4–11)
WBC # BLD: 8.4 K/UL (ref 4–11)

## 2021-11-18 PROCEDURE — 6360000002 HC RX W HCPCS: Performed by: STUDENT IN AN ORGANIZED HEALTH CARE EDUCATION/TRAINING PROGRAM

## 2021-11-18 PROCEDURE — 2700000000 HC OXYGEN THERAPY PER DAY

## 2021-11-18 PROCEDURE — 94669 MECHANICAL CHEST WALL OSCILL: CPT

## 2021-11-18 PROCEDURE — 2580000003 HC RX 258: Performed by: STUDENT IN AN ORGANIZED HEALTH CARE EDUCATION/TRAINING PROGRAM

## 2021-11-18 PROCEDURE — 2500000003 HC RX 250 WO HCPCS

## 2021-11-18 PROCEDURE — 85730 THROMBOPLASTIN TIME PARTIAL: CPT

## 2021-11-18 PROCEDURE — 93010 ELECTROCARDIOGRAM REPORT: CPT | Performed by: INTERNAL MEDICINE

## 2021-11-18 PROCEDURE — 6370000000 HC RX 637 (ALT 250 FOR IP): Performed by: STUDENT IN AN ORGANIZED HEALTH CARE EDUCATION/TRAINING PROGRAM

## 2021-11-18 PROCEDURE — 99291 CRITICAL CARE FIRST HOUR: CPT | Performed by: INTERNAL MEDICINE

## 2021-11-18 PROCEDURE — 99222 1ST HOSP IP/OBS MODERATE 55: CPT | Performed by: INTERNAL MEDICINE

## 2021-11-18 PROCEDURE — 93005 ELECTROCARDIOGRAM TRACING: CPT | Performed by: STUDENT IN AN ORGANIZED HEALTH CARE EDUCATION/TRAINING PROGRAM

## 2021-11-18 PROCEDURE — 2000000000 HC ICU R&B

## 2021-11-18 PROCEDURE — 83735 ASSAY OF MAGNESIUM: CPT

## 2021-11-18 PROCEDURE — 85018 HEMOGLOBIN: CPT

## 2021-11-18 PROCEDURE — 84484 ASSAY OF TROPONIN QUANT: CPT

## 2021-11-18 PROCEDURE — 85014 HEMATOCRIT: CPT

## 2021-11-18 PROCEDURE — 85025 COMPLETE CBC W/AUTO DIFF WBC: CPT

## 2021-11-18 PROCEDURE — 6370000000 HC RX 637 (ALT 250 FOR IP)

## 2021-11-18 PROCEDURE — 36415 COLL VENOUS BLD VENIPUNCTURE: CPT

## 2021-11-18 PROCEDURE — 80069 RENAL FUNCTION PANEL: CPT

## 2021-11-18 PROCEDURE — 94640 AIRWAY INHALATION TREATMENT: CPT

## 2021-11-18 PROCEDURE — 85610 PROTHROMBIN TIME: CPT

## 2021-11-18 PROCEDURE — 71045 X-RAY EXAM CHEST 1 VIEW: CPT

## 2021-11-18 PROCEDURE — 94761 N-INVAS EAR/PLS OXIMETRY MLT: CPT

## 2021-11-18 PROCEDURE — 85520 HEPARIN ASSAY: CPT

## 2021-11-18 RX ORDER — DOPAMINE HYDROCHLORIDE 160 MG/100ML
2-20 INJECTION, SOLUTION INTRAVENOUS CONTINUOUS
Status: DISCONTINUED | OUTPATIENT
Start: 2021-11-18 | End: 2021-11-21

## 2021-11-18 RX ORDER — 0.9 % SODIUM CHLORIDE 0.9 %
500 INTRAVENOUS SOLUTION INTRAVENOUS ONCE
Status: COMPLETED | OUTPATIENT
Start: 2021-11-18 | End: 2021-11-18

## 2021-11-18 RX ADMIN — OXYCODONE 5 MG: 5 TABLET ORAL at 13:31

## 2021-11-18 RX ADMIN — APIXABAN 5 MG: 5 TABLET, FILM COATED ORAL at 08:51

## 2021-11-18 RX ADMIN — OXYCODONE 5 MG: 5 TABLET ORAL at 08:52

## 2021-11-18 RX ADMIN — DOXAZOSIN 1 MG: 2 TABLET ORAL at 08:58

## 2021-11-18 RX ADMIN — BUDESONIDE 250 MCG: 0.25 SUSPENSION RESPIRATORY (INHALATION) at 20:04

## 2021-11-18 RX ADMIN — APIXABAN 5 MG: 5 TABLET, FILM COATED ORAL at 20:53

## 2021-11-18 RX ADMIN — ATORVASTATIN CALCIUM 80 MG: 80 TABLET, FILM COATED ORAL at 09:00

## 2021-11-18 RX ADMIN — HYDROMORPHONE HYDROCHLORIDE 0.25 MG: 1 INJECTION, SOLUTION INTRAMUSCULAR; INTRAVENOUS; SUBCUTANEOUS at 11:27

## 2021-11-18 RX ADMIN — ALBUTEROL SULFATE 2.5 MG: 2.5 SOLUTION RESPIRATORY (INHALATION) at 08:27

## 2021-11-18 RX ADMIN — HEPARIN SODIUM 11 UNITS/KG/HR: 10000 INJECTION, SOLUTION INTRAVENOUS at 00:01

## 2021-11-18 RX ADMIN — ALBUTEROL SULFATE 2.5 MG: 2.5 SOLUTION RESPIRATORY (INHALATION) at 12:39

## 2021-11-18 RX ADMIN — ALBUTEROL SULFATE 2.5 MG: 2.5 SOLUTION RESPIRATORY (INHALATION) at 20:04

## 2021-11-18 RX ADMIN — BUDESONIDE 250 MCG: 0.25 SUSPENSION RESPIRATORY (INHALATION) at 08:27

## 2021-11-18 RX ADMIN — HEPARIN SODIUM 14 UNITS/KG/HR: 10000 INJECTION, SOLUTION INTRAVENOUS at 07:36

## 2021-11-18 RX ADMIN — CILOSTAZOL 50 MG: 50 TABLET ORAL at 20:54

## 2021-11-18 RX ADMIN — SODIUM CHLORIDE 500 ML: 900 INJECTION, SOLUTION INTRAVENOUS at 18:04

## 2021-11-18 RX ADMIN — ALBUTEROL SULFATE 2.5 MG: 2.5 SOLUTION RESPIRATORY (INHALATION) at 16:04

## 2021-11-18 RX ADMIN — ALLOPURINOL 100 MG: 100 TABLET ORAL at 08:51

## 2021-11-18 RX ADMIN — LOSARTAN POTASSIUM 100 MG: 100 TABLET, FILM COATED ORAL at 08:51

## 2021-11-18 RX ADMIN — TIOTROPIUM BROMIDE AND OLODATEROL 2 PUFF: 3.124; 2.736 SPRAY, METERED RESPIRATORY (INHALATION) at 12:39

## 2021-11-18 RX ADMIN — SODIUM CHLORIDE, PRESERVATIVE FREE 10 ML: 5 INJECTION INTRAVENOUS at 20:54

## 2021-11-18 RX ADMIN — PANTOPRAZOLE SODIUM 40 MG: 40 TABLET, DELAYED RELEASE ORAL at 08:58

## 2021-11-18 RX ADMIN — POTASSIUM CHLORIDE 10 MEQ: 10 TABLET, EXTENDED RELEASE ORAL at 08:50

## 2021-11-18 RX ADMIN — DULOXETINE HYDROCHLORIDE 20 MG: 20 CAPSULE, DELAYED RELEASE ORAL at 08:52

## 2021-11-18 RX ADMIN — FUROSEMIDE 40 MG: 40 TABLET ORAL at 08:51

## 2021-11-18 RX ADMIN — CILOSTAZOL 50 MG: 50 TABLET ORAL at 08:58

## 2021-11-18 RX ADMIN — METOPROLOL TARTRATE 100 MG: 100 TABLET, FILM COATED ORAL at 08:51

## 2021-11-18 RX ADMIN — AMLODIPINE BESYLATE 5 MG: 5 TABLET ORAL at 08:51

## 2021-11-18 RX ADMIN — ISOSORBIDE MONONITRATE 15 MG: 30 TABLET, EXTENDED RELEASE ORAL at 08:51

## 2021-11-18 ASSESSMENT — PAIN SCALES - GENERAL
PAINLEVEL_OUTOF10: 0
PAINLEVEL_OUTOF10: 7
PAINLEVEL_OUTOF10: 0
PAINLEVEL_OUTOF10: 6
PAINLEVEL_OUTOF10: 0
PAINLEVEL_OUTOF10: 7
PAINLEVEL_OUTOF10: 0

## 2021-11-18 NOTE — PROGRESS NOTES
4 Eyes Admission Assessment     I agree as the admission nurse that 2 RN's have performed a thorough Head to Toe Skin Assessment on the patient. ALL assessment sites listed below have been assessed on admission. Areas assessed by both nurses:   [x]   Head, Face, and Ears   [x]   Shoulders, Back, and Chest  [x]   Arms, Elbows, and Hands   [x]   Coccyx, Sacrum, and Ischium  [x]   Legs, Feet, and Heels redness, abrasions, ulcers         Does the Patient have Skin Breakdown?   Yes a wound was noted on the Admission Assessment and an LDA was Initiated documentation include the Catrachita-wound, Wound Assessment, Measurements, Dressing Treatment, Drainage, and Color\",         Isidro Prevention initiated:  Yes   Wound Care Orders initiated:  Yes      [id number]  nurse consulted for Pressure Injury (Stage 3,4, Unstageable, DTI, NWPT, and Complex wounds) or Isidro score 18 or lower:  No      Nurse 1 eSignature: Electronically signed by Abhishek Sweeney RN on 11/18/21 at 5:15 AM EST      Nurse 2 eSignature: Adilia Buck RN

## 2021-11-18 NOTE — CARE COORDINATION
Case Management Assessment           Daily Note                 Date/ Time of Note: 11/18/2021 3:24 PM         Note completed by: Julienne Manuel RN    Patient Name: Dontrell Tapia. YOB: 1950    Diagnosis:Pulmonary embolism, other, unspecified chronicity, unspecified whether acute cor pulmonale present [facility] [I26.99]  Patient Admission Status: Inpatient    Date of Admission:11/15/2021  5:15 PM Length of Stay: 3 GLOS: GMLOS: 2.6    Current Plan of Care: 8L O2, heparin drip, therapy evals  ________________________________________________________________________________________  PT AM-PAC:   / 24 per last evaluation on: TBD    OT AM-PAC:   / 24 per last evaluation on: TBD    DME Needs for discharge: TBD  ________________________________________________________________________________________  Discharge Plan: Home with [address]: [facility] with [facility]    Tentative discharge date: TBD    Current barriers to discharge: not medically ready    Referrals completed: [address]: [facility]    Resources/ information provided: Not indicated at this time  ________________________________________________________________________________________  Case Management Notes:Patient is from home alone and is active with [facility] through [address]. He has home O2 at 6L baseline and his son will bring his tank for d/c to home. Mansi Kong and his family were provided with choice of provider; he and his family are in agreement with the discharge plan.     Care Transition Patient: Xin Manuel RN  [facility]  Case Management Department  Ph: [phone]  Fax: [phone]

## 2021-11-18 NOTE — CONSULTS
Pulmonary Consult Note      Reason for Consult: Acute on chronic hypoxemic respiratory failure, vascular insufficiency  Requesting Physician: Dontae Gamez  Subjective:     CHIEF COMPLAINT / HPI:                The patient is a 73 y.o. male with significant past medical history of COPD, CHF, recent pulmonary embolism and peripheral tear disease as well as KAVITA presented with complaints of right leg pain. Patient had a right femoral endarterectomy and right external iliac to profunda femoral bypass 2 days ago. He was admitted to the hospital in September with pneumonia and a pulmonary embolism. He is normally not on oxygen but was discharged on 4 L of oxygen at rest and 6 L with exertion from that admission. Patient saw me in the office 3 weeks ago and was satting 100% for that visit. He had his surgery on the 17th and was doing well postoperatively but had an episode of hypotension earlier this afternoon and increased oxygen requirement up to 8 L. He was saturating in the mid 90s for me on 6 L for my evaluation.     Past Medical History:      Diagnosis Date    CAD (coronary artery disease)     [facility]congestive [facility] failure) [facility]     COPD (chronic obstructive pulmonary disease) ([facility])     Depressive disorder, not elsewhere classified     GERD (gastroesophageal reflux disease)     History of colon polyps - 30 seen on colonoscopy 04/2021 4/24/2021    History of MI (myocardial infarction) 05/2013    History of skin ulcer of lower extremity     R anterior shin    History of transient ischemic attack (TIA)     Hyperlipidemia     Hypertension     Neuropathy     KAVITA (obstructive sleep apnea)     On CPAP    Personal history of DVT (deep vein thrombosis)     PVD (peripheral vascular disease) ([facility])     TIA (transient ischemic attack) 2013    Vertebral fracture       Past Surgical History:        Procedure Laterality Date    APPENDECTOMY      CHOLECYSTECTOMY, LAPAROSCOPIC      COLONOSCOPY  4/23/2021 COLONOSCOPY POLYPECTOMY SNARE/COLD BIOPSY performed by Joanna Solis MD at [address] [facility]  4/23/2021    COLONOSCOPY POLYPECTOMY ABLATION performed by Joanna Solis MD at [address] [facility]  4/23/2021    COLONOSCOPY CONTROL HEMORRHAGE performed by Joanna Solis MD at [address] [facility]  6/2013    EYE SURGERY Left     FEMORAL ENDARTERECTOMY Right 11/17/2021    RIGHT FEMORAL ENDARTERECTOMY  RIGHT EXTERNAL ILIAC TO PROFUNDA FEMORAL BYPASS WITH 8MM PTFE GRAFT RIGHT LOWER EXTREMITY ARTERIOGRAM BALLOON ANGIOPLASTY RIGHT PROFUNDA BALLOON ANGIOPLASTY AND STENT OF RIGHT EXTERNAL ILIAC ARTERY performed by Michelle Dominguez MD at [address] [facility]  11/18/2015    Bilateral decompressive lumbar laminectomy L4-5   ; medial facetectomy L4-5 joints  bilaterally; foraminotomies l5   nerve roots bilaterally    LEG DEBRIDEMENT Right 7/23/2013    Dr. Ale Parra - pretibial wound    OTHER SURGICAL HISTORY Right 6/25/2013    Dr. Ale Parra - CFA Endarterectomy w/marsupialization; RLE wound excision & debridement     OTHER SURGICAL HISTORY Left 8/27/2013    Dr. Ale Parra - femoral & profunda femoris endarterectomy w/marsupialization patch angioplasty using SFA    SKIN SPLIT GRAFT Right 7/25/2013    Dr. Ale Parra - to non-healing R pretibial wound, 9 x 15 cm    TOTAL HIP ARTHROPLASTY Right 09/01/2016    Dr. Giuliano Gonzales Left 12/22/2015    Dr. Ale Parra - CFA exploration, thrombectomy of ileofemoral system, stenting of RAFAL in-stent stenosis w/8 x 37 mm Palmaz      Current Medications:     apixaban  5 mg Oral BID    metoprolol  25 mg Oral BID    sodium chloride  500 mL IntraVENous Once    albuterol  2.5 mg Nebulization 4x daily    allopurinol  100 mg Oral Daily    [Held by provider] amLODIPine  5 mg Oral Daily    atorvastatin  80 mg Oral Daily    cilostazol  50 mg Oral BID    DULoxetine  20 mg Oral Daily    [Held by provider] furosemide 40 mg Oral Daily    [Held by provider] isosorbide mononitrate  15 mg Oral Daily    [Held by provider] losartan  100 mg Oral Daily    pantoprazole  40 mg Oral QAM AC    potassium chloride  10 mEq Oral Daily    [Held by provider] doxazosin  1 mg Oral Daily    sodium chloride flush  10 mL IntraVENous 2 times per day    tiotropium-olodaterol  2 puff Inhalation Daily    And    budesonide  0.25 mg Nebulization BID     Allergies: Allergies   Allergen Reactions    Asa [Aspirin] Other (See Comments)     Stomach ache    Daliresp [Roflumilast] Diarrhea and Other (See Comments)     Severe diarrhea and did not help     Social History:    TOBACCO:   reports that he has been smoking cigarettes. He started smoking about 54 years ago. He has a 26.00 pack-year smoking history. He has never used smokeless tobacco.  ETOH:   reports current alcohol use. Family History:       Problem Relation Age of Onset    Cancer Mother         Breast    Heart Disease Mother     Cancer Father         Bladder    Heart Disease Father     Cancer Paternal Grandmother         melanoma        REVIEW OF SYSTEMS:    CONSTITUTIONAL:  negative for fevers, chills, diaphoresis, activity change, appetite change, fatigue, night sweats and unexpected weight change.    EYES:  negative for blurred vision, eye discharge, visual disturbance and icterus  HEENT:  negative for hearing loss, tinnitus, ear drainage, sinus pressure, nasal congestion, epistaxis and snoring  RESPIRATORY:  See HPI  CARDIOVASCULAR:  negative for chest pain, palpitations, exertional chest pressure/discomfort, edema, syncope  GASTROINTESTINAL:  negative for nausea, vomiting, diarrhea, constipation, blood in stool and abdominal pain  GENITOURINARY:  negative for frequency, dysuria, urinary incontinence, decreased urine volume, and hematuria  HEMATOLOGIC/LYMPHATIC:  negative for easy bruising, bleeding and lymphadenopathy  ALLERGIC/IMMUNOLOGIC:  negative for recurrent infections, angioedema, anaphylaxis and drug reactions  ENDOCRINE:  negative for weight changes and diabetic symptoms including polyuria, polydipsia and polyphagia  MUSCULOSKELETAL:  negative for  pain, joint swelling, decreased range of motion and muscle weakness  NEUROLOGICAL:  negative for headaches, slurred speech, unilateral weakness  PSYCHIATRIC/BEHAVIORAL: negative for hallucinations, behavioral problems, confusion and agitation. Objective:   PHYSICAL EXAM:      VITALS:  /73   Pulse 86   Temp 97.9 °F (36.6 °C) (Oral)   Resp 20   Ht 5' 10\" (1.778 m)   Wt 204 lb 9.4 oz (92.8 kg)   SpO2 91%   BMI 29.36 kg/m²   24HR INTAKE/OUTPUT:      Intake/Output Summary (Last 24 hours) at 2021 1825  Last data filed at 2021 1657  Gross per 24 hour   Intake 353.41 ml   Output 2037 ml   Net -1683.59 ml     CURRENT PULSE OXIMETRY:  SpO2: 91 %  24HR PULSE OXIMETRY RANGE:  SpO2  Av %  Min: 78 %  Max: 100 % on 6 L    CONSTITUTIONAL:  awake, alert, cooperative, no distress, and appears stated age  NECK:  Supple, symmetrical, trachea midline, no adenopathy, thyroid symmetric, not enlarged and no tenderness, skin normal  LUNGS: No increased work of breathing and clear to auscultation. No accessory muscle use  CARDIOVASCULAR:  normal S1 and S2, no edema and no JVD  ABDOMEN:  normal bowel sounds, non-distended and no masses palpated, and no tenderness to palpation. No hepatospleenomegaly  LYMPHADENOPATHY:  no axillary or supraclavicular adenopathy. No cervical adnenopathy  PSYCHIATRIC: Oriented to person place and time. No obvious depression or anxiety. MUSCULOSKELETAL: No obvious misalignment or effusion of the joints. No clubbing, cyanosis of the digits. SKIN: Discoloration of the skin on the lower extremities with several superficial wounds. Diminished pulses bilaterally.     DATA:    Old records have been reviewed  CBC with Differential:    Lab Results   Component Value Date    WBC 10.2 2021    RBC 3.41 11/18/2021    HGB 13.0 11/18/2021    HCT 39.3 11/18/2021     11/18/2021    .6 11/18/2021    MCH 34.8 11/18/2021    MCHC 33.9 11/18/2021    RDW 16.1 11/18/2021    LYMPHOPCT 12.8 11/18/2021    MONOPCT 5.9 11/18/2021    BASOPCT 1.0 11/18/2021    MONOSABS 0.6 11/18/2021    LYMPHSABS 1.3 11/18/2021    EOSABS 0.3 11/18/2021    BASOSABS 0.1 11/18/2021     BMP:    Lab Results   Component Value Date     11/18/2021    K 4.0 11/18/2021    K 4.7 10/30/2021     11/18/2021    CO2 24 11/18/2021    BUN 11 11/18/2021    CREATININE 0.8 11/18/2021    CALCIUM 8.8 11/18/2021    GFRAA >60 11/18/2021    LABGLOM >60 11/18/2021    GLUCOSE 107 11/18/2021     Hepatic Function Panel:    Lab Results   Component Value Date    ALKPHOS 89 09/16/2021    ALT 20 09/16/2021    AST 18 09/16/2021    PROT 6.7 09/16/2021    BILITOT 0.7 09/16/2021    BILIDIR <0.2 09/16/2021    IBILI see below 09/16/2021     ABG:    Lab Results   Component Value Date    DYU8WWG 24.4 11/17/2021    BEART -1 11/17/2021    R2HKYKFB 89 11/17/2021    PHART 7.362 11/17/2021    RDF6QCJ 42.9 11/17/2021    PO2ART 59.4 11/17/2021    FFF9OUE 26 11/17/2021       Cultures:   Blood Culture:    Sputum Culture:        Radiology Review:  All pertinent images / reports were reviewed as a part of this visit. imaging reveals the following:  XR CHEST PORTABLE   Final Result      1. Persistent diffuse mild nonspecific prominence of the pulmonary interstitial markings. No localized consolidation or pleural effusion. XR PELVIS (1-2 VIEWS)   Final Result   1. Intraoperative fluoroscopy as described. FLUORO FOR SURGICAL PROCEDURES   Final Result   1. Intraoperative fluoroscopy as described. XR CHEST PORTABLE   Final Result      Diffuse mild nonspecific prominence of pulmonary interstitial markings. No focal airspace density areas of consolidation seen. There are no effusions. Correlate clinically.             Other diagnostic test:      PFTs: 2013:Spirometry does show an obstructive defect with an FEV1 to FVC ratio of 58. FEV1 is 2.42, which is 75% of predicted. FVC is 4.18 at 91% of predicted. There was no significant response to bronchodilator, although FEV1 did  increase to 2.65 post-bronchodilator. Lung volumes were in the normal range  with exception of the residual volume which was 62. Total lung capacity was  85, vital capacity on that was 4.18, same as the FVC. Diffusing capacity was  moderately to severely reduced to 52% for lung volumes that corrected to 74%,  so mildly reduced. IN SUMMARY:  This is a patient with a mild obstructive defect without  bronchodilator response and a moderately reduced diffusing capacity. Echo: September 2021: While patient had a pulmonary embolism: Summary   Normal left ventricle size. There is mild to moderate concentric left   ventricular hypertrophy. Overall left ventricular systolic function appears   normal with an ejection fraction of 55-60%. No regional wall motion   abnormalities are noted. Diastolic filling parameters suggests normal   diastolic function. Estimated pulmonary artery systolic pressure is 39 mmHg assuming a right   atrial pressure of 8 mmHg. Assessment/Plan   73 y.o. male with acute on chronic hypoxemic respiratory failure and hypotension with decreased urine output following revascularization surgery. Art Snow is well-known to me from the office and he appears to be breathing at his baseline, at least at rest.  Prior to the admission in September where he had pneumonia and a pulmonary embolism he did not require oxygen despite his years of smoking. He was discharged on 4 L of oxygen at rest and 6 L with exertion at that time but his oxygen requirements have come down. He was saturating well for me on 6 L in the room and his chest x-ray does not show any gross abnormalities.   There may be an element of slight volume overload but I could not appreciate crackles on exam and he was laying flat. I was able to wean him down to 5 L and he continued to saturate well. I do not think we need to follow the usual protocol of not weaning him below his baseline oxygen requirement because his requirement was from a transitory process in the PE and is likely much better. His hypotension and decreased urinary output today is a bit more concerning. It sounds like he became hypotensive several hours after getting his antihypertensive medications this morning. He is responding to volume so I do not think he will need any inotropic support at this time. There is a echocardiogram pending which 2 months out from the pulmonary embolism may or may not show evidence of continued right heart strain. Cardiology is following. Patient does have baseline KAVITA and has borderline compliance with his device. He complains that the mask \"hurts his nose something awful \". Under the circumstances I think it is most appropriate for him to have the CPAP while he is here. If he refuses that is his choice.          Jorge Baldwin MD

## 2021-11-18 NOTE — PROGRESS NOTES
Pt admitted to ICU. Pt drowsy, oriented x4. Denies pain at this time. No pulses on RLE, multiple abrasions, redness noted. LLE pulses doppler. Safety precautions in place, bed alarm on. VSS. Will continue to monitor.

## 2021-11-18 NOTE — PROGRESS NOTES
ICU VASCULAR SURGERY DAILY PROGRESS NOTE    CC: Atherosclerosis of native artery of RLE with rest pain    SUBJECTIVE:   Interval Hx: Patient rested well overnight. Denies any nausea or vomiting. Has increased sensation of his LLE and does complain of some increased pain on that side. Tolerating a CLD overnight without difficulty. Making adequate UOP. Remains afebrile and HDS.      ROS: A 14 point review of systems was conducted, significant findings as noted above. All other systems negative. OBJECTIVE:   Vitals:   Vitals:    11/18/21 0300 11/18/21 0400 11/18/21 0500 11/18/21 0600   BP: 112/60 115/62 (!) 117/58 117/64   Pulse: 88 85 86 86   Resp: 21 25 29 28   Temp:  98.3 °F (36.8 °C)     TempSrc:  Oral     SpO2: 93% 94% 92%    Weight:    204 lb 9.4 oz (92.8 kg)   Height:           I/O:     Intake/Output Summary (Last 24 hours) at 11/18/2021 0730  Last data filed at 11/18/2021 2180  Gross per 24 hour   Intake 3878.77 ml   Output 3320 ml   Net 558.77 ml     I/O last 3 completed shifts: In: 3878.8 [P.O.:60; I.V.:3218.8; Blood:600]  Out: 2354 [Urine:2820; Blood:500]    Diet: ADULT DIET; Regular      Physical Examination:   General appearance: alert, no acute distress, grooming appropriate  Eyes: No scleral icterus, EOM grossly intact  Neck: trachea midline, no JVD, no lymphadenopathy, neck supple  Chest/Lungs: Normal effort with no accessory muscle use on 15L HFNC  Cardiovascular: RRR  Abdomen: Soft, non-tender, non-distended, no rebound, guarding, or rigidity. Skin: warm and dry, no rashes  Extremities: erythema and mild edema of right lower extremity > than left. Abrasion to medial aspect of right foot. Bilateral lower extremity warm to palpation and equal bilaterally. Sensation improved over the plantar aspect of bilateral lower extremities. PT dopplerable on the right and left LE. Left stick side hemostatic, no hematoma noted, palpable femoral pulse.  Right groin with Aquacel Ag dressing in place, c/d/i  Genitourinary: Sampson in place with clear yellow urine    Labs:  CBC:   Recent Labs     11/16/21  0551 11/17/21  0540 11/18/21  0330   WBC 7.1 7.7 8.4   HGB 13.2* 13.4* 12.7*   HCT 38.9* 38.7* 37.4*   * 129* 127*       BMP:   Recent Labs     11/16/21  0551 11/16/21  0551 11/17/21  0540 11/17/21  1538 11/18/21  0330     --  136  --  135*   K 4.0  --  4.3  --  4.0     --  101  --  101   CO2 23  --  23  --  24   BUN 10  --  14  --  11   CREATININE 0.9   < > 0.9 0.9 0.8   GLUCOSE 96  --  100*  --  107*    < > = values in this interval not displayed. LFT's: No results for input(s): AST, ALT, ALB, BILITOT, ALKPHOS in the last 72 hours. Troponin: No results for input(s): TROPONINI in the last 72 hours. BNP: No results for input(s): BNP in the last 72 hours. ABGs:   Recent Labs     11/17/21  1538   PHART 7.362   REF0ZEF 42.9   PO2ART 59.4*     INR:   Recent Labs     11/15/21  1818 11/17/21  1840 11/18/21  0613   INR 1.13* 1.08 1.07       U/A:No results for input(s): NITRITE, COLORU, PHUR, LABCAST, WBCUA, RBCUA, MUCUS, TRICHOMONAS, YEAST, BACTERIA, CLARITYU, SPECGRAV, LEUKOCYTESUR, UROBILINOGEN, BILIRUBINUR, BLOODU, GLUCOSEU, AMORPHOUS in the last 72 hours. Invalid input(s): Herrera Collar     Rad:   XR PELVIS (1-2 VIEWS)   Final Result   1. Intraoperative fluoroscopy as described. FLUORO FOR SURGICAL PROCEDURES   Final Result   1. Intraoperative fluoroscopy as described. XR CHEST PORTABLE   Final Result      Diffuse mild nonspecific prominence of pulmonary interstitial markings. No focal airspace density areas of consolidation seen. There are no effusions. Correlate clinically. ASSESSMENT AND PLAN:   This is a 73y.o. year old male with a history of COPD on 6L home O2 at baseline, KAVITA, CAD, CHF and a previous MI, is status post R. femoral endarterectomy, right external iliac to profunda bypass w/ PTFE graft.  RLE arteriogram, balloon angioplasty of R profunda balloon angioplasty and stent of right external iliac artery secondary to atherosclerosis of native artery of RLE with rest pain. (11/17) POD1.     Neuro:   Pain  PRN Tylenol  PRN Dilaudid pain panel  PRN Roxicodone pain panel     Psych  Home Cymbalta restarted     Cardiovascular:   Patient with history of CHF and CAD with previous MI  EF of 55-60% (ECHO 9/17/21)  - Continue lipitor     HTN:  - Scheduled Norvasc, metoprolol, doxazosin and Cozaar  - Lasix 40 mg daily     S/P R. femoral endarterectomy, right external iliac to profunda bypass w/ PTFE graft.  RLE arteriogram, balloon angioplasty of R profunda balloon angioplasty and stent of right external iliac artery (11/17)  - EBL 500cc  - Patient received 2U of PRBC intraoperatively  - No dopplerable pulses in the right lower extremity, Q1hr neuro checks to include temperature of the extremity and capillary refill  - Continue Hep gtt will transition to Eliquis this morning      Pulmonary:   History of COPD on 6L NC at home at baseline.  - Aggressive pulmonary toilet  - Restarted home albuterol nebulizer (scheduled)  - Restarted home inhalers - Stiolto and Pulmicort     GI:   History of GERD  - Continue daily Protonix (40mg)     FEN:   Keep schulte in place for strict I/O's  Baseline Cr 0.9   PRN electrolyte replacement     /Renal:   Continue schulte for strict I/O's  No history of kidney disease  Cr 0.8 from 0.9 baseline        Hem/ID:   Will follow up morning labs  No indication for prophylactic antibiotics     Endo:   No history of endocrine disfunction     Prophylaxis:   Hep gtt     Access:  Central Access: None  Peripheral Access: Two peripheral IV's (11/17)  Arterial Line Access: Left radial (11/17)                                Schulte Date placed: 11/17     Mobility:  PT/OT     Dispo:   ICU for Q1hr neuro checks.     Code Status: Full Code  -----------------------------  Lilian Galvan DO  11/18/21  7:30 AM

## 2021-11-18 NOTE — PROGRESS NOTES
Vascular Surgery  Post-operative Note      Procedure(s) Performed: Right femoral endarterectomy, right external iliac to profunda bypass w/ PTFE graft. RLE arteriogram, balloon angioplasty of R profunda balloon angioplasty and stent of right external iliac artery    Subjective:   Patient's pain is controlled, tolerating a CLD without any nausea or vomiting. Remains on strict bedrest at this time. Denies passing flatus or having bowel movement. States that the pain in his lower extremity is significantly improved since prior to surgery. Objective:  Anesthesia type: General      I/O    Intra op    Post op     Fluids  2100 mL 800 mL      mL 0 mL     Urine 1420 mL 725 mL     Vitals:   Vitals:    11/17/21 1930 11/17/21 1945 11/17/21 2000 11/17/21 2216   BP: 114/60 (!) 106/59 (!) 120/59    Pulse: 87 86 88    Resp: 20 20 20 16   Temp:   98.3 °F (36.8 °C)    TempSrc:   Temporal    SpO2: 94% 96% 95% 100%   Weight:       Height:           Physical Exam:  Post-op vital signs:  Stable   General appearance: alert, no acute distress, grooming appropriate  Eyes: No scleral icterus, EOM grossly intact  Neck: trachea midline, no JVD, no lymphadenopathy, neck supple  Chest/Lungs: Normal effort with no accessory muscle use on 15L HFNC  Cardiovascular: RRR  Abdomen: Soft, non-tender, non-distended, no rebound, guarding, or rigidity. Skin: warm and dry, no rashes  Extremities: erythema and mild edema of right lower extremity > than left. Abrasion to medial aspect of right foot. Bilateral lower extremity warm to palpation and equal bilaterally. Sensation diminished over the plantar aspect of bilateral lower extremities. PT dopplerable on the LLE. Left stick side hemostatic, no hematoma noted, palpable femoral pulse. Right groin with Aquacel Ag dressing in place, c/d/i  Genitourinary: Sampson in place with clear yellow urine  Neuro: A&Ox3    Assessment and Plan:   This is a 73y.o. year old male with a history of COPD on 6L home Date placed: 11/17    Mobility:  Patient to remain on bedrest for 6 hours post-operatively. Dispo:   ICU for Q1hr neuro checks.     Code Status: Full Code  -----------------------------  Sidra Billings DO  11/17/21  10:56 PM

## 2021-11-18 NOTE — PROGRESS NOTES
PACU Transfer Note    Vitals:    11/17/21 2000   BP: (!) 120/59   Pulse: 88   Resp: 20   Temp: 98.3 °F (36.8 °C)   SpO2: 95%   BP is within 20% of baseline value. In: 2500 [I.V.:1900]  Out: 2225 [Urine:1725]    Pain assessment:  none  Pain Level: 0    Report given to Receiving unit RN, Rajat Stone, by phone. 5S has already called report to ICU. Transferred in bed with 02 and monitor on per pacu transport and this RN.  ICU will call for patient belongings to be moved to ICU from .    11/17/2021 8:28 PM

## 2021-11-18 NOTE — PROGRESS NOTES
Art line no longer functioning properly. Does not draw back blood, is reading incorrectly, etc. Surgery resident notified. Lab called for STAT troponin draw.

## 2021-11-18 NOTE — ANESTHESIA POSTPROCEDURE EVALUATION
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        Anesthesia Post Evaluation    Patient location during evaluation: PACU  Patient participation: complete - patient participated  Level of consciousness: awake and alert  Pain score: 0  Airway patency: patent  Nausea & Vomiting: no nausea and no vomiting  Complications: no  Cardiovascular status: hemodynamically stable  Respiratory status: acceptable  Hydration status: euvolemic

## 2021-11-19 LAB
ALBUMIN SERPL-MCNC: 3.3 G/DL (ref 3.4–5)
ALBUMIN SERPL-MCNC: 3.4 G/DL (ref 3.4–5)
ANION GAP SERPL CALCULATED.3IONS-SCNC: 11 MMOL/L (ref 3–16)
ANION GAP SERPL CALCULATED.3IONS-SCNC: 11 MMOL/L (ref 3–16)
BASOPHILS ABSOLUTE: 0 K/UL (ref 0–0.2)
BASOPHILS RELATIVE PERCENT: 0.5 %
BUN BLDV-MCNC: 18 MG/DL (ref 7–20)
BUN BLDV-MCNC: 18 MG/DL (ref 7–20)
CALCIUM SERPL-MCNC: 8.5 MG/DL (ref 8.3–10.6)
CALCIUM SERPL-MCNC: 8.5 MG/DL (ref 8.3–10.6)
CHLORIDE BLD-SCNC: 100 MMOL/L (ref 99–110)
CHLORIDE BLD-SCNC: 99 MMOL/L (ref 99–110)
CO2: 24 MMOL/L (ref 21–32)
CO2: 25 MMOL/L (ref 21–32)
CREAT SERPL-MCNC: 0.8 MG/DL (ref 0.8–1.3)
CREAT SERPL-MCNC: 1.1 MG/DL (ref 0.8–1.3)
EKG ATRIAL RATE: 108 BPM
EKG ATRIAL RATE: 113 BPM
EKG DIAGNOSIS: NORMAL
EKG DIAGNOSIS: NORMAL
EKG P AXIS: 48 DEGREES
EKG P-R INTERVAL: 194 MS
EKG Q-T INTERVAL: 338 MS
EKG Q-T INTERVAL: 348 MS
EKG QRS DURATION: 92 MS
EKG QRS DURATION: 98 MS
EKG QTC CALCULATION (BAZETT): 463 MS
EKG QTC CALCULATION (BAZETT): 466 MS
EKG R AXIS: 60 DEGREES
EKG R AXIS: 70 DEGREES
EKG T AXIS: -36 DEGREES
EKG T AXIS: 59 DEGREES
EKG VENTRICULAR RATE: 108 BPM
EKG VENTRICULAR RATE: 113 BPM
EOSINOPHILS ABSOLUTE: 0.2 K/UL (ref 0–0.6)
EOSINOPHILS RELATIVE PERCENT: 2.8 %
GFR AFRICAN AMERICAN: >60
GFR AFRICAN AMERICAN: >60
GFR NON-AFRICAN AMERICAN: >60
GFR NON-AFRICAN AMERICAN: >60
GLUCOSE BLD-MCNC: 101 MG/DL (ref 70–99)
GLUCOSE BLD-MCNC: 104 MG/DL (ref 70–99)
HCT VFR BLD CALC: 34 % (ref 40.5–52.5)
HEMOGLOBIN: 11.5 G/DL (ref 13.5–17.5)
LYMPHOCYTES ABSOLUTE: 1 K/UL (ref 1–5.1)
LYMPHOCYTES RELATIVE PERCENT: 11.9 %
MAGNESIUM: 1.5 MG/DL (ref 1.8–2.4)
MAGNESIUM: 1.8 MG/DL (ref 1.8–2.4)
MCH RBC QN AUTO: 35 PG (ref 26–34)
MCHC RBC AUTO-ENTMCNC: 33.9 G/DL (ref 31–36)
MCV RBC AUTO: 103.1 FL (ref 80–100)
MONOCYTES ABSOLUTE: 0.8 K/UL (ref 0–1.3)
MONOCYTES RELATIVE PERCENT: 9.1 %
NEUTROPHILS ABSOLUTE: 6.6 K/UL (ref 1.7–7.7)
NEUTROPHILS RELATIVE PERCENT: 75.7 %
PDW BLD-RTO: 16.4 % (ref 12.4–15.4)
PHOSPHORUS: 2.5 MG/DL (ref 2.5–4.9)
PHOSPHORUS: 3.5 MG/DL (ref 2.5–4.9)
PLATELET # BLD: 128 K/UL (ref 135–450)
PMV BLD AUTO: 8.9 FL (ref 5–10.5)
POTASSIUM SERPL-SCNC: 3.7 MMOL/L (ref 3.5–5.1)
POTASSIUM SERPL-SCNC: 4.1 MMOL/L (ref 3.5–5.1)
RBC # BLD: 3.29 M/UL (ref 4.2–5.9)
SODIUM BLD-SCNC: 134 MMOL/L (ref 136–145)
SODIUM BLD-SCNC: 136 MMOL/L (ref 136–145)
WBC # BLD: 8.7 K/UL (ref 4–11)

## 2021-11-19 PROCEDURE — 6360000002 HC RX W HCPCS: Performed by: STUDENT IN AN ORGANIZED HEALTH CARE EDUCATION/TRAINING PROGRAM

## 2021-11-19 PROCEDURE — 6370000000 HC RX 637 (ALT 250 FOR IP): Performed by: STUDENT IN AN ORGANIZED HEALTH CARE EDUCATION/TRAINING PROGRAM

## 2021-11-19 PROCEDURE — 97162 PT EVAL MOD COMPLEX 30 MIN: CPT

## 2021-11-19 PROCEDURE — 97530 THERAPEUTIC ACTIVITIES: CPT

## 2021-11-19 PROCEDURE — 6360000004 HC RX CONTRAST MEDICATION: Performed by: INTERNAL MEDICINE

## 2021-11-19 PROCEDURE — 94150 VITAL CAPACITY TEST: CPT

## 2021-11-19 PROCEDURE — 6370000000 HC RX 637 (ALT 250 FOR IP)

## 2021-11-19 PROCEDURE — 97535 SELF CARE MNGMENT TRAINING: CPT

## 2021-11-19 PROCEDURE — 2580000003 HC RX 258: Performed by: STUDENT IN AN ORGANIZED HEALTH CARE EDUCATION/TRAINING PROGRAM

## 2021-11-19 PROCEDURE — C8923 2D TTE W OR W/O FOL W/CON,CO: HCPCS

## 2021-11-19 PROCEDURE — 85025 COMPLETE CBC W/AUTO DIFF WBC: CPT

## 2021-11-19 PROCEDURE — 83735 ASSAY OF MAGNESIUM: CPT

## 2021-11-19 PROCEDURE — 6360000002 HC RX W HCPCS: Performed by: INTERNAL MEDICINE

## 2021-11-19 PROCEDURE — 97116 GAIT TRAINING THERAPY: CPT

## 2021-11-19 PROCEDURE — 93010 ELECTROCARDIOGRAM REPORT: CPT | Performed by: INTERNAL MEDICINE

## 2021-11-19 PROCEDURE — 94761 N-INVAS EAR/PLS OXIMETRY MLT: CPT

## 2021-11-19 PROCEDURE — 99291 CRITICAL CARE FIRST HOUR: CPT | Performed by: INTERNAL MEDICINE

## 2021-11-19 PROCEDURE — 80069 RENAL FUNCTION PANEL: CPT

## 2021-11-19 PROCEDURE — 94640 AIRWAY INHALATION TREATMENT: CPT

## 2021-11-19 PROCEDURE — 2700000000 HC OXYGEN THERAPY PER DAY

## 2021-11-19 PROCEDURE — 2580000003 HC RX 258: Performed by: INTERNAL MEDICINE

## 2021-11-19 PROCEDURE — 93005 ELECTROCARDIOGRAM TRACING: CPT | Performed by: STUDENT IN AN ORGANIZED HEALTH CARE EDUCATION/TRAINING PROGRAM

## 2021-11-19 PROCEDURE — 99232 SBSQ HOSP IP/OBS MODERATE 35: CPT | Performed by: INTERNAL MEDICINE

## 2021-11-19 PROCEDURE — 2000000000 HC ICU R&B

## 2021-11-19 PROCEDURE — 6370000000 HC RX 637 (ALT 250 FOR IP): Performed by: INTERNAL MEDICINE

## 2021-11-19 PROCEDURE — 94669 MECHANICAL CHEST WALL OSCILL: CPT

## 2021-11-19 PROCEDURE — 35665 BPG ILIOFEMORAL: CPT | Performed by: SURGERY

## 2021-11-19 PROCEDURE — 36415 COLL VENOUS BLD VENIPUNCTURE: CPT

## 2021-11-19 PROCEDURE — 2500000003 HC RX 250 WO HCPCS: Performed by: STUDENT IN AN ORGANIZED HEALTH CARE EDUCATION/TRAINING PROGRAM

## 2021-11-19 RX ORDER — METOPROLOL TARTRATE 5 MG/5ML
5 INJECTION INTRAVENOUS EVERY 5 MIN PRN
Status: DISCONTINUED | OUTPATIENT
Start: 2021-11-19 | End: 2021-11-22 | Stop reason: HOSPADM

## 2021-11-19 RX ORDER — MAGNESIUM SULFATE IN WATER 40 MG/ML
2000 INJECTION, SOLUTION INTRAVENOUS ONCE
Status: COMPLETED | OUTPATIENT
Start: 2021-11-19 | End: 2021-11-19

## 2021-11-19 RX ORDER — FUROSEMIDE 10 MG/ML
10 INJECTION INTRAMUSCULAR; INTRAVENOUS ONCE
Status: COMPLETED | OUTPATIENT
Start: 2021-11-19 | End: 2021-11-19

## 2021-11-19 RX ORDER — POTASSIUM CHLORIDE 20 MEQ/1
20 TABLET, EXTENDED RELEASE ORAL ONCE
Status: COMPLETED | OUTPATIENT
Start: 2021-11-19 | End: 2021-11-19

## 2021-11-19 RX ORDER — METOPROLOL TARTRATE 5 MG/5ML
5 INJECTION INTRAVENOUS
Status: COMPLETED | OUTPATIENT
Start: 2021-11-19 | End: 2021-11-19

## 2021-11-19 RX ADMIN — METOPROLOL TARTRATE 12.5 MG: 25 TABLET, FILM COATED ORAL at 09:17

## 2021-11-19 RX ADMIN — SODIUM CHLORIDE, PRESERVATIVE FREE 10 ML: 5 INJECTION INTRAVENOUS at 21:20

## 2021-11-19 RX ADMIN — ATORVASTATIN CALCIUM 80 MG: 80 TABLET, FILM COATED ORAL at 09:07

## 2021-11-19 RX ADMIN — PERFLUTREN 1.65 MG: 6.52 INJECTION, SUSPENSION INTRAVENOUS at 12:20

## 2021-11-19 RX ADMIN — ALBUTEROL SULFATE 2.5 MG: 2.5 SOLUTION RESPIRATORY (INHALATION) at 16:42

## 2021-11-19 RX ADMIN — ALLOPURINOL 100 MG: 100 TABLET ORAL at 09:07

## 2021-11-19 RX ADMIN — CILOSTAZOL 50 MG: 50 TABLET ORAL at 20:56

## 2021-11-19 RX ADMIN — METOPROLOL TARTRATE 25 MG: 25 TABLET, FILM COATED ORAL at 20:56

## 2021-11-19 RX ADMIN — APIXABAN 5 MG: 5 TABLET, FILM COATED ORAL at 20:56

## 2021-11-19 RX ADMIN — SODIUM CHLORIDE, PRESERVATIVE FREE 10 ML: 5 INJECTION INTRAVENOUS at 09:08

## 2021-11-19 RX ADMIN — ALBUTEROL SULFATE 2.5 MG: 2.5 SOLUTION RESPIRATORY (INHALATION) at 13:37

## 2021-11-19 RX ADMIN — AMIODARONE HYDROCHLORIDE 0.5 MG/MIN: 50 INJECTION, SOLUTION INTRAVENOUS at 22:22

## 2021-11-19 RX ADMIN — APIXABAN 5 MG: 5 TABLET, FILM COATED ORAL at 09:07

## 2021-11-19 RX ADMIN — FUROSEMIDE 10 MG: 10 INJECTION, SOLUTION INTRAMUSCULAR; INTRAVENOUS at 09:46

## 2021-11-19 RX ADMIN — BUDESONIDE 250 MCG: 0.25 SUSPENSION RESPIRATORY (INHALATION) at 09:03

## 2021-11-19 RX ADMIN — MAGNESIUM SULFATE HEPTAHYDRATE 2000 MG: 2 INJECTION, SOLUTION INTRAVENOUS at 06:31

## 2021-11-19 RX ADMIN — ALBUTEROL SULFATE 2.5 MG: 2.5 SOLUTION RESPIRATORY (INHALATION) at 21:03

## 2021-11-19 RX ADMIN — AMIODARONE HYDROCHLORIDE 1 MG/MIN: 50 INJECTION, SOLUTION INTRAVENOUS at 16:47

## 2021-11-19 RX ADMIN — METOPROLOL TARTRATE 5 MG: 5 INJECTION INTRAVENOUS at 15:47

## 2021-11-19 RX ADMIN — METOPROLOL TARTRATE 5 MG: 5 INJECTION INTRAVENOUS at 16:01

## 2021-11-19 RX ADMIN — MAGNESIUM SULFATE HEPTAHYDRATE 2000 MG: 2 INJECTION, SOLUTION INTRAVENOUS at 17:43

## 2021-11-19 RX ADMIN — OXYCODONE 5 MG: 5 TABLET ORAL at 09:44

## 2021-11-19 RX ADMIN — POTASSIUM CHLORIDE 20 MEQ: 1500 TABLET, EXTENDED RELEASE ORAL at 06:31

## 2021-11-19 RX ADMIN — METOPROLOL TARTRATE 12.5 MG: 25 TABLET, FILM COATED ORAL at 11:25

## 2021-11-19 RX ADMIN — DULOXETINE HYDROCHLORIDE 20 MG: 20 CAPSULE, DELAYED RELEASE ORAL at 09:07

## 2021-11-19 RX ADMIN — ALBUTEROL SULFATE 2.5 MG: 2.5 SOLUTION RESPIRATORY (INHALATION) at 09:03

## 2021-11-19 RX ADMIN — POTASSIUM CHLORIDE 10 MEQ: 10 TABLET, EXTENDED RELEASE ORAL at 09:09

## 2021-11-19 RX ADMIN — PANTOPRAZOLE SODIUM 40 MG: 40 TABLET, DELAYED RELEASE ORAL at 09:20

## 2021-11-19 RX ADMIN — BUDESONIDE 250 MCG: 0.25 SUSPENSION RESPIRATORY (INHALATION) at 21:03

## 2021-11-19 RX ADMIN — DEXTROSE MONOHYDRATE 150 MG: 50 INJECTION, SOLUTION INTRAVENOUS at 16:25

## 2021-11-19 RX ADMIN — CILOSTAZOL 50 MG: 50 TABLET ORAL at 09:08

## 2021-11-19 RX ADMIN — TIOTROPIUM BROMIDE AND OLODATEROL 2 PUFF: 3.124; 2.736 SPRAY, METERED RESPIRATORY (INHALATION) at 09:04

## 2021-11-19 ASSESSMENT — PAIN DESCRIPTION - ONSET
ONSET: ON-GOING
ONSET: ON-GOING

## 2021-11-19 ASSESSMENT — PAIN DESCRIPTION - PROGRESSION
CLINICAL_PROGRESSION: NOT CHANGED
CLINICAL_PROGRESSION: NOT CHANGED
CLINICAL_PROGRESSION: GRADUALLY IMPROVING
CLINICAL_PROGRESSION: NOT CHANGED
CLINICAL_PROGRESSION: NOT CHANGED

## 2021-11-19 ASSESSMENT — PAIN SCALES - GENERAL
PAINLEVEL_OUTOF10: 3
PAINLEVEL_OUTOF10: 5
PAINLEVEL_OUTOF10: 7
PAINLEVEL_OUTOF10: 5
PAINLEVEL_OUTOF10: 5

## 2021-11-19 ASSESSMENT — PAIN DESCRIPTION - PAIN TYPE
TYPE: CHRONIC PAIN;SURGICAL PAIN
TYPE: ACUTE PAIN

## 2021-11-19 ASSESSMENT — PAIN DESCRIPTION - ORIENTATION
ORIENTATION: RIGHT;LOWER
ORIENTATION: RIGHT

## 2021-11-19 ASSESSMENT — PAIN DESCRIPTION - FREQUENCY
FREQUENCY: CONTINUOUS
FREQUENCY: CONTINUOUS

## 2021-11-19 ASSESSMENT — PAIN DESCRIPTION - DESCRIPTORS
DESCRIPTORS: ACHING
DESCRIPTORS: ACHING;SORE

## 2021-11-19 ASSESSMENT — PAIN DESCRIPTION - LOCATION
LOCATION: LEG
LOCATION: LEG

## 2021-11-19 ASSESSMENT — PAIN - FUNCTIONAL ASSESSMENT
PAIN_FUNCTIONAL_ASSESSMENT: ACTIVITIES ARE NOT PREVENTED
PAIN_FUNCTIONAL_ASSESSMENT: ACTIVITIES ARE NOT PREVENTED

## 2021-11-19 NOTE — PROGRESS NOTES
Cardiology Consult Service  Daily Progress Note        Admit Date:  11/15/2021  Primary cardiologist: Dr Cindi Murrell    Reason for Consultation/Chief Complaint: hypotension    Subjective:     Beatrice Reid is a 73 y.o. male with a past medical history of CAD s/p LAD stent, HTN, HLP, HFpEF, severe PAD, COPD on baseline 6 L of supplemental oxygen, RLL/RML PE 09/16/21 without cor pulmonale, DVT.      Echo 9/17/21: mild to mod LVH, EF 35%, normal diastolic, normal RV, RVSP 39(8), trace TR.      [facility] 12/2020: patent LAD stent, no stenoses.      Patient started having severe right foot pain and was recently assessed with CTA abdominal aorta with bilateral runoff which revealed severe bilateral PAD. Patient was admitted on 11/15 for elective right lower extremity vascular surgery (right EIA to PFA bypass with R EIA stent). He held his anticoagulation couple of days prior to admission. Patient had a successful surgery on 11/17/2021 and he was started on heparin drip yesterday evening, switched to Eliquis twice daily this morning. Starting around 1 PM today patient became very hypotensive (BP 80s/50s). Cardiology was consulted for further evaluation. EMR shows oxygen requirements of 8 L however it was verified that 6 L are adequate for his oxygenation. Of note, all of his antihypertensive medications have been given daily since admission along with Lasix 40 mg daily. Patient currently reports no complaints including chest pain, shortness of breath. Interval history:  Patient's BP has improved today, received  ml with good response, no need for pressors, just holding his antihypertensive meds. Later today, he flipped into afib with RVR.      Objective:     Medications:   metoprolol  25 mg Oral BID    amiodarone  150 mg IntraVENous Once    apixaban  5 mg Oral BID    albuterol  2.5 mg Nebulization 4x daily    allopurinol  100 mg Oral Daily    [Held by provider] amLODIPine  5 mg Oral Daily    no drainage or sinus tenderness   Throat: Lips, mucosa, and tongue normal   Neck: Supple, symmetrical, trachea midline, no adenopathy, thyroid: not enlarged, symmetric, no tenderness/mass/nodules, no carotid bruit. Lungs:   Normal respiratory rate, lungs clear to auscultation without any wheezes, rubs or ronchi bilaterally. Chest Wall:  No tenderness or deformity   Heart:  Regular rhythm, rate is controlled, S1, S2 normal, there is no murmur, there is no rub or gallop, cannot assess jvd, no bilateral lower extremity edema   Abdomen:   Soft, non-tender, bowel sounds active all four quadrants,  no masses, no organomegaly       Extremities: Extremities normal, atraumatic, no cyanosis. Pulses: 2+ and symmetric   Skin: Skin color, texture, turgor normal, no rashes or lesions   Pysch: Normal mood and affect   Neurologic: Normal gross motor and sensory exam.  Cranial nerves intact       Labs:   Recent Labs     11/17/21  0540 11/17/21  1538 11/18/21  0330 11/19/21  0446     --  135* 136   K 4.3  --  4.0 3.7   BUN 14  --  11 18   CREATININE 0.9 0.9 0.8 1.1     --  101 100   CO2 23  --  24 25   GLUCOSE 100*  --  107* 104*   CALCIUM 8.9  --  8.8 8.5   MG 1.60*  --  1.60* 1.50*     Recent Labs     11/18/21  0330 11/18/21  0330 11/18/21  1415 11/18/21  1415 11/18/21  1739 11/19/21  0446   WBC 8.4  --  10.2  --   --  8.7   HGB 12.7*   < > 11.9*  --  13.0* 11.5*   HCT 37.4*   < > 35.0*  --  39.3* 34.0*   *  --  140  --   --  128*   .5*   < > 102.6*   < >  --  103.1*    < > = values in this interval not displayed. No results for input(s): CHOLTOT, TRIG, HDL in the last 72 hours. Invalid input(s): LIPIDCOMM, CHOLHDL, VLDCHOL, LDL  Recent Labs     11/17/21  1840 11/18/21  0613   INR 1.08 1.07     Recent Labs     11/18/21  1653   TROPONINI 0.02*     No results for input(s): BNP in the last 72 hours. No results for input(s): NTPROBNP in the last 72 hours.   No results for input(s): TSH in the last 72 hours. Imaging:       Assessment & Plan:        1. Hypotension: It could be due to hypovolemia versus acute bleeding (recent surgery and recent reinitiation of anticoagulation) versus additional acute PE (patient has been off anticoagulation for a few days). There is no indication to suggest infection and septic shock. ECG shows NSR, no evidence of ischemia, troponin 0.02; no evidence of ACS. No evidence of bleeding or acute PE since hypotension resolved promptly with IV fluids and off his HTN meds.      -We will repeat a limited echocardiogram in the morning to assess LV and RV size and function  -Hold all antihypertensive medications except for BB     2. CAD status post stent:  There is no evidence of ACS this admission.     -Once stable, will consider baby aspirin, beta-blocker, statin     3.  Essential hypertension:  BP is now normal.      4. PAF with RVR:   Patient was in sinus but flipped into afib RVR today in the afternoon.     -Will start amiodarone gtt to convert back to sinus   -Cw lopressor 25 bid, may increase as BP allows.   -Cw AC. Due to the high probability of clinically significant life threating deterioration of the patient's condition that required my urgent intervention, a total critical care time 35 minutes was used. This time excludes any time that may have been spent performing procedures. This includes but not limited to vital sign monitoring, telemetry monitoring, continuous pulse oximety, IV medication, clinical response to the IV medications, documentation time, consultation time, interpretation of lab data, review of nursing notes and old record review. I have personally reviewed the reports and images of labs, radiological studies, cardiac studies including ECG's and telemetry, current and old medical records. The note was completed using EMR and Dragon dictation system.  Every effort was made to ensure accuracy; however, inadvertent computerized transcription errors may be present. All questions and concerns were addressed to the patient/family. Alternatives to my treatment were discussed. Thank you for allowing to us to participate in the care or Lilibeth Baker [address]. . Please call our service with questions.     Latosha Martinez MD, [facility] [place], [address]  The [address]  [address] [address]  Ph: [phone]  Fax: [phone]

## 2021-11-19 NOTE — PLAN OF CARE
Problem: Pain:  Goal: Pain level will decrease  Description: Pain level will decrease  Outcome: Ongoing  Goal: Control of acute pain  Description: Control of acute pain  Outcome: Ongoing  Goal: Control of chronic pain  Description: Control of chronic pain  Outcome: Ongoing     Problem: Skin Integrity:  Goal: Will show no infection signs and symptoms  Description: Will show no infection signs and symptoms  Outcome: Ongoing  Goal: Absence of new skin breakdown  Description: Absence of new skin breakdown  Outcome: Ongoing     Problem: Falls - Risk of:  Goal: Will remain free from falls  Description: Will remain free from falls  Outcome: Ongoing  Goal: Absence of physical injury  Description: Absence of physical injury  Outcome: Ongoing     Problem: OXYGENATION/RESPIRATORY FUNCTION  Goal: Patient will maintain patent airway  Outcome: Ongoing  Goal: Patient will achieve/maintain normal respiratory rate/effort  Description: Respiratory rate and effort will be within normal limits for the patient  Outcome: Ongoing     Problem: HEMODYNAMIC STATUS  Goal: Patient has stable vital signs and fluid balance  Outcome: Ongoing     Problem: FLUID AND ELECTROLYTE IMBALANCE  Goal: Fluid and electrolyte balance are achieved/maintained  Outcome: Ongoing     Problem: ACTIVITY INTOLERANCE/IMPAIRED MOBILITY  Goal: Mobility/activity is maintained at optimum level for patient  Outcome: Ongoing

## 2021-11-19 NOTE — PROGRESS NOTES
Complexity  PT Education: PT Role; Goals; Transfer Training; Functional Mobility Training; Gait Training  Patient Education: pt verbalized understanding but would benefit from reinforcement  Barriers to Learning: cognition  REQUIRES PT FOLLOW UP: Yes  Activity Tolerance  Activity Tolerance: Patient limited by pain; Patient limited by fatigue       Patient Diagnosis(es): The encounter diagnosis was Atherosclerosis of native artery of right lower extremity with rest pain ([facility] has a past medical history of CAD (coronary artery disease), CHF (congestive heart failure) ([facility] COPD (chronic obstructive pulmonary disease) ([facility] Depressive disorder, not elsewhere classified, GERD (gastroesophageal reflux disease), History of colon polyps - 30 seen on colonoscopy 04/2021, History of MI (myocardial infarction), History of skin ulcer of lower extremity, History of transient ischemic attack (TIA), Hyperlipidemia, Hypertension, Neuropathy, KAVITA (obstructive sleep apnea), Personal history of DVT (deep vein thrombosis), PVD (peripheral vascular disease) ([facility] TIA (transient ischemic attack), and Vertebral fracture. has a past surgical history that includes Appendectomy; Coronary angioplasty with stent (6/2013); other surgical history (Right, 6/25/2013); Leg Debridement (Right, 7/23/2013); skin split graft (Right, 7/25/2013); other surgical history (Left, 8/27/2013); laminectomy (11/18/2015); transluminal angioplasty (Left, 12/22/2015); Total hip arthroplasty (Right, 09/01/2016); Cholecystectomy, laparoscopic; eye surgery (Left); Colonoscopy (4/23/2021); Colonoscopy (4/23/2021); Colonoscopy (4/23/2021); and Femoral Endarterectomy (Right, 11/17/2021).     Restrictions  Position Activity Restriction  Other position/activity restrictions: up with assist  Vision/Hearing  Vision: Within Functional Limits  Hearing: Exceptions to [facility]  Hearing Exceptions: Hard of hearing/hearing concerns     Subjective  General  Chart Reviewed: Yes  Patient assessed for rehabilitation services?: Yes  Additional Pertinent Hx: Dontrell Tapia. is a 73 y.o. male with a past medical history of CAD s/p LAD stent, HTN, HLP, HFpEF, severe PAD, COPD on baseline 6 L of supplemental oxygen, RLL/RML PE 09/16/21 without cor pulmonale, DVT. Patient started having severe right foot pain and was recently assessed with CTA abdominal aorta with bilateral runoff which revealed severe bilateral PAD. Patient was admitted on 11/15 for elective right lower extremity vascular surgery (right EIA to PFA bypass with R EIA stent). He held his anticoagulation couple of days prior to admission. Patient had a successful surgery on 11/17/2021. Starting around 1 PM the following day, patient became very hypotensive (BP 80s/50s). Family / Caregiver Present: No  Referring Practitioner: Ko Ty DO  Diagnosis: PE  Follows Commands: Impaired (difficulty processing at times it appeared)  General Comment  Comments: The pt presents supine in bed and willing to work with therapists. Subjective  Subjective: \"I feel pretty good. \"  Pain Screening  Patient Currently in Pain: Yes (8/10, R LE with RN notified)  Vital Signs  Patient Currently in Pain: Yes (8/10, R LE with RN notified)       Orientation  Orientation  Overall Orientation Status: Within Functional Limits  Social/Functional History  Social/Functional History  Lives With: Alone  Type of Home: [address] [place] [address] Drive: One level (one level once he is in his apartment)  Home Access: Stairs to enter with rails  Entrance Stairs - Number of Steps: 2 to enter through back with one railing, landing, then 3 with bilateral railings; 12-15 to enter through front however pt doesn't use front entrance  Bathroom Shower/Tub: Tub/Shower unit  [employer]: Standard (+ RTS with arms)  Bathroom Equipment: Hand-held shower, Shower chair, Grab bars in shower, [address]ita [address]: [employer], 4 wheeled walker, [address], Long-handled shoehorn, Alert [employer] Help From: Other (comment) (cleaning lady 2 hours every Tuesday)  ADL Assistance: [address]: Needs assistance (Pt does his own cooking and laundry (across the hallway), aide assists with cleaning)  Ambulation Assistance: Independent (has been using w/c inside kitchen lately d/t foot pain (w/c does not fit between doors))  Transfer Assistance: Independent  Active : No  Patient's  Info: Takes the bus or medical transportation  IADL Comments: Does his own grocery shopping - places groceries on rollator to transport  Additional Comments: Pt reports no recent falls. Pt also reports his son lives nearby and can assist with grocery shopping if needed. \"He will come over and help if I ask him to. \"  He states his son is a caregiver for his wife. Cognition   Cognition  Overall Cognitive Status: Exceptions  Arousal/Alertness: Appropriate responses to stimuli  Following Commands: Follows one step commands with repetition; Follows multistep commands with repitition  Attention Span: Appears intact  Memory: Decreased short term memory  Safety Judgement: Decreased awareness of need for safety; Decreased awareness of need for assistance  Insights: Decreased awareness of deficits  Initiation: Does not require cues  Sequencing: Requires cues for some  Cognition Comment: Intermittent confusion. Pt appearing to be hallucinating at one point stating \"Don't I need to move out of the way? That roman needs to come in here and take a shower. \" No males present in room.     Objective             Strength RLE  Strength RLE: WFL  Strength LLE  Strength LLE: WFL        Bed mobility  Supine to Sit: Minimal assistance (HOB elevated; use of rail and min A for trunk; pt noting he sleeps in lift chair)  Scooting: Stand by assistance (to EOB)  Transfers  Sit to Stand: Contact guard assistance (from bed, from recliner)  Stand to sit: Contact guard assistance  Comment: to 5RH  Ambulation  Ambulation?: Yes  Ambulation 1  Surface: level tile  Device: Rollator  Other Apparatus: O2 (6L)  Assistance: Contact guard assistance  Quality of Gait: decreased stance time on R LE, decreased step length L LE, slow star and short step length and height bilaterally  Distance: 15'+ 79'  Comments: pt limited by fatigue and pain; steady wtih 4WW  Stairs/Curb  Stairs?: No     Balance  Sitting - Static: Good  Sitting - Dynamic: Fair; +  Standing - Static: Fair  Standing - Dynamic: Fair; -  Comments: Pt with supervision -SBA sitting balance for dressing EOB and CGA for ambulation/mobility. Able to stand at the sink for several minutes to perform ADLs ~8 min. Pt with standing balance SBA      Treatment:  Functional mobility training and pt education    Plan   Plan  Times per week: 5-7  Times per day: Daily  Current Treatment Recommendations: Strengthening, Transfer Training, Endurance Training, Neuromuscular Re-education, Cognitive Reorientation, Balance Training, Gait Training, Functional Mobility Training, Safety Education & Training  Safety Devices  Type of devices: Call light within reach, Chair alarm in place, Left in chair, Nurse notified    AM-PAC Score  AM-PAC Inpatient Mobility Raw Score : 17 (11/19/21 1023)  AM-PAC Inpatient T-Scale Score : 42.13 (11/19/21 1023)  Mobility Inpatient CMS 0-100% Score: 50.57 (11/19/21 1023)  Mobility Inpatient CMS G-Code Modifier : CK (11/19/21 1023)          Goals  Short term goals  Time Frame for Short term goals: By discharge  Short term goal 1: The pt will perform sit to stand with supervision and LRAD. Short term goal 2: The pt will transfer bed to chair with supervision and LRAD  Short term goal 3: The pt will ambulate 150' with supervision and LRAD  Short term goal 4: The pt will go up/down 5 steps with 1 rail and CGA  Patient Goals   Patient goals :  To go home       Therapy Time   Individual Concurrent Group Co-treatment   Time In 0810         Time Out 6759         Minutes 54         Timed Code Treatment Minutes: 39 Minutes    Timed Code Treatment Minutes:  39 Minutes    Total Treatment Minutes: 54 minutes        Herman Moreno, PT

## 2021-11-19 NOTE — CARE COORDINATION
Case Management Assessment           Daily Note                 Date/ Time of Note: 11/19/2021 2:24 PM         Note completed by: Jed Ritter RN    Patient Name: Robert Ibrahim. YOB: 1950    Diagnosis:Pulmonary embolism, other, unspecified chronicity, unspecified whether acute cor pulmonale present ([facility] [I26.99]  Patient Admission Status: Inpatient    Date of Admission:11/15/2021  5:15 PM Length of Stay: 4 GLOS: GMLOS: 2.6    Current Plan of Care: Monitor oxygen  ________________________________________________________________________________________  PT AM-PAC: 17 / 24 per last evaluation on: 11/18    OT AM-PAC: 18 / 24 per last evaluation on: 11/18    DME Needs for discharge: walker  ________________________________________________________________________________________  Discharge Plan: Home with [address] [place] [address] Way: [facility]    Tentative discharge date: 11/20 vs 11/21    Current barriers to discharge: Monitor Oxygen & electrolytes    Referrals completed: Not Applicable    Resources/ information provided: Not indicated at this time  ________________________________________________________________________________________  Case Management Notes:  Patient from home alone, states he does not wish to go to a facility nor the ARU. Patient plan to return home with home health. Patient son will transport at time of discharge. Will resume care through [facility] (through [address]) and has oxygen through [employer], patient back to baseline O2 and just received new tanks last week. [address] [facility] [address] with [employer] [phone] who contracts with [address] [place] [address] through [address]. Patient has non skilled services only for homemaking. Patient will need Skilled through [facility] [phone]. Call to [address] [place] [address] [phone], spoke with Leigh Ann Larson for Skilled Services Pt/Ot.     Alvarez Parada and his family were provided with choice of provider; he and his family are in agreement with the discharge plan.     Care Transition Patient: Xin Ramirez RN  [facility]  Case Management Department  Ph: [phone]

## 2021-11-19 NOTE — PROGRESS NOTES
Pulmonary Followup Note    CC: acute on chronic hypoxemic respiratory failure, KAVITA   Subjective:  Blood pressure responded to fluids yesterday evening. Still complaining of pain in the leg but it is better than prior to surgery. He was up walking the halls this morning. He was on 4 L of oxygen most of the night was satting 87% per the nurse that he is currently on 6 L.    ROS:  Denies headache, nausea or chest pain. 24HR INTAKE/OUTPUT:    Intake/Output Summary (Last 24 hours) at 2021 0372  Last data filed at 2021 0800  Gross per 24 hour   Intake 144.64 ml   Output 672 ml   Net -527.36 ml        metoprolol  12.5 mg Oral BID    apixaban  5 mg Oral BID    albuterol  2.5 mg Nebulization 4x daily    allopurinol  100 mg Oral Daily    [Held by provider] amLODIPine  5 mg Oral Daily    atorvastatin  80 mg Oral Daily    cilostazol  50 mg Oral BID    DULoxetine  20 mg Oral Daily    [Held by provider] furosemide  40 mg Oral Daily    [Held by provider] isosorbide mononitrate  15 mg Oral Daily    [Held by provider] losartan  100 mg Oral Daily    pantoprazole  40 mg Oral QAM AC    potassium chloride  10 mEq Oral Daily    [Held by provider] doxazosin  1 mg Oral Daily    sodium chloride flush  10 mL IntraVENous 2 times per day    tiotropium-olodaterol  2 puff Inhalation Daily    And    budesonide  0.25 mg Nebulization BID           PHYSICAL EXAMINATION:  /62   Pulse 100   Temp 98.2 °F (36.8 °C) (Oral)   Resp 14   Ht 5' 10\" (1.778 m)   Wt 204 lb 9.4 oz (92.8 kg)   SpO2 94%   BMI 29.36 kg/m²   CURRENT PULSE OXIMETRY:  SpO2: 94 %  24HR PULSE OXIMETRY RANGE:  SpO2  Av.4 %  Min: 78 %  Max: 100 %      Gen: No distress. Speaking in full sentences with out accessory muscle use  HEENT: PERRL, EOMI, OP nl  Lung: Clear to auscultation bilaterally with no wheezes, rales, or rhonchi. CV: RRR without M/R/R  Abd: +BS, soft, NT/ND  Ext: No edema.   No palpable pulses in the feet bilaterally    DATA  CBC:   Recent Labs     11/18/21  0330 11/18/21  0330 11/18/21  1415 11/18/21  1739 11/19/21  0446   WBC 8.4  --  10.2  --  8.7   HGB 12.7*   < > 11.9* 13.0* 11.5*   HCT 37.4*   < > 35.0* 39.3* 34.0*   .5*  --  102.6*  --  103.1*   *  --  140  --  128*    < > = values in this interval not displayed. BMP:   Recent Labs     11/17/21  0540 11/17/21  1538 11/18/21  0330 11/19/21  0446     --  135* 136   K 4.3  --  4.0 3.7     --  101 100   CO2 23  --  24 25   PHOS 4.4  --  4.5 3.5   BUN 14  --  11 18   CREATININE 0.9 0.9 0.8 1.1     Recent Labs     11/17/21  1538   PHART 7.362   OLC9TZU 42.9   PO2ART 59.4*     LIVER PROFILE: No results for input(s): AST, ALT, LIPASE, BILIDIR, BILITOT, ALKPHOS in the last 72 hours. Invalid input(s): AMYLASE,  ALB    CXR REVIEWED BY ME AND SHOWED:  XR CHEST PORTABLE   Final Result      1. Persistent diffuse mild nonspecific prominence of the pulmonary interstitial markings. No localized consolidation or pleural effusion. XR PELVIS (1-2 VIEWS)   Final Result   1. Intraoperative fluoroscopy as described. FLUORO FOR SURGICAL PROCEDURES   Final Result   1. Intraoperative fluoroscopy as described. XR CHEST PORTABLE   Final Result      Diffuse mild nonspecific prominence of pulmonary interstitial markings. No focal airspace density areas of consolidation seen. There are no effusions. Correlate clinically. ASSESSMENT/PLAN:  This is a 73 y.o. male with acute on chronic hypoxemic respiratory failure and hypotension following revascularization surgery for the right lower extremity. Patient responded well to IV fluids yesterday  Patient is on his baseline oxygen requirement of 4 to 6 L. Continue his home COPD regimen. I wrote for his home CPAP settings that he had some vomiting last night so did not wear it. Continue chest physiotherapy.   Patient states very close to his pulmonary baseline and I anticipate this will improve as his mobility improves. No pulmonary interventions are needed at this time. Pulmonary will follow peripherally. Please call with questions. Dr. Nolan Davis will be available this weekend if needed.       Alivia Tesfaye MD

## 2021-11-19 NOTE — PROGRESS NOTES
Patient remains disoriented to place. Patient frequently reoriented. Educated on fall risk protocols. Fall sign posted. Bed low and locked. Bed alarm on. Call light within reach.

## 2021-11-19 NOTE — PROGRESS NOTES
Perfect served surgical residents about patient's disorientation to place and quick, sharp pain in RLE. Patient is oriented to self, time, and situation. Speech clear and following all commands. Strength equal in UE. Pupils PERRLA. VSS. Patient had a quick, sharp pain in RLE. Patient states it resolved within a few seconds. R post tib, L pedal, and L post tib Pulses are still doppler. R pedal pulse is still absent. No increase in swelling. R fem groin site is soft. Will continue to monitor.

## 2021-11-19 NOTE — PROGRESS NOTES
ICU VASCULAR SURGERY DAILY PROGRESS NOTE    CC: Atherosclerosis of native artery of RLE with rest pain    SUBJECTIVE:   Interval Hx: Patient had some minimal confusion overnight. Has intermittent lower extremity pain that normally resolved within a few seconds. He became intermittently hypotensive overnight but was responsive to a 500cc bolus. Cardiac workup negative. All BP medications held at this time. This morning he is afebrile and HDS.       ROS: A 14 point review of systems was conducted, significant findings as noted above. All other systems negative. OBJECTIVE:   Vitals:   Vitals:    11/19/21 0416 11/19/21 0430 11/19/21 0445 11/19/21 0530   BP: (!) 107/47 (!) 104/42  128/61   Pulse: 97 93 96 103   Resp: 22 21 26 24   Temp: 97.7 °F (36.5 °C)      TempSrc: Oral      SpO2: 90% (!) 89%  92%   Weight:       Height:           I/O:     Intake/Output Summary (Last 24 hours) at 11/19/2021 0547  Last data filed at 11/19/2021 0530  Gross per 24 hour   Intake 353.41 ml   Output 947 ml   Net -593.59 ml     I/O last 3 completed shifts: In: 353.4 [P.O.:100; I.V.:253.4]  Out: 722 [Urine:722]    Diet: ADULT DIET; Regular; Low Fat/Low Chol/High Fiber/2 gm Na      Physical Examination:   General appearance: alert, no acute distress, grooming appropriate  Eyes: No scleral icterus, EOM grossly intact  Neck: trachea midline, no JVD, no lymphadenopathy, neck supple  Chest/Lungs: Normal effort with no accessory muscle use on 15L HFNC  Cardiovascular: RRR  Abdomen: Soft, non-tender, non-distended, no rebound, guarding, or rigidity. Skin: warm and dry, no rashes  Extremities: erythema and mild edema of right lower extremity > than left. Abrasion to medial aspect of right foot. Bilateral lower extremity warm to palpation and equal bilaterally. Sensation improved over the plantar aspect of bilateral lower extremities. PT dopplerable on the right and left LE. Left stick side hemostatic, no hematoma noted, palpable femoral pulse. Right groin with Aquacel Ag dressing in place, c/d/i  Genitourinary: Sampson in place with clear yellow urine    Labs:  CBC:   Recent Labs     11/18/21  0330 11/18/21  0330 11/18/21  1415 11/18/21  1739 11/19/21  0446   WBC 8.4  --  10.2  --  8.7   HGB 12.7*   < > 11.9* 13.0* 11.5*   HCT 37.4*   < > 35.0* 39.3* 34.0*   *  --  140  --  128*    < > = values in this interval not displayed. BMP:   Recent Labs     11/16/21  0551 11/16/21  0551 11/17/21  0540 11/17/21  1538 11/18/21  0330     --  136  --  135*   K 4.0  --  4.3  --  4.0     --  101  --  101   CO2 23  --  23  --  24   BUN 10  --  14  --  11   CREATININE 0.9   < > 0.9 0.9 0.8   GLUCOSE 96  --  100*  --  107*    < > = values in this interval not displayed. LFT's: No results for input(s): AST, ALT, ALB, BILITOT, ALKPHOS in the last 72 hours. Troponin:   Recent Labs     11/18/21  1653   TROPONINI 0.02*     BNP: No results for input(s): BNP in the last 72 hours. ABGs:   Recent Labs     11/17/21  1538   PHART 7.362   LLG8KYC 42.9   PO2ART 59.4*     INR:   Recent Labs     11/17/21  1840 11/18/21  0613   INR 1.08 1.07       U/A:No results for input(s): NITRITE, COLORU, PHUR, LABCAST, WBCUA, RBCUA, MUCUS, TRICHOMONAS, YEAST, BACTERIA, CLARITYU, SPECGRAV, LEUKOCYTESUR, UROBILINOGEN, BILIRUBINUR, BLOODU, GLUCOSEU, AMORPHOUS in the last 72 hours. Invalid input(s): Lala Gómez     Rad:   XR CHEST PORTABLE   Final Result      1. Persistent diffuse mild nonspecific prominence of the pulmonary interstitial markings. No localized consolidation or pleural effusion. XR PELVIS (1-2 VIEWS)   Final Result   1. Intraoperative fluoroscopy as described. FLUORO FOR SURGICAL PROCEDURES   Final Result   1. Intraoperative fluoroscopy as described. XR CHEST PORTABLE   Final Result      Diffuse mild nonspecific prominence of pulmonary interstitial markings. No focal airspace density areas of consolidation seen. There are no effusions. Correlate clinically. ASSESSMENT AND PLAN:   This is a 73y.o. year old male with a history of COPD on 6L home O2 at baseline, KAVITA, CAD, CHF and a previous MI, is status post R. femoral endarterectomy, right external iliac to profunda bypass w/ PTFE graft. RLE arteriogram, balloon angioplasty of R profunda balloon angioplasty and stent of right external iliac artery secondary to atherosclerosis of native artery of RLE with rest pain. (11/17) POD2     Neuro:   Pain  PRN Tylenol  PRN Dilaudid pain panel  PRN Roxicodone pain panel     Psych  Home Cymbalta restarted     Cardiovascular:   Patient with history of CHF and CAD with previous MI  EF of 55-60% (ECHO 9/17/21)  Echo 11/18 - left ventricular concentric hypertrophy, EF 55-60%  - Continue lipitor     HTN:  - Scheduled Norvasc, metoprolol, doxazosin, Imdur and Cozaar currently held given hypotension episodes last evening  - Lasix 40 mg daily currently held given hypotensive episodes     S/P R. femoral endarterectomy, right external iliac to profunda bypass w/ PTFE graft.  RLE arteriogram, balloon angioplasty of R profunda balloon angioplasty and stent of right external iliac artery (11/17)  - EBL 500cc  - Patient received 2U of PRBC intraoperatively  - PT dopplerable on the right LE, Q1hr neuro checks to include temperature of the extremity and capillary refill  - Restarted on Eliquis on 11/18, hep gtt discontinued.     Pulmonary:   History of COPD on 6L NC at home at baseline.  - Aggressive pulmonary toilet  - Restarted home albuterol nebulizer (scheduled)  - Restarted home inhalers - Stiolto and Pulmicort     GI:   History of GERD  - Continue daily Protonix (40mg)    Continue regular diet    FEN:   Keep schulte in place for strict I/O's  Baseline Cr 0.9   PRN electrolyte replacement     /Renal:   Continue schulte for strict I/O's  No history of kidney disease  UOP has been adequate at ~0.3 cc/kg/hr  Cr 0.8 from 0.9 baseline        Hem/ID:   Will follow up morning labs  No indication for prophylactic antibiotics     Endo:   No history of endocrine disfunction     Prophylaxis:   Home Eliquis restarted (11/18)     Access:  Central Access: None  Peripheral Access: Two peripheral IV's (11/17)  Arterial Line Access: Left radial (11/17), removed 11/18                                Sampson Date placed: 11/17     Mobility:  PT/OT, to see patient     Dispo:   ICU for Q1hr neuro checks.     Code Status: Full Code  -----------------------------  Zoila Tesfaye DO  11/19/21  5:47 AM

## 2021-11-19 NOTE — PLAN OF CARE
Problem: Pain:  Goal: Pain level will decrease  Description: Pain level will decrease  11/19/2021 1503 by Elroy Brito RN  Outcome: Ongoing  11/19/2021 0415 by Tona Curiel RN  Outcome: Ongoing  Goal: Control of acute pain  Description: Control of acute pain  11/19/2021 1503 by Elroy Brito RN  Outcome: Ongoing  11/19/2021 0415 by Tona Curiel RN  Outcome: Ongoing  Goal: Control of chronic pain  Description: Control of chronic pain  11/19/2021 1503 by Elroy Brito RN  Outcome: Ongoing  11/19/2021 0415 by Tona Curiel RN  Outcome: Ongoing     Problem: Skin Integrity:  Goal: Will show no infection signs and symptoms  Description: Will show no infection signs and symptoms  11/19/2021 1503 by Elroy Brito RN  Outcome: Ongoing  11/19/2021 0415 by Tona Curiel RN  Outcome: Ongoing  Goal: Absence of new skin breakdown  Description: Absence of new skin breakdown  11/19/2021 1503 by Elroy Brito RN  Outcome: Ongoing  11/19/2021 0415 by Tona Curiel RN  Outcome: Ongoing     Problem: Falls - Risk of:  Goal: Will remain free from falls  Description: Will remain free from falls  11/19/2021 1503 by Elroy Brito RN  Outcome: Ongoing  11/19/2021 0415 by Tona Cuirel RN  Outcome: Ongoing  Goal: Absence of physical injury  Description: Absence of physical injury  11/19/2021 1503 by Elroy Brito RN  Outcome: Ongoing  11/19/2021 0415 by Tona Curiel RN  Outcome: Ongoing     Problem: OXYGENATION/RESPIRATORY FUNCTION  Goal: Patient will maintain patent airway  11/19/2021 1503 by Elroy Brito RN  Outcome: Ongoing  11/19/2021 0415 by Tona Curiel RN  Outcome: Ongoing  Goal: Patient will achieve/maintain normal respiratory rate/effort  Description: Respiratory rate and effort will be within normal limits for the patient  11/19/2021 1503 by Elroy Brito RN  Outcome: Ongoing  11/19/2021 0415 by Tona Curiel RN  Outcome: Ongoing     Problem: HEMODYNAMIC STATUS  Goal: Patient has stable vital signs and fluid balance  11/19/2021 1503 by Bandar Fitzpatrick RN  Outcome: Ongoing  11/19/2021 0415 by Chelo Morgan RN  Outcome: Ongoing     Problem: FLUID AND ELECTROLYTE IMBALANCE  Goal: Fluid and electrolyte balance are achieved/maintained  11/19/2021 1503 by Bandar Fitzpatrick RN  Outcome: Ongoing  11/19/2021 0415 by Chelo Morgan RN  Outcome: Ongoing     Problem: ACTIVITY INTOLERANCE/IMPAIRED MOBILITY  Goal: Mobility/activity is maintained at optimum level for patient  11/19/2021 1503 by Bandar Fitzpatrick RN  Outcome: Ongoing  11/19/2021 0415 by Chelo Morgan RN  Outcome: Ongoing

## 2021-11-19 NOTE — PROGRESS NOTES
Good  Decision Making: Medium Complexity  OT Education: OT Role; Plan of Care; Energy Conservation; Precautions; ADL Adaptive Strategies; Transfer Training  Patient Education: pt verb understanding, reinforce as needed  REQUIRES OT FOLLOW UP: Yes  Activity Tolerance  Activity Tolerance: Patient Tolerated treatment well  Activity Tolerance: Pt on 6L of O2 (on 6L of O2 at baseline). SpO2 and BP WFL throughout session, FRANCO at times requiring seated rest breaks. Safety Devices  Safety Devices in place: Yes  Type of devices: Left in chair; Chair alarm in place; Call light within reach; Nurse notified           Patient Diagnosis(es): The encounter diagnosis was Atherosclerosis of native artery of right lower extremity with rest pain ([facility] [address] has a past medical history of CAD (coronary artery disease), CHF (congestive heart failure) ([facility] [address] COPD (chronic obstructive pulmonary disease) ([facility] [address] Depressive disorder, not elsewhere classified, GERD (gastroesophageal reflux disease), History of colon polyps - 30 seen on colonoscopy 04/2021, History of MI (myocardial infarction), History of skin ulcer of lower extremity, History of transient ischemic attack (TIA), Hyperlipidemia, Hypertension, Neuropathy, KAVITA (obstructive sleep apnea), Personal history of DVT (deep vein thrombosis), PVD (peripheral vascular disease) ([facility] [address] TIA (transient ischemic attack), and Vertebral fracture. has a past surgical history that includes Appendectomy; Coronary angioplasty with stent (6/2013); other surgical history (Right, 6/25/2013); Leg Debridement (Right, 7/23/2013); skin split graft (Right, 7/25/2013); other surgical history (Left, 8/27/2013); laminectomy (11/18/2015); transluminal angioplasty (Left, 12/22/2015); Total hip arthroplasty (Right, 09/01/2016); Cholecystectomy, laparoscopic; eye surgery (Left); Colonoscopy (4/23/2021); Colonoscopy (4/23/2021); Colonoscopy (4/23/2021); and Femoral Endarterectomy (Right, 11/17/2021).     Treatment Diagnosis: impaired ADLs and mobility s/p revascularization surgery      Restrictions  Position Activity Restriction  Other position/activity restrictions: up with assist    Subjective   General  Chart Reviewed: Yes  Patient assessed for rehabilitation services?: Yes  Additional Pertinent Hx: Karlie Del Real is a 73 y.o. male with CAD, CHF, COPD (baseline 6L of 02), previous MI, previous TIAs, HLD, HTN, previous DVTs, hospital admission for PE on 9/16/21 and PVD who is direct admitted for planned RLE femoral endarterectomy with iliac stent, femoral angioplasty and stent placement. S/p RIGHT FEMORAL ENDARTERECTOMY AND ILIAC ARTERY STENT on 11/16. Family / Caregiver Present: No  Referring Practitioner: Marlou Gitelman, DO  Diagnosis: pulmonary embolism  Subjective  Subjective: Pt seated in bed upon arrival, pleasant and agreeable to OT eval / tx.   Patient Currently in Pain: Yes (8/10, R LE with RN notified)      Social/Functional History  Social/Functional History  Lives With: Alone  Type of Home: Apartment  Home Layout: One level (one level once he is in his apartment)  Home Access: Stairs to enter with rails  Entrance Stairs - Number of Steps: 2 to enter through back with one railing, landing, then 3 with bilateral railings; 12-15 to enter through front however pt doesn't use front entrance  Bathroom Shower/Tub: Tub/Shower unit  [employer]: Standard (+ RTS with arms)  Bathroom Equipment: Hand-held shower, Shower chair, Grab bars in shower, Toilet raiser  Home Equipment: [employer], 4 wheeled walker, Cane, Reacher, Long-handled shoehorn, Alert [employer] Help From: Other (comment) (cleaning lady 2 hours every Tuesday)  ADL Assistance: Independent  Homemaking Assistance: Needs assistance (Pt does his own cooking and laundry (across the hallway), aide assists with cleaning)  Ambulation Assistance: Independent (has been using w/c inside kitchen lately d/t foot pain (w/c does not fit between doors))  Transfer Assistance: Independent  Active : No  Patient's  Info: Takes the bus or medical transportation  IADL Comments: Does his own grocery shopping - places groceries on rollator to transport  Additional Comments: Pt reports no recent falls. Pt also reports his son lives nearby and can assist with grocery shopping if needed. \"He will come over and help if I ask him to. \"  He states his son is a caregiver for his wife. Objective   Vision: Impaired  Vision Exceptions: Wears glasses for reading  Hearing: Exceptions to [facility]  Hearing Exceptions: Hard of hearing/hearing concerns    Orientation  Overall Orientation Status: Within Functional Limits  Orientation Level: Oriented X4 (oriented to month but not exact day)  Observation/Palpation  Observation: IV L hand  Balance  Sitting Balance: Stand by assistance (seated EOB)  Standing Balance: Contact guard assistance  Standing Balance  Time: ~25 mins total  Activity: functional mobility, transfers, stance for ADLs  Comment: no overt loss of balance noted  Functional Mobility  Functional - Mobility Device: Rolling Walker  Activity: To/from bathroom; Other  Assist Level: Contact guard assistance  Functional Mobility Comments: approx. 79 ft in hallway w/ VCs for proximity to , second person assisting with managing lines/IV pole/02  Toilet Transfers  Toilet - Technique: Ambulating  Equipment Used: Standard bedside commode  Toilet Transfer: Contact guard assistance  Toilet Transfers Comments: simulated - pt has RTS w/ rails at home  ADL  Grooming: Contact guard assistance;  Increased time to complete (Pt completed oral care and washed face in stance at sink; VCs to orient body to sink correctly- attempting to stand at side of sink instead of head on)  UE Dressing: Setup; Stand by assistance (Pt doffed/donned hospital gown seated EOB)  LE Dressing: Contact guard assistance; Setup (Pt threaded brief w/ ++ time seated EOB and completed pants mgmt over hips w/ CGA; Pt donned slip on shoes w/ SBA)  Toileting: Contact guard assistance (Pt wiped rear kelly area in stance at sink and completed pants mgmt w/ CGA; Sampson catheter)  Tone RUE  RUE Tone: Normotonic  Tone LUE  LUE Tone: Normotonic  Coordination  Movements Are Fluid And Coordinated: Yes     Bed mobility  Supine to Sit: Minimal assistance (HOB elevated; use of rail and min A for trunk; pt noting he sleeps in lift chair)  Scooting: Stand by assistance (to EOB)  Transfers  Sit to stand: Contact guard assistance  Stand to sit: Contact guard assistance  Transfer Comments: VCs for hand placement and fully backing up and aligning body to surface  Vision - Basic Assessment  Patient Visual Report: No visual complaint reported. Cognition  Overall Cognitive Status: Exceptions  Arousal/Alertness: Appropriate responses to stimuli  Following Commands: Follows one step commands with repetition; Follows multistep commands with repitition  Attention Span: Appears intact  Memory: Decreased short term memory  Safety Judgement: Decreased awareness of need for safety; Decreased awareness of need for assistance  Insights: Decreased awareness of deficits  Initiation: Does not require cues  Sequencing: Requires cues for some  Cognition Comment: Intermittent confusion. Pt appearing to be hallucinating at one point stating \"Don't I need to move out of the way? That roman needs to come in here and take a shower. \" No males present in room.                  LUE AROM (degrees)  LUE AROM : WFL  Left Hand AROM (degrees)  Left Hand AROM: WFL  RUE AROM (degrees)  RUE AROM : WFL  Right Hand AROM (degrees)  Right Hand AROM: WFL  LUE Strength  Gross LUE Strength: WFL  RUE Strength  Gross RUE Strength: WFL                   Plan   Plan  Times per week: 5-7  Current Treatment Recommendations: Functional Mobility Training, Endurance Training, Self-Care / ADL, Patient/Caregiver Education & Training, Home Management Training, Safety Education & Training    G-Code     OutComes Score                                                  AM-PAC Score             Goals  Short term goals  Time Frame for Short term goals: by d/c  Short term goal 1: pt will complete LE dressing w/ SBA - ongoing  Short term goal 2: pt will complete toilet transfer w/ SBA - ongoing  Short term goal 3: pt will complete standing-level grooming w/ spvn - ongoing  Patient Goals   Patient goals : to return home       Therapy Time   Individual Concurrent Group Co-treatment   Time In 0810         Time Out 0904         Minutes 54         Timed Code Treatment Minutes: [address]

## 2021-11-19 NOTE — PROGRESS NOTES
Point of Care Note:  Vascular Surgery  Tawnya Cole.    3:36 PM  11/19/2021    Called to patient's bedside as he was noted to be in a. Fib in and out of RVR. The patient denies any chest pain, pressure or discomfort. He is otherwise hemodynamically stable. BP of 113/56, HR between 110-150.     - Stat EKG ordered  - 5mg of metoprolol ordered  - Cardiology notified  - Stat renal and magnesium to follow up on. Update:     EKG shows a. Fib RVR two pushes of 5mg of metoprolol given q5mins x2 and patient converted into sinus tachycardia, with EKG confirmation. Patient has remained HDS. Cardiology placed ordered for amio bolus and gtt. - Will continue to monitor  - PRN electrolyte replacement when renal panel results.      Chad Ceja DO, MS  PGY1, General Surgery  11/19/21  3:42 PM  [phone]

## 2021-11-19 NOTE — PROGRESS NOTES
Notified Dr Ike Siu Sx Resident pt irregular rhythm/ burst of afib 140s not sustaining/ pt asymptomatic/ see orders/ notes/ flow sheet/ sent mag/ renal panel specimen to lab

## 2021-11-19 NOTE — OP NOTE
Operative Note      Patient: Sigifredo Lei. 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 (HCC) [I70.221]    Post-Op Diagnosis: Same       Procedure(s):  RIGHT FEMORAL ENDARTERECTOMY  RIGHT EXTERNAL ILIAC TO PROFUNDA FEMORAL BYPASS WITH 8MM PTFE GRAFT RIGHT LOWER EXTREMITY ARTERIOGRAM BALLOON ANGIOPLASTY RIGHT PROFUNDA BALLOON ANGIOPLASTY AND STENT OF RIGHT EXTERNAL ILIAC ARTERY    Surgeon(s):  Michelle Dominguez MD    Assistant:   Resident: Abelino Rinne, DO    Anesthesia: General    Estimated Blood Loss (mL): 921    Complications: None    Specimens:   ID Type Source Tests Collected by Time Destination   A : Right Common Femoral Plaque Tissue Tissue SURGICAL PATHOLOGY Michelle Dominguez MD 11/17/2021 1725        Implants:  Implant Name Type Inv. Item Serial No.  Lot No. LRB No. Used Action   GRAFT VASC W0.8XL8CM THK0.35-0.75MM CAR PERICARD PROC BOV Vascular grafts GRAFT VASC W0.8XL8CM THK0.35-0.75MM CAR PERICARD PROC BOV  [employer] [id number] Right 1 Implanted   GRAFT VASC L10CM [id number] [facility] [id number]  GRAFT VASC L10CM [id number] [facility] [place] [id number]  [employer]-  Right 1 Implanted   STENT PROTEGE [address] [id number] Stent:Biliary/Pancreatic/Iliac [place] [address] [id number]  [facility]-PMM [id number] Right 1 Implanted         Drains:   Urethral Catheter Non-latex 16 fr (Active)   Catheter Indications Perioperative use for selected surgical procedures 11/18/21 1200   Securement Device Date Changed 11/17/21 11/18/21 0400   Site Assessment Pink; No urethral drainage 11/18/21 1500   Urine Color Madeline 11/18/21 2000   Urine Appearance Sediment 11/18/21 1200   Output (mL) 50 mL 11/19/21 0900   Provider Notified to Remove No 11/17/21 1942       Findings: Monophasic doppler PT at end of case. Improved doppler outflow of profunda vessels. Indication for procedure: Mr. Geoff Veloz is a 74-year-old gentleman who originally presented to my office in mid September for what seemed to be significant arterial insufficiency and occasional rest pain. That evening of his first consultation, he was admitted through the emergency department for acute pulmonary embolus. Therefore he had some delay returning to the office. He was treated with anticoagulation which has been held for the last 2 days. Unfortunately, he has significant COPD and continues to smoke. Currently on 6 L oxygen. When he represents to the office in early November, he was noted to have significant worsening of the rest pain, even during the daytime at hours. He was also demonstrating dry ulcerations on the foot. He continues to smoke. Because of his cardiopulmonary morbidities, particularly his pulmonary issues, he really was marginal for any sort of extensive arterial intervention; however, he understood that the option of no intervention carry a significant risk of likely limb loss. He was optimized from a pulmonary standpoint by Dr. Julissa Rosas, and we decided to proceed. Again, I stressed to him the importance of smoking cessation and we fully discussed that he was at a high risk of surgery requiring general anesthesia and my recommendation was to optimize his inflow, as he had significant iliac and femoral disease, but I did not think that he would be appropriate for bypass. I did not think that ultimately he would tolerate this extensive surgery. Detailed Description of Procedure: The patient was taken to the operating room, placed in supine position, and prepped and draped in the usual sterile fashion using Betadine solution. Timeout was called and patient operative site were appropriate identified. He was given IV antibiotics prior to incision.     A 7 cm oblique incision was made in the right groin in the standard fashion with the superior edge of the incision overlying the inguinal ligament. Tissue was dissected down through the skin and subcutaneous tissue with electrocautery. The arterial structures were densely calcified and therefore easily identified; however the patient has had several femoral and profundofemoral endarterectomies in 2013 and 2015, and he had dense scar tissue related to this. In fact, not all the arterial structures could be identified. The common femoral and 2 large branches of the profundofemoral artery were identified, but the superficial femoral artery itself appears surgically absent. There was such dense calcification of all of the structures that in some areas the calcification protruded through the arterial wall, completely obliterating it. The arterial structures were densely adherent to the femoral vein posterior medially as well as the inguinal ligament. The arterial structures were very thin and easily torn with any manipulation and dissection. This dissection was quite difficult and in fact took approximately 2 additional hours than anticipated to dissect out the common femoral and profundofemoral arteries. Finally, once this was accomplished, the inguinal ligament was partially divided and the external iliac artery was dissected and looped approximately 3 cm above the inguinal ligament. Two branches of the profundofemoral artery laterally were looped with a vessel loop. The patient was systemically heparinized with 7000 units of IV heparin. After appropriate circulation time, the above named vessels were clamped and a longitudinal arteriotomy was performed from the profundofemoral to the external iliac artery. Immediately, there was noted to be near occlusive calcified plaque within the vessel as well as superimposed fresh thrombus. Unfortunately, there was backbleeding from what appeared to be a second larger branch of the profundofemoral artery which could not originally be identified.   A 4 Mina balloon was placed within it for control of bleeding and because this would only partially controlled the bleeding, I did attempt to dissect out this second large branch of the profundofemoral artery. This appeared to be actually the main branch of the profunda. Therefore, it was important to preserve this. There was a portion of the patch that appeared to be sewn to the back wall of the distal common femoral artery and therefore when endarterectomy was attempted onto the profundofemoral artery, this entire back wall was left completely obliterated. Therefore, a patch angioplasty was not feasible and attention was turned to interposition grafting. The distal 2 to 3 cm of the external iliac artery and common femoral artery were endarterectomized and then because of no significant remaining arterial wall, attention was turned to interposition of the external iliac artery and the profundofemoral arteries. Again, the superficial femoral artery appeared surgically absent. The distal external iliac artery was debrided back to healthier tissue, though the entire segment remained diseased. An 8 mm PTFE graft was then spatulated and anastomosed end-to-end to the distal external iliac artery with a 5-0 Prolene suture. The distal end was then spatulated in a lateral fashion rather than the usual anterior posterior fashion, and the distal end was then anastomosed as a common orifice to the 2 large and one smaller branch of the profundofemoral artery. Again 5-0 Prolene suture was utilized. Flow was restored and the patient had a palpable pulse in the bypass graft, though it did seem weaker than the distal external iliac artery several centimeters above the anastomosis. This was concerning for maintenance of graft patency. Also, there was not a good signal at this point in the profundofemoral outflow, only monophasic which only minimally diminished with brief compression of the graft.     Attention was then turned to arteriogram to further evaluate the right iliac inflow problem. The left common femoral artery was accessed with a micropuncture needle and a micropuncture sheath was placed which was exchanged over a Bentson wire for a 6 [place] sheath. An Omni Flush catheter was inserted and positioned in the distal abdominal aorta and distal abdominal aortogram and iliac views were obtained in the GREWAL position. On the left, there were 2 self-expanding stents in the left common iliac and left external iliac arteries, respectively. The left side was diseased but these were patent. On the right, the common iliac artery had moderate disease but no significant occlusive areas. The external iliac artery had areas of moderate and severe focal stenoses. Also, there was flow through the bypass graft which seemed to truncate at its outflow into the profunda. The left femoral sheath was then exchanged over a Bentson wire for a 6 [place] destination sheath which was guided over the iliac bifurcation into the right common iliac artery. Using magnification imaging and directional catheters, a Glidewire was guided down into the bypass and into the profundofemoral outflow. A 3 mm x 4 cm [place] balloon was introduced and balloon angioplasty was performed of the profundofemoral outflow, restoring outflow on follow-up imaging. However, there remained stenosis in the mid and distal segments of the external iliac artery, including the area of proximal anastomosis. The balloon was removed and exchanged for a 5 mm balloon and balloon angioplasty was performed of the distal external iliac artery, including the proximal anastomosis of the interposition graft. Due to moderate residual stenosis post angioplasty, the area was then treated with a self-expanding protégé stent, 6 mm x 60 mm.   There was a second area of significant anastomosis in the mid external iliac artery which was treated with balloon angioplasty with a 7 mm x 60 mm balloon, with good result and less than 20% residual stenosis and no dissection. At this point the patient had significantly improved flow through the bypass and into the profunda outflow with good signals. He also had improvement in PT signal of the foot. At the completion of the case, the left femoral sheath was removed and compression was held for hemostasis. The right groin wound was copiously irrigated with antibiotic saline solution and closed with 3 layers of Vicryl, interrupted and running, followed by closure of skin with staples. The patient tolerated the procedure well was taken to the recovery area in stable condition. Needle sponge and instrument counts were correct.     Electronically signed by Andreea Russo MD on 11/19/2021 at 10:08 AM

## 2021-11-20 LAB
ALBUMIN SERPL-MCNC: 3.4 G/DL (ref 3.4–5)
ANION GAP SERPL CALCULATED.3IONS-SCNC: 9 MMOL/L (ref 3–16)
BASOPHILS ABSOLUTE: 0.1 K/UL (ref 0–0.2)
BASOPHILS RELATIVE PERCENT: 0.7 %
BUN BLDV-MCNC: 13 MG/DL (ref 7–20)
CALCIUM SERPL-MCNC: 8.7 MG/DL (ref 8.3–10.6)
CHLORIDE BLD-SCNC: 101 MMOL/L (ref 99–110)
CO2: 25 MMOL/L (ref 21–32)
CREAT SERPL-MCNC: 0.8 MG/DL (ref 0.8–1.3)
EOSINOPHILS ABSOLUTE: 0.3 K/UL (ref 0–0.6)
EOSINOPHILS RELATIVE PERCENT: 3.1 %
GFR AFRICAN AMERICAN: >60
GFR NON-AFRICAN AMERICAN: >60
GLUCOSE BLD-MCNC: 115 MG/DL (ref 70–99)
HCT VFR BLD CALC: 34.4 % (ref 40.5–52.5)
HEMOGLOBIN: 11.6 G/DL (ref 13.5–17.5)
LYMPHOCYTES ABSOLUTE: 1 K/UL (ref 1–5.1)
LYMPHOCYTES RELATIVE PERCENT: 10.7 %
MAGNESIUM: 1.8 MG/DL (ref 1.8–2.4)
MCH RBC QN AUTO: 34.8 PG (ref 26–34)
MCHC RBC AUTO-ENTMCNC: 33.6 G/DL (ref 31–36)
MCV RBC AUTO: 103.6 FL (ref 80–100)
MONOCYTES ABSOLUTE: 0.9 K/UL (ref 0–1.3)
MONOCYTES RELATIVE PERCENT: 9.5 %
NEUTROPHILS ABSOLUTE: 7 K/UL (ref 1.7–7.7)
NEUTROPHILS RELATIVE PERCENT: 76 %
PDW BLD-RTO: 16 % (ref 12.4–15.4)
PHOSPHORUS: 2.8 MG/DL (ref 2.5–4.9)
PLATELET # BLD: 140 K/UL (ref 135–450)
PMV BLD AUTO: 8.5 FL (ref 5–10.5)
POTASSIUM SERPL-SCNC: 5.6 MMOL/L (ref 3.5–5.1)
RBC # BLD: 3.32 M/UL (ref 4.2–5.9)
SODIUM BLD-SCNC: 135 MMOL/L (ref 136–145)
WBC # BLD: 9.2 K/UL (ref 4–11)

## 2021-11-20 PROCEDURE — 97110 THERAPEUTIC EXERCISES: CPT

## 2021-11-20 PROCEDURE — 94761 N-INVAS EAR/PLS OXIMETRY MLT: CPT

## 2021-11-20 PROCEDURE — 2580000003 HC RX 258: Performed by: INTERNAL MEDICINE

## 2021-11-20 PROCEDURE — 6360000002 HC RX W HCPCS

## 2021-11-20 PROCEDURE — 94150 VITAL CAPACITY TEST: CPT

## 2021-11-20 PROCEDURE — 97530 THERAPEUTIC ACTIVITIES: CPT

## 2021-11-20 PROCEDURE — 2580000003 HC RX 258: Performed by: STUDENT IN AN ORGANIZED HEALTH CARE EDUCATION/TRAINING PROGRAM

## 2021-11-20 PROCEDURE — 6370000000 HC RX 637 (ALT 250 FOR IP): Performed by: INTERNAL MEDICINE

## 2021-11-20 PROCEDURE — 2000000000 HC ICU R&B

## 2021-11-20 PROCEDURE — 6360000002 HC RX W HCPCS: Performed by: STUDENT IN AN ORGANIZED HEALTH CARE EDUCATION/TRAINING PROGRAM

## 2021-11-20 PROCEDURE — 2700000000 HC OXYGEN THERAPY PER DAY

## 2021-11-20 PROCEDURE — 6370000000 HC RX 637 (ALT 250 FOR IP): Performed by: STUDENT IN AN ORGANIZED HEALTH CARE EDUCATION/TRAINING PROGRAM

## 2021-11-20 PROCEDURE — 80069 RENAL FUNCTION PANEL: CPT

## 2021-11-20 PROCEDURE — 97116 GAIT TRAINING THERAPY: CPT

## 2021-11-20 PROCEDURE — 85025 COMPLETE CBC W/AUTO DIFF WBC: CPT

## 2021-11-20 PROCEDURE — 94669 MECHANICAL CHEST WALL OSCILL: CPT

## 2021-11-20 PROCEDURE — 36415 COLL VENOUS BLD VENIPUNCTURE: CPT

## 2021-11-20 PROCEDURE — 94640 AIRWAY INHALATION TREATMENT: CPT

## 2021-11-20 PROCEDURE — 83735 ASSAY OF MAGNESIUM: CPT

## 2021-11-20 PROCEDURE — 6360000002 HC RX W HCPCS: Performed by: INTERNAL MEDICINE

## 2021-11-20 PROCEDURE — 6370000000 HC RX 637 (ALT 250 FOR IP)

## 2021-11-20 RX ORDER — CLOPIDOGREL BISULFATE 75 MG/1
75 TABLET ORAL DAILY
Status: DISCONTINUED | OUTPATIENT
Start: 2021-11-20 | End: 2021-11-22 | Stop reason: HOSPADM

## 2021-11-20 RX ORDER — MAGNESIUM SULFATE IN WATER 40 MG/ML
2000 INJECTION, SOLUTION INTRAVENOUS ONCE
Status: COMPLETED | OUTPATIENT
Start: 2021-11-20 | End: 2021-11-20

## 2021-11-20 RX ADMIN — ACETAMINOPHEN 650 MG: 325 TABLET ORAL at 19:51

## 2021-11-20 RX ADMIN — DULOXETINE HYDROCHLORIDE 20 MG: 20 CAPSULE, DELAYED RELEASE ORAL at 09:11

## 2021-11-20 RX ADMIN — PANTOPRAZOLE SODIUM 40 MG: 40 TABLET, DELAYED RELEASE ORAL at 09:16

## 2021-11-20 RX ADMIN — OXYCODONE 5 MG: 5 TABLET ORAL at 19:51

## 2021-11-20 RX ADMIN — BUDESONIDE 250 MCG: 0.25 SUSPENSION RESPIRATORY (INHALATION) at 20:33

## 2021-11-20 RX ADMIN — APIXABAN 5 MG: 5 TABLET, FILM COATED ORAL at 09:11

## 2021-11-20 RX ADMIN — METOPROLOL TARTRATE 25 MG: 25 TABLET, FILM COATED ORAL at 22:00

## 2021-11-20 RX ADMIN — BUDESONIDE 250 MCG: 0.25 SUSPENSION RESPIRATORY (INHALATION) at 08:06

## 2021-11-20 RX ADMIN — CILOSTAZOL 50 MG: 50 TABLET ORAL at 09:15

## 2021-11-20 RX ADMIN — ALBUTEROL SULFATE 2.5 MG: 2.5 SOLUTION RESPIRATORY (INHALATION) at 15:50

## 2021-11-20 RX ADMIN — ALBUTEROL SULFATE 2.5 MG: 2.5 SOLUTION RESPIRATORY (INHALATION) at 08:06

## 2021-11-20 RX ADMIN — METOPROLOL TARTRATE 25 MG: 25 TABLET, FILM COATED ORAL at 09:11

## 2021-11-20 RX ADMIN — ALBUTEROL SULFATE 2.5 MG: 2.5 SOLUTION RESPIRATORY (INHALATION) at 11:44

## 2021-11-20 RX ADMIN — SODIUM CHLORIDE, PRESERVATIVE FREE 10 ML: 5 INJECTION INTRAVENOUS at 09:11

## 2021-11-20 RX ADMIN — OXYCODONE 5 MG: 5 TABLET ORAL at 10:19

## 2021-11-20 RX ADMIN — MAGNESIUM SULFATE HEPTAHYDRATE 2000 MG: 2 INJECTION, SOLUTION INTRAVENOUS at 10:22

## 2021-11-20 RX ADMIN — AMIODARONE HYDROCHLORIDE 0.5 MG/MIN: 50 INJECTION, SOLUTION INTRAVENOUS at 13:01

## 2021-11-20 RX ADMIN — ATORVASTATIN CALCIUM 80 MG: 80 TABLET, FILM COATED ORAL at 09:11

## 2021-11-20 RX ADMIN — CLOPIDOGREL BISULFATE 75 MG: 75 TABLET, FILM COATED ORAL at 09:11

## 2021-11-20 RX ADMIN — TIOTROPIUM BROMIDE AND OLODATEROL 2 PUFF: 3.124; 2.736 SPRAY, METERED RESPIRATORY (INHALATION) at 08:21

## 2021-11-20 RX ADMIN — SODIUM CHLORIDE, PRESERVATIVE FREE 10 ML: 5 INJECTION INTRAVENOUS at 22:01

## 2021-11-20 RX ADMIN — ALLOPURINOL 100 MG: 100 TABLET ORAL at 09:19

## 2021-11-20 RX ADMIN — APIXABAN 5 MG: 5 TABLET, FILM COATED ORAL at 22:00

## 2021-11-20 RX ADMIN — CILOSTAZOL 50 MG: 50 TABLET ORAL at 22:00

## 2021-11-20 RX ADMIN — ALBUTEROL SULFATE 2.5 MG: 2.5 SOLUTION RESPIRATORY (INHALATION) at 20:33

## 2021-11-20 ASSESSMENT — PAIN DESCRIPTION - ORIENTATION
ORIENTATION: RIGHT
ORIENTATION: LEFT

## 2021-11-20 ASSESSMENT — PAIN DESCRIPTION - FREQUENCY: FREQUENCY: CONTINUOUS

## 2021-11-20 ASSESSMENT — PAIN SCALES - GENERAL
PAINLEVEL_OUTOF10: 7
PAINLEVEL_OUTOF10: 5
PAINLEVEL_OUTOF10: 0
PAINLEVEL_OUTOF10: 9
PAINLEVEL_OUTOF10: 0
PAINLEVEL_OUTOF10: 0

## 2021-11-20 ASSESSMENT — PAIN DESCRIPTION - DESCRIPTORS
DESCRIPTORS: ACHING
DESCRIPTORS: ACHING

## 2021-11-20 ASSESSMENT — PAIN DESCRIPTION - PROGRESSION: CLINICAL_PROGRESSION: GRADUALLY IMPROVING

## 2021-11-20 ASSESSMENT — PAIN DESCRIPTION - LOCATION
LOCATION: LEG
LOCATION: LEG

## 2021-11-20 ASSESSMENT — PAIN - FUNCTIONAL ASSESSMENT: PAIN_FUNCTIONAL_ASSESSMENT: ACTIVITIES ARE NOT PREVENTED

## 2021-11-20 ASSESSMENT — PAIN DESCRIPTION - PAIN TYPE
TYPE: ACUTE PAIN;SURGICAL PAIN
TYPE: ACUTE PAIN

## 2021-11-20 ASSESSMENT — PAIN DESCRIPTION - ONSET: ONSET: ON-GOING

## 2021-11-20 NOTE — PROGRESS NOTES
ICU VASCULAR SURGERY DAILY PROGRESS NOTE    CC: Atherosclerosis of native artery of RLE with rest pain    SUBJECTIVE:   Interval Hx:   Patient doing well this AM. States that rest pain is much improved. Tolerating diet without nausea or vomiting. Ambulated halls yesterday. HR has been in normal rate and rhythm overnight. ROS: A 14 point review of systems was conducted, significant findings as noted above. All other systems negative. OBJECTIVE:   Vitals:   Vitals:    11/20/21 0545 11/20/21 0600 11/20/21 0615 11/20/21 0630   BP:  (!) 143/73     Pulse: 92 90 95 88   Resp: 24 23 23 27   Temp:       TempSrc:       SpO2:  95% 95% 97%   Weight:       Height:           I/O:     Intake/Output Summary (Last 24 hours) at 11/20/2021 0753  Last data filed at 11/20/2021 0600  Gross per 24 hour   Intake 550 ml   Output 1495 ml   Net -945 ml     I/O last 3 completed shifts: In: 550 [P.O.:440; I.V.:60; IV Piggyback:50]  Out: 3240 [Urine:1495]    Diet: ADULT DIET; Regular; Low Fat/Low Chol/High Fiber/2 gm Na      Physical Examination:   General appearance: alert, no acute distress, grooming appropriate  Chest/Lungs: Normal effort with no accessory muscle use on 5L HFNC  Cardiovascular: RRR  Abdomen: Soft, non-tender, non-distended, no rebound, guarding, or rigidity. Skin: warm and dry, no rashes  Extremities: erythema and mild edema of right lower extremity > than left. Abrasion to medial aspect of right foot. Bilateral lower extremity warm to palpation and equal bilaterally. Sensation improved over the plantar aspect of bilateral lower extremities. DP/PT dopplerable on the right and left LE. Left stick site hemostatic, no hematoma noted, palpable femoral pulse. Right groin with Aquacel Ag dressing in place, c/d/i  Genitourinary:  Sampson in place with clear yellow urine    Labs:  CBC:   Recent Labs     11/18/21  1415 11/18/21  1415 11/18/21  1739 11/19/21  0446 11/20/21  0517   WBC 10.2  --   --  8.7 9.2   HGB 11.9*   < > 13.0* 11.5* 11.6*   HCT 35.0*   < > 39.3* 34.0* 34.4*     --   --  128* 140    < > = values in this interval not displayed. BMP:   Recent Labs     11/19/21  0446 11/19/21  1530 11/20/21  0518    134* 135*   K 3.7 4.1 5.6*    99 101   CO2 25 24 25   BUN 18 18 13   CREATININE 1.1 0.8 0.8   GLUCOSE 104* 101* 115*     LFT's: No results for input(s): AST, ALT, ALB, BILITOT, ALKPHOS in the last 72 hours. Troponin:   Recent Labs     11/18/21  1653   TROPONINI 0.02*     BNP: No results for input(s): BNP in the last 72 hours. ABGs:   Recent Labs     11/17/21  1538   PHART 7.362   NBQ8JBT 42.9   PO2ART 59.4*     INR:   Recent Labs     11/17/21  1840 11/18/21  0613   INR 1.08 1.07       U/A:No results for input(s): NITRITE, COLORU, PHUR, LABCAST, WBCUA, RBCUA, MUCUS, TRICHOMONAS, YEAST, BACTERIA, CLARITYU, SPECGRAV, LEUKOCYTESUR, UROBILINOGEN, BILIRUBINUR, BLOODU, GLUCOSEU, AMORPHOUS in the last 72 hours. Invalid input(s): Raiza Martinez     Rad:   XR CHEST PORTABLE   Final Result      1. Persistent diffuse mild nonspecific prominence of the pulmonary interstitial markings. No localized consolidation or pleural effusion. XR PELVIS (1-2 VIEWS)   Final Result   1. Intraoperative fluoroscopy as described. FLUORO FOR SURGICAL PROCEDURES   Final Result   1. Intraoperative fluoroscopy as described. XR CHEST PORTABLE   Final Result      Diffuse mild nonspecific prominence of pulmonary interstitial markings. No focal airspace density areas of consolidation seen. There are no effusions. Correlate clinically. ASSESSMENT AND PLAN:   This is a 73y.o. year old male with a history of COPD on 6L home O2 at baseline, KAVITA, CAD, CHF and a previous MI, is status post R. femoral endarterectomy, right external iliac to profunda bypass w/ PTFE graft.  RLE arteriogram, balloon angioplasty of R profunda balloon angioplasty and stent of right external iliac artery secondary to atherosclerosis of native artery of RLE with rest pain. (11/17) POD3.     Neuro:   Pain  PRN Tylenol  D/c Dilaudid  PRN Roxicodone pain panel     Psych  Home Cymbalta restarted  Will start CIWA precautions without Ativan dosing     Cardiovascular:   Patient with history of CHF and CAD with previous MI  EF of 55-60% (ECHO 9/17/21)  Echo 11/18 - left ventricular concentric hypertrophy, EF 55-60%  - Continue lipitor     HTN:  - cont Amiodarone drip and Lopressor per cardiology  - Scheduled Norvasc, doxazosin, Imdur and Cozaar currently held given hypotension episodes  - Lasix 40 mg daily currently held given hypotensive episodes  - f/u cardiology recommendations regarding medications     S/P R. femoral endarterectomy, right external iliac to profunda bypass w/ PTFE graft. RLE arteriogram, balloon angioplasty of R profunda balloon angioplasty and stent of right external iliac artery (11/17)  - EBL 500cc  - Patient received 2U of PRBC intraoperatively  - PT/DP dopplerable on the right LE, Q2hr neuro checks to include temperature of the extremity and capillary refill  - Restarted on Eliquis on 11/18, hep gtt discontinued.   - Will start Plavix today      Pulmonary:   History of COPD on 6L NC at home at baseline.  - Aggressive pulmonary toilet  - Restarted home albuterol nebulizer (scheduled)  - Restarted home inhalers - Stiolto and Pulmicort     GI:   History of GERD  - Continue daily Protonix (40mg)    Continue regular diet    FEN:   Remove Sampson; patient will be a void check  Baseline Cr 0.9   PRN electrolyte replacement     /Renal:   Remove Sampson; patient will be a void check  No history of kidney disease  UOP has been adequate at ~0.3 cc/kg/hr  Cr stable at 0.8 from 0.8 yesterday        Hem/ID:   HgB stable at 11.6 from 11.5 yesterday  WBC is WNL  No indication for prophylactic antibiotics     Endo:   No history of endocrine disfunction     Prophylaxis:   Home Eliquis restarted (11/18)     Access:  Central Access: None  Peripheral Access: Two peripheral IV's (11/17)  Arterial Line Access: Left radial (11/17), removed 11/18                                Sampson Date placed: 11/17     Mobility:  PT/OT, to see patient     Dispo:   ICU for Q2hr neuro checks.   If patient remains stable, will transfer out of ICU today     Code Status: Full Code  -----------------------------  Melissa Rankin DO  11/20/21  7:53 AM

## 2021-11-20 NOTE — PROGRESS NOTES
Surgery residents at bedside. No new orders at this time. Sitter in room. Avasys camera in room. NC increased to 7L.

## 2021-11-20 NOTE — PROGRESS NOTES
(coronary artery disease), CHF (congestive heart failure) ([facility] [address] COPD (chronic obstructive pulmonary disease) ([facility] [address] Depressive disorder, not elsewhere classified, GERD (gastroesophageal reflux disease), History of colon polyps - 30 seen on colonoscopy 04/2021, History of MI (myocardial infarction), History of skin ulcer of lower extremity, History of transient ischemic attack (TIA), Hyperlipidemia, Hypertension, Neuropathy, KAVITA (obstructive sleep apnea), Personal history of DVT (deep vein thrombosis), PVD (peripheral vascular disease) ([facility] [address] TIA (transient ischemic attack), and Vertebral fracture. has a past surgical history that includes Appendectomy; Coronary angioplasty with stent (6/2013); other surgical history (Right, 6/25/2013); Leg Debridement (Right, 7/23/2013); skin split graft (Right, 7/25/2013); other surgical history (Left, 8/27/2013); laminectomy (11/18/2015); transluminal angioplasty (Left, 12/22/2015); Total hip arthroplasty (Right, 09/01/2016); Cholecystectomy, laparoscopic; eye surgery (Left); Colonoscopy (4/23/2021); Colonoscopy (4/23/2021); Colonoscopy (4/23/2021); and Femoral Endarterectomy (Right, 11/17/2021). Restrictions  Position Activity Restriction  Other position/activity restrictions: up with assist  Subjective   General  Chart Reviewed: Yes  Additional Pertinent Hx: George Russo. is a 73 y.o. male with a past medical history of CAD s/p LAD stent, HTN, HLP, HFpEF, severe PAD, COPD on baseline 6 L of supplemental oxygen, RLL/RML PE 09/16/21 without cor pulmonale, DVT. Patient started having severe right foot pain and was recently assessed with CTA abdominal aorta with bilateral runoff which revealed severe bilateral PAD. Patient was admitted on 11/15 for elective right lower extremity vascular surgery (right EIA to PFA bypass with R EIA stent). He held his anticoagulation couple of days prior to admission. Patient had a successful surgery on 11/17/2021.   Starting around 1 PM the following day, patient became very hypotensive (BP 80s/50s). Family / Caregiver Present: No  Referring Practitioner: Fredy Reese,   Subjective  Subjective: Pt supine in bed & agreeable to PT. Pain Screening  Patient Currently in Pain: Yes (\"I've got pain all over. \" not rated.)  Vital Signs  Patient Currently in Pain: Yes (\"I've got pain all over. \" not rated.)              Objective   Bed mobility  Supine to Sit: Contact guard assistance (HOB elevated, pt using rail. slow & effortful.)  Scooting: Stand by assistance (seated)  Transfers  Sit to Stand: Contact guard assistance (from bed)  Stand to sit: Contact guard assistance  Ambulation  Ambulation?: Yes  Ambulation 1  Surface: level tile  Device: Rollator  Other Apparatus: O2 (5 L)  Assistance: Contact guard assistance  Quality of Gait: decreased WB & stance time right LE  Gait Deviations: Slow Yolande; Decreased step length; Decreased step height  Distance: 150 ft  Comments: distance limited by fatigue. pt had LOB x 1 when he let go of rollator to adjust mask, required min A to recover. Stairs/Curb  Stairs?: No        Exercises  Heelslides: x 10 bilat  Hip Flexion: x 10 bilat  Hip Abduction: x 10 bilat  Knee Long Arc Quad: x 10 bilat  Ankle Pumps: x 20 bilat                                                                              AM-PAC Score  AM-PAC Inpatient Mobility Raw Score : 17 (11/20/21 1510)  AM-PAC Inpatient T-Scale Score : 42.13 (11/20/21 1510)  Mobility Inpatient CMS 0-100% Score: 50.57 (11/20/21 1510)  Mobility Inpatient CMS G-Code Modifier : CK (11/20/21 1510)          Goals  Short term goals  Time Frame for Short term goals: By discharge  Short term goal 1: The pt will perform sit to stand with supervision and LRAD.   Short term goal 2: The pt will transfer bed to chair with supervision and LRAD  Short term goal 3: The pt will ambulate 150' with supervision and LRAD  Short term goal 4: The pt will go up/down 5 steps with 1 rail and CGA  Patient Goals   Patient goals :  To go home    Plan    Plan  Times per week: 5-7  Times per day: Daily  Current Treatment Recommendations: Strengthening, Transfer Training, Endurance Training, Neuromuscular Re-education, Cognitive Reorientation, Balance Training, Gait Training, Functional Mobility Training, Safety Education & Training  Safety Devices  Type of devices: Call light within reach, Chair alarm in place, Left in chair, Nurse notified     Therapy Time   Individual Concurrent Group Co-treatment   Time In 1343         Time Out 1421         Minutes [address] [place] [address], PT

## 2021-11-20 NOTE — PROGRESS NOTES
Assessment complete/ sitter at bedside/ pt required safety sitter during the night per handoff/ confused did not know his surroundings nor could he tell the nurse his last name + impulsive behavior pulling at lines/ monitor sytem + trying to getOOB per hand off report/ pt did not sleep well/ pt is drowsy now  easily awakened/ he can tell me his name the year/ states in hospital  For leg surgery/ pt already much  Better mentation with sunrise/ will re-evaluate sitter needs during day/ remains in a regular rhythm on amio gtt/ protocol started yesterday for afib non sustained high rate/ minimal pain from Sx/ pulses doppler bilat lower extremiites/ cont to assess/ see notes/ orders/ flow sheet    1000 Pt remains alert oriented x4/ sitter relieved/ bed/chair alarms on with video monitor in room/ pt dangled up to chair for breakfast/ ambulated in hallway post breakfast and back to bed for rest/ remains hemodynamically stable no complicatons / see flow sheet/ cont to assess

## 2021-11-20 NOTE — RT PROTOCOL NOTE
RT Nebulizer Bronchodilator Protocol Note    There is a bronchodilator order in the chart from a provider indicating to follow the RT Bronchodilator Protocol and there is an Initiate RT Bronchodilator Protocol order as well (see protocol at bottom of note). CXR Findings:  XR CHEST PORTABLE    Result Date: 11/18/2021  1. Persistent diffuse mild nonspecific prominence of the pulmonary interstitial markings. No localized consolidation or pleural effusion. The findings from the last RT Protocol Assessment were as follows:  Smoking: Chronic pulmonary disease  Respiratory Pattern: Dyspnea on exertion or RR 21-25 bpm  Breath Sounds: Slightly diminished and/or crackles  Cough: Strong, spontaneous, non-productive  Indication for Bronchodilator Therapy: Decreased or absent breath sounds  Bronchodilator Assessment Score: 6    Patient with COPD and recent vascular surgery requiring tx for diminished BS. Aerosolized bronchodilator medication orders have been revised according to the RT Nebulizer Bronchodilator Protocol below. Respiratory Therapist to perform RT Therapy Protocol Assessment initially then follow the protocol. Repeat RT Therapy Protocol Assessment PRN for score 0-3 or on second treatment, BID, and PRN for scores above 3. No Indications - adjust the frequency to every 6 hours PRN wheezing or bronchospasm, if no treatments needed after 48 hours then discontinue using Per Protocol order mode. If indication present, adjust the RT bronchodilator orders based on the Bronchodilator Assessment Score as indicated below. If a patient is on this medication at home then do not decrease Frequency below that used at home. 0-3 - enter or revise RT bronchodilator order(s) to equivalent RT Bronchodilator order with Frequency of every 4 hours PRN for wheezing or increased work of breathing using Per Protocol order mode.        4-6 - enter or revise RT Bronchodilator order(s) to two equivalent RT bronchodilator orders with one order with BID Frequency and one order with Frequency of every 4 hours PRN wheezing or increased work of breathing using Per Protocol order mode. 7-10 - enter or revise RT Bronchodilator order(s) to two equivalent RT bronchodilator orders with one order with TID Frequency and one order with Frequency of every 4 hours PRN wheezing or increased work of breathing using Per Protocol order mode. 11-13 - enter or revise RT Bronchodilator order(s) to one equivalent RT bronchodilator order with QID Frequency and an Albuterol order with Frequency of every 4 hours PRN wheezing or increased work of breathing using Per Protocol order mode. Greater than 13 - enter or revise RT Bronchodilator order(s) to one equivalent RT bronchodilator order with every 4 hours Frequency and an Albuterol order with Frequency of every 2 hours PRN wheezing or increased work of breathing using Per Protocol order mode. RT to enter RT Home Evaluation for COPD & MDI Assessment order using Per Protocol order mode.     Electronically signed by Carrie Rehman RCP on 11/20/2021 at 8:17 AM

## 2021-11-20 NOTE — PLAN OF CARE
Problem: Pain:  Goal: Pain level will decrease  Description: Pain level will decrease  11/20/2021 1714 by Nicolette Sousa RN  Outcome: Ongoing  11/20/2021 0528 by Latasha Stallings RN  Outcome: Ongoing  Goal: Control of acute pain  Description: Control of acute pain  11/20/2021 1714 by Nicolette Sousa RN  Outcome: Ongoing  11/20/2021 0528 by Latasha Stallings RN  Outcome: Ongoing  Goal: Control of chronic pain  Description: Control of chronic pain  11/20/2021 1714 by Nicolette Sousa RN  Outcome: Ongoing  11/20/2021 0528 by Latasha Stallings RN  Outcome: Ongoing     Problem: Skin Integrity:  Goal: Will show no infection signs and symptoms  Description: Will show no infection signs and symptoms  11/20/2021 1714 by Nicolette Sousa RN  Outcome: Ongoing  11/20/2021 0528 by Latasha Stallings RN  Outcome: Ongoing  Goal: Absence of new skin breakdown  Description: Absence of new skin breakdown  11/20/2021 1714 by Nicolette Sousa RN  Outcome: Ongoing  11/20/2021 0528 by Latasha Stallings RN  Outcome: Ongoing     Problem: Falls - Risk of:  Goal: Will remain free from falls  Description: Will remain free from falls  11/20/2021 1714 by Nicolette Sousa RN  Outcome: Ongoing  11/20/2021 0528 by Latasha Stallings RN  Outcome: Ongoing  Goal: Absence of physical injury  Description: Absence of physical injury  11/20/2021 1714 by Nicolette Sousa RN  Outcome: Ongoing  11/20/2021 0528 by Latasha Stallings RN  Outcome: Ongoing     Problem: OXYGENATION/RESPIRATORY FUNCTION  Goal: Patient will maintain patent airway  11/20/2021 1714 by Nicolette Sousa RN  Outcome: Ongoing  11/20/2021 0528 by Latasha Stallings RN  Outcome: Ongoing  Goal: Patient will achieve/maintain normal respiratory rate/effort  Description: Respiratory rate and effort will be within normal limits for the patient  11/20/2021 1714 by Nicolette Sousa RN  Outcome: Ongoing  11/20/2021 0528 by Latasha Stallings RN  Outcome: Ongoing     Problem: HEMODYNAMIC STATUS  Goal: Patient has stable vital signs and fluid balance  11/20/2021 1714 by Camryn Tian RN  Outcome: Ongoing  11/20/2021 0528 by Padmini Mendieta RN  Outcome: Ongoing     Problem: FLUID AND ELECTROLYTE IMBALANCE  Goal: Fluid and electrolyte balance are achieved/maintained  11/20/2021 1714 by Camryn Tian RN  Outcome: Ongoing  11/20/2021 0528 by Padmini Mendieta RN  Outcome: Ongoing     Problem: ACTIVITY INTOLERANCE/IMPAIRED MOBILITY  Goal: Mobility/activity is maintained at optimum level for patient  11/20/2021 1714 by Camryn Tian RN  Outcome: Ongoing  11/20/2021 0528 by Padmini Mendieta RN  Outcome: Ongoing

## 2021-11-20 NOTE — PROGRESS NOTES
Patient assessed. VSS. Oriented to self, situation, and time. Disoriented to place. Resting comfortably at this time. Patient occasionally attempts to get out of bed. Reoriented frequently. Educated on fall risk protocols. Bed low and locked. Bed alarm on. Call light within reach. "[employer]s camera #18 placed in room for extra fall precautions. L and R pedal pulse doppler. L and R post tib pulse doppler. Extremities warm to touch. Patient states minimal pain in RLE. R fem groin site soft. No swelling, no redness.

## 2021-11-20 NOTE — PROGRESS NOTES
Perfect served surgery residents that patient frequently attempts to get out of bed and is pulling on his leads, IV, schulte, and nasal cannula. He is getting increasingly confused and is now disoriented to place and situation. Reoriented frequently. Educated on fall risk protocols. Bed low and locked. Bed alarm on. Call light within reach.

## 2021-11-21 LAB
ALBUMIN SERPL-MCNC: 3.3 G/DL (ref 3.4–5)
ANION GAP SERPL CALCULATED.3IONS-SCNC: 13 MMOL/L (ref 3–16)
BASOPHILS ABSOLUTE: 0 K/UL (ref 0–0.2)
BASOPHILS RELATIVE PERCENT: 0.5 %
BUN BLDV-MCNC: 9 MG/DL (ref 7–20)
CALCIUM SERPL-MCNC: 8.5 MG/DL (ref 8.3–10.6)
CHLORIDE BLD-SCNC: 100 MMOL/L (ref 99–110)
CO2: 24 MMOL/L (ref 21–32)
CREAT SERPL-MCNC: 0.7 MG/DL (ref 0.8–1.3)
EOSINOPHILS ABSOLUTE: 0.4 K/UL (ref 0–0.6)
EOSINOPHILS RELATIVE PERCENT: 5.5 %
GFR AFRICAN AMERICAN: >60
GFR NON-AFRICAN AMERICAN: >60
GLUCOSE BLD-MCNC: 90 MG/DL (ref 70–99)
HCT VFR BLD CALC: 34.4 % (ref 40.5–52.5)
HEMOGLOBIN: 11.6 G/DL (ref 13.5–17.5)
LYMPHOCYTES ABSOLUTE: 1 K/UL (ref 1–5.1)
LYMPHOCYTES RELATIVE PERCENT: 16.2 %
MAGNESIUM: 1.8 MG/DL (ref 1.8–2.4)
MCH RBC QN AUTO: 34.7 PG (ref 26–34)
MCHC RBC AUTO-ENTMCNC: 33.7 G/DL (ref 31–36)
MCV RBC AUTO: 103 FL (ref 80–100)
MONOCYTES ABSOLUTE: 0.7 K/UL (ref 0–1.3)
MONOCYTES RELATIVE PERCENT: 10.4 %
NEUTROPHILS ABSOLUTE: 4.3 K/UL (ref 1.7–7.7)
NEUTROPHILS RELATIVE PERCENT: 67.4 %
PDW BLD-RTO: 15.6 % (ref 12.4–15.4)
PHOSPHORUS: 3.7 MG/DL (ref 2.5–4.9)
PLATELET # BLD: 155 K/UL (ref 135–450)
PMV BLD AUTO: 8.4 FL (ref 5–10.5)
POTASSIUM SERPL-SCNC: 3.6 MMOL/L (ref 3.5–5.1)
RBC # BLD: 3.34 M/UL (ref 4.2–5.9)
SODIUM BLD-SCNC: 137 MMOL/L (ref 136–145)
WBC # BLD: 6.4 K/UL (ref 4–11)

## 2021-11-21 PROCEDURE — 94150 VITAL CAPACITY TEST: CPT

## 2021-11-21 PROCEDURE — 6360000002 HC RX W HCPCS: Performed by: STUDENT IN AN ORGANIZED HEALTH CARE EDUCATION/TRAINING PROGRAM

## 2021-11-21 PROCEDURE — 2580000003 HC RX 258: Performed by: INTERNAL MEDICINE

## 2021-11-21 PROCEDURE — 85025 COMPLETE CBC W/AUTO DIFF WBC: CPT

## 2021-11-21 PROCEDURE — 2060000000 HC ICU INTERMEDIATE R&B

## 2021-11-21 PROCEDURE — 36415 COLL VENOUS BLD VENIPUNCTURE: CPT

## 2021-11-21 PROCEDURE — 6370000000 HC RX 637 (ALT 250 FOR IP): Performed by: INTERNAL MEDICINE

## 2021-11-21 PROCEDURE — 94640 AIRWAY INHALATION TREATMENT: CPT

## 2021-11-21 PROCEDURE — 97535 SELF CARE MNGMENT TRAINING: CPT

## 2021-11-21 PROCEDURE — 94669 MECHANICAL CHEST WALL OSCILL: CPT

## 2021-11-21 PROCEDURE — 83735 ASSAY OF MAGNESIUM: CPT

## 2021-11-21 PROCEDURE — 6370000000 HC RX 637 (ALT 250 FOR IP): Performed by: STUDENT IN AN ORGANIZED HEALTH CARE EDUCATION/TRAINING PROGRAM

## 2021-11-21 PROCEDURE — 94761 N-INVAS EAR/PLS OXIMETRY MLT: CPT

## 2021-11-21 PROCEDURE — 2580000003 HC RX 258: Performed by: STUDENT IN AN ORGANIZED HEALTH CARE EDUCATION/TRAINING PROGRAM

## 2021-11-21 PROCEDURE — 97530 THERAPEUTIC ACTIVITIES: CPT

## 2021-11-21 PROCEDURE — 6360000002 HC RX W HCPCS: Performed by: INTERNAL MEDICINE

## 2021-11-21 PROCEDURE — 6370000000 HC RX 637 (ALT 250 FOR IP)

## 2021-11-21 PROCEDURE — 80069 RENAL FUNCTION PANEL: CPT

## 2021-11-21 PROCEDURE — 2700000000 HC OXYGEN THERAPY PER DAY

## 2021-11-21 RX ORDER — MAGNESIUM SULFATE IN WATER 40 MG/ML
2000 INJECTION, SOLUTION INTRAVENOUS ONCE
Status: COMPLETED | OUTPATIENT
Start: 2021-11-21 | End: 2021-11-21

## 2021-11-21 RX ORDER — ISOSORBIDE MONONITRATE 30 MG/1
15 TABLET, EXTENDED RELEASE ORAL DAILY
Status: DISCONTINUED | OUTPATIENT
Start: 2021-11-21 | End: 2021-11-22 | Stop reason: HOSPADM

## 2021-11-21 RX ORDER — AMLODIPINE BESYLATE 5 MG/1
5 TABLET ORAL DAILY
Status: DISCONTINUED | OUTPATIENT
Start: 2021-11-21 | End: 2021-11-22 | Stop reason: HOSPADM

## 2021-11-21 RX ORDER — POTASSIUM CHLORIDE 20 MEQ/1
40 TABLET, EXTENDED RELEASE ORAL ONCE
Status: COMPLETED | OUTPATIENT
Start: 2021-11-21 | End: 2021-11-21

## 2021-11-21 RX ADMIN — OXYCODONE 5 MG: 5 TABLET ORAL at 14:26

## 2021-11-21 RX ADMIN — SODIUM CHLORIDE, PRESERVATIVE FREE 10 ML: 5 INJECTION INTRAVENOUS at 19:53

## 2021-11-21 RX ADMIN — ISOSORBIDE MONONITRATE 15 MG: 30 TABLET, EXTENDED RELEASE ORAL at 10:29

## 2021-11-21 RX ADMIN — BUDESONIDE 250 MCG: 0.25 SUSPENSION RESPIRATORY (INHALATION) at 08:03

## 2021-11-21 RX ADMIN — ALBUTEROL SULFATE 2.5 MG: 2.5 SOLUTION RESPIRATORY (INHALATION) at 12:25

## 2021-11-21 RX ADMIN — APIXABAN 5 MG: 5 TABLET, FILM COATED ORAL at 19:53

## 2021-11-21 RX ADMIN — PANTOPRAZOLE SODIUM 40 MG: 40 TABLET, DELAYED RELEASE ORAL at 08:37

## 2021-11-21 RX ADMIN — CILOSTAZOL 50 MG: 50 TABLET ORAL at 19:53

## 2021-11-21 RX ADMIN — CILOSTAZOL 50 MG: 50 TABLET ORAL at 08:40

## 2021-11-21 RX ADMIN — APIXABAN 5 MG: 5 TABLET, FILM COATED ORAL at 08:37

## 2021-11-21 RX ADMIN — ALLOPURINOL 100 MG: 100 TABLET ORAL at 08:39

## 2021-11-21 RX ADMIN — ALBUTEROL SULFATE 2.5 MG: 2.5 SOLUTION RESPIRATORY (INHALATION) at 21:00

## 2021-11-21 RX ADMIN — METOPROLOL TARTRATE 25 MG: 25 TABLET, FILM COATED ORAL at 08:37

## 2021-11-21 RX ADMIN — ALBUTEROL SULFATE 2.5 MG: 2.5 SOLUTION RESPIRATORY (INHALATION) at 08:03

## 2021-11-21 RX ADMIN — DULOXETINE HYDROCHLORIDE 20 MG: 20 CAPSULE, DELAYED RELEASE ORAL at 08:37

## 2021-11-21 RX ADMIN — ALBUTEROL SULFATE 2.5 MG: 2.5 SOLUTION RESPIRATORY (INHALATION) at 17:04

## 2021-11-21 RX ADMIN — CLOPIDOGREL BISULFATE 75 MG: 75 TABLET, FILM COATED ORAL at 08:39

## 2021-11-21 RX ADMIN — AMLODIPINE BESYLATE 5 MG: 5 TABLET ORAL at 10:29

## 2021-11-21 RX ADMIN — TIOTROPIUM BROMIDE AND OLODATEROL 2 PUFF: 3.124; 2.736 SPRAY, METERED RESPIRATORY (INHALATION) at 08:06

## 2021-11-21 RX ADMIN — AMIODARONE HYDROCHLORIDE 0.5 MG/MIN: 50 INJECTION, SOLUTION INTRAVENOUS at 03:57

## 2021-11-21 RX ADMIN — POTASSIUM CHLORIDE 40 MEQ: 1500 TABLET, EXTENDED RELEASE ORAL at 08:39

## 2021-11-21 RX ADMIN — POTASSIUM CHLORIDE 10 MEQ: 10 TABLET, EXTENDED RELEASE ORAL at 08:39

## 2021-11-21 RX ADMIN — BUDESONIDE 250 MCG: 0.25 SUSPENSION RESPIRATORY (INHALATION) at 21:00

## 2021-11-21 RX ADMIN — METOPROLOL TARTRATE 25 MG: 25 TABLET, FILM COATED ORAL at 19:53

## 2021-11-21 RX ADMIN — ACETAMINOPHEN 650 MG: 325 TABLET ORAL at 14:26

## 2021-11-21 RX ADMIN — MAGNESIUM SULFATE HEPTAHYDRATE 2000 MG: 2 INJECTION, SOLUTION INTRAVENOUS at 08:44

## 2021-11-21 RX ADMIN — ATORVASTATIN CALCIUM 80 MG: 80 TABLET, FILM COATED ORAL at 08:39

## 2021-11-21 ASSESSMENT — PAIN SCALES - GENERAL
PAINLEVEL_OUTOF10: 9
PAINLEVEL_OUTOF10: 0

## 2021-11-21 NOTE — PLAN OF CARE
Problem: Pain:  Goal: Pain level will decrease  Description: Pain level will decrease  Outcome: Ongoing  Note: Reported one episode of 9/10 RLE pain, stated satisfactory relief with PRN meds. Problem: Skin Integrity:  Goal: Absence of new skin breakdown  Description: Absence of new skin breakdown  Outcome: Ongoing  Note: Scabbed wounds scattered on RLE, BETH. Patient turns self adequately in bed, Sacral Heart Mepilex in place to protect, skin intact underneath. Problem: Falls - Risk of:  Goal: Will remain free from falls  Description: Will remain free from falls  Outcome: Ongoing  Note: Fall risk protocol in place: Bed alarm on, fall risk bracelet applied, yellow indicator in hallway on, non-skid footwear in use. Patient/family educated on fall risk protocol, instructed to call for assistance when needed.

## 2021-11-21 NOTE — PLAN OF CARE
and electrolyte balance are achieved/maintained  Outcome: Ongoing     Problem: ACTIVITY INTOLERANCE/IMPAIRED MOBILITY  Goal: Mobility/activity is maintained at optimum level for patient  Outcome: Ongoing

## 2021-11-21 NOTE — PROGRESS NOTES
Occupational Therapy  Facility/Department: [facility] ICU  Daily Treatment Note  NAME: Surya Willard. : 1950  MRN: [id number]    Date of Service: 2021    Discharge Recommendations: Surya Willard. scored a 18/24 on the AM-PAC ADL Inpatient form. Current research shows that an AM-PAC score of 17 or less is typically not associated with a discharge to the patient's home setting. Based on the patient's AM-PAC score and their current ADL deficits, it is recommended that the patient have 5-7 sessions per week of Occupational Therapy at d/c to increase the patient's independence. At this time, this patient demonstrates the endurance, and/or tolerance for 3 hours of therapy each day, with a treatment frequency of 5-7x/wk. Please see assessment section for further patient specific details. If patient discharges prior to next session this note will serve as a discharge summary. Please see below for the latest assessment towards goals. Assessment   Performance deficits / Impairments: Decreased functional mobility ; Decreased safe awareness; Decreased ADL status; Decreased endurance; Decreased high-level IADLs  Assessment: Pt demonstrated good participation in OT today as evident by pt ability to ambulate over 50ft w/ RW w/ CGA. Pt maintained stance at toilet w/ CGA and no LOB and he completed all functional transfers w/ CGA. Pt is limited by decreased activity tolerance and he has poor 4WW awareness at times. Pt benefits from ongoing OT while inpatient to maximize functional independence. Pt reports he plans to go to rehab soon. Cont OT per POC  Treatment Diagnosis: impaired ADLs and mobility s/p revascularization surgery  OT Education: Precautions;  ADL Adaptive Strategies; Transfer Training  Patient Education: pt verb understanding, reinforce as needed  REQUIRES OT FOLLOW UP: Yes  Activity Tolerance  Activity Tolerance: Patient Tolerated treatment well  Activity Tolerance: Pt on 5L w/ SpO2 at 94% or higher when assessed. Safety Devices  Safety Devices in place: Yes  Type of devices: Call light within reach; Chair alarm in place; Nurse notified; Left in chair         Patient Diagnosis(es): The encounter diagnosis was Atherosclerosis of native artery of right lower extremity with rest pain ([address] has a past medical history of CAD (coronary artery disease), CHF (congestive heart failure) ([address] COPD (chronic obstructive pulmonary disease) ([facility] [address] Depressive disorder, not elsewhere classified, GERD (gastroesophageal reflux disease), History of colon polyps - 30 seen on colonoscopy 04/2021, History of MI (myocardial infarction), History of skin ulcer of lower extremity, History of transient ischemic attack (TIA), Hyperlipidemia, Hypertension, Neuropathy, KAVITA (obstructive sleep apnea), Personal history of DVT (deep vein thrombosis), PVD (peripheral vascular disease) ([facility] [address] TIA (transient ischemic attack), and Vertebral fracture. has a past surgical history that includes Appendectomy; Coronary angioplasty with stent (6/2013); other surgical history (Right, 6/25/2013); Leg Debridement (Right, 7/23/2013); skin split graft (Right, 7/25/2013); other surgical history (Left, 8/27/2013); laminectomy (11/18/2015); transluminal angioplasty (Left, 12/22/2015); Total hip arthroplasty (Right, 09/01/2016); Cholecystectomy, laparoscopic; eye surgery (Left); Colonoscopy (4/23/2021); Colonoscopy (4/23/2021); Colonoscopy (4/23/2021); and Femoral Endarterectomy (Right, 11/17/2021).     Restrictions  Position Activity Restriction  Other position/activity restrictions: up with assist  Subjective   General  Chart Reviewed: Yes  Patient assessed for rehabilitation services?: Yes  Additional Pertinent Hx: Ish Murillo is a 73 y.o. male with CAD, CHF, COPD (baseline 6L of 02), previous MI, previous TIAs, HLD, HTN, previous DVTs, hospital admission for PE on 9/16/21 and PVD who is direct admitted for planned RLE femoral endarterectomy with iliac stent, femoral angioplasty and stent placement. S/p RIGHT FEMORAL ENDARTERECTOMY AND ILIAC ARTERY STENT on 11/16. Family / Caregiver Present: No  Referring Practitioner: Fredy Reese DO  Diagnosis: pulmonary embolism  Subjective  Subjective: Pt was semi supine in bed upon arrival. Pt was pleasant and agreeable to OT    Vital Signs  Patient Currently in Pain: Denies   Orientation  Orientation  Orientation Level: Oriented to situation; Oriented to person; Oriented to place; Disoriented to time (knew it was November but not exact date)  Objective    ADL  LE Dressing: Setup; Minimal assistance (Pt donned shoes seated EOB. Pt has a large hole cut into R shoe which he said he did before coming to the hospital because it was rubbing on his toe. Min A needed to adjust heels of shoes. Pt reported discomfort when wearing socks)  Toileting: Contact guard assistance (CGA in stance to urinate)          Balance  Sitting Balance: Supervision (seated EOB)  Standing Balance: Contact guard assistance  Standing Balance  Time: ~7 mins total  Activity: functional mobility in the hallway ~50ft; functional mobility to the bathroom; stance for toileting  Functional Mobility  Functional - Mobility Device: 4-Wheeled Walker  Activity: To/from bathroom; Other (hallway 50ft)  Assist Level: Contact guard assistance  Functional Mobility Comments: VCs needed for safe RW management. Pt was unaware that he was rolling the R side of the wheel onto his R foot and required VCs to correct,    Bed mobility  Supine to Sit: Contact guard assistance (HOB elevated + use of bed rail)  Transfers  Sit to stand: Contact guard assistance (EOB > 4WW)  Stand to sit: Contact guard assistance                         Cognition  Arousal/Alertness: Appropriate responses to stimuli  Following Commands: Follows one step commands with repetition;  Follows multistep commands with repitition  Attention Span: Appears intact  Memory: Decreased short term memory  Safety Judgement: Decreased awareness of need for safety; Decreased awareness of need for assistance  Insights: Decreased awareness of deficits  Initiation: Does not require cues  Sequencing: Requires cues for some                                           Plan   Plan  Times per week: 5-7  Current Treatment Recommendations: Functional Mobility Training, Endurance Training, Self-Care / ADL, Patient/Caregiver Education & Training, Home Management Training, Safety Education & Training  G-Code     OutComes Score                                                  AM-PAC Score        AM-PAC Inpatient Daily Activity Raw Score: 18 (11/21/21 1213)  AM-PAC Inpatient ADL T-Scale Score : 38.66 (11/21/21 1213)  ADL Inpatient CMS 0-100% Score: 46.65 (11/21/21 1213)  ADL Inpatient CMS G-Code Modifier : CK (11/21/21 1213)    Goals  Short term goals  Time Frame for Short term goals: by d/c  Short term goal 1: pt will complete LE dressing w/ SBA - ongoing  Short term goal 2: pt will complete toilet transfer w/ SBA - ongoing  Short term goal 3: pt will complete standing-level grooming w/ spvn - ongoing  Patient Goals   Patient goals : to return home       Therapy Time   Individual Concurrent Group Co-treatment   Time In 9523         Time Out 1200         Minutes 36         Timed Code Treatment Minutes: [address], OT   If patient discharges prior to next treatment, this note will serve as discharge summary. WiIll continue per plan of care if patient does not discharge.

## 2021-11-21 NOTE — PROGRESS NOTES
ICU VASCULAR SURGERY DAILY PROGRESS NOTE    CC: Atherosclerosis of native artery of RLE with rest pain    SUBJECTIVE:   Interval Hx:   Patient doing well this AM. Rest pain is improved. Tolerating diet with no nausea or vomiting. Ambulated halls yesterday. Patient remains afebrile and HDS on 5L HFNC. ROS: A 14 point review of systems was conducted, significant findings as noted above. All other systems negative. OBJECTIVE:   Vitals:   Vitals:    11/21/21 0400 11/21/21 0430 11/21/21 0500 11/21/21 0530   BP: 129/80   138/76   Pulse: 77 79 76 80   Resp: 15 23 18 20   Temp:       TempSrc:       SpO2:       Weight:       Height:           I/O:     Intake/Output Summary (Last 24 hours) at 11/21/2021 0653  Last data filed at 11/21/2021 0600  Gross per 24 hour   Intake 1174.58 ml   Output 1680 ml   Net -505.42 ml     I/O last 3 completed shifts: In: 1041.4 [P.O.:650; I.V.:341.4; IV Piggyback:50]  Out: 4633 [Urine:1375]    Diet: ADULT DIET; Regular; Low Fat/Low Chol/High Fiber/2 gm Na      Physical Examination:   General appearance: alert, no acute distress, grooming appropriate  Chest/Lungs: Normal effort with no accessory muscle use on 5L HFNC  Cardiovascular: RRR  Abdomen: Soft, non-tender, non-distended, no rebound, guarding, or rigidity. Skin: warm and dry, no rashes  Extremities: erythema and mild edema of right lower extremity > than left. Abrasion to medial aspect of right foot. Bilateral lower extremity warm to palpation and equal bilaterally. Sensation improved over the plantar aspect of bilateral lower extremities. DP/PT dopplerable on the right and left LE. Left stick site hemostatic with ecchymosis but no hematoma, palpable femoral pulse.  Right groin with Aquacel Ag dressing in place with minimal staining, c/d/i    Labs:  CBC:   Recent Labs     11/19/21  0446 11/20/21  0517 11/21/21  0436   WBC 8.7 9.2 6.4   HGB 11.5* 11.6* 11.6*   HCT 34.0* 34.4* 34.4*   * 140 155       BMP:   Recent Labs 11/19/21  1530 11/20/21  0518 11/21/21  0436   * 135* 137   K 4.1 5.6* 3.6   CL 99 101 100   CO2 24 25 24   BUN 18 13 9   CREATININE 0.8 0.8 0.7*   GLUCOSE 101* 115* 90     LFT's: No results for input(s): AST, ALT, ALB, BILITOT, ALKPHOS in the last 72 hours. Troponin:   Recent Labs     11/18/21  1653   TROPONINI 0.02*     BNP: No results for input(s): BNP in the last 72 hours. ABGs:   No results for input(s): PHART, [id number], PO2ART in the last 72 hours. INR:   No results for input(s): INR in the last 72 hours. U/A:No results for input(s): NITRITE, COLORU, PHUR, LABCAST, WBCUA, RBCUA, MUCUS, TRICHOMONAS, YEAST, BACTERIA, CLARITYU, SPECGRAV, LEUKOCYTESUR, UROBILINOGEN, BILIRUBINUR, BLOODU, GLUCOSEU, AMORPHOUS in the last 72 hours. Invalid input(s): Adrianne Pena     Rad:   XR CHEST PORTABLE   Final Result      1. Persistent diffuse mild nonspecific prominence of the pulmonary interstitial markings. No localized consolidation or pleural effusion. XR PELVIS (1-2 VIEWS)   Final Result   1. Intraoperative fluoroscopy as described. FLUORO FOR SURGICAL PROCEDURES   Final Result   1. Intraoperative fluoroscopy as described. XR CHEST PORTABLE   Final Result      Diffuse mild nonspecific prominence of pulmonary interstitial markings. No focal airspace density areas of consolidation seen. There are no effusions. Correlate clinically. ASSESSMENT AND PLAN:   This is a 73y.o. year old male with a history of COPD on 6L home O2 at baseline, KAVITA, CAD, CHF and a previous MI, is status post R. femoral endarterectomy, right external iliac to profunda bypass w/ PTFE graft. RLE arteriogram, balloon angioplasty of R profunda balloon angioplasty and stent of right external iliac artery secondary to atherosclerosis of native artery of RLE with rest pain.  (11/17) POD4     Neuro:   Pain  PRN Tylenol  PRN Roxicodone pain panel     Psych  Continue home Cymbalta  Continue CIWA precautions without Ativan dosing     Cardiovascular:   Patient with history of CHF and CAD with previous MI  EF of 55-60% (ECHO 9/17/21)  Echo 11/18 - left ventricular concentric hypertrophy, EF 55-60%  - Continue lipitor     HTN:  - cont Amiodarone drip and Lopressor per cardiology  - Scheduled Norvasc, doxazosin, Imdur and Cozaar currently held given hypotension episodes  - Lasix 40 mg daily currently held given hypotensive episodes  - f/u cardiology recommendations regarding medications, will contact cardiology regarding d/cing amio gtt and restarting home medications.     S/P R. femoral endarterectomy, right external iliac to profunda bypass w/ PTFE graft. RLE arteriogram, balloon angioplasty of R profunda balloon angioplasty and stent of right external iliac artery (11/17)  - EBL 500cc  - Patient received 2U of PRBC intraoperatively  - PT/DP dopplerable on the right LE, Q2hr neuro checks to include temperature of the extremity and capillary refill  - Restarted on Eliquis on 11/18, hep gtt discontinued.   - Continue Plavix today      Pulmonary:   History of COPD on 6L NC at home at baseline.  - Aggressive pulmonary toilet  - Restarted home albuterol nebulizer (scheduled)  - Restarted home inhalers - Stiolto and Pulmicort     GI:   History of GERD  - Continue daily Protonix (40mg)    Continue regular diet    FEN:   Baseline Cr 0.9   PRN electrolyte replacement     /Renal:   No history of kidney disease  UOP has been adequate at ~0.3 cc/kg/hr  Voiding independently  Cr stable at 0.7 from 0.8 yesterday        Hem/ID:   HgB stable at 11.6 from 11.6 yesterday  WBC is WNL  No indication for prophylactic antibiotics     Endo:   No history of endocrine disfunction     Prophylaxis:   Home Eliquis restarted (11/18)  Started on Plavix     Access:  Central Access: None  Peripheral Access: Two peripheral IV's (11/17)  Arterial Line Access: Left radial (11/17), removed 11/18                                Sampson Date placed: 11/17, removed 11/20     Mobility:  PT/OT, to continue to see patient     Dispo:   Okay for q4hr neuro checks.   Will transfer out of ICU today     Code Status: Full Code  -----------------------------  Jay Medrano DO  11/21/21  6:53 AM

## 2021-11-21 NOTE — FLOWSHEET NOTE
11/19/21 1503   Encounter Summary   Services provided to: Patient   Referral/Consult From: Rounding   Continue Visiting   (11/19/21, EMIL. )   Complexity of Encounter Moderate   Length of Encounter 15 minutes   Routine   Type Initial   Assessment Calm;  Approachable   Intervention Nurtured hope   Outcome Expressed gratitude

## 2021-11-22 ENCOUNTER — TELEPHONE (OUTPATIENT)
Dept: PRIMARY CARE CLINIC | Age: 71
End: 2021-11-22

## 2021-11-22 VITALS
BODY MASS INDEX: 28.89 KG/M2 | HEIGHT: 70 IN | SYSTOLIC BLOOD PRESSURE: 128 MMHG | OXYGEN SATURATION: 96 % | DIASTOLIC BLOOD PRESSURE: 63 MMHG | WEIGHT: 201.8 LBS | RESPIRATION RATE: 19 BRPM | HEART RATE: 87 BPM | TEMPERATURE: 97 F

## 2021-11-22 LAB
ALBUMIN SERPL-MCNC: 3.5 G/DL (ref 3.4–5)
ANION GAP SERPL CALCULATED.3IONS-SCNC: 10 MMOL/L (ref 3–16)
BASOPHILS ABSOLUTE: 0.1 K/UL (ref 0–0.2)
BASOPHILS RELATIVE PERCENT: 1.3 %
BUN BLDV-MCNC: 9 MG/DL (ref 7–20)
CALCIUM SERPL-MCNC: 9.1 MG/DL (ref 8.3–10.6)
CHLORIDE BLD-SCNC: 102 MMOL/L (ref 99–110)
CO2: 25 MMOL/L (ref 21–32)
CREAT SERPL-MCNC: 0.8 MG/DL (ref 0.8–1.3)
EOSINOPHILS ABSOLUTE: 0.4 K/UL (ref 0–0.6)
EOSINOPHILS RELATIVE PERCENT: 5.7 %
GFR AFRICAN AMERICAN: >60
GFR NON-AFRICAN AMERICAN: >60
GLUCOSE BLD-MCNC: 100 MG/DL (ref 70–99)
HCT VFR BLD CALC: 36.5 % (ref 40.5–52.5)
HEMOGLOBIN: 12.4 G/DL (ref 13.5–17.5)
LYMPHOCYTES ABSOLUTE: 1.2 K/UL (ref 1–5.1)
LYMPHOCYTES RELATIVE PERCENT: 17.3 %
MAGNESIUM: 1.7 MG/DL (ref 1.8–2.4)
MCH RBC QN AUTO: 34.3 PG (ref 26–34)
MCHC RBC AUTO-ENTMCNC: 34 G/DL (ref 31–36)
MCV RBC AUTO: 100.9 FL (ref 80–100)
MONOCYTES ABSOLUTE: 0.7 K/UL (ref 0–1.3)
MONOCYTES RELATIVE PERCENT: 9.7 %
NEUTROPHILS ABSOLUTE: 4.4 K/UL (ref 1.7–7.7)
NEUTROPHILS RELATIVE PERCENT: 66 %
PDW BLD-RTO: 15.3 % (ref 12.4–15.4)
PHOSPHORUS: 3.7 MG/DL (ref 2.5–4.9)
PLATELET # BLD: 198 K/UL (ref 135–450)
PMV BLD AUTO: 8 FL (ref 5–10.5)
POTASSIUM SERPL-SCNC: 5.2 MMOL/L (ref 3.5–5.1)
RBC # BLD: 3.62 M/UL (ref 4.2–5.9)
SODIUM BLD-SCNC: 137 MMOL/L (ref 136–145)
WBC # BLD: 6.7 K/UL (ref 4–11)

## 2021-11-22 PROCEDURE — 85025 COMPLETE CBC W/AUTO DIFF WBC: CPT

## 2021-11-22 PROCEDURE — 83735 ASSAY OF MAGNESIUM: CPT

## 2021-11-22 PROCEDURE — 97535 SELF CARE MNGMENT TRAINING: CPT

## 2021-11-22 PROCEDURE — 2580000003 HC RX 258: Performed by: STUDENT IN AN ORGANIZED HEALTH CARE EDUCATION/TRAINING PROGRAM

## 2021-11-22 PROCEDURE — 94669 MECHANICAL CHEST WALL OSCILL: CPT

## 2021-11-22 PROCEDURE — 97530 THERAPEUTIC ACTIVITIES: CPT

## 2021-11-22 PROCEDURE — 6370000000 HC RX 637 (ALT 250 FOR IP): Performed by: STUDENT IN AN ORGANIZED HEALTH CARE EDUCATION/TRAINING PROGRAM

## 2021-11-22 PROCEDURE — 97116 GAIT TRAINING THERAPY: CPT

## 2021-11-22 PROCEDURE — 94150 VITAL CAPACITY TEST: CPT

## 2021-11-22 PROCEDURE — 36415 COLL VENOUS BLD VENIPUNCTURE: CPT

## 2021-11-22 PROCEDURE — 6370000000 HC RX 637 (ALT 250 FOR IP)

## 2021-11-22 PROCEDURE — 2700000000 HC OXYGEN THERAPY PER DAY

## 2021-11-22 PROCEDURE — 94761 N-INVAS EAR/PLS OXIMETRY MLT: CPT

## 2021-11-22 PROCEDURE — 6370000000 HC RX 637 (ALT 250 FOR IP): Performed by: INTERNAL MEDICINE

## 2021-11-22 PROCEDURE — 6360000002 HC RX W HCPCS: Performed by: STUDENT IN AN ORGANIZED HEALTH CARE EDUCATION/TRAINING PROGRAM

## 2021-11-22 PROCEDURE — 94640 AIRWAY INHALATION TREATMENT: CPT

## 2021-11-22 PROCEDURE — 80069 RENAL FUNCTION PANEL: CPT

## 2021-11-22 RX ORDER — CLOPIDOGREL BISULFATE 75 MG/1
75 TABLET ORAL DAILY
Qty: 30 TABLET | Refills: 3 | Status: SHIPPED | OUTPATIENT
Start: 2021-11-22 | End: 2022-03-24 | Stop reason: SDUPTHER

## 2021-11-22 RX ORDER — OXYCODONE HYDROCHLORIDE 5 MG/1
5 TABLET ORAL EVERY 6 HOURS PRN
Qty: 20 TABLET | Refills: 0 | Status: ON HOLD | OUTPATIENT
Start: 2021-11-22 | End: 2021-11-30 | Stop reason: HOSPADM

## 2021-11-22 RX ORDER — DOCUSATE SODIUM 100 MG/1
100 CAPSULE, LIQUID FILLED ORAL 2 TIMES DAILY
Qty: 20 CAPSULE | Refills: 0 | Status: SHIPPED | OUTPATIENT
Start: 2021-11-22 | End: 2021-12-02

## 2021-11-22 RX ADMIN — APIXABAN 5 MG: 5 TABLET, FILM COATED ORAL at 09:48

## 2021-11-22 RX ADMIN — POTASSIUM CHLORIDE 10 MEQ: 10 TABLET, EXTENDED RELEASE ORAL at 09:48

## 2021-11-22 RX ADMIN — CILOSTAZOL 50 MG: 50 TABLET ORAL at 09:47

## 2021-11-22 RX ADMIN — AMLODIPINE BESYLATE 5 MG: 5 TABLET ORAL at 09:48

## 2021-11-22 RX ADMIN — ALLOPURINOL 100 MG: 100 TABLET ORAL at 09:48

## 2021-11-22 RX ADMIN — SODIUM CHLORIDE, PRESERVATIVE FREE 10 ML: 5 INJECTION INTRAVENOUS at 09:48

## 2021-11-22 RX ADMIN — ALBUTEROL SULFATE 2.5 MG: 2.5 SOLUTION RESPIRATORY (INHALATION) at 16:45

## 2021-11-22 RX ADMIN — DULOXETINE HYDROCHLORIDE 20 MG: 20 CAPSULE, DELAYED RELEASE ORAL at 09:48

## 2021-11-22 RX ADMIN — METOPROLOL TARTRATE 25 MG: 25 TABLET, FILM COATED ORAL at 09:47

## 2021-11-22 RX ADMIN — ISOSORBIDE MONONITRATE 15 MG: 30 TABLET, EXTENDED RELEASE ORAL at 09:48

## 2021-11-22 RX ADMIN — ATORVASTATIN CALCIUM 80 MG: 80 TABLET, FILM COATED ORAL at 09:47

## 2021-11-22 RX ADMIN — TIOTROPIUM BROMIDE AND OLODATEROL 2 PUFF: 3.124; 2.736 SPRAY, METERED RESPIRATORY (INHALATION) at 09:17

## 2021-11-22 RX ADMIN — PANTOPRAZOLE SODIUM 40 MG: 40 TABLET, DELAYED RELEASE ORAL at 05:30

## 2021-11-22 RX ADMIN — ALBUTEROL SULFATE 2.5 MG: 2.5 SOLUTION RESPIRATORY (INHALATION) at 12:22

## 2021-11-22 RX ADMIN — ALBUTEROL SULFATE 2.5 MG: 2.5 SOLUTION RESPIRATORY (INHALATION) at 09:15

## 2021-11-22 RX ADMIN — BUDESONIDE 250 MCG: 0.25 SUSPENSION RESPIRATORY (INHALATION) at 09:16

## 2021-11-22 RX ADMIN — LOSARTAN POTASSIUM 100 MG: 100 TABLET, FILM COATED ORAL at 09:47

## 2021-11-22 RX ADMIN — CLOPIDOGREL BISULFATE 75 MG: 75 TABLET, FILM COATED ORAL at 09:48

## 2021-11-22 ASSESSMENT — PAIN DESCRIPTION - LOCATION: LOCATION: LEG

## 2021-11-22 ASSESSMENT — PAIN SCALES - GENERAL
PAINLEVEL_OUTOF10: 0
PAINLEVEL_OUTOF10: 8
PAINLEVEL_OUTOF10: 0

## 2021-11-22 ASSESSMENT — PAIN DESCRIPTION - PAIN TYPE: TYPE: ACUTE PAIN

## 2021-11-22 ASSESSMENT — PAIN DESCRIPTION - ORIENTATION: ORIENTATION: RIGHT;LEFT

## 2021-11-22 NOTE — PROGRESS NOTES
Indira Shaikh, the son, called and updated at this time. Alerted that the patient is now in the PCU.  All questions answered

## 2021-11-22 NOTE — CARE COORDINATION
Case Management Assessment            Discharge Note                    Date / Time of Note: 11/22/2021 2:04 PM                  Discharge Note Completed by: Riley Ibarra RN    Patient Name: Landon Centeno. YOB: 1950  Diagnosis: Pulmonary embolism, other, unspecified chronicity, unspecified whether acute cor pulmonale present ([facility] [address] [I26.99]   Date / Time: 11/15/2021  5:15 PM    Current PCP: Noreen You DO  Clinic patient: No    Hospitalization in the last 30 days: No    Advance Directives:  Code Status: Full Code  [place] DNR form completed and on chart: No, Not Indicated    Financial:  Payor: Didi Benson / Plan: Marely Garcia / Product Type: *No Product type* /      Pharmacy:    [id number] - [facility], [address] [place] [address] [phone] - F [phone]  [place] [id number]  Phone: [phone] Fax: [phone]    [facility] [phone] - f [phone]  [address] [facility] [address]  Phone: [phone] Fax: [phone]      Assistance purchasing medications?:    Assistance provided by Case Management: None at this time    Does patient want to participate in local refill/ meds to beds program?:      Meds To Beds General Rules:  1. Can ONLY be done Monday- Friday between 8:30am-5pm  2. Prescription(s) must be in pharmacy by 3pm to be filled same day  3. Copy of patient's insurance/ prescription drug card and patient face sheet must be sent along with the prescription(s)  4. Cost of Rx cannot be added to hospital bill. If financial assistance is needed, please contact unit  or ;  or  CANNOT provide pharmacy voucher for patients co-pays  5.  Patients can then  the prescription on their way out of the hospital at discharge, or pharmacy can deliver to the bedside if staff is available. (payment due at time of pick-up or delivery - cash, check, or card accepted)     Able to afford home medications/ co-pay costs: Yes    ADLS:  Current PT AM-PAC Score: 18 /24  Current OT AM-PAC Score: 20 /24      DISCHARGE Disposition: Home with [address]: [facility]     LOC at discharge: Not Applicable  NICA Completed: Yes    Notification completed in HENS/PAS?:  Not Applicable    IMM Completed:   Yes, Case management has presented and reviewed IMM letter #2 to the patient and/or family/ POA. Patient and/or family/POA verbalized understanding of their medicare rights and appeal process if needed. Patient and/or family/POA has signed, initialed and placed today's date (11.22.2021) and time (.) on IMM letter #2 on the the appropriate lines. Patient and/or family/POA, copy of letter offered and they are aware that this original copy of IMM letter #2 is available prior to discharge from the paper chart on the unit. Electronic documentation has been entered into epic for IMM letter #2 and original paper copy has been added to the paper chart at the nurses station.      Transportation:  Transportation PLAN for discharge: family   Mode of Transport: Private Car  Reason for medical transport: Not Applicable  Name of [address] [place] [address],[address]: Not Applicable  Time of Transport:     Transport form completed: No, Not Indicated    Home Care:  [address] ordered at discharge: Yes  [address] : [place]  Phone: [phone]  Fax: [phone]  Orders faxed: Yes    Durable Medical Equipment:  DME Provider:   Equipment obtained during hospitalization:     Home Oxygen and Respiratory Equipment:  Oxygen needed at discharge?: Yes  [address]: [facility]  Phone: [phone]   Portable tank available for discharge?: Yes    Dialysis:  Dialysis patient: No    Dialysis Center:  Not Applicable    Hospice Services:  Location: Not Applicable  Agency: Not Applicable    Consents signed: No, Not Indicated    Referrals made at DISCHARGE for outpatient continued care:  Not Applicable    Additional CM Notes: The Plan for Transition of Care is related to the following treatment goals of Pulmonary embolism, other, unspecified chronicity, unspecified whether acute cor pulmonale present ([facility] [address] [I26.99]    The Patient and/or patient representative Estela Ceja and his family were provided with a choice of provider and agrees with the discharge plan Yes    Freedom of choice list was provided with basic dialogue that supports the patient's individualized plan of care/goals and shares the quality data associated with the providers.  Yes    Care Transitions patient: No    Eleazar Galvez RN  [facility] [employer]  Case Management Department  Ph: [phone]  Fax: [phone]

## 2021-11-22 NOTE — DISCHARGE INSTR - DIET
Good nutrition is important when healing from an illness, injury, or surgery. Follow any nutrition recommendations given to you during your hospital stay. If you were given an oral nutrition supplement while in the hospital, continue to take this supplement at home. You can take it with meals, in-between meals, and/or before bedtime. These supplements can be purchased at most local grocery stores, pharmacies, and chain [employer]st[employer]. If you have any questions about your diet or nutrition, call the hospital and ask for the dietitian.   LOW FAT, LOW CHOLESTEROL, HIGH FIBER, 2 GM SODIUM DIET AS TOLERATED

## 2021-11-22 NOTE — PROGRESS NOTES
Occupational Therapy  Facility/Department: [place] [address] PCU  Daily Treatment Note  NAME: Ish Murillo : 1950  MRN: [id number]    Date of Service: 2021    Discharge Recommendations:  Ish Murillo scored a 20/24 on the AM-PAC ADL Inpatient form. Current research shows that an AM-PAC score of 18 or greater is typically associated with a discharge to the patient's home setting. Based on the patient's AM-PAC score, and their current ADL deficits, it is recommended that the patient have 2-3 sessions per week of Occupational Therapy at d/c to increase the patient's independence. At this time, this patient demonstrates the endurance and safety to discharge home with home services and a follow up treatment frequency of 2-3x/wk. Please see assessment section for further patient specific details. If patient discharges prior to next session this note will serve as a discharge summary. Please see below for the latest assessment towards goals. HOME HEALTH CARE: LEVEL 1 STANDARD    - Initial home health evaluation to occur within 24-48 hours, in patient home   - Therapy to evaluate with goal of regaining prior level of functioning   - Therapy to evaluate if patient has [address] needs for personal care       OT Equipment Recommendations  Equipment Needed: No    Assessment   Performance deficits / Impairments: Decreased functional mobility ; Decreased safe awareness; Decreased ADL status; Decreased endurance; Decreased high-level IADLs  Assessment: Pt demo improving activity tolerance and standing balance during ADLs and mobility. Pt demo sba for ambulation with walker and ADLs today. Pt deemed safe to d/c home- recommend initial 24hr assist for safety and IADL assist- encouraged pt to receive help from his son and grandson.   Treatment Diagnosis: impaired ADLs and mobility s/p revascularization surgery  REQUIRES OT FOLLOW UP: Yes  Activity Tolerance  Activity Tolerance: Patient Tolerated treatment well  Safety Devices  Safety Devices in place: Yes (eating breakfast in chair)  Type of devices: Nurse notified; Left in chair; Call light within reach; Chair alarm in place         Restrictions  Position Activity Restriction  Other position/activity restrictions: up with assist     Subjective   General  Chart Reviewed: Yes  Patient assessed for rehabilitation services?: Yes  Additional Pertinent Hx: Liliana Aldana is a 73 y.o. male with CAD, CHF, COPD (baseline 6L of 02), previous MI, previous TIAs, HLD, HTN, previous DVTs, hospital admission for PE on 9/16/21 and PVD who is direct admitted for planned RLE femoral endarterectomy with iliac stent, femoral angioplasty and stent placement. S/p RIGHT FEMORAL ENDARTERECTOMY AND ILIAC ARTERY STENT on 11/16. Family / Caregiver Present: No  Referring Practitioner: Joanna Pinedo DO  Diagnosis: pulmonary embolism  Subjective  Subjective: Pt in bed, agreeable to therapy. States hopeful to d/c home tomorrow. States R leg still feels cold  Pain: denied      Orientation  Orientation  Overall Orientation Status: Within Functional Limits     Objective    ADL  LE Dressing: Stand by assistance (don brief and slip-on shoes when seated eob)  Toileting: Stand by assistance (urinated in stance w/ clothing mgmt)  Additional Comments: Declined additional ADLs   Education: educated pt on home modifications to increase safety with ADLs and mobility (ie  loose rugs, minimize tripping hazards and have clear, well lit walking paths), supervision for showers w/ use of shower chair, complete LB ADLs seated           Balance  Sitting Balance: Independent  Standing Balance: Stand by assistance (static/dynamic during ADLs at walker)    Standing Balance  Time: 8 min total  Activity: toileting, mobility  Functional Mobility  Functional - Mobility Device: Rolling Walker  Activity: To/from bathroom;  Other (hallway)  Assist Level: Stand by assistance  Functional Mobility Comments: slow pace, leading w/ R LE 2/2 pain, shaky but no LOB observed  Note: on 6L O2, sats >92% with activity, reported mild SOB and fatigue with ambulation and stairs x3 w/ rail and cga      Toilet Transfers  Toilet - Technique: Ambulating  Equipment Used: Standard toilet  Toilet Transfer: Stand by assistance  Toilet Transfers Comments: L UE support on grab bar, R UE holding walker center    Bed mobility  Supine to Sit: Stand by assistance (HOB slightly elevated with rail)  Transfers  Sit to stand: Contact guard assistance (initial transfer from bed; then sba)  Stand to sit: Stand by assistance (to chair, bed)            Cognition  Overall Cognitive Status: Rem[place] 89  Times per week: 2-5  Current Treatment Recommendations: Functional Mobility Training, Endurance Training, Self-Care / ADL, Patient/Caregiver Education & Training, Home Management Training, Safety Education & Training           AM-PAC Score        AM-PAC Inpatient Daily Activity Raw Score: 20 (11/22/21 1056)  AM-PAC Inpatient ADL T-Scale Score : 42.03 (11/22/21 1056)  ADL Inpatient CMS 0-100% Score: 38.32 (11/22/21 1056)  ADL Inpatient CMS G-Code Modifier : Leopoldomiyamilex Spore (11/22/21 1056)    Goals  Short term goals  Time Frame for Short term goals: by d/c  Short term goal 1: pt will complete LE dressing w/ SBA - goal met; upgrade to mod I  Short term goal 2: pt will complete toilet transfer w/ SBA - goal met 11/22; upgrade to mod I  Short term goal 3: pt will complete standing-level grooming w/ spvn - ongoing  Patient Goals   Patient goals : to return home       Therapy Time   Individual Concurrent Group Co-treatment   Time In 8086         Time Out 0913         Minutes 38         Timed Code Treatment Minutes: Orlando Colmenares OT

## 2021-11-22 NOTE — PROGRESS NOTES
VASCULAR SURGERY DAILY PROGRESS NOTE    CC: Atherosclerosis of native artery of RLE with rest pain    SUBJECTIVE:   Interval Hx:   No acute issues overnight. Patient remains afebrile and HDS on 5L HFNC. Home meds were restarted. Tolerating diet without nausea/ vomiting. Transferred out of the ICU overnight. States that pain is well-controlled. ROS: A 14 point review of systems was conducted, significant findings as noted above. All other systems negative. OBJECTIVE:   Vitals:   Vitals:    11/22/21 0000 11/22/21 0200 11/22/21 0220 11/22/21 0534   BP: 132/85 (!) 147/80 (!) 151/78 (!) 141/66   Pulse: 86 90 86 92   Resp: 23 19 20   Temp: 97.9 °F (36.6 °C)  96.9 °F (36.1 °C) 96.6 °F (35.9 °C)   TempSrc: Oral  Oral Oral   SpO2: 96% 95% 100% 98%   Weight:   201 lb 12.8 oz (91.5 kg)    Height:   5' 10\" (1.778 m)        I/O:     Intake/Output Summary (Last 24 hours) at 11/22/2021 0648  Last data filed at 11/22/2021 0000  Gross per 24 hour   Intake 420 ml   Output 1350 ml   Net -930 ml     I/O last 3 completed shifts: In: 553.2 [P.O.:420; I.V.:133.2]  Out: 1900 [Urine:1900]    Diet: ADULT DIET; Regular; Low Fat/Low Chol/High Fiber/2 gm Na      Physical Examination:   General appearance: alert, no acute distress, grooming appropriate  Chest/Lungs: Normal effort with no accessory muscle use on 5L HFNC  Cardiovascular: RRR  Abdomen: Soft, non-tender, non-distended, no rebound, guarding, or rigidity. Skin: warm and dry, no rashes  Extremities: erythema and mild edema of right lower extremity > than left. Abrasion to medial aspect of right foot. Bilateral lower extremity warm to palpation and equal bilaterally. Sensation improved over the plantar aspect of bilateral lower extremities. DP/PT dopplerable on the right and left LE. Left stick site hemostatic with ecchymosis but no hematoma, palpable femoral pulse.  Right femoral pulse palpable and groin with Aquacel Ag dressing in place with minimal staining, c/d/i    Labs:  CBC:   Recent Labs     11/20/21  0517 11/21/21  0436   WBC 9.2 6.4   HGB 11.6* 11.6*   HCT 34.4* 34.4*    155       BMP:   Recent Labs     11/19/21  1530 11/20/21  0518 11/21/21  0436   * 135* 137   K 4.1 5.6* 3.6   CL 99 101 100   CO2 24 25 24   BUN 18 13 9   CREATININE 0.8 0.8 0.7*   GLUCOSE 101* 115* 90     LFT's: No results for input(s): AST, ALT, ALB, BILITOT, ALKPHOS in the last 72 hours. Troponin:   No results for input(s): TROPONINI in the last 72 hours. BNP: No results for input(s): BNP in the last 72 hours. ABGs:   No results for input(s): PHART, QDO9ODN, PO2ART in the last 72 hours. INR:   No results for input(s): INR in the last 72 hours. U/A:No results for input(s): NITRITE, COLORU, PHUR, LABCAST, WBCUA, RBCUA, MUCUS, TRICHOMONAS, YEAST, BACTERIA, CLARITYU, SPECGRAV, LEUKOCYTESUR, UROBILINOGEN, BILIRUBINUR, BLOODU, GLUCOSEU, AMORPHOUS in the last 72 hours. Invalid input(s): Janey Gómez     Rad:   XR CHEST PORTABLE   Final Result      1. Persistent diffuse mild nonspecific prominence of the pulmonary interstitial markings. No localized consolidation or pleural effusion. XR PELVIS (1-2 VIEWS)   Final Result   1. Intraoperative fluoroscopy as described. FLUORO FOR SURGICAL PROCEDURES   Final Result   1. Intraoperative fluoroscopy as described. XR CHEST PORTABLE   Final Result      Diffuse mild nonspecific prominence of pulmonary interstitial markings. No focal airspace density areas of consolidation seen. There are no effusions. Correlate clinically. ASSESSMENT AND PLAN:   This is a 73y.o. year old male with a history of COPD on 6L home O2 at baseline, KAVITA, CAD, CHF and a previous MI, is status post R. femoral endarterectomy, right external iliac to profunda bypass w/ PTFE graft.  RLE arteriogram, balloon angioplasty of R profunda balloon angioplasty and stent of right external iliac artery secondary to atherosclerosis of native artery of RLE with rest pain.  (11/17) POD#5    - Continue home meds   - Continue anticoagulation and antiplatelet  - PT 21/64 OT 18/24, re-engage PT/OT as patient has been transferred out of the unit  - Discharge home likely later today if scores improved on PT/OT     Harry Collins DO  PGY1, General Surgery  11/22/21  6:52 AM  [phone]

## 2021-11-22 NOTE — PROGRESS NOTES
4 Eyes Admission Assessment     I agree as the admission nurse that 2 RN's have performed a thorough Head to Toe Skin Assessment on the patient. ALL assessment sites listed below have been assessed on admission. Areas assessed by both nurses: yes  [x]   Head, Face, and Ears   [x]   Shoulders, Back, and Chest  [x]   Arms, Elbows, and Hands   [x]   Coccyx, Sacrum, and Ischium  [x]   Legs, Feet, and Heels        Does the Patient have Skin Breakdown?   Yes a wound was noted on the Admission Assessment and an LDA was Initiated documentation include the Catrachita-wound, Wound Assessment, Measurements, Dressing Treatment, Drainage, and Color\",   Multiple R leg ulcerations from PAD         Isidro Prevention initiated:  Yes   Wound Care Orders initiated:  Yes      [id number] nurse consulted for Pressure Injury (Stage 3,4, Unstageable, DTI, NWPT, and Complex wounds) or Isidro score 18 or lower:  No      Nurse 1 eSignature: Electronically signed by Layne Kirk RN on 11/22/21 at 2:44 AM EST    **SHARE this note so that the co-signing nurse is able to place an eSignature**    Nurse 2 eSignature: Electronically signed by Coy Obando RN on 11/22/21 at 3:00 AM EST

## 2021-11-22 NOTE — PROGRESS NOTES
Physical Therapy  Facility/Department: [place] [address] PCU  Daily Treatment Note  NAME: Beatrice Brown. : 1950  MRN: [id number]    Date of Service: 2021    Discharge Recommendations:  Beatrice Brown. scored a 18/24 on the AM-PAC short mobility form. Current research shows that an AM-PAC score of 18 or greater is typically associated with a discharge to the patient's home setting. Based on the patient's AM-PAC score and their current functional mobility deficits, it is recommended that the patient have 2-3 sessions per week of Physical Therapy at d/c to increase the patient's independence. At this time, this patient demonstrates the endurance and safety to discharge home with home PT and a follow up treatment frequency of 2-3x/wk. Please see assessment section for further patient specific details. If patient discharges prior to next session this note will serve as a discharge summary. Please see below for the latest assessment towards goals. Patient would benefit from continued therapy after discharge  PT Equipment Recommendations  Equipment Needed:  (pt has walker and w/c)    Assessment   Assessment: Decreased assist for bed mobility. Needing CG/SBA for transfers/gt. Pt would like to go home at d/c. Has no concerns about managing. Reports son and grandson can assist him but unsure if anyone can be there . Would benefit from 24 hr assist initially - discussed with pt. Rec cont skilled PT to maximize mobility and independence. Treatment Diagnosis: impaired mobility 2/2 R LE procedure  PT Education: Goals; Plan of Care; Transfer Training; Home Exercise Program; General Safety; Gait Training; Functional Mobility Training  Patient Education: pt verbalized understanding but would benefit from reinforcement  REQUIRES PT FOLLOW UP: Yes     Patient Diagnosis(es): The primary encounter diagnosis was Post-op pain.  A diagnosis of Atherosclerosis of native artery of right lower extremity with rest pain [facility]) was also pertinent to this visit. has a past medical history of CAD (coronary artery disease), CHF (congestive heart failure) ([facility] [address] COPD (chronic obstructive pulmonary disease) ([facility] [address] Depressive disorder, not elsewhere classified, GERD (gastroesophageal reflux disease), History of colon polyps - 30 seen on colonoscopy 04/2021, History of MI (myocardial infarction), History of skin ulcer of lower extremity, History of transient ischemic attack (TIA), Hyperlipidemia, Hypertension, Neuropathy, KAVITA (obstructive sleep apnea), Personal history of DVT (deep vein thrombosis), PVD (peripheral vascular disease) ([facility] [address] TIA (transient ischemic attack), and Vertebral fracture. has a past surgical history that includes Appendectomy; Coronary angioplasty with stent (6/2013); other surgical history (Right, 6/25/2013); Leg Debridement (Right, 7/23/2013); skin split graft (Right, 7/25/2013); other surgical history (Left, 8/27/2013); laminectomy (11/18/2015); transluminal angioplasty (Left, 12/22/2015); Total hip arthroplasty (Right, 09/01/2016); Cholecystectomy, laparoscopic; eye surgery (Left); Colonoscopy (4/23/2021); Colonoscopy (4/23/2021); Colonoscopy (4/23/2021); and Femoral Endarterectomy (Right, 11/17/2021). Restrictions  Position Activity Restriction  Other position/activity restrictions: up with assist  Subjective   General  Chart Reviewed: Yes  Additional Pertinent Hx: Stone Atkins is a 73 y.o. male with a past medical history of CAD s/p LAD stent, HTN, HLP, HFpEF, severe PAD, COPD on baseline 6 L of supplemental oxygen, RLL/RML PE 09/16/21 without cor pulmonale, DVT. Patient started having severe right foot pain and was recently assessed with CTA abdominal aorta with bilateral runoff which revealed severe bilateral PAD. Patient was admitted on 11/15 for elective right lower extremity vascular surgery (right EIA to PFA bypass with R EIA stent).   He held his anticoagulation couple of days prior to admission. Patient had a successful surgery on 11/17/2021. Starting around 1 PM the following day, patient became very hypotensive (BP 80s/50s). Subjective  Subjective: Pt found supine. Agreeable to PT. Reports son works part-time but takes care of his wife who is ill. Reports son and grandson can check on him daily but unsure if anyone can stay with him. Pain Screening  Patient Currently in Pain: Yes (feet and legs, not rated, RN aware)  Vital Signs  Patient Currently in Pain: Yes (feet and legs, not rated, RN aware)       Orientation     Cognition      Objective   Bed mobility  Supine to Sit: Stand by assistance (HOB slightly elevated with rail)  Transfers  Sit to Stand: Contact guard assistance  Stand to sit: Stand by assistance  Ambulation  Ambulation?: Yes  Ambulation 1  Device: Rolling Walker  Other Apparatus: O2 (6L)  Assistance: Contact guard assistance (to SBA)  Quality of Gait: decreased stance time RLE, decreased star, no LOB  Distance: 15', 100'  Stairs/Curb  Stairs?: Yes (Up/down 3 steps with rail/cane CGA step to pattern)     Balance  Standing - Dynamic:  (Increased sway without UE support putting on mask - CGA for balance)                           G-Code     OutComes Score                                                     AM-PAC Score  AM-PAC Inpatient Mobility Raw Score : 18 (11/22/21 1056)  AM-PAC Inpatient T-Scale Score : 43.63 (11/22/21 1056)  Mobility Inpatient CMS 0-100% Score: 46.58 (11/22/21 1056)  Mobility Inpatient CMS G-Code Modifier : CK (11/22/21 1056)          Goals  Short term goals  Time Frame for Short term goals: By discharge  Short term goal 1: The pt will perform sit to stand with supervision and LRAD. Ongoing  Short term goal 2: The pt will transfer bed to chair with supervision and LRAD. Ongoing  Short term goal 3: The pt will ambulate 150' with supervision and LRAD. Ongoing  Short term goal 4: The pt will go up/down 5 steps with 1 rail and CGA. Ongoing  Patient Goals   Patient goals :  To go home    Plan    Plan  Times per week: 2-5  Times per day: Daily  Current Treatment Recommendations: Strengthening, Transfer Training, Endurance Training, Neuromuscular Re-education, Cognitive Reorientation, Balance Training, Gait Training, Functional Mobility Training, Safety Education & Training  Safety Devices  Type of devices: Call light within reach, Chair alarm in place, Left in chair, Nurse notified     Therapy Time   Individual Concurrent Group Co-treatment   Time In 0835         Time Out 0913         Minutes 38              Timed Code Treatment Minutes:  38    Total Treatment Minutes:  [facility] PT

## 2021-11-22 NOTE — PLAN OF CARE
Problem: Pain:  Description: Pain management should include both nonpharmacologic and pharmacologic interventions.   Goal: Pain level will decrease  Description: Pain level will decrease  11/22/2021 0242 by Joyce Mejias RN  Outcome: Ongoing  11/21/2021 2351 by Ratna Nova RN  Outcome: Ongoing  Goal: Control of acute pain  Description: Control of acute pain  11/22/2021 0242 by Joyce Mejias RN  Outcome: Ongoing  11/21/2021 2351 by Ratna Nova RN  Outcome: Ongoing  Goal: Control of chronic pain  Description: Control of chronic pain  11/22/2021 0242 by Joyce Mejias RN  Outcome: Ongoing  11/21/2021 2351 by Ratna Nova RN  Outcome: Ongoing     Problem: Skin Integrity:  Goal: Will show no infection signs and symptoms  Description: Will show no infection signs and symptoms  11/21/2021 2351 by Ratna Nova RN  Outcome: Ongoing  Goal: Absence of new skin breakdown  Description: Absence of new skin breakdown  11/21/2021 2351 by Ratna Nova RN  Outcome: Ongoing     Problem: Falls - Risk of:  Goal: Will remain free from falls  Description: Will remain free from falls  11/22/2021 0242 by Joyce Mejias RN  Outcome: Ongoing  11/21/2021 2351 by Ratna Nova RN  Outcome: Ongoing  Goal: Absence of physical injury  Description: Absence of physical injury  11/22/2021 0242 by Joyce Mejias RN  Outcome: Ongoing  11/21/2021 2351 by Ratna Nova RN  Outcome: Ongoing

## 2021-11-22 NOTE — PROGRESS NOTES
Discharge instructions and take home medications given to patient. Patient verbalized understanding. To be discharged home with self care.

## 2021-11-22 NOTE — TELEPHONE ENCOUNTER
Called Catina Leslie, back and let her know We will sign orders and that Ros Harris is out until 11/30

## 2021-11-26 ENCOUNTER — HOSPITAL ENCOUNTER (INPATIENT)
Age: 71
LOS: 4 days | Discharge: HOME HEALTH | DRG: 863 | End: 2021-11-30
Attending: EMERGENCY MEDICINE | Admitting: INTERNAL MEDICINE
Payer: MEDICARE

## 2021-11-26 DIAGNOSIS — N17.9 AKI (ACUTE KIDNEY INJURY) (HCC): ICD-10-CM

## 2021-11-26 DIAGNOSIS — L03.115 CELLULITIS OF RIGHT LOWER EXTREMITY: Primary | ICD-10-CM

## 2021-11-26 DIAGNOSIS — G89.18 POST-OP PAIN: ICD-10-CM

## 2021-11-26 LAB
ANION GAP SERPL CALCULATED.3IONS-SCNC: 12 MMOL/L (ref 3–16)
BASOPHILS ABSOLUTE: 0 K/UL (ref 0–0.2)
BASOPHILS RELATIVE PERCENT: 0.4 %
BUN BLDV-MCNC: 23 MG/DL (ref 7–20)
CALCIUM SERPL-MCNC: 9.4 MG/DL (ref 8.3–10.6)
CHLORIDE BLD-SCNC: 101 MMOL/L (ref 99–110)
CO2: 22 MMOL/L (ref 21–32)
CREAT SERPL-MCNC: 1.6 MG/DL (ref 0.8–1.3)
EOSINOPHILS ABSOLUTE: 0.4 K/UL (ref 0–0.6)
EOSINOPHILS RELATIVE PERCENT: 4.8 %
GFR AFRICAN AMERICAN: 52
GFR NON-AFRICAN AMERICAN: 43
GLUCOSE BLD-MCNC: 95 MG/DL (ref 70–99)
HCT VFR BLD CALC: 38.2 % (ref 40.5–52.5)
HEMOGLOBIN: 13 G/DL (ref 13.5–17.5)
LACTIC ACID: 0.9 MMOL/L (ref 0.4–2)
LYMPHOCYTES ABSOLUTE: 1.6 K/UL (ref 1–5.1)
LYMPHOCYTES RELATIVE PERCENT: 19 %
MCH RBC QN AUTO: 34.8 PG (ref 26–34)
MCHC RBC AUTO-ENTMCNC: 34 G/DL (ref 31–36)
MCV RBC AUTO: 102.2 FL (ref 80–100)
MONOCYTES ABSOLUTE: 0.6 K/UL (ref 0–1.3)
MONOCYTES RELATIVE PERCENT: 6.8 %
NEUTROPHILS ABSOLUTE: 5.7 K/UL (ref 1.7–7.7)
NEUTROPHILS RELATIVE PERCENT: 69 %
PDW BLD-RTO: 15.1 % (ref 12.4–15.4)
PLATELET # BLD: 270 K/UL (ref 135–450)
PMV BLD AUTO: 8.2 FL (ref 5–10.5)
POTASSIUM REFLEX MAGNESIUM: 5 MMOL/L (ref 3.5–5.1)
RBC # BLD: 3.74 M/UL (ref 4.2–5.9)
SODIUM BLD-SCNC: 135 MMOL/L (ref 136–145)
WBC # BLD: 8.3 K/UL (ref 4–11)

## 2021-11-26 PROCEDURE — 87070 CULTURE OTHR SPECIMN AEROBIC: CPT

## 2021-11-26 PROCEDURE — 85025 COMPLETE CBC W/AUTO DIFF WBC: CPT

## 2021-11-26 PROCEDURE — 6360000002 HC RX W HCPCS: Performed by: INTERNAL MEDICINE

## 2021-11-26 PROCEDURE — 2580000003 HC RX 258: Performed by: INTERNAL MEDICINE

## 2021-11-26 PROCEDURE — 87205 SMEAR GRAM STAIN: CPT

## 2021-11-26 PROCEDURE — 80048 BASIC METABOLIC PNL TOTAL CA: CPT

## 2021-11-26 PROCEDURE — 99283 EMERGENCY DEPT VISIT LOW MDM: CPT

## 2021-11-26 PROCEDURE — 2500000003 HC RX 250 WO HCPCS: Performed by: PHYSICIAN ASSISTANT

## 2021-11-26 PROCEDURE — 6370000000 HC RX 637 (ALT 250 FOR IP): Performed by: INTERNAL MEDICINE

## 2021-11-26 PROCEDURE — 87077 CULTURE AEROBIC IDENTIFY: CPT

## 2021-11-26 PROCEDURE — 87040 BLOOD CULTURE FOR BACTERIA: CPT

## 2021-11-26 PROCEDURE — 87641 MR-STAPH DNA AMP PROBE: CPT

## 2021-11-26 PROCEDURE — 83605 ASSAY OF LACTIC ACID: CPT

## 2021-11-26 PROCEDURE — 2580000003 HC RX 258: Performed by: PHYSICIAN ASSISTANT

## 2021-11-26 PROCEDURE — 2700000000 HC OXYGEN THERAPY PER DAY

## 2021-11-26 PROCEDURE — 1200000000 HC SEMI PRIVATE

## 2021-11-26 PROCEDURE — 94761 N-INVAS EAR/PLS OXIMETRY MLT: CPT

## 2021-11-26 RX ORDER — ONDANSETRON 2 MG/ML
4 INJECTION INTRAMUSCULAR; INTRAVENOUS EVERY 6 HOURS PRN
Status: DISCONTINUED | OUTPATIENT
Start: 2021-11-26 | End: 2021-11-30 | Stop reason: HOSPADM

## 2021-11-26 RX ORDER — OXYCODONE HYDROCHLORIDE 5 MG/1
5 TABLET ORAL EVERY 6 HOURS PRN
Status: DISCONTINUED | OUTPATIENT
Start: 2021-11-26 | End: 2021-11-30 | Stop reason: HOSPADM

## 2021-11-26 RX ORDER — CLOPIDOGREL BISULFATE 75 MG/1
75 TABLET ORAL DAILY
Status: DISCONTINUED | OUTPATIENT
Start: 2021-11-27 | End: 2021-11-30 | Stop reason: HOSPADM

## 2021-11-26 RX ORDER — ONDANSETRON 4 MG/1
4 TABLET, ORALLY DISINTEGRATING ORAL EVERY 8 HOURS PRN
Status: DISCONTINUED | OUTPATIENT
Start: 2021-11-26 | End: 2021-11-30 | Stop reason: HOSPADM

## 2021-11-26 RX ORDER — SODIUM CHLORIDE 0.9 % (FLUSH) 0.9 %
5-40 SYRINGE (ML) INJECTION EVERY 12 HOURS SCHEDULED
Status: DISCONTINUED | OUTPATIENT
Start: 2021-11-26 | End: 2021-11-30 | Stop reason: HOSPADM

## 2021-11-26 RX ORDER — CLINDAMYCIN PHOSPHATE 600 MG/50ML
600 INJECTION INTRAVENOUS ONCE
Status: COMPLETED | OUTPATIENT
Start: 2021-11-26 | End: 2021-11-26

## 2021-11-26 RX ORDER — POLYETHYLENE GLYCOL 3350 17 G/17G
17 POWDER, FOR SOLUTION ORAL DAILY PRN
Status: DISCONTINUED | OUTPATIENT
Start: 2021-11-26 | End: 2021-11-30 | Stop reason: HOSPADM

## 2021-11-26 RX ORDER — ISOSORBIDE MONONITRATE 30 MG/1
15 TABLET, EXTENDED RELEASE ORAL DAILY
Status: DISCONTINUED | OUTPATIENT
Start: 2021-11-27 | End: 2021-11-30 | Stop reason: HOSPADM

## 2021-11-26 RX ORDER — ACETAMINOPHEN 325 MG/1
650 TABLET ORAL EVERY 6 HOURS PRN
Status: DISCONTINUED | OUTPATIENT
Start: 2021-11-26 | End: 2021-11-30 | Stop reason: HOSPADM

## 2021-11-26 RX ORDER — SODIUM CHLORIDE 9 MG/ML
INJECTION, SOLUTION INTRAVENOUS CONTINUOUS
Status: ACTIVE | OUTPATIENT
Start: 2021-11-26 | End: 2021-11-27

## 2021-11-26 RX ORDER — SODIUM CHLORIDE 0.9 % (FLUSH) 0.9 %
5-40 SYRINGE (ML) INJECTION PRN
Status: DISCONTINUED | OUTPATIENT
Start: 2021-11-26 | End: 2021-11-30 | Stop reason: HOSPADM

## 2021-11-26 RX ORDER — ACETAMINOPHEN 650 MG/1
650 SUPPOSITORY RECTAL EVERY 6 HOURS PRN
Status: DISCONTINUED | OUTPATIENT
Start: 2021-11-26 | End: 2021-11-30 | Stop reason: HOSPADM

## 2021-11-26 RX ORDER — CLINDAMYCIN PHOSPHATE 600 MG/50ML
600 INJECTION INTRAVENOUS EVERY 8 HOURS
Status: CANCELLED | OUTPATIENT
Start: 2021-11-26

## 2021-11-26 RX ORDER — LANOLIN ALCOHOL/MO/W.PET/CERES
500 CREAM (GRAM) TOPICAL DAILY
Status: DISCONTINUED | OUTPATIENT
Start: 2021-11-27 | End: 2021-11-30 | Stop reason: HOSPADM

## 2021-11-26 RX ORDER — ATORVASTATIN CALCIUM 80 MG/1
80 TABLET, FILM COATED ORAL DAILY
Status: DISCONTINUED | OUTPATIENT
Start: 2021-11-27 | End: 2021-11-30 | Stop reason: HOSPADM

## 2021-11-26 RX ORDER — SODIUM CHLORIDE 9 MG/ML
25 INJECTION, SOLUTION INTRAVENOUS PRN
Status: DISCONTINUED | OUTPATIENT
Start: 2021-11-26 | End: 2021-11-30 | Stop reason: HOSPADM

## 2021-11-26 RX ORDER — DULOXETIN HYDROCHLORIDE 20 MG/1
20 CAPSULE, DELAYED RELEASE ORAL DAILY
Status: DISCONTINUED | OUTPATIENT
Start: 2021-11-27 | End: 2021-11-30 | Stop reason: HOSPADM

## 2021-11-26 RX ORDER — BUDESONIDE 0.25 MG/2ML
0.25 INHALANT ORAL 2 TIMES DAILY
Status: DISCONTINUED | OUTPATIENT
Start: 2021-11-27 | End: 2021-11-30 | Stop reason: HOSPADM

## 2021-11-26 RX ORDER — FOLIC ACID 1 MG/1
1 TABLET ORAL DAILY
Status: DISCONTINUED | OUTPATIENT
Start: 2021-11-27 | End: 2021-11-30 | Stop reason: HOSPADM

## 2021-11-26 RX ORDER — SODIUM CHLORIDE, SODIUM LACTATE, POTASSIUM CHLORIDE, AND CALCIUM CHLORIDE .6; .31; .03; .02 G/100ML; G/100ML; G/100ML; G/100ML
1000 INJECTION, SOLUTION INTRAVENOUS ONCE
Status: COMPLETED | OUTPATIENT
Start: 2021-11-26 | End: 2021-11-27

## 2021-11-26 RX ADMIN — SODIUM CHLORIDE, PRESERVATIVE FREE 5 ML: 5 INJECTION INTRAVENOUS at 23:05

## 2021-11-26 RX ADMIN — SODIUM CHLORIDE, POTASSIUM CHLORIDE, SODIUM LACTATE AND CALCIUM CHLORIDE 1000 ML: 600; 310; 30; 20 INJECTION, SOLUTION INTRAVENOUS at 20:51

## 2021-11-26 RX ADMIN — APIXABAN 5 MG: 5 TABLET, FILM COATED ORAL at 22:57

## 2021-11-26 RX ADMIN — OXYCODONE 5 MG: 5 TABLET ORAL at 22:57

## 2021-11-26 RX ADMIN — SODIUM CHLORIDE: 9 INJECTION, SOLUTION INTRAVENOUS at 23:00

## 2021-11-26 RX ADMIN — SODIUM CHLORIDE 25 ML: 9 INJECTION, SOLUTION INTRAVENOUS at 23:32

## 2021-11-26 RX ADMIN — CLINDAMYCIN PHOSPHATE 600 MG: 600 INJECTION, SOLUTION INTRAVENOUS at 20:50

## 2021-11-26 RX ADMIN — PIPERACILLIN AND TAZOBACTAM 3375 MG: 3; .375 INJECTION, POWDER, LYOPHILIZED, FOR SOLUTION INTRAVENOUS at 23:33

## 2021-11-26 ASSESSMENT — PAIN DESCRIPTION - LOCATION
LOCATION: LEG
LOCATION: FOOT

## 2021-11-26 ASSESSMENT — PAIN DESCRIPTION - FREQUENCY
FREQUENCY: CONTINUOUS
FREQUENCY: CONTINUOUS

## 2021-11-26 ASSESSMENT — PAIN DESCRIPTION - DESCRIPTORS
DESCRIPTORS: CONSTANT
DESCRIPTORS: CONSTANT

## 2021-11-26 ASSESSMENT — PAIN DESCRIPTION - PAIN TYPE
TYPE: ACUTE PAIN
TYPE: ACUTE PAIN

## 2021-11-26 ASSESSMENT — PAIN DESCRIPTION - ONSET
ONSET: PROGRESSIVE
ONSET: PROGRESSIVE

## 2021-11-26 ASSESSMENT — PAIN - FUNCTIONAL ASSESSMENT
PAIN_FUNCTIONAL_ASSESSMENT: PREVENTS OR INTERFERES WITH MANY ACTIVE NOT PASSIVE ACTIVITIES
PAIN_FUNCTIONAL_ASSESSMENT: PREVENTS OR INTERFERES SOME ACTIVE ACTIVITIES AND ADLS

## 2021-11-26 ASSESSMENT — PAIN SCALES - WONG BAKER: WONGBAKER_NUMERICALRESPONSE: 10

## 2021-11-26 ASSESSMENT — PAIN SCALES - GENERAL
PAINLEVEL_OUTOF10: 9
PAINLEVEL_OUTOF10: 9

## 2021-11-26 ASSESSMENT — PAIN DESCRIPTION - PROGRESSION
CLINICAL_PROGRESSION: GRADUALLY WORSENING
CLINICAL_PROGRESSION: GRADUALLY WORSENING

## 2021-11-26 ASSESSMENT — PAIN DESCRIPTION - ORIENTATION
ORIENTATION: RIGHT
ORIENTATION: RIGHT

## 2021-11-26 NOTE — ED PROVIDER NOTES
[address] [facility] 73 ENCOUNTER          PHYSICIAN ASSISTANT NOTE       Date of evaluation: 11/26/2021    Chief Complaint     Foot Pain (states he has bad circulation in right foot; had recent surgery; states foot looks \"busted open\") and Wound Check      History of Present Illness     Bettie Chu. is a 73 y.o. male who presents with complaints of leg wound. Patient underwent a right femoral endarterectomy on 11/17. Patient had sacabbed lesions to his extremity prior to the surgery but reports these have worsened and began draining a few days ago. Patient's home health nurse was concerned his leg is getting infected and recommended he present for evaluation. Patient denies fever, chills, chest pain, shortness of breath, nausea, vomiting, abdominal pain, diarrhea, or constipation. With the exception of the above, there are no aggravating or alleviating factors. Review of Systems     ROS: Pertinent positive and negative findings as documented in the HPI, otherwise all other systems were reviewed and were negative. Past Medical, Surgical, Family, and Social History     He has a past medical history of CAD (coronary artery disease), CHF (congestive heart failure) ([facility] [address] COPD (chronic obstructive pulmonary disease) ([facility] [address] Depressive disorder, not elsewhere classified, GERD (gastroesophageal reflux disease), History of colon polyps - 30 seen on colonoscopy 04/2021, History of MI (myocardial infarction), History of skin ulcer of lower extremity, History of transient ischemic attack (TIA), Hyperlipidemia, Hypertension, Neuropathy, KAVITA (obstructive sleep apnea), Personal history of DVT (deep vein thrombosis), PVD (peripheral vascular disease) ([facility] [address] TIA (transient ischemic attack), and Vertebral fracture. He has a past surgical history that includes Appendectomy; Coronary angioplasty with stent (6/2013); other surgical history (Right, 6/25/2013);  Leg Debridement (Right, 7/23/2013); skin split graft (Right, 7/25/2013); other surgical history (Left, 8/27/2013); laminectomy (11/18/2015); transluminal angioplasty (Left, 12/22/2015); Total hip arthroplasty (Right, 09/01/2016); Cholecystectomy, laparoscopic; eye surgery (Left); Colonoscopy (4/23/2021); Colonoscopy (4/23/2021); Colonoscopy (4/23/2021); and Femoral Endarterectomy (Right, 11/17/2021). His family history includes Cancer in his father, mother, and paternal grandmother; Heart Disease in his father and mother. He reports that he has been smoking cigarettes. He started smoking about 54 years ago. He has a 26.00 pack-year smoking history. He has never used smokeless tobacco. He reports current alcohol use. He reports that he does not use drugs. Medications     Current Discharge Medication List      CONTINUE these medications which have NOT CHANGED    Details   clopidogrel (PLAVIX) 75 MG tablet Take 1 tablet by mouth daily  Qty: 30 tablet, Refills: 3      oxyCODONE (ROXICODONE) 5 MG immediate release tablet Take 1 tablet by mouth every 6 hours as needed for Pain for up to 5 days. Qty: 20 tablet, Refills: 0    Comments: Reduce doses taken as pain becomes manageable  Associated Diagnoses: Post-op pain      docusate sodium (COLACE) 100 MG capsule Take 1 capsule by mouth 2 times daily for 10 days Please take while taking narcotic pain medicine. If you develop loose or watery stools, then stop taking.   Qty: 20 capsule, Refills: 0      AMLODIPINE BENZOATE PO 5 mg: TAKE 1 TAB DAILY      Cyanocobalamin (B-12) 100 MCG TABS 1 tablet      doxazosin (CARDURA) 1 MG tablet 1 tablet      DULoxetine (CYMBALTA) 20 MG extended release capsule 1 capsule      potassium chloride (MICRO-K) 10 MEQ extended release capsule 1 tablet      Cholecalciferol (VITAMIN D-3) 25 MCG (1000 UT) CAPS 1 tablet      ELIQUIS DVT/PE STARTER PACK 5 MG TBPK tablet 2 times daily       omeprazole (PRILOSEC) 40 MG delayed release capsule       fluticasone-umeclidin-vilant (Dustin Nura ELLIPTA) 100-62.5-25 MCG/INH AEPB Inhale 1 puff into the lungs daily  Qty: 28 each, Refills: 2    Associated Diagnoses: KAVITA and COPD overlap syndrome (HCC)      isosorbide mononitrate (IMDUR) 30 MG extended release tablet Take 0.5 tablets by mouth daily  Qty: 30 tablet, Refills: 3      folic acid (FOLVITE) 1 MG tablet Take 1 tablet by mouth daily  Qty: 30 tablet, Refills: 3      albuterol sulfate  (90 Base) MCG/ACT inhaler Inhale 2 puffs into the lungs every 6 hours as needed for Wheezing  Qty: 3 Inhaler, Refills: 4    Associated Diagnoses: KAVITA and COPD overlap syndrome (HCC)      allopurinol (ZYLOPRIM) 100 MG tablet Take 1 tablet by mouth daily  Qty: 180 tablet, Refills: 1    Associated Diagnoses: Chronic idiopathic gout involving toe of right foot without tophus      losartan (COZAAR) 100 MG tablet TAKE 1 TABLET BY MOUTH EVERY DAY  Qty: 90 tablet, Refills: 1    Associated Diagnoses: Essential hypertension; Chronic diastolic heart failure (HCC)      atorvastatin (LIPITOR) 80 MG tablet Take 1 tablet by mouth daily  Qty: 90 tablet, Refills: 1    Associated Diagnoses: Mixed hyperlipidemia; Coronary artery disease involving native coronary artery of native heart without angina pectoris      nitroGLYCERIN (NITROSTAT) 0.4 MG SL tablet Place 1 tablet under the tongue every 5 minutes as needed for Chest pain  Qty: 25 tablet, Refills: 1    Associated Diagnoses: Coronary artery disease involving native coronary artery of native heart without angina pectoris      furosemide (LASIX) 40 MG tablet Take 1 tablet by mouth daily  Qty: 90 tablet, Refills: 3    Associated Diagnoses: Chronic diastolic heart failure (HCC)      metoprolol (LOPRESSOR) 100 MG tablet Take 1 tablet by mouth 2 times daily  Qty: 270 tablet, Refills: 1    Associated Diagnoses: Essential hypertension; Chronic diastolic heart failure ([address]  Coronary artery disease involving native coronary artery of native heart without angina pectoris      magnesium gluconate (MAGONATE) 500 MG tablet Take 500 mg by mouth daily. cilostazol (PLETAL) 50 MG tablet Take 1 tablet by mouth 2 times daily  Qty: 60 tablet, Refills: 3    Associated Diagnoses: PVD (peripheral vascular disease) (HCC)      colchicine (COLCRYS) 0.6 MG tablet Take 0.6 mg by mouth as needed      nicotine (NICODERM CQ) 21 MG/24HR Place 1 patch onto the skin every 24 hours  Qty: 30 patch, Refills: 1    Associated Diagnoses: Cigarette nicotine dependence without complication      pantoprazole (PROTONIX) 40 MG tablet Take 1 tablet by mouth daily for 10 days  Qty: 10 tablet, Refills: 0             Allergies     He is allergic to asa [aspirin] and daliresp [roflumilast]. Physical Exam     INITIAL VITALS: BP: (!) 92/59, Temp: 98.5 °F (36.9 °C), Pulse: 78, Resp: 16, SpO2: 98 %    General: 73 y.o. male in no apparent distress, well developed, well nourished, non-toxic appearance. HEENT: Atraumatic, normocephalic. EOMs intact. Neck:  Full range of motion. Chest/pulm: Respiratory rate normal. Speaks in complete sentences, no respiratory distress. Cardiovascular: Heart rate normal.    Abdomen: No gross distension. : Deferred. Musculoskeletal: No evident long bone or joint deformity. 1+ right lower extremity pitting edema with redness and warmth to the dorsum of foot extending over the shin. Multiple scabbed and crusted lesions with some yellowish purulence. Borders previously marked 2 days ago. Erythema do not extend past previously marked borders. Unable to palpate pulses however good cap refill. No erythema or lesions surrounding surgical site to right groin. Neuro: A&O x 4. Normal speech without dysarthria or aphasia. Moves all extremities spontaneously and symmetrically. Movements normal without ataxia. Skin: Warm, dry. No obvious rashes, petechiae, or purpura. Psych: Appropriate mood and affect, normal interaction.                      Diagnostic Results     EKG  None performed during today's visit. RADIOLOGY:  No orders to display       LABS:   Results for orders placed or performed during the hospital encounter of 11/26/21   CBC Auto Differential   Result Value Ref Range    WBC 8.3 4.0 - 11.0 K/uL    RBC 3.74 (L) 4.20 - 5.90 M/uL    Hemoglobin 13.0 (L) 13.5 - 17.5 g/dL    Hematocrit 38.2 (L) 40.5 - 52.5 %    .2 (H) 80.0 - 100.0 fL    MCH 34.8 (H) 26.0 - 34.0 pg    MCHC 34.0 31.0 - 36.0 g/dL    RDW 15.1 12.4 - 15.4 %    Platelets 638 990 - 506 K/uL    MPV 8.2 5.0 - 10.5 fL    Neutrophils % 69.0 %    Lymphocytes % 19.0 %    Monocytes % 6.8 %    Eosinophils % 4.8 %    Basophils % 0.4 %    Neutrophils Absolute 5.7 1.7 - 7.7 K/uL    Lymphocytes Absolute 1.6 1.0 - 5.1 K/uL    Monocytes Absolute 0.6 0.0 - 1.3 K/uL    Eosinophils Absolute 0.4 0.0 - 0.6 K/uL    Basophils Absolute 0.0 0.0 - 0.2 K/uL   Basic Metabolic Panel w/ Reflex to MG   Result Value Ref Range    Sodium 135 (L) 136 - 145 mmol/L    Potassium reflex Magnesium 5.0 3.5 - 5.1 mmol/L    Chloride 101 99 - 110 mmol/L    CO2 22 21 - 32 mmol/L    Anion Gap 12 3 - 16    Glucose 95 70 - 99 mg/dL    BUN 23 (H) 7 - 20 mg/dL    CREATININE 1.6 (H) 0.8 - 1.3 mg/dL    GFR Non- 43 (A) >60    GFR  52 (A) >60    Calcium 9.4 8.3 - 10.6 mg/dL   Lactic acid, plasma   Result Value Ref Range    Lactic Acid 0.9 0.4 - 2.0 mmol/L         RECENT VITALS:  BP: 129/71, Temp: 97.4 °F (36.3 °C), Pulse: 71, Resp: 18, SpO2: 96 %     Procedures     None    ED Course     Nursing Notes, Past Medical Hx,Past Surgical Hx, Social Hx, Allergies, and Family Hx were reviewed.     The patient was given the following medications:  Orders Placed This Encounter   Medications    lactated ringers bolus    clindamycin (CLEOCIN) 600 mg in dextrose 5 % 50 mL IVPB     Order Specific Question:   Antimicrobial Indications     Answer:   Skin and Soft Tissue Infection    folic acid (FOLVITE) tablet 1 mg    apixaban (ELIQUIS) tablet 5 mg    isosorbide mononitrate (IMDUR) extended release tablet 15 mg    DULoxetine (CYMBALTA) extended release capsule 20 mg    clopidogrel (PLAVIX) tablet 75 mg    atorvastatin (LIPITOR) tablet 80 mg    DISCONTD: fluticasone-umeclidin-vilant (TRELEGY ELLIPTA) 100-62.5-25 MCG/INH inhaler 1 puff    magnesium oxide (MAG-OX) tablet 500 mg    sodium chloride flush 0.9 % injection 5-40 mL    sodium chloride flush 0.9 % injection 5-40 mL    0.9 % sodium chloride infusion    OR Linked Order Group     ondansetron (ZOFRAN-ODT) disintegrating tablet 4 mg     ondansetron (ZOFRAN) injection 4 mg    polyethylene glycol (GLYCOLAX) packet 17 g    OR Linked Order Group     acetaminophen (TYLENOL) tablet 650 mg     acetaminophen (TYLENOL) suppository 650 mg    0.9 % sodium chloride infusion    vancomycin (VANCOCIN) 1,250 mg in dextrose 5 % 250 mL IVPB     Order Specific Question:   Antimicrobial Indications     Answer:   Skin and Soft Tissue Infection    piperacillin-tazobactam (ZOSYN) 3,375 mg in dextrose 5 % 50 mL IVPB extended infusion (mini-bag)     Order Specific Question:   Antimicrobial Indications     Answer:   Skin and Soft Tissue Infection    vancomycin (VANCOCIN) intermittent dosing (placeholder)     Order Specific Question:   Antimicrobial Indications     Answer:   Skin and Soft Tissue Infection    oxyCODONE (ROXICODONE) immediate release tablet 5 mg    AND Linked Order Group     tiotropium-olodaterol (STIOLTO) 2.5-2.5 MCG/ACT inhaler 2 puff     budesonide (PULMICORT) nebulizer suspension 250 mcg       CONSULTS:  IP CONSULT TO HOSPITALIST  [place] [id number] Jen Dillon / REILLY / Beatrice Toure. is a 73 y.o. male who presents to the emergency department with complaints of leg wound. On presentation, patient is well-appearing and in no acute distress.  Patient is afebrile with normal VS.    On physical exam patient was noted to have multiple crusted wounds with yellowish purulence to his right lower extremity and over the dorsum of his right foot. There are previously marked borders of the patient reports were placed yesterday by his nurse. The erythema or lesions do not appear to have extended past these borders. Lab work obtained was negative for leukocytosis or new anemia. The patient was noted however to have a LYDIA with a creatinine of 1.6 compared to 0.8 from 4 days ago. Patient had an echo 3 months ago which showed a normal ejection fraction of 55 to 60%. Patient was started on a liter of fluids and given a dose of clindamycin. At this point in time, patient will require admission to the hospital for further management of their clinical condition. I spoke with the admitting team who is in agreement with plan for admission. Patient and family are in agreement with plan for admission. Patient will be cared for in the ED prior to a bed becoming available upstairs. The patient was seen and evaluated by the attending physician who agreed with the assessment and plan. The patient and / or the family were informed of the results of any tests, a time was given to answer questions, a plan was proposed and they agreed with plan. This note was dictated using voice-recognition software, which occasionally leads to inadvertent typographic errors. Clinical Impression     1. Cellulitis of right lower extremity    2. LYDIA (acute kidney injury) ([facility] [address]        Disposition     PATIENT REFERRED TO:  No follow-up provider specified.     DISCHARGE MEDICATIONS:  Current Discharge Medication List          DISPOSITION Admitted 11/26/2021 08:20:04 PM       Tomi Nancy Garrett Iain  11/26/21 1212

## 2021-11-27 PROBLEM — D64.9 ANEMIA: Status: ACTIVE | Noted: 2021-11-27

## 2021-11-27 LAB
A/G RATIO: 1 (ref 1.1–2.2)
ALBUMIN SERPL-MCNC: 3.4 G/DL (ref 3.4–5)
ALP BLD-CCNC: 87 U/L (ref 40–129)
ALT SERPL-CCNC: 13 U/L (ref 10–40)
ANION GAP SERPL CALCULATED.3IONS-SCNC: 9 MMOL/L (ref 3–16)
AST SERPL-CCNC: 18 U/L (ref 15–37)
BASOPHILS ABSOLUTE: 0.1 K/UL (ref 0–0.2)
BASOPHILS RELATIVE PERCENT: 1.2 %
BILIRUB SERPL-MCNC: 0.5 MG/DL (ref 0–1)
BUN BLDV-MCNC: 20 MG/DL (ref 7–20)
CALCIUM SERPL-MCNC: 9.2 MG/DL (ref 8.3–10.6)
CHLORIDE BLD-SCNC: 101 MMOL/L (ref 99–110)
CO2: 23 MMOL/L (ref 21–32)
CREAT SERPL-MCNC: 1.2 MG/DL (ref 0.8–1.3)
EOSINOPHILS ABSOLUTE: 0.3 K/UL (ref 0–0.6)
EOSINOPHILS RELATIVE PERCENT: 5 %
FOLATE: 9.99 NG/ML (ref 4.78–24.2)
GFR AFRICAN AMERICAN: >60
GFR NON-AFRICAN AMERICAN: 60
GLUCOSE BLD-MCNC: 99 MG/DL (ref 70–99)
HCT VFR BLD CALC: 35.3 % (ref 40.5–52.5)
HEMOGLOBIN: 11.8 G/DL (ref 13.5–17.5)
LYMPHOCYTES ABSOLUTE: 1.1 K/UL (ref 1–5.1)
LYMPHOCYTES RELATIVE PERCENT: 22.2 %
MCH RBC QN AUTO: 34.2 PG (ref 26–34)
MCHC RBC AUTO-ENTMCNC: 33.4 G/DL (ref 31–36)
MCV RBC AUTO: 102.4 FL (ref 80–100)
MONOCYTES ABSOLUTE: 0.4 K/UL (ref 0–1.3)
MONOCYTES RELATIVE PERCENT: 7.3 %
MRSA SCREEN RT-PCR: NORMAL
NEUTROPHILS ABSOLUTE: 3.3 K/UL (ref 1.7–7.7)
NEUTROPHILS RELATIVE PERCENT: 64.3 %
PDW BLD-RTO: 15.4 % (ref 12.4–15.4)
PLATELET # BLD: 229 K/UL (ref 135–450)
PMV BLD AUTO: 8.1 FL (ref 5–10.5)
POTASSIUM REFLEX MAGNESIUM: 4.3 MMOL/L (ref 3.5–5.1)
RBC # BLD: 3.45 M/UL (ref 4.2–5.9)
SODIUM BLD-SCNC: 133 MMOL/L (ref 136–145)
TOTAL PROTEIN: 6.7 G/DL (ref 6.4–8.2)
WBC # BLD: 5.2 K/UL (ref 4–11)

## 2021-11-27 PROCEDURE — 2700000000 HC OXYGEN THERAPY PER DAY

## 2021-11-27 PROCEDURE — 2580000003 HC RX 258: Performed by: INTERNAL MEDICINE

## 2021-11-27 PROCEDURE — 1200000000 HC SEMI PRIVATE

## 2021-11-27 PROCEDURE — 36415 COLL VENOUS BLD VENIPUNCTURE: CPT

## 2021-11-27 PROCEDURE — 6370000000 HC RX 637 (ALT 250 FOR IP): Performed by: INTERNAL MEDICINE

## 2021-11-27 PROCEDURE — 80053 COMPREHEN METABOLIC PANEL: CPT

## 2021-11-27 PROCEDURE — 6360000002 HC RX W HCPCS: Performed by: INTERNAL MEDICINE

## 2021-11-27 PROCEDURE — 82746 ASSAY OF FOLIC ACID SERUM: CPT

## 2021-11-27 PROCEDURE — 94761 N-INVAS EAR/PLS OXIMETRY MLT: CPT

## 2021-11-27 PROCEDURE — 85025 COMPLETE CBC W/AUTO DIFF WBC: CPT

## 2021-11-27 PROCEDURE — 94640 AIRWAY INHALATION TREATMENT: CPT

## 2021-11-27 RX ORDER — METOPROLOL TARTRATE 100 MG/1
100 TABLET ORAL 2 TIMES DAILY
Status: DISCONTINUED | OUTPATIENT
Start: 2021-11-27 | End: 2021-11-30 | Stop reason: HOSPADM

## 2021-11-27 RX ORDER — PANTOPRAZOLE SODIUM 40 MG/1
40 TABLET, DELAYED RELEASE ORAL
Status: DISCONTINUED | OUTPATIENT
Start: 2021-11-28 | End: 2021-11-30 | Stop reason: HOSPADM

## 2021-11-27 RX ADMIN — OXYCODONE 5 MG: 5 TABLET ORAL at 06:55

## 2021-11-27 RX ADMIN — ATORVASTATIN CALCIUM 80 MG: 80 TABLET, FILM COATED ORAL at 08:56

## 2021-11-27 RX ADMIN — APIXABAN 5 MG: 5 TABLET, FILM COATED ORAL at 08:56

## 2021-11-27 RX ADMIN — TIOTROPIUM BROMIDE AND OLODATEROL 2 PUFF: 3.124; 2.736 SPRAY, METERED RESPIRATORY (INHALATION) at 09:13

## 2021-11-27 RX ADMIN — BUDESONIDE 250 MCG: 0.25 SUSPENSION RESPIRATORY (INHALATION) at 09:13

## 2021-11-27 RX ADMIN — METOPROLOL TARTRATE 100 MG: 100 TABLET, FILM COATED ORAL at 08:56

## 2021-11-27 RX ADMIN — APIXABAN 5 MG: 5 TABLET, FILM COATED ORAL at 21:53

## 2021-11-27 RX ADMIN — OXYCODONE 5 MG: 5 TABLET ORAL at 15:19

## 2021-11-27 RX ADMIN — FOLIC ACID 1 MG: 1 TABLET ORAL at 08:57

## 2021-11-27 RX ADMIN — VANCOMYCIN HYDROCHLORIDE 1250 MG: 10 INJECTION, POWDER, LYOPHILIZED, FOR SOLUTION INTRAVENOUS at 04:09

## 2021-11-27 RX ADMIN — DULOXETINE HYDROCHLORIDE 20 MG: 20 CAPSULE, DELAYED RELEASE ORAL at 08:56

## 2021-11-27 RX ADMIN — METOPROLOL TARTRATE 100 MG: 100 TABLET, FILM COATED ORAL at 21:53

## 2021-11-27 RX ADMIN — CLOPIDOGREL BISULFATE 75 MG: 75 TABLET, FILM COATED ORAL at 08:58

## 2021-11-27 RX ADMIN — OXYCODONE 5 MG: 5 TABLET ORAL at 21:53

## 2021-11-27 RX ADMIN — PIPERACILLIN AND TAZOBACTAM 3375 MG: 3; .375 INJECTION, POWDER, LYOPHILIZED, FOR SOLUTION INTRAVENOUS at 21:57

## 2021-11-27 RX ADMIN — VANCOMYCIN HYDROCHLORIDE 1000 MG: 10 INJECTION, POWDER, LYOPHILIZED, FOR SOLUTION INTRAVENOUS at 18:07

## 2021-11-27 RX ADMIN — ISOSORBIDE MONONITRATE 15 MG: 30 TABLET, EXTENDED RELEASE ORAL at 08:56

## 2021-11-27 RX ADMIN — Medication 500 MG: at 08:57

## 2021-11-27 RX ADMIN — PIPERACILLIN AND TAZOBACTAM 3375 MG: 3; .375 INJECTION, POWDER, LYOPHILIZED, FOR SOLUTION INTRAVENOUS at 15:15

## 2021-11-27 RX ADMIN — SODIUM CHLORIDE 25 ML: 9 INJECTION, SOLUTION INTRAVENOUS at 21:57

## 2021-11-27 RX ADMIN — PIPERACILLIN AND TAZOBACTAM 3375 MG: 3; .375 INJECTION, POWDER, LYOPHILIZED, FOR SOLUTION INTRAVENOUS at 06:47

## 2021-11-27 ASSESSMENT — PAIN DESCRIPTION - LOCATION
LOCATION: FOOT
LOCATION: LEG

## 2021-11-27 ASSESSMENT — PAIN SCALES - GENERAL
PAINLEVEL_OUTOF10: 3
PAINLEVEL_OUTOF10: 9
PAINLEVEL_OUTOF10: 0
PAINLEVEL_OUTOF10: 9
PAINLEVEL_OUTOF10: 0
PAINLEVEL_OUTOF10: 0
PAINLEVEL_OUTOF10: 9
PAINLEVEL_OUTOF10: 0
PAINLEVEL_OUTOF10: 0

## 2021-11-27 ASSESSMENT — PAIN DESCRIPTION - FREQUENCY
FREQUENCY: INTERMITTENT
FREQUENCY: CONTINUOUS

## 2021-11-27 ASSESSMENT — PAIN DESCRIPTION - PAIN TYPE
TYPE: ACUTE PAIN
TYPE: ACUTE PAIN

## 2021-11-27 ASSESSMENT — PAIN DESCRIPTION - ONSET
ONSET: ON-GOING
ONSET: ON-GOING

## 2021-11-27 ASSESSMENT — PAIN DESCRIPTION - PROGRESSION
CLINICAL_PROGRESSION: NOT CHANGED
CLINICAL_PROGRESSION: NOT CHANGED

## 2021-11-27 ASSESSMENT — PAIN DESCRIPTION - ORIENTATION
ORIENTATION: RIGHT
ORIENTATION: RIGHT

## 2021-11-27 ASSESSMENT — PAIN DESCRIPTION - DESCRIPTORS
DESCRIPTORS: ACHING
DESCRIPTORS: SHARP;SHOOTING

## 2021-11-27 ASSESSMENT — PAIN DESCRIPTION - DIRECTION: RADIATING_TOWARDS: RIGHT LOWER LEG

## 2021-11-27 NOTE — PROGRESS NOTES
Clinical Pharmacy Progress Note    IV Vancomycin - Management by Pharmacy    Consult Date(s): 11/26/21  Consulting Provider(s): Dr. Colin Quiles / Plan    Indication: Cellulitis of right leg  - Vancomycin   Concurrent Antimicrobials: Zosyn   Day of Vanc Therapy: #1   Current Dosing Method: Intermittent Dosing by Levels   Therapeutic Goal: 10-15 mg/L   Current Dose / Frequency: 1250 mg IVx1   Plan / Rationale:   o Given that patient has LYDIA, will give vancomycin 1250 mg IVx1: followed by intermittent dosing based on levels until renal function recovers.  Will continue to monitor clinical condition and make adjustments to regimen as appropriate. Thank you for consulting Pharmacy! Paul Doyle, PharmD, BCPS      Interval update:     Subjective/Objective: Mr. Michel Bills is a 73 y.o. male with a PMHx significant for  CAD s/p LAD stent, HTN, HLP, HFpEF, severe PAD, COPD, RLL/RML PE 09/16/21 without cor pulmonale, DVT. Pharmacy has been consulted to Vancomycin. Height:   Ht Readings from Last 1 Encounters:   11/22/21 5' 10\" (1.778 m)     Weight:   Wt Readings from Last 1 Encounters:   11/22/21 201 lb 12.8 oz (91.5 kg)       Current & Prior Antimicrobial Regimen(s):   Clindamycin 600 mg IVx1 - 11/26   Vancomycin 1250 mg IVx1 - 11/26   Zosyn 3.375gm every 8 hours    Level(s) / Doses:    Date Time Dose Level / Type of Level Interpretation                 Note: Serum levels collected for AUC-based dosing may be high if collected in close proximity to the dose administered. This is not necessarily an indicator of toxicity. Cultures & Sensitivities:    Date Site Micro Susceptibility / Result   11/26/21 Bloodx2 pending    11/26/21 MRSA DNA Probe, Nasal  Pending    11/26/21 Anaerobic and Aerobic  Pending      Labs / Ancillary Data:    Estimated Creatinine Clearance: 50 mL/min (A) (based on SCr of 1.6 mg/dL (H)).     Recent Labs     11/26/21 1916   CREATININE 1.6*   BUN 23*   WBC 8.3

## 2021-11-27 NOTE — FLOWSHEET NOTE
Patient is alert and oriented x4. Patient is extremely lethargic this shift but does responds appropriately when talked to. Will continue to monitor.       11/27/21 1200   Neurological   Orientation Level Oriented X4

## 2021-11-27 NOTE — PROGRESS NOTES
Hospital Medicine Progress Note    Patient:  Surya Willard. YOB: 1950  MRN: [id number]   PCP: Arsalan Mao DO         Acct: [de-identified]  Unit/Bed: 0330/9413-58    Date of Admission: 11/26/2021   Date of service: 11/27/2021  CC: RLE cellultis      Subjective: The patient is a 73 y.o. male with a PMH of PVD, CAD, CHF, COPD and chronic respiratory failure with hypoxia on 5 to 6 LO2 at home who was admitted on 11/26/2021 for a right lower extremity cellulitis following a right femoral endarterectomy on November 17th.    11/27: Right lower extremity cellulitis improving on Zosyn and vancomycin      Objective:    Medications:  Reviewed    Physical examination:   /70   Pulse 66   Temp 97.8 °F (36.6 °C) (Oral)   Resp 16   Wt 201 lb 1 oz (91.2 kg)   SpO2 98%   BMI 28.85 kg/m²     General appearance:  No apparent distress. Appears comfortable. Well nourished  HEENT: Atraumatic. Pupils equally round. Extraocular motion intact. Conjunctivae clear. Nose symmetric without evidence of discharge. Oral mucosa moist w/o erythema or exudate. Neck supple. No JVD. Trachea midline. Cardiovascular:  RRR w/ normal S1/S2. No murmurs, rubs or gallops. Respiratory: Clear to auscultation, bilaterally without rales/wheezes/rhonchi. Gastrointestinal: Abdomen soft, non-tender, non-distended with normal bowel sounds. Musculoskeletal:  Full ROM without deformity. Neurologic:  No speech or focal sensory/motor deficits. Cranial nerves grossly intact. Lymphatic: No cervical lymphadenopathy. Psychiatric:  Alert and oriented. Appropriate affect, not agitated  Vascular: Diminished dorsalis pedis pulses bilaterally. No peripheral edema. Capillary refill<3 seconds  Genitourinary: Deferred. Skin: No visible rashes or lesions. Warm, dry. Necrotic and blsitering areas at the right lower extremity. Redness seems improved base on the outlined region.   Labs:     Recent Labs     11/26/21  1916 11/27/21  0802 WBC 8.3 5.2   HGB 13.0* 11.8*   HCT 38.2* 35.3*    229     Recent Labs     11/26/21  1916 11/27/21  0802   * 133*   K 5.0 4.3    101   CO2 22 23   BUN 23* 20   CREATININE 1.6* 1.2   CALCIUM 9.4 9.2     Recent Labs     11/27/21  0802   AST 18   ALT 13   BILITOT 0.5   ALKPHOS 87     No results for input(s): INR in the last 72 hours. No results for input(s): Caroline Highman in the last 72 hours. Urinalysis:      Lab Results   Component Value Date    NITRU Negative 12/07/2020    WBCUA >100 12/07/2020    BACTERIA 4+ 12/07/2020    RBCUA 3-4 12/07/2020    RBCUA NEGATIVE 08/10/2016    BLOODU Negative 12/07/2020    SPECGRAV 1.025 12/07/2020    GLUCOSEU Negative 12/07/2020       Diagnostic imaging/procedures:    No orders to display       Echo Limited    Result Date: 11/19/2021  Transthoracic Echocardiography Report (TTE)  Demographics   Patient Name       [address] [facility]. Date of Study      11/19/2021        Gender              Male   Patient Number     [id number]        Date of Birth       1950   Visit Number       [id number]         Age                 73 year(s)   Accession Number   [id number]        Room Number         6936   Corporate ID       [id number]          Sonographer         Princess Pineda [facility],                                                           [facility], [facility]   Ordering Physician Brooks Aguilar MD Interpreting        Brooks Aguilar MD                                       Physician  Procedure Type of Study   TTE procedure:ECHOCARDIOGRAM LIMITED. Procedure Date Date: 11/19/2021 Start: 11:50 AM Study Location: [address] Echo Lab Technical Quality: Limited visualization due to poor acoustical window. Additional Indications:hypotension. Patient Status: Routine Contrast Medium: Definity. Amount - 2 ml Height: 70 inches Weight: 204 pounds BSA: 2.1 m2 BMI: 29.27 kg/m2 BP: 114/61 mmHg  Conclusions   Summary  Limited study.  Left ventricular cavity size is normal. There is mild  concentric left ventricular hypertrophy. Overall left ventricular systolic  function appears normal with an ejection fraction of 55-60%. No regional  wall motion abnormalities are noted. Signature   ------------------------------------------------------------------  Electronically signed by David Leach MD (Interpreting  physician) on 11/19/2021 at 05:29 PM  ------------------------------------------------------------------   Findings   Left Ventricle  Left ventricular cavity size is normal. There is mild concentric left  ventricular hypertrophy. Overall left ventricular systolic function appears  normal with an ejection fraction of 55-60%. No regional wall motion  abnormalities are noted. Aorta  The aortic root is normal in size. Right Ventricle  The right ventricle is normal in size and function. TAPSE measures 2.49 cm. Pericardial Effusion  No pericardial effusion noted. Pleural Effusion  No pleural effusion. Miscellaneous  IVC size is normal (<2.1cm) and collapses > 50% with respiration consistent  with normal RA pressure (3mmHg). M-Mode/2D Measurements (cm)   LV Diastolic Dimension: 6.29 cm LV Systolic Dimension: 3.2 cm  LV Septum Diastolic: 1.2 cm  LV PW Diastolic: 7.18 cm        AO Root Dimension: 3.5 cm  RV Diastolic Dimension: 0.72 cm LA Dimension: 3.8 cm  Aorta   Aortic Root: 3.5 cm      XR PELVIS (1-2 VIEWS)    Result Date: 11/17/2021  History: Right femoral endarterectomy. Iliac artery stent placement. Intraoperative fluoroscopy. FINDINGS: 6 minutes 35 seconds fluoroscopic time. 33 cine series obtained. Images demonstrate catheterization and stent placement in the iliac artery. No acute complication identified. 1. Intraoperative fluoroscopy as described. XR CHEST PORTABLE    Result Date: 11/18/2021  PORTABLE CHEST. HISTORY: Increasing O2 requirement COMPARISON STUDY: 11/15/2021 FINDINGS: Heart size and mediastinal structures appear within normal limits.   Persistent diffuse accentuation of pulmonary interstitial markings, nonspecific. Small nodular passes in the left upper lobe and left lung base unchanged. No pleural effusion. Bony structures are intact. 1.  Persistent diffuse mild nonspecific prominence of the pulmonary interstitial markings. No localized consolidation or pleural effusion. XR CHEST PORTABLE    Result Date: 11/15/2021  PORTABLE AP CHEST AT 1755 HOURS:   HISTORY: Preop study. COMPARISON: 9/17/2021. FINDINGS: The heart and pulmonary vasculature are within normal limits. There is diffuse accentuation of pulmonary interstitial markings throughout both lungs, nonspecific. No focal airspace densities are seen. There are no effusions. Diffuse mild nonspecific prominence of pulmonary interstitial markings. No focal airspace density areas of consolidation seen. There are no effusions. Correlate clinically. FLUORO FOR SURGICAL PROCEDURES    Result Date: 11/17/2021  History: Right femoral endarterectomy. Iliac artery stent placement. Intraoperative fluoroscopy. FINDINGS: 6 minutes 35 seconds fluoroscopic time. 33 cine series obtained. Images demonstrate catheterization and stent placement in the iliac artery. No acute complication identified. 1. Intraoperative fluoroscopy as described. Assessment/plan:     1. Right lower extremity cellulitis following right femoral endarterectomy on 11/17. Continue Zosyn and vancomycin. Wound care nurse and consider ID consult. Plan to transition to oral antibiotic such as linezolid if it continues to improve. 2. Acute kidney injury/hyponatremia: Likely pre-renal. Mild volume depletion. Improved. Avoid nephrotoxic agents  3. Hyponatremia: continue to monitor  4. Hypokalemia: Replace prn  5.  Chronic co-morbidities: chronic respiratory failure with hypoxia on 5-6L O2, COPD, PVD, CAD, chronic diastolic CHF, stable continue home meds      Anticipated Discharge in : 2-3 days  Code Status: Full Code  DVT prophylaxis: [x] Apixaban            Disposition:     [x] Home                         Electronically signed by He Escobar MD on 11/28/2021 at 7:18 PM

## 2021-11-27 NOTE — PROGRESS NOTES
Admission: Patient received to room 6309 from ED. Patient admitted with Dx of R leg cellulitis. Patient A&Ox4 upon arrival. Patient c/o 9/10 pain upon arrival to R leg. Admission assessment as charted. VSS. Patient oriented to room, staff, and call system. Educated on fall protocol and hourly rounding. Patient informed to utilize call light with any needs. Pt verbalized understanding. Will continue to monitor.

## 2021-11-27 NOTE — H&P
Hospital Medicine History & Physical      PCP: Rocky Norton DO    Date of Admission: 11/26/2021    Date of Service: Pt seen/examined on 11/26/21 and Admitted to Inpatient with expected LOS greater than two midnights due to medical therapy. Chief Complaint: Right foot pain and wound check      History Of Present Illness:    73 y.o. male with past medical history significant for PVD, CAD, CHF, COPD, chronic hypoxic respiratory failure with 5 to 6 L of home oxygen, hypertension, and history of PE on Eliquis who presented to [facility] with chief complaint of worsening right leg wound. Patient recently underwent a right femoral endarterectomy November 17th. Right foot wound was worsening after surgery with drainage started a few days ago. Home visiting nurse concerned that he might get infected and recommended him visited ED. He denies fever, chill, chest pain, shortness of breath, nausea vomiting, diarrhea constipation. In the ED, his blood pressure is 90s 2/59, heart rate 78, oxygen saturation at 98% on 5 L of nasal cannula which was his baseline. Afebrile . Labs significant for creatinine 1.6 up from recent baseline of 0.8. Macrocytic anemia with hemoglobin 13. He received a dose of clindamycin in the ED. He was admitted to hospital for further work-up and treatment.     Past Medical History:          Diagnosis Date    CAD (coronary artery disease)     CHF (congestive heart failure) (HCC)     COPD (chronic obstructive pulmonary disease) (HCC)     Depressive disorder, not elsewhere classified     GERD (gastroesophageal reflux disease)     History of colon polyps - 30 seen on colonoscopy 04/2021 4/24/2021    History of MI (myocardial infarction) 05/2013    History of skin ulcer of lower extremity     R anterior shin    History of transient ischemic attack (TIA)     Hyperlipidemia     Hypertension     Neuropathy     KAVITA (obstructive sleep apnea)     On CPAP    Personal history of DVT (deep vein thrombosis)     PVD (peripheral vascular disease) ([facility]     TIA (transient ischemic attack) 2013    Vertebral fracture        Past Surgical History:          Procedure Laterality Date    APPENDECTOMY      CHOLECYSTECTOMY, LAPAROSCOPIC      COLONOSCOPY  4/23/2021    COLONOSCOPY POLYPECTOMY SNARE/COLD BIOPSY performed by Cyndi Marie MD at [address] [facility]  4/23/2021    COLONOSCOPY POLYPECTOMY ABLATION performed by Cyndi Marie MD at [address] [facility]  4/23/2021    COLONOSCOPY CONTROL HEMORRHAGE performed by Cyndi Marie MD at [address] [place] [address]  6/2013    EYE SURGERY Left     FEMORAL ENDARTERECTOMY Right 11/17/2021    RIGHT FEMORAL ENDARTERECTOMY  RIGHT EXTERNAL ILIAC TO PROFUNDA FEMORAL BYPASS WITH 8MM PTFE GRAFT RIGHT LOWER EXTREMITY ARTERIOGRAM BALLOON ANGIOPLASTY RIGHT PROFUNDA BALLOON ANGIOPLASTY AND STENT OF RIGHT EXTERNAL ILIAC ARTERY performed by Ramesh Santiago MD at [facility]  11/18/2015    Bilateral decompressive lumbar laminectomy L4-5   ; medial facetectomy L4-5 joints  bilaterally; foraminotomies l5   nerve roots bilaterally    LEG DEBRIDEMENT Right 7/23/2013    Dr. Ender Lau - pretibial wound    OTHER SURGICAL HISTORY Right 6/25/2013    Dr. Ender Lau - CFA Endarterectomy w/marsupialization; RLE wound excision & debridement     OTHER SURGICAL HISTORY Left 8/27/2013    Dr. Ender Lau - femoral & profunda femoris endarterectomy w/marsupialization patch angioplasty using SFA    SKIN SPLIT GRAFT Right 7/25/2013    Dr. Ender Lau - to non-healing R pretibial wound, 9 x 15 cm    TOTAL HIP ARTHROPLASTY Right 09/01/2016    Dr. Saba Grace Left 12/22/2015    Dr. Ender Lau - CFA exploration, thrombectomy of ileofemoral system, stenting of RAFAL in-stent stenosis w/8 x 37 mm Palmaz        Medications Prior to Admission:      Prior to Admission medications    Medication Sig Start Date End Date Taking? Authorizing Provider   clopidogrel (PLAVIX) 75 MG tablet Take 1 tablet by mouth daily 11/22/21  Yes BRANDI Sherman NP   oxyCODONE (ROXICODONE) 5 MG immediate release tablet Take 1 tablet by mouth every 6 hours as needed for Pain for up to 5 days. 11/22/21 11/27/21 Yes BRANDI Sherman NP   docusate sodium (COLACE) 100 MG capsule Take 1 capsule by mouth 2 times daily for 10 days Please take while taking narcotic pain medicine. If you develop loose or watery stools, then stop taking.  11/22/21 12/2/21 Yes BRANDI Sherman NP   AMLODIPINE BENZOATE PO 5 mg: TAKE 1 TAB DAILY 9/30/21  Yes Historical Provider, MD   Cyanocobalamin (B-12) 100 MCG TABS 1 tablet 9/22/21  Yes Historical Provider, MD   doxazosin (CARDURA) 1 MG tablet 1 tablet 8/24/21  Yes Historical Provider, MD   DULoxetine (CYMBALTA) 20 MG extended release capsule 1 capsule 7/30/21  Yes Historical Provider, MD   potassium chloride (MICRO-K) 10 MEQ extended release capsule 1 tablet 9/4/21  Yes Historical Provider, MD   Cholecalciferol (VITAMIN D-3) 25 MCG (1000 UT) CAPS 1 tablet 9/22/21  Yes Historical Provider, MD DAWKINS DVT/PE STARTER PACK 5 MG TBPK tablet 2 times daily  9/20/21  Yes Historical Provider, MD   omeprazole (PRILOSEC) 40 MG delayed release capsule  9/4/21  Yes Historical Provider, MD   fluticasone-umeclidin-vilant (TRELEGY ELLIPTA) 100-62.5-25 MCG/INH AEPB Inhale 1 puff into the lungs daily 9/24/21  Yes Angel [place], DO   isosorbide mononitrate (IMDUR) 30 MG extended release tablet Take 0.5 tablets by mouth daily 9/20/21  Yes Norberto Llanos MD   folic acid (FOLVITE) 1 MG tablet Take 1 tablet by mouth daily 9/21/21  Yes Norberto Llanos MD   albuterol sulfate  (90 Base) MCG/ACT inhaler Inhale 2 puffs into the lungs every 6 hours as needed for Wheezing 8/25/21  Yes Earleen [place], DO   allopurinol (ZYLOPRIM) 100 MG tablet Take 1 tablet by mouth daily 8/25/21  Yes Earleen [place], DO   losartan (COZAAR) 100 MG tablet TAKE 1 TABLET BY MOUTH EVERY DAY 8/25/21  Yes Thom Simons DO   atorvastatin (LIPITOR) 80 MG tablet Take 1 tablet by mouth daily 8/25/21  Yes Thom Simons DO   nitroGLYCERIN (NITROSTAT) 0.4 MG SL tablet Place 1 tablet under the tongue every 5 minutes as needed for Chest pain 8/25/21  Yes Thom Simons DO   furosemide (LASIX) 40 MG tablet Take 1 tablet by mouth daily 8/25/21  Yes Thom Simons DO   metoprolol (LOPRESSOR) 100 MG tablet Take 1 tablet by mouth 2 times daily 8/25/21 11/26/21 Yes Thom Simons DO   magnesium gluconate (MAGONATE) 500 MG tablet Take 500 mg by mouth daily. Yes Historical Provider, MD   cilostazol (PLETAL) 50 MG tablet Take 1 tablet by mouth 2 times daily 10/8/21   Thom Simons DO   colchicine (COLCRYS) 0.6 MG tablet Take 0.6 mg by mouth as needed    Historical Provider, MD   nicotine (NICODERM CQ) 21 MG/24HR Place 1 patch onto the skin every 24 hours 9/24/21 9/24/22  Thom Simons DO   pantoprazole (PROTONIX) 40 MG tablet Take 1 tablet by mouth daily for 10 days 9/20/21 9/30/21  Cruz Leonard MD       Allergies:  Lisa Valarie [aspirin] and Lauryn Navarro [roflumilast]    Social History:      The patient currently lives     TOBACCO:   reports that he has been smoking cigarettes. He started smoking about 54 years ago. He has a 26.00 pack-year smoking history. He has never used smokeless tobacco.  ETOH:   reports current alcohol use. E-Cigarettes/Vaping Use     Questions Responses    E-Cigarette/Vaping Use     Start Date     Passive Exposure     Quit Date     Counseling Given     Comments             Family History:      Reviewed in detail and Positive as follows:        Problem Relation Age of Onset    Cancer Mother         Breast    Heart Disease Mother     Cancer Father         Bladder    Heart Disease Father     Cancer Paternal Grandmother         melanoma        REVIEW OF SYSTEMS COMPLETED:   Pertinent positives as noted in the HPI. All other systems reviewed and negative.     PHYSICAL EXAM PERFORMED:    BP (!) 154/78   Pulse 66   Temp 97.9 °F (36.6 °C) (Oral)   Resp 18   Wt 201 lb 1 oz (91.2 kg)   SpO2 98%   BMI 28.85 kg/m²     General appearance:  No apparent distress, appears stated age and cooperative. HEENT:  Normal cephalic, atraumatic without obvious deformity. Pupils equal, round, and reactive to light. Extra ocular muscles intact. Conjunctivae/corneas clear. Neck: Supple, with full range of motion. No jugular venous distention. Trachea midline. Respiratory:  Normal respiratory effort. Clear to auscultation, bilaterally without Rales/Wheezes/Rhonchi. Cardiovascular:  Regular rate and rhythm with normal S1/S2 without murmurs, rubs or gallops. Abdomen: Soft, non-tender, non-distended with normal bowel sounds. Musculoskeletal:  No clubbing, cyanosis or edema bilaterally. Full range of motion without deformity. Skin: right leg wound       Neurologic:  Neurovascularly intact without any focal sensory/motor deficits. Cranial nerves: II-XII intact, grossly non-focal.  Psychiatric:  Alert and oriented, thought content appropriate, normal insight  Capillary Refill: Brisk,3 seconds, normal  Peripheral Pulses: +2 palpable, equal bilaterally       Labs:     Recent Labs     11/26/21 1916   WBC 8.3   HGB 13.0*   HCT 38.2*        Recent Labs     11/26/21 1916   *   K 5.0      CO2 22   BUN 23*   CREATININE 1.6*   CALCIUM 9.4     No results for input(s): AST, ALT, BILIDIR, BILITOT, ALKPHOS in the last 72 hours. No results for input(s): INR in the last 72 hours. No results for input(s): [place] in the last 72 hours.     Urinalysis:      Lab Results   Component Value Date    NITRU Negative 12/07/2020    WBCUA >100 12/07/2020    BACTERIA 4+ 12/07/2020    RBCUA 3-4 12/07/2020    RBCUA NEGATIVE 08/10/2016    BLOODU Negative 12/07/2020    SPECGRAV 1.025 12/07/2020    GLUCOSEU Negative 12/07/2020       Radiology:     CXR: Not indicated  EKG: Not indicated    No orders to display ASSESSMENT:    Active Hospital Problems    Diagnosis Date Noted    Anemia [D64.9] 11/27/2021    Cellulitis of right leg [J91.093] 11/26/2021    LYDIA (acute kidney injury) ([facility] [N17.9] 11/26/2021    PVD [facility]peripheral vascular disease) ([facility]) [I73.9]     Coronary artery disease involving native coronary artery of native heart without angina pectoris [I25.10]     KAVITA and COPD overlap syndrome ([facility] [G47.33, J44.9] 10/22/2015         PLAN:    Right leg cellulitis  -History of right hip replacement  -Obtain blood culture and wound culture  -Empirically start with Vanco and Zosyn, de-escalate based on culture results    LYDIA  -IV fluid  -Avoid nephrotoxic medication  -Repeat BMP    Macrocytic anemia with folate deficiency  -Continue folate supplement    History of PE  -Continue Eliquis    PVD  CAD  -Continue Plavix, statin,BB, IMdur    DVT Prophylaxis: Eliquis  Diet: ADULT DIET; Regular  Code Status: Full Code    PT/OT Eval Status: Ordered    Dispo -2 to 3 days pending clinical improvement       Elo Perez MD    Thank you Derick Yin DO for the opportunity to be involved in this patient's care. If you have any questions or concerns please feel free to contact me at [phone].

## 2021-11-27 NOTE — PROGRESS NOTES
Clinical Pharmacy Progress Note    Vancomycin - Management by Pharmacy    Consult Date(s):  11/26/21  Consulting Provider(s):  Dr. Alba Casanova / Plan  1)  RLE cellulitis - Vancomycin   Concurrent Antimicrobials: Zosyn    Day of Vanc Therapy: 1   Current Dosing Method: AUC  o Therapeutic Goal: 400-500 mg/L*hr  o Pt received 1250mg IV x1 early this AM.  Pt initially with SCr 1.6 on presentation last evening; now down to 1.2. Baseline appears to be ~0.8.    o Based on estimated kinetics, will start 1000mg IV q12h for now.    o Will check level prior to 3rd total dose, due 11/28 04:00 to further evaluate above regimen.  Will continue to monitor clinical condition and make adjustments to regimen as appropriate. Please call with questions--  Thanks--  Justin May, PharmD, BCPS, BCGP  [id number] ([facility])   11/27/2021 12:41 PM      Subjective/Objective:   Stone Atkins is a 73 y.o. y/o male with a PMHx that includes CAD, HF, COPD, depression, GERD, TIA, HTN, HLD, neuropathy, and recent right femoral endarterectomy (done 11/17/21) who is admitted with worsening right foot pain with wound drainage. Pharmacy is consulted to dose Vancomycin. Current antibiotics:  Zosyn 3.375g IV EI q8h (11/27-current)  Vancomycin - Pharmacy to dose   1250mg IV x1 11/27 04:00   1000mg IV q12h (11/27-current)    Ht Readings from Last 1 Encounters:   11/22/21 5' 10\" (1.778 m)     Wt Readings from Last 1 Encounters:   11/27/21 201 lb 1 oz (91.2 kg)       Vanc Level(s) / Doses:  Date Time Dose Level / Type of Level Interpretation   11/28 03:30 1000mg q12h Trough = ordered           Note: Serum levels collected for AUC-based dosing may be high if collected in close proximity to the dose administered. This is not necessarily an indicator of toxicity.     Recent Labs     11/26/21  1916 11/27/21  0802   CREATININE 1.6* 1.2   BUN 23* 20   WBC 8.3 5.2       Estimated Creatinine Clearance: 66 mL/min (based on SCr of 1.2 mg/dL).     Cultures & Sensitivities:  Date Site Micro Susceptibility / Result   11/26 Blood x2 sent    11/26 MRSA nasal PCR sent    11/26 Wound sent

## 2021-11-27 NOTE — ED PROVIDER NOTES
ED Attending Attestation Note     Date of evaluation: 11/26/2021    This patient was seen by the advance practice provider. I have seen and examined the patient, agree with the workup, evaluation, management and diagnosis. The care plan has been discussed. Right lower extremity endarterectomy   Has been having drainage and swelling of lower extremity     My assessment reveals a gentleman with erythema, warmth and induration with multiple areas of scab/crusting on his right shin, ankle and dorsum of his foot. He has normal temperature and capillary refill distally. He has a palpable DP. Incision from his recent procedure in his right groin has an intact incision without drainage. Dressing is soiled and will be changed in the emergency department.         Erum Garay MD  11/27/21 Darinel Brood

## 2021-11-28 LAB
A/G RATIO: 1 (ref 1.1–2.2)
ALBUMIN SERPL-MCNC: 3.4 G/DL (ref 3.4–5)
ALP BLD-CCNC: 86 U/L (ref 40–129)
ALT SERPL-CCNC: 12 U/L (ref 10–40)
ANION GAP SERPL CALCULATED.3IONS-SCNC: 12 MMOL/L (ref 3–16)
AST SERPL-CCNC: 17 U/L (ref 15–37)
BASOPHILS ABSOLUTE: 0.1 K/UL (ref 0–0.2)
BASOPHILS RELATIVE PERCENT: 1 %
BILIRUB SERPL-MCNC: 0.4 MG/DL (ref 0–1)
BUN BLDV-MCNC: 15 MG/DL (ref 7–20)
CALCIUM SERPL-MCNC: 9.1 MG/DL (ref 8.3–10.6)
CHLORIDE BLD-SCNC: 101 MMOL/L (ref 99–110)
CO2: 23 MMOL/L (ref 21–32)
CREAT SERPL-MCNC: 1.1 MG/DL (ref 0.8–1.3)
EOSINOPHILS ABSOLUTE: 0.3 K/UL (ref 0–0.6)
EOSINOPHILS RELATIVE PERCENT: 6 %
GFR AFRICAN AMERICAN: >60
GFR NON-AFRICAN AMERICAN: >60
GLUCOSE BLD-MCNC: 108 MG/DL (ref 70–99)
HCT VFR BLD CALC: 35.6 % (ref 40.5–52.5)
HEMOGLOBIN: 12 G/DL (ref 13.5–17.5)
LYMPHOCYTES ABSOLUTE: 1.2 K/UL (ref 1–5.1)
LYMPHOCYTES RELATIVE PERCENT: 21.6 %
MCH RBC QN AUTO: 34.1 PG (ref 26–34)
MCHC RBC AUTO-ENTMCNC: 33.7 G/DL (ref 31–36)
MCV RBC AUTO: 101.3 FL (ref 80–100)
MONOCYTES ABSOLUTE: 0.4 K/UL (ref 0–1.3)
MONOCYTES RELATIVE PERCENT: 8.1 %
NEUTROPHILS ABSOLUTE: 3.5 K/UL (ref 1.7–7.7)
NEUTROPHILS RELATIVE PERCENT: 63.3 %
PDW BLD-RTO: 15.4 % (ref 12.4–15.4)
PLATELET # BLD: 221 K/UL (ref 135–450)
PMV BLD AUTO: 8.2 FL (ref 5–10.5)
POTASSIUM REFLEX MAGNESIUM: 4.5 MMOL/L (ref 3.5–5.1)
RBC # BLD: 3.52 M/UL (ref 4.2–5.9)
SODIUM BLD-SCNC: 136 MMOL/L (ref 136–145)
TOTAL PROTEIN: 6.7 G/DL (ref 6.4–8.2)
VANCOMYCIN TROUGH: 13.3 UG/ML (ref 10–20)
WBC # BLD: 5.5 K/UL (ref 4–11)

## 2021-11-28 PROCEDURE — 6370000000 HC RX 637 (ALT 250 FOR IP): Performed by: INTERNAL MEDICINE

## 2021-11-28 PROCEDURE — 97110 THERAPEUTIC EXERCISES: CPT

## 2021-11-28 PROCEDURE — 6360000002 HC RX W HCPCS: Performed by: INTERNAL MEDICINE

## 2021-11-28 PROCEDURE — 97161 PT EVAL LOW COMPLEX 20 MIN: CPT

## 2021-11-28 PROCEDURE — 2580000003 HC RX 258: Performed by: INTERNAL MEDICINE

## 2021-11-28 PROCEDURE — 97535 SELF CARE MNGMENT TRAINING: CPT

## 2021-11-28 PROCEDURE — 80053 COMPREHEN METABOLIC PANEL: CPT

## 2021-11-28 PROCEDURE — 2700000000 HC OXYGEN THERAPY PER DAY

## 2021-11-28 PROCEDURE — 97166 OT EVAL MOD COMPLEX 45 MIN: CPT

## 2021-11-28 PROCEDURE — 36415 COLL VENOUS BLD VENIPUNCTURE: CPT

## 2021-11-28 PROCEDURE — 85025 COMPLETE CBC W/AUTO DIFF WBC: CPT

## 2021-11-28 PROCEDURE — 94761 N-INVAS EAR/PLS OXIMETRY MLT: CPT

## 2021-11-28 PROCEDURE — 1200000000 HC SEMI PRIVATE

## 2021-11-28 PROCEDURE — 97116 GAIT TRAINING THERAPY: CPT

## 2021-11-28 PROCEDURE — 94640 AIRWAY INHALATION TREATMENT: CPT

## 2021-11-28 PROCEDURE — 80202 ASSAY OF VANCOMYCIN: CPT

## 2021-11-28 RX ORDER — FUROSEMIDE 10 MG/ML
10 INJECTION INTRAMUSCULAR; INTRAVENOUS ONCE
Status: COMPLETED | OUTPATIENT
Start: 2021-11-28 | End: 2021-11-28

## 2021-11-28 RX ORDER — ALBUTEROL SULFATE 2.5 MG/3ML
5 SOLUTION RESPIRATORY (INHALATION) EVERY 4 HOURS PRN
Status: DISCONTINUED | OUTPATIENT
Start: 2021-11-28 | End: 2021-11-30 | Stop reason: HOSPADM

## 2021-11-28 RX ORDER — ALBUTEROL SULFATE 2.5 MG/3ML
2.5 SOLUTION RESPIRATORY (INHALATION) 2 TIMES DAILY
Status: DISCONTINUED | OUTPATIENT
Start: 2021-11-28 | End: 2021-11-30 | Stop reason: HOSPADM

## 2021-11-28 RX ORDER — ALBUTEROL SULFATE 2.5 MG/3ML
2.5 SOLUTION RESPIRATORY (INHALATION) EVERY 6 HOURS PRN
Status: DISCONTINUED | OUTPATIENT
Start: 2021-11-28 | End: 2021-11-28

## 2021-11-28 RX ADMIN — ATORVASTATIN CALCIUM 80 MG: 80 TABLET, FILM COATED ORAL at 09:19

## 2021-11-28 RX ADMIN — PIPERACILLIN AND TAZOBACTAM 3375 MG: 3; .375 INJECTION, POWDER, LYOPHILIZED, FOR SOLUTION INTRAVENOUS at 15:00

## 2021-11-28 RX ADMIN — VANCOMYCIN HYDROCHLORIDE 1000 MG: 10 INJECTION, POWDER, LYOPHILIZED, FOR SOLUTION INTRAVENOUS at 04:49

## 2021-11-28 RX ADMIN — METOPROLOL TARTRATE 100 MG: 100 TABLET, FILM COATED ORAL at 09:20

## 2021-11-28 RX ADMIN — BUDESONIDE 250 MCG: 0.25 SUSPENSION RESPIRATORY (INHALATION) at 09:14

## 2021-11-28 RX ADMIN — SODIUM CHLORIDE, PRESERVATIVE FREE 10 ML: 5 INJECTION INTRAVENOUS at 19:58

## 2021-11-28 RX ADMIN — ISOSORBIDE MONONITRATE 15 MG: 30 TABLET, EXTENDED RELEASE ORAL at 09:19

## 2021-11-28 RX ADMIN — OXYCODONE 5 MG: 5 TABLET ORAL at 15:07

## 2021-11-28 RX ADMIN — APIXABAN 5 MG: 5 TABLET, FILM COATED ORAL at 09:19

## 2021-11-28 RX ADMIN — APIXABAN 5 MG: 5 TABLET, FILM COATED ORAL at 19:53

## 2021-11-28 RX ADMIN — METOPROLOL TARTRATE 100 MG: 100 TABLET, FILM COATED ORAL at 19:54

## 2021-11-28 RX ADMIN — SODIUM CHLORIDE 25 ML: 9 INJECTION, SOLUTION INTRAVENOUS at 06:02

## 2021-11-28 RX ADMIN — FUROSEMIDE 10 MG: 10 INJECTION, SOLUTION INTRAVENOUS at 14:58

## 2021-11-28 RX ADMIN — ALBUTEROL SULFATE 2.5 MG: 2.5 SOLUTION RESPIRATORY (INHALATION) at 21:18

## 2021-11-28 RX ADMIN — PANTOPRAZOLE SODIUM 40 MG: 40 TABLET, DELAYED RELEASE ORAL at 06:03

## 2021-11-28 RX ADMIN — PIPERACILLIN AND TAZOBACTAM 3375 MG: 3; .375 INJECTION, POWDER, LYOPHILIZED, FOR SOLUTION INTRAVENOUS at 20:03

## 2021-11-28 RX ADMIN — OXYCODONE 5 MG: 5 TABLET ORAL at 19:58

## 2021-11-28 RX ADMIN — Medication 500 MG: at 09:20

## 2021-11-28 RX ADMIN — OXYCODONE 5 MG: 5 TABLET ORAL at 06:05

## 2021-11-28 RX ADMIN — CLOPIDOGREL BISULFATE 75 MG: 75 TABLET, FILM COATED ORAL at 09:20

## 2021-11-28 RX ADMIN — DULOXETINE HYDROCHLORIDE 20 MG: 20 CAPSULE, DELAYED RELEASE ORAL at 09:19

## 2021-11-28 RX ADMIN — PIPERACILLIN AND TAZOBACTAM 3375 MG: 3; .375 INJECTION, POWDER, LYOPHILIZED, FOR SOLUTION INTRAVENOUS at 06:03

## 2021-11-28 RX ADMIN — TIOTROPIUM BROMIDE AND OLODATEROL 2 PUFF: 3.124; 2.736 SPRAY, METERED RESPIRATORY (INHALATION) at 09:14

## 2021-11-28 RX ADMIN — VANCOMYCIN HYDROCHLORIDE 750 MG: 10 INJECTION, POWDER, LYOPHILIZED, FOR SOLUTION INTRAVENOUS at 17:53

## 2021-11-28 RX ADMIN — FOLIC ACID 1 MG: 1 TABLET ORAL at 09:20

## 2021-11-28 RX ADMIN — SODIUM CHLORIDE 25 ML: 9 INJECTION, SOLUTION INTRAVENOUS at 04:49

## 2021-11-28 RX ADMIN — BUDESONIDE 250 MCG: 0.25 SUSPENSION RESPIRATORY (INHALATION) at 21:19

## 2021-11-28 ASSESSMENT — PAIN DESCRIPTION - PAIN TYPE
TYPE: ACUTE PAIN
TYPE: ACUTE PAIN

## 2021-11-28 ASSESSMENT — PAIN SCALES - GENERAL
PAINLEVEL_OUTOF10: 0
PAINLEVEL_OUTOF10: 9
PAINLEVEL_OUTOF10: 0
PAINLEVEL_OUTOF10: 9
PAINLEVEL_OUTOF10: 7
PAINLEVEL_OUTOF10: 0

## 2021-11-28 ASSESSMENT — PAIN DESCRIPTION - ORIENTATION
ORIENTATION: RIGHT
ORIENTATION: RIGHT

## 2021-11-28 ASSESSMENT — PAIN DESCRIPTION - FREQUENCY
FREQUENCY: INTERMITTENT
FREQUENCY: INTERMITTENT

## 2021-11-28 ASSESSMENT — PAIN DESCRIPTION - LOCATION
LOCATION: LEG;FOOT
LOCATION: LEG

## 2021-11-28 ASSESSMENT — PAIN DESCRIPTION - ONSET
ONSET: ON-GOING
ONSET: ON-GOING

## 2021-11-28 ASSESSMENT — PAIN DESCRIPTION - DESCRIPTORS
DESCRIPTORS: ACHING
DESCRIPTORS: ACHING;DISCOMFORT

## 2021-11-28 ASSESSMENT — PAIN - FUNCTIONAL ASSESSMENT: PAIN_FUNCTIONAL_ASSESSMENT: ACTIVITIES ARE NOT PREVENTED

## 2021-11-28 ASSESSMENT — PAIN DESCRIPTION - PROGRESSION: CLINICAL_PROGRESSION: NOT CHANGED

## 2021-11-28 NOTE — PLAN OF CARE
Problem: Falls - Risk of:  Goal: Will remain free from falls  Description: Will remain free from falls  Outcome: Ongoing  Note: Safety precautions in place. Pt calls appropriately for assistance. Will monitor. Problem: Falls - Risk of:  Goal: Absence of physical injury  Description: Absence of physical injury  Outcome: Ongoing  Note: No falls or injuries to date this hospitalization. Problem: Pain:  Goal: Pain level will decrease  Description: Pain level will decrease  Outcome: Ongoing  Note: Pain under adequate control with PRN pain medication. Problem: Skin Integrity:  Goal: Will show no infection signs and symptoms  Description: Will show no infection signs and symptoms  Outcome: Ongoing  Note: Right lower leg with ulcerations, redness and swellling. Pt is receiving multiple antibiotics for this infection. Problem: Skin Integrity:  Goal: Absence of new skin breakdown  Description: Absence of new skin breakdown  Outcome: Ongoing  Note: No new skin breakdown noted.

## 2021-11-28 NOTE — PROGRESS NOTES
Clinical Pharmacy Progress Note    Vancomycin - Management by Pharmacy    Consult Date(s):  11/26/21  Consulting Provider(s):  Dr. Colin Quiles / Plan  1)  RLE cellulitis - Vancomycin   Concurrent Antimicrobials: Zosyn    Day of Vanc Therapy: 2   Current Dosing Method: AUC  o Therapeutic Goal: 400-500 mg/L*hr  o Currently on 1000mg IV q12h. Trough this AM prior to 3rd total dose = 13.3mcg/mL. Calculated AUC = 604 (supratherapeutic). o Will decrease dose slightly to 750mg IV q12h. Regimen predicts an AUC = 460 with steady state trough 15.5.  Will continue to monitor clinical condition and make adjustments to regimen as appropriate. Please call with questions--  Thanks--  Yessica Heath, PharmD, BCPS, BCGP  [id number] ([facility])   11/28/2021 7:14 AM      Interval update:  No acute events overnight. Blood cultures with no growth thus far. Subjective/Objective:   Michel Bills is a 73 y.o. y/o male with a PMHx that includes CAD, HF, COPD, depression, GERD, TIA, HTN, HLD, neuropathy, and recent right femoral endarterectomy (done 11/17/21) who is admitted with worsening right foot pain with wound drainage. Pharmacy is consulted to dose Vancomycin. Current antibiotics:  Zosyn 3.375g IV EI q8h (11/27-current)  Vancomycin - Pharmacy to dose   1250mg IV x1 11/27 04:00   1000mg IV q12h (11/27-11/28)   750mg IV q12h (11/28-current)    Ht Readings from Last 1 Encounters:   11/22/21 5' 10\" (1.778 m)     Wt Readings from Last 1 Encounters:   11/28/21 202 lb 6.1 oz (91.8 kg)       Vanc Level(s) / Doses:  Date Time Dose Level / Type of Level Interpretation   11/28 03:33 1000mg q12h Trough = 13.3mcg/mL Drawn ~9.5h after 2nd total dose. Calculated AUC = 604 & trough 20.3. Note: Serum levels collected for AUC-based dosing may be high if collected in close proximity to the dose administered. This is not necessarily an indicator of toxicity.     Recent Labs     11/26/21  1916 11/27/21  0802 11/28/21  0333   CREATININE 1.6* 1.2 1.1   BUN 23* 20 15   WBC 8.3 5.2 5.5       Estimated Creatinine Clearance: 72 mL/min (based on SCr of 1.1 mg/dL).     Cultures & Sensitivities:  Date Site Micro Susceptibility / Result   11/26 Blood x2 No growth to date    11/26 MRSA nasal PCR negative    11/26 Wound ordered

## 2021-11-28 NOTE — PROGRESS NOTES
Physical Therapy    Facility/Department: [address]  Initial Assessment / treatment    NAME: Ish Murillo : 1950  MRN: [id number]    Date of Service: 2021    Discharge Recommendations: Ish Murillo scored a 20/24 on the AM-PAC short mobility form. Current research shows that an AM-PAC score of 18 or greater is typically associated with a discharge to the patient's home setting. Based on the patient's AM-PAC score and their current functional mobility deficits, it is recommended that the patient have 2-3 sessions per week of Physical Therapy at d/c to increase the patient's independence. At this time, this patient demonstrates the endurance and safety to discharge home with home services and a follow up treatment frequency of 2-3x/wk. Please see assessment section for further patient specific details. If patient discharges prior to next session this note will serve as a discharge summary. Please see below for the latest assessment towards goals. PT Equipment Recommendations  Equipment Needed: No    Assessment   Body structures, Functions, Activity limitations: Decreased functional mobility   Assessment: Pt is 73 y.o. male admit with LE cellulitis. Pt demos fairly safe transfers and and gait with use of walker. Amb distance limited by R LE pain. Min FRANCO with amb - O2 sats mid 90s on 3L O2. Pt plans to return home at d/c. Rec home PT services. Will follow  Treatment Diagnosis: impaired gait and transfers  Prognosis: Good  Decision Making: Low Complexity  PT Education: PT Role; Functional Mobility Training  Patient Education: pt demos understanding  REQUIRES PT FOLLOW UP: Yes       Patient Diagnosis(es): The primary encounter diagnosis was Cellulitis of right lower extremity. A diagnosis of LYDIA (acute kidney injury) ([facility] [address] was also pertinent to this visit.      has a past medical history of CAD (coronary artery disease), CHF (congestive heart failure) ([address] COPD (chronic obstructive pulmonary disease) ([facility] [address] Depressive disorder, not elsewhere classified, GERD (gastroesophageal reflux disease), History of colon polyps - 30 seen on colonoscopy 04/2021, History of MI (myocardial infarction), History of skin ulcer of lower extremity, History of transient ischemic attack (TIA), Hyperlipidemia, Hypertension, Neuropathy, KAVITA (obstructive sleep apnea), Personal history of DVT (deep vein thrombosis), PVD (peripheral vascular disease) ([facility] [address] TIA (transient ischemic attack), and Vertebral fracture. has a past surgical history that includes Appendectomy; Coronary angioplasty with stent (6/2013); other surgical history (Right, 6/25/2013); Leg Debridement (Right, 7/23/2013); skin split graft (Right, 7/25/2013); other surgical history (Left, 8/27/2013); laminectomy (11/18/2015); transluminal angioplasty (Left, 12/22/2015); Total hip arthroplasty (Right, 09/01/2016); Cholecystectomy, laparoscopic; eye surgery (Left); Colonoscopy (4/23/2021); Colonoscopy (4/23/2021); Colonoscopy (4/23/2021); and Femoral Endarterectomy (Right, 11/17/2021). Restrictions  Position Activity Restriction  Other position/activity restrictions: up as tolerated  Vision/Hearing  Vision: Within Functional Limits  Hearing: Exceptions to [facility]  Hearing Exceptions: Hard of hearing/hearing concerns (does well with increased volume)     Subjective  General  Chart Reviewed: Yes  Additional Pertinent Hx: Admit 11/26 with LE cellulitis; PMHx:  right femoral endarterectomy on 11/17, PVD, CAD, CHF, COPD, chronic hypoxic respiratory failure with 5 to 6 L of home oxygen, hypertension, and history of PE  Referring Practitioner: Michelle Rios MD  Diagnosis: LE cellulitis  Subjective  Subjective: Pt found supine in bed. Agreeable to PT. Reports 8-9/10 R GWENDOLYN pain - RN aware.           Orientation  Orientation  Overall Orientation Status: Within Functional Limits  Social/Functional History  Social/Functional History  Lives With: Alone  Type of Home: Apartment  Home Layout: One level (basement level apt)  Home Access: Stairs to enter with rails  Entrance Stairs - Number of Steps: \"a couple\"  Bathroom Shower/Tub: Tub/Shower unit  Bathroom Toilet: Handicap height (sink nearby for leverage)  Bathroom Equipment: Shower chair  Home Equipment: 4 wheeled walker, Cane, Lift chair (6L O2)  ADL Assistance: Independent  Homemaking Assistance: Needs assistance (HHA 1x/week for 2hrs/day for cleaning, changes bed linens)  Ambulation Assistance: Independent (cane inside the house; rollator when outside)  Transfer Assistance: Independent  Active : No  Patient's  Info: Takes the bus or medical transportation  Occupation: Retired  Type of occupation: finished [employer]  Additional Comments: denies falls; recently had home PT x 1 visit prior to coming back to the hospital  Cognition        Objective          AROM RLE (degrees)  RLE AROM: WFL  AROM LLE (degrees)  LLE AROM : WFL  Strength RLE  Strength RLE: WFL  Strength LLE  Strength LLE: WFL        Bed mobility  Supine to Sit: Supervision (HOB elevated)  Transfers  Sit to Stand: Contact guard assistance  Stand to sit: Contact guard assistance  Ambulation  Ambulation?: Yes  Ambulation 1  Device: Rolling Walker  Other Apparatus: O2 (3L)  Assistance: Contact guard assistance  Quality of Gait: slow, fairly steady with use of walker, flexed trunk, min cues for LE sequencing to minimize pain R LE  Distance: 10 ft; 30 ft     Balance  Sitting - Static: Good  Sitting - Dynamic: Good  Standing - Static: Good  Standing - Dynamic: Fair  Exercises  Comments: x 10 B APs, LAQ with 3 sec hold, seated march   Treatment included LE ex, gait and transfer training, pt education.   Plan   Plan  Times per week: 2-5  Current Treatment Recommendations: Home Exercise Program, Balance Training, Functional Mobility Training, Transfer Training, Gait Training, Endurance Training, Patient/Caregiver Education & Training  Safety Devices  Type of devices: Call light within reach, Chair alarm in place, Nurse notified, Gait belt, Left in chair    G-Code       OutComes Score                                                  AM-PAC Score  AM-PAC Inpatient Mobility Raw Score : 20 (11/28/21 1515)  AM-PAC Inpatient T-Scale Score : 47.67 (11/28/21 1515)  Mobility Inpatient CMS 0-100% Score: 35.83 (11/28/21 1515)  Mobility Inpatient CMS G-Code Modifier : Isabel Mckenna (11/28/21 1515)          Goals  Short term goals  Time Frame for Short term goals: discharge  Short term goal 1: Pt will transfer sit <--> stand with supervision  Short term goal 2: Pt will amb 80 ft with RW and supervision  Short term goal 3: Pt will negotiate 4 steps with rail and CGA  Patient Goals   Patient goals : return home       Therapy Time   Individual Concurrent Group Co-treatment   Time In 1420         Time Out 1458         Minutes 38                 Timed Code Treatment Minutes:  23    Total Treatment Minutes:  38    If patient is discharged prior to next treatment, this note will serve as the discharge summary.   Kong Cardoso, PT, DPT  [id number]

## 2021-11-28 NOTE — PROGRESS NOTES
Occupational Therapy   Occupational Therapy Initial Assessment/Treatment  Date: 2021   Patient Name: Bettie Chu. MRN: [id number]     : 1950    Date of Service: 2021    Discharge Recommendations:    Bettie Chu. scored a 20/24 on the AM-PAC ADL Inpatient form. Current research shows that an AM-PAC score of 18 or greater is typically associated with a discharge to the patient's home setting. Based on the patient's AM-PAC score, and their current ADL deficits, it is recommended that the patient have 2-3 sessions per week of Occupational Therapy at d/c to increase the patient's independence. At this time, this patient demonstrates the endurance and safety to discharge  home with home services and a follow up treatment frequency of 2-3x/wk. Please see assessment section for further patient specific details. If patient discharges prior to next session this note will serve as a discharge summary. Please see below for the latest assessment towards goals. OT Equipment Recommendations  Equipment Needed: No    Assessment   Performance deficits / Impairments: Decreased functional mobility ; Decreased balance; Decreased ADL status; Decreased endurance  Assessment: Pt presents with a decline in functional independence. Pt is from home alone and was independent. Currently, pt is requiring CG-SBA for functional mobility. Pt plans to go home at discharge. Treatment Diagnosis: Impaired ADL and functional mobility  Prognosis: Good  Decision Making: Medium Complexity  OT Education: OT Role; Plan of Care; Transfer Training  Patient Education: Pt verbalized understanding  REQUIRES OT FOLLOW UP: Yes  Activity Tolerance  Activity Tolerance: Patient limited by pain  Safety Devices  Safety Devices in place: Yes  Type of devices: Left in chair; Call light within reach;  Chair alarm in place; Nurse notified       Treatment Diagnosis: Impaired ADL and functional mobility      Restrictions  Position Activity Restriction  Other position/activity restrictions: up as tolerated    Subjective   General  Chart Reviewed: Yes  Additional Pertinent Hx: Pt admitted 11/26/21 with LE cellulitis; PMHx:  right femoral endarterectomy on 11/17, PVD, CAD, CHF, COPD, chronic hypoxic respiratory failure with 5 to 6 L of home oxygen, hypertension, and history of PE  Family / Caregiver Present: No  Referring Practitioner: Dr. Rosita Mike  Diagnosis: LYDIA  Subjective  Subjective: Pt in bed upon entry. I'll try. Patient Currently in Pain: Yes (Reports 8-9/10 R LE pain - RN aware.)  Pain Assessment  Pain Level: 7  Vital Signs  Patient Currently in Pain: Yes (Reports 8-9/10 R LE pain - RN aware.)  Social/Functional History  Social/Functional History  Lives With: Alone  Type of Home: Apartment  Home Layout: One level (basement level apt)  Home Access: Stairs to enter with rails  Entrance Stairs - Number of Steps: \"a couple\"  Bathroom Shower/Tub: Tub/Shower unit  Bathroom Toilet: Handicap height (sink nearby for leverage)  Bathroom Equipment: Shower chair  Home Equipment: 4 wheeled walker, Cane, Lift chair (6L O2)  ADL Assistance: Independent  Homemaking Assistance: Needs assistance (HHA 1x/week for 2hrs/day for cleaning, changes bed linens)  Ambulation Assistance: Independent (cane inside the house; rollator when outside)  Transfer Assistance: Independent  Active : No  Patient's  Info:  Takes the bus or medical transportation  Occupation: Retired  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  Additional Comments: denies falls       Objective   Vision: Within Functional Limits  Hearing: Exceptions to [facility]  Hearing Exceptions: Hard of hearing/hearing concerns (does well with increased volume)    Orientation  Overall Orientation Status: Within Functional Limits     Balance  Sitting Balance: Independent  Standing Balance: Contact guard assistance (to SBA)  Standing Balance  Time: ~4 min  Activity: bathroom mobility/activity, walk in room  Functional Mobility  Functional - Mobility Device: Rolling Walker  Activity: To/from bathroom; Other  Assist Level: Contact guard assistance (to SBA)  Toilet Transfers  Toilet - Technique: Ambulating  Equipment Used: Standard toilet (grab bar)  Toilet Transfer: Contact guard assistance  ADL  Grooming: Stand by assistance (to wash hands and face, standing at sink)  LE Dressing: Supervision  Toileting:  (denied need)  Tone RUE  RUE Tone: Normotonic  Tone LUE  LUE Tone: Normotonic  Coordination  Movements Are Fluid And Coordinated: Yes     Bed mobility  Supine to Sit: Supervision (HOB elevated)  Transfers  Stand Step Transfers: Contact guard assistance (to SBA)  Sit to stand: Contact guard assistance (to SBA)  Stand to sit: Contact guard assistance (to SBA)     Cognition  Overall Cognitive Status: WFL                 LUE AROM (degrees)  LUE AROM : WFL  RUE AROM (degrees)  RUE AROM : WFL  LUE Strength  Gross LUE Strength: WNL  RUE Strength  Gross RUE Strength: WNL           Treatment included ADL and transfer training.         Plan   Plan  Times per week: 2-5  Current Treatment Recommendations: Strengthening, Balance Training, Functional Mobility Training, Endurance Training, Self-Care / ADL    AM-[facility] Score        AM-[facility] Inpatient Daily Activity Raw Score: 20 (11/28/21 1538)  AM-PAC Inpatient ADL T-Scale Score : 42.03 (11/28/21 1538)  ADL Inpatient CMS 0-100% Score: 38.32 (11/28/21 1538)  ADL Inpatient CMS G-Code Modifier : Ashley Bassett (11/28/21 1538)    Goals                     No goals met  Short term goals  Time Frame for Short term goals: Discharge  Short term goal 1: Transfer to/from toilet with supervision  Short term goal 2: Stance with supervision x6 min while engaging in ADL/functional mobility  Patient Goals   Patient goals : Go tony       Therapy Time   Individual Concurrent Group Co-treatment   Time In 1422         Time Out 1452         Minutes 30         Timed Code Treatment Minutes:

## 2021-11-28 NOTE — RT PROTOCOL NOTE
RT Nebulizer Bronchodilator Protocol Note    There is a bronchodilator order in the chart from a provider indicating to follow the RT Bronchodilator Protocol and there is an Initiate RT Bronchodilator Protocol order as well (see protocol at bottom of note). CXR Findings:  No results found. The findings from the last RT Protocol Assessment were as follows:  Smoking: Chronic pulmonary disease  Respiratory Pattern: Regular pattern and RR 12-20 bpm  Breath Sounds: Slightly diminished and/or crackles  Cough: Strong, spontaneous, non-productive  Indication for Bronchodilator Therapy:    Bronchodilator Assessment Score: 4    Aerosolized bronchodilator medication orders have been revised according to the RT Nebulizer Bronchodilator Protocol below. Respiratory Therapist to perform RT Therapy Protocol Assessment initially then follow the protocol. Repeat RT Therapy Protocol Assessment PRN for score 0-3 or on second treatment, BID, and PRN for scores above 3. No Indications - adjust the frequency to every 6 hours PRN wheezing or bronchospasm, if no treatments needed after 48 hours then discontinue using Per Protocol order mode. If indication present, adjust the RT bronchodilator orders based on the Bronchodilator Assessment Score as indicated below. If a patient is on this medication at home then do not decrease Frequency below that used at home. 0-3 - enter or revise RT bronchodilator order(s) to equivalent RT Bronchodilator order with Frequency of every 4 hours PRN for wheezing or increased work of breathing using Per Protocol order mode. 4-6 - enter or revise RT Bronchodilator order(s) to two equivalent RT bronchodilator orders with one order with BID Frequency and one order with Frequency of every 4 hours PRN wheezing or increased work of breathing using Per Protocol order mode.          7-10 - enter or revise RT Bronchodilator order(s) to two equivalent RT bronchodilator orders with one order with TID Frequency and one order with Frequency of every 4 hours PRN wheezing or increased work of breathing using Per Protocol order mode. 11-13 - enter or revise RT Bronchodilator order(s) to one equivalent RT bronchodilator order with QID Frequency and an Albuterol order with Frequency of every 4 hours PRN wheezing or increased work of breathing using Per Protocol order mode. Greater than 13 - enter or revise RT Bronchodilator order(s) to one equivalent RT bronchodilator order with every 4 hours Frequency and an Albuterol order with Frequency of every 2 hours PRN wheezing or increased work of breathing using Per Protocol order mode. RT to enter RT Home Evaluation for COPD & MDI Assessment order using Per Protocol order mode.     Electronically signed by Brennan Blanca RCP on 11/28/2021 at 4:05 PM

## 2021-11-29 ENCOUNTER — TELEPHONE (OUTPATIENT)
Dept: PRIMARY CARE CLINIC | Age: 71
End: 2021-11-29

## 2021-11-29 LAB
A/G RATIO: 1 (ref 1.1–2.2)
ALBUMIN SERPL-MCNC: 3.5 G/DL (ref 3.4–5)
ALP BLD-CCNC: 87 U/L (ref 40–129)
ALT SERPL-CCNC: 13 U/L (ref 10–40)
ANION GAP SERPL CALCULATED.3IONS-SCNC: 14 MMOL/L (ref 3–16)
AST SERPL-CCNC: 18 U/L (ref 15–37)
BASOPHILS ABSOLUTE: 0.1 K/UL (ref 0–0.2)
BASOPHILS RELATIVE PERCENT: 1.1 %
BILIRUB SERPL-MCNC: 0.5 MG/DL (ref 0–1)
BUN BLDV-MCNC: 13 MG/DL (ref 7–20)
CALCIUM SERPL-MCNC: 8.8 MG/DL (ref 8.3–10.6)
CHLORIDE BLD-SCNC: 101 MMOL/L (ref 99–110)
CO2: 21 MMOL/L (ref 21–32)
CREAT SERPL-MCNC: 1.2 MG/DL (ref 0.8–1.3)
EOSINOPHILS ABSOLUTE: 0.4 K/UL (ref 0–0.6)
EOSINOPHILS RELATIVE PERCENT: 6 %
GFR AFRICAN AMERICAN: >60
GFR NON-AFRICAN AMERICAN: 60
GLUCOSE BLD-MCNC: 93 MG/DL (ref 70–99)
GRAM STAIN RESULT: NORMAL
HCT VFR BLD CALC: 37.1 % (ref 40.5–52.5)
HEMOGLOBIN: 12.7 G/DL (ref 13.5–17.5)
LYMPHOCYTES ABSOLUTE: 1.2 K/UL (ref 1–5.1)
LYMPHOCYTES RELATIVE PERCENT: 21 %
MCH RBC QN AUTO: 34.5 PG (ref 26–34)
MCHC RBC AUTO-ENTMCNC: 34.3 G/DL (ref 31–36)
MCV RBC AUTO: 100.6 FL (ref 80–100)
MONOCYTES ABSOLUTE: 0.5 K/UL (ref 0–1.3)
MONOCYTES RELATIVE PERCENT: 7.8 %
NEUTROPHILS ABSOLUTE: 3.7 K/UL (ref 1.7–7.7)
NEUTROPHILS RELATIVE PERCENT: 64.1 %
PDW BLD-RTO: 15.4 % (ref 12.4–15.4)
PLATELET # BLD: 222 K/UL (ref 135–450)
PMV BLD AUTO: 8.4 FL (ref 5–10.5)
POTASSIUM REFLEX MAGNESIUM: 3.9 MMOL/L (ref 3.5–5.1)
RBC # BLD: 3.69 M/UL (ref 4.2–5.9)
SODIUM BLD-SCNC: 136 MMOL/L (ref 136–145)
TOTAL PROTEIN: 7.1 G/DL (ref 6.4–8.2)
WBC # BLD: 5.8 K/UL (ref 4–11)
WOUND/ABSCESS: NORMAL

## 2021-11-29 PROCEDURE — 6370000000 HC RX 637 (ALT 250 FOR IP): Performed by: INTERNAL MEDICINE

## 2021-11-29 PROCEDURE — 85025 COMPLETE CBC W/AUTO DIFF WBC: CPT

## 2021-11-29 PROCEDURE — 1200000000 HC SEMI PRIVATE

## 2021-11-29 PROCEDURE — 6360000002 HC RX W HCPCS: Performed by: INTERNAL MEDICINE

## 2021-11-29 PROCEDURE — 80053 COMPREHEN METABOLIC PANEL: CPT

## 2021-11-29 PROCEDURE — 94640 AIRWAY INHALATION TREATMENT: CPT

## 2021-11-29 PROCEDURE — 99222 1ST HOSP IP/OBS MODERATE 55: CPT | Performed by: INTERNAL MEDICINE

## 2021-11-29 PROCEDURE — 36415 COLL VENOUS BLD VENIPUNCTURE: CPT

## 2021-11-29 PROCEDURE — 2580000003 HC RX 258: Performed by: INTERNAL MEDICINE

## 2021-11-29 PROCEDURE — 2700000000 HC OXYGEN THERAPY PER DAY

## 2021-11-29 PROCEDURE — 94761 N-INVAS EAR/PLS OXIMETRY MLT: CPT

## 2021-11-29 RX ORDER — GABAPENTIN 100 MG/1
200 CAPSULE ORAL 3 TIMES DAILY
Status: DISCONTINUED | OUTPATIENT
Start: 2021-11-29 | End: 2021-11-30 | Stop reason: HOSPADM

## 2021-11-29 RX ADMIN — DULOXETINE HYDROCHLORIDE 20 MG: 20 CAPSULE, DELAYED RELEASE ORAL at 09:00

## 2021-11-29 RX ADMIN — ALBUTEROL SULFATE 2.5 MG: 2.5 SOLUTION RESPIRATORY (INHALATION) at 10:41

## 2021-11-29 RX ADMIN — BUDESONIDE 250 MCG: 0.25 SUSPENSION RESPIRATORY (INHALATION) at 10:41

## 2021-11-29 RX ADMIN — OXYCODONE 5 MG: 5 TABLET ORAL at 19:44

## 2021-11-29 RX ADMIN — ISOSORBIDE MONONITRATE 15 MG: 30 TABLET, EXTENDED RELEASE ORAL at 09:58

## 2021-11-29 RX ADMIN — PIPERACILLIN AND TAZOBACTAM 3375 MG: 3; .375 INJECTION, POWDER, LYOPHILIZED, FOR SOLUTION INTRAVENOUS at 06:20

## 2021-11-29 RX ADMIN — GABAPENTIN 200 MG: 100 CAPSULE ORAL at 21:20

## 2021-11-29 RX ADMIN — PANTOPRAZOLE SODIUM 40 MG: 40 TABLET, DELAYED RELEASE ORAL at 06:20

## 2021-11-29 RX ADMIN — APIXABAN 5 MG: 5 TABLET, FILM COATED ORAL at 21:20

## 2021-11-29 RX ADMIN — VANCOMYCIN HYDROCHLORIDE 750 MG: 10 INJECTION, POWDER, LYOPHILIZED, FOR SOLUTION INTRAVENOUS at 17:09

## 2021-11-29 RX ADMIN — PIPERACILLIN AND TAZOBACTAM 3375 MG: 3; .375 INJECTION, POWDER, LYOPHILIZED, FOR SOLUTION INTRAVENOUS at 13:12

## 2021-11-29 RX ADMIN — SODIUM CHLORIDE, PRESERVATIVE FREE 10 ML: 5 INJECTION INTRAVENOUS at 21:22

## 2021-11-29 RX ADMIN — TIOTROPIUM BROMIDE AND OLODATEROL 2 PUFF: 3.124; 2.736 SPRAY, METERED RESPIRATORY (INHALATION) at 10:41

## 2021-11-29 RX ADMIN — BUDESONIDE 250 MCG: 0.25 SUSPENSION RESPIRATORY (INHALATION) at 20:50

## 2021-11-29 RX ADMIN — ATORVASTATIN CALCIUM 80 MG: 80 TABLET, FILM COATED ORAL at 09:00

## 2021-11-29 RX ADMIN — ALBUTEROL SULFATE 2.5 MG: 2.5 SOLUTION RESPIRATORY (INHALATION) at 20:50

## 2021-11-29 RX ADMIN — METOPROLOL TARTRATE 100 MG: 100 TABLET, FILM COATED ORAL at 09:58

## 2021-11-29 RX ADMIN — SODIUM CHLORIDE, PRESERVATIVE FREE 10 ML: 5 INJECTION INTRAVENOUS at 10:22

## 2021-11-29 RX ADMIN — Medication 500 MG: at 09:58

## 2021-11-29 RX ADMIN — CLOPIDOGREL BISULFATE 75 MG: 75 TABLET, FILM COATED ORAL at 09:00

## 2021-11-29 RX ADMIN — FOLIC ACID 1 MG: 1 TABLET ORAL at 09:00

## 2021-11-29 RX ADMIN — PIPERACILLIN AND TAZOBACTAM 3375 MG: 3; .375 INJECTION, POWDER, LYOPHILIZED, FOR SOLUTION INTRAVENOUS at 22:23

## 2021-11-29 RX ADMIN — OXYCODONE 5 MG: 5 TABLET ORAL at 13:09

## 2021-11-29 RX ADMIN — APIXABAN 5 MG: 5 TABLET, FILM COATED ORAL at 09:00

## 2021-11-29 RX ADMIN — VANCOMYCIN HYDROCHLORIDE 750 MG: 10 INJECTION, POWDER, LYOPHILIZED, FOR SOLUTION INTRAVENOUS at 05:00

## 2021-11-29 ASSESSMENT — PAIN DESCRIPTION - FREQUENCY
FREQUENCY: CONTINUOUS
FREQUENCY: INTERMITTENT

## 2021-11-29 ASSESSMENT — PAIN DESCRIPTION - LOCATION
LOCATION: FOOT;LEG
LOCATION: FOOT;LEG

## 2021-11-29 ASSESSMENT — PAIN SCALES - GENERAL
PAINLEVEL_OUTOF10: 0
PAINLEVEL_OUTOF10: 9

## 2021-11-29 ASSESSMENT — PAIN DESCRIPTION - PROGRESSION
CLINICAL_PROGRESSION: NOT CHANGED
CLINICAL_PROGRESSION: NOT CHANGED

## 2021-11-29 ASSESSMENT — PAIN DESCRIPTION - PAIN TYPE
TYPE: ACUTE PAIN;CHRONIC PAIN
TYPE: ACUTE PAIN;CHRONIC PAIN

## 2021-11-29 ASSESSMENT — PAIN DESCRIPTION - ORIENTATION
ORIENTATION: RIGHT
ORIENTATION: RIGHT

## 2021-11-29 ASSESSMENT — PAIN DESCRIPTION - DESCRIPTORS
DESCRIPTORS: ACHING;DISCOMFORT
DESCRIPTORS: ACHING;DISCOMFORT

## 2021-11-29 ASSESSMENT — PAIN DESCRIPTION - ONSET
ONSET: ON-GOING
ONSET: ON-GOING

## 2021-11-29 NOTE — CARE COORDINATION
Case Management Assessment           Initial Evaluation                Date / Time of Evaluation: 11/29/2021 12:55 PM                 Assessment Completed by: Leeanne Del Real RN    Patient Name: Aracely Ryan. YOB: 1950  Diagnosis: LYDIA (acute kidney injury) ([facility] [address] [N17.9]  Cellulitis of right leg [L03.115]  Cellulitis of right lower extremity [L03.115]     Date / Time: 11/26/2021  6:22 PM    Patient Admission Status: Inpatient    If patient is discharged prior to next notation, then this note serves as note for discharge by case management. Current PCP: Mary Evans DO  Clinic Patient: No    Chart Reviewed: Yes  Patient/ Family Interviewed: Yes    Initial assessment completed at bedside with: patient    Hospitalization in the last 30 days: Yes    Emergency Contacts:  Extended Emergency Contact Information  Primary Emergency Contact: [address] [place] [address] Phone: [phone]  Relation: Child    Advance Directives:   Code Status: Full Code    Healthcare Power of : No     Financial  Payor: Belinda Brody / Plan: Toni Orellana / Product Type: *No Product type* /     Pre-cert required for SNF: Yes    Pharmacy    [place] [id number] [place] [id number]  Phone: [phone] Fax: [phone]    [address] [facility] [place] [address]  [address] [facility] [address]  Phone: [phone] Fax: [phone]      Potential assistance Purchasing Medications: Potential Assistance Purchasing Medications: No  Does Patient want to participate in local refill/ meds to beds program?: No    Meds To Beds General Rules:  1. Can ONLY be done Monday- Friday between 8:30am-5pm  2. Prescription(s) must be in pharmacy by 3pm to be filled same day  3. Copy of patient's insurance/ prescription drug card and patient face sheet must be sent along with the prescription(s)  4. Cost of Rx cannot be added to hospital bill. If financial assistance is needed, please contact unit  or ;  or  CANNOT provide pharmacy voucher for patients co-pays  5. Patients can then  the prescription on their way out of the hospital at discharge, or pharmacy can deliver to the bedside if staff is available. (payment due at time of pick-up or delivery - cash, check, or card accepted)     Able to afford home medications/ co-pay costs: Yes    ADLS  Support Systems: Children, Family Members    PT AM-PAC: 20 /24  OT AM-PAC: 20 /24    [place]:  Home alone  Steps:  A few    Plans to RETURN to current housing: Yes  Barriers to RETURNING to current housing: medical stability    Home Care Information  Currently ACTIVE with [address]: Yes  [address]: [address] [place] [address]  Phone: [phone]  Fax:      Currently ACTIVE with [facility]: Yes     Zhang Cruz [phone]    Durable Medical Equipment  DME Provider:    Equipment: crutches, walker, bath bench and raised toilet seat    Home Oxygen and Respiratory Equipment  Has [address] prior to admission: Yes  [address]: [facility]  Phone: [phone]   Other Respiratory Equipment:      Informed of need to bring portable home O2 tank on day of DISCHARGE for nursing to connect prior to leaving: Yes  Verbalized agreement/Understanding: Yes  Person to bring portable tank at discharge:  son      DISCHARGE PLAN:  Disposition: Home with [address]: [facility] for discharge: family     Factors facilitating achievement of predicted outcomes: Family support, Caregiver support and Pleasant    Barriers to discharge: medical stablility    Additional Case Management Notes:  Cm met with pt at bedside. Plans to return home at [place]. Has home care and MOW.  Pt states he will ask son to bring portable home O2 tank. Son to transport home. The Plan for Transition of Care is related to the following treatment goals of LYDIA (acute kidney injury) ([facility] [address] [N17.9]  Cellulitis of right leg [L03.115]  Cellulitis of right lower extremity [L03.115]    The Patient and/or patient representative Diana Lagos and his family were provided with a choice of provider and agrees with the discharge plan Yes    Freedom of choice list was provided with basic dialogue that supports the patient's individualized plan of care/goals and shares the quality data associated with the providers.  Yes    Care Transition patient: No    Corrine Gregorio RN  [facility]  Case Management Department  Ph: [phone]

## 2021-11-29 NOTE — CONSULTS
Infectious Diseases Inpatient Consult Note    Medical Student note - reviewed and modified, see Attending addendum at bottom    Reason for Consult:   Right LE cellulitis s/p femoral endarterectomy   Requesting Physician:   Gibran Church   Primary Care Physician:  Lisa Moreira, DO  History Obtained From:   Pt, Chart Review     Admit Date: 11/26/2021  Hospital Day: 3    CHIEF COMPLAINT:       Chief Complaint   Patient presents with    Foot Pain     states he has bad circulation in right foot; had recent surgery; states foot looks \"busted open\"    Wound Check       HISTORY OF PRESENT ILLNESS:       is a 73year old male with a past medical history of CAD,CHF,PVD,COPD (om home oxygen 5-6 L),HTN,prior PE (on eliquis),DVT,TIA,GERD who presented on 11/26/21 due right leg wound pain. He states that he recently had underwent a right femoral endarterectomy on 11/17 and has noticed drainage from the wound. His home health nurse became concerned by the appearance of the wound for infection and advised him to come in for evaluation. In the ED, he was afebrile, hypotensive with BP of 92/59, HR of 78 and saturating at 98% on 5 L oxygen. On physical examination, he had 1+ right LE pitting edema, erythema and warmth to the dorsum of the foot extending over the shin area. He also had lesions that were draining yellow purulent fluid. The erythema area borders were marked 2 days prior to presentation and the redness did not extend past the borders. Initial labs were unremarkable. He was given a dose of clindamycin. He was admitted for further evaluation and management. During admission,he was started on zosyn and vancomycin with improvement seen in the R LE erythema. Blood and wound cultures showed no growth. Today, 11/29- Afebrile, WBC 5.8    The patient was seen at bedside and his chart was reviewed. No acute overnight events.   He states he is still experiencing pain in his right leg but has noticed that the redness has improved greatly since admission. He also stated that he has not noticed any drainage of fluid over the past 24 hours. He stated that he is having shortness of breath which is not new for him but becoming a bit more worse. He denied any chest pain, fever/chills,nausea,vomiting,diarrhea, headaches or urinary symptoms.     Past Medical History:    Past Medical History:   Diagnosis Date    CAD (coronary artery disease)     CHF (congestive heart failure) (HCC)     COPD (chronic obstructive pulmonary disease) (HCC)     Depressive disorder, not elsewhere classified     GERD (gastroesophageal reflux disease)     History of colon polyps - 30 seen on colonoscopy 04/2021 4/24/2021    History of MI (myocardial infarction) 05/2013    History of skin ulcer of lower extremity     R anterior shin    History of transient ischemic attack (TIA)     Hyperlipidemia     Hypertension     Neuropathy     KAVITA (obstructive sleep apnea)     On CPAP    Personal history of DVT (deep vein thrombosis)     PVD (peripheral vascular disease) ([facility] [address]     TIA (transient ischemic attack) 2013    Vertebral fracture        Past Surgical History:    Past Surgical History:   Procedure Laterality Date    APPENDECTOMY      CHOLECYSTECTOMY, LAPAROSCOPIC      COLONOSCOPY  4/23/2021    COLONOSCOPY POLYPECTOMY SNARE/COLD BIOPSY performed by Kylah Olivarez MD at [facility]  4/23/2021    COLONOSCOPY POLYPECTOMY ABLATION performed by Kylah Olivarez MD at [facility]  4/23/2021    COLONOSCOPY CONTROL HEMORRHAGE performed by Kylah Olivarez MD at [address]  6/2013    EYE SURGERY Left     FEMORAL ENDARTERECTOMY Right 11/17/2021    RIGHT FEMORAL ENDARTERECTOMY  RIGHT EXTERNAL ILIAC TO PROFUNDA FEMORAL BYPASS WITH 8MM PTFE GRAFT RIGHT LOWER EXTREMITY ARTERIOGRAM BALLOON ANGIOPLASTY RIGHT PROFUNDA BALLOON ANGIOPLASTY AND STENT OF RIGHT EXTERNAL ILIAC pain, palpitations. Denies n / v / abd pain. No diarrhea. Denies dysuria or change in urinary function. Denies joint swelling or pain. No myalgia, arthralgia. R LE pain   Positive for mild erythema in the R LE. Denies focal weakness, sensory change or other neurologic symptom    Denies new / worse depression, psychiatric symptoms    PHYSICAL EXAM:      Vitals:    BP (!) 154/73   Pulse 57   Temp 97.9 °F (36.6 °C) (Oral)   Resp 16   Wt 209 lb 7 oz (95 kg)   SpO2 98%   BMI 30.05 kg/m²     GENERAL: No apparent distress. HEENT: Membranes moist, no oral lesion, PERRL  NECK:  Supple, no lymphadenopathy  LUNGS: Clear b/l, no rales, no dullness  CARDIAC: RRR, no murmur appreciated  ABD:  + BS, soft / NT  EXT:  Right lower extremity erythema with areas of scabbing and necrosis. Improved from pictures taken on admission. No drainage of fluids observed.    NEURO: No focal neurologic findings  PSYCH: Orientation, sensorium, mood normal  LINES:  Peripheral iv    DATA:    Lab Results   Component Value Date    WBC 5.8 11/29/2021    HGB 12.7 (L) 11/29/2021    HCT 37.1 (L) 11/29/2021    .6 (H) 11/29/2021     11/29/2021     Lab Results   Component Value Date    CREATININE 1.2 11/29/2021    BUN 13 11/29/2021     11/29/2021    K 3.9 11/29/2021     11/29/2021    CO2 21 11/29/2021       Hepatic Function Panel:   Lab Results   Component Value Date    ALKPHOS 87 11/29/2021    ALT 13 11/29/2021    AST 18 11/29/2021    PROT 7.1 11/29/2021    BILITOT 0.5 11/29/2021    BILIDIR <0.2 09/16/2021    IBILI see below 09/16/2021    LABALBU 3.5 11/29/2021       Micro:    11/26 Blood Cultures- No growth to date     11/26 Wound Culture- Mixed skin carmen,  Moderate growth    11/26 MRSA DNA Nasal Probe - Negative     Imaging:       IMPRESSION:       is a 73year old male with a PMHx of CAD,CHF,PVD,COPD (on home oxygen 5-6 L),HTN,prior PE (on eliquis),DVT,TIA,GERD who presented on 11/26/21 due right leg wound pain    Right LE Cellulitis    -S/p right femoral endarterectomy on 21  -Has had scabs and lesions on right leg that became worse in the past few days with drainage and erythema  -Blood and wound cultures with no growth to date thus far  -Today, - Improving erythema and afebrile   -On zosyn and vancomycin    RECOMMENDATIONS:    -Can be transitioned to oral antibiotics upon discharge       [facility])  Discussed with Attending Laura Gilliland     Addendum to Medical Student Consult note:  Pt seen,examined and evaluated. I have independently performed history, physical exam, lab and data review. I have determined assessment and plan as documented by student Rajani Garcia). 74 yo man with hx CAD,COPD (home O2 5-6L), CHF, PAD, HTN, hx DVT / PE, HER    Hx R fem endarterectomy on   Hx R LE chronic wound  Presented on with R leg redness and pain associated with R leg wound  Wound with drainage.     No fever / chills    In ED , afeb, SBP 92  Admit, started on Vancomycin + zosyn    Today - afeb, 5L, WBC 5.8  R LE with mild erythema, escar intact, no open wound / drainage    IMP/  R leg cellulitis, improved  Wound cult mixed skin carmen    REC/  Cont vancomycin in hospital    Home on po bactrim ds bid x 7 days  F/u Vascular Surg    Discussed with pt  Jefm Apgar, MD

## 2021-11-29 NOTE — TELEPHONE ENCOUNTER
----- Message from Anil Cantrell sent at 11/26/2021  3:11 PM EST -----  Subject: Message to Provider    QUESTIONS  Information for Provider? Mollie Solon calling to let [facility] physical therapy ana   was completed today plan to see PT 1 time a day for 5 weeks and BP was   72/54 was seen by nurse to go to ER  ---------------------------------------------------------------------------  --------------  [address]  What is the best way for the office to contact you? Do not leave any   message, patient will call back for answer  Preferred Call Back Phone Number? [phone]  ---------------------------------------------------------------------------  --------------  SCRIPT ANSWERS  Relationship to Patient? Third Party  Representative Name?  Soraya Horner

## 2021-11-29 NOTE — PROGRESS NOTES
Consulted for necrotic wounds on R Lower Leg, reviewed photos taken 11/26/21, Wound Care orders placed, Wound Care to follow up tomorrow 11/30/21.

## 2021-11-29 NOTE — PROGRESS NOTES
Hospital Medicine Progress Note    Patient:  Sigifredo Lei. YOB: 1950  MRN: [id number]   PCP: Jakob Rios DO         Acct: [de-identified]  Unit/Bed: [facility]/3311-01    Date of Admission: 11/26/2021   Date of service: 11/27/2021  CC: RLE cellultis      Subjective: The patient is a 73 y.o. male with a PMH of PVD, CAD, CHF, COPD and chronic respiratory failure with hypoxia on 5 to 6 LO2 at home who was admitted on 11/26/2021 for a right lower extremity cellulitis following a right femoral endarterectomy on November 17th.    11/27: Right lower extremity cellulitis improving on Zosyn and vancomycin  11/28: Area of redness continues to improve. Patient has not been ambulating much. This evening patient also had some worsening shortness of breath. Patient's last echocardiogram reviewed from 11/19/2021 shows an EF of 55-60%   11/29: Patient states that he is feeling  Like the pain is getting worse. He has a burning sensation associated with his symptoms and he rates it as 9/10. Shortness of breath is improved and is currently on 2LO2      Objective:    Medications:  Reviewed    Physical examination:   /70   Pulse 64   Temp 97.4 °F (36.3 °C) (Oral)   Resp 16   Wt 209 lb 7 oz (95 kg)   SpO2 98%   BMI 30.05 kg/m²     General appearance:  No apparent distress. Appears comfortable. Well nourished  HEENT: Atraumatic. Pupils equally round. Extraocular motion intact. Conjunctivae clear. Nose symmetric without evidence of discharge. Oral mucosa moist w/o erythema or exudate. Neck supple. No JVD. Trachea midline. Cardiovascular:  RRR w/ normal S1/S2. No murmurs, rubs or gallops. Respiratory: Clear to auscultation, bilaterally without rales/wheezes/rhonchi. Gastrointestinal: Abdomen soft, non-tender, non-distended with normal bowel sounds. Musculoskeletal:  Full ROM without deformity. Neurologic:  No speech or focal sensory/motor deficits. Cranial nerves grossly intact.    Lymphatic: No Patricia Jeter MD                                       Physician  Procedure Type of Study   TTE procedure:ECHOCARDIOGRAM LIMITED. Procedure Date Date: 11/19/2021 Start: 11:50 AM Study Location: [address] Echo Lab Technical Quality: Limited visualization due to poor acoustical window. Additional Indications:hypotension. Patient Status: Routine Contrast Medium: Definity. Amount - 2 ml Height: 70 inches Weight: 204 pounds BSA: 2.1 m2 BMI: 29.27 kg/m2 BP: 114/61 mmHg  Conclusions   Summary  Limited study. Left ventricular cavity size is normal. There is mild  concentric left ventricular hypertrophy. Overall left ventricular systolic  function appears normal with an ejection fraction of 55-60%. No regional  wall motion abnormalities are noted. Signature   ------------------------------------------------------------------  Electronically signed by Patricia Jeter MD (Interpreting  physician) on 11/19/2021 at 05:29 PM  ------------------------------------------------------------------   Findings   Left Ventricle  Left ventricular cavity size is normal. There is mild concentric left  ventricular hypertrophy. Overall left ventricular systolic function appears  normal with an ejection fraction of 55-60%. No regional wall motion  abnormalities are noted. Aorta  The aortic root is normal in size. Right Ventricle  The right ventricle is normal in size and function. TAPSE measures 2.49 cm. Pericardial Effusion  No pericardial effusion noted. Pleural Effusion  No pleural effusion. Miscellaneous  IVC size is normal (<2.1cm) and collapses > 50% with respiration consistent  with normal RA pressure (3mmHg).   M-Mode/2D Measurements (cm)   LV Diastolic Dimension: 0.51 cm LV Systolic Dimension: 3.2 cm  LV Septum Diastolic: 1.2 cm  LV PW Diastolic: 3.00 cm        AO Root Dimension: 3.5 cm  RV Diastolic Dimension: 1.21 cm LA Dimension: 3.8 cm  Aorta   Aortic Root: 3.5 cm      XR PELVIS (1-2 VIEWS)    Result Date: 11/17/2021  History: Right femoral endarterectomy. Iliac artery stent placement. Intraoperative fluoroscopy. FINDINGS: 6 minutes 35 seconds fluoroscopic time. 33 cine series obtained. Images demonstrate catheterization and stent placement in the iliac artery. No acute complication identified. 1. Intraoperative fluoroscopy as described. XR CHEST PORTABLE    Result Date: 11/18/2021  PORTABLE CHEST. HISTORY: Increasing O2 requirement COMPARISON STUDY: 11/15/2021 FINDINGS: Heart size and mediastinal structures appear within normal limits. Persistent diffuse accentuation of pulmonary interstitial markings, nonspecific. Small nodular passes in the left upper lobe and left lung base unchanged. No pleural effusion. Bony structures are intact. 1.  Persistent diffuse mild nonspecific prominence of the pulmonary interstitial markings. No localized consolidation or pleural effusion. XR CHEST PORTABLE    Result Date: 11/15/2021  PORTABLE AP CHEST AT 1755 HOURS:   HISTORY: Preop study. COMPARISON: 9/17/2021. FINDINGS: The heart and pulmonary vasculature are within normal limits. There is diffuse accentuation of pulmonary interstitial markings throughout both lungs, nonspecific. No focal airspace densities are seen. There are no effusions. Diffuse mild nonspecific prominence of pulmonary interstitial markings. No focal airspace density areas of consolidation seen. There are no effusions. Correlate clinically. FLUORO FOR SURGICAL PROCEDURES    Result Date: 11/17/2021  History: Right femoral endarterectomy. Iliac artery stent placement. Intraoperative fluoroscopy. FINDINGS: 6 minutes 35 seconds fluoroscopic time. 33 cine series obtained. Images demonstrate catheterization and stent placement in the iliac artery. No acute complication identified. 1. Intraoperative fluoroscopy as described. Assessment/plan:     1.  Post-op right lower extremity cellulitis following right femoral endarterectomy on 11/17. Continue Zosyn and vancomycin. Wound care nurse. ID to see to assist with tailoring antibiotics. PT/OT ordered to encourage ambulation. Add gabapentin for burning pain  2. Acute kidney injury/hyponatremia: Likely pre-renal. Mild volume depletion. Improved. Avoid nephrotoxic agents  3. Acute dyspnea, imprved: without increased O2 requirement. Likely related to history of COPD or chronic diastolic CHF. Given Lasix 10 mg IV x1 and albuterol inhaler as needed. Improved  4. Hyponatremia: continue to monitor  5. Hypokalemia: Replace prn  6.  Chronic co-morbidities: chronic respiratory failure with hypoxia on 5-6L O2, COPD, PVD, CAD, chronic diastolic CHF (EF of 83-41%), stable continue home meds       Anticipated Discharge in : 2-3 days  Code Status: Full Code  DVT prophylaxis: [x] Apixaban            Disposition:     [x] Home                         Electronically signed by Mau Pillai MD on 11/29/2021 at 5:35 PM

## 2021-11-29 NOTE — PLAN OF CARE
Problem: Pain:  Goal: Control of acute pain  Description: Control of acute pain  Outcome: Ongoing  Note: Pt reports symptoms of pain r/t leg discomfort  RN implemented non-pharmacological pain interventions to decrease patients pain level.   After re-assessment patients pain level is 9    See MAR for intervention

## 2021-11-29 NOTE — TELEPHONE ENCOUNTER
----- Message from Damian Garrison sent at 11/26/2021  3:40 PM EST -----  Subject: Message to Provider    QUESTIONS  Information for Provider? Leroy fowler/ [facility] called to let you   know for documentation purposes that the pt's nurse had stated that the   pt's blood pressure had been low all day and that the pt's brother was   being called to take him to the ER.   ---------------------------------------------------------------------------  --------------  [id number]  What is the best way for the office to contact you? OK to leave message on   voicemail  Preferred Call Back Phone Number? [phone]  ---------------------------------------------------------------------------  --------------  SCRIPT ANSWERS  Relationship to Patient? Third Party  Representative Name?  [id number] [facility]

## 2021-11-29 NOTE — PROGRESS NOTES
Physician Progress Note      Sg Ralph  CSN #:                  [id number]  :                       1950  ADMIT DATE:       2021 6:22 PM  [address] [place] DATE:  RESPONDING  PROVIDER #:        Caden Arellano MD          QUERY TEXT:    Pt admitted with Cellulitis of RLE following  right femoral endarterectomy   . ? If possible, please document in progress notes and discharge   summary:      The medical record reflects the following:  Risk Factors:  right femoral endarterectomy , PVD  Clinical Indicators: Per ED \"multiple crusted wounds with yellowish purulence   to his right lower extremity\"; Per Attending  \"right lower extremity   cellulitis following a right femoral endarterectomy on . \"  Blood/   wound drainage cx negative  Treatment: L LR BOLUS, NS @ 100, Zosyn, Vanc, Wound cx, Blood cx's  Options provided:  -- Cellulitis as a postoperative wound infection  -- Cellulitis is not a related to postoperative wound infection but is due to   other incidental risk factor, Please specify other incidental risk factor  -- Other - I will add my own diagnosis  -- Disagree - Not applicable / Not valid  -- Disagree - Clinically unable to determine / Unknown  -- Refer to Clinical Documentation Reviewer    PROVIDER RESPONSE TEXT:    Patient has cellulitis as a postoperative wound infection. Query created by:  Jay Staples on 2021 11:34 AM      Electronically signed by:  Caden Arellano MD 2021 12:25 PM

## 2021-11-29 NOTE — PROGRESS NOTES
Hospital Medicine Progress Note    Patient:  George Russo. YOB: 1950  MRN: [id number]   PCP: Denton Ochoa DO         Acct: [de-identified]  Unit/Bed: [facility]/3311-01    Date of Admission: 11/26/2021   Date of service: 11/27/2021  CC: RLE cellultis      Subjective: The patient is a 73 y.o. male with a PMH of PVD, CAD, CHF, COPD and chronic respiratory failure with hypoxia on 5 to 6 LO2 at home who was admitted on 11/26/2021 for a right lower extremity cellulitis following a right femoral endarterectomy on November 17th.    11/27: Right lower extremity cellulitis improving on Zosyn and vancomycin  11/28: Area of redness continues to improve. Patient has not been ambulating much. This evening patient also had some worsening shortness of breath. Patient's last echocardiogram reviewed from 11/19/2021 shows an EF of 55-60%       Objective:    Medications:  Reviewed    Physical examination:   /64   Pulse 58   Temp 97.3 °F (36.3 °C) (Oral)   Resp 16   Wt 202 lb 6.1 oz (91.8 kg)   SpO2 96%   BMI 29.04 kg/m²     General appearance:  No apparent distress. Appears comfortable. Well nourished  HEENT: Atraumatic. Pupils equally round. Extraocular motion intact. Conjunctivae clear. Nose symmetric without evidence of discharge. Oral mucosa moist w/o erythema or exudate. Neck supple. No JVD. Trachea midline. Cardiovascular:  RRR w/ normal S1/S2. No murmurs, rubs or gallops. Respiratory: Clear to auscultation, bilaterally without rales/wheezes/rhonchi. Gastrointestinal: Abdomen soft, non-tender, non-distended with normal bowel sounds. Musculoskeletal:  Full ROM without deformity. Neurologic:  No speech or focal sensory/motor deficits. Cranial nerves grossly intact. Lymphatic: No cervical lymphadenopathy. Psychiatric:  Alert and oriented. Appropriate affect, not agitated  Vascular: Diminished dorsalis pedis pulses bilaterally. No peripheral edema.  Capillary refill<3 seconds  Genitourinary: Deferred. Skin: No visible rashes or lesions. Warm, dry. Necrotic and blsitering areas at the right lower extremity. Redness seems improved base on the outlined region. Labs:     Recent Labs     11/26/21 1916 11/27/21  0802 11/28/21  0333   WBC 8.3 5.2 5.5   HGB 13.0* 11.8* 12.0*   HCT 38.2* 35.3* 35.6*    229 221     Recent Labs     11/26/21 1916 11/27/21  0802 11/28/21  0333   * 133* 136   K 5.0 4.3 4.5    101 101   CO2 22 23 23   BUN 23* 20 15   CREATININE 1.6* 1.2 1.1   CALCIUM 9.4 9.2 9.1     Recent Labs     11/27/21 0802 11/28/21  0333   AST 18 17   ALT 13 12   BILITOT 0.5 0.4   ALKPHOS 87 86     No results for input(s): INR in the last 72 hours. No results for input(s): Curlie Lat in the last 72 hours. Urinalysis:      Lab Results   Component Value Date    NITRU Negative 12/07/2020    WBCUA >100 12/07/2020    BACTERIA 4+ 12/07/2020    RBCUA 3-4 12/07/2020    RBCUA NEGATIVE 08/10/2016    BLOODU Negative 12/07/2020    SPECGRAV 1.025 12/07/2020    GLUCOSEU Negative 12/07/2020       Diagnostic imaging/procedures:    No orders to display       Echo Limited    Result Date: 11/19/2021  Transthoracic Echocardiography Report (TTE)  Demographics   Patient Name       [address] [facility]. Date of Study      11/19/2021        Gender              Male   Patient Number     [id number]        Date of Birth       1950   Visit Number       [id number]         Age                 73 year(s)   Accession Number   [id number]        Room Number         4388   Corporate ID       [id number]          Sonographer         Yanira Gonzalez Cap, RVT, RDCS   Ordering Physician Zuleyka Sweeney MD Interpreting        Zuleyka Sweeney MD                                       Physician  Procedure Type of Study   TTE procedure:ECHOCARDIOGRAM LIMITED.   Procedure Date Date: 11/19/2021 Start: 11:50 AM Study Location: [facility] Echo Lab Technical Quality: Limited visualization due to poor acoustical window. Additional Indications:hypotension. Patient Status: Routine Contrast Medium: Definity. Amount - 2 ml Height: 70 inches Weight: 204 pounds BSA: 2.1 m2 BMI: 29.27 kg/m2 BP: 114/61 mmHg  Conclusions   Summary  Limited study. Left ventricular cavity size is normal. There is mild  concentric left ventricular hypertrophy. Overall left ventricular systolic  function appears normal with an ejection fraction of 55-60%. No regional  wall motion abnormalities are noted. Signature   ------------------------------------------------------------------  Electronically signed by Hudson Lilly MD (Interpreting  physician) on 11/19/2021 at 05:29 PM  ------------------------------------------------------------------   Findings   Left Ventricle  Left ventricular cavity size is normal. There is mild concentric left  ventricular hypertrophy. Overall left ventricular systolic function appears  normal with an ejection fraction of 55-60%. No regional wall motion  abnormalities are noted. Aorta  The aortic root is normal in size. Right Ventricle  The right ventricle is normal in size and function. TAPSE measures 2.49 cm. Pericardial Effusion  No pericardial effusion noted. Pleural Effusion  No pleural effusion. Miscellaneous  IVC size is normal (<2.1cm) and collapses > 50% with respiration consistent  with normal RA pressure (3mmHg). M-Mode/2D Measurements (cm)   LV Diastolic Dimension: 0.44 cm LV Systolic Dimension: 3.2 cm  LV Septum Diastolic: 1.2 cm  LV PW Diastolic: 8.48 cm        AO Root Dimension: 3.5 cm  RV Diastolic Dimension: 2.04 cm LA Dimension: 3.8 cm  Aorta   Aortic Root: 3.5 cm      XR PELVIS (1-2 VIEWS)    Result Date: 11/17/2021  History: Right femoral endarterectomy. Iliac artery stent placement. Intraoperative fluoroscopy. FINDINGS: 6 minutes 35 seconds fluoroscopic time. 33 cine series obtained.  Images demonstrate catheterization and stent placement in the iliac artery. No acute complication identified. 1. Intraoperative fluoroscopy as described. XR CHEST PORTABLE    Result Date: 11/18/2021  PORTABLE CHEST. HISTORY: Increasing O2 requirement COMPARISON STUDY: 11/15/2021 FINDINGS: Heart size and mediastinal structures appear within normal limits. Persistent diffuse accentuation of pulmonary interstitial markings, nonspecific. Small nodular passes in the left upper lobe and left lung base unchanged. No pleural effusion. Bony structures are intact. 1.  Persistent diffuse mild nonspecific prominence of the pulmonary interstitial markings. No localized consolidation or pleural effusion. XR CHEST PORTABLE    Result Date: 11/15/2021  PORTABLE AP CHEST AT 1755 HOURS:   HISTORY: Preop study. COMPARISON: 9/17/2021. FINDINGS: The heart and pulmonary vasculature are within normal limits. There is diffuse accentuation of pulmonary interstitial markings throughout both lungs, nonspecific. No focal airspace densities are seen. There are no effusions. Diffuse mild nonspecific prominence of pulmonary interstitial markings. No focal airspace density areas of consolidation seen. There are no effusions. Correlate clinically. FLUORO FOR SURGICAL PROCEDURES    Result Date: 11/17/2021  History: Right femoral endarterectomy. Iliac artery stent placement. Intraoperative fluoroscopy. FINDINGS: 6 minutes 35 seconds fluoroscopic time. 33 cine series obtained. Images demonstrate catheterization and stent placement in the iliac artery. No acute complication identified. 1. Intraoperative fluoroscopy as described. Assessment/plan:     1. Right lower extremity cellulitis following right femoral endarterectomy on 11/17. Continue Zosyn and vancomycin. Wound care nurse and consider ID consult. Plan to transition to oral antibiotic such as linezolid if it continues to improve. PT/OT ordered to encourage ambulation. 2. Acute kidney injury/hyponatremia: Likely pre-renal. Mild volume depletion. Improved. Avoid nephrotoxic agents  3. Acute dyspnea: without increased O2 requirement. Likely related to history of COPD or chronic diastolic CHF. Given Lasix 10 mg IV x1 and albuterol inhaler as needed. Consider chest XR if no improvement  4. Hyponatremia: continue to monitor  5. Hypokalemia: Replace prn  6.  Chronic co-morbidities: chronic respiratory failure with hypoxia on 5-6L O2, COPD, PVD, CAD, chronic diastolic CHF (EF of 42-95%), stable continue home meds       Anticipated Discharge in : 2-3 days  Code Status: Full Code  DVT prophylaxis: [x] Apixaban            Disposition:     [x] Home                         Electronically signed by Chuy Polk MD on 11/28/2021 at 7:25 PM

## 2021-11-29 NOTE — CARE COORDINATION
Severo Pinna from [address] called to offer assistance with discharge planning and to let us know that the patient currently gets home care assistance from [facility] and home delivered meals from [employer]. If you have any questions, you can reach Severo Pinna at [phone].     Thank you  Beverly Murillo

## 2021-11-29 NOTE — PROGRESS NOTES
Clinical Pharmacy Progress Note    Vancomycin - Management by Pharmacy    Consult Date(s):  11/26/21  Consulting Provider(s):  Dr. Haylee Tomas / Plan  1)  RLE cellulitis - Vancomycin   Concurrent Antimicrobials: Zosyn    Day of Vanc Therapy: 3   Current Dosing Method: AUC  o Therapeutic Goal: 400-500 mg/L*hr  o Dose adjusted to 750mg IV q12h yesterday based on level. Regimen predicts an AUC = 460 with steady state trough 15. 5.  o Trough level is ordered for 11/30 AM to further evaluate above regimen.  Will continue to monitor clinical condition and make adjustments to regimen as appropriate. Please call with questions--  Thanks--  Daljit Spencer, PharmD, BCPS, BCGP  [id number] ([facility])   11/29/2021 10:58 AM      Interval update:  Wound culture growing mixed skin carmen. Subjective/Objective:   Surya Martinez is a 73 y.o. y/o male with a PMHx that includes CAD, HF, COPD, depression, GERD, TIA, HTN, HLD, neuropathy, and recent right femoral endarterectomy (done 11/17/21) who is admitted with worsening right foot pain with wound drainage. Pharmacy is consulted to dose Vancomycin. Current antibiotics:  Zosyn 3.375g IV EI q8h (11/27-current)  Vancomycin - Pharmacy to dose   1250mg IV x1 11/27 04:00   1000mg IV q12h (11/27-11/28)   750mg IV q12h (11/28-current)    Ht Readings from Last 1 Encounters:   11/22/21 5' 10\" (1.778 m)     Wt Readings from Last 1 Encounters:   11/29/21 209 lb 7 oz (95 kg)       Vanc Level(s) / Doses:  Date Time Dose Level / Type of Level Interpretation   11/28 03:33 1000mg q12h Trough = 13.3mcg/mL Drawn ~9.5h after 2nd total dose. Calculated AUC = 604 & trough 20.3.   11/30 05:30 750mg q12h Trough = ordered    Note: Serum levels collected for AUC-based dosing may be high if collected in close proximity to the dose administered. This is not necessarily an indicator of toxicity.     Recent Labs     11/27/21  0802 11/28/21  0333 11/29/21  0515   CREATININE 1.2 1.1 1. 2   BUN 20 15 13   WBC 5.2 5.5 5.8       Estimated Creatinine Clearance: 67 mL/min (based on SCr of 1.2 mg/dL).     Cultures & Sensitivities:  Date Site Micro Susceptibility / Result   11/26 Blood x2 No growth to date    11/26 MRSA nasal PCR negative    11/26 Wound Mixed skin carmen

## 2021-11-30 VITALS
TEMPERATURE: 97.8 F | OXYGEN SATURATION: 97 % | SYSTOLIC BLOOD PRESSURE: 118 MMHG | WEIGHT: 194 LBS | HEART RATE: 61 BPM | RESPIRATION RATE: 18 BRPM | BODY MASS INDEX: 27.84 KG/M2 | DIASTOLIC BLOOD PRESSURE: 64 MMHG

## 2021-11-30 LAB
A/G RATIO: 1.1 (ref 1.1–2.2)
ALBUMIN SERPL-MCNC: 3.7 G/DL (ref 3.4–5)
ALP BLD-CCNC: 90 U/L (ref 40–129)
ALT SERPL-CCNC: 14 U/L (ref 10–40)
ANION GAP SERPL CALCULATED.3IONS-SCNC: 13 MMOL/L (ref 3–16)
AST SERPL-CCNC: 19 U/L (ref 15–37)
BASOPHILS ABSOLUTE: 0.1 K/UL (ref 0–0.2)
BASOPHILS RELATIVE PERCENT: 1.4 %
BILIRUB SERPL-MCNC: 0.4 MG/DL (ref 0–1)
BLOOD CULTURE, ROUTINE: NORMAL
BUN BLDV-MCNC: 14 MG/DL (ref 7–20)
CALCIUM SERPL-MCNC: 8.9 MG/DL (ref 8.3–10.6)
CHLORIDE BLD-SCNC: 103 MMOL/L (ref 99–110)
CO2: 22 MMOL/L (ref 21–32)
CREAT SERPL-MCNC: 1.1 MG/DL (ref 0.8–1.3)
CULTURE, BLOOD 2: NORMAL
EOSINOPHILS ABSOLUTE: 0.3 K/UL (ref 0–0.6)
EOSINOPHILS RELATIVE PERCENT: 5.4 %
GFR AFRICAN AMERICAN: >60
GFR NON-AFRICAN AMERICAN: >60
GLUCOSE BLD-MCNC: 97 MG/DL (ref 70–99)
HCT VFR BLD CALC: 38.8 % (ref 40.5–52.5)
HEMOGLOBIN: 13.2 G/DL (ref 13.5–17.5)
LYMPHOCYTES ABSOLUTE: 1.3 K/UL (ref 1–5.1)
LYMPHOCYTES RELATIVE PERCENT: 23.5 %
MCH RBC QN AUTO: 34 PG (ref 26–34)
MCHC RBC AUTO-ENTMCNC: 33.9 G/DL (ref 31–36)
MCV RBC AUTO: 100.3 FL (ref 80–100)
MONOCYTES ABSOLUTE: 0.5 K/UL (ref 0–1.3)
MONOCYTES RELATIVE PERCENT: 7.9 %
NEUTROPHILS ABSOLUTE: 3.5 K/UL (ref 1.7–7.7)
NEUTROPHILS RELATIVE PERCENT: 61.8 %
PDW BLD-RTO: 15.1 % (ref 12.4–15.4)
PLATELET # BLD: 219 K/UL (ref 135–450)
PMV BLD AUTO: 8.2 FL (ref 5–10.5)
POTASSIUM REFLEX MAGNESIUM: 4 MMOL/L (ref 3.5–5.1)
RBC # BLD: 3.87 M/UL (ref 4.2–5.9)
SODIUM BLD-SCNC: 138 MMOL/L (ref 136–145)
TOTAL PROTEIN: 7.2 G/DL (ref 6.4–8.2)
VANCOMYCIN TROUGH: 16.8 UG/ML (ref 10–20)
WBC # BLD: 5.7 K/UL (ref 4–11)

## 2021-11-30 PROCEDURE — 36415 COLL VENOUS BLD VENIPUNCTURE: CPT

## 2021-11-30 PROCEDURE — 80053 COMPREHEN METABOLIC PANEL: CPT

## 2021-11-30 PROCEDURE — 99232 SBSQ HOSP IP/OBS MODERATE 35: CPT | Performed by: INTERNAL MEDICINE

## 2021-11-30 PROCEDURE — 6370000000 HC RX 637 (ALT 250 FOR IP): Performed by: INTERNAL MEDICINE

## 2021-11-30 PROCEDURE — 6360000002 HC RX W HCPCS: Performed by: INTERNAL MEDICINE

## 2021-11-30 PROCEDURE — 2700000000 HC OXYGEN THERAPY PER DAY

## 2021-11-30 PROCEDURE — 94640 AIRWAY INHALATION TREATMENT: CPT

## 2021-11-30 PROCEDURE — 80202 ASSAY OF VANCOMYCIN: CPT

## 2021-11-30 PROCEDURE — 85025 COMPLETE CBC W/AUTO DIFF WBC: CPT

## 2021-11-30 PROCEDURE — 97116 GAIT TRAINING THERAPY: CPT

## 2021-11-30 PROCEDURE — 94761 N-INVAS EAR/PLS OXIMETRY MLT: CPT

## 2021-11-30 RX ORDER — SULFAMETHOXAZOLE AND TRIMETHOPRIM 800; 160 MG/1; MG/1
1 TABLET ORAL 2 TIMES DAILY
Qty: 14 TABLET | Refills: 0 | Status: SHIPPED | OUTPATIENT
Start: 2021-11-30 | End: 2021-12-07

## 2021-11-30 RX ORDER — GABAPENTIN 100 MG/1
200 CAPSULE ORAL 3 TIMES DAILY
Qty: 60 CAPSULE | Refills: 0 | Status: SHIPPED | OUTPATIENT
Start: 2021-11-30 | End: 2021-12-22

## 2021-11-30 RX ADMIN — BUDESONIDE 250 MCG: 0.25 SUSPENSION RESPIRATORY (INHALATION) at 07:45

## 2021-11-30 RX ADMIN — METOPROLOL TARTRATE 100 MG: 100 TABLET, FILM COATED ORAL at 09:21

## 2021-11-30 RX ADMIN — ALBUTEROL SULFATE 2.5 MG: 2.5 SOLUTION RESPIRATORY (INHALATION) at 07:45

## 2021-11-30 RX ADMIN — APIXABAN 5 MG: 5 TABLET, FILM COATED ORAL at 09:23

## 2021-11-30 RX ADMIN — CLOPIDOGREL BISULFATE 75 MG: 75 TABLET, FILM COATED ORAL at 09:23

## 2021-11-30 RX ADMIN — FOLIC ACID 1 MG: 1 TABLET ORAL at 09:22

## 2021-11-30 RX ADMIN — TIOTROPIUM BROMIDE AND OLODATEROL 2 PUFF: 3.124; 2.736 SPRAY, METERED RESPIRATORY (INHALATION) at 07:45

## 2021-11-30 RX ADMIN — ISOSORBIDE MONONITRATE 15 MG: 30 TABLET, EXTENDED RELEASE ORAL at 09:22

## 2021-11-30 RX ADMIN — ATORVASTATIN CALCIUM 80 MG: 80 TABLET, FILM COATED ORAL at 09:23

## 2021-11-30 RX ADMIN — Medication 500 MG: at 09:22

## 2021-11-30 RX ADMIN — GABAPENTIN 200 MG: 100 CAPSULE ORAL at 09:21

## 2021-11-30 RX ADMIN — GABAPENTIN 200 MG: 100 CAPSULE ORAL at 16:00

## 2021-11-30 RX ADMIN — OXYCODONE 5 MG: 5 TABLET ORAL at 16:01

## 2021-11-30 ASSESSMENT — PAIN SCALES - GENERAL: PAINLEVEL_OUTOF10: 9

## 2021-11-30 NOTE — CARE COORDINATION
Patient expected to return home today with present services and his [address]. Patient to follow up with OP vascular and have PO antibiotics at discharge. Electronically signed by Chelo Patel RN on 11/30/2021 at 12:11 PM     Addendum: This  [facility] [address] [facility] and physician discharge note. Patient to follow up outpatient for services regarding this admit.  Electronically signed by Chelo Patel RN on 11/30/2021 at 1:19 PM

## 2021-11-30 NOTE — PROGRESS NOTES
growth to date   11/26 Wound Culture- Mixed skin carmen,  Moderate growth  11/26 MRSA DNA Nasal Probe - Negative      Imaging:        Scheduled Meds:   gabapentin  200 mg Oral TID    vancomycin  750 mg IntraVENous Q12H    albuterol  2.5 mg Nebulization BID    metoprolol  100 mg Oral BID    pantoprazole  40 mg Oral QAM AC    folic acid  1 mg Oral Daily    apixaban  5 mg Oral BID    isosorbide mononitrate  15 mg Oral Daily    DULoxetine  20 mg Oral Daily    clopidogrel  75 mg Oral Daily    atorvastatin  80 mg Oral Daily    magnesium oxide  500 mg Oral Daily    sodium chloride flush  5-40 mL IntraVENous 2 times per day    piperacillin-tazobactam  3,375 mg IntraVENous Q8H    tiotropium-olodaterol  2 puff Inhalation Daily    And    budesonide  0.25 mg Nebulization BID       Continuous Infusions:   sodium chloride 100 mL/hr at 11/28/21 1003       PRN Meds:  albuterol, sodium chloride flush, sodium chloride, ondansetron **OR** ondansetron, polyethylene glycol, acetaminophen **OR** acetaminophen, oxyCODONE      Assessment:        is a 73year old male with a PMHx of CAD,CHF,PVD,COPD (on home oxygen 5-6 L),HTN,prior PE (on eliquis),DVT,TIA,GERD who presented on 11/26/21 due right leg wound pain. Right LE Cellulitis  -S/p right femoral endarterectomy on 11/17/21  -Has had scabs and lesions on right leg that became worse in the past few days with drainage and erythema  -Blood and wound cultures with no growth to date   -Today, 11/30, remains afebrile and has improvement in right LE erythema and swelling  -On zosyn and vancomycin    Plan:     -Continue vancomycin while in hospital  -Can transition to oral bactrim at discharge for a total of 7 days     [facility])  Discussed with Attending Deonte Vital     Addendum to Medical Student Progressnote:  Pt seen,examined and evaluated. I have independently performed history, physical exam, lab and data review.   I have determined assessment and plan as documented by student Daniel Torres).    72 yo man with hx CAD,COPD (home O2 5-6L), CHF, PAD, HTN, hx DVT / PE, HER     Hx R fem endarterectomy on 11/17  Hx R LE chronic wound  Presented on with R leg redness and pain associated with R leg wound  Wound with drainage.     No fever / chills     In ED 11/26, afeb, SBP 92  Admit, started on Vancomycin + zosyn     Today - afeb, 5L, WBC 5.7  R LE with mild erythema, escar intact, no open wound / drainage     IMP/  R leg cellulitis, improved  Wound cult mixed skin carmen     REC/  Cont vancomycin in hospital  D/c zosyn     Home on po bactrim ds bid x 7 days  F/u Vascular Surg     Discussed with pt  Imani Mancera MD

## 2021-11-30 NOTE — PLAN OF CARE
Problem: Falls - Risk of:  Goal: Will remain free from falls  Description: Will remain free from falls  Outcome: Ongoing  Note: Pt remains free from falls through this shift, alert and oriented x4, fall protocol in place, bed alarm on and in low, locked position, side rails elevated x2, nonskid footwear on. Call light and pt belongings within reach, uses call light appropriately, no attempts to get oob without assistance. Will continue to monitor for pt safety. Problem: Pain:  Description: Pain management should include both nonpharmacologic and pharmacologic interventions. Goal: Pain level will decrease  Description: Pain level will decrease  Outcome: Ongoing  Note: Pt complained of right lower leg pain and left lower leg tenderness this shift. Pt medicated with PRN oxycodone as ordered. Will continue to monitor. Problem: Skin Integrity:  Goal: Will show no infection signs and symptoms  Description: Will show no infection signs and symptoms  Outcome: Ongoing  Note: No signs of new skin breakdown breakdown this shift. Pts right lower leg dressing is clean, dry, and intact. Pt repositions self in bed as needed. Will continue to monitor. Problem: OXYGENATION/RESPIRATORY FUNCTION  Goal: Patient will maintain patent airway  Outcome: Ongoing  Note: Pt remains on 4L NC saturating in mid 90s which is pts basline. Will continue to monitor.

## 2021-11-30 NOTE — CONSULTS
VascularSurgery   Resident Consult Note      Chief Complaint: Cellulitis RLE    History obtained from: Pt and EMR    History of Present Illness :   Lauren Armenta is a 73 y.o. [place] a history of COPD on 6L home O2 at baseline, KAVITA, CAD, CHF and a previous MI, PE on eliquis is status post R. femoral endarterectomy, right external iliac to profunda bypass w/ PTFE graft. RLE arteriogram, balloon angioplasty of R profunda balloon angioplasty and stent of right external iliac artery secondary to atherosclerosis of native artery of RLE with rest pain. (11/17). Pt returned to [facility] with complaints of worsening right leg redness and pain. He states an RN came to the house and noticed the symptoms the first visit then on the second visit saw the erythema and swelling was increasing. She suggested the pt come to the ED for evaluation. Pt was admitted and placed on abx.        Risk Factors: Age, Gender, Family History, Smoking, HTN, Hyperlipidemia, Diabetes    Past Medical History:        Diagnosis Date    CAD (coronary artery disease)     CHF (congestive heart failure) (HCC)     COPD (chronic obstructive pulmonary disease) (HCC)     Depressive disorder, not elsewhere classified     GERD (gastroesophageal reflux disease)     History of colon polyps - 30 seen on colonoscopy 04/2021 4/24/2021    History of MI (myocardial infarction) 05/2013    History of skin ulcer of lower extremity     R anterior shin    History of transient ischemic attack (TIA)     Hyperlipidemia     Hypertension     Neuropathy     KAVITA (obstructive sleep apnea)     On CPAP    Personal history of DVT (deep vein thrombosis)     PVD [facility]peripheral vascular disease) ([facility] [address]     TIA (transient ischemic attack) 2013    Vertebral fracture        Past Surgical History:           Procedure Laterality Date    APPENDECTOMY      CHOLECYSTECTOMY, LAPAROSCOPIC      COLONOSCOPY  4/23/2021    COLONOSCOPY POLYPECTOMY SNARE/COLD BIOPSY performed by Quan Camarena Rose Morton MD at [facility]  4/23/2021    COLONOSCOPY POLYPECTOMY ABLATION performed by Joanna Solis MD at [facility]  4/23/2021    COLONOSCOPY CONTROL HEMORRHAGE performed by Joanna Solis MD at [address] [facility]  6/2013    EYE SURGERY Left     FEMORAL ENDARTERECTOMY Right 11/17/2021    RIGHT FEMORAL ENDARTERECTOMY  RIGHT EXTERNAL ILIAC TO PROFUNDA FEMORAL BYPASS WITH 8MM PTFE GRAFT RIGHT LOWER EXTREMITY ARTERIOGRAM BALLOON ANGIOPLASTY RIGHT PROFUNDA BALLOON ANGIOPLASTY AND STENT OF RIGHT EXTERNAL ILIAC ARTERY performed by Michelle Dominguez MD at [address] [facility]  11/18/2015    Bilateral decompressive lumbar laminectomy L4-5   ; medial facetectomy L4-5 joints  bilaterally; foraminotomies l5   nerve roots bilaterally    LEG DEBRIDEMENT Right 7/23/2013    Dr. Ale Parra - pretibial wound    OTHER SURGICAL HISTORY Right 6/25/2013    Dr. Ale Parra - CFA Endarterectomy w/marsupialization; RLE wound excision & debridement     OTHER SURGICAL HISTORY Left 8/27/2013    Dr. Ale Parra - femoral & profunda femoris endarterectomy w/marsupialization patch angioplasty using SFA    SKIN SPLIT GRAFT Right 7/25/2013    Dr. Ale Parra - to non-healing R pretibial wound, 9 x 15 cm    TOTAL HIP ARTHROPLASTY Right 09/01/2016    Dr. Giuliano Gonzales Left 12/22/2015    Dr. Ale Parra - CFA exploration, thrombectomy of ileofemoral system, stenting of RAFAL in-stent stenosis w/8 x 37 mm Palmaz        Allergies:  Asa [aspirin] and Daliresp [roflumilast]    Medications:   Home Meds  No current facility-administered medications on file prior to encounter.      Current Outpatient Medications on File Prior to Encounter   Medication Sig Dispense Refill    clopidogrel (PLAVIX) 75 MG tablet Take 1 tablet by mouth daily 30 tablet 3    docusate sodium (COLACE) 100 MG capsule Take 1 capsule by mouth 2 times daily for 10 days Please take while taking narcotic pain medicine. If you develop loose or watery stools, then stop taking. 20 capsule 0    AMLODIPINE BENZOATE PO 5 mg: TAKE 1 TAB DAILY      Cyanocobalamin (B-12) 100 MCG TABS 1 tablet      DULoxetine (CYMBALTA) 20 MG extended release capsule 1 capsule      potassium chloride (MICRO-K) 10 MEQ extended release capsule 1 tablet      Cholecalciferol (VITAMIN D-3) 25 MCG (1000 UT) CAPS 1 tablet      ELIQUIS DVT/PE STARTER PACK 5 MG TBPK tablet 2 times daily       omeprazole (PRILOSEC) 40 MG delayed release capsule       fluticasone-umeclidin-vilant (TRELEGY ELLIPTA) 100-62.5-25 MCG/INH AEPB Inhale 1 puff into the lungs daily 28 each 2    isosorbide mononitrate (IMDUR) 30 MG extended release tablet Take 0.5 tablets by mouth daily 30 tablet 3    folic acid (FOLVITE) 1 MG tablet Take 1 tablet by mouth daily 30 tablet 3    albuterol sulfate  (90 Base) MCG/ACT inhaler Inhale 2 puffs into the lungs every 6 hours as needed for Wheezing 3 Inhaler 4    allopurinol (ZYLOPRIM) 100 MG tablet Take 1 tablet by mouth daily 180 tablet 1    losartan (COZAAR) 100 MG tablet TAKE 1 TABLET BY MOUTH EVERY DAY 90 tablet 1    atorvastatin (LIPITOR) 80 MG tablet Take 1 tablet by mouth daily 90 tablet 1    nitroGLYCERIN (NITROSTAT) 0.4 MG SL tablet Place 1 tablet under the tongue every 5 minutes as needed for Chest pain 25 tablet 1    furosemide (LASIX) 40 MG tablet Take 1 tablet by mouth daily 90 tablet 3    metoprolol (LOPRESSOR) 100 MG tablet Take 1 tablet by mouth 2 times daily 270 tablet 1    magnesium gluconate (MAGONATE) 500 MG tablet Take 500 mg by mouth daily.       cilostazol (PLETAL) 50 MG tablet Take 1 tablet by mouth 2 times daily 60 tablet 3    colchicine (COLCRYS) 0.6 MG tablet Take 0.6 mg by mouth as needed      nicotine (NICODERM CQ) 21 MG/24HR Place 1 patch onto the skin every 24 hours 30 patch 1    pantoprazole (PROTONIX) 40 MG tablet Take 1 tablet by mouth daily for 10 days 10 tablet 0 Current Meds  gabapentin (NEURONTIN) capsule 200 mg, TID  vancomycin (VANCOCIN) 750 mg in dextrose 5 % 250 mL IVPB, Q12H  albuterol (PROVENTIL) nebulizer solution 5 mg, Q4H PRN  albuterol (PROVENTIL) nebulizer solution 2.5 mg, BID  metoprolol (LOPRESSOR) tablet 100 mg, BID  pantoprazole (PROTONIX) tablet 40 mg, QAM AC  folic acid (FOLVITE) tablet 1 mg, Daily  apixaban (ELIQUIS) tablet 5 mg, BID  isosorbide mononitrate (IMDUR) extended release tablet 15 mg, Daily  DULoxetine (CYMBALTA) extended release capsule 20 mg, Daily  clopidogrel (PLAVIX) tablet 75 mg, Daily  atorvastatin (LIPITOR) tablet 80 mg, Daily  magnesium oxide (MAG-OX) tablet 500 mg, Daily  sodium chloride flush 0.9 % injection 5-40 mL, 2 times per day  sodium chloride flush 0.9 % injection 5-40 mL, PRN  0.9 % sodium chloride infusion, PRN  ondansetron (ZOFRAN-ODT) disintegrating tablet 4 mg, Q8H PRN   Or  ondansetron (ZOFRAN) injection 4 mg, Q6H PRN  polyethylene glycol (GLYCOLAX) packet 17 g, Daily PRN  acetaminophen (TYLENOL) tablet 650 mg, Q6H PRN   Or  acetaminophen (TYLENOL) suppository 650 mg, Q6H PRN  piperacillin-tazobactam (ZOSYN) 3,375 mg in dextrose 5 % 50 mL IVPB extended infusion (mini-bag), Q8H  oxyCODONE (ROXICODONE) immediate release tablet 5 mg, Q6H PRN  tiotropium-olodaterol (STIOLTO) 2.5-2.5 MCG/ACT inhaler 2 puff, Daily   And  budesonide (PULMICORT) nebulizer suspension 250 mcg, BID        Family History:   Family History   Problem Relation Age of Onset    Cancer Mother         Breast    Heart Disease Mother     Cancer Father         Bladder    Heart Disease Father     Cancer Paternal Grandmother         melanoma        Social History:   TOBACCO:   reports that he has been smoking cigarettes. He started smoking about 54 years ago. He has a 26.00 pack-year smoking history. He has never used smokeless tobacco.  ETOH:   reports current alcohol use. DRUGS:   reports no history of drug use.     ROS: A 10 point review of systems was conducted, significant findings as noted in HPI. All other systems negative. Physical exam:   Vitals:    11/30/21 0350 11/30/21 0500 11/30/21 0744 11/30/21 0845   BP: (!) 159/75   (!) 155/76   Pulse: 65   68   Resp: 18  16 18   Temp: 97.8 °F (36.6 °C)   97.9 °F (36.6 °C)   TempSrc: Oral   Oral   SpO2: 94%  98% 97%   Weight:  194 lb (88 kg)         General appearance: alert, no acute distress, grooming appropriate  Eyes: no scleral icterus  Neck: trachea midline, no JVD, no lymphadenopathy  Chest/Lungs: CTAB, wheezes/rhonchi, normal effort, remains on 4 L o2  Cardiovascular: RRR, no murmurs/gallops/rubs  Abdomen: soft, non-tender, non-distended, +BS, no guarding/rigidity  Skin: warm and dry, no rashes  Extremities: no edema, no cyanosis  Neuro: A&Ox3, no focal deficits, sensation intacta    Pulses    F PT DP   R + +/- +/-   L + + +    +, -/+ ,-/-           Labs:    CBC:   Recent Labs     11/28/21  0333 11/29/21  0515 11/30/21  0626   WBC 5.5 5.8 5.7   HGB 12.0* 12.7* 13.2*   HCT 35.6* 37.1* 38.8*   .3* 100.6* 100.3*    222 219     BMP:   Recent Labs     11/28/21  0333 11/29/21  0515 11/30/21  0626    136 138   K 4.5 3.9 4.0    101 103   CO2 23 21 22   BUN 15 13 14   CREATININE 1.1 1.2 1.1     PT/INR: No results for input(s): PROTIME, INR in the last 72 hours. APTT: No results for input(s): APTT in the last 72 hours.   Liver Profile:  Lab Results   Component Value Date    AST 19 11/30/2021    ALT 14 11/30/2021    BILIDIR <0.2 09/16/2021    BILITOT 0.4 11/30/2021    ALKPHOS 90 11/30/2021     Lab Results   Component Value Date    CHOL 141 12/06/2020    HDL 30 12/06/2020    TRIG 128 12/06/2020     UA:   Lab Results   Component Value Date    COLORU Yellow 12/07/2020    PHUR 6.0 12/07/2020    LABCAST 10-20 Hyaline 08/06/2019    WBCUA >100 12/07/2020    RBCUA 3-4 12/07/2020    RBCUA NEGATIVE 08/10/2016    MUCUS PRESENT 08/10/2016    BACTERIA 4+ 12/07/2020    CLARITYU Clear 12/07/2020 SPECGRAV 1.025 12/07/2020    LEUKOCYTESUR MODERATE 12/07/2020    UROBILINOGEN 0.2 12/07/2020    BILIRUBINUR Negative 12/07/2020    BILIRUBINUR NEGATIVE 08/10/2016    BLOODU Negative 12/07/2020    GLUCOSEU Negative 12/07/2020         Assessment/Plan: This is a 73 y.o. male with a history of COPD on 4L home O2 at baseline, KAVITA, CAD, CHF and a previous MI, PE on eliquis is status post R. femoral endarterectomy, right external iliac to profunda bypass w/ PTFE graft. RLE arteriogram, balloon angioplasty of R profunda balloon angioplasty and stent of right external iliac artery secondary to atherosclerosis of native artery of RLE with rest pain. (11/17) Returned to ED with RLE erythema, pain. Chronic Numbness of toes. WBC 5.7 on vanco/zosyn. On eliquis. Overall pt states his leg is looking and feeling better. Still has some pain to that extremity which could be attributed to recent revascularization.     - No vascular surgical needs at this time. Pt ok to be discharged. - Post op office visit missed yesterday.  Pt will need to follow up in office in 1 week for wound check and staple removal.       Karmen Ramirez CNP  11/30/2021  11:38 AM  russell

## 2021-11-30 NOTE — PROGRESS NOTES
Physical Therapy  Daily Treatment Note    Discharge Recommendations: Adilene Miramontes. scored a 19/24 on the AM-PAC short mobility form. Current research shows that an AM-PAC score of 18 or greater is typically associated with a discharge to the patient's home setting. Based on the patient's AM-PAC score and their current functional mobility deficits, it is recommended that the patient have 2-3 sessions per week of Physical Therapy at d/c to increase the patient's independence. At this time, this patient demonstrates the endurance and safety to discharge home with home PT and a follow up treatment frequency of 2-3x/wk. Please see assessment section for further patient specific details. Assessment:  Pt with improved activity tolerance this session. Gait steady with wheeled walker. Did well on stairs with railing. Plan is for home at D/C. Pt would benefit from initial 24 hour assist and continued PT to work towards baseline function. No new DME needs. HOME HEALTH CARE: LEVEL 1 STANDARD  - Initial home health evaluation to occur within 24-48 hours, in patient home   - Therapy to evaluate with goal of regaining prior level of functioning   - Therapy to evaluate if patient has [address] needs for personal care    Equipment Needs: No new needs    Chart Reviewed: Yes     Other position/activity restrictions: up as tolerated   Additional Pertinent Hx: Admit 11/26 with LE cellulitis; PMHx:  right femoral endarterectomy on 11/17, PVD, CAD, CHF, COPD, chronic hypoxic respiratory failure with 5 to 6 L of home oxygen, hypertension, and history of PE      Diagnosis: LE cellulitis   Treatment Diagnosis: impaired gait and transfers    Subjective: Pt in bed initially. Agreeable to working with PT. Hopes to go home today. States he has 2-4 steps (depending on front or back entrance) into house with railing. Pain: c/o some R LE discomfort. Better than previous days.      Objective:    Bed mobility  Supine to sit: Supervision, HOB up partially  Sit to Supine: Supervision, HOB up partially    Transfers  Sit to stand: SBA from bed; SBA from commode with grab bar  Stand to sit: SBA onto commode with grab bar; SBA onto bed    Ambulation  Assistance Level: SBA  Assistive device: Wheeled walker  Distance: ~160 ft in johnson; 8 ft x 2 in room (to/from bathroom)  Quality of gait: Step-to pattern; antalgic R LE; no LOB  Other: Occasional standing rest break towards end of longer walk due to fatigue/mild FRANCO. Stairs  Up/down 4 steps with B hands on one rail CGA to SBA. Step-to pattern. Steady. Other  Pt on 4L oxygen throughout session. Mild FRANCO with ambulation. Resolved with rest breaks. Saturation = 96% at end of session. Patient Education  Role of PT. Calling for assist with needs. Expressed understanding. Safety Devices  Pt left with needs in reach. In bed with bed alarm on. RN updated. AM-PAC score  AM-PAC Inpatient Mobility Raw Score : 19  AM-PAC Inpatient T-Scale Score : 45.44  Mobility Inpatient CMS 0-100% Score: 41.77  Mobility Inpatient CMS G-Code Modifier : CK    Goals: (as determined and assessed by primary PT)  Time Frame for Short term goals: discharge  Short term goal 1: Pt will transfer sit <--> stand with supervision   Short term goal 2: Pt will amb 80 ft with RW and supervision   Short term goal 3: Pt will negotiate 4 steps with rail and CGA      Plan:  Times per week: 2-5;    Current Treatment Recommendations: Home Exercise Program, Balance Training, Functional Mobility Training, Transfer Training, Gait Training, Endurance Training, Patient/Caregiver Education & Training    Therapy Time    Individual  Concurrent  Group  Co-treatment    Time In  1158            Time Out  1225            Minutes  27              Timed Code Treatment Minutes: 27  Total Treatment Minutes: 27    Will continue per plan of care if not D/Sanjiv today.    If patient is discharged prior to next treatment, this note will serve as the discharge summary.     [place], [place] #[id number]

## 2021-11-30 NOTE — DISCHARGE SUMMARY
Discharge Summary    Date:11/30/2021        Patient Name:Kong Merchant YOB: 1950     Age:71 y.o. Admit Date:11/26/2021   Admission Condition:good   Discharged Condition:fair  Discharge Date: 11/30/21    Discharge Diagnoses   Active Problems:    KAVITA and COPD overlap syndrome (HCC)    PVD (peripheral vascular disease) ([facility] [address]    Coronary artery disease involving native coronary artery of native heart without angina pectoris    Cellulitis of right lower extremity    LYDIA (acute kidney injury) ([facility] [address]    Anemia  Resolved Problems:    * No resolved hospital problems. [facility] Stay   Narrative of Hospital Course:        1. Post-op right lower extremity cellulitis following right femoral endarterectomy on 11/17. Inpatient treated with IV vancomycin and Zosyn for 4 days. Per ID DC on Bactrim. Wound care per vascular surgery and wound care nurse. Vascular surgery was consulted prior to discharge. 2. Acute kidney injury/hyponatremia: Present on admission, resolved. 3. Chronic co-morbidities: chronic respiratory failure with hypoxia on 5-6L O2, COPD, PVD, CAD, chronic diastolic CHF (EF of 03-27%), stable continue home meds     On dc Patient denies any CP,SOB,Denies any nausea, vomiting, abdominal pain. Denies any diarrhea. Patient denies any palpitations, lightheadedness, orthopnea, PND. Patient doesn't appear to be in any distress on discharge . Physical exam   Vitals:    11/30/21 0350 11/30/21 0500 11/30/21 0744 11/30/21 0845   BP: (!) 159/75   (!) 155/76   Pulse: 65   68   Resp: 18  16 18   Temp: 97.8 °F (36.6 °C)   97.9 °F (36.6 °C)   TempSrc: Oral   Oral   SpO2: 94%  98% 97%   Weight:  194 lb (88 kg)           Physical Exam  Constitutional:       Appearance: Normal appearance. He is ill-appearing. HENT:      Head: Normocephalic and atraumatic. Cardiovascular:      Rate and Rhythm: Normal rate and regular rhythm. Pulses: Normal pulses.       Heart sounds: Normal heart sounds. Pulmonary:      Effort: Pulmonary effort is normal.      Breath sounds: Normal breath sounds. Abdominal:      General: Abdomen is flat. Palpations: Abdomen is soft. Skin:     Capillary Refill: Capillary refill takes less than 2 seconds. Comments: Multiple scabs seen on right lower extremity  Surgical staples seen on the right groin. No   Erythema noted   Neurological:      General: No focal deficit present. Mental Status: He is alert and oriented to person, place, and time. Consultants:   IP CONSULT TO HOSPITALIST  PHARMACY TO DOSE VANCOMYCIN  IP CONSULT TO INFECTIOUS DISEASES  IP CONSULT TO VASCULAR SURGERY    Time Spent on Discharge:  45 minutes were spent in patient examination, evaluation, counseling as well as medication reconciliation, prescriptions for required medications, discharge plan and follow up. Surgeries/Procedures Performed:            Significant Diagnostic Studies:   Recent Labs:  CBC:   Recent Labs     11/28/21 0333 11/29/21 0515 11/30/21  0626   WBC 5.5 5.8 5.7   HGB 12.0* 12.7* 13.2*   HCT 35.6* 37.1* 38.8*   .3* 100.6* 100.3*    222 219     BMP:   Recent Labs     11/28/21 0333 11/29/21  0515 11/30/21  0626    136 138   K 4.5 3.9 4.0    101 103   CO2 23 21 22   BUN 15 13 14   CREATININE 1.1 1.2 1.1     Mag:   Lab Results   Component Value Date    MG 1.70 11/22/2021     LIVER PROFILE:   Recent Labs     11/28/21 0333 11/29/21  0515 11/30/21  0626   AST 17 18 19   ALT 12 13 14   BILITOT 0.4 0.5 0.4   ALKPHOS 86 87 90     PT/INR: No results for input(s): PROTIME, INR in the last 72 hours. APTT: No results for input(s): APTT in the last 72 hours. BNP:  No results for input(s): BNP in the last 72 hours. CARDIAC ENZYMES: No results for input(s): CKTOTAL, CKMB, TROPONINI in the last 72 hours.       Radiology Last 7 Days:  No orders to display       Discharge Plan   Disposition: Home    Provider Follow-Up:   Laci Hsu DO  [address]  [address]  [phone]    In 1 week  follow up     Jenniffer Duarte MD  [address]  [address]  [phone]    Call  If symptoms worsen    Mitali Louis MD  [address] [facility]  [phone]    In 1 week  follow up     [address]  [address]  [phone]  Schedule an appointment as soon as possible for a visit  follow up     Mitali Louis, [address]  [phone]    In 1 week  For wound re-check       Hospital/Incidental Findings Requiring Follow-Up:  No orders to display             Discharge Medications         Medication List      START taking these medications    gabapentin 100 MG capsule  Commonly known as: NEURONTIN  Take 2 capsules by mouth 3 times daily for 10 days. sulfamethoxazole-trimethoprim 800-160 MG per tablet  Commonly known as: BACTRIM DS;SEPTRA DS  Take 1 tablet by mouth 2 times daily for 7 days        CONTINUE taking these medications    albuterol sulfate  (90 Base) MCG/ACT inhaler  Inhale 2 puffs into the lungs every 6 hours as needed for Wheezing     allopurinol 100 MG tablet  Commonly known as: ZYLOPRIM  Take 1 tablet by mouth daily     AMLODIPINE BENZOATE PO     atorvastatin 80 MG tablet  Commonly known as: LIPITOR  Take 1 tablet by mouth daily     B-12 100 MCG Tabs     cilostazol 50 MG tablet  Commonly known as: PLETAL  Take 1 tablet by mouth 2 times daily     clopidogrel 75 MG tablet  Commonly known as: PLAVIX  Take 1 tablet by mouth daily     colchicine 0.6 MG tablet  Commonly known as: COLCRYS     docusate sodium 100 MG capsule  Commonly known as: Colace  Take 1 capsule by mouth 2 times daily for 10 days Please take while taking narcotic pain medicine. If you develop loose or watery stools, then stop taking.      DULoxetine 20 MG extended release capsule  Commonly known as: CYMBALTA     Eliquis DVT/PE Starter Pack 5 MG Tbpk tablet  Generic drug: apixaban starter pack     folic acid 1 MG tablet  Commonly known as: FOLVITE  Take 1 tablet by mouth daily     furosemide 40 MG tablet  Commonly known as: Lasix  Take 1 tablet by mouth daily     isosorbide mononitrate 30 MG extended release tablet  Commonly known as: IMDUR  Take 0.5 tablets by mouth daily     losartan 100 MG tablet  Commonly known as: COZAAR  TAKE 1 TABLET BY MOUTH EVERY DAY     magnesium gluconate 500 MG tablet  Commonly known as: MAGONATE     metoprolol 100 MG tablet  Commonly known as: LOPRESSOR  Take 1 tablet by mouth 2 times daily     nicotine 21 MG/24HR  Commonly known as: Nicoderm CQ  Place 1 patch onto the skin every 24 hours     nitroGLYCERIN 0.4 MG SL tablet  Commonly known as: NITROSTAT  Place 1 tablet under the tongue every 5 minutes as needed for Chest pain     omeprazole 40 MG delayed release capsule  Commonly known as: PRILOSEC     pantoprazole 40 MG tablet  Commonly known as: PROTONIX  Take 1 tablet by mouth daily for 10 days     potassium chloride 10 MEQ extended release capsule  Commonly known as: MICRO-K     Trelegy Ellipta 100-62.5-25 MCG/INH Aepb  Generic drug: fluticasone-umeclidin-vilant  Inhale 1 puff into the lungs daily     vitamin D-3 25 MCG (1000 UT) Caps        STOP taking these medications    doxazosin 1 MG tablet  Commonly known as: CARDURA     oxyCODONE 5 MG immediate release tablet  Commonly known as: ROXICODONE           Where to Get Your Medications      These medications were sent to [place] [address] [address] [facility] [address] J[address] [place]. [facility] [phone] - F [phone]  [address] [facility] [address]    Phone: [phone]   · gabapentin 100 MG capsule  · sulfamethoxazole-trimethoprim 800-160 MG per tablet         Electronically signed by Buddy Redmond MD on 11/30/21 at 12:50 PM EST

## 2021-11-30 NOTE — DISCHARGE INSTR - COC
Continuity of Care Form    Patient Name: George Borden    :  1950  MRN:  [id number]    Admit date:  2021  Discharge date:  21    Code Status Order: Full Code   Advance Directives:      Admitting Physician:  Deanne Louis MD  PCP: Denton Ochoa DO    Discharging Nurse: [facility] Unit/Room#: 3311/3311-01  Discharging Unit Phone Number: [phone]    Emergency Contact:   Extended Emergency Contact Information  Primary Emergency Contact: Aaron Guzman   [address] Phone: [phone]  Relation: Child    Past Surgical History:  Past Surgical History:   Procedure Laterality Date    APPENDECTOMY      CHOLECYSTECTOMY, LAPAROSCOPIC      COLONOSCOPY  2021    COLONOSCOPY POLYPECTOMY SNARE/COLD BIOPSY performed by Davidson Loja MD at [facility]  2021    COLONOSCOPY POLYPECTOMY ABLATION performed by Davidson Loja MD at [facility]  2021    COLONOSCOPY CONTROL HEMORRHAGE performed by Davidson Loja MD at [facility]  2013    EYE SURGERY Left     FEMORAL ENDARTERECTOMY Right 2021    RIGHT FEMORAL ENDARTERECTOMY  RIGHT EXTERNAL ILIAC TO PROFUNDA FEMORAL BYPASS WITH 8MM PTFE GRAFT RIGHT LOWER EXTREMITY ARTERIOGRAM BALLOON ANGIOPLASTY RIGHT PROFUNDA BALLOON ANGIOPLASTY AND STENT OF RIGHT EXTERNAL ILIAC ARTERY performed by Olga Hou MD at [facility]  2015    Bilateral decompressive lumbar laminectomy L4-5   ; medial facetectomy L4-5 joints  bilaterally; foraminotomies l5   nerve roots bilaterally    LEG DEBRIDEMENT Right 2013    Dr. Nicolas Bejarano - pretibial wound    OTHER SURGICAL HISTORY Right 2013    Dr. Nicolas Bejarano - CFA Endarterectomy w/marsupialization; RLE wound excision & debridement     OTHER SURGICAL HISTORY Left 2013    Dr. Nicolas Bejarano - femoral & profunda femoris endarterectomy w/marsupialization patch angioplasty using SFA    SKIN SPLIT GRAFT Right 7/25/2013    Dr. Alexei Cruz - to non-healing R pretibial wound, 9 x 15 cm    TOTAL HIP ARTHROPLASTY Right 09/01/2016    Dr. Georgette Ramirez Left 12/22/2015    Dr. Alexei Cruz - CFA exploration, thrombectomy of ileofemoral system, stenting of RAFAL in-stent stenosis w/8 x 37 mm Palmaz        Immunization History:   Immunization History   Administered Date(s) Administered    COVID-19, Pfizer, PF, 30mcg/0.3mL 03/17/2021, 04/07/2021, 11/01/2021    Influenza 10/14/2013    Influenza Vaccine, unspecified formulation 10/14/2013, 10/22/2015    Influenza Virus Vaccine 10/14/2013, 10/14/2014, 10/22/2015    Influenza, High Dose (Fluzone 65 yrs and older) 10/22/2015, 11/18/2016, 08/17/2017, 08/30/2018, 10/17/2019, 09/14/2020    Influenza, Atilio Aas, adjuvanted, 65 yrs +, IM, PF (Fluad) 09/24/2021    Pneumococcal Conjugate 13-valent (Abwpllx95) 10/22/2015    Pneumococcal Conjugate 7-valent (Prevnar7) 03/01/2013    Pneumococcal Polysaccharide ([id number]) 01/03/2018    Tdap (Boostrix, Adacel) 10/14/2013    Zoster Live (Zostavax) 06/07/2014    Zoster Recombinant (Shingrix) 05/08/2018       Active Problems:  Patient Active Problem List   Diagnosis Code    Essential hypertension I10    Hyperlipemia E78.5    Degeneration of intervertebral disc of lumbar region M51.36    KAVITA and COPD overlap syndrome ([facility]) G47.33, J44.9    PVD (peripheral vascular disease) ([facility]) I73.9    Coronary artery disease involving native coronary artery of native heart without angina pectoris I25.10    Idiopathic peripheral neuropathy G60.9    Pulmonary nodule R91.1    Chronic, continuous use of opioids F11.90    Chronic pain syndrome G89.4    Gastroesophageal reflux disease K21.9    Sacroiliitis, not elsewhere classified ([facility] [address] M46.1    Other spondylosis, lumbosacral region M47.897    Chronic diastolic heart failure ([facility]) I50.32    Cigarette nicotine dependence without complication M17.786    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00X0    Pulmonary embolism without acute cor pulmonale (HCC) R61.93    Diastolic dysfunction with chronic heart failure (HCC) I50.32    Dependence on other enabling machines and devices Z99.89    Dependence on supplemental oxygen Z99.81    Hereditary and idiopathic neuropathy, unspecified 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    Hypertensive heart disease with heart failure (HCC) I11.0    Long term (current) use of anticoagulants Z79.01    Problems related to living alone Z60.2    Athscl heart disease of native coronary artery w/o ang pctrs I25.10    Presence of coronary angioplasty implant and graft Z95.5    Pulmonary embolism (HCC) I26.99    Critical ischemia of lower extremity (HCC) I70.229    Cellulitis of right lower extremity L03.115    LYDIA (acute kidney injury) ([facility] [address] [address] N17.9    Anemia D64.9       Isolation/Infection:   Isolation            No Isolation          Patient Infection Status       Infection Onset Added Last Indicated Last Indicated By Review Planned Expiration Resolved Resolved By    None active    Resolved    COVID-19 (Rule Out) 12/05/20 12/05/20 12/05/20 COVID-19 (Ordered)   12/06/20 Rule-Out Test Resulted            Nurse Assessment:  Last Vital Signs: BP (!) 155/76   Pulse 68   Temp 97.9 °F (36.6 °C) (Oral)   Resp 18   Wt 194 lb (88 kg)   SpO2 97%   BMI 27.84 kg/m²     Last documented pain score (0-10 scale): Pain Level: 9  Last Weight:   Wt Readings from Last 1 Encounters:   11/30/21 194 lb (88 kg)     Mental Status:  oriented and alert    IV Access:  - None    Nursing Mobility/ADLs:  Walking   {[facility] DUFX:[id number]}  Transfer  Assisted  Bathing  Assisted  Dressing  Assisted  Toileting  Assisted  Feeding  Independent  Med Admin  Assisted  Med Delivery   whole    Wound Care Documentation and Therapy:  Puncture 11/17/21 Femoral Anterior; Left (Active)   Wound Assessment Clean; Dry; Intact 11/17/21 1942   [place]-wound Assessment Dry; Intact;  Other (Comment) 11/17/21 1742   Closure None 11/17/21 1942   Drainage Amount None 11/17/21 1942   Dressing/Treatment Open to air 11/17/21 1742   Number of days: 12       Wound 11/15/21 Foot Anterior; Right; Outer (Active)   Wound Etiology Arterial 11/30/21 0900   Dressing Status Clean; Dry; Intact 11/30/21 0900   Dressing/Treatment Other (comment) 11/30/21 0900   Wound Assessment Other (Comment) 11/30/21 0900   Drainage Amount None 11/29/21 1530   Number of days: 14       Wound 11/15/21 Pretibial Distal; Right (Active)   Wound Etiology Arterial 11/28/21 1200   Dressing Status Clean; Dry; Intact 11/30/21 0900   Dressing/Treatment Other (comment) 11/30/21 0900   Wound Assessment Other (Comment) 11/30/21 0900   Drainage Amount None 11/29/21 1530   Number of days: 14       Wound 11/15/21 Foot Anterior; Right; Inner (Active)   Wound Etiology Arterial 11/28/21 1200   Dressing Status Clean; Dry; Intact 11/30/21 0900   Dressing/Treatment Other (comment) 11/30/21 0900   Wound Assessment Other (Comment) 11/30/21 0900   Drainage Amount None 11/29/21 1530   Number of days: 14       Wound 11/15/21 Pretibial Distal; Right; Outer (Active)   Wound Etiology Arterial 11/28/21 1200   Dressing Status Clean; Dry; Intact 11/30/21 0900   Dressing/Treatment Other (comment) 11/30/21 0900   Wound Assessment Other (Comment) 11/30/21 0900   Drainage Amount None 11/28/21 1200   Number of days: 14        Elimination:  Continence: Bowel: Yes  Bladder: Yes  Urinary Catheter: None   Colostomy/Ileostomy/Ileal Conduit: No       Date of Last BM: 11/30/21    Intake/Output Summary (Last 24 hours) at 11/30/2021 1309  Last data filed at 11/30/2021 0350  Gross per 24 hour   Intake 720 ml   Output 750 ml   Net -30 ml     I/O last 3 completed shifts: In: 1080 [P.O.:1080]  Out: 1150 [Urine:1150]    Safety Concerns: At Risk for Falls    Impairments/Disabilities:      Hearing    Nutrition Therapy:  Current Nutrition Therapy:   - Oral Diet:  General    Routes of Feeding: Oral  Liquids:  Thin Liquids  Daily Fluid Restriction: no  Last Modified Barium Swallow with Video (Video Swallowing Test): not done    Treatments at the Time of Hospital Discharge:   Respiratory Treatments: HHN  Oxygen Therapy:  4-6 l per n/c  Ventilator:    - No ventilator support    Heart Failure Instructions for Daily Management  Patient was treated for chronic diastolic heart failure. he  will require the following:    Please weigh daily on the same scale and approximately the same time of day. Report weight gain of 3 pounds/day or 5 pounds/week to : [address] and [facility] / Prashantdawn Neumann [phone]. Please use hospital discharge weight as baseline reference. Please monitor for signs and symptoms of and report to MD:  Worsening Heart Failure: sudden weight gain, shortness of breath, lower extremity or general edema/swelling, abdominal bloating/swelling, inability to lie flat, intolerance to usual activity, or cough (especially at night). Report these finding even if no increase in weight. Dehydration:  having difficulty or a decrease in urination, dizziness, worsening fatigue, or new onset/worsening of generalized weakness. Please continue a LOW SODIUM diet and LIMIT fluid intake to 48 - 64 ounces ( 1.5 - 2 liters) per day. Call [address] and [facility] / Prashant Neumann [phone] with any questions or concerns. Please continue heart failure education to patient and family/support system. See After Visit Summary for hospital follow up appointment details. Consider spiritual care referral for support and/or completion of advance directives . Consider: [place] [address] telehealth program if patient agreeable and able to participate and palliative care consult for ongoing goals of care, end of life, and/or chronic disease management discussions.   Dr Miguel Nguyen is pt's primary cardiologist.       Rehab Therapies: Physical Therapy and Occupational Therapy  Weight Bearing Status/Restrictions: No weight bearing restirctions  Other Medical Equipment (for information only, NOT a DME order):  cane  Other Treatments:     Patient's personal belongings (please select all that are sent with patient):  None    RN SIGNATURE:  Bonita Parikh RN    CASE MANAGEMENT/SOCIAL WORK SECTION    Inpatient Status Date: 11-26-21    Readmission Risk Assessment Score:  Readmission Risk              Risk of Unplanned Readmission:  32           Discharging to Facility/ [address]              [facility], [address] [place] [address], [address] [place] [address]       Phone: [phone]       Fax: [phone]        / signature: Electronically signed by Howard Barrientos RN on 11/30/21 at 1:10 PM EST    PHYSICIAN SECTION    Prognosis: {Prognosis:8897841302}    Condition at Discharge: [address] [place] [address] Patient Condition:[id number]}    Rehab Potential (if transferring to Rehab): {Prognosis:5297924341}    Recommended Labs or Other Treatments After Discharge: ***    Physician Certification: I certify the above information and transfer of Serena Sandhoff.  is necessary for the continuing treatment of the diagnosis listed and that he requires {Admit to Appropriate Level of Care:37993} for {GREATER/LESS:373447648} 30 days.      Update Admission H&P: {CHP DME Changes in BNRVU:553421614}    PHYSICIAN SIGNATURE:  {Esignature:[id number]}

## 2021-11-30 NOTE — PROGRESS NOTES
Clinical Pharmacy Progress Note    Vancomycin - Management by Pharmacy    Consult Date(s):  11/26/21  Consulting Provider(s):  Dr. Claudia Chaudhry / Plan  1)  RLE cellulitis - Vancomycin   Concurrent Antimicrobials: Zosyn    Day of Vanc Therapy: 4   Current Dosing Method: AUC  o Therapeutic Goal: 400-500 mg/L*hr  o Currently on 750mg IV q12h. Trough this AM = 16.8mcg/mL - calculated AUC = 475. Renal function stable. Continue 750mg q12h.  Will continue to monitor clinical condition and make adjustments to regimen as appropriate. Please call with questions--  Thanks--  Howard Paz, PharmD, BCPS, BCGP  [id number] ([facility])   11/30/2021 7:54 AM      Interval update:  Planning to transition to Bactrim po at discharge per ID. Subjective/Objective:   Krista Garza is a 73 y.o. y/o male with a PMHx that includes CAD, HF, COPD, depression, GERD, TIA, HTN, HLD, neuropathy, and recent right femoral endarterectomy (done 11/17/21) who is admitted with worsening right foot pain with wound drainage. Pharmacy is consulted to dose Vancomycin. Current antibiotics:  Zosyn 3.375g IV EI q8h (11/27-current)  Vancomycin - Pharmacy to dose   1250mg IV x1 11/27 04:00   1000mg IV q12h (11/27-11/28)   750mg IV q12h (11/28-current)    Ht Readings from Last 1 Encounters:   11/22/21 5' 10\" (1.778 m)     Wt Readings from Last 1 Encounters:   11/30/21 194 lb (88 kg)       Vanc Level(s) / Doses:  Date Time Dose Level / Type of Level Interpretation   11/28 03:33 1000mg q12h Trough = 13.3mcg/mL Drawn ~9.5h after 2nd total dose. Calculated AUC = 604 & trough 20.3.   11/30 06:26 750mg q12h Trough = 16.8mcg/mL Drawn ~13h after prior dose. Calculated AUC = 475. Note: Serum levels collected for AUC-based dosing may be high if collected in close proximity to the dose administered. This is not necessarily an indicator of toxicity.     Recent Labs     11/28/21  0333 11/29/21  0515 11/30/21  0626   CREATININE 1.1 1.2 1. 1   BUN 15 13 14   WBC 5.5 5.8 5.7       Estimated Creatinine Clearance: 71 mL/min (based on SCr of 1.1 mg/dL).     Cultures & Sensitivities:  Date Site Micro Susceptibility / Result   11/26 Blood x2 No growth to date    11/26 MRSA nasal PCR negative    11/26 Wound Mixed skin carmen

## 2021-12-01 NOTE — DISCHARGE SUMMARY
[facility]     Patient ID:  Constantine Li  [id number]  73 y.o.  1950    Admit date: 11/15/2021    Discharge date and time: 11/22/2021  6:25 PM     Admitting Physician: Vasile Sr MD     Discharge Physician: same    Admission Diagnoses: Critical limb ischemia    Discharge Diagnoses: Critical limb ischemia     Admission Condition: poor    Discharged Condition: good    Indication for Admission: Critical limb ischemia and optimization prior to planned surgical intervention. Hospital Course: Pt was admitted 2 days prior to surgical intervention for critical limb ischemia. Pt recently was hospitalized for PE for which Pulmonary was consulted for anesthesia recommendations and optimization. Pt had been previously cleared by cardiology. Pt received breathing treatments along with CXR, EKG and anticoagulants held for the 2 days leading to surgery. Pt was taken to surgery for RIGHT FEMORAL ENDARTERECTOMY  RIGHT EXTERNAL ILIAC TO PROFUNDA FEMORAL BYPASS WITH 8MM PTFE GRAFT RIGHT LOWER EXTREMITY ARTERIOGRAM BALLOON ANGIOPLASTY RIGHT PROFUNDA BALLOON ANGIOPLASTY AND STENT OF RIGHT EXTERNAL ILIAC ARTERY on hospital day 3. Post op he was admitted to the ICU for close surveillance of both his bypass graft and respiratory status. He was restarted on his anticoagulation POD 1. Cardiology was consulted for hypotension for which his BP medication was held and pt was worked up with H and H, bolus, and a limited repeat echo. Cardiac workup was negative but his pressure did respond to IV fluid bolus. He was ordered his home CPAP which he did not wear do to some emesis. PT/OT evaluated him as well. On POD 2, the was noted to be in AFIB and metoprolol was given with appropriate response back to sinus tachycardia. He was placed on an amiodarone gtt per cards. Pts BP meds continued to be held until POD 4 when the pt was much more stable.  Pt was discharged home on POD 5 in stable condition with follow up in 2 weeks. Consults: cardiology, pulmonary/intensive care, PT/OT    Treatments: surgery: as above    Discharge Exam:    Physical Examination:   General appearance: alert, no acute distress, grooming appropriate  Chest/Lungs: Normal effort with no accessory muscle use on 5L HFNC  Cardiovascular: RRR  Abdomen: Soft, non-tender, non-distended, no rebound, guarding, or rigidity. Skin: warm and dry, no rashes  Extremities: erythema and mild edema of right lower extremity > than left. Abrasion to medial aspect of right foot. Bilateral lower extremity warm to palpation and equal bilaterally. Sensation improved over the plantar aspect of bilateral lower extremities. DP/PT dopplerable on the right and left LE. Left stick site hemostatic with ecchymosis but no hematoma, palpable femoral pulse. Right femoral pulse palpable and groin with Aquacel Ag dressing in place with minimal staining, c/d/i    Disposition: home    In process/preliminary results:  Outstanding Order Results     No orders found from 10/17/2021 to 11/16/2021. Patient Instructions:   Discharge Medication List as of 11/22/2021  4:52 PM      START taking these medications    Details   clopidogrel (PLAVIX) 75 MG tablet Take 1 tablet by mouth daily, Disp-30 tablet, R-3Print      docusate sodium (COLACE) 100 MG capsule Take 1 capsule by mouth 2 times daily for 10 days Please take while taking narcotic pain medicine. If you develop loose or watery stools, then stop taking., Disp-20 capsule, R-0Print      oxyCODONE (ROXICODONE) 5 MG immediate release tablet Take 1 tablet by mouth every 6 hours as needed for Pain for up to 5 days. , Disp-20 tablet, R-0Print         CONTINUE these medications which have NOT CHANGED    Details   AMLODIPINE BENZOATE PO 5 mg: TAKE 1 TAB DAILYHistorical Med      Cyanocobalamin (B-12) 100 MCG TABS 1 tabletHistorical Med      DULoxetine (CYMBALTA) 20 MG extended release capsule 1 capsuleHistorical Med      potassium chloride (MICRO-K) 10 MEQ extended release capsule 1 tabletHistorical Med      Cholecalciferol (VITAMIN D-3) 25 MCG (1000 UT) CAPS 1 tabletHistorical Med      doxazosin (CARDURA) 1 MG tablet 1 tabletHistorical Med      ELIQUIS DVT/PE STARTER PACK 5 MG TBPK tablet DAWHistorical Med      cilostazol (PLETAL) 50 MG tablet Take 1 tablet by mouth 2 times daily, Disp-60 tablet, R-3Normal      colchicine (COLCRYS) 0.6 MG tablet Take 0.6 mg by mouth as neededHistorical Med      omeprazole (PRILOSEC) 40 MG delayed release capsule Historical Med      fluticasone-umeclidin-vilant (TRELEGY ELLIPTA) 100-62.5-25 MCG/INH AEPB Inhale 1 puff into the lungs daily, Disp-28 each, R-2Normal      nicotine (NICODERM CQ) 21 MG/24HR Place 1 patch onto the skin every 24 hours, Disp-30 patch, R-1Normal      isosorbide mononitrate (IMDUR) 30 MG extended release tablet Take 0.5 tablets by mouth daily, Disp-30 tablet, [id number]      folic acid (FOLVITE) 1 MG tablet Take 1 tablet by mouth daily, Disp-30 tablet, R-3Normal      pantoprazole (PROTONIX) 40 MG tablet Take 1 tablet by mouth daily for 10 days, Disp-10 tablet, R-0Normal      albuterol sulfate  (90 Base) MCG/ACT inhaler Inhale 2 puffs into the lungs every 6 hours as needed for Wheezing, Disp-3 Inhaler, R-4Normal      allopurinol (ZYLOPRIM) 100 MG tablet Take 1 tablet by mouth daily, Disp-180 tablet, R-1Normal      losartan (COZAAR) 100 MG tablet TAKE 1 TABLET BY MOUTH EVERY DAY, Disp-90 tablet, R-1Normal      atorvastatin (LIPITOR) 80 MG tablet Take 1 tablet by mouth daily, Disp-90 tablet, R-1Normal      nitroGLYCERIN (NITROSTAT) 0.4 MG SL tablet Place 1 tablet under the tongue every 5 minutes as needed for Chest pain, Disp-25 tablet, R-1Normal      furosemide (LASIX) 40 MG tablet Take 1 tablet by mouth daily, Disp-90 tablet, R-3Normal      metoprolol (LOPRESSOR) 100 MG tablet Take 1 tablet by mouth 2 times daily, Disp-270 tablet, R-1Normal      magnesium gluconate (MAGONATE) 500 MG tablet

## 2021-12-02 ENCOUNTER — TELEPHONE (OUTPATIENT)
Dept: PRIMARY CARE CLINIC | Age: 71
End: 2021-12-02

## 2021-12-02 NOTE — TELEPHONE ENCOUNTER
Caroline Anderson from [facility] called- physical therapy evaluated today after going to [facility] they will see once a week for 5 weeks

## 2021-12-03 DIAGNOSIS — I25.10 CORONARY ARTERY DISEASE INVOLVING NATIVE CORONARY ARTERY OF NATIVE HEART WITHOUT ANGINA PECTORIS: ICD-10-CM

## 2021-12-03 DIAGNOSIS — E78.2 MIXED HYPERLIPIDEMIA: Chronic | ICD-10-CM

## 2021-12-03 RX ORDER — ATORVASTATIN CALCIUM 80 MG/1
80 TABLET, FILM COATED ORAL DAILY
Qty: 90 TABLET | Refills: 1 | Status: SHIPPED | OUTPATIENT
Start: 2021-12-03 | End: 2022-04-19 | Stop reason: SDUPTHER

## 2021-12-03 NOTE — TELEPHONE ENCOUNTER
Medication:   Requested Prescriptions     Pending Prescriptions Disp Refills    atorvastatin (LIPITOR) 80 MG tablet 90 tablet 1     Sig: Take 1 tablet by mouth daily        Last Filled:   08/25/21    Patient Phone Number: [phone] (home)     Last appt: 10/8/2021   Next appt: 12/8/2021    Last OARRS:   RX Monitoring 8/14/2020   Attestation -   Periodic Controlled Substance Monitoring Possible medication side effects, risk of tolerance/dependence & alternative treatments discussed. ;Assessed functional status. ;Obtaining appropriate analgesic effect of treatment. Chronic Pain > 50 MEDD Obtained or confirmed \"Consent for Opioid Use\" on file.

## 2021-12-03 NOTE — TELEPHONE ENCOUNTER
atorvastatin (LIPITOR) 80 MG tablet     [id number], [address] [phone]

## 2021-12-08 ENCOUNTER — OFFICE VISIT (OUTPATIENT)
Dept: PRIMARY CARE CLINIC | Age: 71
End: 2021-12-08
Payer: MEDICARE

## 2021-12-08 VITALS
HEIGHT: 70 IN | HEART RATE: 68 BPM | SYSTOLIC BLOOD PRESSURE: 90 MMHG | TEMPERATURE: 97.7 F | WEIGHT: 205.4 LBS | DIASTOLIC BLOOD PRESSURE: 52 MMHG | BODY MASS INDEX: 29.41 KG/M2

## 2021-12-08 DIAGNOSIS — Z99.89 DEPENDENCE ON OTHER ENABLING MACHINES AND DEVICES: ICD-10-CM

## 2021-12-08 DIAGNOSIS — E78.2 MIXED HYPERLIPIDEMIA: Chronic | ICD-10-CM

## 2021-12-08 DIAGNOSIS — N17.9 AKI (ACUTE KIDNEY INJURY) (HCC): ICD-10-CM

## 2021-12-08 DIAGNOSIS — Z00.00 ROUTINE GENERAL MEDICAL EXAMINATION AT A HEALTH CARE FACILITY: Primary | ICD-10-CM

## 2021-12-08 DIAGNOSIS — R79.9 ABNORMAL FINDING OF BLOOD CHEMISTRY, UNSPECIFIED: ICD-10-CM

## 2021-12-08 DIAGNOSIS — L03.115 CELLULITIS OF RIGHT LOWER EXTREMITY: ICD-10-CM

## 2021-12-08 DIAGNOSIS — I10 ESSENTIAL HYPERTENSION: ICD-10-CM

## 2021-12-08 DIAGNOSIS — Z99.81 DEPENDENCE ON SUPPLEMENTAL OXYGEN: ICD-10-CM

## 2021-12-08 DIAGNOSIS — Z13.1 SCREENING FOR DIABETES MELLITUS: ICD-10-CM

## 2021-12-08 DIAGNOSIS — I73.9 PVD (PERIPHERAL VASCULAR DISEASE) (HCC): ICD-10-CM

## 2021-12-08 DIAGNOSIS — Z23 NEED FOR SHINGLES VACCINE: ICD-10-CM

## 2021-12-08 PROBLEM — F11.90 CHRONIC, CONTINUOUS USE OF OPIOIDS: Status: RESOLVED | Noted: 2018-01-19 | Resolved: 2021-12-08

## 2021-12-08 PROBLEM — G89.4 CHRONIC PAIN SYNDROME: Status: RESOLVED | Noted: 2019-06-26 | Resolved: 2021-12-08

## 2021-12-08 PROBLEM — I25.10 ATHSCL HEART DISEASE OF NATIVE CORONARY ARTERY W/O ANG PCTRS: Status: RESOLVED | Noted: 2021-09-22 | Resolved: 2021-12-08

## 2021-12-08 PROBLEM — R91.1 PULMONARY NODULE: Status: RESOLVED | Noted: 2017-04-13 | Resolved: 2021-12-08

## 2021-12-08 PROCEDURE — G0438 PPPS, INITIAL VISIT: HCPCS | Performed by: STUDENT IN AN ORGANIZED HEALTH CARE EDUCATION/TRAINING PROGRAM

## 2021-12-08 PROCEDURE — 99214 OFFICE O/P EST MOD 30 MIN: CPT | Performed by: STUDENT IN AN ORGANIZED HEALTH CARE EDUCATION/TRAINING PROGRAM

## 2021-12-08 RX ORDER — ZOSTER VACCINE RECOMBINANT, ADJUVANTED 50 MCG/0.5
0.5 KIT INTRAMUSCULAR SEE ADMIN INSTRUCTIONS
Qty: 0.5 ML | Refills: 0 | Status: SHIPPED | OUTPATIENT
Start: 2021-12-08 | End: 2021-12-22

## 2021-12-08 ASSESSMENT — ENCOUNTER SYMPTOMS
COLOR CHANGE: 0
CHEST TIGHTNESS: 0
SHORTNESS OF BREATH: 0
COUGH: 0

## 2021-12-08 ASSESSMENT — PATIENT HEALTH QUESTIONNAIRE - PHQ9
SUM OF ALL RESPONSES TO PHQ QUESTIONS 1-9: 1
SUM OF ALL RESPONSES TO PHQ QUESTIONS 1-9: 1
2. FEELING DOWN, DEPRESSED OR HOPELESS: 0
SUM OF ALL RESPONSES TO PHQ QUESTIONS 1-9: 1
SUM OF ALL RESPONSES TO PHQ9 QUESTIONS 1 & 2: 1
1. LITTLE INTEREST OR PLEASURE IN DOING THINGS: 1

## 2021-12-08 ASSESSMENT — LIFESTYLE VARIABLES
AUDIT TOTAL SCORE: INCOMPLETE
HOW OFTEN DO YOU HAVE A DRINK CONTAINING ALCOHOL: NEVER
AUDIT-C TOTAL SCORE: INCOMPLETE
HOW OFTEN DO YOU HAVE A DRINK CONTAINING ALCOHOL: 0

## 2021-12-08 NOTE — PATIENT INSTRUCTIONS
Personalized Preventive Plan for Nick Rahman. - 12/8/2021  Medicare offers a range of preventive health benefits. Some of the tests and screenings are paid in full while other may be subject to a deductible, co-insurance, and/or copay. Some of these benefits include a comprehensive review of your medical history including lifestyle, illnesses that may run in your family, and various assessments and screenings as appropriate. After reviewing your medical record and screening and assessments performed today your provider may have ordered immunizations, labs, imaging, and/or referrals for you. A list of these orders (if applicable) as well as your Preventive Care list are included within your After Visit Summary for your review. Other Preventive Recommendations:    · A preventive eye exam performed by an eye specialist is recommended every 1-2 years to screen for glaucoma; cataracts, macular degeneration, and other eye disorders. · A preventive dental visit is recommended every 6 months. · Try to get at least 150 minutes of exercise per week or 10,000 steps per day on a pedometer . · Order or download the FREE \"Exercise & Physical Activity: Your Everyday Guide\" from [employer]. Call 6-137.341.4454 or search [employer] online. · You need 2681-7165 mg of calcium and 3736-0805 IU of vitamin D per day. It is possible to meet your calcium requirement with diet alone, but a vitamin D supplement is usually necessary to meet this goal.  · When exposed to the sun, use a sunscreen that protects against both UVA and UVB radiation with an SPF of 30 or greater. Reapply every 2 to 3 hours or after sweating, drying off with a towel, or swimming. · Always wear a seat belt when traveling in a car. Always wear a helmet when riding a bicycle or motorcycle.

## 2021-12-08 NOTE — PROGRESS NOTES
Medicare Annual Wellness Visit  Name: George Borden GOCVLA Date: 2021   MRN: [id number] Sex: Male   Age: 73 y.o. Ethnicity: Non- / Non    : 1950 Race: White (non-)      George Borden is here for Medicare AWV and Hypertension    Screenings for behavioral, psychosocial and functional/safety risks, and cognitive dysfunction are all negative except as indicated below. These results, as well as other patient data from the [address] form, are documented in Flowsheets linked to this Encounter. Allergies   Allergen Reactions    Asa [Aspirin] Other (See Comments)     Stomach ache    Daliresp [Roflumilast] Diarrhea and Other (See Comments)     Severe diarrhea and did not help         Prior to Visit Medications    Medication Sig Taking? Authorizing Provider   zoster recombinant adjuvanted vaccine [facility]) 50 MCG/0.5ML SUSR injection Inject 0.5 mLs into the muscle See Admin Instructions 1 dose now and repeat in 2-6 months Yes Denton Ochoa,    atorvastatin (LIPITOR) 80 MG tablet Take 1 tablet by mouth daily Yes Denton Ochoa DO   gabapentin (NEURONTIN) 100 MG capsule Take 2 capsules by mouth 3 times daily for 10 days.  Yes Nicole Patel MD   clopidogrel (PLAVIX) 75 MG tablet Take 1 tablet by mouth daily Yes BRANDI Luna NP   AMLODIPINE BENZOATE PO 5 mg: TAKE 1 TAB DAILY Yes Historical Provider, MD   Cyanocobalamin (B-12) 100 MCG TABS 1 tablet Yes Historical Provider, MD   DULoxetine (CYMBALTA) 20 MG extended release capsule 1 capsule Yes Historical Provider, MD   potassium chloride (MICRO-K) 10 MEQ extended release capsule 1 tablet Yes Historical Provider, MD   Cholecalciferol (VITAMIN D-3) 25 MCG (1000 UT) CAPS 1 tablet Yes Historical Provider, MD   colchicine (COLCRYS) 0.6 MG tablet Take 0.6 mg by mouth as needed Yes Historical Provider, MD   omeprazole (PRILOSEC) 40 MG delayed release capsule  Yes Historical Provider, MD   fluticasone-umeclidin-vilant (TRELEGY ELLIPTA) 100-62.5-25 MCG/INH AEPB Inhale 1 puff into the lungs daily Yes Noreen You DO   nicotine (NICODERM CQ) 21 MG/24HR Place 1 patch onto the skin every 24 hours Yes Noreen You DO   folic acid (FOLVITE) 1 MG tablet Take 1 tablet by mouth daily Yes Ivan Gonzales MD   albuterol sulfate  (90 Base) MCG/ACT inhaler Inhale 2 puffs into the lungs every 6 hours as needed for Wheezing Yes Noreen You DO   allopurinol (ZYLOPRIM) 100 MG tablet Take 1 tablet by mouth daily Yes Noreen You DO   losartan (COZAAR) 100 MG tablet TAKE 1 TABLET BY MOUTH EVERY DAY Yes Noreen You DO   nitroGLYCERIN (NITROSTAT) 0.4 MG SL tablet Place 1 tablet under the tongue every 5 minutes as needed for Chest pain Yes Noreen You DO   furosemide (LASIX) 40 MG tablet Take 1 tablet by mouth daily Yes Noreen You DO   magnesium gluconate (MAGONATE) 500 MG tablet Take 500 mg by mouth daily.  Yes Annita Evans MD   pantoprazole (PROTONIX) 40 MG tablet Take 1 tablet by mouth daily for 10 days  Ivan Gonzales MD   metoprolol (LOPRESSOR) 100 MG tablet Take 1 tablet by mouth 2 times daily  Noreen You DO         Past Medical History:   Diagnosis Date    CAD (coronary artery disease)     [facility]     COPD (chronic obstructive pulmonary disease) (HCC)     Depressive disorder, not elsewhere classified     GERD (gastroesophageal reflux disease)     History of colon polyps - 30 seen on colonoscopy 04/2021 4/24/2021    History of MI (myocardial infarction) 05/2013    History of skin ulcer of lower extremity     R anterior shin    History of transient ischemic attack (TIA)     Hyperlipidemia     Hypertension     Neuropathy     KAVITA (obstructive sleep apnea)     On CPAP    Personal history of DVT (deep vein thrombosis)     PVD [facility]peripheral vascular disease) [facility]     TIA (transient ischemic attack) 2013    Vertebral fracture        Past Surgical History:   Procedure Laterality Date    APPENDECTOMY      CHOLECYSTECTOMY, LAPAROSCOPIC      COLONOSCOPY  4/23/2021    COLONOSCOPY POLYPECTOMY SNARE/COLD BIOPSY performed by Amie Pineda MD at [facility]  4/23/2021    COLONOSCOPY POLYPECTOMY ABLATION performed by Amie Pineda MD at [facility]  4/23/2021    COLONOSCOPY CONTROL HEMORRHAGE performed by Amie Pineda MD at [address]  6/2013    EYE SURGERY Left     FEMORAL ENDARTERECTOMY Right 11/17/2021    RIGHT FEMORAL ENDARTERECTOMY  RIGHT EXTERNAL ILIAC TO PROFUNDA FEMORAL BYPASS WITH 8MM PTFE GRAFT RIGHT LOWER EXTREMITY ARTERIOGRAM BALLOON ANGIOPLASTY RIGHT PROFUNDA BALLOON ANGIOPLASTY AND STENT OF RIGHT EXTERNAL ILIAC ARTERY performed by Riaz Armstrong MD at [facility]  11/18/2015    Bilateral decompressive lumbar laminectomy L4-5   ; medial facetectomy L4-5 joints  bilaterally; foraminotomies l5   nerve roots bilaterally    LEG DEBRIDEMENT Right 7/23/2013    Dr. Erick Worley - pretibial wound    OTHER SURGICAL HISTORY Right 6/25/2013    Dr. Erick Worley - CFA Endarterectomy w/marsupialization; RLE wound excision & debridement     OTHER SURGICAL HISTORY Left 8/27/2013    Dr. rEick Worley - femoral & profunda femoris endarterectomy w/marsupialization patch angioplasty using SFA    SKIN SPLIT GRAFT Right 7/25/2013    Dr. Erick Worley - to non-healing R pretibial wound, 9 x 15 cm    TOTAL HIP ARTHROPLASTY Right 09/01/2016    Dr. Candelario Nissen Left 12/22/2015    Dr. Erick Worley - CFA exploration, thrombectomy of ileofemoral system, stenting of RAFAL in-stent stenosis w/8 x 37 mm [facility]          Family History   Problem Relation Age of Onset    Cancer Mother         Breast    Heart Disease Mother     Cancer Father         Bladder    Heart Disease Father     Cancer Paternal Grandmother         melanoma        CareTeam (Including outside providers/suppliers regularly involved in providing care):   Patient Care Team:  Russel Mendez DO as PCP - General (Family Medicine)  Russel Mendez DO as PCP - [facility] Empaneled Provider  Adonay Mancini MD as Consulting Physician (Pulmonology)  Veronika Arias MD as Consulting Physician (Pain Management)  Moreno Dowd MD as Consulting Physician (Interventional Cardiology)  Matheus Mullen MD as Consulting Physician (Gastroenterology)    Wt Readings from Last 3 Encounters:   12/08/21 205 lb 6.4 oz (93.2 kg)   11/30/21 194 lb (88 kg)   11/22/21 201 lb 12.8 oz (91.5 kg)     Vitals:    12/08/21 1233 12/08/21 1314   BP: (!) 85/49 (!) 90/52   Site: Left Upper Arm    Position: Sitting    Cuff Size: Medium Adult    Pulse: 68    Temp: 97.7 °F (36.5 °C)    TempSrc: Axillary    Weight: 205 lb 6.4 oz (93.2 kg)    Height: 5' 10\" (1.778 m)      Body mass index is 29.47 kg/m². Based upon direct observation of the patient, evaluation of cognition reveals recent and remote memory intact. General Appearance: alert and oriented to person, place and time, well-developed and well-nourished, in no acute distress  Skin: warm and dry and healing vertical surgical incision RLE, no indication of infection. ENT: tympanic membrane, external ear and ear canal normal bilaterally, oropharynx clear and moist with normal mucous membranes  Pulmonary/Chest: clear to auscultation bilaterally- no wheezes, rales or rhonchi, normal air movement, no respiratory distress  Cardiovascular: normal rate, normal S1 and S2, no gallops, intact distal pulses and no carotid bruits  Abdomen: soft, non-tender, non-distended, normal bowel sounds, no masses or organomegaly  Extremities: no cyanosis and no clubbing    Patient's complete Health Risk Assessment and screening values have been reviewed and are found in Flowsheets. The following problems were reviewed today and where indicated follow up appointments were made and/or referrals ordered.     Positive Risk Factor Screenings with Interventions: Substance History:  Social History     Tobacco History     Smoking Status  Current Every Day Smoker Smoking Start Date  1/1/1967 Smoking Frequency  0.5 packs/day for 52 years (26 pk yrs) Smoking Tobacco Type  Cigarettes    Smokeless Tobacco Use  Never Used          Alcohol History     Alcohol Use Status  Yes Drinks/Week  0 Standard drinks or equivalent per week Amount  0.0 standard drinks of alcohol/wk Comment  2-3 beers a month          Drug Use     Drug Use Status  No          Sexual Activity     Sexually Active  Never               Alcohol Screening:       A score of 8 or more is associated with harmful or hazardous drinking. A score of 13 or more in women, and 15 or more in men, is likely to indicate alcohol dependence.   Substance Abuse Interventions:  · Negative screening     Health Habits/Nutrition:  Health Habits/Nutrition  Do you exercise for at least 20 minutes 2-3 times per week?: (!) No  Have you lost any weight without trying in the past 3 months?: (!) Yes  Do you eat only one meal per day?: No  Have you seen the dentist within the past year?: (!) No  Body mass index: (!) 29.47  Health Habits/Nutrition Interventions:  · Inadequate physical activity:  educational materials provided to promote increased physical activity  · Dental exam overdue:  patient encouraged to make appointment with his/her dentist    Hearing/Vision:  No exam data present  Hearing/Vision  Do you or your family notice any trouble with your hearing that hasn't been managed with hearing aids?: (!) Yes  Do you have difficulty driving, watching TV, or doing any of your daily activities because of your eyesight?: No  Have you had an eye exam within the past year?: (!) No  Hearing/Vision Interventions:  · Hearing concerns:  patient declines any further evaluation/treatment for hearing issues  · Vision concerns:  ophthalmology/optometry referral provided      Personalized Preventive Plan   Current Health Maintenance Status  Immunization History   Administered Date(s) Administered    COVID-19, Pfizer, PF, 30mcg/0.3mL 03/17/2021, 04/07/2021, 11/01/2021    Influenza 10/14/2013    Influenza Vaccine, unspecified formulation 10/14/2013, 10/22/2015    Influenza Virus Vaccine 10/14/2013, 10/14/2014, 10/22/2015    Influenza, High Dose (Fluzone 65 yrs and older) 10/22/2015, 11/18/2016, 08/17/2017, 08/30/2018, 10/17/2019, 09/14/2020    Influenza, Quadv, adjuvanted, 65 yrs +, IM, PF (Fluad) 09/24/2021    Pneumococcal Conjugate 13-valent ([id number]) 10/22/2015    Pneumococcal Conjugate 7-valent (Prevnar7) 03/01/2013    Pneumococcal Polysaccharide ([id number]) 01/03/2018    Tdap (Boostrix, Adacel) 10/14/2013    Zoster Live (Zostavax) 06/07/2014    Zoster Recombinant (Shingrix) 05/08/2018        Health Maintenance   Topic Date Due    Shingles Vaccine (3 of 3) 07/03/2018    Annual Wellness Visit (AWV)  Never done    Low dose CT lung screening  02/07/2021    A1C test (Diabetic or Prediabetic)  07/22/2021    Lipid screen  12/06/2021    Colon cancer screen colonoscopy  04/23/2022    Potassium monitoring  11/30/2022    Creatinine monitoring  11/30/2022    DTaP/Tdap/Td vaccine (2 - Td or Tdap) 10/14/2023    Flu vaccine  Completed    Pneumococcal 65+ years Vaccine  Completed    COVID-19 Vaccine  Completed    AAA screen  Completed    Hepatitis C screen  Completed    Hepatitis A vaccine  Aged Out    Hepatitis B vaccine  Aged Out    Hib vaccine  Aged Out    Meningococcal (ACWY) vaccine  Aged Out     Recommendations for [employer] Due: see orders and patient instructions/AVS.  . Recommended screening schedule for the next 5-10 years is provided to the patient in written form: see Patient Instructions/AVS.    Alba Stafford was seen today for medicare awv and hypertension.     Diagnoses and all orders for this visit:    Routine general medical examination at a health care facility    Dependence on other enabling machines and devices  - External Referral To Home Health    Dependence on supplemental oxygen  -     External Referral To Home Health    Need for shingles vaccine  -     zoster recombinant adjuvanted vaccine [facility]) 50 MCG/0.5ML SUSR injection;  Inject 0.5 mLs into the muscle See Admin Instructions 1 dose now and repeat in 2-6 months

## 2021-12-08 NOTE — PROGRESS NOTES
2021     Yesika Napoles [address] (:  1950) is a 73 y.o. male, here for evaluation of the following medical concerns:    HPI  Cellulitis right lower extremity: Gisela Tom presents today for evaluation of right lower extremity cellulitis. Patient presented to the hospital late last month for a revascularization of his right lower extremity due to peripheral vascular disease. He underwent endarterectomy and in the postop period developed a cellulitic infection of the wound. He was placed on antibiotics and the infection quickly improved. He was discharged on oral Bactrim. He completed the course of antibiotics yesterday. He also had an acute kidney injury while inpatient, which was thought to be related to the antibiotic that was initially used to treat the infection. Hypertension: Compliant with his medications as prescribed. He does have a history of heart failure and takes Lasix daily, but has noted that his urine output has decreased recently. He has not had any chest pain, shortness of breath or chest tightness. He has noted some positional dizziness recently. Review of Systems   Constitutional: Negative for fatigue. Eyes: Negative for visual disturbance. Respiratory: Negative for cough, chest tightness and shortness of breath. Cardiovascular: Negative for chest pain, palpitations and leg swelling. Endocrine: Negative for polydipsia, polyphagia and polyuria. Genitourinary: Negative for frequency. Skin: Positive for wound. Negative for color change and rash. Neurological: Positive for dizziness. Negative for syncope, weakness and light-headedness. Prior to Visit Medications    Medication Sig Taking?  Authorizing Provider   zoster recombinant adjuvanted vaccine [facility]) 50 MCG/0.5ML SUSR injection Inject 0.5 mLs into the muscle See Admin Instructions 1 dose now and repeat in 2-6 months Yes Laci Session, DO   atorvastatin (LIPITOR) 80 MG tablet Take 1 tablet by mouth daily Yes Cruz Santos DO   gabapentin (NEURONTIN) 100 MG capsule Take 2 capsules by mouth 3 times daily for 10 days. Yes Virgen Garcia MD   clopidogrel (PLAVIX) 75 MG tablet Take 1 tablet by mouth daily Yes BRANDI Edwards - RODNEY   AMLODIPINE BENZOATE PO 5 mg: TAKE 1 TAB DAILY Yes Historical Provider, MD   Cyanocobalamin (B-12) 100 MCG TABS 1 tablet Yes Historical Provider, MD   DULoxetine (CYMBALTA) 20 MG extended release capsule 1 capsule Yes Historical Provider, MD   potassium chloride (MICRO-K) 10 MEQ extended release capsule 1 tablet Yes Historical Provider, MD   Cholecalciferol (VITAMIN D-3) 25 MCG (1000 UT) CAPS 1 tablet Yes Historical Provider, MD   colchicine (COLCRYS) 0.6 MG tablet Take 0.6 mg by mouth as needed Yes Historical Provider, MD   omeprazole (PRILOSEC) 40 MG delayed release capsule  Yes Historical Provider, MD   fluticasone-umeclidin-vilant (TRELEGY ELLIPTA) 100-62.5-25 MCG/INH AEPB Inhale 1 puff into the lungs daily Yes Cruz Santos DO   nicotine (NICODERM CQ) 21 MG/24HR Place 1 patch onto the skin every 24 hours Yes Cruz Santos DO   folic acid (FOLVITE) 1 MG tablet Take 1 tablet by mouth daily Yes Carisa Shultz MD   albuterol sulfate  (90 Base) MCG/ACT inhaler Inhale 2 puffs into the lungs every 6 hours as needed for Wheezing Yes Cruz Santos DO   allopurinol (ZYLOPRIM) 100 MG tablet Take 1 tablet by mouth daily Yes Cruz Santos DO   losartan (COZAAR) 100 MG tablet TAKE 1 TABLET BY MOUTH EVERY DAY Yes Cruz Santos DO   nitroGLYCERIN (NITROSTAT) 0.4 MG SL tablet Place 1 tablet under the tongue every 5 minutes as needed for Chest pain Yes Cruz Santos DO   furosemide (LASIX) 40 MG tablet Take 1 tablet by mouth daily Yes Cruz Santos DO   magnesium gluconate (MAGONATE) 500 MG tablet Take 500 mg by mouth daily.  Yes Historical Provider, MD   pantoprazole (PROTONIX) 40 MG tablet Take 1 tablet by mouth daily for 10 days  Carisa Shultz MD   metoprolol (LOPRESSOR) 100 MG tablet Take 1 tablet by mouth 2 times daily  Ji First, DO        Social History     Tobacco Use    Smoking status: Current Every Day Smoker     Packs/day: 0.50     Years: 52.00     Pack years: 26.00     Types: Cigarettes     Start date: 1/1/1967    Smokeless tobacco: Never Used   Substance Use Topics    Alcohol use: Yes     Alcohol/week: 0.0 standard drinks     Comment: 2-3 beers a month        Vitals:    12/08/21 1233 12/08/21 1314   BP: (!) 85/49 (!) 90/52   Site: Left Upper Arm    Position: Sitting    Cuff Size: Medium Adult    Pulse: 68    Temp: 97.7 °F (36.5 °C)    TempSrc: Axillary    Weight: 205 lb 6.4 oz (93.2 kg)    Height: 5' 10\" (1.778 m)      Estimated body mass index is 29.47 kg/m² as calculated from the following:    Height as of this encounter: 5' 10\" (1.778 m). Weight as of this encounter: 205 lb 6.4 oz (93.2 kg). Physical Exam  Constitutional:       Appearance: Normal appearance. He is obese. HENT:      Head: Normocephalic and atraumatic. Eyes:      Extraocular Movements: Extraocular movements intact. Conjunctiva/sclera: Conjunctivae normal.   Cardiovascular:      Rate and Rhythm: Normal rate and regular rhythm. Pulmonary:      Effort: Pulmonary effort is normal.      Breath sounds: Normal breath sounds. Abdominal:      General: Abdomen is flat. Bowel sounds are normal.      Palpations: Abdomen is soft. Skin:     General: Skin is warm and dry. Findings: Lesion (Well-healing vertical surgical incision of the right lower extremity, no indication of infection) present. No erythema. Neurological:      Mental Status: He is alert. Mental status is at baseline. Psychiatric:         Mood and Affect: Mood normal.         Behavior: Behavior normal.         Thought Content: Thought content normal.         Judgment: Judgment normal.         ASSESSMENT/PLAN:  Essential hypertension: Patient is having some symptomatic hypotension.   We will have him switch his Lasix from daily to as needed for swelling/weight gain. We will also hold his isosorbide. Does have a history of CAD, but not has chest pain. We will reevaluate in 2 weeks. - Comprehensive Metabolic Panel; Future    Mixed hyperlipidemia: Reports compliant with his statin. - Lipid, Fasting; Future    PVD (peripheral vascular disease) [facility]): Status post endarterectomy right lower extremity. On aspirin and Plavix. Will hold his cilostazol. Cellulitis of right lower extremity: No indication of acute infection of the right lower extremity. No additional antibiotics at this time. Follow-up appointment with vascular surgery tomorrow. LYDIA (acute kidney injury) ([facility]): Thought to be related to his antibiotics while inpatient. May also be related to overdiuresis. Checking CMP today. Switching his Lasix to as needed. - Comprehensive Metabolic Panel; Future    Return in 2 weeks (on 12/22/2021) for Medicare Annual Wellness Visit in 1 year, Blood Pressure. An electronic signature was used to authenticate this note.     --Cruz Santos,  on 12/8/2021 at 1:42 PM

## 2021-12-09 ENCOUNTER — OFFICE VISIT (OUTPATIENT)
Dept: VASCULAR SURGERY | Age: 71
End: 2021-12-09

## 2021-12-09 VITALS
DIASTOLIC BLOOD PRESSURE: 63 MMHG | BODY MASS INDEX: 29.06 KG/M2 | SYSTOLIC BLOOD PRESSURE: 106 MMHG | HEIGHT: 70 IN | HEART RATE: 64 BPM | WEIGHT: 203 LBS | TEMPERATURE: 97.4 F

## 2021-12-09 DIAGNOSIS — I70.221 ATHEROSCLEROSIS OF NATIVE ARTERY OF RIGHT LOWER EXTREMITY WITH REST PAIN (HCC): Primary | ICD-10-CM

## 2021-12-09 PROCEDURE — 99024 POSTOP FOLLOW-UP VISIT: CPT | Performed by: SURGERY

## 2021-12-13 ENCOUNTER — APPOINTMENT (OUTPATIENT)
Dept: GENERAL RADIOLOGY | Age: 71
DRG: 291 | End: 2021-12-13
Payer: MEDICARE

## 2021-12-13 ENCOUNTER — APPOINTMENT (OUTPATIENT)
Dept: CT IMAGING | Age: 71
DRG: 291 | End: 2021-12-13
Payer: MEDICARE

## 2021-12-13 ENCOUNTER — HOSPITAL ENCOUNTER (INPATIENT)
Age: 71
LOS: 2 days | Discharge: HOME HEALTH | DRG: 291 | End: 2021-12-15
Attending: EMERGENCY MEDICINE | Admitting: INTERNAL MEDICINE
Payer: MEDICARE

## 2021-12-13 DIAGNOSIS — R09.02 HYPOXIA: ICD-10-CM

## 2021-12-13 DIAGNOSIS — L03.115 CELLULITIS OF RIGHT LOWER EXTREMITY: ICD-10-CM

## 2021-12-13 DIAGNOSIS — I50.32 CHRONIC DIASTOLIC HEART FAILURE (HCC): ICD-10-CM

## 2021-12-13 DIAGNOSIS — I10 ESSENTIAL HYPERTENSION: ICD-10-CM

## 2021-12-13 DIAGNOSIS — I25.10 CORONARY ARTERY DISEASE INVOLVING NATIVE CORONARY ARTERY OF NATIVE HEART WITHOUT ANGINA PECTORIS: ICD-10-CM

## 2021-12-13 DIAGNOSIS — J81.0 ACUTE PULMONARY EDEMA (HCC): Primary | ICD-10-CM

## 2021-12-13 PROBLEM — I50.9 HEART FAILURE (HCC): Status: ACTIVE | Noted: 2021-12-13

## 2021-12-13 LAB
ANION GAP SERPL CALCULATED.3IONS-SCNC: 12 MMOL/L (ref 3–16)
BASE EXCESS VENOUS: 0.2 MMOL/L (ref -2–3)
BASOPHILS ABSOLUTE: 0.1 K/UL (ref 0–0.2)
BASOPHILS RELATIVE PERCENT: 0.7 %
BUN BLDV-MCNC: 11 MG/DL (ref 7–20)
CALCIUM SERPL-MCNC: 9 MG/DL (ref 8.3–10.6)
CARBOXYHEMOGLOBIN: 2.6 % (ref 0–1.5)
CHLORIDE BLD-SCNC: 104 MMOL/L (ref 99–110)
CO2: 21 MMOL/L (ref 21–32)
CREAT SERPL-MCNC: 0.9 MG/DL (ref 0.8–1.3)
EKG ATRIAL RATE: 72 BPM
EKG DIAGNOSIS: NORMAL
EKG P AXIS: 53 DEGREES
EKG P-R INTERVAL: 200 MS
EKG Q-T INTERVAL: 392 MS
EKG QRS DURATION: 84 MS
EKG QTC CALCULATION (BAZETT): 429 MS
EKG R AXIS: 65 DEGREES
EKG T AXIS: 75 DEGREES
EKG VENTRICULAR RATE: 72 BPM
EOSINOPHILS ABSOLUTE: 0.4 K/UL (ref 0–0.6)
EOSINOPHILS RELATIVE PERCENT: 5.6 %
GFR AFRICAN AMERICAN: >60
GFR NON-AFRICAN AMERICAN: >60
GLUCOSE BLD-MCNC: 112 MG/DL (ref 70–99)
HCO3 VENOUS: 26.1 MMOL/L (ref 24–28)
HCT VFR BLD CALC: 38.9 % (ref 40.5–52.5)
HEMOGLOBIN, VEN, REDUCED: 60.4 %
HEMOGLOBIN: 13.3 G/DL (ref 13.5–17.5)
LYMPHOCYTES ABSOLUTE: 1.6 K/UL (ref 1–5.1)
LYMPHOCYTES RELATIVE PERCENT: 21.1 %
MAGNESIUM: 1.4 MG/DL (ref 1.8–2.4)
MCH RBC QN AUTO: 34 PG (ref 26–34)
MCHC RBC AUTO-ENTMCNC: 34.2 G/DL (ref 31–36)
MCV RBC AUTO: 99.5 FL (ref 80–100)
METHEMOGLOBIN VENOUS: 0.1 % (ref 0–1.5)
MONOCYTES ABSOLUTE: 0.5 K/UL (ref 0–1.3)
MONOCYTES RELATIVE PERCENT: 7.4 %
NEUTROPHILS ABSOLUTE: 4.8 K/UL (ref 1.7–7.7)
NEUTROPHILS RELATIVE PERCENT: 65.2 %
O2 SAT, VEN: 38 %
PCO2, VEN: 45.6 MMHG (ref 41–51)
PDW BLD-RTO: 15.3 % (ref 12.4–15.4)
PH VENOUS: 7.37 (ref 7.35–7.45)
PHOSPHORUS: 3.5 MG/DL (ref 2.5–4.9)
PLATELET # BLD: 168 K/UL (ref 135–450)
PMV BLD AUTO: 8.8 FL (ref 5–10.5)
PO2, VEN: <30 MMHG (ref 25–40)
POTASSIUM SERPL-SCNC: 4.4 MMOL/L (ref 3.5–5.1)
PRO-BNP: 417 PG/ML (ref 0–124)
RBC # BLD: 3.91 M/UL (ref 4.2–5.9)
SODIUM BLD-SCNC: 137 MMOL/L (ref 136–145)
TCO2 CALC VENOUS: 28 MMOL/L
TROPONIN: 0.02 NG/ML
TROPONIN: 0.02 NG/ML
WBC # BLD: 7.4 K/UL (ref 4–11)

## 2021-12-13 PROCEDURE — 84443 ASSAY THYROID STIM HORMONE: CPT

## 2021-12-13 PROCEDURE — 84100 ASSAY OF PHOSPHORUS: CPT

## 2021-12-13 PROCEDURE — 82803 BLOOD GASES ANY COMBINATION: CPT

## 2021-12-13 PROCEDURE — 83735 ASSAY OF MAGNESIUM: CPT

## 2021-12-13 PROCEDURE — 96376 TX/PRO/DX INJ SAME DRUG ADON: CPT

## 2021-12-13 PROCEDURE — G0378 HOSPITAL OBSERVATION PER HR: HCPCS

## 2021-12-13 PROCEDURE — 71260 CT THORAX DX C+: CPT

## 2021-12-13 PROCEDURE — 96365 THER/PROPH/DIAG IV INF INIT: CPT

## 2021-12-13 PROCEDURE — 71045 X-RAY EXAM CHEST 1 VIEW: CPT

## 2021-12-13 PROCEDURE — 96372 THER/PROPH/DIAG INJ SC/IM: CPT

## 2021-12-13 PROCEDURE — 2580000003 HC RX 258: Performed by: INTERNAL MEDICINE

## 2021-12-13 PROCEDURE — 6360000004 HC RX CONTRAST MEDICATION: Performed by: EMERGENCY MEDICINE

## 2021-12-13 PROCEDURE — 6370000000 HC RX 637 (ALT 250 FOR IP): Performed by: INTERNAL MEDICINE

## 2021-12-13 PROCEDURE — 2700000000 HC OXYGEN THERAPY PER DAY

## 2021-12-13 PROCEDURE — 99223 1ST HOSP IP/OBS HIGH 75: CPT | Performed by: INTERNAL MEDICINE

## 2021-12-13 PROCEDURE — 80048 BASIC METABOLIC PNL TOTAL CA: CPT

## 2021-12-13 PROCEDURE — 85025 COMPLETE CBC W/AUTO DIFF WBC: CPT

## 2021-12-13 PROCEDURE — 1200000000 HC SEMI PRIVATE

## 2021-12-13 PROCEDURE — 6360000002 HC RX W HCPCS: Performed by: EMERGENCY MEDICINE

## 2021-12-13 PROCEDURE — 84484 ASSAY OF TROPONIN QUANT: CPT

## 2021-12-13 PROCEDURE — 36415 COLL VENOUS BLD VENIPUNCTURE: CPT

## 2021-12-13 PROCEDURE — 6370000000 HC RX 637 (ALT 250 FOR IP): Performed by: EMERGENCY MEDICINE

## 2021-12-13 PROCEDURE — 96374 THER/PROPH/DIAG INJ IV PUSH: CPT

## 2021-12-13 PROCEDURE — 96366 THER/PROPH/DIAG IV INF ADDON: CPT

## 2021-12-13 PROCEDURE — 99285 EMERGENCY DEPT VISIT HI MDM: CPT

## 2021-12-13 PROCEDURE — 93005 ELECTROCARDIOGRAM TRACING: CPT | Performed by: EMERGENCY MEDICINE

## 2021-12-13 PROCEDURE — 94761 N-INVAS EAR/PLS OXIMETRY MLT: CPT

## 2021-12-13 PROCEDURE — 94640 AIRWAY INHALATION TREATMENT: CPT

## 2021-12-13 PROCEDURE — 6360000002 HC RX W HCPCS: Performed by: INTERNAL MEDICINE

## 2021-12-13 PROCEDURE — 83880 ASSAY OF NATRIURETIC PEPTIDE: CPT

## 2021-12-13 RX ORDER — DULOXETIN HYDROCHLORIDE 20 MG/1
20 CAPSULE, DELAYED RELEASE ORAL DAILY
Status: DISCONTINUED | OUTPATIENT
Start: 2021-12-14 | End: 2021-12-15 | Stop reason: HOSPADM

## 2021-12-13 RX ORDER — SODIUM CHLORIDE 9 MG/ML
25 INJECTION, SOLUTION INTRAVENOUS PRN
Status: DISCONTINUED | OUTPATIENT
Start: 2021-12-13 | End: 2021-12-15 | Stop reason: HOSPADM

## 2021-12-13 RX ORDER — ARFORMOTEROL TARTRATE 15 UG/2ML
15 SOLUTION RESPIRATORY (INHALATION) 2 TIMES DAILY
Status: DISCONTINUED | OUTPATIENT
Start: 2021-12-13 | End: 2021-12-13

## 2021-12-13 RX ORDER — BUDESONIDE 0.5 MG/2ML
0.5 INHALANT ORAL 2 TIMES DAILY
Status: DISCONTINUED | OUTPATIENT
Start: 2021-12-13 | End: 2021-12-13

## 2021-12-13 RX ORDER — ACETAMINOPHEN 325 MG/1
650 TABLET ORAL EVERY 6 HOURS PRN
Status: DISCONTINUED | OUTPATIENT
Start: 2021-12-13 | End: 2021-12-15 | Stop reason: HOSPADM

## 2021-12-13 RX ORDER — FUROSEMIDE 10 MG/ML
40 INJECTION INTRAMUSCULAR; INTRAVENOUS ONCE
Status: COMPLETED | OUTPATIENT
Start: 2021-12-13 | End: 2021-12-13

## 2021-12-13 RX ORDER — ONDANSETRON 4 MG/1
4 TABLET, ORALLY DISINTEGRATING ORAL EVERY 8 HOURS PRN
Status: DISCONTINUED | OUTPATIENT
Start: 2021-12-13 | End: 2021-12-15 | Stop reason: HOSPADM

## 2021-12-13 RX ORDER — LOSARTAN POTASSIUM 100 MG/1
100 TABLET ORAL DAILY
Status: DISCONTINUED | OUTPATIENT
Start: 2021-12-14 | End: 2021-12-15 | Stop reason: HOSPADM

## 2021-12-13 RX ORDER — ARFORMOTEROL TARTRATE 15 UG/2ML
15 SOLUTION RESPIRATORY (INHALATION) 2 TIMES DAILY
Status: DISCONTINUED | OUTPATIENT
Start: 2021-12-14 | End: 2021-12-15 | Stop reason: HOSPADM

## 2021-12-13 RX ORDER — SODIUM CHLORIDE 0.9 % (FLUSH) 0.9 %
5-40 SYRINGE (ML) INJECTION PRN
Status: DISCONTINUED | OUTPATIENT
Start: 2021-12-13 | End: 2021-12-15 | Stop reason: HOSPADM

## 2021-12-13 RX ORDER — FUROSEMIDE 10 MG/ML
40 INJECTION INTRAMUSCULAR; INTRAVENOUS DAILY
Status: DISCONTINUED | OUTPATIENT
Start: 2021-12-13 | End: 2021-12-15 | Stop reason: HOSPADM

## 2021-12-13 RX ORDER — CLOPIDOGREL BISULFATE 75 MG/1
75 TABLET ORAL DAILY
Status: DISCONTINUED | OUTPATIENT
Start: 2021-12-14 | End: 2021-12-15 | Stop reason: HOSPADM

## 2021-12-13 RX ORDER — IPRATROPIUM BROMIDE AND ALBUTEROL SULFATE 2.5; .5 MG/3ML; MG/3ML
1 SOLUTION RESPIRATORY (INHALATION) ONCE
Status: COMPLETED | OUTPATIENT
Start: 2021-12-13 | End: 2021-12-13

## 2021-12-13 RX ORDER — PANTOPRAZOLE SODIUM 40 MG/1
40 TABLET, DELAYED RELEASE ORAL
Status: DISCONTINUED | OUTPATIENT
Start: 2021-12-14 | End: 2021-12-15 | Stop reason: HOSPADM

## 2021-12-13 RX ORDER — POLYETHYLENE GLYCOL 3350 17 G/17G
17 POWDER, FOR SOLUTION ORAL DAILY PRN
Status: DISCONTINUED | OUTPATIENT
Start: 2021-12-13 | End: 2021-12-15 | Stop reason: HOSPADM

## 2021-12-13 RX ORDER — SODIUM CHLORIDE 0.9 % (FLUSH) 0.9 %
5-40 SYRINGE (ML) INJECTION EVERY 12 HOURS SCHEDULED
Status: DISCONTINUED | OUTPATIENT
Start: 2021-12-13 | End: 2021-12-15 | Stop reason: HOSPADM

## 2021-12-13 RX ORDER — PANTOPRAZOLE SODIUM 40 MG/1
40 TABLET, DELAYED RELEASE ORAL DAILY
Status: DISCONTINUED | OUTPATIENT
Start: 2021-12-13 | End: 2021-12-13 | Stop reason: SDUPTHER

## 2021-12-13 RX ORDER — ACETAMINOPHEN 650 MG/1
650 SUPPOSITORY RECTAL EVERY 6 HOURS PRN
Status: DISCONTINUED | OUTPATIENT
Start: 2021-12-13 | End: 2021-12-15 | Stop reason: HOSPADM

## 2021-12-13 RX ORDER — MAGNESIUM SULFATE IN WATER 40 MG/ML
2000 INJECTION, SOLUTION INTRAVENOUS ONCE
Status: COMPLETED | OUTPATIENT
Start: 2021-12-13 | End: 2021-12-13

## 2021-12-13 RX ORDER — BUDESONIDE 0.5 MG/2ML
0.5 INHALANT ORAL 2 TIMES DAILY
Status: DISCONTINUED | OUTPATIENT
Start: 2021-12-14 | End: 2021-12-15 | Stop reason: HOSPADM

## 2021-12-13 RX ORDER — ONDANSETRON 2 MG/ML
4 INJECTION INTRAMUSCULAR; INTRAVENOUS EVERY 6 HOURS PRN
Status: DISCONTINUED | OUTPATIENT
Start: 2021-12-13 | End: 2021-12-15 | Stop reason: HOSPADM

## 2021-12-13 RX ADMIN — ENOXAPARIN SODIUM 40 MG: 100 INJECTION SUBCUTANEOUS at 17:25

## 2021-12-13 RX ADMIN — FUROSEMIDE 40 MG: 10 INJECTION, SOLUTION INTRAMUSCULAR; INTRAVENOUS at 07:19

## 2021-12-13 RX ADMIN — METOPROLOL TARTRATE 100 MG: 25 TABLET, FILM COATED ORAL at 21:10

## 2021-12-13 RX ADMIN — FUROSEMIDE 40 MG: 10 INJECTION, SOLUTION INTRAMUSCULAR; INTRAVENOUS at 17:25

## 2021-12-13 RX ADMIN — IOPAMIDOL 80 ML: 755 INJECTION, SOLUTION INTRAVENOUS at 06:29

## 2021-12-13 RX ADMIN — IPRATROPIUM BROMIDE AND ALBUTEROL SULFATE 1 AMPULE: .5; 3 SOLUTION RESPIRATORY (INHALATION) at 05:01

## 2021-12-13 RX ADMIN — SODIUM CHLORIDE, PRESERVATIVE FREE 10 ML: 5 INJECTION INTRAVENOUS at 21:12

## 2021-12-13 RX ADMIN — ALBUTEROL SULFATE 2.5 MG: 2.5 SOLUTION RESPIRATORY (INHALATION) at 05:02

## 2021-12-13 RX ADMIN — MAGNESIUM SULFATE HEPTAHYDRATE 2000 MG: 2 INJECTION, SOLUTION INTRAVENOUS at 09:04

## 2021-12-13 ASSESSMENT — ENCOUNTER SYMPTOMS
SHORTNESS OF BREATH: 1
EYES NEGATIVE: 1
GASTROINTESTINAL NEGATIVE: 1
COUGH: 1

## 2021-12-13 ASSESSMENT — PAIN SCALES - WONG BAKER
WONGBAKER_NUMERICALRESPONSE: 0

## 2021-12-13 ASSESSMENT — PAIN SCALES - GENERAL
PAINLEVEL_OUTOF10: 0
PAINLEVEL_OUTOF10: 0

## 2021-12-13 NOTE — CARE COORDINATION
Kath Cardoso from [employer] called to offer assistance with discharge planning and to let us know that the patient currently gets home care assistance from [facility] and home delivered meals from [employer], and has a [phone] [employer] button.   If you have any questions, you can reach Kath Cardoso at [phone].     Thank you  Meghana Ba

## 2021-12-13 NOTE — H&P
Hospital Medicine History & Physical      PCP: Babita Gillette DO    Date of Admission: 12/13/2021    Date of Service: Pt seen/examined on 12/13/21 and Admitted to Inpatient     Chief Complaint:  SOB      History Of Present Illness:    73 y.o. male with past medical history significant for PVD, CAD, CHF, COPD, chronic hypoxic respiratory failure with 5 to 6 L of home oxygen, hypertension, and history of PE on Eliquis who presented to [facility] with chief complaint of worsening SOB. Patient recently underwent a right femoral endarterectomy November 17th. CTPA done here without PE. He has been off his lasix per cardiology as he was told that he might be getting too dry. Ed workup, bnp of 417,     He was admitted to hospital for further work-up and treatment.     Past Medical History:        Diagnosis Date    CAD (coronary artery disease)     CHF (congestive heart failure) (HCC)     COPD (chronic obstructive pulmonary disease) (HCC)     Depressive disorder, not elsewhere classified     GERD (gastroesophageal reflux disease)     History of colon polyps - 30 seen on colonoscopy 04/2021 4/24/2021    History of MI (myocardial infarction) 05/2013    History of skin ulcer of lower extremity     R anterior shin    History of transient ischemic attack (TIA)     Hyperlipidemia     Hypertension     Neuropathy     KAVITA (obstructive sleep apnea)     On CPAP    Personal history of DVT (deep vein thrombosis)     PVD (peripheral vascular disease) ([address]     TIA (transient ischemic attack) 2013    Vertebral fracture        Past Surgical History:        Procedure Laterality Date    APPENDECTOMY      CHOLECYSTECTOMY, LAPAROSCOPIC      COLONOSCOPY  4/23/2021    COLONOSCOPY POLYPECTOMY SNARE/COLD BIOPSY performed by La Mendez MD at [facility]  4/23/2021 COLONOSCOPY POLYPECTOMY ABLATION performed by Doyle Callejas MD at [address]  4/23/2021    COLONOSCOPY CONTROL HEMORRHAGE performed by Doyle Callejas MD at [address] [facility] [place]  6/2013    EYE SURGERY Left     FEMORAL ENDARTERECTOMY Right 11/17/2021    RIGHT FEMORAL ENDARTERECTOMY  RIGHT EXTERNAL ILIAC TO PROFUNDA FEMORAL BYPASS WITH 8MM PTFE GRAFT RIGHT LOWER EXTREMITY ARTERIOGRAM BALLOON ANGIOPLASTY RIGHT PROFUNDA BALLOON ANGIOPLASTY AND STENT OF RIGHT EXTERNAL ILIAC ARTERY performed by Christina Boogie MD at [address] [facility]  11/18/2015    Bilateral decompressive lumbar laminectomy L4-5   ; medial facetectomy L4-5 joints  bilaterally; foraminotomies l5   nerve roots bilaterally    LEG DEBRIDEMENT Right 7/23/2013    Dr. Jose Márquez - pretibial wound    OTHER SURGICAL HISTORY Right 6/25/2013    Dr. Jose Márquez - CFA Endarterectomy w/marsupialization; RLE wound excision & debridement     OTHER SURGICAL HISTORY Left 8/27/2013    Dr. Jose Márquez - femoral & profunda femoris endarterectomy w/marsupialization patch angioplasty using SFA    SKIN SPLIT GRAFT Right 7/25/2013    Dr. Jose Márquez - to non-healing R pretibial wound, 9 x 15 cm    TOTAL HIP ARTHROPLASTY Right 09/01/2016    Dr. Sharon Villeda Left 12/22/2015    Dr. Jose Márquez - CFA exploration, thrombectomy of ileofemoral system, stenting of RAFAL in-stent stenosis w/8 x 37 mm Palmaz        Medications Prior to Admission:    Prior to Admission medications    Medication Sig Start Date End Date Taking?  Authorizing Provider   zoster recombinant adjuvanted vaccine [facility]) 50 MCG/0.5ML SUSR injection Inject 0.5 mLs into the muscle See Admin Instructions 1 dose now and repeat in 2-6 months 12/8/21 6/6/22 Yes Felice Roland, DO   atorvastatin (LIPITOR) 80 MG tablet Take 1 tablet by mouth daily 12/3/21  Yes Felice Roland, DO   clopidogrel (PLAVIX) 75 MG tablet Take 1 tablet by mouth daily 11/22/21 Yes Destiny Dodson, APRN - NP   AMLODIPINE BENZOATE PO 5 mg: TAKE 1 TAB DAILY 9/30/21  Yes Historical Provider, MD   Cyanocobalamin (B-12) 100 MCG TABS 1 tablet 9/22/21  Yes Historical Provider, MD   DULoxetine (CYMBALTA) 20 MG extended release capsule 1 capsule 7/30/21  Yes Historical Provider, MD   potassium chloride (MICRO-K) 10 MEQ extended release capsule 1 tablet 9/4/21  Yes Historical Provider, MD   Cholecalciferol (VITAMIN D-3) 25 MCG (1000 UT) CAPS 1 tablet 9/22/21  Yes Historical Provider, MD   colchicine (COLCRYS) 0.6 MG tablet Take 0.6 mg by mouth as needed   Yes Historical Provider, MD   omeprazole (PRILOSEC) 40 MG delayed release capsule  9/4/21  Yes Historical Provider, MD   fluticasone-umeclidin-vilant (TRELEGY ELLIPTA) 100-62.5-25 MCG/INH AEPB Inhale 1 puff into the lungs daily 9/24/21  Yes Mateus Arcos, DO   nicotine (NICODERM CQ) 21 MG/24HR Place 1 patch onto the skin every 24 hours 9/24/21 9/24/22 Yes Mateus Arcos, DO   folic acid (FOLVITE) 1 MG tablet Take 1 tablet by mouth daily 9/21/21  Yes Carisa Oviedo MD   albuterol sulfate  (90 Base) MCG/ACT inhaler Inhale 2 puffs into the lungs every 6 hours as needed for Wheezing 8/25/21  Yes Mateus Charlesires, DO   allopurinol (ZYLOPRIM) 100 MG tablet Take 1 tablet by mouth daily 8/25/21  Yes Mateus Charlesires, DO   losartan (COZAAR) 100 MG tablet TAKE 1 TABLET BY MOUTH EVERY DAY 8/25/21  Yes Mateus Charlesires, DO   nitroGLYCERIN (NITROSTAT) 0.4 MG SL tablet Place 1 tablet under the tongue every 5 minutes as needed for Chest pain 8/25/21  Yes Mateus Charlesires, DO   furosemide (LASIX) 40 MG tablet Take 1 tablet by mouth daily 8/25/21  Yes Mateus Charlesires, DO   magnesium gluconate (MAGONATE) 500 MG tablet Take 500 mg by mouth daily. Yes Historical Provider, MD   gabapentin (NEURONTIN) 100 MG capsule Take 2 capsules by mouth 3 times daily for 10 days.  11/30/21 12/10/21  Melina Reveles MD   pantoprazole (PROTONIX) 40 MG tablet Take 1 tablet by mouth daily for 10 days 9/20/21 9/30/21 Leydi Ernst MD   metoprolol (LOPRESSOR) 100 MG tablet Take 1 tablet by mouth 2 times daily 8/25/21 11/26/21  Susan Fang DO       Allergies:  [place] Carbo [aspirin] and Daliresp [roflumilast]    Social History:  The patient currently lives at [address] [place] [address] [place]:   reports that he has been smoking cigarettes. He started smoking about 54 years ago. He has a 26.00 pack-year smoking history. He has never used smokeless tobacco.  ETOH:   reports current alcohol use. Family History:  Reviewed in detail and negative for DM, Early CAD, Cancer, CVA. Positive as follows:        Problem Relation Age of Onset    Cancer Mother         Breast    Heart Disease Mother     Cancer Father         Bladder    Heart Disease Father     Cancer Paternal Grandmother         melanoma        REVIEW OF SYSTEMS:   Positive and negative  as noted in the HPI. All other systems reviewed and negative. PHYSICAL EXAM:    BP (!) 112/43   Pulse 66   Temp 97.9 °F (36.6 °C) (Oral)   Resp 23   Ht 5' 10\" (1.778 m)   SpO2 99%   BMI 29.13 kg/m²     General appearance: No apparent distress appears stated age and cooperative. HEENT Normal cephalic, atraumatic without obvious deformity. Pupils equal, round, and reactive to light. Extra ocular muscles intact. Conjunctivae/corneas clear. Neck: Supple, No jugular venous distention/bruits. Trachea midline without thyromegaly or adenopathy with full range of motion. Lungs: Clear to auscultation, bilaterally without Rales/Wheezes/Rhonchi with good respiratory effort. Heart: Regular rate and rhythm with Normal S1/S2 without murmurs, rubs or gallops, point of maximum impulse non-displaced  Abdomen: Soft, non-tender or non-distended without rigidity or guarding and positive bowel sounds all four quadrants. Extremities: No clubbing, cyanosis, or edema bilaterally. Full range of motion without deformity and normal gait intact. Skin: Skin color, texture, turgor normal.  No rashes or lesions.   Neurologic: Alert and oriented X 3, neurovascularly intact with sensory/motor intact upper extremities/lower extremities, bilaterally. Cranial nerves: II-XII intact, grossly non-focal.  Mental status: Alert, oriented, thought content appropriate. Capillary Refill: Acceptable  < 3 seconds  Peripheral Pulses: +3 Easily felt, not easily obliterated with pressure      CXR:  I have reviewed the CXR with the following interpretation: mild edema  EKG:  I have reviewed the EKG with the following interpretation: NSr    CBC   Recent Labs     12/13/21 0455   WBC 7.4   HGB 13.3*   HCT 38.9*         RENAL  Recent Labs     12/13/21 0455      K 4.4      CO2 21   PHOS 3.5   BUN 11   CREATININE 0.9     LFT'S  No results for input(s): AST, ALT, ALB, BILIDIR, BILITOT, ALKPHOS in the last 72 hours. COAG  No results for input(s): INR in the last 72 hours.   CARDIAC ENZYMES  Recent Labs     12/13/21 0455 12/13/21  0926   TROPONINI 0.02* 0.02*       U/A:    Lab Results   Component Value Date    COLORU Yellow 12/07/2020    WBCUA >100 12/07/2020    RBCUA 3-4 12/07/2020    RBCUA NEGATIVE 08/10/2016    MUCUS PRESENT 08/10/2016    BACTERIA 4+ 12/07/2020    CLARITYU Clear 12/07/2020    SPECGRAV 1.025 12/07/2020    LEUKOCYTESUR MODERATE 12/07/2020    BLOODU Negative 12/07/2020    GLUCOSEU Negative 12/07/2020       ABG    Lab Results   Component Value Date    VDD5UXK 24.4 11/17/2021    BEART -1 11/17/2021    C7LHBNGJ 89 11/17/2021    PHART 7.362 11/17/2021    SAA8CGB 42.9 11/17/2021    PO2ART 59.4 11/17/2021    WSH9RFZ 26 11/17/2021           Active Hospital Problems    Diagnosis Date Noted    Heart failure ([address] [I50.9] 12/13/2021         PHYSICIANS CERTIFICATION:    I certify that Justine Moore. is expected to be hospitalized for more  than 2 midnights based on the following assessment and plan:      ASSESSMENT/PLAN:  CHF exacerbation  And SOB  started on iv lasix   cardiology consulted  Strict intake and ouptut  Daily weights        Macrocytic anemia with folate deficiency  -Continue folate supplement     History of PE  -Continue Eliquis     PVD  CAD  -Continue Plavix, statin,BB, IMdur    DVT Prophylaxis: lovenox  Diet: ADULT DIET; Regular; Low Sodium (2 gm)  Code Status: Full Code  PT/OT Eval Status:  N/a     Dispo - inpatient        Magaly Nails MD    Thank you Yulissa Ruelas DO for the opportunity to be involved in this patient's care. If you have any questions or concerns please feel free to contact me at [phone].

## 2021-12-13 NOTE — CONSULTS
CARDIOLOGY  CONSULTATION         Reason for Consultation/Chief Complaint: \"I have been having shortness of breath. \"       History of Present Illness:  Rlaph Jay is a 73 y.o. patient who presented to the hospital with complaints of shortness of breath. Patient states he was in normal health last night. This morning woke up with extreme shortness of breath which prompted him to come to the ED. Noted to have sats in the 70s on RA per EMS, improved with NRB. He denies any increased lower extremity edema. His right leg is chronically swollen after vascular surgery. Denies any chest pain, palpitations. .  Past Medical History:   has a past medical history of CAD (coronary artery disease), CHF (congestive heart failure) ([facility] COPD (chronic obstructive pulmonary disease) ([facility] Depressive disorder, not elsewhere classified, GERD (gastroesophageal reflux disease), History of colon polyps - 30 seen on colonoscopy 04/2021, History of MI (myocardial infarction), History of skin ulcer of lower extremity, History of transient ischemic attack (TIA), Hyperlipidemia, Hypertension, Neuropathy, KAVITA (obstructive sleep apnea), Personal history of DVT (deep vein thrombosis), PVD (peripheral vascular disease) ([facility] TIA (transient ischemic attack), and Vertebral fracture. Surgical History:   has a past surgical history that includes Appendectomy; Coronary angioplasty with stent (6/2013); other surgical history (Right, 6/25/2013); Leg Debridement (Right, 7/23/2013); skin split graft (Right, 7/25/2013); other surgical history (Left, 8/27/2013); laminectomy (11/18/2015); transluminal angioplasty (Left, 12/22/2015); Total hip arthroplasty (Right, 09/01/2016); Cholecystectomy, laparoscopic; eye surgery (Left); Colonoscopy (4/23/2021); Colonoscopy (4/23/2021); Colonoscopy (4/23/2021); and Femoral Endarterectomy (Right, 11/17/2021). Social History:   reports that he has been smoking cigarettes.  He started smoking about 54 years ago. He has a 26.00 pack-year smoking history. He has never used smokeless tobacco. He reports current alcohol use. He reports that he does not use drugs. Family History:  No evidence for sudden cardiac death or premature CAD    Home Medications:  Were reviewed and are listed in nursing record. and/or listed below  Prior to Admission medications    Medication Sig Start Date End Date Taking?  Authorizing Provider   zoster recombinant adjuvanted vaccine [facility]) 50 MCG/0.5ML SUSR injection Inject 0.5 mLs into the muscle See Admin Instructions 1 dose now and repeat in 2-6 months 12/8/21 6/6/22 Yes Shantel Savage DO   atorvastatin (LIPITOR) 80 MG tablet Take 1 tablet by mouth daily 12/3/21  Yes Shantel Savage DO   clopidogrel (PLAVIX) 75 MG tablet Take 1 tablet by mouth daily 11/22/21  Yes Sanaz Daily, APRN - NP   AMLODIPINE BENZOATE PO 5 mg: TAKE 1 TAB DAILY 9/30/21  Yes Historical Provider, MD   Cyanocobalamin (B-12) 100 MCG TABS 1 tablet 9/22/21  Yes Historical Provider, MD   DULoxetine (CYMBALTA) 20 MG extended release capsule 1 capsule 7/30/21  Yes Historical Provider, MD   potassium chloride (MICRO-K) 10 MEQ extended release capsule 1 tablet 9/4/21  Yes Historical Provider, MD   Cholecalciferol (VITAMIN D-3) 25 MCG (1000 UT) CAPS 1 tablet 9/22/21  Yes Historical Provider, MD   colchicine (COLCRYS) 0.6 MG tablet Take 0.6 mg by mouth as needed   Yes Historical Provider, MD   omeprazole (PRILOSEC) 40 MG delayed release capsule  9/4/21  Yes Historical Provider, MD   fluticasone-umeclidin-vilant (TRELEGY ELLIPTA) 100-62.5-25 MCG/INH AEPB Inhale 1 puff into the lungs daily 9/24/21  Yes Shantel Savage DO   nicotine (NICODERM CQ) 21 MG/24HR Place 1 patch onto the skin every 24 hours 9/24/21 9/24/22 Yes Shantel Savage DO   folic acid (FOLVITE) 1 MG tablet Take 1 tablet by mouth daily 9/21/21  Yes Bri Conroy MD   albuterol sulfate  (90 Base) MCG/ACT inhaler Inhale 2 puffs into the lungs every 6 hours as needed for Wheezing 8/25/21  Yes Loly Salts, DO   allopurinol (ZYLOPRIM) 100 MG tablet Take 1 tablet by mouth daily 8/25/21  Yes Loly Salts, DO   losartan (COZAAR) 100 MG tablet TAKE 1 TABLET BY MOUTH EVERY DAY 8/25/21  Yes Loly Salts, DO   nitroGLYCERIN (NITROSTAT) 0.4 MG SL tablet Place 1 tablet under the tongue every 5 minutes as needed for Chest pain 8/25/21  Yes Loly Salts, DO   furosemide (LASIX) 40 MG tablet Take 1 tablet by mouth daily 8/25/21  Yes Loly Salts, DO   magnesium gluconate (MAGONATE) 500 MG tablet Take 500 mg by mouth daily. Yes Historical Provider, MD   gabapentin (NEURONTIN) 100 MG capsule Take 2 capsules by mouth 3 times daily for 10 days. 11/30/21 12/10/21  Darshan Puga MD   pantoprazole (PROTONIX) 40 MG tablet Take 1 tablet by mouth daily for 10 days 9/20/21 9/30/21  Germaine Narayan MD   metoprolol (LOPRESSOR) 100 MG tablet Take 1 tablet by mouth 2 times daily 8/25/21 11/26/21  Loly Salts, DO        Hospital Medications: Allergies:  Asa [aspirin] and Daliresp [roflumilast]     Review of Systems:   All 12 point review of symptoms completed. Pertinent positives identified in the HPI, all other review of symptoms negative as below. · Constitutional: there has been no unanticipated weight loss. · Eyes: No visual changes or diplopia. No scleral icterus. · ENT: No Headaches, hearing loss or vertigo. No mouth sores or sore throat. · Cardiovascular: No loss of consciousness. No hemoptysis, pleuritic pain, or phlebitis. · Respiratory: No cough or wheezing, no sputum production. No hematemesis. · Gastrointestinal: No abdominal pain, appetite loss, blood in stools. No change in bowel or bladder habits. · Genitourinary: No dysuria, or hematuria. · Musculoskeletal:  No gait disturbance, weakness or joint complaints. · Integumentary: No rash or pruritis. · Neurological: No headache, diplopia,numbness or tingling.  No change in gait, balance, coordination, mood, affect, memory, mentation, behavior. · Psychiatric: No anxiety,  · Endocrine: No malaise,  · Hematologic/Lymphatic: No abnormal bruising  · Allergic/Immunologic: No nasal congestion      Physical Examination:    Vitals:    12/13/21 1500   BP: (!) 112/43   Pulse: 66   Resp: 23   Temp: 97.9 °F (36.6 °C)   SpO2: 99%              General Appearance:  Alert, cooperative, no distress, appears stated age   Head:  Normocephalic, without obvious abnormality, atraumatic   Eyes:  PERRL   Nose: Nares normal,   Neck: Supple, JVP normal   Lungs:   Clear to auscultation bilaterally, respirations unlabored   Chest Wall:  No tenderness or deformity   Heart:  Regular rate and rhythm, S1, S2 normal, no murmur, rub or gallop   Abdomen:   Soft, non-tender, +bowel sounds   Extremities: no cyanosis, no clubbing + edema on R leg   Pulses: symmetric   Skin:  no gross lesions   Pysch: Normal mood and affect   Neurologic: No gross deficits.         Labs  CBC:   Lab Results   Component Value Date    WBC 7.4 12/13/2021    RBC 3.91 12/13/2021    HGB 13.3 12/13/2021    HCT 38.9 12/13/2021    MCV 99.5 12/13/2021    RDW 15.3 12/13/2021     12/13/2021     CMP:    Lab Results   Component Value Date     12/13/2021    K 4.4 12/13/2021    K 4.0 11/30/2021     12/13/2021    CO2 21 12/13/2021    BUN 11 12/13/2021    CREATININE 0.9 12/13/2021    GFRAA >60 12/13/2021    AGRATIO 1.1 11/30/2021    LABGLOM >60 12/13/2021    GLUCOSE 112 12/13/2021    PROT 7.2 11/30/2021    CALCIUM 9.0 12/13/2021    BILITOT 0.4 11/30/2021    ALKPHOS 90 11/30/2021    AST 19 11/30/2021    ALT 14 11/30/2021     PT/INR:  No results found for: PTINR  Recent Labs     12/13/21  0455 12/13/21  0926   TROPONINI 0.02* 0.02*       EKG:  NSR    Assessment  Patient Active Problem List   Diagnosis    Essential hypertension    Hyperlipemia    Degeneration of intervertebral disc of lumbar region    KAVITA and COPD overlap syndrome ([address]    PVD (peripheral vascular disease) ([address]    Coronary artery disease involving native coronary artery of native heart without angina pectoris    Idiopathic peripheral neuropathy    Gastroesophageal reflux disease    Sacroiliitis, not elsewhere classified ([facility]    Other spondylosis, lumbosacral region    Cigarette nicotine dependence without complication    Idiopathic chronic gout without tophus    Pulmonary embolism without acute cor pulmonale (HCC)    Diastolic dysfunction with chronic heart failure (HCC)    Dependence on other enabling machines and devices    Dependence on supplemental oxygen    Hereditary and idiopathic neuropathy, unspecified    Hypertensive heart disease with heart failure ([address]    Long term (current) use of anticoagulants    Problems related to living alone    Presence of coronary angioplasty implant and graft    Pulmonary embolism ([address]    Critical ischemia of lower extremity ([address]    Cellulitis of right lower extremity    LYDIA (acute kidney injury) ([address]    Anemia    Heart failure (HCC)        Impression: Acute dCHF, CAD    Recommendations:  - CHF has been well managed as echo without evidence of diastolic dysfunction since 2013 which showed G1DD.   - Diurese with lasix 40mg IV daily  - Strict I/O  - Daily wts  - Continue lopressor 100mg BID  - Continue losartan, can restart amlodipine if BP begins to increase      I had the opportunity to review the clinical symptoms and presentation of Zodio. .     Tobacco use was discussed with the patient and educated on the negative effects. I have asked the patient to not utilize these agents. Thank you for allowing to us to participate in the care or Zodio. .     All questions and concerns were addressed to the patient/family. Alternatives to my treatment were discussed. The note was completed using EMR. Every effort was made to ensure accuracy; however, inadvertent computerized transcription errors may be present.     Case will be discussed with attending, Dr. Russell Plascencia MD  PGY-3      Staff Note      Patient seen and evaluated with the medical resident. Troponin 0.02 with normal ECG. bnp elevated. Start diuresis for mild acute diastolic HF  I was physically present during the critical portions of the service when performed by the resident including the assessment and management of the patient. I agree with the findings and plans as described.

## 2021-12-13 NOTE — ED PROVIDER NOTES
[address] ENCOUNTER          ATTENDING PHYSICIAN NOTE       Date of evaluation: 12/13/2021    Chief Complaint     Shortness of breath    History of Present Illness     James Mcallister is a 73 y.o. male who presents to the emergency department complaining of shortness of breath. Patient states that approximately an hour prior to presentation he woke him from sleep feeling short of breath. He denies any chest pain or pressure associated with this. He states he said similar things happen in the past but this is more severe. He did not try take anything for his breathing but did call 911. EMS documented oxygen saturations in the high 70s on room air. He was placed on a nonrebreather and oxygen saturations came up into the 80s per their report. Patient denies any recent fevers or chills. He denies any swelling or pain in his left leg. He states he has swelling and pain in the right leg where he recently had a vascular surgery procedure. He states he was feeling in his normal state of health before he went to bed. Review of Systems     Review of Systems   Constitutional: Negative. HENT: Negative. Eyes: Negative. Respiratory: Positive for cough and shortness of breath. Gastrointestinal: Negative. Genitourinary: Negative. Musculoskeletal: Negative. Neurological: Negative. All other systems reviewed and are negative.       Past Medical, Surgical, Family, and Social History     He has a past medical history of CAD (coronary artery disease), CHF (congestive heart failure) ([facility] [address] COPD (chronic obstructive pulmonary disease) ([facility] [address] Depressive disorder, not elsewhere classified, GERD (gastroesophageal reflux disease), History of colon polyps - 30 seen on colonoscopy 04/2021, History of MI (myocardial infarction), History of skin ulcer of lower extremity, History of transient ischemic attack (TIA), Hyperlipidemia, Hypertension, Neuropathy, KAVITA (obstructive sleep apnea), Personal history of DVT (deep vein thrombosis), PVD (peripheral vascular disease) ([facility] [address] TIA (transient ischemic attack), and Vertebral fracture. He has a past surgical history that includes Appendectomy; Coronary angioplasty with stent (6/2013); other surgical history (Right, 6/25/2013); Leg Debridement (Right, 7/23/2013); skin split graft (Right, 7/25/2013); other surgical history (Left, 8/27/2013); laminectomy (11/18/2015); transluminal angioplasty (Left, 12/22/2015); Total hip arthroplasty (Right, 09/01/2016); Cholecystectomy, laparoscopic; eye surgery (Left); Colonoscopy (4/23/2021); Colonoscopy (4/23/2021); Colonoscopy (4/23/2021); and Femoral Endarterectomy (Right, 11/17/2021). His family history includes Cancer in his father, mother, and paternal grandmother; Heart Disease in his father and mother. He reports that he has been smoking cigarettes. He started smoking about 54 years ago. He has a 26.00 pack-year smoking history. He has never used smokeless tobacco. He reports current alcohol use. He reports that he does not use drugs.     Medications     Previous Medications    ALBUTEROL SULFATE  (90 BASE) MCG/ACT INHALER    Inhale 2 puffs into the lungs every 6 hours as needed for Wheezing    ALLOPURINOL (ZYLOPRIM) 100 MG TABLET    Take 1 tablet by mouth daily    AMLODIPINE BENZOATE PO    5 mg: TAKE 1 TAB DAILY    ATORVASTATIN (LIPITOR) 80 MG TABLET    Take 1 tablet by mouth daily    CHOLECALCIFEROL (VITAMIN D-3) 25 MCG (1000 UT) CAPS    1 tablet    CLOPIDOGREL (PLAVIX) 75 MG TABLET    Take 1 tablet by mouth daily    COLCHICINE (COLCRYS) 0.6 MG TABLET    Take 0.6 mg by mouth as needed    CYANOCOBALAMIN (B-12) 100 MCG TABS    1 tablet    DULOXETINE (CYMBALTA) 20 MG EXTENDED RELEASE CAPSULE    1 capsule    FLUTICASONE-UMECLIDIN-VILANT (TRELEGY ELLIPTA) 100-62.5-25 MCG/INH AEPB    Inhale 1 puff into the lungs daily    FOLIC ACID (FOLVITE) 1 MG TABLET    Take 1 tablet by mouth daily FUROSEMIDE (LASIX) 40 MG TABLET    Take 1 tablet by mouth daily    GABAPENTIN (NEURONTIN) 100 MG CAPSULE    Take 2 capsules by mouth 3 times daily for 10 days. LOSARTAN (COZAAR) 100 MG TABLET    TAKE 1 TABLET BY MOUTH EVERY DAY    MAGNESIUM GLUCONATE (MAGONATE) 500 MG TABLET    Take 500 mg by mouth daily. METOPROLOL (LOPRESSOR) 100 MG TABLET    Take 1 tablet by mouth 2 times daily    NICOTINE (NICODERM CQ) 21 MG/24HR    Place 1 patch onto the skin every 24 hours    NITROGLYCERIN (NITROSTAT) 0.4 MG SL TABLET    Place 1 tablet under the tongue every 5 minutes as needed for Chest pain    OMEPRAZOLE (PRILOSEC) 40 MG DELAYED RELEASE CAPSULE        PANTOPRAZOLE (PROTONIX) 40 MG TABLET    Take 1 tablet by mouth daily for 10 days    POTASSIUM CHLORIDE (MICRO-K) 10 MEQ EXTENDED RELEASE CAPSULE    1 tablet    ZOSTER RECOMBINANT ADJUVANTED VACCINE (SHINGRIX) 50 MCG/0.5ML SUSR INJECTION    Inject 0.5 mLs into the muscle See Admin Instructions 1 dose now and repeat in 2-6 months       Allergies     He is allergic to asa [aspirin] and daliresp [roflumilast]. Physical Exam     INITIAL VITALS: BP: 137/79,  , Pulse: 72, Resp: 26, SpO2: 98 %   Physical Exam  Vitals and nursing note reviewed. Constitutional:       Appearance: Normal appearance. He is normal weight. HENT:      Head: Normocephalic and atraumatic. Mouth/Throat:      Mouth: Mucous membranes are moist.      Pharynx: No oropharyngeal exudate. Cardiovascular:      Rate and Rhythm: Normal rate and regular rhythm. Pulses: Normal pulses. Heart sounds: Normal heart sounds. Pulmonary:      Effort: Tachypnea present. Breath sounds: Decreased breath sounds present. Comments: Patient has diminished breath sounds throughout with poor air movement. Musculoskeletal:         General: No swelling. Normal range of motion. Skin:     General: Skin is warm and dry. Neurological:      General: No focal deficit present.       Mental Status: He is alert and oriented to person, place, and time. Diagnostic Results     EKG   EKG is interpreted by me shows the patient to be in a normal sinus rhythm with a normal axis, normal CA and QT intervals, normal QRS duration, no ST segment abnormalities, no T wave abnormalities. RADIOLOGY:  CT CHEST PULMONARY EMBOLISM W CONTRAST   Final Result   1. No evidence of acute pulmonary emboli. Interval resolution of previously noted thrombus within the right middle lobe and right lower lobe segmental and subsegmental pulmonary arterial branches. 2. Mild intralobular septal thickening diffusely. Correlate for mild edema. 3. Extensive atherosclerotic disease.                XR CHEST PORTABLE   Final Result     Mild interstitial pulmonary edema              LABS:   Results for orders placed or performed during the hospital encounter of 12/13/21   CBC auto differential   Result Value Ref Range    WBC 7.4 4.0 - 11.0 K/uL    RBC 3.91 (L) 4.20 - 5.90 M/uL    Hemoglobin 13.3 (L) 13.5 - 17.5 g/dL    Hematocrit 38.9 (L) 40.5 - 52.5 %    MCV 99.5 80.0 - 100.0 fL    MCH 34.0 26.0 - 34.0 pg    MCHC 34.2 31.0 - 36.0 g/dL    RDW 15.3 12.4 - 15.4 %    Platelets 787 628 - 200 K/uL    MPV 8.8 5.0 - 10.5 fL    Neutrophils % 65.2 %    Lymphocytes % 21.1 %    Monocytes % 7.4 %    Eosinophils % 5.6 %    Basophils % 0.7 %    Neutrophils Absolute 4.8 1.7 - 7.7 K/uL    Lymphocytes Absolute 1.6 1.0 - 5.1 K/uL    Monocytes Absolute 0.5 0.0 - 1.3 K/uL    Eosinophils Absolute 0.4 0.0 - 0.6 K/uL    Basophils Absolute 0.1 0.0 - 0.2 K/uL   Basic Metabolic Panel (EP - 1)   Result Value Ref Range    Sodium 137 136 - 145 mmol/L    Potassium 4.4 3.5 - 5.1 mmol/L    Chloride 104 99 - 110 mmol/L    CO2 21 21 - 32 mmol/L    Anion Gap 12 3 - 16    Glucose 112 (H) 70 - 99 mg/dL    BUN 11 7 - 20 mg/dL    CREATININE 0.9 0.8 - 1.3 mg/dL    GFR Non-African American >60 >60    GFR African American >60 >60    Calcium 9.0 8.3 - 10.6 mg/dL   Troponin   Result Value Ref Range    Troponin 0.02 (H) <0.01 ng/mL   Brain Natriuretic Peptide   Result Value Ref Range    Pro- (H) 0 - 124 pg/mL   Magnesium   Result Value Ref Range    Magnesium 1.40 (L) 1.80 - 2.40 mg/dL   Phosphorus   Result Value Ref Range    Phosphorus 3.5 2.5 - 4.9 mg/dL   Blood Gas, Venous   Result Value Ref Range    pH, Kan 7.365 7.350 - 7.450    pCO2, Kan 45.6 41.0 - 51.0 mmHg    pO2, Kan <30.0 25.0 - 40.0 mmHg    HCO3, Venous 26.1 24.0 - 28.0 mmol/L    Base Excess, Kan 0.2 -2.0 - 3.0 mmol/L    O2 Sat, Kan 38 Not established %    Carboxyhemoglobin 2.6 (H) 0.0 - 1.5 %    MetHgb, Kan 0.1 0.0 - 1.5 %    TC02 (Calc), Kan 28 mmol/L    Hemoglobin, Kan, Reduced 60.40 %     RECENT VITALS:  BP: (!) 142/72, , Pulse: 92, Resp: 19, SpO2: 99 %     Procedures     N/A    ED Course     Nursing Notes, Past Medical Hx, Past Surgical Hx, Social Hx,Allergies, and Family Hx were reviewed. patient was given the following medications:  Orders Placed This Encounter   Medications    ipratropium-albuterol (DUONEB) nebulizer solution 1 ampule     Order Specific Question:   Initiate RT Bronchodilator Protocol     Answer:   No    albuterol (PROVENTIL) nebulizer solution 2.5 mg     Order Specific Question:   Initiate RT Bronchodilator Protocol     Answer:   No    albuterol (PROVENTIL) nebulizer solution 2.5 mg     Order Specific Question:   Initiate RT Bronchodilator Protocol     Answer:   No    iopamidol (ISOVUE-370) 76 % injection 80 mL    furosemide (LASIX) injection 40 mg       CONSULTS:  IP CONSULT TO HOSPITALIST    MEDICAL DECISIONMAKING / ASSESSMENT / Jorge Alberto Mai. is a 73 y.o. male with history of COPD and recent diagnosis of pulmonary embolism presents complaining of acute onset of shortness of breath. Patient states that his symptoms started acutely and woke him up from sleep. Per EMS, patient was hypoxic in the 70s on room air but did come up into the 80s on a nonrebreather.   On arrival to the emergency department, his oxygen saturations were 100% on a nonrebreather. He was able to wean down to nasal cannula. Patient has diminished breath sounds bilaterally. He has regular heart rate with no murmurs. He has no peripheral edema on the left side. Right side is in a dressing from his recent vascular procedure. Laboratory studies are significant for normal white count and stable hemoglobin. VBG is unremarkable. Renal panel is at the patient's baseline. Troponin is 0.02. NT proBNP is 417. Magnesium is low at 1.4 and this was written to be replaced. Chest x-ray shows mild interstitial edema. CTPA shows no evidence of recurrent pulmonary embolism but does show evidence of interstitial edema. Patient was written for Lasix. He was given nebulizer treatments. Patient will be admitted to the hospitalist service for further evaluation of his hypoxia and pulmonary edema. Clinical Impression     1. Acute pulmonary edema (HCC)    2. Hypoxia        Disposition     PATIENT REFERRED TO:  No follow-up provider specified.     DISCHARGE MEDICATIONS:  New Prescriptions    No medications on file       DISPOSITION Decision To Admit 12/13/2021 06:56:36 AM        Corey Kerns MD  12/13/21 [id number]

## 2021-12-13 NOTE — ED NOTES
Bed: B14-14  Expected date:   Expected time:   Means of arrival:   Comments:  [address], RN  12/13/21 [id number]

## 2021-12-14 LAB
ANION GAP SERPL CALCULATED.3IONS-SCNC: 11 MMOL/L (ref 3–16)
BUN BLDV-MCNC: 14 MG/DL (ref 7–20)
CALCIUM SERPL-MCNC: 9.3 MG/DL (ref 8.3–10.6)
CHLORIDE BLD-SCNC: 103 MMOL/L (ref 99–110)
CHOLESTEROL, TOTAL: 150 MG/DL (ref 0–199)
CO2: 25 MMOL/L (ref 21–32)
CREAT SERPL-MCNC: 0.8 MG/DL (ref 0.8–1.3)
GFR AFRICAN AMERICAN: >60
GFR NON-AFRICAN AMERICAN: >60
GLUCOSE BLD-MCNC: 101 MG/DL (ref 70–99)
HDLC SERPL-MCNC: 28 MG/DL (ref 40–60)
LDL CHOLESTEROL CALCULATED: 82 MG/DL
MAGNESIUM: 1.8 MG/DL (ref 1.8–2.4)
POTASSIUM SERPL-SCNC: 4.2 MMOL/L (ref 3.5–5.1)
SODIUM BLD-SCNC: 139 MMOL/L (ref 136–145)
TRIGL SERPL-MCNC: 198 MG/DL (ref 0–150)
TSH SERPL DL<=0.05 MIU/L-ACNC: 1.57 UIU/ML (ref 0.27–4.2)
VLDLC SERPL CALC-MCNC: 40 MG/DL

## 2021-12-14 PROCEDURE — 97162 PT EVAL MOD COMPLEX 30 MIN: CPT

## 2021-12-14 PROCEDURE — 2580000003 HC RX 258: Performed by: INTERNAL MEDICINE

## 2021-12-14 PROCEDURE — 80061 LIPID PANEL: CPT

## 2021-12-14 PROCEDURE — 99233 SBSQ HOSP IP/OBS HIGH 50: CPT | Performed by: NURSE PRACTITIONER

## 2021-12-14 PROCEDURE — G0378 HOSPITAL OBSERVATION PER HR: HCPCS

## 2021-12-14 PROCEDURE — 6360000002 HC RX W HCPCS: Performed by: INTERNAL MEDICINE

## 2021-12-14 PROCEDURE — 1200000000 HC SEMI PRIVATE

## 2021-12-14 PROCEDURE — 6370000000 HC RX 637 (ALT 250 FOR IP): Performed by: INTERNAL MEDICINE

## 2021-12-14 PROCEDURE — 97530 THERAPEUTIC ACTIVITIES: CPT

## 2021-12-14 PROCEDURE — 96376 TX/PRO/DX INJ SAME DRUG ADON: CPT

## 2021-12-14 PROCEDURE — 36415 COLL VENOUS BLD VENIPUNCTURE: CPT

## 2021-12-14 PROCEDURE — 99223 1ST HOSP IP/OBS HIGH 75: CPT | Performed by: INTERNAL MEDICINE

## 2021-12-14 PROCEDURE — 83735 ASSAY OF MAGNESIUM: CPT

## 2021-12-14 PROCEDURE — 96375 TX/PRO/DX INJ NEW DRUG ADDON: CPT

## 2021-12-14 PROCEDURE — 80048 BASIC METABOLIC PNL TOTAL CA: CPT

## 2021-12-14 PROCEDURE — 97535 SELF CARE MNGMENT TRAINING: CPT

## 2021-12-14 PROCEDURE — 96372 THER/PROPH/DIAG INJ SC/IM: CPT

## 2021-12-14 PROCEDURE — 99024 POSTOP FOLLOW-UP VISIT: CPT | Performed by: SURGERY

## 2021-12-14 PROCEDURE — 2700000000 HC OXYGEN THERAPY PER DAY

## 2021-12-14 PROCEDURE — 94640 AIRWAY INHALATION TREATMENT: CPT

## 2021-12-14 PROCEDURE — 94761 N-INVAS EAR/PLS OXIMETRY MLT: CPT

## 2021-12-14 PROCEDURE — 97165 OT EVAL LOW COMPLEX 30 MIN: CPT

## 2021-12-14 PROCEDURE — 97116 GAIT TRAINING THERAPY: CPT

## 2021-12-14 RX ORDER — KETOROLAC TROMETHAMINE 15 MG/ML
15 INJECTION, SOLUTION INTRAMUSCULAR; INTRAVENOUS EVERY 6 HOURS PRN
Status: DISCONTINUED | OUTPATIENT
Start: 2021-12-14 | End: 2021-12-15 | Stop reason: HOSPADM

## 2021-12-14 RX ADMIN — ARFORMOTEROL TARTRATE 15 MCG: 15 SOLUTION RESPIRATORY (INHALATION) at 07:05

## 2021-12-14 RX ADMIN — IPRATROPIUM BROMIDE 0.5 MG: 0.5 SOLUTION RESPIRATORY (INHALATION) at 23:54

## 2021-12-14 RX ADMIN — DULOXETINE HYDROCHLORIDE 20 MG: 20 CAPSULE, DELAYED RELEASE ORAL at 08:52

## 2021-12-14 RX ADMIN — METOPROLOL TARTRATE 100 MG: 25 TABLET, FILM COATED ORAL at 20:47

## 2021-12-14 RX ADMIN — METOPROLOL TARTRATE 100 MG: 25 TABLET, FILM COATED ORAL at 08:52

## 2021-12-14 RX ADMIN — BUDESONIDE 500 MCG: 0.5 SUSPENSION RESPIRATORY (INHALATION) at 07:05

## 2021-12-14 RX ADMIN — KETOROLAC TROMETHAMINE 15 MG: 15 INJECTION, SOLUTION INTRAMUSCULAR; INTRAVENOUS at 12:54

## 2021-12-14 RX ADMIN — FUROSEMIDE 40 MG: 10 INJECTION, SOLUTION INTRAMUSCULAR; INTRAVENOUS at 08:52

## 2021-12-14 RX ADMIN — IPRATROPIUM BROMIDE 0.5 MG: 0.5 SOLUTION RESPIRATORY (INHALATION) at 07:05

## 2021-12-14 RX ADMIN — PANTOPRAZOLE SODIUM 40 MG: 40 TABLET, DELAYED RELEASE ORAL at 05:34

## 2021-12-14 RX ADMIN — ARFORMOTEROL TARTRATE 15 MCG: 15 SOLUTION RESPIRATORY (INHALATION) at 23:54

## 2021-12-14 RX ADMIN — BUDESONIDE 500 MCG: 0.5 SUSPENSION RESPIRATORY (INHALATION) at 23:54

## 2021-12-14 RX ADMIN — KETOROLAC TROMETHAMINE 15 MG: 15 INJECTION, SOLUTION INTRAMUSCULAR; INTRAVENOUS at 19:21

## 2021-12-14 RX ADMIN — SODIUM CHLORIDE, PRESERVATIVE FREE 10 ML: 5 INJECTION INTRAVENOUS at 20:47

## 2021-12-14 RX ADMIN — SODIUM CHLORIDE, PRESERVATIVE FREE 10 ML: 5 INJECTION INTRAVENOUS at 08:52

## 2021-12-14 RX ADMIN — LOSARTAN POTASSIUM 100 MG: 100 TABLET, FILM COATED ORAL at 08:54

## 2021-12-14 RX ADMIN — ENOXAPARIN SODIUM 40 MG: 100 INJECTION SUBCUTANEOUS at 08:52

## 2021-12-14 RX ADMIN — CLOPIDOGREL BISULFATE 75 MG: 75 TABLET ORAL at 08:52

## 2021-12-14 ASSESSMENT — PAIN DESCRIPTION - PAIN TYPE
TYPE: CHRONIC PAIN
TYPE: CHRONIC PAIN

## 2021-12-14 ASSESSMENT — PAIN DESCRIPTION - ORIENTATION
ORIENTATION: RIGHT
ORIENTATION: RIGHT

## 2021-12-14 ASSESSMENT — PAIN DESCRIPTION - FREQUENCY
FREQUENCY: INTERMITTENT
FREQUENCY: INTERMITTENT

## 2021-12-14 ASSESSMENT — PAIN SCALES - WONG BAKER
WONGBAKER_NUMERICALRESPONSE: 0

## 2021-12-14 ASSESSMENT — PAIN DESCRIPTION - DESCRIPTORS
DESCRIPTORS: SHARP;SHOOTING
DESCRIPTORS: SHARP

## 2021-12-14 ASSESSMENT — PAIN SCALES - GENERAL
PAINLEVEL_OUTOF10: 0
PAINLEVEL_OUTOF10: 0
PAINLEVEL_OUTOF10: 9
PAINLEVEL_OUTOF10: 0
PAINLEVEL_OUTOF10: 8
PAINLEVEL_OUTOF10: 0
PAINLEVEL_OUTOF10: 9
PAINLEVEL_OUTOF10: 9
PAINLEVEL_OUTOF10: 0

## 2021-12-14 ASSESSMENT — PAIN DESCRIPTION - PROGRESSION
CLINICAL_PROGRESSION: GRADUALLY WORSENING
CLINICAL_PROGRESSION: GRADUALLY WORSENING

## 2021-12-14 ASSESSMENT — PAIN DESCRIPTION - LOCATION
LOCATION: FOOT
LOCATION: FOOT

## 2021-12-14 ASSESSMENT — PAIN DESCRIPTION - DIRECTION
RADIATING_TOWARDS: UP RIGHT LEG
RADIATING_TOWARDS: RIGHT LEG

## 2021-12-14 ASSESSMENT — PAIN DESCRIPTION - ONSET
ONSET: ON-GOING
ONSET: PROGRESSIVE

## 2021-12-14 NOTE — PLAN OF CARE
Problem: HEMODYNAMIC STATUS  Goal: Patient has stable vital signs and fluid balance  Outcome: Ongoing  Note: Monitor vital signs per protocol. VS WNL     Problem: Skin Integrity:  Goal: Absence of new skin breakdown  Description: Absence of new skin breakdown  Outcome: Ongoing  Note: Skin assessment qs. Pt repositioned q 2 hours.  Skin kept clean and dry

## 2021-12-14 NOTE — PLAN OF CARE
Problem: OXYGENATION/RESPIRATORY FUNCTION  Goal: Patient will maintain patent airway  Outcome: Ongoing  Patient airway remains patent. On 4 L of oxygen SPO2 wnl. Goal: Patient will achieve/maintain normal respiratory rate/effort  Description: Respiratory rate and effort will be within normal limits for the patient  Outcome: Ongoing     Problem: HEMODYNAMIC STATUS  Goal: Patient has stable vital signs and fluid balance  Outcome: Ongoing     Problem: FLUID AND ELECTROLYTE IMBALANCE  Goal: Fluid and electrolyte balance are achieved/maintained  Outcome: Ongoing     Problem: ACTIVITY INTOLERANCE/IMPAIRED MOBILITY  Goal: Mobility/activity is maintained at optimum level for patient  Outcome: Ongoing     Problem: Skin Integrity:  Goal: Will show no infection signs and symptoms  Description: Will show no infection signs and symptoms  Outcome: Ongoing  Goal: Absence of new skin breakdown  Description: Absence of new skin breakdown  Outcome: Ongoing     Problem: Pain:  Goal: Pain level will decrease  Description: Pain level will decrease  Outcome: Ongoing  Patient has intermittent pain in RLE, specifically right foot going up right leg. Pain controlled with prn toradol. Pain is rated a 9 out 10 at its worst. Pain is sharp and shooting in nature.   Goal: Control of acute pain  Description: Control of acute pain  Outcome: Ongoing  Goal: Control of chronic pain  Description: Control of chronic pain  Outcome: Ongoing

## 2021-12-14 NOTE — CONSULTS
Nadia Desir MD at [address] [facility]  4/23/2021    COLONOSCOPY POLYPECTOMY ABLATION performed by Oralia Tran MD at [address] [facility]  4/23/2021    COLONOSCOPY CONTROL HEMORRHAGE performed by Oralia Tran MD at [address] [facility]  6/2013    EYE SURGERY Left     FEMORAL ENDARTERECTOMY Right 11/17/2021    RIGHT FEMORAL ENDARTERECTOMY  RIGHT EXTERNAL ILIAC TO PROFUNDA FEMORAL BYPASS WITH 8MM PTFE GRAFT RIGHT LOWER EXTREMITY ARTERIOGRAM BALLOON ANGIOPLASTY RIGHT PROFUNDA BALLOON ANGIOPLASTY AND STENT OF RIGHT EXTERNAL ILIAC ARTERY performed by Jessie Hernandez MD at [facility]  11/18/2015    Bilateral decompressive lumbar laminectomy L4-5   ; medial facetectomy L4-5 joints  bilaterally; foraminotomies l5   nerve roots bilaterally    LEG DEBRIDEMENT Right 7/23/2013    Dr. Amy Villarreal - pretibial wound    OTHER SURGICAL HISTORY Right 6/25/2013    Dr. Amy Villarreal - CFA Endarterectomy w/marsupialization; RLE wound excision & debridement     OTHER SURGICAL HISTORY Left 8/27/2013    Dr. Amy Villarreal - femoral & profunda femoris endarterectomy w/marsupialization patch angioplasty using SFA    SKIN SPLIT GRAFT Right 7/25/2013    Dr. Amy Villarreal - to non-healing R pretibial wound, 9 x 15 cm    TOTAL HIP ARTHROPLASTY Right 09/01/2016    Dr. Luba Santiago Left 12/22/2015    Dr. Amy Villarreal - CFA exploration, thrombectomy of ileofemoral system, stenting of RAFAL in-stent stenosis w/8 x 37 mm Palmaz        Allergies:  Asa [aspirin] and Daliresp [roflumilast]    Medications:   Home Meds  No current facility-administered medications on file prior to encounter.      Current Outpatient Medications on File Prior to Encounter   Medication Sig Dispense Refill    zoster recombinant adjuvanted vaccine [facility]) 50 MCG/0.5ML SUSR injection Inject 0.5 mLs into the muscle See Admin Instructions 1 dose now and repeat in 2-6 months 0.5 mL 0    atorvastatin (LIPITOR) 80 MG tablet Take 1 tablet by mouth daily 90 tablet 1    clopidogrel (PLAVIX) 75 MG tablet Take 1 tablet by mouth daily 30 tablet 3    AMLODIPINE BENZOATE PO 5 mg: TAKE 1 TAB DAILY      Cyanocobalamin (B-12) 100 MCG TABS 1 tablet      DULoxetine (CYMBALTA) 20 MG extended release capsule 1 capsule      potassium chloride (MICRO-K) 10 MEQ extended release capsule 1 tablet      Cholecalciferol (VITAMIN D-3) 25 MCG (1000 UT) CAPS 1 tablet      colchicine (COLCRYS) 0.6 MG tablet Take 0.6 mg by mouth as needed      omeprazole (PRILOSEC) 40 MG delayed release capsule       fluticasone-umeclidin-vilant (TRELEGY ELLIPTA) 100-62.5-25 MCG/INH AEPB Inhale 1 puff into the lungs daily 28 each 2    nicotine (NICODERM CQ) 21 MG/24HR Place 1 patch onto the skin every 24 hours 30 patch 1    folic acid (FOLVITE) 1 MG tablet Take 1 tablet by mouth daily 30 tablet 3    albuterol sulfate  (90 Base) MCG/ACT inhaler Inhale 2 puffs into the lungs every 6 hours as needed for Wheezing 3 Inhaler 4    allopurinol (ZYLOPRIM) 100 MG tablet Take 1 tablet by mouth daily 180 tablet 1    losartan (COZAAR) 100 MG tablet TAKE 1 TABLET BY MOUTH EVERY DAY 90 tablet 1    nitroGLYCERIN (NITROSTAT) 0.4 MG SL tablet Place 1 tablet under the tongue every 5 minutes as needed for Chest pain 25 tablet 1    furosemide (LASIX) 40 MG tablet Take 1 tablet by mouth daily 90 tablet 3    magnesium gluconate (MAGONATE) 500 MG tablet Take 500 mg by mouth daily.  gabapentin (NEURONTIN) 100 MG capsule Take 2 capsules by mouth 3 times daily for 10 days.  60 capsule 0    pantoprazole (PROTONIX) 40 MG tablet Take 1 tablet by mouth daily for 10 days 10 tablet 0    metoprolol (LOPRESSOR) 100 MG tablet Take 1 tablet by mouth 2 times daily 270 tablet 1     Current Meds  ipratropium (ATROVENT) 0.02 % nebulizer solution 0.5 mg, BID  ketorolac (TORADOL) injection 15 mg, Q6H PRN  clopidogrel (PLAVIX) tablet 75 mg, Daily  DULoxetine (CYMBALTA) extended release capsule 20 mg, Daily  furosemide (LASIX) injection 40 mg, Daily  sodium chloride flush 0.9 % injection 5-40 mL, 2 times per day  sodium chloride flush 0.9 % injection 5-40 mL, PRN  0.9 % sodium chloride infusion, PRN  ondansetron (ZOFRAN-ODT) disintegrating tablet 4 mg, Q8H PRN   Or  ondansetron (ZOFRAN) injection 4 mg, Q6H PRN  polyethylene glycol (GLYCOLAX) packet 17 g, Daily PRN  acetaminophen (TYLENOL) tablet 650 mg, Q6H PRN   Or  acetaminophen (TYLENOL) suppository 650 mg, Q6H PRN  enoxaparin (LOVENOX) injection 40 mg, Daily  losartan (COZAAR) tablet 100 mg, Daily  metoprolol tartrate (LOPRESSOR) tablet 100 mg, BID  pantoprazole (PROTONIX) tablet 40 mg, QAM AC  Arformoterol Tartrate (BROVANA) nebulizer solution 15 mcg, BID  budesonide (PULMICORT) nebulizer suspension 500 mcg, BID        Family History:   Family History   Problem Relation Age of Onset    Cancer Mother         Breast    Heart Disease Mother     Cancer Father         Bladder    Heart Disease Father     Cancer Paternal Grandmother         melanoma        Social History:   TOBACCO:   reports that he has been smoking cigarettes. He started smoking about 54 years ago. He has a 26.00 pack-year smoking history. He has never used smokeless tobacco.  ETOH:   reports current alcohol use. DRUGS:   reports no history of drug use. ROS: A 10 point review of systems was conducted, significant findings as noted in HPI. All other systems negative.     Physical exam:   Vitals:    12/14/21 0515 12/14/21 0705 12/14/21 0836 12/14/21 1258   BP: (!) 157/71  131/70 104/70   Pulse: 71  60 69   Resp: 18 20 20 18   Temp: 98 °F (36.7 °C)  97.5 °F (36.4 °C) 97.6 °F (36.4 °C)   TempSrc: Oral  Oral Oral   SpO2: 97% 96% 96% 97%   Weight: 197 lb 12 oz (89.7 kg)      Height:           General appearance: alert, resting in bed  Neuro: A&Ox3, no focal deficits  Chest/Lungs: normal effort, no accessory muscle use, on 4L NC  Cardiovascular: RRR, no murmurs/gallops/rubs  Abdomen: soft, non-tender, non-distended  Right groin - incision C/D/I staples in place, minimal edema and mild erythema distal aspect of the incision site  Extremities: no edema, no cyanosis, chronic wounds over RLE in various stages of healing, see below        Pulses    C B R F P PT DP   R    -/+ -/+ -/+ -/+   L    +  -/+ -/+    +, -/+ ,-/-      Right groin       Right leg          Labs:    CBC:   Recent Labs     12/13/21  0455   WBC 7.4   HGB 13.3*   HCT 38.9*   MCV 99.5        BMP:   Recent Labs     12/13/21  0455 12/14/21  0713    139   K 4.4 4.2    103   CO2 21 25   PHOS 3.5  --    BUN 11 14   CREATININE 0.9 0.8     PT/INR: No results for input(s): PROTIME, INR in the last 72 hours. APTT: No results for input(s): APTT in the last 72 hours. Liver Profile:  Lab Results   Component Value Date    AST 19 11/30/2021    ALT 14 11/30/2021    BILIDIR <0.2 09/16/2021    BILITOT 0.4 11/30/2021    ALKPHOS 90 11/30/2021     Lab Results   Component Value Date    CHOL 141 12/06/2020    HDL 30 12/06/2020    TRIG 128 12/06/2020     UA:   Lab Results   Component Value Date    COLORU Yellow 12/07/2020    PHUR 6.0 12/07/2020    LABCAST 10-20 Hyaline 08/06/2019    WBCUA >100 12/07/2020    RBCUA 3-4 12/07/2020    RBCUA NEGATIVE 08/10/2016    MUCUS PRESENT 08/10/2016    BACTERIA 4+ 12/07/2020    CLARITYU Clear 12/07/2020    SPECGRAV 1.025 12/07/2020    LEUKOCYTESUR MODERATE 12/07/2020    UROBILINOGEN 0.2 12/07/2020    BILIRUBINUR Negative 12/07/2020    BILIRUBINUR NEGATIVE 08/10/2016    BLOODU Negative 12/07/2020    GLUCOSEU Negative 12/07/2020       Assessment/Plan: This is a 73 y.o. male with CAD, PVD, CHF, COPD, presented to [facility] with SOB, admitted for acute CHF exacerbation being managed by medical team with cardiology consult and lasix. He is recently s/p right fem endarterectomy, R EIA to PFA bypass. Balloon angioplasty profunda and right external iliac stent on 11/17.  Vascular surgery consulted for post op wound management.      - seen a week ago in the office, planned for staple removal of his right groin at that time  - staples removed, site cleaned with betadine   - leg wrap taken down, chronic wounds in various stages of healing  - recommend shower prior to re-wrap of the RLE  - discussed with patient's nurse  - will discuss with Dr. Lon Du  - Patient can follow up in 2 weeks following his discharge from the hospital,  please call with questions       Jasmin Mercedes MD  12/14/21  3:01 PM

## 2021-12-14 NOTE — CARE COORDINATION
Case Management Assessment           Initial Evaluation                Date / Time of Evaluation: 12/14/2021 3:24 PM                 Assessment Completed by: CAROLINE Carbajal, ROBW    Patient Name: Corey Singleton. YOB: 1950  Diagnosis: Acute pulmonary edema ([facility] [J81.0]  Heart failure ([facility] [I50.9]  Hypoxia [R09.02]     Date / Time: 12/13/2021  4:42 AM    Patient Admission Status: Inpatient    If patient is discharged prior to next notation, then this note serves as note for discharge by case management. Current PCP: Elaine Grider, DO  Clinic Patient: No    Chart Reviewed: Yes  Patient/ Family Interviewed: Yes    Initial assessment completed at bedside with: patient    Hospitalization in the last 30 days: Yes    Emergency Contacts:  Extended Emergency Contact Information  Primary Emergency Contact: Aaron Guzman   [address] Phone: [phone]  Relation: Child    Advance Directives:   Code Status: Full 2021 Vizcarra Joyce y: Yes  Agent: Diane Yao  Contact Number: [phone]    Copy present: No     In paper Chart: No    Scanned into EMR Yes    Financial  Payor: Estil Ped / Plan: Gage Pean / Product Type: *No Product type* /     Pre-cert required for SNF: Yes    [address] [place] [address] [id number] [phone]  [place] [id number]  Phone: [phone] Fax: [phone] - [place] [address]  Phone: [phone] Fax: [phone]      Potential assistance Purchasing Medications:    Does Patient want to participate in local refill/ meds to beds program?:      Meds To Beds General Rules:  1. Can ONLY be done Monday- Friday between 8:30am-5pm  2. Prescription(s) must be in pharmacy by 3pm to be filled same day  3. Copy of patient's insurance/ prescription drug card and patient face sheet must be sent along with the prescription(s)  4. Cost of Rx cannot be added to hospital bill. If financial assistance is needed, please contact unit  or ;  or  CANNOT provide pharmacy voucher for patients co-pays  5. Patients can then  the prescription on their way out of the hospital at discharge, or pharmacy can deliver to the bedside if staff is available. (payment due at time of pick-up or delivery - cash, check, or card accepted)     Able to afford home medications/ co-pay costs: Yes    ADLS  Support Systems:      PT AM-PAC: 18 /24  OT AM-PAC: 20 /24    [place]: apartment alone  Steps: 2 outside, 3-4 to walk down inside building    Plans to RETURN to current housing: Yes  Barriers to RETURNING to current housing: NA    Currently ACTIVE with [place] on Aging: Yes  Passport/ Waiver: Yes  Passport/ Waiver Services: [employer] for . hours/ day for . days per week, Emergency Response System and Meals on Wheels    : Yesenia Moore Phone: [phone]      Durable Medical Equipment  DME Provider: unknown  Equipment: wheelchair, cane, walker, bath bench and rollator    Home Oxygen and Respiratory Equipment  Has HOME OXYGEN prior to admission: Yes  [address]:     Informed of need to bring portable home O2 tank on day of DISCHARGE for nursing to connect prior to leaving: Yes  Verbalized agreement/Understanding: Yes  Person to bring portable tank at discharge: sonJakob:  Disposition: Home with [address]: [address] [facility] through [address] for discharge: family     Factors facilitating achievement of predicted outcomes: Pleasant    Barriers to discharge: Medical complications    Additional Case Management Notes:  CM met with pt at bedside. Pt from home with [facility], plans to return at OR.   Pt has RN, therapies and aide with [facility] through [facility]. CM spoke with [place] to provide update, and will update [facility] CM when pt is to DC. The Plan for Transition of Care is related to the following treatment goals of Acute pulmonary edema ([address] [J81.0]  Heart failure ([address] [I50.9]  Hypoxia [R09.02]    The Patient and/or patient representative Jez Corbett and his family were provided with a choice of provider and agrees with the discharge plan Yes    Freedom of choice list was provided with basic dialogue that supports the patient's individualized plan of care/goals and shares the quality data associated with the providers.  Yes    Care Transition patient: No    CAROLINE Markham, [facility]  Case Management Department  Ph: [phone]

## 2021-12-14 NOTE — PROGRESS NOTES
Occupational Therapy   Occupational Therapy Initial Assessment and Tx  Date: 2021   Patient Name: Brenda Person MRN: [id number]     : 1950    Date of Service: 2021    Discharge Recommendations: Brenda Person scored a 20/24 on the AM-PAC ADL Inpatient form. Current research shows that an AM-PAC score of 18 or greater is typically associated with a discharge to the patient's home setting. Based on the patient's AM-PAC score, and their current ADL deficits, it is recommended that the patient have 2-3 sessions per week of Occupational Therapy at d/c to increase the patient's independence. At this time, this patient demonstrates the endurance and safety to discharge home with (home vs OP services) and a follow up treatment frequency of 2-3x/wk. Please see assessment section for further patient specific details. If patient discharges prior to next session this note will serve as a discharge summary. Please see below for the latest assessment towards goals. OT Equipment Recommendations  Equipment Needed: No    Assessment   Performance deficits / Impairments: Decreased functional mobility ; Decreased ADL status; Decreased endurance  Assessment: From home alone, has son and neighbor that check in daily. Pt reporting typically ind at home, some assist with IADL, home health care, PT/OT. Pt currently requires SBA with fx mobility and transfers, using RW, slow but steady no LOB. Mobility in room, hallway, bathroom, stance at sink for grooming, toileting. Pt would benefit from cont OT while in acute care, cont home services at [place].   Treatment Diagnosis: impaired mobility, transfers, ADL  Decision Making: Low Complexity  OT Education: OT Role; Plan of Care; ADL Adaptive Strategies; Transfer Training  Patient Education: verb understanding  Barriers to Learning: [place]  REQUIRES OT FOLLOW UP: Yes  Activity Tolerance  Activity Tolerance: Patient Tolerated treatment well  Safety Devices  Safety Devices in place: Yes  Type of devices: All fall risk precautions in place; Call light within reach; Chair alarm in place; Gait belt; Left in chair; Nurse notified           Patient Diagnosis(es): The primary encounter diagnosis was Acute pulmonary edema ([facility] A diagnosis of Hypoxia was also pertinent to this visit. has a past medical history of CAD (coronary artery disease), CHF (congestive heart failure) ([facility] COPD (chronic obstructive pulmonary disease) ([facility] Depressive disorder, not elsewhere classified, GERD (gastroesophageal reflux disease), History of colon polyps - 30 seen on colonoscopy 04/2021, History of MI (myocardial infarction), History of skin ulcer of lower extremity, History of transient ischemic attack (TIA), Hyperlipidemia, Hypertension, Neuropathy, KAVITA (obstructive sleep apnea), Personal history of DVT (deep vein thrombosis), PVD (peripheral vascular disease) ([facility] TIA (transient ischemic attack), and Vertebral fracture. has a past surgical history that includes Appendectomy; Coronary angioplasty with stent (6/2013); other surgical history (Right, 6/25/2013); Leg Debridement (Right, 7/23/2013); skin split graft (Right, 7/25/2013); other surgical history (Left, 8/27/2013); laminectomy (11/18/2015); transluminal angioplasty (Left, 12/22/2015); Total hip arthroplasty (Right, 09/01/2016); Cholecystectomy, laparoscopic; eye surgery (Left); Colonoscopy (4/23/2021); Colonoscopy (4/23/2021); Colonoscopy (4/23/2021); and Femoral Endarterectomy (Right, 11/17/2021). Treatment Diagnosis: impaired mobility, transfers, ADL      Restrictions  Position Activity Restriction  Other position/activity restrictions: up w/ assist    Subjective   General  Chart Reviewed:  Yes  Additional Pertinent Hx: 73 y.o. male with past medical history significant for PVD, CAD, CHF, COPD, chronic hypoxic respiratory failure with 5 to 6 L of home oxygen, hypertension, and history of PE on Eliquis who presented to [facility] with chief complaint of worsening SOB. Patient recently underwent a right femoral endarterectomy November 17th. CTPA done here without PE. Referring Practitioner: Marcy Staples MD  Diagnosis: SOB  Subjective  Subjective: Pt in bed upon arrival, agreeable, reporting pain in BLE, RLE the most, 9/10  Patient Currently in Pain: Yes (pt reporting pain in RLE, not rated- RN notified)  Pain Assessment  Pain Level: 9  Vital Signs  Patient Currently in Pain: Yes (pt reporting pain in RLE, not rated- RN notified)  Social/Functional History  Social/Functional History  Lives With: Alone  Type of Home: Apartment (basement level)  Home Layout: One level  Home Access: Stairs to enter with rails  Entrance Stairs - Number of Steps: up 2 then down 4 stating goes down them sideways holding both rails  Bathroom Shower/Tub: Tub/Shower unit  Bathroom Toilet: Handicap height (tub and sink to push up from)  [employer]: Shower chair, Grab bars in shower, Hand-held shower  Bathroom Accessibility: Walker accessible  Home Equipment: 4 wheeled walker, Standard walker, [employer] Help From: Home health (Home OT & PT, home health aid to assist with cleaning and laundry)  ADL Assistance: Independent  Homemaking Assistance: Needs assistance (HHA assist laundry, cleaning, son assist with groceries)  Ambulation Assistance: Independent (using SW the most lately, using 4ww outside the home)  Transfer Assistance: Independent  Active : No  Patient's  Info: son, bus or medical transport  Occupation: Retired  Additional Comments: Son calls daily to check in, neighbor checks in every day. Does not have 24hr. Reporting no falls. O2 6L at home baseline.        Objective   Vision: Impaired  Vision Exceptions: Wears glasses for reading  Hearing: Exceptions to [facility]  Hearing Exceptions: Hard of hearing/hearing concerns    Orientation  Overall Orientation Status: Within Functional Limits     Balance  Sitting Balance: Independent  Standing Balance: Stand by assistance  Standing Balance  Activity: mobility in room, hallway, to and from bathroom, toileting, stance at sink  Functional Mobility  Functional - Mobility Device: Rolling Walker  Activity: To/from bathroom;  Other  Assist Level: Stand by assistance  Functional Mobility Comments: CGA initial, progressing SBA, slow but steady, no LOB, on 4L O2  Toilet Transfers  Toilet - Technique: Ambulating  Equipment Used: Standard toilet  Toilet Transfer: Stand by assistance  ADL  Feeding: Independent  Grooming: Stand by assistance (stance at sink, mouth wash, wash hands, wipe face)  Toileting: Stand by assistance        Bed mobility  Supine to Sit: Stand by assistance  Scooting: Stand by assistance  Transfers  Sit to stand: Stand by assistance  Stand to sit: Stand by assistance     Cognition  Overall Cognitive Status: WFL                 LUE AROM (degrees)  LUE AROM : WFL  Left Hand AROM (degrees)  Left Hand AROM: WFL  RUE AROM (degrees)  RUE AROM : WFL  Right Hand AROM (degrees)  Right Hand AROM: WFL  LUE Strength  Gross LUE Strength: WFL  RUE Strength  Gross RUE Strength: WFL                   Plan   Plan  Times per week: 2-5  Times per day: Daily      AM-PAC Score        AM-[facility] Inpatient Daily Activity Raw Score: 20 (12/14/21 1258)  AM-PAC Inpatient ADL T-Scale Score : 42.03 (12/14/21 1258)  ADL Inpatient CMS 0-100% Score: 38.32 (12/14/21 1258)  ADL Inpatient CMS G-Code Modifier : Joann Estefania (12/14/21 1258)    Goals  Short term goals  Time Frame for Short term goals: dc  Short term goal 1: supine to sit IND  Short term goal 2: sit<>stand SPVN  Short term goal 3: Fx mobility SPVN with RW  Short term goal 4: grooming SPVN in stance at sink 5 min  Short term goal 5: toileting SPVN       Therapy Time   Individual Concurrent Group Co-treatment   Time In 1155         Time Out 1233         Minutes 38              Timed Code Treatment Minutes:   25    Total Treatment Minutes: [address], OT

## 2021-12-14 NOTE — PROGRESS NOTES
Patient remain stable throughout shift. Vitals stable. Intermittent complaints of pain in right lower extremity. Vascular surgery cam to bedside and removed dressing to right leg and removed staples. Patient assisted to shower and helped wash legs, right groin and rest of body. NSR on tele.

## 2021-12-14 NOTE — CONSULTS
Nutrition Note    Received consult for diet education on heart failure. Pt states he has been on a heart healthy/cardiac diet for years. Noted pt had low fat, low sodium chips at bedside. RD discussed low sodium foods and offered to review heart failure diet guidelines. Pt reports he does not even keep salt in the house anymore. RD offered Nutrition Care Manual Heart Failure handout as well, but pt declined stating that he already has enough information at home. RD will re-offer education throughout adm.        Instructed the Patient on:   [x] Low Sodium foods  [x] Sodium free  [] Fluids   [] Other     Educational Materials Provided:   [] [address] Failure Education folder- Nutrition Therapy   [] Nutrition Care Manual Heart Failure Nutrition Therapy- pt declined  [] Other    Time Spent with Patient (minutes): 10 min    Yemi Berg, [address],   [place]dra: [phone]  Office:  [phone]

## 2021-12-14 NOTE — PROGRESS NOTES
Hospitalist Progress Note      PCP: Elton Lynne DO    Date of Admission: 12/13/2021     Subjective: seen  And examined  Feeling better today, no new complaints      Medications:  Reviewed    Infusion Medications    sodium chloride       Scheduled Medications    ipratropium  0.5 mg Nebulization BID    clopidogrel  75 mg Oral Daily    DULoxetine  20 mg Oral Daily    furosemide  40 mg IntraVENous Daily    sodium chloride flush  5-40 mL IntraVENous 2 times per day    enoxaparin  40 mg SubCUTAneous Daily    losartan  100 mg Oral Daily    metoprolol  100 mg Oral BID    pantoprazole  40 mg Oral QAM AC    Arformoterol Tartrate  15 mcg Nebulization BID    budesonide  0.5 mg Nebulization BID     PRN Meds: ketorolac, sodium chloride flush, sodium chloride, ondansetron **OR** ondansetron, polyethylene glycol, acetaminophen **OR** acetaminophen      Intake/Output Summary (Last 24 hours) at 12/14/2021 1833  Last data filed at 12/14/2021 1654  Gross per 24 hour   Intake 600 ml   Output 1025 ml   Net -425 ml       Physical Exam Performed:    /74   Pulse 63   Temp 97.5 °F (36.4 °C) (Oral)   Resp 18   Ht 5' 10\" (1.778 m)   Wt 197 lb 12 oz (89.7 kg)   SpO2 98%   BMI 28.37 kg/m²       General appearance: No apparent distress appears stated age and cooperative. HEENT Normal cephalic, atraumatic without obvious deformity. Pupils equal, round, and reactive to light. Extra ocular muscles intact. Conjunctivae/corneas clear. Neck: Supple, No jugular venous distention/bruits. Trachea midline without thyromegaly or adenopathy with full range of motion. Lungs: Clear to auscultation, bilaterally without Rales/Wheezes/Rhonchi with good respiratory effort. Heart: Regular rate and rhythm with Normal S1/S2 without murmurs, rubs or gallops, point of maximum impulse non-displaced  Abdomen: Soft, non-tender or non-distended without rigidity or guarding and positive bowel sounds all four quadrants.   Extremities: No clubbing, cyanosis, or edema bilaterally. Full range of motion without deformity and normal gait intact. Skin: Skin color, texture, turgor normal.  No rashes or lesions. Neurologic: Alert and oriented X 3, neurovascularly intact with sensory/motor intact upper extremities/lower extremities, bilaterally. Cranial nerves: II-XII intact, grossly non-focal.  Mental status: Alert, oriented, thought content appropriate. Capillary Refill: Acceptable  < 3 seconds  Peripheral Pulses: +3 Easily felt, not easily obliterated with pressure         Labs:   Recent Labs     12/13/21  0455   WBC 7.4   HGB 13.3*   HCT 38.9*        Recent Labs     12/13/21  0455 12/14/21  0713    139   K 4.4 4.2    103   CO2 21 25   BUN 11 14   CREATININE 0.9 0.8   CALCIUM 9.0 9.3   PHOS 3.5  --      No results for input(s): AST, ALT, BILIDIR, BILITOT, ALKPHOS in the last 72 hours. No results for input(s): INR in the last 72 hours. Recent Labs     12/13/21 0455 12/13/21  0926   TROPONINI 0.02* 0.02*       Urinalysis:      Lab Results   Component Value Date    NITRU Negative 12/07/2020    WBCUA >100 12/07/2020    BACTERIA 4+ 12/07/2020    RBCUA 3-4 12/07/2020    RBCUA NEGATIVE 08/10/2016    BLOODU Negative 12/07/2020    SPECGRAV 1.025 12/07/2020    GLUCOSEU Negative 12/07/2020       Radiology:  CT CHEST PULMONARY EMBOLISM W CONTRAST   Final Result   1. No evidence of acute pulmonary emboli. Interval resolution of previously noted thrombus within the right middle lobe and right lower lobe segmental and subsegmental pulmonary arterial branches. 2. Mild intralobular septal thickening diffusely. Correlate for mild edema. 3. Extensive atherosclerotic disease.                XR CHEST PORTABLE   Final Result   Addendum 2 of 2   CR   Patient: LENNIE GUARDADO  Time Out: 07:15   Exam(s): FILM CXR 1 VIEW        ADDENDUM - Added by Morales Galeano MD on 12/13/2021 7:15 AM (-08:00)   Interstitial prominence seen on the current study is also seen on    the prior study from 11/18/21. This could be from interstitial    edema or chronic interstitial lung disease   ---------------------------------------------------------------       EXAM:     XR Chest, 1 View       CLINICAL HISTORY:     shortness of breath. TECHNIQUE:     Frontal view of the chest.       COMPARISON:     No relevant prior studies available. FINDINGS:     Lungs: There is mild interstitial pulmonary edema. Pleural space:  Unremarkable. No pneumothorax. Heart: Cardiac size is mildly prominent     Mediastinum:  Unremarkable. Bones/joints:  Unremarkable. IMPRESSION:     Mild interstitial pulmonary edema               Communications:       12/13/21 07:22 Verify Receipt Verified receipt with NESS Reyes,given to Dr Levi Murrell  on 12/13 07:22 (-05:00)       Electronically signed by Adalgisa Faustin MD on 12-13-21 at 3 [facility]      Final              Assessment/Plan:    [address] Problems    Diagnosis     Acute pulmonary edema ([facility] [J81.0]     Heart failure ([facility] [I50.9]      CHF exacerbation  And SOB  started on iv lasix   cardiology consulted  Strict intake and ouptut  Daily weights     Macrocytic anemia with folate deficiency  -Continue folate supplement     History of PE  -Continue Eliquis     PVD  CAD  -Continue Plavix, statin,BB, IMdur    DVT Prophylaxis: lovenox  Diet: ADULT DIET; Regular;  Low Sodium (2 gm)  Code Status: Full Code    PT/OT Eval Status: ordered    Dispo - ipt, 1-2 days     Prabhjot Cruz MD

## 2021-12-14 NOTE — PROGRESS NOTES
Admission assessment as charted. VSS. Patient oriented to room, staff, and call system. Educated on fall protocol and hourly rounding. Patient informed to utilize call light with any needs. Pt verbalized understanding.

## 2021-12-14 NOTE — RT PROTOCOL NOTE
RT Nebulizer Bronchodilator Protocol Note    There is a bronchodilator order in the chart from a provider indicating to follow the RT Bronchodilator Protocol and there is an Initiate RT Bronchodilator Protocol order as well (see protocol at bottom of note). CXR Findings:  XR CHEST PORTABLE    Addendum Date: 12/13/2021    CR Patient: Vonzell Nyhan  Time Out: 07:15 Exam(s): FILM CXR 1 VIEW  ADDENDUM - Added by Joseph May MD on 12/13/2021 7:15 AM (-08:00) Interstitial prominence seen on the current study is also seen on the prior study from 11/18/21. This could be from interstitial edema or chronic interstitial lung disease ---------------------------------------------------------------  EXAM:   XR Chest, 1 View  CLINICAL HISTORY:   shortness of breath. TECHNIQUE:   Frontal view of the chest.  COMPARISON:   No relevant prior studies available. FINDINGS:   Lungs: There is mild interstitial pulmonary edema. Pleural space:  Unremarkable. No pneumothorax. Heart: Cardiac size is mildly prominent   Mediastinum:  Unremarkable. Bones/joints:  Unremarkable. IMPRESSION:   Mild interstitial pulmonary edema    Communications:  12/13/21 07:22 Verify Receipt Verified receipt with NESS Reyes,given to Dr Royce Barajas  on 12/13 07:22 (-05:00)  Electronically signed by Joseph May MD on 12-13-21 at [facility] Date: 12/13/2021    CR Patient: Vonzell Nyhan  Time Out: 07:15 Exam(s): FILM CXR 1 VIEW  ADDENDUM - Added by Joseph May MD on 12/13/2021 7:15 AM (-08:00) Interstitial prominence seen on the current study is also seen on the prior study from 11/18/21. This could be from interstitial edema or chronic interstitial lung disease ---------------------------------------------------------------  EXAM:   XR Chest, 1 View  CLINICAL HISTORY:   shortness of breath. TECHNIQUE:   Frontal view of the chest.  COMPARISON:   No relevant prior studies available. FINDINGS:   Lungs:   There is mild interstitial pulmonary edema. Pleural space:  Unremarkable. No pneumothorax. Heart: Cardiac size is mildly prominent   Mediastinum:  Unremarkable. Bones/joints:  Unremarkable. IMPRESSION:   Mild interstitial pulmonary edema    Communications:   Verify Receipt Addendum  Electronically signed by Antolin Neumann MD on 12-13-21 at 0715      The findings from the last RT Protocol Assessment were as follows:  Smoking: Chronic pulmonary disease  Respiratory Pattern: Regular pattern and RR 12-20 bpm  Breath Sounds: Slightly diminished and/or crackles  Cough: Strong, spontaneous, non-productive  Indication for Bronchodilator Therapy: Decreased or absent breath sounds  Bronchodilator Assessment Score: 4    Aerosolized bronchodilator medication orders have been revised according to the RT Nebulizer Bronchodilator Protocol below. Respiratory Therapist to perform RT Therapy Protocol Assessment initially then follow the protocol. Repeat RT Therapy Protocol Assessment PRN for score 0-3 or on second treatment, BID, and PRN for scores above 3. No Indications - adjust the frequency to every 6 hours PRN wheezing or bronchospasm, if no treatments needed after 48 hours then discontinue using Per Protocol order mode. If indication present, adjust the RT bronchodilator orders based on the Bronchodilator Assessment Score as indicated below. If a patient is on this medication at home then do not decrease Frequency below that used at home. 0-3 - enter or revise RT bronchodilator order(s) to equivalent RT Bronchodilator order with Frequency of every 4 hours PRN for wheezing or increased work of breathing using Per Protocol order mode. 4-6 - enter or revise RT Bronchodilator order(s) to two equivalent RT bronchodilator orders with one order with BID Frequency and one order with Frequency of every 4 hours PRN wheezing or increased work of breathing using Per Protocol order mode.          7-10 - enter or revise RT Bronchodilator order(s) to two equivalent RT bronchodilator orders with one order with TID Frequency and one order with Frequency of every 4 hours PRN wheezing or increased work of breathing using Per Protocol order mode. 11-13 - enter or revise RT Bronchodilator order(s) to one equivalent RT bronchodilator order with QID Frequency and an Albuterol order with Frequency of every 4 hours PRN wheezing or increased work of breathing using Per Protocol order mode. Greater than 13 - enter or revise RT Bronchodilator order(s) to one equivalent RT bronchodilator order with every 4 hours Frequency and an Albuterol order with Frequency of every 2 hours PRN wheezing or increased work of breathing using Per Protocol order mode. RT to enter RT Home Evaluation for COPD & MDI Assessment order using Per Protocol order mode.     Electronically signed by Ursula Hall RCP on 12/14/2021 at 7:16 AM

## 2021-12-14 NOTE — PROGRESS NOTES
Physical Therapy    Facility/Department: [address]  Initial Assessment    NAME: Marcos Lopez. : 1950  MRN: [id number]    Date of Service: 2021    Discharge Recommendations:Kong Lomax. scored a 18/24 on the AM-PAC short mobility form. Current research shows that an AM-PAC score of 18 or greater is typically associated with a discharge to the patient's home setting. Based on the patient's AM-PAC score and their current functional mobility deficits, it is recommended that the patient have 2-3 sessions per week of Physical Therapy at d/c to increase the patient's independence. At this time, this patient demonstrates the endurance and safety to discharge home with home PT and a follow up treatment frequency of 2-3x/wk. Please see assessment section for further patient specific details. If patient discharges prior to next session this note will serve as a discharge summary. Please see below for the latest assessment towards goals. PT Equipment Recommendations  Equipment Needed: No    Assessment   Body structures, Functions, Activity limitations: Decreased functional mobility ; Decreased endurance; Decreased safe awareness; Decreased strength  Assessment: pt is a 72 yo male presenting close to baseline function demonstrating steady gait using RW with CGA/SBA limited by dec endurance. pt was able to maintain o2 sats above 96% on 4Lo2. pt is safe to return home once medically ready and should continue HHPT. will follow throughout stay. Treatment Diagnosis: dec functional mobility  Prognosis: Good  Decision Making: Medium Complexity  PT Education: Goals; PT Role; Plan of Care; General Safety; Functional Mobility Training  Patient Education: pt verbalized understanding  REQUIRES PT FOLLOW UP: Yes  Activity Tolerance  Activity Tolerance: Patient limited by endurance; Patient Tolerated treatment well  Activity Tolerance: rest breaks with gait.  o2 sats remained above 96% on 4Lo2 with activity       Patient Diagnosis(es): The primary encounter diagnosis was Acute pulmonary edema ([facility] [address] A diagnosis of Hypoxia was also pertinent to this visit. has a past medical history of CAD (coronary artery disease), CHF (congestive heart failure) ([facility] [address] COPD (chronic obstructive pulmonary disease) ([facility] Depressive disorder, not elsewhere classified, GERD (gastroesophageal reflux disease), History of colon polyps - 30 seen on colonoscopy 04/2021, History of MI (myocardial infarction), History of skin ulcer of lower extremity, History of transient ischemic attack (TIA), Hyperlipidemia, Hypertension, Neuropathy, KAVITA (obstructive sleep apnea), Personal history of DVT (deep vein thrombosis), PVD (peripheral vascular disease) ([facility] TIA (transient ischemic attack), and Vertebral fracture. has a past surgical history that includes Appendectomy; Coronary angioplasty with stent (6/2013); other surgical history (Right, 6/25/2013); Leg Debridement (Right, 7/23/2013); skin split graft (Right, 7/25/2013); other surgical history (Left, 8/27/2013); laminectomy (11/18/2015); transluminal angioplasty (Left, 12/22/2015); Total hip arthroplasty (Right, 09/01/2016); Cholecystectomy, laparoscopic; eye surgery (Left); Colonoscopy (4/23/2021); Colonoscopy (4/23/2021); Colonoscopy (4/23/2021); and Femoral Endarterectomy (Right, 11/17/2021).     Restrictions  Position Activity Restriction  Other position/activity restrictions: up w/ assist  Vision/Hearing        Subjective  General  Chart Reviewed: Yes  Referring Practitioner: Kyler Victor MD  Diagnosis: acute pulmonary edema  Follows Commands: Within Functional Limits  Subjective  Subjective: pt supine in bed and agreeable to PT eval  Pain Screening  Patient Currently in Pain: Yes (pt reporting pain in RLE, not rated- RN notified)  Vital Signs  Patient Currently in Pain: Yes (pt reporting pain in RLE, not rated- RN notified)       Orientation  Orientation  Overall Orientation Status: Within Normal Limits  Social/Functional History  Social/Functional History  Lives With: Alone  Type of Home: Apartment (basement level)  Home Layout: One level  Home Access: Stairs to enter with rails  Entrance Stairs - Number of Steps: up 2 then down 4 stating goes down them sideways holding both rails  Bathroom Shower/Tub: Tub/Shower unit  Bathroom Toilet: Handicap height (tub and sink to push up from)  [employer]: Shower chair, Grab bars in shower, Hand-held shower  Bathroom Accessibility: Walker accessible  Home Equipment: 4 wheeled walker, Standard walker, [employer] Help From: Home health (Home OT & PT, home health aid to assist with cleaning and laundry)  ADL Assistance: Independent  Homemaking Assistance: Needs assistance (HHA assist laundry, cleaning, son assist with groceries)  Ambulation Assistance: Independent (using SW the most lately, using 4ww outside the home)  Transfer Assistance: Independent  Active : No  Patient's  Info: son, bus or medical transport  Occupation: Retired  Additional Comments: Son calls daily to check in, neighbor checks in every day. Does not have 24hr. Reporting no falls. O2 6L at home baseline. Cognition        Objective             Strength RLE  Strength RLE: WFL  Strength LLE  Strength LLE: WFL        Bed mobility  Supine to Sit: Stand by assistance  Scooting: Stand by assistance  Transfers  Sit to Stand: Contact guard assistance; Stand by assistance  Stand to sit: Contact guard assistance; Stand by assistance  Comment: at RW, transfers performed from EOB, toilet, and recliner with CGA progressing to SBA  Ambulation  Ambulation?: Yes  Ambulation 1  Surface: level tile  Device: Rolling Walker  Other Apparatus: O2 (4Lo2)  Assistance: Contact guard assistance; Stand by assistance  Quality of Gait: pt demonstrating steady gait with no LOB, dec star with dec step length  Gait Deviations: Shuffles; Decreased step length;  Slow Yolande  Distance: 10'x2 + 100'  Stairs/Curb  Stairs?: No     Balance  Posture: Good  Sitting - Static: +; Fair  Sitting - Dynamic: Fair  Standing - Static: Fair  Standing - Dynamic: Fair  Comments: sitting balance EOB SBA, standing balance at sink CGA progressing to SBA        Plan   Plan  Times per week: 2-5  Times per day: Daily  Current Treatment Recommendations: Balance Training, Functional Mobility Training, Gait Training, Transfer Training, Strengthening, Neuromuscular Re-education, Endurance Training, Patient/Caregiver Education & Training, Stair training, Safety Education & Training, Home Exercise Program  Safety Devices  Type of devices: Call light within reach, Gait belt, Nurse notified, Left in chair, Chair alarm in place    G-Code       OutComes Score                                                  AM-PAC Score  AM-PAC Inpatient Mobility Raw Score : 18 (12/14/21 1239)  AM-PAC Inpatient T-Scale Score : 43.63 (12/14/21 1239)  Mobility Inpatient CMS 0-100% Score: 46.58 (12/14/21 1239)  Mobility Inpatient CMS G-Code Modifier : CK (12/14/21 1239)          Goals  Short term goals  Time Frame for Short term goals: by dc  Short term goal 1: pt will perform bed mobility with indep  Short term goal 2: pt will perform function transfers with LRAD and Mod I  Short term goal 3: pt will ambulate 150' with LRAD and mod I  Short term goal 4: pt will negotiate 4 steps with handrail and mod I  Patient Goals   Patient goals : to go home       Therapy Time   Individual Concurrent Group Co-treatment   Time In 1155         Time Out 1233         Minutes 38              Timed Code Treatment Minutes:  23 min     Total Treatment Minutes:  38 min       Alycia Barger, PT

## 2021-12-15 VITALS
OXYGEN SATURATION: 96 % | RESPIRATION RATE: 18 BRPM | TEMPERATURE: 97.6 F | SYSTOLIC BLOOD PRESSURE: 102 MMHG | BODY MASS INDEX: 27.55 KG/M2 | HEIGHT: 70 IN | DIASTOLIC BLOOD PRESSURE: 63 MMHG | HEART RATE: 64 BPM | WEIGHT: 192.46 LBS

## 2021-12-15 LAB
ANION GAP SERPL CALCULATED.3IONS-SCNC: 11 MMOL/L (ref 3–16)
BUN BLDV-MCNC: 29 MG/DL (ref 7–20)
CALCIUM SERPL-MCNC: 9.3 MG/DL (ref 8.3–10.6)
CHLORIDE BLD-SCNC: 101 MMOL/L (ref 99–110)
CO2: 25 MMOL/L (ref 21–32)
CREAT SERPL-MCNC: 1.2 MG/DL (ref 0.8–1.3)
GFR AFRICAN AMERICAN: >60
GFR NON-AFRICAN AMERICAN: 60
GLUCOSE BLD-MCNC: 104 MG/DL (ref 70–99)
MAGNESIUM: 1.7 MG/DL (ref 1.8–2.4)
POTASSIUM SERPL-SCNC: 4.3 MMOL/L (ref 3.5–5.1)
SODIUM BLD-SCNC: 137 MMOL/L (ref 136–145)

## 2021-12-15 PROCEDURE — 97530 THERAPEUTIC ACTIVITIES: CPT

## 2021-12-15 PROCEDURE — 96366 THER/PROPH/DIAG IV INF ADDON: CPT

## 2021-12-15 PROCEDURE — G0378 HOSPITAL OBSERVATION PER HR: HCPCS

## 2021-12-15 PROCEDURE — 94761 N-INVAS EAR/PLS OXIMETRY MLT: CPT

## 2021-12-15 PROCEDURE — 6360000002 HC RX W HCPCS: Performed by: INTERNAL MEDICINE

## 2021-12-15 PROCEDURE — 83735 ASSAY OF MAGNESIUM: CPT

## 2021-12-15 PROCEDURE — 2580000003 HC RX 258: Performed by: INTERNAL MEDICINE

## 2021-12-15 PROCEDURE — 96372 THER/PROPH/DIAG INJ SC/IM: CPT

## 2021-12-15 PROCEDURE — 94640 AIRWAY INHALATION TREATMENT: CPT

## 2021-12-15 PROCEDURE — 99233 SBSQ HOSP IP/OBS HIGH 50: CPT | Performed by: INTERNAL MEDICINE

## 2021-12-15 PROCEDURE — 2700000000 HC OXYGEN THERAPY PER DAY

## 2021-12-15 PROCEDURE — 80048 BASIC METABOLIC PNL TOTAL CA: CPT

## 2021-12-15 PROCEDURE — 97535 SELF CARE MNGMENT TRAINING: CPT

## 2021-12-15 PROCEDURE — 6370000000 HC RX 637 (ALT 250 FOR IP): Performed by: INTERNAL MEDICINE

## 2021-12-15 PROCEDURE — 36415 COLL VENOUS BLD VENIPUNCTURE: CPT

## 2021-12-15 PROCEDURE — 96376 TX/PRO/DX INJ SAME DRUG ADON: CPT

## 2021-12-15 PROCEDURE — 99233 SBSQ HOSP IP/OBS HIGH 50: CPT | Performed by: NURSE PRACTITIONER

## 2021-12-15 RX ORDER — DULOXETIN HYDROCHLORIDE 20 MG/1
20 CAPSULE, DELAYED RELEASE ORAL DAILY
Qty: 30 CAPSULE | Refills: 3 | Status: SHIPPED | OUTPATIENT
Start: 2021-12-15 | End: 2022-02-09 | Stop reason: SDUPTHER

## 2021-12-15 RX ORDER — METOPROLOL TARTRATE 100 MG/1
100 TABLET ORAL 2 TIMES DAILY
Qty: 270 TABLET | Refills: 1 | Status: SHIPPED | OUTPATIENT
Start: 2021-12-15 | End: 2022-06-20 | Stop reason: SDUPTHER

## 2021-12-15 RX ORDER — FUROSEMIDE 40 MG/1
40 TABLET ORAL DAILY
Qty: 90 TABLET | Refills: 3 | Status: SHIPPED | OUTPATIENT
Start: 2021-12-15 | End: 2022-06-20 | Stop reason: SDUPTHER

## 2021-12-15 RX ORDER — MAGNESIUM SULFATE IN WATER 40 MG/ML
2000 INJECTION, SOLUTION INTRAVENOUS ONCE
Status: COMPLETED | OUTPATIENT
Start: 2021-12-15 | End: 2021-12-15

## 2021-12-15 RX ORDER — OXYCODONE HYDROCHLORIDE AND ACETAMINOPHEN 5; 325 MG/1; MG/1
1 TABLET ORAL EVERY 6 HOURS PRN
Qty: 28 TABLET | Refills: 0 | Status: SHIPPED | OUTPATIENT
Start: 2021-12-15 | End: 2021-12-22

## 2021-12-15 RX ADMIN — IPRATROPIUM BROMIDE 0.5 MG: 0.5 SOLUTION RESPIRATORY (INHALATION) at 08:49

## 2021-12-15 RX ADMIN — CLOPIDOGREL BISULFATE 75 MG: 75 TABLET ORAL at 09:18

## 2021-12-15 RX ADMIN — KETOROLAC TROMETHAMINE 15 MG: 15 INJECTION, SOLUTION INTRAMUSCULAR; INTRAVENOUS at 05:13

## 2021-12-15 RX ADMIN — MAGNESIUM SULFATE HEPTAHYDRATE 2000 MG: 2 INJECTION, SOLUTION INTRAVENOUS at 09:17

## 2021-12-15 RX ADMIN — FUROSEMIDE 40 MG: 10 INJECTION, SOLUTION INTRAMUSCULAR; INTRAVENOUS at 09:18

## 2021-12-15 RX ADMIN — PANTOPRAZOLE SODIUM 40 MG: 40 TABLET, DELAYED RELEASE ORAL at 05:13

## 2021-12-15 RX ADMIN — METOPROLOL TARTRATE 100 MG: 25 TABLET, FILM COATED ORAL at 09:18

## 2021-12-15 RX ADMIN — DULOXETINE HYDROCHLORIDE 20 MG: 20 CAPSULE, DELAYED RELEASE ORAL at 09:18

## 2021-12-15 RX ADMIN — KETOROLAC TROMETHAMINE 15 MG: 15 INJECTION, SOLUTION INTRAMUSCULAR; INTRAVENOUS at 11:45

## 2021-12-15 RX ADMIN — ENOXAPARIN SODIUM 40 MG: 100 INJECTION SUBCUTANEOUS at 09:18

## 2021-12-15 RX ADMIN — ARFORMOTEROL TARTRATE 15 MCG: 15 SOLUTION RESPIRATORY (INHALATION) at 08:49

## 2021-12-15 RX ADMIN — LOSARTAN POTASSIUM 100 MG: 100 TABLET, FILM COATED ORAL at 09:22

## 2021-12-15 RX ADMIN — SODIUM CHLORIDE 25 ML: 9 INJECTION, SOLUTION INTRAVENOUS at 09:15

## 2021-12-15 RX ADMIN — SODIUM CHLORIDE, PRESERVATIVE FREE 10 ML: 5 INJECTION INTRAVENOUS at 09:19

## 2021-12-15 RX ADMIN — BUDESONIDE 500 MCG: 0.5 SUSPENSION RESPIRATORY (INHALATION) at 08:49

## 2021-12-15 ASSESSMENT — PAIN SCALES - GENERAL
PAINLEVEL_OUTOF10: 9

## 2021-12-15 NOTE — DISCHARGE SUMMARY
Hospital Discharge Summary    Patient's PCP: Rios Mcmanus DO  Admit Date: 12/13/2021   Discharge Date: 12/15/21    Admitting Physician: Dr. Waleska Matthew MD  Discharge Physician: Dr. Waleska Matthew MD     Consults:   IP CONSULT TO HOSPITALIST  IP CONSULT TO CARDIOLOGY  IP CONSULT TO HEART FAILURE NURSE/COORDINATOR  IP CONSULT TO DIETITIAN  IP CONSULT TO VASCULAR SURGERY  IP CONSULT TO SOCIAL WORK    Brief HPI: 73 y. o. male with past medical history significant for PVD, CAD, CHF, COPD, chronic hypoxic respiratory failure with 5 to 6 L of home oxygen, hypertension, and history of PE on Eliquis who presented to [facility] chief complaint of worsening SOB.  Patient recently underwent a right femoral endarterectomy November 17th. CTPA done here without PE. He has been off his lasix per cardiology as he was told that he might be getting too dry.        Brief hospital course:    1. CHF exacerbation  And SOB  started on iv lasix   cardiology consulted, continue cozaar and lopressor  F/u with cardiology in 1-2 weeks      2. Macrocytic anemia with folate deficiency  -Continue folate supplement     3. History of PE  -Continue Eliquis     4. PVD/ CAD  -Continue Plavix, statin,BB, IMdur         Invasive procedures:  None     Discharge Diagnoses: Active Problems:    Heart failure (HCC)    Acute pulmonary edema (HCC)  Resolved Problems:    * No resolved hospital problems. *      Physical Exam: /63   Pulse 64   Temp 97.6 °F (36.4 °C) (Oral)   Resp 18   Ht 5' 10\" (1.778 m)   Wt 192 lb 7.4 oz (87.3 kg)   SpO2 96%   BMI 27.62 kg/m²       General appearance: No apparent distress appears stated age and cooperative. HEENT Normal cephalic, atraumatic without obvious deformity.  Pupils equal, round, and reactive to light.  Extra ocular muscles intact.  Conjunctivae/corneas clear.   Neck: Supple, No jugular venous distention/bruits.  Trachea midline without thyromegaly or adenopathy with full range of motion. Lungs: Clear to auscultation, bilaterally without Rales/Wheezes/Rhonchi with good respiratory effort. Heart: Regular rate and rhythm with Normal S1/S2 without murmurs, rubs or gallops, point of maximum impulse non-displaced  Abdomen: Soft, non-tender or non-distended without rigidity or guarding and positive bowel sounds all four quadrants. Extremities: No clubbing, cyanosis, or edema bilaterally.  Full range of motion without deformity and normal gait intact. Skin: Skin color, texture, turgor normal.  No rashes or lesions. Neurologic: Alert and oriented X 3, neurovascularly intact with sensory/motor intact upper extremities/lower extremities, bilaterally.  Cranial nerves: II-XII intact, grossly non-focal.  Mental status: Alert, oriented, thought content appropriate. Capillary Refill: Acceptable  < 3 seconds  Peripheral Pulses: +3 Easily felt, not easily obliterated with pressure         Significant diagnostic studies that may require follow up:  Echo Limited    Result Date: 11/19/2021  Transthoracic Echocardiography Report (TTE)  Demographics   Patient Name       [address] [facility]. Date of Study      11/19/2021        Gender              Male   Patient Number     [id number]        Date of Birth       1950   Visit Number       [id number]         Age                 73 year(s)   Accession Number   [id number]        Room Number         1690   Corporate ID       [id number]          Sonographer         Duane Arita RVT, [facility]   Ordering Physician Harriet Rene MD Interpreting        Harriet Rene MD                                       Physician  Procedure Type of Study   TTE procedure:ECHOCARDIOGRAM LIMITED. Procedure Date Date: 11/19/2021 Start: 11:50 AM Study Location: [address] Echo Lab Technical Quality: Limited visualization due to poor acoustical window. Additional Indications:hypotension.  Patient Status: Routine Contrast Medium: Definity. Amount - 2 ml Height: 70 inches Weight: 204 pounds BSA: 2.1 m2 BMI: 29.27 kg/m2 BP: 114/61 mmHg  Conclusions   Summary  Limited study. Left ventricular cavity size is normal. There is mild  concentric left ventricular hypertrophy. Overall left ventricular systolic  function appears normal with an ejection fraction of 55-60%. No regional  wall motion abnormalities are noted. Signature   ------------------------------------------------------------------  Electronically signed by Roderick Da Silva MD (Interpreting  physician) on 11/19/2021 at 05:29 PM  ------------------------------------------------------------------   Findings   Left Ventricle  Left ventricular cavity size is normal. There is mild concentric left  ventricular hypertrophy. Overall left ventricular systolic function appears  normal with an ejection fraction of 55-60%. No regional wall motion  abnormalities are noted. Aorta  The aortic root is normal in size. Right Ventricle  The right ventricle is normal in size and function. TAPSE measures 2.49 cm. Pericardial Effusion  No pericardial effusion noted. Pleural Effusion  No pleural effusion. Miscellaneous  IVC size is normal (<2.1cm) and collapses > 50% with respiration consistent  with normal RA pressure (3mmHg). M-Mode/2D Measurements (cm)   LV Diastolic Dimension: 9.27 cm LV Systolic Dimension: 3.2 cm  LV Septum Diastolic: 1.2 cm  LV PW Diastolic: 7.72 cm        AO Root Dimension: 3.5 cm  RV Diastolic Dimension: 6.19 cm LA Dimension: 3.8 cm  Aorta   Aortic Root: 3.5 cm      XR PELVIS (1-2 VIEWS)    Result Date: 11/17/2021  History: Right femoral endarterectomy. Iliac artery stent placement. Intraoperative fluoroscopy. FINDINGS: 6 minutes 35 seconds fluoroscopic time. 33 cine series obtained. Images demonstrate catheterization and stent placement in the iliac artery. No acute complication identified. 1. Intraoperative fluoroscopy as described.     XR CHEST PORTABLE    Addendum Date: 12/13/2021    CR Patient: Carlos Hint  Time Out: 07:15 Exam(s): FILM CXR 1 VIEW  ADDENDUM - Added by Gregorio Gonzalez MD on 12/13/2021 7:15 AM (-08:00) Interstitial prominence seen on the current study is also seen on the prior study from 11/18/21. This could be from interstitial edema or chronic interstitial lung disease ---------------------------------------------------------------  EXAM:   XR Chest, 1 View  CLINICAL HISTORY:   shortness of breath. TECHNIQUE:   Frontal view of the chest.  COMPARISON:   No relevant prior studies available. FINDINGS:   Lungs: There is mild interstitial pulmonary edema. Pleural space:  Unremarkable. No pneumothorax. Heart: Cardiac size is mildly prominent   Mediastinum:  Unremarkable. Bones/joints:  Unremarkable. IMPRESSION:   Mild interstitial pulmonary edema    Communications:  12/13/21 07:22 Verify Receipt Verified receipt with NESS Reyes,given to Dr Mehran Owens  on 12/13 07:22 (-05:00)  Electronically signed by Gregorio Gonzalez MD on 12-13-21 at [place] [id number] Date: 12/13/2021    CR Patient: Carlos Marsh  Time Out: 07:15 Exam(s): FILM CXR 1 VIEW  ADDENDUM - Added by Gregorio Gonzalez MD on 12/13/2021 7:15 AM (-08:00) Interstitial prominence seen on the current study is also seen on the prior study from 11/18/21. This could be from interstitial edema or chronic interstitial lung disease ---------------------------------------------------------------  EXAM:   XR Chest, 1 View  CLINICAL HISTORY:   shortness of breath. TECHNIQUE:   Frontal view of the chest.  COMPARISON:   No relevant prior studies available. FINDINGS:   Lungs: There is mild interstitial pulmonary edema. Pleural space:  Unremarkable. No pneumothorax. Heart: Cardiac size is mildly prominent   Mediastinum:  Unremarkable. Bones/joints:  Unremarkable.   IMPRESSION:   Mild interstitial pulmonary edema    Communications:   Verify Receipt Addendum  Electronically signed by Gregorio Gonzalez MD on 12-13-21 at 0715    XR CHEST PORTABLE    Result Date: 11/18/2021  PORTABLE CHEST. HISTORY: Increasing O2 requirement COMPARISON STUDY: 11/15/2021 FINDINGS: Heart size and mediastinal structures appear within normal limits. Persistent diffuse accentuation of pulmonary interstitial markings, nonspecific. Small nodular passes in the left upper lobe and left lung base unchanged. No pleural effusion. Bony structures are intact. 1.  Persistent diffuse mild nonspecific prominence of the pulmonary interstitial markings. No localized consolidation or pleural effusion. XR CHEST PORTABLE    Result Date: 11/15/2021  PORTABLE AP CHEST AT 1755 HOURS:   HISTORY: Preop study. COMPARISON: 9/17/2021. FINDINGS: The heart and pulmonary vasculature are within normal limits. There is diffuse accentuation of pulmonary interstitial markings throughout both lungs, nonspecific. No focal airspace densities are seen. There are no effusions. Diffuse mild nonspecific prominence of pulmonary interstitial markings. No focal airspace density areas of consolidation seen. There are no effusions. Correlate clinically. CT CHEST PULMONARY EMBOLISM W CONTRAST    Result Date: 12/13/2021  EXAM: CT CHEST PULMONARY EMBOLISM W CONTRAST INDICATION: shortness of breath, hypoxia COMPARISON: September 17, 2021 TECHNIQUE: IV Contrast Media and volume: 80 cc OMNI 449 Helically acquired pulmonary embolism protocol. MIP reformats were submitted. Up-to-date CT equipment and radiation dose reduction techniques were employed. FINDINGS: Diagnostic quality: Adequate. Pulmonary emboli: None. The previously noted segmental and subsegmental chronic emboli on the right are no longer apparent. Right heart strain: None. Lungs: There is bibasilar atelectasis. Mild interlobular septal thickening is noted diffusely. No lobar opacity. No pulmonary mass. There is atelectasis of the right middle lobe as well. Airways: Normal. Heart/Great Vessels:  The heart is stable in size. There is extensive atherosclerotic calcification, both calcified and noncalcified plaque, involving the thoracic aorta. This is similar to prior. Adenopathy: None. Upper Abdomen: Unchanged in appearance. Decreased attenuation of the hepatic parenchyma is indicative of steatosis. Bones: Stable. Impression deformities of the mid and upper thoracic spine are unchanged. Other findings: None. 1. No evidence of acute pulmonary emboli. Interval resolution of previously noted thrombus within the right middle lobe and right lower lobe segmental and subsegmental pulmonary arterial branches. 2. Mild intralobular septal thickening diffusely. Correlate for mild edema. 3. Extensive atherosclerotic disease. FLUORO FOR SURGICAL PROCEDURES    Result Date: 11/17/2021  History: Right femoral endarterectomy. Iliac artery stent placement. Intraoperative fluoroscopy. FINDINGS: 6 minutes 35 seconds fluoroscopic time. 33 cine series obtained. Images demonstrate catheterization and stent placement in the iliac artery. No acute complication identified. 1. Intraoperative fluoroscopy as described. Treatments: As above.       Discharge Medications:     Medication List      ASK your doctor about these medications    albuterol sulfate  (90 Base) MCG/ACT inhaler  Inhale 2 puffs into the lungs every 6 hours as needed for Wheezing     allopurinol 100 MG tablet  Commonly known as: ZYLOPRIM  Take 1 tablet by mouth daily     AMLODIPINE BENZOATE PO     atorvastatin 80 MG tablet  Commonly known as: LIPITOR  Take 1 tablet by mouth daily     B-12 100 MCG Tabs     clopidogrel 75 MG tablet  Commonly known as: PLAVIX  Take 1 tablet by mouth daily     colchicine 0.6 MG tablet  Commonly known as: COLCRYS     DULoxetine 20 MG extended release capsule  Commonly known as: CYMBALTA     folic acid 1 MG tablet  Commonly known as: FOLVITE  Take 1 tablet by mouth daily     furosemide 40 MG tablet  Commonly known as: Lasix  Take 1 tablet by mouth daily     gabapentin 100 MG capsule  Commonly known as: NEURONTIN  Take 2 capsules by mouth 3 times daily for 10 days. losartan 100 MG tablet  Commonly known as: COZAAR  TAKE 1 TABLET BY MOUTH EVERY DAY     magnesium gluconate 500 MG tablet  Commonly known as: MAGONATE     metoprolol 100 MG tablet  Commonly known as: LOPRESSOR  Take 1 tablet by mouth 2 times daily     nicotine 21 MG/24HR  Commonly known as: Nicoderm CQ  Place 1 patch onto the skin every 24 hours     nitroGLYCERIN 0.4 MG SL tablet  Commonly known as: NITROSTAT  Place 1 tablet under the tongue every 5 minutes as needed for Chest pain     omeprazole 40 MG delayed release capsule  Commonly known as: PRILOSEC     pantoprazole 40 MG tablet  Commonly known as: PROTONIX  Take 1 tablet by mouth daily for 10 days     potassium chloride 10 MEQ extended release capsule  Commonly known as: MICRO-K     Shingrix 50 MCG/0.5ML Susr injection  Generic drug: zoster recombinant adjuvanted vaccine  Inject 0.5 mLs into the muscle See Admin Instructions 1 dose now and repeat in 2-6 months     Trelegy Ellipta 100-62.5-25 MCG/INH Aepb  Generic drug: fluticasone-umeclidin-vilant  Inhale 1 puff into the lungs daily     vitamin D-3 25 MCG (1000 UT) Caps            Activity: activity as tolerated  Diet: ADULT DIET; Regular; Low Sodium (2 gm)      Disposition: home  Discharged Condition: Stable  Follow Up:   Socrates Lynne MD  [address] [facility]  [phone]    In 2 weeks  Post op Follow up        Code status:  Full Code         Total time spent on discharge, finalizing medications, referrals and arranging outpatient follow up was more than 45 minutes      Thank you Dr. Vidhya Kramer DO for the opportunity to be involved in this patients care.

## 2021-12-15 NOTE — PLAN OF CARE
Problem: Pain:  Goal: Pain level will decrease  Description: Pain level will decrease  12/15/2021 0842 by Oneil David RN  Outcome: Ongoing     Problem: Pain:  Goal: Control of acute pain  Description: Control of acute pain  12/15/2021 0842 by Oneil David RN  Outcome: Ongoing  Note: Patient having pain in right lower leg. Pulses checked, cap refill less than 3 seconds. Patient can wiggle toes.

## 2021-12-15 NOTE — PROGRESS NOTES
Discharge order received, discharge paperwork completed and gone over with patient, questions answered. Paper prescription for percocet given to patient, copy of prescription in the patient's chart. IV and telemetry removed, patient dressed and wheeled down with all belongings to main entrance.

## 2021-12-15 NOTE — DISCHARGE INSTR - COC
Continuity of Care Form    Patient Name: Meir Dias    :  1950  MRN:  [id number]    Admit date:  2021  Discharge date:  ***    Code Status Order: Full Code   Advance Directives:      Admitting Physician:  Gilson Field MD  PCP: Jacinto Vásquez DO    Discharging Nurse: [facility] Unit/Room#: 0763/7461-78  Discharging Unit Phone Number: ***    Emergency Contact:   Extended Emergency Contact Information  Primary Emergency Contact: Aaron Guzmna   [address] Phone: [phone]  Relation: Child    Past Surgical History:  Past Surgical History:   Procedure Laterality Date    APPENDECTOMY      CHOLECYSTECTOMY, LAPAROSCOPIC      COLONOSCOPY  2021    COLONOSCOPY POLYPECTOMY SNARE/COLD BIOPSY performed by Amanda Headley MD at [place] [address]  2021    COLONOSCOPY POLYPECTOMY ABLATION performed by Amanda Headley MD at [place] [address]  2021    COLONOSCOPY CONTROL HEMORRHAGE performed by Amanda Headley MD at [facility]  2013    EYE SURGERY Left     FEMORAL ENDARTERECTOMY Right 2021    RIGHT FEMORAL ENDARTERECTOMY  RIGHT EXTERNAL ILIAC TO PROFUNDA FEMORAL BYPASS WITH 8MM PTFE GRAFT RIGHT LOWER EXTREMITY ARTERIOGRAM BALLOON ANGIOPLASTY RIGHT PROFUNDA BALLOON ANGIOPLASTY AND STENT OF RIGHT EXTERNAL ILIAC ARTERY performed by Alexey Sewell MD at [address] [place] [address]  2015    Bilateral decompressive lumbar laminectomy L4-5   ; medial facetectomy L4-5 joints  bilaterally; foraminotomies l5   nerve roots bilaterally    LEG DEBRIDEMENT Right 2013    Dr. uL Sorenson - pretibial wound    OTHER SURGICAL HISTORY Right 2013    Dr. Lu Sorenson - CFA Endarterectomy w/marsupialization; RLE wound excision & debridement     OTHER SURGICAL HISTORY Left 2013    Dr. Lu Sorenson - femoral & profunda femoris endarterectomy w/marsupialization patch angioplasty using SFA    SKIN SPLIT GRAFT Right 7/25/2013    Dr. Darrold Klinefelter - to non-healing R pretibial wound, 9 x 15 cm    TOTAL HIP ARTHROPLASTY Right 09/01/2016    Dr. Anya Keller Left 12/22/2015    Dr. Darrold Klinefelter - CFA exploration, thrombectomy of ileofemoral system, stenting of RAFAL in-stent stenosis w/8 x 37 mm Palmaz        Immunization History:   Immunization History   Administered Date(s) Administered    COVID-19, Pfizer, PF, 30mcg/0.3mL 03/17/2021, 04/07/2021, 11/01/2021    Influenza 10/14/2013    Influenza Vaccine, unspecified formulation 10/14/2013, 10/22/2015    Influenza Virus Vaccine 10/14/2013, 10/14/2014, 10/22/2015    Influenza, High Dose (Fluzone 65 yrs and older) 10/22/2015, 11/18/2016, 08/17/2017, 08/30/2018, 10/17/2019, 09/14/2020    Influenza, Judy , adjuvanted, 65 yrs +, IM, PF (Fluad) 09/24/2021    Pneumococcal Conjugate 13-valent (Riamual44) 10/22/2015    Pneumococcal Conjugate 7-valent (Prevnar7) 03/01/2013    Pneumococcal Polysaccharide ([id number]) 01/03/2018    Tdap (Boostrix, Adacel) 10/14/2013    Zoster Live (Zostavax) 06/07/2014    Zoster Recombinant (Shingrix) 05/08/2018       Active Problems:  Patient Active Problem List   Diagnosis Code    Essential hypertension I10    Hyperlipemia E78.5    Degeneration of intervertebral disc of lumbar region M51.36    KAVITA and COPD overlap syndrome ([facility]) G47.33, J44.9    PVD (peripheral vascular disease) ([facility]) I73.9    Coronary artery disease involving native coronary artery of native heart without angina pectoris I25.10    Idiopathic peripheral neuropathy G60.9    Gastroesophageal reflux disease K21.9    Sacroiliitis, not elsewhere classified ([facility] M46.1    Other spondylosis, lumbosacral region M47.897    Cigarette nicotine dependence without complication R92.384    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    Hypertensive heart disease with heart failure (HCC) I11.0    Long term (current) use of anticoagulants Z79.01    Problems related to living alone Z60.2    Presence of coronary angioplasty implant and graft Z95.5    Pulmonary embolism (HCC) I26.99    Critical ischemia of lower extremity (HCC) I70.229    Cellulitis of right lower extremity L03.115    LYDIA (acute kidney injury) ([facility] N17.9    Anemia D64.9    Heart failure (HCC) I50.9    Acute pulmonary edema (HCC) J81.0       Isolation/Infection:   Isolation            No Isolation          Patient Infection Status       Infection Onset Added Last Indicated Last Indicated By Review Planned Expiration Resolved Resolved By    None active    Resolved    COVID-19 (Rule Out) 12/05/20 12/05/20 12/05/20 COVID-19 (Ordered)   12/06/20 Rule-Out Test Resulted            Nurse Assessment:  Last Vital Signs: /63   Pulse 64   Temp 97.6 °F (36.4 °C) (Oral)   Resp 18   Ht 5' 10\" (1.778 m)   Wt 192 lb 7.4 oz (87.3 kg)   SpO2 96%   BMI 27.62 kg/m²     Last documented pain score (0-10 scale): Pain Level: 9  Last Weight:   Wt Readings from Last 1 Encounters:   12/15/21 192 lb 7.4 oz (87.3 kg)     Mental Status:  {IP PT MENTAL STATUS:26874}    IV Access:  { NICA IV ACCESS:821796778}    Nursing Mobility/ADLs:  Walking   {P DME VVAR:782158308}  Transfer  {P DME TGWL:764934298}  Bathing  {[facility] DME ADAS:010609575}  Dressing  {[facility] DME ZZVE:123294355}  Toileting  {P DME MWNB:870799870}  Feeding  {[facility] DME CHPF:717050037}  Med Admin  {[facility] DME SWNN:124523498}  Med Delivery   { NICA MED Delivery:975683766}    Wound Care Documentation and Therapy:  Puncture 11/17/21 Femoral Anterior; Left (Active)   Wound Assessment Clean; Dry; Intact 11/17/21 1942   [place]-wound Assessment Dry; Intact;  Other (Comment) 11/17/21 9684   Closure None 11/17/21 1942   Drainage Amount None 11/17/21 1942   Dressing/Treatment Open to air 11/17/21 1742   Number of days: 27       Wound 11/15/21 Foot Anterior; Right; Outer (Active)   Wound Etiology Venous 12/15/21 0514   Dressing Status Clean; Dry; Intact 12/15/21 0759   Dressing/Treatment Ace wrap 12/15/21 0759   Wound Assessment Other (Comment) 12/14/21 0240   Drainage Amount None 12/15/21 0759   Number of days: 29       Wound 11/15/21 Pretibial Distal; Right (Active)   Wound Etiology Arterial 12/14/21 0240   Dressing Status Clean; Dry; Intact 12/15/21 0759   Dressing/Treatment Ace wrap 12/15/21 0759   Wound Assessment Other (Comment) 11/30/21 0900   Drainage Amount None 12/15/21 0759   Number of days: 29       Wound 11/15/21 Foot Anterior; Right; Inner (Active)   Wound Etiology Arterial 12/14/21 0240   Dressing Status Clean; Dry; Intact 12/15/21 0759   Dressing/Treatment Other (comment) 12/14/21 0240   Wound Assessment Other (Comment) 12/15/21 0759   Drainage Amount None 12/15/21 0759   Number of days: 29       Wound 11/15/21 Pretibial Distal; Right; Outer (Active)   Wound Etiology Arterial 12/14/21 0240   Dressing Status Clean; Dry; Intact 12/15/21 0759   Dressing/Treatment Other (comment) 12/15/21 0759   Wound Assessment Other (Comment) 12/15/21 0759   Drainage Amount None 12/15/21 0759   Number of days: 29        Elimination:  Continence: Bowel: {YES / [place]:95946}  Bladder: {YES / BS:94532}  Urinary Catheter: {Urinary Catheter:851368199}   Colostomy/Ileostomy/Ileal Conduit: {YES / DK:92384}       Date of Last BM: ***    Intake/Output Summary (Last 24 hours) at 12/15/2021 1148  Last data filed at 12/15/2021 0917  Gross per 24 hour   Intake 835 ml   Output 725 ml   Net 110 ml     I/O last 3 completed shifts:   In: 900 [P.O.:900]  Out: 725 [Urine:725]    Safety Concerns:     [address] NICA Safety Concerns:260241998}    Impairments/Disabilities:      [address] NICA Impairments/Disabilities:776820576}    Nutrition Therapy:  Current Nutrition Therapy:   Alonzo Anderson NICA Diet List:544821930}    Routes of Feeding: {CHP DME Other Feedings:209443214}  Liquids: {Slp liquid thickness:60684}  Daily Fluid Restriction: {CHP DME Yes amt example:857683098}  Last Modified Barium Swallow with Video (Video Swallowing Test): {Done Not Done LYNV:134685282}    Treatments at the Time of Hospital Discharge:   Respiratory Treatments: ***  Oxygen Therapy:  {Therapy; copd oxygen:92037}  Ventilator:    {MH CC Vent JTVN:864520376}    Heart Failure Instructions for Daily Management  Patient was treated for acute on chronic diastolic heart failure. he  will require the following:    Please weigh daily on the same scale and approximately the same time of day. Report weight gain of 3 pounds/day or 5 pounds/week to : [address] and [facility] / Bisi Flanagan [phone]. Please use hospital discharge weight as baseline reference. Please monitor for signs and symptoms of and report to MD:  Worsening Heart Failure: sudden weight gain, shortness of breath, lower extremity or general edema/swelling, abdominal bloating/swelling, inability to lie flat, intolerance to usual activity, or cough (especially at night). Report these finding even if no increase in weight. Dehydration:  having difficulty or a decrease in urination, dizziness, worsening fatigue, or new onset/worsening of generalized weakness. Please continue a LOW SODIUM diet and LIMIT fluid intake to 48 - 64 ounces ( 1.5 - 2 liters) per day. Call [address] and [facility] / Bisi Flanagan [phone] with any questions or concerns. Please continue heart failure education to patient and family/support system. See After Visit Summary for hospital follow up appointment details. Consider spiritual care referral for support and/or completion of advance directives .   Consider: [facility] telehealth program if patient agreeable and able to participate and palliative care consult for ongoing goals of care, end of life, and/or chronic disease management discussions. Dr Cyndie Britton is pt's primary cardiologist.      Rehab Therapies: {THERAPEUTIC INTERVENTION:9507656071}  Weight Bearing Status/Restrictions: [address] CC Weight Bearin}  Other Medical Equipment (for information only, NOT a DME order):  {EQUIPMENT:147256892}  Other Treatments: ***    Patient's personal belongings (please select all that are sent with patient):  {CHP DME Belongings:901916601}    RN SIGNATURE:  {Esignature:671958444}    CASE MANAGEMENT/SOCIAL WORK SECTION    Inpatient Status Date: ***    Readmission Risk Assessment Score:  Readmission Risk              Risk of Unplanned Readmission:  22           Discharging to Facility/ Agency   Name:   Address:  Phone:  Fax:    Dialysis Facility (if applicable)   Name:  Address:  Dialysis Schedule:  Phone:  Fax:    / signature: {Esignature:215702219}    PHYSICIAN SECTION    Prognosis: Fair    Condition at Discharge: Stable    Rehab Potential (if transferring to Rehab): Fair    Recommended Labs or Other Treatments After Discharge: Laukaantiyamilex 80     Physician Certification: I certify the above information and transfer of Tate Spicer.  is necessary for the continuing treatment of the diagnosis listed and that he requires [address] for less 30 days.      Update Admission H&P: No change in H&P    PHYSICIAN SIGNATURE:  Electronically signed by Jonathan Emmanuel MD on 12/15/21 at 11:49 AM EST

## 2021-12-15 NOTE — PROGRESS NOTES
Occupational Therapy  Facility/Department: [address]  Daily Treatment Note  NAME: Alexandra Kruse. : 1950  MRN: [id number]    Date of Service: 12/15/2021    Discharge Recommendations: Alexandra Kruse. scored a 20/24 on the AM-PAC ADL Inpatient form. Current research shows that an AM-PAC score of 18 or greater is typically associated with a discharge to the patient's home setting. Based on the patient's AM-PAC score, and their current ADL deficits, it is recommended that the patient have 2-3 sessions per week of Occupational Therapy at d/c to increase the patient's independence. At this time, this patient demonstrates the endurance and safety to discharge home with (home vs OP services) and a follow up treatment frequency of 2-3x/wk. Please see assessment section for further patient specific details. If patient discharges prior to next session this note will serve as a discharge summary. Please see below for the latest assessment towards goals. OT Equipment Recommendations  Equipment Needed: No    Assessment   Performance deficits / Impairments: Decreased functional mobility ; Decreased ADL status; Decreased endurance  Assessment: From home alone, has son and neighbor that check in daily. Pt has home health care, PT/OT. Pt currently requires SBA with fx mobility and transfers, using RW. Mobility in room, bathroom, stance at sink for grooming, toileting. UB bathing and dressing. Pt would benefit from cont OT while in acute care, cont home services at [place]. Treatment Diagnosis: impaired mobility, transfers, ADL  OT Education: OT Role; Plan of Care; ADL Adaptive Strategies; Transfer Training  Barriers to Learning: [place]  REQUIRES OT FOLLOW UP: Yes  Activity Tolerance  Activity Tolerance: Patient Tolerated treatment well  Safety Devices  Safety Devices in place: Yes  Type of devices: All fall risk precautions in place; Call light within reach;  Chair alarm in place; Gait belt; Left in chair; Nurse notified         Patient Diagnosis(es): The primary encounter diagnosis was Acute pulmonary edema ([facility] [address] Diagnoses of Hypoxia, Essential hypertension, Chronic diastolic heart failure ([address] and Coronary artery disease involving native coronary artery of native heart without angina pectoris were also pertinent to this visit. has a past medical history of CAD (coronary artery disease), CHF (congestive heart failure) ([address] COPD (chronic obstructive pulmonary disease) ([address] Depressive disorder, not elsewhere classified, GERD (gastroesophageal reflux disease), History of colon polyps - 30 seen on colonoscopy 04/2021, History of MI (myocardial infarction), History of skin ulcer of lower extremity, History of transient ischemic attack (TIA), Hyperlipidemia, Hypertension, Neuropathy, KAVITA (obstructive sleep apnea), Personal history of DVT (deep vein thrombosis), PVD (peripheral vascular disease) ([facility] [address] TIA (transient ischemic attack), and Vertebral fracture. has a past surgical history that includes Appendectomy; Coronary angioplasty with stent (6/2013); other surgical history (Right, 6/25/2013); Leg Debridement (Right, 7/23/2013); skin split graft (Right, 7/25/2013); other surgical history (Left, 8/27/2013); laminectomy (11/18/2015); transluminal angioplasty (Left, 12/22/2015); Total hip arthroplasty (Right, 09/01/2016); Cholecystectomy, laparoscopic; eye surgery (Left); Colonoscopy (4/23/2021); Colonoscopy (4/23/2021); Colonoscopy (4/23/2021); and Femoral Endarterectomy (Right, 11/17/2021). Restrictions  Position Activity Restriction  Other position/activity restrictions: up w/ assist  Subjective   General  Chart Reviewed:  Yes  Additional Pertinent Hx: 73 y.o. male with past medical history significant for PVD, CAD, CHF, COPD, chronic hypoxic respiratory failure with 5 to 6 L of home oxygen, hypertension, and history of PE on Eliquis who presented to [facility] with chief complaint of worsening SOB.  Patient recently underwent a right femoral endarterectomy November 17th. CTPA done here without PE. Referring Practitioner: Shana Prader, MD  Diagnosis: SOB  Subjective  Subjective: In bed upon arrival, agreeable to session      Orientation  Orientation  Overall Orientation Status: Within Functional Limits  Objective    ADL  Grooming: Stand by assistance (stance at sink, wipe face, oral care, wash hand s)  UE Bathing: Stand by assistance  UE Dressing: Stand by assistance  Toileting: Stand by assistance        Balance  Sitting Balance: Independent  Standing Balance: Stand by assistance  Standing Balance  Activity: mobility in room, to and from bathroom, toileting, stance at sink  Functional Mobility  Functional - Mobility Device: Rolling Walker  Activity: To/from bathroom;  Other  Assist Level: Stand by assistance  Functional Mobility Comments: SBA, slow but steady, no LOB, on 4L O2  Toilet Transfers  Toilet - Technique: Ambulating  Equipment Used: Standard toilet  Toilet Transfer: Stand by assistance  Bed mobility  Supine to Sit: Stand by assistance  Scooting: Stand by assistance  Transfers  Sit to stand: Stand by assistance  Stand to sit: Stand by assistance                       Cognition  Overall Cognitive Status: [facility]                                         Plan   Plan  Times per week: 2-5  Times per day: Daily    AM-PAC Score        AM-PAC Inpatient Daily Activity Raw Score: 20 (12/15/21 1147)  AM-PAC Inpatient ADL T-Scale Score : 42.03 (12/15/21 1147)  ADL Inpatient CMS 0-100% Score: 38.32 (12/15/21 1147)  ADL Inpatient CMS G-Code Modifier : Kirstie Hopkins (12/15/21 1147)    Goals  Short term goals  Time Frame for Short term goals: dc  Short term goal 1: supine to sit IND  Short term goal 2: sit<>stand SPVN  Short term goal 3: Fx mobility SPVN with RW  Short term goal 4: grooming SPVN in stance at sink 5 min  Short term goal 5: toileting SPVN       Therapy Time   Individual Concurrent Group Co-treatment   Time In 1110         Time Out 1135         Minutes 25              Timed Code Treatment Minutes:   25    Total Treatment Minutes:  [address], OT

## 2021-12-15 NOTE — DISCHARGE INSTR - DIET

## 2021-12-15 NOTE — PLAN OF CARE
Problem: OXYGENATION/RESPIRATORY FUNCTION  Goal: Patient will maintain patent airway  12/15/2021 0212 by Armen Ca RN  Outcome: Ongoing  12/14/2021 1851 by Pedro De León RN  Outcome: Ongoing  Goal: Patient will achieve/maintain normal respiratory rate/effort  Description: Respiratory rate and effort will be within normal limits for the patient  12/15/2021 0212 by Armen Ca RN  Outcome: Ongoing  12/14/2021 1851 by Pedro De León RN  Outcome: Ongoing     Problem: HEMODYNAMIC STATUS  Goal: Patient has stable vital signs and fluid balance  12/15/2021 0212 by Armen Ca RN  Outcome: Ongoing  12/14/2021 1851 by Pedro De León RN  Outcome: Ongoing     Problem: FLUID AND ELECTROLYTE IMBALANCE  Goal: Fluid and electrolyte balance are achieved/maintained  12/15/2021 0212 by Armen Ca RN  Outcome: Ongoing  12/14/2021 1851 by Pedro De León RN  Outcome: Ongoing     Problem: ACTIVITY INTOLERANCE/IMPAIRED MOBILITY  Goal: Mobility/activity is maintained at optimum level for patient  12/15/2021 0212 by Armen Ca RN  Outcome: Ongoing  12/14/2021 1851 by Pedro De León RN  Outcome: Ongoing     Problem: Skin Integrity:  Goal: Will show no infection signs and symptoms  Description: Will show no infection signs and symptoms  12/15/2021 0212 by Armen Ca RN  Outcome: Ongoing  12/14/2021 1851 by Pedro De León RN  Outcome: Ongoing  Goal: Absence of new skin breakdown  Description: Absence of new skin breakdown  12/15/2021 0212 by Armen Ca RN  Outcome: Ongoing  12/14/2021 1851 by Pedro De León RN  Outcome: Ongoing     Problem: Pain:  Goal: Pain level will decrease  Description: Pain level will decrease  12/15/2021 0212 by Armen Ca RN  Outcome: Ongoing  12/14/2021 1851 by Pedro De León RN  Outcome: Ongoing  Goal: Control of acute pain  Description: Control of acute pain  12/15/2021 0212 by Armen Ca RN  Outcome: Ongoing  12/14/2021 1851 by Pedro De León RN  Outcome: Ongoing  Goal: Control of chronic pain  Description: Control of chronic pain  12/15/2021 0212 by Win Tan RN  Outcome: Ongoing  12/14/2021 1851 by Louis Flynn RN  Outcome: Ongoing

## 2021-12-15 NOTE — PROGRESS NOTES
Physician Progress Note      Garth Menendez  CSN #:                  [id number]  :                       1950  ADMIT DATE:       2021 4:42 AM  DISCH DATE:  RESPONDING  PROVIDER #:        Ale Baptiste MD          QUERY TEXT:    Pt admitted with CHF exacerbation. Pt noted to use home 02. If possible,   please document in the progress notes and discharge summary if you are   evaluating and/or treating any of the following: The medical record reflects the following:  Risk Factors: 74 yo w/ history of COPD on home 02 5-6L  Clinical Indicators: Per PMH: chronic hypoxic respiratory failure with 5 to 6   L of home oxygen. Treatment: 4-6L 02  Options provided:  -- Chronic respiratory failure  -- Other - I will add my own diagnosis  -- Disagree - Not applicable / Not valid  -- Disagree - Clinically unable to determine / Unknown  -- Refer to Clinical Documentation Reviewer    PROVIDER RESPONSE TEXT:    This patient has chronic respiratory failure.     Query created by: Manuel Núñez on 2021 9:16 AM      Electronically signed by:  Ale Baptiste MD 12/15/2021 1:30 PM

## 2021-12-15 NOTE — PROGRESS NOTES
[address]   Cardiology  Note   Dr William Petit MD, Carolin Hernández RN, FNP APRN CVNP  Date: 12/15/2021  Admit Date: 12/13/2021       CC:/ consult for cardiology for SOB     PMH  CAD with coronary stents June 2013    [address] [place] [address] 12/2020: patent LAD stent, no stenoses. Obstructive sleep apnea on CPAP  Peripheral vascular disease  Pulmonary nodules followed by Dr. Nila Davis  Hx PE / CTPA done here without PE  right femoral endarterectomy November 17th    Interval Hx /  Subjective:  c/o some SOB with improvement   / <1+ edema   resting in bed / VSS SV02 97% on n/c 02  / net-2.0  liters /sCr wnl   Cardiac meds plavix lasix 40 mg IVP to oral cozaar lopressor   planning discharge today     TTE 11/2021 Limited study. Left ventricular cavity size is normal. There is mild concentric left ventricular hypertrophy. Overall left ventricular systolic function appears normal with an ejection fraction of 55-60%. No regional wall motion abnormalities are noted. Pul CT  12/13/2021  1. No evidence of acute pulmonary emboli. Interval resolution of previously noted thrombus within the right middle lobe and right lower lobe segmental and subsegmental pulmonary arterial branches. 2. Mild intralobular septal thickening diffusely. Correlate for mild edema. 3. Extensive atherosclerotic disease. Patient seen and examined. Clinical notes reviewed.  Telemetry reviewed / testing reviewed  30 minutes   Pertinent labs, diagnostic, device, and imaging results reviewed as a part of this visit  EKG TTE stress test: any LHC/RHC reviewed     Past Medical History:  Past Medical History:   Diagnosis Date    CAD (coronary artery disease)     CHF (congestive heart failure) (HCC)     diastolic    COPD (chronic obstructive pulmonary disease) (HCC)     Depressive disorder, not elsewhere classified     GERD (gastroesophageal reflux disease)     History of colon polyps - 30 seen on colonoscopy 04/2021 04/24/2021    History of MI (myocardial infarction) 05/2013    History of skin ulcer of lower extremity     R anterior shin    History of transient ischemic attack (TIA)     Hyperlipidemia     Hypertension     Neuropathy     KAVITA (obstructive sleep apnea)     On CPAP    Personal history of DVT (deep vein thrombosis)     PVD (peripheral vascular disease) (HCC)     TIA (transient ischemic attack) 2013    Vertebral fracture       Scheduled Meds:   ipratropium  0.5 mg Nebulization BID    clopidogrel  75 mg Oral Daily    DULoxetine  20 mg Oral Daily    furosemide  40 mg IntraVENous Daily    sodium chloride flush  5-40 mL IntraVENous 2 times per day    enoxaparin  40 mg SubCUTAneous Daily    losartan  100 mg Oral Daily    metoprolol  100 mg Oral BID    pantoprazole  40 mg Oral QAM AC    Arformoterol Tartrate  15 mcg Nebulization BID    budesonide  0.5 mg Nebulization BID     Vitals:    12/15/21 1139   BP: 102/63   Pulse: 64   Resp: 18   Temp: 97.6 °F (36.4 °C)   SpO2: 96%      In: 475 [P.O.:475]  Out: 100    Wt Readings from Last 3 Encounters:   12/15/21 192 lb 7.4 oz (87.3 kg)   12/09/21 203 lb (92.1 kg)   12/08/21 205 lb 6.4 oz (93.2 kg)       Intake/Output Summary (Last 24 hours) at 12/15/2021 1601  Last data filed at 12/15/2021 1330  Gross per 24 hour   Intake 955 ml   Output 225 ml   Net 730 ml       Telemetry: Personally Reviewed    · Constitutional: Cooperative and in no apparent distress, and appears well nourished  · Skin: Warm and pink; no pallor, cyanosis, clubbing, or bruising   · HEENT: Symmetric and normocephalic. · Cardiovascular: Regular rate and rhythm. S1/S2 present without murmurs, no rubs or gallops. Peripheral pulses 2+, capillary refill < 3 seconds. No elevation of JVP. No peripheral edema  · Respiratory: Respirations symmetric and unlabored. Lungs clear to auscultation bilaterally, no wheezing, crackles, or rhonchi  · Gastrointestinal: Abdomen soft and round.  Bowel sounds normoactive without tenderness or masses. · Musculoskeletal: Bilateral upper and lower extremity strength 5/5 with full ROM  · Neurologic/Psych: Awake and orientated to person, place and time. Calm affect, appropriate mood      Patient Active Problem List    Diagnosis Date Noted    Acute pulmonary edema ([address]     Heart failure ([address] 12/13/2021    Anemia 11/27/2021    Cellulitis of right lower extremity 11/26/2021    LYDIA (acute kidney injury) ([address] 11/26/2021    Critical ischemia of lower extremity ([address]     Pulmonary embolism ([address] 11/15/2021    Dependence on other enabling machines and devices 09/22/2021    Dependence on supplemental oxygen 09/22/2021    Hereditary and idiopathic neuropathy, unspecified 09/22/2021    Long term (current) use of anticoagulants 09/22/2021    Problems related to living alone 09/22/2021    Presence of coronary angioplasty implant and graft 17/28/9656    Diastolic dysfunction with chronic heart failure ([address] 09/20/2021    Hypertensive heart disease with heart failure ([address] 09/20/2021    Pulmonary embolism without acute cor pulmonale (HCC) 09/17/2021    Cigarette nicotine dependence without complication 69/08/5373    Idiopathic chronic gout without tophus 08/25/2021    Sacroiliitis, not elsewhere classified ([address] 12/17/2019    Other spondylosis, lumbosacral region 12/17/2019    Idiopathic peripheral neuropathy 04/22/2016    PVD (peripheral vascular disease) ([address]     Coronary artery disease involving native coronary artery of native heart without angina pectoris     KAVITA and COPD overlap syndrome ([address] 10/22/2015    Degeneration of intervertebral disc of lumbar region 10/20/2015    Gastroesophageal reflux disease 08/22/2013    Essential hypertension 06/24/2013    Hyperlipemia 06/24/2013        Assessment     SOB / dHF decompensated      TTE 11/2021 Limited study. Left ventricular cavity size is normal. There is mild concentric left ventricular hypertrophy.  Overall left ventricular systolic function appears normal with an ejection fraction of 55-60%. No regional wall motion abnormalities are noted. CAD   coronary stents June 2013  [facility] 12/2020: patent LAD stent, no stenoses. Obstructive sleep apnea on CPAP    Peripheral vascular disease  right femoral endarterectomy November 17th. Pulmonary nodules  followed by Dr. Sonam Larsen    Hx PE / CTPA done here without PE  Pul CT  12/13/2021  1. No evidence of acute pulmonary emboli. Interval resolution of previously noted thrombus within the right middle lobe and right lower lobe segmental and subsegmental pulmonary arterial branches. 2. Mild intralobular septal thickening diffusely. Correlate for mild edema. 3. Extensive atherosclerotic disease. Plan   c/o some SOB with improvement   / <1+ edema   resting in bed / VSS SV02 97% on n/c 02  / net-2.0  liters /sCr wnl   Cardiac meds plavix lasix 40 mg IVP to oral / cozaar lopressor   planning discharge today   Fu in 1-2 weeks   Thank you for allowing to us to participate in the care of [place]. [facility] APRN-CNP-CVNP  Interventional cardiology note    Patient is clinically stable at this time. Did have a left heart cath December 2020. Cardiology consult is performed. Patient's shortness of breath is improved. Still confused. Will follow.   Evelyn Dean MD, [facility]

## 2021-12-15 NOTE — CARE COORDINATION
due at time of pick-up or delivery - cash, check, or card accepted)     Able to afford home medications/ co-pay costs: Yes    ADLS:  Current PT AM-PAC Score: 18 /24  Current OT AM-PAC Score: 20 /24      DISCHARGE Disposition: Home with [address]: through [facility]     LOC at discharge: Not Applicable  NICA Completed: Yes    Notification completed in HENS/PAS?:  Not Applicable    IMM Completed:   Not Indicated    Transportation:  Transportation PLAN for discharge: family   Mode of Transport: [address]:  [address] ordered at discharge: Yes  [address]: [facility] [employer] [facility]  Phone: [phone]  Fax: Thi Osei Orders faxed: Yes    Home Oxygen and Respiratory Equipment:  Oxygen needed at discharge?: Yes  [address]: unknown  Portable tank available for discharge?: No; pt declined needing portable tank during short drive from hospital to home    Referrals made at [facility] for outpatient continued care:  Not Applicable    Additional CM Notes:   CM met with pt at bedside to confirm DC plan. Pt's son will transport pt home. CM encouraged pt to have son bring O2 tank for transport home; pt declined needing portable tank during short drive from hospital to home. CM notified [facility] liaison, Astor Alfredana and pt's [address] [facility] [phone] of DC so [facility] services can resume. No other needs at this time. The Plan for Transition of Care is related to the following treatment goals of Acute pulmonary edema ([address] [J81.0]  Heart failure ([address] [I50.9]  Hypoxia [R09.02]    The Patient and/or patient representative Sonido Jimenez and his family were provided with a choice of provider and agrees with the discharge plan Yes    Freedom of choice list was provided with basic dialogue that supports the patient's individualized plan of care/goals and shares the quality data associated with the providers.  Yes    Care Transitions patient: No    CAROLINE Carlson, [facility] [employer]  Case Management Department  Ph: [phone]

## 2021-12-15 NOTE — PROGRESS NOTES
Hospitalist Progress Note      PCP: Haroldo Piedra DO    Date of Admission: 12/13/2021     Subjective: seen  And examined  Feeling better today, no new complaints      Medications:  Reviewed    Infusion Medications    sodium chloride 25 mL (12/15/21 0915)     Scheduled Medications    magnesium sulfate  2,000 mg IntraVENous Once    ipratropium  0.5 mg Nebulization BID    clopidogrel  75 mg Oral Daily    DULoxetine  20 mg Oral Daily    furosemide  40 mg IntraVENous Daily    sodium chloride flush  5-40 mL IntraVENous 2 times per day    enoxaparin  40 mg SubCUTAneous Daily    losartan  100 mg Oral Daily    metoprolol  100 mg Oral BID    pantoprazole  40 mg Oral QAM AC    Arformoterol Tartrate  15 mcg Nebulization BID    budesonide  0.5 mg Nebulization BID     PRN Meds: ketorolac, sodium chloride flush, sodium chloride, ondansetron **OR** ondansetron, polyethylene glycol, acetaminophen **OR** acetaminophen      Intake/Output Summary (Last 24 hours) at 12/15/2021 1116  Last data filed at 12/15/2021 0917  Gross per 24 hour   Intake 835 ml   Output 725 ml   Net 110 ml       Physical Exam Performed:    /83   Pulse 64   Temp 97.4 °F (36.3 °C) (Oral)   Resp 16   Ht 5' 10\" (1.778 m)   Wt 192 lb 7.4 oz (87.3 kg)   SpO2 96%   BMI 27.62 kg/m²       General appearance: No apparent distress appears stated age and cooperative. HEENT Normal cephalic, atraumatic without obvious deformity. Pupils equal, round, and reactive to light. Extra ocular muscles intact. Conjunctivae/corneas clear. Neck: Supple, No jugular venous distention/bruits. Trachea midline without thyromegaly or adenopathy with full range of motion. Lungs: Clear to auscultation, bilaterally without Rales/Wheezes/Rhonchi with good respiratory effort.   Heart: Regular rate and rhythm with Normal S1/S2 without murmurs, rubs or gallops, point of maximum impulse non-displaced  Abdomen: Soft, non-tender or non-distended without rigidity or

## 2021-12-16 NOTE — CARE COORDINATION
CTN faxed orders to [address] [place] [address] for sn/pt/ot. [phone] fax. For [facility] by 12/17.  Electronically signed by William Rose LPN on 20/48/6902 at 10:23 AM

## 2021-12-20 ENCOUNTER — TELEPHONE (OUTPATIENT)
Dept: PRIMARY CARE CLINIC | Age: 71
End: 2021-12-20

## 2021-12-20 ENCOUNTER — FOLLOWUP TELEPHONE ENCOUNTER (OUTPATIENT)
Dept: INPATIENT UNIT | Age: 71
End: 2021-12-20

## 2021-12-20 NOTE — TELEPHONE ENCOUNTER
----- Message from Dang Fraga sent at 12/17/2021  2:18 PM EST -----  Subject: Message to Provider    QUESTIONS  Information for Provider? PT reevaluated him plan to see him once a week   for 5 weeks   ---------------------------------------------------------------------------  --------------  CALL BACK INFO  What is the best way for the office to contact you? OK to leave message on   voicemail  Preferred Call Back Phone Number? [phone]  ---------------------------------------------------------------------------  --------------  SCRIPT ANSWERS  Relationship to Patient? Third Party  Representative Name?  Kita Goins

## 2021-12-20 NOTE — PROGRESS NOTES
Confirmed pt was seen by Shawn Whitten RN on 12/17/21 at his home. This will serve as 72 hour HF hospital follow up.

## 2021-12-20 NOTE — TELEPHONE ENCOUNTER
----- Message from Lethia Najjar sent at 12/17/2021  2:18 PM EST -----  Subject: Message to Provider    QUESTIONS  Information for Provider? PT reevaluated him plan to see him once a week   for 5 weeks   ---------------------------------------------------------------------------  --------------  CALL BACK INFO  What is the best way for the office to contact you? OK to leave message on   voicemail  Preferred Call Back Phone Number? [phone]  ---------------------------------------------------------------------------  --------------  SCRIPT ANSWERS  Relationship to Patient? Third Party  Representative Name?  Carroll Santo

## 2021-12-22 ENCOUNTER — OFFICE VISIT (OUTPATIENT)
Dept: PRIMARY CARE CLINIC | Age: 71
End: 2021-12-22
Payer: MEDICARE

## 2021-12-22 VITALS
BODY MASS INDEX: 29.15 KG/M2 | DIASTOLIC BLOOD PRESSURE: 71 MMHG | TEMPERATURE: 97.7 F | SYSTOLIC BLOOD PRESSURE: 117 MMHG | WEIGHT: 203.6 LBS | HEIGHT: 70 IN | HEART RATE: 63 BPM

## 2021-12-22 DIAGNOSIS — I26.99 PULMONARY EMBOLISM, UNSPECIFIED CHRONICITY, UNSPECIFIED PULMONARY EMBOLISM TYPE, UNSPECIFIED WHETHER ACUTE COR PULMONALE PRESENT (HCC): ICD-10-CM

## 2021-12-22 DIAGNOSIS — J44.9 OSA AND COPD OVERLAP SYNDROME (HCC): ICD-10-CM

## 2021-12-22 DIAGNOSIS — G47.33 OSA AND COPD OVERLAP SYNDROME (HCC): ICD-10-CM

## 2021-12-22 DIAGNOSIS — I50.23 ACUTE ON CHRONIC SYSTOLIC CONGESTIVE HEART FAILURE (HCC): Primary | ICD-10-CM

## 2021-12-22 DIAGNOSIS — I10 ESSENTIAL HYPERTENSION: ICD-10-CM

## 2021-12-22 PROBLEM — L03.115 CELLULITIS OF RIGHT LOWER EXTREMITY: Status: RESOLVED | Noted: 2021-11-26 | Resolved: 2021-12-22

## 2021-12-22 PROBLEM — D64.9 ANEMIA: Status: RESOLVED | Noted: 2021-11-27 | Resolved: 2021-12-22

## 2021-12-22 PROBLEM — Z60.2 PROBLEMS RELATED TO LIVING ALONE: Status: RESOLVED | Noted: 2021-09-22 | Resolved: 2021-12-22

## 2021-12-22 PROCEDURE — 99214 OFFICE O/P EST MOD 30 MIN: CPT | Performed by: STUDENT IN AN ORGANIZED HEALTH CARE EDUCATION/TRAINING PROGRAM

## 2021-12-22 PROCEDURE — 1111F DSCHRG MED/CURRENT MED MERGE: CPT | Performed by: STUDENT IN AN ORGANIZED HEALTH CARE EDUCATION/TRAINING PROGRAM

## 2021-12-22 RX ORDER — FLUTICASONE FUROATE, UMECLIDINIUM BROMIDE AND VILANTEROL TRIFENATATE 100; 62.5; 25 UG/1; UG/1; UG/1
1 POWDER RESPIRATORY (INHALATION) DAILY
COMMUNITY
End: 2021-12-22

## 2021-12-22 RX ORDER — FLUTICASONE FUROATE, UMECLIDINIUM BROMIDE AND VILANTEROL TRIFENATATE 100; 62.5; 25 UG/1; UG/1; UG/1
1 POWDER RESPIRATORY (INHALATION) DAILY
Qty: 28 EACH | Refills: 2 | Status: SHIPPED | OUTPATIENT
Start: 2021-12-22 | End: 2022-01-10 | Stop reason: SDUPTHER

## 2021-12-22 ASSESSMENT — ENCOUNTER SYMPTOMS
CHEST TIGHTNESS: 0
COLOR CHANGE: 0
SHORTNESS OF BREATH: 0
COUGH: 0

## 2021-12-22 NOTE — PROGRESS NOTES
Post-Discharge Transitional Care Management Services or Hospital Follow Up      Lilibeth Baugh. YOB: 1950    Date of Office Visit:  12/22/2021  Date of Hospital Admission: 12/13/21  Date of Hospital Discharge: 12/15/21  Risk of hospital readmission (high >=14%.  Medium >=10%) :Readmission Risk Score: 25.4 ( )      Care management risk score Rising risk (score 2-5) and Complex Care (Scores >=6): 3     Non face to face  following discharge, date last encounter closed (first attempt may have been earlier): *No documented post hospital discharge outreach found in the last 14 days    Call initiated 2 business days of discharge: *No response recorded in the last 14 days    Patient Active Problem List   Diagnosis    Essential hypertension    Hyperlipemia    Degeneration of intervertebral disc of lumbar region    KAVITA and COPD overlap syndrome ([address]    PVD (peripheral vascular disease) [facility]    Coronary artery disease involving native coronary artery of native heart without angina pectoris    Idiopathic peripheral neuropathy    Gastroesophageal reflux disease    Other spondylosis, lumbosacral region    Cigarette nicotine dependence without complication    Idiopathic chronic gout without tophus    Pulmonary embolism without acute cor pulmonale (HCC)    Diastolic dysfunction with chronic heart failure (HCC)    Dependence on other enabling machines and devices    Dependence on supplemental oxygen    Long term (current) use of anticoagulants    Presence of coronary angioplasty implant and graft    Pulmonary embolism ([facility]       Allergies   Allergen Reactions    Asa [Aspirin] Other (See Comments)     Stomach ache    Daliresp [Roflumilast] Diarrhea and Other (See Comments)     Severe diarrhea and did not help       Medications listed as ordered at the time of discharge from hospital     Medication List          Accurate as of December 22, 2021  1:14 PM. If you have any questions, ask your nurse or doctor. CONTINUE taking these medications    albuterol sulfate  (90 Base) MCG/ACT inhaler  Inhale 2 puffs into the lungs every 6 hours as needed for Wheezing     allopurinol 100 MG tablet  Commonly known as: ZYLOPRIM  Take 1 tablet by mouth daily     atorvastatin 80 MG tablet  Commonly known as: LIPITOR  Take 1 tablet by mouth daily     clopidogrel 75 MG tablet  Commonly known as: PLAVIX  Take 1 tablet by mouth daily     colchicine 0.6 MG tablet  Commonly known as: COLCRYS     DULoxetine 20 MG extended release capsule  Commonly known as: CYMBALTA  Take 1 capsule by mouth daily     folic acid 1 MG tablet  Commonly known as: FOLVITE  Take 1 tablet by mouth daily     furosemide 40 MG tablet  Commonly known as: Lasix  Take 1 tablet by mouth daily     losartan 100 MG tablet  Commonly known as: COZAAR  TAKE 1 TABLET BY MOUTH EVERY DAY     magnesium gluconate 500 MG tablet  Commonly known as: MAGONATE     metoprolol 100 MG tablet  Commonly known as: LOPRESSOR  Take 1 tablet by mouth 2 times daily     nicotine 21 MG/24HR  Commonly known as: Nicoderm CQ  Place 1 patch onto the skin every 24 hours     nitroGLYCERIN 0.4 MG SL tablet  Commonly known as: NITROSTAT  Place 1 tablet under the tongue every 5 minutes as needed for Chest pain     omeprazole 40 MG delayed release capsule  Commonly known as: PRILOSEC     oxyCODONE-acetaminophen 5-325 MG per tablet  Commonly known as: Percocet  Take 1 tablet by mouth every 6 hours as needed for Pain for up to 7 days. Intended supply: 7 days.  Take lowest dose possible to manage pain     potassium chloride 10 MEQ extended release capsule  Commonly known as: MICRO-K     Trelegy Ellipta 100-62.5-25 MCG/INH Aepb  Generic drug: fluticasone-umeclidin-vilant  Inhale 1 puff into the lungs daily           Where to Get Your Medications      These medications were sent to [address] [place] [address] [phone] [address] [facility] [address]    Phone: [phone]   · Celeste Godoy [id number] MCG/INH Aepb           Medications marked \"taking\" at this time  Outpatient Medications Marked as Taking for the 12/22/21 encounter (Office Visit) with Brandon Glass,    Medication Sig Dispense Refill    fluticasone-umeclidin-vilant (TRELEGY ELLIPTA) 100-62.5-25 MCG/INH AEPB Inhale 1 puff into the lungs daily 28 each 2    DULoxetine (CYMBALTA) 20 MG extended release capsule Take 1 capsule by mouth daily 30 capsule 3    metoprolol (LOPRESSOR) 100 MG tablet Take 1 tablet by mouth 2 times daily 270 tablet 1    furosemide (LASIX) 40 MG tablet Take 1 tablet by mouth daily 90 tablet 3    oxyCODONE-acetaminophen (PERCOCET) 5-325 MG per tablet Take 1 tablet by mouth every 6 hours as needed for Pain for up to 7 days. Intended supply: 7 days. Take lowest dose possible to manage pain 28 tablet 0    atorvastatin (LIPITOR) 80 MG tablet Take 1 tablet by mouth daily 90 tablet 1    clopidogrel (PLAVIX) 75 MG tablet Take 1 tablet by mouth daily 30 tablet 3    potassium chloride (MICRO-K) 10 MEQ extended release capsule 1 tablet      colchicine (COLCRYS) 0.6 MG tablet Take 0.6 mg by mouth as needed      omeprazole (PRILOSEC) 40 MG delayed release capsule       nicotine (NICODERM CQ) 21 MG/24HR Place 1 patch onto the skin every 24 hours 30 patch 1    folic acid (FOLVITE) 1 MG tablet Take 1 tablet by mouth daily 30 tablet 3    albuterol sulfate  (90 Base) MCG/ACT inhaler Inhale 2 puffs into the lungs every 6 hours as needed for Wheezing 3 Inhaler 4    allopurinol (ZYLOPRIM) 100 MG tablet Take 1 tablet by mouth daily 180 tablet 1    losartan (COZAAR) 100 MG tablet TAKE 1 TABLET BY MOUTH EVERY DAY 90 tablet 1    nitroGLYCERIN (NITROSTAT) 0.4 MG SL tablet Place 1 tablet under the tongue every 5 minutes as needed for Chest pain 25 tablet 1    magnesium gluconate (MAGONATE) 500 MG tablet Take 500 mg by mouth daily. Medications patient taking as of now reconciled against medications ordered at time of hospital discharge: Yes    Chief Complaint   Patient presents with    Follow[facility] from [facility] Leg Pain       History of Present illness - Follow up of Hospital diagnosis(es): Acute heart failure exacerbation    Inpatient course: Discharge summary reviewed- see chart. Interval history/Current status: Seen for hospital follow-up after admission for lower extremity cellulitis. Hypotensive episodes discovered at that time. His isosorbide was held and his Lasix was switched to as needed. Unfortunately, he is not able to detect well when he is accumulating fluid and because of this developed heart failure exacerbation. Presented the emergency department complaining of respiratory distress. Imaging suggestive of CHF exacerbation. Admitted and started on IV Lasix. Seen by cardiologist, Dr. Gurjit Gonzales while inpatient. Restarted on Lasix at discharge. Review of Systems   Constitutional: Negative for fatigue. Eyes: Negative for visual disturbance. Respiratory: Negative for cough, chest tightness and shortness of breath. Cardiovascular: Negative for chest pain, palpitations and leg swelling. Endocrine: Negative for polydipsia, polyphagia and polyuria. Genitourinary: Negative for frequency. Skin: Positive for wound. Negative for color change and rash. Neurological: Positive for dizziness (Does believes it has improved). Negative for syncope, weakness and light-headedness. Vitals:    12/22/21 1235   BP: 117/71   Site: Left Upper Arm   Position: Sitting   Cuff Size: Medium Adult   Pulse: 63   Temp: 97.7 °F (36.5 °C)   Weight: 203 lb 9.6 oz (92.4 kg)   Height: 5' 10\" (1.778 m)     Body mass index is 29.21 kg/m².    Wt Readings from Last 3 Encounters:   12/22/21 203 lb 9.6 oz (92.4 kg)   12/15/21 192 lb 7.4 oz (87.3 kg)   12/09/21 203 lb (92.1 kg)     BP Readings from Last 3 Encounters:   12/22/21 117/71 12/15/21 102/63   12/09/21 106/63      Physical Exam  Constitutional:       Appearance: Normal appearance. He is obese. HENT:      Head: Normocephalic and atraumatic. Eyes:      Extraocular Movements: Extraocular movements intact. Conjunctiva/sclera: Conjunctivae normal.   Cardiovascular:      Rate and Rhythm: Normal rate and regular rhythm. Pulmonary:      Effort: Pulmonary effort is normal.      Breath sounds: Normal breath sounds. Abdominal:      General: Abdomen is flat. Bowel sounds are normal.      Palpations: Abdomen is soft. Skin:     General: Skin is warm and dry. Findings: No erythema. Comments: Right lower extremity cleanly dressed   Neurological:      Mental Status: He is alert. Mental status is at baseline. Psychiatric:         Mood and Affect: Mood normal.         Behavior: Behavior normal.         Thought Content: Thought content normal.         Judgment: Judgment normal.           Assessment/Plan:  1. Acute on chronic systolic congestive heart failure [facility]): Recent hospitalization likely secondary to switching his Lasix to as needed. Weighing himself at home did not go well. For now we will continue Lasix at daily.  - MO DISCHARGE MEDS RECONCILED W/ CURRENT OUTPATIENT MED LIST    2. Essential hypertension: Blood pressure remains at goal today despite stopping the isosorbide. He has had no increase in this amount of chest pain. Does feel like the dizziness has improved; although, he does report a few additional episodes. We will continue current regimen unchanged for now given recent hospitalization. 3. Pulmonary embolism, unspecified chronicity, unspecified pulmonary embolism type, unspecified whether acute cor pulmonale present [facility]): Patient stopped his Eliquis, because it was not on his discharge med list.  Suspect this was because he was on Lovenox while inpatient. He should continue Eliquis. He will resume this today.       Follow-up 6 weeks    Medical Decision Making: moderate complexity

## 2021-12-22 NOTE — PATIENT INSTRUCTIONS
Patient Education        Learning About Heart Failure  What is heart failure? Heart failure means that your heart muscle doesn't pump as much blood as your body needs. Failure doesn't mean that your heart has stopped. It means that your heart isn't pumping as well as it should. Because your heart cannot pump well, your body tries to make up for it. To do this:  · Your body holds on to salt and water. This increases the amount of blood in your bloodstream.  · Your heart beats faster. · Your heart might get bigger. Your body has an amazing ability to make up for heart failure. It may do such a good job that you don't know you have a disease. But at some point, your heart and body will no longer be able to keep up. Then fluid starts to build up in your lungs and other parts of your body. This fluid buildup is called congestion. It's why some doctors call the disease congestive heart failure. What can you expect when you have heart failure? Heart failure is a lifelong (chronic) disease. Treatment may be able to slow the disease and help you feel better. But heart failure tends to get worse over time. Despite this, there are many steps you can take to feel better and stay healthy longer. Early on, your symptoms may not be too bad. As heart failure gets worse, symptoms typically get worse, and you may need to limit your activities. Heart failure can also get worse suddenly. If this happens, you need emergency care. Then, after treatment, your symptoms may go back to being stable (which means they stay the same) for a long time. Heart failure can lead to other health problems, such as heart rhythm problems. Over time, your treatment options may change, especially as your symptoms get worse. You may want to think about what kind of care you want at the end of your life. What are the symptoms? Symptoms of heart failure start to happen when your heart can't pump enough blood to the rest of your body.   In the early stages of heart failure, you may:  · Feel tired easily. · Be short of breath when you exert yourself. · Feel like your heart is pounding or racing (palpitations). · Feel weak or dizzy. As heart failure gets worse, fluid starts to build up in your lungs and other parts of your body. This may cause you to:  · Feel short of breath even at rest.  · Have swelling (edema), especially in your legs, ankles, and feet. · Gain weight. This may happen over just a day or two, or more slowly. · Cough or wheeze, especially when you lie down. · Feel bloated or sick to your stomach. How is heart failure treated? Heart failure is treated with medicines and steps you take to make lifestyle changes and check your symptoms. Treatment can slow the disease and help you feel better and live longer. · You'll probably take several medicines. · You'll take steps to care for yourself at home. Idris Bains watch for changes in your symptoms. You may need to make lifestyle changes, such as limiting sodium, getting regular exercise, not smoking, and eating healthy foods. · You might attend cardiac rehabilitation (rehab) to get education and support that help you make lifestyle changes and stay as healthy as possible. · You may get a heart device. A pacemaker helps your heart pump blood. An ICD can stop abnormal heart rhythms. · As heart failure gets worse, palliative care can help improve your quality of life. You can do advance care planning to decide what kind of care you want at the end of your life. How can you care for yourself? There are many steps you can take to feel better and live longer. They can help you stay active and enjoy life. Take your medicine the right way. Avoid medicines that can make your symptoms worse. Check your weight and symptoms every day. Know what to do if your symptoms get worse. Limit sodium. This helps keep fluid from building up. It may help you feel better. Be active.   Exercise regularly, but don't exercise too hard. Be heart-healthy. Eat healthy foods, stay at a healthy weight, limit alcohol, and don't smoke. Stay as healthy as possible. Manage other health problems such as diabetes and high blood pressure. Avoid colds and flu, get help for depression and anxiety, and manage stress. If you think you may have a problem with alcohol or drug use, talk to your doctor. Ask your doctor if you need to limit the amount of fluids you drink. Follow-up care is a key part of your treatment and safety. Be sure to make and go to all appointments, and call your doctor if you are having problems. It's also a good idea to know your test results and keep a list of the medicines you take. Where can you learn more? Go to [email or web address] [email or web address] org and sign in to your [employer] account. Enter I902 in the [employer] box to learn more about \"Learning About Heart Failure. \"     If you do not have an account, please click on the \"Sign Up Now\" link. Current as of: April 29, 2021               Content Version: 13.1  © 0080-2856 Healthwise, Incorporated. Care instructions adapted under license by [facility] ([facility]). If you have questions about a medical condition or this instruction, always ask your healthcare professional. [address] any warranty or liability for your use of this information.

## 2021-12-22 NOTE — PROGRESS NOTES
2021     Kristyn Bender (:  1950) is a 73 y.o. male, here for evaluation of the following medical concerns:    HPI  {Visit Chronic CHP (Optional):15996}    Review of Systems    Prior to Visit Medications    Medication Sig Taking? Authorizing Provider   DULoxetine (CYMBALTA) 20 MG extended release capsule Take 1 capsule by mouth daily  Andrew Lucas MD   metoprolol (LOPRESSOR) 100 MG tablet Take 1 tablet by mouth 2 times daily  Andrew Lucas MD   furosemide (LASIX) 40 MG tablet Take 1 tablet by mouth daily  Andrew Lucas MD   oxyCODONE-acetaminophen (PERCOCET) 5-325 MG per tablet Take 1 tablet by mouth every 6 hours as needed for Pain for up to 7 days. Intended supply: 7 days. Take lowest dose possible to manage pain  Andrew Lucas MD   zoster recombinant adjuvanted vaccine [facility]) 50 MCG/0.5ML SUSR injection Inject 0.5 mLs into the muscle See Admin Instructions 1 dose now and repeat in 2-6 months  Jose Alva, DO   atorvastatin (LIPITOR) 80 MG tablet Take 1 tablet by mouth daily  Jose Alva, DO   gabapentin (NEURONTIN) 100 MG capsule Take 2 capsules by mouth 3 times daily for 10 days.   Kim Juarez MD   clopidogrel (PLAVIX) 75 MG tablet Take 1 tablet by mouth daily  BRANDI Hoskins - NP   Cyanocobalamin (B-12) 100 MCG TABS 1 tablet  Historical Provider, MD   potassium chloride (MICRO-K) 10 MEQ extended release capsule 1 tablet  Historical Provider, MD   Cholecalciferol (VITAMIN D-3) 25 MCG (1000 UT) CAPS 1 tablet  Historical Provider, MD   colchicine (COLCRYS) 0.6 MG tablet Take 0.6 mg by mouth as needed  Historical Provider, MD   omeprazole (PRILOSEC) 40 MG delayed release capsule   Historical Provider, MD   fluticasone-umeclidin-vilant (TRELEGY ELLIPTA) 100-62.5-25 MCG/INH AEPB Inhale 1 puff into the lungs daily  Jose Alva DO   nicotine (NICODERM CQ) 21 MG/24HR Place 1 patch onto the skin every 24 hours  Jose Alva, DO   folic acid (FOLVITE) 1 MG tablet Take 1 tablet by mouth daily  Janki Lynn MD   albuterol sulfate  (90 Base) MCG/ACT inhaler Inhale 2 puffs into the lungs every 6 hours as needed for Wheezing  Gwendolynn First, DO   allopurinol (ZYLOPRIM) 100 MG tablet Take 1 tablet by mouth daily  Gwendolynn First, DO   losartan (COZAAR) 100 MG tablet TAKE 1 TABLET BY MOUTH EVERY DAY  Gwendolynn First, DO   nitroGLYCERIN (NITROSTAT) 0.4 MG SL tablet Place 1 tablet under the tongue every 5 minutes as needed for Chest pain  Gwendolyzahida First, DO   magnesium gluconate (MAGONATE) 500 MG tablet Take 500 mg by mouth daily. Historical Provider, MD        Social History     Tobacco Use    Smoking status: Current Every Day Smoker     Packs/day: 0.50     Years: 52.00     Pack years: 26.00     Types: Cigarettes     Start date: 1/1/1967    Smokeless tobacco: Never Used   Substance Use Topics    Alcohol use: Yes     Alcohol/week: 0.0 standard drinks     Comment: 2-3 beers a month        There were no vitals filed for this visit. Estimated body mass index is 27.62 kg/m² as calculated from the following:    Height as of 12/13/21: 5' 10\" (1.778 m). Weight as of 12/15/21: 192 lb 7.4 oz (87.3 kg). Physical Exam    ASSESSMENT/PLAN:  1. Mixed hyperlipidemia  ***      No follow-ups on file. An electronic signature was used to authenticate this note.     --Ji Howard, DO on 12/22/2021 at 7:50 AM

## 2021-12-30 ENCOUNTER — OFFICE VISIT (OUTPATIENT)
Dept: VASCULAR SURGERY | Age: 71
End: 2021-12-30

## 2021-12-30 VITALS
WEIGHT: 204 LBS | BODY MASS INDEX: 30.21 KG/M2 | SYSTOLIC BLOOD PRESSURE: 128 MMHG | HEART RATE: 60 BPM | TEMPERATURE: 98.5 F | DIASTOLIC BLOOD PRESSURE: 64 MMHG | HEIGHT: 69 IN

## 2021-12-30 DIAGNOSIS — I70.221 ATHEROSCLEROSIS OF NATIVE ARTERY OF RIGHT LOWER EXTREMITY WITH REST PAIN (HCC): Primary | ICD-10-CM

## 2021-12-30 PROCEDURE — 99024 POSTOP FOLLOW-UP VISIT: CPT | Performed by: SURGERY

## 2021-12-30 NOTE — PROGRESS NOTES
2021     Yesika Cintron (:  1950) is a 73 y.o. male,Established patient, here for evaluation of the following chief complaint(s):  Post-Op Check (r Fem endarterectomy)    ASSESSMENT/PLAN:  1. Atherosclerosis of native artery of right lower extremity with rest pain [facility])  He is doing well following recent right leg revascularization. Right leg wounds appear to be healing. Continue to  him regarding smoking cessation. I will see him back in the wound care center for follow-up on January 10, 2022. No follow-ups on file. SUBJECTIVE/OBJECTIVE:  S/p Right femoral endarterectomy, R EIA to PFA bypass and R EIA PTA/stent  2021  He was rehospitalized for pulmonary edema after his last visit. Back home now, states he is rarely smoking. Feeling better and breathing better. Physical Exam  Vitals:    21 0956   BP: 128/64   Pulse: 60   Temp: 98.5 °F (36.9 °C)   Weight: 204 lb (92.5 kg)   Height: 5' 9\" (1.753 m)     Right lower extremities warm well perfused with normal capillary refill. Multiple dry eschar on the right anterior lower leg, appear to be healing. No further cyanotic or ruborous discoloration of the right foot. Doppler DP and PT pulses are now audible following surgery. Right groin incision is healed. Doppler right femoral pulse over bypass graft. An electronic signature was used to authenticate this note.     --Shey Castillo MD

## 2022-01-03 ENCOUNTER — TELEPHONE (OUTPATIENT)
Dept: PRIMARY CARE CLINIC | Age: 72
End: 2022-01-03

## 2022-01-03 NOTE — TELEPHONE ENCOUNTER
----- Message from Gian Rahman sent at 1/3/2022  4:04 PM EST -----  Subject: Message to Provider    QUESTIONS  Information for Provider? Devi Curtis from [facility] would like   to let Dr. Michelle Coppola know that the pt had missed physical therapy on Friday,   as he had no insurance authorization. If there are any questions, Devi Curtis   can be reached at [phone]  ---------------------------------------------------------------------------  --------------  CALL BACK INFO  What is the best way for the office to contact you? Do not leave any   message, patient will call back for answer  Preferred Call Back Phone Number? [phone]  ---------------------------------------------------------------------------  --------------  SCRIPT ANSWERS  Relationship to Patient? Third Party  Representative Name?  Devi Curtis

## 2022-01-05 ENCOUNTER — TELEPHONE (OUTPATIENT)
Dept: PRIMARY CARE CLINIC | Age: 72
End: 2022-01-05

## 2022-01-05 NOTE — TELEPHONE ENCOUNTER
[facility] relays that the pt insurance company will not be covering anymore PT visits.      [address] [place] [address]

## 2022-01-10 ENCOUNTER — TELEPHONE (OUTPATIENT)
Dept: PRIMARY CARE CLINIC | Age: 72
End: 2022-01-10

## 2022-01-10 DIAGNOSIS — J44.9 OSA AND COPD OVERLAP SYNDROME (HCC): ICD-10-CM

## 2022-01-10 DIAGNOSIS — G47.33 OSA AND COPD OVERLAP SYNDROME (HCC): ICD-10-CM

## 2022-01-10 RX ORDER — FLUTICASONE FUROATE, UMECLIDINIUM BROMIDE AND VILANTEROL TRIFENATATE 100; 62.5; 25 UG/1; UG/1; UG/1
1 POWDER RESPIRATORY (INHALATION) DAILY
Qty: 28 EACH | Refills: 11 | Status: SHIPPED | OUTPATIENT
Start: 2022-01-10 | End: 2022-01-13 | Stop reason: SDUPTHER

## 2022-01-10 NOTE — TELEPHONE ENCOUNTER
fluticasone-umeclidin-ramu ([facility]) 130-91.2-16 MCG/INH AEPB     [address] [facility], [address] - F [phone]

## 2022-01-13 ENCOUNTER — OFFICE VISIT (OUTPATIENT)
Dept: PRIMARY CARE CLINIC | Age: 72
End: 2022-01-13
Payer: MEDICARE

## 2022-01-13 VITALS
BODY MASS INDEX: 30.33 KG/M2 | DIASTOLIC BLOOD PRESSURE: 68 MMHG | TEMPERATURE: 96.6 F | HEART RATE: 68 BPM | HEIGHT: 69 IN | WEIGHT: 204.8 LBS | SYSTOLIC BLOOD PRESSURE: 137 MMHG

## 2022-01-13 DIAGNOSIS — I70.202 POPLITEAL ARTERY STENOSIS, LEFT (HCC): Primary | ICD-10-CM

## 2022-01-13 DIAGNOSIS — I25.10 CORONARY ARTERY DISEASE INVOLVING NATIVE CORONARY ARTERY OF NATIVE HEART WITHOUT ANGINA PECTORIS: ICD-10-CM

## 2022-01-13 DIAGNOSIS — F17.210 CIGARETTE NICOTINE DEPENDENCE WITHOUT COMPLICATION: ICD-10-CM

## 2022-01-13 DIAGNOSIS — G57.91 NEUROPATHY OF RIGHT LOWER EXTREMITY: ICD-10-CM

## 2022-01-13 DIAGNOSIS — J44.9 OSA AND COPD OVERLAP SYNDROME (HCC): ICD-10-CM

## 2022-01-13 DIAGNOSIS — G47.33 OSA AND COPD OVERLAP SYNDROME (HCC): ICD-10-CM

## 2022-01-13 PROBLEM — Z99.89 DEPENDENCE ON OTHER ENABLING MACHINES AND DEVICES: Status: RESOLVED | Noted: 2021-09-22 | Resolved: 2022-01-13

## 2022-01-13 PROBLEM — Z99.81 DEPENDENCE ON SUPPLEMENTAL OXYGEN: Status: RESOLVED | Noted: 2021-09-22 | Resolved: 2022-01-13

## 2022-01-13 PROCEDURE — 99214 OFFICE O/P EST MOD 30 MIN: CPT | Performed by: STUDENT IN AN ORGANIZED HEALTH CARE EDUCATION/TRAINING PROGRAM

## 2022-01-13 RX ORDER — FLUTICASONE FUROATE, UMECLIDINIUM BROMIDE AND VILANTEROL TRIFENATATE 100; 62.5; 25 UG/1; UG/1; UG/1
1 POWDER RESPIRATORY (INHALATION) DAILY
Qty: 28 EACH | Refills: 11 | Status: SHIPPED | OUTPATIENT
Start: 2022-01-13 | End: 2022-06-20 | Stop reason: SDUPTHER

## 2022-01-13 RX ORDER — PREGABALIN 50 MG/1
50 CAPSULE ORAL 3 TIMES DAILY
Qty: 90 CAPSULE | Refills: 1 | Status: SHIPPED | OUTPATIENT
Start: 2022-01-13 | End: 2022-02-09

## 2022-01-13 ASSESSMENT — ENCOUNTER SYMPTOMS
SHORTNESS OF BREATH: 0
COLOR CHANGE: 0
COUGH: 0
CHEST TIGHTNESS: 0

## 2022-01-13 NOTE — PROGRESS NOTES
2022     Yesika Napoles 13 (:  1950) is a 73 y.o. male, here for evaluation of the following medical concerns:    HPI  Lower extremity pain: Shelbie Meckel presents today for evaluation of pain in his lower extremities. Patient has a past medical history significant for peripheral vascular disease. He did recently undergo endarterectomy of his right lower extremity. He had had several arterial ulcers of this leg. Today, the leg appears significantly improved when compared to most recent exams, but he is complaining of burning pain in the areas involving and surrounding the scars from the healing ulcers. The pain is particularly worse at night and often keeps him up. He has noticed no surrounding redness or warmth. In his left lower extremity, he has been experiencing cramping and aching pain. He is concerned, because these are symptoms similar to what he had in the opposing leg. He does have known vascular disease in this leg confirmed on a CT done this year. Review of Systems   Constitutional: Negative for fatigue. Eyes: Negative for visual disturbance. Respiratory: Negative for cough, chest tightness and shortness of breath. Cardiovascular: Negative for chest pain, palpitations and leg swelling. Endocrine: Negative for polydipsia, polyphagia and polyuria. Genitourinary: Negative for frequency. Musculoskeletal: Positive for gait problem (walker at baseline). Skin: Positive for wound (healing ulcers R LE). Negative for color change and rash. Neurological: Negative for dizziness, syncope, weakness, light-headedness and numbness. + \"burning pain\"       Prior to Visit Medications    Medication Sig Taking? Authorizing Provider   pregabalin (LYRICA) 50 MG capsule Take 1 capsule by mouth 3 times daily for 30 days.  Yes Tiny Leta, DO   fluticasone-umeclidin-vilant (TRELEGY ELLIPTA) 100-62.5-25 MCG/INH AEPB Inhale 1 puff into the lungs daily Yes Tiny Leta, DO   DULoxetine (CYMBALTA) 20 MG extended release capsule Take 1 capsule by mouth daily Yes Kinza Monteiro MD   metoprolol (LOPRESSOR) 100 MG tablet Take 1 tablet by mouth 2 times daily Yes Kinza Monteiro MD   furosemide (LASIX) 40 MG tablet Take 1 tablet by mouth daily Yes Kinza Monteiro MD   atorvastatin (LIPITOR) 80 MG tablet Take 1 tablet by mouth daily Yes Jen Ronquillo DO   clopidogrel (PLAVIX) 75 MG tablet Take 1 tablet by mouth daily Yes BRANDI Mak NP   potassium chloride (MICRO-K) 10 MEQ extended release capsule 1 tablet Yes Historical Provider, MD   colchicine (COLCRYS) 0.6 MG tablet Take 0.6 mg by mouth as needed Yes Historical Provider, MD   omeprazole (PRILOSEC) 40 MG delayed release capsule  Yes Historical Provider, MD   folic acid (FOLVITE) 1 MG tablet Take 1 tablet by mouth daily Yes Gladys Trent MD   albuterol sulfate  (90 Base) MCG/ACT inhaler Inhale 2 puffs into the lungs every 6 hours as needed for Wheezing Yes Jen Ronquillo DO   allopurinol (ZYLOPRIM) 100 MG tablet Take 1 tablet by mouth daily Yes Jen Ronquillo, DO   losartan (COZAAR) 100 MG tablet TAKE 1 TABLET BY MOUTH EVERY DAY Yes Jen Ronquillo DO   nitroGLYCERIN (NITROSTAT) 0.4 MG SL tablet Place 1 tablet under the tongue every 5 minutes as needed for Chest pain Yes Jen Ronquillo DO   magnesium gluconate (MAGONATE) 500 MG tablet Take 500 mg by mouth daily. Yes Historical Provider, MD        Social History     Tobacco Use    Smoking status: Current Every Day Smoker     Packs/day: 0.50     Years: 52.00     Pack years: 26.00     Types: Cigarettes     Start date: 1/1/1967    Smokeless tobacco: Never Used   Substance Use Topics    Alcohol use:  Yes     Alcohol/week: 0.0 standard drinks     Comment: 2-3 beers a month        Vitals:    01/13/22 1212 01/13/22 1215   BP: (!) 147/68 137/68   Pulse: 68    Temp: 96.6 °F (35.9 °C)    Weight: 204 lb 12.8 oz (92.9 kg)    Height: 5' 9\" (1.753 m)      Estimated body mass index is 30.24 kg/m² as calculated from the following:    Height as of this encounter: 5' 9\" (1.753 m). Weight as of this encounter: 204 lb 12.8 oz (92.9 kg). Physical Exam  Constitutional:       Appearance: Normal appearance. He is obese. HENT:      Head: Normocephalic and atraumatic. Eyes:      Extraocular Movements: Extraocular movements intact. Conjunctiva/sclera: Conjunctivae normal.   Cardiovascular:      Rate and Rhythm: Normal rate and regular rhythm. Pulmonary:      Effort: Pulmonary effort is normal.      Breath sounds: Normal breath sounds. Abdominal:      General: Abdomen is flat. Bowel sounds are normal.      Palpations: Abdomen is soft. Skin:     General: Skin is warm and dry. Findings: No erythema. Comments: Significant improvement in the Ulcers compared to previous exam, new granulation tissue, well perfused. No indication of infection. Neurological:      Mental Status: He is alert. Mental status is at baseline. Psychiatric:         Mood and Affect: Mood normal.         Behavior: Behavior normal.         Thought Content: Thought content normal.         Judgment: Judgment normal.         ASSESSMENT/PLAN:  1. Popliteal artery stenosis, left [facility]): Patient is having some cramping, aching pain of the left lower extremity, which reminds him of the pain of his contralateral leg. Known vascular disease seen on CTA. Curious if he could now need intervention on this leg. We will have him call Dr. Dominique Liz. - Marika Tucker MD, Vascular Surgery, [facility]    2. Cigarette nicotine dependence without complication: Smoking has decreased, but patient is a known vasculopath. Again emphasized the importance of complete cessation. 3. Neuropathy of right lower extremity: Patient has a burning sensation surrounding where the vascular ulcers had previously been/are healing.   Curious if this could be damage to the cutaneous nerves, which she now appreciates more significantly since the wounds are healing. Will start low-dose Lyrica and titrate as tolerated. Follow-up 4 to 6 weeks. - pregabalin (LYRICA) 50 MG capsule; Take 1 capsule by mouth 3 times daily for 30 days. Dispense: 90 capsule; Refill: 1    Return in about 4 weeks (around 2/10/2022). An electronic signature was used to authenticate this note.     --Carlos Tran, DO on 1/13/2022 at 1:10 PM

## 2022-01-13 NOTE — PROGRESS NOTES
2022     Yesika Napoles 11 (:  1950) is a 73 y.o. male, here for evaluation of the following medical concerns:    HPI  {Visit Chronic CHP (Optional):33409}    Review of Systems    Prior to Visit Medications    Medication Sig Taking? Authorizing Provider   pregabalin (LYRICA) 50 MG capsule Take 1 capsule by mouth 3 times daily for 30 days. Yes Nation Mcmanus, DO   fluticasone-umeclidin-vilant (TRELEGY ELLIPTA) 100-62.5-25 MCG/INH AEPB Inhale 1 puff into the lungs daily Yes Nation Mcmanus, DO   DULoxetine (CYMBALTA) 20 MG extended release capsule Take 1 capsule by mouth daily Yes Waleska Matthew MD   metoprolol (LOPRESSOR) 100 MG tablet Take 1 tablet by mouth 2 times daily Yes Waleska Matthew MD   furosemide (LASIX) 40 MG tablet Take 1 tablet by mouth daily Yes Waleska Matthew MD   atorvastatin (LIPITOR) 80 MG tablet Take 1 tablet by mouth daily Yes Nation Mcmanus, DO   clopidogrel (PLAVIX) 75 MG tablet Take 1 tablet by mouth daily Yes BRANDI Rizvi NP   potassium chloride (MICRO-K) 10 MEQ extended release capsule 1 tablet Yes Historical Provider, MD   colchicine (COLCRYS) 0.6 MG tablet Take 0.6 mg by mouth as needed Yes Historical Provider, MD   omeprazole (PRILOSEC) 40 MG delayed release capsule  Yes Historical Provider, MD   folic acid (FOLVITE) 1 MG tablet Take 1 tablet by mouth daily Yes Georgina Matt MD   albuterol sulfate  (90 Base) MCG/ACT inhaler Inhale 2 puffs into the lungs every 6 hours as needed for Wheezing Yes Nation Mcmanus, DO   allopurinol (ZYLOPRIM) 100 MG tablet Take 1 tablet by mouth daily Yes Nation Mcmanus, DO   losartan (COZAAR) 100 MG tablet TAKE 1 TABLET BY MOUTH EVERY DAY Yes Nation Mcmanus, DO   nitroGLYCERIN (NITROSTAT) 0.4 MG SL tablet Place 1 tablet under the tongue every 5 minutes as needed for Chest pain Yes Nation Mcmanus, DO   magnesium gluconate (MAGONATE) 500 MG tablet Take 500 mg by mouth daily.  Yes Historical Provider, MD        Social History     Tobacco Use    Smoking status: Current Every Day Smoker     Packs/day: 0.50     Years: 52.00     Pack years: 26.00     Types: Cigarettes     Start date: 1/1/1967    Smokeless tobacco: Never Used   Substance Use Topics    Alcohol use: Yes     Alcohol/week: 0.0 standard drinks     Comment: 2-3 beers a month        Vitals:    01/13/22 1212 01/13/22 1215   BP: (!) 147/68 137/68   Pulse: 68    Temp: 96.6 °F (35.9 °C)    Weight: 204 lb 12.8 oz (92.9 kg)    Height: 5' 9\" (1.753 m)      Estimated body mass index is 30.24 kg/m² as calculated from the following:    Height as of this encounter: 5' 9\" (1.753 m). Weight as of this encounter: 204 lb 12.8 oz (92.9 kg). Physical Exam    ASSESSMENT/PLAN:  1. Popliteal artery stenosis, left (HCC)  ***    2. Cigarette nicotine dependence without complication  ***    3. Neuropathy of right lower extremity  ***  - pregabalin (LYRICA) 50 MG capsule; Take 1 capsule by mouth 3 times daily for 30 days. Dispense: 90 capsule; Refill: 1    4. Coronary artery disease involving native coronary artery of native heart without angina pectoris  ***      No follow-ups on file. An electronic signature was used to authenticate this note.     --Elton Lynne DO on 1/13/2022 at 12:51 PM

## 2022-01-14 ENCOUNTER — TELEPHONE (OUTPATIENT)
Dept: VASCULAR SURGERY | Age: 72
End: 2022-01-14

## 2022-02-09 ENCOUNTER — OFFICE VISIT (OUTPATIENT)
Dept: PRIMARY CARE CLINIC | Age: 72
End: 2022-02-09
Payer: MEDICARE

## 2022-02-09 VITALS
DIASTOLIC BLOOD PRESSURE: 66 MMHG | SYSTOLIC BLOOD PRESSURE: 134 MMHG | HEART RATE: 72 BPM | RESPIRATION RATE: 19 BRPM | BODY MASS INDEX: 31.78 KG/M2 | WEIGHT: 215.2 LBS

## 2022-02-09 DIAGNOSIS — G57.91 NEUROPATHY OF RIGHT LOWER EXTREMITY: Primary | ICD-10-CM

## 2022-02-09 DIAGNOSIS — R22.41 LOCALIZED SWELLING OF RIGHT LOWER EXTREMITY: ICD-10-CM

## 2022-02-09 PROCEDURE — 99214 OFFICE O/P EST MOD 30 MIN: CPT | Performed by: STUDENT IN AN ORGANIZED HEALTH CARE EDUCATION/TRAINING PROGRAM

## 2022-02-09 RX ORDER — OMEPRAZOLE 40 MG/1
40 CAPSULE, DELAYED RELEASE ORAL DAILY
Qty: 30 CAPSULE | Refills: 3 | Status: SHIPPED | OUTPATIENT
Start: 2022-02-09 | End: 2022-06-06

## 2022-02-09 RX ORDER — PREGABALIN 100 MG/1
100 CAPSULE ORAL 3 TIMES DAILY
Qty: 90 CAPSULE | Refills: 3 | Status: SHIPPED | OUTPATIENT
Start: 2022-02-09 | End: 2022-06-20

## 2022-02-09 RX ORDER — DULOXETIN HYDROCHLORIDE 20 MG/1
20 CAPSULE, DELAYED RELEASE ORAL DAILY
Qty: 30 CAPSULE | Refills: 3 | Status: SHIPPED | OUTPATIENT
Start: 2022-02-09 | End: 2022-05-12

## 2022-02-09 RX ORDER — POTASSIUM CHLORIDE 750 MG/1
CAPSULE, EXTENDED RELEASE ORAL
Qty: 60 CAPSULE | Refills: 1 | Status: SHIPPED | OUTPATIENT
Start: 2022-02-09 | End: 2022-06-14 | Stop reason: SDUPTHER

## 2022-02-09 ASSESSMENT — ENCOUNTER SYMPTOMS
SHORTNESS OF BREATH: 0
COLOR CHANGE: 0
COUGH: 0
CHEST TIGHTNESS: 0

## 2022-02-09 NOTE — PATIENT INSTRUCTIONS
Patient Education        pregabalin  Pronunciation:  pre MELVA a dilia  Brand:  Lyrica, Lyrica CR  What is the most important information I should know about pregabalin? Pregabalin can cause a severe allergic reaction. Stop taking this medicine and seek emergency medical help if you have hives or blisters on your skin, trouble breathing, or swelling in your face, mouth, or throat. Some people have thoughts about suicide while taking pregabalin. Stay alert to changes in your mood or symptoms. Report any new or worsening symptoms to your doctor. If you have diabetes or heart problems, call your doctor if you have weight gain or swelling in your hands or feet while taking pregabalin. Do not stop using pregabalin suddenly, even if you feel fine. Stopping suddenly may cause withdrawal symptoms. What is pregabalin? Pregabalin is an anti-epileptic drug, also called an anticonvulsant. It works by slowing down impulses in the brain that cause seizures. Pregabalin also affects chemicals in the brain that send pain signals across the nervous system. Pregabalin is used to treat pain caused by fibromyalgia, or nerve pain in people with diabetes (diabetic neuropathy), herpes zoster (post-herpetic neuralgia), or spinal cord injury. Pregabalin is also used with other medications to treat partial onset seizures in adults and children who are at least 2 month old. Pregabalin may also be used for purposes not listed in this medication guide. What should I discuss with my healthcare provider before taking pregabalin? You should not use pregabalin if you are allergic to it. Tell your doctor if you have ever had:  · lung disease, such as chronic obstructive pulmonary disease (COPD);   · a mood disorder, depression, or suicidal thoughts;  · heart problems (especially congestive heart failure);  · a bleeding disorder, or low levels of platelets in your blood;  · kidney disease (or if you are on dialysis);  · diabetes (unless you are taking pregabalin to treat diabetic neuropathy);  · drug or alcohol addiction; or  · a severe allergic reaction (angioedema). Do not give this medicine to a child without medical advice. · Pregabalin is not approved for use by anyone younger than 25years old to treat nerve pain caused by fibromyalgia, diabetes, herpes zoster, or spinal cord injury. · Pregabalin is not approved for seizures in anyone younger than 2 month old. Some people have thoughts about suicide while taking pregabalin. Your doctor will need to check your progress at regular visits. Your family or other caregivers should also be alert to changes in your mood or symptoms. Seizure control is very important during pregnancy, and having a seizure could harm both mother and baby. Do not start or stop taking pregabalin without your doctor's advice, and tell your doctor right away if you become pregnant. If you are pregnant, your name may be listed on a pregnancy registry to track the effects of pregabalin on the baby. Pregabalin can decrease sperm count and may affect fertility in men (your ability to have children). In animal studies, pregabalin also caused birth defects in the offspring of males treated with this medicine. However, it is not known whether these effects would occur in humans. Ask your doctor about your risk. You should not breastfeed while using pregabalin. How should I take pregabalin? Follow all directions on your prescription label and read all medication guides or instruction sheets. Your doctor may occasionally change your dose. Use the medicine exactly as directed. Take the medicine at the same time each day, with or without food. Swallow an extended-release tablet whole and do not crush, chew, or break it. Measure liquid medicine carefully. Use the dosing syringe provided, or use a medicine dose-measuring device (not a kitchen spoon).   Call your doctor if your symptoms do not improve, or if they get worse. Do not stop using pregabalin suddenly, even if you feel fine. Stopping suddenly may cause increased seizures or unpleasant withdrawal symptoms. Follow your doctor's instructions about tapering your dose for at least 1 week before stopping completely. In case of emergency, wear or carry medical identification to let others know you take seizure medication. Store at room temperature away from moisture, heat, and light. What happens if I miss a dose? Take the medicine as soon as you can, but skip the missed dose if it is almost time for your next dose. Do not take two doses at one time. What happens if I overdose? Seek emergency medical attention or call the Poison Help line at 1-664.855.7136. What should I avoid while taking pregabalin? Avoid drinking alcohol. It may increase certain side effects of pregabalin. Avoid driving or hazardous activity until you know how this medicine will affect you. Your reactions could be impaired. What are the possible side effects of pregabalin? Pregabalin can cause a severe allergic reaction. Stop taking this medicine and get emergency medical help if you have: hives or blisters on your skin; difficult breathing; swelling of your face, lips, tongue, or throat. Report any new or worsening symptoms to your doctor, such as: mood or behavior changes, depression, anxiety, panic attacks, trouble sleeping, or if you feel impulsive, irritable, agitated, hostile, aggressive, restless, hyperactive (mentally or physically), or have thoughts about suicide or hurting yourself.   Call your doctor at once if you have:  · weak or shallow breathing;  · blue-colored skin, lips, fingers, and toes;  · confusion, extreme drowsiness or weakness;  · vision problems;  · skin sores (if you have diabetes);  · easy bruising, unusual bleeding;  · swelling in your hands or feet, rapid weight gain (especially if you have diabetes or heart problems); or  · unexplained muscle pain, tenderness, or weakness (especially if you also have fever or don't feel well). Pregabalin can cause life-threatening breathing problems. A person caring for you should seek emergency medical attention if you have slow breathing with long pauses, blue colored lips, or if you are hard to wake up. Breathing problems may be more likely in older adults or in people with COPD. If you have diabetes,  tell your doctor right away if you have any new sores or other skin problems. Common side effects may include:  · dizziness, drowsiness;  · swelling in your hands and feet;  · trouble concentrating;  · increased appetite;  · weight gain;  · dry mouth; or  · blurred vision. This is not a complete list of side effects and others may occur. Call your doctor for medical advice about side effects. You may report side effects to FDA at 4-278-FDA-5737. What other drugs will affect pregabalin? Using pregabalin with other drugs that slow your breathing can cause dangerous side effects or death. Ask your doctor before using opioid medication, a sleeping pill, cold or allergy medicine, a muscle relaxer, or medicine for anxiety or seizures. Tell your doctor about all your other medicines, especially:  · oral diabetes medicine --pioglitazone, rosiglitazone; or  · an ACE inhibitor --benazepril, captopril, enalapril, fosinopril, lisinopril, moexipril, perindopril, quinapril, ramipril, or trandolapril. This list is not complete. Other drugs may affect pregabalin, including prescription and over-the-counter medicines, vitamins, and herbal products. Not all possible drug interactions are listed here. Where can I get more information? Your pharmacist can provide more information about pregabalin. Remember, keep this and all other medicines out of the reach of children, never share your medicines with others, and use this medication only for the indication prescribed.    Every effort has been made to ensure that the information provided by [employer] [facility] is accurate, up-to-date, and complete, but no guarantee is made to that effect. Drug information contained herein may be time sensitive. [facility] information has been compiled for use by healthcare practitioners and consumers in the [place] and therefore [facility] does not warrant that uses outside of the [place] are appropriate, unless specifically indicated otherwise. [facility]'s drug information does not endorse drugs, diagnose patients or recommend therapy. [facility] drug information is an informational resource designed to assist licensed healthcare practitioners in caring for their patients and/or to serve consumers viewing this service as a supplement to, and not a substitute for, the expertise, skill, knowledge and judgment of healthcare practitioners. The absence of a warning for a given drug or drug combination in no way should be construed to indicate that the drug or drug combination is safe, effective or appropriate for any given patient. [facility] does not assume any responsibility for any aspect of healthcare administered with the aid of information [facility] provides. The information contained herein is not intended to cover all possible uses, directions, precautions, warnings, drug interactions, allergic reactions, or adverse effects. If you have questions about the drugs you are taking, check with your doctor, nurse or pharmacist.  Copyright 5292-8395 [address]. Version: 9.01. Revision date: 12/26/2019. Care instructions adapted under license by [facility]. If you have questions about a medical condition or this instruction, always ask your healthcare professional. [place] [address] any warranty or liability for your use of this information.

## 2022-02-09 NOTE — PROGRESS NOTES
2022     Yesika Cintron (:  1950) is a 73 y.o. male, here for evaluation of the following medical concerns:    HPI  Leg swelling  Patient complains of pain, swelling and warmth in the right leg. Symptoms have been present for 1 week. He has not had similar problems in the past.  He did have in unexplained pulmonary embolism late last year, which she is treated for with Eliquis. He reports compliance with his medicine. He also recently had femoropopliteal bypass on the side for peripheral vascular disease. The patient is able to ambulate. He denies chest pain, shortness of breath or hemoptysis. Neuropathy  Patient also presents today for evaluation of neuropathy of the same leg. He was seen approximately 1 month ago complaining of numbness, burning in his right leg, which is worse at night. This has been acutely worsened since his recent surgical procedure. He was started on Lyrica 50 mg 3 times daily. He has noted improvement in his symptoms, but not complete resolution. He has not been dizzy, lightheaded, weak or oversedated on this medication. Review of Systems   Constitutional: Negative for fatigue. Eyes: Negative for visual disturbance. Respiratory: Negative for cough, chest tightness and shortness of breath. Cardiovascular: Positive for leg swelling (R). Negative for chest pain and palpitations. Endocrine: Negative for polydipsia, polyphagia and polyuria. Genitourinary: Negative for frequency. Musculoskeletal: Positive for gait problem (walker at baseline). Skin: Negative for color change, rash and wound. Neurological: Negative for dizziness, syncope, weakness, light-headedness and numbness. Prior to Visit Medications    Medication Sig Taking?  Authorizing Provider   omeprazole (PRILOSEC) 40 MG delayed release capsule Take 1 capsule by mouth daily Yes José Luis Mcfarlane,    potassium chloride (MICRO-K) 10 MEQ extended release capsule 1 tablet Yes José Luis Mcfarlane, DO   DULoxetine (CYMBALTA) 20 MG extended release capsule Take 1 capsule by mouth daily Yes Sayra Grant, DO   pregabalin (LYRICA) 100 MG capsule Take 1 capsule by mouth 3 times daily for 30 days. Yes Sayra Grant, DO   fluticasone-umeclidin-vilant (TRELEGY ELLIPTA) 100-62.5-25 MCG/INH AEPB Inhale 1 puff into the lungs daily Yes Sayra Grant, DO   metoprolol (LOPRESSOR) 100 MG tablet Take 1 tablet by mouth 2 times daily Yes Qasim Robert MD   furosemide (LASIX) 40 MG tablet Take 1 tablet by mouth daily Yes Qasim Robert MD   atorvastatin (LIPITOR) 80 MG tablet Take 1 tablet by mouth daily Yes Sayra Grant, DO   clopidogrel (PLAVIX) 75 MG tablet Take 1 tablet by mouth daily Yes BRANDI Gil - RODNEY   colchicine (COLCRYS) 0.6 MG tablet Take 0.6 mg by mouth as needed Yes Historical Provider, MD   folic acid (FOLVITE) 1 MG tablet Take 1 tablet by mouth daily Yes Rosaline Griffin MD   albuterol sulfate  (90 Base) MCG/ACT inhaler Inhale 2 puffs into the lungs every 6 hours as needed for Wheezing Yes Sayra Grant, DO   allopurinol (ZYLOPRIM) 100 MG tablet Take 1 tablet by mouth daily Yes Sayra Grant, DO   losartan (COZAAR) 100 MG tablet TAKE 1 TABLET BY MOUTH EVERY DAY Yes Sayra Grant, DO   nitroGLYCERIN (NITROSTAT) 0.4 MG SL tablet Place 1 tablet under the tongue every 5 minutes as needed for Chest pain Yes Sayra Grant, DO   magnesium gluconate (MAGONATE) 500 MG tablet Take 500 mg by mouth daily. Yes Historical Provider, MD        Social History     Tobacco Use    Smoking status: Current Every Day Smoker     Packs/day: 0.50     Years: 52.00     Pack years: 26.00     Types: Cigarettes     Start date: 1/1/1967    Smokeless tobacco: Never Used   Substance Use Topics    Alcohol use:  Yes     Alcohol/week: 0.0 standard drinks     Comment: 2-3 beers a month        Vitals:    02/09/22 1257   BP: 134/66   Pulse: 72   Resp: 19   Weight: 215 lb 3.2 oz (97.6 kg)     Estimated body mass index is 31.78 kg/m² as calculated from the following:    Height as of 1/13/22: 5' 9\" (1.753 m). Weight as of this encounter: 215 lb 3.2 oz (97.6 kg). Physical Exam  Constitutional:       Appearance: Normal appearance. He is obese. HENT:      Head: Normocephalic and atraumatic. Eyes:      Extraocular Movements: Extraocular movements intact. Conjunctiva/sclera: Conjunctivae normal.   Cardiovascular:      Rate and Rhythm: Normal rate and regular rhythm. Pulmonary:      Effort: Pulmonary effort is normal.      Breath sounds: Normal breath sounds. Abdominal:      General: Abdomen is flat. Bowel sounds are normal.      Palpations: Abdomen is soft. Musculoskeletal:      Right lower leg: Edema (2+, extends to mid thigh) present. Skin:     General: Skin is warm and dry. Findings: No erythema. Comments: Significant improvement in the Ulcers compared to previous exam, new granulation tissue, well perfused. No indication of infection. Neurological:      Mental Status: He is alert. Mental status is at baseline. Psychiatric:         Mood and Affect: Mood normal.         Behavior: Behavior normal.         Thought Content: Thought content normal.         Judgment: Judgment normal.         ASSESSMENT/PLAN:  1. Neuropathy of right lower extremity: Slight improvement with starting the Lyrica. Will increase from 50 to 100 mg and reevaluate at follow-up in 4 to 6 weeks. - pregabalin (LYRICA) 100 MG capsule; Take 1 capsule by mouth 3 times daily for 30 days. Dispense: 90 capsule; Refill: 3    2. Localized swelling of right lower extremity: New onset unilateral edema, which is concerning for acute DVT. Checking venous Dopplers as below. If positive, this would be a recurrence despite anticoagulation on NOAC. Would need to switch to Coumadin. - VL Extremity Venous Right; Future      Return in about 2 weeks (around 2/23/2022). An electronic signature was used to authenticate this note.     --Gregoria Pearson, DO on 2/9/2022 at 3:13 PM

## 2022-02-11 ENCOUNTER — HOSPITAL ENCOUNTER (OUTPATIENT)
Dept: VASCULAR LAB | Age: 72
Discharge: HOME | End: 2022-02-11
Payer: MEDICARE

## 2022-02-11 DIAGNOSIS — R22.41 LOCALIZED SWELLING OF RIGHT LOWER EXTREMITY: ICD-10-CM

## 2022-02-11 PROCEDURE — 93971 EXTREMITY STUDY: CPT

## 2022-02-16 ENCOUNTER — TELEPHONE (OUTPATIENT)
Dept: PRIMARY CARE CLINIC | Age: 72
End: 2022-02-16

## 2022-02-16 NOTE — TELEPHONE ENCOUNTER
----- Message from [facility] sent at 2/16/2022  3:36 PM EST -----  Subject: Results Request    QUESTIONS  Which lab or imaging result is the patient calling about? Dopler Sonogram   Which provider ordered the test? Tl Anthony   At what location was the test performed? Date the test was performed? 2022-02-11  Additional Information for Provider? Patient would like to know his   results from the labs he had done on 2/11/2022. Please contact patient to   further assist.   ---------------------------------------------------------------------------  --------------  CALL BACK INFO  What is the best way for the office to contact you? OK to leave message on   voicemail  Preferred Call Back Phone Number?  [phone]

## 2022-02-17 ENCOUNTER — TELEPHONE (OUTPATIENT)
Dept: PRIMARY CARE CLINIC | Age: 72
End: 2022-02-17

## 2022-02-28 ENCOUNTER — TELEPHONE (OUTPATIENT)
Dept: PRIMARY CARE CLINIC | Age: 72
End: 2022-02-28

## 2022-03-24 ENCOUNTER — TELEPHONE (OUTPATIENT)
Dept: PRIMARY CARE CLINIC | Age: 72
End: 2022-03-24

## 2022-03-24 DIAGNOSIS — I10 ESSENTIAL HYPERTENSION: ICD-10-CM

## 2022-03-24 DIAGNOSIS — I50.32 CHRONIC DIASTOLIC HEART FAILURE (HCC): ICD-10-CM

## 2022-03-24 RX ORDER — LOSARTAN POTASSIUM 100 MG/1
TABLET ORAL
Qty: 90 TABLET | Refills: 1 | Status: SHIPPED | OUTPATIENT
Start: 2022-03-24 | End: 2022-06-20 | Stop reason: SDUPTHER

## 2022-03-24 RX ORDER — CLOPIDOGREL BISULFATE 75 MG/1
75 TABLET ORAL DAILY
Qty: 30 TABLET | Refills: 3 | Status: SHIPPED | OUTPATIENT
Start: 2022-03-24 | End: 2022-06-20 | Stop reason: SDUPTHER

## 2022-03-24 NOTE — TELEPHONE ENCOUNTER
Medication:   Requested Prescriptions     Pending Prescriptions Disp Refills    losartan (COZAAR) 100 MG tablet 90 tablet 1     Sig: TAKE 1 TABLET BY MOUTH EVERY DAY    clopidogrel (PLAVIX) 75 MG tablet 30 tablet 3     Sig: Take 1 tablet by mouth daily        Last Filled:  08/25/21, 11/22/21    Patient Phone Number: [phone] (home)     Last appt: 2/9/2022 Return in about 2 weeks (around 2/23/2022). Next appt: Visit date not found    Last OARRS:   RX Monitoring 8/14/2020   Attestation -   Periodic Controlled Substance Monitoring Possible medication side effects, risk of tolerance/dependence & alternative treatments discussed. ;Assessed functional status. ;Obtaining appropriate analgesic effect of treatment. Chronic Pain > 50 MEDD Obtained or confirmed \"Consent for Opioid Use\" on file.

## 2022-03-24 NOTE — TELEPHONE ENCOUNTER
----- Message from Jasen Huggins sent at 3/24/2022 11:08 AM EDT -----  Subject: Refill Request    QUESTIONS  Name of Medication? losartan (COZAAR) 100 MG tablet  Patient-reported dosage and instructions? losartan (COZAAR) 100 MG tablet   and TAKE 1 TABLET BY MOUTH EVERY DAY  How many days do you have left? 3  Preferred Pharmacy? [id number] [id number]  Pharmacy phone number (if available)? [phone]  ---------------------------------------------------------------------------  --------------,  Name of Medication? clopidogrel (PLAVIX) 75 MG tablet  Patient-reported dosage and instructions? clopidogrel (PLAVIX) 75 MG   tablet and Take 1 tablet by mouth daily  How many days do you have left? 3  Preferred Pharmacy? [id number] [id number]  Pharmacy phone number (if available)? [phone]  ---------------------------------------------------------------------------  --------------  Kaylyn CHAVEZ  What is the best way for the office to contact you? OK to leave message on   voicemail  Preferred Call Back Phone Number?  [phone]

## 2022-04-14 ENCOUNTER — OFFICE VISIT (OUTPATIENT)
Dept: PULMONOLOGY | Age: 72
End: 2022-04-14
Payer: MEDICARE

## 2022-04-14 VITALS
OXYGEN SATURATION: 94 % | RESPIRATION RATE: 16 BRPM | SYSTOLIC BLOOD PRESSURE: 139 MMHG | HEART RATE: 70 BPM | DIASTOLIC BLOOD PRESSURE: 88 MMHG | BODY MASS INDEX: 32.21 KG/M2 | HEIGHT: 70 IN | TEMPERATURE: 96.4 F | WEIGHT: 225 LBS

## 2022-04-14 DIAGNOSIS — J44.9 CHRONIC OBSTRUCTIVE PULMONARY DISEASE, UNSPECIFIED COPD TYPE (HCC): ICD-10-CM

## 2022-04-14 DIAGNOSIS — R06.02 SOB (SHORTNESS OF BREATH): ICD-10-CM

## 2022-04-14 DIAGNOSIS — Z99.89 OSA ON CPAP: ICD-10-CM

## 2022-04-14 DIAGNOSIS — G47.33 OSA ON CPAP: ICD-10-CM

## 2022-04-14 DIAGNOSIS — I50.32 CHRONIC DIASTOLIC CHF (CONGESTIVE HEART FAILURE) (HCC): ICD-10-CM

## 2022-04-14 DIAGNOSIS — I26.99 PULMONARY INFARCTION (HCC): Primary | ICD-10-CM

## 2022-04-14 DIAGNOSIS — Z72.0 TOBACCO ABUSE DISORDER: ICD-10-CM

## 2022-04-14 PROCEDURE — 99213 OFFICE O/P EST LOW 20 MIN: CPT | Performed by: INTERNAL MEDICINE

## 2022-04-14 RX ORDER — APIXABAN 5 MG/1
TABLET, FILM COATED ORAL
COMMUNITY
Start: 2022-02-16 | End: 2022-05-23 | Stop reason: SDUPTHER

## 2022-04-14 NOTE — PROGRESS NOTES
[facility] Pulmonary and Critical Care    Outpatient Follow Up Note    Subjective:   CHIEF COMPLAINT / HPI:     The patient is 73 y.o. male who presents today for follow up of COPD, CHF and KAVITA. Mr. Lanette Cameron has had a difficult past few months. He had his surgery to relieve pain in his leg and improved blood flow but it was complicated by a pulmonary embolism and gangrene at the wound site. He does not currently appear to be infected but he is still having pain in his right lower extremity which he says is as bad if not worse than it was prior to the surgery. He does have better blood flow to the leg however. His inhalers were switched from the combination of Breo and Spiriva to Trelegy which she says seems to be doing the job as well. He has not been wearing his CPAP for the past several months because instead he wears oxygen at night. He has trouble sleeping but mostly because of hypersensitivity in the leg. He states that if the sheets even touch his calf and will wake him up. Interval history :  isidoro was seen in the hospital a month ago. He was admitted for leg pain and actually transferred to the ICU at one point because he had a pulmonary embolism and possible pneumonia. He was treated for both and is currently on Eliquis. He had a CT of the lungs that showed multifocal airspace disease, but it was in the region of the clots. He also had a procalcitonin that was 0.17. 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 He is being followed actively by vascular surgery. Today he says his breathing feels normal but his leg is causing him considerable pain. He states that he cannot even lay down in bed because the pain from becoming horizontal is intense. He has to sleep in a recliner if he can sleep at all.   He has significant pain in the toes on his right foot such that he cut a hole in his shoe so that they do not have to touch anything. Last visit:  Snow Henry comes in with his granddaughter today. Says his breathing gets difficult at times. He mentioned that sometimes it happens all of a sudden when he's just sitting in a chair and then it stops. He thinks it might be his heart, because it feels like it's racing. He also had a lot of complaints about his neuropathy. He recently had to get new tubing for his auto CPAP because the tubing broke. Incruse is no longer going to be covered by his insurance. Previous visit:  Phone visit today because of the pandemic and Inder's risk factors. Snow Henry notes that he has shortness of breath and cough which are a bit worse than baseline. Snow Henry continues to roll his own cigarettes and smoke. He gets his supplies from the \"[employer]\" down the street. It is a convenient store that is owned by [employer]. He reports his weight is 223 pounds that he has some swelling by the end of the day in his ankles but it is gone by morning. He is compliant with his Breo and Incruse as well as his new auto titrating CPAP. He rarely uses his albuterol inhaler and in reviewing his med list the prescription has . Previous visit:  Snow Henry comes in today complaining of being both sleepy and tired. He states that his CPAP \"shorted out\" and melted the water reservoir so he had to throw it out. He's using his breo and incruse that cost $8.50/month. He's still smoking. He also said he needed a refill on his nitroglycerin since what he has left is . Previous visit:  Snow Henry comes in today saying he feels about the same as usual.  He did point out he had to be admitted to the hospital in August for heart failure. He still smokes about 4-5 cigarettes a day. He is using his CPAP of 9 but it's difficult to sleep. He is currently on breo and incruse and tolerating well. Says his breathing is about the same as always and is limiting.   He's also limited by joint pain which impedes mobility and his ability to sleep. He weighed 210 lbs about a week ago and weighed in at 220 today. He's not been weighing himself at home, but did say that his left leg is swollen and tender. Previous history: Back to wearing his CPAP. Has some dyspnea at times, without a clear trigger. He had an issue with his new pharmacy, [facility], and went without symbicort for 3 weeks. He noticed a difference. His hip is recovering well and he's more mobile. He's still smoking anywhere from a half pack to a quarter of a pack per week.     Past Medical History:    Past Medical History:   Diagnosis Date    CAD (coronary artery disease)     CHF [facility]congestive heart failure) [facility]     diastolic    COPD (chronic obstructive pulmonary disease) ([facility]     Critical ischemia of lower extremity ([facility])     Depressive disorder, not elsewhere classified     GERD (gastroesophageal reflux disease)     History of colon polyps - 30 seen on colonoscopy 04/2021 04/24/2021    History of MI (myocardial infarction) 05/2013    History of skin ulcer of lower extremity     R anterior shin    History of transient ischemic attack (TIA)     Hyperlipidemia     Hypertension     Neuropathy     KAVITA (obstructive sleep apnea)     On CPAP    Personal history of DVT (deep vein thrombosis)     PVD (peripheral vascular disease) ([facility] [address]     TIA (transient ischemic attack) 2013    Vertebral fracture        Social History:    Social History     Tobacco Use   Smoking Status Current Every Day Smoker    Packs/day: 0.50    Years: 52.00    Pack years: 26.00    Types: Cigarettes    Start date: 1/1/1967   Smokeless Tobacco Never Used       Current Medications:  Current Outpatient Medications on File Prior to Visit   Medication Sig Dispense Refill    losartan (COZAAR) 100 MG tablet TAKE 1 TABLET BY MOUTH EVERY DAY 90 tablet 1    clopidogrel (PLAVIX) 75 MG tablet Take 1 tablet by mouth daily 30 tablet 3    omeprazole (PRILOSEC) 40 MG delayed release capsule Take 1 capsule by mouth daily 30 capsule 3    potassium chloride (MICRO-K) 10 MEQ extended release capsule 1 tablet 60 capsule 1    DULoxetine (CYMBALTA) 20 MG extended release capsule Take 1 capsule by mouth daily 30 capsule 3    fluticasone-umeclidin-vilant (TRELEGY ELLIPTA) 100-62.5-25 MCG/INH AEPB Inhale 1 puff into the lungs daily 28 each 11    furosemide (LASIX) 40 MG tablet Take 1 tablet by mouth daily 90 tablet 3    atorvastatin (LIPITOR) 80 MG tablet Take 1 tablet by mouth daily 90 tablet 1    folic acid (FOLVITE) 1 MG tablet Take 1 tablet by mouth daily 30 tablet 3    albuterol sulfate  (90 Base) MCG/ACT inhaler Inhale 2 puffs into the lungs every 6 hours as needed for Wheezing 3 Inhaler 4    allopurinol (ZYLOPRIM) 100 MG tablet Take 1 tablet by mouth daily 180 tablet 1    nitroGLYCERIN (NITROSTAT) 0.4 MG SL tablet Place 1 tablet under the tongue every 5 minutes as needed for Chest pain 25 tablet 1    magnesium gluconate (MAGONATE) 500 MG tablet Take 500 mg by mouth daily.  ELIQUIS 5 MG TABS tablet TAKE 1 TABLET BY MOUTH TWICE DAILY      pregabalin (LYRICA) 100 MG capsule Take 1 capsule by mouth 3 times daily for 30 days. 90 capsule 3    metoprolol (LOPRESSOR) 100 MG tablet Take 1 tablet by mouth 2 times daily 270 tablet 1    colchicine (COLCRYS) 0.6 MG tablet Take 0.6 mg by mouth as needed (Patient not taking: Reported on 4/14/2022)       No current facility-administered medications on file prior to visit. REVIEW OF SYSTEMS:    CONSTITUTIONAL: Negative for fevers and chills  HEENT: Negative for oropharyngeal exudate, post nasal drip, sinus pain / pressure, nasal congestion, ear pain  RESPIRATORY:  See HPI  CARDIOVASCULAR: Negative for palpitations.   No edema  GASTROINTESTINAL: Negative for nausea, vomiting, diarrhea, constipation and abdominal pain  HEMATOLOGICAL: Negative for adenopathy  SKIN: Negative for clubbing, cyanosis, skin lesions  EXTREMITIES: Negative for weakness, decreased ROM  NEUROLOGICAL: Negative for unilateral weakness, speech or gait abnormalities  PSYCH: Negative for anxiety, depression    Objective:   PHYSICAL EXAM:        VITALS:   Vitals:    22 1022   BP: 139/88   Site: Right Upper Arm   Position: Sitting   Cuff Size: Medium Adult   Pulse: 70   Resp: 16   Temp: 96.4 °F (35.8 °C)   TempSrc: Infrared   SpO2: 94%   Weight: 225 lb (102.1 kg)   Height: 5' 10\" (1.778 m)       CONSTITUTIONAL:  Awake, alert, cooperative, no apparent distress, and appears stated age  HEENT: No oropharyngeal exudate, PERRL, no cervical adenopathy, no tracheal deviation, thyroid size normal  LUNGS:  No increased work of breathing and clear to auscultation, no crackles or wheezing  CARDIOVASCULAR:  normal S1 and S2 and no JVD  ABDOMEN:  Normal bowel sounds, non-distended and non-tender to palpation  EXT: Marked erythema of bilateral lower extremities with diminished pulses bilaterally. NEUROLOGIC:  Mental Status Exam:  Level of Alertness:   awake  Orientation:   person, place, time. SKIN:  normal skin color, texture, turgor, no redness, warmth, or swelling     DATA:    Last PFTs:  mild obstructive defect without  bronchodilator response and a moderately reduced diffusing capacity. Ct screenin2017  Lung RADS category 2 benign findings. Small pulmonary nodule right   lung as described unchanged.       Recommendation: Continued annual screening low dose CT       Lung RADS category S significant findings-marked coronary artery   calcification which is a risk factor for acute coronary syndrome. Marked fatty filtration liver. Emphysema. 2018     Lung RADS category 2-positive.  Specifically, 4 mm and 3 mm stable pulmonary nodules in the right hemithorax       Lung RADS category S-negative, no new or unknown potentially significant incidental findings requiring urgent additional evaluation       [id number] low-dose CT lung screen in one year     2/2020      Impression       Continued stable appearance of previous seen noted stable nodules. The previously noted new larger nodules have resolved.       Cardiomegaly with coronary calcification.       No acute infiltrates seen. Assessment: This is a 73 y.o. male with COPD, CHF and KAVITA    Plan:     COPD: Was stable on breo and spiriva respimat which was changed to trelegy. Continue rescue inhaler. Needs to stop smoking but he remains pre-contemplative. Patient previously attempted pulmonary rehab, but he was in too much pain and stopped going. Tried and failed daliresp in the past 2/2 GI side effects    Pulmonary embolism: Completed eliquis. Lesions associated with pulmonary infarcts and November have resolved. He is eligible for screening CTs in December of this year. CHF:  Not in acute overload. KAVITA:  AHI 34 and Controlled with auto pap, with range of 8-15 cmH2O. I encouraged him to go back on his CPAP because the oxygen while better than nothing is not as good as the CPAP and preventing hypoxia, and does nothing for the sleep apnea itself. He said he would try. PAD: Still with considerable pain and slow healing in the right lower extremity. Does not look actively infected to me which is fortunate. Needs to reestablish follow-up with vascular surgery and his PCP. The patient is currently smoking less than a pack week. The risks related to smoking were reviewed with the patient. Recommend maintaining a smoke-free lifestyle. Products available for smoking cessation were discussed in detail. More than half the time of this 24 minute visit was spent counseling the patient. RTC 6 months w/ MD. Call or RTC sooner if symptoms persist or worsen acutely.         Miguelina Cunningham

## 2022-04-19 DIAGNOSIS — E78.2 MIXED HYPERLIPIDEMIA: Chronic | ICD-10-CM

## 2022-04-19 DIAGNOSIS — I25.10 CORONARY ARTERY DISEASE INVOLVING NATIVE CORONARY ARTERY OF NATIVE HEART WITHOUT ANGINA PECTORIS: ICD-10-CM

## 2022-04-19 RX ORDER — ATORVASTATIN CALCIUM 80 MG/1
80 TABLET, FILM COATED ORAL DAILY
Qty: 90 TABLET | Refills: 1 | Status: SHIPPED | OUTPATIENT
Start: 2022-04-19 | End: 2022-06-20 | Stop reason: SDUPTHER

## 2022-04-19 NOTE — TELEPHONE ENCOUNTER
----- Message from Kelsey Caraballo sent at 4/19/2022  1:11 PM EDT -----  Subject: Refill Request    QUESTIONS  Name of Medication? atorvastatin (LIPITOR) 80 MG tablet  Patient-reported dosage and instructions? 1 a day   How many days do you have left? 4  Preferred [facility]? [id number]  Pharmacy phone number (if available)? [phone]  ---------------------------------------------------------------------------  --------------  Leela CHAVEZ  What is the best way for the office to contact you? OK to leave message on   voicemail  Preferred Call Back Phone Number? [phone]  ---------------------------------------------------------------------------  --------------  SCRIPT ANSWERS  Relationship to Patient?  Self

## 2022-04-19 NOTE — TELEPHONE ENCOUNTER
Medication:   Requested Prescriptions     Pending Prescriptions Disp Refills    atorvastatin (LIPITOR) 80 MG tablet 90 tablet 1     Sig: Take 1 tablet by mouth daily        Last Filled:  12/3/21    Patient Phone Number: [phone] (home)     Last appt: 2/9/2022 Return in about 2 weeks (around 2/23/2022). Next appt: Visit date not found    Last OARRS:   RX Monitoring 8/14/2020   Attestation -   Periodic Controlled Substance Monitoring Possible medication side effects, risk of tolerance/dependence & alternative treatments discussed. ;Assessed functional status. ;Obtaining appropriate analgesic effect of treatment. Chronic Pain > 50 MEDD Obtained or confirmed \"Consent for Opioid Use\" on file.

## 2022-04-25 ENCOUNTER — NURSE TRIAGE (OUTPATIENT)
Dept: OTHER | Facility: CLINIC | Age: 72
End: 2022-04-25

## 2022-04-25 ENCOUNTER — HOSPITAL ENCOUNTER (EMERGENCY)
Age: 72
Discharge: HOME | End: 2022-04-25
Attending: EMERGENCY MEDICINE
Payer: MEDICARE

## 2022-04-25 VITALS
OXYGEN SATURATION: 95 % | HEIGHT: 70 IN | DIASTOLIC BLOOD PRESSURE: 61 MMHG | BODY MASS INDEX: 32.12 KG/M2 | RESPIRATION RATE: 20 BRPM | HEART RATE: 79 BPM | TEMPERATURE: 97.7 F | SYSTOLIC BLOOD PRESSURE: 135 MMHG | WEIGHT: 224.38 LBS

## 2022-04-25 DIAGNOSIS — I73.9 PERIPHERAL VASCULAR DISEASE (HCC): Primary | ICD-10-CM

## 2022-04-25 DIAGNOSIS — L98.499 SKIN ULCER, UNSPECIFIED ULCER STAGE (HCC): ICD-10-CM

## 2022-04-25 LAB
ANION GAP SERPL CALCULATED.3IONS-SCNC: 11 MMOL/L (ref 3–16)
BASOPHILS ABSOLUTE: 0.1 K/UL (ref 0–0.2)
BASOPHILS RELATIVE PERCENT: 1.1 %
BUN BLDV-MCNC: 20 MG/DL (ref 7–20)
CALCIUM SERPL-MCNC: 9.4 MG/DL (ref 8.3–10.6)
CHLORIDE BLD-SCNC: 101 MMOL/L (ref 99–110)
CO2: 25 MMOL/L (ref 21–32)
CREAT SERPL-MCNC: 1.1 MG/DL (ref 0.8–1.3)
EOSINOPHILS ABSOLUTE: 0.2 K/UL (ref 0–0.6)
EOSINOPHILS RELATIVE PERCENT: 2.9 %
GFR AFRICAN AMERICAN: >60
GFR NON-AFRICAN AMERICAN: >60
GLUCOSE BLD-MCNC: 95 MG/DL (ref 70–99)
HCT VFR BLD CALC: 37.8 % (ref 40.5–52.5)
HEMOGLOBIN: 12.7 G/DL (ref 13.5–17.5)
LYMPHOCYTES ABSOLUTE: 1.3 K/UL (ref 1–5.1)
LYMPHOCYTES RELATIVE PERCENT: 18.7 %
MCH RBC QN AUTO: 33.4 PG (ref 26–34)
MCHC RBC AUTO-ENTMCNC: 33.7 G/DL (ref 31–36)
MCV RBC AUTO: 99.3 FL (ref 80–100)
MONOCYTES ABSOLUTE: 0.8 K/UL (ref 0–1.3)
MONOCYTES RELATIVE PERCENT: 11.3 %
NEUTROPHILS ABSOLUTE: 4.6 K/UL (ref 1.7–7.7)
NEUTROPHILS RELATIVE PERCENT: 66 %
PDW BLD-RTO: 14.2 % (ref 12.4–15.4)
PLATELET # BLD: 150 K/UL (ref 135–450)
PMV BLD AUTO: 8.8 FL (ref 5–10.5)
POTASSIUM SERPL-SCNC: 4.1 MMOL/L (ref 3.5–5.1)
RBC # BLD: 3.8 M/UL (ref 4.2–5.9)
SODIUM BLD-SCNC: 137 MMOL/L (ref 136–145)
WBC # BLD: 6.9 K/UL (ref 4–11)

## 2022-04-25 PROCEDURE — 99283 EMERGENCY DEPT VISIT LOW MDM: CPT

## 2022-04-25 PROCEDURE — 6370000000 HC RX 637 (ALT 250 FOR IP): Performed by: EMERGENCY MEDICINE

## 2022-04-25 PROCEDURE — 80048 BASIC METABOLIC PNL TOTAL CA: CPT

## 2022-04-25 PROCEDURE — 85025 COMPLETE CBC W/AUTO DIFF WBC: CPT

## 2022-04-25 RX ORDER — HYDROCODONE BITARTRATE AND ACETAMINOPHEN 5; 325 MG/1; MG/1
1 TABLET ORAL EVERY 8 HOURS PRN
Qty: 8 TABLET | Refills: 0 | Status: SHIPPED | OUTPATIENT
Start: 2022-04-25 | End: 2022-04-28

## 2022-04-25 RX ORDER — DOXYCYCLINE 100 MG/1
100 CAPSULE ORAL 2 TIMES DAILY
COMMUNITY
End: 2022-05-02 | Stop reason: ALTCHOICE

## 2022-04-25 RX ORDER — HYDROCODONE BITARTRATE AND ACETAMINOPHEN 5; 325 MG/1; MG/1
1 TABLET ORAL ONCE
Status: COMPLETED | OUTPATIENT
Start: 2022-04-25 | End: 2022-04-25

## 2022-04-25 RX ADMIN — HYDROCODONE BITARTRATE AND ACETAMINOPHEN 1 TABLET: 5; 325 TABLET ORAL at 18:19

## 2022-04-25 ASSESSMENT — PAIN SCALES - GENERAL
PAINLEVEL_OUTOF10: 5
PAINLEVEL_OUTOF10: 9

## 2022-04-25 ASSESSMENT — PAIN DESCRIPTION - PAIN TYPE: TYPE: ACUTE PAIN

## 2022-04-25 ASSESSMENT — PAIN DESCRIPTION - LOCATION: LOCATION: LEG

## 2022-04-25 ASSESSMENT — PAIN DESCRIPTION - ORIENTATION: ORIENTATION: RIGHT;LOWER

## 2022-04-25 ASSESSMENT — PAIN DESCRIPTION - FREQUENCY: FREQUENCY: CONTINUOUS

## 2022-04-25 ASSESSMENT — PAIN - FUNCTIONAL ASSESSMENT: PAIN_FUNCTIONAL_ASSESSMENT: 0-10

## 2022-04-25 NOTE — TELEPHONE ENCOUNTER
Received call from [address]hip at [facility] with Red Flag Complaint. Subjective: Caller states \"Open sore on leg that is getting bigger. Smaller ones forming around it. It is burning and painful with some swelling in foot and leg. \"     Surgery in November to increase blood flow to R leg and now this wound wont heal up    NP with insurance company rx abx and Mupirocin cream on last Wednesday that is not helping. Current Symptoms: Reports wound on R leg at shin with clear/light brown drainage - draining frequently, small abrasions around it related to bandages, bilaterally leg swelling with pitting edema, numbness/tingling/burning in R lower leg, able to walk, erythema around wound without streaking    Also reports calf cramping at times, knot on side of R leg, knot feels warm at time and is hard at time (has spoken to physician about this and had an ultrasound in Feb for possible blood clot and did was NOT blood clot),     Denies blood in drainage from R leg, worsening SOB, worsening CP, swelling of face/arms/hands,     Onset: 5 months ago; worsening    Associated Symptoms: NA    Pain Severity: 9/10; aching, shooting; intermittent    Temperature: denies    What has been tried: abx, Mupirocin cream    LMP: NA Pregnant: NA    Recommended disposition: See in Office Today     Care advice provided, patient verbalizes understanding; denies any other questions or concerns; instructed to call back for any new or worsening symptoms. staff at [facility] calling patient to schedule  - direct transfer to [facility]) by accident. Notified [facility] via Teams chat system. Tried to call patient back to confirm for appt with no answer. Call back 15 min later. Patient stated he spoke with office who stated to go to ED. Pt states he will go to the ED. Will transfer to [facility] ([facility] to schedule follow up visit. Warm transfer to all [place] with [facility] Yemi for follow up scheduling    Attention Provider:   Thank you for allowing me to participate in the care of your patient. The patient was connected to triage in response to information provided to the E[facility]/PSC. Please do not respond through this encounter as the response is not directed to a shared pool.     Reason for Disposition   Looks infected (spreading redness, pus) and no fever    Protocols used: CUTS AND LACERATIONS-ADULT-[place]

## 2022-04-26 ENCOUNTER — CARE COORDINATION (OUTPATIENT)
Dept: CARE COORDINATION | Age: 72
End: 2022-04-26

## 2022-04-26 NOTE — CARE COORDINATION
Ambulatory Care Coordination Note  4/26/2022  CM Risk Score: 11  Charlson 10 Year Mortality Risk Score: 79%     ACC: Nate Byrd RN    Summary Note: care transition outreach s/p ED visit 4.25.22. Reports home visits by SN for wound care to RLE ended 1-2 weeks ago. He is following w/vascular physician this Thursday, and will see PCP Thurs as well. Discussed smoking cessation; including health coaching towards goal setting, methods to wean down daily consumption of cigarette smoking. Reviewed self mgmt concepts/Zone Mgmt guidelines for HF, COPD. Reviewed DC instructions. Pt verbalized understanding of above and agreement with the following  Plan: initiate ACC support for health coaching, care collaboration, support w/community resources, and help with any newly presenting concerns as needed. Pt will keep appts w/PCP & Vasc surgeon scheduled 4.28.22. Mail Zone Mgmt tools - HF & COPD for pt reference/review. Pt agrees to outreach direct to ACM, as needed, between routine follow up outreach initiated by Candelaria Baldwin    Ambulatory Care Coordination Assessment    Care Coordination Protocol  Referral from Primary Care Provider: No  Week 1 - Initial Assessment     Do you have all of your prescriptions and are they filled?: Yes (Comment: reviewed 4.26.22)  Barriers to medication adherence: None  Are you able to afford your medications?: Yes  How often do you have trouble taking your medications the way you have been told to take them?: I always take them as prescribed. Do you have Home O2 Therapy?: Yes   Oxygen Regimen: At night/Sleep Flow - Enter rate/FIO2: 5   Method of Delivery: Nasal Cannula   CPAP Use: CPAP      Ability to seek help/take action for Emergent Urgent situations i.e. fire, crime, inclement weather or health crisis. : Independent  Ability to ambulate to restroom: Independent  Ability handle personal hygeine needs (bathing/dressing/grooming):  Independent  Ability to manage Medications: Independent  Ability to prepare Food Preparation: Independent  Ability to maintain home (clean home, laundry): Independent  Ability to drive and/or has transportation: Dependent  Ability to do shopping: Independent  Ability to manage finances: Independent  Is patient able to live independently?: Yes     Current Housing: Apartment        Per the Fall Risk Screening, did the patient have 2 or more falls or 1 fall with injury in the past year?: No     Frequent urination at night?: No  Do you use rails/bars?: Yes  Do you have a non-slip tub mat?: Yes     Are you experiencing loss of meaning?: No  Are you experiencing loss of hope and peace?: No     Thinking about your patient's physical health needs, are there any symptoms or problems (risk indicators) you are unsure about that require further investigation?: No identified areas of uncertainly or problems already being investigated   Are the patients physical health problems impacting on their mental well-being?: No identified areas of concern   Are there any problems with your patients lifestyle behaviors (alcohol, drugs, diet, exercise) that are impacting on physical or mental well-being?: No identified areas of concern   Do you have any other concerns about your patients mental well-being?  How would you rate their severity and impact on the patient?: No identified areas of concern   How would you rate their home environment in terms of safety and stability (including domestic violence, insecure housing, neighbor harassment)?: Consistently safe, supportive, stable, no identified problems   How do daily activities impact on the patient's well-being? (include current or anticipated unemployment, work, caregiving, access to transportation or other): No identified problems or perceived positive benefits   How would you rate their social network (family, work, friends)?: Good participation with social networks   How would you rate their financial resources (including ability to afford all required medical care)?: Financially secure, resources adequate, no identified problems   How wells does the patient now understand their health and well-being (symptoms, signs or risk factors) and what they need to do to manage their health?: Reasonable to good understanding and already engages in managing health or is willing to undertake better management   How well do you think your patient can engage in healthcare discussions? (Barriers include language, deafness, aphasia, alcohol or drug problems, learning difficulties, concentration): Clear and open communication, no identified barriers   Do other services need to be involved to help this patient?: Other care/services not required at this time   Are current services involved with this patient well-coordinated? (Include coordination with other services you are now recommendation): All required care/services in place and well-coordinated   Suggested Interventions and Community Resources  Fall Risk Prevention: Completed Other Services or Interventions: review of self mgmt concepts; resources; health coaching re smoking cessation   Smoking Cessation: In Process   Specialty Service Referral: Completed   Transportation Services: Not Started   Zone Management Tools: Completed         Schedule an appointment with the patient's PCP, Set up/Review an Education Plan, Set up/Review Goals              Prior to Admission medications    Medication Sig Start Date End Date Taking? Authorizing Provider   doxycycline monohydrate (MONODOX) 100 MG capsule Take 100 mg by mouth 2 times daily   Yes Historical Provider, MD   mupirocin (BACTROBAN) 2 % ointment Apply topically daily Apply topically 3 times daily. Yes Historical Provider, MD   HYDROcodone-acetaminophen (NORCO) 5-325 MG per tablet Take 1 tablet by mouth every 8 hours as needed for Pain for up to 8 doses.  Sedation precautions 4/25/22 4/28/22 Yes Doreen Paris MD   atorvastatin (LIPITOR) 80 MG tablet Take 1 tablet by mouth daily 4/19/22  Yes Claudia Horner, DO   ELIQUIS 5 MG TABS tablet TAKE 1 TABLET BY MOUTH TWICE DAILY 2/16/22  Yes Historical Provider, MD   losartan (COZAAR) 100 MG tablet TAKE 1 TABLET BY MOUTH EVERY DAY 3/24/22  Yes Claudia Horner, DO   clopidogrel (PLAVIX) 75 MG tablet Take 1 tablet by mouth daily 3/24/22  Yes Claudia Horner, DO   omeprazole (PRILOSEC) 40 MG delayed release capsule Take 1 capsule by mouth daily 2/9/22  Yes Claudia Horner, DO   potassium chloride (MICRO-K) 10 MEQ extended release capsule 1 tablet 2/9/22  Yes Claudia Horner, DO   DULoxetine (CYMBALTA) 20 MG extended release capsule Take 1 capsule by mouth daily 2/9/22  Yes Claudia Horner, DO   fluticasone-umeclidin-vilant (TRELEGY ELLIPTA) 861-20.3-99 MCG/INH AEPB Inhale 1 puff into the lungs daily 1/13/22  Yes Claudia Horner, DO   furosemide (LASIX) 40 MG tablet Take 1 tablet by mouth daily 12/15/21  Yes Rickie Ramachandran MD   folic acid (FOLVITE) 1 MG tablet Take 1 tablet by mouth daily 9/21/21  Yes Tracy Ryan MD   albuterol sulfate  (90 Base) MCG/ACT inhaler Inhale 2 puffs into the lungs every 6 hours as needed for Wheezing 8/25/21  Yes Claudia Horner, DO   allopurinol (ZYLOPRIM) 100 MG tablet Take 1 tablet by mouth daily 8/25/21  Yes Claudia Horner, DO   nitroGLYCERIN (NITROSTAT) 0.4 MG SL tablet Place 1 tablet under the tongue every 5 minutes as needed for Chest pain 8/25/21  Yes Claudia Horner, DO   magnesium gluconate (MAGONATE) 500 MG tablet Take 500 mg by mouth daily. Yes Historical Provider, MD   pregabalin (LYRICA) 100 MG capsule Take 1 capsule by mouth 3 times daily for 30 days.  2/9/22 3/11/22  Claudia Horner, DO   metoprolol (LOPRESSOR) 100 MG tablet Take 1 tablet by mouth 2 times daily 12/15/21 3/15/22  Rickie Ramachandran MD       Future Appointments   Date Time Provider Orlando Colmenares   4/28/2022 11:30 AM MD BOYD Mehta [facility]   4/28/2022  1:30 PM MD JEFF Thayer [place] - DYD   5/25/2022  9:15 AM May Hector Brynn Khanna MD [facility]   10/13/2022  1:00 PM Jalil Bennett MD [facility]     Advance Care Planning   The patient has the following advanced directives on file:  Advance Directives     Power of [address] Will ACP-Advance Directive ACP-Power of     Not on File Not on File Filed Not on File          The patient has appointed the following active healthcare agents:    Primary Decision Maker: Honey Alfredo - [employer] - [phone]    The Patient has the following current code status:    Code Status: Prior    Visit Documentation:  I called and spoke with Marycruz Novak regarding [facility] [address]. I discussed Advance Care Planning with Marycruz Novak today which included the importance of making their choices for care and treatment in the case of a health event that adversely affects their decision-making abilities. He has not completed the Advance Care Directives. He does not have an active health care agent at this time. Marycruz Novak was encouraged to complete the declaration forms and provide a signed copy of his medical records. He was advised availability for support to assist with completion of ACP if needed. Advised ACP information packet will be sent via mail, along with Zone Mgmt tools for HF & COPD.        Arslan Santoyo RN  4/26/2022    I agree with the Johann Zarate

## 2022-04-26 NOTE — ED PROVIDER NOTES
[id number] [facility] Emergency Department      Pt Name: Chema Johnston. MRN: [id number]  Birthdate 1950  Date of evaluation: 4/25/2022  Provider: Samuel Le MD  CHIEF COMPLAINT  Chief Complaint   Patient presents with    Wound Infection     right leg surgery back in november and not healing and np came to house last week and gave him a cream and antibiotics and not helping     HPI  Chema Snáchez is a 73 y.o. male who presents because of leg pain. He is a long term smoker and continues to smoke. He has severe peripheral vascular disease and neuropathy of both legs. He had a stent and bypass procedure in November of last year for his right leg. He says he has had wounds and pains to his leg since then, but feels like the size of the wound has increased over the past several weeks. He has a visiting NP that evaluates him weekly. She started him on bactroban cream and doxycyline about a week ago. Despite this, the patient feels like the pain has gotten worse. His doctors does have him on eliquis, plavix, lyrica, lasix see list below. He had a ultrasound on his right leg several months ago which was negative for DVT. He denies CP. He occasionally has sob and has a history of COPD. REVIEW OF SYSTEMS:  No fever, no cough, no abdominal pain, leg pain Pertinent positives and negatives as per the HPI. All other review of systems reviewed and negative. Nursing notes reviewed.     PAST MEDICAL HISTORY  Past Medical History:   Diagnosis Date    CAD (coronary artery disease)     CHF [facility]congestive heart failure) ([facility])     diastolic    COPD [facility] obstructive [facility]     Critical ischemia of lower extremity (HCC)     Depressive disorder, not elsewhere classified     GERD (gastroesophageal reflux disease)     History of colon polyps - 30 seen on colonoscopy 04/2021 04/24/2021    History of MI (myocardial infarction) 05/2013    History of skin ulcer of lower extremity     R anterior shin    History of transient ischemic attack (TIA)     Hyperlipidemia     Hypertension     Neuropathy     KAVITA (obstructive sleep apnea)     On CPAP    Personal history of DVT (deep vein thrombosis)     PVD (peripheral vascular disease) ([address]     TIA (transient ischemic attack) 2013    Vertebral fracture      SURGICAL HISTORY  Past Surgical History:   Procedure Laterality Date    APPENDECTOMY      CHOLECYSTECTOMY, LAPAROSCOPIC      COLONOSCOPY  4/23/2021    COLONOSCOPY POLYPECTOMY SNARE/COLD BIOPSY performed by Daria Kocher, MD at [facility]  4/23/2021    COLONOSCOPY POLYPECTOMY ABLATION performed by Daria Kocher, MD at [facility]  4/23/2021    COLONOSCOPY CONTROL HEMORRHAGE performed by Daria Kocher, MD at [place] [address]  6/2013    EYE SURGERY Left     FEMORAL ENDARTERECTOMY Right 11/17/2021    RIGHT FEMORAL ENDARTERECTOMY  RIGHT EXTERNAL ILIAC TO PROFUNDA FEMORAL BYPASS WITH 8MM PTFE GRAFT RIGHT LOWER EXTREMITY ARTERIOGRAM BALLOON ANGIOPLASTY RIGHT PROFUNDA BALLOON ANGIOPLASTY AND STENT OF RIGHT EXTERNAL ILIAC ARTERY performed by Fredo Palacio MD at [facility]  11/18/2015    Bilateral decompressive lumbar laminectomy L4-5   ; medial facetectomy L4-5 joints  bilaterally; foraminotomies l5   nerve roots bilaterally    LEG DEBRIDEMENT Right 7/23/2013    Dr. Marcello Marcos - pretibial wound    OTHER SURGICAL HISTORY Right 6/25/2013    Dr. Marcello Marcos - CFA Endarterectomy w/marsupialization; RLE wound excision & debridement     OTHER SURGICAL HISTORY Left 8/27/2013    Dr. Marcello Marcos - femoral & profunda femoris endarterectomy w/marsupialization patch angioplasty using SFA    SKIN SPLIT GRAFT Right 7/25/2013    Dr. Marcello Marcos - to non-healing R pretibial wound, 9 x 15 cm    TOTAL HIP ARTHROPLASTY Right 09/01/2016    Dr. May David Left 12/22/2015    Dr. Marcello Marcos - CFA exploration, thrombectomy of ileofemoral system, stenting of RAFAL in-stent stenosis w/8 x 37 mm Palmaz      MEDICATIONS:  No current facility-administered medications on file prior to encounter. Current Outpatient Medications on File Prior to Encounter   Medication Sig Dispense Refill    doxycycline monohydrate (MONODOX) 100 MG capsule Take 100 mg by mouth 2 times daily      mupirocin (BACTROBAN) 2 % ointment Apply topically daily Apply topically 3 times daily.  atorvastatin (LIPITOR) 80 MG tablet Take 1 tablet by mouth daily 90 tablet 1    ELIQUIS 5 MG TABS tablet TAKE 1 TABLET BY MOUTH TWICE DAILY      losartan (COZAAR) 100 MG tablet TAKE 1 TABLET BY MOUTH EVERY DAY 90 tablet 1    clopidogrel (PLAVIX) 75 MG tablet Take 1 tablet by mouth daily 30 tablet 3    omeprazole (PRILOSEC) 40 MG delayed release capsule Take 1 capsule by mouth daily 30 capsule 3    potassium chloride (MICRO-K) 10 MEQ extended release capsule 1 tablet 60 capsule 1    DULoxetine (CYMBALTA) 20 MG extended release capsule Take 1 capsule by mouth daily 30 capsule 3    pregabalin (LYRICA) 100 MG capsule Take 1 capsule by mouth 3 times daily for 30 days.  90 capsule 3    fluticasone-umeclidin-vilant (TRELEGY ELLIPTA) 100-62.5-25 MCG/INH AEPB Inhale 1 puff into the lungs daily 28 each 11    metoprolol (LOPRESSOR) 100 MG tablet Take 1 tablet by mouth 2 times daily 270 tablet 1    furosemide (LASIX) 40 MG tablet Take 1 tablet by mouth daily 90 tablet 3    folic acid (FOLVITE) 1 MG tablet Take 1 tablet by mouth daily 30 tablet 3    albuterol sulfate  (90 Base) MCG/ACT inhaler Inhale 2 puffs into the lungs every 6 hours as needed for Wheezing 3 Inhaler 4    allopurinol (ZYLOPRIM) 100 MG tablet Take 1 tablet by mouth daily 180 tablet 1    nitroGLYCERIN (NITROSTAT) 0.4 MG SL tablet Place 1 tablet under the tongue every 5 minutes as needed for Chest pain 25 tablet 1    magnesium gluconate (MAGONATE) 500 MG tablet Take 500 mg by mouth daily. ALLERGIES  Asa [aspirin] and Daliresp [roflumilast]  FAMILY HISTORY:  Family History   Problem Relation Age of Onset    Cancer Mother         Breast    Heart Disease Mother     Cancer Father         Bladder    Heart Disease Father     Cancer Paternal Grandmother         melanoma      SOCIAL HISTORY:  Social History     Tobacco Use    Smoking status: Current Every Day Smoker     Packs/day: 0.50     Years: 52.00     Pack years: 26.00     Types: Cigarettes     Start date: 1/1/1967    Smokeless tobacco: Never Used   Substance Use Topics    Alcohol use: Yes     Alcohol/week: 0.0 standard drinks     Comment: 2-3 beers a month    Drug use: No     IMMUNIZATIONS:  Noncontributory    PHYSICAL EXAM  VITAL SIGNS:  Blood pressure 135/61, pulse 79, temperature 97.7 °F (36.5 °C), temperature source Oral, resp. rate 20, height 5' 10\" (1.778 m), weight 224 lb 6 oz (101.8 kg), SpO2 95 %. Constitutional:  73 y.o. male seated  HENT:  Atraumatic, mucous membranes moist  Eyes:   Conjunctiva clear, no icterus  Neck:  Supple, no visible JVD  Cardiovascular:  Regular, no rubs  Thorax & Lungs:  No accessory muscle usage, clear  Abdomen:  Soft, non distended, NT, bowel sounds present  Back:  No deformity  Genitalia:  Deferred  Rectal:  Deferred  Extremities:  No cyanosis, cannot palpate or doppler pulses but his foot is warm to the touch with brisk capillary refill, ulceration with dry cracking skin to shin, see photo below  Skin:  Warm, dry, see photo  Neurologic:  Alert, no slurred speech  Psychiatric:  Affect appropriate        DIAGNOSTIC RESULTS:  Labs resulted at the time of this note reviewed.   Labs Reviewed   CBC WITH AUTO DIFFERENTIAL - Abnormal; Notable for the following components:       Result Value    RBC 3.80 (*)     Hemoglobin 12.7 (*)     Hematocrit 37.8 (*)     All other components within normal limits   BASIC METABOLIC PANEL     RADIOLOGY:  None     ED COURSE:    Medications administered:  Medications HYDROcodone-acetaminophen (NORCO) 5-325 MG per tablet 1 tablet (1 tablet Oral Given 4/25/22 1819)     PROCEDURES:  None    CRITICAL CARE:  None    CONSULTATIONS:  Dr Mychal Babin: Earl Romero is a 73 y.o. male who presented because of leg pain and ulceration. He is currently on abx. He is not toxic. He has severe PVD and pulses cannot be felt but there is no clinical evidence to suggest acute LVO or severe acute  hypoperfusion clinically. Foot is warm to the touch, cap refill present. Chronic hypoperfusion is evident. I discussed clinical findings with Dr Danica Martinez. He will inform Dr Clair Carmona of patient's clinical presentation here today so she can closely follow the patient as an outpatient. I advised smoking cessation, continuing current meds, additional analgesia. Earl Gomez. was given appropriate discharge instructions. Referral to follow up provider. Differential diagnosis:  Fracture, vascular occlusion, compartment syndrome, DVT, infection, other  Discharge Medication List as of 4/25/2022  6:31 PM      START taking these medications    Details   HYDROcodone-acetaminophen (NORCO) 5-325 MG per tablet Take 1 tablet by mouth every 8 hours as needed for Pain for up to 8 doses. Sedation precautions, Disp-8 tablet, R-0Normal           FOLLOW UP:    Erminio Favre, MD  [address] [facility]  [phone]    Call in 1 day      [facility] [address] [address] [phone]  Go to   If symptoms worsen    FINAL IMPRESSION:    1. Peripheral vascular disease ([facility] [address]    2. Skin ulcer, unspecified ulcer stage ([facility] [address]        (Please note that I used voice recognition software to generate this note.   Occasionally words are mistranscribed despite my efforts to edit errors.)        Andriy Jimenez MD  04/25/22 1773

## 2022-04-27 RX ORDER — DULOXETINE 40 MG/1
CAPSULE, DELAYED RELEASE ORAL
Status: ON HOLD | COMMUNITY
Start: 2022-04-21 | End: 2022-07-29 | Stop reason: HOSPADM

## 2022-04-28 ENCOUNTER — OFFICE VISIT (OUTPATIENT)
Dept: VASCULAR SURGERY | Age: 72
End: 2022-04-28
Payer: MEDICARE

## 2022-04-28 VITALS
TEMPERATURE: 98.1 F | HEIGHT: 70 IN | HEART RATE: 67 BPM | BODY MASS INDEX: 32.5 KG/M2 | SYSTOLIC BLOOD PRESSURE: 147 MMHG | WEIGHT: 227 LBS | DIASTOLIC BLOOD PRESSURE: 71 MMHG

## 2022-04-28 DIAGNOSIS — I70.221 ATHEROSCLEROSIS OF NATIVE ARTERY OF RIGHT LOWER EXTREMITY WITH REST PAIN (HCC): ICD-10-CM

## 2022-04-28 DIAGNOSIS — Z72.0 TOBACCO ABUSE: ICD-10-CM

## 2022-04-28 DIAGNOSIS — I73.9 PAD (PERIPHERAL ARTERY DISEASE) (HCC): Primary | ICD-10-CM

## 2022-04-28 PROCEDURE — 99214 OFFICE O/P EST MOD 30 MIN: CPT | Performed by: SURGERY

## 2022-04-28 NOTE — PROGRESS NOTES
2022     Yesika Cintron (:  1950) is a 73 y.o. male,Established patient, here for evaluation of the following chief complaint(s):  Follow-up (leg ulcer)    ASSESSMENT/PLAN:  1. PAD (peripheral artery disease) (HCC)  -     VL DUP LOWER EXTREMITY ARTERIES BILATERAL; Future  2. Atherosclerosis of native artery of right lower extremity with rest pain ([address]  3. Tobacco abuse    ---Strongly encouraged him to stop smoking. He had a very complex revascularization of his inflow vessels and given his overall poor pulmonary state, in my opinion, he would not be a candidate for any further revascularization. I told him this at the time of surgery and continue to reiterate it as he appears quite feeble today.  ---He is due for repeat arterial duplex study with follow-up. I have made him an appointment with me in 1 week in the wound care center and advised him that should he forego that appointment, he will need to find another vascular physician. His poor follow-up and lack of communication have made it very difficult to continue to treat him appropriately. SUBJECTIVE/OBJECTIVE:  S/p Right femoral endarterectomy, R EIA to PFA bypass and R EIA PTA/stent  2021  He has been a no-show to all his visits with me since December. He apparently has a visiting nurse doing his dressings, but states she has not been out for a few weeks. It is unclear to me who is directing his care. He has not contacted me with any problems. He continues to smoke. Reports increased pain in the area of the lower leg wound. Overall, the rest pain itself has resolved since prior to surgery and his complaint now is more regarding the periwound area. Reports shortness of breath and wheezing, more so than usual.  Continues to follow with Dr. Becky Banks.     Physical Exam  Vitals:    22 1101   BP: (!) 147/71   Pulse: 67   Temp: 98.1 °F (36.7 °C)   Weight: 227 lb (103 kg)   Height: 5' 10\" (1.778 m)     Right lower extremities warm well perfused with normal capillary refill. Swelling is little bit improved. He has a moist ulceration to the anterior portion of the mid right lower leg. Mild periwound erythema. Foot itself is well-perfused with no cyanosis or discoloration. No dependent rubor. Doppler DP and PT pulses are now audible following surgery. Remain monophasic by Doppler and nonpalpable, as expected. Right groin incision is healed. Doppler right femoral pulse over bypass graft. An electronic signature was used to authenticate this note.     --Wilmer Agudelo MD

## 2022-05-02 ENCOUNTER — HOSPITAL ENCOUNTER (OUTPATIENT)
Dept: WOUND CARE | Age: 72
Discharge: HOME | End: 2022-05-02
Payer: MEDICARE

## 2022-05-02 ENCOUNTER — HOSPITAL ENCOUNTER (OUTPATIENT)
Dept: VASCULAR LAB | Age: 72
Discharge: HOME | End: 2022-05-02
Payer: MEDICARE

## 2022-05-02 VITALS
HEART RATE: 71 BPM | SYSTOLIC BLOOD PRESSURE: 125 MMHG | WEIGHT: 227 LBS | RESPIRATION RATE: 16 BRPM | HEIGHT: 70 IN | BODY MASS INDEX: 32.5 KG/M2 | DIASTOLIC BLOOD PRESSURE: 68 MMHG | TEMPERATURE: 97.5 F

## 2022-05-02 DIAGNOSIS — I73.9 PVD (PERIPHERAL VASCULAR DISEASE) (HCC): ICD-10-CM

## 2022-05-02 DIAGNOSIS — I73.9 PAD (PERIPHERAL ARTERY DISEASE) (HCC): ICD-10-CM

## 2022-05-02 DIAGNOSIS — S81.801A OPEN WOUND OF RIGHT LOWER LEG, INITIAL ENCOUNTER: Primary | ICD-10-CM

## 2022-05-02 DIAGNOSIS — L97.912 NON-PRESSURE ULCER OF RIGHT LOWER EXTREMITY WITH FAT LAYER EXPOSED (HCC): ICD-10-CM

## 2022-05-02 PROCEDURE — 99213 OFFICE O/P EST LOW 20 MIN: CPT

## 2022-05-02 PROCEDURE — 93925 LOWER EXTREMITY STUDY: CPT

## 2022-05-02 PROCEDURE — 99214 OFFICE O/P EST MOD 30 MIN: CPT | Performed by: SURGERY

## 2022-05-02 RX ORDER — LIDOCAINE HYDROCHLORIDE 40 MG/ML
2.5 SOLUTION TOPICAL ONCE
Status: DISCONTINUED | OUTPATIENT
Start: 2022-05-02 | End: 2022-05-03 | Stop reason: HOSPADM

## 2022-05-02 ASSESSMENT — PAIN DESCRIPTION - LOCATION: LOCATION: LEG

## 2022-05-02 ASSESSMENT — PAIN DESCRIPTION - ONSET: ONSET: AWAKENED FROM SLEEP

## 2022-05-02 ASSESSMENT — PAIN SCALES - GENERAL: PAINLEVEL_OUTOF10: 7

## 2022-05-02 ASSESSMENT — PAIN DESCRIPTION - DESCRIPTORS: DESCRIPTORS: BURNING;SHOOTING;SHARP

## 2022-05-02 ASSESSMENT — PAIN DESCRIPTION - ORIENTATION: ORIENTATION: RIGHT

## 2022-05-02 ASSESSMENT — PAIN DESCRIPTION - FREQUENCY: FREQUENCY: CONTINUOUS

## 2022-05-03 ENCOUNTER — TELEPHONE (OUTPATIENT)
Dept: WOUND CARE | Age: 72
End: 2022-05-03

## 2022-05-03 PROBLEM — L97.912 NON-PRESSURE ULCER OF RIGHT LOWER EXTREMITY WITH FAT LAYER EXPOSED (HCC): Status: ACTIVE | Noted: 2022-05-03

## 2022-05-03 NOTE — PROGRESS NOTES
[facility] and Procedure Note      Waleska Gamez. MEDICAL RECORD NUMBER:  [id number]  AGE: 73 y.o. GENDER: male  : 1950  EPISODE DATE:  2022    Subjective:     Chief Complaint   Patient presents with    Wound Check     Initial visit - wound on right lower leg      HISTORY of PRESENT ILLNESS HPI   Waleska Navarro is a 73 y.o. male who presents today for wound/ulcer evaluation. S/p Right femoral endarterectomy, R EIA to PFA bypass and R EIA PTA/stent  2021. Previously, he had multiple revascularizations/endarterectomies and the above-stated surgery was very difficult. He has known right SFA occlusion with three-vessel runoff to the foot. He returned to my office last week for right lower leg ulcer evaluation. As stated previously, he had no showed for multiple visits since December. It was unclear who was even managing his wound care and he stated he was not putting anything on it. Today, he presents with Bactroban which he states was ordered a week or so ago by \"some nurse\". He had a repeat arterial duplex study today which shows a patent right external iliac artery to PFA bypass with stable ROD of 0.45. Again, SFA is known to be occluded. Unfortunately he continues to smoke and demonstrates wheezing and shortness of breath at rest, though this is a little bit better than last week.     I personally reviewed images the following study:  VL DUP LOWER EXTREMITY ARTERIES BILATERAL  22    Ulcer Identification:  Ulcer Type: arterial and traumatic        PAST MEDICAL HISTORY      Diagnosis Date    CAD (coronary artery disease)     CH[facility]estive [facility] failure) [facility]     diastolic    COPD ([facility]ronic obstructive pulmonary disease) [facility]     Critical ischemia of lower extremity (HCC)     Depressive disorder, not elsewhere classified     GERD (gastroesophageal reflux disease)     History of colon polyps - 30 seen on colonoscopy 2021 04/24/2021    History of MI (myocardial infarction) 05/2013    History of skin ulcer of lower extremity     R anterior shin    History of transient ischemic attack (TIA)     Hyperlipidemia     Hypertension     Neuropathy     KAVITA (obstructive sleep apnea)     On CPAP    Personal history of DVT (deep vein thrombosis)     PVD (peripheral vascular disease) ([facility] [address]     TIA (transient ischemic attack) 2013    Vertebral fracture      PAST SURGICAL HISTORY  Past Surgical History:   Procedure Laterality Date    APPENDECTOMY      CHOLECYSTECTOMY, LAPAROSCOPIC      COLONOSCOPY  4/23/2021    COLONOSCOPY POLYPECTOMY SNARE/COLD BIOPSY performed by Dean Mena MD at [facility]  4/23/2021    COLONOSCOPY POLYPECTOMY ABLATION performed by Dean Mena MD at [address] [place] [address]  4/23/2021    COLONOSCOPY CONTROL HEMORRHAGE performed by Dean Mena MD at [place] [address]  6/2013    EYE SURGERY Left     FEMORAL ENDARTERECTOMY Right 11/17/2021    RIGHT FEMORAL ENDARTERECTOMY  RIGHT EXTERNAL ILIAC TO PROFUNDA FEMORAL BYPASS WITH 8MM PTFE GRAFT RIGHT LOWER EXTREMITY ARTERIOGRAM BALLOON ANGIOPLASTY RIGHT PROFUNDA BALLOON ANGIOPLASTY AND STENT OF RIGHT EXTERNAL ILIAC ARTERY performed by Kylah Adler MD at [address] [place] [address]  11/18/2015    Bilateral decompressive lumbar laminectomy L4-5   ; medial facetectomy L4-5 joints  bilaterally; foraminotomies l5   nerve roots bilaterally    LEG DEBRIDEMENT Right 7/23/2013    Dr. Mihaela Mills - pretibial wound    OTHER SURGICAL HISTORY Right 6/25/2013    Dr. Mihaela Mills - CFA Endarterectomy w/marsupialization; RLE wound excision & debridement     OTHER SURGICAL HISTORY Left 8/27/2013    Dr. Mihaela Mills - femoral & profunda femoris endarterectomy w/marsupialization patch angioplasty using SFA    SKIN SPLIT GRAFT Right 7/25/2013    Dr. Mihaela Mills - to non-healing R pretibial wound, 9 x 15 cm    TOTAL HIP ARTHROPLASTY Right 09/01/2016    Dr. Roshni Tapia Left 12/22/2015    Dr. Mary Lowry - CFA exploration, thrombectomy of ileofemoral system, stenting of RAFAL in-stent stenosis w/8 x 37 mm Palmaz        SOCIAL HISTORY  Social History     Tobacco Use    Smoking status: Current Every Day Smoker     Packs/day: 0.25     Years: 52.00     Pack years: 13.00     Types: Cigarettes     Start date: 1/1/1967    Smokeless tobacco: Never Used   Substance Use Topics    Alcohol use: Yes     Alcohol/week: 0.0 standard drinks     Comment: 2-3 beers a month    Drug use: No     ALLERGIES  Allergies   Allergen Reactions    Asa [Aspirin] Other (See Comments)     Stomach ache    Daliresp [Roflumilast] Diarrhea and Other (See Comments)     Severe diarrhea and did not help     MEDICATIONS  Current Outpatient Medications on File Prior to Encounter   Medication Sig Dispense Refill    DULoxetine HCl 40 MG CPEP TAKE 1 CAPSULE BY MOUTH DAILY      mupirocin (BACTROBAN) 2 % ointment Apply topically daily Apply topically 3 times daily.  atorvastatin (LIPITOR) 80 MG tablet Take 1 tablet by mouth daily 90 tablet 1    ELIQUIS 5 MG TABS tablet TAKE 1 TABLET BY MOUTH TWICE DAILY      losartan (COZAAR) 100 MG tablet TAKE 1 TABLET BY MOUTH EVERY DAY 90 tablet 1    clopidogrel (PLAVIX) 75 MG tablet Take 1 tablet by mouth daily 30 tablet 3    omeprazole (PRILOSEC) 40 MG delayed release capsule Take 1 capsule by mouth daily 30 capsule 3    potassium chloride (MICRO-K) 10 MEQ extended release capsule 1 tablet 60 capsule 1    DULoxetine (CYMBALTA) 20 MG extended release capsule Take 1 capsule by mouth daily 30 capsule 3    pregabalin (LYRICA) 100 MG capsule Take 1 capsule by mouth 3 times daily for 30 days.  90 capsule 3    fluticasone-umeclidin-vilant (TRELEGY ELLIPTA) 100-62.5-25 MCG/INH AEPB Inhale 1 puff into the lungs daily 28 each 11    metoprolol (LOPRESSOR) 100 MG tablet Take 1 tablet by mouth 2 times daily 270 tablet 1    furosemide (LASIX) 40 MG tablet Take 1 tablet by mouth daily 90 tablet 3    folic acid (FOLVITE) 1 MG tablet Take 1 tablet by mouth daily 30 tablet 3    albuterol sulfate  (90 Base) MCG/ACT inhaler Inhale 2 puffs into the lungs every 6 hours as needed for Wheezing 3 Inhaler 4    allopurinol (ZYLOPRIM) 100 MG tablet Take 1 tablet by mouth daily 180 tablet 1    nitroGLYCERIN (NITROSTAT) 0.4 MG SL tablet Place 1 tablet under the tongue every 5 minutes as needed for Chest pain 25 tablet 1    magnesium gluconate (MAGONATE) 500 MG tablet Take 500 mg by mouth daily. No current facility-administered medications on file prior to encounter. Last S4A if applicable:   Hemoglobin A1C   Date Value Ref Range Status   07/22/2020 6.1 See comment % Final     Comment:     Comment:  Diagnosis of Diabetes: > or = 6.5%  Increased risk of diabetes (Prediabetes): 5.7-6.4%  Glycemic Control: Nonpregnant Adults: <7.0%                    Pregnant: <6.0%           Objective:      /68   Pulse 71   Temp 97.5 °F (36.4 °C) (Temporal)   Resp 16   Ht 5' 10\" (1.778 m)   Wt 227 lb (103 kg) Comment: last weighed 4 days ago  BMI 32.57 kg/m²     Wt Readings from Last 3 Encounters:   05/02/22 227 lb (103 kg)   04/28/22 227 lb (103 kg)   04/25/22 224 lb 6 oz (101.8 kg)     PHYSICAL EXAM  General:  AAO x 3. NAD. WD/WN. Chronically ill-appearing. Appears older than stated age. Lungs: Breath sounds decreased symmetrically throughout. Bilateral wheezes noted. Decreased work of breathing noted compared to 4/28/2022 visit. Extremities: Swelling is modestly improved. Feet are relatively warm and well-perfused with 2 to 3-second capillary refill and no ulcerations of the feet. No cyanosis. Right anterior lower leg with stable ulceration, shallow and no necrosis. Vascular: Biphasic femoral pulse. Right DP and PT remain monophasic. Skin:  warm, dry, no rash, no jaundice. Assessment:      1.  Open wound of right lower leg, initial encounter    2. PVD (peripheral vascular disease) ([facility] [address]    3. Non-pressure ulcer of right lower extremity with fat layer exposed ([place] [address]      Procedure Note  Indications:  Based on my examination of this patient's wound(s)/ulcer(s) today, debridement is not required to promote healing and evaluate the wound base. Wound 11/15/21 Pretibial Distal;Right #1 (Active)   Wound Image   05/02/22 1435   Wound Etiology Venous 05/02/22 1435   Wound Cleansed Cleansed with saline 05/02/22 1435   Dressing/Treatment Alginate with Ag 05/02/22 1522   Wound Length (cm) 1.6 cm 05/02/22 1435   Wound Width (cm) 1.5 cm 05/02/22 1435   Wound Depth (cm) 0.1 cm 05/02/22 1435   Wound Surface Area (cm^2) 2.4 cm^2 05/02/22 1435   Wound Volume (cm^3) 0.24 cm^3 05/02/22 1435   Post-Procedure Length (cm) 1.6 cm 05/02/22 1522   Post-Procedure Width (cm) 1.5 cm 05/02/22 1522   Post-Procedure Depth (cm) 0.1 cm 05/02/22 1522   Post-Procedure Surface Area (cm^2) 2.4 cm^2 05/02/22 1522   Post-Procedure Volume (cm^3) 0.24 cm^3 05/02/22 1522   Distance Tunneling (cm) 0 cm 05/02/22 1435   Undermining Maxium Distance (cm) 0 05/02/22 1435   Wound Assessment Dry;Pink/red;Fibrin 05/02/22 1435   Drainage Amount None 05/02/22 1435   Odor None 05/02/22 1435   Catrachita-wound Assessment Blanchable erythema; Intact 05/02/22 1435   Margins Attached edges 05/02/22 1435   Wound Thickness Description not for Pressure Injury Full thickness 05/02/22 1435   Number of days: 168          Plan:   Continue Bactroban with collagen dressing. Light compression in the form of [place] [address].   Follow-up in 1 week    Treatment Note please see attached Discharge Instructions    New Medication(s) at this visit:   New Prescriptions    No medications on file       Other orders at this visit:   Orders Placed This Encounter   Procedures    External Referral To Home Health       Smoking Cessation: Ready to quit: Yes  Counseling given: Yes      Written patient dismissal instructions given to patient and signed by patient or POA. Discharge [facility] Physician Orders and Discharge Instructions  [address] [place] [address] [address] [address] [facility]. [place]. [place]  Telephone: [phone]  NAME:  Oneil Pineda. YOB: 1950  MEDICAL RECORD NUMBER:  [id number]  DATE:  5/2/2022    Wash hands with soap and water prior to and after every dressing change. Wound Cleansing:   · Do not scrub or use excessive force. · With each dressing change, rinse wounds with 0.9% Saline. (May use wound wash or soft contact solution. Both can be purchased at a local drug store). · If unable to obtain saline, may use a gentle soap and water. · Keep wounds dry in the shower unless otherwise instructed by the physician. · For wounds on lower legs, cast covers can be purchased at local drug stores, so that you may shower and keep the wound(s) dry. []  Vashe Wash solution instructions (if prescribed): Apply enough Vashe to soak a piece of gauze and place on wound bed for 5-10 minutes. DO NOT rinse after Vashe has been applied. Follow dressing application as instructed below. Catrachita wound Topical Treatments:  Do not apply lotions, creams, or ointments to the skin around the wound bed unless directed as followed:     [] Apply around the wound: [] moisturizing lotion [] Antifungal ointment [] No-Sting barrier film [] Zinc paste [] Other:       Dressings:           Wound Location: right lower leg      Apply Primary Dressing to wound: Oly         Cover and Secure with:     Cover and Secure with: 4X4 gauze pad  Bulky roll gauze   Avoid contact of tape with skin if possible.      When to change Dressing: [] Daily [] Every Other Day [] Once a week  [x] Three times per week: [x] Monday, Wednesday, Friday [] Tuesday, Thursday, Saturday  [] Do Not Change Dressing [] Other:     Edema Control:  Apply: [] Compression Stocking  [] Left Lower Leg [] Right Lower Leg     [x]  Spandagrip:Strength: Low compression 5-10 mm/Hg    [] Left Lower Leg  [x] Right Lower Leg        Apply every morning immediately when getting up. They should be applied to affected leg(s) from mid foot to knee making sure to cover the heel. Remove every night before going to bed. [x] Elevate leg(s) above the level of the heart for 30 minutes 4-5 times a day and/or when sitting. [x] Avoid prolonged standing in one place. Dietary:  Important dietary reminders:  1. Increase Protein intake (i.e. Lean meats, fish, eggs, legumes, and yogurt)  2. No added salt  3. If diabetic, follow a diabetic diet and check glucose prior to meals or as instructed by your physician. Dietary Supplements:  [] Real  [] 30ml ProStat  [] EnsureEnlive [] Ensure Max/Premier  [] Other:    If you are still having pain after you go home:   For wounds on lower legs or arms, elevate the affected limb.  Use over-the-counter medications you would normally use for pain as permitted by your primary care doctor.  For persistent pain not relieved by the above interventions, please call your primary care doctor. Return Appointment:   Return Appointment: With Dr Ginger Arzola  in  [address] [place] [address])    [] Return Appointment for a Wound Assessment (Nurse Visit) on:       [x] Orders placed during your visit: No orders of the defined types were placed in this encounter.       o All other future appointments:  Future Appointments   Date Time Provider Orlando Colmenares   2022  9:15 AM Kalin Lemus MD [facility]   10/13/2022  1:00 PM Josephine Chaudhari MD [facility]   -     Home care: [facility] [phone]   F: [phone]    Your nurse  is:  Deysi Fuller     Electronically signed by Gordon Anthony RN on 2022 at 3:23 PM     [facility] Information: Should you experience any significant changes in your wound(s) or have questions about your wound care, please contact the [address] [place] [address] at [phone]. We are open from 8:00am - 4:30p Monday, Thursday and Friday; 11:00am - 5pm on Tuesday and CLOSED Wednesday. Please give us 24-48 hours to return your call. Call your doctor now or seek immediate medical care if:    · You have symptoms of infection, such as:  ? Increased pain, swelling, warmth, or redness. ? Red streaks leading from the area. ? Pus draining from the area. ? A fever.         Physician for this visit and orders: Marie Dave MD    [] Patient unable to sign Discharge Instructions given to ECF/Transportation/POA            Electronically signed by Marie Dave MD on 5/3/2022 at 3:37 PM

## 2022-05-04 ENCOUNTER — CARE COORDINATION (OUTPATIENT)
Dept: CARE COORDINATION | Age: 72
End: 2022-05-04

## 2022-05-04 NOTE — CARE COORDINATION
Ambulatory Care Coordination Note  5/4/2022  CM Risk Score: 3  Charlson 10 Year Mortality Risk Score: 79%     ACC: Ashleigh Lopez, RN    Summary Note: pt has not heard from Shawn Whitten for [facility] visit for wound care M,W,F ordered by vascular surgeon this week. Pt further reviews HHA support, formerly received 2hr/wk is suspended d/t staffing shortage of agency [facility]) which newly acquired the agency which was providing the service [facility])  Series of calls ensued whereas ACM advocated on pt behalf to address aforementioned concerns. Outcome: [facility] visit this afternoon for wound care by SN via [facility]; and  HHA referral will be forwarded to [facility] (per Lin, [employer] Coordination Specialist @ [facility]) for 2hr wk  Lin advised ACM there is no assurance [facility] will be able to accommodate the [facility] referral - contingent on available staffing at [facility]. Lin notes pt does remain on [facility] wait list for non-skilled care and there remains potential another agency may acquire pt for services, if/as able. Due to industry-wide staffing shortage, there is no means to ascertain when/if HHA support services will be able to provide HHA support for pt. ACM reviewed w/pt consideration of Consumer Directed Care, explaining a family, friend, or trusted acquaintance may be compensated for non-skilled care support through [employer]. However, pt does not know of any such available person. Reviewed self mgmt concepts, availability of ACM for support by phone or further advocacy, as needed. Pt verbalized understanding of above and agreement with the following  Plan: continue [facility] support for health coaching, care collaboration, support w/community resources, and help with any newly presenting concerns as needed.   Pt agrees to outreach direct to ACM, as needed, between routine follow up outreach initiated by [facility]    Congestive Heart Failure Assessment    Are you currently restricting fluids?: No Restriction  Do you understand a low sodium diet?: Yes  Do you understand how to read food labels?: Yes  How many restaurant meals do you eat per week?: 0  Do you salt your food before tasting it?: No     No patient-reported symptoms      Symptoms:  None: Yes      Symptom course: stable  Weight trend: stable  Salt intake watch compared to last visit: stable      and   COPD Assessment    Does the patient understand envrionmental exposure?: Yes  Is the patient able to verbalize Rescue vs. Long Acting medications?: Yes  Does the patient have a nebulizer?: No  Does the patient use a space with inhaled medications?: No     No patient-reported symptoms         Symptoms:  None: Yes      Symptom course: stable  Breathlessness: none  Change in chronic cough?: No/At Baseline  Change in sputum?: No/At Baseline  Have you had a recent diagnosis of pneumonia either by PCP or at a hospital?: No           Care Coordination Interventions    Referral from Primary Care Provider: No  Suggested Interventions and Community Resources  Fall Risk Prevention: Completed  Home Health Services: In Process (Comment: 5.4.22 pending SOC for wound care M,W,F via Dr Joe Lopez MD [facility] [address] )  Senior Services: Completed (Comment: Home health aide for homemaker support  2 hr/wk via [facility] (formerly provided through [facility]))  Smoking Cessation: In Process (Comment: 4.26.22 health coaching/encouraged in self challenge of reducing daily consumption of cigarettes by delay of 1st cirgarette of day by 15 min, advance as ready)  Specialty Services Referral: Completed (Comment: Dr Katie Owens (cardiology); Dr Joe Lopez (Vas);  Dr Juan Miguel Matos (Pulmonology))  Transportation Support: Not Started  Zone Management Tools: Completed (Comment: HF, COPD)  Other Services or Interventions: review of self mgmt concepts; resources; health coaching re smoking cessation         Goals Addressed                    This Visit's Progress      Conditions and Symptoms (pt-stated)   On track      I will schedule office visits, as directed by my provider. I will keep my appointment or reschedule if I have to cancel. I will notify my provider of any barriers to my plan of care. I will follow my Zone Management tool to seek urgent or emergent care. I will notify my provider of any symptoms that indicate a worsening of my condition. Barriers: impairment:  physical: vascular wound lower extremity and fear of failure  Plan for overcoming my barriers: engage in Care Coordination  Confidence: 10/10  Anticipated Goal Completion Date: 10.26.22          Wellness Goal (pt-stated)   On track      Patient Self-Management Goal for Health Maintenance  Goal: I will chose a goal related to tobacco cessation:  I will think about my triggers for smoking, I will think about reasons why I should quit smoking, and I will learn more about the harmful effects of tobacco.  Barriers: impairment:  physical: nicotine dependence and fear of failure  Plan for overcoming my barriers: engage in Care Coordination  Confidence: 10/10  Anticipated Goal Completion Date: 10.26.22            Prior to Admission medications    Medication Sig Start Date End Date Taking? Authorizing Provider   DULoxetine HCl 40 MG CPEP TAKE 1 CAPSULE BY MOUTH DAILY 4/21/22   Historical Provider, MD   mupirocin (BACTROBAN) 2 % ointment Apply topically daily Apply topically 3 times daily.     Historical Provider, MD   atorvastatin (LIPITOR) 80 MG tablet Take 1 tablet by mouth daily 4/19/22   Azar Lockhart DO   ELIQUIS 5 MG TABS tablet TAKE 1 TABLET BY MOUTH TWICE DAILY 2/16/22   Historical Provider, MD   losartan (COZAAR) 100 MG tablet TAKE 1 TABLET BY MOUTH EVERY DAY 3/24/22   Azar Lockhart DO   clopidogrel (PLAVIX) 75 MG tablet Take 1 tablet by mouth daily 3/24/22   Azar Lockhart DO   omeprazole (PRILOSEC) 40 MG delayed release capsule Take 1 capsule by mouth daily 2/9/22   Azar Lockhart DO   potassium chloride (MICRO-K) 10 MEQ extended release capsule 1 tablet 2/9/22   [facility],    DULoxetine (CYMBALTA) 20 MG extended release capsule Take 1 capsule by mouth daily 2/9/22   Sidonie Mio, DO   pregabalin (LYRICA) 100 MG capsule Take 1 capsule by mouth 3 times daily for 30 days. 2/9/22 3/11/22  Sidonie Mio, DO   fluticasone-umeclidin-vilant (TRELEGY ELLIPTA) 399-73.7-04 MCG/INH AEPB Inhale 1 puff into the lungs daily 1/13/22   Marivele Mio, DO   metoprolol (LOPRESSOR) 100 MG tablet Take 1 tablet by mouth 2 times daily 12/15/21 3/15/22  Christopher Duarte MD   furosemide (LASIX) 40 MG tablet Take 1 tablet by mouth daily 12/15/21   Christopher Duarte MD   folic acid (FOLVITE) 1 MG tablet Take 1 tablet by mouth daily 9/21/21   Sera Hammonds MD   albuterol sulfate  (90 Base) MCG/ACT inhaler Inhale 2 puffs into the lungs every 6 hours as needed for Wheezing 8/25/21   Sidonie Mio, DO   allopurinol (ZYLOPRIM) 100 MG tablet Take 1 tablet by mouth daily 8/25/21   Sidonie Mio, DO   nitroGLYCERIN (NITROSTAT) 0.4 MG SL tablet Place 1 tablet under the tongue every 5 minutes as needed for Chest pain 8/25/21   Sidonie Mio, DO   magnesium gluconate (MAGONATE) 500 MG tablet Take 500 mg by mouth daily.     Historical Provider, MD       Future Appointments   Date Time Provider Orlando Colmenares   5/16/2022  1:15 PM MD Vick Medina [facility]   5/25/2022  9:15 AM Gisele Copeland MD [facility]   10/13/2022  1:00 PM Boone Colunga MD [facility]

## 2022-05-09 RX ORDER — APIXABAN 5 MG/1
TABLET, FILM COATED ORAL
Qty: 180 TABLET | OUTPATIENT
Start: 2022-05-09

## 2022-05-10 ENCOUNTER — TELEPHONE (OUTPATIENT)
Dept: WOUND CARE | Age: 72
End: 2022-05-10

## 2022-05-10 NOTE — TELEPHONE ENCOUNTER
Returned Daisy's phone call ( [phone]) with [facility] and she states wound larger, with increased pain,  redness,pulse palp. Called patient and will have him come in on Thursday at 10:45am to see Dr. Monica Briseno.

## 2022-05-12 ENCOUNTER — HOSPITAL ENCOUNTER (OUTPATIENT)
Dept: WOUND CARE | Age: 72
Discharge: HOME | End: 2022-05-12
Payer: MEDICARE

## 2022-05-12 VITALS — SYSTOLIC BLOOD PRESSURE: 133 MMHG | HEART RATE: 75 BPM | DIASTOLIC BLOOD PRESSURE: 74 MMHG | TEMPERATURE: 96.9 F

## 2022-05-12 DIAGNOSIS — I73.9 PERIPHERAL ARTERY DISEASE (HCC): ICD-10-CM

## 2022-05-12 PROBLEM — S91.104A OPEN WOUND OF FIFTH TOE OF RIGHT FOOT: Status: ACTIVE | Noted: 2022-05-12

## 2022-05-12 PROCEDURE — 11042 DBRDMT SUBQ TIS 1ST 20SQCM/<: CPT

## 2022-05-12 PROCEDURE — 11042 DBRDMT SUBQ TIS 1ST 20SQCM/<: CPT | Performed by: SPECIALIST

## 2022-05-12 ASSESSMENT — PAIN DESCRIPTION - LOCATION: LOCATION: LEG

## 2022-05-12 ASSESSMENT — PAIN SCALES - GENERAL: PAINLEVEL_OUTOF10: 9

## 2022-05-12 ASSESSMENT — PAIN DESCRIPTION - FREQUENCY: FREQUENCY: CONTINUOUS

## 2022-05-12 ASSESSMENT — PAIN DESCRIPTION - ORIENTATION: ORIENTATION: RIGHT

## 2022-05-12 ASSESSMENT — PAIN DESCRIPTION - DESCRIPTORS: DESCRIPTORS: SHARP;BURNING

## 2022-05-12 NOTE — PROGRESS NOTES
[facility]  Progress Note and Procedure Note      Zarie Slade. MEDICAL RECORD NUMBER:  [id number]  AGE: 81 y.o. GENDER: male  : 1950  EPISODE DATE:  2022    Subjective:     Chief Complaint   Patient presents with    Wound Check     right lower leg         HISTORY of PRESENT ILLNESS HPI     Zaire Givens is a 81 y.o. male who presents today for wound/ulcer evaluation. History of Wound Context: Patient is followed in wound care by Dr. Ewelina Camara for a right lower extremity wound. Patient has had a right femoral endarterectomy and bypass procedure in the past.  He states that there is also present today and new wound overlying his right medial malleolus which he ascribes to trauma    Pain Assessment:  Wound/Ulcer Pain Timing/Severity: none  Quality of pain: N/A  Severity:  0 / 10   Modifying Factors: None  Associated Signs/Symptoms: edema    Ulcer Identification:  Ulcer Type: arterial and traumatic  Contributing Factors: smoking    Objective:      /74   Pulse 75   Temp 96.9 °F (36.1 °C)     Wt Readings from Last 3 Encounters:   22 227 lb (103 kg)   22 227 lb (103 kg)   22 224 lb 6 oz (101.8 kg)       PHYSICAL EXAM    Extremities: no cyanosis, no clubbing, 1 + edema-  right lower extremity with full-thickness granulating wound anterior lower leg designated as wound #1  Small full-thickness wound containing fibrin, slough right medial malleolus designated as wound #2  Minimal periwound erythema  Assessment:      No diagnosis found. Procedure Note  Indications:  Based on my examination of this patient's wound(s) today, sharp excision is required to promote healing and evaluate the extent healing. Performed by: Jeremie Garcia MD    Consent obtained?  Yes    Time out taken: Yes    Pain Control: Anesthetic: 4% Lidocaine Liquid Topical     Debridement:Excisional Debridement    Using curette the wound was sharply debrided    down through and including the removal of  epidermis, dermis and subcutaneous tissue. Devitalized Tissue Debrided:  fibrin and slough      Pre Debridement Measurements:  Are located in the Wound Documentation Flow Sheet   Wound #: 2     Post  Debridement Measurements:  Wound 11/15/21 Leg Distal;Right #1 (Active)   Wound Image   05/02/22 1435   Wound Etiology Venous 05/02/22 1435   Wound Cleansed Cleansed with saline 05/12/22 1000   Dressing/Treatment Collagen 05/12/22 1142   Wound Length (cm) 1.9 cm 05/12/22 1000   Wound Width (cm) 1 cm 05/12/22 1000   Wound Depth (cm) 0.1 cm 05/12/22 1000   Wound Surface Area (cm^2) 1.9 cm^2 05/12/22 1000   Change in Wound Size % (l*w) 20.83 05/12/22 1000   Wound Volume (cm^3) 0.19 cm^3 05/12/22 1000   Wound Healing % 21 05/12/22 1000   Post-Procedure Length (cm) 1.9 cm 05/12/22 1131   Post-Procedure Width (cm) 1 cm 05/12/22 1131   Post-Procedure Depth (cm) 0.1 cm 05/12/22 1131   Post-Procedure Surface Area (cm^2) 1.9 cm^2 05/12/22 1131   Post-Procedure Volume (cm^3) 0.19 cm^3 05/12/22 1131   Distance Tunneling (cm) 0 cm 05/12/22 1000   Undermining Maxium Distance (cm) 0 05/12/22 1000   Wound Assessment Fibrin;Pink/red 05/12/22 1000   Drainage Amount Small 05/12/22 1000   Drainage Description Serosanguinous 05/12/22 1000   Odor None 05/12/22 1000   Catrachita-wound Assessment Blanchable erythema; Intact 05/12/22 1000   Margins Defined edges 05/12/22 1000   Wound Thickness Description not for Pressure Injury Full thickness 05/12/22 1000   Number of days: 177       Wound 05/12/22 Ankle Right #2 (Active)   Wound Image   05/12/22 1000   Wound Etiology Venous 05/12/22 1000   Wound Cleansed Cleansed with saline 05/12/22 1000   Dressing/Treatment Collagen 05/12/22 1142   Wound Length (cm) 0.5 cm 05/12/22 1000   Wound Width (cm) 0.3 cm 05/12/22 1000   Wound Depth (cm) 0.1 cm 05/12/22 1000   Wound Surface Area (cm^2) 0.15 cm^2 05/12/22 1000   Wound Volume (cm^3) 0.015 cm^3 05/12/22 1000   Post-Procedure Length (cm) 0.6 cm 05/12/22 1131   Post-Procedure Width (cm) 0.4 cm 05/12/22 1131   Post-Procedure Depth (cm) 0.3 cm 05/12/22 1131   Post-Procedure Surface Area (cm^2) 0.24 cm^2 05/12/22 1131   Post-Procedure Volume (cm^3) 0.072 cm^3 05/12/22 1131   Distance Tunneling (cm) 0 cm 05/12/22 1000   Undermining Maxium Distance (cm) 0 05/12/22 1000   Wound Assessment Fibrin 05/12/22 1000   Drainage Amount Small 05/12/22 1000   Drainage Description Serosanguinous 05/12/22 1000   Odor None 05/12/22 1000   Catrachita-wound Assessment Blanchable erythema 05/12/22 1000   Margins Defined edges 05/12/22 1000   Wound Thickness Description not for Pressure Injury Full thickness 05/12/22 1000   Number of days: 0          Percent of Wound Debrided: 100%    Total Surface Area Debrided:  .24 sq cm    Diabetic/Pressure/Non Pressure Ulcers only:  Ulcer: Non-Pressure ulcer, fat layer exposed    Bleeding: Minimal    Hemostasis Achieved: by pressure    Procedural Pain: 0  / 10     Post Procedural Pain: 0 / 10     Response to treatment:  Well tolerated by patient. Patient to follow-up with Dr. Jimmie Cueva next week        Plan:     Treatment Note: Please see attached Discharge Instructions. These instructions were given and signed by the patient or POA    New Medication(s) at this visit:   New Prescriptions    No medications on file       Other orders at this visit: No orders of the defined types were placed in this encounter. Discharge [facility] Physician Orders and Discharge Instructions  [address] [place] [address] E. [address] [place] [address]  Telephone: [phone]  NAME:  Rosa Valdivia. YOB: 1950  MEDICAL RECORD NUMBER:  [id number]  DATE:  5/12/2022    Wash hands with soap and water prior to and after every dressing change. Wound Cleansing:   · Do not scrub or use excessive force. · With each dressing change, rinse wounds with 0.9% Saline. (May use wound wash or soft contact solution. Both can be purchased at a local drug store). · If unable to obtain saline, may use a gentle soap and water. · Keep wounds dry in the shower unless otherwise instructed by the physician. · For wounds on lower legs, cast covers can be purchased at local drug stores, so that you may shower and keep the wound(s) dry. []  Vashe Wash solution instructions (if prescribed): Apply enough Vashe to soak a piece of gauze and place on wound bed for 5-10 minutes. DO NOT rinse after Vashe has been applied. Follow dressing application as instructed below. Catrachita wound Topical Treatments:  Do not apply lotions, creams, or ointments to the skin around the wound bed unless directed as followed:     [] Apply around the wound: [] moisturizing lotion [] Antifungal ointment [] No-Sting barrier film [] Zinc paste [] Other:       Dressings:           Wound Location: right lower leg and right ankle      Apply Primary Dressing to wound: Oly         Cover and Secure with:     Cover and Secure with: 4X4 gauze pad , kerlix   Avoid contact of tape with skin if possible.  When to change Dressing: [] Daily [] Every Other Day [] Once a week  [x] Three times per week: [x] Monday, Wednesday, Friday [] Tuesday, Thursday, Saturday  [] Do Not Change Dressing [] Other:     Edema Control:  Apply: [] Compression Stocking  [] Left Lower Leg [] Right Lower Leg     [x]  Spandagrip:Strength: Low compression 5-10 mm/Hg    [] Left Lower Leg  [x] Right Lower Leg        Apply every morning immediately when getting up. They should be applied to affected leg(s) from mid foot to knee making sure to cover the heel. Remove every night before going to bed. [x] Elevate leg(s) above the level of the heart for 30 minutes 4-5 times a day and/or when sitting. [x] Avoid prolonged standing in one place. Dietary:  Important dietary reminders:  1.  Increase Protein intake (i.e. Lean meats, fish, eggs, legumes, and yogurt)  2. No added salt  3. If diabetic, follow a diabetic diet and check glucose prior to meals or as instructed by your physician. Dietary Supplements:  [] Real  [] 30ml ProStat  [] EnsureEnlive [] Ensure Max/Premier  [] Other:    If you are still having pain after you go home:   For wounds on lower legs or arms, elevate the affected limb.  Use over-the-counter medications you would normally use for pain as permitted by your primary care doctor.  For persistent pain not relieved by the above interventions, please call your primary care doctor. Return Appointment:   Return Appointment: With Dr Ewelina Camara  On Monday 05/16/2022    [] Return Appointment for a Wound Assessment (Nurse Visit) on:       [x] Orders placed during your visit: No orders of the defined types were placed in this encounter. o All other future appointments:  Future Appointments   Date Time Provider Orlando Colmenares   5/16/2022  1:15 PM Ramez Pickett MD [facility]   5/25/2022  9:15 AM MD Brice Monsivais [facility]   10/13/2022  1:00 PM MD Karissa Vasquez P/CC [facility]   -       Your nurse  is:  Arianne Encinas     Electronically signed by Marika Nova RN on 5/12/2022 at 11:33 AM     [address] Information: Should you experience any significant changes in your wound(s) or have questions about your wound care, please contact the [address] [place] [address] at [phone]. We are open from 8:00am - 4:30p Monday, Thursday and Friday; 11:00am - 5pm on Tuesday and CLOSED Wednesday. Please give us 24-48 hours to return your call. Call your doctor now or seek immediate medical care if:    · You have symptoms of infection, such as:  ? Increased pain, swelling, warmth, or redness. ? Red streaks leading from the area. ? Pus draining from the area. ? A fever.         Physician for this visit and orders: Jeremie Garcia MD    [] Patient unable to sign Discharge Instructions given to ECF/Transportation/POA        Electronically signed by Jean Claude Dias MD on 5/12/2022 at 12:45 PM

## 2022-05-16 ENCOUNTER — HOSPITAL ENCOUNTER (OUTPATIENT)
Dept: WOUND CARE | Age: 72
Discharge: HOME | End: 2022-05-16

## 2022-05-16 ENCOUNTER — CARE COORDINATION (OUTPATIENT)
Dept: CARE COORDINATION | Age: 72
End: 2022-05-16

## 2022-05-16 NOTE — CARE COORDINATION
Problems Identified: [facility] was >2 hr late, pt had to cx today's follow up visit w/wound care specialist.  Additionally, SN did not show for follow up visit, as scheduled - pt has not heard from [address] about r/s or when to anticipate next SN visit. Reviewed w/Faiza Clinical Mgr @ [address] [place] [address] who explains insurance has not approved for skilled visits. Further SN visits pending insurance approval.     Call placed to [employer] - spoke w/Cody who advises pt is approved for skilled nursing/HHC  Authorization #[id number]    5.11.22  for RLE wound care by Romaine was unable to verify whether aforementioned entity is  [facility] -  ACM offered to hold line while Cody escalated call to next level CM Dept- [employer]. Cody ret'd to line after his review with '[employer]' Dept, explaining skilled visits by home care nurse are approved, per authorization info above. He adds that the lady he spoke with in [employer] Dept reportedly re-faxed the approval documentation again, to [address] [place] [address], as Cody reviewed gravity of ACM expressed concern. .  ACM insisted Cody call to confirm receipt- advised to speak w/Latisha Dept @ [facility], ask for Ruddy Campuzano - per instruction of Maninder, who advised ACM of this contact if needed. ACM & pt held on conference call further while Cody contacted [facility] on another line to ascertain information received & HHC confirms ability to resume SN visits. Cody ret'd to line after speaking w/Eli, as Ruddy Campuzano was unavailable. Cody reports he offered his direct line to [employer] to [facility] if any further concerns. Reviewed further malcolm/Cody re medical transportation support. Pt is enrolled for Access to Care through [address] However, pt interjects that Access to Care \"hasn't been reliable for (him) me\".   For this reason, pt attempted to use [facility] this time, which is the source that caused him to miss his appt today. Reviewed ACM availability for support as needed. Pt verbalized understanding of above and agreement with the following  Plan: pt to anticipate info re HF mgmt via US mail  Continue ACC support for health coaching, care collaboration, support w/community resources, and help with any newly presenting concerns as needed. Pt agrees to outreach direct to [facility], as needed, between routine follow up outreach initiated by [facility]     Ambulatory Care Coordination Note  5/16/2022  CM Risk Score: 11  Charlson 10 Year Mortality Risk Score: 98%     ACC: Jossie Pineda RN    Summary Note: above       Congestive Heart Failure Assessment    Are you currently restricting fluids?: No Restriction  Do you understand a low sodium diet?: Yes  Do you understand how to read food labels?: Yes  How many restaurant meals do you eat per week?: 0  Do you salt your food before tasting it?: No     No patient-reported symptoms      Symptoms:  None: Yes      Symptom course: stable  Weight trend: stable  Salt intake watch compared to last visit: stable      and   COPD Assessment    Does the patient understand envrionmental exposure?: Yes  Is the patient able to verbalize Rescue vs. Long Acting medications?: Yes  Does the patient have a nebulizer?: No  Does the patient use a space with inhaled medications?: No     No patient-reported symptoms         Symptoms:     Have you had a recent diagnosis of pneumonia either by PCP or at a hospital?: No         Care Coordination Interventions    Referral from Primary Care Provider: No  Suggested Interventions and Community Resources  Fall Risk Prevention: Completed  Home Care Waiver: Completed (Comment: 5.4.22 reviewed w/pt, per Lin @ [employer] - option for Consumer Directed Care, compensated for approved HHA support up to 2hr/wk; however, pt unable to name anyone for this)  Home Health Services:  In Process (Comment: 5.4.22 pending SOC for wound care M,W,F via Dr Sayda Rodrigues MD [facility] [address] )  Senior Services: Completed (Comment: HHA for homemaker support  2 hr/wk via COA (formerly provided through [employer]); spoke w/Lin 5.4. 22 who was going to reFax referral for non-skilled HHA again to [employer] in hope Shawn Whitten can accomodate reinstating HHA support)  Smoking Cessation: In Process (Comment: 4.26.22 health coaching/encouraged in self challenge of reducing daily consumption of cigarettes by delay of 1st cirgarette of day by 15 min, advance as ready)  Specialty Services Referral: Completed (Comment: Dr Yg Ayala (cardiology); Dr Jessica Grullon (Vasc); Dr Trent Robles (Pulmonology))  Transportation Support: Not Started  Zone Management Tools: Completed (Comment: HF, COPD)  Other Services or Interventions: review of self mgmt concepts; resources; health coaching re smoking cessation         Goals Addressed                    This Visit's Progress      Conditions and Symptoms (pt-stated)   On track      I will schedule office visits, as directed by my provider. I will keep my appointment or reschedule if I have to cancel. I will notify my provider of any barriers to my plan of care. I will follow my Zone Management tool to seek urgent or emergent care. I will notify my provider of any symptoms that indicate a worsening of my condition. Barriers: impairment:  physical: vascular wound lower extremity and fear of failure  Plan for overcoming my barriers: engage in Care Coordination  Confidence: 10/10  Anticipated Goal Completion Date: 10.26.22              Prior to Admission medications    Medication Sig Start Date End Date Taking? Authorizing Provider   DULoxetine HCl 40 MG CPEP TAKE 1 CAPSULE BY MOUTH DAILY 4/21/22   Historical Provider, MD   mupirocin (BACTROBAN) 2 % ointment Apply topically daily Apply topically 3 times daily.     Historical Provider, MD   atorvastatin (LIPITOR) 80 MG tablet Take 1 tablet by mouth daily 4/19/22   Griselda Moise DO   ELIQUIS 5 MG TABS tablet TAKE 1 TABLET BY MOUTH TWICE DAILY 2/16/22 Historical Provider, MD   losartan (COZAAR) 100 MG tablet TAKE 1 TABLET BY MOUTH EVERY DAY 3/24/22   Mikey Cocks, DO   clopidogrel (PLAVIX) 75 MG tablet Take 1 tablet by mouth daily 3/24/22   Mikey Cocks, DO   omeprazole (PRILOSEC) 40 MG delayed release capsule Take 1 capsule by mouth daily 2/9/22   Mikey Cocks, DO   potassium chloride (MICRO-K) 10 MEQ extended release capsule 1 tablet 2/9/22   Mikey Cocks, DO   pregabalin (LYRICA) 100 MG capsule Take 1 capsule by mouth 3 times daily for 30 days. 2/9/22 3/11/22  Mikey Cocks, DO   fluticasone-umeclidin-vilant (TRELEGY ELLIPTA) 559-43.8-71 MCG/INH AEPB Inhale 1 puff into the lungs daily 1/13/22   Mikey Cocks, DO   metoprolol (LOPRESSOR) 100 MG tablet Take 1 tablet by mouth 2 times daily 12/15/21 3/15/22  Randi Jordan MD   furosemide (LASIX) 40 MG tablet Take 1 tablet by mouth daily 12/15/21   Randi Jordan MD   folic acid (FOLVITE) 1 MG tablet Take 1 tablet by mouth daily 9/21/21   Yomi Garza MD   albuterol sulfate  (90 Base) MCG/ACT inhaler Inhale 2 puffs into the lungs every 6 hours as needed for Wheezing 8/25/21   Mikey Cocks, DO   allopurinol (ZYLOPRIM) 100 MG tablet Take 1 tablet by mouth daily 8/25/21   Mikey Cocks, DO   nitroGLYCERIN (NITROSTAT) 0.4 MG SL tablet Place 1 tablet under the tongue every 5 minutes as needed for Chest pain 8/25/21   Mikey Cocks, DO   magnesium gluconate (MAGONATE) 500 MG tablet Take 500 mg by mouth daily.     Historical Provider, MD       Future Appointments   Date Time Provider Orlando Colmenares   5/23/2022 10:15 AM MD Shivam [facility]   5/25/2022  9:15 [facility]   10/13/2022  1:00 PM Isidra Clark MD [facility]

## 2022-05-23 ENCOUNTER — HOSPITAL ENCOUNTER (OUTPATIENT)
Dept: WOUND CARE | Age: 72
Discharge: HOME | End: 2022-05-23
Payer: MEDICARE

## 2022-05-23 ENCOUNTER — TELEPHONE (OUTPATIENT)
Dept: WOUND CARE | Age: 72
End: 2022-05-23

## 2022-05-23 VITALS
RESPIRATION RATE: 16 BRPM | HEART RATE: 61 BPM | DIASTOLIC BLOOD PRESSURE: 85 MMHG | SYSTOLIC BLOOD PRESSURE: 126 MMHG | TEMPERATURE: 96.9 F

## 2022-05-23 DIAGNOSIS — S81.801D OPEN WOUND OF RIGHT LOWER LEG, SUBSEQUENT ENCOUNTER: ICD-10-CM

## 2022-05-23 DIAGNOSIS — I73.9 PVD (PERIPHERAL VASCULAR DISEASE) (HCC): ICD-10-CM

## 2022-05-23 DIAGNOSIS — L97.912 NON-PRESSURE ULCER OF RIGHT LOWER EXTREMITY WITH FAT LAYER EXPOSED (HCC): Primary | ICD-10-CM

## 2022-05-23 DIAGNOSIS — I70.221 ATHEROSCLEROSIS OF NATIVE ARTERY OF RIGHT LOWER EXTREMITY WITH REST PAIN (HCC): ICD-10-CM

## 2022-05-23 PROCEDURE — 11042 DBRDMT SUBQ TIS 1ST 20SQCM/<: CPT

## 2022-05-23 PROCEDURE — 6370000000 HC RX 637 (ALT 250 FOR IP): Performed by: SPECIALIST

## 2022-05-23 PROCEDURE — 11042 DBRDMT SUBQ TIS 1ST 20SQCM/<: CPT | Performed by: SPECIALIST

## 2022-05-23 RX ORDER — CLOBETASOL PROPIONATE 0.5 MG/G
OINTMENT TOPICAL ONCE
Status: CANCELLED | OUTPATIENT
Start: 2022-05-23 | End: 2022-05-23

## 2022-05-23 RX ORDER — APIXABAN 5 MG/1
TABLET, FILM COATED ORAL
Qty: 60 TABLET | Refills: 0 | Status: SHIPPED | OUTPATIENT
Start: 2022-05-23 | End: 2022-06-20

## 2022-05-23 RX ORDER — GENTAMICIN SULFATE 1 MG/G
OINTMENT TOPICAL ONCE
Status: CANCELLED | OUTPATIENT
Start: 2022-05-23 | End: 2022-05-23

## 2022-05-23 RX ORDER — LIDOCAINE 40 MG/G
CREAM TOPICAL ONCE
Status: CANCELLED | OUTPATIENT
Start: 2022-05-23 | End: 2022-05-23

## 2022-05-23 RX ORDER — BACITRACIN ZINC AND POLYMYXIN B SULFATE 500; 1000 [USP'U]/G; [USP'U]/G
OINTMENT TOPICAL ONCE
Status: CANCELLED | OUTPATIENT
Start: 2022-05-23 | End: 2022-05-23

## 2022-05-23 RX ORDER — LIDOCAINE HYDROCHLORIDE 20 MG/ML
JELLY TOPICAL ONCE
Status: CANCELLED | OUTPATIENT
Start: 2022-05-23 | End: 2022-05-23

## 2022-05-23 RX ORDER — BETAMETHASONE DIPROPIONATE 0.05 %
OINTMENT (GRAM) TOPICAL ONCE
Status: CANCELLED | OUTPATIENT
Start: 2022-05-23 | End: 2022-05-23

## 2022-05-23 RX ORDER — BACITRACIN, NEOMYCIN, POLYMYXIN B 400; 3.5; 5 [USP'U]/G; MG/G; [USP'U]/G
OINTMENT TOPICAL ONCE
Status: CANCELLED | OUTPATIENT
Start: 2022-05-23 | End: 2022-05-23

## 2022-05-23 RX ORDER — GINSENG 100 MG
CAPSULE ORAL ONCE
Status: CANCELLED | OUTPATIENT
Start: 2022-05-23 | End: 2022-05-23

## 2022-05-23 RX ORDER — LIDOCAINE HYDROCHLORIDE 40 MG/ML
SOLUTION TOPICAL ONCE
Status: COMPLETED | OUTPATIENT
Start: 2022-05-23 | End: 2022-05-23

## 2022-05-23 RX ORDER — LIDOCAINE HYDROCHLORIDE 40 MG/ML
SOLUTION TOPICAL ONCE
Status: CANCELLED | OUTPATIENT
Start: 2022-05-23 | End: 2022-05-23

## 2022-05-23 RX ORDER — LIDOCAINE 50 MG/G
OINTMENT TOPICAL ONCE
Status: CANCELLED | OUTPATIENT
Start: 2022-05-23 | End: 2022-05-23

## 2022-05-23 RX ADMIN — LIDOCAINE HYDROCHLORIDE: 40 SOLUTION TOPICAL at 10:57

## 2022-05-23 ASSESSMENT — PAIN DESCRIPTION - DESCRIPTORS: DESCRIPTORS: BURNING;SHARP

## 2022-05-23 ASSESSMENT — PAIN DESCRIPTION - FREQUENCY: FREQUENCY: CONTINUOUS

## 2022-05-23 ASSESSMENT — PAIN DESCRIPTION - ORIENTATION: ORIENTATION: RIGHT

## 2022-05-23 ASSESSMENT — PAIN SCALES - GENERAL: PAINLEVEL_OUTOF10: 9

## 2022-05-23 ASSESSMENT — PAIN DESCRIPTION - ONSET: ONSET: ON-GOING

## 2022-05-23 ASSESSMENT — PAIN DESCRIPTION - LOCATION: LOCATION: LEG

## 2022-05-23 ASSESSMENT — PAIN DESCRIPTION - PAIN TYPE: TYPE: ACUTE PAIN

## 2022-05-23 NOTE — PROGRESS NOTES
[address] [facility]  Progress Note and Procedure Note      Earl Gomez. MEDICAL RECORD NUMBER:  [id number]  AGE: 81 y.o. GENDER: male  : 1950  EPISODE DATE:  2022    Subjective:     Chief Complaint   Patient presents with    Wound Check     right lower leg         HISTORY of PRESENT ILLNESS HPI     Earl Gomez. is a 81 y.o. male who presents today for wound/ulcer evaluation. History of Wound Context: Patient is followed by Dr. Clair Carmona for right lower extremity wound patient has had previous surgery including a right femoral endarterectomy and bypass procedure. He states visiting wound care nurses have been coming and he has been changing dressing 3 times a week with Oly and applying the Winkcam without assistance    Pain Assessment:  Wound/Ulcer Pain Timing/Severity: none  Quality of pain: N/A  Severity:  0 / 10   Modifying Factors: None  Associated Signs/Symptoms: edema    Ulcer Identification:  Ulcer Type: arterial and traumatic  Contributing Factors: edema, venous stasis, smoking and arterial insufficiency    Objective:      /85   Pulse 61   Temp 96.9 °F (36.1 °C) (Temporal)   Resp 16     Wt Readings from Last 3 Encounters:   22 227 lb (103 kg)   22 227 lb (103 kg)   22 224 lb 6 oz (101.8 kg)       PHYSICAL EXAM    Extremities: no cyanosis, no clubbing, 1 + edema-  right lower extremity with full-thickness granulating wound, minimal fibrin and slough anterior designated as wound #1  small full-thickness wound containing fibrin granulation tissue right medial malleolus designated as wound #2  Assessment:      1. Non-pressure ulcer of right lower extremity with fat layer exposed ([address]    2. Open wound of right lower leg, subsequent encounter    3. Atherosclerosis of native artery of right lower extremity with rest pain ([address]    4.  PVD (peripheral vascular disease) ([address]         Procedure Note  Indications:  Based on my examination of this patient's wound(s) today, sharp excision is required to promote healing and evaluate the extent healing. Performed by: Levern Gaucher, MD    Consent obtained? Yes    Time out taken: Yes    Pain Control: Anesthetic: 4% Lidocaine Liquid Topical     Debridement:Excisional Debridement    Using curette the wound was sharply debrided    down through and including the removal of  epidermis, dermis and subcutaneous tissue.     Devitalized Tissue Debrided:  fibrin and slough      Pre Debridement Measurements:  Are located in the Wound Documentation Flow Sheet   Wound #: 1 and 2     Post  Debridement Measurements:  Wound 11/15/21 Leg Distal;Right #1 (Active)   Wound Image   05/02/22 1435   Wound Etiology Venous 05/02/22 1435   Wound Cleansed Cleansed with saline 05/23/22 1005   Dressing/Treatment Collagen with Ag 05/23/22 1013   Wound Length (cm) 1.9 cm 05/23/22 1005   Wound Width (cm) 1 cm 05/23/22 1005   Wound Depth (cm) 0.1 cm 05/23/22 1005   Wound Surface Area (cm^2) 1.9 cm^2 05/23/22 1005   Change in Wound Size % (l*w) 20.83 05/23/22 1005   Wound Volume (cm^3) 0.19 cm^3 05/23/22 1005   Wound Healing % 21 05/23/22 1005   Post-Procedure Length (cm) 2 cm 05/23/22 1013   Post-Procedure Width (cm) 1.1 cm 05/23/22 1013   Post-Procedure Depth (cm) 0.3 cm 05/23/22 1013   Post-Procedure Surface Area (cm^2) 2.2 cm^2 05/23/22 1013   Post-Procedure Volume (cm^3) 0.66 cm^3 05/23/22 1013   Distance Tunneling (cm) 0 cm 05/23/22 1005   Undermining Maxium Distance (cm) 0 05/23/22 1005   Wound Assessment Fibrin;Pink/red 05/23/22 1005   Drainage Amount Small 05/23/22 1005   Drainage Description Serosanguinous 05/23/22 1005   Odor None 05/23/22 1005   Catrachita-wound Assessment Intact 05/23/22 1005   Margins Defined edges 05/23/22 1005   Wound Thickness Description not for Pressure Injury Full thickness 05/23/22 1005   Number of days: 188       Wound 05/12/22 Ankle Right #2 (Active)   Wound Image   05/12/22 1000   Wound Etiology Venous 05/12/22 1000   Wound Cleansed Cleansed with saline 05/23/22 1005   Dressing/Treatment Collagen with Ag 05/23/22 1013   Wound Length (cm) 0.7 cm 05/23/22 1005   Wound Width (cm) 0.9 cm 05/23/22 1005   Wound Depth (cm) 0.1 cm 05/23/22 1005   Wound Surface Area (cm^2) 0.63 cm^2 05/23/22 1005   Change in Wound Size % (l*w) -320 05/23/22 1005   Wound Volume (cm^3) 0.063 cm^3 05/23/22 1005   Wound Healing % -320 05/23/22 1005   Post-Procedure Length (cm) 0.8 cm 05/23/22 1013   Post-Procedure Width (cm) 1 cm 05/23/22 1013   Post-Procedure Depth (cm) 0.3 cm 05/23/22 1013   Post-Procedure Surface Area (cm^2) 0.8 cm^2 05/23/22 1013   Post-Procedure Volume (cm^3) 0.24 cm^3 05/23/22 1013   Distance Tunneling (cm) 0 cm 05/23/22 1005   Undermining Maxium Distance (cm) 0 05/23/22 1005   Wound Assessment Eschar dry 05/23/22 1005   Drainage Amount None 05/23/22 1005   Drainage Description Other (Comment) 05/23/22 1005   Odor None 05/23/22 1005   Catrachita-wound Assessment Intact 05/23/22 1005   Margins Defined edges 05/23/22 1005   Wound Thickness Description not for Pressure Injury Full thickness 05/12/22 1000   Number of days: 11          Percent of Wound Debrided: 100%    Total Surface Area Debrided:  3 sq cm    Diabetic/Pressure/Non Pressure Ulcers only:  Ulcer: Non-Pressure ulcer, fat layer exposed    Bleeding: Minimal    Hemostasis Achieved: by pressure    Procedural Pain: 0  / 10     Post Procedural Pain: 0 / 10     Response to treatment:  Well tolerated by patient. Plan:     Treatment Note: Please see attached Discharge Instructions.   These instructions were given and signed by the patient or POA    New Medication(s) at this visit:   New Prescriptions    No medications on file       Other orders at this visit:   Orders Placed This Encounter   Procedures   [id number] [facility] Protocol       Discharge Instructions          [facility] Physician Orders and Discharge Mukesh Valerio. [address]  Telephone: [phone]  NAME:  Eliott Favre. YOB: 1950  MEDICAL RECORD NUMBER:  [id number]  DATE:  5/23/2022    Wash hands with soap and water prior to and after every dressing change. Wound Cleansing:   · Do not scrub or use excessive force. · With each dressing change, rinse wounds with 0.9% Saline. (May use wound wash or soft contact solution. Both can be purchased at a local drug store). · If unable to obtain saline, may use a gentle soap and water. · Keep wounds dry in the shower unless otherwise instructed by the physician. · For wounds on lower legs, cast covers can be purchased at local drug stores, so that you may shower and keep the wound(s) dry. []  Vashe Wash solution instructions (if prescribed): Apply enough Vashe to soak a piece of gauze and place on wound bed for 5-10 minutes. DO NOT rinse after Vashe has been applied. Follow dressing application as instructed below. Catrachita wound Topical Treatments:  Do not apply lotions, creams, or ointments to the skin around the wound bed unless directed as followed:     [] Apply around the wound: [] moisturizing lotion [] Antifungal ointment [] No-Sting barrier film [] Zinc paste [] Other:       Dressings:           Wound Location: right lower leg    Apply Primary Dressing to wound: Oly         Cover and Secure with:     Cover and Secure with: 4X4 gauze pad  Bulky roll gauze   Avoid contact of tape with skin if possible.      When to change Dressing: [] Daily [] Every Other Day [] Once a week  [x] Three times per week: [x] Monday, Wednesday, Friday [] Tuesday, Thursday, Saturday  [] Do Not Change Dressing [] Other:       Edema Control:  Apply: [] Compression Stocking  [] Left Lower Leg [] Right Lower Leg     [x]  Spandagrip:Strength: Low compression 5-10 mm/Hg    [] Left Lower Leg  [x] Right Lower Leg        Apply every morning immediately when getting up. They should be applied to affected leg(s) from mid foot to knee making sure to cover the heel. Remove every night before going to bed. [x] Elevate leg(s) above the level of the heart for 30 minutes 4-5 times a day and/or when sitting. [x] Avoid prolonged standing in one place. Dietary:  Important dietary reminders:  1. Increase Protein intake (i.e. Lean meats, fish, eggs, legumes, and yogurt)  2. No added salt  3. If diabetic, follow a diabetic diet and check glucose prior to meals or as instructed by your physician. Dietary Supplements:  [] Real  [] 30ml ProStat  [] EnsureEnlive [] Ensure Max/Premier  [] Other:    If you are still having pain after you go home:   For wounds on lower legs or arms, elevate the affected limb.  Use over-the-counter medications you would normally use for pain as permitted by your primary care doctor.  For persistent pain not relieved by the above interventions, please call your primary care doctor. Return Appointment:   Return Appointment: With Dr. Jonathan Lutz  in  [address] [place] [address])    [] Return Appointment for a Wound Assessment (Nurse Visit) on:       [x] Orders placed during your visit: No orders of the defined types were placed in this encounter. o All other future appointments:  Future Appointments   Date Time Provider Orlando Colmenares   5/25/2022  9:15 AM Anup Lin MD [facility]   10/13/2022  1:00 PM MD Rosendo Pantoja [facility]   -     Home care: [facility] [phone]   F: [phone]    Your nurse  is:  Beth Cardozo     Electronically signed by Pawan Plascencia RN on 5/23/2022 at 10:52 AM     [address] [place] [address] Information: Should you experience any significant changes in your wound(s) or have questions about your wound care, please contact the [facility]ending [facility] to [facility] at [phone].  We are open from 8:00am - 4:30p Monday, Thursday and Friday; 11:00am - 5pm on Tuesday and CLOSED Wednesday. Please give us 24-48 hours to return your call. Call your doctor now or seek immediate medical care if:    · You have symptoms of infection, such as:  ? Increased pain, swelling, warmth, or redness. ? Red streaks leading from the area. ? Pus draining from the area. ? A fever.         Physician for this visit and orders: Jesús Urbina MD    [] Patient unable to sign Discharge Instructions given to ECF/Transportation/POA        Electronically signed by Jesús Urbina MD on 5/23/2022 at 12:45 PM

## 2022-05-23 NOTE — TELEPHONE ENCOUNTER
----- Message from [facility] sent at 5/23/2022  2:28 PM EDT -----  Subject: Refill Request    QUESTIONS  Name of Medication? apixaban (ELIQUIS) 5 MG TABS tablet  Patient-reported dosage and instructions? take one tablet twice daily   How many days do you have left? 5  [facility]? [id number]  Pharmacy phone number (if available)? [phone]  Additional Information for Provider? Patient is unsure if he is supposed   to continue with Eliquis because PCP advised pt that he would only be   taking it for three months initially, however, patient has been on this   medication for longer than three month. He only has about 4-5 doses left   so he is needing to discuss with PCP or have a refill sent into the   pharmacy.   ---------------------------------------------------------------------------  --------------  [address]  What is the best way for the office to contact you? OK to leave message on   voicemail  Preferred Call Back Phone Number? [phone]  ---------------------------------------------------------------------------  --------------  SCRIPT ANSWERS  Relationship to Patient?  Self

## 2022-05-23 NOTE — TELEPHONE ENCOUNTER
Medication:   Requested Prescriptions     Pending Prescriptions Disp Refills    ELIQUIS 5 MG TABS tablet 60 tablet         Last Filled:  02/16/22 reported Patient is unsure if he is supposed   to continue with Eliquis because PCP advised pt that he would only be   taking it for three months initially, however, patient has been on this   medication for longer than three month. He only has about 4-5 doses left   so he is needing to discuss with PCP or have a refill sent into the   pharmacy. Patient Phone Number: [phone] (home)     Last appt: 2/9/2022 Return in about 2 weeks (around 2/23/2022). Next appt: Visit date not found    Last OARRS:   RX Monitoring 8/14/2020   Attestation -   Periodic Controlled Substance Monitoring Possible medication side effects, risk of tolerance/dependence & alternative treatments discussed. ;Assessed functional status. ;Obtaining appropriate analgesic effect of treatment. Chronic Pain > 50 MEDD Obtained or confirmed \"Consent for Opioid Use\" on file.

## 2022-05-23 NOTE — TELEPHONE ENCOUNTER
Called [place] [facility] and spoke to [facility] regarding patient stating he is not being seen by home care nurses. Elisa Butterfield states that the nurses have been going once a week and not twice a week with the goal to teach patient or son how to change dressing because of  Insurance limiting number of home care visits.

## 2022-05-25 ENCOUNTER — OFFICE VISIT (OUTPATIENT)
Dept: CARDIOLOGY CLINIC | Age: 72
End: 2022-05-25
Payer: MEDICARE

## 2022-05-25 VITALS
HEART RATE: 60 BPM | DIASTOLIC BLOOD PRESSURE: 80 MMHG | HEIGHT: 70 IN | BODY MASS INDEX: 31.5 KG/M2 | SYSTOLIC BLOOD PRESSURE: 130 MMHG | WEIGHT: 220 LBS

## 2022-05-25 DIAGNOSIS — R07.9 CHEST PAIN, UNSPECIFIED TYPE: ICD-10-CM

## 2022-05-25 DIAGNOSIS — E78.2 MIXED HYPERLIPIDEMIA: Primary | ICD-10-CM

## 2022-05-25 PROCEDURE — 93000 ELECTROCARDIOGRAM COMPLETE: CPT | Performed by: INTERNAL MEDICINE

## 2022-05-25 PROCEDURE — 1123F ACP DISCUSS/DSCN MKR DOCD: CPT | Performed by: INTERNAL MEDICINE

## 2022-05-25 PROCEDURE — 99214 OFFICE O/P EST MOD 30 MIN: CPT | Performed by: INTERNAL MEDICINE

## 2022-05-25 NOTE — PROGRESS NOTES
[facility]   Dr Yolanda Junior . Leo Linton MD, [facility]    Outpatient Follow Up Note    5/25/2022,9:45 AM  Subjective:   CHIEF COMPLAINT / HPI:  Follow Up secondary to CAD     Tiffanie Johnson is 81 y.o. male who presents today for a routine follow up. Here for evaluation follow-up and generally doing well no. No chest pains or shortness of breath. Did have a pulmonary embolus back last year and was on anticoagulant therapy. No chest discomfort currently nothing that sounds ischemic. I did review his angiogram that was performed in December 2020 and the coronary anatomy is seems stable. Did not require any intervention at that time and the previous coronary stents were widely patent. Overall and from a cardiac perspective everything looks stable for him at this time. I am concerned about his peripheral vascular disease but he is on adequate therapy in that regard as well. We did obtain an EKG today and shows sinus rhythm at 56/min LVH. Ccoronavirus vaccine [employer] x2 and have booster    Last cardiac cath December 2020. Stable coronary anatomy. Previous stent stent was patent. No intervention was necessary. Per lipidemia LDL 84 on 1221  DVT and pulmonary emboli September 2021 still on Eliquis therapy along with Plavix 75 mg.    CAD with coronary stents June 2013  Obstructive sleep apnea on CPAP not tolerated the CPAP another BiPAP and currently does not use  PVD and status post right leg stenting and bypass September 2021 by Dr. Juliet Pacheco  Pulmonary nodules followed by Dr. Mari Soares                                                       Past Medical History:    Past Medical History:   Diagnosis Date    CAD (coronary artery disease)     CHF (congestive heart failure) (HCC)     diastolic    COPD [facility] obstructive [facility] disease) [facility]     Critical ischemia of lower extremity (HCC)     Depressive disorder, not elsewhere classified     GERD (gastroesophageal reflux disease)     History of colon polyps - 30 seen on colonoscopy 04/2021 04/24/2021    History of MI (myocardial infarction) 05/2013    History of skin ulcer of lower extremity     R anterior shin    History of transient ischemic attack (TIA)     Hyperlipidemia     Hypertension     Neuropathy     KAVITA (obstructive sleep apnea)     On CPAP    Personal history of DVT (deep vein thrombosis)     PVD (peripheral vascular disease) [facility]     TIA (transient ischemic attack) 2013    Vertebral fracture      Past Surgical History  Past Surgical History:   Procedure Laterality Date    APPENDECTOMY      CHOLECYSTECTOMY, LAPAROSCOPIC      COLONOSCOPY  4/23/2021    COLONOSCOPY POLYPECTOMY SNARE/COLD BIOPSY performed by Ajit Hi MD at [address]  4/23/2021    COLONOSCOPY POLYPECTOMY ABLATION performed by Ajit Hi MD at [address] [facility]  4/23/2021    COLONOSCOPY CONTROL HEMORRHAGE performed by Ajit Hi MD at [facility]  6/2013    EYE SURGERY Left     FEMORAL ENDARTERECTOMY Right 11/17/2021    RIGHT FEMORAL ENDARTERECTOMY  RIGHT EXTERNAL ILIAC TO PROFUNDA FEMORAL BYPASS WITH 8MM PTFE GRAFT RIGHT LOWER EXTREMITY ARTERIOGRAM BALLOON ANGIOPLASTY RIGHT PROFUNDA BALLOON ANGIOPLASTY AND STENT OF RIGHT EXTERNAL ILIAC ARTERY performed by Maryann Venegas MD at [facility]  11/18/2015    Bilateral decompressive lumbar laminectomy L4-5   ; medial facetectomy L4-5 joints  bilaterally; foraminotomies l5   nerve roots bilaterally    LEG DEBRIDEMENT Right 7/23/2013    Dr. Manuelito Herrera - pretibial wound    OTHER SURGICAL HISTORY Right 6/25/2013    Dr. Manuelito Herrera - CFA Endarterectomy w/marsupialization; RLE wound excision & debridement     OTHER SURGICAL HISTORY Left 8/27/2013    Dr. Manuelito Herrera - femoral & profunda femoris endarterectomy w/marsupialization patch angioplasty using SFA    SKIN SPLIT GRAFT Right 7/25/2013    Dr. Manuelito Herrera - to non-healing R pretibial wound, 9 x 15 cm    TOTAL HIP ARTHROPLASTY Right 09/01/2016    Dr. Seena Gitelman Left 12/22/2015    Dr. Brian Mancini - [facility] exploration, thrombectomy of ileofemoral system, stenting of RAFAL in-stent stenosis w/8 x 37 mm Fito      Social History:       Social History     Tobacco Use   Smoking Status Current Every Day Smoker    Packs/day: 0.25    Years: 52.00    Pack years: 13.00    Types: Cigarettes    Start date: 1/1/1967   Smokeless Tobacco Never Used   Tobacco Comment    cut down     Current Medications:  Prior to Visit Medications    Medication Sig Taking? Authorizing Provider   ELIQUIS 5 MG TABS tablet TAKE 1 TABLET BY MOUTH TWICE DAILY Yes Estee Beach MD   DULoxetine HCl 40 MG CPEP TAKE 1 CAPSULE BY MOUTH DAILY Yes Historical Provider, MD   mupirocin (BACTROBAN) 2 % ointment Apply topically daily Apply topically 3 times daily.  Yes Historical Provider, MD   atorvastatin (LIPITOR) 80 MG tablet Take 1 tablet by mouth daily Yes Deshawn Zelaya DO   losartan (COZAAR) 100 MG tablet TAKE 1 TABLET BY MOUTH EVERY DAY Yes Deshawn Zelaya DO   clopidogrel (PLAVIX) 75 MG tablet Take 1 tablet by mouth daily Yes Deshawn Zelaya DO   omeprazole (PRILOSEC) 40 MG delayed release capsule Take 1 capsule by mouth daily Yes Deshawn Zelaya DO   potassium chloride (MICRO-K) 10 MEQ extended release capsule 1 tablet Yes Deshawn Zelaya DO   fluticasone-umeclidin-vilant (TRELEGY ELLIPTA) 100-62.5-25 MCG/INH AEPB Inhale 1 puff into the lungs daily Yes Deshawn Zelaya DO   furosemide (LASIX) 40 MG tablet Take 1 tablet by mouth daily Yes Ryan Lopez MD   apixaban (ELIQUIS) 5 MG TABS tablet Take 2 tablets by mouth 2 times daily for 11 doses Yes Prema Sandoval MD   folic acid (FOLVITE) 1 MG tablet Take 1 tablet by mouth daily Yes Prema Sandoval MD   albuterol sulfate  (90 Base) MCG/ACT inhaler Inhale 2 puffs into the lungs every 6 hours as needed for Wheezing Yes Desahwn Zelaya DO   allopurinol (ZYLOPRIM) 100 MG tablet Take 1 tablet by mouth daily Yes Mikey Cocks, DO   nitroGLYCERIN (NITROSTAT) 0.4 MG SL tablet Place 1 tablet under the tongue every 5 minutes as needed for Chest pain Yes Mikey Cocks, DO   magnesium gluconate (MAGONATE) 500 MG tablet Take 500 mg by mouth daily. Yes Historical Provider, MD   pregabalin (LYRICA) 100 MG capsule Take 1 capsule by mouth 3 times daily for 30 days. Mikey Cocks, DO   metoprolol (LOPRESSOR) 100 MG tablet Take 1 tablet by mouth 2 times daily  Randi Jordan MD     Family History  Family History   Problem Relation Age of Onset    Cancer Mother         Breast    Heart Disease Mother     Cancer Father         Bladder    Heart Disease Father     Cancer Paternal Grandmother         melanoma        Current Medications  Current Outpatient Medications   Medication Sig Dispense Refill    ELIQUIS 5 MG TABS tablet TAKE 1 TABLET BY MOUTH TWICE DAILY 60 tablet 0    DULoxetine HCl 40 MG CPEP TAKE 1 CAPSULE BY MOUTH DAILY      mupirocin (BACTROBAN) 2 % ointment Apply topically daily Apply topically 3 times daily.       atorvastatin (LIPITOR) 80 MG tablet Take 1 tablet by mouth daily 90 tablet 1    losartan (COZAAR) 100 MG tablet TAKE 1 TABLET BY MOUTH EVERY DAY 90 tablet 1    clopidogrel (PLAVIX) 75 MG tablet Take 1 tablet by mouth daily 30 tablet 3    omeprazole (PRILOSEC) 40 MG delayed release capsule Take 1 capsule by mouth daily 30 capsule 3    potassium chloride (MICRO-K) 10 MEQ extended release capsule 1 tablet 60 capsule 1    fluticasone-umeclidin-vilant (TRELEGY ELLIPTA) 100-62.5-25 MCG/INH AEPB Inhale 1 puff into the lungs daily 28 each 11    furosemide (LASIX) 40 MG tablet Take 1 tablet by mouth daily 90 tablet 3    apixaban (ELIQUIS) 5 MG TABS tablet Take 2 tablets by mouth 2 times daily for 11 doses 22 tablet 0    folic acid (FOLVITE) 1 MG tablet Take 1 tablet by mouth daily 30 tablet 3    albuterol sulfate  (90 Base) MCG/ACT inhaler Inhale 2 puffs into the lungs every 6 hours as needed for Wheezing 3 Inhaler 4    allopurinol (ZYLOPRIM) 100 MG tablet Take 1 tablet by mouth daily 180 tablet 1    nitroGLYCERIN (NITROSTAT) 0.4 MG SL tablet Place 1 tablet under the tongue every 5 minutes as needed for Chest pain 25 tablet 1    magnesium gluconate (MAGONATE) 500 MG tablet Take 500 mg by mouth daily.  pregabalin (LYRICA) 100 MG capsule Take 1 capsule by mouth 3 times daily for 30 days. 90 capsule 3    metoprolol (LOPRESSOR) 100 MG tablet Take 1 tablet by mouth 2 times daily 270 tablet 1     No current facility-administered medications for this visit. REVIEW OF SYSTEMS:    CONSTITUTIONAL: No major weight gain or loss, fatigue, weakness, night sweats or fever. HEENT: No new vision difficulties or ringing in the ears. RESPIRATORY: No new SOB, PND, orthopnea or cough. CARDIOVASCULAR: See HPI  GI: No nausea, vomiting, diarrhea, constipation, abdominal pain or changes in bowel habits. : No urinary frequency, urgency, incontinence hematuria or dysuria. SKIN: No cyanosis or skin lesions. MUSCULOSKELETAL: No new muscle or joint pain. NEUROLOGICAL: No syncope or TIA-like symptoms. PSYCHIATRIC: No anxiety, pain, insomnia or depression    Objective:   PHYSICAL EXAM:        VITALS:    Wt Readings from Last 3 Encounters:   05/25/22 220 lb (99.8 kg)   05/02/22 227 lb (103 kg)   04/28/22 227 lb (103 kg)     BP Readings from Last 3 Encounters:   05/25/22 130/80   05/23/22 126/85   05/12/22 133/74     Pulse Readings from Last 3 Encounters:   05/25/22 60   05/23/22 61   05/12/22 75       CONSTITUTIONAL: Cooperative, no apparent distress, and appears well nourished / developed  NEUROLOGIC:  Awake and orientated to person, place and time. PSYCH: Calm affect. SKIN: Warm and dry. HEENT: Sclera non-icteric, normocephalic, neck supple, no elevation of JVP, normal carotid pulses with no bruits and thyroid normal size.   LUNGS:  No increased work of filling parameters suggests normal   diastolic function. Estimated pulmonary artery systolic pressure is 39 mmHg assuming a right   atrial pressure of 8 mmHg. Last Stress Test / Angiogram:  Impression      2/7/20 ct lungs    Continued stable appearance of previous seen noted stable nodules. The previously noted new larger nodules have resolved.         Cardiomegaly with coronary calcification.         No acute infiltrates seen. Coronary anatomy:  12/9/20  The left main coronary artery has significant calcification. No significant focal stenoses.     Left anterior descending artery is has significant calcification. No significant focal stenoses identified. Previous stent is patent.     Circumflex artery i significant calcification proximally. No significant stenoses.     The right coronary artery is a dominant vessel and has calcification but no significant stenoses.     Left ventriculogram shows ejection fraction of 55%. Normal wall motion.        Impression:  Diffuse coronary artery calcification with previously patent stent. No significant focal stenoses. No opportunities or need for intervention. Last ECG: On 9/17/2021 sinus rhythm at 83/min. Nonspecific ST segment changes. EKG on 5/25/2022 sinus rhythm at 56/min. LVH by voltage. Otherwise normal.  This patient was educated using the patient point room wall mount device. Absence from smokers and smoking and diet and exercising are important. Assessment:   CAD is stable. Old stent in 2013. Heart cath December 2020 with stable anatomy and patent stent. DVT and pulmonary emboli in September 2021 currently on anticoagulant therapy. Has sleep apnea and does not tolerate the CPAP or BiPAP. Plan:   Sinew current therapy. Optimize lipid management. In view of the peripheral vessel disease and of the lipid disease I think we should probably push his LDL even further. We will consider Evelyn Franco for optimization of lipids.   Please call if we can assist further [phone]. Ashley Rosas. Eb Marcano MD, [facility]  Return in 1 year.     This note was likely completed using voice recognition technology and may contain unintended errors

## 2022-05-31 ENCOUNTER — HOSPITAL ENCOUNTER (OUTPATIENT)
Dept: WOUND CARE | Age: 72
Discharge: HOME | End: 2022-05-31
Payer: MEDICARE

## 2022-05-31 VITALS — TEMPERATURE: 96.8 F | RESPIRATION RATE: 16 BRPM | HEART RATE: 63 BPM

## 2022-05-31 DIAGNOSIS — L97.912 NON-PRESSURE ULCER OF RIGHT LOWER EXTREMITY WITH FAT LAYER EXPOSED (HCC): Primary | ICD-10-CM

## 2022-05-31 DIAGNOSIS — S81.801D OPEN WOUND OF RIGHT LOWER LEG, SUBSEQUENT ENCOUNTER: ICD-10-CM

## 2022-05-31 DIAGNOSIS — I70.221 ATHEROSCLEROSIS OF NATIVE ARTERY OF RIGHT LOWER EXTREMITY WITH REST PAIN (HCC): ICD-10-CM

## 2022-05-31 DIAGNOSIS — I73.9 PVD (PERIPHERAL VASCULAR DISEASE) (HCC): ICD-10-CM

## 2022-05-31 PROCEDURE — 11042 DBRDMT SUBQ TIS 1ST 20SQCM/<: CPT

## 2022-05-31 PROCEDURE — 6370000000 HC RX 637 (ALT 250 FOR IP): Performed by: SPECIALIST

## 2022-05-31 PROCEDURE — 11042 DBRDMT SUBQ TIS 1ST 20SQCM/<: CPT | Performed by: SPECIALIST

## 2022-05-31 RX ORDER — BACITRACIN, NEOMYCIN, POLYMYXIN B 400; 3.5; 5 [USP'U]/G; MG/G; [USP'U]/G
OINTMENT TOPICAL ONCE
Status: CANCELLED | OUTPATIENT
Start: 2022-05-31 | End: 2022-05-31

## 2022-05-31 RX ORDER — LIDOCAINE HYDROCHLORIDE 20 MG/ML
JELLY TOPICAL ONCE
Status: CANCELLED | OUTPATIENT
Start: 2022-05-31 | End: 2022-05-31

## 2022-05-31 RX ORDER — LIDOCAINE 40 MG/G
CREAM TOPICAL ONCE
Status: CANCELLED | OUTPATIENT
Start: 2022-05-31 | End: 2022-05-31

## 2022-05-31 RX ORDER — BETAMETHASONE DIPROPIONATE 0.05 %
OINTMENT (GRAM) TOPICAL ONCE
Status: CANCELLED | OUTPATIENT
Start: 2022-05-31 | End: 2022-05-31

## 2022-05-31 RX ORDER — GINSENG 100 MG
CAPSULE ORAL ONCE
Status: CANCELLED | OUTPATIENT
Start: 2022-05-31 | End: 2022-05-31

## 2022-05-31 RX ORDER — LIDOCAINE HYDROCHLORIDE 40 MG/ML
SOLUTION TOPICAL ONCE
Status: COMPLETED | OUTPATIENT
Start: 2022-05-31 | End: 2022-05-31

## 2022-05-31 RX ORDER — GENTAMICIN SULFATE 1 MG/G
OINTMENT TOPICAL ONCE
Status: CANCELLED | OUTPATIENT
Start: 2022-05-31 | End: 2022-05-31

## 2022-05-31 RX ORDER — CLOBETASOL PROPIONATE 0.5 MG/G
OINTMENT TOPICAL ONCE
Status: CANCELLED | OUTPATIENT
Start: 2022-05-31 | End: 2022-05-31

## 2022-05-31 RX ORDER — LIDOCAINE 50 MG/G
OINTMENT TOPICAL ONCE
Status: CANCELLED | OUTPATIENT
Start: 2022-05-31 | End: 2022-05-31

## 2022-05-31 RX ORDER — LIDOCAINE HYDROCHLORIDE 40 MG/ML
SOLUTION TOPICAL ONCE
Status: CANCELLED | OUTPATIENT
Start: 2022-05-31 | End: 2022-05-31

## 2022-05-31 RX ORDER — BACITRACIN ZINC AND POLYMYXIN B SULFATE 500; 1000 [USP'U]/G; [USP'U]/G
OINTMENT TOPICAL ONCE
Status: CANCELLED | OUTPATIENT
Start: 2022-05-31 | End: 2022-05-31

## 2022-05-31 RX ADMIN — LIDOCAINE HYDROCHLORIDE: 40 SOLUTION TOPICAL at 09:33

## 2022-05-31 ASSESSMENT — PAIN DESCRIPTION - FREQUENCY: FREQUENCY: CONTINUOUS

## 2022-05-31 ASSESSMENT — PAIN DESCRIPTION - LOCATION: LOCATION: LEG

## 2022-05-31 ASSESSMENT — PAIN DESCRIPTION - DESCRIPTORS: DESCRIPTORS: BURNING;SHARP

## 2022-05-31 ASSESSMENT — PAIN DESCRIPTION - ORIENTATION: ORIENTATION: RIGHT

## 2022-05-31 ASSESSMENT — PAIN DESCRIPTION - PAIN TYPE: TYPE: ACUTE PAIN

## 2022-05-31 ASSESSMENT — PAIN SCALES - GENERAL: PAINLEVEL_OUTOF10: 9

## 2022-05-31 NOTE — PROGRESS NOTES
[address]  Progress Note and Procedure Note      Dior Aguirre. MEDICAL RECORD NUMBER:  [id number]  AGE: 81 y.o. GENDER: male  : 1950  EPISODE DATE:  2022    Subjective:     Chief Complaint   Patient presents with    Wound Check     right lower leg         HISTORY of PRESENT ILLNESS HPI     Dior Diaz is a 81 y.o. male who presents today for wound/ulcer evaluation. History of Wound Context: Patient is followed by Dr. Clarissa Diaz for right lower extremity wound patient has had previous surgery including a right femoral endarterectomy and bypass procedure. He states visiting wound care nurses have been coming and he has been changing dressing 3 times a week with Oly and applying the [address] without assistance. Does complain about calf pain especially noted when he wakes up in the morning. Does continue to smoke    Pain Assessment:  Wound/Ulcer Pain Timing/Severity: none  Quality of pain: N/A  Severity:  0 / 10   Modifying Factors: None  Associated Signs/Symptoms: edema    Ulcer Identification:  Ulcer Type: arterial and traumatic  Contributing Factors: edema, venous stasis, smoking and arterial insufficiency    Objective:      Pulse 63   Temp 96.8 °F (36 °C) (Temporal)   Resp 16     Wt Readings from Last 3 Encounters:   22 220 lb (99.8 kg)   22 227 lb (103 kg)   22 227 lb (103 kg)       PHYSICAL EXAM    Extremities: no cyanosis, no clubbing, 1 + edema-  right lower extremity and full-thickness granulating wound, minimal fibrin minimal slough and epithelialization at the margins anterior knee designated as wound #1  Small full-thickness wound containing fibrin, granulation tissue epithelialization at the margins overlying right medial malleolus designated as wound #2  Assessment:      1. Non-pressure ulcer of right lower extremity with fat layer exposed ([address]    2. Open wound of right lower leg, subsequent encounter    3. Atherosclerosis of native artery of right lower extremity with rest pain ([facility]    4. PVD (peripheral vascular disease) ([facility] [address]         Procedure Note  Indications:  Based on my examination of this patient's wound(s) today, sharp excision is required to promote healing and evaluate the extent healing. Performed by: Tara Abdalla MD    Consent obtained? Yes    Time out taken: Yes    Pain Control: Anesthetic: 4% Lidocaine Liquid Topical     Debridement:Excisional Debridement    Using curette the wound was sharply debrided    down through and including the removal of  epidermis, dermis and subcutaneous tissue.     Devitalized Tissue Debrided:  fibrin and slough      Pre Debridement Measurements:  Are located in the Wound Documentation Flow Sheet   Wound #: 1 and 2     Post  Debridement Measurements:  Wound 11/15/21 Leg Distal;Right #1 (Active)   Wound Image   05/02/22 1435   Wound Etiology Venous 05/02/22 1435   Wound Cleansed Cleansed with saline 05/31/22 0934   Dressing/Treatment Collagen with Ag 05/23/22 1013   Wound Length (cm) 1.7 cm 05/31/22 0934   Wound Width (cm) 1 cm 05/31/22 0934   Wound Depth (cm) 0.1 cm 05/31/22 0934   Wound Surface Area (cm^2) 1.7 cm^2 05/31/22 0934   Change in Wound Size % (l*w) 29.17 05/31/22 0934   Wound Volume (cm^3) 0.17 cm^3 05/31/22 0934   Wound Healing % 29 05/31/22 0934   Post-Procedure Length (cm) 1.8 cm 05/31/22 1001   Post-Procedure Width (cm) 1.1 cm 05/31/22 1001   Post-Procedure Depth (cm) 0.3 cm 05/31/22 1001   Post-Procedure Surface Area (cm^2) 1.98 cm^2 05/31/22 1001   Post-Procedure Volume (cm^3) 0.594 cm^3 05/31/22 1001   Distance Tunneling (cm) 0 cm 05/31/22 0934   Tunneling Position ___ O'Clock 0 05/31/22 0934   Undermining Starts ___ O'Clock 0 05/31/22 0934   Undermining Ends___ O'Clock 0 05/31/22 0934   Undermining Maxium Distance (cm) 0 05/23/22 1005   Wound Assessment Fibrin;Pink/red 05/31/22 0934   Drainage Amount Small 05/31/22 0934   Drainage Description Serosanguinous 05/31/22 0934   Odor None 05/31/22 0934   Catrachita-wound Assessment Intact 05/31/22 0934   Margins Defined edges 05/31/22 0934   Wound Thickness Description not for Pressure Injury Full thickness 05/31/22 0934   Number of days: 196       Wound 05/12/22 Ankle Right #2 (Active)   Wound Image   05/12/22 1000   Wound Etiology Venous 05/12/22 1000   Wound Cleansed Cleansed with saline 05/31/22 0934   Dressing/Treatment Collagen with Ag 05/23/22 1013   Wound Length (cm) 0.2 cm 05/31/22 0934   Wound Width (cm) 0.3 cm 05/31/22 0934   Wound Depth (cm) 0.1 cm 05/31/22 0934   Wound Surface Area (cm^2) 0.06 cm^2 05/31/22 0934   Change in Wound Size % (l*w) 60 05/31/22 0934   Wound Volume (cm^3) 0.006 cm^3 05/31/22 0934   Wound Healing % 60 05/31/22 0934   Post-Procedure Length (cm) 0.3 cm 05/31/22 1001   Post-Procedure Width (cm) 0.4 cm 05/31/22 1001   Post-Procedure Depth (cm) 0.3 cm 05/31/22 1001   Post-Procedure Surface Area (cm^2) 0.12 cm^2 05/31/22 1001   Post-Procedure Volume (cm^3) 0.036 cm^3 05/31/22 1001   Distance Tunneling (cm) 0 cm 05/31/22 0934   Tunneling Position ___ O'Clock 0 05/31/22 0934   Undermining Starts ___ O'Clock 0 05/31/22 0934   Undermining Ends___ O'Clock 0 05/31/22 0934   Undermining Maxium Distance (cm) 0 05/31/22 0934   Wound Assessment Pink/red 05/31/22 0934   Drainage Amount Small 05/31/22 0934   Drainage Description Yellow 05/31/22 0934   Odor None 05/31/22 0934   Catrachita-wound Assessment Intact 05/31/22 0934   Margins Defined edges 05/31/22 0934   Wound Thickness Description not for Pressure Injury Full thickness 05/31/22 0934   Number of days: 19          Percent of Wound Debrided: 100%    Total Surface Area Debrided:  2 sq cm    Diabetic/Pressure/Non Pressure Ulcers only:  Ulcer: Non-Pressure ulcer, fat layer exposed    Bleeding: Minimal    Hemostasis Achieved: by pressure    Procedural Pain: 0  / 10     Post Procedural Pain: 0 / 10     Response to treatment:  Well tolerated by patient. Continued slow improvement in wound measurements. Patient to see Dr. Herminia Galvin next week        Plan:     Treatment Note: Please see attached Discharge Instructions. These instructions were given and signed by the patient or POA    New Medication(s) at this visit:   New Prescriptions    No medications on file       Other orders at this visit:   Orders Placed This Encounter   Procedures   [address] Protocol       Discharge Instructions         c   [facility] Physician Orders and Discharge Instructions  [address] [address] [address] [address] [facility]. [place] [place]  Telephone: [phone]  NAME:  Beatriz Guzman. YOB: 1950  MEDICAL RECORD NUMBER:  [id number]  DATE:  5/31/2022    Wash hands with soap and water prior to and after every dressing change. Wound Cleansing:   · Do not scrub or use excessive force. · With each dressing change, rinse wounds with 0.9% Saline. (May use wound wash or soft contact solution. Both can be purchased at a local drug store). · If unable to obtain saline, may use a gentle soap and water. · Keep wounds dry in the shower unless otherwise instructed by the physician. · For wounds on lower legs, cast covers can be purchased at local drug stores, so that you may shower and keep the wound(s) dry. []  Vashe Wash solution instructions (if prescribed): Apply enough Vashe to soak a piece of gauze and place on wound bed for 5-10 minutes. DO NOT rinse after Vashe has been applied. Follow dressing application as instructed below.     Catrachita wound Topical Treatments:  Do not apply lotions, creams, or ointments to the skin around the wound bed unless directed as followed:     [] Apply around the wound: [] moisturizing lotion [] Antifungal ointment [] No-Sting barrier film [] Zinc paste [] Other:       Dressings:           Wound Location:  Left lower leg and ankle      Apply Primary Dressing to wound: Oly Or collagen with silver        Cover and Secure with:     Cover and Secure with: 4X4 gauze pad  Conforming roll gauze   Avoid contact of tape with skin if possible.  When to change Dressing: [] Daily [] Every Other Day [] Once a week  [x] Three times per week: [x] Monday, Wednesday, Friday [] Tuesday, Thursday, Saturday  [] Do Not Change Dressing [] Other:     Edema Control:  Apply: [] Compression Stocking  [] Left Lower Leg [] Right Lower Leg     [x]  Spandagrip:Strength: Low compression 5-10 mm/Hg    [] Left Lower Leg  [x] Right Lower Leg        Apply every morning immediately when getting up. They should be applied to affected leg(s) from mid foot to knee making sure to cover the heel. Remove every night before going to bed. [x] Elevate leg(s) above the level of the heart for 30 minutes 4-5 times a day and/or when sitting. [x] Avoid prolonged standing in one place. Dietary:  Important dietary reminders:  1. Increase Protein intake (i.e. Lean meats, fish, eggs, legumes, and yogurt)  2. No added salt  3. If diabetic, follow a diabetic diet and check glucose prior to meals or as instructed by your physician. Dietary Supplements:  [] Real  [] 30ml ProStat  [] EnsureEnlive [] Ensure Max/Premier  [] Other:    If you are still having pain after you go home:   For wounds on lower legs or arms, elevate the affected limb.  Use over-the-counter medications you would normally use for pain as permitted by your primary care doctor.  For persistent pain not relieved by the above interventions, please call your primary care doctor.      Return Appointment:   Return Appointment: With Dr Rosamaria Yu  in  [address] [place] [address])    [] Return Appointment for a Wound Assessment (Nurse Visit) on:       [x] Orders placed during your visit:   Orders Placed This Encounter   Procedures    Initiate Outpatient Wound Care Protocol       o All other future appointments:  Future Appointments   Date Time Provider Orlando Colmenares   6/20/2022 10:30 AM Julian JessicaDO JEFF posey MEREDITH [place] - DYD   8/30/2022  9:45 AM Michelle Kearney MD [facility]   10/13/2022  1:00 PM MD Azul Bob [facility]   -     Home care: [facility] [phone]   F: [phone]    Your nurse  is:  Natacha Galan     Electronically signed by Ashleigh Clarke RN on 5/31/2022 at 10:02 AM     [address] [place] [address] Information: Should you experience any significant changes in your wound(s) or have questions about your wound care, please contact the [address] [place] [address] at [phone]. We are open from 8:00am - 4:30p Monday, Thursday and Friday; 11:00am - 5pm on Tuesday and CLOSED Wednesday. Please give us 24-48 hours to return your call. Call your doctor now or seek immediate medical care if:    · You have symptoms of infection, such as:  ? Increased pain, swelling, warmth, or redness. ? Red streaks leading from the area. ? Pus draining from the area. ? A fever.         Physician for this visit and orders: Angi Danielle MD    [] Patient unable to sign Discharge Instructions given to ECF/Transportation/POA        Electronically signed by Angi Danielle MD on 5/31/2022 at 12:17 PM

## 2022-06-02 ENCOUNTER — CARE COORDINATION (OUTPATIENT)
Dept: CARE COORDINATION | Age: 72
End: 2022-06-02

## 2022-06-02 NOTE — CARE COORDINATION
Completed  Home Care Waiver: Completed (Comment: 5.4.22 reviewed w/pt, per Lin @ [facility] - option for Consumer Directed Care, compensated for approved HHA support up to 2hr/wk; however, pt unable to name anyone for this)  Home Health Services: In Process (Comment: 5.4.22 pending SOC for wound care M,W,F via Dr Bienvenido June MD [facility] )  Senior Services: Completed (Comment: HHA for homemaker support  2 hr/wk via COA (formerly provided through [facility]); spoke w/Lin 5.4. 22 who was going to reFax referral for non-skilled HHA again to [facility] [place] [address] [place] accomodate reinstating HHA support)  Smoking Cessation: In Process (Comment: 4.26.22 health coaching/encouraged in self challenge of reducing daily consumption of cigarettes by delay of 1st cirgarette of day by 15 min, advance as ready)  Specialty Services Referral: Completed (Comment: Dr Janett Mann (cardiology); Dr Bienvenido June (Vasc); Dr Toni Bond (Pulmonology))  Transportation Support: Not Started  Zone Management Tools: Completed (Comment: HF, COPD)  Other Services or Interventions: review of self mgmt concepts; resources; health coaching re smoking cessation         Goals Addressed                    This Visit's Progress      Conditions and Symptoms (pt-stated)   On track      I will schedule office visits, as directed by my provider. I will keep my appointment or reschedule if I have to cancel. I will notify my provider of any barriers to my plan of care. I will follow my Zone Management tool to seek urgent or emergent care. I will notify my provider of any symptoms that indicate a worsening of my condition.     Barriers: impairment:  physical: vascular wound lower extremity and fear of failure  Plan for overcoming my barriers: engage in Care Coordination  Confidence: 10/10  Anticipated Goal Completion Date: 10.26.22          Wellness Goal (pt-stated)   On track      Patient Self-Management Goal for Health Maintenance  Goal: I will chose a goal related to tobacco cessation:  I will think about my triggers for smoking, I will think about reasons why I should quit smoking, and I will learn more about the harmful effects of tobacco.  Barriers: impairment:  physical: nicotine dependence and fear of failure  Plan for overcoming my barriers: engage in Care Coordination  Confidence: 10/10  Anticipated Goal Completion Date: 10.26.22            Prior to Admission medications    Medication Sig Start Date End Date Taking? Authorizing Provider   Evolocumab 140 MG/ML SOAJ Inject 1 pen into the skin every 14 days  Patient not taking: Reported on 5/31/2022 5/25/22   Rae Singh MD   ELIQUIS 5 MG TABS tablet TAKE 1 TABLET BY MOUTH TWICE DAILY 5/23/22   Diana Clark MD   DULoxetine HCl 40 MG CPEP TAKE 1 CAPSULE BY MOUTH DAILY 4/21/22   Historical Provider, MD   mupirocin (BACTROBAN) 2 % ointment Apply topically daily Apply topically 3 times daily. Patient not taking: Reported on 5/31/2022    Historical Provider, MD   atorvastatin (LIPITOR) 80 MG tablet Take 1 tablet by mouth daily 4/19/22   Carlos A Fields DO   losartan (COZAAR) 100 MG tablet TAKE 1 TABLET BY MOUTH EVERY DAY 3/24/22   Carlos A Fields DO   clopidogrel (PLAVIX) 75 MG tablet Take 1 tablet by mouth daily 3/24/22   Carlos A Fields DO   omeprazole (PRILOSEC) 40 MG delayed release capsule Take 1 capsule by mouth daily 2/9/22   Carlos A Fields DO   potassium chloride (MICRO-K) 10 MEQ extended release capsule 1 tablet 2/9/22   Carlos A Fields DO   pregabalin (LYRICA) 100 MG capsule Take 1 capsule by mouth 3 times daily for 30 days.  2/9/22 3/11/22  Carlos A Fields DO   fluticasone-umeclidin-vilant (TRELEGY ELLIPTA) 761-56.9-88 MCG/INH AEPB Inhale 1 puff into the lungs daily 1/13/22   Carlos A Fields DO   metoprolol (LOPRESSOR) 100 MG tablet Take 1 tablet by mouth 2 times daily 12/15/21 3/15/22  Sue Carroll MD   furosemide (LASIX) 40 MG tablet Take 1 tablet by mouth daily 12/15/21   Sue Carroll MD   apixaban (ELIQUIS) 5 MG TABS tablet Take 2 tablets by mouth 2 times daily for 11 doses 9/20/21 5/23/23  Yomi Garza MD   folic acid (FOLVITE) 1 MG tablet Take 1 tablet by mouth daily 9/21/21   Yomi Garza MD   albuterol sulfate  (90 Base) MCG/ACT inhaler Inhale 2 puffs into the lungs every 6 hours as needed for Wheezing 8/25/21   Mikey Cocks, DO   allopurinol (ZYLOPRIM) 100 MG tablet Take 1 tablet by mouth daily 8/25/21   Mikey Cocks, DO   nitroGLYCERIN (NITROSTAT) 0.4 MG SL tablet Place 1 tablet under the tongue every 5 minutes as needed for Chest pain  Patient not taking: Reported on 5/31/2022 8/25/21   Mikeygualberto Brandon DO   magnesium gluconate (MAGONATE) 500 MG tablet Take 500 mg by mouth daily.     Historical Provider, MD       Future Appointments   Date Time Provider Orlando Colmenares   6/6/2022  1:45 PM Rani Ramírez MD [facility] WOUND [facility] H[facility]   6/20/2022 10:30 AM DO JEFF Woo [place] - DYD   8/30/2022  9:45 AM Hamzah Dueñas MD [facility]   10/13/2022  1:00 PM Isidra Clark MD [facility] P/CC [facility]

## 2022-06-06 ENCOUNTER — HOSPITAL ENCOUNTER (OUTPATIENT)
Dept: WOUND CARE | Age: 72
Discharge: HOME | End: 2022-06-06
Payer: MEDICARE

## 2022-06-06 VITALS — TEMPERATURE: 97.7 F | RESPIRATION RATE: 16 BRPM | HEART RATE: 71 BPM

## 2022-06-06 DIAGNOSIS — I70.221 ATHEROSCLEROSIS OF NATIVE ARTERY OF RIGHT LOWER EXTREMITY WITH REST PAIN (HCC): ICD-10-CM

## 2022-06-06 DIAGNOSIS — S81.801D OPEN WOUND OF RIGHT LOWER LEG, SUBSEQUENT ENCOUNTER: ICD-10-CM

## 2022-06-06 DIAGNOSIS — I73.9 PVD (PERIPHERAL VASCULAR DISEASE) (HCC): ICD-10-CM

## 2022-06-06 DIAGNOSIS — L97.912 NON-PRESSURE ULCER OF RIGHT LOWER EXTREMITY WITH FAT LAYER EXPOSED (HCC): Primary | ICD-10-CM

## 2022-06-06 LAB
ALBUMIN SERPL-MCNC: 4.1 G/DL (ref 3.4–5)
ANION GAP SERPL CALCULATED.3IONS-SCNC: 15 MMOL/L (ref 3–16)
BUN BLDV-MCNC: 17 MG/DL (ref 7–20)
CALCIUM SERPL-MCNC: 9.7 MG/DL (ref 8.3–10.6)
CHLORIDE BLD-SCNC: 105 MMOL/L (ref 99–110)
CO2: 23 MMOL/L (ref 21–32)
CREAT SERPL-MCNC: 0.9 MG/DL (ref 0.8–1.3)
GFR AFRICAN AMERICAN: >60
GFR NON-AFRICAN AMERICAN: >60
GLUCOSE BLD-MCNC: 104 MG/DL (ref 70–99)
PHOSPHORUS: 3 MG/DL (ref 2.5–4.9)
POTASSIUM SERPL-SCNC: 4.7 MMOL/L (ref 3.5–5.1)
SODIUM BLD-SCNC: 143 MMOL/L (ref 136–145)

## 2022-06-06 PROCEDURE — 6370000000 HC RX 637 (ALT 250 FOR IP): Performed by: SPECIALIST

## 2022-06-06 RX ORDER — LIDOCAINE HYDROCHLORIDE 20 MG/ML
JELLY TOPICAL ONCE
Status: CANCELLED | OUTPATIENT
Start: 2022-06-06 | End: 2022-06-06

## 2022-06-06 RX ORDER — GINSENG 100 MG
CAPSULE ORAL ONCE
Status: CANCELLED | OUTPATIENT
Start: 2022-06-06 | End: 2022-06-06

## 2022-06-06 RX ORDER — LIDOCAINE HYDROCHLORIDE 40 MG/ML
SOLUTION TOPICAL ONCE
Status: CANCELLED | OUTPATIENT
Start: 2022-06-06 | End: 2022-06-06

## 2022-06-06 RX ORDER — BETAMETHASONE DIPROPIONATE 0.05 %
OINTMENT (GRAM) TOPICAL ONCE
Status: CANCELLED | OUTPATIENT
Start: 2022-06-06 | End: 2022-06-06

## 2022-06-06 RX ORDER — DULOXETIN HYDROCHLORIDE 20 MG/1
20 CAPSULE, DELAYED RELEASE ORAL DAILY
Qty: 30 CAPSULE | Refills: 3 | Status: SHIPPED | OUTPATIENT
Start: 2022-06-06 | End: 2022-07-13

## 2022-06-06 RX ORDER — LIDOCAINE HYDROCHLORIDE 40 MG/ML
SOLUTION TOPICAL ONCE
Status: COMPLETED | OUTPATIENT
Start: 2022-06-06 | End: 2022-06-06

## 2022-06-06 RX ORDER — GENTAMICIN SULFATE 1 MG/G
OINTMENT TOPICAL ONCE
Status: CANCELLED | OUTPATIENT
Start: 2022-06-06 | End: 2022-06-06

## 2022-06-06 RX ORDER — BACITRACIN ZINC AND POLYMYXIN B SULFATE 500; 1000 [USP'U]/G; [USP'U]/G
OINTMENT TOPICAL ONCE
Status: CANCELLED | OUTPATIENT
Start: 2022-06-06 | End: 2022-06-06

## 2022-06-06 RX ORDER — LIDOCAINE 40 MG/G
CREAM TOPICAL ONCE
Status: CANCELLED | OUTPATIENT
Start: 2022-06-06 | End: 2022-06-06

## 2022-06-06 RX ORDER — BACITRACIN, NEOMYCIN, POLYMYXIN B 400; 3.5; 5 [USP'U]/G; MG/G; [USP'U]/G
OINTMENT TOPICAL ONCE
Status: CANCELLED | OUTPATIENT
Start: 2022-06-06 | End: 2022-06-06

## 2022-06-06 RX ORDER — OMEPRAZOLE 40 MG/1
40 CAPSULE, DELAYED RELEASE ORAL DAILY
Qty: 30 CAPSULE | Refills: 3 | Status: SHIPPED | OUTPATIENT
Start: 2022-06-06 | End: 2022-10-04

## 2022-06-06 RX ORDER — CLOBETASOL PROPIONATE 0.5 MG/G
OINTMENT TOPICAL ONCE
Status: CANCELLED | OUTPATIENT
Start: 2022-06-06 | End: 2022-06-06

## 2022-06-06 RX ORDER — LIDOCAINE 50 MG/G
OINTMENT TOPICAL ONCE
Status: CANCELLED | OUTPATIENT
Start: 2022-06-06 | End: 2022-06-06

## 2022-06-06 RX ADMIN — LIDOCAINE HYDROCHLORIDE: 40 SOLUTION TOPICAL at 13:52

## 2022-06-06 ASSESSMENT — PAIN DESCRIPTION - ORIENTATION: ORIENTATION: RIGHT

## 2022-06-06 ASSESSMENT — PAIN DESCRIPTION - FREQUENCY: FREQUENCY: INTERMITTENT

## 2022-06-06 ASSESSMENT — PAIN DESCRIPTION - LOCATION: LOCATION: LEG

## 2022-06-06 ASSESSMENT — PAIN DESCRIPTION - ONSET: ONSET: AWAKENED FROM SLEEP

## 2022-06-06 ASSESSMENT — PAIN SCALES - GENERAL: PAINLEVEL_OUTOF10: 8

## 2022-06-06 ASSESSMENT — PAIN DESCRIPTION - DESCRIPTORS: DESCRIPTORS: BURNING;SHARP

## 2022-06-06 ASSESSMENT — PAIN DESCRIPTION - PAIN TYPE: TYPE: ACUTE PAIN

## 2022-06-06 NOTE — TELEPHONE ENCOUNTER
Medication:   Requested Prescriptions     Pending Prescriptions Disp Refills    omeprazole (PRILOSEC) 40 MG delayed release capsule [Pharmacy Med Name: OMEPRAZOLE 40MG CAPSULES] 30 capsule 3     Sig: TAKE 1 CAPSULE BY MOUTH DAILY    DULoxetine (CYMBALTA) 20 MG extended release capsule [Pharmacy Med Name: DULOXETINE DR 20MG CAPSULES] 30 capsule 3     Sig: TAKE 1 CAPSULE BY MOUTH DAILY        Last Filled:  02/09/22, 04/21/22  Patient Phone Number: [phone] (home)     Last appt: 2/9/2022   Next appt: 6/20/2022    Last OARRS:   RX Monitoring 8/14/2020   Attestation -   Periodic Controlled Substance Monitoring Possible medication side effects, risk of tolerance/dependence & alternative treatments discussed. ;Assessed functional status. ;Obtaining appropriate analgesic effect of treatment. Chronic Pain > 50 MEDD Obtained or confirmed \"Consent for Opioid Use\" on file.

## 2022-06-14 ENCOUNTER — TELEPHONE (OUTPATIENT)
Dept: PRIMARY CARE CLINIC | Age: 72
End: 2022-06-14

## 2022-06-14 DIAGNOSIS — M1A.0710 CHRONIC IDIOPATHIC GOUT INVOLVING TOE OF RIGHT FOOT WITHOUT TOPHUS: ICD-10-CM

## 2022-06-14 RX ORDER — ALLOPURINOL 100 MG/1
100 TABLET ORAL DAILY
Qty: 180 TABLET | Refills: 1 | Status: SHIPPED | OUTPATIENT
Start: 2022-06-14

## 2022-06-14 RX ORDER — POTASSIUM CHLORIDE 750 MG/1
CAPSULE, EXTENDED RELEASE ORAL
Qty: 60 CAPSULE | Refills: 1 | Status: SHIPPED | OUTPATIENT
Start: 2022-06-14 | End: 2022-06-20 | Stop reason: SDUPTHER

## 2022-06-15 ENCOUNTER — CARE COORDINATION (OUTPATIENT)
Dept: CARE COORDINATION | Age: 72
End: 2022-06-15

## 2022-06-15 SDOH — ECONOMIC STABILITY: TRANSPORTATION INSECURITY
IN THE PAST 12 MONTHS, HAS LACK OF TRANSPORTATION KEPT YOU FROM MEETINGS, WORK, OR FROM GETTING THINGS NEEDED FOR DAILY LIVING?: NO

## 2022-06-15 SDOH — ECONOMIC STABILITY: TRANSPORTATION INSECURITY
IN THE PAST 12 MONTHS, HAS THE LACK OF TRANSPORTATION KEPT YOU FROM MEDICAL APPOINTMENTS OR FROM GETTING MEDICATIONS?: NO

## 2022-06-15 ASSESSMENT — PATIENT HEALTH QUESTIONNAIRE - PHQ9
SUM OF ALL RESPONSES TO PHQ QUESTIONS 1-9: 0

## 2022-06-15 NOTE — CARE COORDINATION
verbalize Rescue vs. Long Acting medications?: Yes  Does the patient have a nebulizer?: No  Does the patient use a space with inhaled medications?: No     No patient-reported symptoms         Symptoms:     Have you had a recent diagnosis of pneumonia either by PCP or at a hospital?: No         Care Coordination Interventions    Referral from Primary Care Provider: No  Suggested Interventions and Community Resources  Fall Risk Prevention: Completed  Home Care Waiver: Completed (Comment: 5.4.22 reviewed w/pt, per Lin @ [facility] - option for Consumer Directed Care, compensated for approved HHA support up to 2hr/wk; however, pt unable to name anyone for this)  Home Health Services: In Process (Comment: 5.4.22 pending SOC for wound care M,W,F via Dr Jimmie Cueva MD [facility] )  Senior Services: Completed (Comment: HHA for homemaker support  2 hr/wk via COA (formerly provided through [facility]); spoke w/Lin 5.4. 25 who was going to reFax referral for non-skilled HHA again to [facility] in [facility] can accomodate reinstating HHA support)  Smoking Cessation: Completed (Comment: 6.15.22 QUIT circa 6.8.22; 4.26.22 health coaching/encouraged in self challenge of reducing daily consumption of cigarettes by delay of 1st cirgarette of day by 15 min, advance as ready)  Specialty Services Referral: Completed (Comment: Dr Reyna Ledezma (cardiology); Dr Jimmie Cueva (Vasc); Dr Eliud Dacosta (Pulmonology))  Transportation Support: Not Started  Zone Management Tools: Completed (Comment: HF, COPD)  Other Services or Interventions: review of self mgmt concepts; resources; health coaching re smoking cessation         Goals Addressed                    This Visit's Progress      Conditions and Symptoms (pt-stated)   On track      I will schedule office visits, as directed by my provider. I will keep my appointment or reschedule if I have to cancel. I will notify my provider of any barriers to my plan of care.   I will follow my Zone Management tool to seek urgent or emergent care. I will notify my provider of any symptoms that indicate a worsening of my condition. Barriers: impairment:  physical: vascular wound lower extremity and fear of failure  Plan for overcoming my barriers: engage in Care Coordination  Confidence: 10/10  Anticipated Goal Completion Date: 10.26.22          Wellness Goal (pt-stated)   On track      Patient Self-Management Goal for Health Maintenance  Goal: I will chose a goal related to tobacco cessation:  I will think about my triggers for smoking, I will think about reasons why I should quit smoking, and I will learn more about the harmful effects of tobacco.  Barriers: impairment:  physical: nicotine dependence and fear of failure  Plan for overcoming my barriers: engage in Care Coordination  Confidence: 10/10  Anticipated Goal Completion Date: 10.26.22            Prior to Admission medications    Medication Sig Start Date End Date Taking? Authorizing Provider   potassium chloride (MICRO-K) 10 MEQ extended release capsule 1 tablet 6/14/22   Trung Goyal DO   allopurinol (ZYLOPRIM) 100 MG tablet Take 1 tablet by mouth daily 6/14/22   Trung Goyal DO   omeprazole (PRILOSEC) 40 MG delayed release capsule TAKE 1 CAPSULE BY MOUTH DAILY 6/6/22   Trung Goyal DO   DULoxetine (CYMBALTA) 20 MG extended release capsule TAKE 1 CAPSULE BY MOUTH DAILY 6/6/22   Trung Goyal DO   Evolocumab 140 MG/ML SOAJ Inject 1 pen into the skin every 14 days 5/25/22   Bev Caldwell MD   ELIQUIS 5 MG TABS tablet TAKE 1 TABLET BY MOUTH TWICE DAILY 5/23/22   Joce Purdy MD   DULoxetine HCl 40 MG CPEP TAKE 1 CAPSULE BY MOUTH DAILY 4/21/22   Historical Provider, MD   mupirocin (BACTROBAN) 2 % ointment Apply topically daily Apply topically 3 times daily.      Historical Provider, MD   atorvastatin (LIPITOR) 80 MG tablet Take 1 tablet by mouth daily 4/19/22   Trung Goyal DO   losartan (COZAAR) 100 MG tablet TAKE 1 TABLET BY MOUTH EVERY DAY 3/24/22   Trung Goyal DO clopidogrel (PLAVIX) 75 MG tablet Take 1 tablet by mouth daily 3/24/22   Erie Poag, DO   pregabalin (LYRICA) 100 MG capsule Take 1 capsule by mouth 3 times daily for 30 days. 2/9/22 3/11/22  Erie Poag, DO   fluticasone-umeclidin-vilant (TRELEGY ELLIPTA) 914-99.1-00 MCG/INH AEPB Inhale 1 puff into the lungs daily 1/13/22   Erie Poag, DO   metoprolol (LOPRESSOR) 100 MG tablet Take 1 tablet by mouth 2 times daily 12/15/21 3/15/22  Jason Koch MD   furosemide (LASIX) 40 MG tablet Take 1 tablet by mouth daily 12/15/21   Jason Koch MD   apixaban (ELIQUIS) 5 MG TABS tablet Take 2 tablets by mouth 2 times daily for 11 doses 9/20/21 5/23/23  Pasha Paz MD   folic acid (FOLVITE) 1 MG tablet Take 1 tablet by mouth daily 9/21/21   Pasha Paz MD   albuterol sulfate  (90 Base) MCG/ACT inhaler Inhale 2 puffs into the lungs every 6 hours as needed for Wheezing 8/25/21   Erie Poag, DO   nitroGLYCERIN (NITROSTAT) 0.4 MG SL tablet Place 1 tablet under the tongue every 5 minutes as needed for Chest pain  Patient not taking: Reported on 5/31/2022 8/25/21   Erie Poag, DO   magnesium gluconate (MAGONATE) 500 MG tablet Take 500 mg by mouth daily.     Historical Provider, MD       Future Appointments   Date Time Provider Orlando Colmenares   6/16/2022  7:30 AM [facility] CT  [facility]   6/20/2022 10:30 AM Erie Poag, DO [facility] [place] - DYD   6/20/2022  1:30 PM MD Ivan Zuñiga [facility]   8/30/2022  9:45 AM Ji Reeves MD [facility]   10/13/2022  1:00 PM Minor Bond MD [facility]     I agree with the Johann Zarate

## 2022-06-16 ENCOUNTER — HOSPITAL ENCOUNTER (OUTPATIENT)
Dept: CT IMAGING | Age: 72
Discharge: HOME | End: 2022-06-16
Payer: MEDICARE

## 2022-06-16 DIAGNOSIS — I70.221 ATHEROSCLEROSIS OF NATIVE ARTERY OF RIGHT LOWER EXTREMITY WITH REST PAIN (HCC): ICD-10-CM

## 2022-06-16 DIAGNOSIS — L97.912 NON-PRESSURE ULCER OF RIGHT LOWER EXTREMITY WITH FAT LAYER EXPOSED (HCC): ICD-10-CM

## 2022-06-16 PROCEDURE — 75635 CT ANGIO ABDOMINAL ARTERIES: CPT

## 2022-06-16 PROCEDURE — 6360000004 HC RX CONTRAST MEDICATION: Performed by: SURGERY

## 2022-06-16 RX ADMIN — IOPAMIDOL 120 ML: 755 INJECTION, SOLUTION INTRAVENOUS at 07:30

## 2022-06-19 RX ORDER — POTASSIUM CHLORIDE 750 MG/1
TABLET, FILM COATED, EXTENDED RELEASE ORAL
Qty: 60 TABLET | OUTPATIENT
Start: 2022-06-19

## 2022-06-20 ENCOUNTER — TELEPHONE (OUTPATIENT)
Dept: CARDIOLOGY CLINIC | Age: 72
End: 2022-06-20

## 2022-06-20 ENCOUNTER — TELEPHONE (OUTPATIENT)
Dept: VASCULAR SURGERY | Age: 72
End: 2022-06-20

## 2022-06-20 ENCOUNTER — TELEPHONE (OUTPATIENT)
Dept: PULMONOLOGY | Age: 72
End: 2022-06-20

## 2022-06-20 ENCOUNTER — OFFICE VISIT (OUTPATIENT)
Dept: PRIMARY CARE CLINIC | Age: 72
End: 2022-06-20
Payer: MEDICARE

## 2022-06-20 ENCOUNTER — HOSPITAL ENCOUNTER (OUTPATIENT)
Dept: WOUND CARE | Age: 72
Discharge: HOME | End: 2022-06-20
Payer: MEDICARE

## 2022-06-20 VITALS
SYSTOLIC BLOOD PRESSURE: 156 MMHG | TEMPERATURE: 97.2 F | DIASTOLIC BLOOD PRESSURE: 74 MMHG | RESPIRATION RATE: 16 BRPM | HEART RATE: 67 BPM

## 2022-06-20 VITALS
SYSTOLIC BLOOD PRESSURE: 128 MMHG | BODY MASS INDEX: 32.35 KG/M2 | WEIGHT: 226 LBS | HEART RATE: 74 BPM | TEMPERATURE: 97.8 F | HEIGHT: 70 IN | DIASTOLIC BLOOD PRESSURE: 64 MMHG

## 2022-06-20 DIAGNOSIS — I73.9 PVD (PERIPHERAL VASCULAR DISEASE) (HCC): ICD-10-CM

## 2022-06-20 DIAGNOSIS — L97.912 NON-PRESSURE ULCER OF RIGHT LOWER EXTREMITY WITH FAT LAYER EXPOSED (HCC): Primary | ICD-10-CM

## 2022-06-20 DIAGNOSIS — I50.32 CHRONIC DIASTOLIC HEART FAILURE (HCC): ICD-10-CM

## 2022-06-20 DIAGNOSIS — G60.9 IDIOPATHIC PERIPHERAL NEUROPATHY: ICD-10-CM

## 2022-06-20 DIAGNOSIS — I25.10 CORONARY ARTERY DISEASE INVOLVING NATIVE CORONARY ARTERY OF NATIVE HEART WITHOUT ANGINA PECTORIS: ICD-10-CM

## 2022-06-20 DIAGNOSIS — I70.221 ATHEROSCLEROSIS OF NATIVE ARTERY OF RIGHT LOWER EXTREMITY WITH REST PAIN (HCC): ICD-10-CM

## 2022-06-20 DIAGNOSIS — E78.2 MIXED HYPERLIPIDEMIA: Chronic | ICD-10-CM

## 2022-06-20 DIAGNOSIS — J44.9 OSA AND COPD OVERLAP SYNDROME (HCC): ICD-10-CM

## 2022-06-20 DIAGNOSIS — I50.32 DIASTOLIC DYSFUNCTION WITH CHRONIC HEART FAILURE (HCC): ICD-10-CM

## 2022-06-20 DIAGNOSIS — G47.33 OSA AND COPD OVERLAP SYNDROME (HCC): ICD-10-CM

## 2022-06-20 DIAGNOSIS — I10 ESSENTIAL HYPERTENSION: Primary | ICD-10-CM

## 2022-06-20 DIAGNOSIS — S81.801D OPEN WOUND OF RIGHT LOWER LEG, SUBSEQUENT ENCOUNTER: ICD-10-CM

## 2022-06-20 PROCEDURE — 1123F ACP DISCUSS/DSCN MKR DOCD: CPT | Performed by: STUDENT IN AN ORGANIZED HEALTH CARE EDUCATION/TRAINING PROGRAM

## 2022-06-20 PROCEDURE — 6370000000 HC RX 637 (ALT 250 FOR IP): Performed by: SPECIALIST

## 2022-06-20 PROCEDURE — 99214 OFFICE O/P EST MOD 30 MIN: CPT | Performed by: STUDENT IN AN ORGANIZED HEALTH CARE EDUCATION/TRAINING PROGRAM

## 2022-06-20 PROCEDURE — 99213 OFFICE O/P EST LOW 20 MIN: CPT

## 2022-06-20 PROCEDURE — 99213 OFFICE O/P EST LOW 20 MIN: CPT | Performed by: SURGERY

## 2022-06-20 RX ORDER — PREGABALIN 150 MG/1
150 CAPSULE ORAL 2 TIMES DAILY
Qty: 180 CAPSULE | Refills: 1 | Status: SHIPPED | OUTPATIENT
Start: 2022-06-20 | End: 2022-10-31

## 2022-06-20 RX ORDER — LIDOCAINE HYDROCHLORIDE 40 MG/ML
SOLUTION TOPICAL ONCE
Status: COMPLETED | OUTPATIENT
Start: 2022-06-20 | End: 2022-06-20

## 2022-06-20 RX ORDER — METOPROLOL TARTRATE 100 MG/1
100 TABLET ORAL 2 TIMES DAILY
Qty: 270 TABLET | Refills: 1 | Status: ON HOLD | OUTPATIENT
Start: 2022-06-20 | End: 2022-07-29 | Stop reason: HOSPADM

## 2022-06-20 RX ORDER — LIDOCAINE HYDROCHLORIDE 40 MG/ML
SOLUTION TOPICAL ONCE
Status: CANCELLED | OUTPATIENT
Start: 2022-06-20 | End: 2022-06-20

## 2022-06-20 RX ORDER — LIDOCAINE HYDROCHLORIDE 20 MG/ML
JELLY TOPICAL ONCE
Status: CANCELLED | OUTPATIENT
Start: 2022-06-20 | End: 2022-06-20

## 2022-06-20 RX ORDER — BACITRACIN ZINC AND POLYMYXIN B SULFATE 500; 1000 [USP'U]/G; [USP'U]/G
OINTMENT TOPICAL ONCE
Status: CANCELLED | OUTPATIENT
Start: 2022-06-20 | End: 2022-06-20

## 2022-06-20 RX ORDER — CLOBETASOL PROPIONATE 0.5 MG/G
OINTMENT TOPICAL ONCE
Status: CANCELLED | OUTPATIENT
Start: 2022-06-20 | End: 2022-06-20

## 2022-06-20 RX ORDER — BETAMETHASONE DIPROPIONATE 0.05 %
OINTMENT (GRAM) TOPICAL ONCE
Status: CANCELLED | OUTPATIENT
Start: 2022-06-20 | End: 2022-06-20

## 2022-06-20 RX ORDER — ATORVASTATIN CALCIUM 80 MG/1
80 TABLET, FILM COATED ORAL DAILY
Qty: 90 TABLET | Refills: 1 | Status: SHIPPED | OUTPATIENT
Start: 2022-06-20

## 2022-06-20 RX ORDER — BACITRACIN, NEOMYCIN, POLYMYXIN B 400; 3.5; 5 [USP'U]/G; MG/G; [USP'U]/G
OINTMENT TOPICAL ONCE
Status: CANCELLED | OUTPATIENT
Start: 2022-06-20 | End: 2022-06-20

## 2022-06-20 RX ORDER — POTASSIUM CHLORIDE 750 MG/1
CAPSULE, EXTENDED RELEASE ORAL
Qty: 60 CAPSULE | Refills: 1 | Status: ON HOLD | OUTPATIENT
Start: 2022-06-20 | End: 2022-07-29 | Stop reason: HOSPADM

## 2022-06-20 RX ORDER — ALBUTEROL SULFATE 90 UG/1
2 AEROSOL, METERED RESPIRATORY (INHALATION) EVERY 6 HOURS PRN
Qty: 18 G | Refills: 2 | Status: SHIPPED | OUTPATIENT
Start: 2022-06-20 | End: 2022-09-01

## 2022-06-20 RX ORDER — FUROSEMIDE 40 MG/1
40 TABLET ORAL DAILY
Qty: 90 TABLET | Refills: 3 | Status: ON HOLD | OUTPATIENT
Start: 2022-06-20 | End: 2022-07-29 | Stop reason: HOSPADM

## 2022-06-20 RX ORDER — LIDOCAINE 40 MG/G
CREAM TOPICAL ONCE
Status: CANCELLED | OUTPATIENT
Start: 2022-06-20 | End: 2022-06-20

## 2022-06-20 RX ORDER — GENTAMICIN SULFATE 1 MG/G
OINTMENT TOPICAL ONCE
Status: CANCELLED | OUTPATIENT
Start: 2022-06-20 | End: 2022-06-20

## 2022-06-20 RX ORDER — GINSENG 100 MG
CAPSULE ORAL ONCE
Status: CANCELLED | OUTPATIENT
Start: 2022-06-20 | End: 2022-06-20

## 2022-06-20 RX ORDER — CLOPIDOGREL BISULFATE 75 MG/1
75 TABLET ORAL DAILY
Qty: 30 TABLET | Refills: 3 | Status: SHIPPED | OUTPATIENT
Start: 2022-06-20 | End: 2022-11-03

## 2022-06-20 RX ORDER — FLUTICASONE FUROATE, UMECLIDINIUM BROMIDE AND VILANTEROL TRIFENATATE 100; 62.5; 25 UG/1; UG/1; UG/1
1 POWDER RESPIRATORY (INHALATION) DAILY
Qty: 28 EACH | Refills: 11 | Status: ON HOLD | OUTPATIENT
Start: 2022-06-20 | End: 2022-07-29 | Stop reason: HOSPADM

## 2022-06-20 RX ORDER — LOSARTAN POTASSIUM 100 MG/1
TABLET ORAL
Qty: 90 TABLET | Refills: 1 | Status: ON HOLD | OUTPATIENT
Start: 2022-06-20 | End: 2022-07-29 | Stop reason: HOSPADM

## 2022-06-20 RX ORDER — LIDOCAINE 50 MG/G
OINTMENT TOPICAL ONCE
Status: CANCELLED | OUTPATIENT
Start: 2022-06-20 | End: 2022-06-20

## 2022-06-20 RX ADMIN — LIDOCAINE HYDROCHLORIDE: 40 SOLUTION TOPICAL at 13:41

## 2022-06-20 ASSESSMENT — PAIN SCALES - GENERAL: PAINLEVEL_OUTOF10: 9

## 2022-06-20 ASSESSMENT — PAIN DESCRIPTION - DESCRIPTORS: DESCRIPTORS: BURNING;SHARP;STABBING

## 2022-06-20 ASSESSMENT — ENCOUNTER SYMPTOMS
SHORTNESS OF BREATH: 0
COUGH: 0
COLOR CHANGE: 0
CHEST TIGHTNESS: 0

## 2022-06-20 ASSESSMENT — PAIN DESCRIPTION - FREQUENCY: FREQUENCY: INTERMITTENT

## 2022-06-20 ASSESSMENT — PAIN DESCRIPTION - PAIN TYPE: TYPE: ACUTE PAIN

## 2022-06-20 ASSESSMENT — PAIN DESCRIPTION - LOCATION: LOCATION: LEG;ANKLE

## 2022-06-20 ASSESSMENT — PAIN DESCRIPTION - ORIENTATION: ORIENTATION: RIGHT

## 2022-06-20 NOTE — TELEPHONE ENCOUNTER
Calls placed to Dr Huy Montoya (cardiology) and Dr Baylee Duran (pulmonolgy) requesting clearances for RLE bypass surgery. Surgery not yet scheduled. Awaiting responses.

## 2022-06-20 NOTE — PROGRESS NOTES
2022     Yesika Cintron (:  1950) is a 81 y.o. male, here for evaluation of the following medical concerns:    HPI  COPD:  Current treatment includes short-acting beta agonist inhaler, long-acting beta agonist inhaler, inhaled steroid, anticholinergic inhaler. Residual symptoms: chronic dyspnea. He denies cough, purulent sputum, wheezing, chest tightness. He requires his rescue inhaler 2 time(s) per week. Hyperlipidemia:  No new myalgias or GI upset on atorvastatin (Lipitor). Medication compliance: compliant most of the time. Patient is not following a low fat, low cholesterol diet. He is not exercising regularly. Lab Results   Component Value Date    CHOL 150 2021    TRIG 198 (H) 2021    HDL 28 (L) 2021    LDLCALC 82 2021    LDLDIRECT 96 2018     Lab Results   Component Value Date    ALT 14 2021    AST 19 2021        Hypertension:  Home blood pressure monitoring: No.  He is not adherent to a low sodium diet. Patient denies chest pain, headache, blurred vision, peripheral edema and fatigue. Antihypertensive medication side effects: no medication side effects noted. Use of agents associated with hypertension: none. Sodium (mmol/L)   Date Value   2022 143    BUN (mg/dL)   Date Value   2022 17    Glucose (mg/dL)   Date Value   2022 104 (H)      Potassium (mmol/L)   Date Value   2022 4.7     Potassium reflex Magnesium (mmol/L)   Date Value   2021 4.0    CREATININE (mg/dL)   Date Value   2022 0.9           Review of Systems   Constitutional: Negative for fatigue. Eyes: Negative for visual disturbance. Respiratory: Negative for cough, chest tightness and shortness of breath. Cardiovascular: Positive for leg swelling (R). Negative for chest pain and palpitations. Endocrine: Negative for polydipsia, polyphagia and polyuria. Genitourinary: Negative for frequency. Musculoskeletal: Positive for gait problem (walker at baseline). Skin: Negative for color change, rash and wound. Neurological: Negative for dizziness, syncope, weakness, light-headedness and numbness. Prior to Visit Medications    Medication Sig Taking? Authorizing Provider   albuterol sulfate HFA (PROVENTIL;VENTOLIN;PROAIR) 108 (90 Base) MCG/ACT inhaler Inhale 2 puffs into the lungs every 6 hours as needed for Wheezing Yes Larrie Drain, DO   atorvastatin (LIPITOR) 80 MG tablet Take 1 tablet by mouth daily Yes Larrie Drain, DO   clopidogrel (PLAVIX) 75 MG tablet Take 1 tablet by mouth daily Yes Larrie Drain, DO   fluticasone-umeclidin-vilant (TRELEGY ELLIPTA) 100-62.5-25 MCG/INH AEPB Inhale 1 puff into the lungs daily Yes Larrie Drain, DO   losartan (COZAAR) 100 MG tablet TAKE 1 TABLET BY MOUTH EVERY DAY Yes Larrie Drain, DO   metoprolol (LOPRESSOR) 100 MG tablet Take 1 tablet by mouth 2 times daily Yes Larrie Drain, DO   pregabalin (LYRICA) 150 MG capsule Take 1 capsule by mouth 2 times daily for 30 days. Yes Larrie Drain, DO   potassium chloride (MICRO-K) 10 MEQ extended release capsule 1 tablet Yes Larrie Drain, DO   furosemide (LASIX) 40 MG tablet Take 1 tablet by mouth daily Yes Larrie Drain, DO   allopurinol (ZYLOPRIM) 100 MG tablet Take 1 tablet by mouth daily Yes Larrie Drain, DO   omeprazole (PRILOSEC) 40 MG delayed release capsule TAKE 1 CAPSULE BY MOUTH DAILY Yes Larrie Drain, DO   DULoxetine (CYMBALTA) 20 MG extended release capsule TAKE 1 CAPSULE BY MOUTH DAILY Yes Larrie Drain, DO   Evolocumab 140 MG/ML SOAJ Inject 1 pen into the skin every 14 days Yes Rohit Neil MD   DULoxetine HCl 40 MG CPEP TAKE 1 CAPSULE BY MOUTH DAILY Yes Historical Provider, MD   mupirocin (BACTROBAN) 2 % ointment Apply topically daily Apply topically 3 times daily.   Yes Historical Provider, MD   apixaban (ELIQUIS) 5 MG TABS tablet Take 2 tablets by mouth 2 times daily for 11 doses Yes Cornelia Echavarria MD   folic acid (FOLVITE) 1 MG tablet Take 1 tablet by mouth daily Yes Monica Franklin MD   nitroGLYCERIN (NITROSTAT) 0.4 MG SL tablet Place 1 tablet under the tongue every 5 minutes as needed for Chest pain Yes Genny Cho DO   magnesium gluconate (MAGONATE) 500 MG tablet Take 500 mg by mouth daily. Yes Historical Provider, MD        Social History     Tobacco Use    Smoking status: Former Smoker     Packs/day: 0.25     Years: 52.00     Pack years: 13.00     Types: Cigarettes     Start date: 1967     Quit date: 2022     Years since quittin.0    Smokeless tobacco: Never Used    Tobacco comment: reports he quit circa 22   Substance Use Topics    Alcohol use: Yes     Alcohol/week: 0.0 standard drinks     Comment: 2-3 beers a month        Vitals:    22 1003   BP: 128/64   Pulse: 74   Temp: 97.8 °F (36.6 °C)   TempSrc: Axillary   Weight: 226 lb (102.5 kg)   Height: 5' 10\" (1.778 m)     Estimated body mass index is 32.43 kg/m² as calculated from the following:    Height as of this encounter: 5' 10\" (1.778 m). Weight as of this encounter: 226 lb (102.5 kg). Physical Exam  Constitutional:       Appearance: Normal appearance. He is obese. HENT:      Head: Normocephalic and atraumatic. Eyes:      Extraocular Movements: Extraocular movements intact. Conjunctiva/sclera: Conjunctivae normal.   Cardiovascular:      Rate and Rhythm: Normal rate and regular rhythm. Pulmonary:      Effort: Pulmonary effort is normal.      Breath sounds: Normal breath sounds. Abdominal:      General: Abdomen is flat. Bowel sounds are normal.      Palpations: Abdomen is soft. Musculoskeletal:      Right lower leg: No edema. Skin:     General: Skin is warm and dry. Findings: No erythema. Comments: RLE cleanly bandaged   Neurological:      Mental Status: He is alert. Mental status is at baseline. Psychiatric:         Mood and Affect: Mood normal.         Behavior: Behavior normal.         Thought Content:  Thought content normal.         ASSESSMENT/PLAN:  1. Essential hypertension: Blood pressure well controlled today. Tolerating current regimen without side effects. Refill given. Follow-up 6 months. - losartan (COZAAR) 100 MG tablet; TAKE 1 TABLET BY MOUTH EVERY DAY  Dispense: 90 tablet; Refill: 1  - metoprolol (LOPRESSOR) 100 MG tablet; Take 1 tablet by mouth 2 times daily  Dispense: 270 tablet; Refill: 1    2. Chronic diastolic heart failure ([facility] [address]): Euvolemic today. Blood pressure well controlled. Regimen appropriate. - losartan (COZAAR) 100 MG tablet; TAKE 1 TABLET BY MOUTH EVERY DAY  Dispense: 90 tablet; Refill: 1  - metoprolol (LOPRESSOR) 100 MG tablet; Take 1 tablet by mouth 2 times daily  Dispense: 270 tablet; Refill: 1  - potassium chloride (MICRO-K) 10 MEQ extended release capsule; 1 tablet  Dispense: 60 capsule; Refill: 1  - furosemide (LASIX) 40 MG tablet; Take 1 tablet by mouth daily  Dispense: 90 tablet; Refill: 3    3. Coronary artery disease involving native coronary artery of native heart without angina pectoris: Compliant with Eliquis, Plavix, beta-blocker and ARB. Denies chest pain today. - atorvastatin (LIPITOR) 80 MG tablet; Take 1 tablet by mouth daily  Dispense: 90 tablet; Refill: 1  - metoprolol (LOPRESSOR) 100 MG tablet; Take 1 tablet by mouth 2 times daily  Dispense: 270 tablet; Refill: 1    4. KAVITA and COPD overlap syndrome [facility]): Doing well on current inhaler regimen. Compliant with smoking cessation. - albuterol sulfate HFA (PROVENTIL;VENTOLIN;PROAIR) 108 (90 Base) MCG/ACT inhaler; Inhale 2 puffs into the lungs every 6 hours as needed for Wheezing  Dispense: 18 g; Refill: 2  - fluticasone-umeclidin-vilant (Iram Pump) 100-62.5-25 MCG/INH AEPB; Inhale 1 puff into the lungs daily  Dispense: 28 each; Refill: 11    5. Idiopathic peripheral neuropathy: Did notice some improvement on the increased dose of Lyrica, but continues to have symptoms. Would like to increase Lyrica. No side effects. Okay to increase to 150 today. Reevaluate in 6 weeks. - pregabalin (LYRICA) 150 MG capsule; Take 1 capsule by mouth 2 times daily for 30 days. Dispense: 180 capsule; Refill: 1    Return in about 3 months (around 9/20/2022). An electronic signature was used to authenticate this note.     --Megan Lieberman,  on 6/20/2022 at 12:11 PM

## 2022-06-20 NOTE — TELEPHONE ENCOUNTER
Since Huyen did well with his last surgery and his chronic lung disease is stable then I don't think he needs clearance. But I will gladly give it if they need it.

## 2022-06-20 NOTE — TELEPHONE ENCOUNTER
CARDIAC CLEARANCE     What type of procedure are you having? Right leg bypass    Which physician is performing your procedure? Dr. Lopez Feeling    When is your procedure scheduled for? Not scheduled yet. Where are you having this procedure? [address]    Are you taking Blood Thinners? If so what? (Name/dose/frequesncy)    Eliquis and Plavix (question if still taking Eliquis)     Does the surgeon want you to stop your blood thinner? If so for how long? Yes. Two days prior if on Eliquis.      No hold on Plavix     Phone Number and Contact Name for Physicians office:    [phone]    Fax number to send information:    [phone]

## 2022-06-20 NOTE — TELEPHONE ENCOUNTER
Please advise 's office called, they would like to know if pulmonary clearance is needed for Right Leg bypass surgery. Surgery is not yet etelvina.

## 2022-06-20 NOTE — PATIENT INSTRUCTIONS
Patient Education        Chronic Obstructive Pulmonary Disease (COPD): Care Instructions  Overview     Chronic obstructive pulmonary disease (COPD) is a lung disease that makes it hard to breathe. With COPD, the airways that lead to the lungs are narrowed, and the tiny air sacs in the lungs are damaged and lose their stretch. People with COPD have decreased airflow in and out of the lungs, which makes it hard to breathe. The airways also can get clogged with thick mucus. Cigarettesmoking is a major cause of COPD. Although there is no cure for COPD, you can slow its progress. Following your treatment plan and taking care of yourself can help you feel better and livelonger. Follow-up care is a key part of your treatment and safety. Be sure to make and go to all appointments, and call your doctor if you are having problems. It's also a good idea to know your test results and keep alist of the medicines you take. How can you care for yourself at home? Staying healthy     Do not smoke. This is the most important step you can take to prevent more damage to your lungs. If you need help quitting, talk to your doctor about stop-smoking programs and medicines. These can increase your chances of quitting for good.      Avoid colds and other infections. Get the pneumococcal and whooping cough (pertussis) vaccines. If you have had these vaccines before, ask your doctor if you need another dose. Get the flu vaccine every fall. If you must be around people with colds or the flu, wash your hands often.      Avoid secondhand smoke and air pollution. Try to stay inside with your windows closed when air pollution is bad. Medicines and oxygen therapy     Take your medicines exactly as prescribed. Call your doctor if you think you are having a problem with your medicine. You may be taking medicines such as:  ? Bronchodilators. These help open your airways and make breathing easier.  They are either short-acting (work for 4 to 9 hours) or long-acting (work for 12 to 24 hours). You inhale most bronchodilators, so they start to act quickly. Always carry your quick-relief inhaler with you in case you need it. ? Corticosteroids (prednisone, budesonide). These reduce airway inflammation. They come in inhaled or pill form.      Ask your doctor or pharmacist if you are using each type of inhaler correctly. With correct use, the medicine is more likely to get to your lungs.      See if a spacer is right for you. A spacer may also help you get more inhaled medicine to your lungs. If you use one, ask how to use it properly.      Do not take any vitamins, over-the-counter medicine, or herbal products without talking to your doctor first.      If your doctor prescribed antibiotics, take them as directed. Do not stop taking them just because you feel better. You need to take the full course of antibiotics.      If you use oxygen therapy, use the flow rate your doctor has recommended. Don't change it without talking to your doctor first. Oxygen therapy boosts the amount of oxygen in your blood and helps you breathe easier. Activity     Get regular exercise. Walking is an easy way to get exercise. Start out slowly, and walk a little more each day.      Pay attention to your breathing. You are exercising too hard if you can't talk while you exercise.      Take short rest breaks when doing household chores and other activities.      Learn breathing methods--such as breathing through pursed lips--to help you become less short of breath.      If your doctor has not set you up with a pulmonary rehabilitation program, ask if rehab is right for you. Rehab includes exercise programs, education about your disease and how to manage it, help with diet and other changes, and emotional support.    Diet     Eat regular, healthy meals.      Use bronchodilators about 1 hour before you eat to make it easier to eat.      Eat several smaller, frequent meals to prevent getting too full. A full stomach can push on the muscle that helps you breathe (your diaphragm) and make it harder to breathe.      Drink beverages at the end of the meal.      Avoid foods that are hard to chew.      Eat foods that contain protein so you don't lose muscle mass.      Talk with your doctor if you gain too much weight or if you lose weight without trying. Mental health     Talk to your family, friends, or a therapist about your feelings. Some people feel frightened, angry, hopeless, helpless, and even guilty. Talking openly about feelings may help you cope. If these feelings last, talk to your doctor. When should you call for help? Call 911 anytime you think you may need emergency care. For example, call if:     You have severe trouble breathing.      You are having chest pain that is different or worse than usual.   Call your doctor now or seek immediate medical care if:     You have new or worse trouble breathing.      You cough up blood.      You have a fever.      You have feelings of anxiety or depression. Watch closely for changes in your health, and be sure to contact your doctor if:     You cough more deeply or more often, especially if you notice more mucus or a change in the color of your mucus.      You have new or worse swelling in your legs or belly.      You are not getting better as expected. Where can you learn more? Go to https://[employer]. org and sign in to your [employer] account. Enter A709 in the [facility] box to learn more about \"Chronic Obstructive Pulmonary Disease (COPD): Care Instructions. \"     If you do not have an account, please click on the \"Sign Up Now\" link. Current as of: July 6, 2021               Content Version: 13.2  © 4092-1966 [employer]wise, Incorporated. Care instructions adapted under license by [facility] [place] ([facility]).  If you have questions about a medical condition or this instruction, always ask your healthcare professional. [address] any warranty or liability for your use of this information.

## 2022-06-21 ENCOUNTER — TELEPHONE (OUTPATIENT)
Dept: WOUND CARE | Age: 72
End: 2022-06-21

## 2022-06-21 NOTE — TELEPHONE ENCOUNTER
Spoke to patient and made him aware that Hanna Mcintyre LPN from Dr. Donta Callejas office reached out to both Dr. Katie Owens and Dr. Juan Miguel Matos and is waiting for a response from both offices.

## 2022-06-24 NOTE — TELEPHONE ENCOUNTER
Merari, please see  Message about patient not needing clearance. Tried to call back spoke with someone told them I would just send  a my chart message. Will forward  message to DHARMESH Clarke.

## 2022-06-27 ENCOUNTER — HOSPITAL ENCOUNTER (OUTPATIENT)
Dept: WOUND CARE | Age: 72
Discharge: HOME | End: 2022-06-27
Payer: MEDICARE

## 2022-06-27 VITALS
TEMPERATURE: 97.5 F | SYSTOLIC BLOOD PRESSURE: 119 MMHG | DIASTOLIC BLOOD PRESSURE: 63 MMHG | HEART RATE: 69 BPM | RESPIRATION RATE: 18 BRPM

## 2022-06-27 DIAGNOSIS — I70.221 ATHEROSCLEROSIS OF NATIVE ARTERY OF RIGHT LOWER EXTREMITY WITH REST PAIN (HCC): ICD-10-CM

## 2022-06-27 DIAGNOSIS — I73.9 PVD (PERIPHERAL VASCULAR DISEASE) (HCC): ICD-10-CM

## 2022-06-27 DIAGNOSIS — S81.801D OPEN WOUND OF RIGHT LOWER LEG, SUBSEQUENT ENCOUNTER: ICD-10-CM

## 2022-06-27 DIAGNOSIS — L97.912 NON-PRESSURE ULCER OF RIGHT LOWER EXTREMITY WITH FAT LAYER EXPOSED (HCC): Primary | ICD-10-CM

## 2022-06-27 PROCEDURE — 11042 DBRDMT SUBQ TIS 1ST 20SQCM/<: CPT | Performed by: SPECIALIST

## 2022-06-27 PROCEDURE — 6370000000 HC RX 637 (ALT 250 FOR IP): Performed by: SPECIALIST

## 2022-06-27 PROCEDURE — 11042 DBRDMT SUBQ TIS 1ST 20SQCM/<: CPT

## 2022-06-27 RX ORDER — GENTAMICIN SULFATE 1 MG/G
OINTMENT TOPICAL ONCE
Status: CANCELLED | OUTPATIENT
Start: 2022-06-27 | End: 2022-06-27

## 2022-06-27 RX ORDER — LIDOCAINE HYDROCHLORIDE 20 MG/ML
JELLY TOPICAL ONCE
Status: CANCELLED | OUTPATIENT
Start: 2022-06-27 | End: 2022-06-27

## 2022-06-27 RX ORDER — LIDOCAINE HYDROCHLORIDE 40 MG/ML
SOLUTION TOPICAL ONCE
Status: COMPLETED | OUTPATIENT
Start: 2022-06-27 | End: 2022-06-27

## 2022-06-27 RX ORDER — LIDOCAINE 40 MG/G
CREAM TOPICAL ONCE
Status: CANCELLED | OUTPATIENT
Start: 2022-06-27 | End: 2022-06-27

## 2022-06-27 RX ORDER — BACITRACIN ZINC AND POLYMYXIN B SULFATE 500; 1000 [USP'U]/G; [USP'U]/G
OINTMENT TOPICAL ONCE
Status: CANCELLED | OUTPATIENT
Start: 2022-06-27 | End: 2022-06-27

## 2022-06-27 RX ORDER — GINSENG 100 MG
CAPSULE ORAL ONCE
Status: CANCELLED | OUTPATIENT
Start: 2022-06-27 | End: 2022-06-27

## 2022-06-27 RX ORDER — CLOBETASOL PROPIONATE 0.5 MG/G
OINTMENT TOPICAL ONCE
Status: CANCELLED | OUTPATIENT
Start: 2022-06-27 | End: 2022-06-27

## 2022-06-27 RX ORDER — LIDOCAINE 50 MG/G
OINTMENT TOPICAL ONCE
Status: CANCELLED | OUTPATIENT
Start: 2022-06-27 | End: 2022-06-27

## 2022-06-27 RX ORDER — BETAMETHASONE DIPROPIONATE 0.05 %
OINTMENT (GRAM) TOPICAL ONCE
Status: CANCELLED | OUTPATIENT
Start: 2022-06-27 | End: 2022-06-27

## 2022-06-27 RX ORDER — BACITRACIN, NEOMYCIN, POLYMYXIN B 400; 3.5; 5 [USP'U]/G; MG/G; [USP'U]/G
OINTMENT TOPICAL ONCE
Status: CANCELLED | OUTPATIENT
Start: 2022-06-27 | End: 2022-06-27

## 2022-06-27 RX ORDER — LIDOCAINE HYDROCHLORIDE 40 MG/ML
SOLUTION TOPICAL ONCE
Status: CANCELLED | OUTPATIENT
Start: 2022-06-27 | End: 2022-06-27

## 2022-06-27 RX ADMIN — LIDOCAINE HYDROCHLORIDE: 40 SOLUTION TOPICAL at 08:03

## 2022-06-27 ASSESSMENT — PAIN SCALES - GENERAL: PAINLEVEL_OUTOF10: 8

## 2022-06-27 ASSESSMENT — PAIN DESCRIPTION - LOCATION: LOCATION: LEG

## 2022-06-27 ASSESSMENT — PAIN DESCRIPTION - DESCRIPTORS: DESCRIPTORS: BURNING

## 2022-06-27 ASSESSMENT — PAIN DESCRIPTION - ORIENTATION: ORIENTATION: RIGHT

## 2022-06-27 NOTE — PROGRESS NOTES
[facility]  Progress Note and Procedure Note      Chema Johnston. MEDICAL RECORD NUMBER:  [id number]  AGE: 81 y.o. GENDER: male  : 1950  EPISODE DATE:  2022    Subjective:     Chief Complaint   Patient presents with    Wound Check     right lower leg         HISTORY of PRESENT ILLNESS HPI     Chema Sánchez is a 81 y.o. male who presents today for wound/ulcer evaluation. History of Wound Context: Patient is followed by Dr. Damian Asencio for very right lower extremity wound. He has had previous surgery including a right femoral endarterectomy and bypass procedure. At the present time a visiting nurse helps assist him with changing the dressing several times a week with Oly.   He is apparently scheduled for a bypass procedure by Dr. Damian Asencio following medical clearance  Wound/Ulcer Pain Timing/Severity: none  Quality of pain: N/A  Severity:  0 / 10   Modifying Factors: None  Associated Signs/Symptoms: edema    Ulcer Identification:  Ulcer Type: arterial and traumatic    Contributing Factors: edema, venous stasis, smoking and arterial insufficiency    Acute Wound: N/A not an acute wound    PAST MEDICAL HISTORY        Diagnosis Date    CAD (coronary artery disease)     CHF (congestive heart failure) ([facility])     diastolic    COPD (chronic obstructive pulmonary disease) ([facility])     Critical ischemia of lower extremity ([facility])     Depressive disorder, not elsewhere classified     GERD (gastroesophageal reflux disease)     History of colon polyps - 30 seen on colonoscopy 2021    History of MI (myocardial infarction) 2013    History of skin ulcer of lower extremity     R anterior shin    History of transient ischemic attack (TIA)     Hyperlipidemia     Hypertension     Neuropathy     KAVITA (obstructive sleep apnea)     On CPAP    Personal history of DVT (deep vein thrombosis)     PVD (peripheral vascular disease) ([facility])     TIA (transient ischemic attack) 2013    Vertebral fracture        PAST SURGICAL HISTORY    Past Surgical History:   Procedure Laterality Date    APPENDECTOMY      CHOLECYSTECTOMY, LAPAROSCOPIC      COLONOSCOPY  4/23/2021    COLONOSCOPY POLYPECTOMY SNARE/COLD BIOPSY performed by Tiffany Farmer MD at [address]  4/23/2021    COLONOSCOPY POLYPECTOMY ABLATION performed by Tiffany Farmer MD at [address]  4/23/2021    COLONOSCOPY CONTROL HEMORRHAGE performed by Tiffany Farmer MD at [address]  6/2013    EYE SURGERY Left     FEMORAL ENDARTERECTOMY Right 11/17/2021    RIGHT FEMORAL ENDARTERECTOMY  RIGHT EXTERNAL ILIAC TO PROFUNDA FEMORAL BYPASS WITH 8MM PTFE GRAFT RIGHT LOWER EXTREMITY ARTERIOGRAM BALLOON ANGIOPLASTY RIGHT PROFUNDA BALLOON ANGIOPLASTY AND STENT OF RIGHT EXTERNAL ILIAC ARTERY performed by Lavinia Hall MD at [facility]  11/18/2015    Bilateral decompressive lumbar laminectomy L4-5   ; medial facetectomy L4-5 joints  bilaterally; foraminotomies l5   nerve roots bilaterally    LEG DEBRIDEMENT Right 7/23/2013    Dr. Toya Carvajal - pretibial wound    OTHER SURGICAL HISTORY Right 6/25/2013    Dr. Toya Carvajal - CFA Endarterectomy w/marsupialization; RLE wound excision & debridement     OTHER SURGICAL HISTORY Left 8/27/2013    Dr. Toya Carvajal - femoral & profunda femoris endarterectomy w/marsupialization patch angioplasty using SFA    SKIN SPLIT GRAFT Right 7/25/2013    Dr. Toya Carvajal - to non-healing R pretibial wound, 9 x 15 cm    TOTAL HIP ARTHROPLASTY Right 09/01/2016    Dr. Lowell Gage Left 12/22/2015    Dr. Toya Carvajal - CFA exploration, thrombectomy of ileofemoral system, stenting of RAFAL in-stent stenosis w/8 x 37 mm Palmaz        FAMILY HISTORY    Family History   Problem Relation Age of Onset    Cancer Mother         Breast    Heart Disease Mother     Cancer Father         Bladder    Heart Disease Father  Cancer Paternal Grandmother         melanoma        SOCIAL HISTORY    Social History     Tobacco Use    Smoking status: Former Smoker     Packs/day: 0.25     Years: 52.00     Pack years: 13.00     Types: Cigarettes     Start date: 1967     Quit date: 2022     Years since quittin.0    Smokeless tobacco: Never Used    Tobacco comment: reports he quit circa 22   Substance Use Topics    Alcohol use: Yes     Alcohol/week: 0.0 standard drinks     Comment: 2-3 beers a month    Drug use: No       ALLERGIES    Allergies   Allergen Reactions    Asa [Aspirin] Other (See Comments)     Stomach ache    Daliresp [Roflumilast] Diarrhea and Other (See Comments)     Severe diarrhea and did not help       MEDICATIONS    Current Outpatient Medications on File Prior to Encounter   Medication Sig Dispense Refill    albuterol sulfate HFA (PROVENTIL;VENTOLIN;PROAIR) 108 (90 Base) MCG/ACT inhaler Inhale 2 puffs into the lungs every 6 hours as needed for Wheezing 18 g 2    atorvastatin (LIPITOR) 80 MG tablet Take 1 tablet by mouth daily 90 tablet 1    clopidogrel (PLAVIX) 75 MG tablet Take 1 tablet by mouth daily 30 tablet 3    fluticasone-umeclidin-vilant (TRELEGY ELLIPTA) 100-62.5-25 MCG/INH AEPB Inhale 1 puff into the lungs daily 28 each 11    losartan (COZAAR) 100 MG tablet TAKE 1 TABLET BY MOUTH EVERY DAY 90 tablet 1    metoprolol (LOPRESSOR) 100 MG tablet Take 1 tablet by mouth 2 times daily 270 tablet 1    pregabalin (LYRICA) 150 MG capsule Take 1 capsule by mouth 2 times daily for 30 days.  180 capsule 1    potassium chloride (MICRO-K) 10 MEQ extended release capsule 1 tablet 60 capsule 1    furosemide (LASIX) 40 MG tablet Take 1 tablet by mouth daily 90 tablet 3    allopurinol (ZYLOPRIM) 100 MG tablet Take 1 tablet by mouth daily 180 tablet 1    omeprazole (PRILOSEC) 40 MG delayed release capsule TAKE 1 CAPSULE BY MOUTH DAILY 30 capsule 3    DULoxetine (CYMBALTA) 20 MG extended release capsule TAKE 1 CAPSULE BY MOUTH DAILY 30 capsule 3    Evolocumab 140 MG/ML SOAJ Inject 1 pen into the skin every 14 days 6 pen 3    DULoxetine HCl 40 MG CPEP TAKE 1 CAPSULE BY MOUTH DAILY      mupirocin (BACTROBAN) 2 % ointment Apply topically daily Apply topically 3 times daily. (Patient not taking: Reported on 6/20/2022)      apixaban (ELIQUIS) 5 MG TABS tablet Take 2 tablets by mouth 2 times daily for 11 doses 22 tablet 0    folic acid (FOLVITE) 1 MG tablet Take 1 tablet by mouth daily 30 tablet 3    nitroGLYCERIN (NITROSTAT) 0.4 MG SL tablet Place 1 tablet under the tongue every 5 minutes as needed for Chest pain (Patient not taking: Reported on 6/20/2022) 25 tablet 1    magnesium gluconate (MAGONATE) 500 MG tablet Take 500 mg by mouth daily. No current facility-administered medications on file prior to encounter. REVIEW OF SYSTEMS  Review of Systems    Pertinent items are noted in HPI. Objective:      /63   Pulse 69   Temp 97.5 °F (36.4 °C) (Temporal)   Resp 18     Wt Readings from Last 3 Encounters:   06/20/22 226 lb (102.5 kg)   05/25/22 220 lb (99.8 kg)   05/02/22 227 lb (103 kg)       PHYSICAL EXAM    Extremities: no cyanosis, no clubbing, 1 + edema-  right lower extremity with full-thickness fibrous wound right anterior knee designated as wound #1  Small full-thickness wound containing fibrin, granulation tissue and epithelialization at margins of the wound overlying right medial malleolus designated as wound #2  Assessment:      1. Non-pressure ulcer of right lower extremity with fat layer exposed ([address]    2. Open wound of right lower leg, subsequent encounter    3. Atherosclerosis of native artery of right lower extremity with rest pain ([address]    4. PVD (peripheral vascular disease) ([address]         Procedure Note  Indications:  Based on my examination of this patient's wound(s) today, sharp excision is required to promote healing and evaluate the extent healing.     Performed by: Sigifredo Pineda Cristy Wright MD    Consent obtained? Yes    Time out taken: Yes    Pain Control: Anesthetic: 4% Lidocaine Liquid Topical     Debridement:Excisional Debridement    Using curette the wound was sharply debrided    down through and including the removal of  epidermis, dermis and subcutaneous tissue.     Devitalized Tissue Debrided:  fibrin and slough      Pre Debridement Measurements:  Are located in the Wound Documentation Flow Sheet   Wound #: 1 and 2     Post  Debridement Measurements:  Wound 11/15/21 Leg Distal;Right #1 (Active)   Wound Image   06/20/22 1343   Wound Etiology Venous 05/02/22 1435   Wound Cleansed Cleansed with saline 06/27/22 0759   Dressing/Treatment Collagen with Ag 06/27/22 0815   Wound Length (cm) 1.1 cm 06/27/22 0759   Wound Width (cm) 0.9 cm 06/27/22 0759   Wound Depth (cm) 0.1 cm 06/27/22 0759   Wound Surface Area (cm^2) 0.99 cm^2 06/27/22 0759   Change in Wound Size % (l*w) 58.75 06/27/22 0759   Wound Volume (cm^3) 0.099 cm^3 06/27/22 0759   Wound Healing % 59 06/27/22 0759   Post-Procedure Length (cm) 1.2 cm 06/27/22 0815   Post-Procedure Width (cm) 1 cm 06/27/22 0815   Post-Procedure Depth (cm) 0.3 cm 06/27/22 0815   Post-Procedure Surface Area (cm^2) 1.2 cm^2 06/27/22 0815   Post-Procedure Volume (cm^3) 0.36 cm^3 06/27/22 0815   Distance Tunneling (cm) 0 cm 06/27/22 0759   Tunneling Position ___ O'Clock 0 06/27/22 0759   Undermining Starts ___ O'Clock 0 06/27/22 0759   Undermining Ends___ O'Clock 0 06/27/22 0759   Undermining Maxium Distance (cm) 0 06/20/22 1343   Wound Assessment Fibrin 06/27/22 0759   Drainage Amount Small 06/27/22 0759   Drainage Description Serosanguinous 06/27/22 0759   Odor None 06/27/22 0759   Catrachita-wound Assessment Edematous 06/27/22 0759   Margins Defined edges 06/27/22 0759   Wound Thickness Description not for Pressure Injury Full thickness 06/27/22 0759   Number of days: 223       Wound 05/12/22 Ankle Right #2 (Active)   Wound Image   06/20/22 1343   Wound Etiology Venous 05/12/22 1000   Wound Cleansed Cleansed with saline 06/27/22 0759   Dressing/Treatment Collagen with Ag 06/27/22 0815   Wound Length (cm) 0.4 cm 06/27/22 0759   Wound Width (cm) 0.4 cm 06/27/22 0759   Wound Depth (cm) 0.1 cm 06/27/22 0759   Wound Surface Area (cm^2) 0.16 cm^2 06/27/22 0759   Change in Wound Size % (l*w) -6.67 06/27/22 0759   Wound Volume (cm^3) 0.016 cm^3 06/27/22 0759   Wound Healing % -7 06/27/22 0759   Post-Procedure Length (cm) 0.5 cm 06/27/22 0815   Post-Procedure Width (cm) 0.5 cm 06/27/22 0815   Post-Procedure Depth (cm) 0.3 cm 06/27/22 0815   Post-Procedure Surface Area (cm^2) 0.25 cm^2 06/27/22 0815   Post-Procedure Volume (cm^3) 0.075 cm^3 06/27/22 0815   Distance Tunneling (cm) 0 cm 06/27/22 0759   Tunneling Position ___ O'Clock 0 06/27/22 0759   Undermining Starts ___ O'Clock 0 06/27/22 0759   Undermining Ends___ O'Clock 0 06/27/22 0759   Undermining Maxium Distance (cm) 0 06/27/22 0759   Wound Assessment Fibrin 06/27/22 0759   Drainage Amount Small 06/27/22 0759   Drainage Description Serosanguinous 06/27/22 0759   Odor None 06/27/22 0759   Catrachita-wound Assessment Intact 06/27/22 0759   Margins Defined edges 06/27/22 0759   Wound Thickness Description not for Pressure Injury Full thickness 06/27/22 0759   Number of days: 45          Percent of Wound Debrided: 100%    Total Surface Area Debrided:  1.4 sq cm    Diabetic/Pressure/Non Pressure Ulcers only:  Ulcer: Non-Pressure ulcer, fat layer exposed    Bleeding: Minimal    Hemostasis Achieved: by pressure    Procedural Pain: 0  / 10     Post Procedural Pain: 0 / 10     Response to treatment:  Well tolerated by patient. Plan:   Hopefully, patient to be scheduled shortly for bypass procedure. Otherwise to see Dr. Ginger Arzola in 2 weeks here in wound care  Treatment Note: Please see attached Discharge Instructions.   These instructions were given and signed by the patient or POA    New Prescriptions    No medications on file       Orders Placed This Encounter   Procedures   [id number] [address] [facility] Protocol       Discharge Instructions          [facility] Physician Orders and Discharge Instructions  [address]   [facility] [address] [address] [facility]. [place] [place]  Telephone: [phone]  NAME:  Oneil Pindea. YOB: 1950  MEDICAL RECORD NUMBER:  [id number]  DATE:  6/27/2022    Return Appointment:  Janae Rowe Return Appointment: With Dr. Nancy Chaudhari  in  2  [facility])  o Schedule weekly for the rest of the month? No    o [] Return Appointment for a Wound Assessment with the nurse on:     Future Appointments   Date Time Provider Orlando Colmenares   6/27/2022  8:45 AM MD Santana Aguila [facility]   8/30/2022  9:45 AM Cami Vergara MD [facility]   10/13/2022  1:00 PM MD Brice Joe P/CC [facility]   12/19/2022 11:00 AM DO JEFF Maravilla [place] - DYD       Orders Placed This Encounter   Procedures   [facility] Ext: [facility] [phone]   F: [phone]  Medically necessary services: [x] Skilled Nursing [] PT (Eval & Treat) [] OT (Eval & Treat) [] Social Work [] Dietician  [] Other:    Wound care instructions:  1. If you smoke we ask that you refrain from smoking. Smoking inhibits wounds from healing. 2. When taking antibiotics take the entire prescription as ordered. Do not stop taking until medication is all gone unless otherwise instructed. 3. Exercise as tolerated. 4. Keep weight off wounds and reposition every 2 hours if applicable. 5. Do not get wounds wet in the bath or shower unless otherwise instructed by your physician. If your wound is on your foot or leg, you may purchase a cast bag. Please ask at the pharmacy. 6. Wash hands with soap and water prior to and after every dressing change.     [x]Wash wounds with: 0.9% normal saline  [x]Catrachita wound Topical Treatments: Do not apply lotions, creams, or ointments to the skin around the wound bed unless directed as followed:   o Apply around the wound: Nothing         [x]Wound Location: right lower leg and ankle    Apply Primary Dressing to wound: Oly   Secondary Dressing: 4X4 gauze pad and Bulky roll gauze  o  Avoid contact of tape with skin if possible.  When to change Dressing: 3 times per week:Monday/Wednesday/Friday      [x] Edema Control:  Apply: Spandagrip/Medigrip on Right; Low compression 5-10 mm/Hg. They should be applied to affected leg(s) from mid foot to knee making sure to cover the heel      · Elevate leg(s) above the level of the heart for 30 minutes 4-5 times a day and/or when sitting. · Avoid prolonged standing in one place. Dietary:  Important dietary reminders:  1. Increase Protein intake (i.e. Lean meats, fish, eggs, legumes, and yogurt)  2. No added salt  3. If diabetic, follow a diabetic diet and check glucose prior to meals or as instructed by your physician. Dietary Supplements(Take twice a day unless instructed otherwise):  [] Russ Vizcarra  [] 30ml ProStat [] Michel Holding [] Ensure Max/Premier [] Other:    Your nurse  is:  Bhupinder Prado     Electronically signed by Nilsa Jeffrey RN on 6/27/2022 at 8:09 AM     [facility] Information: Should you experience any significant changes in your wound(s) or have questions about your wound care, please contact the [address] [place] [address] at [phone]. We are open from 8:00am - 4:30p Monday, Thursday and Friday; 11:00am - 5pm on Tuesday and CLOSED Wednesday. Please give us 24-48 business hours to return your call. Call your doctor now or seek immediate medical care if:    · You have symptoms of infection, such as:  ? Increased pain, swelling, warmth, or redness. ? Red streaks leading from the area. ? Pus draining from the area. ? A fever.         Physician for this visit and orders: Mikayla Galan MD    [] Patient unable to sign Discharge Instructions given to ECF/Transportation/POA        Electronically signed by Jeremie Garcia MD on 6/27/2022 at 8:45 AM

## 2022-06-28 ENCOUNTER — CARE COORDINATION (OUTPATIENT)
Dept: CARE COORDINATION | Age: 72
End: 2022-06-28

## 2022-06-28 NOTE — CARE COORDINATION
Ambulatory Care Coordination Note  6/28/2022  CM Risk Score: 3  Charlson 10 Year Mortality Risk Score: 98%     ACC: Reed Cruz RN  General Assessment    Do you have any symptoms that are causing concern?: No       Summary Note: 2 weeks since he quit smoking. He continues feeling good since quitting. Pt is hopeful to hear from vascular surgeon after cardiology review w/vascular surgeon re risk/benefits for surgery. He states he is still in consideration about whether or not he would elect to have surgery, even if surgery is presented as an option. Reviewed ACM availability; Zone Mgmt HF and future appts  Congestive Heart Failure Assessment    Are you currently restricting fluids?: No Restriction  Do you understand a low sodium diet?: Yes  Do you understand how to read food labels?: Yes  How many restaurant meals do you eat per week?: 0  Do you salt your food before tasting it?: No     No patient-reported symptoms      Symptoms:  None: Yes      Symptom course: stable  Weight trend: stable  Salt intake watch compared to last visit: stable     pt verbalized understanding of above and agreement with the following  Plan: continue [facility] support for health coaching, care collaboration, support w/community resources, and help with any newly presenting concerns as needed.   Pt agrees to outreach direct to ACM, as needed, between routine follow up outreach initiated by [facility]    COPD Assessment    Does the patient understand envrionmental exposure?: Yes  Is the patient able to verbalize Rescue vs. Long Acting medications?: Yes  Does the patient have a nebulizer?: No  Does the patient use a space with inhaled medications?: No            Symptoms:            Care Coordination Interventions    Referral from Primary Care Provider: No  Suggested Interventions and Community Resources  Fall Risk Prevention: Completed  Home Care Waiver: Completed (Comment: 5.4.22 reviewed w/pt, per Lin @ [employer] - option for Consumer Directed Care, compensated for approved HHA support up to 2hr/wk; however, pt unable to name anyone for this)  Home Health Services: In Process (Comment: 5.4.22 pending SOC for wound care M,W,F via Dr Corinne Lindo MD [facility] )  Senior Services: Completed (Comment: HHA for homemaker support  2 hr/wk via [employer] (formerly provided through [facility]); spoke w/Lin 5.4. 25 who was going to reFax referral for non-skilled HHA again to [facility] in [place] [address] can accomodate reinstating HHA support)  Smoking Cessation: Completed (Comment: 6.15.22 QUIT circa 6.8.22; 4.26.22 health coaching/encouraged in self challenge of reducing daily consumption of cigarettes by delay of 1st cirgarette of day by 15 min, advance as ready)  Specialty Services Referral: Completed (Comment: Dr Veronika Tidwell (cardiology); Dr Corinne Lindo (Vas); Dr Molly Penny (Pulmonology))  Transportation Support: Not Started  Zone Management Tools: Completed (Comment: HF, COPD)  Other Services or Interventions: review of self mgmt concepts; resources; health coaching re smoking cessation         Goals Addressed    None         Prior to Admission medications    Medication Sig Start Date End Date Taking? Authorizing Provider   albuterol sulfate HFA (PROVENTIL;VENTOLIN;PROAIR) 108 (90 Base) MCG/ACT inhaler Inhale 2 puffs into the lungs every 6 hours as needed for Wheezing 6/20/22   Reinier Xiimo, DO   atorvastatin (LIPITOR) 80 MG tablet Take 1 tablet by mouth daily 6/20/22   Reinier Xiimo, DO   clopidogrel (PLAVIX) 75 MG tablet Take 1 tablet by mouth daily 6/20/22   Reinier Xiimo, DO   fluticasone-umeclidin-vilant (TRELEGY ELLIPTA) 363-29.4-46 MCG/INH AEPB Inhale 1 puff into the lungs daily 6/20/22   Reinier Perez, DO   losartan (COZAAR) 100 MG tablet TAKE 1 TABLET BY MOUTH EVERY DAY 6/20/22   Reinier Ana, DO   metoprolol (LOPRESSOR) 100 MG tablet Take 1 tablet by mouth 2 times daily 6/20/22 9/18/22  Reinier Xiimo, DO   pregabalin (LYRICA) 150 MG capsule Take 1 capsule by mouth 2 times daily for 30 days.  6/20/22 7/20/22  Katrina De Oliveira, DO   potassium chloride (MICRO-K) 10 MEQ extended release capsule 1 tablet 6/20/22   Katrniae Shorts, DO   furosemide (LASIX) 40 MG tablet Take 1 tablet by mouth daily 6/20/22   Katrina Alinas, DO   allopurinol (ZYLOPRIM) 100 MG tablet Take 1 tablet by mouth daily 6/14/22   Katrina Fullers, DO   omeprazole (PRILOSEC) 40 MG delayed release capsule TAKE 1 CAPSULE BY MOUTH DAILY 6/6/22   Katrinae Shorts, DO   DULoxetine (CYMBALTA) 20 MG extended release capsule TAKE 1 CAPSULE BY MOUTH DAILY 6/6/22   Katrina Alinas, DO   Evolocumab 140 MG/ML SOAJ Inject 1 pen into the skin every 14 days 5/25/22   Susana Brooks MD   DULoxetine HCl 40 MG CPEP TAKE 1 CAPSULE BY MOUTH DAILY 4/21/22   Historical Provider, MD   mupirocin (BACTROBAN) 2 % ointment Apply topically daily Apply topically 3 times daily. Patient not taking: Reported on 6/20/2022    Historical Provider, MD   apixaban (ELIQUIS) 5 MG TABS tablet Take 2 tablets by mouth 2 times daily for 11 doses 9/20/21 5/23/23  Shital Phoenix MD   folic acid (FOLVITE) 1 MG tablet Take 1 tablet by mouth daily 9/21/21   Shital Phoenix MD   nitroGLYCERIN (NITROSTAT) 0.4 MG SL tablet Place 1 tablet under the tongue every 5 minutes as needed for Chest pain  Patient not taking: Reported on 6/20/2022 8/25/21   Katrina De Oliveira,    magnesium gluconate (MAGONATE) 500 MG tablet Take 500 mg by mouth daily.     Historical Provider, MD       Future Appointments   Date Time Provider Orlando Colmenares   7/11/2022  1:00 PM Lázaro Drake MD [facility]   8/30/2022  9:45 AM Susana Brooks MD [facility]   10/13/2022  1:00 PM Mosie Snellen, MD [facility] P[facility]   12/19/2022 11:00 AM DO JEFF Morin [place] [facility] [place]

## 2022-06-28 NOTE — CARE COORDINATION
Unable to reach pt for review or leave message d/t no vm. Outreach made to Flynngrecia Cesardawit, pt's son, whom pt has previously advised ACM to outreach as secondary contact. Left vm requesting Brad Moran invite pt to please return callback to [facility] for brief review by phone. Provided & repeated ACM callback number.

## 2022-06-29 NOTE — TELEPHONE ENCOUNTER
Medication:   Requested Prescriptions     Pending Prescriptions Disp Refills    apixaban (ELIQUIS) 5 MG TABS tablet 22 tablet 0     Sig: Take 2 tablets by mouth 2 times daily for 11 doses        Last Filled:  09/20/21    Patient Phone Number: [phone] (home)     Last appt: 6/20/2022   Next appt: 12/19/2022    Last OARRS:   RX Monitoring 8/14/2020   Attestation -   Periodic Controlled Substance Monitoring Possible medication side effects, risk of tolerance/dependence & alternative treatments discussed. ;Assessed functional status. ;Obtaining appropriate analgesic effect of treatment. Chronic Pain > 50 MEDD Obtained or confirmed \"Consent for Opioid Use\" on file.

## 2022-06-30 NOTE — TELEPHONE ENCOUNTER
Medication:   Requested Prescriptions     Pending Prescriptions Disp Refills    ELIQUIS 5 MG TABS tablet [Pharmacy Med Name: ELIQUIS 5MG TABLETS] 60 tablet 2     Sig: TAKE 1 TABLET BY MOUTH TWICE DAILY        Last Filled:      Patient Phone Number: [phone] (home)     Last appt: 6/20/2022   Next appt: 12/19/2022  Return in about 3 months (around 9/20/2022) for neuropathy. Last OARRS:   RX Monitoring 8/14/2020   Attestation -   Periodic Controlled Substance Monitoring Possible medication side effects, risk of tolerance/dependence & alternative treatments discussed. ;Assessed functional status. ;Obtaining appropriate analgesic effect of treatment. Chronic Pain > 50 MEDD Obtained or confirmed \"Consent for Opioid Use\" on file.

## 2022-07-05 RX ORDER — APIXABAN 5 MG/1
TABLET, FILM COATED ORAL
Qty: 60 TABLET | Refills: 2 | Status: ON HOLD | OUTPATIENT
Start: 2022-07-05 | End: 2022-07-29 | Stop reason: HOSPADM

## 2022-07-06 ENCOUNTER — TELEPHONE (OUTPATIENT)
Dept: VASCULAR SURGERY | Age: 72
End: 2022-07-06

## 2022-07-06 RX ORDER — CLOPIDOGREL BISULFATE 75 MG/1
75 TABLET ORAL DAILY
Qty: 30 TABLET | Refills: 3 | OUTPATIENT
Start: 2022-07-06

## 2022-07-06 NOTE — TELEPHONE ENCOUNTER
Medication:   Requested Prescriptions     Pending Prescriptions Disp Refills    clopidogrel (PLAVIX) 75 MG tablet [Pharmacy Med Name: CLOPIDOGREL 75MG TABLETS] 30 tablet 3     Sig: TAKE 1 TABLET BY MOUTH DAILY        Last Filled:      Patient Phone Number: [phone] (home)     Last appt: 6/20/2022   Next appt: 12/19/2022    Last OARRS:   RX Monitoring 8/14/2020   Attestation -   Periodic Controlled Substance Monitoring Possible medication side effects, risk of tolerance/dependence & alternative treatments discussed. ;Assessed functional status. ;Obtaining appropriate analgesic effect of treatment. Chronic Pain > 50 MEDD Obtained or confirmed \"Consent for Opioid Use\" on file.

## 2022-07-06 NOTE — TELEPHONE ENCOUNTER
Mariela with [address] care called stating they were called out today about the patient's leg concerns. The nurse found that the leg is swollen more is warm to the touch red and she has concerns of cellulitis.     Please call [place]: [phone]

## 2022-07-07 ENCOUNTER — HOSPITAL ENCOUNTER (EMERGENCY)
Age: 72
Discharge: HOME | End: 2022-07-07
Attending: EMERGENCY MEDICINE
Payer: MEDICARE

## 2022-07-07 VITALS
HEIGHT: 70 IN | TEMPERATURE: 98.2 F | WEIGHT: 231 LBS | HEART RATE: 87 BPM | OXYGEN SATURATION: 94 % | SYSTOLIC BLOOD PRESSURE: 140 MMHG | DIASTOLIC BLOOD PRESSURE: 69 MMHG | BODY MASS INDEX: 33.07 KG/M2 | RESPIRATION RATE: 20 BRPM

## 2022-07-07 DIAGNOSIS — L03.115 CELLULITIS OF RIGHT LEG: Primary | ICD-10-CM

## 2022-07-07 DIAGNOSIS — I73.9 PERIPHERAL VASCULAR DISEASE (HCC): ICD-10-CM

## 2022-07-07 PROCEDURE — 99283 EMERGENCY DEPT VISIT LOW MDM: CPT

## 2022-07-07 PROCEDURE — 6370000000 HC RX 637 (ALT 250 FOR IP): Performed by: EMERGENCY MEDICINE

## 2022-07-07 RX ORDER — DOXYCYCLINE 100 MG/1
100 CAPSULE ORAL ONCE
Status: COMPLETED | OUTPATIENT
Start: 2022-07-07 | End: 2022-07-07

## 2022-07-07 RX ORDER — HYDROCODONE BITARTRATE AND ACETAMINOPHEN 5; 325 MG/1; MG/1
1 TABLET ORAL EVERY 8 HOURS PRN
Qty: 8 TABLET | Refills: 0 | Status: SHIPPED | OUTPATIENT
Start: 2022-07-07 | End: 2022-07-10

## 2022-07-07 RX ORDER — DOXYCYCLINE HYCLATE 100 MG
100 TABLET ORAL 2 TIMES DAILY
Qty: 20 TABLET | Refills: 0 | Status: SHIPPED | OUTPATIENT
Start: 2022-07-07 | End: 2022-07-17

## 2022-07-07 RX ADMIN — DOXYCYCLINE 100 MG: 100 CAPSULE ORAL at 14:28

## 2022-07-07 ASSESSMENT — PAIN DESCRIPTION - DESCRIPTORS: DESCRIPTORS: BURNING

## 2022-07-07 ASSESSMENT — PAIN DESCRIPTION - LOCATION: LOCATION: LEG

## 2022-07-07 ASSESSMENT — PAIN DESCRIPTION - PAIN TYPE: TYPE: ACUTE PAIN;CHRONIC PAIN

## 2022-07-07 ASSESSMENT — PAIN DESCRIPTION - FREQUENCY: FREQUENCY: CONTINUOUS

## 2022-07-07 ASSESSMENT — PAIN - FUNCTIONAL ASSESSMENT
PAIN_FUNCTIONAL_ASSESSMENT: PREVENTS OR INTERFERES SOME ACTIVE ACTIVITIES AND ADLS
PAIN_FUNCTIONAL_ASSESSMENT: 0-10

## 2022-07-07 ASSESSMENT — PAIN DESCRIPTION - ONSET: ONSET: ON-GOING

## 2022-07-07 ASSESSMENT — PAIN SCALES - GENERAL: PAINLEVEL_OUTOF10: 9

## 2022-07-07 ASSESSMENT — PAIN DESCRIPTION - ORIENTATION: ORIENTATION: RIGHT

## 2022-07-07 NOTE — ED PROVIDER NOTES
[facility] Emergency Department      Pt Name: Tawnya Cole. MRN: [id number]  Birthdate 1950  Date of evaluation: 7/7/2022  Provider: Ever Cabrera MD  CHIEF COMPLAINT  Chief Complaint   Patient presents with    Wound Check     wound on right lower leg since Nov hoime care RN told pt is is infected states a new wound past four days      HPI  Tawnya Gaspar is a 81 y.o. male who presents because of leg pain with wound. He has a history of peripheral vascular disease and has had a longstanding wound on his right leg since November of last year. He says it was healing pretty well and his last wound center checkup was on June 27. He has a visiting nurse that checked him yesterday. She was concerned about the way the wound looks. She attempted to contact the physician for antibiotics but was unable to reach the provider so he was referred to the ED for evaluation. He has had some increased pain from usual.  He describes chronic numbness to both of his feet. He denies any fever or chills. He denies any injury. REVIEW OF SYSTEMS:  No fever, no shortness of breath, no abdominal pain, no chest pain Pertinent positives and negatives as per the HPI. All other review of systems reviewed and negative. Nursing notes reviewed.     PAST MEDICAL HISTORY  Past Medical History:   Diagnosis Date    CAD (coronary artery disease)     CH[facility]est[facility] failure[facility]     diastolic    [facility]     Critical ischemia of lower extremity (HCC)     Depressive disorder, not elsewhere classified     GERD (gastroesophageal reflux disease)     History of colon polyps - 30 seen on colonoscopy 04/2021 04/24/2021    History of MI (myocardial infarction) 05/2013    History of skin ulcer of lower extremity     R anterior shin    History of transient ischemic attack (TIA)     Hyperlipidemia     Hypertension     Neuropathy     KAVITA (obstructive sleep apnea)     On CPAP    Personal history of DVT (deep vein thrombosis)     PVD (peripheral vascular disease) ([address]     TIA (transient ischemic attack) 2013    Vertebral fracture      SURGICAL HISTORY  Past Surgical History:   Procedure Laterality Date    APPENDECTOMY      CHOLECYSTECTOMY, LAPAROSCOPIC      COLONOSCOPY  4/23/2021    COLONOSCOPY POLYPECTOMY SNARE/COLD BIOPSY performed by Edin Gomez MD at [facility]  4/23/2021    COLONOSCOPY POLYPECTOMY ABLATION performed by Edin Gomez MD at [facility]  4/23/2021    COLONOSCOPY CONTROL HEMORRHAGE performed by Edin Gomez MD at [facility]  6/2013    EYE SURGERY Left     FEMORAL ENDARTERECTOMY Right 11/17/2021    RIGHT FEMORAL ENDARTERECTOMY  RIGHT EXTERNAL ILIAC TO PROFUNDA FEMORAL BYPASS WITH 8MM PTFE GRAFT RIGHT LOWER EXTREMITY ARTERIOGRAM BALLOON ANGIOPLASTY RIGHT PROFUNDA BALLOON ANGIOPLASTY AND STENT OF RIGHT EXTERNAL ILIAC ARTERY performed by Andreea Russo MD at [facility]  11/18/2015    Bilateral decompressive lumbar laminectomy L4-5   ; medial facetectomy L4-5 joints  bilaterally; foraminotomies l5   nerve roots bilaterally    LEG DEBRIDEMENT Right 7/23/2013    Dr. Alexei Cruz - pretibial wound    OTHER SURGICAL HISTORY Right 6/25/2013    Dr. Alexei Cruz - CFA Endarterectomy w/marsupialization; RLE wound excision & debridement     OTHER SURGICAL HISTORY Left 8/27/2013    Dr. Alexei Cruz - femoral & profunda femoris endarterectomy w/marsupialization patch angioplasty using SFA    SKIN SPLIT GRAFT Right 7/25/2013    Dr. lAexei Cruz - to non-healing R pretibial wound, 9 x 15 cm    TOTAL HIP ARTHROPLASTY Right 09/01/2016    Dr. Luis Felipe Carrasquillo Left 12/22/2015    Dr. Alexei Cruz - CFA exploration, thrombectomy of ileofemoral system, stenting of RAFAL in-stent stenosis w/8 x 37 mm Palmaz      MEDICATIONS:  No current facility-administered medications on file prior to encounter. Current Outpatient Medications on File Prior to Encounter   Medication Sig Dispense Refill    ELIQUIS 5 MG TABS tablet TAKE 1 TABLET BY MOUTH TWICE DAILY 60 tablet 2    albuterol sulfate HFA (PROVENTIL;VENTOLIN;PROAIR) 108 (90 Base) MCG/ACT inhaler Inhale 2 puffs into the lungs every 6 hours as needed for Wheezing 18 g 2    atorvastatin (LIPITOR) 80 MG tablet Take 1 tablet by mouth daily 90 tablet 1    clopidogrel (PLAVIX) 75 MG tablet Take 1 tablet by mouth daily 30 tablet 3    fluticasone-umeclidin-vilant (TRELEGY ELLIPTA) 100-62.5-25 MCG/INH AEPB Inhale 1 puff into the lungs daily 28 each 11    losartan (COZAAR) 100 MG tablet TAKE 1 TABLET BY MOUTH EVERY DAY 90 tablet 1    metoprolol (LOPRESSOR) 100 MG tablet Take 1 tablet by mouth 2 times daily 270 tablet 1    pregabalin (LYRICA) 150 MG capsule Take 1 capsule by mouth 2 times daily for 30 days. 180 capsule 1    potassium chloride (MICRO-K) 10 MEQ extended release capsule 1 tablet 60 capsule 1    furosemide (LASIX) 40 MG tablet Take 1 tablet by mouth daily 90 tablet 3    allopurinol (ZYLOPRIM) 100 MG tablet Take 1 tablet by mouth daily 180 tablet 1    omeprazole (PRILOSEC) 40 MG delayed release capsule TAKE 1 CAPSULE BY MOUTH DAILY 30 capsule 3    DULoxetine (CYMBALTA) 20 MG extended release capsule TAKE 1 CAPSULE BY MOUTH DAILY 30 capsule 3    Evolocumab 140 MG/ML SOAJ Inject 1 pen into the skin every 14 days 6 pen 3    DULoxetine HCl 40 MG CPEP TAKE 1 CAPSULE BY MOUTH DAILY      mupirocin (BACTROBAN) 2 % ointment Apply topically daily Apply topically 3 times daily.   (Patient not taking: Reported on 0/29/3469)      folic acid (FOLVITE) 1 MG tablet Take 1 tablet by mouth daily 30 tablet 3    nitroGLYCERIN (NITROSTAT) 0.4 MG SL tablet Place 1 tablet under the tongue every 5 minutes as needed for Chest pain (Patient not taking: Reported on 6/20/2022) 25 tablet 1    magnesium gluconate (MAGONATE) 500 MG tablet Take 500 mg by mouth daily. ALLERGIES  Asa [aspirin] and Daliresp [roflumilast]  FAMILY HISTORY:  Family History   Problem Relation Age of Onset    Cancer Mother         Breast    Heart Disease Mother     Cancer Father         Bladder    Heart Disease Father     Cancer Paternal Grandmother         melanoma      SOCIAL HISTORY:  Social History     Tobacco Use    Smoking status: Former Smoker     Packs/day: 0.25     Years: 52.00     Pack years: 13.00     Types: Cigarettes     Start date: 1967     Quit date: 2022     Years since quittin.0    Smokeless tobacco: Never Used    Tobacco comment: reports he quit circa 22   Substance Use Topics    Alcohol use: Yes     Alcohol/week: 0.0 standard drinks     Comment: 2-3 beers a month    Drug use: No     IMMUNIZATIONS:  Noncontributory    PHYSICAL EXAM  VITAL SIGNS:  Blood pressure (!) 140/69, pulse 87, temperature 98.2 °F (36.8 °C), temperature source Oral, resp. rate 20, height 5' 10\" (1.778 m), weight 231 lb (104.8 kg), SpO2 94 %.   Constitutional:  81 y.o. male who does not appear toxic  HENT:  Atraumatic, mucous membranes moist  Eyes:   Conjunctiva clear, no icterus  Neck:  Supple, no visible JVD  Cardiovascular:  Regular, no rubs  Thorax & Lungs:  No accessory muscle usage, faint wheeze  Abdomen:  Soft, non distended, bowel sounds present  Back:  No deformity  Genitalia:  Deferred  Rectal:  Deferred  Extremities:  No cyanosis, there is ulceration to the shin with surrounding erythema, light touch sensation peripherally is intact, capillary refill is normal and both feet are warm, pulses are difficult to palpate  Skin:  Warm, dry, see photo  Neurologic:  Alert, no slurred speech  Psychiatric:  Affect appropriate        DIAGNOSTIC RESULTS:  None     ED COURSE:    Medications administered:  Medications   doxycycline monohydrate (MONODOX) capsule 100 mg (100 mg Oral Given 22 1424)     PROCEDURES:  None    CRITICAL CARE:  None    CONSULTATIONS: None    MEDICAL DECISION MAKING: Adilene Rehman is a 81 y.o. male who presented because of leg pain. The patient is otherwise not clinically toxic nor febrile. The appearance of the infection is such that I will initiate oral abx and gave him strict return precautions. Clinical findings are negative for more serious concerns such as sepsis or necrotizing fasciitis. He was advised to return to the ED if there is no improvement or if the infection starts to worsen in any way. He was given appropriate discharge instructions. Referral for follow up care. Differential diagnosis:  Fracture, vascular occlusion, compartment syndrome, DVT, infection, other  New Prescriptions    DOXYCYCLINE HYCLATE (VIBRA-TABS) 100 MG TABLET    Take 1 tablet by mouth 2 times daily for 10 days    HYDROCODONE-ACETAMINOPHEN (NORCO) 5-325 MG PER TABLET    Take 1 tablet by mouth every 8 hours as needed for Pain for up to 8 doses. Sedation precautions     FOLLOW UP:    Gloria Singleton MD  [address]  [phone]    Schedule an appointment as soon as possible for a visit       Riaz Armstrong MD  [facility] [address]  [phone]    Schedule an appointment as soon as possible for a visit       [address] Emergency Department  [facility] [address] [place] [address] [address] [address] [phone]  Go to   If symptoms worsen    FINAL IMPRESSION:    1. Cellulitis of right leg    2. Peripheral vascular disease ([address]        (Please note that I used voice recognition software to generate this note.   Occasionally words are mistranscribed despite my efforts to edit errors.)        Pop Yin MD  07/07/22 [id number]

## 2022-07-08 ENCOUNTER — CARE COORDINATION (OUTPATIENT)
Dept: CARE COORDINATION | Age: 72
End: 2022-07-08

## 2022-07-08 NOTE — CARE COORDINATION
Ambulatory Care Coordination  ED Follow up Call    Reason for ED visit:  RLE pain   Status:     improved    Rates pain 9 on 0-10 scale (not new, just not improved)  /69  Scheduled for follow up w/Dr Alisha Suh on Monday  SN visits for wound care 1x/wk via [facility]    (pt reports insurance reduced from 3x/wk after only 1 week at higher frequency)    Did you call your PCP prior to going to the ED? Did not review w/PCP; pt reports home visiting nurse attempted to reach Kyra Christian. On Call provider advised nurse to instruct pt to go to ED, so he did. Did you receive a discharge instructions from the Emergency Room? Yes  Review of Instructions:     Understands what to report/when to return?:  Yes   Understands discharge instructions?:  Yes   Following discharge instructions?:  Yes   If not why? Are there any new complaints of pain? No  New Pain Meds? Yes Norco   Constipation prophylaxis needed? No    If you have a wound is the dressing clean, dry, and intact? Yes  Understands wound care regimen? Yes    Are there any other complaints/concerns that you wish to tell your provider? No    FU appts/Provider:    Future Appointments   Date Time Provider Orlando Colmenares   7/11/2022  1:00 PM [facility]   8/30/2022  9:45 AM Dayami Burns MD [facility]   10/13/2022  1:00 PM Vikash Hsu MD [facility]   12/19/2022 11:00 AM Genie Erwin, DO JEFF HARPER [place] - [place]     New Medications?:   Yes  Norco & doxycycline     Medication Reconciliation by phone - Yes  Understands Medications? Yes  Taking Medications? Yes  Can you swallow your pills? Yes    Any further needs in the home i.e. Equipment? No    Link to services in community?:  Yes   Which services:  [facility]    Reviewed s/s to report, ACM availability.   Pt verbalized understanding of above and agreement with the following  Plan: pt will see [facility] Mon 7.11.22 for f/u ED visit 7.7.22  Continue [facility] support for health coaching, care collaboration, support w/community resources, and help with any newly presenting concerns as needed.   Pt agrees to outreach direct to ACM, as needed, between routine follow up outreach initiated by [facility]

## 2022-07-11 ENCOUNTER — HOSPITAL ENCOUNTER (OUTPATIENT)
Dept: WOUND CARE | Age: 72
Discharge: HOME | End: 2022-07-11
Payer: MEDICARE

## 2022-07-11 ENCOUNTER — CARE COORDINATION (OUTPATIENT)
Dept: CARE COORDINATION | Age: 72
End: 2022-07-11

## 2022-07-11 VITALS
TEMPERATURE: 97.3 F | HEART RATE: 69 BPM | RESPIRATION RATE: 20 BRPM | DIASTOLIC BLOOD PRESSURE: 77 MMHG | SYSTOLIC BLOOD PRESSURE: 145 MMHG

## 2022-07-11 DIAGNOSIS — L97.912 NON-PRESSURE ULCER OF RIGHT LOWER EXTREMITY WITH FAT LAYER EXPOSED (HCC): Primary | ICD-10-CM

## 2022-07-11 DIAGNOSIS — I73.9 PVD (PERIPHERAL VASCULAR DISEASE) (HCC): ICD-10-CM

## 2022-07-11 DIAGNOSIS — I70.221 ATHEROSCLEROSIS OF NATIVE ARTERY OF RIGHT LOWER EXTREMITY WITH REST PAIN (HCC): ICD-10-CM

## 2022-07-11 DIAGNOSIS — S81.801D OPEN WOUND OF RIGHT LOWER LEG, SUBSEQUENT ENCOUNTER: ICD-10-CM

## 2022-07-11 PROCEDURE — 6370000000 HC RX 637 (ALT 250 FOR IP): Performed by: SPECIALIST

## 2022-07-11 PROCEDURE — 99214 OFFICE O/P EST MOD 30 MIN: CPT | Performed by: SURGERY

## 2022-07-11 PROCEDURE — 99213 OFFICE O/P EST LOW 20 MIN: CPT

## 2022-07-11 RX ORDER — LIDOCAINE HYDROCHLORIDE 20 MG/ML
JELLY TOPICAL ONCE
Status: CANCELLED | OUTPATIENT
Start: 2022-07-11 | End: 2022-07-11

## 2022-07-11 RX ORDER — BETAMETHASONE DIPROPIONATE 0.05 %
OINTMENT (GRAM) TOPICAL ONCE
Status: CANCELLED | OUTPATIENT
Start: 2022-07-11 | End: 2022-07-11

## 2022-07-11 RX ORDER — LIDOCAINE HYDROCHLORIDE 40 MG/ML
SOLUTION TOPICAL ONCE
Status: CANCELLED | OUTPATIENT
Start: 2022-07-11 | End: 2022-07-11

## 2022-07-11 RX ORDER — GENTAMICIN SULFATE 1 MG/G
OINTMENT TOPICAL ONCE
Status: CANCELLED | OUTPATIENT
Start: 2022-07-11 | End: 2022-07-11

## 2022-07-11 RX ORDER — CLOBETASOL PROPIONATE 0.5 MG/G
OINTMENT TOPICAL ONCE
Status: CANCELLED | OUTPATIENT
Start: 2022-07-11 | End: 2022-07-11

## 2022-07-11 RX ORDER — LIDOCAINE HYDROCHLORIDE 40 MG/ML
SOLUTION TOPICAL ONCE
Status: COMPLETED | OUTPATIENT
Start: 2022-07-11 | End: 2022-07-11

## 2022-07-11 RX ORDER — GINSENG 100 MG
CAPSULE ORAL ONCE
Status: CANCELLED | OUTPATIENT
Start: 2022-07-11 | End: 2022-07-11

## 2022-07-11 RX ORDER — LIDOCAINE 50 MG/G
OINTMENT TOPICAL ONCE
Status: CANCELLED | OUTPATIENT
Start: 2022-07-11 | End: 2022-07-11

## 2022-07-11 RX ORDER — BACITRACIN ZINC AND POLYMYXIN B SULFATE 500; 1000 [USP'U]/G; [USP'U]/G
OINTMENT TOPICAL ONCE
Status: CANCELLED | OUTPATIENT
Start: 2022-07-11 | End: 2022-07-11

## 2022-07-11 RX ORDER — LIDOCAINE 40 MG/G
CREAM TOPICAL ONCE
Status: CANCELLED | OUTPATIENT
Start: 2022-07-11 | End: 2022-07-11

## 2022-07-11 RX ORDER — BACITRACIN, NEOMYCIN, POLYMYXIN B 400; 3.5; 5 [USP'U]/G; MG/G; [USP'U]/G
OINTMENT TOPICAL ONCE
Status: CANCELLED | OUTPATIENT
Start: 2022-07-11 | End: 2022-07-11

## 2022-07-11 RX ADMIN — LIDOCAINE HYDROCHLORIDE: 40 SOLUTION TOPICAL at 13:16

## 2022-07-11 ASSESSMENT — PAIN DESCRIPTION - ORIENTATION: ORIENTATION: RIGHT

## 2022-07-11 ASSESSMENT — PAIN DESCRIPTION - LOCATION: LOCATION: LEG

## 2022-07-11 ASSESSMENT — PAIN SCALES - GENERAL: PAINLEVEL_OUTOF10: 9

## 2022-07-11 ASSESSMENT — PAIN DESCRIPTION - DESCRIPTORS: DESCRIPTORS: BURNING;SHOOTING

## 2022-07-11 NOTE — PROGRESS NOTES
[facility] [place]  Progress Note and Procedure Note      Robert Ibrahim. AGE: 81 y.o. GENDER: male  : 1950  EPISODE DATE:  2022      Subjective:     Chief Complaint   Patient presents with    Wound Check     right lower leg       HISTORY of PRESENT ILLNESS HPI   Robert Khan is a 81 y.o. male who presents today for wound evaluation. History of Wound Context: Continues to have increasing pain and inflammation right leg. Went to the ER last week for possible cellulitis. Has not responded to oral doxycycline. Describes rest pain. He has not smoked in a few weeks now. We have clearance from Dr. Miguel Nguyen and Dr. David aLguna for cardiac and pulmonary issues, respectively.   Wound Identification:  Wound Type: arterial    ALLERGIES  Allergies   Allergen Reactions    Asa [Aspirin] Other (See Comments)     Stomach ache    Daliresp [Roflumilast] Diarrhea and Other (See Comments)     Severe diarrhea and did not help     MEDICATIONS  Current Outpatient Medications on File Prior to Encounter   Medication Sig Dispense Refill    doxycycline hyclate (VIBRA-TABS) 100 MG tablet Take 1 tablet by mouth 2 times daily for 10 days 20 tablet 0    ELIQUIS 5 MG TABS tablet TAKE 1 TABLET BY MOUTH TWICE DAILY 60 tablet 2    albuterol sulfate HFA (PROVENTIL;VENTOLIN;PROAIR) 108 (90 Base) MCG/ACT inhaler Inhale 2 puffs into the lungs every 6 hours as needed for Wheezing 18 g 2    atorvastatin (LIPITOR) 80 MG tablet Take 1 tablet by mouth daily 90 tablet 1    clopidogrel (PLAVIX) 75 MG tablet Take 1 tablet by mouth daily 30 tablet 3    fluticasone-umeclidin-vilant (TRELEGY ELLIPTA) 100-62.5-25 MCG/INH AEPB Inhale 1 puff into the lungs daily 28 each 11    losartan (COZAAR) 100 MG tablet TAKE 1 TABLET BY MOUTH EVERY DAY 90 tablet 1    metoprolol (LOPRESSOR) 100 MG tablet Take 1 tablet by mouth 2 times daily 270 tablet 1    pregabalin (LYRICA) 150 MG capsule Take 1 capsule by mouth 2 times daily for 30 days. 180 capsule 1    potassium chloride (MICRO-K) 10 MEQ extended release capsule 1 tablet 60 capsule 1    furosemide (LASIX) 40 MG tablet Take 1 tablet by mouth daily 90 tablet 3    allopurinol (ZYLOPRIM) 100 MG tablet Take 1 tablet by mouth daily 180 tablet 1    omeprazole (PRILOSEC) 40 MG delayed release capsule TAKE 1 CAPSULE BY MOUTH DAILY 30 capsule 3    DULoxetine (CYMBALTA) 20 MG extended release capsule TAKE 1 CAPSULE BY MOUTH DAILY 30 capsule 3    Evolocumab 140 MG/ML SOAJ Inject 1 pen into the skin every 14 days 6 pen 3    DULoxetine HCl 40 MG CPEP TAKE 1 CAPSULE BY MOUTH DAILY      folic acid (FOLVITE) 1 MG tablet Take 1 tablet by mouth daily 30 tablet 3    nitroGLYCERIN (NITROSTAT) 0.4 MG SL tablet Place 1 tablet under the tongue every 5 minutes as needed for Chest pain (Patient not taking: Reported on 6/20/2022) 25 tablet 1    magnesium gluconate (MAGONATE) 500 MG tablet Take 500 mg by mouth daily. No current facility-administered medications on file prior to encounter. Objective:      BP (!) 145/77   Pulse 69   Temp 97.3 °F (36.3 °C) (Temporal)   Resp 20     PHYSICAL EXAM  General:  AAO x 3. NAD. WD/WN. Breathing comfortably today. Not using supplemental oxygen. Right lower extremity with enlarging ischemic appearing ulceration of the right anterior lower leg with surrounding erythema. Slightly warm. Mild to moderate nonpitting swelling noted. Toes are warm well perfused with normal capillary refill. Nonpalpable pedal pulses, Doppler PT and DP only. Assessment:     1. Non-pressure ulcer of right lower extremity with fat layer exposed ([address]    2. Open wound of right lower leg, subsequent encounter    3. Atherosclerosis of native artery of right lower extremity with rest pain ([address]    4.  PVD (peripheral vascular disease) ([address]          Wound Measurements:  Wound 11/15/21 Leg Distal;Right #1 (Active)   Wound Image   06/20/22 1343   Wound Etiology Venous 05/02/22 1435   Wound Cleansed Cleansed with saline 07/11/22 1310   Dressing/Treatment Alginate with Ag 07/11/22 1346   Wound Length (cm) 5.5 cm 07/11/22 1310   Wound Width (cm) 2.5 cm 07/11/22 1310   Wound Depth (cm) 0.1 cm 07/11/22 1310   Wound Surface Area (cm^2) 13.75 cm^2 07/11/22 1310   Change in Wound Size % (l*w) -472.92 07/11/22 1310   Wound Volume (cm^3) 1.375 cm^3 07/11/22 1310   Wound Healing % -473 07/11/22 1310   Post-Procedure Length (cm) 5.5 cm 07/11/22 1337   Post-Procedure Width (cm) 2.5 cm 07/11/22 1337   Post-Procedure Depth (cm) 0.1 cm 07/11/22 1337   Post-Procedure Surface Area (cm^2) 13.75 cm^2 07/11/22 1337   Post-Procedure Volume (cm^3) 1.375 cm^3 07/11/22 1337   Distance Tunneling (cm) 0 cm 07/11/22 1310   Tunneling Position ___ O'Clock 0 07/11/22 1310   Undermining Starts ___ O'Clock 0 07/11/22 1310   Undermining Ends___ O'Clock 0 07/11/22 1310   Undermining Maxium Distance (cm) 0 07/11/22 1310   Wound Assessment Slough 07/11/22 1310   Drainage Amount Moderate 07/11/22 1310   Drainage Description Brown 07/11/22 1310   Odor None 07/11/22 1310   Catrachita-wound Assessment Edematous 07/11/22 1310   Margins Defined edges 07/11/22 1310   Wound Thickness Description not for Pressure Injury Full thickness 07/11/22 1310   Number of days: 237       Wound 05/12/22 Ankle Right #2 (Active)   Wound Image   06/20/22 1343   Wound Etiology Venous 05/12/22 1000   Wound Cleansed Cleansed with saline 07/11/22 1310   Dressing/Treatment Alginate with Ag 07/11/22 1346   Wound Length (cm) 0.5 cm 07/11/22 1310   Wound Width (cm) 0.5 cm 07/11/22 1310   Wound Depth (cm) 0.1 cm 07/11/22 1310   Wound Surface Area (cm^2) 0.25 cm^2 07/11/22 1310   Change in Wound Size % (l*w) -66.67 07/11/22 1310   Wound Volume (cm^3) 0.025 cm^3 07/11/22 1310   Wound Healing % -67 07/11/22 1310   Post-Procedure Length (cm) 0.5 cm 07/11/22 1337   Post-Procedure Width (cm) 0.5 cm 07/11/22 1337   Post-Procedure Depth (cm) 0.1 cm 07/11/22 1337   Post-Procedure Surface Area (cm^2) 0.25 cm^2 07/11/22 1337   Post-Procedure Volume (cm^3) 0.025 cm^3 07/11/22 1337   Distance Tunneling (cm) 0 cm 07/11/22 1310   Tunneling Position ___ O'Clock 0 07/11/22 1310   Undermining Starts ___ O'Clock 0 07/11/22 1310   Undermining Ends___ O'Clock 0 07/11/22 1310   Undermining Maxium Distance (cm) 0 07/11/22 1310   Wound Assessment Fibrin 07/11/22 1310   Drainage Amount None 07/11/22 1310   Drainage Description Other (Comment) 07/11/22 1310   Odor None 07/11/22 1310   Catrachita-wound Assessment Intact 07/11/22 1310   Margins Defined edges 07/11/22 1310   Wound Thickness Description not for Pressure Injury Full thickness 07/11/22 1310   Number of days: 60          Plan:   No debridement was performed. He will continue current wound care with elevation and wrapping as tolerated. He is scheduled for a right femoral to below-knee popliteal artery bypass with possible right arm vein harvest next Monday, July 18, 2022 at 7:30 AM.  Advised him to stop his Eliquis 2 days prior to surgery. He may continue his Plavix. New Prescriptions    No medications on file       Orders Placed This Encounter   Procedures    Initiate Outpatient Wound Care Protocol       Written patient dismissal instructions given to patient and signed by patient or POA. Discharge [facility] Physician Orders and Discharge Instructions  [address] [place] [address]. [address] [place] [address]. [place]. [place]  Telephone: [phone]  NAME:  Halle Rascon YOB: 1950  MEDICAL RECORD NUMBER:  [id number]  DATE:  7/11/2022    Return Appointment:  Singh Abbott Return Appointment: With Dr Neftaly Solorzano  In 2 weeks. Surgery possibly on 07/18/22   o Schedule weekly for the rest of the month?  No    o [] Return Appointment for a Wound Assessment with the nurse on:     Future Appointments   Date Time Provider [place] Colmenares   8/30/2022 9:45 AM Vanessa Powell MD [facility]   10/13/2022  1:00 PM MD Alaina Moss P/CC [facility]   12/19/2022 11:00 AM DO JEFF Diaz [place] [facility]       Orders Placed This Encounter   Procedures   [facility] Ext: [facility] - EXTENDED CARE [phone]   F: [phone]  Medically necessary services: [x] Skilled Nursing [] PT (Eval & Treat) [] OT (Eval & Treat) [] Social Work [] Dietician  [] Other:    Wound care instructions:  1. If you smoke we ask that you refrain from smoking. Smoking inhibits wounds from healing. 2. When taking antibiotics take the entire prescription as ordered. Do not stop taking until medication is all gone unless otherwise instructed. 3. Exercise as tolerated. 4. Keep weight off wounds and reposition every 2 hours if applicable. 5. Do not get wounds wet in the bath or shower unless otherwise instructed by your physician. If your wound is on your foot or leg, you may purchase a cast bag. Please ask at the pharmacy. 6. Wash hands with soap and water prior to and after every dressing change. [x]Wash wounds with: 0.9% normal saline  [x]Catrachita wound Topical Treatments: Do not apply lotions, creams, or ointments to the skin around the wound bed unless directed as followed:   o Apply around the wound: Nothing         [x]Wound Location: right lower leg and ankle    Apply Primary Dressing to wound: Alginate with silver pad   Secondary Dressing: 4X4 gauze pad and Bulky roll gauze  o  Avoid contact of tape with skin if possible.  When to change Dressing: 3 times per week:Monday/Wednesday/Friday      [x] Edema Control:  Apply: Spandagrip/Medigrip on Right; Low compression 5-10 mm/Hg. They should be applied to affected leg(s) from mid foot to knee making sure to cover the heel      · Elevate leg(s) above the level of the heart for 30 minutes 4-5 times a day and/or when sitting.   · Avoid prolonged standing in one place.  Dietary:  Important dietary reminders:  1. Increase Protein intake (i.e. Lean meats, fish, eggs, legumes, and yogurt)  2. No added salt  3. If diabetic, follow a diabetic diet and check glucose prior to meals or as instructed by your physician. Dietary Supplements(Take twice a day unless instructed otherwise):  [] [facility]  [] 30ml ProStat [] Kavya Bar [] Ensure Max/Premier [] Other:    Your nurse  is:  Galilea Davenport     Electronically signed by Shaquille Barcenas RN on 7/11/2022 at 1:17 PM     [facility] Information: Should you experience any significant changes in your wound(s) or have questions about your wound care, please contact the [address] [place] [address] at [phone]. We are open from 8:00am - 4:30p Monday, Thursday and Friday; 11:00am - 5pm on Tuesday and CLOSED Wednesday. Please give us 24-48 business hours to return your call. Call your doctor now or seek immediate medical care if:    · You have symptoms of infection, such as:  ? Increased pain, swelling, warmth, or redness. ? Red streaks leading from the area. ? Pus draining from the area. ? A fever.         Physician for this visit and orders: Perez Elizondo MD    [] Patient unable to sign Discharge Instructions given to ECF/Transportation/POA            Electronically signed by Perez Elizondo MD on 7/11/2022 at 3:31 PM

## 2022-07-11 NOTE — CARE COORDINATION
Ambulatory Care Coordination Note  7/11/2022  CM Risk Score: 3  Charlson 10 Year Mortality Risk Score: 98%     ACC: Rodrigue Baca RN    Summary Note: pt seen vasc surgeon today & reports he is scheduled for vasc surgery Mon 7.18.22. He is scheduled w/Dr Gabby Stein for next office visit 7.25.22. Denies any questions or concerns. States he was told to anticipate short stay for subacute care following surgery. Congestive Heart Failure Assessment    Are you currently restricting fluids?: No Restriction  Do you understand a low sodium diet?: Yes  Do you understand how to read food labels?: Yes  How many restaurant meals do you eat per week?: 0  Do you salt your food before tasting it?: No     No patient-reported symptoms      Symptoms:  None: Yes      Symptom course: stable  Weight trend: stable  Salt intake watch compared to last visit: stable       COPD Assessment    Does the patient understand envrionmental exposure?: Yes  Is the patient able to verbalize Rescue vs. Long Acting medications?: Yes  Does the patient have a nebulizer?: No  Does the patient use a space with inhaled medications?: No     No patient-reported symptoms         Symptoms:     Have you had a recent diagnosis of pneumonia either by PCP or at a hospital?: No       Offered to call again later this week - pt reviews he is anticipating a pre-surgery call on Friday; does not feel he requires sooner call by ACM. Mutually agreed ACM will plan call on return from PTO week of August 1. Pt agrees to reach out to [facility] prior to ACM PTO beginning July 20, if needed. Reviewed ACM availability over through July 19 and that name of a fellow ACM on team will be provided to PCP for any newly presenting concerns requiring ACC support, which cannot await this writer's return on August 1.   Pt verbalized understanding of above and agreement with the following  Plan: continue ACC support for health coaching, care collaboration, support w/community resources, and help with any newly presenting concerns as needed. Pt agrees to outreach direct to [place], as needed, between routine follow up [facility]        Care Coordination Interventions    Referral from Primary Care Provider: No  Suggested Interventions and Community Resources  Fall Risk Prevention: Completed  Home Care Waiver: Completed (Comment: 5.4.22 reviewed w/pt, per Lin @ [employer] - option for Consumer Directed Care, compensated for approved HHA support up to 2hr/wk; however, pt unable to name anyone for this)  Home Health Services: Completed (Comment: 7.8.22 continues for wound care weekly via Dr Trang Walter MD [facility] )  Senior Services: Completed (Comment: HHA for homemaker support  2 hr/wk via [employer] (formerly provided through [employer]); spoke w/Lin 5.4. 25 who was going to reFax referral for non-skilled HHA again to [employer] in hope [facility] can accomodate reinstating HHA support)  Smoking Cessation: Completed (Comment: 6.15.22 QUIT circa 6.8.22; 4.26.22 health coaching/encouraged in self challenge of reducing daily consumption of cigarettes by delay of 1st cirgarette of day by 15 min, advance as ready)  Specialty Services Referral: Completed (Comment:  [facility] (cardiology); Dr Trang Walter (Vasc); Dr Jared Laurent (Pulmonology))  Transportation Support: Not Started  Zone Management Tools: Completed (Comment: HF, COPD)  Other Services or Interventions: review of self mgmt concepts; resources; health coaching re smoking cessation         Goals Addressed                    This Visit's Progress      Conditions and Symptoms (pt-stated)   On track      I will schedule office visits, as directed by my provider. I will keep my appointment or reschedule if I have to cancel. I will notify my provider of any barriers to my plan of care. I will follow my Zone Management tool to seek urgent or emergent care. I will notify my provider of any symptoms that indicate a worsening of my condition.     Barriers: impairment:  physical: vascular wound lower extremity and fear of failure  Plan for overcoming my barriers: engage in Care Coordination  Confidence: 10/10  Anticipated Goal Completion Date: 10.26.22          Wellness Goal (pt-stated)   On track      Patient Self-Management Goal for Health Maintenance  Goal: I will chose a goal related to tobacco cessation:  I will think about my triggers for smoking, I will think about reasons why I should quit smoking, and I will learn more about the harmful effects of tobacco.  Barriers: impairment:  physical: nicotine dependence and fear of failure  Plan for overcoming my barriers: engage in Care Coordination  Confidence: 10/10  Anticipated Goal Completion Date: 10.26.22            Prior to Admission medications    Medication Sig Start Date End Date Taking? Authorizing Provider   doxycycline hyclate (VIBRA-TABS) 100 MG tablet Take 1 tablet by mouth 2 times daily for 10 days 7/7/22 7/17/22  Gisela Kohler MD   ELIQUIS 5 MG TABS tablet TAKE 1 TABLET BY MOUTH TWICE DAILY 7/5/22   Carolyn Hals, DO   albuterol sulfate HFA (PROVENTIL;VENTOLIN;PROAIR) 108 (90 Base) MCG/ACT inhaler Inhale 2 puffs into the lungs every 6 hours as needed for Wheezing 6/20/22   Carolyn Hals, DO   atorvastatin (LIPITOR) 80 MG tablet Take 1 tablet by mouth daily 6/20/22   Carolyn Hals, DO   clopidogrel (PLAVIX) 75 MG tablet Take 1 tablet by mouth daily 6/20/22   Carolyn Hals, DO   fluticasone-umeclidin-vilant (TRELEGY ELLIPTA) 742-71.9-79 MCG/INH AEPB Inhale 1 puff into the lungs daily 6/20/22   Carolyn Hals, DO   losartan (COZAAR) 100 MG tablet TAKE 1 TABLET BY MOUTH EVERY DAY 6/20/22   Carolyn Hals, DO   metoprolol (LOPRESSOR) 100 MG tablet Take 1 tablet by mouth 2 times daily 6/20/22 9/18/22  Carolyn Hals, DO   pregabalin (LYRICA) 150 MG capsule Take 1 capsule by mouth 2 times daily for 30 days.  6/20/22 7/20/22  Carolyn Hals, DO   potassium chloride (MICRO-K) 10 MEQ extended release capsule 1 tablet 6/20/22   Carolyn Hals, DO

## 2022-07-11 NOTE — LETTER
[facility] Physician Orders and Discharge Instructions  [address] [address] [address] [address] [facility]. [place]. [place]  Telephone: [phone]  NAME:  Aracely Ryan. YOB: 1950  MEDICAL RECORD NUMBER:  [id number]  DATE:  7/11/2022    Return Appointment:  Teo Return Appointment: With Dr Evans Stallworth  In 2 weeks. Surgery possibly on 07/18/22   o Schedule weekly for the rest of the month? No    o [] Return Appointment for a Wound Assessment with the nurse on:     Future Appointments   Date Time Provider Orlando Colmenares   8/30/2022  9:45 AM Tod Baird MD [facility]   10/13/2022  1:00 PM MD Brice Mancia P/CC [facility]   12/19/2022 11:00 AM DO JEFF Robles [place] - [place]       Orders Placed This Encounter   Procedures   [address] [place] [address] Ext: [facility] CARE [phone]   F: [phone]  Medically necessary services: [x] Skilled Nursing [] PT (Eval & Treat) [] OT (Eval & Treat) [] Social Work [] Dietician  [] Other:    Wound care instructions:  1. If you smoke we ask that you refrain from smoking. Smoking inhibits wounds from healing. 2. When taking antibiotics take the entire prescription as ordered. Do not stop taking until medication is all gone unless otherwise instructed. 3. Exercise as tolerated. 4. Keep weight off wounds and reposition every 2 hours if applicable. 5. Do not get wounds wet in the bath or shower unless otherwise instructed by your physician. If your wound is on your foot or leg, you may purchase a cast bag. Please ask at the pharmacy. 6. Wash hands with soap and water prior to and after every dressing change.     [x]Wash wounds with: 0.9% normal saline  [x]Catrachita wound Topical Treatments: Do not apply lotions, creams, or ointments to the skin around the wound bed unless directed as followed:   o Apply around the wound: Nothing         [x]Wound Location: right lower leg and ankle    Apply Primary Dressing to wound: Alginate with silver pad   Secondary Dressing: 4X4 gauze pad and Bulky roll gauze  o  Avoid contact of tape with skin if possible.  When to change Dressing: 3 times per week:Monday/Wednesday/Friday      [x] Edema Control:  Apply: Spandagrip/Medigrip on Right; Low compression 5-10 mm/Hg. They should be applied to affected leg(s) from mid foot to knee making sure to cover the heel      · Elevate leg(s) above the level of the heart for 30 minutes 4-5 times a day and/or when sitting. · Avoid prolonged standing in one place. Dietary:  Important dietary reminders:  1. Increase Protein intake (i.e. Lean meats, fish, eggs, legumes, and yogurt)  2. No added salt  3. If diabetic, follow a diabetic diet and check glucose prior to meals or as instructed by your physician. Dietary Supplements(Take twice a day unless instructed otherwise):  [] Jacquelene Reins  [] 30ml ProStat [] Unruly Baker [] Ensure Max/Premier [] Other:    Your nurse  is:  Rodriguez Guzman     Electronically signed by Monique Ndiaye RN on 7/11/2022 at 1:17 PM     [address] Information: Should you experience any significant changes in your wound(s) or have questions about your wound care, please contact the [address] [place] [address] at [phone]. We are open from 8:00am - 4:30p Monday, Thursday and Friday; 11:00am - 5pm on Tuesday and CLOSED Wednesday. Please give us 24-48 business hours to return your call. Call your doctor now or seek immediate medical care if:    · You have symptoms of infection, such as:  ? Increased pain, swelling, warmth, or redness. ? Red streaks leading from the area. ? Pus draining from the area. ? A fever.         Physician for this visit and orders: Ming Bruce MD    [] Patient unable to sign Discharge Instructions given to ECF/Transportation/POA

## 2022-07-13 NOTE — PROGRESS NOTES
Place patient label inside box (if no patient label, complete below)  Name:  :  MR#:         Nikki Zaman / PROCEDURE  1. I (we), LENNIE GUARDADO JR. (Patient Name) authorize DR. SYLVESTER ORR (Provider / Cristy Jeans) and/or such assistants as may be selected by him/her, to perform the following operation/procedure(s): RIGHT FEMORAL BELOW KNEE POPLITEAL ARTERY BYPASS, POSSIBLE RIGHT ARM VEIN HARVEST        Note: If unable to obtain consent prior to an emergent procedure, document the emergent reason in the medical record. This procedure has been explained to my (our) satisfaction and included in the explanation was:  A) The intended benefit, nature, and extent of the procedure to be performed;  B) The significant risks involved and the probability of success;  C) Alternative procedures and methods of treatment;  D) The dangers and probable consequences of such alternatives (including no procedure or treatment); E) The expected consequences of the procedure on my future health;  F) Whether other qualified individuals would be performing important surgical tasks and/or whether  would be present to advise or support the procedure. I (we) understand that there are other risks of infection and other serious complications in the pre-operative/procedural and postoperative/procedural stages of my (our) care. I (we) have asked all of the questions which I (we) thought were important in deciding whether or not to undergo treatment or diagnosis. These questions have been answered to my (our) satisfaction. I (we) understand that no assurance can be given that the procedure will be a success, and no guarantee or warranty of success has been given to me (us).     2. It has been explained to me (us) that during the course of the operation/procedure, unforeseen conditions may be revealed that necessitate extension of the original procedure(s) or different procedure(s) than those set forth in Paragraph 1. I (we) authorize and request that the above-named physician, his/her assistants or his/her designees, perform procedures as necessary and desirable if deemed to be in my (our) best interest.     Revised 8/2/2021                                                                          Page 1 of 2       3. I acknowledge that health care personnel may be observing this procedure for the purpose of medical education or other specified purposes as may be necessary as requested and/or approved by my (our) physician. 4. I (we) consent to the disposal by the hospital Pathologist of the removed tissue, parts or organs in accordance with hospital policy. 5. I do ____ do not ____ consent to the use of a local infiltration pain blocking agent that will be used by my provider/surgical provider to help alleviate pain during my procedure. 6. I do ____ do not ____ consent to an emergent blood transfusion in the case of a life-threatening situation that requires blood components to be administered. This consent is valid for 24 hours from the beginning of the procedure. 7. This patient does ____ or does not ____ currently have a DNR status/order. If DNR order is in place, obtain Addendum to the Surgical Consent for ALL Patients with a DNR Order to address kelly-operative status for limited intervention or DNR suspension.      8. I have read and fully understand the above Consent for Operation/Procedure and that all blanks were completed before I signed the consent.   _____________________________       _____________________      ____/____am/pm  Signature of Patient or legal representative      Printed Name / Relationship            Date / Time   ____________________________       _____________________      ____/____am/pm  Witness to Signature                                    Printed Name                    Date / Time     If patient is unable to sign or is a minor, complete the following)  Patient is a minor, ____ years of age, or unable to sign because:   ______________________________________________________________________________________________    Nona Benavides If a phone consent is obtained, consent will be documented by using two health care professionals, each affirming that the consenting party has no questions and gives consent for the procedure discussed with the physician/provider.   _____________________          ____________________       _____/_____am/pm   2nd witness to phone consent        Printed name           Date / Time    Informed Consent:  I have provided the explanation described above in section 1 to the patient and/or legal representative.  I have provided the patient and/or legal representative with an opportunity to ask any questions about the proposed operation/procedure.   ___________________________          ____________________         ____/____am/pm  Provider / Proceduralist                            Printed name            Date / Time  Revised 8/2/2021                                                                      Page 2 of 2

## 2022-07-13 NOTE — PROGRESS NOTES
[place] PRE-SURGICAL TESTING INSTRUCTIONS                                  PRIOR TO PROCEDURE DATE:        1. PLEASE FOLLOW ANY  GUIDELINES/ INSTRUCTIONS PRIOR TO YOUR PROCEDURE AS ADVISED BY YOUR SURGEON. 2. Arrange for someone to drive you home and be with you for the first 24 hours after discharge for your safety after your procedure for which you received sedation. Ensure it is someone we can share information with regarding your discharge. 3. You must contact your surgeon for instructions IF:   You are taking any blood thinners, aspirin, anti-inflammatory or vitamin E.   There is a change in your physical condition such as a cold, fever, rash, cuts, sores or any other infection, especially near your surgical site. 4. Do not drink alcohol the day before or day of your procedure. 5. A Pre-op History and Physical for surgery MUST be completed by your Physician or Urgent Care within 30 days of your procedure date. Please bring a copy with you on the day of your procedure and along with any other testing performed. THE DAY OF YOUR PROCEDURE:  1. Follow instructions for ARRIVAL TIME as DIRECTED BY YOUR SURGEON. 2. Enter the MAIN entrance from [address] and follow the signs to the free [place] or [place] [facility] parking (offered free of charge 6am-5pm). 3. Enter the Main Entrance of the hospital (do not enter from the lower level of the parking garage). Upon entrance, check in with the  at the main desk on your left. If no one is available at the desk, proceed into the [facility] Waiting Room and go through the door directly into the [facility]. There is a Check-in desk ACROSS from Room 5 (marked with a sign hanging from the ceiling). The phone number for the surgery center is [phone]. 4. Please call [phone] option #2 option #2 if you have not been preregistered yet.   On the day of your procedure bring your insurance card and phase of your recovery. Your nurse will help you recover from any potential side effects of anesthesia, such as extreme drowsiness, changes in your vital signs or breathing patterns. Nausea, headache, muscle aches, or sore throat may also occur after anesthesia. Your nurse will help you manage these potential side effects. 2. For comfort and safety, arrange to have someone at home with you for the first 24 hours after discharge. 3. You and your family will be given written instructions about your diet, activity, dressing care, medications, and return visits. 4. Once at home, should issues with nausea, pain, or bleeding occur, or should you notice any signs of infection, you should call your surgeon. 5. Always clean your hands before and after caring for your wound. Do not let your family touch your surgery site without cleaning their hands. 6. Narcotic pain medications can cause significant constipation. You may want to add a stool softener to your postoperative medication schedule or speak to your surgeon on how best to manage this SIDE EFFECT. Thank you for allowing us to care for you. We strive to exceed your expectations in the delivery of care and service provided to you and your family. If you need to contact the [place] [address] staff for any reason, please call us at [phone]    Instructions reviewed with patient during preadmission testing phone interview.   Latoya Wooten RN.7/13/2022 .10:09 AM      ADDITIONAL EDUCATIONAL INFORMATION REVIEWED PER PHONE WITH YOU AND/OR YOUR FAMILY:     Yes Antibacterial Soap

## 2022-07-18 ENCOUNTER — ANESTHESIA (OUTPATIENT)
Dept: OPERATING ROOM | Age: 72
DRG: 253 | End: 2022-07-18
Payer: MEDICARE

## 2022-07-18 ENCOUNTER — APPOINTMENT (OUTPATIENT)
Dept: GENERAL RADIOLOGY | Age: 72
DRG: 253 | End: 2022-07-18
Attending: SURGERY
Payer: MEDICARE

## 2022-07-18 ENCOUNTER — ANESTHESIA EVENT (OUTPATIENT)
Dept: OPERATING ROOM | Age: 72
DRG: 253 | End: 2022-07-18
Payer: MEDICARE

## 2022-07-18 ENCOUNTER — HOSPITAL ENCOUNTER (INPATIENT)
Age: 72
LOS: 7 days | Discharge: NURSING, REHAB OR LONG-TERM CARE | DRG: 253 | End: 2022-07-25
Attending: SURGERY | Admitting: SURGERY
Payer: MEDICARE

## 2022-07-18 DIAGNOSIS — I73.9 PERIPHERAL ARTERIAL DISEASE (HCC): Primary | ICD-10-CM

## 2022-07-18 LAB
ABO/RH: NORMAL
ANION GAP SERPL CALCULATED.3IONS-SCNC: 12 MMOL/L (ref 3–16)
ANTIBODY SCREEN: NORMAL
BUN BLDV-MCNC: 27 MG/DL (ref 7–20)
CALCIUM SERPL-MCNC: 9.5 MG/DL (ref 8.3–10.6)
CHLORIDE BLD-SCNC: 105 MMOL/L (ref 99–110)
CO2: 25 MMOL/L (ref 21–32)
CREAT SERPL-MCNC: 1.3 MG/DL (ref 0.8–1.3)
GFR AFRICAN AMERICAN: >60
GFR NON-AFRICAN AMERICAN: 55
GLUCOSE BLD-MCNC: 121 MG/DL (ref 70–99)
HCT VFR BLD CALC: 40.1 % (ref 40.5–52.5)
HEMOGLOBIN: 13 G/DL (ref 13.5–17.5)
INR BLD: 1.17 (ref 0.87–1.14)
MCH RBC QN AUTO: 30.9 PG (ref 26–34)
MCHC RBC AUTO-ENTMCNC: 32.3 G/DL (ref 31–36)
MCV RBC AUTO: 95.7 FL (ref 80–100)
PDW BLD-RTO: 16.6 % (ref 12.4–15.4)
PLATELET # BLD: 170 K/UL (ref 135–450)
PMV BLD AUTO: 9.2 FL (ref 5–10.5)
POTASSIUM SERPL-SCNC: 4.4 MMOL/L (ref 3.5–5.1)
PROTHROMBIN TIME: 14.9 SEC (ref 11.7–14.5)
RBC # BLD: 4.19 M/UL (ref 4.2–5.9)
SODIUM BLD-SCNC: 142 MMOL/L (ref 136–145)
WBC # BLD: 8.5 K/UL (ref 4–11)

## 2022-07-18 PROCEDURE — 86901 BLOOD TYPING SEROLOGIC RH(D): CPT

## 2022-07-18 PROCEDURE — 3209999900 FLUORO FOR SURGICAL PROCEDURES

## 2022-07-18 PROCEDURE — 3700000000 HC ANESTHESIA ATTENDED CARE: Performed by: SURGERY

## 2022-07-18 PROCEDURE — 6360000002 HC RX W HCPCS: Performed by: ANESTHESIOLOGY

## 2022-07-18 PROCEDURE — A4217 STERILE WATER/SALINE, 500 ML: HCPCS | Performed by: SURGERY

## 2022-07-18 PROCEDURE — 7100000001 HC PACU RECOVERY - ADDTL 15 MIN: Performed by: SURGERY

## 2022-07-18 PROCEDURE — 80048 BASIC METABOLIC PNL TOTAL CA: CPT

## 2022-07-18 PROCEDURE — 3700000001 HC ADD 15 MINUTES (ANESTHESIA): Performed by: SURGERY

## 2022-07-18 PROCEDURE — 3600000014 HC SURGERY LEVEL 4 ADDTL 15MIN: Performed by: SURGERY

## 2022-07-18 PROCEDURE — 2580000003 HC RX 258: Performed by: SURGERY

## 2022-07-18 PROCEDURE — 2580000003 HC RX 258: Performed by: ANESTHESIOLOGY

## 2022-07-18 PROCEDURE — 3600000004 HC SURGERY LEVEL 4 BASE: Performed by: SURGERY

## 2022-07-18 PROCEDURE — 73560 X-RAY EXAM OF KNEE 1 OR 2: CPT

## 2022-07-18 PROCEDURE — 6370000000 HC RX 637 (ALT 250 FOR IP): Performed by: SURGERY

## 2022-07-18 PROCEDURE — 6360000004 HC RX CONTRAST MEDICATION: Performed by: SURGERY

## 2022-07-18 PROCEDURE — 041K09L BYPASS RIGHT FEMORAL ARTERY TO POPLITEAL ARTERY WITH AUTOLOGOUS VENOUS TISSUE, OPEN APPROACH: ICD-10-PCS | Performed by: SURGERY

## 2022-07-18 PROCEDURE — 6360000002 HC RX W HCPCS: Performed by: NURSE ANESTHETIST, CERTIFIED REGISTERED

## 2022-07-18 PROCEDURE — 2780000010 HC IMPLANT OTHER: Performed by: SURGERY

## 2022-07-18 PROCEDURE — 6360000002 HC RX W HCPCS: Performed by: SURGERY

## 2022-07-18 PROCEDURE — 86900 BLOOD TYPING SEROLOGIC ABO: CPT

## 2022-07-18 PROCEDURE — C1769 GUIDE WIRE: HCPCS | Performed by: SURGERY

## 2022-07-18 PROCEDURE — 7100000000 HC PACU RECOVERY - FIRST 15 MIN: Performed by: SURGERY

## 2022-07-18 PROCEDURE — 2500000003 HC RX 250 WO HCPCS: Performed by: NURSE ANESTHETIST, CERTIFIED REGISTERED

## 2022-07-18 PROCEDURE — 2709999900 HC NON-CHARGEABLE SUPPLY: Performed by: SURGERY

## 2022-07-18 PROCEDURE — 2000000000 HC ICU R&B

## 2022-07-18 PROCEDURE — C1757 CATH, THROMBECTOMY/EMBOLECT: HCPCS | Performed by: SURGERY

## 2022-07-18 PROCEDURE — 85610 PROTHROMBIN TIME: CPT

## 2022-07-18 PROCEDURE — 2720000010 HC SURG SUPPLY STERILE: Performed by: SURGERY

## 2022-07-18 PROCEDURE — 86850 RBC ANTIBODY SCREEN: CPT

## 2022-07-18 PROCEDURE — 35583 VEIN BYP GRFT FEM-POPLITEAL: CPT | Performed by: SURGERY

## 2022-07-18 PROCEDURE — 85027 COMPLETE CBC AUTOMATED: CPT

## 2022-07-18 RX ORDER — SODIUM CHLORIDE 0.9 % (FLUSH) 0.9 %
5-40 SYRINGE (ML) INJECTION PRN
Status: DISCONTINUED | OUTPATIENT
Start: 2022-07-18 | End: 2022-07-18 | Stop reason: HOSPADM

## 2022-07-18 RX ORDER — LABETALOL HYDROCHLORIDE 5 MG/ML
10 INJECTION, SOLUTION INTRAVENOUS
Status: DISCONTINUED | OUTPATIENT
Start: 2022-07-18 | End: 2022-07-18

## 2022-07-18 RX ORDER — IODIXANOL 320 MG/ML
INJECTION, SOLUTION INTRAVASCULAR PRN
Status: DISCONTINUED | OUTPATIENT
Start: 2022-07-18 | End: 2022-07-18 | Stop reason: HOSPADM

## 2022-07-18 RX ORDER — SODIUM CHLORIDE 0.9 % (FLUSH) 0.9 %
5-40 SYRINGE (ML) INJECTION PRN
Status: DISCONTINUED | OUTPATIENT
Start: 2022-07-18 | End: 2022-07-25 | Stop reason: HOSPADM

## 2022-07-18 RX ORDER — GLYCOPYRROLATE 1 MG/5 ML
SYRINGE (ML) INTRAVENOUS PRN
Status: DISCONTINUED | OUTPATIENT
Start: 2022-07-18 | End: 2022-07-18 | Stop reason: SDUPTHER

## 2022-07-18 RX ORDER — FENTANYL CITRATE 50 UG/ML
INJECTION, SOLUTION INTRAMUSCULAR; INTRAVENOUS PRN
Status: DISCONTINUED | OUTPATIENT
Start: 2022-07-18 | End: 2022-07-18 | Stop reason: SDUPTHER

## 2022-07-18 RX ORDER — OXYCODONE HYDROCHLORIDE 5 MG/1
5 TABLET ORAL EVERY 4 HOURS PRN
Status: DISCONTINUED | OUTPATIENT
Start: 2022-07-18 | End: 2022-07-25 | Stop reason: HOSPADM

## 2022-07-18 RX ORDER — LOSARTAN POTASSIUM 100 MG/1
100 TABLET ORAL DAILY
Status: DISCONTINUED | OUTPATIENT
Start: 2022-07-19 | End: 2022-07-25 | Stop reason: HOSPADM

## 2022-07-18 RX ORDER — SODIUM CHLORIDE 0.9 % (FLUSH) 0.9 %
5-40 SYRINGE (ML) INJECTION EVERY 12 HOURS SCHEDULED
Status: DISCONTINUED | OUTPATIENT
Start: 2022-07-18 | End: 2022-07-18 | Stop reason: HOSPADM

## 2022-07-18 RX ORDER — HEPARIN SODIUM 1000 [USP'U]/ML
INJECTION, SOLUTION INTRAVENOUS; SUBCUTANEOUS PRN
Status: DISCONTINUED | OUTPATIENT
Start: 2022-07-18 | End: 2022-07-18 | Stop reason: SDUPTHER

## 2022-07-18 RX ORDER — IPRATROPIUM BROMIDE AND ALBUTEROL SULFATE 2.5; .5 MG/3ML; MG/3ML
1 SOLUTION RESPIRATORY (INHALATION)
Status: DISCONTINUED | OUTPATIENT
Start: 2022-07-18 | End: 2022-07-18

## 2022-07-18 RX ORDER — ACETAMINOPHEN 500 MG
1000 TABLET ORAL EVERY 6 HOURS
Status: DISCONTINUED | OUTPATIENT
Start: 2022-07-18 | End: 2022-07-25 | Stop reason: HOSPADM

## 2022-07-18 RX ORDER — SODIUM CHLORIDE 0.9 % (FLUSH) 0.9 %
5-40 SYRINGE (ML) INJECTION EVERY 12 HOURS SCHEDULED
Status: DISCONTINUED | OUTPATIENT
Start: 2022-07-18 | End: 2022-07-18

## 2022-07-18 RX ORDER — PROTAMINE SULFATE 10 MG/ML
INJECTION, SOLUTION INTRAVENOUS PRN
Status: DISCONTINUED | OUTPATIENT
Start: 2022-07-18 | End: 2022-07-18 | Stop reason: SDUPTHER

## 2022-07-18 RX ORDER — PANTOPRAZOLE SODIUM 40 MG/1
40 TABLET, DELAYED RELEASE ORAL
Status: DISCONTINUED | OUTPATIENT
Start: 2022-07-19 | End: 2022-07-25 | Stop reason: HOSPADM

## 2022-07-18 RX ORDER — POLYETHYLENE GLYCOL 3350 17 G/17G
17 POWDER, FOR SOLUTION ORAL DAILY
Status: DISCONTINUED | OUTPATIENT
Start: 2022-07-19 | End: 2022-07-25 | Stop reason: HOSPADM

## 2022-07-18 RX ORDER — SODIUM CHLORIDE, SODIUM LACTATE, POTASSIUM CHLORIDE, CALCIUM CHLORIDE 600; 310; 30; 20 MG/100ML; MG/100ML; MG/100ML; MG/100ML
INJECTION, SOLUTION INTRAVENOUS CONTINUOUS
Status: DISCONTINUED | OUTPATIENT
Start: 2022-07-18 | End: 2022-07-18 | Stop reason: HOSPADM

## 2022-07-18 RX ORDER — SODIUM CHLORIDE, SODIUM LACTATE, POTASSIUM CHLORIDE, CALCIUM CHLORIDE 600; 310; 30; 20 MG/100ML; MG/100ML; MG/100ML; MG/100ML
INJECTION, SOLUTION INTRAVENOUS CONTINUOUS
Status: DISCONTINUED | OUTPATIENT
Start: 2022-07-18 | End: 2022-07-20

## 2022-07-18 RX ORDER — PROPOFOL 10 MG/ML
INJECTION, EMULSION INTRAVENOUS PRN
Status: DISCONTINUED | OUTPATIENT
Start: 2022-07-18 | End: 2022-07-18 | Stop reason: SDUPTHER

## 2022-07-18 RX ORDER — LIDOCAINE HYDROCHLORIDE 20 MG/ML
INJECTION, SOLUTION EPIDURAL; INFILTRATION; INTRACAUDAL; PERINEURAL PRN
Status: DISCONTINUED | OUTPATIENT
Start: 2022-07-18 | End: 2022-07-18 | Stop reason: SDUPTHER

## 2022-07-18 RX ORDER — ARFORMOTEROL TARTRATE 15 UG/2ML
15 SOLUTION RESPIRATORY (INHALATION) 2 TIMES DAILY
Status: DISCONTINUED | OUTPATIENT
Start: 2022-07-18 | End: 2022-07-25 | Stop reason: HOSPADM

## 2022-07-18 RX ORDER — ROCURONIUM BROMIDE 10 MG/ML
INJECTION, SOLUTION INTRAVENOUS PRN
Status: DISCONTINUED | OUTPATIENT
Start: 2022-07-18 | End: 2022-07-18 | Stop reason: SDUPTHER

## 2022-07-18 RX ORDER — ONDANSETRON 2 MG/ML
4 INJECTION INTRAMUSCULAR; INTRAVENOUS
Status: DISCONTINUED | OUTPATIENT
Start: 2022-07-18 | End: 2022-07-18

## 2022-07-18 RX ORDER — ONDANSETRON 2 MG/ML
4 INJECTION INTRAMUSCULAR; INTRAVENOUS EVERY 6 HOURS PRN
Status: DISCONTINUED | OUTPATIENT
Start: 2022-07-18 | End: 2022-07-25 | Stop reason: HOSPADM

## 2022-07-18 RX ORDER — OXYCODONE HYDROCHLORIDE 5 MG/1
5 TABLET ORAL PRN
Status: DISCONTINUED | OUTPATIENT
Start: 2022-07-18 | End: 2022-07-18

## 2022-07-18 RX ORDER — SODIUM CHLORIDE 9 MG/ML
INJECTION, SOLUTION INTRAVENOUS PRN
Status: DISCONTINUED | OUTPATIENT
Start: 2022-07-18 | End: 2022-07-25 | Stop reason: HOSPADM

## 2022-07-18 RX ORDER — NEOSTIGMINE METHYLSULFATE 5 MG/5 ML
SYRINGE (ML) INTRAVENOUS PRN
Status: DISCONTINUED | OUTPATIENT
Start: 2022-07-18 | End: 2022-07-18 | Stop reason: SDUPTHER

## 2022-07-18 RX ORDER — ONDANSETRON 2 MG/ML
INJECTION INTRAMUSCULAR; INTRAVENOUS PRN
Status: DISCONTINUED | OUTPATIENT
Start: 2022-07-18 | End: 2022-07-18 | Stop reason: SDUPTHER

## 2022-07-18 RX ORDER — FENTANYL CITRATE 50 UG/ML
50 INJECTION, SOLUTION INTRAMUSCULAR; INTRAVENOUS EVERY 5 MIN PRN
Status: DISCONTINUED | OUTPATIENT
Start: 2022-07-18 | End: 2022-07-18

## 2022-07-18 RX ORDER — OXYCODONE HYDROCHLORIDE 5 MG/1
10 TABLET ORAL EVERY 4 HOURS PRN
Status: DISCONTINUED | OUTPATIENT
Start: 2022-07-18 | End: 2022-07-25 | Stop reason: HOSPADM

## 2022-07-18 RX ORDER — ALBUTEROL SULFATE 2.5 MG/3ML
2.5 SOLUTION RESPIRATORY (INHALATION) EVERY 6 HOURS PRN
Status: DISCONTINUED | OUTPATIENT
Start: 2022-07-18 | End: 2022-07-25 | Stop reason: HOSPADM

## 2022-07-18 RX ORDER — PREGABALIN 150 MG/1
150 CAPSULE ORAL 2 TIMES DAILY
Status: DISCONTINUED | OUTPATIENT
Start: 2022-07-18 | End: 2022-07-25 | Stop reason: HOSPADM

## 2022-07-18 RX ORDER — SODIUM CHLORIDE 0.9 % (FLUSH) 0.9 %
5-40 SYRINGE (ML) INJECTION EVERY 12 HOURS SCHEDULED
Status: DISCONTINUED | OUTPATIENT
Start: 2022-07-18 | End: 2022-07-25 | Stop reason: HOSPADM

## 2022-07-18 RX ORDER — ONDANSETRON 4 MG/1
4 TABLET, ORALLY DISINTEGRATING ORAL EVERY 8 HOURS PRN
Status: DISCONTINUED | OUTPATIENT
Start: 2022-07-18 | End: 2022-07-25 | Stop reason: HOSPADM

## 2022-07-18 RX ORDER — SODIUM CHLORIDE 9 MG/ML
INJECTION, SOLUTION INTRAVENOUS PRN
Status: DISCONTINUED | OUTPATIENT
Start: 2022-07-18 | End: 2022-07-18 | Stop reason: HOSPADM

## 2022-07-18 RX ORDER — METOPROLOL TARTRATE 100 MG/1
100 TABLET ORAL 2 TIMES DAILY
Status: DISCONTINUED | OUTPATIENT
Start: 2022-07-18 | End: 2022-07-25 | Stop reason: HOSPADM

## 2022-07-18 RX ORDER — ATORVASTATIN CALCIUM 80 MG/1
80 TABLET, FILM COATED ORAL DAILY
Status: DISCONTINUED | OUTPATIENT
Start: 2022-07-19 | End: 2022-07-25 | Stop reason: HOSPADM

## 2022-07-18 RX ORDER — SODIUM CHLORIDE 0.9 % (FLUSH) 0.9 %
5-40 SYRINGE (ML) INJECTION PRN
Status: DISCONTINUED | OUTPATIENT
Start: 2022-07-18 | End: 2022-07-18

## 2022-07-18 RX ORDER — DOCUSATE SODIUM 100 MG/1
100 CAPSULE, LIQUID FILLED ORAL 2 TIMES DAILY
Status: DISCONTINUED | OUTPATIENT
Start: 2022-07-18 | End: 2022-07-25 | Stop reason: HOSPADM

## 2022-07-18 RX ORDER — HYDROMORPHONE HCL 110MG/55ML
PATIENT CONTROLLED ANALGESIA SYRINGE INTRAVENOUS PRN
Status: DISCONTINUED | OUTPATIENT
Start: 2022-07-18 | End: 2022-07-18 | Stop reason: SDUPTHER

## 2022-07-18 RX ORDER — BUDESONIDE 0.5 MG/2ML
0.5 INHALANT ORAL 2 TIMES DAILY
Status: DISCONTINUED | OUTPATIENT
Start: 2022-07-18 | End: 2022-07-25 | Stop reason: HOSPADM

## 2022-07-18 RX ORDER — SODIUM CHLORIDE 9 MG/ML
25 INJECTION, SOLUTION INTRAVENOUS PRN
Status: DISCONTINUED | OUTPATIENT
Start: 2022-07-18 | End: 2022-07-18

## 2022-07-18 RX ORDER — MIDAZOLAM HYDROCHLORIDE 1 MG/ML
2 INJECTION INTRAMUSCULAR; INTRAVENOUS
Status: DISCONTINUED | OUTPATIENT
Start: 2022-07-18 | End: 2022-07-18

## 2022-07-18 RX ORDER — METOCLOPRAMIDE HYDROCHLORIDE 5 MG/ML
10 INJECTION INTRAMUSCULAR; INTRAVENOUS
Status: DISCONTINUED | OUTPATIENT
Start: 2022-07-18 | End: 2022-07-18 | Stop reason: HOSPADM

## 2022-07-18 RX ORDER — ALLOPURINOL 100 MG/1
100 TABLET ORAL DAILY
Status: DISCONTINUED | OUTPATIENT
Start: 2022-07-19 | End: 2022-07-25 | Stop reason: HOSPADM

## 2022-07-18 RX ORDER — DULOXETIN HYDROCHLORIDE 20 MG/1
20 CAPSULE, DELAYED RELEASE ORAL DAILY
Status: DISCONTINUED | OUTPATIENT
Start: 2022-07-19 | End: 2022-07-25 | Stop reason: HOSPADM

## 2022-07-18 RX ORDER — LIDOCAINE HYDROCHLORIDE 10 MG/ML
1 INJECTION, SOLUTION EPIDURAL; INFILTRATION; INTRACAUDAL; PERINEURAL
Status: DISCONTINUED | OUTPATIENT
Start: 2022-07-18 | End: 2022-07-18 | Stop reason: HOSPADM

## 2022-07-18 RX ORDER — OXYCODONE HYDROCHLORIDE 5 MG/1
10 TABLET ORAL PRN
Status: DISCONTINUED | OUTPATIENT
Start: 2022-07-18 | End: 2022-07-18

## 2022-07-18 RX ADMIN — PROPOFOL 120 MG: 10 INJECTION, EMULSION INTRAVENOUS at 07:41

## 2022-07-18 RX ADMIN — PHENYLEPHRINE HYDROCHLORIDE 50 MCG: 10 INJECTION, SOLUTION INTRAMUSCULAR; INTRAVENOUS; SUBCUTANEOUS at 09:11

## 2022-07-18 RX ADMIN — DOCUSATE SODIUM 100 MG: 100 CAPSULE, LIQUID FILLED ORAL at 20:50

## 2022-07-18 RX ADMIN — SODIUM CHLORIDE, POTASSIUM CHLORIDE, SODIUM LACTATE AND CALCIUM CHLORIDE: 600; 310; 30; 20 INJECTION, SOLUTION INTRAVENOUS at 06:00

## 2022-07-18 RX ADMIN — ROCURONIUM BROMIDE 100 MG: 10 INJECTION INTRAVENOUS at 07:42

## 2022-07-18 RX ADMIN — PROTAMINE SULFATE 20 MG: 10 INJECTION, SOLUTION INTRAVENOUS at 12:56

## 2022-07-18 RX ADMIN — PHENYLEPHRINE HYDROCHLORIDE 75 MCG: 10 INJECTION, SOLUTION INTRAMUSCULAR; INTRAVENOUS; SUBCUTANEOUS at 11:12

## 2022-07-18 RX ADMIN — Medication 0.5 MG: at 13:24

## 2022-07-18 RX ADMIN — PREGABALIN 150 MG: 150 CAPSULE ORAL at 20:50

## 2022-07-18 RX ADMIN — SODIUM CHLORIDE, POTASSIUM CHLORIDE, SODIUM LACTATE AND CALCIUM CHLORIDE: 600; 310; 30; 20 INJECTION, SOLUTION INTRAVENOUS at 09:57

## 2022-07-18 RX ADMIN — METOPROLOL 100 MG: 100 TABLET ORAL at 20:50

## 2022-07-18 RX ADMIN — Medication 3 MG: at 13:24

## 2022-07-18 RX ADMIN — SODIUM CHLORIDE, PRESERVATIVE FREE 10 ML: 5 INJECTION INTRAVENOUS at 20:51

## 2022-07-18 RX ADMIN — HEPARIN SODIUM 3000 UNITS: 1000 INJECTION INTRAVENOUS; SUBCUTANEOUS at 10:52

## 2022-07-18 RX ADMIN — ROCURONIUM BROMIDE 20 MG: 10 INJECTION INTRAVENOUS at 12:57

## 2022-07-18 RX ADMIN — HYDROMORPHONE HYDROCHLORIDE 1 MG: 2 INJECTION, SOLUTION INTRAMUSCULAR; INTRAVENOUS; SUBCUTANEOUS at 13:29

## 2022-07-18 RX ADMIN — HEPARIN SODIUM 7000 UNITS: 1000 INJECTION INTRAVENOUS; SUBCUTANEOUS at 10:01

## 2022-07-18 RX ADMIN — PHENYLEPHRINE HYDROCHLORIDE 50 MCG: 10 INJECTION, SOLUTION INTRAMUSCULAR; INTRAVENOUS; SUBCUTANEOUS at 08:30

## 2022-07-18 RX ADMIN — FENTANYL CITRATE 50 MCG: 50 INJECTION, SOLUTION INTRAMUSCULAR; INTRAVENOUS at 12:54

## 2022-07-18 RX ADMIN — PHENYLEPHRINE HYDROCHLORIDE 50 MCG: 10 INJECTION, SOLUTION INTRAMUSCULAR; INTRAVENOUS; SUBCUTANEOUS at 08:27

## 2022-07-18 RX ADMIN — FENTANYL CITRATE 50 MCG: 50 INJECTION, SOLUTION INTRAMUSCULAR; INTRAVENOUS at 08:22

## 2022-07-18 RX ADMIN — ACETAMINOPHEN 1000 MG: 500 TABLET ORAL at 20:50

## 2022-07-18 RX ADMIN — PHENYLEPHRINE HYDROCHLORIDE 50 MCG: 10 INJECTION, SOLUTION INTRAMUSCULAR; INTRAVENOUS; SUBCUTANEOUS at 09:02

## 2022-07-18 RX ADMIN — PHENYLEPHRINE HYDROCHLORIDE 50 MCG: 10 INJECTION, SOLUTION INTRAMUSCULAR; INTRAVENOUS; SUBCUTANEOUS at 08:46

## 2022-07-18 RX ADMIN — OXYCODONE 10 MG: 5 TABLET ORAL at 20:50

## 2022-07-18 RX ADMIN — PHENYLEPHRINE HYDROCHLORIDE 50 MCG: 10 INJECTION, SOLUTION INTRAMUSCULAR; INTRAVENOUS; SUBCUTANEOUS at 09:28

## 2022-07-18 RX ADMIN — LIDOCAINE HYDROCHLORIDE 50 MG: 20 INJECTION, SOLUTION EPIDURAL; INFILTRATION; INTRACAUDAL; PERINEURAL at 07:41

## 2022-07-18 RX ADMIN — ROCURONIUM BROMIDE 50 MG: 10 INJECTION INTRAVENOUS at 11:06

## 2022-07-18 RX ADMIN — CEFAZOLIN 2000 MG: 2 INJECTION, POWDER, FOR SOLUTION INTRAMUSCULAR; INTRAVENOUS at 07:52

## 2022-07-18 RX ADMIN — ONDANSETRON 4 MG: 2 INJECTION INTRAMUSCULAR; INTRAVENOUS at 13:40

## 2022-07-18 RX ADMIN — SODIUM CHLORIDE, POTASSIUM CHLORIDE, SODIUM LACTATE AND CALCIUM CHLORIDE: 600; 310; 30; 20 INJECTION, SOLUTION INTRAVENOUS at 19:51

## 2022-07-18 RX ADMIN — Medication 0.2 MG: at 07:49

## 2022-07-18 ASSESSMENT — PAIN DESCRIPTION - DESCRIPTORS
DESCRIPTORS: PRESSURE
DESCRIPTORS: ACHING

## 2022-07-18 ASSESSMENT — PAIN SCALES - GENERAL
PAINLEVEL_OUTOF10: 0
PAINLEVEL_OUTOF10: 9

## 2022-07-18 ASSESSMENT — PAIN - FUNCTIONAL ASSESSMENT
PAIN_FUNCTIONAL_ASSESSMENT: 0-10
PAIN_FUNCTIONAL_ASSESSMENT: PREVENTS OR INTERFERES SOME ACTIVE ACTIVITIES AND ADLS

## 2022-07-18 ASSESSMENT — PAIN DESCRIPTION - LOCATION: LOCATION: LEG

## 2022-07-18 ASSESSMENT — PAIN DESCRIPTION - PAIN TYPE: TYPE: SURGICAL PAIN

## 2022-07-18 ASSESSMENT — PAIN DESCRIPTION - ONSET: ONSET: ON-GOING

## 2022-07-18 ASSESSMENT — PAIN DESCRIPTION - ORIENTATION: ORIENTATION: RIGHT

## 2022-07-18 ASSESSMENT — PAIN DESCRIPTION - FREQUENCY: FREQUENCY: CONTINUOUS

## 2022-07-18 ASSESSMENT — LIFESTYLE VARIABLES: SMOKING_STATUS: 0

## 2022-07-18 ASSESSMENT — COPD QUESTIONNAIRES: CAT_SEVERITY: SEVERE

## 2022-07-18 NOTE — PROGRESS NOTES
Vascular Surgery  Post-operative Note    POST-OP DIAGNOSIS: atherosclerosis of native artery of RLE with ulceration, unspecified ulceration site    PROCEDURE(S): R femoral below knee popliteal bypass with in-situ saphenous vein    SUBJECTIVE:   Patient is doing well. States he has pain in his legs, but tolerating it well. Has not asked for pain meds, per nurse. Denies any SOB, chest pain, or worsening leg pains. OBJECTIVE:  Anesthesia type: General    I/O    Intra op    Post op     Fluids  1000 mL 1000 mL      mL 0 mL     Urine 300 mL 210 mL     Physical Exam:  Vitals:   Vitals:    07/18/22 1815 07/18/22 1830 07/18/22 1845 07/18/22 1900   BP:       Pulse: 65 64 71 73   Resp: 15 16 19 17   Temp:       TempSrc:       SpO2: 95% 95% 92% 95%   Weight:       Height:           General Appearance: Alert, no acute distress  Neuro: A&Ox3, no focal deficits, sensation intact  Chest/Lungs: Normal effort without accessory muscle use, on 4 L NC  Cardiovascular: RRR  : Sampson in place draining clear, yellow urine  Extremities: no edema, no cyanosis; RLE wrapped with stocking, no strikethrough noted    Pulses    F P PT DP   R + + -/+ -/+   L + -/+ -/- -/-    +, -/+ ,-/-     R PT and DP pulses Dopplerable, not palpable. L DP and PT non-Dopplerable or palpable. ASSESSMENT/PLAN:  This is a 81y.o. 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 POD0.     Analgesia: Tylenol, Roxicodone q4h PRN  Cardiovascular: Restarted home Lopressor  Respiratory: Encourage hourly incentive spirometry and deep breathing, Albuterol PRN, home Trelegy Ellipta; wean off oxygen as tolerated  FEN:  Fluids: LR @ 125cc/hr, Diet: Regular  : Sampson remains in place with adequate urine outpuy  Wound: Local care  Ambulation: Out of bed to chair during the day, encourage frequent ambulation  VTE PPx: SCDs    Rimma Chandler DO, MSMEd  PGY1, General Surgery  07/18/22  7:10 PM  [facility]  [phone]

## 2022-07-18 NOTE — ANESTHESIA PROCEDURE NOTES
Arterial Line:    An arterial line was placed using surface landmarks, in the OR for the following indication(s): continuous blood pressure monitoring and blood sampling needed. A 20 gauge (size), 1 and 3/8 inch (length), Arrow (type) catheter was placed, Seldinger technique used, into the left radial artery, secured by Tegaderm. Anesthesia type: General    Events:  patient tolerated procedure well with no complications and EBL 0mL.   Anesthesiologist: Lizette Mosley MD  Performed: Anesthesiologist   Preanesthetic Checklist  Completed: patient identified, IV checked, site marked, risks and benefits discussed, surgical/procedural consents, equipment checked, pre-op evaluation, timeout performed, anesthesia consent given, oxygen available, monitors applied/VS acknowledged and blood product R/B/A discussed and consented

## 2022-07-18 NOTE — BRIEF OP NOTE
Brief Postoperative Note      Patient: Starla Jones. YOB: 1950  MRN: [id number]    Date of Procedure: 7/18/2022    Pre-Op Diagnosis: Atherosclerosis of native artery of right lower extremity with ulceration, unspecified ulceration site ([facility] [address] [I70.239]    Post-Op Diagnosis: Same       Procedure(s):  RIGHT FEMORAL BELOW KNEE POPLITEAL ARTERY BYPASS WITH IN SITU SAPHENOUS VEIN    Surgeon(s):  Hanna Cool MD    Assistant:  Resident: Yony Barrett MD    Anesthesia: General    Estimated Blood Loss (mL): 801     Complications: None      Drains:   Urinary Catheter Sampson (Active)     Findings:  Procedure as listed. No complications. Doppler DP and PT signals at the end of the case. Aquacell Ag and Coflex dressing placed at the end of the case.     Plan:  - Admit to the ICU for 1qh vascular checks and hemodynamic monitoring  - Advance diet as tolerated  - Eliquis and Plavix to resume tomorrow     Electronically signed by Yony Barrett MD on 7/18/2022 at 2:03 PM

## 2022-07-18 NOTE — ANESTHESIA PRE PROCEDURE
Provider, MD   folic acid (FOLVITE) 1 MG tablet Take 1 tablet by mouth daily 9/21/21   Julia Cowan MD   nitroGLYCERIN (NITROSTAT) 0.4 MG SL tablet Place 1 tablet under the tongue every 5 minutes as needed for Chest pain  Patient not taking: No sig reported 8/25/21   Hamburg Drilling, DO   magnesium gluconate (MAGONATE) 500 MG tablet Take 500 mg by mouth daily. Historical Provider, MD       Current medications:    No current facility-administered medications for this visit. No current outpatient medications on file. Facility-Administered Medications Ordered in Other Visits   Medication Dose Route Frequency Provider Last Rate Last Admin    ceFAZolin (ANCEF) 2,000 mg in sodium chloride 0.9 % 50 mL IVPB (mini-bag)  2,000 mg IntraVENous Once Hanna Cool MD        lidocaine PF 1 % injection 1 mL  1 mL IntraDERmal Once PRN Virgen Turcios MD        lactated ringers infusion   IntraVENous Continuous Virgen Turcios  mL/hr at 07/18/22 0600 New Bag at 07/18/22 0600    sodium chloride flush 0.9 % injection 5-40 mL  5-40 mL IntraVENous 2 times per day Virgen Turcios MD        sodium chloride flush 0.9 % injection 5-40 mL  5-40 mL IntraVENous PRN Virgen Turcios MD        0.9 % sodium chloride infusion   IntraVENous PRN Virgen Turcios MD           Allergies:     Allergies   Allergen Reactions    Asa [Aspirin] Other (See Comments)     Stomach ache    Daliresp [Roflumilast] Diarrhea and Other (See Comments)     Severe diarrhea and did not help       Problem List:    Patient Active Problem List   Diagnosis Code    Essential hypertension I10    Hyperlipemia E78.5    Degeneration of intervertebral disc of lumbar region M51.36    KAVITA and COPD overlap syndrome ([facility] [address] G47.33, J44.9    PVD (peripheral vascular disease) ([facility] [address] I73.9    Coronary artery disease involving native coronary artery of native heart without angina pectoris I25.10    Tobacco abuse Z72.0    Idiopathic peripheral neuropathy G60.9  Gastroesophageal reflux disease K21.9    Other spondylosis, lumbosacral region M47.897    Cigarette nicotine dependence without complication O69.904    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    Pulmonary embolism without acute cor pulmonale ([facility]) Z15.82    Diastolic dysfunction with chronic heart failure ([facility]) I50.32    Long term (current) use of anticoagulants Z79.01    Presence of coronary angioplasty implant and graft Z95.5    Atherosclerosis of native artery of right lower extremity with rest pain ([facility]) I70.221    Popliteal artery stenosis, left ([facility]) I70.202    Non-pressure ulcer of right lower extremity with fat layer exposed ([facility] [address] L97.912    Open wound of right lower leg S81.801A    Open wound of fifth toe of right foot S91.104A    Peripheral artery disease ([facility]) I73.9       Past Medical History:        Diagnosis Date    CAD (coronary artery disease)     CHF (congestive heart failure) ([facility])     diastolic    COPD (chronic obstructive pulmonary disease) ([facility] [address]     Critical ischemia of lower extremity ([facility])     Depressive disorder, not elsewhere classified     GERD (gastroesophageal reflux disease)     History of colon polyps - 30 seen on colonoscopy 04/2021 04/24/2021    History of MI (myocardial infarction) 05/2013    History of skin ulcer of lower extremity     R anterior shin    History of transient ischemic attack (TIA)     Hx of blood clots     PE    Hyperlipidemia     Hypertension     Neuropathy     KAVITA (obstructive sleep apnea)     On CPAP    Personal history of DVT (deep vein thrombosis)     Prolonged emergence from general anesthesia     Pt reported being combative after surgery     PVD (peripheral vascular disease) ([facility] [address]     TIA (transient ischemic attack) 2013    Vertebral fracture        Past Surgical History:        Procedure Laterality Date    APPENDECTOMY      CHOLECYSTECTOMY, LAPAROSCOPIC      COLONOSCOPY  4/23/2021    COLONOSCOPY POLYPECTOMY SNARE/COLD BIOPSY performed by Claudette Furrow, MD at [address] [facility]  2021    COLONOSCOPY POLYPECTOMY ABLATION performed by Claudette Furrow, MD at [facility]  2021    COLONOSCOPY CONTROL HEMORRHAGE performed by Claudette Furrow, MD at \[facility]""  2013    EYE SURGERY Left     FEMORAL ENDARTERECTOMY Right 2021    RIGHT FEMORAL ENDARTERECTOMY  RIGHT EXTERNAL ILIAC TO PROFUNDA FEMORAL BYPASS WITH 8MM PTFE GRAFT RIGHT LOWER EXTREMITY ARTERIOGRAM BALLOON ANGIOPLASTY RIGHT PROFUNDA BALLOON ANGIOPLASTY AND STENT OF RIGHT EXTERNAL ILIAC ARTERY performed by Jamie Jordan MD at [facility]  2015    Bilateral decompressive lumbar laminectomy L4-5   ; medial facetectomy L4-5 joints  bilaterally; foraminotomies l5   nerve roots bilaterally    LEG DEBRIDEMENT Right 2013    Dr. Stoney Brown - pretibial wound    OTHER SURGICAL HISTORY Right 2013    Dr. Stoney Brown - CFA Endarterectomy w/marsupialization; RLE wound excision & debridement     OTHER SURGICAL HISTORY Left 2013    Dr. Stoney Brown - femoral & profunda femoris endarterectomy w/marsupialization patch angioplasty using SFA    SKIN SPLIT GRAFT Right 2013    Dr. Stoney Brown - to non-healing R pretibial wound, 9 x 15 cm    TOTAL HIP ARTHROPLASTY Right 2016    Dr. Gerson Pennington Left 2015    Dr. Stoney Brown - CFA exploration, thrombectomy of ileofemoral system, stenting of RAFAL in-stent stenosis w/8 x 37 mm Palmaz        Social History:    Social History     Tobacco Use    Smoking status: Former     Packs/day: 0.25     Years: 52.00     Pack years: 13.00     Types: Cigarettes     Start date: 1967     Quit date: 2022     Years since quittin.1    Smokeless tobacco: Never    Tobacco comments:     reports he quit circa 22   Substance Use Topics    Alcohol use:  Yes     Alcohol/week: 0.0 standard drinks     Comment: 2-3 beers a month                                Counseling given: Not Answered  Tobacco comments: reports he quit circa 6.8.22      Vital Signs (Current): There were no vitals filed for this visit. BP Readings from Last 3 Encounters:   07/18/22 123/71   07/11/22 (!) 145/77   07/07/22 (!) 140/69       NPO Status:                                                                                 BMI:   Wt Readings from Last 3 Encounters:   07/18/22 231 lb (104.8 kg)   07/07/22 231 lb (104.8 kg)   06/20/22 226 lb (102.5 kg)     There is no height or weight on file to calculate BMI.    CBC:   Lab Results   Component Value Date/Time    WBC 8.5 07/18/2022 06:28 AM    RBC 4.19 07/18/2022 06:28 AM    HGB 13.0 07/18/2022 06:28 AM    HCT 40.1 07/18/2022 06:28 AM    MCV 95.7 07/18/2022 06:28 AM    RDW 16.6 07/18/2022 06:28 AM     07/18/2022 06:28 AM       CMP:   Lab Results   Component Value Date/Time     06/06/2022 02:56 PM    K 4.7 06/06/2022 02:56 PM    K 4.0 11/30/2021 06:26 AM     06/06/2022 02:56 PM    CO2 23 06/06/2022 02:56 PM    BUN 17 06/06/2022 02:56 PM    CREATININE 0.9 06/06/2022 02:56 PM    GFRAA >60 06/06/2022 02:56 PM    AGRATIO 1.1 11/30/2021 06:26 AM    LABGLOM >60 06/06/2022 02:56 PM    GLUCOSE 104 06/06/2022 02:56 PM    PROT 7.2 11/30/2021 06:26 AM    CALCIUM 9.7 06/06/2022 02:56 PM    BILITOT 0.4 11/30/2021 06:26 AM    ALKPHOS 90 11/30/2021 06:26 AM    AST 19 11/30/2021 06:26 AM    ALT 14 11/30/2021 06:26 AM       POC Tests: No results for input(s): POCGLU, POCNA, POCK, POCCL, POCBUN, POCHEMO, POCHCT in the last 72 hours.     Coags:   Lab Results   Component Value Date/Time    PROTIME 14.9 07/18/2022 06:27 AM    INR 1.17 07/18/2022 06:27 AM    APTT 41.8 11/18/2021 10:00 AM       HCG (If Applicable): No results found for: PREGTESTUR, PREGSERUM, HCG, HCGQUANT     ABGs:   Lab Results   Component Value Date/Time    PHART 7.362 11/17/2021 03:38 PM    PO2ART 59.4 11/17/2021 03:38 PM    GHB3VDB 42.9 11/17/2021 03:38 PM    HTQ8BHR 24.4 11/17/2021 03:38 PM    BEART -1 11/17/2021 03:38 PM    P4KUGTXP 89 11/17/2021 03:38 PM        Type & Screen (If Applicable):  Lab Results   Component Value Date    LABABO B%POS^^POSITIVE 08/10/2016       Drug/Infectious Status (If Applicable):  No results found for: HIV, HEPCAB    COVID-19 Screening (If Applicable):   Lab Results   Component Value Date/Time    COVID19 Not Detected 04/19/2021 07:47 PM    COVID19 Not Detected 12/05/2020 06:39 PM           Anesthesia Evaluation  Patient summary reviewed no history of anesthetic complications:   Airway: Mallampati: IV  TM distance: >3 FB   Neck ROM: full  Mouth opening: > = 3 FB   Dental:    (+) edentulous and poor dentition  Comment: No teeth on top  Few teeth on bottom that appear broken    Pulmonary: breath sounds clear to auscultation  (+) COPD (6l oxygen at all times according to patient): severe,  sleep apnea: on noncompliant,      (-) not a current smoker (quit smoking 3-4 weeks ago, prior to that smoked a couple packs a day )                          ROS comment: Smokes 2 ppd up to a few months ago  On home O2  6 liters   Cardiovascular:    (+) hypertension:, past MI: no interval change, CAD:, CABG/stent (PTCA with stent ):, CHF (3 episodes of CHF this yr):, hyperlipidemia      ECG reviewed  Rhythm: regular  Rate: normal  Echocardiogram reviewed         Beta Blocker:  Dose within 24 Hrs      ROS comment:    Normal left ventricle size. There is mild to moderate concentric left   ventricular hypertrophy. Overall left ventricular systolic function appears   normal with an ejection fraction of 55-60%. No regional wall motion   abnormalities are noted. Diastolic filling parameters suggests normal   diastolic function. Estimated pulmonary artery systolic pressure is 39 mmHg assuming a right   atrial pressure of 8 mmHg.       Signature   PVD ------------------------------------------------------------------   Electronically signed by Taylor Landeros MD (Interpreting   physician) on 09/17/2021 at 03:43 PM   ------------------------------------------------------------------     Neuro/Psych:   (+) TIA (2013), depression/anxiety    (-) seizures            SUHA comment: Neuropathy and back pain  GI/Hepatic/Renal:   (+) GERD:,      (-) hepatitis and liver disease       Endo/Other:    (+) : arthritis (gout):., no malignancy/cancer. (-) diabetes mellitus, hypothyroidism, hyperthyroidism, no malignancy/cancer               Abdominal:             Vascular:   + PVD, aortic or cerebral, PE (12/2021). - DVT. ROS comment: Stopped eliquus on 7/15. Other Findings:             Anesthesia Plan      general     ASA 3       Induction: intravenous. arterial line  MIPS: Prophylactic antiemetics administered. Anesthetic plan and risks discussed with patient. Use of blood products discussed with patient whom consented to blood products. Plan discussed with CRNA.                     April Noble MD   7/18/2022

## 2022-07-18 NOTE — PROGRESS NOTES
Updated pt's son and pt's sister as to room number. Room is dirty, will monitor when clean. Turned pt and repositioned him, as well as called in a dinner tray request.  Pt denies pain and appears comfortable.

## 2022-07-18 NOTE — ANESTHESIA POSTPROCEDURE EVALUATION
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     Keith Phase I: Keith Score: 8    Keith Phase II:        Anesthesia Post Evaluation    Patient location during evaluation: PACU  Level of consciousness: awake and alert  Airway patency: patent  Nausea & Vomiting: no vomiting  Complications: no  Cardiovascular status: blood pressure returned to baseline  Respiratory status: acceptable  Hydration status: euvolemic  Multimodal analgesia pain management approach

## 2022-07-18 NOTE — PROGRESS NOTES
Pt received from OR to PACU # 17 via bed. Post:  R femoral lower knee popliteal artery bypass with situ spahenous vein     Report received from [facility] [place]. Per report Pt has doppler pulses to the RLE, , Aquacel to the right groin down to below the R popliteal, all in the inner portion of the leg. Lower leg with Coban and a Coflex. Respirations regular and easy. Pt is very drowsy. Attached to PACU monitoring system. Alarms and parameters set    Pain none noted and no complaints of nausea.

## 2022-07-19 LAB
ALBUMIN SERPL-MCNC: 3.1 G/DL (ref 3.4–5)
ANION GAP SERPL CALCULATED.3IONS-SCNC: 8 MMOL/L (ref 3–16)
BASOPHILS ABSOLUTE: 0.1 K/UL (ref 0–0.2)
BASOPHILS RELATIVE PERCENT: 1 %
BUN BLDV-MCNC: 22 MG/DL (ref 7–20)
CALCIUM SERPL-MCNC: 8 MG/DL (ref 8.3–10.6)
CHLORIDE BLD-SCNC: 106 MMOL/L (ref 99–110)
CO2: 25 MMOL/L (ref 21–32)
CREAT SERPL-MCNC: 1.1 MG/DL (ref 0.8–1.3)
EOSINOPHILS ABSOLUTE: 0.2 K/UL (ref 0–0.6)
EOSINOPHILS RELATIVE PERCENT: 2.1 %
GFR AFRICAN AMERICAN: >60
GFR NON-AFRICAN AMERICAN: >60
GLUCOSE BLD-MCNC: 126 MG/DL (ref 70–99)
HCT VFR BLD CALC: 31.3 % (ref 40.5–52.5)
HEMOGLOBIN: 10.3 G/DL (ref 13.5–17.5)
LYMPHOCYTES ABSOLUTE: 1.4 K/UL (ref 1–5.1)
LYMPHOCYTES RELATIVE PERCENT: 17 %
MAGNESIUM: 1.2 MG/DL (ref 1.8–2.4)
MCH RBC QN AUTO: 31.2 PG (ref 26–34)
MCHC RBC AUTO-ENTMCNC: 32.9 G/DL (ref 31–36)
MCV RBC AUTO: 95 FL (ref 80–100)
MONOCYTES ABSOLUTE: 0.5 K/UL (ref 0–1.3)
MONOCYTES RELATIVE PERCENT: 6.9 %
NEUTROPHILS ABSOLUTE: 5.8 K/UL (ref 1.7–7.7)
NEUTROPHILS RELATIVE PERCENT: 73 %
PDW BLD-RTO: 16.7 % (ref 12.4–15.4)
PHOSPHORUS: 3.6 MG/DL (ref 2.5–4.9)
PLATELET # BLD: 132 K/UL (ref 135–450)
PMV BLD AUTO: 9.4 FL (ref 5–10.5)
POTASSIUM SERPL-SCNC: 3.9 MMOL/L (ref 3.5–5.1)
RBC # BLD: 3.29 M/UL (ref 4.2–5.9)
SODIUM BLD-SCNC: 139 MMOL/L (ref 136–145)
WBC # BLD: 8 K/UL (ref 4–11)

## 2022-07-19 PROCEDURE — 2580000003 HC RX 258

## 2022-07-19 PROCEDURE — 85025 COMPLETE CBC W/AUTO DIFF WBC: CPT

## 2022-07-19 PROCEDURE — 2580000003 HC RX 258: Performed by: SURGERY

## 2022-07-19 PROCEDURE — 2700000000 HC OXYGEN THERAPY PER DAY

## 2022-07-19 PROCEDURE — 6360000002 HC RX W HCPCS

## 2022-07-19 PROCEDURE — 83735 ASSAY OF MAGNESIUM: CPT

## 2022-07-19 PROCEDURE — 6360000002 HC RX W HCPCS: Performed by: SURGERY

## 2022-07-19 PROCEDURE — 94761 N-INVAS EAR/PLS OXIMETRY MLT: CPT

## 2022-07-19 PROCEDURE — 94640 AIRWAY INHALATION TREATMENT: CPT

## 2022-07-19 PROCEDURE — 36415 COLL VENOUS BLD VENIPUNCTURE: CPT

## 2022-07-19 PROCEDURE — 6370000000 HC RX 637 (ALT 250 FOR IP): Performed by: STUDENT IN AN ORGANIZED HEALTH CARE EDUCATION/TRAINING PROGRAM

## 2022-07-19 PROCEDURE — 80069 RENAL FUNCTION PANEL: CPT

## 2022-07-19 PROCEDURE — 2000000000 HC ICU R&B

## 2022-07-19 PROCEDURE — 6370000000 HC RX 637 (ALT 250 FOR IP): Performed by: SURGERY

## 2022-07-19 RX ORDER — SODIUM CHLORIDE, SODIUM LACTATE, POTASSIUM CHLORIDE, AND CALCIUM CHLORIDE .6; .31; .03; .02 G/100ML; G/100ML; G/100ML; G/100ML
500 INJECTION, SOLUTION INTRAVENOUS ONCE
Status: COMPLETED | OUTPATIENT
Start: 2022-07-19 | End: 2022-07-19

## 2022-07-19 RX ORDER — CLOPIDOGREL BISULFATE 75 MG/1
75 TABLET ORAL DAILY
Status: DISCONTINUED | OUTPATIENT
Start: 2022-07-19 | End: 2022-07-25 | Stop reason: HOSPADM

## 2022-07-19 RX ADMIN — SODIUM CHLORIDE, PRESERVATIVE FREE 10 ML: 5 INJECTION INTRAVENOUS at 19:40

## 2022-07-19 RX ADMIN — PREGABALIN 150 MG: 150 CAPSULE ORAL at 09:04

## 2022-07-19 RX ADMIN — CLOPIDOGREL BISULFATE 75 MG: 75 TABLET ORAL at 09:04

## 2022-07-19 RX ADMIN — APIXABAN 5 MG: 5 TABLET, FILM COATED ORAL at 19:38

## 2022-07-19 RX ADMIN — ACETAMINOPHEN 1000 MG: 500 TABLET ORAL at 19:37

## 2022-07-19 RX ADMIN — ACETAMINOPHEN 1000 MG: 500 TABLET ORAL at 13:36

## 2022-07-19 RX ADMIN — APIXABAN 5 MG: 5 TABLET, FILM COATED ORAL at 09:04

## 2022-07-19 RX ADMIN — OXYCODONE 10 MG: 5 TABLET ORAL at 13:36

## 2022-07-19 RX ADMIN — OXYCODONE 10 MG: 5 TABLET ORAL at 23:11

## 2022-07-19 RX ADMIN — DULOXETINE HYDROCHLORIDE 20 MG: 20 CAPSULE, DELAYED RELEASE ORAL at 09:04

## 2022-07-19 RX ADMIN — ARFORMOTEROL TARTRATE 15 MCG: 15 SOLUTION RESPIRATORY (INHALATION) at 21:25

## 2022-07-19 RX ADMIN — SODIUM CHLORIDE, POTASSIUM CHLORIDE, SODIUM LACTATE AND CALCIUM CHLORIDE 500 ML: 600; 310; 30; 20 INJECTION, SOLUTION INTRAVENOUS at 18:15

## 2022-07-19 RX ADMIN — BUDESONIDE 500 MCG: 0.5 SUSPENSION RESPIRATORY (INHALATION) at 07:43

## 2022-07-19 RX ADMIN — ACETAMINOPHEN 1000 MG: 500 TABLET ORAL at 01:49

## 2022-07-19 RX ADMIN — ACETAMINOPHEN 1000 MG: 500 TABLET ORAL at 09:02

## 2022-07-19 RX ADMIN — OXYCODONE 10 MG: 5 TABLET ORAL at 09:04

## 2022-07-19 RX ADMIN — ARFORMOTEROL TARTRATE 15 MCG: 15 SOLUTION RESPIRATORY (INHALATION) at 07:43

## 2022-07-19 RX ADMIN — ATORVASTATIN CALCIUM 80 MG: 80 TABLET, FILM COATED ORAL at 09:04

## 2022-07-19 RX ADMIN — OXYCODONE 10 MG: 5 TABLET ORAL at 02:54

## 2022-07-19 RX ADMIN — SODIUM CHLORIDE, POTASSIUM CHLORIDE, SODIUM LACTATE AND CALCIUM CHLORIDE: 600; 310; 30; 20 INJECTION, SOLUTION INTRAVENOUS at 20:21

## 2022-07-19 RX ADMIN — SODIUM CHLORIDE, POTASSIUM CHLORIDE, SODIUM LACTATE AND CALCIUM CHLORIDE: 600; 310; 30; 20 INJECTION, SOLUTION INTRAVENOUS at 02:57

## 2022-07-19 RX ADMIN — SODIUM CHLORIDE, PRESERVATIVE FREE 10 ML: 5 INJECTION INTRAVENOUS at 09:11

## 2022-07-19 RX ADMIN — POLYETHYLENE GLYCOL 3350 17 G: 17 POWDER, FOR SOLUTION ORAL at 09:05

## 2022-07-19 RX ADMIN — BUDESONIDE 500 MCG: 0.5 SUSPENSION RESPIRATORY (INHALATION) at 21:25

## 2022-07-19 RX ADMIN — MAGNESIUM SULFATE HEPTAHYDRATE 6000 MG: 500 INJECTION, SOLUTION INTRAMUSCULAR; INTRAVENOUS at 09:02

## 2022-07-19 RX ADMIN — DOCUSATE SODIUM 100 MG: 100 CAPSULE, LIQUID FILLED ORAL at 19:38

## 2022-07-19 RX ADMIN — OXYCODONE 10 MG: 5 TABLET ORAL at 18:18

## 2022-07-19 RX ADMIN — PANTOPRAZOLE SODIUM 40 MG: 40 TABLET, DELAYED RELEASE ORAL at 05:46

## 2022-07-19 RX ADMIN — DOCUSATE SODIUM 100 MG: 100 CAPSULE, LIQUID FILLED ORAL at 09:04

## 2022-07-19 RX ADMIN — ALLOPURINOL 100 MG: 100 TABLET ORAL at 09:04

## 2022-07-19 RX ADMIN — METOPROLOL 100 MG: 100 TABLET ORAL at 20:23

## 2022-07-19 RX ADMIN — PREGABALIN 150 MG: 150 CAPSULE ORAL at 19:37

## 2022-07-19 ASSESSMENT — PAIN SCALES - WONG BAKER: WONGBAKER_NUMERICALRESPONSE: 0

## 2022-07-19 ASSESSMENT — PAIN DESCRIPTION - LOCATION
LOCATION: LEG
LOCATION: LEG;INCISION

## 2022-07-19 ASSESSMENT — PAIN - FUNCTIONAL ASSESSMENT
PAIN_FUNCTIONAL_ASSESSMENT: ACTIVITIES ARE NOT PREVENTED
PAIN_FUNCTIONAL_ASSESSMENT: PREVENTS OR INTERFERES SOME ACTIVE ACTIVITIES AND ADLS
PAIN_FUNCTIONAL_ASSESSMENT: PREVENTS OR INTERFERES SOME ACTIVE ACTIVITIES AND ADLS

## 2022-07-19 ASSESSMENT — PAIN SCALES - GENERAL
PAINLEVEL_OUTOF10: 8
PAINLEVEL_OUTOF10: 5
PAINLEVEL_OUTOF10: 8
PAINLEVEL_OUTOF10: 3
PAINLEVEL_OUTOF10: 7
PAINLEVEL_OUTOF10: 8
PAINLEVEL_OUTOF10: 8
PAINLEVEL_OUTOF10: 0
PAINLEVEL_OUTOF10: 6
PAINLEVEL_OUTOF10: 3
PAINLEVEL_OUTOF10: 8
PAINLEVEL_OUTOF10: 7

## 2022-07-19 ASSESSMENT — PAIN DESCRIPTION - DESCRIPTORS
DESCRIPTORS: ACHING

## 2022-07-19 ASSESSMENT — PAIN DESCRIPTION - ONSET
ONSET: ON-GOING
ONSET: ON-GOING

## 2022-07-19 ASSESSMENT — PAIN DESCRIPTION - FREQUENCY
FREQUENCY: CONTINUOUS
FREQUENCY: CONTINUOUS

## 2022-07-19 ASSESSMENT — PAIN DESCRIPTION - ORIENTATION
ORIENTATION: RIGHT

## 2022-07-19 ASSESSMENT — PAIN DESCRIPTION - PAIN TYPE
TYPE: SURGICAL PAIN
TYPE: SURGICAL PAIN

## 2022-07-19 NOTE — PROGRESS NOTES
[facility]  Progress Note and Procedure Note      Ailyn Peed. MEDICAL RECORD NUMBER:  [id number]  AGE: 81 y.o. GENDER: male  : 1950  EPISODE DATE:  2022    Subjective:     Chief Complaint   Patient presents with    Wound Check     right lower leg       HISTORY of PRESENT ILLNESS HPI   Ailyn Peed. is a 81 y.o. male who presents today for wound/ulcer evaluation. History of Wound Context: Continues to have pain in the right lower extremity with worsening ulceration. Also developing rest pain. 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 Once he has been cleared from pulmonology and cardiology aspects, will need to proceed with right leg bypass. Unable to perform debridement secondary to poor inflow.         PAST MEDICAL HISTORY      Diagnosis Date    CAD (coronary artery disease)     CHF (congestive heart failure) ([facility])     diastolic    COPD (chronic obstructive pulmonary disease) ([facility])     Critical ischemia of lower extremity ([facility])     Depressive disorder, not elsewhere classified     GERD (gastroesophageal reflux disease)     History of colon polyps - 30 seen on colonoscopy 2021    History of MI (myocardial infarction) 2013    History of skin ulcer of lower extremity     R anterior shin    History of transient ischemic attack (TIA)     Hx of blood clots     PE    Hyperlipidemia     Hypertension     Neuropathy     KAVITA (obstructive sleep apnea)     On CPAP    Personal history of DVT (deep vein thrombosis)     Prolonged emergence from general anesthesia     Pt reported being combative after surgery     PVD (peripheral vascular disease) ([facility])     TIA (transient ischemic attack)     Vertebral fracture      PAST SURGICAL HISTORY  Past Surgical History:   Procedure Laterality Date    APPENDECTOMY      CHOLECYSTECTOMY, LAPAROSCOPIC COLONOSCOPY  4/23/2021    COLONOSCOPY POLYPECTOMY SNARE/COLD BIOPSY performed by Isak Russell MD at [facility]  4/23/2021    COLONOSCOPY POLYPECTOMY ABLATION performed by Isak Russell MD at [facility]  4/23/2021    COLONOSCOPY CONTROL HEMORRHAGE performed by Isak Russell MD at [place] [address]  6/2013    EYE SURGERY Left     FEMORAL BYPASS Right 7/18/2022    RIGHT FEMORAL BELOW KNEE POPLITEAL ARTERY BYPASS WITH IN SITU SAPHENOUS VEIN performed by Lázaro Drake MD at [address] Right 11/17/2021    RIGHT FEMORAL ENDARTERECTOMY  RIGHT EXTERNAL ILIAC TO PROFUNDA FEMORAL BYPASS WITH 8MM PTFE GRAFT RIGHT LOWER EXTREMITY ARTERIOGRAM BALLOON ANGIOPLASTY RIGHT PROFUNDA BALLOON ANGIOPLASTY AND STENT OF RIGHT EXTERNAL ILIAC ARTERY performed by Lázaro Drake MD at [facility]  11/18/2015    Bilateral decompressive lumbar laminectomy L4-5   ; medial facetectomy L4-5 joints  bilaterally; foraminotomies l5   nerve roots bilaterally    LEG DEBRIDEMENT Right 7/23/2013    Dr. Darinel Angulo - pretibial wound    OTHER SURGICAL HISTORY Right 6/25/2013    Dr. Darinel Angulo - CFA Endarterectomy w/marsupialization; RLE wound excision & debridement     OTHER SURGICAL HISTORY Left 8/27/2013    Dr. Darinel Angulo - femoral & profunda femoris endarterectomy w/marsupialization patch angioplasty using SFA    SKIN SPLIT GRAFT Right 7/25/2013    Dr. Darinel Angulo - to non-healing R pretibial wound, 9 x 15 cm    TOTAL HIP ARTHROPLASTY Right 09/01/2016    Dr. Esha Lam Left 12/22/2015    Dr. Darinel Angulo - CFA exploration, thrombectomy of ileofemoral system, stenting of RAFAL in-stent stenosis w/8 x 37 mm [facility]      SOCIAL HISTORY  Social History     Tobacco Use    Smoking status: Former     Packs/day: 0.25     Years: 52.00     Pack years: 13.00     Types: Cigarettes     Start date: 1/1/1967     Quit date: 6/8/2022     Years since quittin.1    Smokeless tobacco: Never    Tobacco comments:     reports he quit circa 6.8.22   Substance Use Topics    Alcohol use: Yes     Alcohol/week: 0.0 standard drinks     Comment: 2-3 beers a month    Drug use: No     ALLERGIES  Allergies   Allergen Reactions    Asa [Aspirin] Other (See Comments)     Stomach ache    Daliresp [Roflumilast] Diarrhea and Other (See Comments)     Severe diarrhea and did not help     MEDICATIONS  No current facility-administered medications on file prior to encounter. Current Outpatient Medications on File Prior to Encounter   Medication Sig Dispense Refill    albuterol sulfate HFA (PROVENTIL;VENTOLIN;PROAIR) 108 (90 Base) MCG/ACT inhaler Inhale 2 puffs into the lungs every 6 hours as needed for Wheezing 18 g 2    atorvastatin (LIPITOR) 80 MG tablet Take 1 tablet by mouth daily 90 tablet 1    clopidogrel (PLAVIX) 75 MG tablet Take 1 tablet by mouth daily 30 tablet 3    fluticasone-umeclidin-vilant (TRELEGY ELLIPTA) 100-62.5-25 MCG/INH AEPB Inhale 1 puff into the lungs daily 28 each 11    losartan (COZAAR) 100 MG tablet TAKE 1 TABLET BY MOUTH EVERY DAY 90 tablet 1    metoprolol (LOPRESSOR) 100 MG tablet Take 1 tablet by mouth 2 times daily 270 tablet 1    pregabalin (LYRICA) 150 MG capsule Take 1 capsule by mouth 2 times daily for 30 days.  180 capsule 1    potassium chloride (MICRO-K) 10 MEQ extended release capsule 1 tablet 60 capsule 1    furosemide (LASIX) 40 MG tablet Take 1 tablet by mouth daily 90 tablet 3    allopurinol (ZYLOPRIM) 100 MG tablet Take 1 tablet by mouth daily 180 tablet 1    omeprazole (PRILOSEC) 40 MG delayed release capsule TAKE 1 CAPSULE BY MOUTH DAILY 30 capsule 3    Evolocumab 140 MG/ML SOAJ Inject 1 pen into the skin every 14 days 6 pen 3    DULoxetine HCl 40 MG CPEP TAKE 1 CAPSULE BY MOUTH DAILY      folic acid (FOLVITE) 1 MG tablet Take 1 tablet by mouth daily 30 tablet 3    nitroGLYCERIN (NITROSTAT) 0.4 MG SL tablet Place 1 tablet under the tongue every 5 minutes as needed for Chest pain (Patient not taking: No sig reported) 25 tablet 1    magnesium gluconate (MAGONATE) 500 MG tablet Take 500 mg by mouth daily. REVIEW OF SYSTEMS  Denies chest pain. He has chronic shortness of breath, on 6 L supplemental oxygen at home, particularly at night. No worsening of COPD symptoms. All other ROS is negative except for that which was today in the HPI. Last O1U if applicable:   Hemoglobin A1C   Date Value Ref Range Status   07/22/2020 6.1 See comment % Final     Comment:     Comment:  Diagnosis of Diabetes: > or = 6.5%  Increased risk of diabetes (Prediabetes): 5.7-6.4%  Glycemic Control: Nonpregnant Adults: <7.0%                    Pregnant: <6.0%           Objective:      BP (!) 156/74   Pulse 67   Temp 97.2 °F (36.2 °C) (Temporal)   Resp 16     Wt Readings from Last 3 Encounters:   07/18/22 231 lb (104.8 kg)   07/07/22 231 lb (104.8 kg)   06/20/22 226 lb (102.5 kg)       PHYSICAL EXAM  Please see flowsheet for wound measurements. Right leg swelling and inflammation surrounding the right anterior lower leg wound. Painful ulceration on the right medial malleolus. Nonpalpable pedal pulses, monophasic by Doppler. Assessment:      1. Non-pressure ulcer of right lower extremity with fat layer exposed ([address]    2. Open wound of right lower leg, subsequent encounter    3. Atherosclerosis of native artery of right lower extremity with rest pain ([address]    4.  PVD (peripheral vascular disease) ([address]      Wound 11/15/21 Leg Distal;Right #1 (Active)   Wound Image   06/20/22 1343   Wound Etiology Venous 05/02/22 1435   Dressing Status Old drainage noted 07/19/22 1200   Wound Cleansed Cleansed with saline 07/11/22 1310   Dressing/Treatment Alginate with Ag 07/11/22 1346   Wound Length (cm) 5.5 cm 07/11/22 1310   Wound Width (cm) 2.5 cm 07/11/22 1310   Wound Depth (cm) 0.1 cm 07/11/22 1310   Wound Surface Area (cm^2) 13.75 cm^2 07/11/22 1310   Change in Wound Size % (l*w) -472.92 07/11/22 1310   Wound Volume (cm^3) 1.375 cm^3 07/11/22 1310   Wound Healing % -473 07/11/22 1310   Post-Procedure Length (cm) 5.5 cm 07/11/22 1337   Post-Procedure Width (cm) 2.5 cm 07/11/22 1337   Post-Procedure Depth (cm) 0.1 cm 07/11/22 1337   Post-Procedure Surface Area (cm^2) 13.75 cm^2 07/11/22 1337   Post-Procedure Volume (cm^3) 1.375 cm^3 07/11/22 1337   Distance Tunneling (cm) 0 cm 07/11/22 1310   Tunneling Position ___ O'Clock 0 07/11/22 1310   Undermining Starts ___ O'Clock 0 07/11/22 1310   Undermining Ends___ O'Clock 0 07/11/22 1310   Undermining Maxium Distance (cm) 0 07/11/22 1310   Wound Assessment Slough 07/11/22 1310   Drainage Amount Moderate 07/11/22 1310   Drainage Description Brown 07/11/22 1310   Odor None 07/19/22 1200   Catrachita-wound Assessment Edematous 07/11/22 1310   Margins Defined edges 07/11/22 1310   Wound Thickness Description not for Pressure Injury Full thickness 07/11/22 1310   Number of days: 245       Wound 05/12/22 Ankle Right #2 (Active)   Wound Image   06/20/22 1343   Wound Etiology Venous 05/12/22 1000   Dressing Status Clean;Dry; Intact 07/19/22 1200   Wound Cleansed Cleansed with saline 07/11/22 1310   Dressing/Treatment Alginate with Ag 07/19/22 1200   Wound Length (cm) 0.5 cm 07/11/22 1310   Wound Width (cm) 0.5 cm 07/11/22 1310   Wound Depth (cm) 0.1 cm 07/11/22 1310   Wound Surface Area (cm^2) 0.25 cm^2 07/11/22 1310   Change in Wound Size % (l*w) -66.67 07/11/22 1310   Wound Volume (cm^3) 0.025 cm^3 07/11/22 1310   Wound Healing % -67 07/11/22 1310   Post-Procedure Length (cm) 0.5 cm 07/11/22 1337   Post-Procedure Width (cm) 0.5 cm 07/11/22 1337   Post-Procedure Depth (cm) 0.1 cm 07/11/22 1337   Post-Procedure Surface Area (cm^2) 0.25 cm^2 07/11/22 1337   Post-Procedure Volume (cm^3) 0.025 cm^3 07/11/22 1337   Distance Tunneling (cm) 0 cm 07/11/22 1310   Tunneling Position ___ O'Clock 0 07/11/22 1310   Undermining Starts ___ O'Clock 0 07/11/22 1310   Undermining Ends___ O'Clock 0 07/11/22 1310   Undermining Maxium Distance (cm) 0 07/11/22 1310   Wound Assessment Fibrin 07/11/22 1310   Drainage Amount None 07/19/22 1200   Drainage Description Other (Comment) 07/11/22 1310   Odor None 07/19/22 1200   Catrachita-wound Assessment Intact 07/19/22 1200   Margins Defined edges 07/11/22 1310   Wound Thickness Description not for Pressure Injury Full thickness 07/11/22 1310   Number of days: 68     Incision 07/18/22 Thigh Anterior;Proximal;Right (Active)   Dressing Status Intact; Other (Comment) 07/19/22 1200   Dressing/Treatment Other (comment) 07/19/22 1200   Drainage Amount Scant 07/19/22 1200   Drainage Description Serosanguinous 07/19/22 1200   Odor None 07/19/22 1200   Number of days: 1       Incision 07/18/22 Pretibial Right;Medial (Active)   Dressing Status Clean;Dry; Intact 07/19/22 1200   Dressing/Treatment Other (comment) 07/19/22 1200   Drainage Amount None 07/19/22 1200   Odor None 07/19/22 1200   Number of days: 1       Plan:   Once cleared by Pulmonology and Cardiology, will plan to proceed with right leg bypass  All questions were answered. Continued same wound care regimen for now. Treatment Note please see attached Discharge Instructions    New Medication(s) at this visit:   New Prescriptions    No medications on file       Other orders at this visit: No orders of the defined types were placed in this encounter. Smoking Cessation: Counseling given: Not Answered  Tobacco comments: reports he quit circa 6.8.22      Written patient dismissal instructions given to patient and signed by patient or POA. Discharge [address] [facility] Physician Orders and Discharge Instructions  [address] [place] [address] [address] [address] [facility]. [place]. [place]  Telephone: [phone]  NAME:  Dior Aguirre.   YOB: 1950  MEDICAL RECORD NUMBER:  [id number]  DATE:  6/20/2022    Return Appointment:  Return Appointment: With Dr. Jose Luis Almendarez  in  [facility])    Dr. Brandi Lay office will call you regarding scheduling for surgery    Schedule weekly for the rest of the month? No    [] Return Appointment for a Wound Assessment with the nurse on:     Future Appointments   Date Time Provider Orlando Colmenares   8/30/2022  9:45 AM Harley Camarena MD [facility]   10/13/2022  1:00 PM Giovani Rosenbaum MD [facility]   12/19/2022 11:00 AM DO JEFF Barry [place] - DYD     Orders Placed This Encounter   Procedures    Initiate Outpatient Wound Care Protocol       [address]: [address]  [phone]   F: [phone]  Medically necessary services: [x] Skilled Nursing [] PT (Eval & Treat) [] OT (Eval & Treat) [] Social Work [] Dietician  [] Other:    Wound care instructions: If you smoke we ask that you refrain from smoking. Smoking inhibits wounds from healing. When taking antibiotics take the entire prescription as ordered. Do not stop taking until medication is all gone unless otherwise instructed. Exercise as tolerated. Keep weight off wounds and reposition every 2 hours if applicable. Do not get wounds wet in the bath or shower unless otherwise instructed by your physician. If your wound is on your foot or leg, you may purchase a cast bag. Please ask at the pharmacy. Wash hands with soap and water prior to and after every dressing change. [x]Wash wounds with: 0.9% normal saline  [x]Catrachita wound Topical Treatments: Do not apply lotions, creams, or ointments to the skin around the wound bed unless directed as followed:   Apply around the wound: Nothing         [x]Wound Location: right lower leg   Apply Primary Dressing to wound: Oly  Secondary Dressing: 4X4 gauze pad , conforming gauze roll   Avoid contact of tape with skin if possible.   When to change Dressing: 3 times per week:Monday/Wednesday/Friday        [x] Edema Control:  Apply: Spandagrip/Medigrip on Right; Low compression 5-10 mm/Hg. They should be applied to affected leg(s) from mid foot to knee making sure to cover the heel      Elevate leg(s) above the level of the heart for 30 minutes 4-5 times a day and/or when sitting. Avoid prolonged standing in one place. Dietary:  Important dietary reminders:  1. Increase Protein intake (i.e. Lean meats, fish, eggs, legumes, and yogurt)  2. No added salt  3. If diabetic, follow a diabetic diet and check glucose prior to meals or as instructed by your physician. Dietary Supplements(Take twice a day unless instructed otherwise):  [] Jonathan Mandy  [] 30ml ProStat [] Richard Flow [] Ensure Max/Premier [] Other:    Your nurse  is:  Vladislav Crystal     Electronically signed by Serene Jeffery RN on 6/20/2022 at 2:00 PM     Er[address]: Should you experience any significant changes in your wound(s) or have questions about your wound care, please contact the [address] [place] [address] at [phone]. We are open from 8:00am - 4:30p Monday, Thursday and Friday; 11:00am - 5pm on Tuesday and CLOSED Wednesday. Please give us 24-48 business hours to return your call. Call your doctor now or seek immediate medical care if:    You have symptoms of infection, such as: Increased pain, swelling, warmth, or redness. Red streaks leading from the area. Pus draining from the area. A fever.         Physician for this visit and orders: Felix Prescott MD    [] Patient unable to sign Discharge Instructions given to ECF/Transportation/POA            Electronically signed by Felix Prescott MD on 7/19/2022 at 3:20 PM

## 2022-07-19 NOTE — PROGRESS NOTES
Point of Care Note:  Vascular Surgery  Nick Rahman.    10:55 PM  7/18/2022     Senior and ezekiel resident re-checked patient's vascular status using Doppler with the following findings:  L leg: Dopplerable AT, non-Dopplerable PT. Palpable femoral artery. R leg: Dopplerable DP and PT. Palpable femoral artery. Encouraged to use incentive spirometer to wean off oxygen.       Zurdo Ojeda DO, [address] [facility]  PGY1, General Surgery  07/18/22  11:01 PM  [employer]  [phone]

## 2022-07-19 NOTE — OP NOTE
Operative Note      Patient: Sherron Mansfield. 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, unspecified ulceration site ([facility] [I70.239]    Post-Op Diagnosis: Same       Procedure(s):  RIGHT FEMORAL BELOW KNEE POPLITEAL ARTERY BYPASS WITH IN SITU SAPHENOUS VEIN    Surgeon(s):  Gus Champion MD    Assistant:   Resident: Binh Lomas MD    Anesthesia: General    Estimated Blood Loss (mL): 747    Complications: None    Specimens:   * No specimens in log *    Implants:  * No implants in log *      Drains:   Urinary Catheter Sampson (Active)   Catheter Indications Perioperative use for selected surgical procedures 07/19/22 1200   Site Assessment No urethral drainage 07/19/22 1200   Urine Color Yellow 07/19/22 1200   Urine Appearance Clear 07/19/22 1200   Collection Container Standard 07/19/22 1200   Securement Method Securing device (Describe) 07/19/22 1200   Status Draining 07/19/22 1200   Output (mL) 125 mL 07/19/22 0800       Findings: Two vessel runoff to foot by posterior tibial and peroneal (dominant) arteries. The peroneal artery reconstitutes the AT distally. Detailed Description of Procedure: The patient was taken to the operating room and placed in supine position prepped and draped in the usual sterile fashion using Betadine solution. Timeout was called and patient operative site were appropriately identified. He was given IV antibiotics prior to the incision. Incision was made through the previous right femoral incision in the standard fashion. Tissue was dissected down through the skin and subcutaneous tissue with electrocautery. The patient has previously had 3 femoral dissections, including femoral endarterectomies by Dr. Steph Key as well as prior distal external iliac to profunda femoral artery bypass by myself.   Because of the prior dissections, there was exquisitely scarred tissue. This included scarring around the greater saphenous vein. The dense scarring created some difficulty with dissection, adding approximately 1 hour and 30 minutes additional to the case. Eventually, the prior external iliac artery to profunda femoral artery bypass graft was identified. The capsule surrounding this was sharply dissected away with combination of 15 blade scalpel and Metzenbaum scissors. It was dissected down to the anastomosis with the profunda femoral artery. The graft was looped with a vessel loop. Medially, the greater saphenous vein was identified and there were 3 large trunks that came to a confluence at the saphenofemoral junction. Again, there was dense scarring surrounding this and several small rents in the saphenous vein were repaired with 7-0 Prolene suture. Eventually, the main trunk was dissected back to the saphenofemoral junction and eventually controlled with a Satinsky clamp and transected. The stump of the saphenofemoral junction was oversewn with a 4-0 Prolene suture. The proximal end of the graft conduit was then irrigated with heparinized saline solution and gently dilated. The proximal valves were lysed under direct vision with tenotomy scissors. The patient was systemically heparinized with 7000 units of IV heparin. He received a total of 9000 units throughout the case. Following appropriate circulation time, the previously placed femoral graft was proximally distally controlled and a 1.5 cm longitudinal arteriotomy was performed. The saphenous vein conduit was spatulated and anastomosed using 6-0 Prolene suture in the standard running fashion. All bleeding points were controlled. Prior to performing the proximal anastomosis, the popliteal artery was exposed below the knee. A 10 cm longitudinal incision was made in the medial calf just inferior to the medial border of the tibia.   The saphenous vein was first identified, looped with a vessel loop and protected throughout the dissection. The gastrocnemius muscle was retracted inferiorly and the semitendinosus and Semimembrinous muscles were divided with cautery. The popliteal space was entered. The popliteal vein was retracted medially, exposing the popliteal artery which was looped with a vessel loop. The great saphenous vein conduit was then transected and the distal portion of the Incision. A LeMaitre valvulotome was passed a total of 3 times proximally, lysing valves and producing brisk pulsatile inflow. The popliteal artery was then proximally distally controlled with titanium clamps and a 1.5 cm arteriotomy was performed. The distal portion of the saphenous vein conduit was spatulated and anastomosed using 7-0 Prolene suture in standard running fashion. All bleeding points were controlled. Using a hand-held ultrasound, the vein graft was evaluated and all side branches were marked. There was a total of 5 large branches in the proximal mid and distal thigh. Over each of these branches, a small 1 to 2 inch incision was made and the branches were identified and clipped with titanium clips. The bypass graft was accessed in the groin incision, using a 4 Samoan introducer sheath through which right lower extremity arteriogram was performed, revealing two-vessel runoff to the foot by the peroneal and posterior tibial arteries. The peroneal artery appeared to be dominant and provided reconstitution of the distal anterior tibial artery. All wounds were copiously irrigated with antibiotic saline solution and closed with interrupted 2-0 Vicryl suture in the subcutaneous layer followed closure of skin with staples. Sterile dressings were applied. He was taken to the recovery room stable condition. Needle sponge and instrument counts were correct.     Electronically signed by Shannon Francis MD on 7/19/2022 at 2:46 PM

## 2022-07-19 NOTE — PROGRESS NOTES
Sampson catheter removed at 1100 per physician order without complication. Patient tolerated well, urinal at bedside.

## 2022-07-19 NOTE — PROGRESS NOTES
4 Eyes Admission Assessment     I agree as the admission nurse that 2 RN's have performed a thorough Head to Toe Skin Assessment on the patient. ALL assessment sites listed below have been assessed on admission. Areas assessed by both nurses:   [x]   Head, Face, and Ears   [x]   Shoulders, Back, and Chest  [x]   Arms, Elbows, and Hands   [x]   Coccyx, Sacrum, and Ischium  [x]   Legs, Feet, and Heels        Does the Patient have Skin Breakdown?   Yes a wound was noted on the Admission Assessment and an LDA was Initiated documentation include the Catrachita-wound, Wound Assessment, Measurements, Dressing Treatment, Drainage, and Color\",  Surgical incision        Isidro Prevention initiated:  No   Wound Care Orders initiated:  No      WOC nurse consulted for Pressure Injury (Stage 3,4, Unstageable, DTI, NWPT, and Complex wounds) or Isidro score 18 or lower:  No      Nurse 1 eSignature: Electronically signed by Shreyas Francois RN on 7/18/22 at 9:36 PM EDT    **SHARE this note so that the co-signing nurse is able to place an eSignature**    Nurse 2 eSignature: Electronically signed by Rachel Sotelo RN on 7/18/22 at 9:37 PM EDT

## 2022-07-19 NOTE — PROGRESS NOTES
Pt states he typically wear 6 L O2 at nighttime. Denies use of his CPAP. Switched to HFNC at 6 L. RT aware. Currently satting 96%.

## 2022-07-19 NOTE — PROGRESS NOTES
Pt admitted to 4505 from PACU. Alert and orientedx4. 9/10 R leg surgical pain. Doppler pulses on R leg post tibial and pedal. No pedal pulse per doppler on L foot but ant and post tibial found per doppler. Surgery aware. L foot is pink and cool. Decreased sensation and tingling noted on BLE. Limited movement. VSS. On 4L NC. Will cont close neurovascular checks.

## 2022-07-19 NOTE — PROGRESS NOTES
Vascular Surgery   Daily Progress Note  Patient: Karen Cabello.      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 Tolerating regular diet without N/V. No acute complaints this AM. Sitting on the chair. Pain is well-controlled. Sampson in place. A-line removed overnight due to dysfunction. ROS:   A 14 point review of systems was conducted, significant findings as noted above. All other systems negative. OBJECTIVE:    PHYSICAL EXAM:    Vitals:    07/19/22 0400 07/19/22 0500 07/19/22 0532 07/19/22 0600   BP: 105/61 (!) 99/58 (!) 104/58 (!) 123/57   Pulse: 66 61 59 64   Resp: 20 17 18 16   Temp: 98.2 °F (36.8 °C)      TempSrc: Oral      SpO2: (!) 88% 98% 97% 97%   Weight:       Height:           General Appearance: Alert, no acute distress  Neuro: A&Ox3, no focal deficits, sensation intact  Chest/Lungs: Normal effort without accessory muscle use, on 6 L HFNC  Cardiovascular: RRR  Abdomen: soft, nontender, nondistended  : Sampson in place draining clear, yellow urine  Extremities: no edema, no cyanosis; RLE wrapped with aqaquacel Ag dressing in place, foot wrapped stocking, no strikethrough noted; warm to touch    Pulses    R F P PT DP   R + + + -/+ -/+   L + + + -/- -/+    +, -/+ ,-/-     -/+ not palpable with signals  -/- not palpable without signals    LABS:   Recent Labs     07/18/22 0628 07/19/22 0327   WBC 8.5 8.0   HGB 13.0* 10.3*   HCT 40.1* 31.3*   MCV 95.7 95.0    132*        Recent Labs     07/18/22 0628 07/19/22  0327    139   K 4.4 3.9    106   CO2 25 25   PHOS  --  3.6   BUN 27* 22*   CREATININE 1.3 1.1      No results for input(s): AST, ALT, ALB, BILIDIR, BILITOT, ALKPHOS in the last 72 hours. No results for input(s): LIPASE, AMYLASE in the last 72 hours.      Recent Labs     07/18/22  0627   INR 1.17*      No results for input(s): CKTOTAL, CKMB, CKMBINDEX, TROPONINI in the last 72

## 2022-07-19 NOTE — DISCHARGE INSTRUCTIONS
Extra Heart Failure sites:     https://[employer].com/ --- this is American Heart Association interactive Healthier Living with Heart Failure guidebook. Please copy and paste link into search bar. Use your mouse to scroll through the pages. Lots and lots of info / tips    [address] --- free smart phone kike- (icon will look like a lopsided pink heart) --Use your phone to track sodium / fluid intake,2 symptoms, weight, etc.    [employer] (formerly known as [employer]) - website-- [employer] is a dialysis Easy-Point. All dialysis patients follow a renal diet which IS low sodium!! This website offers free seasonal cookbooks.  Each quarter, they will release 25-30 new recipes with a breakdown of calories, sodium, glucose, etc    [email or web address]/recipes -- more free recipes

## 2022-07-20 LAB
ALBUMIN SERPL-MCNC: 1.9 G/DL (ref 3.4–5)
ALBUMIN SERPL-MCNC: 3.4 G/DL (ref 3.4–5)
ANION GAP SERPL CALCULATED.3IONS-SCNC: 11 MMOL/L (ref 3–16)
ANION GAP SERPL CALCULATED.3IONS-SCNC: 6 MMOL/L (ref 3–16)
BASOPHILS ABSOLUTE: 0.1 K/UL (ref 0–0.2)
BASOPHILS RELATIVE PERCENT: 1.2 %
BUN BLDV-MCNC: 15 MG/DL (ref 7–20)
BUN BLDV-MCNC: 17 MG/DL (ref 7–20)
CALCIUM SERPL-MCNC: 7.9 MG/DL (ref 8.3–10.6)
CALCIUM SERPL-MCNC: 8.3 MG/DL (ref 8.3–10.6)
CHLORIDE BLD-SCNC: 104 MMOL/L (ref 99–110)
CHLORIDE BLD-SCNC: 106 MMOL/L (ref 99–110)
CO2: 16 MMOL/L (ref 21–32)
CO2: 29 MMOL/L (ref 21–32)
CREAT SERPL-MCNC: 0.8 MG/DL (ref 0.8–1.3)
CREAT SERPL-MCNC: 1.1 MG/DL (ref 0.8–1.3)
EOSINOPHILS ABSOLUTE: 0.2 K/UL (ref 0–0.6)
EOSINOPHILS RELATIVE PERCENT: 2.9 %
GFR AFRICAN AMERICAN: >60
GFR AFRICAN AMERICAN: >60
GFR NON-AFRICAN AMERICAN: >60
GFR NON-AFRICAN AMERICAN: >60
GLUCOSE BLD-MCNC: 147 MG/DL (ref 70–99)
GLUCOSE BLD-MCNC: 96 MG/DL (ref 70–99)
HCT VFR BLD CALC: 30.4 % (ref 40.5–52.5)
HEMOGLOBIN: 10.2 G/DL (ref 13.5–17.5)
LYMPHOCYTES ABSOLUTE: 1.3 K/UL (ref 1–5.1)
LYMPHOCYTES RELATIVE PERCENT: 17.3 %
MAGNESIUM: 1.6 MG/DL (ref 1.8–2.4)
MAGNESIUM: 2 MG/DL (ref 1.8–2.4)
MCH RBC QN AUTO: 32.2 PG (ref 26–34)
MCHC RBC AUTO-ENTMCNC: 33.7 G/DL (ref 31–36)
MCV RBC AUTO: 95.6 FL (ref 80–100)
MONOCYTES ABSOLUTE: 0.5 K/UL (ref 0–1.3)
MONOCYTES RELATIVE PERCENT: 6.8 %
NEUTROPHILS ABSOLUTE: 5.4 K/UL (ref 1.7–7.7)
NEUTROPHILS RELATIVE PERCENT: 71.8 %
PDW BLD-RTO: 16.7 % (ref 12.4–15.4)
PHOSPHORUS: 3.3 MG/DL (ref 2.5–4.9)
PHOSPHORUS: 3.5 MG/DL (ref 2.5–4.9)
PLATELET # BLD: 139 K/UL (ref 135–450)
PMV BLD AUTO: 9.1 FL (ref 5–10.5)
POTASSIUM SERPL-SCNC: 4.2 MMOL/L (ref 3.5–5.1)
POTASSIUM SERPL-SCNC: 4.6 MMOL/L (ref 3.5–5.1)
RBC # BLD: 3.18 M/UL (ref 4.2–5.9)
REASON FOR REJECTION: NORMAL
REJECTED TEST: NORMAL
SODIUM BLD-SCNC: 133 MMOL/L (ref 136–145)
SODIUM BLD-SCNC: 139 MMOL/L (ref 136–145)
WBC # BLD: 7.5 K/UL (ref 4–11)

## 2022-07-20 PROCEDURE — 85025 COMPLETE CBC W/AUTO DIFF WBC: CPT

## 2022-07-20 PROCEDURE — 97162 PT EVAL MOD COMPLEX 30 MIN: CPT

## 2022-07-20 PROCEDURE — 2060000000 HC ICU INTERMEDIATE R&B

## 2022-07-20 PROCEDURE — 97166 OT EVAL MOD COMPLEX 45 MIN: CPT

## 2022-07-20 PROCEDURE — 6360000002 HC RX W HCPCS

## 2022-07-20 PROCEDURE — 2700000000 HC OXYGEN THERAPY PER DAY

## 2022-07-20 PROCEDURE — 94760 N-INVAS EAR/PLS OXIMETRY 1: CPT

## 2022-07-20 PROCEDURE — 80069 RENAL FUNCTION PANEL: CPT

## 2022-07-20 PROCEDURE — 36415 COLL VENOUS BLD VENIPUNCTURE: CPT

## 2022-07-20 PROCEDURE — 1200000000 HC SEMI PRIVATE

## 2022-07-20 PROCEDURE — 97530 THERAPEUTIC ACTIVITIES: CPT

## 2022-07-20 PROCEDURE — 94060 EVALUATION OF WHEEZING: CPT

## 2022-07-20 PROCEDURE — 97535 SELF CARE MNGMENT TRAINING: CPT

## 2022-07-20 PROCEDURE — 94640 AIRWAY INHALATION TREATMENT: CPT

## 2022-07-20 PROCEDURE — 94729 DIFFUSING CAPACITY: CPT

## 2022-07-20 PROCEDURE — 94150 VITAL CAPACITY TEST: CPT

## 2022-07-20 PROCEDURE — 94664 DEMO&/EVAL PT USE INHALER: CPT

## 2022-07-20 PROCEDURE — 6370000000 HC RX 637 (ALT 250 FOR IP): Performed by: STUDENT IN AN ORGANIZED HEALTH CARE EDUCATION/TRAINING PROGRAM

## 2022-07-20 PROCEDURE — 97116 GAIT TRAINING THERAPY: CPT

## 2022-07-20 PROCEDURE — 51798 US URINE CAPACITY MEASURE: CPT

## 2022-07-20 PROCEDURE — 83735 ASSAY OF MAGNESIUM: CPT

## 2022-07-20 PROCEDURE — 2580000003 HC RX 258: Performed by: SURGERY

## 2022-07-20 PROCEDURE — 6370000000 HC RX 637 (ALT 250 FOR IP): Performed by: SURGERY

## 2022-07-20 PROCEDURE — 6360000002 HC RX W HCPCS: Performed by: SURGERY

## 2022-07-20 PROCEDURE — 94761 N-INVAS EAR/PLS OXIMETRY MLT: CPT

## 2022-07-20 RX ORDER — FUROSEMIDE 10 MG/ML
20 INJECTION INTRAMUSCULAR; INTRAVENOUS ONCE
Status: COMPLETED | OUTPATIENT
Start: 2022-07-20 | End: 2022-07-20

## 2022-07-20 RX ADMIN — SODIUM CHLORIDE, PRESERVATIVE FREE 10 ML: 5 INJECTION INTRAVENOUS at 09:17

## 2022-07-20 RX ADMIN — DOCUSATE SODIUM 100 MG: 100 CAPSULE, LIQUID FILLED ORAL at 19:52

## 2022-07-20 RX ADMIN — PREGABALIN 150 MG: 150 CAPSULE ORAL at 19:52

## 2022-07-20 RX ADMIN — OXYCODONE 10 MG: 5 TABLET ORAL at 19:52

## 2022-07-20 RX ADMIN — ACETAMINOPHEN 1000 MG: 500 TABLET ORAL at 02:24

## 2022-07-20 RX ADMIN — METOPROLOL 100 MG: 100 TABLET ORAL at 08:59

## 2022-07-20 RX ADMIN — APIXABAN 5 MG: 5 TABLET, FILM COATED ORAL at 19:52

## 2022-07-20 RX ADMIN — PREGABALIN 150 MG: 150 CAPSULE ORAL at 09:01

## 2022-07-20 RX ADMIN — BUDESONIDE 500 MCG: 0.5 SUSPENSION RESPIRATORY (INHALATION) at 20:54

## 2022-07-20 RX ADMIN — METOPROLOL 100 MG: 100 TABLET ORAL at 19:53

## 2022-07-20 RX ADMIN — FUROSEMIDE 20 MG: 10 INJECTION, SOLUTION INTRAMUSCULAR; INTRAVENOUS at 16:50

## 2022-07-20 RX ADMIN — DOCUSATE SODIUM 100 MG: 100 CAPSULE, LIQUID FILLED ORAL at 08:59

## 2022-07-20 RX ADMIN — PANTOPRAZOLE SODIUM 40 MG: 40 TABLET, DELAYED RELEASE ORAL at 05:06

## 2022-07-20 RX ADMIN — ACETAMINOPHEN 1000 MG: 500 TABLET ORAL at 19:52

## 2022-07-20 RX ADMIN — OXYCODONE 5 MG: 5 TABLET ORAL at 13:00

## 2022-07-20 RX ADMIN — ACETAMINOPHEN 1000 MG: 500 TABLET ORAL at 09:00

## 2022-07-20 RX ADMIN — DULOXETINE HYDROCHLORIDE 20 MG: 20 CAPSULE, DELAYED RELEASE ORAL at 08:59

## 2022-07-20 RX ADMIN — APIXABAN 5 MG: 5 TABLET, FILM COATED ORAL at 08:59

## 2022-07-20 RX ADMIN — SODIUM CHLORIDE, POTASSIUM CHLORIDE, SODIUM LACTATE AND CALCIUM CHLORIDE: 600; 310; 30; 20 INJECTION, SOLUTION INTRAVENOUS at 04:54

## 2022-07-20 RX ADMIN — ARFORMOTEROL TARTRATE 15 MCG: 15 SOLUTION RESPIRATORY (INHALATION) at 20:54

## 2022-07-20 RX ADMIN — CLOPIDOGREL BISULFATE 75 MG: 75 TABLET ORAL at 08:59

## 2022-07-20 RX ADMIN — OXYCODONE 10 MG: 5 TABLET ORAL at 05:05

## 2022-07-20 RX ADMIN — ALLOPURINOL 100 MG: 100 TABLET ORAL at 08:59

## 2022-07-20 RX ADMIN — SODIUM CHLORIDE, PRESERVATIVE FREE 10 ML: 5 INJECTION INTRAVENOUS at 19:53

## 2022-07-20 RX ADMIN — LOSARTAN POTASSIUM 100 MG: 100 TABLET, FILM COATED ORAL at 08:59

## 2022-07-20 RX ADMIN — BUDESONIDE 500 MCG: 0.5 SUSPENSION RESPIRATORY (INHALATION) at 09:06

## 2022-07-20 RX ADMIN — ARFORMOTEROL TARTRATE 15 MCG: 15 SOLUTION RESPIRATORY (INHALATION) at 09:06

## 2022-07-20 RX ADMIN — OXYCODONE 10 MG: 5 TABLET ORAL at 08:59

## 2022-07-20 RX ADMIN — ATORVASTATIN CALCIUM 80 MG: 80 TABLET, FILM COATED ORAL at 08:59

## 2022-07-20 ASSESSMENT — PAIN - FUNCTIONAL ASSESSMENT
PAIN_FUNCTIONAL_ASSESSMENT: PREVENTS OR INTERFERES SOME ACTIVE ACTIVITIES AND ADLS

## 2022-07-20 ASSESSMENT — PAIN DESCRIPTION - LOCATION
LOCATION: LEG

## 2022-07-20 ASSESSMENT — PAIN SCALES - GENERAL
PAINLEVEL_OUTOF10: 7
PAINLEVEL_OUTOF10: 5
PAINLEVEL_OUTOF10: 6
PAINLEVEL_OUTOF10: 4
PAINLEVEL_OUTOF10: 8
PAINLEVEL_OUTOF10: 7
PAINLEVEL_OUTOF10: 4
PAINLEVEL_OUTOF10: 7

## 2022-07-20 ASSESSMENT — PAIN DESCRIPTION - ONSET
ONSET: ON-GOING

## 2022-07-20 ASSESSMENT — PAIN SCALES - WONG BAKER: WONGBAKER_NUMERICALRESPONSE: 0

## 2022-07-20 ASSESSMENT — PAIN DESCRIPTION - DESCRIPTORS
DESCRIPTORS: ACHING

## 2022-07-20 ASSESSMENT — PAIN DESCRIPTION - ORIENTATION
ORIENTATION: RIGHT

## 2022-07-20 ASSESSMENT — PAIN DESCRIPTION - PAIN TYPE
TYPE: SURGICAL PAIN

## 2022-07-20 ASSESSMENT — PAIN DESCRIPTION - FREQUENCY
FREQUENCY: CONTINUOUS

## 2022-07-20 NOTE — PROGRESS NOTES
Physical Therapy  Facility/Department: [facility] ICU  Physical Therapy Initial Assessment    Name: Rivera Baker : 1950  MRN: [id number]  Date of Service: 2022    Discharge Recommendations:Kong Cohen. scored a 17/24 on the AM-PAC short mobility form. Current research shows that an AM-PAC score of 17 or less is typically not associated with a discharge to the patient's home setting. Based on the patient's AM-PAC score and their current functional mobility deficits, it is recommended that the patient have 3-5 sessions per week of Physical Therapy at d/c to increase the patient's independence. Please see assessment section for further patient specific details. If patient discharges prior to next session this note will serve as a discharge summary. Please see below for the latest assessment towards goals. PT Equipment Recommendations  Equipment Needed: No      Patient Diagnosis(es): There were no encounter diagnoses. Past Medical History:  has a past medical history of CAD (coronary artery disease), CHF (congestive heart failure) ([address] COPD (chronic obstructive pulmonary disease) ([address] Critical ischemia of lower extremity ([address] Depressive disorder, not elsewhere classified, GERD (gastroesophageal reflux disease), History of colon polyps - 30 seen on colonoscopy 2021, History of MI (myocardial infarction), History of skin ulcer of lower extremity, History of transient ischemic attack (TIA), Hx of blood clots, Hyperlipidemia, Hypertension, Neuropathy, KAVITA (obstructive sleep apnea), Personal history of DVT (deep vein thrombosis), Prolonged emergence from general anesthesia, PVD (peripheral vascular disease) ([address] TIA (transient ischemic attack), and Vertebral fracture. Past Surgical History:  has a past surgical history that includes Appendectomy; Coronary angioplasty with stent (2013); other surgical history (Right, 2013);  Leg Debridement (Right, 2013); skin split graft (Right, 7/25/2013); other surgical history (Left, 8/27/2013); laminectomy (11/18/2015); transluminal angioplasty (Left, 12/22/2015); Total hip arthroplasty (Right, 09/01/2016); Cholecystectomy, laparoscopic; eye surgery (Left); Colonoscopy (4/23/2021); Colonoscopy (4/23/2021); Colonoscopy (4/23/2021); Femoral Endarterectomy (Right, 11/17/2021); and femoral bypass (Right, 7/18/2022). Assessment   Assessment: 82 yo admitted 7/18/22 for R fem-pop bypass. Pt demo mobility below his reported baseline of independent at home with cane. Pt plans to return home at discharge. If home, recommend 24 hour assist initially, home PT, use of RW at all times. Pt may benefit from continued skilled IP PT at discharge to maximize his functional independence. Treatment Diagnosis: mobility impairment due to R fem-pop bypass  Decision Making: Medium Complexity  Requires PT Follow-Up: Yes  Activity Tolerance  Activity Tolerance: Patient limited by fatigue;Patient limited by pain; Patient limited by endurance     Plan   Plan  Plan:  (2-5)  Current Treatment Recommendations: Functional mobility training, Transfer training, Gait training  Safety Devices  Type of Devices: Nurse notified, Chair alarm in place, Call light within reach, Left in chair     Restrictions  Position Activity Restriction  Other position/activity restrictions: up as tolerated     Subjective   General  Chart Reviewed: Yes  Additional Pertinent Hx: 81y.o. 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 Pmhx: HTN, COPD. Family / Caregiver Present: No  Diagnosis: R fem-pop bypass  Follows Commands: Within Functional Limits  Subjective  Subjective: Pt found up in chair and agreeable to PT. Pt rates R LE pain 8/10, RN aware.          Social/Functional History  Social/Functional History  Lives With: Alone  Type of Home: Apartment  Home Access: Stairs to enter with rails  Entrance Stairs - Number of Steps: 3 steps to enter building; then 4 steps w/ rail down to basement level apt  Bathroom Shower/Tub: Tub/Shower unit  Bathroom Toilet: Handicap height (sink close)  [employer]: Grab bars in shower, Hand-held shower, Tub transfer bench  Home Equipment: Sock aid, Reacher  Has the patient had two or more falls in the past year or any fall with injury in the past year?: No  ADL Assistance: [address]: Independent (has been trying to set up [facility] through 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  Leisure & Hobbies: watch TV    [address]: Impaired  Vision Exceptions: Wears glasses for reading  Hearing  Hearing: Exceptions to [facility]  Hearing Exceptions: Hard of hearing/hearing concerns      Cognition   Orientation  Overall Orientation Status: Within Functional Limits  Cognition  Overall Cognitive Status: WFL     Objective                 AROM RLE (degrees)  RLE AROM:  (hip/knee flexion to 90 degrees; ankle WFL)  AROM LLE (degrees)  LLE AROM : WFL    Strength RLE  Strength RLE:  (hip/knee 3+/5 grossly throughout)  Strength LLE  Strength LLE: WFL           Bed mobility  Supine to Sit: Stand by assistance  Sit to Supine: Stand by assistance  Scooting: Stand by assistance    Transfers  Sit to Stand: Contact guard assistance (x3 trials with occ vc for hand placement)  Stand to sit: Contact guard assistance (x3 trials with occ vc for hand placement)    Ambulation  Device: Rolling Walker  Other Apparatus: O2 (6L)  Assistance: Contact guard assistance  Quality of Gait: forward posture with step to gait pattern leading R; pt tolerated PWB R due to pain; no overt LOB noted; slow star  Distance: 10 ft , 15 ft, 5 ft  Comments: Pt FRANCO on 6L 02 with sp02 88-89% throughout (difficult getting accurate sp02 reading-RN aware); pt rated R LE pain 10/10 after ambulation and RN aware  Stairs/Curb  Stairs? :  (pt too fatigued to attempt this date)         AM-PAC Score  AM-PAC Inpatient Mobility Raw Score : 17 (07/20/22 0932)  AM-PAC Inpatient T-Scale Score : 42.13 (07/20/22 0932)  Mobility Inpatient CMS 0-100% Score: 50.57 (07/20/22 0932)  Mobility Inpatient CMS G-Code Modifier : CK (07/20/22 0932)          Goals  Short Term Goals  Time Frame for Short term goals: discharge  Short term goal 1: sit to/from supine mod I  Short term goal 2: sit to/from stand mod I  Short term goal 3: ambulate 100 ft with RW supervision  Patient Goals   Patient goals : return home       Education  Patient Education  Education Given To: Patient  Education Provided: Role of Therapy  Education Method: Verbal;Demonstration  Barriers to Learning: Hearing  Education Outcome: Verbalized understanding;Continued education needed      Therapy Time   Individual Concurrent Group Co-treatment   Time In 0810         Time Out 0905         Minutes 55          Timed Code Treatment Minutes: 45      Total Treatment Minutes:   [facility], PT

## 2022-07-20 NOTE — CARE COORDINATION
Case Management Assessment           Initial Evaluation                Date / Time of Evaluation: 7/20/2022 8:53 AM                 Assessment Completed by: Ant Charles RN    Patient Name: Andrea Muñoz. YOB: 1950  Diagnosis: Atherosclerosis of native artery of right lower extremity with ulceration, unspecified ulceration site [facility]) [I70.239]  Peripheral arterial disease ([facility] [I73.9]     Date / Time: 7/18/2022  5:24 AM    Patient Admission Status: Inpatient    If patient is discharged prior to next notation, then this note serves as note for discharge by case management. Current PCP: Leni Maharaj DO    Chart Reviewed: Yes  Patient/ Family Interviewed: Yes    Initial assessment completed at bedside with: patient    Hospitalization in the last 30 days: No    Emergency Contacts:  Extended Emergency Contact Information  Primary Emergency Contact: [address] [place] [address] Phone: [phone]  Relation: Child    Advance Directives:   Code Status: Full Code    Financial  Payor: Gracie Quinonez / Plan: Maximino Handy / Product Type: *No Product type* /     Pre-cert required for SNF: Yes    Pharmacy    [facility] #[id number] - [place], [address] [facility] -  [phone]  [facility] [address]  Phone: [phone] Fax: [phone]    [facility] [address] [place] [address], [address] [place] [address] 35 [phone] [place] [phone] [address]  [address] [address]  Phone: [phone] Fax: [phone]      Potential assistance Purchasing Medications:  no  Does Patient want to participate in local refill/ meds to beds program?: Yes    Meds To Beds General Rules:  1. Can ONLY be done Monday- Friday between 8:30am-5pm  2. Prescription(s) must be in pharmacy by 3pm to be filled same day  3. Copy of patient's insurance/ prescription drug card and patient face sheet must be sent along with the prescription(s)  4. Cost of Rx cannot be added to hospital bill. If financial assistance is needed, please contact unit  or ;  or  CANNOT provide pharmacy voucher for patients co-pays  5. Patients can then  the prescription on their way out of the hospital at discharge, or pharmacy can deliver to the bedside if staff is available. (payment due at time of pick-up or delivery - cash, check, or card accepted)     Able to afford home medications/ co-pay costs: Yes    ADLS Independent with self care and functional mobility. Has DME and O2. Son or the bus are transportation [address] [facility]: apartment alone  Steps: 3 [address]ONNA with rails    Plans to RETURN to current housing: Yes    [address] [facility] [address] skilled nursing. Goes to [facility]    Currently ACTIVE with [place] on Aging: Yes  Passport/ Waiver: Yes  Passport/ Waiver Services: aide services    Durable Medical Equipment   Equipment: wheelchair, cane, crutches, walker, and shower chair    DISCHARGE PLAN:  Disposition: Home with [address]: [address] [facility] for discharge: family     The Patient and/or patient representative Batsheva Arguelles and his family were provided with a choice of provider and agrees with the discharge plan Yes    Freedom of choice list was provided with basic dialogue that supports the patient's individualized plan of care/goals and shares the quality data associated with the providers.  Yes    Aren Walters RN  [facility] [employer]  Case Management Department  [phone]

## 2022-07-20 NOTE — PROGRESS NOTES
Pt. Is feeling better now. He was upset due to a misunderstanding. Agreeable to labs. This RN unable to obtain, Lab called for assistance.

## 2022-07-20 NOTE — PROGRESS NOTES
Upon assessment, pt. Disoriented x4. He states he has no idea what the year is, dose not know where he is at, doesn't know his name, and isn't sure why he is here other than he \"thinks he got his leg fixed\". The patient is up in the chair behaving completely appropriately. Uses call light appropriately, etc. Will cont. To monitor.

## 2022-07-20 NOTE — PROGRESS NOTES
in the last 72 hours. Troponin: No results for input(s): TROPONINI in the last 72 hours. BNP: No results for input(s): BNP in the last 72 hours. ABGs: No results for input(s): PHART, FZS5RSL, PO2ART in the last 72 hours. INR:   Recent Labs     07/18/22  0627   INR 1.17*       U/A:No results for input(s): NITRITE, COLORU, PHUR, LABCAST, WBCUA, RBCUA, MUCUS, TRICHOMONAS, YEAST, BACTERIA, CLARITYU, SPECGRAV, LEUKOCYTESUR, UROBILINOGEN, BILIRUBINUR, BLOODU, GLUCOSEU, AMORPHOUS in the last 72 hours. Invalid input(s): [place]     Rad:   XR KNEE RIGHT (1-2 VIEWS)   Final Result      Intraoperative fluoroscopy provided as above. See operative report for details. FLUORO FOR SURGICAL PROCEDURES   Final Result      Intraoperative fluoroscopy provided as above. See operative report for details. ASSESSMENT AND PLAN:   This is a 81y.o. year old male status post R femoral below knee popliteal bypass with in-situ saphenous vein secondary to atherosclerosis of native artery of RLE with ulceration 07/18    Neuro:   Pain/sedation/psych  Tylenol scheduled  Dilaudid, Roxicodone pain panel     Cardiovascular:   HDS  Hx of HTN, home meds Losartan and Metoprolol restarted  PAD status post R femoral below knee popliteal bypass  Eliquis and Plavix started 7/19     Pulmonary:   Extubated postoperatively. Currently on 6L NC at baseline  Wean O2 as able. Aggressive IS  Hx of COPD and KAVITA PTA     GI:   General diet     FEN:   Damon@[employer] cc/hr  UOP remains marginal for now. Will SLIV later today  Electrolyte replacements PRN     /Renal:   Sampson out and able to void.    Strict I/Os     Hem/ID:   Hgb 10.2 (stable)    Endo:   Normoglycemic     Prophylaxis:   NA     Access:  Peripheral Access: 07/18 (L wrist)        Mobility:  OOB, ambulate, PT/OT eval today     Dispo:   OK to transfer to floor if necessary  Possible dispo to SNF vs rehab as he lives alone    Code Status: Full Code  -----------------------------  Steven Kaminski DO PGY1, General Surgery  07/20/22  6:52 AM  Pager # [phone]

## 2022-07-20 NOTE — PROGRESS NOTES
Occupational Therapy  Facility/Department: [address] [place] [address] ICU  Occupational Therapy Initial Assessment    Name: Nick Rahman. : 1950  MRN: [id number]  Date of Service: 2022    Discharge Recommendations:  Nick Rahman. scored a 17/24 on the AM-PAC ADL Inpatient form. Current research shows that an AM-PAC score of 17 or less is typically not associated with a discharge to the patient's home setting. Based on the patient's AM-PAC score and their current ADL deficits, it is recommended that the patient have 3-5 sessions per week of Occupational Therapy at d/c to increase the patient's independence. Please see assessment section for further patient specific details. Should pt d/c home, recommend 24hr assist and  HOME HEALTH CARE: LEVEL 1 STANDARD    - Initial home health evaluation to occur within 24-48 hours, in patient home   - Therapy to evaluate with goal of regaining prior level of functioning   - Therapy to evaluate if patient has [address] needs for personal care`      If patient discharges prior to next session this note will serve as a discharge summary. Please see below for the latest assessment towards goals. OT Equipment Recommendations  Equipment Needed: No       Patient Diagnosis(es): There were no encounter diagnoses.   Past Medical History:  has a past medical history of CAD (coronary artery disease), CHF (congestive heart failure) ([address] COPD (chronic obstructive pulmonary disease) ([address] Critical ischemia of lower extremity ([address] Depressive disorder, not elsewhere classified, GERD (gastroesophageal reflux disease), History of colon polyps - 30 seen on colonoscopy 2021, History of MI (myocardial infarction), History of skin ulcer of lower extremity, History of transient ischemic attack (TIA), Hx of blood clots, Hyperlipidemia, Hypertension, Neuropathy, KAVITA (obstructive sleep apnea), Personal history of DVT (deep vein thrombosis), Prolonged emergence from general in-situ saphenous vein. PMHx: COPD, CHF, CAD with stenting, HTN, neuropathy, lumbar laminectomy, R MELANI  Family / Caregiver Present: No  Referring Practitioner: Marley Altamirano  Diagnosis: atherosclerosis of native artery of right lower extremity  Subjective  Subjective: Pt seated in chair, agreeable to OT evaluation. \"I haven't been up walking yet\"  Pain: 8/10 R GWENDOLYN. RN made aware and medicated pt beginning of session. Social/Functional History  Social/Functional History  Lives With: Alone  Type of Home: Apartment  Home Access: Stairs to enter with rails  Entrance Stairs - Number of Steps: 3 steps to enter building; then 4 steps w/ rail down to basement level apt  Bathroom Shower/Tub: Tub/Shower unit  Bathroom Toilet: Handicap height (sink close)  [employer]: Grab bars in shower, Hand-held shower, Tub transfer bench  Home Equipment: Sock aid, Reacher  Has the patient had two or more falls in the past year or any fall with injury in the past year?: No  ADL Assistance: [facility]: Independent (has been trying to set up cleaning company through [address]; takes bus to get groceries)  Ambulation Assistance: Independent (uses cane at all times)  Transfer Assistance: Independent  Active : No  Patient's  Info: son, bus or medical transport  Occupation: Retired  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  Leisure & Hobbies: watch TV       Objective   Observation: wrap/dressing R LE knee to distal     Vision: Reading glasses  Hearing: [place]      Toilet Transfers  Toilet - Technique: Ambulating (w/ RW)  Equipment Used: Standard toilet  Toilet Transfer: Contact guard assistance  Toilet Transfers Comments: heavy reliance on grab bar (his toilet is higher at home)    UE Function  AROM: Within functional limits  Strength:  Within functional limits  Coordination: Within functional limits      ADL  Feeding: Independent  Grooming: Stand by assistance (wash hands, wash face standing at sink)  GWENDOLYN met  Short Term Goal 4: supervision toileting- not met       Therapy Time   Individual Concurrent Group Co-treatment   Time In 0810         Time Out 0910         Minutes 60         Timed Code Treatment Minutes: 45 Minutes +15 min ana Romero, OT

## 2022-07-21 LAB
ALBUMIN SERPL-MCNC: 2.8 G/DL (ref 3.4–5)
ANION GAP SERPL CALCULATED.3IONS-SCNC: 7 MMOL/L (ref 3–16)
BASOPHILS ABSOLUTE: 0.1 K/UL (ref 0–0.2)
BASOPHILS RELATIVE PERCENT: 1 %
BUN BLDV-MCNC: 13 MG/DL (ref 7–20)
CALCIUM SERPL-MCNC: 8.5 MG/DL (ref 8.3–10.6)
CHLORIDE BLD-SCNC: 105 MMOL/L (ref 99–110)
CO2: 26 MMOL/L (ref 21–32)
CREAT SERPL-MCNC: 0.8 MG/DL (ref 0.8–1.3)
EOSINOPHILS ABSOLUTE: 0.2 K/UL (ref 0–0.6)
EOSINOPHILS RELATIVE PERCENT: 3.1 %
GFR AFRICAN AMERICAN: >60
GFR NON-AFRICAN AMERICAN: >60
GLUCOSE BLD-MCNC: 111 MG/DL (ref 70–99)
HCT VFR BLD CALC: 28.1 % (ref 40.5–52.5)
HEMOGLOBIN: 9.4 G/DL (ref 13.5–17.5)
LYMPHOCYTES ABSOLUTE: 1.3 K/UL (ref 1–5.1)
LYMPHOCYTES RELATIVE PERCENT: 20.6 %
MAGNESIUM: 1.5 MG/DL (ref 1.8–2.4)
MCH RBC QN AUTO: 31.6 PG (ref 26–34)
MCHC RBC AUTO-ENTMCNC: 33.3 G/DL (ref 31–36)
MCV RBC AUTO: 94.8 FL (ref 80–100)
MONOCYTES ABSOLUTE: 0.4 K/UL (ref 0–1.3)
MONOCYTES RELATIVE PERCENT: 6.8 %
NEUTROPHILS ABSOLUTE: 4.4 K/UL (ref 1.7–7.7)
NEUTROPHILS RELATIVE PERCENT: 68.5 %
PDW BLD-RTO: 16.3 % (ref 12.4–15.4)
PHOSPHORUS: 2.8 MG/DL (ref 2.5–4.9)
PLATELET # BLD: 127 K/UL (ref 135–450)
PMV BLD AUTO: 9.3 FL (ref 5–10.5)
POTASSIUM SERPL-SCNC: 4.2 MMOL/L (ref 3.5–5.1)
RBC # BLD: 2.97 M/UL (ref 4.2–5.9)
SODIUM BLD-SCNC: 138 MMOL/L (ref 136–145)
WBC # BLD: 6.5 K/UL (ref 4–11)

## 2022-07-21 PROCEDURE — 2580000003 HC RX 258

## 2022-07-21 PROCEDURE — 94640 AIRWAY INHALATION TREATMENT: CPT

## 2022-07-21 PROCEDURE — 2500000003 HC RX 250 WO HCPCS

## 2022-07-21 PROCEDURE — 6370000000 HC RX 637 (ALT 250 FOR IP): Performed by: SURGERY

## 2022-07-21 PROCEDURE — 6370000000 HC RX 637 (ALT 250 FOR IP)

## 2022-07-21 PROCEDURE — 36415 COLL VENOUS BLD VENIPUNCTURE: CPT

## 2022-07-21 PROCEDURE — 6360000002 HC RX W HCPCS: Performed by: SURGERY

## 2022-07-21 PROCEDURE — 2700000000 HC OXYGEN THERAPY PER DAY

## 2022-07-21 PROCEDURE — 83735 ASSAY OF MAGNESIUM: CPT

## 2022-07-21 PROCEDURE — 6370000000 HC RX 637 (ALT 250 FOR IP): Performed by: STUDENT IN AN ORGANIZED HEALTH CARE EDUCATION/TRAINING PROGRAM

## 2022-07-21 PROCEDURE — 94761 N-INVAS EAR/PLS OXIMETRY MLT: CPT

## 2022-07-21 PROCEDURE — 80069 RENAL FUNCTION PANEL: CPT

## 2022-07-21 PROCEDURE — 1200000000 HC SEMI PRIVATE

## 2022-07-21 PROCEDURE — 2580000003 HC RX 258: Performed by: SURGERY

## 2022-07-21 PROCEDURE — 85025 COMPLETE CBC W/AUTO DIFF WBC: CPT

## 2022-07-21 PROCEDURE — 99223 1ST HOSP IP/OBS HIGH 75: CPT | Performed by: PHYSICAL MEDICINE & REHABILITATION

## 2022-07-21 RX ORDER — LANOLIN ALCOHOL/MO/W.PET/CERES
400 CREAM (GRAM) TOPICAL ONCE
Status: COMPLETED | OUTPATIENT
Start: 2022-07-21 | End: 2022-07-21

## 2022-07-21 RX ADMIN — BUDESONIDE 500 MCG: 0.5 SUSPENSION RESPIRATORY (INHALATION) at 08:51

## 2022-07-21 RX ADMIN — SODIUM CHLORIDE, PRESERVATIVE FREE 10 ML: 5 INJECTION INTRAVENOUS at 08:18

## 2022-07-21 RX ADMIN — DOCUSATE SODIUM 100 MG: 100 CAPSULE, LIQUID FILLED ORAL at 20:27

## 2022-07-21 RX ADMIN — SODIUM CHLORIDE, PRESERVATIVE FREE 10 ML: 5 INJECTION INTRAVENOUS at 20:26

## 2022-07-21 RX ADMIN — ACETAMINOPHEN 1000 MG: 500 TABLET ORAL at 14:47

## 2022-07-21 RX ADMIN — ATORVASTATIN CALCIUM 80 MG: 80 TABLET, FILM COATED ORAL at 08:18

## 2022-07-21 RX ADMIN — SODIUM PHOSPHATE, MONOBASIC, MONOHYDRATE 10 MMOL: 276; 142 INJECTION, SOLUTION INTRAVENOUS at 08:27

## 2022-07-21 RX ADMIN — APIXABAN 5 MG: 5 TABLET, FILM COATED ORAL at 20:27

## 2022-07-21 RX ADMIN — APIXABAN 5 MG: 5 TABLET, FILM COATED ORAL at 08:18

## 2022-07-21 RX ADMIN — POLYETHYLENE GLYCOL 3350 17 G: 17 POWDER, FOR SOLUTION ORAL at 08:18

## 2022-07-21 RX ADMIN — PANTOPRAZOLE SODIUM 40 MG: 40 TABLET, DELAYED RELEASE ORAL at 06:05

## 2022-07-21 RX ADMIN — DULOXETINE HYDROCHLORIDE 20 MG: 20 CAPSULE, DELAYED RELEASE ORAL at 08:18

## 2022-07-21 RX ADMIN — DOCUSATE SODIUM 100 MG: 100 CAPSULE, LIQUID FILLED ORAL at 08:18

## 2022-07-21 RX ADMIN — METOPROLOL 100 MG: 100 TABLET ORAL at 20:26

## 2022-07-21 RX ADMIN — CLOPIDOGREL BISULFATE 75 MG: 75 TABLET ORAL at 08:18

## 2022-07-21 RX ADMIN — PREGABALIN 150 MG: 150 CAPSULE ORAL at 20:27

## 2022-07-21 RX ADMIN — ARFORMOTEROL TARTRATE 15 MCG: 15 SOLUTION RESPIRATORY (INHALATION) at 08:51

## 2022-07-21 RX ADMIN — LOSARTAN POTASSIUM 100 MG: 100 TABLET, FILM COATED ORAL at 08:17

## 2022-07-21 RX ADMIN — PREGABALIN 150 MG: 150 CAPSULE ORAL at 08:17

## 2022-07-21 RX ADMIN — Medication 400 MG: at 08:17

## 2022-07-21 RX ADMIN — ACETAMINOPHEN 1000 MG: 500 TABLET ORAL at 01:30

## 2022-07-21 RX ADMIN — ALLOPURINOL 100 MG: 100 TABLET ORAL at 08:18

## 2022-07-21 RX ADMIN — ACETAMINOPHEN 1000 MG: 500 TABLET ORAL at 08:17

## 2022-07-21 RX ADMIN — ACETAMINOPHEN 1000 MG: 500 TABLET ORAL at 20:26

## 2022-07-21 ASSESSMENT — PAIN SCALES - WONG BAKER
WONGBAKER_NUMERICALRESPONSE: 0
WONGBAKER_NUMERICALRESPONSE: 0

## 2022-07-21 ASSESSMENT — PAIN DESCRIPTION - ONSET: ONSET: ON-GOING

## 2022-07-21 ASSESSMENT — PAIN SCALES - GENERAL
PAINLEVEL_OUTOF10: 7
PAINLEVEL_OUTOF10: 0

## 2022-07-21 ASSESSMENT — PAIN - FUNCTIONAL ASSESSMENT
PAIN_FUNCTIONAL_ASSESSMENT: ACTIVITIES ARE NOT PREVENTED
PAIN_FUNCTIONAL_ASSESSMENT: PREVENTS OR INTERFERES SOME ACTIVE ACTIVITIES AND ADLS
PAIN_FUNCTIONAL_ASSESSMENT: ACTIVITIES ARE NOT PREVENTED

## 2022-07-21 ASSESSMENT — PAIN DESCRIPTION - LOCATION: LOCATION: LEG

## 2022-07-21 ASSESSMENT — PAIN DESCRIPTION - PAIN TYPE: TYPE: SURGICAL PAIN

## 2022-07-21 ASSESSMENT — PAIN DESCRIPTION - FREQUENCY: FREQUENCY: CONTINUOUS

## 2022-07-21 ASSESSMENT — PAIN DESCRIPTION - ORIENTATION: ORIENTATION: RIGHT

## 2022-07-21 ASSESSMENT — PAIN DESCRIPTION - DESCRIPTORS: DESCRIPTORS: ACHING

## 2022-07-21 NOTE — PROGRESS NOTES
Update 6/88/7228 61:42: precert approved 4/90/4360. Can admit today with discharge orders and pending negative rapid. Update 7/25/2022: still waiting on precert. CL called insurance company and waiting for a return phone call to check status. Update 7/22/2022: still waiting on precert     The [address] Unit   After review, this patient is felt to be:       []  Appropriate for Acute Inpatient Rehab    [x]  Appropriate for Acute Inpatient Rehab Pending Insurance Authorization    []  Not appropriate for Acute Inpatient Rehab    []  Referral received and ARU reviewing patient      Precert initiated 0/92/5386 for ARU admission. Pt in agreement per  and CL's conversation with pt this AM. Ref#: pending. Will notify CM/SW with further updates.  Thank you for the referral.    Dipika MOONEY OTR/L  Clinical Liaison- [facility]   (P): [phone]  (F): [phone]

## 2022-07-21 NOTE — CARE COORDINATION
Addendum 1430  Mr. Viridiana Fonseca is now agreeable to go to [facility] [place]. Spoke with Paralee Jeans in [address]. She plans to start pre-cert today. Addendum 1000  Met with Mr. Guzman at the bedside this morning to discuss his preferred plans for discharge. He stated he intends to return home. He is open to resuming services with [facility]. When he is discharge, he plans to ask his brother to stay with his a few days for supervision. He is confident he will be safe at home. Case Management Assessment           Daily Note                 Date/ Time of Note: 7/21/2022 8:23 AM         Note completed by: Vivi Mathews RN    Patient Name: Adilene Miramontes. 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 PT/OT wound care. Pain mgt  ________________________________________________________________________________________  PT AM-PAC: 17 / 24 per last evaluation on: 7/20    OT AM-PAC: 17 / 24 per last evaluation on: 7/20    DME Needs for discharge: Has needed DME at home    Discharge Plan: ARU v Home w [facility] AT [facility]    Case Management Notes: Pt is from home alone in his own apartment. Independent with self care and functional mobility. Has DME and O2. Son or the bus are transportation methods    Christiane Monte and his family were provided with choice of provider; he and his family are in agreement with the discharge plan.     Care Transition Patient: No    Vivi Mathews RN  [facility]  Case Management Department  [phone]

## 2022-07-21 NOTE — PROGRESS NOTES
ICU Vascular SURGERY DAILY PROGRESS NOTE    CC: Atherosclerosis of native artery of RLE with ulceration, unspecified ulceration site    SUBJECTIVE:   Interval Hx: HDS and afebrile, on 6L HFNC (at baseline). P did well overnight. Worked with PT/OT. No acute complaints. Feels like R foot movement and sensation is improved. OOB to chair. Tolerating diet and passing gas. ROS: A 14 point review of systems was conducted, significant findings as noted above. All other systems negative. OBJECTIVE:   Vitals:   Vitals:    07/21/22 0300 07/21/22 0400 07/21/22 0500 07/21/22 0600   BP: 127/60 109/74 121/60 126/69   Pulse: 67 62 60 64   Resp: 17 18 17 14   Temp:  97.5 °F (36.4 °C)     TempSrc:  Oral     SpO2: 96% 95% 96% 95%   Weight:    236 lb 3.2 oz (107.1 kg)   Height:           I/O:   Intake/Output Summary (Last 24 hours) at 7/21/2022 0631  Last data filed at 7/21/2022 0142  Gross per 24 hour   Intake 125 ml   Output 1350 ml   Net -1225 ml       I/O last 3 completed shifts: In: 3167 [P.O.:125; I.V.:3042]  Out: 3080 [Urine:1675]    Diet: ADULT DIET; Regular; Low Fat/Low Chol/High Fiber/2 gm Na  ADULT ORAL NUTRITION SUPPLEMENT; Breakfast, Lunch, Dinner; Standard High Calorie/High Protein Oral Supplement      Physical Examination:     General Appearance: Alert, no acute distress  Neuro: A&Ox3, no focal deficits, sensation intact  Chest/Lungs: Normal effort without accessory muscle use, on 6 L HFNC  Cardiovascular: RRR  Abdomen: soft, nontender, nondistended  Extremities: no edema, no cyanosis; RLE with aquacell Ag dressing in place, foot wrapped, no strikethrough noted; warm to touch.  Motor and sesnation intact     Pulses    R F P AT PT DP   R + + + defer -/+ -/+   L + + + -/+ -/- defer    +, -/+ ,-/-     -/+ not palpable with signals  -/- not palpable without signals      Labs:  CBC:   Recent Labs     07/19/22  0327 07/20/22  0351 07/21/22  0320   WBC 8.0 7.5 6.5   HGB 10.3* 10.2* 9.4*   HCT 31.3* 30.4* 28.1*   * 139 127*         BMP:   Recent Labs     07/20/22  0351 07/20/22  1529 07/21/22  0320    133* 138   K 4.2 4.6 4.2    106 105   CO2 29 16* 26   BUN 17 15 13   CREATININE 1.1 0.8 0.8   GLUCOSE 147* 96 111*       LFT's: No results for input(s): AST, ALT, ALB, BILITOT, ALKPHOS in the last 72 hours. Troponin: No results for input(s): TROPONINI in the last 72 hours. BNP: No results for input(s): BNP in the last 72 hours. ABGs: No results for input(s): PHART, WGL0VWC, PO2ART in the last 72 hours. INR:   No results for input(s): INR in the last 72 hours. U/A:No results for input(s): NITRITE, COLORU, PHUR, LABCAST, WBCUA, RBCUA, MUCUS, TRICHOMONAS, YEAST, BACTERIA, CLARITYU, SPECGRAV, LEUKOCYTESUR, UROBILINOGEN, BILIRUBINUR, BLOODU, GLUCOSEU, AMORPHOUS in the last 72 hours. Invalid input(s): Kristy Nayak     Rad:   XR KNEE RIGHT (1-2 VIEWS)   Final Result      Intraoperative fluoroscopy provided as above. See operative report for details. FLUORO FOR SURGICAL PROCEDURES   Final Result      Intraoperative fluoroscopy provided as above. See operative report for details. ASSESSMENT AND PLAN:   This is a 81y.o. 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 POD3    Neuro:   Pain/sedation/psych  Tylenol scheduled  Roxicodone pain panel     Cardiovascular:   HDS  Hx of HTN, home meds Losartan and Metoprolol restarted  PAD status post R femoral below knee popliteal bypass  Eliquis and Plavix started 7/19     Pulmonary:   Extubated postoperatively. Currently on 6L NC at baseline  Wean O2 as able. Aggressive IS  Hx of COPD and KAVITA PTA     GI:   General diet     FEN:   SLIV  Given dose of Lasix yesterday with good response of 1L  Electrolyte replacements PRN     /Renal:   Sampson out and able to void.    Strict I/Os     Hem/ID:   Hgb 9.4 from 10.2 (stable)    Endo:   Normoglycemic Prophylaxis:   NA     Access:  Peripheral Access: 07/18 (L wrist)        Mobility:  OOB, ambulate  PT 17/24 and OT 17/24     Dispo:   Transfer to PCU   ARU consult  Possible dispo to SNF vs rehab as he lives alone    Code Status: Full Code  -----------------------------  Erick Hou DO, [facility]  PGY1, General Surgery  07/21/22  6:31 AM  [place]  [phone]

## 2022-07-22 LAB
ALBUMIN SERPL-MCNC: 3.1 G/DL (ref 3.4–5)
ANION GAP SERPL CALCULATED.3IONS-SCNC: 11 MMOL/L (ref 3–16)
BASOPHILS ABSOLUTE: 0 K/UL (ref 0–0.2)
BASOPHILS RELATIVE PERCENT: 0.4 %
BUN BLDV-MCNC: 13 MG/DL (ref 7–20)
CALCIUM SERPL-MCNC: 9 MG/DL (ref 8.3–10.6)
CHLORIDE BLD-SCNC: 106 MMOL/L (ref 99–110)
CO2: 23 MMOL/L (ref 21–32)
CREAT SERPL-MCNC: 0.8 MG/DL (ref 0.8–1.3)
EOSINOPHILS ABSOLUTE: 0.2 K/UL (ref 0–0.6)
EOSINOPHILS RELATIVE PERCENT: 2.5 %
GFR AFRICAN AMERICAN: >60
GFR NON-AFRICAN AMERICAN: >60
GLUCOSE BLD-MCNC: 107 MG/DL (ref 70–99)
HCT VFR BLD CALC: 31.2 % (ref 40.5–52.5)
HEMOGLOBIN: 10.4 G/DL (ref 13.5–17.5)
LYMPHOCYTES ABSOLUTE: 1 K/UL (ref 1–5.1)
LYMPHOCYTES RELATIVE PERCENT: 12.1 %
MAGNESIUM: 1.6 MG/DL (ref 1.8–2.4)
MCH RBC QN AUTO: 31.5 PG (ref 26–34)
MCHC RBC AUTO-ENTMCNC: 33.3 G/DL (ref 31–36)
MCV RBC AUTO: 94.8 FL (ref 80–100)
MONOCYTES ABSOLUTE: 0.7 K/UL (ref 0–1.3)
MONOCYTES RELATIVE PERCENT: 8 %
NEUTROPHILS ABSOLUTE: 6.6 K/UL (ref 1.7–7.7)
NEUTROPHILS RELATIVE PERCENT: 77 %
PDW BLD-RTO: 16.4 % (ref 12.4–15.4)
PHOSPHORUS: 2.3 MG/DL (ref 2.5–4.9)
PLATELET # BLD: 152 K/UL (ref 135–450)
PMV BLD AUTO: 9.6 FL (ref 5–10.5)
POTASSIUM SERPL-SCNC: 4.3 MMOL/L (ref 3.5–5.1)
RBC # BLD: 3.29 M/UL (ref 4.2–5.9)
SODIUM BLD-SCNC: 140 MMOL/L (ref 136–145)
WBC # BLD: 8.6 K/UL (ref 4–11)

## 2022-07-22 PROCEDURE — 85025 COMPLETE CBC W/AUTO DIFF WBC: CPT

## 2022-07-22 PROCEDURE — 1200000000 HC SEMI PRIVATE

## 2022-07-22 PROCEDURE — 2580000003 HC RX 258

## 2022-07-22 PROCEDURE — 36415 COLL VENOUS BLD VENIPUNCTURE: CPT

## 2022-07-22 PROCEDURE — 6370000000 HC RX 637 (ALT 250 FOR IP)

## 2022-07-22 PROCEDURE — 2700000000 HC OXYGEN THERAPY PER DAY

## 2022-07-22 PROCEDURE — 83735 ASSAY OF MAGNESIUM: CPT

## 2022-07-22 PROCEDURE — 94640 AIRWAY INHALATION TREATMENT: CPT

## 2022-07-22 PROCEDURE — 6360000002 HC RX W HCPCS

## 2022-07-22 PROCEDURE — 2500000003 HC RX 250 WO HCPCS

## 2022-07-22 PROCEDURE — 2060000000 HC ICU INTERMEDIATE R&B

## 2022-07-22 PROCEDURE — 80069 RENAL FUNCTION PANEL: CPT

## 2022-07-22 PROCEDURE — 94761 N-INVAS EAR/PLS OXIMETRY MLT: CPT

## 2022-07-22 PROCEDURE — 99232 SBSQ HOSP IP/OBS MODERATE 35: CPT | Performed by: PHYSICAL MEDICINE & REHABILITATION

## 2022-07-22 RX ORDER — MAGNESIUM SULFATE IN WATER 40 MG/ML
4000 INJECTION, SOLUTION INTRAVENOUS ONCE
Status: COMPLETED | OUTPATIENT
Start: 2022-07-22 | End: 2022-07-22

## 2022-07-22 RX ADMIN — ARFORMOTEROL TARTRATE 15 MCG: 15 SOLUTION RESPIRATORY (INHALATION) at 20:50

## 2022-07-22 RX ADMIN — SODIUM PHOSPHATE, MONOBASIC, MONOHYDRATE 15 MMOL: 276; 142 INJECTION, SOLUTION INTRAVENOUS at 09:57

## 2022-07-22 RX ADMIN — BUDESONIDE 500 MCG: 0.5 SUSPENSION RESPIRATORY (INHALATION) at 20:50

## 2022-07-22 RX ADMIN — ACETAMINOPHEN 1000 MG: 500 TABLET ORAL at 16:20

## 2022-07-22 RX ADMIN — PREGABALIN 150 MG: 150 CAPSULE ORAL at 09:49

## 2022-07-22 RX ADMIN — METOPROLOL 100 MG: 100 TABLET ORAL at 09:48

## 2022-07-22 RX ADMIN — APIXABAN 5 MG: 5 TABLET, FILM COATED ORAL at 21:24

## 2022-07-22 RX ADMIN — SODIUM CHLORIDE, PRESERVATIVE FREE 10 ML: 5 INJECTION INTRAVENOUS at 11:38

## 2022-07-22 RX ADMIN — CLOPIDOGREL BISULFATE 75 MG: 75 TABLET ORAL at 09:49

## 2022-07-22 RX ADMIN — SODIUM CHLORIDE: 9 INJECTION, SOLUTION INTRAVENOUS at 16:18

## 2022-07-22 RX ADMIN — PREGABALIN 150 MG: 150 CAPSULE ORAL at 21:24

## 2022-07-22 RX ADMIN — ACETAMINOPHEN 1000 MG: 500 TABLET ORAL at 09:48

## 2022-07-22 RX ADMIN — DULOXETINE HYDROCHLORIDE 20 MG: 20 CAPSULE, DELAYED RELEASE ORAL at 09:48

## 2022-07-22 RX ADMIN — APIXABAN 5 MG: 5 TABLET, FILM COATED ORAL at 09:49

## 2022-07-22 RX ADMIN — DOCUSATE SODIUM 100 MG: 100 CAPSULE, LIQUID FILLED ORAL at 09:49

## 2022-07-22 RX ADMIN — METOPROLOL 100 MG: 100 TABLET ORAL at 21:25

## 2022-07-22 RX ADMIN — ONDANSETRON 4 MG: 2 INJECTION INTRAMUSCULAR; INTRAVENOUS at 09:48

## 2022-07-22 RX ADMIN — MAGNESIUM SULFATE HEPTAHYDRATE 4000 MG: 40 INJECTION, SOLUTION INTRAVENOUS at 16:19

## 2022-07-22 RX ADMIN — ACETAMINOPHEN 1000 MG: 500 TABLET ORAL at 21:23

## 2022-07-22 RX ADMIN — DOCUSATE SODIUM 100 MG: 100 CAPSULE, LIQUID FILLED ORAL at 21:25

## 2022-07-22 RX ADMIN — ALLOPURINOL 100 MG: 100 TABLET ORAL at 09:48

## 2022-07-22 RX ADMIN — PANTOPRAZOLE SODIUM 40 MG: 40 TABLET, DELAYED RELEASE ORAL at 10:04

## 2022-07-22 RX ADMIN — ACETAMINOPHEN 1000 MG: 500 TABLET ORAL at 03:25

## 2022-07-22 RX ADMIN — LOSARTAN POTASSIUM 100 MG: 100 TABLET, FILM COATED ORAL at 10:04

## 2022-07-22 RX ADMIN — ATORVASTATIN CALCIUM 80 MG: 80 TABLET, FILM COATED ORAL at 09:49

## 2022-07-22 RX ADMIN — SODIUM CHLORIDE, PRESERVATIVE FREE 10 ML: 5 INJECTION INTRAVENOUS at 21:23

## 2022-07-22 ASSESSMENT — PAIN DESCRIPTION - ONSET: ONSET: GRADUAL

## 2022-07-22 ASSESSMENT — PAIN DESCRIPTION - LOCATION
LOCATION: ANKLE
LOCATION: ABDOMEN
LOCATION: ABDOMEN;LEG

## 2022-07-22 ASSESSMENT — PAIN DESCRIPTION - DESCRIPTORS
DESCRIPTORS: BURNING
DESCRIPTORS: ACHING

## 2022-07-22 ASSESSMENT — PAIN DESCRIPTION - PAIN TYPE: TYPE: ACUTE PAIN

## 2022-07-22 ASSESSMENT — PAIN DESCRIPTION - ORIENTATION
ORIENTATION: MID
ORIENTATION: RIGHT
ORIENTATION: RIGHT

## 2022-07-22 ASSESSMENT — PAIN - FUNCTIONAL ASSESSMENT: PAIN_FUNCTIONAL_ASSESSMENT: ACTIVITIES ARE NOT PREVENTED

## 2022-07-22 ASSESSMENT — PAIN SCALES - GENERAL
PAINLEVEL_OUTOF10: 9
PAINLEVEL_OUTOF10: 8

## 2022-07-22 ASSESSMENT — PAIN DESCRIPTION - FREQUENCY: FREQUENCY: INTERMITTENT

## 2022-07-22 NOTE — PROGRESS NOTES
Physician Progress Note      Edvin Gomez  CSN #:                  [id number]  :                       1950  ADMIT DATE:       2022 5:24 AM  DISCH DATE:  RESPONDING  PROVIDER #:        SYLVESTER ORR MD          QUERY TEXT:    Pt admitted with post R femoral below knee popliteal bypass . Pt noted to be   on 6L via nasal cannula. If possible, please document in the progress notes   and discharge summary if you are evaluating and/or treating any of the   following: The medical record reflects the following:  Risk Factors: COPD; KAVITA  Clinical Indicators: per H/P \"6l oxygen at all times according to   patient. Kelsy Luong Tienjohnny Luong Severe Sleep apnea, non compliant\"  Treatment: 6L O2 via nasal cannula, continuous O2 saturation monitoring; V/S   monitoring per unit protocol  Options provided:  -- Chronic respiratory failure with hypoxia  -- Chronic respiratory failure with hypercapnia  -- Chronic respiratory failure with hypoxia and hypercapnia  -- Other - I will add my own diagnosis  -- Disagree - Not applicable / Not valid  -- Disagree - Clinically unable to determine / Unknown  -- Refer to Clinical Documentation Reviewer    PROVIDER RESPONSE TEXT:    This patient has chronic respiratory failure with hypoxia.     Query created by: Lynette Morrow on 2022 6:02 PM      Electronically signed by:  Amanda Lee MD 2022 7:31 AM

## 2022-07-22 NOTE — PROGRESS NOTES
Vascular Surgery   Daily Progress Note  Patient: Nick Rahman.    CC: Atherosclerosis of native artery of RLE with ulceration, unspecified ulceration site    SUBJECTIVE:  Complains of hurting all over this am. Moved from ICU to 6s. Stable motor and sensation in the RLE. Tolerating diet on 6L NC at baseline. ROS: A 14 point review of systems was conducted, significant findings as noted above. All other systems negative. OBJECTIVE:   Infusions:   sodium chloride        I/O:I/O last 3 completed shifts: In: 65 [P.O.:840; I.V.:10]  Out: 1000 [Urine:1000]           Wt Readings from Last 1 Encounters:   07/21/22 236 lb 3.2 oz (107.1 kg)       Exam:  BP (!) 143/67   Pulse 74   Temp 97.7 °F (36.5 °C) (Oral)   Resp 22   Ht 5' 10\" (1.778 m)   Wt 236 lb 3.2 oz (107.1 kg)   SpO2 99%   BMI 33.89 kg/m²     General Appearance: Alert, in no apparent distress   Neuro: A&Ox3, no focal deficits  Lungs: Normal effort; no adventitious breath sounds, 6L NC baseline  Heart: Regular rate and rhythm  Abdomen: Soft, non-tender, non-distended; no guarding, no rigidity, no rebound  Extremities: RLE with Aquacel Ag in place. Foot wrapped, dopplerable PT and DP pulses. LABS:   Recent Labs     07/21/22  0320 07/22/22  0526   WBC 6.5 8.6   HGB 9.4* 10.4*   HCT 28.1* 31.2*   MCV 94.8 94.8   * 152        Recent Labs     07/21/22  0320 07/22/22  0526    140   K 4.2 4.3    106   CO2 26 23   PHOS 2.8 2.3*   BUN 13 13   CREATININE 0.8 0.8      No results for input(s): AST, ALT, ALB, BILIDIR, BILITOT, ALKPHOS in the last 72 hours. No results for input(s): LIPASE, AMYLASE in the last 72 hours. No results for input(s): PROT, INR, APTT in the last 72 hours. No results for input(s): CKTOTAL, CKMB, CKMBINDEX, TROPONINI in the last 72 hours.     ASSESSMENT/PLAN:   This is a 81y.o. 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 POD4    - Plan for dressing change today.   -Continue gen diet. Accepted tro ARU. Awaiting precert. OK to d/c to ARU once achieved.      Veronique Ron DO   PGY1, General Surgery  07/22/22  7:33 AM  Pager # [phone]

## 2022-07-22 NOTE — PROGRESS NOTES
[facility] Vascular SURGERY DAILY PROGRESS NOTE    CC: Atherosclerosis of native artery of RLE with ulceration, unspecified ulceration site    SUBJECTIVE:   Interval Hx: HDS and afebrile, on 6L HFNC (at baseline). No acute complaints. OOB to chair. Tolerating diet and passing gas. Tolerating diet without N/V.      ROS: A 14 point review of systems was conducted, significant findings as noted above. All other systems negative. OBJECTIVE:   Vitals:   Vitals:    07/22/22 0300 07/22/22 0400 07/22/22 0500 07/22/22 0600   BP: (!) 143/76 (!) 157/97 (!) 140/73 (!) 158/75   Pulse: 75 74 77 80   Resp: 22 19 21 21   Temp:  97.9 °F (36.6 °C)     TempSrc:  Oral     SpO2: 95% 95%     Weight:       Height:           I/O:   Intake/Output Summary (Last 24 hours) at 7/22/2022 0649  Last data filed at 7/22/2022 0400  Gross per 24 hour   Intake 850 ml   Output 650 ml   Net 200 ml       I/O last 3 completed shifts: In: 975 [P.O.:965; I.V.:10]  Out: 1350 [Urine:1350]    Diet: ADULT DIET; Regular; Low Fat/Low Chol/High Fiber/2 gm Na  ADULT ORAL NUTRITION SUPPLEMENT; Breakfast, Lunch, Dinner; Standard High Calorie/High Protein Oral Supplement      Physical Examination:     General Appearance: Alert, no acute distress  Neuro: A&Ox3, no focal deficits, sensation intact  Chest/Lungs: Normal effort without accessory muscle use, on 6 L HFNC  Cardiovascular: RRR  Abdomen: soft, nontender, nondistended  Extremities: no edema, no cyanosis; RLE with aquacell Ag dressing in place, foot wrapped, no strikethrough noted; warm to touch.  Motor and sensation intact     Pulses    R F P AT PT DP   R + + + defer -/+ -/+   L + + + -/+ -/- defer    +, -/+ ,-/-     -/+ not palpable with signals  -/- not palpable without signals    Labs:  CBC:   Recent Labs     07/20/22  0351 07/21/22  0320 07/22/22  0526   WBC 7.5 6.5 8.6   HGB 10.2* 9.4* 10.4*   HCT 30.4* 28.1* 31.2*    127* 152         BMP:   Recent Labs     07/20/22  0351 07/20/22  1529 07/21/22  0320 OT 17/24     Dispo:   Transfer to PCU   Appropriate for inpatient ARU, awaiting pre-cert    Code Status: Full Code  -----------------------------  Jonathan Aguilar DO, [facility]  PGY1, General Surgery  07/22/22  6:49 AM  [employer]  [phone]

## 2022-07-22 NOTE — PROGRESS NOTES
Pt c/o nausea noted to have poor appetite. Zofran given as well as scheduled protonix. Pt noted to have large BM. Will cont to monitor GI status.

## 2022-07-22 NOTE — PROGRESS NOTES
Patient arrived to unit via wheelchair. Patient transferred to bed with stand side assist. Patient on 6L high flow O2 (which is baseline). Patient with complaints of pain to RLE and stomach. Patient states he has not been experiencing diarrhea but that is how his stomach is feeling. Patient stated he had a BM 7/21/22. Patient given ice water and vitals obtained and documented.

## 2022-07-22 NOTE — H&P
Update History & Physical    The patient's History and Physical of July 11, 2022 was reviewed with the patient and I examined the patient. There was no change. The surgical site was confirmed by the patient and me. Plan: The risks, benefits, expected outcome, and alternative to the recommended procedure have been discussed with the patient. Patient understands and wants to proceed with the procedure.      Electronically signed by Alison Goel MD on 7/22/2022 at 8:37 AM

## 2022-07-22 NOTE — PROGRESS NOTES
Department of Physical Medicine & Rehabilitation  Progress Note    Patient Identification:  Bret Thacker  [id number]  : 1950  Admit date: 2022    Chief Complaint: Peripheral arterial disease ([address]    Subjective:   No acute events overnight. Patient seen this am. Complains of right lower leg pain. He is tolerating po intake. ROS: No fever, chills, chest pain, shortness of breath, abdominal pain, cough, nausea, vomiting, diarrhea, constipation, dizziness. Objective:  Patient Vitals for the past 24 hrs:   BP Temp Temp src Pulse Resp SpO2   22 0704 (!) 143/67 97.7 °F (36.5 °C) Oral 74 22 99 %   22 0600 (!) 158/75 -- -- 80 21 --   22 0500 (!) 140/73 -- -- 77 21 --   22 0400 (!) 157/97 97.9 °F (36.6 °C) Oral 74 19 95 %   22 0300 (!) 143/76 -- -- 75 22 95 %   22 0200 (!) 142/81 -- -- 75 20 --   22 0100 125/78 -- -- 72 18 --   22 0000 125/80 98.3 °F (36.8 °C) Oral 72 13 98 %   22 2300 -- -- -- 69 15 98 %   22 2200 -- -- -- 78 16 99 %   22 2100 (!) 118/59 -- -- 77 15 97 %   22 2000 (!) 155/81 97.9 °F (36.6 °C) Oral 78 17 99 %   22 1638 (!) 136/96 -- -- 63 17 98 %   22 1126 (!) 113/52 97.9 °F (36.6 °C) Oral 58 14 92 %     Const: Alert. No distress, pleasant. HEENT: Normocephalic, atraumatic. Normal sclera/conjunctiva. MMM. CV: Regular rate and rhythm. Resp: No respiratory distress. Lungs CTAB. Abd: Soft, nontender, nondistended, NABS+   Ext: No edema. RLE dressed C/D/I. Distal pulses palpable. Neuro: Alert, oriented, appropriately interactive. Psych: Cooperative, appropriate mood and affect    Laboratory data: Available via EMR.    Last 24 hour lab  Recent Results (from the past 24 hour(s))   CBC with Auto Differential    Collection Time: 22  5:26 AM   Result Value Ref Range    WBC 8.6 4.0 - 11.0 K/uL    RBC 3.29 (L) 4.20 - 5.90 M/uL    Hemoglobin 10.4 (L) 13.5 - 17.5 g/dL    Hematocrit 31.2 (L) 40.5 - 52.5 %    MCV 94.8 80.0 - 100.0 fL    MCH 31.5 26.0 - 34.0 pg    MCHC 33.3 31.0 - 36.0 g/dL    RDW 16.4 (H) 12.4 - 15.4 %    Platelets 758 444 - 904 K/uL    MPV 9.6 5.0 - 10.5 fL    Neutrophils % 77.0 %    Lymphocytes % 12.1 %    Monocytes % 8.0 %    Eosinophils % 2.5 %    Basophils % 0.4 %    Neutrophils Absolute 6.6 1.7 - 7.7 K/uL    Lymphocytes Absolute 1.0 1.0 - 5.1 K/uL    Monocytes Absolute 0.7 0.0 - 1.3 K/uL    Eosinophils Absolute 0.2 0.0 - 0.6 K/uL    Basophils Absolute 0.0 0.0 - 0.2 K/uL   Renal Function Panel    Collection Time: 07/22/22  5:26 AM   Result Value Ref Range    Sodium 140 136 - 145 mmol/L    Potassium 4.3 3.5 - 5.1 mmol/L    Chloride 106 99 - 110 mmol/L    CO2 23 21 - 32 mmol/L    Anion Gap 11 3 - 16    Glucose 107 (H) 70 - 99 mg/dL    BUN 13 7 - 20 mg/dL    Creatinine 0.8 0.8 - 1.3 mg/dL    GFR Non-African American >60 >60    GFR African American >60 >60    Calcium 9.0 8.3 - 10.6 mg/dL    Phosphorus 2.3 (L) 2.5 - 4.9 mg/dL    Albumin 3.1 (L) 3.4 - 5.0 g/dL   Magnesium    Collection Time: 07/22/22  5:26 AM   Result Value Ref Range    Magnesium 1.60 (L) 1.80 - 2.40 mg/dL       Therapy progress:  PT  Position Activity Restriction  Other position/activity restrictions: up as tolerated  Objective     Sit to Stand: Contact guard assistance (x3 trials with occ vc for hand placement)  Stand to sit: Contact guard assistance (x3 trials with occ vc for hand placement)  Device: Rolling Walker  Other Apparatus: O2 (6L)  Assistance: Contact guard assistance  Distance: 10 ft , 15 ft, 5 ft  OT  PT Equipment Recommendations  Equipment Needed: No  Toilet - Technique: Ambulating (w/ RW)  Equipment Used: Standard toilet  Toilet Transfers Comments: heavy reliance on grab bar (his toilet is higher at home)  Assessment        SLP          Body mass index is 33.89 kg/m². Assessment and Plan:    1.  Non-pressure ulcer of RLE 2/2 atherosclerosis of native artery of RLE  -- S/p right femoral popliteal bypass with in-situ saphenous vein   -- Wound care  -- Roxicodone, Tylenol for pain control  -- PT/OT    2. Essential Hypertension  -- Continue Losartan & Toprol XL    3. CAD/PAD  -- Continue Lipitor  -- Continue home Eliquis & Plavix      ARU appropriate. Awaiting pre-cert    Will discuss with attending Dr. Ricardo Denise MD  Internal Medicine PGY-3  7/22/2022  9:30 AM      This patient has been seen, examined, and discussed with the resident. This note has been altered to reflect my own examination findings, impression, and recommendations.      Zeke Awan D.O. M.P.H  PM&R  7/22/2022  2:06 PM

## 2022-07-23 LAB
ALBUMIN SERPL-MCNC: 2.7 G/DL (ref 3.4–5)
ANION GAP SERPL CALCULATED.3IONS-SCNC: 8 MMOL/L (ref 3–16)
BASOPHILS ABSOLUTE: 0 K/UL (ref 0–0.2)
BASOPHILS RELATIVE PERCENT: 0.6 %
BUN BLDV-MCNC: 11 MG/DL (ref 7–20)
CALCIUM SERPL-MCNC: 8.8 MG/DL (ref 8.3–10.6)
CHLORIDE BLD-SCNC: 108 MMOL/L (ref 99–110)
CO2: 26 MMOL/L (ref 21–32)
CREAT SERPL-MCNC: 1 MG/DL (ref 0.8–1.3)
EOSINOPHILS ABSOLUTE: 0.2 K/UL (ref 0–0.6)
EOSINOPHILS RELATIVE PERCENT: 3.6 %
GFR AFRICAN AMERICAN: >60
GFR NON-AFRICAN AMERICAN: >60
GLUCOSE BLD-MCNC: 92 MG/DL (ref 70–99)
HCT VFR BLD CALC: 28 % (ref 40.5–52.5)
HEMOGLOBIN: 9 G/DL (ref 13.5–17.5)
LYMPHOCYTES ABSOLUTE: 1.2 K/UL (ref 1–5.1)
LYMPHOCYTES RELATIVE PERCENT: 21.8 %
MAGNESIUM: 2 MG/DL (ref 1.8–2.4)
MCH RBC QN AUTO: 30.9 PG (ref 26–34)
MCHC RBC AUTO-ENTMCNC: 32.1 G/DL (ref 31–36)
MCV RBC AUTO: 96.2 FL (ref 80–100)
MONOCYTES ABSOLUTE: 0.4 K/UL (ref 0–1.3)
MONOCYTES RELATIVE PERCENT: 7.8 %
NEUTROPHILS ABSOLUTE: 3.6 K/UL (ref 1.7–7.7)
NEUTROPHILS RELATIVE PERCENT: 66.2 %
PDW BLD-RTO: 16.7 % (ref 12.4–15.4)
PHOSPHORUS: 3 MG/DL (ref 2.5–4.9)
PLATELET # BLD: 158 K/UL (ref 135–450)
PMV BLD AUTO: 8.9 FL (ref 5–10.5)
POTASSIUM SERPL-SCNC: 5.2 MMOL/L (ref 3.5–5.1)
RBC # BLD: 2.91 M/UL (ref 4.2–5.9)
SODIUM BLD-SCNC: 142 MMOL/L (ref 136–145)
WBC # BLD: 5.5 K/UL (ref 4–11)

## 2022-07-23 PROCEDURE — 36415 COLL VENOUS BLD VENIPUNCTURE: CPT

## 2022-07-23 PROCEDURE — 2060000000 HC ICU INTERMEDIATE R&B

## 2022-07-23 PROCEDURE — 2700000000 HC OXYGEN THERAPY PER DAY

## 2022-07-23 PROCEDURE — 85025 COMPLETE CBC W/AUTO DIFF WBC: CPT

## 2022-07-23 PROCEDURE — 83735 ASSAY OF MAGNESIUM: CPT

## 2022-07-23 PROCEDURE — 94664 DEMO&/EVAL PT USE INHALER: CPT

## 2022-07-23 PROCEDURE — 6370000000 HC RX 637 (ALT 250 FOR IP)

## 2022-07-23 PROCEDURE — 94761 N-INVAS EAR/PLS OXIMETRY MLT: CPT

## 2022-07-23 PROCEDURE — 6360000002 HC RX W HCPCS

## 2022-07-23 PROCEDURE — 2580000003 HC RX 258

## 2022-07-23 PROCEDURE — 80069 RENAL FUNCTION PANEL: CPT

## 2022-07-23 PROCEDURE — 94640 AIRWAY INHALATION TREATMENT: CPT

## 2022-07-23 PROCEDURE — 1200000000 HC SEMI PRIVATE

## 2022-07-23 RX ADMIN — PANTOPRAZOLE SODIUM 40 MG: 40 TABLET, DELAYED RELEASE ORAL at 06:40

## 2022-07-23 RX ADMIN — APIXABAN 5 MG: 5 TABLET, FILM COATED ORAL at 21:43

## 2022-07-23 RX ADMIN — ARFORMOTEROL TARTRATE 15 MCG: 15 SOLUTION RESPIRATORY (INHALATION) at 10:09

## 2022-07-23 RX ADMIN — DULOXETINE HYDROCHLORIDE 20 MG: 20 CAPSULE, DELAYED RELEASE ORAL at 09:59

## 2022-07-23 RX ADMIN — ATORVASTATIN CALCIUM 80 MG: 80 TABLET, FILM COATED ORAL at 09:59

## 2022-07-23 RX ADMIN — ACETAMINOPHEN 1000 MG: 500 TABLET ORAL at 21:44

## 2022-07-23 RX ADMIN — LOSARTAN POTASSIUM 100 MG: 100 TABLET, FILM COATED ORAL at 10:05

## 2022-07-23 RX ADMIN — ALLOPURINOL 100 MG: 100 TABLET ORAL at 09:59

## 2022-07-23 RX ADMIN — ACETAMINOPHEN 1000 MG: 500 TABLET ORAL at 10:04

## 2022-07-23 RX ADMIN — SODIUM CHLORIDE, PRESERVATIVE FREE 10 ML: 5 INJECTION INTRAVENOUS at 10:00

## 2022-07-23 RX ADMIN — SODIUM CHLORIDE, PRESERVATIVE FREE 10 ML: 5 INJECTION INTRAVENOUS at 21:45

## 2022-07-23 RX ADMIN — ACETAMINOPHEN 1000 MG: 500 TABLET ORAL at 02:44

## 2022-07-23 RX ADMIN — PREGABALIN 150 MG: 150 CAPSULE ORAL at 09:59

## 2022-07-23 RX ADMIN — METOPROLOL 100 MG: 100 TABLET ORAL at 09:58

## 2022-07-23 RX ADMIN — PREGABALIN 150 MG: 150 CAPSULE ORAL at 21:44

## 2022-07-23 RX ADMIN — DOCUSATE SODIUM 100 MG: 100 CAPSULE, LIQUID FILLED ORAL at 21:44

## 2022-07-23 RX ADMIN — CLOPIDOGREL BISULFATE 75 MG: 75 TABLET ORAL at 09:59

## 2022-07-23 RX ADMIN — ACETAMINOPHEN 1000 MG: 500 TABLET ORAL at 16:07

## 2022-07-23 RX ADMIN — APIXABAN 5 MG: 5 TABLET, FILM COATED ORAL at 09:59

## 2022-07-23 RX ADMIN — BUDESONIDE 500 MCG: 0.5 SUSPENSION RESPIRATORY (INHALATION) at 10:09

## 2022-07-23 ASSESSMENT — PAIN DESCRIPTION - DESCRIPTORS
DESCRIPTORS: ACHING;DISCOMFORT
DESCRIPTORS: BURNING
DESCRIPTORS: DISCOMFORT
DESCRIPTORS: BURNING

## 2022-07-23 ASSESSMENT — PAIN SCALES - GENERAL
PAINLEVEL_OUTOF10: 3
PAINLEVEL_OUTOF10: 3
PAINLEVEL_OUTOF10: 6
PAINLEVEL_OUTOF10: 5
PAINLEVEL_OUTOF10: 8

## 2022-07-23 ASSESSMENT — PAIN DESCRIPTION - ORIENTATION
ORIENTATION: RIGHT

## 2022-07-23 ASSESSMENT — PAIN DESCRIPTION - LOCATION
LOCATION: ANKLE
LOCATION: LEG
LOCATION: LEG

## 2022-07-23 ASSESSMENT — PAIN DESCRIPTION - ONSET: ONSET: ON-GOING

## 2022-07-23 ASSESSMENT — PAIN - FUNCTIONAL ASSESSMENT: PAIN_FUNCTIONAL_ASSESSMENT: ACTIVITIES ARE NOT PREVENTED

## 2022-07-23 ASSESSMENT — PAIN DESCRIPTION - FREQUENCY: FREQUENCY: INTERMITTENT

## 2022-07-23 ASSESSMENT — PAIN DESCRIPTION - PAIN TYPE: TYPE: ACUTE PAIN

## 2022-07-23 NOTE — PROGRESS NOTES
Vascular Surgery   Daily Progress Note  Patient: Robert Ibrahim.    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 Afebrile, HDS. Tolerating diet on 5L NC at baseline. No acute complaints. Did not get out of bed yesterday. ROS: A 14 point review of systems was conducted, significant findings as noted above. All other systems negative. OBJECTIVE:   Infusions:   sodium chloride 25 mL/hr at 07/22/22 1618      I/O:I/O last 3 completed shifts: In: 1090 [P.O.:1080; I.V.:10]  Out: 650 [Urine:650]           Wt Readings from Last 1 Encounters:   07/21/22 236 lb 3.2 oz (107.1 kg)       Exam:  /62   Pulse 61   Temp 98.1 °F (36.7 °C) (Oral)   Resp 18   Ht 5' 10\" (1.778 m)   Wt 236 lb 3.2 oz (107.1 kg)   SpO2 95%   BMI 33.89 kg/m²     General Appearance: Alert, in no apparent distress   Neuro: A&Ox3, no focal deficits  Lungs: Normal effort; no adventitious breath sounds, 5L NC (baseline 6L)  Heart: Regular rate and rhythm  Abdomen: Soft, non-tender, non-distended; no guarding, no rigidity, no rebound  Extremities: RLE with Aquacel Ag in place. Foot wrapped, dopplerable PT and DP pulses. LABS:   Recent Labs     07/21/22  0320 07/22/22  0526   WBC 6.5 8.6   HGB 9.4* 10.4*   HCT 28.1* 31.2*   MCV 94.8 94.8   * 152          Recent Labs     07/21/22  0320 07/22/22  0526    140   K 4.2 4.3    106   CO2 26 23   PHOS 2.8 2.3*   BUN 13 13   CREATININE 0.8 0.8        No results for input(s): AST, ALT, ALB, BILIDIR, BILITOT, ALKPHOS in the last 72 hours. No results for input(s): LIPASE, AMYLASE in the last 72 hours. No results for input(s): PROT, INR, APTT in the last 72 hours. No results for input(s): CKTOTAL, CKMB, CKMBINDEX, TROPONINI in the last 72 hours.     ASSESSMENT/PLAN:   This is a 81y.o. 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 POD5    - Plan for dressing change today  - Continue gen diet. - Accepted tro ARU. Awaiting precert. OK to d/c to ARU once achieved.        Akhil Griggs DO, [facility]  PGY1, General Surgery  07/23/22  6:13 AM  [employer]  [phone]

## 2022-07-24 LAB
ALBUMIN SERPL-MCNC: 2.9 G/DL (ref 3.4–5)
ANION GAP SERPL CALCULATED.3IONS-SCNC: 7 MMOL/L (ref 3–16)
BASOPHILS ABSOLUTE: 0 K/UL (ref 0–0.2)
BASOPHILS RELATIVE PERCENT: 0.5 %
BUN BLDV-MCNC: 13 MG/DL (ref 7–20)
CALCIUM SERPL-MCNC: 8.7 MG/DL (ref 8.3–10.6)
CHLORIDE BLD-SCNC: 103 MMOL/L (ref 99–110)
CO2: 29 MMOL/L (ref 21–32)
CREAT SERPL-MCNC: 0.9 MG/DL (ref 0.8–1.3)
EOSINOPHILS ABSOLUTE: 0.2 K/UL (ref 0–0.6)
EOSINOPHILS RELATIVE PERCENT: 3.9 %
GFR AFRICAN AMERICAN: >60
GFR NON-AFRICAN AMERICAN: >60
GLUCOSE BLD-MCNC: 108 MG/DL (ref 70–99)
HCT VFR BLD CALC: 28.1 % (ref 40.5–52.5)
HEMOGLOBIN: 8.9 G/DL (ref 13.5–17.5)
LYMPHOCYTES ABSOLUTE: 1.3 K/UL (ref 1–5.1)
LYMPHOCYTES RELATIVE PERCENT: 24.2 %
MAGNESIUM: 1.7 MG/DL (ref 1.8–2.4)
MCH RBC QN AUTO: 30.6 PG (ref 26–34)
MCHC RBC AUTO-ENTMCNC: 31.6 G/DL (ref 31–36)
MCV RBC AUTO: 96.7 FL (ref 80–100)
MONOCYTES ABSOLUTE: 0.5 K/UL (ref 0–1.3)
MONOCYTES RELATIVE PERCENT: 8.9 %
NEUTROPHILS ABSOLUTE: 3.3 K/UL (ref 1.7–7.7)
NEUTROPHILS RELATIVE PERCENT: 62.5 %
PDW BLD-RTO: 16.8 % (ref 12.4–15.4)
PHOSPHORUS: 3.3 MG/DL (ref 2.5–4.9)
PLATELET # BLD: 172 K/UL (ref 135–450)
PMV BLD AUTO: 9.1 FL (ref 5–10.5)
POTASSIUM SERPL-SCNC: 4.1 MMOL/L (ref 3.5–5.1)
RBC # BLD: 2.91 M/UL (ref 4.2–5.9)
SODIUM BLD-SCNC: 139 MMOL/L (ref 136–145)
WBC # BLD: 5.3 K/UL (ref 4–11)

## 2022-07-24 PROCEDURE — 83735 ASSAY OF MAGNESIUM: CPT

## 2022-07-24 PROCEDURE — 6370000000 HC RX 637 (ALT 250 FOR IP)

## 2022-07-24 PROCEDURE — 85025 COMPLETE CBC W/AUTO DIFF WBC: CPT

## 2022-07-24 PROCEDURE — 36415 COLL VENOUS BLD VENIPUNCTURE: CPT

## 2022-07-24 PROCEDURE — 94761 N-INVAS EAR/PLS OXIMETRY MLT: CPT

## 2022-07-24 PROCEDURE — 1200000000 HC SEMI PRIVATE

## 2022-07-24 PROCEDURE — 6360000002 HC RX W HCPCS

## 2022-07-24 PROCEDURE — 80069 RENAL FUNCTION PANEL: CPT

## 2022-07-24 PROCEDURE — 2700000000 HC OXYGEN THERAPY PER DAY

## 2022-07-24 PROCEDURE — 2060000000 HC ICU INTERMEDIATE R&B

## 2022-07-24 PROCEDURE — 2580000003 HC RX 258

## 2022-07-24 PROCEDURE — 94640 AIRWAY INHALATION TREATMENT: CPT

## 2022-07-24 RX ORDER — MAGNESIUM SULFATE IN WATER 40 MG/ML
4000 INJECTION, SOLUTION INTRAVENOUS ONCE
Status: COMPLETED | OUTPATIENT
Start: 2022-07-24 | End: 2022-07-24

## 2022-07-24 RX ADMIN — APIXABAN 5 MG: 5 TABLET, FILM COATED ORAL at 09:47

## 2022-07-24 RX ADMIN — ATORVASTATIN CALCIUM 80 MG: 80 TABLET, FILM COATED ORAL at 09:48

## 2022-07-24 RX ADMIN — PANTOPRAZOLE SODIUM 40 MG: 40 TABLET, DELAYED RELEASE ORAL at 06:52

## 2022-07-24 RX ADMIN — BUDESONIDE 500 MCG: 0.5 SUSPENSION RESPIRATORY (INHALATION) at 09:51

## 2022-07-24 RX ADMIN — PREGABALIN 150 MG: 150 CAPSULE ORAL at 09:47

## 2022-07-24 RX ADMIN — SODIUM CHLORIDE, PRESERVATIVE FREE 10 ML: 5 INJECTION INTRAVENOUS at 20:11

## 2022-07-24 RX ADMIN — PREGABALIN 150 MG: 150 CAPSULE ORAL at 20:10

## 2022-07-24 RX ADMIN — ARFORMOTEROL TARTRATE 15 MCG: 15 SOLUTION RESPIRATORY (INHALATION) at 09:51

## 2022-07-24 RX ADMIN — SODIUM CHLORIDE, PRESERVATIVE FREE 10 ML: 5 INJECTION INTRAVENOUS at 09:50

## 2022-07-24 RX ADMIN — ACETAMINOPHEN 1000 MG: 500 TABLET ORAL at 09:47

## 2022-07-24 RX ADMIN — MAGNESIUM SULFATE HEPTAHYDRATE 4000 MG: 40 INJECTION, SOLUTION INTRAVENOUS at 12:03

## 2022-07-24 RX ADMIN — DULOXETINE HYDROCHLORIDE 20 MG: 20 CAPSULE, DELAYED RELEASE ORAL at 09:48

## 2022-07-24 RX ADMIN — ALLOPURINOL 100 MG: 100 TABLET ORAL at 09:48

## 2022-07-24 RX ADMIN — ARFORMOTEROL TARTRATE 15 MCG: 15 SOLUTION RESPIRATORY (INHALATION) at 20:48

## 2022-07-24 RX ADMIN — ACETAMINOPHEN 1000 MG: 500 TABLET ORAL at 16:02

## 2022-07-24 RX ADMIN — BUDESONIDE 500 MCG: 0.5 SUSPENSION RESPIRATORY (INHALATION) at 20:48

## 2022-07-24 RX ADMIN — APIXABAN 5 MG: 5 TABLET, FILM COATED ORAL at 20:10

## 2022-07-24 RX ADMIN — ACETAMINOPHEN 1000 MG: 500 TABLET ORAL at 04:21

## 2022-07-24 RX ADMIN — CLOPIDOGREL BISULFATE 75 MG: 75 TABLET ORAL at 09:47

## 2022-07-24 RX ADMIN — METOPROLOL 100 MG: 100 TABLET ORAL at 09:47

## 2022-07-24 RX ADMIN — DOCUSATE SODIUM 100 MG: 100 CAPSULE, LIQUID FILLED ORAL at 20:11

## 2022-07-24 RX ADMIN — LOSARTAN POTASSIUM 100 MG: 100 TABLET, FILM COATED ORAL at 09:48

## 2022-07-24 RX ADMIN — METOPROLOL 100 MG: 100 TABLET ORAL at 20:11

## 2022-07-24 ASSESSMENT — PAIN DESCRIPTION - LOCATION
LOCATION: ANKLE
LOCATION: LEG

## 2022-07-24 ASSESSMENT — PAIN DESCRIPTION - ONSET
ONSET: ON-GOING
ONSET: ON-GOING

## 2022-07-24 ASSESSMENT — PAIN DESCRIPTION - DESCRIPTORS
DESCRIPTORS: DISCOMFORT
DESCRIPTORS: BURNING

## 2022-07-24 ASSESSMENT — PAIN DESCRIPTION - ORIENTATION
ORIENTATION: RIGHT
ORIENTATION: RIGHT

## 2022-07-24 ASSESSMENT — PAIN SCALES - GENERAL
PAINLEVEL_OUTOF10: 3
PAINLEVEL_OUTOF10: 6
PAINLEVEL_OUTOF10: 6

## 2022-07-24 ASSESSMENT — PAIN DESCRIPTION - PAIN TYPE
TYPE: SURGICAL PAIN
TYPE: ACUTE PAIN

## 2022-07-24 ASSESSMENT — PAIN DESCRIPTION - FREQUENCY
FREQUENCY: INTERMITTENT
FREQUENCY: INTERMITTENT

## 2022-07-24 NOTE — PROGRESS NOTES
Vascular Surgery   Daily Progress Note  Patient: Michel Pond.    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 Afebrile, HDS. Tolerating diet on 5L NC at baseline. No acute complaints. Resting comfortably this AM.    ROS: A 14 point review of systems was conducted, significant findings as noted above. All other systems negative. OBJECTIVE:   Infusions:   sodium chloride 25 mL/hr at 07/22/22 1618      I/O:I/O last 3 completed shifts: In: 720 [P.O.:720]  Out: 650 [Urine:650]           Wt Readings from Last 1 Encounters:   07/21/22 236 lb 3.2 oz (107.1 kg)       Exam:  /70   Pulse 60   Temp 97.7 °F (36.5 °C) (Oral)   Resp 20   Ht 5' 10\" (1.778 m)   Wt 236 lb 3.2 oz (107.1 kg)   SpO2 96%   BMI 33.89 kg/m²     General Appearance: Alert, in no apparent distress   Neuro: A&Ox3, no focal deficits  Lungs: Normal effort; no adventitious breath sounds, 5L NC (baseline 6L)  Heart: Regular rate and rhythm  Abdomen: Soft, non-tender, non-distended; no guarding, no rigidity, no rebound  Extremities: RLE with Aquacel Ag in place. Foot wrapped, dopplerable PT and DP pulses. LABS:   Recent Labs     07/22/22  0526 07/23/22  0647   WBC 8.6 5.5   HGB 10.4* 9.0*   HCT 31.2* 28.0*   MCV 94.8 96.2    158          Recent Labs     07/22/22  0526 07/23/22  0647    142   K 4.3 5.2*    108   CO2 23 26   PHOS 2.3* 3.0   BUN 13 11   CREATININE 0.8 1.0        No results for input(s): AST, ALT, ALB, BILIDIR, BILITOT, ALKPHOS in the last 72 hours. No results for input(s): LIPASE, AMYLASE in the last 72 hours. No results for input(s): PROT, INR, APTT in the last 72 hours. No results for input(s): CKTOTAL, CKMB, CKMBINDEX, TROPONINI in the last 72 hours.     ASSESSMENT/PLAN:   This is a 81y.o. 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 POD6.    - Plan for [employer] dressing change today  - Continue gen diet. - Accepted tro ARU. Awaiting precert. OK to d/c to ARU once achieved. Kyler Matthews DO, [employer]  PGY1, General Surgery  07/24/22  6:32 AM    I am post-call today and will not be on campus. Please contact Dr. Faith Ivory at [phone] or Dr. Kaylyn Mckeon at [phone] for questions or concerns regarding this patient.

## 2022-07-25 ENCOUNTER — HOSPITAL ENCOUNTER (INPATIENT)
Age: 72
LOS: 5 days | Discharge: HOME HEALTH | DRG: 949 | End: 2022-07-30
Attending: PHYSICAL MEDICINE & REHABILITATION | Admitting: PHYSICAL MEDICINE & REHABILITATION
Payer: MEDICARE

## 2022-07-25 VITALS
SYSTOLIC BLOOD PRESSURE: 147 MMHG | TEMPERATURE: 97.6 F | HEART RATE: 57 BPM | RESPIRATION RATE: 18 BRPM | DIASTOLIC BLOOD PRESSURE: 80 MMHG | OXYGEN SATURATION: 98 % | BODY MASS INDEX: 32.78 KG/M2 | WEIGHT: 229 LBS | HEIGHT: 70 IN

## 2022-07-25 DIAGNOSIS — R53.81 DEBILITY: Primary | ICD-10-CM

## 2022-07-25 DIAGNOSIS — S91.104S: ICD-10-CM

## 2022-07-25 LAB
ALBUMIN SERPL-MCNC: 3.1 G/DL (ref 3.4–5)
ANION GAP SERPL CALCULATED.3IONS-SCNC: 5 MMOL/L (ref 3–16)
BASOPHILS ABSOLUTE: 0.1 K/UL (ref 0–0.2)
BASOPHILS RELATIVE PERCENT: 1.2 %
BUN BLDV-MCNC: 15 MG/DL (ref 7–20)
CALCIUM SERPL-MCNC: 8.7 MG/DL (ref 8.3–10.6)
CHLORIDE BLD-SCNC: 108 MMOL/L (ref 99–110)
CO2: 29 MMOL/L (ref 21–32)
CREAT SERPL-MCNC: 0.9 MG/DL (ref 0.8–1.3)
EOSINOPHILS ABSOLUTE: 0.2 K/UL (ref 0–0.6)
EOSINOPHILS RELATIVE PERCENT: 4.5 %
GFR AFRICAN AMERICAN: >60
GFR NON-AFRICAN AMERICAN: >60
GLUCOSE BLD-MCNC: 100 MG/DL (ref 70–99)
HCT VFR BLD CALC: 27.9 % (ref 40.5–52.5)
HEMOGLOBIN: 9.1 G/DL (ref 13.5–17.5)
LYMPHOCYTES ABSOLUTE: 1.3 K/UL (ref 1–5.1)
LYMPHOCYTES RELATIVE PERCENT: 25 %
MAGNESIUM: 1.8 MG/DL (ref 1.8–2.4)
MCH RBC QN AUTO: 31.3 PG (ref 26–34)
MCHC RBC AUTO-ENTMCNC: 32.5 G/DL (ref 31–36)
MCV RBC AUTO: 96.1 FL (ref 80–100)
MONOCYTES ABSOLUTE: 0.4 K/UL (ref 0–1.3)
MONOCYTES RELATIVE PERCENT: 7.2 %
NEUTROPHILS ABSOLUTE: 3.3 K/UL (ref 1.7–7.7)
NEUTROPHILS RELATIVE PERCENT: 62.1 %
PDW BLD-RTO: 17.3 % (ref 12.4–15.4)
PHOSPHORUS: 4 MG/DL (ref 2.5–4.9)
PLATELET # BLD: 183 K/UL (ref 135–450)
PMV BLD AUTO: 8.9 FL (ref 5–10.5)
POTASSIUM SERPL-SCNC: 4.8 MMOL/L (ref 3.5–5.1)
RBC # BLD: 2.91 M/UL (ref 4.2–5.9)
SARS-COV-2, NAAT: NOT DETECTED
SODIUM BLD-SCNC: 142 MMOL/L (ref 136–145)
WBC # BLD: 5.3 K/UL (ref 4–11)

## 2022-07-25 PROCEDURE — 94640 AIRWAY INHALATION TREATMENT: CPT

## 2022-07-25 PROCEDURE — 85025 COMPLETE CBC W/AUTO DIFF WBC: CPT

## 2022-07-25 PROCEDURE — 99232 SBSQ HOSP IP/OBS MODERATE 35: CPT | Performed by: PHYSICAL MEDICINE & REHABILITATION

## 2022-07-25 PROCEDURE — 6370000000 HC RX 637 (ALT 250 FOR IP)

## 2022-07-25 PROCEDURE — 83735 ASSAY OF MAGNESIUM: CPT

## 2022-07-25 PROCEDURE — 6360000002 HC RX W HCPCS

## 2022-07-25 PROCEDURE — 2580000003 HC RX 258

## 2022-07-25 PROCEDURE — 6360000002 HC RX W HCPCS: Performed by: PHYSICAL MEDICINE & REHABILITATION

## 2022-07-25 PROCEDURE — 87635 SARS-COV-2 COVID-19 AMP PRB: CPT

## 2022-07-25 PROCEDURE — 1280000000 HC REHAB R&B

## 2022-07-25 PROCEDURE — 6370000000 HC RX 637 (ALT 250 FOR IP): Performed by: PHYSICAL MEDICINE & REHABILITATION

## 2022-07-25 PROCEDURE — 2700000000 HC OXYGEN THERAPY PER DAY

## 2022-07-25 PROCEDURE — 97116 GAIT TRAINING THERAPY: CPT

## 2022-07-25 PROCEDURE — 80069 RENAL FUNCTION PANEL: CPT

## 2022-07-25 PROCEDURE — 94150 VITAL CAPACITY TEST: CPT

## 2022-07-25 PROCEDURE — 97530 THERAPEUTIC ACTIVITIES: CPT

## 2022-07-25 PROCEDURE — 2580000003 HC RX 258: Performed by: PHYSICAL MEDICINE & REHABILITATION

## 2022-07-25 PROCEDURE — 36415 COLL VENOUS BLD VENIPUNCTURE: CPT

## 2022-07-25 PROCEDURE — 94761 N-INVAS EAR/PLS OXIMETRY MLT: CPT

## 2022-07-25 RX ORDER — METOPROLOL TARTRATE 100 MG/1
100 TABLET ORAL 2 TIMES DAILY
Status: CANCELLED | OUTPATIENT
Start: 2022-07-25

## 2022-07-25 RX ORDER — OXYCODONE HYDROCHLORIDE 5 MG/1
5 TABLET ORAL EVERY 4 HOURS PRN
Status: DISCONTINUED | OUTPATIENT
Start: 2022-07-25 | End: 2022-07-30 | Stop reason: HOSPADM

## 2022-07-25 RX ORDER — OXYCODONE HYDROCHLORIDE 5 MG/1
10 TABLET ORAL EVERY 4 HOURS PRN
Status: CANCELLED | OUTPATIENT
Start: 2022-07-25

## 2022-07-25 RX ORDER — LOSARTAN POTASSIUM 100 MG/1
100 TABLET ORAL DAILY
Status: DISCONTINUED | OUTPATIENT
Start: 2022-07-26 | End: 2022-07-30 | Stop reason: HOSPADM

## 2022-07-25 RX ORDER — OXYCODONE HYDROCHLORIDE 5 MG/1
5 TABLET ORAL EVERY 6 HOURS PRN
Qty: 28 TABLET | Refills: 0 | Status: ON HOLD | OUTPATIENT
Start: 2022-07-25 | End: 2022-07-29 | Stop reason: HOSPADM

## 2022-07-25 RX ORDER — POLYETHYLENE GLYCOL 3350 17 G/17G
17 POWDER, FOR SOLUTION ORAL DAILY PRN
Status: CANCELLED | OUTPATIENT
Start: 2022-07-25

## 2022-07-25 RX ORDER — OXYCODONE HYDROCHLORIDE 5 MG/1
5 TABLET ORAL EVERY 4 HOURS PRN
Status: CANCELLED | OUTPATIENT
Start: 2022-07-25

## 2022-07-25 RX ORDER — BUDESONIDE 0.5 MG/2ML
0.5 INHALANT ORAL 2 TIMES DAILY
Status: CANCELLED | OUTPATIENT
Start: 2022-07-25

## 2022-07-25 RX ORDER — SODIUM CHLORIDE 9 MG/ML
INJECTION, SOLUTION INTRAVENOUS PRN
Status: DISCONTINUED | OUTPATIENT
Start: 2022-07-25 | End: 2022-07-30 | Stop reason: HOSPADM

## 2022-07-25 RX ORDER — ATORVASTATIN CALCIUM 80 MG/1
80 TABLET, FILM COATED ORAL DAILY
Status: DISCONTINUED | OUTPATIENT
Start: 2022-07-25 | End: 2022-07-30 | Stop reason: HOSPADM

## 2022-07-25 RX ORDER — PANTOPRAZOLE SODIUM 40 MG/1
40 TABLET, DELAYED RELEASE ORAL
Status: CANCELLED | OUTPATIENT
Start: 2022-07-26

## 2022-07-25 RX ORDER — SODIUM CHLORIDE 0.9 % (FLUSH) 0.9 %
5-40 SYRINGE (ML) INJECTION PRN
Status: DISCONTINUED | OUTPATIENT
Start: 2022-07-25 | End: 2022-07-30 | Stop reason: HOSPADM

## 2022-07-25 RX ORDER — METOPROLOL TARTRATE 100 MG/1
100 TABLET ORAL 2 TIMES DAILY
Status: DISCONTINUED | OUTPATIENT
Start: 2022-07-25 | End: 2022-07-30 | Stop reason: HOSPADM

## 2022-07-25 RX ORDER — POLYETHYLENE GLYCOL 3350 17 G/17G
17 POWDER, FOR SOLUTION ORAL DAILY
Status: DISCONTINUED | OUTPATIENT
Start: 2022-07-26 | End: 2022-07-30 | Stop reason: HOSPADM

## 2022-07-25 RX ORDER — SODIUM CHLORIDE 0.9 % (FLUSH) 0.9 %
5-40 SYRINGE (ML) INJECTION EVERY 12 HOURS SCHEDULED
Status: CANCELLED | OUTPATIENT
Start: 2022-07-25

## 2022-07-25 RX ORDER — CLOPIDOGREL BISULFATE 75 MG/1
75 TABLET ORAL DAILY
Status: CANCELLED | OUTPATIENT
Start: 2022-07-26

## 2022-07-25 RX ORDER — ALBUTEROL SULFATE 2.5 MG/3ML
2.5 SOLUTION RESPIRATORY (INHALATION) EVERY 6 HOURS PRN
Status: DISCONTINUED | OUTPATIENT
Start: 2022-07-25 | End: 2022-07-30 | Stop reason: HOSPADM

## 2022-07-25 RX ORDER — ONDANSETRON 2 MG/ML
4 INJECTION INTRAMUSCULAR; INTRAVENOUS EVERY 6 HOURS PRN
Status: CANCELLED | OUTPATIENT
Start: 2022-07-25

## 2022-07-25 RX ORDER — SODIUM CHLORIDE 0.9 % (FLUSH) 0.9 %
5-40 SYRINGE (ML) INJECTION PRN
Status: CANCELLED | OUTPATIENT
Start: 2022-07-25

## 2022-07-25 RX ORDER — POLYETHYLENE GLYCOL 3350 17 G/17G
17 POWDER, FOR SOLUTION ORAL DAILY
Status: CANCELLED | OUTPATIENT
Start: 2022-07-26

## 2022-07-25 RX ORDER — POLYETHYLENE GLYCOL 3350 17 G/17G
17 POWDER, FOR SOLUTION ORAL DAILY
Qty: 7 EACH | Refills: 1 | Status: ON HOLD | OUTPATIENT
Start: 2022-07-26 | End: 2022-07-25

## 2022-07-25 RX ORDER — ALBUTEROL SULFATE 2.5 MG/3ML
2.5 SOLUTION RESPIRATORY (INHALATION) EVERY 6 HOURS PRN
Status: CANCELLED | OUTPATIENT
Start: 2022-07-25

## 2022-07-25 RX ORDER — ATORVASTATIN CALCIUM 80 MG/1
80 TABLET, FILM COATED ORAL DAILY
Status: CANCELLED | OUTPATIENT
Start: 2022-07-25

## 2022-07-25 RX ORDER — CLOPIDOGREL BISULFATE 75 MG/1
75 TABLET ORAL DAILY
Status: DISCONTINUED | OUTPATIENT
Start: 2022-07-26 | End: 2022-07-30 | Stop reason: HOSPADM

## 2022-07-25 RX ORDER — MAGNESIUM SULFATE IN WATER 40 MG/ML
2000 INJECTION, SOLUTION INTRAVENOUS ONCE
Status: COMPLETED | OUTPATIENT
Start: 2022-07-25 | End: 2022-07-25

## 2022-07-25 RX ORDER — DULOXETIN HYDROCHLORIDE 20 MG/1
20 CAPSULE, DELAYED RELEASE ORAL DAILY
Status: DISCONTINUED | OUTPATIENT
Start: 2022-07-26 | End: 2022-07-30 | Stop reason: HOSPADM

## 2022-07-25 RX ORDER — PREGABALIN 75 MG/1
150 CAPSULE ORAL 2 TIMES DAILY
Status: DISCONTINUED | OUTPATIENT
Start: 2022-07-25 | End: 2022-07-30 | Stop reason: HOSPADM

## 2022-07-25 RX ORDER — ONDANSETRON 2 MG/ML
4 INJECTION INTRAMUSCULAR; INTRAVENOUS EVERY 6 HOURS PRN
Status: DISCONTINUED | OUTPATIENT
Start: 2022-07-25 | End: 2022-07-30 | Stop reason: HOSPADM

## 2022-07-25 RX ORDER — PANTOPRAZOLE SODIUM 40 MG/1
40 TABLET, DELAYED RELEASE ORAL
Status: DISCONTINUED | OUTPATIENT
Start: 2022-07-26 | End: 2022-07-30 | Stop reason: HOSPADM

## 2022-07-25 RX ORDER — ALLOPURINOL 100 MG/1
100 TABLET ORAL DAILY
Status: CANCELLED | OUTPATIENT
Start: 2022-07-26

## 2022-07-25 RX ORDER — DOCUSATE SODIUM 100 MG/1
100 CAPSULE, LIQUID FILLED ORAL 2 TIMES DAILY
Status: DISCONTINUED | OUTPATIENT
Start: 2022-07-25 | End: 2022-07-30 | Stop reason: HOSPADM

## 2022-07-25 RX ORDER — LOSARTAN POTASSIUM 50 MG/1
100 TABLET ORAL DAILY
Status: CANCELLED | OUTPATIENT
Start: 2022-07-26

## 2022-07-25 RX ORDER — SODIUM CHLORIDE 9 MG/ML
INJECTION, SOLUTION INTRAVENOUS PRN
Status: CANCELLED | OUTPATIENT
Start: 2022-07-25

## 2022-07-25 RX ORDER — OXYCODONE HYDROCHLORIDE 5 MG/1
10 TABLET ORAL EVERY 4 HOURS PRN
Status: DISCONTINUED | OUTPATIENT
Start: 2022-07-25 | End: 2022-07-30 | Stop reason: HOSPADM

## 2022-07-25 RX ORDER — ACETAMINOPHEN 500 MG
1000 TABLET ORAL EVERY 6 HOURS
Status: CANCELLED | OUTPATIENT
Start: 2022-07-25

## 2022-07-25 RX ORDER — ARFORMOTEROL TARTRATE 15 UG/2ML
15 SOLUTION RESPIRATORY (INHALATION) 2 TIMES DAILY
Status: CANCELLED | OUTPATIENT
Start: 2022-07-25

## 2022-07-25 RX ORDER — POLYETHYLENE GLYCOL 3350 17 G/17G
17 POWDER, FOR SOLUTION ORAL DAILY PRN
Status: DISCONTINUED | OUTPATIENT
Start: 2022-07-25 | End: 2022-07-30 | Stop reason: HOSPADM

## 2022-07-25 RX ORDER — ACETAMINOPHEN 500 MG
1000 TABLET ORAL EVERY 6 HOURS
Status: DISCONTINUED | OUTPATIENT
Start: 2022-07-25 | End: 2022-07-30 | Stop reason: HOSPADM

## 2022-07-25 RX ORDER — ALLOPURINOL 100 MG/1
100 TABLET ORAL DAILY
Status: DISCONTINUED | OUTPATIENT
Start: 2022-07-26 | End: 2022-07-30 | Stop reason: HOSPADM

## 2022-07-25 RX ORDER — ONDANSETRON 4 MG/1
4 TABLET, ORALLY DISINTEGRATING ORAL EVERY 8 HOURS PRN
Status: DISCONTINUED | OUTPATIENT
Start: 2022-07-25 | End: 2022-07-30 | Stop reason: HOSPADM

## 2022-07-25 RX ORDER — PREGABALIN 150 MG/1
150 CAPSULE ORAL 2 TIMES DAILY
Status: CANCELLED | OUTPATIENT
Start: 2022-07-25

## 2022-07-25 RX ORDER — DULOXETIN HYDROCHLORIDE 20 MG/1
20 CAPSULE, DELAYED RELEASE ORAL DAILY
Status: CANCELLED | OUTPATIENT
Start: 2022-07-26

## 2022-07-25 RX ORDER — PSEUDOEPHEDRINE HCL 30 MG
100 TABLET ORAL 2 TIMES DAILY
Qty: 14 CAPSULE | Refills: 1 | Status: ON HOLD | OUTPATIENT
Start: 2022-07-25 | End: 2022-07-25

## 2022-07-25 RX ORDER — SODIUM CHLORIDE 0.9 % (FLUSH) 0.9 %
5-40 SYRINGE (ML) INJECTION EVERY 12 HOURS SCHEDULED
Status: DISCONTINUED | OUTPATIENT
Start: 2022-07-25 | End: 2022-07-30 | Stop reason: HOSPADM

## 2022-07-25 RX ORDER — ARFORMOTEROL TARTRATE 15 UG/2ML
15 SOLUTION RESPIRATORY (INHALATION) 2 TIMES DAILY
Status: DISCONTINUED | OUTPATIENT
Start: 2022-07-25 | End: 2022-07-30 | Stop reason: HOSPADM

## 2022-07-25 RX ORDER — BUDESONIDE 0.5 MG/2ML
0.5 INHALANT ORAL 2 TIMES DAILY
Status: DISCONTINUED | OUTPATIENT
Start: 2022-07-25 | End: 2022-07-30 | Stop reason: HOSPADM

## 2022-07-25 RX ORDER — ONDANSETRON 4 MG/1
4 TABLET, ORALLY DISINTEGRATING ORAL EVERY 8 HOURS PRN
Status: CANCELLED | OUTPATIENT
Start: 2022-07-25

## 2022-07-25 RX ORDER — DOCUSATE SODIUM 100 MG/1
100 CAPSULE, LIQUID FILLED ORAL 2 TIMES DAILY
Status: CANCELLED | OUTPATIENT
Start: 2022-07-25

## 2022-07-25 RX ADMIN — BUDESONIDE 500 MCG: 0.5 SUSPENSION RESPIRATORY (INHALATION) at 19:38

## 2022-07-25 RX ADMIN — PANTOPRAZOLE SODIUM 40 MG: 40 TABLET, DELAYED RELEASE ORAL at 06:30

## 2022-07-25 RX ADMIN — DULOXETINE HYDROCHLORIDE 20 MG: 20 CAPSULE, DELAYED RELEASE ORAL at 08:57

## 2022-07-25 RX ADMIN — MAGNESIUM SULFATE HEPTAHYDRATE 2000 MG: 40 INJECTION, SOLUTION INTRAVENOUS at 06:35

## 2022-07-25 RX ADMIN — SODIUM CHLORIDE, PRESERVATIVE FREE 10 ML: 5 INJECTION INTRAVENOUS at 08:57

## 2022-07-25 RX ADMIN — CLOPIDOGREL BISULFATE 75 MG: 75 TABLET ORAL at 08:57

## 2022-07-25 RX ADMIN — METOPROLOL 100 MG: 100 TABLET ORAL at 08:56

## 2022-07-25 RX ADMIN — ARFORMOTEROL TARTRATE 15 MCG: 15 SOLUTION RESPIRATORY (INHALATION) at 08:01

## 2022-07-25 RX ADMIN — ALLOPURINOL 100 MG: 100 TABLET ORAL at 08:57

## 2022-07-25 RX ADMIN — SODIUM CHLORIDE, PRESERVATIVE FREE 10 ML: 5 INJECTION INTRAVENOUS at 20:53

## 2022-07-25 RX ADMIN — APIXABAN 5 MG: 5 TABLET, FILM COATED ORAL at 20:53

## 2022-07-25 RX ADMIN — ARFORMOTEROL TARTRATE 15 MCG: 15 SOLUTION RESPIRATORY (INHALATION) at 19:38

## 2022-07-25 RX ADMIN — PREGABALIN 150 MG: 75 CAPSULE ORAL at 20:53

## 2022-07-25 RX ADMIN — METOPROLOL TARTRATE 100 MG: 100 TABLET, FILM COATED ORAL at 20:52

## 2022-07-25 RX ADMIN — ACETAMINOPHEN 1000 MG: 500 TABLET ORAL at 15:08

## 2022-07-25 RX ADMIN — ACETAMINOPHEN 1000 MG: 500 TABLET ORAL at 03:05

## 2022-07-25 RX ADMIN — DOCUSATE SODIUM 100 MG: 100 CAPSULE, LIQUID FILLED ORAL at 08:56

## 2022-07-25 RX ADMIN — LOSARTAN POTASSIUM 100 MG: 100 TABLET, FILM COATED ORAL at 08:56

## 2022-07-25 RX ADMIN — ACETAMINOPHEN 1000 MG: 500 TABLET ORAL at 20:52

## 2022-07-25 RX ADMIN — DOCUSATE SODIUM 100 MG: 100 CAPSULE, LIQUID FILLED ORAL at 20:52

## 2022-07-25 RX ADMIN — APIXABAN 5 MG: 5 TABLET, FILM COATED ORAL at 08:57

## 2022-07-25 RX ADMIN — PREGABALIN 150 MG: 150 CAPSULE ORAL at 08:56

## 2022-07-25 RX ADMIN — BUDESONIDE 500 MCG: 0.5 SUSPENSION RESPIRATORY (INHALATION) at 08:01

## 2022-07-25 RX ADMIN — ATORVASTATIN CALCIUM 80 MG: 80 TABLET, FILM COATED ORAL at 20:53

## 2022-07-25 RX ADMIN — ACETAMINOPHEN 1000 MG: 500 TABLET ORAL at 08:56

## 2022-07-25 ASSESSMENT — PAIN SCALES - GENERAL
PAINLEVEL_OUTOF10: 6
PAINLEVEL_OUTOF10: 0
PAINLEVEL_OUTOF10: 8
PAINLEVEL_OUTOF10: 7
PAINLEVEL_OUTOF10: 0
PAINLEVEL_OUTOF10: 6
PAINLEVEL_OUTOF10: 0

## 2022-07-25 ASSESSMENT — PAIN DESCRIPTION - ORIENTATION
ORIENTATION: RIGHT

## 2022-07-25 ASSESSMENT — PAIN DESCRIPTION - LOCATION
LOCATION: GROIN;LEG
LOCATION: ANKLE

## 2022-07-25 ASSESSMENT — PAIN DESCRIPTION - DESCRIPTORS
DESCRIPTORS: BURNING

## 2022-07-25 ASSESSMENT — PAIN DESCRIPTION - FREQUENCY: FREQUENCY: INTERMITTENT

## 2022-07-25 ASSESSMENT — PAIN DESCRIPTION - ONSET: ONSET: ON-GOING

## 2022-07-25 ASSESSMENT — PAIN DESCRIPTION - PAIN TYPE: TYPE: SURGICAL PAIN

## 2022-07-25 NOTE — DISCHARGE INSTR - COC
Continuity of Care Form    Patient Name: Krista Garza    :  1950  MRN:  [id number]    Admit date:  2022  Discharge date:  ***    Code Status Order: Full Code   Advance Directives:   Advance Care Flowsheet Documentation       Date/Time Healthcare Directive Type of Healthcare Directive Copy in [address] Agent's Name Healthcare Agent's Phone Number    22 [phone] Yes, patient has an advance directive for healthcare treatment Health care treatment directive No, copy requested from [facility] of  -- --            Admitting Physician:  Shey Castillo MD  PCP: Patty Ordoñez DO    Discharging Nurse: [facility] Unit/Room#: 3134/7507-10  Discharging Unit Phone Number: ***    Emergency Contact:   Extended Emergency Contact Information  Primary Emergency Contact: Aaron Guzman   [address] Phone: [phone]  Relation: Child    Past Surgical History:  Past Surgical History:   Procedure Laterality Date    APPENDECTOMY      CHOLECYSTECTOMY, LAPAROSCOPIC      COLONOSCOPY  2021    COLONOSCOPY POLYPECTOMY SNARE/COLD BIOPSY performed by Xochilt Rodriguez MD at [place] [address]  2021    COLONOSCOPY POLYPECTOMY ABLATION performed by Xochilt Rodriguez MD at [place] [address]  2021    COLONOSCOPY CONTROL HEMORRHAGE performed by Xochilt Rodriguez MD at [facility]  2013    EYE SURGERY Left     FEMORAL BYPASS Right 2022    RIGHT FEMORAL BELOW KNEE POPLITEAL ARTERY BYPASS WITH IN SITU SAPHENOUS VEIN performed by Shey Castillo MD at [address] Right 2021    RIGHT FEMORAL ENDARTERECTOMY  RIGHT EXTERNAL ILIAC TO PROFUNDA FEMORAL BYPASS WITH 8MM PTFE GRAFT RIGHT LOWER EXTREMITY ARTERIOGRAM BALLOON ANGIOPLASTY RIGHT PROFUNDA BALLOON ANGIOPLASTY AND STENT OF RIGHT EXTERNAL ILIAC ARTERY performed by Shey Castillo MD at [facility] OR    LAMINECTOMY  11/18/2015    Bilateral decompressive lumbar laminectomy L4-5   ; medial facetectomy L4-5 joints  bilaterally; foraminotomies l5   nerve roots bilaterally    LEG DEBRIDEMENT Right 7/23/2013    Dr. Mitra Hall - pretibial wound    OTHER SURGICAL HISTORY Right 6/25/2013    Dr. Mitra Hall - CFA Endarterectomy w/marsupialization; RLE wound excision & debridement     OTHER SURGICAL HISTORY Left 8/27/2013    Dr. Mitra Hall - femoral & profunda femoris endarterectomy w/marsupialization patch angioplasty using SFA    SKIN SPLIT GRAFT Right 7/25/2013    Dr. Mitra Hall - to non-healing R pretibial wound, 9 x 15 cm    TOTAL HIP ARTHROPLASTY Right 09/01/2016    Dr. Marycruz Moreno Left 12/22/2015    Dr. Mitra Hall - CFA exploration, thrombectomy of ileofemoral system, stenting of RAFAL in-stent stenosis w/8 x 37 mm Palmaz        Immunization History:   Immunization History   Administered Date(s) Administered    COVID-19, PFIZER PURPLE top, DILUTE for use, (age 15 y+), 30mcg/0.3mL 03/17/2021, 04/07/2021, 11/01/2021    Influenza 10/14/2013    Influenza Vaccine, unspecified formulation 10/14/2013, 10/22/2015    Influenza Virus Vaccine 10/14/2013, 10/14/2014, 10/22/2015    Influenza, High Dose (Fluzone 65 yrs and older) 10/22/2015, 11/18/2016, 08/17/2017, 08/30/2018, 10/17/2019, 09/14/2020    Influenza, Quadv, adjuvanted, 65 yrs +, IM, PF (Fluad) 09/24/2021    Pneumococcal Conjugate 13-valent ([id number]) 10/22/2015    Pneumococcal Conjugate 7-valent (Prevnar7) 03/01/2013    Pneumococcal Polysaccharide ([id number]) 01/03/2018    Tdap (Boostrix, Adacel) 10/14/2013    Zoster Live (Zostavax) 06/07/2014    Zoster Recombinant (Shingrix) 05/08/2018       Active Problems:  Patient Active Problem List   Diagnosis Code    Essential hypertension I10    Hyperlipemia E78.5    Degeneration of intervertebral disc of lumbar region M51.36    KAVITA and COPD overlap syndrome (HCC) G47.33, J44.9    PVD (peripheral vascular disease) ([facility] I73.9    Coronary artery disease involving native coronary artery of native heart without angina pectoris I25.10    Tobacco abuse Z72.0    Idiopathic peripheral neuropathy G60.9    Gastroesophageal reflux disease K21.9    Other spondylosis, lumbosacral region M47.897    Cigarette nicotine dependence without complication Z37.734    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 L97.912    Open wound of right lower leg S81.801A    Open wound of fifth toe of right foot S91.104A    Peripheral artery disease (HCC) I73.9    Peripheral arterial disease ([facility]) I73.9       Isolation/Infection:   Isolation            No Isolation          Patient Infection Status       Infection Onset Added Last Indicated Last Indicated By Review Planned Expiration Resolved Resolved By    None active    Resolved    COVID-19 (Rule Out) 12/05/20 12/05/20 12/05/20 COVID-19 (Ordered)   12/06/20 Rule-Out Test Resulted            Nurse Assessment:  Last Vital Signs: BP (!) 150/65   Pulse 62   Temp 98.7 °F (37.1 °C) (Oral)   Resp 21   Ht 5' 10\" (1.778 m)   Wt 229 lb (103.9 kg)   SpO2 97%   BMI 32.86 kg/m²     Last documented pain score (0-10 scale): Pain Level: 0  Last Weight:   Wt Readings from Last 1 Encounters:   07/25/22 229 lb (103.9 kg)     Mental Status:  {IP PT MENTAL STATUS:24537}    IV Access:  {[facility] IV ACCESS:476888532}    Nursing Mobility/ADLs:  Walking   {[facility] DME HFYJ:640199204}  Transfer  {[facility] DME VMCR:007747100}  Bathing  {[facility] DME ROME:333136356}  Dressing  {[facility] DME SISO:469492669}  Toileting  {[facility] DME DXQT:441145593}  Feeding  {[facility] DME WHTQ:616499422}  Med Admin  {[facility] DME NBL}  Med Delivery   {[facility] MED Delivery:920788697}    Wound Care Documentation and Therapy:  Wound 11/15/21 Leg Distal;Right #1 (Active)   Wound Image   22 1343   Dressing Status Old drainage noted 22 0854   Wound Cleansed Cleansed with saline 22 0930   Dressing/Treatment Alginate with Ag 22 0854   Wound Length (cm) 5.5 cm 22 1310   Wound Width (cm) 2.5 cm 22 1310   Wound Depth (cm) 0.1 cm 22 1310   Wound Surface Area (cm^2) 13.75 cm^2 22 1310   Change in Wound Size % (l*w) -472.92 22 1310   Wound Volume (cm^3) 1.375 cm^3 22 1310   Wound Healing % -473 22 1310   Post-Procedure Length (cm) 5.5 cm 22 1337   Post-Procedure Width (cm) 2.5 cm 22 1337   Post-Procedure Depth (cm) 0.1 cm 22 1337   Post-Procedure Surface Area (cm^2) 13.75 cm^2 22 1337   Post-Procedure Volume (cm^3) 1.375 cm^3 22 1337   Distance Tunneling (cm) 0 cm 22 1310   Tunneling Position ___ O'Clock 0 22 1310   Undermining Starts ___ O'Clock 0 22 1310   Undermining Ends___ O'Clock 0 22 1310   Undermining Maxium Distance (cm) 0 22 1310   Wound Assessment Slough 22 1310   Drainage Amount Moderate 22 0930   Drainage Description Brown 22 0930   Odor None 22 0930   Catrachita-wound Assessment Edematous 22 0930   Margins Defined edges 22 1310   Wound Thickness Description not for Pressure Injury Full thickness 22 1310   Number of days: 251       Wound 22 Ankle Right #2 (Active)   Wound Image   22 1343   Dressing Status Clean;Dry; Intact 22 0854   Wound Cleansed Cleansed with saline 22 0400   Dressing/Treatment Alginate with Ag 22 0854   Wound Length (cm) 0.5 cm 22 1310   Wound Width (cm) 0.5 cm 22 1310   Wound Depth (cm) 0.1 cm 22 1310   Wound Surface Area (cm^2) 0.25 cm^2 07/11/22 1310   Change in Wound Size % (l*w) -66.67 07/11/22 1310   Wound Volume (cm^3) 0.025 cm^3 07/11/22 1310   Wound Healing % -67 07/11/22 1310   Post-Procedure Length (cm) 0.5 cm 07/11/22 1337   Post-Procedure Width (cm) 0.5 cm 07/11/22 1337   Post-Procedure Depth (cm) 0.1 cm 07/11/22 1337   Post-Procedure Surface Area (cm^2) 0.25 cm^2 07/11/22 1337   Post-Procedure Volume (cm^3) 0.025 cm^3 07/11/22 1337   Distance Tunneling (cm) 0 cm 07/11/22 1310   Tunneling Position ___ O'Clock 0 07/11/22 1310   Undermining Starts ___ O'Clock 0 07/11/22 1310   Undermining Ends___ O'Clock 0 07/11/22 1310   Undermining Maxium Distance (cm) 0 07/11/22 1310   Wound Assessment Other (Comment) 07/22/22 0930   Drainage Amount None 07/23/22 1803   Drainage Description Other (Comment) 07/22/22 0400   Odor None 07/22/22 0930   Catrachita-wound Assessment Intact 07/20/22 0400   Margins Defined edges 07/11/22 1310   Wound Thickness Description not for Pressure Injury Full thickness 07/11/22 1310   Number of days: 74       Incision 07/18/22 Thigh Anterior;Proximal;Right (Active)   Dressing Status Dry; Intact 07/25/22 0854   Dressing/Treatment Other (comment) 07/22/22 0930   Drainage Amount None 07/25/22 0854   Drainage Description Serosanguinous 07/22/22 0930   Odor None 07/22/22 0930   Number of days: 7       Incision 07/18/22 Pretibial Right;Medial (Active)   Dressing Status Clean;Dry; Intact 07/25/22 0854   Dressing/Treatment Other (comment) 07/22/22 0930   Drainage Amount None 07/25/22 0854   Odor None 07/22/22 0930   Number of days: 7        Elimination:  Continence:    Bowel: {YES / MR:62513}  Bladder: {YES / JJ:18131}  Urinary Catheter: {Urinary Catheter:937031473}   Colostomy/Ileostomy/Ileal Conduit: {YES / VT:25421}       Date of Last BM: ***    Intake/Output Summary (Last 24 hours) at 7/25/2022 1101  Last data filed at 7/25/2022 0945  Gross per 24 hour   Intake 620 ml   Output 1575 ml   Net -955 ml     I/O last 3 completed shifts: In: 1055 [P.O.:1055]  Out: 1875 [Urine:1875]    Safety Concerns:     [facility] Safety Concerns:522467961}    Impairments/Disabilities:      [facility] Impairments/Disabilities:575493039}    Nutrition Therapy:  Current Nutrition Therapy:   [facility] Diet List:676730245}    Routes of Feeding: {[facility] DME Other Feedings:603718530}  Liquids: {Slp liquid thickness:37171}  Daily Fluid Restriction: {[facility] DME Yes amt example:304240971}  Last Modified Barium Swallow with Video (Video Swallowing Test): {Done Not Done KJTU:505722476}    Treatments at the Time of Hospital Discharge:   Respiratory Treatments: ***  Oxygen Therapy:  {Therapy; copd oxygen:29003}  Ventilator:    { CC Vent CXCZ:171299641}    Rehab Therapies: {THERAPEUTIC INTERVENTION:2489465707}  Weight Bearing Status/Restrictions: [address] [facility] Weight Bearin}  Other Medical Equipment (for information only, NOT a DME order):  {EQUIPMENT:585073369}  Other Treatments: ***    Patient's personal belongings (please select all that are sent with patient):  {[facility] DME Belongings:884721144}    RN SIGNATURE:  {Esignature:886521203}    CASE MANAGEMENT/SOCIAL WORK SECTION    Inpatient Status Date: ***    Readmission Risk Assessment Score:  Readmission Risk              Risk of Unplanned Readmission:  [id number]           Discharging to Facility/ Agency   Name:   Address:  Phone:  Fax:    Dialysis Facility (if applicable)   Name:  Address:  Dialysis Schedule:  Phone:  Fax:    / signature: {Esignature:239212819}    PHYSICIAN SECTION    Prognosis: Good    Condition at Discharge: Stable    Rehab Potential (if transferring to Rehab): Good    Recommended Labs or Other Treatments After Discharge:   Change CoFlex TLC dressing every-other day.    - Remove previous dressing, evaluate wound, cleanse with sterile saline, pat dry   - Apply Adaptic dressing to area of denuded skin   - Apply CoFlex TLC dressing per 's recommendation     Cleanse medial leg wounds daily with soap and water. Apply betadine to incisions. Keep groin aquacel Ag dressing in place for 3-5 days. After dressing is removed, cleanse with soap and water and apply betadine to incision daily. Follow-up with Dr. Linnea Garcia in 2 weeks for staple removal. IF not still in house. Please call [phone] to make an appointment. Physician Certification: I certify the above information and transfer of Karen Cabello.  is necessary for the continuing treatment of the diagnosis listed and that he requires Acute Rehab for greater 30 days.      Update Admission H&P: No change in H&P    PHYSICIAN SIGNATURE:  Electronically signed by Aron Delagdo MD on 7/25/22 at 11:08 AM EDT

## 2022-07-25 NOTE — PROGRESS NOTES
Department of Physical Medicine & Rehabilitation  Progress Note    Patient Identification:  Vibha Ko  [id number]  : 1950  Admit date: 2022    Chief Complaint: Peripheral arterial disease ([address]    Subjective:   No acute events overnight. Patient seen this am. Patient states he was able to ambulate yesterday. Tolerating diet, sleeping well. Pain is adequately controlled. ROS: No fever, chills, chest pain, shortness of breath, abdominal pain, cough, nausea, vomiting, diarrhea, constipation, dizziness. Objective:  Patient Vitals for the past 24 hrs:   BP Temp Temp src Pulse Resp SpO2 Weight   22 0854 (!) 150/65 -- -- 62 -- -- --   22 0803 -- -- -- -- -- 97 % --   22 0733 (!) 112/57 98.7 °F (37.1 °C) Oral 63 21 -- --   22 0630 -- -- -- -- -- -- 229 lb (103.9 kg)   22 0300 (!) 145/76 98 °F (36.7 °C) Oral 67 16 97 % --   22 0022 (!) 149/78 98.1 °F (36.7 °C) Oral 64 14 97 % --   22 2048 -- -- -- -- -- 96 % --   22 1945 121/64 97.7 °F (36.5 °C) Oral 67 16 95 % --   22 1605 134/84 97 °F (36.1 °C) Oral 59 17 96 % --     Const: Alert. No distress, pleasant. HEENT: Normocephalic, atraumatic. Normal sclera/conjunctiva. MMM. CV: Regular rate and rhythm. Resp: No respiratory distress. Lungs CTAB. Abd: Soft, nontender, nondistended, NABS+   Ext: No edema. RLE wrapped  Neuro: Alert, oriented, appropriately interactive. Psych: Cooperative, appropriate mood and affect. Laboratory data: Available via EMR.    Last 24 hour lab  Recent Results (from the past 24 hour(s))   CBC with Auto Differential    Collection Time: 22  5:01 AM   Result Value Ref Range    WBC 5.3 4.0 - 11.0 K/uL    RBC 2.91 (L) 4.20 - 5.90 M/uL    Hemoglobin 9.1 (L) 13.5 - 17.5 g/dL    Hematocrit 27.9 (L) 40.5 - 52.5 %    MCV 96.1 80.0 - 100.0 fL    MCH 31.3 26.0 - 34.0 pg    MCHC 32.5 31.0 - 36.0 g/dL    RDW 17.3 (H) 12.4 - 15.4 %    Platelets 689 017 - 340 K/uL    MPV 8.9 5.0 - 10.5 fL    Neutrophils % 62.1 %    Lymphocytes % 25.0 %    Monocytes % 7.2 %    Eosinophils % 4.5 %    Basophils % 1.2 %    Neutrophils Absolute 3.3 1.7 - 7.7 K/uL    Lymphocytes Absolute 1.3 1.0 - 5.1 K/uL    Monocytes Absolute 0.4 0.0 - 1.3 K/uL    Eosinophils Absolute 0.2 0.0 - 0.6 K/uL    Basophils Absolute 0.1 0.0 - 0.2 K/uL   Renal Function Panel    Collection Time: 07/25/22  5:01 AM   Result Value Ref Range    Sodium 142 136 - 145 mmol/L    Potassium 4.8 3.5 - 5.1 mmol/L    Chloride 108 99 - 110 mmol/L    CO2 29 21 - 32 mmol/L    Anion Gap 5 3 - 16    Glucose 100 (H) 70 - 99 mg/dL    BUN 15 7 - 20 mg/dL    Creatinine 0.9 0.8 - 1.3 mg/dL    GFR Non-African American >60 >60    GFR African American >60 >60    Calcium 8.7 8.3 - 10.6 mg/dL    Phosphorus 4.0 2.5 - 4.9 mg/dL    Albumin 3.1 (L) 3.4 - 5.0 g/dL   Magnesium    Collection Time: 07/25/22  5:01 AM   Result Value Ref Range    Magnesium 1.80 1.80 - 2.40 mg/dL       Therapy progress:  PT  Position Activity Restriction  Other position/activity restrictions: up as tolerated  Objective     Sit to Stand: Contact guard assistance (x3 trials with occ vc for hand placement)  Stand to sit: Contact guard assistance (x3 trials with occ vc for hand placement)  Device: Rolling Walker  Other Apparatus: O2 (6L)  Assistance: Contact guard assistance  Distance: 10 ft , 15 ft, 5 ft  OT  PT Equipment Recommendations  Equipment Needed: No  Toilet - Technique: Ambulating (w/ RW)  Equipment Used: Standard toilet  Toilet Transfers Comments: heavy reliance on grab bar (his toilet is higher at home)  Assessment        SLP          Body mass index is 32.86 kg/m². Assessment and Plan:    1.  Non-pressure ulcer of RLE 2/2 atherosclerosis of native artery of RLE  -- S/p right femoral popliteal bypass with in-situ saphenous vein POD#7  -- Encouraged OOB, mobilization  -- Neurochecks q4h  -- Unna boot, wound care  -- Deep breaths, ICS  -- Roxicodone, Tylenol for pain control  -- Continue Lyrica and Cymbalta   -- PT/OT     2. Essential Hypertension  -- Continue Losartan & Lopressor     3. CAD/PAD  -- Continue Lipitor  -- Continue home Eliquis & Plavix        ARU appropriate. Awaiting pre-cert      Admit to ARU     Will discuss with attending Dr. Whitney Rogers MD  Internal Medicine PGY-3  7/25/2022  10:19 AM      This patient has been seen, examined, and discussed with the resident. This note has been altered to reflect my own examination findings, impression, and recommendations.        Ny Perez D.O. M.P.H  PM&R  7/25/2022  12:31 PM

## 2022-07-25 NOTE — PROGRESS NOTES
6316NURSING ASSESSMENT: ARU ADMISSION  [facility]    Patient:Kong Pena. Rehab Dx/Hx: Shweta Mathews [R53.81]   VVO:0/6/7872  LVB:[id number]  Date of Admit: 7/25/2022  Room #: 3109/3109-01    Subjective:   Patient admitted to [facility] from [place] [address] via 3109. Alert and oriented x4. Oriented to room and call light system. Oriented to rehab routine and therapy schedules. Informed about care conferences and ordering of meals. Drug / Medication Review:   Medications were reviewed by RN at time of admission  [x]  No potential or actual clinically significant medication issues were noted.      []   Yes, a clinically significant medication issue was identified                 []  Adverse Drug Event:                  []  Allergy:                  []  Side Effect:                  []  Ineffective Therapy:                  []  Drug Interaction:                 []  Duplicated Therapy:                 []  Untreated Indication:                  []  Non-adherence:                 []  Other:                  Nursing/Pharmacy contacted the physician:     Date:              Time:                  Actions recommended by physician were completed:   Date :            Time:    4 Eyes Skin Assessment   The patient is being assessed for: Admission     I agree that 2 RN's have performed a thorough Head to Toe Skin Assessment on the patient. ALL assessment sites listed below have been assessed. Right leg staples related to fem pop bypass, buttocks pink, scattered bruised, bilateral feet dry  Areas assessed by both nurses:   [x]   Head, Face, and Ears   [x]   Shoulders, Back, and Chest, Abdomen  [x]   Arms, Elbows, and Hands   [x]   Coccyx, Sacrum, and Ischium  [x]   Legs, Feet, and Heel     Does the Patient have Skin Breakdown?    No         Isidro Prevention initiated:  Yes   Wound Care Orders initiated: Not Applicable      [facility] nurse consulted for Pressure Injury (Stage 3,4, Unstageable, DTI, NWPT, Complex wounds)and New or Established Ostomies:  Not Applicable    Primary Nurse eSignature: Sahra Pichardo RN  Co-signer eSignature: GILBERT Lopez RN

## 2022-07-25 NOTE — PROGRESS NOTES
----- Message from Guzman Bowman MD sent at 3/14/2022  7:17 AM CDT -----  Please let patient know that biopsies showed mostly non specific inflammation, most likely related to diverticulitis and less likely IBD such as ulcerative colitis. This is generally self resolving but with ongoing diarrhea would recommend a course of mesalamine 2.4g daily for one month if approved by insurance to help speed the healing process.    Physical Therapy  Facility/Department: [address]  Daily Treatment Note  NAME: Dontrell Tapia. : 1950  MRN: [id number]    Date of Service: 2022    Discharge Recommendations: Dontrell Tapia. scored a 17/24 on the AM-PAC short mobility form. Current research shows that an AM-PAC score of 17 or less is typically not associated with a discharge to the patient's home setting. Based on the patient's AM-PAC score and their current functional mobility deficits, it is recommended that the patient have 3-5 sessions per week of Physical Therapy at d/c to increase the patient's independence. Please see assessment section for further patient specific details. If patient discharges prior to next session this note will serve as a discharge summary. Please see below for the latest assessment towards goals. PT Equipment Recommendations  Equipment Needed: No    Patient Diagnosis(es): There were no encounter diagnoses. Assessment   Assessment: pt tolerated session well slightly limited by SOB and decreased endurance. pt performing bed mobility with supervision and is transferring and ambulating with CGA using SPC. pt demos antalgic and mildly unsteady gait, grabbing for objects in room to steady himself. pt on 6Lo2 at baseline and throughout session with minimal SOB post ambulation, however pt SpO2 is 99% post ambulation. pt lives alone and does not have 24hr A and would benefit from further IP PT upon dc. Continue POC  Activity Tolerance: Patient limited by fatigue;Patient limited by endurance  Equipment Needed: No     Plan    Plan  Plan:  (2-5)  Current Treatment Recommendations: Functional mobility training;Transfer training;Gait training;Balance training;Stair training;Strengthening;Neuromuscular re-education; Endurance training;Patient/Caregiver education & training; Safety education & training;Home exercise program;Therapeutic activities     Restrictions  Position Activity Restriction  Other position/activity restrictions: up as tolerated     Subjective    Subjective  Subjective: pt supine in bed and agreeable to PT. denies SOB or pain at rest. hx: 81y.o. 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 Pmhx: HTN, COPD. Pain: denies pain  Orientation  Overall Orientation Status: Within Functional Limits  Cognition  Overall Cognitive Status: WFL     Objective   Vitals     Bed Mobility Training  Bed Mobility Training: Yes  Supine to Sit: Supervision  Scooting: Supervision  Balance  Sitting: Intact (Supervision at EOB)  Standing: Impaired  Standing - Static: Fair;Occasional (CGA with SPC)  Standing - Dynamic: Fair;Occasional (CGA with mobility using SPC, occasionally grabs for objects in the room to steady himself)  Transfer Training  Transfer Training: Yes  Overall Level of Assistance: Contact-guard assistance  Sit to Stand: Contact-guard assistance (with SPC from EOB)  Stand to Sit: Contact-guard assistance  Gait Training  Gait Training: Yes  Right Side Weight Bearing: As tolerated  Left Side Weight Bearing: As tolerated  Gait  Overall Level of Assistance: Contact-guard assistance  Base of Support: Widened  Speed/Yolande: Slow;Shuffled  Step Length: Left shortened;Right shortened  Stance: Right decreased  Gait Abnormalities: Antalgic; Step to gait; Shuffling gait (limited tolerance due to fatigue. on 6Lo2 throughout, slight SOB noted at end of session but o2 sats reading 99%. )  Distance (ft): 75 Feet  Assistive Device: Cane, straight           Safety Devices  Type of Devices: Nurse notified; Chair alarm in place;Call light within reach; Left in chair;Gait belt       Goals  Short Term Goals  Time Frame for Short term goals: discharge  Short term goal 1: sit to/from supine mod I  Short term goal 2: sit to/from stand mod I  Short term goal 3: ambulate 100 ft with RW supervision  Patient Goals   Patient goals : return home    Education  Patient Education  Education Given To: Patient  Education Provided: Role of Therapy  Education Method: Verbal;Demonstration  Barriers to Learning: Hearing  Education Outcome: Verbalized understanding;Continued education needed    Therapy Time   Individual Concurrent Group Co-treatment   Time In 1030         Time Out 1056         Minutes 26                 Deana Hilario, PT

## 2022-07-25 NOTE — PROGRESS NOTES
Vascular Surgery   Daily Progress Note  Patient: Surya Willard.    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 Had a dressing change last night, tolerated it well. Afebrile, HDS. Tolerating diet on 5L NC (baseline 6 L). No acute complaints. Resting comfortably this AM. Tolerating diet without N/V. Denies pain in foot. Motor intact and sensation diminished at baseline. ROS: A 14 point review of systems was conducted, significant findings as noted above. All other systems negative. OBJECTIVE:   Infusions:   sodium chloride 25 mL/hr at 07/22/22 1618      I/O:I/O last 3 completed shifts: In: 1175 [P.O.:1175]  Out: 700 [Urine:700]           Wt Readings from Last 1 Encounters:   07/24/22 230 lb 14.4 oz (104.7 kg)       Exam:  BP (!) 145/76   Pulse 67   Temp 98 °F (36.7 °C) (Oral)   Resp 16   Ht 5' 10\" (1.778 m)   Wt 230 lb 14.4 oz (104.7 kg)   SpO2 97%   BMI 33.13 kg/m²     General Appearance: Alert, in no apparent distress   Neuro: A&Ox3, no focal deficits  Lungs: Normal effort; no adventitious breath sounds, 5L NC (baseline 6L)  Heart: Regular rate and rhythm  Abdomen: Soft, non-tender, non-distended; no guarding, no rigidity, no rebound  Extremities: RLE with Aquacel Ag in place. Foot wrapped, dopplerable PT and DP pulses. LABS:   Recent Labs     07/24/22  0725 07/25/22  0501   WBC 5.3 5.3   HGB 8.9* 9.1*   HCT 28.1* 27.9*   MCV 96.7 96.1    183          Recent Labs     07/23/22  0647 07/24/22  0725    139   K 5.2* 4.1    103   CO2 26 29   PHOS 3.0 3.3   BUN 11 13   CREATININE 1.0 0.9        No results for input(s): AST, ALT, ALB, BILIDIR, BILITOT, ALKPHOS in the last 72 hours. No results for input(s): LIPASE, AMYLASE in the last 72 hours. No results for input(s): PROT, INR, APTT in the last 72 hours.    No results for input(s): CKTOTAL, CKMB, CKMBINDEX, TROPONINI in the last 72 hours.    ASSESSMENT/PLAN:   This is a 81y.o. 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 POD7.    - Unna boot changed yesterday  - SW for precert -- in process  - awaiting transfer to ARU transfer  - Continue gen diet.   - Continue to encourage OOB, ambulation      Agapito Loaiza DO, MSMEd  PGY1, General Surgery  07/25/22  6:00 AM  [facility]  [phone]

## 2022-07-25 NOTE — PLAN OF CARE
Problem: Chronic Conditions and Co-morbidities  Goal: Patient's chronic conditions and co-morbidity symptoms are monitored and maintained or improved  7/19/2022 2015 by Brett Porras RN  Outcome: Progressing     Problem: Chronic Conditions and Co-morbidities  Goal: Patient's chronic conditions and co-morbidity symptoms are monitored and maintained or improved  7/19/2022 2015 by Brett Porras RN  Outcome: Progressing  Flowsheets (Taken 7/19/2022 0800)  Care Plan - Patient's Chronic Conditions and Co-Morbidity Symptoms are Monitored and Maintained or Improved:   Monitor and assess patient's chronic conditions and comorbid symptoms for stability, deterioration, or improvement   Collaborate with multidisciplinary team to address chronic and comorbid conditions and prevent exacerbation or deterioration   Update acute care plan with appropriate goals if chronic or comorbid symptoms are exacerbated and prevent overall improvement and discharge     Problem: Discharge Planning  Goal: Discharge to home or other facility with appropriate resources  7/19/2022 2015 by Brett Porras RN  Outcome: Progressing  Flowsheets (Taken 7/19/2022 0800)  Discharge to home or other facility with appropriate resources:   Identify barriers to discharge with patient and caregiver   Arrange for needed discharge resources and transportation as appropriate   Identify discharge learning needs (meds, wound care, etc)   Refer to discharge planning if patient needs post-hospital services based on physician order or complex needs related to functional status, cognitive ability or social support system     Problem: Pain  Goal: Verbalizes/displays adequate comfort level or baseline comfort level  7/19/2022 2015 by Brett Porras RN  Outcome: Progressing
Problem: Chronic Conditions and Co-morbidities  Goal: Patient's chronic conditions and co-morbidity symptoms are monitored and maintained or improved  Outcome: Progressing  Flowsheets (Taken 7/20/2022 2000)  Care Plan - Patient's Chronic Conditions and Co-Morbidity Symptoms are Monitored and Maintained or Improved: Monitor and assess patient's chronic conditions and comorbid symptoms for stability, deterioration, or improvement     Problem: Discharge Planning  Goal: Discharge to home or other facility with appropriate resources  Outcome: Progressing  Flowsheets (Taken 7/20/2022 2000)  Discharge to home or other facility with appropriate resources: Identify barriers to discharge with patient and caregiver     Problem: Pain  Goal: Verbalizes/displays adequate comfort level or baseline comfort level  Outcome: Progressing
Problem: Discharge Planning  Goal: Discharge to home or other facility with appropriate resources  Outcome: Patient waiting precert to rehab      Problem: Pain  Goal: Verbalizes/displays adequate comfort level or baseline comfort level  Outcome: Progressing  Note: Patient receiving scheduled Tylenol for pain. VSS. Patient in bed with no complaints at this time. Will continue to monitor. Problem: Safety - Adult  Goal: Free from fall injury  Outcome: Progressing  Note: Patient at risk for falls. Fall risk protocol in place. Patient in bed with call light and personal belongings in reach. Bed alarm activated. Will continue to monitor. Problem: ABCDS Injury Assessment  Goal: Absence of physical injury  Outcome: Progressing     Problem: Skin/Tissue Integrity  Goal: Absence of new skin breakdown  Outcome: Progressing  Dressing to RLE. Dry and intact, Vascular surgery doing dressing changes. Will continue to monitor.
Problem: Discharge Planning  Goal: Discharge to home or other facility with appropriate resources  Outcome: Progressing     Problem: Pain  Goal: Verbalizes/displays adequate comfort level or baseline comfort level  Outcome: Progressing  Note: Patient c/o right ankle pain . Scheduled Tylenol given . No other c/o at this time. Will continue to monitor. Problem: Safety - Adult  Goal: Free from fall injury  Outcome: Progressing  Note: Patient at risk for falls. Fall risk protocol in place. Patient in bed with call light and personal belongings in reach. Bed alarm activated. Will continue to monitor.      Problem: ABCDS Injury Assessment  Goal: Absence of physical injury  Outcome: Progressing     Problem: Skin/Tissue Integrity  Goal: Absence of new skin breakdown  Outcome: Progressing
Problem: Discharge Planning  Goal: Discharge to home or other facility with appropriate resources  Outcome: Progressing     Problem: Pain  Goal: Verbalizes/displays adequate comfort level or baseline comfort level  Outcome: Progressing  Note: Patient c/o right ankle pain . Scheduled Tylenol given . No other c/o at this time. Will continue to monitor. Problem: Safety - Adult  Goal: Free from fall injury  Outcome: Progressing  Note: Patient at risk for falls. Fall risk protocol in place. Patient in bed with call light and personal belongings in reach. Bed alarm activated. Will continue to monitor.      Problem: ABCDS Injury Assessment  Goal: Absence of physical injury  Outcome: Progressing     Problem: Skin/Tissue Integrity  Goal: Absence of new skin breakdown  Outcome: Progressing
Problem: Discharge Planning  Goal: Discharge to home or other facility with appropriate resources  Outcome: Progressing  Flowsheets (Taken 7/21/2022 6379 by Mohsen Sequeira RN)  Discharge to home or other facility with appropriate resources:   Identify barriers to discharge with patient and caregiver   Arrange for needed discharge resources and transportation as appropriate   Identify discharge learning needs (meds, wound care, etc)   Refer to discharge planning if patient needs post-hospital services based on physician order or complex needs related to functional status, cognitive ability or social support system  Note: Pt plans to discharge to ARU pending precert. Problem: Pain  Goal: Verbalizes/displays adequate comfort level or baseline comfort level  Outcome: Progressing  Flowsheets (Taken 7/25/2022 1420)  Verbalizes/displays adequate comfort level or baseline comfort level: Encourage patient to monitor pain and request assistance  Note: Pt denies c/o pain. Pt has been receiving scheduled tylenol. Will monitor. Problem: Safety - Adult  Goal: Free from fall injury  Outcome: Progressing  Flowsheets (Taken 7/25/2022 1420)  Free From Fall Injury: Instruct family/caregiver on patient safety  Note: Patient at risk for falls. Patient resting quietly in bed. Side rails up x 2/4. Bed locked in lowest position. Bed alarm on. Bedside table and call light within reach. Patient instructed to call for assistance. Patient verbalized understanding. Will continue to monitor.
Problem: Discharge Planning  Goal: Discharge to home or other facility with appropriate resources  Outcome: REHAB      Problem: Pain  Goal: Verbalizes/displays adequate comfort level or baseline comfort level  Outcome: Progressing  Note: Patient c/o right ankle pain . Scheduled Tylenol given . VSS No other complaints at this time. Will continue to monitor. Problem: Safety - Adult  Goal: Free from fall injury  Outcome: Progressing  Note: Patient at risk for falls. Fall risk protocol in place. Patient in bed with call light and personal belongings in reach. Bed alarm activated. Will continue to monitor. Problem: ABCDS Injury Assessment  Goal: Absence of physical injury  Outcome: Progressing     Problem: Skin/Tissue Integrity  Goal: Absence of new skin breakdown  Outcome: Progressing  Dressing to RLE.  Clean dry and intact
Pain  Goal: Verbalizes/displays adequate comfort level or baseline comfort level  7/21/2022 1657 by Bobbi Kim RN  Outcome: Progressing  Flowsheets (Taken 7/21/2022 0756)  Verbalizes/displays adequate comfort level or baseline comfort level:   Encourage patient to monitor pain and request assistance   Assess pain using appropriate pain scale   Implement non-pharmacological measures as appropriate and evaluate response   Administer analgesics based on type and severity of pain and evaluate response   Consider cultural and social influences on pain and pain management   Notify Licensed Independent Practitioner if interventions unsuccessful or patient reports new pain  7/21/2022 0654 by Wagner Brown RN  Outcome: Progressing     Problem: Safety - Adult  Goal: Free from fall injury  Outcome: Progressing     Problem: ABCDS Injury Assessment  Goal: Absence of physical injury  Outcome: Progressing

## 2022-07-25 NOTE — PROGRESS NOTES
Point of Care Note:  Vascular Surgery  Lauren Gutierrez.    2:58 AM  7/25/2022    Dressing Change:    Dressing to right lower extremity removed, wound bed appears stable from prior exams (see image below), lower extremity cleaned and Zinc dressing replaced.     Wound:      Saul Bradford DO, MS  PGY2, General Surgery  07/25/22  2:59 AM  [phone]

## 2022-07-25 NOTE — CARE COORDINATION
deliver to the bedside if staff is available. (payment due at time of pick-up or delivery - cash, check, or card accepted)     Able to afford home medications/ co-pay costs: Yes    ADLS:  Current PT AM-PAC Score: 17 /24  Current OT AM-PAC Score: 17 /24      DISCHARGE Disposition:     LOC at discharge: Skilled  NICA Completed: Not Indicated    Notification completed in HENS/PAS?:  Not Applicable    IMM Completed:   Not Indicated    Durable Medical Equipment:  Equipment obtained during hospitalization: NA    Home Oxygen and Respiratory Equipment:  Oxygen needed at discharge?: Yes - 6L    Dialysis:  Dialysis patient: No      Referrals made at [facility] for outpatient continued care:  Not Applicable    Additional CM Notes:  Pt will transfer to [facility] today. No other needs at this time. The Plan for Transition of Care is related to the following treatment goals of Atherosclerosis of native artery of right lower extremity with ulceration, unspecified ulceration site ([facility] [address] [I70.239]  Peripheral arterial disease ([facility] [address] [I73.9]    The Patient and/or patient representative Regino Rosenthal and his family were provided with a choice of provider and agrees with the discharge plan Yes    Freedom of choice list was provided with basic dialogue that supports the patient's individualized plan of care/goals and shares the quality data associated with the providers.  Yes    Care Transitions patient: No    CAROLINE Yañez, [facility]  Case Management Department  Ph: [phone]  Fax: [phone]

## 2022-07-25 NOTE — PLAN OF CARE
ARU PATIENT TREATMENT PLAN  [address], [address]  [address] [place] [address]. : 1950  Acct #: [de-identified]  MRN: [id number]  PHYSICIAN:  Barrett Dudley DO  Primary Problem    Patient Active Problem List   Diagnosis    Essential hypertension    Hyperlipemia    Degeneration of intervertebral disc of lumbar region    KAVITA and COPD overlap syndrome (HCC)    PVD (peripheral vascular disease) ([address]    Coronary artery disease involving native coronary artery of native heart without angina pectoris    Tobacco abuse    Idiopathic peripheral neuropathy    Gastroesophageal reflux disease    Other spondylosis, lumbosacral region    Cigarette nicotine dependence without complication    Idiopathic chronic gout without tophus    Pulmonary embolism without acute cor pulmonale (HCC)    Diastolic dysfunction with chronic heart failure ([address]    Long term (current) use of anticoagulants    Presence of coronary angioplasty implant and graft    Atherosclerosis of native artery of right lower extremity with rest pain (HCC)    Popliteal artery stenosis, left (HCC)    Non-pressure ulcer of right lower extremity with fat layer exposed ([address]    Open wound of right lower leg    Open wound of fifth toe of right foot    Peripheral artery disease ([address]    Peripheral arterial disease ([address]    Debility     Rehabilitation Diagnosis:  16.0, Debility (non-cardiac, non-pulmonary  ADMIT DATE:2022    Patient Goals: \"do what I did before\"  Admitting Impairments: Decreased functional mobility ; Decreased ADL status; Decreased high-level IADLs;Decreased endurance;Decreased strength;Decreased balance  Activity Restrictions: none  Participation Limitations: none    CARE PLAN   NURSING:  Bettie Chu. while on this unit will:  [x] Be continent of bowel and bladder     [x] Have an adequate number of bowel movements  [x] Urinate with no urinary retention >300ml in bladder  [x] Complete bladder protocol with schulte removal  [x] Maintain O2 SATs at ___%  [x] Have pain managed while on ARU       [] Be pain free by discharge   [x] Have no skin breakdown while on ARU  [x] Have improved skin integrity via wound measurements  [x] Have no signs/symptoms of infection at the wound site  [x] Be free from injury during hospitalization   [x] Complete education with patient/family with understanding demonstrated for:  [x] Adjustment   [] Other:     Nursing Interventions will include:  [x] bowel/bladder training   [x] education for medical assistive devices   [x] medication education   [x] O2 saturation management   [x] energy conservation   [x] stress management techniques   [x] fall prevention   [x] alarms protocol   [x] seating and positioning   [x] skin/wound care   [x] pressure relief instruction   [x] dressing changes     [x] infection protection   [x] DVT prophylaxis  [x] assistance with in room safety with transfers to bed, toilet, wheelchair, shower   [x] bathroom activities and hygiene  [] Other:    Patient/Caregiver Education for:  [x] Disease/sustained injury/management     [x] Medication Use  [x] Surgical intervention  [x] Safety  [x] Body mechanics and or joint protection  [x] Health maintenance     [] Other:     PHYSICAL THERAPY:  Goals:                  Short Term Goals  Time Frame for Short term goals: 10 days  Short term goal 1: Ind with bed mobility  Short term goal 2: Ind with transf excluding floor transf  Short term goal 3: Amb with LRAD distances > 200' at a time indly on level surfaces  Short term goal 4: Amb up and down 12 stepsIndly  with use of 1 railing and SPC  Additional Goals?: No               These goals were reviewed with this patient at the time of assessment and Lilibeth Baker [id number]. is in agreement.      Plan of Care: Pt to be seen 5 out of 7 days per week per ARU protocol (90 minutes with PT)                  Current Treatment Recommendations: Functional mobility training, Transfer training, Gait training, Balance training, Stair training, Strengthening, Neuromuscular re-education, Endurance training, Patient/Caregiver education & training, Safety education & training, Home exercise program, Therapeutic activities    OCCUPATIONAL THERAPY:  Goals:             Short Term Goals  Time Frame for Short term goals: 10 days  Short Term Goal 1: Pt will complete toileting and toilet transfers MOD I-ongoing  Short Term Goal 2: Pt will complete tub transfer MOD I w/ use of shower chair or TTB pending safest method-ongoing  Short Term Goal 3: Pt will complete LE dressing MOD I w/ use of AE prn-ongoing  Short Term Goal 4: Pt will complete light housekeeping task in stance for 7 mins MOD I-ongoing  Short Term Goal 5: Pt will complete bathing tasks MOD I-ongoing :    :  These goals were reviewed with this patient at the time of assessment and Kongalex [place] Rounds. is in agreement    Plan of Care:  Pt to be seen 5 out of 7 days per week per ARU protocol (90 minutes with OT)     SPEECH THERAPY: Goals will be left blank if speech is not following this patient. Goals:  Plan of Care:  Pt to be seen 5 out of 7 days per week per ARU protocol (0 minutes with SLP)    Therapy Treatments will include:  [x]  therapeutic exercises    []  gait training     []  neuromuscular re-ed                            []  transfer training             [x] community reintegration    [x] bed mobility                          []  w/c mobility and training  [x]  self care    [x]home mgmt    []  cognitive training            []  energy conservation        []  dysphagia tx    []  speech/language/communication therapy   []  group therapy    [x]  patient/family education    [] Other:    CASE MANAGEMENT:  Goals: Assist patient/family with discharge planning, patient/family counseling, and coordination with insurance during ARU stay.     Admission Period/Goal QM SCORES  QM Admit/Goal Score   Eating CARE Score: 6 / Discharge Goal: Independent   Oral Hygiene CARE Score: 4 / Discharge Goal: Independent   Shower/Bathing CARE Score: 4 / Discharge Goal: Independent   UB Dressing CARE Score: 5 / Discharge Goal: Independent   LB Dressing CARE Score: 3 / Discharge Goal: Independent   Putting on/off Footwear CARE Score: 3 / Discharge Goal: Independent    Toileting Hygiene CARE Score: 4 / Discharge Goal: Independent   Bladder Continence Bladder Continence: Always continent    Bowel Continence Bowel Continence: Always continent    Toilet Transfers CARE Score: 4 / Discharge Goal: Independent    Shower/Bathe Self  CARE Score: 4 / Discharge Goal: Independent   Rolling Left and Right CARE Score: 3 / Discharge Goal: Independent   Sit to Lying CARE Score: 3 / Discharge Goal: Independent   Lying to Sitting on Bedside CARE Score: 4 / Discharge Goal: Independent   Sit to Stand CARE Score: 3 / Discharge Goal: Independent   Chair/Bed to Chair Transfer CARE Score: 3 / Discharge Goal: Independent   Car Transfers CARE Score: 4 / Discharge Goal: Independent   Walk 10 Feet CARE Score: 4 / Discharge Goal: Independent   Walk 50 Feet with Two Turns CARE Score: 4 / Discharge Goal: Independent   Walk 150 Feet CARE Score: 88 / Discharge Goal: Independent   Walk 10 Feet on Uneven Surfaces CARE Score: 4 / Discharge Goal: Independent   1 Step (Curb) CARE Score: 4 / Discharge Goal: Independent   4 Steps CARE Score: 4 / Discharge Goal: Independent   12 Steps CARE Score: 88 / Discharge Goal: Independent   Picking up Object from Floor CARE Score: 3 / Discharge Goal: Independent   Wheel 50 Feet with 2 Turns   /     Type         [] Manual        [] Motorized        [x] N/A   Wheel 150 Feet   /     Type         [] Manual        [] Motorized        [x] N/A        Sundeep Suarez. will be seen a minimum of 3 hours of therapy per day, a minimum of 5 out of 7 days per week (please see above for specific treatment plan per PT/OT/SLP).     [] In this rare instance due to the nature of this patient's medical involvement, this patient will be seen 15 hours per week (900 minutes within a 7day period). In addition, dietician/nutritionist may monitor calorie count as well as intake and collaboratively work with SLP on dietary upgrades. Neuropsychology/Psychology may evaluate and provide necessary support. Medical issues being managed closely and that require 24hour availability of a physician:  [] Swallowing Precautions  [x] Bowel/Bladder Fx  [] Weight bearing precautions  [] Wound Care    [x] Pain Mgmt   [] Infection Protection  [x] DVT Prophylaxis   [x] Fall Precautions  [x] Fluid/Electrolyte/Nutrition Balance  [] Voice Protection   [] Respiratory  [] Other:    Medical Prognosis: [] Good  [x] Fair    [] Guarded   Total expected IRF days 9  Anticipated discharge destination:   [x] Home Independently   [] Home Modified Independent  [] Home with supervision    []SNF     [] Other                                           Physician anticipated functional outcomes: Patient will progress to a level of independence with functional mobility and all ADLs to allow for a safe return to prior living environment. IPOC brief synthesis: This is a 81y.o. year old male status post R femoral below knee popliteal bypass with in-situ saphenous vein secondary to atherosclerosis of native artery of RLE with ulceration 07/18 with Dr. Caroline Villareal. He currently requires assistnece in therapy and is scoring 17/17 in PT/OT scores. Would require a score of >18 to be able to discharge home. He is agreeable to ARU admit to improve function before planned discharge home. This initial ARU patient treatment plan of care, together with the IPOC & the Education plan, form the foundation for the patient's plan of care. Weekly patient care conferences are held to evaluate progress towards the initial treatment plan & goals.     I have reviewed this initial plan of care and agree with its contents:    Title   Name    Date    Time    Physician:   Zeke Awan D.O. M.P.H  PM&R  7/28/2022  12:11 PM    Case Mgmt: Wilfredo Ames, LSW 7/26/22 0830    OT: Rickie Rolon OTR/L  7/26/2022   1209     PT: Kate Metcalf LPT #[id number] 7/26/2022    RN: Eh Guerra RN 07/25/2022 1816     ARU Supervisor: Yvan Fowler OTR/L 6/27/2022 14:30     Other:

## 2022-07-26 PROCEDURE — 99222 1ST HOSP IP/OBS MODERATE 55: CPT | Performed by: PHYSICAL MEDICINE & REHABILITATION

## 2022-07-26 PROCEDURE — 97166 OT EVAL MOD COMPLEX 45 MIN: CPT

## 2022-07-26 PROCEDURE — 2580000003 HC RX 258: Performed by: PHYSICAL MEDICINE & REHABILITATION

## 2022-07-26 PROCEDURE — 94761 N-INVAS EAR/PLS OXIMETRY MLT: CPT

## 2022-07-26 PROCEDURE — 97116 GAIT TRAINING THERAPY: CPT

## 2022-07-26 PROCEDURE — 2700000000 HC OXYGEN THERAPY PER DAY

## 2022-07-26 PROCEDURE — 94640 AIRWAY INHALATION TREATMENT: CPT

## 2022-07-26 PROCEDURE — 97530 THERAPEUTIC ACTIVITIES: CPT | Performed by: PHYSICAL THERAPIST

## 2022-07-26 PROCEDURE — 6370000000 HC RX 637 (ALT 250 FOR IP): Performed by: PHYSICAL MEDICINE & REHABILITATION

## 2022-07-26 PROCEDURE — 94150 VITAL CAPACITY TEST: CPT

## 2022-07-26 PROCEDURE — 97162 PT EVAL MOD COMPLEX 30 MIN: CPT | Performed by: PHYSICAL THERAPIST

## 2022-07-26 PROCEDURE — 6360000002 HC RX W HCPCS: Performed by: PHYSICAL MEDICINE & REHABILITATION

## 2022-07-26 PROCEDURE — 1280000000 HC REHAB R&B

## 2022-07-26 PROCEDURE — 97116 GAIT TRAINING THERAPY: CPT | Performed by: PHYSICAL THERAPIST

## 2022-07-26 PROCEDURE — 97535 SELF CARE MNGMENT TRAINING: CPT

## 2022-07-26 PROCEDURE — 97110 THERAPEUTIC EXERCISES: CPT

## 2022-07-26 RX ORDER — TRAZODONE HYDROCHLORIDE 50 MG/1
50 TABLET ORAL NIGHTLY PRN
Status: DISCONTINUED | OUTPATIENT
Start: 2022-07-26 | End: 2022-07-30 | Stop reason: HOSPADM

## 2022-07-26 RX ADMIN — ALLOPURINOL 100 MG: 100 TABLET ORAL at 09:14

## 2022-07-26 RX ADMIN — LOSARTAN POTASSIUM 100 MG: 100 TABLET, FILM COATED ORAL at 09:14

## 2022-07-26 RX ADMIN — OXYCODONE 10 MG: 5 TABLET ORAL at 09:13

## 2022-07-26 RX ADMIN — APIXABAN 5 MG: 5 TABLET, FILM COATED ORAL at 20:10

## 2022-07-26 RX ADMIN — ACETAMINOPHEN 1000 MG: 500 TABLET ORAL at 09:14

## 2022-07-26 RX ADMIN — ARFORMOTEROL TARTRATE 15 MCG: 15 SOLUTION RESPIRATORY (INHALATION) at 19:31

## 2022-07-26 RX ADMIN — CLOPIDOGREL BISULFATE 75 MG: 75 TABLET ORAL at 09:14

## 2022-07-26 RX ADMIN — DOCUSATE SODIUM 100 MG: 100 CAPSULE, LIQUID FILLED ORAL at 20:10

## 2022-07-26 RX ADMIN — SODIUM CHLORIDE, PRESERVATIVE FREE 10 ML: 5 INJECTION INTRAVENOUS at 20:12

## 2022-07-26 RX ADMIN — OXYCODONE 10 MG: 5 TABLET ORAL at 19:55

## 2022-07-26 RX ADMIN — SODIUM CHLORIDE, PRESERVATIVE FREE 10 ML: 5 INJECTION INTRAVENOUS at 11:20

## 2022-07-26 RX ADMIN — OXYCODONE 10 MG: 5 TABLET ORAL at 14:41

## 2022-07-26 RX ADMIN — PREGABALIN 150 MG: 75 CAPSULE ORAL at 09:14

## 2022-07-26 RX ADMIN — APIXABAN 5 MG: 5 TABLET, FILM COATED ORAL at 09:14

## 2022-07-26 RX ADMIN — BUDESONIDE 500 MCG: 0.5 SUSPENSION RESPIRATORY (INHALATION) at 12:13

## 2022-07-26 RX ADMIN — PREGABALIN 150 MG: 75 CAPSULE ORAL at 20:10

## 2022-07-26 RX ADMIN — DULOXETINE HYDROCHLORIDE 20 MG: 20 CAPSULE, DELAYED RELEASE ORAL at 09:13

## 2022-07-26 RX ADMIN — DOCUSATE SODIUM 100 MG: 100 CAPSULE, LIQUID FILLED ORAL at 09:14

## 2022-07-26 RX ADMIN — METOPROLOL TARTRATE 100 MG: 100 TABLET, FILM COATED ORAL at 09:13

## 2022-07-26 RX ADMIN — BUDESONIDE 500 MCG: 0.5 SUSPENSION RESPIRATORY (INHALATION) at 19:31

## 2022-07-26 RX ADMIN — ACETAMINOPHEN 1000 MG: 500 TABLET ORAL at 14:41

## 2022-07-26 RX ADMIN — PANTOPRAZOLE SODIUM 40 MG: 40 TABLET, DELAYED RELEASE ORAL at 05:41

## 2022-07-26 RX ADMIN — ACETAMINOPHEN 1000 MG: 500 TABLET ORAL at 20:10

## 2022-07-26 RX ADMIN — ARFORMOTEROL TARTRATE 15 MCG: 15 SOLUTION RESPIRATORY (INHALATION) at 12:14

## 2022-07-26 RX ADMIN — ATORVASTATIN CALCIUM 80 MG: 80 TABLET, FILM COATED ORAL at 20:10

## 2022-07-26 ASSESSMENT — PAIN DESCRIPTION - ONSET
ONSET: ON-GOING
ONSET: ON-GOING

## 2022-07-26 ASSESSMENT — PAIN SCALES - GENERAL
PAINLEVEL_OUTOF10: 7

## 2022-07-26 ASSESSMENT — PAIN DESCRIPTION - DESCRIPTORS
DESCRIPTORS: BURNING
DESCRIPTORS: BURNING

## 2022-07-26 ASSESSMENT — PAIN DESCRIPTION - PAIN TYPE
TYPE: SURGICAL PAIN
TYPE: SURGICAL PAIN

## 2022-07-26 ASSESSMENT — PAIN DESCRIPTION - ORIENTATION
ORIENTATION: RIGHT
ORIENTATION: RIGHT

## 2022-07-26 ASSESSMENT — PAIN DESCRIPTION - FREQUENCY
FREQUENCY: INTERMITTENT
FREQUENCY: INTERMITTENT

## 2022-07-26 ASSESSMENT — PAIN DESCRIPTION - LOCATION
LOCATION: GROIN
LOCATION: GROIN;LEG

## 2022-07-26 NOTE — PROGRESS NOTES
Physical Therapy  Facility/Department: [facility] UNIT  Rehabilitation Physical Therapy Initial Assessment/ daily treatment note    NAME: Erica Grimm   : 1950 (81 y.o.)  MRN: [id number]  CODE STATUS: Full Code    Date of Service: 22      Past Medical History:   Diagnosis Date    CAD (coronary artery disease)     CHF (congestive heart failure) (HCC)     diastolic    COPD (chronic obstructive pulmonary disease) (HCC)     Critical ischemia of lower extremity (HCC)     Depressive disorder, not elsewhere classified     GERD (gastroesophageal reflux disease)     History of colon polyps - 30 seen on colonoscopy 2021    History of MI (myocardial infarction) 2013    History of skin ulcer of lower extremity     R anterior shin    History of transient ischemic attack (TIA)     Hx of blood clots     PE    Hyperlipidemia     Hypertension     Neuropathy     KAVITA (obstructive sleep apnea)     On CPAP    Personal history of DVT (deep vein thrombosis)     Prolonged emergence from general anesthesia     Pt reported being combative after surgery     PVD (peripheral vascular disease) ([facility]     TIA (transient ischemic attack)     Vertebral fracture      Past Surgical History:   Procedure Laterality Date    APPENDECTOMY      CHOLECYSTECTOMY, LAPAROSCOPIC      COLONOSCOPY  2021    COLONOSCOPY POLYPECTOMY SNARE/COLD BIOPSY performed by Tianna Goetz MD at [facility]  2021    COLONOSCOPY POLYPECTOMY ABLATION performed by Tianna Goetz MD at [facility]  2021    COLONOSCOPY CONTROL HEMORRHAGE performed by Tianna Goetz MD at [address] [facility]  2013    EYE SURGERY Left     FEMORAL BYPASS Right 2022    RIGHT FEMORAL BELOW KNEE POPLITEAL ARTERY BYPASS WITH IN SITU SAPHENOUS VEIN performed by Velma Mallory MD at [address] Right 2021    RIGHT FEMORAL ENDARTERECTOMY  RIGHT EXTERNAL ILIAC TO PROFUNDA FEMORAL BYPASS WITH 8MM PTFE GRAFT RIGHT LOWER EXTREMITY ARTERIOGRAM BALLOON ANGIOPLASTY RIGHT PROFUNDA BALLOON ANGIOPLASTY AND STENT OF RIGHT EXTERNAL ILIAC ARTERY performed by Olga Hou MD at [address]  11/18/2015    Bilateral decompressive lumbar laminectomy L4-5   ; medial facetectomy L4-5 joints  bilaterally; foraminotomies l5   nerve roots bilaterally    LEG DEBRIDEMENT Right 7/23/2013    Dr. Nicolas Bejarano - pretibial wound    OTHER SURGICAL HISTORY Right 6/25/2013    Dr. Nicolas Bejarano - CFA Endarterectomy w/marsupialization; RLE wound excision & debridement     OTHER SURGICAL HISTORY Left 8/27/2013    Dr. Nicolas Bejarano - femoral & profunda femoris endarterectomy w/marsupialization patch angioplasty using SFA    SKIN SPLIT GRAFT Right 7/25/2013    Dr. Nicolas Bejarano - to non-healing R pretibial wound, 9 x 15 cm    TOTAL HIP ARTHROPLASTY Right 09/01/2016    Dr. Jeannette Negrete Left 12/22/2015    Dr. Nicolas Bejarano - CFA exploration, thrombectomy of ileofemoral system, stenting of RAFAL in-stent stenosis w/8 x 37 mm Palmaz        Chart Reviewed: Yes  Additional Pertinent Hx: 81y.o. 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 Pmhx: HTN, COPD. Family / Caregiver Present: No  Referring Practitioner: Dr. Irena Dudley  Diagnosis: Debility  2/2 R fem-pop bypass  General Comment  Comments: Pt sitting in BS chair. Pt agreeable to PT.     Restrictions:  Position Activity Restriction  Other position/activity restrictions: up as tolerated     SUBJECTIVE  Subjective: Pt reports that his R leg does not necessarily hurt but rather its very \"tight\"       Post Treatment Pain Screening       Prior Level of Function:  Social/Functional History  Lives With: Alone  Type of Home: Apartment  Home Layout: One level  Home Access: Stairs to enter with rails  Entrance Stairs - Number of Steps: 3 steps to enter building; then 4 steps w/ rail down to basement level apt  Entrance Stairs - Rails: Right (when ascending)  Bathroom Shower/Tub: Tub/Shower unit  Bathroom Toilet: Standard (w/ toilet raised and handles)  Bathroom Equipment: Toilet raiser, Shower chair, Grab bars in shower, Hand-held shower  Bathroom Accessibility: Walker accessible  Home Equipment: Sock aid, Susana Dame, Rollator, Walker, rolling  Has the patient had two or more falls in the past year or any fall with injury in the past year?: No  Receives Help From: Family, Neighbor (Son helps)  ADL Assistance: [address]: Independent (does cooking, cleaning, laundry. son helps buy groceries. Had [employer] services but then they stopped. 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       OBJECTIVE  Vision  Vision: Impaired  Vision Exceptions: Wears glasses for reading    Hearing  Hearing: Exceptions to [facility]  Hearing Exceptions: Hard of hearing/hearing concerns (Mostly L ear has problems)         ROM  AROM RLE (degrees)  RLE AROM: WFL  RLE General AROM: Pt reports that his R leg feels \"tight'  AROM LLE (degrees)  LLE AROM : WFL    Strength  Strength RLE  Strength RLE: WFL  Strength LLE  Strength LLE: WFL    Quality of Movement  Tone RLE  RLE Tone: Normotonic         Functional Mobility  Bed mobility  Bridging: Independent  Rolling to Left: Supervision (VC for energy efficient technique)  Rolling to Right: Supervision (VC for energy efficient technique)  Supine to Sit: Supervision (VC for energy efficient technique)  Sit to Supine: Supervision (VC for energy efficient technique)  Scooting: Supervision (VC for energy efficient technique.)  Bed Mobility Comments: Bed positioned flat with bed rails lowered to simulate home situation  Transfers  Sit to Stand: Contact guard assistance;Stand by assistance (VC  for hand placement)  Stand to sit: Contact guard assistance;Stand by assistance (VC for hand placement)  Bed to Chair: Contact guard assistance;Stand by assistance (stand pivot transf)  Car Transfer: Contact guard assistance  Comment: VC for energy efficient technique includig backing himself up to the car seat  Balance  Sitting - Static: Good  Sitting - Dynamic: Good  Standing - Static: Fair;+ (WBOS)  Standing - Dynamic: Fair    Environmental Mobility  Ambulation  WB Status: No WB precautions noted in chart  Ambulation  Surface: level tile;uneven;ramp  Device: Single point cane  Other Apparatus: O2 (6L)  Assistance: Contact guard assistance  Quality of Gait: Tends to ER BLES with L> R  Gait Deviations: Slow Yolande;Decreased step length;Staggers  Distance: 48' x2, 80' x1, short distances in the gym, 20' amb on ramp( x 10' up and 10' down for a total of 20' )  Comments: SOB appears to be the most limiting factor,  More Ambulation?: Yes  Stairs/Curb  Stairs?: Yes  Stairs  # Steps : 9 (3x3)  Stairs Height: 6\"  Rails: Right ascending (plus SPC)  Device: Single pt cane  Assistance: Contact guard assistance  Comment: Amb in a noonreciprocal pattern  Wheelchair Activities  Wheelchair Size: 18\"  Wheelchair Type: Standard  Wheelchair Cushion: Pressure Relieving  Level of Assistance for pressure relief activities: Supervision (VC for technique including a stepping pattern)  Wheelchair Parts Management: No  Propulsion: Yes  Propulsion 1  Propulsion: Manual  Level: Level Tile  Method: RLE;LLE  Level of Assistance: Supervision  Description/ Details: Stepping pattern  Distance: 79'      Second Session: Pt seated in recliner on entry on telephone, agreeable to therapy session. Pt reports some pain in RLE but does not rate, states he recently had pain medication. Pt ambulated with SPC [id number] with CGA and cues for upright posture, dec Wbing thru cane. PT manages O2 line and provides wheelchair follow. Pt limited by endurance and requesting to sit. Standing therex  with BUE support on walker: marches, mini squats, hip abd, HS curls x20 each. Picking up object from standing with min  A for balance, pt has difficulty rising and requires inc time. Pt left in room at end session with O2 connected to wall unit and call light in reach. ASSESSMENT       Activity Tolerance  Activity Tolerance: Patient tolerated evaluation without incident;Patient limited by fatigue;Patient limited by endurance    Assessment  Assessment: Pt is a 82 yo admitted 7/18/22 for R fem-pop bypass. Pt presents with decreased functional mobility, decreased balance with walking and decreased endurance. Pt lives alone so Ind is the overall goal. Pt is well motivated. Pt is below baseline and would benefit from continued therapy to maximize potential and increase functional mobility towards Ind to allow for a safer d/c to home  Treatment Diagnosis: mobility impairment due to R fem-pop bypass  Therapy Prognosis: Good  Decision Making: Medium Complexity  Barriers to Learning: None  Discharge Recommendations: Home with Home health PT  PT D/C Equipment  Other: Ongoing assessment  PT Equipment Recommendations  Equipment Needed: No  Other: Ongoing assessment    CLINICAL IMPRESSION   Pt is a 82 yo admitted 7/18/22 for R fem-pop bypass. Pt presents with decreased functional mobility, decreased balance with walking and decreased endurance. Pt lives alone so Ind is the overall goal. Pt is well motivated.  Pt is below baseline and would benefit from continued therapy to maximize potential and increase functional mobility towards Ind to allow for a safer d/c to home    GOALS  Patient Goals   Patient goals : Get home ASAP  Short Term Goals  Time Frame for Short term goals: 10 days  Short term goal 1: Ind with bed mobility  Short term goal 2: Ind with transf excluding floor transf  Short term goal 3: Amb with LRAD distances > 200' at a time indly on level surfaces  Short term goal 4: Amb up and down 12 stepsIndly  with use of 1 railing and SPC  Additional Goals?: No    PLAN OF CARE  Frequency: 1-2 treatment sessions per day, 5-7 days per week  Plan  Plan:  (5 x week x 60 minutes)  Current Treatment Recommendations: Functional mobility training;Transfer training;Gait training;Balance training;Stair training;Strengthening;Neuromuscular re-education; Endurance training;Patient/Caregiver education & training; Safety education & training;Home exercise program;Therapeutic activities  Safety Devices  Type of Devices: Chair alarm in place;Call light within reach; Left in chair;Nurse notified    EDUCATION  Education  Education Given To: Patient  Education Provided: Role of Therapy;Plan of Care;Precautions; Safety; Mobility Training;Energy Conservation;Transfer Training  Education Provided Comments: Pt educated on diaphragmatic breathing ex  Education Method: Demonstration;Verbal  Barriers to Learning: None  Education Outcome: Verbalized understanding;Continued education needed    ELOS:          Therapy Time   Individual Concurrent Group Co-treatment   Time In 930         Time Out 1030         Minutes 60            Second Session Therapy Time:    Individual Concurrent Group Co-treatment   Time In  1300      Time Out  1330      Minutes  30         Timed Code Treatment Minutes:  60+30     Total Treatment Minutes:  90     Second Session only: Dwana Nageotte PT, DPT, NCS, CSRS        Patrick Magaña, PT, 07/26/22 at 5:27 PM

## 2022-07-26 NOTE — PROGRESS NOTES
Pt A & O. VSS. Pt x 1 cane. All safety precautions in place. Bed alarm activated. Non skid socks on. No complaints of pain or nausea voiced. Neuro checks remain unchanged. Will cont to monitor.     Vitals:    07/25/22 2045   BP: (!) 115/58   Pulse: 62   Resp: 17   Temp: 97.5 °F (36.4 °C)   SpO2: 95%

## 2022-07-26 NOTE — PROGRESS NOTES
Physical Therapy  Daily Treatment Note ARU  7/26/22  Second Session: Pt seated in recliner on entry on telephone, agreeable to therapy session. Pt reports some pain in RLE but does not rate, states he recently had pain medication. Pt ambulated with SPC [id number] with CGA and cues for upright posture, dec Wbing thru cane. PT manages O2 line and provides wheelchair follow. Pt limited by endurance and requesting to sit. Standing therex  with BUE support on walker: marches, mini squats, hip abd, HS curls x20 each. Picking up object from standing with min  A for balance, pt has difficulty rising and requires inc time. Pt left in room at end session with O2 connected to wall unit and call light in reach. Second Session Therapy Time:   Individual Concurrent Group Co-treatment   Time In  1300         Time Out  1330         Minutes  30           Timed Code Treatment Minutes:       Total Treatment Minutes:  30    Second Session only: Lakshmi Sprague PT, DPT, NCS, CSRS

## 2022-07-26 NOTE — H&P
Department of Physical Medicine & Rehabilitation  History & Physical      Patient Identification:  Dontrell Tapia. : 1950  Admit date: 2022   Attending provider: Sarina Ballard DO        Primary care provider: Gabrielle Dave DO     Chief Complaint: PAD    History of Present Illness/Hospital Course: This is a [address] 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 Melany Handley. He currently requires assistnece in therapy and is scoring 17/17 in PT/OT scores. Would require a score of >18 to be able to discharge home. He is agreeable to ARU admit to improve function before planned discharge home.      Prior Level of Function:  Independent for mobility, ADLs, and IADLs    Current Level of Function:  Mod assist     Pertinent Social History:  Support: lives alone  Home set-up: 3 steps in [place]       Past Medical History:   Diagnosis Date    CAD (coronary artery disease)     CHF (congestive heart failure) ([facility])     diastolic    COPD (chronic obstructive pulmonary disease) ([facility])     Critical ischemia of lower extremity (HCC)     Depressive disorder, not elsewhere classified     GERD (gastroesophageal reflux disease)     History of colon polyps - 30 seen on colonoscopy 2021    History of MI (myocardial infarction) 2013    History of skin ulcer of lower extremity     R anterior shin    History of transient ischemic attack (TIA)     Hx of blood clots     PE    Hyperlipidemia     Hypertension     Neuropathy     KAVITA (obstructive sleep apnea)     On CPAP    Personal history of DVT (deep vein thrombosis)     Prolonged emergence from general anesthesia     Pt reported being combative after surgery     PVD (peripheral vascular disease) ([facility] [address]     TIA (transient ischemic attack)     Vertebral fracture      Fall in past year: No    Past Surgical History:   Procedure Laterality Date    APPENDECTOMY      CHOLECYSTECTOMY, LAPAROSCOPIC      COLONOSCOPY  4/23/2021    COLONOSCOPY POLYPECTOMY SNARE/COLD BIOPSY performed by Stefan Alexandra MD at [facility]  4/23/2021    COLONOSCOPY POLYPECTOMY ABLATION performed by Stefan Alexandra MD at [facility]  4/23/2021    COLONOSCOPY CONTROL HEMORRHAGE performed by Stefan Alexandra MD at [address]  6/2013    EYE SURGERY Left     FEMORAL BYPASS Right 7/18/2022    RIGHT FEMORAL BELOW KNEE POPLITEAL ARTERY BYPASS WITH IN SITU SAPHENOUS VEIN performed by Candido Rogers MD at [address] Right 11/17/2021    RIGHT FEMORAL ENDARTERECTOMY  RIGHT EXTERNAL ILIAC TO PROFUNDA FEMORAL BYPASS WITH 8MM PTFE GRAFT RIGHT LOWER EXTREMITY ARTERIOGRAM BALLOON ANGIOPLASTY RIGHT PROFUNDA BALLOON ANGIOPLASTY AND STENT OF RIGHT EXTERNAL ILIAC ARTERY performed by Candido Rogers MD at [address] [facility]  11/18/2015    Bilateral decompressive lumbar laminectomy L4-5   ; medial facetectomy L4-5 joints  bilaterally; foraminotomies l5   nerve roots bilaterally    LEG DEBRIDEMENT Right 7/23/2013    Dr. Noble Rowe - pretibial wound    OTHER SURGICAL HISTORY Right 6/25/2013    Dr. Noble Rowe - CFA Endarterectomy w/marsupialization; RLE wound excision & debridement     OTHER SURGICAL HISTORY Left 8/27/2013    Dr. Noble Rowe - femoral & profunda femoris endarterectomy w/marsupialization patch angioplasty using SFA    SKIN SPLIT GRAFT Right 7/25/2013    Dr. Noble Rowe - to non-healing R pretibial wound, 9 x 15 cm    TOTAL HIP ARTHROPLASTY Right 09/01/2016    Dr. Caitlyn Stevenson Left 12/22/2015    Dr. Noble Rowe - CFA exploration, thrombectomy of ileofemoral system, stenting of RAFAL in-stent stenosis w/8 x 37 mm Palmaz      Major Surgery in past 100 days: yes    Family History   Problem Relation Age of Onset    Cancer Mother         Breast    Heart Disease Mother Cancer Father         Bladder    Heart Disease Father     Cancer Paternal Grandmother         melanoma        Social History     Socioeconomic History    Marital status:     Number of children: 3   Occupational History    Occupation: Retired      Comment:    Tobacco Use    Smoking status: Former     Packs/day: 0.25     Years: 52.00     Pack years: 13.00     Types: Cigarettes     Start date: 1967     Quit date: 2022     Years since quittin.1    Smokeless tobacco: Never    Tobacco comments:     reports he quit circa 22   Substance and Sexual Activity    Alcohol use: Yes     Alcohol/week: 0.0 standard drinks     Comment: 2-3 beers a month    Drug use: No    Sexual activity: Never     Social Determinants of Health     Financial Resource Strain: Low Risk     Difficulty of Paying Living Expenses: Not hard at all   Food Insecurity: No Food Insecurity    Worried About [address] [place] [address] Sightly in the Last Year: Never true    Ran Out of Food in the Last Year: Never true   Transportation Needs: No Transportation Needs    Lack of Transportation (Medical): No    Lack of Transportation (Non-Medical):  No       Allergies   Allergen Reactions    Asa [Aspirin] Other (See Comments)     Stomach ache    Daliresp [Roflumilast] Diarrhea and Other (See Comments)     Severe diarrhea and did not help         Current Facility-Administered Medications   Medication Dose Route Frequency Provider Last Rate Last Admin    0.9 % sodium chloride infusion   IntraVENous PRN Mark L Heis, DO        acetaminophen (TYLENOL) tablet 1,000 mg  1,000 mg Oral Q6H Mark L Heis, DO   1,000 mg at 22 0914    albuterol (PROVENTIL) nebulizer solution 2.5 mg  2.5 mg Nebulization Q6H PRN Mark L Heis, DO        allopurinol (ZYLOPRIM) tablet 100 mg  100 mg Oral Daily Mark L Heis, DO   100 mg at 22 0914    apixaban (ELIQUIS) tablet 5 mg  5 mg Oral BID Mark L Heis, DO   5 mg at 22 3222    Arformoterol Tartrate [facility]) nebulizer solution 15 mcg  15 mcg Nebulization BID Mark L Heis, DO   15 mcg at 07/26/22 1214    And    budesonide (PULMICORT) nebulizer suspension 500 mcg  0.5 mg Nebulization BID Mark L Heis, DO   500 mcg at 07/26/22 1213    atorvastatin (LIPITOR) tablet 80 mg  80 mg Oral Daily Mark L Heis, DO   80 mg at 07/25/22 2053    clopidogrel (PLAVIX) tablet 75 mg  75 mg Oral Daily Mark L Heis, DO   75 mg at 07/26/22 0914    docusate sodium (COLACE) capsule 100 mg  100 mg Oral BID Mark L Heis, DO   100 mg at 07/26/22 0914    DULoxetine (CYMBALTA) extended release capsule 20 mg  20 mg Oral Daily Mark L Heis, DO   20 mg at 07/26/22 0913    losartan (COZAAR) tablet 100 mg  100 mg Oral Daily Mark L Heis, DO   100 mg at 07/26/22 0914    metoprolol (LOPRESSOR) tablet 100 mg  100 mg Oral BID Mark L Heis, DO   100 mg at 07/26/22 0913    ondansetron (ZOFRAN-ODT) disintegrating tablet 4 mg  4 mg Oral Q8H PRN Mark L Heis, DO        Or    ondansetron (ZOFRAN) injection 4 mg  4 mg IntraVENous Q6H PRN Mark L Heis, DO        oxyCODONE (ROXICODONE) immediate release tablet 5 mg  5 mg Oral Q4H PRN Mark L Heis, DO        Or    oxyCODONE (ROXICODONE) immediate release tablet 10 mg  10 mg Oral Q4H PRN Mark L Heis, DO   10 mg at 07/26/22 0913    pantoprazole (PROTONIX) tablet 40 mg  40 mg Oral QAM AC Mark L Heis, DO   40 mg at 07/26/22 0541    polyethylene glycol (GLYCOLAX) packet 17 g  17 g Oral Daily Mark L Heis, DO        pregabalin (LYRICA) capsule 150 mg  150 mg Oral BID Mark L Heis, DO   150 mg at 07/26/22 0914    sodium chloride flush 0.9 % injection 5-40 mL  5-40 mL IntraVENous 2 times per day Mark L Heis, DO   10 mL at 07/26/22 1120    sodium chloride flush 0.9 % injection 5-40 mL  5-40 mL IntraVENous PRN Mark Dudley DO        bisacodyl (DULCOLAX) EC tablet 5 mg  5 mg Oral Daily Mark Dudley DO        magnesium hydroxide (MILK OF MAGNESIA) 400 MG/5ML suspension 30 mL  30 mL Oral Daily PRN Mark Dudley, DO        polyethylene glycol (GLYCOLAX) packet 17 g  17 g Oral Daily PRN Mark Dudley,              REVIEW OF SYSTEMS:   CONSTITUTIONAL: negative for fevers, chills, diaphoresis, appetite change, night sweats, unexpected weight change,  positive for  fatigue  EYES: negative for blurred vision, eye discharge, visual disturbance and icterus  HEENT: negative for hearing loss, tinnitus, ear drainage, sinus pressure, nasal congestion, epistaxis and snoring  RESPIRATORY: Negative for hemoptysis, cough, sputum production  CARDIOVASCULAR: negative for chest pain, palpitations, exertional chest pressure/discomfort, syncope, edema  GASTROINTESTINAL: negative for nausea, vomiting, diarrhea, blood in stool, abdominal pain, constipation  GENITOURINARY: negative for frequency, dysuria, urinary incontinence, decreased urine volume, and hematuria  HEMATOLOGIC/LYMPHATIC: negative for easy bruising, bleeding and lymphadenopathy  ALLERGIC/IMMUNOLOGIC: negative for recurrent infections, angioedema, anaphylaxis and drug reactions  ENDOCRINE: negative for weight changes and diabetic symptoms including polyuria, polydipsia and polyphagia  MUSCULOSKELETAL: negative for pain, joint swelling, decreased range of motion  NEUROLOGICAL: negative for headaches, slurred speech, unilateral weakness positive for bilateral LE weakness  PSYCHIATRIC/BEHAVIORAL: negative for hallucinations, behavioral problems, confusion and agitation. All pertinent positives are noted in the HPI.     Physical Examination:  Vitals: Patient Vitals for the past 24 hrs:   BP Temp Temp src Pulse Resp SpO2 Height Weight   07/26/22 0900 (!) 161/69 97.7 °F (36.5 °C) Oral 60 18 95 % -- --   07/26/22 0542 -- -- -- -- -- -- 5' 10\" (1.778 m) 228 lb 6.3 oz (103.6 kg)   07/25/22 2045 (!) 115/58 97.5 °F (36.4 °C) Oral 62 17 95 % -- --   07/25/22 1938 -- -- -- 62 16 95 % -- --   07/25/22 1830 138/75 97.6 °F (36.4 °C) Oral 61 18 93 % -- -- Const: Alert. WDWN. No distress  Eyes: Conjunctiva noninjected, no icterus noted; pupils equal, round, and reactive to light. HENT: Atraumatic, normocephalic; Oral mucosa moist  Neck: Trachea midline, neck supple. No thyromegaly noted. CV: Regular rate and rhythm, no murmur rub or gallop noted  Resp: Lungs clear to auscultation bilaterally, no rales wheezes or ronchi, no retractions. Respirations unlabored. GI: Soft, nontender, nondistended. Normal bowel sounds. No palpable masses. Skin: Normal temperature and turgor. No rashes or breakdown noted. Ext: No significant edema appreciated. No varicosities. MSK: No joint tenderness, erythema, warmth noted. AROM intact. Neuro: Alert, oriented, appropriate. No cranial nerve deficits appreciated. Sensation intact to light touch. Motor examination reveals 4/5 strength in all four limbs diffusely. No abnormalities with finger/nose or heel/shin noted. Reflexes normal and symmetric. Psych: Stable mood, normal judgement, normal affect     Lab Results   Component Value Date    WBC 5.3 07/25/2022    HGB 9.1 (L) 07/25/2022    HCT 27.9 (L) 07/25/2022    MCV 96.1 07/25/2022     07/25/2022     Lab Results   Component Value Date    INR 1.17 (H) 07/18/2022    INR 1.07 11/18/2021    INR 1.08 11/17/2021    PROTIME 14.9 (H) 07/18/2022    PROTIME 12.1 11/18/2021    PROTIME 12.3 11/17/2021     Lab Results   Component Value Date    CREATININE 0.9 07/25/2022    BUN 15 07/25/2022     07/25/2022    K 4.8 07/25/2022     07/25/2022    CO2 29 07/25/2022     Lab Results   Component Value Date    ALT 14 11/30/2021    AST 19 11/30/2021    ALKPHOS 90 11/30/2021    BILITOT 0.4 11/30/2021         No orders to display             The above laboratory data have been reviewed. The above imaging data have been reviewed. The above medical testing have been reviewed. Body mass index is 32.77 kg/m².     POST ADMISSION PHYSICIAN EVALUATION  The patient has agreed to being admitted to our comprehensive inpatient rehabilitation facility and can tolerate the intensity of service consisting of at least:  --180 minutes of therapy a day, 5 out of 7 days a week. OR  --15 hours of intensive therapy within a 7 consecutive day period. The patient/family has a good understanding of our discharge process and will benefit from an interdisciplinary inpatient rehabilitation program. The patient has potential to make improvement and is in need of at least two of the following multidisciplinary therapies including but not limited to physical, occupational, respiratory, and speech, nutritional services, wound care, and prosthetics and orthotics. Given the patients complex condition and risk of further medical complications, rehabilitation services cannot be safely provided at a lower level of care such as a skilled nursing facility. All of the goals listed below were reviewed with the patient and he/she is in agreement. I have compared the patients medical and functional status at the time of the preadmission screening and the same on this date, and there are no significant changes. By signing this document, I acknowledge that I have personally performed a full physical examination on this patient within 24 hours of admission to this inpatient rehabilitation facility and have determined the patient to be able to tolerate the above course of treatment at an intensive level for a reasonable period of time. I will be completing a detailed individualized  Plan of Care for this patient by day four of the patients stay based upon the Preadmission Screen, this Post-Admission Evaluation, and the therapy evaluations.      Barriers: Decreased functional mobility, medical comorbidities  Services Required: PT, OT  Goals: mod I  Prognosis: Good  Anticipated Dispo: home  ELOS: TBD    Rehabilitation Diagnosis:   16.0, Debility (non-cardiac, non-pulmonary      Assessment and Plan:  Pain control  Tylenol scheduled  Roxicodone pain panel        HTN  Hx of HTN, home meds Losartan and Metoprolol restarted  PAD status post R femoral below knee popliteal bypass  Eliquis and Plavix started 7/19        Urinary retention  - required schulte  - monitor  - schulte out        Decreased functional mobility  - PT/OT requirements - ARU    Impairments: Decreased functional mobility, Decreased ADLs    Bladder - high risk retention - Monitor PVRs, SC prn >300cc    Bowel - high risk constipation - colace BID, PRN miralax and MoM. follow bowel movements. Enema or suppository if needed.      Safety - fall precautions    PPx  DVT: eliquis   GI: pantoprazole    FULL CODE    Gallito Mccloud D.O. M.P.H  PM&R  7/26/2022  2:34 PM

## 2022-07-26 NOTE — PLAN OF CARE
Problem: Discharge Planning  Goal: Discharge to home or other facility with appropriate resources  Outcome: Progressing Towards Goal  Flowsheets (Taken 7/25/2022 2045 by Luis Nails RN)  Discharge to home or other facility with appropriate resources: Identify barriers to discharge with patient and caregiver     Problem: ABCDS Injury Assessment  Goal: Absence of physical injury  Outcome: Progressing Towards Goal  Flowsheets (Taken 7/25/2022 2045 by Luis Nails RN)  Absence of Physical Injury: Implement safety measures based on patient assessment  Note: Patient remains free of physical injury     Problem: Discharge Planning  Goal: Discharge to home or other facility with appropriate resources  Outcome: Progressing Towards Goal  Flowsheets (Taken 7/25/2022 2045 by Luis Nails RN)  Discharge to home or other facility with appropriate resources: Identify barriers to discharge with patient and caregiver

## 2022-07-26 NOTE — PROGRESS NOTES
Patient assessment completed. VSS and patient is afebrile. Medicated for pain in right leg. Bed in low position and call light within reach.

## 2022-07-26 NOTE — PROGRESS NOTES
Occupational Therapy  Facility/Department: [facility] ACUTE REHAB UNIT  Rehabilitation Occupational Therapy Evaluation/Treatment        Date: 22  Patient Name: Liliana Steve. Room: [address] [place] [address]  MRN: [id number]  Account: [de-identified]   : 1950  (71 y.o.) Gender: male     Referring Practitioner: Dr. Trey Chong DO  Diagnosis: atherosclerosis of native artery of right lower extremity  Additional Pertinent Hx: Pt with atherosclerosis of native artery of right lower extremity admitted  s/p R femoral below knee popliteal bypass with in-situ saphenous vein. PMHx: COPD, CHF, CAD with stenting, HTN, neuropathy, lumbar laminectomy, R MELANI.  Pt admitted to ARU     Restrictions  Other position/activity restrictions: up as tolerated    Subjective  Subjective: Pt was semi supine in bed upon arrival. Pt was pleasant and agreeable to OT evaluation          Vitals  Temp: 97.7 °F (36.5 °C)  Heart Rate: 60  Resp: 18  BP: (!) 161/69  Height: 5' 10\" (177.8 cm)  Weight: 228 lb 6.3 oz (103.6 kg)  BMI (Calculated): 32.8  Oxygen Therapy  SpO2: 95 %  Pulse Oximeter Device Mode: Intermittent  Pulse Oximeter Device Location: Finger  O2 Device: Nasal cannula  Skin Assessment: Clean, dry, & intact  O2 Flow Rate (L/min): 4 L/min  Level of Consciousness: Alert (0)    Objective     Orientation  Overall Orientation Status: Within Normal Limits  Orientation Level: Oriented X4       ROM  LUE AROM (degrees)  LUE AROM : WFL  RUE AROM (degrees)  RUE AROM : WFL  Fine Motor Skills  Coordination  Movements Are Fluid And Coordinated: Yes       Hand Assessment     Functional Mobility    Balance  Sitting Balance: Supervision  Standing Balance: Minimal assistance (CGA-Min A)  Standing Balance  Time: ~4 mins total  Activity: functional mobility to/from bathroom; stance for ADLs  Transfers  Sit to stand: Contact guard assistance (from EOB)  Stand to sit: Contact guard assistance (to recliner chair)  Shower Transfers  Shower - Transfer From: Cane  Shower - Transfer Type: To and From  Shower - Transfer To: Transfer tub bench  Shower - Technique: Ambulating  Shower Transfers: Contact Guard  Shower Transfers Comments: Min VCs for safety upon approach    Social/Functional History  Lives With: Alone  Type of Home: Apartment  Home Layout: One level  Home Access: Stairs to enter with rails  Entrance Stairs - Number of Steps: 3 steps to enter building; then 4 steps w/ rail down to basement level apt  Bathroom Shower/Tub: Tub/Shower unit  Bathroom Toilet: Standard (w/ toilet raised and handles)  Bathroom Equipment: Toilet raiser; Shower chair;Grab bars in shower;Hand-held shower  Bathroom Accessibility: Walker accessible  Home Equipment: Sock aid;Reacher;Cane;Rollator;Walker, rolling  Has the patient had two or more falls in the past year or any fall with injury in the past year?: No  Receives Help From: Family  ADL Assistance: [address] [place] [address] Avenue: Independent (does cooking, cleaning, laundry. son helps buy groceries. Had [employer] services but then they stopped. Pt is interested in having them back)  Ambulation Assistance: Independent (uses the cane in the house and rollator for longer distance)  Transfer Assistance: Independent  Active : No  Patient's  Info: son, bus or medical transport  Occupation: Retired  Type of Occupation: drywall installation  Leisure & Hobbies: watch TV  Additional Comments: Pt reported his son is available and could possibly stay with him for the first couple days if needed and he stated he has a neighbor who can help out if needed.     ADL  Feeding: Independent  Feeding Skilled Clinical Factors: Breakfast tray delivered  Grooming: Setup  Grooming Skilled Clinical Factors: Pt washed his face seated on TTB  UE Bathing: Supervision  UE Bathing Skilled Clinical Factors: Pt washed and dried 4/4 components of UE bathing seated on TTB  LE Bathing: Contact guard assistance  LE Bathing Skilled Clinical Factors: Pt washed and dried 5/5 components of LE bathing seated on TTB. Pt unable to wash RLE since it was wrapped in plastic to prevent dressing from getting wet. Pt washed his buttocks by laterally WSing L and R on the bench. CGA needed in stance to dry buttocks  UE Dressing: Setup  UE Dressing Skilled Clinical Factors: Pt donned his tshirt seated on TTB  LE Dressing: Moderate assistance  LE Dressing Skilled Clinical Factors: Pt required assistance to thread RLE into underwear and CGA in stance to manage clothes over hips in stance. Pt completed all other components of LE dressing w/o assistance. Pt required assistance to don R sock but pt was able to don L sock. Toileting Skilled Clinical Factors: Pt denied need during session    Goals  Patient Goals   Patient goals : \"do what I did before\"  Short Term Goals  Time Frame for Short term goals: 10 days  Short Term Goal 1: Pt will complete toileting and toilet transfers MOD I  Short Term Goal 2: Pt will complete tub transfer MOD I w/ use of shower chair or TTB pending safest method  Short Term Goal 3: Pt will complete LE dressing MOD I w/ use of AE prn  Short Term Goal 4: Pt will complete light housekeeping task in stance for 7 mins MOD I  Short Term Goal 5: Pt will complete bathing tasks MOD I      2nd session: Pt was seated in recliner chair upon arrival. Pt c/o 7/10 RLE pain-RN notified and present to deliver meds during session. Sit to stand completed from recliner w/ CGA. Pt ambulated w/ SPC w/ CGA to the bathroom. Pt completed oral care in stance at the sink w/ CGA progressing to SBA. Pt practiced toilet transfer to standard height toilet and completed w/ CGA. Pt reported it felt too low and requested RTS. Pt completed transfer to RTS w/ SBA and reported it felt much better. Pt ambulated w/ SPC and CGA back to the recliner chair. Pt doffed his L shoe I'ly and OT applied tenso shape to LLE due to edema. Pt donned L shoe I'ly.  Pt was left in the recliner chair at EOS w/ chair alarm on and call light within reach. Assessment  Performance deficits / Impairments: Decreased functional mobility ; Decreased ADL status; Decreased high-level IADLs;Decreased endurance;Decreased strength;Decreased balance  Assessment: Pt is a 80 yo male who presents to [facility] 2/2 popliteal bypass. Prior to admission pt reported he was independent w/ all ADLs and functional mobility and he was doing most IADLs. Pt is currently limited by decreased activity tolerance, pain, and impaired balance. Pt is functioning below his baseline and benefits from OT in order to maximize functional independence. Treatment Diagnosis: Impaired ADLs, mobility 2/2 popliteal bypass  Prognosis: Good  Decision Making: Medium Complexity  Discharge Recommendations: Home with Home health OT; Home with assist PRN  Plan  Times per Week: 5x a week for 90 mins daily  Times per Day: Daily  Current Treatment Recommendations: Strengthening;Equipment evaluation, education, & procurement; Functional mobility training;Balance training; Endurance training; Safety education & training;Patient/Caregiver education & training;Stair training;Self-Care / ADL;Gait training;Home management training       Therapy Time   Individual Concurrent Group Co-treatment   Time In 0730         Time Out 0830         Minutes 60         Timed Code Treatment Minutes: 60 Minutes       Second Session Therapy Time:   Individual Concurrent Group Co-treatment   Time In  2550         Time Out  3955         Minutes  30           Timed Code Treatment Minutes:  30    Total Treatment Minutes:   60+30= [address] [id number], OT

## 2022-07-26 NOTE — CARE COORDINATION
Case Management Assessment           Initial Evaluation                Date / Time of Evaluation: 7/26/2022 3:54 PM                 Assessment Completed by: BRADLEY Smith    Patient Name: Bridget Diaz. YOB: 1950  Diagnosis: Debility [R53.81]     Date / Time: 7/25/2022  6:08 PM    Patient Admission Status: Inpatient    If patient is discharged prior to next notation, then this note serves as note for discharge by case management. Current PCP: Chelsi Hernandes DO  Clinic Patient: Yes    Chart Reviewed: Yes  Patient/ Family Interviewed: Yes    Initial assessment completed at bedside with: Pt    Hospitalization in the last 30 days: No    Emergency Contacts:  Extended Emergency Contact Information  Primary Emergency Contact: [address] [place] [address] Phone: [phone]  Relation: Child    Advance Directives:   Code Status: Full Code    Financial  Payor: Joe Merritt / Plan: Kerri Copas / Product Type: *No Product type* /     Pre-cert required for SNF: Yes    Pharmacy    [place] [id number] - [facility], [address] [place] [address] [phone]  [place] [id number]  Phone: [phone] Fax: [phone]    [facility] [place] [address] [phone] Britt Huston [phone] [address] [facility]  [address] [address]  Phone: [phone] Fax: [phone]      Potential assistance Purchasing Medications: Potential Assistance Purchasing Medications: No  Does Patient want to participate in local refill/ meds to beds program?: No    Meds To Beds General Rules:  1. Can ONLY be done Monday- Friday between 8:30am-5pm  2. Prescription(s) must be in pharmacy by 3pm to be filled same day  3. Copy of patient's insurance/ prescription drug card and patient face sheet must be sent along with the prescription(s)  4. Cost of Rx cannot be added to hospital bill. If financial assistance is needed, please contact unit  or ;  or  CANNOT provide pharmacy voucher for patients co-pays  5. Patients can then  the prescription on their way out of the hospital at discharge, or pharmacy can deliver to the bedside if staff is available. (payment due at time of pick-up or delivery - cash, check, or card accepted)     Able to afford home medications/ co-pay costs: Yes    ADLS  Support Systems: Children, Family Members    PT AM-PAC:   /24  OT AM-PAC:   /24    HOUSING  Home Environment: lives alone in apartment  Steps:     Plans to RETURN to current housing: Yes  Barriers to RETURNING to current housing: will be home alone; son may be able to stay with Pt for a few days. Home Care Information  Currently ACTIVE with Home Health Care: Yes  Home Care Agency: [facility]    Currently ACTIVE with [facility] on Aging: No    Durable Medical Equipment  DME Provider: Maria Fernanda  Equipment: Home 02, wheelchair, walker, shower chair    Home Oxygen and Respiratory Equipment  Has HOME OXYGEN prior to admission: Yes  Masoud Zuniga 262: [facility]  Other Respiratory Equipment:     Dialysis  Active with HD/PD prior to admission: No  Nephrologist:    HD Center:  Not Applicable    DISCHARGE PLAN:  Disposition: Home with [address]: [address] for discharge: family     Additional Case Management Notes:   SW met with Pt at bedside. Introduced self and role of SW. Pt states that he lives alone in apartment complex and was independent PTA. Pt is active with [facility] and was coming to [facility] \"about 1x a month. \" Pt states that he does not drive - he uses the bus. Pt states, \"I live in [facility] so I can use the bus route everywhere I need to go. \" Pt has Home 02 with [facility]. Pt states his son may be able to stay with him at [place] if needed. Emotional support provided.          The Plan for Transition of Care is related to the following treatment goals of Debility [R53.81]    The Patient and/or patient representative Ildefonso Simons and his family were provided with a choice of provider and agrees with the discharge plan Yes    Freedom of choice list was provided with basic dialogue that supports the patient's individualized plan of care/goals and shares the quality data associated with the providers.  Yes    Care Transition patient: No    Ita Miranda [place] [address]  Case Management Department  Ph: [phone]

## 2022-07-26 NOTE — DISCHARGE INSTRUCTIONS
[email or web address]   --- this is American Heart Association interactive Healthier Living with Heart Failure guidebook. Copy and paste link into search bar. Use your mouse to scroll through the pages. Lots and lots of info / tips    [address] [address] --- free smart phone kike- (icon will look like a lopsided pink heart) --Use your phone to track sodium / fluid intake, symptoms, weight, etc.    [employer] - website-  - [employer] is a dialysis company. All dialysis patients follow a renal diet which IS low sodium!! This website offers free seasonal cookbooks. Each quarter, they will release 25-30 new recipes with a breakdown of calories, sodium, glucose, etc     www.[email or web address] -- more free recipes    Vascular Surgery Discharge Instructions:  Change Coflex TLC dressing every-other day. Wash incision sites not covered by Coflex TLC dressing daily with soap and water. Paint incision with betadine. Follow-up with Dr. Dirk Minaya in the office in 1 week for staple removal and post-operative check.

## 2022-07-26 NOTE — PLAN OF CARE
Problem: Discharge Planning  Goal: Discharge to home or other facility with appropriate resources  Recent Flowsheet Documentation  Taken 7/25/2022 2045 by Melissa Vargas RN  Discharge to home or other facility with appropriate resources: Identify barriers to discharge with patient and caregiver  Problem: Safety - Adult  Goal: Free from fall injury  Recent Flowsheet Documentation  Taken 7/25/2022 2045 by Melissa Vargas RN  Free From Fall Injury: Instruct family/caregiver on patient safety     Problem: ABCDS Injury Assessment  Goal: Absence of physical injury  Recent Flowsheet Documentation  Taken 7/25/2022 2045 by Melissa Vargas RN  Absence of Physical Injury: Implement safety measures based on patient assessment

## 2022-07-27 LAB
ANION GAP SERPL CALCULATED.3IONS-SCNC: 6 MMOL/L (ref 3–16)
BASOPHILS ABSOLUTE: 0 K/UL (ref 0–0.2)
BASOPHILS RELATIVE PERCENT: 0.7 %
BUN BLDV-MCNC: 20 MG/DL (ref 7–20)
CALCIUM SERPL-MCNC: 8.9 MG/DL (ref 8.3–10.6)
CHLORIDE BLD-SCNC: 102 MMOL/L (ref 99–110)
CO2: 27 MMOL/L (ref 21–32)
CREAT SERPL-MCNC: 0.9 MG/DL (ref 0.8–1.3)
EOSINOPHILS ABSOLUTE: 0.3 K/UL (ref 0–0.6)
EOSINOPHILS RELATIVE PERCENT: 4.2 %
GFR AFRICAN AMERICAN: >60
GFR NON-AFRICAN AMERICAN: >60
GLUCOSE BLD-MCNC: 97 MG/DL (ref 70–99)
HCT VFR BLD CALC: 28.5 % (ref 40.5–52.5)
HEMOGLOBIN: 9.5 G/DL (ref 13.5–17.5)
LYMPHOCYTES ABSOLUTE: 1.6 K/UL (ref 1–5.1)
LYMPHOCYTES RELATIVE PERCENT: 24.6 %
MCH RBC QN AUTO: 31.6 PG (ref 26–34)
MCHC RBC AUTO-ENTMCNC: 33.4 G/DL (ref 31–36)
MCV RBC AUTO: 94.6 FL (ref 80–100)
MONOCYTES ABSOLUTE: 0.5 K/UL (ref 0–1.3)
MONOCYTES RELATIVE PERCENT: 7.2 %
NEUTROPHILS ABSOLUTE: 4 K/UL (ref 1.7–7.7)
NEUTROPHILS RELATIVE PERCENT: 63.3 %
PDW BLD-RTO: 16.6 % (ref 12.4–15.4)
PLATELET # BLD: 183 K/UL (ref 135–450)
PMV BLD AUTO: 8.5 FL (ref 5–10.5)
POTASSIUM REFLEX MAGNESIUM: 4.5 MMOL/L (ref 3.5–5.1)
RBC # BLD: 3.02 M/UL (ref 4.2–5.9)
SODIUM BLD-SCNC: 135 MMOL/L (ref 136–145)
WBC # BLD: 6.4 K/UL (ref 4–11)

## 2022-07-27 PROCEDURE — 97535 SELF CARE MNGMENT TRAINING: CPT

## 2022-07-27 PROCEDURE — 6370000000 HC RX 637 (ALT 250 FOR IP): Performed by: PHYSICAL MEDICINE & REHABILITATION

## 2022-07-27 PROCEDURE — 97530 THERAPEUTIC ACTIVITIES: CPT | Performed by: PHYSICAL THERAPIST

## 2022-07-27 PROCEDURE — 97116 GAIT TRAINING THERAPY: CPT | Performed by: PHYSICAL THERAPIST

## 2022-07-27 PROCEDURE — 97530 THERAPEUTIC ACTIVITIES: CPT

## 2022-07-27 PROCEDURE — 2700000000 HC OXYGEN THERAPY PER DAY

## 2022-07-27 PROCEDURE — 1280000000 HC REHAB R&B

## 2022-07-27 PROCEDURE — 99232 SBSQ HOSP IP/OBS MODERATE 35: CPT | Performed by: PHYSICAL MEDICINE & REHABILITATION

## 2022-07-27 PROCEDURE — 80048 BASIC METABOLIC PNL TOTAL CA: CPT

## 2022-07-27 PROCEDURE — 97110 THERAPEUTIC EXERCISES: CPT | Performed by: PHYSICAL THERAPIST

## 2022-07-27 PROCEDURE — 2580000003 HC RX 258: Performed by: PHYSICAL MEDICINE & REHABILITATION

## 2022-07-27 PROCEDURE — 36415 COLL VENOUS BLD VENIPUNCTURE: CPT

## 2022-07-27 PROCEDURE — 94640 AIRWAY INHALATION TREATMENT: CPT

## 2022-07-27 PROCEDURE — 94761 N-INVAS EAR/PLS OXIMETRY MLT: CPT

## 2022-07-27 PROCEDURE — 6360000002 HC RX W HCPCS: Performed by: PHYSICAL MEDICINE & REHABILITATION

## 2022-07-27 PROCEDURE — 85025 COMPLETE CBC W/AUTO DIFF WBC: CPT

## 2022-07-27 RX ADMIN — POLYETHYLENE GLYCOL (3350) 17 G: 17 POWDER, FOR SOLUTION ORAL at 09:10

## 2022-07-27 RX ADMIN — OXYCODONE 10 MG: 5 TABLET ORAL at 20:39

## 2022-07-27 RX ADMIN — BISACODYL 5 MG: 5 TABLET, COATED ORAL at 09:10

## 2022-07-27 RX ADMIN — OXYCODONE 10 MG: 5 TABLET ORAL at 06:48

## 2022-07-27 RX ADMIN — METOPROLOL TARTRATE 100 MG: 100 TABLET, FILM COATED ORAL at 09:11

## 2022-07-27 RX ADMIN — DOCUSATE SODIUM 100 MG: 100 CAPSULE, LIQUID FILLED ORAL at 20:40

## 2022-07-27 RX ADMIN — PANTOPRAZOLE SODIUM 40 MG: 40 TABLET, DELAYED RELEASE ORAL at 06:18

## 2022-07-27 RX ADMIN — ACETAMINOPHEN 1000 MG: 500 TABLET ORAL at 14:42

## 2022-07-27 RX ADMIN — APIXABAN 5 MG: 5 TABLET, FILM COATED ORAL at 09:11

## 2022-07-27 RX ADMIN — OXYCODONE 10 MG: 5 TABLET ORAL at 11:23

## 2022-07-27 RX ADMIN — SODIUM CHLORIDE, PRESERVATIVE FREE 10 ML: 5 INJECTION INTRAVENOUS at 20:43

## 2022-07-27 RX ADMIN — ALLOPURINOL 100 MG: 100 TABLET ORAL at 09:11

## 2022-07-27 RX ADMIN — CLOPIDOGREL BISULFATE 75 MG: 75 TABLET ORAL at 09:11

## 2022-07-27 RX ADMIN — DULOXETINE HYDROCHLORIDE 20 MG: 20 CAPSULE, DELAYED RELEASE ORAL at 09:10

## 2022-07-27 RX ADMIN — DOCUSATE SODIUM 100 MG: 100 CAPSULE, LIQUID FILLED ORAL at 09:11

## 2022-07-27 RX ADMIN — ACETAMINOPHEN 1000 MG: 500 TABLET ORAL at 09:11

## 2022-07-27 RX ADMIN — BUDESONIDE 500 MCG: 0.5 SUSPENSION RESPIRATORY (INHALATION) at 21:02

## 2022-07-27 RX ADMIN — APIXABAN 5 MG: 5 TABLET, FILM COATED ORAL at 20:40

## 2022-07-27 RX ADMIN — PREGABALIN 150 MG: 75 CAPSULE ORAL at 09:10

## 2022-07-27 RX ADMIN — PREGABALIN 150 MG: 75 CAPSULE ORAL at 20:41

## 2022-07-27 RX ADMIN — LOSARTAN POTASSIUM 100 MG: 100 TABLET, FILM COATED ORAL at 09:11

## 2022-07-27 RX ADMIN — ARFORMOTEROL TARTRATE 15 MCG: 15 SOLUTION RESPIRATORY (INHALATION) at 21:02

## 2022-07-27 RX ADMIN — ATORVASTATIN CALCIUM 80 MG: 80 TABLET, FILM COATED ORAL at 20:40

## 2022-07-27 RX ADMIN — BUDESONIDE 500 MCG: 0.5 SUSPENSION RESPIRATORY (INHALATION) at 07:25

## 2022-07-27 RX ADMIN — SODIUM CHLORIDE, PRESERVATIVE FREE 10 ML: 5 INJECTION INTRAVENOUS at 09:10

## 2022-07-27 RX ADMIN — METOPROLOL TARTRATE 100 MG: 100 TABLET, FILM COATED ORAL at 20:40

## 2022-07-27 RX ADMIN — ARFORMOTEROL TARTRATE 15 MCG: 15 SOLUTION RESPIRATORY (INHALATION) at 07:25

## 2022-07-27 RX ADMIN — ACETAMINOPHEN 1000 MG: 500 TABLET ORAL at 20:40

## 2022-07-27 ASSESSMENT — PAIN DESCRIPTION - FREQUENCY
FREQUENCY: INTERMITTENT

## 2022-07-27 ASSESSMENT — PAIN DESCRIPTION - DESCRIPTORS
DESCRIPTORS: BURNING

## 2022-07-27 ASSESSMENT — PAIN DESCRIPTION - ONSET
ONSET: ON-GOING

## 2022-07-27 ASSESSMENT — PAIN SCALES - GENERAL
PAINLEVEL_OUTOF10: 0
PAINLEVEL_OUTOF10: 7
PAINLEVEL_OUTOF10: 6
PAINLEVEL_OUTOF10: 7
PAINLEVEL_OUTOF10: 0
PAINLEVEL_OUTOF10: 7

## 2022-07-27 ASSESSMENT — PAIN - FUNCTIONAL ASSESSMENT
PAIN_FUNCTIONAL_ASSESSMENT: ACTIVITIES ARE NOT PREVENTED

## 2022-07-27 ASSESSMENT — PAIN DESCRIPTION - PAIN TYPE
TYPE: SURGICAL PAIN

## 2022-07-27 ASSESSMENT — PAIN DESCRIPTION - ORIENTATION
ORIENTATION: RIGHT
ORIENTATION: LEFT

## 2022-07-27 ASSESSMENT — PAIN DESCRIPTION - LOCATION
LOCATION: LEG;FOOT
LOCATION: LEG
LOCATION: LEG;INCISION
LOCATION: LEG;FOOT
LOCATION: LEG;INCISION
LOCATION: LEG

## 2022-07-27 NOTE — PROGRESS NOTES
Department of Physical Medicine & Rehabilitation  Progress Note    Patient Identification:  Wilson Gibbs  [id number]  : 1950  Admit date: 2022    Chief Complaint: Debility    Subjective:   No acute events overnight. Patient seen this am in therapy room. Walking with therapy today, no complaints overnight. Pt is improving with therapy. No issues with bowel/bladder movements. Eating appropriately and slept well. ROS: No f/c, n/v, cp     Objective:  Patient Vitals for the past 24 hrs:   BP Temp Temp src Pulse Resp SpO2 Weight   22 0900 126/76 98 °F (36.7 °C) Oral 67 -- -- --   22 0728 -- -- -- -- -- 96 % --   22 0615 -- -- -- -- -- -- 227 lb 8.2 oz (103.2 kg)   22 194 127/67 97.4 °F (36.3 °C) Oral 59 18 95 % --   22 1933 -- -- -- -- -- 95 % --     Const: Alert. No distress, pleasant. HEENT: Normocephalic, atraumatic. Normal sclera/conjunctiva. MMM. CV: Regular rate and rhythm. Resp: No respiratory distress. Lungs CTAB. Abd: Soft, nontender, nondistended, NABS+   Ext: No edema. Neuro: Alert, oriented, appropriately interactive. Psych: Cooperative, appropriate mood and affect    Laboratory data: Available via EMR.    Last 24 hour lab  Recent Results (from the past 24 hour(s))   Basic Metabolic Panel w/ Reflex to MG    Collection Time: 22  6:41 AM   Result Value Ref Range    Sodium 135 (L) 136 - 145 mmol/L    Potassium reflex Magnesium 4.5 3.5 - 5.1 mmol/L    Chloride 102 99 - 110 mmol/L    CO2 27 21 - 32 mmol/L    Anion Gap 6 3 - 16    Glucose 97 70 - 99 mg/dL    BUN 20 7 - 20 mg/dL    Creatinine 0.9 0.8 - 1.3 mg/dL    GFR Non-African American >60 >60    GFR African American >60 >60    Calcium 8.9 8.3 - 10.6 mg/dL   CBC auto differential    Collection Time: 07/27/22  6:41 AM   Result Value Ref Range    WBC 6.4 4.0 - 11.0 K/uL    RBC 3.02 (L) 4.20 - 5.90 M/uL    Hemoglobin 9.5 (L) 13.5 - 17.5 g/dL    Hematocrit 28.5 (L) 40.5 - 52.5 %    MCV 94.6 80.0 - 100.0 fL    MCH 31.6 26.0 - 34.0 pg    MCHC 33.4 31.0 - 36.0 g/dL    RDW 16.6 (H) 12.4 - 15.4 %    Platelets 655 454 - 245 K/uL    MPV 8.5 5.0 - 10.5 fL    Neutrophils % 63.3 %    Lymphocytes % 24.6 %    Monocytes % 7.2 %    Eosinophils % 4.2 %    Basophils % 0.7 %    Neutrophils Absolute 4.0 1.7 - 7.7 K/uL    Lymphocytes Absolute 1.6 1.0 - 5.1 K/uL    Monocytes Absolute 0.5 0.0 - 1.3 K/uL    Eosinophils Absolute 0.3 0.0 - 0.6 K/uL    Basophils Absolute 0.0 0.0 - 0.2 K/uL       Therapy progress:  PT  Position Activity Restriction  Other position/activity restrictions: up as tolerated  Objective     Sit to Stand: Contact guard assistance, Stand by assistance (VC  for hand placement)  Stand to sit: Contact guard assistance, Stand by assistance (VC for hand placement)  Bed to Chair: Contact guard assistance, Stand by assistance (stand pivot transf)  Device: Single point cane  Other Apparatus: O2 (6L)  Assistance: Contact guard assistance  Distance: 48' x2, [de-identified]' x1, short distances in the gym, 20' amb on ramp( x 10' up and 10' down for a total of 20' )  OT  PT Equipment Recommendations  Equipment Needed: No  Other: Ongoing assessment     Assessment        SLP          Body mass index is 32.65 kg/m².     Assessment and Plan:  Pain control  Tylenol scheduled  Roxicodone pain panel        HTN  Hx of HTN, home meds Losartan and Metoprolol restarted  PAD status post R femoral below knee popliteal bypass  Eliquis and Plavix started 7/19        Urinary retention  - required schulte  - monitor  - schulte out        Decreased functional mobility  - PT/OT requirements - ARU       Rehab Progress: Improving  Anticipated Dispo: home  Services/DME: REBEKAH  ELOS: REBEKAH Salmeron D.O. M.P.H  PM&R  7/27/2022  11:27 AM

## 2022-07-27 NOTE — PLAN OF CARE
Problem: Safety - Adult  Goal: Free from fall injury  7/27/2022 1913 by Phoebe Platt RN  Outcome: Progressing  Note: Client remains free from falls, bed/chair alarm in place, door open, encouraged to use call light for needs, call light is within reach, bed locked in lowest position,  Will continue to monitor. Problem: Pain  Goal: Verbalizes/displays adequate comfort level or baseline comfort level  Outcome: Progressing  Note: Pt voices pain needs appropriately, pain is assessed during shift.

## 2022-07-27 NOTE — PROGRESS NOTES
Occupational Therapy  Facility/Department: [facility] ACUTE REHAB UNIT  Rehabilitation Occupational Therapy Daily Treatment Note    Date: 22  Patient Name: Adilene Miramontes. Room: [facility]-  MRN: [id number]  Account: [de-identified]   : 1950  (71 y.o.) Gender: male                  Past Medical History:  has a past medical history of CAD (coronary artery disease), CHF (congestive heart failure) ([facility] COPD (chronic obstructive pulmonary disease) ([facility] Critical ischemia of lower extremity ([facility] Depressive disorder, not elsewhere classified, GERD (gastroesophageal reflux disease), History of colon polyps - 30 seen on colonoscopy 2021, History of MI (myocardial infarction), History of skin ulcer of lower extremity, History of transient ischemic attack (TIA), Hx of blood clots, Hyperlipidemia, Hypertension, Neuropathy, KAVITA (obstructive sleep apnea), Personal history of DVT (deep vein thrombosis), Prolonged emergence from general anesthesia, PVD (peripheral vascular disease) ([facility] TIA (transient ischemic attack), and Vertebral fracture. Past Surgical History:   has a past surgical history that includes Appendectomy; Coronary angioplasty with stent (2013); other surgical history (Right, 2013); Leg Debridement (Right, 2013); skin split graft (Right, 2013); other surgical history (Left, 2013); laminectomy (2015); transluminal angioplasty (Left, 2015); Total hip arthroplasty (Right, 2016); Cholecystectomy, laparoscopic; eye surgery (Left); Colonoscopy (2021); Colonoscopy (2021); Colonoscopy (2021); Femoral Endarterectomy (Right, 2021); and femoral bypass (Right, 2022). Restrictions  Other position/activity restrictions: up as tolerated    Subjective  Subjective: Pt was semi supine in bed upon arrival. Pt was pleasant and agreeable to OT.  Pt on 5 L O2                Objective     Cognition  Overall Cognitive Status: [facility] ADL  Feeding  Assistance Level: Independent  Skilled Clinical Factors: Breakfast tray delivered  Grooming/Oral Hygiene  Assistance Level: Stand by assist  Skilled Clinical Factors: Pt completed hand hygiene and washed his face in stance at the sink w/ SBA  Upper Extremity Dressing  Assistance Level: Set-up  Skilled Clinical Factors: Pt donned tshirt seated EOB  Lower Extremity Dressing  Assistance Level: Contact guard assist  Skilled Clinical Factors: Pt threaded BLEs into underwear and pants seated on EOB w/ improved ability to lift RLE into figure 4 today. Pt managed clothes over hips in stance w/ CGA and demonstrated one minor LOB requiring CGA to correct. Putting On/Taking Off Footwear  Assistance Level: Set-up  Skilled Clinical Factors: Pt donned L tenso shape and shoes seated EOB  Toileting  Assistance Level: Contact guard assist  Skilled Clinical Factors: CGA in stance at toilet to urinate  Tub/Shower Transfers  Type: Tub  Transfer From:  (cane)  Transfer To: Tub/Shower in standing position  Additional Factors: Verbal cues  Assistance Level: Contact guard assist  Skilled Clinical Factors: Pt practiced a dry tub transfer in order to simulate his home environment. Pt stepped in/out of the tub w/ BUEs supported on GBs w/ CGA. Pt demonstrated min difficulty w/ lifting RLE over the tub but completed w/o LOB. OT educated pt about a TTB to increase safety which pt seemed interested in but will continue to determine if it is necessary. Functional Mobility  Device: Cane  Activity: To/From bathroom; To/From therapy gym  Assistance Level: Contact guard assist  Skilled Clinical Factors: Pt ambulated w/ SPC w/ CGA and O2 tank in tow by OT. Pt demonstrated BLE external rotation and decreased step length.  Pt demonstrated no LOB  Supine to Sit  Assistance Level: Supervision  Skilled Clinical Factors: HOB elevated  Sit to Stand  Assistance Level: Contact guard assist  Skilled Clinical Factors: completed from EOB and EOM w/ VCs needed to push up from the bed and mat instead of using the cane  Stand to Sit  Assistance Level: Contact guard assist  Skilled Clinical Factors: VCs needed to reach back upon descent   OT Exercises  Dynamic Standing Balance Exercises: Pt engaged in various dynamic standing balance tasks in order to increase safety in stance for ADLs and IADLs. Pt completed the followin) bean bag toss which included tossing 1 bean bag in a pattern to OT student and nursing student. Pt completed task w/ 1 UE support w/ use of cane w/ CGA and no LOB noted. Pt demonstrated several instances of dropping the bean bag on the ground which pt was able to retrieve from the ground w/ CGA and w/o LOB. To increase the challenge, pt was then instructed to toss 2 bean bags between OT student and nursing student, which required pt to react more quickly. Pt demonstrated mod difficulty w/ coordinating 2 bags at a time but completed task w/ CGA and was able to correct LOBs w/ CGA. 2) cone taps completed which required pt to tap LLE, RLE, and alternating LLE to RLE in a sequence. Pt completed task w/ 1 UE support on the cane w/ CGA-min A and two LOB when pt was stepping w/ RLE. Pt demonstrated increased success w/ LLE vs RLE due to decreased ROM and pain in RLE. Pt required several rest breaks during task. Assessment  Assessment  Assessment: Pt demonstrated great participation in OT this morning as evident by pt ability to don pants, underwear, socks, and shoes w/ only CGA needed for balance in stance. Pt completed a dry tub transfer today w/ CGA while stepping over the tub but may benefit from TTB for home pending progress w/ RLE. Pt is motivated but is limited by RLE pain and weakness as well as impaired balance. Pt benefits from OT in order to maximize functional independence. Cont OT per POC  Activity Tolerance: Patient tolerated treatment well  Discharge Recommendations: Home with Home health OT; Home with assist PRN  OT Equipment Recommendations  Other: Pt has a toilet raiser, GBs in the shower, reacher, sock aid, and shower chair. Pt may benefit from TTB pending progress  Safety Devices  Safety Devices in place: Yes  Type of devices: Call light within reach; Left in chair;Chair alarm in place    Patient Education  Education  Education Given To: Patient  Education Provided: Role of Therapy; Safety;Precautions; ADL Function;Transfer Training;Equipment;DME/Home Modifications  Education Provided Comments: tub transfer bench  Education Method: Demonstration;Verbal  Barriers to Learning: None  Education Outcome: Verbalized understanding          2nd session: Pt was seated in recliner chair upon arrival. Pt was pleasant and agreeable to OT. Pt on 5L O2. Sit to stand completed from recliner w/ CGA. Pt ambulated w/ SPC to the dining room w/ CGA. Pt demonstrated decreased step height and ext rotation of BLEs. Pt did not require any rest breaks upon ambulation to dining room but required rest break upon arrival. OT asked pt to describe his normal morning routines in order to simulate them. Pt reported he likes to get up and make coffee and watch TV. Pt engaged in routine and ambulated in the dining room to retrieve coffee supplies. Pt prepared coffee and then poured coffee into a cup upon completion. Pt ambulated w/ coffee cup in R hand and transported it ~10ft w/ SPC and CGA. Pt demonstrated no LOB or safety concern. Pt also participated in item retrieval from high and low cabinets. Pt required min VCs for closer proximity to the cabinets while reaching and for safe UE placement on the countertop vs cane while reaching. Pt tolerated stance for over 8 mins w/o rest break. Pt ambulated w/ SPC and CGA back to the room and was left in the recliner chair at EOS w/ chair alarm on and call light within reach.        Plan  Plan  Times per Week: 5x a week for 90 mins daily  Times per Day: Daily  Current Treatment Recommendations: Strengthening;Equipment evaluation, education, & procurement; Functional mobility training;Balance training; Endurance training; Safety education & training;Patient/Caregiver education & training;Stair training;Self-Care / ADL;Gait training;Home management training    Goals  Patient Goals   Patient goals : \"do what I did before\"  Short Term Goals  Time Frame for Short term goals: 10 days  Short Term Goal 1: Pt will complete toileting and toilet transfers MOD I-ongoing  Short Term Goal 2: Pt will complete tub transfer MOD I w/ use of shower chair or TTB pending safest method-ongoing  Short Term Goal 3: Pt will complete LE dressing MOD I w/ use of AE prn-ongoing  Short Term Goal 4: Pt will complete light housekeeping task in stance for 7 mins MOD I-ongoing  Short Term Goal 5: Pt will complete bathing tasks MOD I-ongoing    AM-PAC Score               Therapy Time   Individual Concurrent Group Co-treatment   Time In 0730         Time Out 0830         Minutes 60         Timed Code Treatment Minutes: 60 Minutes       Second Session Therapy Time:   Individual Concurrent Group Co-treatment   Time In  1030         Time Out  1100         Minutes  30            Timed Code Treatment Minutes:  30    Total Treatment Minutes:   60+30= 90   Katharine Dent OT

## 2022-07-27 NOTE — FLOWSHEET NOTE
07/27/22 1117   Encounter Summary   Encounter Overview/Reason  Initial Encounter   Service Provided For: Patient   Referral/Consult From: Kalen   Last Encounter    (es 7/27 d)   Complexity of Encounter Low   Begin Time 0933   End Time  0938   Total Time Calculated 5 min   Assessment/Intervention/Outcome   Assessment Calm;Coping   Intervention Active listening   Outcome Refused/Declined   Plan and Referrals   Plan/Referrals No future visits requested

## 2022-07-27 NOTE — PROGRESS NOTES
Perfect serve sent to Dr Abimbola Farnsworth to confirm that they are following patient on ARU. Awaiting response.

## 2022-07-27 NOTE — PLAN OF CARE
Problem: Pain  Goal: Verbalizes/displays adequate comfort level or baseline comfort level    Patient is able to rate pain on a 0-10 scale, he is aware that he can take PRN pain medications q 4 hours. Patient encouraged to utilize non pharmacologic pain interventions in conjunction with pain medications.

## 2022-07-27 NOTE — PLAN OF CARE
Problem: Pain  Goal: Verbalizes/displays adequate comfort level or baseline comfort level  Outcome: Not Progressing Towards Goal      Problem: Safety - Adult  Goal: Free from fall injury  Recent Flowsheet Documentation  Taken 7/26/2022 2019 by Bonny Mcginnis RN  Free From Fall Injury: Instruct family/caregiver on patient safety     Problem: ABCDS Injury Assessment  Goal: Absence of physical injury  Outcome: Progressing Towards Goal  Flowsheets (Taken 7/26/2022 2019)  Absence of Physical Injury: Implement safety measures based on patient assessment

## 2022-07-27 NOTE — PROGRESS NOTES
Patient is educated to call for assistance to transfer. He has fall precautions in place, call light and bedside tables are within reach. Patient c/o chronic leg pain \" since November\"  His pain has improved with medications. See flow sheet.

## 2022-07-27 NOTE — PROGRESS NOTES
Vasc Surg  POC    Vascular surgery is following peripherally. Will see the patient every few days. Please call with any questions. Updated nurse of washing staple lines with warm soapy water and paint with betadine. Please leave groin dressing in place.     Liset Baig MD   Gen Surg PGY 5  7/27/2022  4:25 PM  [phone]

## 2022-07-27 NOTE — FLOWSHEET NOTE
07/27/22 1121   Encounter Summary   Encounter Overview/Reason  Initial Encounter   Service Provided For: Patient   Referral/Consult From: Kalen   Last Encounter    (es 7/27)   Complexity of Encounter Moderate   Begin Time 0933   End Time  0938   Total Time Calculated 5 min   Assessment/Intervention/Outcome   Assessment Coping   Intervention Active listening;Prayer (assurance of)/[place]   Outcome Coping;Receptive   Plan and Referrals   Plan/Referrals Other (Comment)  (as needed)

## 2022-07-27 NOTE — PATIENT CARE CONFERENCE
Inpatient Rehabilitation  Weekly Team Conference Note  The [address]  [phone]  Patient Name: Starla Jones. MRN: [id number]    : 1950  (71 y.o.)  Gender: male   Referring Practitioner: Dr. 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  The team conference for this patient was held on 2022 at 10:30am by:  Allison Dudley DO    Current/Goal QM SCORES  QM Current/Goal Score   Eating CARE Score: 6 / Discharge Goal: Independent   Oral Hygiene CARE Score: 4 / Discharge Goal: Independent   Shower/Bathing CARE Score: 4 / Discharge Goal: Independent   UB Dressing CARE Score: 5 / Discharge Goal: Independent   LB Dressing CARE Score: 4 / Discharge Goal: Independent   Putting on/off Footwear CARE Score: 5 / Discharge Goal: Independent   Toileting Hygiene CARE Score: 4 / Discharge Goal: Independent   Bladder Continence Bladder Continence: Always continent    Bowel Continence Bowel Continence: Always continent    Toilet Transfers CARE Score: 4 / Discharge Goal: Independent   Shower/Bathe Self  CARE Score: 4 / Discharge Goal: Independent   Rolling Left and Right CARE Score: 4 / Discharge Goal: Independent   Sit to Lying CARE Score: 4 / Discharge Goal: Independent   Lying to Sitting on Bedside CARE Score: 4 / Discharge Goal: Independent   Sit to Stand CARE Score: 4 / Discharge Goal: Independent   Chair/Bed to Chair Transfer CARE Score: 4 / Discharge Goal: Independent   Car Transfers CARE Score: 4 / Discharge Goal: Independent   Walk 10 Feet CARE Score: 4 / Discharge Goal: Independent   Walk 50 Feet with Two Turns CARE Score: 4 / Discharge Goal: Independent   Walk 150 Feet CARE Score: 88 / Discharge Goal: Independent   Walk 10 Feet on Uneven Surfaces CARE Score: 4 / Discharge Goal: Independent   1 Step (Curb) CARE Score: 4 / Discharge Goal: Independent   4 Steps CARE Score: 4 / Discharge Goal: Independent   12 Steps CARE Score: 88 / Discharge Goal: Independent   Picking up Object from Floor CARE Score: 3 / Discharge Goal: Independent   Wheel 50 Feet with 2 Turns   /     Type         [] Manual        [] Motorized        [x] N/A   Wheel 150 Feet   /     Type         [] Manual        [] Motorized        [x] N/A     NURSING:  A&Ox: Level of Consciousness: Alert (0)  Orientation Level: Oriented X4  Dominguez Fall Risk Score: Total Score: 60  Admission BIMS: 15  Wounds/Incisions/Ulcers: RLE sx site multiple  Medication Review: Reviewed with pt  Pain: RLE pain  Consultations: vascular  Imaging:    No orders to display     Active Comorbid Conditions:Htn, hyperlipids, PVD, CAD, COPD, GERD, neuropathy  Systems Review:   Renal: w,   Neurological: w  Musculoskeletal: x  Respiratory: x  Cardiac/Circulatory/Peripheral Vascular: x  Abnormal/Relevant Test Results:   Abnormal/Relevant Lab Values:   CBC:   Recent Labs     07/27/22 0641   WBC 6.4   HGB 9.5*   HCT 28.5*   MCV 94.6        BMP:   Recent Labs     07/27/22 0641   *   K 4.5      CO2 27   BUN 20   CREATININE 0.9     PT/INR: No results for input(s): PROTIME, INR in the last 72 hours. APTT: No results for input(s): APTT in the last 72 hours. Liver Profile:  Lab Results   Component Value Date/Time    AST 19 11/30/2021 06:26 AM    ALT 14 11/30/2021 06:26 AM    BILIDIR <0.2 09/16/2021 10:52 PM    BILITOT 0.4 11/30/2021 06:26 AM    ALKPHOS 90 11/30/2021 06:26 AM     Lab Results   Component Value Date/Time    CHOL 150 12/14/2021 07:13 AM    HDL 28 12/14/2021 07:13 AM    TRIG 198 12/14/2021 07:13 AM     Recent Labs     07/27/22 0641   WBC 6.4   HGB 9.5*   HCT 28.5*      MCV 94.6     Recent Labs     07/27/22 0641   *   K 4.5      CO2 27   BUN 20   CREATININE 0.9     No results for input(s): AST, ALT, ALB, BILIDIR, BILITOT, ALKPHOS in the last 72 hours. No results for input(s): MG in the last 72 hours.     Recent Labs     07/27/22  0641   WBC 6.4   HGB 9.5* HCT 28.5*        Recent Labs     07/27/22  0641   *   K 4.5      CO2 27   BUN 20   CREATININE 0.9   CALCIUM 8.9     No results for input(s): AST, ALT, BILIDIR, BILITOT, ALKPHOS in the last 72 hours. No results for input(s): INR in the last 72 hours. No results for input(s): Kaleen Hope in the last 72 hours. PHYSICAL THERAPY:  Bed Mobility: Scooting: Supervision (VC for energy efficient technique. )    Transfers:  Sit to Stand: Contact guard assistance, Stand by assistance (VC  for hand placement)  Stand to sit: Contact guard assistance, Stand by assistance (VC for hand placement)  Bed to Chair: Contact guard assistance, Stand by assistance (stand pivot transf)    WB Status: No WB precautions noted in chart  Ambulation  Surface: level tile, uneven, ramp  Device: Single point cane  Other Apparatus: O2 (6L)  Assistance: Contact guard assistance  Quality of Gait: Tends to ER BLES with L> R  Gait Deviations: Slow Yolande, Decreased step length, Staggers  Distance: 48' x2, 80' x1, short distances in the gym, 20' amb on ramp( x 10' up and 10' down for a total of 20' )  Comments: SOB appears to be the most limiting factor,  More Ambulation?: Yes    Stairs  # Steps : 9 (3x3)  Stairs Height: 6\"  Rails: Right ascending (plus SPC)  Device: Single pt cane  Assistance: Contact guard assistance  Comment: Amb in a noonreciprocal pattern    OCCUPATIONAL THERAPY:  ADL  Feeding: Independent  Feeding Skilled Clinical Factors: Breakfast tray delivered  Grooming: Setup  Grooming Skilled Clinical Factors: Pt washed his face seated on TTB  UE Bathing: Supervision  UE Bathing Skilled Clinical Factors: Pt washed and dried 4/4 components of UE bathing seated on TTB  LE Bathing: Contact guard assistance  LE Bathing Skilled Clinical Factors: Pt washed and dried 5/5 components of LE bathing seated on TTB. Pt unable to wash RLE since it was wrapped in plastic to prevent dressing from getting wet.  Pt washed his buttocks by laterally WSing L and R on the bench. CGA needed in stance to dry buttocks  UE Dressing: Setup  UE Dressing Skilled Clinical Factors: Pt donned his tshirt seated on TTB  LE Dressing: Moderate assistance  LE Dressing Skilled Clinical Factors: Pt required assistance to thread RLE into underwear and CGA in stance to manage clothes over hips in stance. Pt completed all other components of LE dressing w/o assistance. Pt required assistance to don R sock but pt was able to don L sock. Toileting Skilled Clinical Factors: Pt denied need during session    Toilet Transfers: Toilet- Transfer From: [facility]- Transfer Type: To and From  Toilet- Transfer To: toilet  Toilet- Technique: Ambulating  ToiletTransfers: Contact Guard    Tub/ShowerTransfers:  Shower Transfers  Shower - Transfer From: Nancy Figueroa - Transfer Type: To and From  Shower - Transfer To: Transfer tub bench  Shower - Technique: Ambulating  Shower Transfers: Contact Guard  Shower Transfers Comments: Min VCs for safety upon approach    NUTRITION:  Please see nutrition note for details. Weight: 227 lb 8.2 oz (103.2 kg) / Body mass index is 32.65 kg/m². Diet Level/Order: ADULT DIET; Regular; Low Fat/Low Chol/High Fiber/2 gm Na  ADULT ORAL NUTRITION SUPPLEMENT; Breakfast, Lunch, Dinner; Standard High Calorie/High Protein Oral Supplement  Average Consumption per meal: PO Meals Eaten (%): 76 - 100%    CASE MANAGEMENT:  Psychosocial/Behavioral Issues: none  Assessment:  Pt lives in home alone. Pt has Home 02, wheelchair, and a walker at home through [employer]. Pt is already [facility] with [facility]. Patient/Family Education provided by team:  Role of PT/OT, safe transfers,     Patient Goals for Rehab stay:  Do what I did before     Rehab Team Goals for patient for the Upcoming Week:  1. increased independence w/ ADLs  2.  Increased independence w/ functional transfers     Barriers to Progress/Attainment of Goals & Efforts/Interventions to remove Barriers:  Currently on 5L O2- pt stated he doesn't wear O2 during the day. Wean O2 before d/c? Discharge Plan:  Estimated Length of Stay: 9 days  Destination: home health and discharge home with supervision  Services at Discharge: [facility], Occupational Therapy, and Nursing 3x week  Equipment at Discharge: needs TTB ; will need home O2 if does not already have  Factors facilitating achievement of predicted outcomes: Motivated, Cooperative, and Pleasant  Barriers to the achievement of predicted outcomes: Pain    Special Needs in the Upcoming Week:   [x] Family/Caregiver Education  [] Home visit  []Therapeutic Pass [] Consults:_______   [] Family/Caregiver Training  [] Stroke Risk Factor Education     [] Other: currently on 4L of O2- continue to wean off     TEAM SUMMARY: Will continue with current poc & goals until anticipated d/c date of 7/30/2022. MD:   Stroke Risk Factors:   [] N/A for this patient [x] HTN  []  Diabetes  [x] Hyperlipidemia  [x]Obesity BMI >25  [] Atrial Fibrillation [] Smoker (current)  [] Smoker (quit in last 12 months)  [] Sleep Apnea [] Other- hx of blood clots    Risk for Readmission: High (24%)  Critical Items: If High Risk, consider the following recommendations: Follow up with PCP within one week of discharge.     Justification for Continued Stay:   Criteria for continued IRF stay:  Based on my medical assessment of the patient and review of information from the interdisciplinary team, as part of this weekly team conference, the patient continues to meet the following criteria for IRF level of care:  [x] The patient requires 24-hour rehabilitation nursing care   [x] The patient requires an intensive rehabilitation therapy program  [x] The patient requires active and ongoing intervention of multiple therapy disciplines  [x] The patient requires continued physician supervision by a rehabilitation physician  [x] The patient requires an intensive and coordinated interdisciplinary team approach to the delivery of rehabilitative care    Medical Necessity-continued close physician medical management is required for:   [] Cardiac/Circulatory dysfunction  [] Respiratory/Pulmonary dysfunction  [] Integumentary complications  [] Peripheral Vascular dysfunction  [x] Musculoskeletal dysfunction  [] Neurological dysfunction d/t:  [] CVA  [] SCI  [] TBI  [] Other: __________  [] Renal dysfunction  [] Hematologic dysfunction    [] Endocrine disorders  [] GI disorders     [] Genito-Urinary dysfunction    Assessment/Plan:  [x] The patient is making good progression towards their LTG's, is actively participating in, and has a reasonable expectation to continue to benefit from the intensive rehabilitation program.  [] The estimated discharge date has been changed from initial team conference due to:   [] The estimated discharge destination has been changed from initial team conference due to:     Rehab Team Members in attendance for Team Conference:  ARU Supervisor/PPS Coordinator:  Booker Ruvalcaba, OTR/L     Therapy Manager/ARU :  Luci Judd, PT, DPT     Nursing Manager:  Guero Gill RN     Social Work:  Rafael Morocho, [place]     Nursing: Kimberly Love, JEAN Burdick RN     Therapy:  Cletus Habermann, PT  Frannie Lung, PT, DPT  Rita Desean PT, DPT   Batsheva Shay, OTR/L  Inocencia Peters, OTR/L  Frantz, OTR/L  Mary Anne Louise, OTR/L     Keshia Toure, MAGabrielCCC, SLP  Tomi Loo, MA-CCC, SLP    I approve the established interdisciplinary plan of care as documented within the medical record of [facility]. .    MD Signature Alex Gómez D.O. M.P.H  PM&R  7/28/2022  11:43 AM

## 2022-07-27 NOTE — PROGRESS NOTES
Physical Therapy  Facility/Department: [facility] ACUTE REHAB UNIT  Rehabilitation Physical Therapy Treatment Note    NAME: Fernando Burnham. : 1950 (81 y.o.)  MRN: [id number]  CODE STATUS: Full Code    Date of Service: 22       Restrictions:  Position Activity Restriction  Other position/activity restrictions: up as tolerated     SUBJECTIVE  Subjective  Subjective: Pt sitting in BS chair when Pt arrived. Pt agreeable to therapy. Pain: denies pain        Post Treatment Pain Screening         OBJECTIVE  Cognition  Overall Cognitive Status: WFL  Orientation  Overall Orientation Status: Within Normal Limits  Orientation Level: Oriented X4    Functional Mobility  Bed Mobility  Additional Factors: Verbal cues; Increased time to complete  Bridging  Assistance Level: Supervision  Skilled Clinical Factors: VC for effective technique  Roll Left  Assistance Level: Supervision  Skilled Clinical Factors: VC for energy effective technique  Sit to Supine  Assistance Level: Supervision  Skilled Clinical Factors: VC for energy effective technique  Supine to Sit  Assistance Level: Stand by assist  Skilled Clinical Factors: Step by step VC to sequence the activity and complete w/ an energy efficient technique  Balance  Sitting Balance: Independent  Standing Balance: Stand by assistance  Standing Balance  Activity: PT instructed pt with a postural ex against wall/ door in which pt aligned head, shlds, hips, knees, and ankles. Pt then instructed with B shld flex for a stretch in LB x 5 reps with a hold of \"3\"  Transfers  Surface: From chair with arms; To chair with arms  Additional Factors: Verbal cues; Hand placement cues; Increased time to complete  Device: Cane  Sit to Stand  Assistance Level: Stand by assist;Contact guard assist  Skilled Clinical Factors: VC for an erect posture and taking longer steps  Stand to Sit  Assistance Level: Supervision  Skilled Clinical Factors: VC for hand placement and to pay attention to the O2 tubing      Environmental Mobility  Ambulation  Surface: Level surface; Ramp  Device: Cane  Distance: 350' x2, 80' x1,  Additional Factors: Verbal cues  Assistance Level: Stand by assist;Contact guard (assist with PT transporting O2)  Gait Deviations: Slow star;Decreased step length bilateral  Skilled Clinical Factors: PT instructed pt with taking longer steps by designating a 25' pathway to a certain # of steps. When pt started, pt was anly able to complete in 19 steps but reduced it to 16 steps . This resulted in a more fluent gt pattern  Stairs  Stair Height: 6''  Device: Cane; One handrail  Number of Stairs: 17(8+9)  Additional Factors: Hand placement cues; Non-reciprocal going up;Non-reciprocal going down  Assistance Level: Contact guard assist  Skilled Clinical Factors: VC for foot placement with descension    Second Session: Pt sitting in BS chair when PT arrived. Pt agreeable to therapy. Functional Mobility  Bed Mobility Performed on a mat table and not in a bed  Additional Factors: Verbal cues; Increased time to complete  Bridging  Assistance Level: Supervision  Skilled Clinical Factors: VC for effective technique  Roll Left  Assistance Level: Supervision  Skilled Clinical Factors: VC for energy effective technique  Sit to Supine  Assistance Level: Supervision  Skilled Clinical Factors: VC for energy effective technique  Supine to Sit  Assistance Level: Stand by assist  Skilled Clinical Factors: Step by step VC to sequence the activity and complete w/ an energy efficient technique  Transfers  Surface: From chair with arms; To chair with arms:from mat table, to mat table  Additional Factors: Verbal cues; Hand placement cues; Increased time to complete  Device: Cane  Sit to Stand  Assistance Level: Stand by assist;Contact guard assist  Skilled Clinical Factors: VC for an erect posture and taking longer steps  Stand to Sit  Assistance Level: Supervision  Skilled Clinical Factors: VC for hand placement and to pay attention to the O2 tubing    Environmental Mobility  Ambulation  Surface: Level surface; Ramp  Device: [employer]: 225' , 125'  Additional Factors: Verbal cues  Assistance Level: Stand by assist;Contact guard assist with PT transporting O2  Gait Deviations: Slow star;Decreased step length bilateral  Skilled Clinical Factors: PT instructed pt with taking longer steps by designating a 25' pathway to a certain # of steps. When pt started, pt was anly able to complete in 19 steps but reduced it to 16 steps . This resulted in a more fluent gt pattern   PT instructed pt with the following ex performed in supine to BLES:  1. AP/ QS/ GS  ( combination isometrics)  2. Bridges with hold of \"3\" ( UES across chest)  3. Alternating hip/knee flex/ext  4. Hip abd   5. Marching in hooklying  6. LAQS in hooklying  7. SLRs    Marisol 10 reps of each with brief rests between  Pt returned to [facility] chair with phone and call light placed within reach. Chair alarm reactivated. ASSESSMENT/PROGRESS TOWARDS GOALS       Assessment  Assessment: Pt is progressing well with overall mobility. Pt cont to be limited by fatigue, SOB and overall decreased endurance. Pt is well motivated to return [facility] the [facility]. Pt is belwo baseline and would benefit from continued therapy to maximize potential and increase functional mobility towards Ind to allow for a safer d/c to home  Activity Tolerance: Patient limited by fatigue;Patient limited by endurance; Other (comment) (limited by SOB)  Discharge Recommendations: Home with Home health PT;Home independently  PT Equipment Recommendations  Other: Ongoing assessment    Goals  Patient Goals   Patient goals : Get home ASAP  Short Term Goals  Time Frame for Short term goals: 10 days  Short term goal 1: Ind with bed mobility  Short term goal 2:  Ind with transf excluding floor transf  Short term goal 3: Amb with LRAD distances > 200' at a time indly on level surfaces  Short term goal 4: Amb up and down 12 stepsIndly  with use of 1 railing and SPC  Additional Goals?: No    PLAN OF CARE/SAFETY  Plan  Plan:  (5 x week x 60 minutes)  Current Treatment Recommendations: Functional mobility training;Transfer training;Gait training;Balance training;Stair training;Strengthening;Neuromuscular re-education; Endurance training;Patient/Caregiver education & training; Safety education & training;Home exercise program;Therapeutic activities  Safety Devices  Type of Devices: Chair alarm in place;Call light within reach; Left in chair;Nurse notified    EDUCATION  Education  Education Given To: Patient  Education Provided: Precautions; Safety; Mobility Training;Energy Conservation;Transfer Training;ADL Function  Education Provided Comments: Pt educated on diaphragmatic breathing ex  Education Method: Demonstration;Verbal  Barriers to Learning: None  Education Outcome: Verbalized understanding;Continued education needed        Therapy Time   Individual Concurrent Group Co-treatment   Time In 0930         Time Out 1000         Minutes 30         Second Session Therapy Time:   Individual Concurrent Group Co-treatment   Time In 1325         Time Out 1425         Minutes 60           Timed Code Treatment Minutes:  25+11    Total Treatment Minutes:  80           Patrick Magaña PT, 07/27/22 at 5:41 PM

## 2022-07-28 PROCEDURE — 99233 SBSQ HOSP IP/OBS HIGH 50: CPT | Performed by: PHYSICAL MEDICINE & REHABILITATION

## 2022-07-28 PROCEDURE — 97110 THERAPEUTIC EXERCISES: CPT

## 2022-07-28 PROCEDURE — 97530 THERAPEUTIC ACTIVITIES: CPT | Performed by: PHYSICAL THERAPIST

## 2022-07-28 PROCEDURE — 2580000003 HC RX 258: Performed by: PHYSICAL MEDICINE & REHABILITATION

## 2022-07-28 PROCEDURE — 6370000000 HC RX 637 (ALT 250 FOR IP): Performed by: PHYSICAL MEDICINE & REHABILITATION

## 2022-07-28 PROCEDURE — 94761 N-INVAS EAR/PLS OXIMETRY MLT: CPT

## 2022-07-28 PROCEDURE — 97530 THERAPEUTIC ACTIVITIES: CPT

## 2022-07-28 PROCEDURE — 6360000002 HC RX W HCPCS: Performed by: PHYSICAL MEDICINE & REHABILITATION

## 2022-07-28 PROCEDURE — 97116 GAIT TRAINING THERAPY: CPT

## 2022-07-28 PROCEDURE — 97116 GAIT TRAINING THERAPY: CPT | Performed by: PHYSICAL THERAPIST

## 2022-07-28 PROCEDURE — 94640 AIRWAY INHALATION TREATMENT: CPT

## 2022-07-28 PROCEDURE — 1280000000 HC REHAB R&B

## 2022-07-28 PROCEDURE — 2700000000 HC OXYGEN THERAPY PER DAY

## 2022-07-28 PROCEDURE — 97535 SELF CARE MNGMENT TRAINING: CPT

## 2022-07-28 RX ADMIN — POLYETHYLENE GLYCOL (3350) 17 G: 17 POWDER, FOR SOLUTION ORAL at 08:35

## 2022-07-28 RX ADMIN — SODIUM CHLORIDE, PRESERVATIVE FREE 10 ML: 5 INJECTION INTRAVENOUS at 21:25

## 2022-07-28 RX ADMIN — METOPROLOL TARTRATE 100 MG: 100 TABLET, FILM COATED ORAL at 08:35

## 2022-07-28 RX ADMIN — ALLOPURINOL 100 MG: 100 TABLET ORAL at 08:37

## 2022-07-28 RX ADMIN — METOPROLOL TARTRATE 100 MG: 100 TABLET, FILM COATED ORAL at 21:16

## 2022-07-28 RX ADMIN — ATORVASTATIN CALCIUM 80 MG: 80 TABLET, FILM COATED ORAL at 21:16

## 2022-07-28 RX ADMIN — APIXABAN 5 MG: 5 TABLET, FILM COATED ORAL at 21:17

## 2022-07-28 RX ADMIN — DULOXETINE HYDROCHLORIDE 20 MG: 20 CAPSULE, DELAYED RELEASE ORAL at 08:37

## 2022-07-28 RX ADMIN — CLOPIDOGREL BISULFATE 75 MG: 75 TABLET ORAL at 08:37

## 2022-07-28 RX ADMIN — ARFORMOTEROL TARTRATE 15 MCG: 15 SOLUTION RESPIRATORY (INHALATION) at 21:26

## 2022-07-28 RX ADMIN — ACETAMINOPHEN 1000 MG: 500 TABLET ORAL at 14:01

## 2022-07-28 RX ADMIN — DOCUSATE SODIUM 100 MG: 100 CAPSULE, LIQUID FILLED ORAL at 21:17

## 2022-07-28 RX ADMIN — DOCUSATE SODIUM 100 MG: 100 CAPSULE, LIQUID FILLED ORAL at 08:36

## 2022-07-28 RX ADMIN — OXYCODONE 10 MG: 5 TABLET ORAL at 11:50

## 2022-07-28 RX ADMIN — ACETAMINOPHEN 1000 MG: 500 TABLET ORAL at 21:17

## 2022-07-28 RX ADMIN — OXYCODONE 10 MG: 5 TABLET ORAL at 07:42

## 2022-07-28 RX ADMIN — BUDESONIDE 500 MCG: 0.5 SUSPENSION RESPIRATORY (INHALATION) at 21:26

## 2022-07-28 RX ADMIN — PANTOPRAZOLE SODIUM 40 MG: 40 TABLET, DELAYED RELEASE ORAL at 06:56

## 2022-07-28 RX ADMIN — BUDESONIDE 500 MCG: 0.5 SUSPENSION RESPIRATORY (INHALATION) at 07:29

## 2022-07-28 RX ADMIN — APIXABAN 5 MG: 5 TABLET, FILM COATED ORAL at 08:35

## 2022-07-28 RX ADMIN — ARFORMOTEROL TARTRATE 15 MCG: 15 SOLUTION RESPIRATORY (INHALATION) at 07:29

## 2022-07-28 RX ADMIN — LOSARTAN POTASSIUM 100 MG: 100 TABLET, FILM COATED ORAL at 08:36

## 2022-07-28 RX ADMIN — ACETAMINOPHEN 1000 MG: 500 TABLET ORAL at 08:36

## 2022-07-28 RX ADMIN — SODIUM CHLORIDE, PRESERVATIVE FREE 10 ML: 5 INJECTION INTRAVENOUS at 08:40

## 2022-07-28 RX ADMIN — BISACODYL 5 MG: 5 TABLET, COATED ORAL at 08:36

## 2022-07-28 RX ADMIN — OXYCODONE 10 MG: 5 TABLET ORAL at 21:16

## 2022-07-28 RX ADMIN — PREGABALIN 150 MG: 75 CAPSULE ORAL at 08:37

## 2022-07-28 RX ADMIN — PREGABALIN 150 MG: 75 CAPSULE ORAL at 21:16

## 2022-07-28 ASSESSMENT — PAIN DESCRIPTION - LOCATION
LOCATION: LEG
LOCATION: LEG;FOOT

## 2022-07-28 ASSESSMENT — PAIN DESCRIPTION - FREQUENCY
FREQUENCY: INTERMITTENT
FREQUENCY: INTERMITTENT

## 2022-07-28 ASSESSMENT — PAIN DESCRIPTION - ORIENTATION
ORIENTATION: RIGHT
ORIENTATION: LEFT
ORIENTATION: RIGHT
ORIENTATION: RIGHT

## 2022-07-28 ASSESSMENT — PAIN DESCRIPTION - ONSET
ONSET: ON-GOING
ONSET: ON-GOING

## 2022-07-28 ASSESSMENT — PAIN DESCRIPTION - PAIN TYPE
TYPE: SURGICAL PAIN
TYPE: SURGICAL PAIN

## 2022-07-28 ASSESSMENT — PAIN DESCRIPTION - DESCRIPTORS
DESCRIPTORS: BURNING

## 2022-07-28 ASSESSMENT — PAIN - FUNCTIONAL ASSESSMENT: PAIN_FUNCTIONAL_ASSESSMENT: ACTIVITIES ARE NOT PREVENTED

## 2022-07-28 ASSESSMENT — PAIN SCALES - GENERAL
PAINLEVEL_OUTOF10: 8
PAINLEVEL_OUTOF10: 0
PAINLEVEL_OUTOF10: 8
PAINLEVEL_OUTOF10: 7

## 2022-07-28 NOTE — DISCHARGE INSTR - COC
/Continuity of Care Form    Patient Name: Adilene Rehman :  1950  MRN:  [id number]    /Admit date:  2022  Discharge date:  2022    Code Status Order: Full Code   Advance Directives:     Admitting Physician:  Dion Brandon DO  PCP: Angel Crews DO    Discharging Nurse:  Ana Hernandez Unit/Room#: 7747/8712-18  Discharging Unit Phone Number: [phone]    Emergency Contact:   Extended Emergency Contact Information  Primary Emergency Contact: Aaron Guzman   [address] Phone: [phone]  Relation: Child    Past Surgical History:  Past Surgical History:   Procedure Laterality Date    APPENDECTOMY      CHOLECYSTECTOMY, LAPAROSCOPIC      COLONOSCOPY  2021    COLONOSCOPY POLYPECTOMY SNARE/COLD BIOPSY performed by Amie Pineda MD at [place] [address]  2021    COLONOSCOPY POLYPECTOMY ABLATION performed by Amie Pineda MD at [place] [address]  2021    COLONOSCOPY CONTROL HEMORRHAGE performed by Amie Pineda MD at [facility]  2013    EYE SURGERY Left     FEMORAL BYPASS Right 2022    RIGHT FEMORAL BELOW KNEE POPLITEAL ARTERY BYPASS WITH IN SITU SAPHENOUS VEIN performed by Riaz Armstrong MD at [facility] Right 2021    RIGHT FEMORAL ENDARTERECTOMY  RIGHT EXTERNAL ILIAC TO PROFUNDA FEMORAL BYPASS WITH 8MM PTFE GRAFT RIGHT LOWER EXTREMITY ARTERIOGRAM BALLOON ANGIOPLASTY RIGHT PROFUNDA BALLOON ANGIOPLASTY AND STENT OF RIGHT EXTERNAL ILIAC ARTERY performed by Riaz Armstrong MD at [facility]  2015    Bilateral decompressive lumbar laminectomy L4-5   ; medial facetectomy L4-5 joints  bilaterally; foraminotomies l5   nerve roots bilaterally    LEG DEBRIDEMENT Right 2013    Dr. Erick Worley - pretibial wound    OTHER SURGICAL HISTORY Right 2013    Dr. Erick Worley - CFA Endarterectomy w/marsupialization; RLE wound excision & debridement     OTHER SURGICAL HISTORY Left 8/27/2013    Dr. Gaby Pradhan - femoral & profunda femoris endarterectomy w/marsupialization patch angioplasty using SFA    SKIN SPLIT GRAFT Right 7/25/2013    Dr. Gaby Pradhan - to non-healing R pretibial wound, 9 x 15 cm    TOTAL HIP ARTHROPLASTY Right 09/01/2016    Dr. Emeka Smiley Left 12/22/2015    Dr. Gaby Pradhan - CFA exploration, thrombectomy of ileofemoral system, stenting of RAFAL in-stent stenosis w/8 x 37 mm Palmaz        Immunization History:   Immunization History   Administered Date(s) Administered    COVID-19, PFIZER PURPLE top, DILUTE for use, (age 15 y+), 30mcg/0.3mL 03/17/2021, 04/07/2021, 11/01/2021    Influenza 10/14/2013    Influenza Vaccine, unspecified formulation 10/14/2013, 10/22/2015    Influenza Virus Vaccine 10/14/2013, 10/14/2014, 10/22/2015    Influenza, High Dose (Fluzone 65 yrs and older) 10/22/2015, 11/18/2016, 08/17/2017, 08/30/2018, 10/17/2019, 09/14/2020    Influenza, Yeni Loyd, adjuvanted, 65 yrs +, IM, PF (Fluad) 09/24/2021    Pneumococcal Conjugate 13-valent (Vmwnthq52) 10/22/2015    Pneumococcal Conjugate 7-valent (Prevnar7) 03/01/2013    Pneumococcal Polysaccharide (Rvxqxeekd71) 01/03/2018    Tdap (Boostrix, Adacel) 10/14/2013    Zoster Live (Zostavax) 06/07/2014    Zoster Recombinant (Shingrix) 05/08/2018       Active Problems:  Patient Active Problem List   Diagnosis Code    Essential hypertension I10    Hyperlipemia E78.5    Degeneration of intervertebral disc of lumbar region M51.36    KAVITA and COPD overlap syndrome ([facility]) G47.33, J44.9    PVD (peripheral vascular disease) ([facility]) I73.9    Coronary artery disease involving native coronary artery of native heart without angina pectoris I25.10    Tobacco abuse Z72.0    Idiopathic peripheral neuropathy G60.9    Gastroesophageal reflux disease K21.9    Other spondylosis, lumbosacral region M47.897    Cigarette nicotine dependence without complication Z57.323    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 L97.912    Open wound of right lower leg S81.801A    Open wound of fifth toe of right foot S91.104A    Peripheral artery disease (HCC) I73.9    Peripheral arterial disease (HCC) I73.9    Debility R53.81       Isolation/Infection:   Isolation            No Isolation          Patient Infection Status       Infection Onset Added Last Indicated Last Indicated By Review Planned Expiration Resolved Resolved By    None active    Resolved    COVID-19 (Rule Out) 12/05/20 12/05/20 12/05/20 COVID-19 (Ordered)   12/06/20 Rule-Out Test Resulted            Nurse Assessment:  Last Vital Signs: /69   Pulse 64   Temp 97.9 °F (36.6 °C) (Oral)   Resp 16   Ht 5' 10\" (1.778 m)   Wt 227 lb 8.2 oz (103.2 kg)   SpO2 95%   BMI 32.65 kg/m²     Last documented pain score (0-10 scale): Pain Level: 8  Last Weight:   Wt Readings from Last 1 Encounters:   07/27/22 227 lb 8.2 oz (103.2 kg)     Mental Status:  oriented and alert    IV Access:  - None    Nursing Mobility/ADLs:  Walking   Independent  Transfer  Independent  Bathing  Assisted  Dressing  Assisted  Toileting  Independent  Feeding  Independent  Med Admin  Independent  Med Delivery   whole    Wound Care Documentation and Therapy:  Wound 11/15/21 Leg Distal;Right #1 (Active)   Dressing Status Old drainage noted 07/25/22 2045   Wound Cleansed Cleansed with saline 07/22/22 0930   Dressing/Treatment Alginate with Ag 07/25/22 2045   Wound Length (cm) 5.5 cm 07/11/22 1310   Wound Width (cm) 2.5 cm 07/11/22 1310   Wound Depth (cm) 0.1 cm 07/11/22 1310   Wound Surface Area (cm^2) 13.75 cm^2 07/11/22 1310   Change in Wound Size % (l*w) -472.92 07/11/22 1310   Wound Volume (cm^3) 1.375 cm^3 07/11/22 1310   Wound Healing % -473 07/11/22 1310   Post-Procedure Length (cm) 5.5 cm 07/11/22 1337   Post-Procedure Width (cm) 2.5 cm 07/11/22 1337   Post-Procedure Depth (cm) 0.1 cm 07/11/22 1337   Post-Procedure Surface Area (cm^2) 13.75 cm^2 07/11/22 1337   Post-Procedure Volume (cm^3) 1.375 cm^3 07/11/22 1337   Distance Tunneling (cm) 0 cm 07/11/22 1310   Tunneling Position ___ O'Clock 0 07/11/22 1310   Undermining Starts ___ O'Clock 0 07/11/22 1310   Undermining Ends___ O'Clock 0 07/11/22 1310   Undermining Maxium Distance (cm) 0 07/11/22 1310   Wound Assessment Slough 07/11/22 1310   Drainage Amount Moderate 07/22/22 0930   Drainage Description Brown 07/22/22 0930   Odor None 07/22/22 0930   Catrachita-wound Assessment Edematous 07/26/22 1945   Margins Defined edges 07/11/22 1310   Wound Thickness Description not for Pressure Injury Full thickness 07/11/22 1310   Number of days: 254       Wound 05/12/22 Ankle Right #2 (Active)   Dressing Status Clean;Dry; Intact 07/28/22 0846   Wound Cleansed Cleansed with saline 07/22/22 0400   Dressing/Treatment Alginate with Ag 07/25/22 2045   Wound Length (cm) 0.5 cm 07/11/22 1310   Wound Width (cm) 0.5 cm 07/11/22 1310   Wound Depth (cm) 0.1 cm 07/11/22 1310   Wound Surface Area (cm^2) 0.25 cm^2 07/11/22 1310   Change in Wound Size % (l*w) -66.67 07/11/22 1310   Wound Volume (cm^3) 0.025 cm^3 07/11/22 1310   Wound Healing % -67 07/11/22 1310   Post-Procedure Length (cm) 0.5 cm 07/11/22 1337   Post-Procedure Width (cm) 0.5 cm 07/11/22 1337   Post-Procedure Depth (cm) 0.1 cm 07/11/22 1337   Post-Procedure Surface Area (cm^2) 0.25 cm^2 07/11/22 1337   Post-Procedure Volume (cm^3) 0.025 cm^3 07/11/22 1337   Distance Tunneling (cm) 0 cm 07/11/22 1310   Tunneling Position ___ O'Clock 0 07/11/22 1310   Undermining Starts ___ O'Clock 0 07/11/22 1310   Undermining Ends___ O'Clock 0 07/11/22 1310   Undermining Maxium Distance (cm) 0 07/11/22 1310   Wound Assessment Other (Comment) 07/22/22 0930   Drainage Amount None 07/28/22 0846   Drainage Description Other (Comment) 07/22/22 0400   Odor None 07/22/22 0930   Catrachita-wound Assessment Intact 07/20/22 0400   Margins Defined edges 07/11/22 1310   Wound Thickness Description not for Pressure Injury Full thickness 07/11/22 1310   Number of days: 76       Incision 07/18/22 Thigh Anterior;Proximal;Right (Active)   Dressing Status Dry; Intact 07/26/22 0900   Incision Cleansed Soap and water;Betadine/povidone iodine 07/28/22 0846   Dressing/Treatment Open to air 07/28/22 0846   Closure Staples 07/28/22 0846   Drainage Amount None 07/28/22 0846   Drainage Description Serosanguinous 07/22/22 0930   Odor None 07/22/22 0930   Number of days: 10       Incision 07/18/22 Pretibial Right;Medial (Active)   Dressing Status Clean;Dry; Intact 07/26/22 0900   Incision Cleansed Soap and water;Betadine/povidone iodine 07/28/22 0846   Dressing/Treatment Open to air 07/28/22 0846   Closure Staples 07/28/22 0846   Drainage Amount None 07/28/22 0846   Odor None 07/28/22 0846   Number of days: 10        Elimination:  Continence: Bowel: Yes  Bladder: Yes  Urinary Catheter: None   Colostomy/Ileostomy/Ileal Conduit: No       Date of Last BM: 07/28/2022    Intake/Output Summary (Last 24 hours) at 7/28/2022 0947  Last data filed at 7/28/2022 0840  Gross per 24 hour   Intake 130 ml   Output 1925 ml   Net -1795 ml     I/O last 3 completed shifts: In: 120 [P.O.:120]  Out: 3550 [Urine:3550]    Safety Concerns: At Risk for Falls    Impairments/Disabilities:      Vision and Hearing    Nutrition Therapy:  Current Nutrition Therapy:   - Oral Diet:  Low Fat, Low Fiber, and Low Sodium (2gm)    Routes of Feeding: Oral  Liquids:  Thin Liquids  Daily Fluid Restriction: no  Last Modified Barium Swallow with Video (Video Swallowing Test): not done    Treatments at the Time of Hospital Discharge:   Respiratory Treatments: Brovana, Pulmicort  Oxygen Therapy:  is on oxygen at 4 L/min per nasal cannula. Ventilator:    - No ventilator support    Heart Failure Instructions for Daily Management  Patient was treated for chronic diastolic heart failure. he  will require the following:    Please weigh daily on the same scale and approximately the same time of day. Report weight gain of 3 pounds/day or 5 pounds/week to : [facility] [employer] [phone]. Please use hospital discharge weight as baseline reference. Please monitor for signs and symptoms of and report to MD:  Worsening Heart Failure: sudden weight gain, shortness of breath, lower extremity or general edema/swelling, abdominal bloating/swelling, inability to lie flat, intolerance to usual activity, or cough (especially at night). Report these finding even if no increase in weight. Dehydration:  having difficulty or a decrease in urination, dizziness, worsening fatigue, or new onset/worsening of generalized weakness. Please continue a LOW SODIUM diet and LIMIT fluid intake to 48 - 64 ounces ( 1.5 - 2 liters) per day. Call [facility] [employer] [phone] with any questions or concerns. Please continue heart failure education to patient and family/support system. See After Visit Summary for hospital follow up appointment details. Consider spiritual care referral for support and/or completion of advance directives . Consider: [place] [address] telehealth program if patient agreeable and able to participate.   Patient's primary cardiologist is Dr Sonya Wyman       Rehab Therapies: Physical Therapy and Occupational Therapy  Weight Bearing Status/Restrictions: No weight bearing restrictions  Other Medical Equipment (for information only, NOT a DME order):  wheelchair  Other Treatments: dressing to rt leg see MD order    Patient's personal belongings (please select all that are sent with patient):  Sent with patient    RN SIGNATURE:  Electronically signed by Himanshu Hagan RN on 7/30/22 at 11:11 AM EDT    CASE MANAGEMENT/SOCIAL WORK SECTION    Inpatient Status Date: ***    Readmission Risk Assessment Score:  Readmission Risk              Risk of Unplanned Readmission:  [id number]           Discharging to Facility/ Agency   Name:   Address:  Phone:  Fax:    Dialysis Facility (if applicable)   Name:  Address:  Dialysis Schedule:  Phone:  Fax:    / signature: {Esignature:[id number]}    PHYSICIAN SECTION    Prognosis: Good    Condition at Discharge: Stable    Rehab Potential (if transferring to Rehab): Good    Recommended Labs or Other Treatments After Discharge:   PT/OT  Change Coflex TLC dressing every-other day. Wash incision sites not covered by Coflex TLC dressing daily with soap and water. Paint incision with betadine. Follow-up with Dr. Dirk Minaya in the office in 1 week for staple removal and post-operative check. Physician Certification: I certify the above information and transfer of Kristyn Bendre.  is necessary for the continuing treatment of the diagnosis listed and that he requires [address] for greater 30 days.      Update Admission H&P: No change in H&P    PHYSICIAN SIGNATURE:  Electronically signed by Sulma Harkins DO on 7/29/22 at 9:59 AM EDT

## 2022-07-28 NOTE — PROGRESS NOTES
Department of Physical Medicine & Rehabilitation  Progress Note    Patient Identification:  Renetta Gomez  [id number]  : 1950  Admit date: 2022    Chief Complaint: Debility    Subjective:   Feels well. No new complaints overnight. Seen in PT today. He continues to have some weakness. Improving daily. Plan for DC . Team conference today. ROS: No f/c, n/v, cp     Objective:  Patient Vitals for the past 24 hrs:   BP Temp Temp src Pulse Resp SpO2   22 1005 -- -- -- -- -- 99 %   22 0835 130/69 97.9 °F (36.6 °C) Oral 64 16 95 %   22 0729 -- -- -- -- -- 95 %   22 2030 124/70 97.4 °F (36.3 °C) Oral 65 16 95 %       Const: Alert. No distress, pleasant. HEENT: Normocephalic, atraumatic. Normal sclera/conjunctiva. MMM. CV: Regular rate and rhythm. Resp: No respiratory distress. Lungs CTAB. Abd: Soft, nontender, nondistended, NABS+   Ext: No edema. Neuro: Alert, oriented, appropriately interactive. Psych: Cooperative, appropriate mood and affect    Laboratory data: Available via EMR. Last 24 hour lab  No results found for this or any previous visit (from the past 24 hour(s)). Therapy progress:  PT  Position Activity Restriction  Other position/activity restrictions: up as tolerated  Objective     Sit to Stand: Contact guard assistance, Stand by assistance (VC  for hand placement)  Stand to sit: Contact guard assistance, Stand by assistance (VC for hand placement)  Bed to Chair: Contact guard assistance, Stand by assistance (stand pivot transf)  Device: Single point cane  Other Apparatus: O2 (6L)  Assistance: Contact guard assistance  Distance: 48' x2, [de-identified]' x1, short distances in the gym, 20' amb on ramp( x 10' up and 10' down for a total of 20' )  OT  PT Equipment Recommendations  Equipment Needed: No  Other: Ongoing assessment     Assessment        SLP          Body mass index is 32.65 kg/m².     Assessment and Plan:  Pain control  Tylenol scheduled  Roxicodone pain panel        HTN  Hx of HTN, home meds Losartan and Metoprolol restarted  PAD status post R femoral below knee popliteal bypass  Eliquis and Plavix started 7/19        Urinary retention  - required schulte  - monitor  - schulte out        Decreased functional mobility  - PT/OT requirements - ARU      Team conference was held today on the patient and discussed directly with the patient utilizing their entire treatment team. Please see separate team note for details. Total treatment time for today's care >35 min. >50% of time spent counseling with patient and coordinating care.           Rehab Progress: Improving  Anticipated Dispo: home  Services/DME: [facility] [place]  ELOS: 7/30      PRINCE PateP.H  PM&R  7/28/2022  11:44 AM

## 2022-07-28 NOTE — PLAN OF CARE
Problem: Safety - Adult  Goal: Free from fall injury  Outcome: Progressing  Note: Client remains free from falls, bed/chair alarm in place, door open, encouraged to use call light for needs, call light is within reach, bed locked in lowest position,  Will continue to monitor. Problem: Pain  Goal: Verbalizes/displays adequate comfort level or baseline comfort level  7/28/2022 1916 by Rozina Schilling RN  Outcome: Progressing  Note: Pt voices pain needs appropriately, pain is assessed during shift. Surgical site observed for signs/symptoms of infection. Vitals performed routinely, labs observed.  Will monitor pt while on ARU

## 2022-07-28 NOTE — PROGRESS NOTES
Assessment completed, medications given. Fall precautions are in place, call light and bedside table are within reach. Patient is aware to call for assistance.

## 2022-07-28 NOTE — PROGRESS NOTES
Physical Therapy  Facility/Department: [facility] ACUTE REHAB UNIT  Rehabilitation Physical Therapy Treatment Note    NAME: Surya Willard. : 1950 (81 y.o.)  MRN: [id number]  CODE STATUS: Full Code    Date of Service: 22  Additional Pertinent Hx: 81y.o. year old male status post R femoral below knee popliteal bypass with in-situ saphenous vein secondary to atherosclerosis of native artery of RLE with ulceration . Pmhx: HTN, COPD. Referring Practitioner: Dr. Davidson Dudley  Diagnosis: Debility  2/2 R fem-pop bypass  General Comment  Comments: Pt sitting in BS chair. Pt agreeable to PT. Restrictions:  Position Activity Restriction  Other position/activity restrictions: up as tolerated     SUBJECTIVE  Subjective  Subjective: Pt sitting in BS chair when Pt arrived. Pt agreeable to therapy. Pain: 8-9/10    OBJECTIVE  Cognition  Overall Cognitive Status: WFL  Orientation  Overall Orientation Status: Within Normal Limits    Functional Mobility  Balance  Sitting Balance: Independent  Standing Balance: Stand by assistance  Transfers  Surface: From chair with arms; To chair with arms  Additional Factors: Verbal cues; Hand placement cues; Increased time to complete  Device: Cane  Sit to Stand  Assistance Level: Supervision  Stand to Sit  Assistance Level: Supervision    Environmental Mobility  Ambulation  Surface: Level surface  Device: [employer]: 300' 2x75ft + 120ft  Additional Factors: Verbal cues  Assistance Level: Stand by assist;Contact guard assist  Gait Deviations: Slow star;Decreased step length bilateral  Stairs  Stair Height: 6''  Device: One handrail  Number of Stairs: 12  Additional Factors: Verbal cues  Assistance Level: Stand by assist  Skilled Clinical Factors: VC for foot placement with descension    PT Exercises  Static Standing Balance Exercises: SBA to Min assist (using hallway rail).    Romberg EO: 30\"  Romberg EO nodding yes and no: x10 ea dir  Tandem stance: 2x30\" B   Side stepping: 50ft   Retro steppinft  Repetitive sit to stands: x5 with no UE support. Second session: Pt sitting in BS chair when PT arrived. Pt agreeable to therapy. Transfers  Surface: From chair with arms; To chair with arms  Additional Factors: Verbal cues; Hand placement cues; Increased time to complete  Device: Cane  Sit to Stand  Assistance Level: Supervision  Stand to Sit  Assistance Level: Supervision  Ambulation  Surface: Level surface  Device: [employer]: 115', 175' and 225'  Additional Factors: Verbal cues for an erect posture and taking longer steps  Assistance Level: Stand by assist;Contact guard assist  Gait Deviations: Slow star;Decreased step length bilateral    Pt instructed with using the bottom step of a flight of steps and performing the followin.alternating toe tapping in a forwards direction x 10 reps,   2.sideways( hip abd and adduction)  direction x 10 reps    3. 4 step ex alternating using the bottom 2 steps x10 reps, Pt req rests between 2/2 to SOB. B handrails used    Req CGA    Pt returned to room and positioned back in bed. Sit>supine w/the use of the bedrail req S for VC for technique  Call light and phone placed within reach. Chair alarm reactivated. ASSESSMENT/PROGRESS TOWARDS GOALS  Vital Signs  SpO2: 99 %  O2 Device: Nasal cannula (4L)    Assessment  Assessment: Pt is progressing well with overall mobility. Pt cont to be limited by fatigue, SOB and overall decreased endurance. Presented with limited righting reactions during dynamic standing balance activities. Activity Tolerance: Patient tolerated treatment well  Discharge Recommendations: Home with Home health PT;Home independently  PT Equipment Recommendations  Equipment Needed: No  Other: Ongoing assessment    Goals  Patient Goals   Patient goals : Get home ASAP  Short Term Goals  Time Frame for Short term goals: 10 days  Short term goal 1: Ind with bed mobility  Short term goal 2:  Ind with transf excluding floor transf  Short term goal 3: Amb with LRAD distances > 200' at a time indly on level surfaces  Short term goal 4: Amb up and down 12 stepsIndly  with use of 1 railing and SPC  Additional Goals?: No    PLAN OF CARE/SAFETY  Plan  Plan weeks: 5x a week 60 mins a day  Safety Devices  Type of Devices: Chair alarm in place;Call light within reach; Left in chair;Nurse notified    EDUCATION  Education  Education Given To: Patient  Education Provided: Precautions; Safety; Mobility Training;Energy Conservation;Transfer Training;ADL Function  Education Method: Demonstration;Verbal  Barriers to Learning: None  Education Outcome: Verbalized understanding;Continued education needed        Therapy Time   Individual Concurrent Group Co-treatment   Time In 0930         Time Out 1030         Minutes 60         Second Session Therapy Time:   Individual Concurrent Group Co-treatment   Time In 1330         Time Out 1400         Minutes 30           Timed Code Treatment Minutes:  60+30    Total Treatment Minutes:   [address] Zach Cope, BRIE, 07/28/22   Madeline Levine LPT [id number] ( treating therapist for 2nd session)

## 2022-07-28 NOTE — PROGRESS NOTES
Nutrition Assessment     Type and Reason for Visit: Initial    Nutrition Recommendations/Plan:   Regular; Low Fat/Low Chol/High Fiber/2 gm Na  Will decrease Ensure to BID     Malnutrition Assessment:  Malnutrition Status: No malnutrition    Nutrition Assessment:  Pt seen for new rehab admission. Pt admitted with debility. Pt is s/p R femoral below knee popliteal bypass with in-situ saphenous vein secondary to atherosclerosis of native artery of RLE with ulceration. Hx includes CAD, CHF, COPD, GERD, HLD, HTN. Pt reports good appetite currently and PTA. Reported stable weight. Pt with ONS TID, is accepting well. Will decrease to BID since pt is eating well. No questions about diet. No other nutritional concerns. Nutrition Related Findings:   Reviewed labs Wound Type: Surgical Incision    Current Nutrition Therapies:    ADULT DIET;  Regular; Low Fat/Low Chol/High Fiber/2 gm Na  ADULT ORAL NUTRITION SUPPLEMENT; Breakfast, Lunch, Dinner; Standard High Calorie/High Protein Oral Supplement    Anthropometric Measures:  Height: 5' 10\" (177.8 cm)  Current Body Wt: 227 lb (103 kg)   BMI: 32.6    Nutrition Diagnosis:   No nutrition diagnosis at this time     Nutrition Interventions:   Food and/or Nutrient Delivery: Continue Current Diet  Nutrition Education/Counseling: No recommendation at this time  Coordination of Nutrition Care: Continue to monitor while inpatient       Goals:     Goals: Meet at least 75% of estimated needs       Nutrition Monitoring and Evaluation:   Behavioral-Environmental Outcomes: None Identified  Food/Nutrient Intake Outcomes: Food and Nutrient Intake  Physical Signs/Symptoms Outcomes: Biochemical Data, Nutrition Focused Physical Findings, Skin, Weight    Discharge Planning:    No discharge needs at this time     Nikki Estrada, [address] [place] [address],   Contact: [phone]

## 2022-07-28 NOTE — PROGRESS NOTES
Occupational Therapy  Facility/Department: [facility] ACUTE REHAB UNIT  Rehabilitation Occupational Therapy Daily Treatment Note    Date: 22  Patient Name: Nick Rahman. Room: [facility]  MRN: [id number]  Account: [de-identified]   : 1950  (71 y.o.) Gender: male                    Past Medical History:  has a past medical history of CAD (coronary artery disease), CHF (congestive heart failure) ([facility] [address] COPD (chronic obstructive pulmonary disease) ([facility] [address] Critical ischemia of lower extremity ([facility] [address] Depressive disorder, not elsewhere classified, GERD (gastroesophageal reflux disease), History of colon polyps - 30 seen on colonoscopy 2021, History of MI (myocardial infarction), History of skin ulcer of lower extremity, History of transient ischemic attack (TIA), Hx of blood clots, Hyperlipidemia, Hypertension, Neuropathy, KAVITA (obstructive sleep apnea), Personal history of DVT (deep vein thrombosis), Prolonged emergence from general anesthesia, PVD (peripheral vascular disease) ([facility] [address] TIA (transient ischemic attack), and Vertebral fracture. Past Surgical History:   has a past surgical history that includes Appendectomy; Coronary angioplasty with stent (2013); other surgical history (Right, 2013); Leg Debridement (Right, 2013); skin split graft (Right, 2013); other surgical history (Left, 2013); laminectomy (2015); transluminal angioplasty (Left, 2015); Total hip arthroplasty (Right, 2016); Cholecystectomy, laparoscopic; eye surgery (Left); Colonoscopy (2021); Colonoscopy (2021); Colonoscopy (2021); Femoral Endarterectomy (Right, 2021); and femoral bypass (Right, 2022). Restrictions  Other position/activity restrictions: up as tolerated    Subjective  Subjective: Pt was semi supine in bed upon arrival. Pt was pleasant and agreeable to OT.  Pt on 5 L O2                Objective     Cognition  Overall Cognitive Status: [facility] ADL  Feeding  Assistance Level: Independent  Skilled Clinical Factors: Breakfast tray delivered  Grooming/Oral Hygiene  Assistance Level: Stand by assist  Skilled Clinical Factors: Pt completed hand hygiene in stance at the sink w/ SBA  Upper Extremity Dressing  Assistance Level: Independent  Skilled Clinical Factors: Pt donned tshirt seated EOB  Lower Extremity Dressing  Assistance Level: Stand by assist  Skilled Clinical Factors: Pt threaded BLEs into underwear and pants seated on EOB w/ improved ability to lift RLE into figure 4 today. Pt managed clothes over hips in stance w/ SBA. Putting On/Taking Off Footwear  Assistance Level: Independent  Skilled Clinical Factors: Pt donned L tenso shape and shoes seated EOB  Toileting  Assistance Level: Stand by assist  Skilled Clinical Factors: SBA in stance at toilet to urinate      Instrumental ADL's  Instrumental ADLs: Yes  Light Housekeeping  Light Housekeeping Level: Leia Lacks Housekeeping Level of Assistance: Contact guard assistance  Light Housekeeping: Pt engaged in laundry activity to simulate home routines. Pt ambulated w/ SPC to the dresser and retreived 10 clothing items from the lowest shelf. Pt placed clothes in the laundry basket and transported clothes to the washing machine. Due to small size of basket, pt completed 2 trips from the dresser to/from the washing machine. Pt placed clothes into the washing machine which required him to bend down low. Pt completed the retrieval components of task w/ CGA progressing to SBA w/ only min VCs needed for safety while bending. Pt removed clothes from the washing machine and then transported them to the tabletop where pt folded clothes in stance for ~5 mins w/ SBA. Pt returned folded clothes to the dresser upon conclusion of task. Overall, pt demonstrated safety w/ task w/ CGA-SBA and no LOB noted.  Pt reported he normally uses his rollator to transport his laundry basket to the machine which would likely be the safest method at d/c. Functional Mobility  Device: Cane  Activity: To/From bathroom; To/From therapy gym (to/from dining room)  Assistance Level: Contact guard assist  Skilled Clinical Factors: CGA progressing to SBA w/ SPC. Pt on 5L O2 throughout session. SpO2 assessed and pt was 100%. Pt stated he doesn't wear O2 during the day at home. OT discussed w/ RN and pt was weaned to 4L to assess SpO2. pt maintained 93% and greater on 4L during mobility task w/ no increased SOB noted. Supine to Sit  Assistance Level: Supervision  Skilled Clinical Factors: HOB elevated  Sit to Stand  Assistance Level: Stand by assist  Stand to Sit  Assistance Level: Stand by assist  Skilled Clinical Factors: VCs needed to reach back upon descent         Assessment  Assessment  Assessment: Pt continues to make good progress in OT as evident by pt ability to complete light housekeeping task w/ CGA progressing to SBA. Pt completed LE dressing and toileting today w/ SBA and pt tolerated stance for over 7 mins w/o SOB noted. Pt reported he doesn't wear O2 during the day at home however pt has been requiring 5L while in the hospital. Pt may benefit from O2 assessment to determine if pt will require O2 at d/c. Pt continues to benefit from OT. Cont OT per POC  Activity Tolerance: Patient tolerated treatment well  Discharge Recommendations: Home with Home health OT; Home with assist PRN  OT Equipment Recommendations  Other: Pt has a toilet raiser, GBs in the shower, reacher, sock aid, and shower chair. Pt may benefit from TTB pending progress  Safety Devices  Safety Devices in place: Yes  Type of devices: Call light within reach; Left in chair;Nurse notified; Chair alarm in place    Patient Education  Education  Education Given To: Patient  Education Provided: Safety;ADL Function;IADL Function;Equipment; Mobility Training;Transfer Training  Education Provided Comments: d/c plans, weaning from O2, IADL management  Education Method: training; Endurance training; Safety education & training;Patient/Caregiver education & training;Stair training;Self-Care / ADL;Gait training;Home management training    Goals  Patient Goals   Patient goals : \"do what I did before\"  Short Term Goals  Time Frame for Short term goals: 10 days  Short Term Goal 1: Pt will complete toileting and toilet transfers MOD I-ongoing  Short Term Goal 2: Pt will complete tub transfer MOD I w/ use of shower chair or TTB pending safest method-ongoing  Short Term Goal 3: Pt will complete LE dressing MOD I w/ use of AE prn-ongoing  Short Term Goal 4: Pt will complete light housekeeping task in stance for 7 mins MOD I-goal met for time 7/28; ongoing for level of assistance  Short Term Goal 5: Pt will complete bathing tasks MOD I-ongoing        Therapy Time   Individual Concurrent Group Co-treatment   Time In 0730         Time Out 0830         Minutes 60         Timed Code Treatment Minutes: 60 Minutes     Second Session Therapy Time:   Individual Concurrent Group Co-treatment   Time In 1130         Time Out 1200         Minutes 30           Timed Code Treatment Minutes:  60+30    Total Treatment Minutes:  241 [facility] 1266, [place] (second session)

## 2022-07-29 LAB
ANION GAP SERPL CALCULATED.3IONS-SCNC: 8 MMOL/L (ref 3–16)
BASOPHILS ABSOLUTE: 0 K/UL (ref 0–0.2)
BASOPHILS RELATIVE PERCENT: 0.9 %
BUN BLDV-MCNC: 26 MG/DL (ref 7–20)
CALCIUM SERPL-MCNC: 8.8 MG/DL (ref 8.3–10.6)
CHLORIDE BLD-SCNC: 105 MMOL/L (ref 99–110)
CO2: 27 MMOL/L (ref 21–32)
CREAT SERPL-MCNC: 0.9 MG/DL (ref 0.8–1.3)
EOSINOPHILS ABSOLUTE: 0.3 K/UL (ref 0–0.6)
EOSINOPHILS RELATIVE PERCENT: 4.8 %
GFR AFRICAN AMERICAN: >60
GFR NON-AFRICAN AMERICAN: >60
GLUCOSE BLD-MCNC: 98 MG/DL (ref 70–99)
HCT VFR BLD CALC: 30.4 % (ref 40.5–52.5)
HEMOGLOBIN: 9.7 G/DL (ref 13.5–17.5)
LYMPHOCYTES ABSOLUTE: 1.6 K/UL (ref 1–5.1)
LYMPHOCYTES RELATIVE PERCENT: 27 %
MCH RBC QN AUTO: 30.5 PG (ref 26–34)
MCHC RBC AUTO-ENTMCNC: 31.8 G/DL (ref 31–36)
MCV RBC AUTO: 96.1 FL (ref 80–100)
MONOCYTES ABSOLUTE: 0.5 K/UL (ref 0–1.3)
MONOCYTES RELATIVE PERCENT: 9.4 %
NEUTROPHILS ABSOLUTE: 3.3 K/UL (ref 1.7–7.7)
NEUTROPHILS RELATIVE PERCENT: 57.9 %
PDW BLD-RTO: 17 % (ref 12.4–15.4)
PLATELET # BLD: 174 K/UL (ref 135–450)
PMV BLD AUTO: 9.1 FL (ref 5–10.5)
POTASSIUM REFLEX MAGNESIUM: 4.3 MMOL/L (ref 3.5–5.1)
RBC # BLD: 3.17 M/UL (ref 4.2–5.9)
SODIUM BLD-SCNC: 140 MMOL/L (ref 136–145)
WBC # BLD: 5.8 K/UL (ref 4–11)

## 2022-07-29 PROCEDURE — 36415 COLL VENOUS BLD VENIPUNCTURE: CPT

## 2022-07-29 PROCEDURE — 97530 THERAPEUTIC ACTIVITIES: CPT | Performed by: PHYSICAL THERAPIST

## 2022-07-29 PROCEDURE — 94761 N-INVAS EAR/PLS OXIMETRY MLT: CPT

## 2022-07-29 PROCEDURE — 97116 GAIT TRAINING THERAPY: CPT | Performed by: PHYSICAL THERAPIST

## 2022-07-29 PROCEDURE — 6370000000 HC RX 637 (ALT 250 FOR IP): Performed by: PHYSICAL MEDICINE & REHABILITATION

## 2022-07-29 PROCEDURE — 2700000000 HC OXYGEN THERAPY PER DAY

## 2022-07-29 PROCEDURE — 99232 SBSQ HOSP IP/OBS MODERATE 35: CPT | Performed by: PHYSICAL MEDICINE & REHABILITATION

## 2022-07-29 PROCEDURE — 1280000000 HC REHAB R&B

## 2022-07-29 PROCEDURE — 94640 AIRWAY INHALATION TREATMENT: CPT

## 2022-07-29 PROCEDURE — 85025 COMPLETE CBC W/AUTO DIFF WBC: CPT

## 2022-07-29 PROCEDURE — 2580000003 HC RX 258: Performed by: PHYSICAL MEDICINE & REHABILITATION

## 2022-07-29 PROCEDURE — 6360000002 HC RX W HCPCS: Performed by: PHYSICAL MEDICINE & REHABILITATION

## 2022-07-29 PROCEDURE — 97530 THERAPEUTIC ACTIVITIES: CPT

## 2022-07-29 PROCEDURE — 97535 SELF CARE MNGMENT TRAINING: CPT

## 2022-07-29 PROCEDURE — 80048 BASIC METABOLIC PNL TOTAL CA: CPT

## 2022-07-29 RX ORDER — LOSARTAN POTASSIUM 100 MG/1
100 TABLET ORAL DAILY
Qty: 30 TABLET | Refills: 3 | Status: SHIPPED | OUTPATIENT
Start: 2022-07-30

## 2022-07-29 RX ORDER — OXYCODONE HYDROCHLORIDE 5 MG/1
5 TABLET ORAL EVERY 4 HOURS PRN
Qty: 20 TABLET | Refills: 0 | Status: SHIPPED | OUTPATIENT
Start: 2022-07-29 | End: 2022-08-01

## 2022-07-29 RX ORDER — PSEUDOEPHEDRINE HCL 30 MG
100 TABLET ORAL 2 TIMES DAILY
Qty: 60 CAPSULE | Refills: 1 | Status: SHIPPED | OUTPATIENT
Start: 2022-07-29 | End: 2022-08-30

## 2022-07-29 RX ORDER — METOPROLOL TARTRATE 100 MG/1
100 TABLET ORAL 2 TIMES DAILY
Qty: 60 TABLET | Refills: 3 | Status: SHIPPED | OUTPATIENT
Start: 2022-07-29 | End: 2022-08-02

## 2022-07-29 RX ORDER — POLYETHYLENE GLYCOL 3350 17 G/17G
17 POWDER, FOR SOLUTION ORAL DAILY PRN
Qty: 527 G | Refills: 1 | Status: SHIPPED | OUTPATIENT
Start: 2022-07-29 | End: 2022-08-28

## 2022-07-29 RX ORDER — DULOXETIN HYDROCHLORIDE 20 MG/1
20 CAPSULE, DELAYED RELEASE ORAL DAILY
Qty: 30 CAPSULE | Refills: 3 | Status: SHIPPED | OUTPATIENT
Start: 2022-07-30 | End: 2022-08-30

## 2022-07-29 RX ORDER — TRAZODONE HYDROCHLORIDE 50 MG/1
50 TABLET ORAL NIGHTLY PRN
Qty: 60 TABLET | Refills: 1 | Status: SHIPPED | OUTPATIENT
Start: 2022-07-29 | End: 2022-08-30

## 2022-07-29 RX ADMIN — OXYCODONE 10 MG: 5 TABLET ORAL at 22:29

## 2022-07-29 RX ADMIN — APIXABAN 5 MG: 5 TABLET, FILM COATED ORAL at 20:30

## 2022-07-29 RX ADMIN — BISACODYL 5 MG: 5 TABLET, COATED ORAL at 08:56

## 2022-07-29 RX ADMIN — BUDESONIDE 500 MCG: 0.5 SUSPENSION RESPIRATORY (INHALATION) at 11:20

## 2022-07-29 RX ADMIN — APIXABAN 5 MG: 5 TABLET, FILM COATED ORAL at 08:56

## 2022-07-29 RX ADMIN — PANTOPRAZOLE SODIUM 40 MG: 40 TABLET, DELAYED RELEASE ORAL at 06:30

## 2022-07-29 RX ADMIN — PREGABALIN 150 MG: 75 CAPSULE ORAL at 08:56

## 2022-07-29 RX ADMIN — LOSARTAN POTASSIUM 100 MG: 100 TABLET, FILM COATED ORAL at 08:56

## 2022-07-29 RX ADMIN — SODIUM CHLORIDE, PRESERVATIVE FREE 10 ML: 5 INJECTION INTRAVENOUS at 12:05

## 2022-07-29 RX ADMIN — ACETAMINOPHEN 1000 MG: 500 TABLET ORAL at 08:55

## 2022-07-29 RX ADMIN — OXYCODONE 10 MG: 5 TABLET ORAL at 06:30

## 2022-07-29 RX ADMIN — ARFORMOTEROL TARTRATE 15 MCG: 15 SOLUTION RESPIRATORY (INHALATION) at 11:20

## 2022-07-29 RX ADMIN — CLOPIDOGREL BISULFATE 75 MG: 75 TABLET ORAL at 08:56

## 2022-07-29 RX ADMIN — OXYCODONE 10 MG: 5 TABLET ORAL at 18:13

## 2022-07-29 RX ADMIN — METOPROLOL TARTRATE 100 MG: 100 TABLET, FILM COATED ORAL at 08:55

## 2022-07-29 RX ADMIN — ALLOPURINOL 100 MG: 100 TABLET ORAL at 08:56

## 2022-07-29 RX ADMIN — BUDESONIDE 500 MCG: 0.5 SUSPENSION RESPIRATORY (INHALATION) at 20:02

## 2022-07-29 RX ADMIN — DULOXETINE HYDROCHLORIDE 20 MG: 20 CAPSULE, DELAYED RELEASE ORAL at 08:56

## 2022-07-29 RX ADMIN — ARFORMOTEROL TARTRATE 15 MCG: 15 SOLUTION RESPIRATORY (INHALATION) at 20:01

## 2022-07-29 RX ADMIN — PREGABALIN 150 MG: 75 CAPSULE ORAL at 20:29

## 2022-07-29 RX ADMIN — ACETAMINOPHEN 1000 MG: 500 TABLET ORAL at 20:29

## 2022-07-29 RX ADMIN — SODIUM CHLORIDE, PRESERVATIVE FREE 10 ML: 5 INJECTION INTRAVENOUS at 08:59

## 2022-07-29 RX ADMIN — ATORVASTATIN CALCIUM 80 MG: 80 TABLET, FILM COATED ORAL at 20:30

## 2022-07-29 RX ADMIN — OXYCODONE 10 MG: 5 TABLET ORAL at 12:03

## 2022-07-29 RX ADMIN — DOCUSATE SODIUM 100 MG: 100 CAPSULE, LIQUID FILLED ORAL at 08:56

## 2022-07-29 ASSESSMENT — PAIN DESCRIPTION - ORIENTATION
ORIENTATION: RIGHT

## 2022-07-29 ASSESSMENT — PAIN DESCRIPTION - PAIN TYPE
TYPE: SURGICAL PAIN

## 2022-07-29 ASSESSMENT — PAIN - FUNCTIONAL ASSESSMENT
PAIN_FUNCTIONAL_ASSESSMENT: ACTIVITIES ARE NOT PREVENTED

## 2022-07-29 ASSESSMENT — PAIN DESCRIPTION - FREQUENCY
FREQUENCY: CONTINUOUS
FREQUENCY: INTERMITTENT
FREQUENCY: CONTINUOUS

## 2022-07-29 ASSESSMENT — PAIN DESCRIPTION - LOCATION
LOCATION: LEG

## 2022-07-29 ASSESSMENT — PAIN SCALES - GENERAL
PAINLEVEL_OUTOF10: 7
PAINLEVEL_OUTOF10: 8
PAINLEVEL_OUTOF10: 7

## 2022-07-29 ASSESSMENT — PAIN DESCRIPTION - ONSET
ONSET: ON-GOING

## 2022-07-29 ASSESSMENT — PAIN DESCRIPTION - DESCRIPTORS
DESCRIPTORS: ACHING
DESCRIPTORS: BURNING
DESCRIPTORS: BURNING
DESCRIPTORS: ACHING

## 2022-07-29 NOTE — PROGRESS NOTES
Physical Therapy  Facility/Department: [facility] ACUTE REHAB UNIT  Rehabilitation Physical Therapy Treatment Note    NAME: Rivera Baker : 1950 (81 y.o.)  MRN: [id number]  CODE STATUS: Full Code    Date of Service: 22       Restrictions:  Position Activity Restriction  Other position/activity restrictions: up as tolerated     SUBJECTIVE  Subjective  Subjective: Pt sitting in BS chair when Pt arrived. Pt agreeable to therapy. Pain: Reports pain in the RLE but did not rate the pain        Post Treatment Pain Screening         OBJECTIVE  Cognition  Overall Cognitive Status: WFL  Orientation  Overall Orientation Status: Within Normal Limits  Orientation Level: Oriented X4    Functional Mobility  Bed Mobility  Overall Assistance Level: Independent  Bridging  Assistance Level: Independent  Roll Left  Assistance Level: Independent  Roll Right  Assistance Level: Independent  Sit to Supine  Assistance Level: Independent  Supine to Sit  Assistance Level: Independent  Scooting  Assistance Level: Independent (Both [place]<>cephalo and laterally)  Balance  Sitting Balance: Independent  Standing Balance: Modified independent  (Uses a SPC)  Transfers  Surface: To bed;From bed; To chair with arms;From chair with arms  Device: Cane  Sit to Stand  Assistance Level: Independent  Stand to Sit  Assistance Level: Independent  Bed To/From Chair  Technique: Stand step  Assistance Level: Independent  Stand Pivot  Assistance Level: Independent  Car Transfer  Assistance Level: Independent (Used car simulator)      Environmental Mobility  Ambulation  Surface: Level surface; Ramp  Device: Ritani  Distance: 400' x2( includes amb up and down a 79' ramp )250' x2, 160' x1  Assistance Level: Modified independent  Gait Deviations: Slow star  Stairs  Stair Height: 6''  Device: One handrail  Number of Stairs: 12(4x3)  Additional Factors: Non-reciprocal going down;Non-reciprocal going up  Assistance Level: Modified independent    Second session: Pt sitting in BS chair when PT arrived. Pt agreeable to therapy. Note, respiratory therapist present to complete assessment for oxygen need. Transfers  Surface: From chair with arms  Device: Cane  Sit to Stand  Assistance Level: Independent  Stand to Sit: independent  Ambulation  Surface: Level surface; Device: Cane  Distance: ~45' when O2 dropped <85%. 4 L O2 started w/o success. 6L for sats >90%. Distance ~50', 95'   Assistance Level: Modified independent  Gait Deviations: Slow star  PT instructed pt with floor<>mat transf. 2/2 to pt's L THR, PT did not have pt physically practice the transf but rather instructed therapist on the technique. Pt demo an understanding   Pt returned to room and positioned in BS chair. Call light and phone placed within reach, Chair alarm reactivated      ASSESSMENT/PROGRESS TOWARDS GOALS       Assessment  Assessment: Pt has progressed well with overall mobility. Pt cont to be limited by fatigue, SOB and overall decreased endurance. Pt has achieved all previously set goals and reports feeling confident with his d/c home. Pt is below baseline and would benefit from continued therapy to maximize potential and increase functional mobility towards status as PTA  Activity Tolerance: Patient tolerated treatment well  Discharge Recommendations: Home with Home health PT;Home independently  PT Equipment Recommendations  Equipment Needed: No  Other: Pt has a SPC    Goals  Patient Goals   Patient goals : Get home ASAP  Short Term Goals  Time Frame for Short term goals: 10 days  Short term goal 1: Ind with bed mobility. Goal achieved  Short term goal 2: Ind with transf excluding floor transf. Goal achieved  Short term goal 3: Amb with LRAD distances > 200' at a time indly on level surfaces. Goal achieved  Short term goal 4: Amb up and down 12 steps Indly  with use of 1 railing and SPC.  Goal achieved  Additional Goals?: No    PLAN OF CARE/SAFETY  Plan  Plan Comment: d/c home Indly w/ home health for continued progress. Safety Devices  Type of Devices: Chair alarm in place;Call light within reach; Left in chair;Nurse notified    EDUCATION  Education  Education Given To: Patient  Education Provided: Precautions; Safety; Mobility Training;Energy Conservation;Transfer Training;ADL Function  Education Provided Comments: Pt educated on diaphragmatic breathing ex  Education Method: Demonstration;Verbal  Barriers to Learning: None  Education Outcome: Verbalized understanding        Therapy Time   Individual Concurrent Group Co-treatment   Time In 0930         Time Out 1030         Minutes 60         Second Session Therapy Time:   Individual Concurrent Group Co-treatment   Time In 1130         Time Out 1200         Minutes 30           Timed Code Treatment Minutes:  60+30    Total Treatment Minutes:   80          Trever Gomez PT, 07/29/22 at 12:35 PM

## 2022-07-29 NOTE — PROGRESS NOTES
Department of Physical Medicine & Rehabilitation  Progress Note    Patient Identification:  Pat Verdugo  [id number]  : 1950  Admit date: 2022    Chief Complaint: Debility    Subjective:   Doing well. Plan for discharge tomorrow is still on track. He is doing well in therapy. Pain is under control. Discharge meds sent. DC scores today. No new complaints overnight. ROS: No f/c, n/v, cp     Objective:  Patient Vitals for the past 24 hrs:   BP Temp Temp src Pulse Resp SpO2 Height   22 0845 111/67 98.2 °F (36.8 °C) Oral 66 18 96 % --   22 2127 -- -- -- -- 18 92 % --   22 2112 121/67 97.7 °F (36.5 °C) Oral 64 16 92 % --   22 1324 -- -- -- -- -- -- 5' 10\" (1.778 m)   22 1005 -- -- -- -- -- 99 % --       Const: Alert. No distress, pleasant. HEENT: Normocephalic, atraumatic. Normal sclera/conjunctiva. MMM. CV: Regular rate and rhythm. Resp: No respiratory distress. Lungs CTAB. Abd: Soft, nontender, nondistended, NABS+   Ext: No edema. Neuro: Alert, oriented, appropriately interactive. Psych: Cooperative, appropriate mood and affect    Laboratory data: Available via EMR.    Last 24 hour lab  Recent Results (from the past 24 hour(s))   Basic Metabolic Panel w/ Reflex to MG    Collection Time: 22  6:05 AM   Result Value Ref Range    Sodium 140 136 - 145 mmol/L    Potassium reflex Magnesium 4.3 3.5 - 5.1 mmol/L    Chloride 105 99 - 110 mmol/L    CO2 27 21 - 32 mmol/L    Anion Gap 8 3 - 16    Glucose 98 70 - 99 mg/dL    BUN 26 (H) 7 - 20 mg/dL    Creatinine 0.9 0.8 - 1.3 mg/dL    GFR Non-African American >60 >60    GFR African American >60 >60    Calcium 8.8 8.3 - 10.6 mg/dL   CBC auto differential    Collection Time: 22  6:05 AM   Result Value Ref Range    WBC 5.8 4.0 - 11.0 K/uL    RBC 3.17 (L) 4.20 - 5.90 M/uL    Hemoglobin 9.7 (L) 13.5 - 17.5 g/dL    Hematocrit 30.4 (L) 40.5 - 52.5 %    MCV 96.1 80.0 - 100.0 fL    MCH 30.5 26.0 - 34.0 pg MCHC 31.8 31.0 - 36.0 g/dL    RDW 17.0 (H) 12.4 - 15.4 %    Platelets 734 249 - 027 K/uL    MPV 9.1 5.0 - 10.5 fL    Neutrophils % 57.9 %    Lymphocytes % 27.0 %    Monocytes % 9.4 %    Eosinophils % 4.8 %    Basophils % 0.9 %    Neutrophils Absolute 3.3 1.7 - 7.7 K/uL    Lymphocytes Absolute 1.6 1.0 - 5.1 K/uL    Monocytes Absolute 0.5 0.0 - 1.3 K/uL    Eosinophils Absolute 0.3 0.0 - 0.6 K/uL    Basophils Absolute 0.0 0.0 - 0.2 K/uL         Therapy progress:  PT  Position Activity Restriction  Other position/activity restrictions: up as tolerated  Objective     Sit to Stand: Contact guard assistance, Stand by assistance (VC  for hand placement)  Stand to sit: Contact guard assistance, Stand by assistance (VC for hand placement)  Bed to Chair: Contact guard assistance, Stand by assistance (stand pivot transf)  Device: Single point cane  Other Apparatus: O2 (6L)  Assistance: Contact guard assistance  Distance: 48' x2, [de-identified]' x1, short distances in the gym, 20' amb on ramp( x 10' up and 10' down for a total of 20' )  OT  PT Equipment Recommendations  Equipment Needed: No  Other: Ongoing assessment     Assessment        SLP          Body mass index is 32.65 kg/m².     Assessment and Plan:  Pain control  Tylenol scheduled  Roxicodone pain panel        HTN  Hx of HTN, home meds Losartan and Metoprolol restarted  PAD status post R femoral below knee popliteal bypass  Eliquis and Plavix started 7/19        Urinary retention  - required schulte  - monitor  - schulte out        Decreased functional mobility  - PT/OT requirements - ARU          Rehab Progress: Improving  Anticipated Dispo: home  Services/DME:   ELOS: 7/30      Kaylyn Mayo D.O. M.P.H  PM&R  7/29/2022  10:00 AM

## 2022-07-29 NOTE — CARE COORDINATION
Case Management Assessment            Discharge Note                    Date / Time of Note: 7/29/2022 3:49 PM                  Discharge Note Completed by: CAROLINE Hunt    Patient Name: Bettie Chu. YOB: 1950  Diagnosis: Debility [R53.81]   Date / Time: 7/25/2022  6:08 PM    Current PCP: Jaci Bland DO  Clinic patient: No    Hospitalization in the last 30 days: Yes    Advance Directives:  Code Status: Full Code  [place] DNR form completed and on chart: Not Indicated    Financial:  Payor: [address] [place] [address] Way / Plan: Elisa Hadley / Product Type: *No Product type* /      Pharmacy:    [id number] - [address] [phone] - F [phone]  [address] [place] [id number]  Phone: [phone] Fax: [phone]    [facility] [address] [place] [address] [phone] - F [phone]  [address]. [address] [address]  Phone: [phone] Fax: [phone]      Assistance purchasing medications?: Potential Assistance Purchasing Medications: No  Assistance provided by Case Management: None at this time    Does patient want to participate in local refill/ meds to beds program?: Yes    Meds To Beds General Rules:  1. Can ONLY be done Monday- Friday between 8:30am-5pm  2. Prescription(s) must be in pharmacy by 3pm to be filled same day  3. Copy of patient's insurance/ prescription drug card and patient face sheet must be sent along with the prescription(s)  4. Cost of Rx cannot be added to hospital bill. If financial assistance is needed, please contact unit  or ;  or  CANNOT provide pharmacy voucher for patients co-pays  5.  Patients can then  the prescription on their way out of the hospital at discharge, or pharmacy can deliver to the bedside if staff is available. (payment due at time of pick-up or delivery - cash, check, or card accepted)     Able to afford home medications/ co-pay costs: Yes    ADLS:  Current PT AM-PAC Score:   /24  Current OT AM-PAC Score:   /24      DISCHARGE Disposition: Home with [address]: [address] [place] [address]     LOC at discharge: Not Applicable  NICA Completed: Not Indicated    Notification completed in HENS/PAS?:  No    IMM Completed:   No    Transportation:  Transportation PLAN for discharge: family   Mode of Transport: Private Car  Reason for medical transport: Not Applicable  Name of [address] [place] [address],[address]: Not Applicable  Time of Transport: n/a    Transport form completed: Not Indicated    Home Care:  [address] ordered at discharge: Yes  [address]:  [address] [place] [address]  Phone: [phone]  Fax: [phone]  Orders faxed: Yes    Durable Medical Equipment:  DME Provider: [facility]  Equipment obtained during hospitalization: tub transfer bench    Home Oxygen and Respiratory Equipment:  Oxygen needed at discharge?: Yes  [facility]: [facility]  Phone: [phone]   Portable tank available for discharge?: Yes - Bailey with [facility] to deliver to room    Dialysis:  Dialysis patient: No    Dialysis Center:  Not Applicable    Referrals made at [facility] for outpatient continued care:  Not Applicable    Additional CM Notes: Patient is from home alone. Patient is discharging home and his son will stay with him and provide transportation. Patient will have home health care through [facility]. Patient already active with [facility] for home oxygen needs. Bailey to deliver a tub transfer chair and portable oxygen tank to the patient's room for discharge home. Patient reports no other CM needs at discharge. Patient reports his son will provide transportation home at discharge.     The Plan for Transition of Care is related to the following treatment goals of Debility [R53.81]    The Patient and/or patient representative Khoi hardin his family were provided with a choice of provider and agrees with the discharge plan Yes    Freedom of choice list was provided with basic dialogue that supports the patient's individualized plan of care/goals and shares the quality data associated with the providers.  Yes    Care Transitions patient: No    CAROLINE Mckeon  [facility]  Case Management Department  Ph: [phone]  Fax: [phone]

## 2022-07-29 NOTE — PROGRESS NOTES
Occupational Therapy  Facility/Department: [facility] UNIT  Rehabilitation Occupational Therapy Daily Treatment Note/Discharge Summary     Date: 22  Patient Name: Serena Sandhoff. Room: 3109/3109-01  MRN: [id number]  Account: [de-identified]   : 1950  (71 y.o.) Gender: male                    Past Medical History:  has a past medical history of CAD (coronary artery disease), CHF (congestive heart failure) ([address] COPD (chronic obstructive pulmonary disease) ([address] Critical ischemia of lower extremity ([facility] [address] Depressive disorder, not elsewhere classified, GERD (gastroesophageal reflux disease), History of colon polyps - 30 seen on colonoscopy 2021, History of MI (myocardial infarction), History of skin ulcer of lower extremity, History of transient ischemic attack (TIA), Hx of blood clots, Hyperlipidemia, Hypertension, Neuropathy, KAVITA (obstructive sleep apnea), Personal history of DVT (deep vein thrombosis), Prolonged emergence from general anesthesia, PVD (peripheral vascular disease) ([facility] [address] TIA (transient ischemic attack), and Vertebral fracture. Past Surgical History:   has a past surgical history that includes Appendectomy; Coronary angioplasty with stent (2013); other surgical history (Right, 2013); Leg Debridement (Right, 2013); skin split graft (Right, 2013); other surgical history (Left, 2013); laminectomy (2015); transluminal angioplasty (Left, 2015); Total hip arthroplasty (Right, 2016); Cholecystectomy, laparoscopic; eye surgery (Left); Colonoscopy (2021); Colonoscopy (2021); Colonoscopy (2021); Femoral Endarterectomy (Right, 2021); and femoral bypass (Right, 2022). Restrictions  Other position/activity restrictions: up as tolerated    Subjective  Subjective: Pt was semi supine in bed upon arrival. Pt was pleasant and agreeable to ADL w/ OT. Pt reported being excited to go home tomorrow.  Pt on 4L O2 Objective     Cognition  Overall Cognitive Status: WFL         ADL    Feeding  Assistance Level: Independent  Skilled Clinical Factors: Breakfast tray delivered  Upper Extremity Bathing  Assistance Level: Modified independent  Skilled Clinical Factors: Pt washed and dried 4/4 components of UE bathing seated on TTB  Lower Extremity Bathing  Assistance Level: Supervision  Skilled Clinical Factors: Pt washed and dried 6/6 components of LE bathing seated on TTB. Pt weight shifted L and R to wash buttocks. Min VCs needed for safety and to prevent standing in the shower while pt had the plastic bag covering his dressing on his RLE. Upper Extremity Dressing  Assistance Level: Independent  Skilled Clinical Factors: Pt donned tshirt I'ly  Lower Extremity Dressing  Assistance Level: Supervision  Skilled Clinical Factors: Pt threaded BLEs into underwear and pants seated on TTB and in stance w/ spvn to manage clothes over hips. Putting On/Taking Off Footwear  Assistance Level: Independent  Skilled Clinical Factors: Pt donned L tenso shape and shoes seated on TTB. Toileting  Skilled Clinical Factors: Pt denied need  Toilet Transfers  Technique:  (ambulating)  Equipment: Raised toilet seat with arms  Assistance Level: Modified independent  Skilled Clinical Factors: Pt ambulated w/ SPC to/from RTS MOD I  Tub/Shower Transfers  Type: Shower  Transfer From:  (cane)  Transfer To: Tub transfer bench  Assistance Level: Supervision  Skilled Clinical Factors: Pt ambulated to the TTB w/ use of SPC and completed entry/exit w/ spvn and use of GB. Pt also completed dry tub transfer MOD I w/ use of TTB. Pt reported he has a TTB at home he can use          Functional Mobility  Device: Cane  Activity: To/From therapy gym; To/From bathroom  Assistance Level: Supervision  Skilled Clinical Factors: Pt ambulated w/ SPC to/from bathroom and to/from the gym w/ spvn, progressing to MOD I.  Slow pace noted due to RLE pain  Supine to Sit  Assistance Level: Modified independent  Sit to Stand  Assistance Level: Modified independent  Stand to Sit  Assistance Level: Modified independent         Addendum Second Therapy Session   Pt (4L O2) seated in chair upon entry, pleasant and reports pain \"comes and goes. \" Pt agreeable to therapy session. Pt sit to stand Mod I and ambulated to bathroom sink ~18 ft Mod I with SPC. Pt in stance at sink < 3 min Mod I with oral care. Pt then ambulated several ft to toilet. Pt toilet transfer and toilet Mod I. Pt back at sink in stance (Mod I) to wash hands. Pt ambulated back to chair with SPC Mod I. Pt stand to sit Mod I. Pt with seated rest break. Pt actively participated in dynamic IADL activity to ambulating to sink counter and folding laundry in stance and ambulating back to chair (7 min 3 sec) completed at Mod I. At EOS call light in reach and chair alarm on. Assessment    Assessment  Assessment: Pt has demonstrated good progress in OT since admission and has met 3.5/5 OT goals. Pt is now MOD I for toileting, toilet transfers, tub transfer, and UE bathing and dressing tasks. Pt continues to require spvn for safety during LE bathing and LE dressing and pt requires occasional cues for safety regarding management of his O2 tubing. Pt reported that he has a TTB, RTS, and GBs at home and he does not require any other equipment for home. Pt's son will be available to assist as needed and OT recommends [facility] OT at d/c . Activity Tolerance: Patient tolerated treatment well  Discharge Recommendations: Home with Home health OT; Home with assist PRN  OT Equipment Recommendations  Equipment Needed: No  Other: Pt has a toilet raiser, TTB, GBs in the shower, reacher, sock aid, and shower chair. Safety Devices  Safety Devices in place: Yes  Type of devices: Call light within reach; Left in chair;Chair alarm in place;Nurse notified    Patient Education  Education  Education Given To: Patient  Education Provided: Safety;ADL Function;IADL Function;Equipment; Mobility Training;Transfer Training  Education Provided Comments: d/c plans and equipment recommendations. Pt reported he actually has a TTB at home that he can use for the tub. OT also educated pt about importance of wearing his O2 at home  Education Method: Demonstration;Verbal  Barriers to Learning: None  Education Outcome: Verbalized understanding    Plan  Plan  Times per Week: 5x a week for 90 mins daily  Times per Day: Daily  Current Treatment Recommendations: Strengthening;Equipment evaluation, education, & procurement; Functional mobility training;Balance training; Endurance training; Safety education & training;Patient/Caregiver education & training;Stair training;Self-Care / ADL;Gait training;Home management training    Goals  Patient Goals   Patient goals : \"do what I did before\"  Short Term Goals  Time Frame for Short term goals: 10 days  Short Term Goal 1: Pt will complete toileting and toilet transfers MOD I-goal met 7/29  Short Term Goal 2: Pt will complete tub transfer MOD I w/ use of shower chair or TTB pending safest method-goal met 7/29  Short Term Goal 3: Pt will complete LE dressing MOD I w/ use of AE prn-not met  Short Term Goal 4: Pt will complete light housekeeping task in stance for 7 mins MOD I-goal met for time 7/28, goal met 7/29  Short Term Goal 5: Pt will complete bathing tasks MOD I-met for UE bathing.  not met for LE bathing    AM-PAC Score               Therapy Time   Individual Concurrent Group Co-treatment   Time In 0730         Time Out 0830         Minutes 60             Xena Miller    Second Session Therapy Time:   Individual Concurrent Group Co-treatment   Time In 1354         Time Out 1424         Minutes 30         Timed Code Treatment Minutes:  30    Total Treatment Minutes:   60 + 30 = 90 minutes    CORAZON Samson/L [id number]  (Treatment and documentation for 2nd therapy session)

## 2022-07-29 NOTE — PROGRESS NOTES
Patient assessment completed. VSS and patient is afebrile. Medicated for pain and he is satisfied with current pain medication.

## 2022-07-29 NOTE — PROGRESS NOTES
07/29/22 1147   Resting (Room Air)   SpO2 91   HR 63   Resting (On O2)   SpO2 95   HR 58   O2 Device Nasal cannula   O2 Flow Rate (l/min) 4 l/min   During Walk (Room Air)   SpO2 78   HR 85   Walk/Assistance Device Ambulation   Rate of Dyspnea 1   Symptoms Dizziness; Shortness of breath   During Walk (On O2)   SpO2 88   HR 83   O2 Device Nasal cannula   O2 Flow Rate (l/min) 4 l/min   O2 Flow Rate (l/min) 6 l/min   O2 Saturation 97   Walk/Assistance Device Ambulation   Rate of Dyspnea 1   After Walk   Does the Patient Qualify for Home O2 Yes   Liter Flow on Exertion 6

## 2022-07-30 VITALS
HEART RATE: 65 BPM | WEIGHT: 227.51 LBS | SYSTOLIC BLOOD PRESSURE: 115 MMHG | OXYGEN SATURATION: 95 % | TEMPERATURE: 98.1 F | BODY MASS INDEX: 32.57 KG/M2 | RESPIRATION RATE: 18 BRPM | DIASTOLIC BLOOD PRESSURE: 63 MMHG | HEIGHT: 70 IN

## 2022-07-30 PROCEDURE — 94761 N-INVAS EAR/PLS OXIMETRY MLT: CPT

## 2022-07-30 PROCEDURE — 6360000002 HC RX W HCPCS: Performed by: PHYSICAL MEDICINE & REHABILITATION

## 2022-07-30 PROCEDURE — 99239 HOSP IP/OBS DSCHRG MGMT >30: CPT | Performed by: PHYSICAL MEDICINE & REHABILITATION

## 2022-07-30 PROCEDURE — 2700000000 HC OXYGEN THERAPY PER DAY

## 2022-07-30 PROCEDURE — 94640 AIRWAY INHALATION TREATMENT: CPT

## 2022-07-30 PROCEDURE — 6370000000 HC RX 637 (ALT 250 FOR IP): Performed by: PHYSICAL MEDICINE & REHABILITATION

## 2022-07-30 RX ADMIN — CLOPIDOGREL BISULFATE 75 MG: 75 TABLET ORAL at 09:01

## 2022-07-30 RX ADMIN — DULOXETINE HYDROCHLORIDE 20 MG: 20 CAPSULE, DELAYED RELEASE ORAL at 09:01

## 2022-07-30 RX ADMIN — BISACODYL 5 MG: 5 TABLET, COATED ORAL at 09:01

## 2022-07-30 RX ADMIN — OXYCODONE 10 MG: 5 TABLET ORAL at 06:43

## 2022-07-30 RX ADMIN — ARFORMOTEROL TARTRATE 15 MCG: 15 SOLUTION RESPIRATORY (INHALATION) at 07:54

## 2022-07-30 RX ADMIN — ACETAMINOPHEN 1000 MG: 500 TABLET ORAL at 09:02

## 2022-07-30 RX ADMIN — APIXABAN 5 MG: 5 TABLET, FILM COATED ORAL at 09:01

## 2022-07-30 RX ADMIN — BUDESONIDE 500 MCG: 0.5 SUSPENSION RESPIRATORY (INHALATION) at 07:58

## 2022-07-30 RX ADMIN — PANTOPRAZOLE SODIUM 40 MG: 40 TABLET, DELAYED RELEASE ORAL at 06:40

## 2022-07-30 RX ADMIN — DOCUSATE SODIUM 100 MG: 100 CAPSULE, LIQUID FILLED ORAL at 09:02

## 2022-07-30 RX ADMIN — POLYETHYLENE GLYCOL (3350) 17 G: 17 POWDER, FOR SOLUTION ORAL at 09:06

## 2022-07-30 RX ADMIN — ALLOPURINOL 100 MG: 100 TABLET ORAL at 09:01

## 2022-07-30 RX ADMIN — LOSARTAN POTASSIUM 100 MG: 100 TABLET, FILM COATED ORAL at 09:01

## 2022-07-30 RX ADMIN — PREGABALIN 150 MG: 75 CAPSULE ORAL at 09:01

## 2022-07-30 RX ADMIN — METOPROLOL TARTRATE 100 MG: 100 TABLET, FILM COATED ORAL at 09:01

## 2022-07-30 ASSESSMENT — PAIN SCALES - GENERAL
PAINLEVEL_OUTOF10: 8
PAINLEVEL_OUTOF10: 8
PAINLEVEL_OUTOF10: 0

## 2022-07-30 ASSESSMENT — PAIN DESCRIPTION - LOCATION
LOCATION: LEG

## 2022-07-30 ASSESSMENT — PAIN DESCRIPTION - ORIENTATION
ORIENTATION: RIGHT

## 2022-07-30 ASSESSMENT — PAIN DESCRIPTION - ONSET
ONSET: ON-GOING
ONSET: ON-GOING

## 2022-07-30 ASSESSMENT — PAIN DESCRIPTION - DESCRIPTORS
DESCRIPTORS: BURNING

## 2022-07-30 ASSESSMENT — PAIN DESCRIPTION - FREQUENCY
FREQUENCY: CONTINUOUS
FREQUENCY: CONTINUOUS

## 2022-07-30 ASSESSMENT — PAIN DESCRIPTION - PAIN TYPE
TYPE: SURGICAL PAIN
TYPE: SURGICAL PAIN

## 2022-07-30 NOTE — PROGRESS NOTES
Assessment completed, medications given. Fall precautions remain in place, call light and bedside table are within reach. Patient is scheduled for discharge today. Expressing that he will leave sometime after lunch.

## 2022-07-30 NOTE — PROGRESS NOTES
Pt in bed, awake watching television. Alert & oriented x 4. Complains of surgical pain to RLE. Assessment completed. Nighttime medications given which included Tylenol for pain. Pt tolerated medications well. Pt refused the Colace. Metoprolol not given due to order parameters no met. Reminded pt to call for assistance with any needs. Call light within reach. Safety measures in place.

## 2022-07-30 NOTE — PLAN OF CARE
Problem: Discharge Planning  Goal: Discharge to home or other facility with appropriate resources  Outcome: Adequate for Discharge     Problem: Safety - Adult  Goal: Free from fall injury  Outcome: Adequate for Discharge  Flowsheets (Taken 7/29/2022 2017)  Free From Fall Injury: Instruct family/caregiver on patient safety     Problem: Pain  Goal: Verbalizes/displays adequate comfort level or baseline comfort level  Outcome: Progressing     Problem: ABCDS Injury Assessment  Goal: Absence of physical injury  Outcome: Adequate for Discharge  Flowsheets (Taken 7/29/2022 2017)  Absence of Physical Injury: Implement safety measures based on patient assessment

## 2022-07-30 NOTE — PROGRESS NOTES
Discharge directions given to patient, he verbalizes understanding. Reviewed medications, uses and potential adverse reactions. Patient was able to teach back uses. Answered questions to patient satisfaction. Room number given to patient for any future concerns or questions regarding discharge or hospitalization. Escorted to car with belongings. Per w/c and PCA.

## 2022-07-30 NOTE — DISCHARGE SUMMARY
6\"  Rails: Right ascending (plus SPC)  Device: Single pt cane  Assistance: Contact guard assistance  Comment: Amb in a noonreciprocal pattern  Mobility:  , PT Equipment Recommendations  Equipment Needed: No  Other: Pt has a SPC, Assessment: Pt is a 82 yo admitted 7/18/22 for R fem-pop bypass. Pt presents with decreased functional mobility, decreased balance with walking and decreased endurance. Pt lives alone so Ind is the overall goal. Pt is well motivated. Pt is below baseline and would benefit from continued therapy to maximize potential and increase functional mobility towards Ind to allow for a safer d/c to home    Occupational therapy:  ,  , Assessment: Pt is a 82 yo male who presents to [facility] 2/2 popliteal bypass. Prior to admission pt reported he was independent w/ all ADLs and functional mobility and he was doing most IADLs. Pt is currently limited by decreased activity tolerance, pain, and impaired balance. Pt is functioning below his baseline and benefits from OT in order to maximize functional independence. Speech therapy:         Significant Diagnostics:   Lab Results   Component Value Date    CREATININE 0.9 07/29/2022    BUN 26 (H) 07/29/2022     07/29/2022    K 4.3 07/29/2022     07/29/2022    CO2 27 07/29/2022       Lab Results   Component Value Date    WBC 5.8 07/29/2022    HGB 9.7 (L) 07/29/2022    HCT 30.4 (L) 07/29/2022    MCV 96.1 07/29/2022     07/29/2022       Disposition:  home    Services:PT/OT  DME: none    Discharge Condition: Stable    Follow-up:  See after visit summary from hospitalization    Discharge Medications:     Medication List        START taking these medications      docusate 100 MG Caps  Commonly known as: COLACE, DULCOLAX  Take 100 mg by mouth in the morning and 100 mg before bedtime.      polyethylene glycol 17 g packet  Commonly known as: GLYCOLAX  Take 17 g by mouth daily as needed for Constipation     traZODone 50 MG tablet  Commonly known as: DESYREL  Take 1 tablet by mouth nightly as needed for Sleep            CHANGE how you take these medications      apixaban 5 MG Tabs tablet  Commonly known as: Eliquis  Take 1 tablet by mouth in the morning and 1 tablet before bedtime. What changed: See the new instructions. DULoxetine 20 MG extended release capsule  Commonly known as: CYMBALTA  Take 1 capsule by mouth in the morning. What changed:   medication strength  See the new instructions. losartan 100 MG tablet  Commonly known as: COZAAR  Take 1 tablet by mouth in the morning. What changed:   how much to take  how to take this  when to take this  additional instructions     oxyCODONE 5 MG immediate release tablet  Commonly known as: ROXICODONE  Take 1 tablet by mouth every 4 hours as needed for Pain for up to 3 days. What changed: when to take this            CONTINUE taking these medications      albuterol sulfate  (90 Base) MCG/ACT inhaler  Commonly known as: PROVENTIL;VENTOLIN;PROAIR  Inhale 2 puffs into the lungs every 6 hours as needed for Wheezing     allopurinol 100 MG tablet  Commonly known as: ZYLOPRIM  Take 1 tablet by mouth daily     atorvastatin 80 MG tablet  Commonly known as: LIPITOR  Take 1 tablet by mouth daily     clopidogrel 75 MG tablet  Commonly known as: PLAVIX  Take 1 tablet by mouth daily     metoprolol 100 MG tablet  Commonly known as: LOPRESSOR  Take 1 tablet by mouth in the morning and 1 tablet before bedtime. omeprazole 40 MG delayed release capsule  Commonly known as: PRILOSEC  TAKE 1 CAPSULE BY MOUTH DAILY     pregabalin 150 MG capsule  Commonly known as: LYRICA  Take 1 capsule by mouth 2 times daily for 30 days.             STOP taking these medications      Evolocumab 140 MG/ML Soaj     folic acid 1 MG tablet  Commonly known as: FOLVITE     furosemide 40 MG tablet  Commonly known as: Lasix     magnesium gluconate 500 MG tablet  Commonly known as: MAGONATE     nitroGLYCERIN 0.4 MG SL tablet  Commonly known as: NITROSTAT     potassium chloride 10 MEQ extended release capsule  Commonly known as: MICRO-K     Trelegy Ellipta 100-62.5-25 MCG/INH Aepb  Generic drug: fluticasone-umeclidin-vilant               Where to Get Your Medications        These medications were sent to [address] [place] [address], [address] [place] [address] -  [phone]  [address] [place] [id number]      Phone: [phone]   apixaban 5 MG Tabs tablet  docusate 100 MG Caps  DULoxetine 20 MG extended release capsule  losartan 100 MG tablet  metoprolol 100 MG tablet  oxyCODONE 5 MG immediate release tablet  polyethylene glycol 17 g packet  traZODone 50 MG tablet           I spent over 35 minutes on this discharge encounter between counseling, coordination of care, and medication reconciliation. To comply with [facility] usman R.II.4.1:   Discharge order placed in advance to facilitate patients discharge needs.       Mark Dudley, DO

## 2022-08-01 ENCOUNTER — TELEPHONE (OUTPATIENT)
Dept: CARDIOLOGY CLINIC | Age: 72
End: 2022-08-01

## 2022-08-01 NOTE — TELEPHONE ENCOUNTER
Pt would like appt to see Damir Oseguera or Dr. Ari Boone before or after his Dr. Palomino Hand appt 8/2/22. He said hospital told him to follow up with cardiology soon. He would like call back about this.

## 2022-08-01 NOTE — TELEPHONE ENCOUNTER
Called and left patient a voicemail letting him know that he could be scheduled with Simon Do on August 2,2022.

## 2022-08-02 ENCOUNTER — OFFICE VISIT (OUTPATIENT)
Dept: CARDIOLOGY CLINIC | Age: 72
End: 2022-08-02
Payer: MEDICARE

## 2022-08-02 ENCOUNTER — OFFICE VISIT (OUTPATIENT)
Dept: PRIMARY CARE CLINIC | Age: 72
End: 2022-08-02
Payer: MEDICARE

## 2022-08-02 VITALS
SYSTOLIC BLOOD PRESSURE: 92 MMHG | HEIGHT: 70 IN | HEART RATE: 59 BPM | BODY MASS INDEX: 32.21 KG/M2 | WEIGHT: 225 LBS | DIASTOLIC BLOOD PRESSURE: 58 MMHG

## 2022-08-02 VITALS
TEMPERATURE: 96.8 F | HEART RATE: 59 BPM | DIASTOLIC BLOOD PRESSURE: 57 MMHG | BODY MASS INDEX: 32.4 KG/M2 | WEIGHT: 225.8 LBS | SYSTOLIC BLOOD PRESSURE: 113 MMHG

## 2022-08-02 DIAGNOSIS — Z09 HOSPITAL DISCHARGE FOLLOW-UP: Primary | ICD-10-CM

## 2022-08-02 DIAGNOSIS — I50.32 CHRONIC DIASTOLIC HEART FAILURE (HCC): ICD-10-CM

## 2022-08-02 DIAGNOSIS — R06.02 SOB (SHORTNESS OF BREATH): Primary | ICD-10-CM

## 2022-08-02 DIAGNOSIS — I73.9 PVD (PERIPHERAL VASCULAR DISEASE) (HCC): ICD-10-CM

## 2022-08-02 DIAGNOSIS — S81.801A OPEN WOUND OF RIGHT LOWER LEG, INITIAL ENCOUNTER: ICD-10-CM

## 2022-08-02 DIAGNOSIS — I70.201 POPLITEAL ARTERY STENOSIS, RIGHT (HCC): ICD-10-CM

## 2022-08-02 DIAGNOSIS — J44.9 OSA AND COPD OVERLAP SYNDROME (HCC): ICD-10-CM

## 2022-08-02 DIAGNOSIS — G47.33 OSA AND COPD OVERLAP SYNDROME (HCC): ICD-10-CM

## 2022-08-02 DIAGNOSIS — R06.02 SOB (SHORTNESS OF BREATH): ICD-10-CM

## 2022-08-02 PROBLEM — S91.104A OPEN WOUND OF FIFTH TOE OF RIGHT FOOT: Status: RESOLVED | Noted: 2022-05-12 | Resolved: 2022-08-02

## 2022-08-02 PROBLEM — F17.210 CIGARETTE NICOTINE DEPENDENCE WITHOUT COMPLICATION: Status: RESOLVED | Noted: 2021-08-25 | Resolved: 2022-08-02

## 2022-08-02 LAB
A/G RATIO: 1.1 (ref 1.1–2.2)
ALBUMIN SERPL-MCNC: 4.1 G/DL (ref 3.4–5)
ALP BLD-CCNC: 97 U/L (ref 40–129)
ALT SERPL-CCNC: 23 U/L (ref 10–40)
ANION GAP SERPL CALCULATED.3IONS-SCNC: 16 MMOL/L (ref 3–16)
AST SERPL-CCNC: 42 U/L (ref 15–37)
BILIRUB SERPL-MCNC: 0.4 MG/DL (ref 0–1)
BILIRUBIN DIRECT: <0.2 MG/DL (ref 0–0.3)
BILIRUBIN, INDIRECT: ABNORMAL MG/DL (ref 0–1)
BUN BLDV-MCNC: 38 MG/DL (ref 7–20)
CALCIUM SERPL-MCNC: 10 MG/DL (ref 8.3–10.6)
CHLORIDE BLD-SCNC: 101 MMOL/L (ref 99–110)
CHOLESTEROL, TOTAL: 128 MG/DL (ref 0–199)
CO2: 21 MMOL/L (ref 21–32)
CREAT SERPL-MCNC: 1.5 MG/DL (ref 0.8–1.3)
FOLATE: 8.61 NG/ML (ref 4.78–24.2)
GFR AFRICAN AMERICAN: 56
GFR NON-AFRICAN AMERICAN: 46
GLUCOSE BLD-MCNC: 120 MG/DL (ref 70–99)
HCT VFR BLD CALC: 34.5 % (ref 40.5–52.5)
HDLC SERPL-MCNC: 20 MG/DL (ref 40–60)
HEMOGLOBIN: 10.9 G/DL (ref 13.5–17.5)
LDL CHOLESTEROL CALCULATED: 49 MG/DL
MAGNESIUM: 2 MG/DL (ref 1.8–2.4)
MCH RBC QN AUTO: 30.2 PG (ref 26–34)
MCHC RBC AUTO-ENTMCNC: 31.6 G/DL (ref 31–36)
MCV RBC AUTO: 95.7 FL (ref 80–100)
PDW BLD-RTO: 17.1 % (ref 12.4–15.4)
PLATELET # BLD: 218 K/UL (ref 135–450)
PMV BLD AUTO: 9.5 FL (ref 5–10.5)
POTASSIUM SERPL-SCNC: 5.1 MMOL/L (ref 3.5–5.1)
PRO-BNP: 344 PG/ML (ref 0–124)
RBC # BLD: 3.61 M/UL (ref 4.2–5.9)
SODIUM BLD-SCNC: 138 MMOL/L (ref 136–145)
TOTAL PROTEIN: 7.8 G/DL (ref 6.4–8.2)
TRIGL SERPL-MCNC: 294 MG/DL (ref 0–150)
TSH REFLEX FT4: 4.38 UIU/ML (ref 0.27–4.2)
VITAMIN B-12: 1624 PG/ML (ref 211–911)
VITAMIN D 25-HYDROXY: 52.5 NG/ML
VLDLC SERPL CALC-MCNC: 59 MG/DL
WBC # BLD: 11.6 K/UL (ref 4–11)

## 2022-08-02 PROCEDURE — 1123F ACP DISCUSS/DSCN MKR DOCD: CPT | Performed by: NURSE PRACTITIONER

## 2022-08-02 PROCEDURE — 1111F DSCHRG MED/CURRENT MED MERGE: CPT | Performed by: STUDENT IN AN ORGANIZED HEALTH CARE EDUCATION/TRAINING PROGRAM

## 2022-08-02 PROCEDURE — 1123F ACP DISCUSS/DSCN MKR DOCD: CPT | Performed by: STUDENT IN AN ORGANIZED HEALTH CARE EDUCATION/TRAINING PROGRAM

## 2022-08-02 PROCEDURE — 99214 OFFICE O/P EST MOD 30 MIN: CPT | Performed by: STUDENT IN AN ORGANIZED HEALTH CARE EDUCATION/TRAINING PROGRAM

## 2022-08-02 PROCEDURE — 99214 OFFICE O/P EST MOD 30 MIN: CPT | Performed by: NURSE PRACTITIONER

## 2022-08-02 RX ORDER — FLUTICASONE FUROATE, UMECLIDINIUM BROMIDE AND VILANTEROL TRIFENATATE 100; 62.5; 25 UG/1; UG/1; UG/1
1 POWDER RESPIRATORY (INHALATION) DAILY
Qty: 28 EACH | Refills: 11 | Status: SHIPPED | OUTPATIENT
Start: 2022-08-02

## 2022-08-02 RX ORDER — POTASSIUM CHLORIDE 750 MG/1
CAPSULE, EXTENDED RELEASE ORAL
Qty: 60 CAPSULE | Refills: 1 | Status: SHIPPED | OUTPATIENT
Start: 2022-08-02

## 2022-08-02 RX ORDER — METOPROLOL TARTRATE 50 MG/1
75 TABLET, FILM COATED ORAL 2 TIMES DAILY
Qty: 90 TABLET | Refills: 5 | Status: SHIPPED | OUTPATIENT
Start: 2022-08-02

## 2022-08-02 RX ORDER — FUROSEMIDE 40 MG/1
40 TABLET ORAL DAILY
Qty: 90 TABLET | Refills: 3 | Status: SHIPPED | OUTPATIENT
Start: 2022-08-02

## 2022-08-02 ASSESSMENT — ENCOUNTER SYMPTOMS
CHEST TIGHTNESS: 0
SHORTNESS OF BREATH: 0
COUGH: 0

## 2022-08-02 NOTE — PROGRESS NOTES
[address]     Outpatient Follow Up Note  Dr Tank Burton MD,  Will Lombardo RN, APRN,CNP CVNP      CHIEF COMPLAINT / HPI:  Follow Up   Starla Jones. is 81 y.o. male who presents today with a history of   HTN KAVITA COPD HLD tobacco use hx PE CAD stent  dHF PVD and recent surgery right leg  Per lipidemia LDL 82 on 12/21  DVT and pulmonary emboli September 2021 still on Eliquis therapy along with Plavix 75 mg. CAD with coronary stents June 2013  Last cardiac cath December 2020. Stable coronary anatomy. Previous stent stent was patent. No intervention was necessary. Obstructive sleep apnea on CPAP not tolerated the CPAP another BiPAP and currently does not use  PVD and status post right leg stenting and bypass September 2021 by Dr. Eugenia Guerra  Pulmonary nodules followed by Dr. Joanne Dias             Interval history:  b/p soft hr 50s   Decrease metoprolol to 75mg BID from 100mg BID   Right leg staples dry an intact   <+1 edema / lasix 40 mg daily       Limited TTE 11/2021   Left ventricular cavity size is normal. There is mild concentric left ventricular hypertrophy. Overall left ventricular systolic  function appears normal with an ejection fraction of 55-60%. No regional wall motion abnormalities are noted. I spent a total of 35 minutes and greater than 50% of the time was spent counseling with patient  coordinating care regarding her diagnosis, reviewing labs, cardiac work up, treatments and plan of care.     Past Medical History:   Diagnosis Date    CAD (coronary artery disease)     CHF (congestive heart failure) (HCC)     diastolic    COPD (chronic obstructive pulmonary disease) (HCC)     Critical ischemia of lower extremity (HCC)     Depressive disorder, not elsewhere classified     GERD (gastroesophageal reflux disease)     History of colon polyps - 30 seen on colonoscopy 04/2021 04/24/2021    History of MI (myocardial infarction) 05/2013    History of skin ulcer of lower extremity     R anterior shin    History of transient ischemic attack (TIA)     Hx of blood clots     PE    Hyperlipidemia     Hypertension     Neuropathy     KAVITA (obstructive sleep apnea)     On CPAP    Personal history of DVT (deep vein thrombosis)     Prolonged emergence from general anesthesia     Pt reported being combative after surgery     PVD (peripheral vascular disease) ([facility] [address]     TIA (transient ischemic attack) 2013    Vertebral fracture      Social History:    Social History     Tobacco Use   Smoking Status Former    Packs/day: 0.25    Years: 52.00    Pack years: 13.00    Types: Cigarettes    Start date: 1967    Quit date: 2022    Years since quittin.1   Smokeless Tobacco Never   Tobacco Comments    reports he quit circa 6.8.22     Current Medications:  Current Outpatient Medications   Medication Sig Dispense Refill    fluticasone-umeclidin-vilant (TRELEGY ELLIPTA) 100-62.5-25 MCG/INH AEPB Inhale 1 puff into the lungs in the morning. 28 each 11    potassium chloride (MICRO-K) 10 MEQ extended release capsule 1 tablet 60 capsule 1    furosemide (LASIX) 40 MG tablet Take 1 tablet by mouth in the morning. 90 tablet 3    apixaban (ELIQUIS) 5 MG TABS tablet Take 1 tablet by mouth in the morning and 1 tablet before bedtime. 60 tablet 1    DULoxetine (CYMBALTA) 20 MG extended release capsule Take 1 capsule by mouth in the morning. 30 capsule 3    traZODone (DESYREL) 50 MG tablet Take 1 tablet by mouth nightly as needed for Sleep 60 tablet 1    losartan (COZAAR) 100 MG tablet Take 1 tablet by mouth in the morning. 30 tablet 3    metoprolol (LOPRESSOR) 100 MG tablet Take 1 tablet by mouth in the morning and 1 tablet before bedtime. 60 tablet 3    docusate sodium (COLACE, DULCOLAX) 100 MG CAPS Take 100 mg by mouth in the morning and 100 mg before bedtime.  60 capsule 1    polyethylene glycol (GLYCOLAX) 17 g packet Take 17 g by mouth daily as needed for Constipation 527 g 1    albuterol sulfate HFA

## 2022-08-02 NOTE — PROGRESS NOTES
Post-Discharge Transitional Care  Follow Up      Bettie Chu. YOB: 1950    Date of Office Visit:  8/2/2022  Date of Hospital Admission: 7/25/22  Date of Hospital Discharge: 7/30/22  Risk of hospital readmission (high >=14%. Medium >=10%) :Readmission Risk Score: 23.4      Care management risk score Rising risk (score 2-5) and Complex Care (Scores >=6): No Risk Score On File     Non face to face  following discharge, date last encounter closed (first attempt may have been earlier): *No documented post hospital discharge outreach found in the last 14 days    Call initiated 2 business days of discharge: *No response recorded in the last 14 days    ASSESSMENT/PLAN:   Hospital discharge follow-up: Status post femoropopliteal bypass. Wound looks good without signs of infection and healing well. Has an upcoming appointment with vascular surgery on Tuesday of next week. -     ME DISCHARGE MEDS RECONCILED W/ CURRENT OUTPATIENT MED LIST    PVD (peripheral vascular disease) ([facility]): Continue high-dose statin, Repatha and Eliquis. Popliteal artery stenosis, right [facility]): Status post femoropopliteal bypass. Open wound of right lower leg, initial encounter: Following with Dr. Pablo Lara and wound care. Cleanly dressed today. Chronic diastolic heart failure ([facility]): Lasix and potassium was stopped at discharge from rehab. Unsure why. Historically, patient has become exacerbated quickly when off these medications. He has continued with them, which I would agree with. -     potassium chloride (MICRO-K) 10 MEQ extended release capsule; 1 tablet, Disp-60 capsule, R-1Normal  -     furosemide (LASIX) 40 MG tablet; Take 1 tablet by mouth in the morning., Disp-90 tablet, R-3Normal    Medical Decision Making: moderate complexity  Return in 8 weeks (on 9/27/2022) for leg wound, heart failure.     On this date 8/2/2022 I have spent > 30 minutes reviewing previous notes, test results and face to face with the patient discussing the diagnosis and importance of compliance with the treatment plan as well as documenting on the day of the visit. Subjective:   HPI:  Follow up of Hospital problems/diagnosis(es): Open wound of right lower leg, peripheral vascular disease, popliteal artery stenosis of the right    Inpatient course: Discharge summary reviewed- see chart. Interval history/Current status: Patient with chronic ulceration of the right lower extremity, which is secondary to peripheral vascular disease and resulting arterial insufficiency. Admitted to the hospital for femoropopliteal bypass. Procedure completed successfully. Patient tolerated well. Chronically disabled and discharged to rehab facility. Completed physical therapy. Wound healing well without signs of infection. Patient has noted some bleeding in the ulceration, which is a new development since the from past bypass. He had never noted bleeding before. It had always drained a serous fluid.     Patient Active Problem List   Diagnosis    Essential hypertension    Hyperlipemia    Degeneration of intervertebral disc of lumbar region    KAVITA and COPD overlap syndrome (HCC)    PVD (peripheral vascular disease) ([address]    Coronary artery disease involving native coronary artery of native heart without angina pectoris    Idiopathic peripheral neuropathy    Gastroesophageal reflux disease    Other spondylosis, lumbosacral region    Idiopathic chronic gout without tophus    Pulmonary embolism without acute cor pulmonale (HCC)    Diastolic dysfunction with chronic heart failure ([address]    Long term (current) use of anticoagulants    Presence of coronary angioplasty implant and graft    Atherosclerosis of native artery of right lower extremity with rest pain (HCC)    Popliteal artery stenosis, left ([address]    Non-pressure ulcer of right lower extremity with fat layer exposed ([address]    Open wound of right lower leg    Debility       Medications listed as capsule  Commonly known as: LYRICA  Take 1 capsule by mouth 2 times daily for 30 days. traZODone 50 MG tablet  Commonly known as: DESYREL  Take 1 tablet by mouth nightly as needed for Sleep               Where to Get Your Medications        These medications were sent to [address] [place] [address], [phone] [facility]      Phone: [phone]   furosemide 40 MG tablet  potassium chloride 10 MEQ extended release capsule  Trelegy Ellipta 100-62.5-25 MCG/INH Aepb           Medications marked \"taking\" at this time  Outpatient Medications Marked as Taking for the 8/2/22 encounter (Office Visit) with Lidia Pettit, DO   Medication Sig Dispense Refill    fluticasone-umeclidin-vilant (TRELEGY ELLIPTA) 100-62.5-25 MCG/INH AEPB Inhale 1 puff into the lungs in the morning. 28 each 11    potassium chloride (MICRO-K) 10 MEQ extended release capsule 1 tablet 60 capsule 1    furosemide (LASIX) 40 MG tablet Take 1 tablet by mouth in the morning. 90 tablet 3    apixaban (ELIQUIS) 5 MG TABS tablet Take 1 tablet by mouth in the morning and 1 tablet before bedtime. 60 tablet 1    DULoxetine (CYMBALTA) 20 MG extended release capsule Take 1 capsule by mouth in the morning. 30 capsule 3    traZODone (DESYREL) 50 MG tablet Take 1 tablet by mouth nightly as needed for Sleep 60 tablet 1    docusate sodium (COLACE, DULCOLAX) 100 MG CAPS Take 100 mg by mouth in the morning and 100 mg before bedtime.  60 capsule 1    polyethylene glycol (GLYCOLAX) 17 g packet Take 17 g by mouth daily as needed for Constipation 527 g 1    albuterol sulfate HFA (PROVENTIL;VENTOLIN;PROAIR) 108 (90 Base) MCG/ACT inhaler Inhale 2 puffs into the lungs every 6 hours as needed for Wheezing 18 g 2    atorvastatin (LIPITOR) 80 MG tablet Take 1 tablet by mouth daily 90 tablet 1    clopidogrel (PLAVIX) 75 MG tablet Take 1 tablet by mouth daily 30 tablet 3    allopurinol (ZYLOPRIM) 100 MG tablet Take 1 tablet by mouth daily 180 tablet 1        Medications patient taking as of now reconciled against medications ordered at time of hospital discharge: Yes    Review of Systems   Constitutional:  Negative for fatigue. Eyes:  Negative for visual disturbance. Respiratory:  Negative for cough, chest tightness and shortness of breath. Cardiovascular:  Negative for chest pain, palpitations and leg swelling. Endocrine: Negative for polydipsia, polyphagia and polyuria. Genitourinary:  Negative for frequency. Skin:  Positive for wound. Negative for rash. Neurological:  Negative for dizziness, syncope, weakness and light-headedness. Objective:    BP (!) 113/57   Pulse 59   Temp 96.8 °F (36 °C) (Temporal)   Wt 225 lb 12.8 oz (102.4 kg)   BMI 32.40 kg/m²   Physical Exam  Constitutional:       Appearance: Normal appearance. He is obese. HENT:      Head: Normocephalic and atraumatic. Eyes:      Extraocular Movements: Extraocular movements intact. Conjunctiva/sclera: Conjunctivae normal.   Cardiovascular:      Rate and Rhythm: Normal rate and regular rhythm. Pulmonary:      Effort: Pulmonary effort is normal.      Breath sounds: Normal breath sounds. Abdominal:      General: Abdomen is flat. Bowel sounds are normal.      Palpations: Abdomen is soft. Musculoskeletal:      Right lower leg: No edema. Skin:     General: Skin is warm and dry. Findings: No erythema. Comments: RLE cleanly bandaged, surgical incision of the right upper leg, which is well approximated, healing with staples in place and no indication of infection   Neurological:      Mental Status: He is alert. Mental status is at baseline. Psychiatric:         Mood and Affect: Mood normal.         Behavior: Behavior normal.         Thought Content: Thought content normal.         Judgment: Judgment normal.     An electronic signature was used to authenticate this note.   --Lidia Pettit DO

## 2022-08-03 LAB — T4 FREE: 1 NG/DL (ref 0.9–1.8)

## 2022-08-04 ENCOUNTER — CARE COORDINATION (OUTPATIENT)
Dept: CARE COORDINATION | Age: 72
End: 2022-08-04

## 2022-08-04 NOTE — DISCHARGE SUMMARY
Physician Discharge Summary     Patient ID:  Al Maldonado  [id number]  81 y.o.  1950    Admit date: 7/18/2022    Discharge date and time: 7/25/2022  6:05 PM     Admitting Physician: Candido Rogers MD     Discharge Physician: Candido Rogers MD     Admission Diagnoses: Atherosclerosis of native artery of right lower extremity with ulceration, unspecified ulceration site [facility]) [I70.239]  Peripheral arterial disease ([facility] [I73.9]    Discharge Diagnoses: Atherosclerosis of native artery of right lower extremity with ulceration, unspecified ulceration site ([facility] [I70.239]  Peripheral arterial disease ([facility] [I73.9]    PMH:  has a past medical history of CAD (coronary artery disease), CHF (congestive heart failure) ([facility] COPD (chronic obstructive pulmonary disease) ([facility] Critical ischemia of lower extremity ([facility] Depressive disorder, not elsewhere classified, GERD (gastroesophageal reflux disease), History of colon polyps - 30 seen on colonoscopy 04/2021, History of MI (myocardial infarction), History of skin ulcer of lower extremity, History of transient ischemic attack (TIA), Hx of blood clots, Hyperlipidemia, Hypertension, Neuropathy, KAVITA (obstructive sleep apnea), Personal history of DVT (deep vein thrombosis), Prolonged emergence from general anesthesia, PVD (peripheral vascular disease) ([facility] TIA (transient ischemic attack), and Vertebral fracture. PSH:  has a past surgical history that includes Appendectomy; Coronary angioplasty with stent (6/2013); other surgical history (Right, 6/25/2013); Leg Debridement (Right, 7/23/2013); skin split graft (Right, 7/25/2013); other surgical history (Left, 8/27/2013); laminectomy (11/18/2015); transluminal angioplasty (Left, 12/22/2015); Total hip arthroplasty (Right, 09/01/2016); Cholecystectomy, laparoscopic; eye surgery (Left); Colonoscopy (4/23/2021); Colonoscopy (4/23/2021); Colonoscopy (4/23/2021);  Femoral Endarterectomy (Right, 11/17/2021); and femoral bypass (Right, 7/18/2022). Allergies: Asa [aspirin] and Daliresp [roflumilast]    Admission Condition: fair    Discharged Condition: stable    Indication for Admission: Atherosclerosis of native artery of right lower extremity with ulceration, unspecified ulceration site [facility]) [I70.239]  Peripheral arterial disease [facility] [I73.9]    Hospital Course:   Bettie Chu. is a 81 y.o. male who presents today for wound evaluation. Continues to have increasing pain and inflammation right leg. Went to the ER last week for possible cellulitis. Has not responded to oral doxycycline. Describes rest pain. Patient presented for and underwent:RIGHT FEMORAL BELOW KNEE POPLITEAL ARTERY BYPASS WITH IN SITU SAPHENOUS VEIN  Post op:Patient is doing well. States he has pain in his legs, but tolerating it well. Has not asked for pain meds, per nurse. Denies any SOB, chest pain, or worsening leg pains. POD 1 -Patient rested well overnight. Tolerating regular diet without N/V. No acute complaints this AM. Sitting on the chair. Pain is well-controlled. Sampson in place. A-line removed overnight due to dysfunction. POD 2- HDS and afebrile, on 6L HFNC (at baseline). Pt OOB in chair. Sampson removed yesterday and initially required a 1L bolus, then voided marginally. POD 3 - HDS and afebrile, on 6L HFNC (at baseline). P did well overnight. Worked with PT/OT. No acute complaints. Feels like R foot movement and sensation is improved. OOB to chair. Tolerating diet and passing gas. Working with PTOT, evaluated by ARU  POD 4- Complains of hurting all over this am. Moved from ICU to 6s. Stable motor and sensation in the RLE. Tolerating diet on 6L NC at baseline. POD 5 - Patient did well overnight. Afebrile, HDS. Tolerating diet on 5L NC at baseline. No acute complaints. Did not get out of bed yesterday. POD 6 - Patient did well overnight. Afebrile, HDS. Tolerating diet on 5L NC at baseline. No acute complaints.  Resting comfortably this AM. Dressing changed. POD 7 - Patient did well overnight. Had a dressing change last night, tolerated it well. Afebrile, HDS. Tolerating diet on 5L NC (baseline 6 L). No acute complaints. Resting comfortably this AM. Tolerating diet without N/V. Denies pain in foot. Motor intact and sensation diminished at baseline. Admitted to ARU in stable condition    Discharge Physical Exam:  General Appearance: Alert, in no apparent distress  Neuro: A&Ox3, no focal deficits  Lungs: Normal effort; no adventitious breath sounds, 5L NC (baseline 6L)  Heart: Regular rate and rhythm  Abdomen: Soft, non-tender, non-distended; no guarding, no rigidity, no rebound  Extremities: RLE with Aquacel Ag in place. Foot wrapped, dopplerable PT and DP pulses. Consults:   N/A    Significant Diagnostic Studies:   N/A    Treatments/Procedures: surgery: RIGHT FEMORAL BELOW KNEE POPLITEAL ARTERY BYPASS WITH IN SITU SAPHENOUS VEIN    Disposition: home    Patient Instructions:   See discharge instruction form.     Signed:  Jude Hart MD  8/4/2022  5:52 PM  [phone]

## 2022-08-04 NOTE — CARE COORDINATION
Ambulatory Care Coordination Note  8/4/2022    ACC: Sonal Jackson RN    Summary Note: pt has remained smoke-free x 2 months; had visit from  [address] [facility]) this week; had his hospital follow up visits w/PCP & cardiology on Monday. ACM provided verbal order for ARLYN by skilled home health, as pt had not heard from anyone re [address] visit. (Note: [facility] listed as Shawn  provider per hosp DC)  Denies questions/concerns since return home s/p IP rehab DC over weekend. Reviewed ACM availability. Pt denies belief for need of interim ACM to outreach next week during this writer's PTO - noting he will be resumed for home visiting nurse by Adilson Silverio also nurse by [facility]) has resumed routine outreach. Pt verbalized understanding of above and agreement with the following  Plan: resume [facility] outreach for health coaching, care collaboration, support w/community resources, and help with any newly presenting concerns as needed. ARLYN visit by [facility] scheduled tomorrow.   Pt agrees to outreach direct to ACM, as needed, between routine follow up outreach initiated by [facility]     Congestive Heart Failure Assessment    Are you currently restricting fluids?: No Restriction  Do you understand a low sodium diet?: Yes  Do you understand how to read food labels?: Yes  How many restaurant meals do you eat per week?: 0  Do you salt your food before tasting it?: No     No patient-reported symptoms      Symptoms:  None: Yes      Symptom course: stable  Weight trend: stable  Salt intake watch compared to last visit: stable      and   COPD Assessment    Does the patient understand envrionmental exposure?: Yes  Is the patient able to verbalize Rescue vs. Long Acting medications?: Yes  Does the patient have a nebulizer?: No  Does the patient use a space with inhaled medications?: No     No patient-reported symptoms         Symptoms:     Have you had a recent diagnosis of pneumonia either by PCP or at a hospital?: No Care Coordination Interventions    Referral from Primary Care Provider: No  Suggested Interventions and Community Resources  Fall Risk Prevention: Completed  Home Care Waiver: Completed (Comment: 8.4.22 [id number] s/p IP 7.25.22; 5.4.22 reviewed w/pt, per [facility] @ [facility] - option for Consumer Directed Care, compensated for approved HHA support up to 2hr/wk; however, pt unable to name anyone for this)  Home Health Services: Completed (Comment: 7.8.22 continues for wound care weekly via Dr Eugenia Guerra MD [facility] )  Senior Services: Completed (Comment: HHA for homemaker support  2 hr/wk via [employer] (formerly provided through [facility]); spoke w/Lin 5.4. 25 who was going to reFax referral for non-skilled HHA again to [facility] in hope [facility] can accomodate reinstating HHA support)  Smoking Cessation: Completed (Comment: 6.15.22 QUIT circa 6.8.22; 4.26.22 health coaching/encouraged in self challenge of reducing daily consumption of cigarettes by delay of 1st cirgarette of day by 15 min, advance as ready)  Specialty Services Referral: Completed (Comment: Dr Parisa Miramontes (cardiology); Dr Eugenia Guerra (Vasc); Dr Joanne Dias (Pulmonology))  Transportation Support: Not Started  Zone Management Tools: Completed (Comment: HF, COPD)  Other Services or Interventions: review of self mgmt concepts; resources; health coaching re smoking cessation          Goals Addressed                      This Visit's Progress      Conditions and Symptoms (pt-stated)   On track      I will schedule office visits, as directed by my provider. I will keep my appointment or reschedule if I have to cancel. I will notify my provider of any barriers to my plan of care. I will follow my Zone Management tool to seek urgent or emergent care. I will notify my provider of any symptoms that indicate a worsening of my condition.     Barriers: impairment:  physical: vascular wound lower extremity and fear of failure  Plan for overcoming my barriers: engage in Care 8/28/22 Yes Mark Dudley,    albuterol sulfate HFA (PROVENTIL;VENTOLIN;PROAIR) 108 (90 Base) MCG/ACT inhaler Inhale 2 puffs into the lungs every 6 hours as needed for Wheezing 6/20/22  Yes Genie Erwin DO   atorvastatin (LIPITOR) 80 MG tablet Take 1 tablet by mouth daily 6/20/22  Yes Genie Erwin DO   clopidogrel (PLAVIX) 75 MG tablet Take 1 tablet by mouth daily 6/20/22  Yes Genie Erwin DO   allopurinol (ZYLOPRIM) 100 MG tablet Take 1 tablet by mouth daily 6/14/22  Yes Genie Erwin DO   omeprazole (PRILOSEC) 40 MG delayed release capsule TAKE 1 CAPSULE BY MOUTH DAILY 6/6/22  Yes Genie Erwin DO   pregabalin (LYRICA) 150 MG capsule Take 1 capsule by mouth 2 times daily for 30 days. 6/20/22 7/20/22  Genie Erwin DO   folic acid (FOLVITE) 1 MG tablet Take 1 tablet by mouth daily 9/21/21 7/29/22  Re Fay MD   nitroGLYCERIN (NITROSTAT) 0.4 MG SL tablet Place 1 tablet under the tongue every 5 minutes as needed for Chest pain  Patient not taking: No sig reported 8/25/21 7/29/22  Genie Erwin DO   magnesium gluconate (MAGONATE) 500 MG tablet Take 500 mg by mouth daily.   7/29/22  Historical Provider, MD       Future Appointments   Date Time Provider Orlando Colmenares   8/9/2022 10:45 AM MD Christine Tejada [facility]   8/30/2022  9:45 AM Dayami Burns MD [facility]   8/30/2022 10:15 AM DO JEFF Weller - DYCESARIO   10/13/2022  1:00 PM MD Brice Duncan P/CC [facility]   12/19/2022 11:00 AM DO JEFF Weller [place] - DYD    I agree with the Care Coordinator's Plan of Care

## 2022-08-05 ENCOUNTER — TELEPHONE (OUTPATIENT)
Dept: WOUND CARE | Age: 72
End: 2022-08-05

## 2022-08-08 NOTE — TELEPHONE ENCOUNTER
Called and left message for John Anderson who is asking for wound care orders. Patient does not have follow up appt in wound care, but I will call patient and get him on schedule. Patient will be seeing Dr. Donn Newell tomorrow in her office for post op vascular surgery and she can give direction on wound care orders.

## 2022-08-09 ENCOUNTER — OFFICE VISIT (OUTPATIENT)
Dept: VASCULAR SURGERY | Age: 72
End: 2022-08-09

## 2022-08-09 ENCOUNTER — TELEPHONE (OUTPATIENT)
Dept: WOUND CARE | Age: 72
End: 2022-08-09

## 2022-08-09 VITALS
HEIGHT: 70 IN | WEIGHT: 221 LBS | BODY MASS INDEX: 31.64 KG/M2 | SYSTOLIC BLOOD PRESSURE: 113 MMHG | DIASTOLIC BLOOD PRESSURE: 67 MMHG | HEART RATE: 59 BPM | TEMPERATURE: 97.5 F

## 2022-08-09 DIAGNOSIS — I70.221 ATHEROSCLEROSIS OF NATIVE ARTERY OF RIGHT LOWER EXTREMITY WITH REST PAIN (HCC): Primary | ICD-10-CM

## 2022-08-09 DIAGNOSIS — L97.912 NON-PRESSURE ULCER OF RIGHT LOWER EXTREMITY WITH FAT LAYER EXPOSED (HCC): ICD-10-CM

## 2022-08-09 PROCEDURE — 99024 POSTOP FOLLOW-UP VISIT: CPT | Performed by: SURGERY

## 2022-08-09 RX ORDER — DOXYCYCLINE HYCLATE 100 MG
100 TABLET ORAL 2 TIMES DAILY
Qty: 20 TABLET | Refills: 0 | Status: SHIPPED | OUTPATIENT
Start: 2022-08-09 | End: 2022-08-19

## 2022-08-09 RX ORDER — POLYETHYLENE GLYCOL 3350 17 G/17G
POWDER, FOR SOLUTION ORAL
COMMUNITY
Start: 2022-07-29 | End: 2022-08-15

## 2022-08-09 NOTE — TELEPHONE ENCOUNTER
See other telephone note regarding wound care orders and discussion with [facility]

## 2022-08-09 NOTE — PROGRESS NOTES
2022     Yesika Cintron (:  1950) is a 81 y.o. male,Established patient, here for evaluation of the following chief complaint(s):  Post-Op Check (Fem-Pop bypass)        ASSESSMENT/PLAN:  1. Atherosclerosis of native artery of right lower extremity with rest pain ([address]  2. Non-pressure ulcer of right lower extremity with fat layer exposed ([address]    Collagen dressing and 2 layer Coflex applied. Advised him to allow home health nurse to come in and assist with dressings. I also reached out to Shaquille Barcenas who will touch base with his [facility] visiting nurse. Advised him to not smoke. He apparently has spoke several times since discharge to home. He has an upcoming appointment this Monday, 8/15/2022 at wound care. No follow-ups on file. SUBJECTIVE/OBJECTIVE:  POV#1:  3 weeks s/p R femoral to BK popliteal artery bypass with in situ SVG. Returns today for wound check and staple removal    Exam: Right groin thigh and calf wounds are clean dry and intact. Staples were removed. No dehiscence. No fluctuance or drainage. Mild staple reaction noted. Right lower extremity swelling well controlled. He has an anterior lower leg ulceration, much improved compared to prior to bypass. Still some slough noted. Collagen dressing and 2 layer compression wrap applied. Foot is well perfused with biphasic DP pulse. Physical Exam  Vitals:    22 1013   BP: 113/67   Pulse: 59   Temp: 97.5 °F (36.4 °C)   Weight: 221 lb (100.2 kg)   Height: 5' 10\" (1.778 m)       An electronic signature was used to authenticate this note.     --Perez Elizondo MD

## 2022-08-09 NOTE — TELEPHONE ENCOUNTER
Spoke to [employer] (nurse) with [facility] ( [phone]) and stated patient seen by Dr. Roger Castro today and wound to lower leg dressed with collagen, wrapped in 2 layer coflex. Instructed to have nurse do home care visit and assess dressing and if dry ,up, and intact may leave alone until we see patient next Monday. [employer] stated she can adjust up to 2 home care visits a week if needed. Will give further instructions after patient is seen in wound care next week.

## 2022-08-15 ENCOUNTER — HOSPITAL ENCOUNTER (OUTPATIENT)
Dept: WOUND CARE | Age: 72
Discharge: HOME | End: 2022-08-15
Payer: MEDICARE

## 2022-08-15 VITALS
TEMPERATURE: 96.8 F | HEART RATE: 50 BPM | SYSTOLIC BLOOD PRESSURE: 134 MMHG | RESPIRATION RATE: 16 BRPM | DIASTOLIC BLOOD PRESSURE: 77 MMHG

## 2022-08-15 DIAGNOSIS — L97.912 NON-PRESSURE ULCER OF RIGHT LOWER EXTREMITY WITH FAT LAYER EXPOSED (HCC): Primary | ICD-10-CM

## 2022-08-15 DIAGNOSIS — I73.9 PVD (PERIPHERAL VASCULAR DISEASE) (HCC): ICD-10-CM

## 2022-08-15 DIAGNOSIS — S81.801D OPEN WOUND OF RIGHT LOWER LEG, SUBSEQUENT ENCOUNTER: ICD-10-CM

## 2022-08-15 DIAGNOSIS — I70.221 ATHEROSCLEROSIS OF NATIVE ARTERY OF RIGHT LOWER EXTREMITY WITH REST PAIN (HCC): ICD-10-CM

## 2022-08-15 PROCEDURE — 11042 DBRDMT SUBQ TIS 1ST 20SQCM/<: CPT

## 2022-08-15 PROCEDURE — 6370000000 HC RX 637 (ALT 250 FOR IP): Performed by: SPECIALIST

## 2022-08-15 PROCEDURE — 11042 DBRDMT SUBQ TIS 1ST 20SQCM/<: CPT | Performed by: SPECIALIST

## 2022-08-15 RX ORDER — BACITRACIN ZINC AND POLYMYXIN B SULFATE 500; 1000 [USP'U]/G; [USP'U]/G
OINTMENT TOPICAL ONCE
Status: CANCELLED | OUTPATIENT
Start: 2022-08-15 | End: 2022-08-15

## 2022-08-15 RX ORDER — BETAMETHASONE DIPROPIONATE 0.05 %
OINTMENT (GRAM) TOPICAL ONCE
Status: CANCELLED | OUTPATIENT
Start: 2022-08-15 | End: 2022-08-15

## 2022-08-15 RX ORDER — BACITRACIN, NEOMYCIN, POLYMYXIN B 400; 3.5; 5 [USP'U]/G; MG/G; [USP'U]/G
OINTMENT TOPICAL ONCE
Status: CANCELLED | OUTPATIENT
Start: 2022-08-15 | End: 2022-08-15

## 2022-08-15 RX ORDER — LIDOCAINE 40 MG/G
CREAM TOPICAL ONCE
Status: CANCELLED | OUTPATIENT
Start: 2022-08-15 | End: 2022-08-15

## 2022-08-15 RX ORDER — LIDOCAINE 50 MG/G
OINTMENT TOPICAL ONCE
Status: CANCELLED | OUTPATIENT
Start: 2022-08-15 | End: 2022-08-15

## 2022-08-15 RX ORDER — LIDOCAINE HYDROCHLORIDE 40 MG/ML
SOLUTION TOPICAL ONCE
Status: CANCELLED | OUTPATIENT
Start: 2022-08-15 | End: 2022-08-15

## 2022-08-15 RX ORDER — CLOBETASOL PROPIONATE 0.5 MG/G
OINTMENT TOPICAL ONCE
Status: CANCELLED | OUTPATIENT
Start: 2022-08-15 | End: 2022-08-15

## 2022-08-15 RX ORDER — GENTAMICIN SULFATE 1 MG/G
OINTMENT TOPICAL ONCE
Status: CANCELLED | OUTPATIENT
Start: 2022-08-15 | End: 2022-08-15

## 2022-08-15 RX ORDER — LIDOCAINE HYDROCHLORIDE 20 MG/ML
JELLY TOPICAL ONCE
Status: CANCELLED | OUTPATIENT
Start: 2022-08-15 | End: 2022-08-15

## 2022-08-15 RX ORDER — LIDOCAINE HYDROCHLORIDE 40 MG/ML
SOLUTION TOPICAL ONCE
Status: COMPLETED | OUTPATIENT
Start: 2022-08-15 | End: 2022-08-15

## 2022-08-15 RX ORDER — GINSENG 100 MG
CAPSULE ORAL ONCE
Status: CANCELLED | OUTPATIENT
Start: 2022-08-15 | End: 2022-08-15

## 2022-08-15 RX ADMIN — LIDOCAINE HYDROCHLORIDE: 40 SOLUTION TOPICAL at 10:44

## 2022-08-15 ASSESSMENT — PAIN - FUNCTIONAL ASSESSMENT: PAIN_FUNCTIONAL_ASSESSMENT: ACTIVITIES ARE NOT PREVENTED

## 2022-08-15 ASSESSMENT — PAIN SCALES - GENERAL: PAINLEVEL_OUTOF10: 7

## 2022-08-15 ASSESSMENT — PAIN DESCRIPTION - ONSET: ONSET: ON-GOING

## 2022-08-15 ASSESSMENT — PAIN DESCRIPTION - DESCRIPTORS: DESCRIPTORS: BURNING

## 2022-08-15 ASSESSMENT — PAIN DESCRIPTION - FREQUENCY: FREQUENCY: CONTINUOUS

## 2022-08-15 ASSESSMENT — PAIN DESCRIPTION - ORIENTATION: ORIENTATION: RIGHT

## 2022-08-15 ASSESSMENT — PAIN DESCRIPTION - LOCATION: LOCATION: LEG

## 2022-08-15 ASSESSMENT — PAIN DESCRIPTION - PAIN TYPE: TYPE: SURGICAL PAIN

## 2022-08-15 NOTE — PROGRESS NOTES
Multilayer Compression Wrap   (Not Unna) Below the Knee    NAME:  Krista Early. YOB: 1950  MEDICAL RECORD NUMBER:  [id number]  DATE:  8/15/2022       Removed old Multilayer wrap if present and washed leg with mild soap/water. Applied moisturizing agent to dry skin as needed. Applied primary and secondary dressing as ordered    Applied multilayered dressing below the knee to Right lower leg(s)  (2 Layer Lite compression wrap ) . Instructed patient/caregiver not to remove dressing and to keep it clean and dry. Instructed patient/caregiver on complications to report to provider, such as pain, numbness in toes, heavy drainage, and slippage of dressing. Instructed patient on purpose of compression dressing and on activity and exercise recommendations.    Applied per   Guidelines    Electronically signed by Bobby Sheriff RN on 8/15/2022 at 11:40 AM

## 2022-08-15 NOTE — PROGRESS NOTES
[facility]  Progress Note and Procedure Note      Krista Early. MEDICAL RECORD NUMBER:  [id number]  AGE: 81 y.o. GENDER: male  : 1950  EPISODE DATE:  8/15/2022    Subjective:     Chief Complaint   Patient presents with    Wound Check     Right lower leg           HISTORY of PRESENT ILLNESS HPI     Krista Early. is a 81 y.o. male who presents today for wound/ulcer evaluation. History of Wound Context: Patient of Dr. Caroline Villareal who is status post femoropopliteal bypass done 4 weeks ago. He is here today for follow-up of right lower extremity wound. He states he was unable to keep 2 layer compression wrap in place because of drainage.   Wound/Ulcer Pain Timing/Severity: none  Quality of pain: N/A  Severity:  0 / 10   Modifying Factors: None  Associated Signs/Symptoms: drainage    Ulcer Identification:  Ulcer Type: arterial    Contributing Factors: arterial insufficiency    Acute Wound: N/A not an acute wound    PAST MEDICAL HISTORY        Diagnosis Date    CAD (coronary artery disease)     CHF (congestive heart failure) ([facility])     diastolic    COPD (chronic obstructive pulmonary disease) ([facility])     Critical ischemia of lower extremity ([facility])     Depressive disorder, not elsewhere classified     GERD (gastroesophageal reflux disease)     History of colon polyps - 30 seen on colonoscopy 2021    History of MI (myocardial infarction) 2013    History of skin ulcer of lower extremity     R anterior shin    History of transient ischemic attack (TIA)     Hx of blood clots     PE    Hyperlipidemia     Hypertension     Neuropathy     KAVITA (obstructive sleep apnea)     On CPAP    Personal history of DVT (deep vein thrombosis)     Prolonged emergence from general anesthesia     Pt reported being combative after surgery     PVD (peripheral vascular disease) ([address]     TIA (transient ischemic attack)     Vertebral fracture        PAST SURGICAL HISTORY    Past Surgical History:   Procedure Laterality Date    APPENDECTOMY      CHOLECYSTECTOMY, LAPAROSCOPIC      COLONOSCOPY  4/23/2021    COLONOSCOPY POLYPECTOMY SNARE/COLD BIOPSY performed by Lisa Bradley MD at [place] [address]  4/23/2021    COLONOSCOPY POLYPECTOMY ABLATION performed by Lisa Bradley MD at [facility]  4/23/2021    COLONOSCOPY CONTROL HEMORRHAGE performed by Lisa Bradley MD at [address]  6/2013    EYE SURGERY Left     FEMORAL BYPASS Right 7/18/2022    RIGHT FEMORAL BELOW KNEE POPLITEAL ARTERY BYPASS WITH IN SITU SAPHENOUS VEIN performed by Chelsi Abel MD at [address] Right 11/17/2021    RIGHT FEMORAL ENDARTERECTOMY  RIGHT EXTERNAL ILIAC TO PROFUNDA FEMORAL BYPASS WITH 8MM PTFE GRAFT RIGHT LOWER EXTREMITY ARTERIOGRAM BALLOON ANGIOPLASTY RIGHT PROFUNDA BALLOON ANGIOPLASTY AND STENT OF RIGHT EXTERNAL ILIAC ARTERY performed by Chelsi Abel MD at [address] [facility]  11/18/2015    Bilateral decompressive lumbar laminectomy L4-5   ; medial facetectomy L4-5 joints  bilaterally; foraminotomies l5   nerve roots bilaterally    LEG DEBRIDEMENT Right 7/23/2013    Dr. Mitra Hall - pretibial wound    OTHER SURGICAL HISTORY Right 6/25/2013    Dr. Mitra Hall - CFA Endarterectomy w/marsupialization; RLE wound excision & debridement     OTHER SURGICAL HISTORY Left 8/27/2013    Dr. Mitra Hall - femoral & profunda femoris endarterectomy w/marsupialization patch angioplasty using SFA    SKIN SPLIT GRAFT Right 7/25/2013    Dr. Mitra Hall - to non-healing R pretibial wound, 9 x 15 cm    TOTAL HIP ARTHROPLASTY Right 09/01/2016    Dr. Marycruz Moreno Left 12/22/2015    Dr. Mitra Hall - CFA exploration, thrombectomy of ileofemoral system, stenting of RAFAL in-stent stenosis w/8 x 37 mm Palmaz        FAMILY HISTORY    Family History   Problem Relation Age of Onset    Cancer Mother         Breast    Heart Disease Mother     Cancer Father         Bladder    Heart Disease Father     Cancer Paternal Grandmother         melanoma        SOCIAL HISTORY    Social History     Tobacco Use    Smoking status: Former     Packs/day: 0.25     Years: 52.00     Pack years: 13.00     Types: Cigarettes     Start date: 1967     Quit date: 2022     Years since quittin.1    Smokeless tobacco: Never    Tobacco comments:     reports he quit circa 6   Vaping Use    Vaping Use: Never used   Substance Use Topics    Alcohol use: Yes     Alcohol/week: 0.0 standard drinks     Comment: 2-3 beers a month    Drug use: No       ALLERGIES    Allergies   Allergen Reactions    Asa [Aspirin] Other (See Comments)     Stomach ache    Daliresp [Roflumilast] Diarrhea and Other (See Comments)     Severe diarrhea and did not help       MEDICATIONS    Current Outpatient Medications on File Prior to Encounter   Medication Sig Dispense Refill    doxycycline hyclate (VIBRA-TABS) 100 MG tablet Take 1 tablet by mouth in the morning and 1 tablet before bedtime. Do all this for 10 days. 20 tablet 0    fluticasone-umeclidin-vilant (TRELEGY ELLIPTA) 100-62.5-25 MCG/INH AEPB Inhale 1 puff into the lungs in the morning. 28 each 11    potassium chloride (MICRO-K) 10 MEQ extended release capsule 1 tablet 60 capsule 1    furosemide (LASIX) 40 MG tablet Take 1 tablet by mouth in the morning. 90 tablet 3    metoprolol tartrate (LOPRESSOR) 50 MG tablet Take 1.5 tablets by mouth in the morning and 1.5 tablets before bedtime. 90 tablet 5    apixaban (ELIQUIS) 5 MG TABS tablet Take 1 tablet by mouth in the morning and 1 tablet before bedtime. 60 tablet 1    DULoxetine (CYMBALTA) 20 MG extended release capsule Take 1 capsule by mouth in the morning. 30 capsule 3    traZODone (DESYREL) 50 MG tablet Take 1 tablet by mouth nightly as needed for Sleep 60 tablet 1    losartan (COZAAR) 100 MG tablet Take 1 tablet by mouth in the morning.  30 tablet 3    docusate sodium (COLACE, DULCOLAX) 100 MG CAPS Take 100 mg by mouth in the morning and 100 mg before bedtime. 60 capsule 1    polyethylene glycol (GLYCOLAX) 17 g packet Take 17 g by mouth daily as needed for Constipation 527 g 1    albuterol sulfate HFA (PROVENTIL;VENTOLIN;PROAIR) 108 (90 Base) MCG/ACT inhaler Inhale 2 puffs into the lungs every 6 hours as needed for Wheezing 18 g 2    atorvastatin (LIPITOR) 80 MG tablet Take 1 tablet by mouth daily 90 tablet 1    clopidogrel (PLAVIX) 75 MG tablet Take 1 tablet by mouth daily 30 tablet 3    pregabalin (LYRICA) 150 MG capsule Take 1 capsule by mouth 2 times daily for 30 days. 180 capsule 1    allopurinol (ZYLOPRIM) 100 MG tablet Take 1 tablet by mouth daily 180 tablet 1    omeprazole (PRILOSEC) 40 MG delayed release capsule TAKE 1 CAPSULE BY MOUTH DAILY 30 capsule 3    [DISCONTINUED] folic acid (FOLVITE) 1 MG tablet Take 1 tablet by mouth daily 30 tablet 3    [DISCONTINUED] nitroGLYCERIN (NITROSTAT) 0.4 MG SL tablet Place 1 tablet under the tongue every 5 minutes as needed for Chest pain (Patient not taking: No sig reported) 25 tablet 1    [DISCONTINUED] magnesium gluconate (MAGONATE) 500 MG tablet Take 500 mg by mouth daily. No current facility-administered medications on file prior to encounter. REVIEW OF SYSTEMS  Review of Systems    Pertinent items are noted in HPI. Objective:      /77   Pulse 50   Temp 96.8 °F (36 °C) (Temporal)   Resp 16     Wt Readings from Last 3 Encounters:   08/09/22 221 lb (100.2 kg)   08/02/22 225 lb (102.1 kg)   08/02/22 225 lb 12.8 oz (102.4 kg)       PHYSICAL EXAM    General Appearance: alert and oriented to person, place and time and in no acute distress  Extremities: Healed surgical incisions.   Full-thickness wound distal right knee containing fibrin, slough, granulation tissue designated as wound #1  Small full-thickness wound overlying right medial malleolus containing fibrin, granulation tissue designated as wound #2  Assessment: 1. Non-pressure ulcer of right lower extremity with fat layer exposed ([address]    2. Open wound of right lower leg, subsequent encounter    3. Atherosclerosis of native artery of right lower extremity with rest pain ([address]    4. PVD (peripheral vascular disease) [facility]         Procedure Note  Indications:  Based on my examination of this patient's wound(s) today, sharp excision is required to promote healing and evaluate the extent healing. Performed by: Kris Dailey MD    Consent obtained? Yes    Time out taken: Yes    Pain Control: Anesthetic: 4% Lidocaine Liquid Topical     Debridement:Excisional Debridement    Using curette the wound was sharply debrided    down through and including the removal of  epidermis, dermis, and subcutaneous tissue. Devitalized Tissue Debrided:  fibrin and slough      Pre Debridement Measurements:  Are located in the Wound Documentation Flow Sheet   Wound #: 1     Post  Debridement Measurements:  Wound 11/15/21 Leg Distal;Right #1 (Active)   Wound Image   06/20/22 1343   Wound Etiology Venous 05/02/22 1435   Dressing Status Old drainage noted 07/25/22 2045   Wound Cleansed Cleansed with saline 08/15/22 1032   Dressing/Treatment Collagen with Ag 08/15/22 1139   Wound Length (cm) 5.5 cm 08/15/22 1032   Wound Width (cm) 2.5 cm 08/15/22 1032   Wound Depth (cm) 0.1 cm 08/15/22 1032   Wound Surface Area (cm^2) 13.75 cm^2 08/15/22 1032   Change in Wound Size % (l*w) -472.92 08/15/22 1032   Wound Volume (cm^3) 1.375 cm^3 08/15/22 1032   Wound Healing % -473 08/15/22 1032   Post-Procedure Length (cm) 5.6 cm 08/15/22 1124   Post-Procedure Width (cm) 2.6 cm 08/15/22 1124   Post-Procedure Depth (cm) 0.1 cm 08/15/22 1124   Post-Procedure Surface Area (cm^2) 14.56 cm^2 08/15/22 1124   Post-Procedure Volume (cm^3) 1. 456 cm^3 08/15/22 1124   Distance Tunneling (cm) 0 cm 08/15/22 1032   Tunneling Position ___ O'Clock 0 07/11/22 1310   Undermining Starts ___ O'Clock 0 07/11/22 1310 Undermining Ends___ O'Clock 0 07/11/22 1310   Undermining Maxium Distance (cm) 0 08/15/22 1032   Wound Assessment Pink/red;Slough;Fibrin 08/15/22 1032   Drainage Amount Small 08/15/22 1032   Drainage Description Yellow 08/15/22 1032   Odor None 08/15/22 1032   Catrachita-wound Assessment Edematous; Blanchable erythema 08/15/22 1032   Margins Defined edges 08/15/22 1032   Wound Thickness Description not for Pressure Injury Full thickness 08/15/22 1032   Number of days: 272       Wound 05/12/22 Ankle Right #2 (Active)   Wound Image   06/20/22 1343   Wound Etiology Venous 05/12/22 1000   Dressing Status Clean;Dry; Intact 08/15/22 1032   Wound Cleansed Cleansed with saline 07/22/22 0400   Dressing/Treatment Collagen with Ag 08/15/22 1139   Wound Length (cm) 0.4 cm 08/15/22 1032   Wound Width (cm) 0.4 cm 08/15/22 1032   Wound Depth (cm) 0.1 cm 08/15/22 1032   Wound Surface Area (cm^2) 0.16 cm^2 08/15/22 1032   Change in Wound Size % (l*w) -6.67 08/15/22 1032   Wound Volume (cm^3) 0.016 cm^3 08/15/22 1032   Wound Healing % -7 08/15/22 1032   Post-Procedure Length (cm) 0.4 cm 08/15/22 1124   Post-Procedure Width (cm) 0.4 cm 08/15/22 1124   Post-Procedure Depth (cm) 0.1 cm 08/15/22 1124   Post-Procedure Surface Area (cm^2) 0.16 cm^2 08/15/22 1124   Post-Procedure Volume (cm^3) 0.016 cm^3 08/15/22 1124   Distance Tunneling (cm) 0 cm 08/15/22 1032   Tunneling Position ___ O'Clock 0 07/11/22 1310   Undermining Starts ___ O'Clock 0 07/11/22 1310   Undermining Ends___ O'Clock 0 07/11/22 1310   Undermining Maxium Distance (cm) 0 08/15/22 1032   Wound Assessment Pink/red 08/15/22 1032   Drainage Amount None 08/15/22 1032   Drainage Description Other (Comment) 07/22/22 0400   Odor None 08/15/22 1032   Catrachita-wound Assessment Intact 08/15/22 1032   Margins Defined edges 08/15/22 1032   Wound Thickness Description not for Pressure Injury Partial thickness 08/15/22 1032   Number of days: 95     Incision 07/18/22 Thigh Anterior;Proximal;Right (Active)   Dressing Status Dry; Intact 07/26/22 0900   Incision Cleansed Soap and water;Betadine/povidone iodine 07/28/22 0846   Dressing/Treatment Open to air 07/30/22 0845   Closure Staples 07/30/22 0845   Drainage Amount None 07/30/22 0845   Drainage Description Serosanguinous 07/22/22 0930   Odor None 07/29/22 2013   Number of days: 28       Incision 07/18/22 Pretibial Right;Medial (Active)   Dressing Status Clean;Dry; Intact 07/26/22 0900   Incision Cleansed Soap and water;Betadine/povidone iodine 07/28/22 0846   Dressing/Treatment Open to air 07/30/22 0845   Closure Staples 07/30/22 0845   Drainage Amount None 07/30/22 0845   Odor None 07/30/22 0845   Number of days: 28       Percent of Wound Debrided: 100%    Total Surface Area Debrided:  14 sq cm    Diabetic/Pressure/Non Pressure Ulcers only:  Ulcer: Non-Pressure ulcer, fat layer exposed    Bleeding: Minimal    Hemostasis Achieved: by pressure    Procedural Pain: 0  / 10     Post Procedural Pain: 0 / 10     Response to treatment:  Well tolerated by patient. Plan:   Attempt to wrap patient in 2 layer compression once again, primary collagen dressing. Patient to see Dr. Junior Ojeda next week  Treatment Note: Please see attached Discharge Instructions. These instructions were given and signed by the patient or POA    New Prescriptions    No medications on file       Orders Placed This Encounter   Procedures    Initiate Outpatient Wound Care Protocol       Discharge Instructions          [address] [facility] Physician Orders and Discharge Instructions  [address]  Telephone: [phone]  NAME:  Bridget Diaz.   YOB: 1950  MEDICAL RECORD NUMBER:  [id number]  DATE: 8/15/22     Return Appointment:  Return Appointment: With Dr Junior Ojeda  in  1 [facility])  [] Return Appointment for a Wound Assessment with the nurse on:     Future Appointments   Date Time Provider [place] Col[place]ares   8/30/2022  9:45 AM Татьяна Johnston MD [facility]   8/30/2022 10:15 AM Pau Gibson DO [facility] [place] - D[place]   10/13/2022  1:00 PM MD Caden Sweeney P/CC [facility]   12/19/2022 11:00 AM Pau Gibson DO [address]: [facility] CARE [phone]   F: [phone]   Medically necessary services: [x] Skilled Nursing [] PT (Eval & Treat) [] OT (Eval & Treat) [] Social Work [] Dietician  [] Other:    Wound care instructions: If you smoke we ask that you refrain from smoking. Smoking inhibits wounds from healing. When taking antibiotics take the entire prescription as ordered. Do not stop taking until medication is all gone unless otherwise instructed. Exercise as tolerated. Keep weight off wounds and reposition every 2 hours if applicable. Do not get wounds wet in the bath or shower unless otherwise instructed by your physician. If your wound is on your foot or leg, you may purchase a cast bag. Please ask at the pharmacy. Wash hands with soap and water prior to and after every dressing change. [x]Wash wounds with: 0.9% normal saline  [x]Catrachita wound Topical Treatments: Do not apply lotions, creams, or ointments to the skin around the wound bed unless directed as followed:   Apply around the wound: Nothing         [x]Wound Location: right lower leg and ankle wound   Apply Primary Dressing to wound: Oly  Secondary Dressing: ABD pad   Avoid contact of tape with skin if possible. When to change Dressing: DO NOT CHANGE      [x] Multilayer Compression Wrap:  Type: 2 Layer Lite Compression Wrap    Do not get leg(s) with wrap wet. If wraps become too tight call the center or completely remove the wrap. Elevate leg(s) above the level of the heart when sitting. Avoid prolonged standing in one place.    Applied in Clinic on 8/15/2022  The Goal of this therapy is to reduce edema and get into long term compression garments to control venous insufficiency, lymphedema and reduce occurrence of venous ulcers    [x] Edema Control:      Elevate leg(s) above the level of the heart for 30 minutes 4-5 times a day and/or when sitting. Avoid prolonged standing in one place. Dietary:  Important dietary reminders:  1. Increase Protein intake (i.e. Lean meats, fish, eggs, legumes, and yogurt)  2. No added salt  3. If diabetic, follow a diabetic diet and check glucose prior to meals or as instructed by your physician. Dietary Supplements(Take twice a day unless instructed otherwise):  [] Tia Vernon  [] 30ml ProStat [] Doneta More [] Ensure Max/Premier [] Other:    Your nurse  is:  Davidson Hammonds     Electronically signed by Wayne Hung RN on 8/15/2022 at 11:27 AM     [address] [facility] [address] Information: Should you experience any significant changes in your wound(s) or have questions about your wound care, please contact the [address] [place] [address] at [phone]. We are open from 8:00am - 4:30p Monday, Thursday and Friday; 11:00am - 5pm on Tuesday and CLOSED Wednesday. Please give us 24-48 business hours to return your call. Call your doctor now or seek immediate medical care if:    You have symptoms of infection, such as: Increased pain, swelling, warmth, or redness. Red streaks leading from the area. Pus draining from the area. A fever.          [] Patient unable to sign Discharge Instructions given to ECF/Transportation/POA         Electronically signed by Izabella Willard MD on 8/15/2022 at 1:47 PM

## 2022-08-15 NOTE — DISCHARGE INSTRUCTIONS
215 [facility] Physician Orders and Discharge Instructions  [address] [place] [address] [address] [address] [facility]. [place]  Telephone: [phone]  NAME:  Iona Schirmer. YOB: 1950  MEDICAL RECORD NUMBER:  [id number]  DATE: 8/15/22     Return Appointment:  Return Appointment: With Dr Kiana Noguera  in  1 [facility])  [] Return Appointment for a Wound Assessment with the nurse on:     Future Appointments   Date Time Provider [place] Colmenares   8/30/2022  9:45 AM Maico Landers MD [facility]   8/30/2022 10:15 AM Cruz Santos DO [facility] [place] - DYD   10/13/2022  1:00 PM Elvira Baires MD [facility] P/CC [facility]   12/19/2022 11:00 AM Cruz Santos DO [address]: [facility] CARE [phone]   F: [phone]   Medically necessary services: [x] Skilled Nursing [] PT (Eval & Treat) [] OT (Eval & Treat) [] Social Work [] Dietician  [] Other:    Wound care instructions: If you smoke we ask that you refrain from smoking. Smoking inhibits wounds from healing. When taking antibiotics take the entire prescription as ordered. Do not stop taking until medication is all gone unless otherwise instructed. Exercise as tolerated. Keep weight off wounds and reposition every 2 hours if applicable. Do not get wounds wet in the bath or shower unless otherwise instructed by your physician. If your wound is on your foot or leg, you may purchase a cast bag. Please ask at the pharmacy. Wash hands with soap and water prior to and after every dressing change.     [x]Wash wounds with: 0.9% normal saline  [x]Catrachita wound Topical Treatments: Do not apply lotions, creams, or ointments to the skin around the wound bed unless directed as followed:   Apply around the wound: Nothing         [x]Wound Location: right lower leg and ankle wound   Apply Primary Dressing to wound: Oly  Secondary Dressing: ABD pad   Avoid contact of tape with skin

## 2022-08-22 ENCOUNTER — HOSPITAL ENCOUNTER (OUTPATIENT)
Dept: WOUND CARE | Age: 72
Discharge: HOME | End: 2022-08-22
Payer: MEDICARE

## 2022-08-22 VITALS
HEART RATE: 76 BPM | RESPIRATION RATE: 16 BRPM | TEMPERATURE: 97.4 F | SYSTOLIC BLOOD PRESSURE: 124 MMHG | DIASTOLIC BLOOD PRESSURE: 64 MMHG

## 2022-08-22 DIAGNOSIS — L97.912 NON-PRESSURE ULCER OF RIGHT LOWER EXTREMITY WITH FAT LAYER EXPOSED (HCC): Primary | ICD-10-CM

## 2022-08-22 DIAGNOSIS — I73.9 PVD (PERIPHERAL VASCULAR DISEASE) (HCC): ICD-10-CM

## 2022-08-22 DIAGNOSIS — I70.221 ATHEROSCLEROSIS OF NATIVE ARTERY OF RIGHT LOWER EXTREMITY WITH REST PAIN (HCC): ICD-10-CM

## 2022-08-22 DIAGNOSIS — S81.801D OPEN WOUND OF RIGHT LOWER LEG, SUBSEQUENT ENCOUNTER: ICD-10-CM

## 2022-08-22 PROCEDURE — 6370000000 HC RX 637 (ALT 250 FOR IP): Performed by: SPECIALIST

## 2022-08-22 PROCEDURE — 29581 APPL MULTLAYER CMPRN SYS LEG: CPT

## 2022-08-22 RX ORDER — CLOBETASOL PROPIONATE 0.5 MG/G
OINTMENT TOPICAL ONCE
Status: CANCELLED | OUTPATIENT
Start: 2022-08-22 | End: 2022-08-22

## 2022-08-22 RX ORDER — LIDOCAINE 40 MG/G
CREAM TOPICAL ONCE
Status: CANCELLED | OUTPATIENT
Start: 2022-08-22 | End: 2022-08-22

## 2022-08-22 RX ORDER — LIDOCAINE HYDROCHLORIDE 40 MG/ML
SOLUTION TOPICAL ONCE
Status: CANCELLED | OUTPATIENT
Start: 2022-08-22 | End: 2022-08-22

## 2022-08-22 RX ORDER — LIDOCAINE HYDROCHLORIDE 20 MG/ML
JELLY TOPICAL ONCE
Status: CANCELLED | OUTPATIENT
Start: 2022-08-22 | End: 2022-08-22

## 2022-08-22 RX ORDER — BACITRACIN ZINC AND POLYMYXIN B SULFATE 500; 1000 [USP'U]/G; [USP'U]/G
OINTMENT TOPICAL ONCE
Status: CANCELLED | OUTPATIENT
Start: 2022-08-22 | End: 2022-08-22

## 2022-08-22 RX ORDER — GENTAMICIN SULFATE 1 MG/G
OINTMENT TOPICAL ONCE
Status: CANCELLED | OUTPATIENT
Start: 2022-08-22 | End: 2022-08-22

## 2022-08-22 RX ORDER — LIDOCAINE HYDROCHLORIDE 40 MG/ML
SOLUTION TOPICAL ONCE
Status: COMPLETED | OUTPATIENT
Start: 2022-08-22 | End: 2022-08-22

## 2022-08-22 RX ORDER — BETAMETHASONE DIPROPIONATE 0.05 %
OINTMENT (GRAM) TOPICAL ONCE
Status: CANCELLED | OUTPATIENT
Start: 2022-08-22 | End: 2022-08-22

## 2022-08-22 RX ORDER — BACITRACIN, NEOMYCIN, POLYMYXIN B 400; 3.5; 5 [USP'U]/G; MG/G; [USP'U]/G
OINTMENT TOPICAL ONCE
Status: CANCELLED | OUTPATIENT
Start: 2022-08-22 | End: 2022-08-22

## 2022-08-22 RX ORDER — LIDOCAINE 50 MG/G
OINTMENT TOPICAL ONCE
Status: CANCELLED | OUTPATIENT
Start: 2022-08-22 | End: 2022-08-22

## 2022-08-22 RX ORDER — GINSENG 100 MG
CAPSULE ORAL ONCE
Status: CANCELLED | OUTPATIENT
Start: 2022-08-22 | End: 2022-08-22

## 2022-08-22 RX ADMIN — LIDOCAINE HYDROCHLORIDE: 40 SOLUTION TOPICAL at 13:30

## 2022-08-22 ASSESSMENT — PAIN SCALES - GENERAL: PAINLEVEL_OUTOF10: 8

## 2022-08-22 NOTE — LETTER
215 [facility] Physician Orders and Discharge Instructions  [address] [place] [address] [address] [address] [facility]. [place] [place]  Telephone: [phone]  NAME:  Cande France YOB: 1950  MEDICAL RECORD NUMBER:  [id number]  DATE: 8/22/22     Return Appointment:  Noy Farias Return Appointment: With Dr. Dayana Gallego  in  1 [facility])  o [] Return Appointment for a Wound Assessment with the nurse on:     Future Appointments   Date Time Provider [place] Colmenares   8/30/2022  9:45 AM Imelda Rogers MD [facility]   8/30/2022 10:15 AM DO JEFF [place] - DYCESARIO   10/13/2022  1:00 PM MD Brice Pierson P/CC [facility]   12/19/2022 11:00 AM Joel Bautista DO [address]: [facility] [phone]   F: [phone]   Medically necessary services: [x] Skilled Nursing [] PT (Eval & Treat) [] OT (Eval & Treat) [] Social Work [] Dietician  [] Other:    Wound care instructions:  1. If you smoke we ask that you refrain from smoking. Smoking inhibits wounds from healing. 2. When taking antibiotics take the entire prescription as ordered. Do not stop taking until medication is all gone unless otherwise instructed. 3. Exercise as tolerated. 4. Keep weight off wounds and reposition every 2 hours if applicable. 5. Do not get wounds wet in the bath or shower unless otherwise instructed by your physician. If your wound is on your foot or leg, you may purchase a cast bag. Please ask at the pharmacy. 6. Wash hands with soap and water prior to and after every dressing change.     [x]Wash wounds with: 0.9% normal saline  [x]Catrachita wound Topical Treatments: Do not apply lotions, creams, or ointments to the skin around the wound bed unless directed as followed:   o Apply around the wound: Nothing         [x]Wound Location: right lower leg and right ankle wound    Apply Primary Dressing to wound: Oly   Secondary Dressing: 4X4 gauze pad  o  Avoid contact of tape with skin if possible.  When to change Dressing: DO NOT CHANGE        [x] Multilayer Compression Wrap:  Type: 2 Layer Lite Compression Wrap     Do not get leg(s) with wrap wet.  If wraps become too tight call the center or completely remove the wrap.  Elevate leg(s) above the level of the heart when sitting.  Avoid prolonged standing in one place.  Applied in Clinic on 8/22/2022   The Goal of this therapy is to reduce edema and get into long term compression garments to control venous insufficiency, lymphedema and reduce occurrence of venous ulcers    [x] Edema Control:  Apply: Spandagrip/Medigrip on Left; Low compression 5-10 mm/Hg. They should be applied to affected leg(s) from mid foot to knee making sure to cover the heel      · Elevate leg(s) above the level of the heart for 30 minutes 4-5 times a day and/or when sitting. · Avoid prolonged standing in one place. Dietary:  Important dietary reminders:  1. Increase Protein intake (i.e. Lean meats, fish, eggs, legumes, and yogurt)  2. No added salt  3. If diabetic, follow a diabetic diet and check glucose prior to meals or as instructed by your physician. Dietary Supplements(Take twice a day unless instructed otherwise):  [] Kirby   [] 30ml ProStat [] Ruba Saran [] Ensure Max/Premier [] Other:    Your nurse  is:  Tuan Salinas     Electronically signed by Jean Adamson RN on 8/22/2022 at 2:41 PM     [address]: Should you experience any significant changes in your wound(s) or have questions about your wound care, please contact the [address] [place] [address] at [phone]. We are open from 8:00am - 4:30p Monday, Thursday and Friday; 11:00am - 5pm on Tuesday and CLOSED Wednesday. Please give us 24-48 business hours to return your call.       Call your doctor now or seek immediate medical care if:    · You have symptoms of infection, such as:  ? Increased pain, swelling, warmth, or redness. ? Red streaks leading from the area. ? Pus draining from the area. ? A fever.          [] Patient unable to sign Discharge Instructions given to ECF/Transportation/POA

## 2022-08-22 NOTE — PROGRESS NOTES
Multilayer Compression Wrap   (Not Unna) Below the Knee    NAME:  Jeremy Brito. YOB: 1950  MEDICAL RECORD NUMBER:  [id number]  DATE:  8/22/2022       Removed old Multilayer wrap if present and washed leg with mild soap/water. Applied moisturizing agent to dry skin as needed. Applied primary and secondary dressing as ordered    Applied multilayered dressing below the knee to Right lower leg(s)  (2 Layer Lite compression wrap ) . Instructed patient/caregiver not to remove dressing and to keep it clean and dry. Instructed patient/caregiver on complications to report to provider, such as pain, numbness in toes, heavy drainage, and slippage of dressing. Instructed patient on purpose of compression dressing and on activity and exercise recommendations.    Applied per   Guidelines    Electronically signed by Tamiko Mike RN on 8/22/2022 at 3:03 PM

## 2022-08-22 NOTE — DISCHARGE INSTRUCTIONS
[facility] Physician Orders and Discharge Instructions  [address] [address] [facility]. [address]  Telephone: [phone]  NAME:  Regino Bosworth. YOB: 1950  MEDICAL RECORD NUMBER:  [id number]  DATE: 8/22/22     Return Appointment:  Return Appointment: With Dr. Ian Aguiar  in  1 [facility])  [] Return Appointment for a Wound Assessment with the nurse on:     Future Appointments   Date Time Provider Orlando Colmenares   8/30/2022  9:45 AM Pablito Franco MD [facility]   8/30/2022 10:15 AM DO JEFF Cruz [place] - DYD   10/13/2022  1:00 PM MD Kingsley Brysonwood P/CC [facility]   12/19/2022 11:00 AM Consuelo Azul DO [address]: [facility] [phone]   F: [phone]   Medically necessary services: [x] Skilled Nursing [] PT (Eval & Treat) [] OT (Eval & Treat) [] Social Work [] Dietician  [] Other:    Wound care instructions: If you smoke we ask that you refrain from smoking. Smoking inhibits wounds from healing. When taking antibiotics take the entire prescription as ordered. Do not stop taking until medication is all gone unless otherwise instructed. Exercise as tolerated. Keep weight off wounds and reposition every 2 hours if applicable. Do not get wounds wet in the bath or shower unless otherwise instructed by your physician. If your wound is on your foot or leg, you may purchase a cast bag. Please ask at the pharmacy. Wash hands with soap and water prior to and after every dressing change.     [x]Wash wounds with: 0.9% normal saline  [x]Catrachita wound Topical Treatments: Do not apply lotions, creams, or ointments to the skin around the wound bed unless directed as followed:   Apply around the wound: Nothing         [x]Wound Location: right lower leg and right ankle wound   Apply Primary Dressing to wound: Oly  Secondary Dressing: 4X4 gauze pad   Avoid contact of Dr Patterson Charan Parker MD Dr Patterson Dr Patterson MD. Brad Akins tape with skin if possible. When to change Dressing: DO NOT CHANGE        [x] Multilayer Compression Wrap:  Type: 2 Layer Lite Compression Wrap    Do not get leg(s) with wrap wet. If wraps become too tight call the center or completely remove the wrap. Elevate leg(s) above the level of the heart when sitting. Avoid prolonged standing in one place. Applied in Clinic on 8/22/2022  The Goal of this therapy is to reduce edema and get into long term compression garments to control venous insufficiency, lymphedema and reduce occurrence of venous ulcers    [x] Edema Control:  Apply: Spandagrip/Medigrip on Left; Low compression 5-10 mm/Hg. They should be applied to affected leg(s) from mid foot to knee making sure to cover the heel      Elevate leg(s) above the level of the heart for 30 minutes 4-5 times a day and/or when sitting. Avoid prolonged standing in one place. Dietary:  Important dietary reminders:  1. Increase Protein intake (i.e. Lean meats, fish, eggs, legumes, and yogurt)  2. No added salt  3. If diabetic, follow a diabetic diet and check glucose prior to meals or as instructed by your physician. Dietary Supplements(Take twice a day unless instructed otherwise):  [] Keshia Diazrdle  [] 30ml ProStat [] Natividad Hazard [] Ensure Max/Premier [] Other:    Your nurse  is:  Donavan Smoker     Electronically signed by Jessica Rivas RN on 8/22/2022 at 2:41 PM     Ernestina Corbett 281: Should you experience any significant changes in your wound(s) or have questions about your wound care, please contact the [address] [place] [address] at [phone]. We are open from 8:00am - 4:30p Monday, Thursday and Friday; 11:00am - 5pm on Tuesday and CLOSED Wednesday. Please give us 24-48 business hours to return your call. Call your doctor now or seek immediate medical care if:    You have symptoms of infection, such as: Increased pain, swelling, warmth, or redness.   Red streaks leading from the area.  Pus draining from the area. A fever.          [] Patient unable to sign Discharge Instructions given to ECF/Transportation/POA

## 2022-08-29 ENCOUNTER — HOSPITAL ENCOUNTER (OUTPATIENT)
Dept: WOUND CARE | Age: 72
Discharge: HOME | End: 2022-08-29
Payer: MEDICARE

## 2022-08-29 VITALS
HEART RATE: 79 BPM | TEMPERATURE: 97.5 F | SYSTOLIC BLOOD PRESSURE: 134 MMHG | DIASTOLIC BLOOD PRESSURE: 71 MMHG | RESPIRATION RATE: 18 BRPM

## 2022-08-29 DIAGNOSIS — I70.221 ATHEROSCLEROSIS OF NATIVE ARTERY OF RIGHT LOWER EXTREMITY WITH REST PAIN (HCC): ICD-10-CM

## 2022-08-29 DIAGNOSIS — S81.801D OPEN WOUND OF RIGHT LOWER LEG, SUBSEQUENT ENCOUNTER: ICD-10-CM

## 2022-08-29 DIAGNOSIS — I73.9 PVD (PERIPHERAL VASCULAR DISEASE) (HCC): ICD-10-CM

## 2022-08-29 DIAGNOSIS — L97.912 NON-PRESSURE ULCER OF RIGHT LOWER EXTREMITY WITH FAT LAYER EXPOSED (HCC): Primary | ICD-10-CM

## 2022-08-29 PROCEDURE — 29581 APPL MULTLAYER CMPRN SYS LEG: CPT

## 2022-08-29 PROCEDURE — 6370000000 HC RX 637 (ALT 250 FOR IP): Performed by: SPECIALIST

## 2022-08-29 PROCEDURE — 11042 DBRDMT SUBQ TIS 1ST 20SQCM/<: CPT

## 2022-08-29 PROCEDURE — 11042 DBRDMT SUBQ TIS 1ST 20SQCM/<: CPT | Performed by: SPECIALIST

## 2022-08-29 RX ORDER — GENTAMICIN SULFATE 1 MG/G
OINTMENT TOPICAL ONCE
Status: CANCELLED | OUTPATIENT
Start: 2022-08-29 | End: 2022-08-29

## 2022-08-29 RX ORDER — LIDOCAINE HYDROCHLORIDE 40 MG/ML
SOLUTION TOPICAL ONCE
Status: CANCELLED | OUTPATIENT
Start: 2022-08-29 | End: 2022-08-29

## 2022-08-29 RX ORDER — LIDOCAINE 50 MG/G
OINTMENT TOPICAL ONCE
Status: CANCELLED | OUTPATIENT
Start: 2022-08-29 | End: 2022-08-29

## 2022-08-29 RX ORDER — BETAMETHASONE DIPROPIONATE 0.05 %
OINTMENT (GRAM) TOPICAL ONCE
Status: CANCELLED | OUTPATIENT
Start: 2022-08-29 | End: 2022-08-29

## 2022-08-29 RX ORDER — GINSENG 100 MG
CAPSULE ORAL ONCE
Status: CANCELLED | OUTPATIENT
Start: 2022-08-29 | End: 2022-08-29

## 2022-08-29 RX ORDER — BACITRACIN ZINC AND POLYMYXIN B SULFATE 500; 1000 [USP'U]/G; [USP'U]/G
OINTMENT TOPICAL ONCE
Status: CANCELLED | OUTPATIENT
Start: 2022-08-29 | End: 2022-08-29

## 2022-08-29 RX ORDER — LIDOCAINE HYDROCHLORIDE 20 MG/ML
JELLY TOPICAL ONCE
Status: CANCELLED | OUTPATIENT
Start: 2022-08-29 | End: 2022-08-29

## 2022-08-29 RX ORDER — LIDOCAINE HYDROCHLORIDE 40 MG/ML
SOLUTION TOPICAL ONCE
Status: COMPLETED | OUTPATIENT
Start: 2022-08-29 | End: 2022-08-29

## 2022-08-29 RX ORDER — CLOBETASOL PROPIONATE 0.5 MG/G
OINTMENT TOPICAL ONCE
Status: CANCELLED | OUTPATIENT
Start: 2022-08-29 | End: 2022-08-29

## 2022-08-29 RX ORDER — LIDOCAINE 40 MG/G
CREAM TOPICAL ONCE
Status: CANCELLED | OUTPATIENT
Start: 2022-08-29 | End: 2022-08-29

## 2022-08-29 RX ORDER — BACITRACIN, NEOMYCIN, POLYMYXIN B 400; 3.5; 5 [USP'U]/G; MG/G; [USP'U]/G
OINTMENT TOPICAL ONCE
Status: CANCELLED | OUTPATIENT
Start: 2022-08-29 | End: 2022-08-29

## 2022-08-29 RX ADMIN — LIDOCAINE HYDROCHLORIDE: 40 SOLUTION TOPICAL at 07:55

## 2022-08-29 ASSESSMENT — PAIN DESCRIPTION - PAIN TYPE: TYPE: ACUTE PAIN

## 2022-08-29 ASSESSMENT — PAIN DESCRIPTION - LOCATION: LOCATION: LEG

## 2022-08-29 ASSESSMENT — PAIN SCALES - GENERAL: PAINLEVEL_OUTOF10: 8

## 2022-08-29 ASSESSMENT — PAIN DESCRIPTION - ORIENTATION: ORIENTATION: RIGHT

## 2022-08-29 ASSESSMENT — PAIN DESCRIPTION - DESCRIPTORS: DESCRIPTORS: BURNING

## 2022-08-29 NOTE — PROGRESS NOTES
Multilayer Compression Wrap   (Not Unna) Below the Knee    NAME:  Kylee Roldan. YOB: 1950  MEDICAL RECORD NUMBER:  [id number]  DATE:  8/29/2022       Removed old Multilayer wrap if present and washed leg with mild soap/water. Applied moisturizing agent to dry skin as needed. Applied primary and secondary dressing as ordered    Applied multilayered dressing below the knee to Right lower leg(s)  (2 Layer Lite compression wrap ) . Instructed patient/caregiver not to remove dressing and to keep it clean and dry. Instructed patient/caregiver on complications to report to provider, such as pain, numbness in toes, heavy drainage, and slippage of dressing. Instructed patient on purpose of compression dressing and on activity and exercise recommendations.    Applied per   Guidelines    Electronically signed by Ryne Walker RN on 8/29/2022 at 8:18 AM

## 2022-08-29 NOTE — PROGRESS NOTES
2022     Yesika Cintron (:  1950) is a 81 y.o. male, here for evaluation of the following medical concerns:    HPI    Hypertension:  Home blood pressure monitoring: No. Patient denies chest pain, shortness of breath, headache, lightheadedness, blurred vision, peripheral edema, palpitations, dry cough, and fatigue. Antihypertensive medication side effects: no medication side effects noted. Use of agents associated with hypertension: none. Sodium (mmol/L)   Date Value   2022 138    BUN (mg/dL)   Date Value   2022 38 (H)    Glucose (mg/dL)   Date Value   2022 120 (H)      Potassium (mmol/L)   Date Value   2022 5.1     Potassium reflex Magnesium (mmol/L)   Date Value   2022 4.3    Creatinine (mg/dL)   Date Value   2022 1.5 (H)         Hyperlipidemia:  No new myalgias or GI upset on atorvastatin (Lipitor) and Repatha. Lab Results   Component Value Date    CHOL 128 2022    TRIG 294 (H) 2022    HDL 20 (L) 2022    LDLCALC 49 2022    LDLDIRECT 96 2018     Lab Results   Component Value Date    ALT 23 2022    AST 42 (H) 2022        Congestive Heart Failure  Patient presents for re-evaluation of congestive heart failure. Patient's current complaints are none. He denies chest pressure/discomfort, claudication, dyspnea, fatigue, irregular heart beat, near-syncope, and palpitations. He states he is compliant most of the time with his medications. He states he is compliant most of the time with his diet. Coronary Artery Disease  Patient presents for routine coronary artery disease follow-up. Current symptoms: chest heaviness, chest pressure/discomfort, chest tightness, and shortness of breath. Aggravating factors: none. Alleviating factors: none.  Cardiac risk factors include advanced age (older than 54 for men, 72 for women), dyslipidemia, hypertension, male gender, obesity (BMI >= 30 kg/m2), sedentary lifestyle, and smoking/ tobacco exposure. Review of Systems   Constitutional:  Negative for fatigue. Eyes:  Negative for visual disturbance. Respiratory:  Negative for cough, chest tightness and shortness of breath. Cardiovascular:  Negative for chest pain, palpitations and leg swelling. Endocrine: Negative for polydipsia, polyphagia and polyuria. Genitourinary:  Negative for frequency. Skin:  Positive for wound. Negative for rash. Neurological:  Negative for dizziness, syncope, weakness and light-headedness. Prior to Visit Medications    Medication Sig Taking? Authorizing Provider   Evolocumab 140 MG/ML SOAJ Inject 140 mg into the skin every 14 days Take an injection every 14 days. Yes Historical Provider, MD   fluticasone-umeclidin-vilant (TRELEGY ELLIPTA) 100-62.5-25 MCG/INH AEPB Inhale 1 puff into the lungs in the morning. Yes Janis Guzman DO   potassium chloride (MICRO-K) 10 MEQ extended release capsule 1 tablet Yes Janis Guzman DO   furosemide (LASIX) 40 MG tablet Take 1 tablet by mouth in the morning. Yes Janis Guzman DO   metoprolol tartrate (LOPRESSOR) 50 MG tablet Take 1.5 tablets by mouth in the morning and 1.5 tablets before bedtime. Yes BRANDI Perez - CNP   apixaban (ELIQUIS) 5 MG TABS tablet Take 1 tablet by mouth in the morning and 1 tablet before bedtime. Yes Mark Dudley DO   losartan (COZAAR) 100 MG tablet Take 1 tablet by mouth in the morning.  Yes Mark Dudley DO   albuterol sulfate HFA (PROVENTIL;VENTOLIN;PROAIR) 108 (90 Base) MCG/ACT inhaler Inhale 2 puffs into the lungs every 6 hours as needed for Wheezing Yes Janis Guzman DO   atorvastatin (LIPITOR) 80 MG tablet Take 1 tablet by mouth daily Yes Janis Guzman DO   clopidogrel (PLAVIX) 75 MG tablet Take 1 tablet by mouth daily Yes Janis Guzman DO   allopurinol (ZYLOPRIM) 100 MG tablet Take 1 tablet by mouth daily Yes Janis Guzman DO   omeprazole (PRILOSEC) 40 MG delayed release capsule TAKE 1 CAPSULE BY MOUTH DAILY Yes Jeny Stearns, DO   pregabalin (LYRICA) 150 MG capsule Take 1 capsule by mouth 2 times daily for 30 days. Jeny Stearns, DO   folic acid (FOLVITE) 1 MG tablet Take 1 tablet by mouth daily  Cash Gomez MD   nitroGLYCERIN (NITROSTAT) 0.4 MG SL tablet Place 1 tablet under the tongue every 5 minutes as needed for Chest pain  Patient not taking: No sig reported  Jeny Stearns, DO   magnesium gluconate (MAGONATE) 500 MG tablet Take 500 mg by mouth daily. Historical Provider, MD        Social History     Tobacco Use    Smoking status: Former     Packs/day: 0.25     Years: 52.00     Pack years: 13.00     Types: Cigarettes     Start date: 1967     Quit date: 2022     Years since quittin.2    Smokeless tobacco: Never    Tobacco comments:     reports he quit circa 22   Substance Use Topics    Alcohol use: Yes     Alcohol/week: 0.0 standard drinks     Comment: 2-3 beers a month        Vitals:    22 0959   BP: 122/63   Pulse: 62   Temp: 98.1 °F (36.7 °C)   TempSrc: Temporal   Weight: 225 lb 6.4 oz (102.2 kg)   Height: 5' 10\" (1.778 m)     Estimated body mass index is 32.34 kg/m² as calculated from the following:    Height as of this encounter: 5' 10\" (1.778 m). Weight as of this encounter: 225 lb 6.4 oz (102.2 kg). Physical Exam  Constitutional:       Appearance: Normal appearance. He is obese. HENT:      Head: Normocephalic and atraumatic. Eyes:      Extraocular Movements: Extraocular movements intact. Conjunctiva/sclera: Conjunctivae normal.   Cardiovascular:      Rate and Rhythm: Normal rate and regular rhythm. Pulmonary:      Effort: Pulmonary effort is normal.      Breath sounds: Normal breath sounds. Abdominal:      General: Abdomen is flat. Bowel sounds are normal.      Palpations: Abdomen is soft. Musculoskeletal:      Right lower leg: No edema. Skin:     General: Skin is warm and dry. Findings: No erythema.       Comments: RLE cleanly bandaged,

## 2022-08-29 NOTE — DISCHARGE INSTRUCTIONS
[address] [facility] Physician Orders and Discharge Instructions  [address] [address] [address] [address] [facility]. [address] [place]  Telephone: [phone]  NAME:  Aileen Dietz. YOB: 1950  MEDICAL RECORD NUMBER:  [id number]  DATE: 8/29/22     Return Appointment:  Return Appointment: With Dr. Daniela Cheng  in  1 [facility])  [] Return Appointment for a Wound Assessment with the nurse on:     Future Appointments   Date Time Provider Orlando Colmenares   8/30/2022  9:45 AM Holland Perez MD [facility]   8/30/2022 10:15 AM DO JEFF Kelley [place] - DYD   10/13/2022  1:00 PM MD Brice Rose P/CC [facility]   12/19/2022 11:00 AM Lidia Pettit DO [address]: [facility] [phone]   F: [phone]   Medically necessary services: [x] Skilled Nursing [] PT (Eval & Treat) [] OT (Eval & Treat) [] Social Work [] Dietician  [] Other:    Wound care instructions: If you smoke we ask that you refrain from smoking. Smoking inhibits wounds from healing. When taking antibiotics take the entire prescription as ordered. Do not stop taking until medication is all gone unless otherwise instructed. Exercise as tolerated. Keep weight off wounds and reposition every 2 hours if applicable. Do not get wounds wet in the bath or shower unless otherwise instructed by your physician. If your wound is on your foot or leg, you may purchase a cast bag. Please ask at the pharmacy. Wash hands with soap and water prior to and after every dressing change. [x]Wash wounds with: Vashe wash - Apply enough Vashe to soak a piece of gauze and place on wound bed for 5-10 minutes. No need to rinse after soaking.   [x]Catrachita wound Topical Treatments: Do not apply lotions, creams, or ointments to the skin around the wound bed unless directed as followed:   Apply around the wound: Nothing         [x]Wound Location: right lower leg   Apply of infection, such as: Increased pain, swelling, warmth, or redness. Red streaks leading from the area. Pus draining from the area. A fever.          [] Patient unable to sign Discharge Instructions given to ECF/Transportation/POA

## 2022-08-29 NOTE — PROGRESS NOTES
[address] [facility] [place]  Progress Note and Procedure Note      Bettie Chu. MEDICAL RECORD NUMBER:  [id number]  AGE: 81 y.o. GENDER: male  : 1950  EPISODE DATE:  2022    Subjective:     Chief Complaint   Patient presents with    Wound Check         HISTORY of PRESENT ILLNESS HPI     Bettie Lee is a 81 y.o. male who presents today for wound/ulcer evaluation. History of Wound Context: Patient of Dr. Pablo Lara who is here for follow-up status post femoral-popliteal bypass. He is now being treated for 2 wounds of the right lower extremity. He has been wearing his Coban compression to leg without difficulty. He describes no discomfort or drainage.   He claims to have quit smoking  Wound/Ulcer Pain Timing/Severity: none  Quality of pain: N/A  Severity:  0 / 10   Modifying Factors: None  Associated Signs/Symptoms: edema    Ulcer Identification:  Ulcer Type: arterial    Contributing Factors: edema and arterial insufficiency    Acute Wound: N/A not an acute wound    PAST MEDICAL HISTORY        Diagnosis Date    CAD (coronary artery disease)     CHF (congestive heart failure) ([facility])     diastolic    COPD (chronic obstructive pulmonary disease) ([facility])     Critical ischemia of lower extremity ([facility])     Depressive disorder, not elsewhere classified     GERD (gastroesophageal reflux disease)     History of colon polyps - 30 seen on colonoscopy 2021    History of MI (myocardial infarction) 2013    History of skin ulcer of lower extremity     R anterior shin    History of transient ischemic attack (TIA)     Hx of blood clots     PE    Hyperlipidemia     Hypertension     Neuropathy     KAVITA (obstructive sleep apnea)     On CPAP    Personal history of DVT (deep vein thrombosis)     Prolonged emergence from general anesthesia     Pt reported being combative after surgery     PVD (peripheral vascular disease) ([facility] [address]     TIA (transient ischemic attack) 2013    Vertebral fracture        PAST SURGICAL HISTORY    Past Surgical History:   Procedure Laterality Date    APPENDECTOMY      CHOLECYSTECTOMY, LAPAROSCOPIC      COLONOSCOPY  4/23/2021    COLONOSCOPY POLYPECTOMY SNARE/COLD BIOPSY performed by Willis Mustafa MD at [place] [address]  4/23/2021    COLONOSCOPY POLYPECTOMY ABLATION performed by Willis Mustafa MD at [facility]  4/23/2021    COLONOSCOPY CONTROL HEMORRHAGE performed by Willis Mustafa MD at [address]  6/2013    EYE SURGERY Left     FEMORAL BYPASS Right 7/18/2022    RIGHT FEMORAL BELOW KNEE POPLITEAL ARTERY BYPASS WITH IN SITU SAPHENOUS VEIN performed by Zakiya Jarquin MD at [address] Right 11/17/2021    RIGHT FEMORAL ENDARTERECTOMY  RIGHT EXTERNAL ILIAC TO PROFUNDA FEMORAL BYPASS WITH 8MM PTFE GRAFT RIGHT LOWER EXTREMITY ARTERIOGRAM BALLOON ANGIOPLASTY RIGHT PROFUNDA BALLOON ANGIOPLASTY AND STENT OF RIGHT EXTERNAL ILIAC ARTERY performed by Zakiya Jarquin MD at [address]  11/18/2015    Bilateral decompressive lumbar laminectomy L4-5   ; medial facetectomy L4-5 joints  bilaterally; foraminotomies l5   nerve roots bilaterally    LEG DEBRIDEMENT Right 7/23/2013    Dr. Scar Craven - pretibial wound    OTHER SURGICAL HISTORY Right 6/25/2013    Dr. Scar Craven - CFA Endarterectomy w/marsupialization; RLE wound excision & debridement     OTHER SURGICAL HISTORY Left 8/27/2013    Dr. Scar Craven - femoral & profunda femoris endarterectomy w/marsupialization patch angioplasty using SFA    SKIN SPLIT GRAFT Right 7/25/2013    Dr. Scar Craven - to non-healing R pretibial wound, 9 x 15 cm    TOTAL HIP ARTHROPLASTY Right 09/01/2016    Dr. Booker Vega Left 12/22/2015    Dr. Scar Craven - CFA exploration, thrombectomy of ileofemoral system, stenting of RAFAL in-stent stenosis w/8 x 37 mm Palmaz        FAMILY HISTORY    Family History   Problem Relation Age of Onset of right lower extremity with rest pain ([facility] [address]    4. PVD (peripheral vascular disease) ([facility] [address]         Procedure Note  Indications:  Based on my examination of this patient's wound(s) today, sharp excision is required to promote healing and evaluate the extent healing. Performed by: Caitie Gilmore MD    Consent obtained? Yes    Time out taken: Yes    Pain Control: Anesthetic: 4% Lidocaine Cream     Debridement:Excisional Debridement    Using curette the wound was sharply debrided    down through and including the removal of  epidermis, dermis, and subcutaneous tissue.     Devitalized Tissue Debrided:  fibrin and slough      Pre Debridement Measurements:  Are located in the Wound Documentation Flow Sheet   Wound #: 1     Post  Debridement Measurements:  Wound 11/15/21 Leg Distal;Right #1 (Active)   Wound Image   06/20/22 1343   Wound Etiology Arterial 08/22/22 1433   Dressing Status Old drainage noted 07/25/22 2045   Wound Cleansed Cleansed with saline 08/29/22 0750   Dressing/Treatment Collagen with Ag 08/29/22 0809   Wound Length (cm) 5 cm 08/29/22 0750   Wound Width (cm) 2.3 cm 08/29/22 0750   Wound Depth (cm) 0.1 cm 08/29/22 0750   Wound Surface Area (cm^2) 11.5 cm^2 08/29/22 0750   Change in Wound Size % (l*w) -379.17 08/29/22 0750   Wound Volume (cm^3) 1.15 cm^3 08/29/22 0750   Wound Healing % -379 08/29/22 0750   Post-Procedure Length (cm) 5.1 cm 08/29/22 0809   Post-Procedure Width (cm) 2.4 cm 08/29/22 0809   Post-Procedure Depth (cm) 0.3 cm 08/29/22 0809   Post-Procedure Surface Area (cm^2) 12.24 cm^2 08/29/22 0809   Post-Procedure Volume (cm^3) 3.672 cm^3 08/29/22 0809   Distance Tunneling (cm) 0 cm 08/29/22 0750   Tunneling Position ___ O'Clock 0 07/11/22 1310   Undermining Starts ___ O'Clock 0 07/11/22 1310   Undermining Ends___ O'Clock 0 07/11/22 1310   Undermining Maxium Distance (cm) 0 08/29/22 0750   Wound Assessment [place]/red;Slough 08/29/22 0750   Drainage Amount Moderate 08/29/22 0750   Drainage Description Brown 08/29/22 0750   Odor None 08/29/22 0750   Catrachita-wound Assessment Edematous 08/29/22 0750   Margins Defined edges 08/29/22 0750   Wound Thickness Description not for Pressure Injury Full thickness 08/29/22 0750   Number of days: 286          Percent of Wound Debrided: 100%    Total Surface Area Debrided:  12 sq cm    Diabetic/Pressure/Non Pressure Ulcers only:  Ulcer: Non-Pressure ulcer, fat layer exposed    Bleeding: Minimal    Hemostasis Achieved: by pressure    Procedural Pain: 0  / 10     Post Procedural Pain: 0 / 10     Response to treatment:  Well tolerated by patient. Plan: Will order TheraSkin allograft deemed medically necessary to assist with wound healing right lower extremity  Treatment Note: Please see attached Discharge Instructions. These instructions were given and signed by the patient or POA    New Prescriptions    No medications on file       Orders Placed This Encounter   Procedures    Initiate Outpatient Wound Care Protocol       Discharge Instructions          [facility] Physician Orders and Discharge Instructions  [address] [place] [address] [address] [place] [address]. [place]  Telephone: [phone]  NAME:  Robert Ibrahim.   YOB: 1950  MEDICAL RECORD NUMBER:  [id number]  DATE: 8/29/22     Return Appointment:  Return Appointment: With Dr. Bettie Zacarias  in  [facility])  [] Return Appointment for a Wound Assessment with the nurse on:     Future Appointments   Date Time Provider [place] Colmenares   8/30/2022  9:45 AM Alfred Figueredo MD [facility]   8/30/2022 10:15 AM Carolyn Snyder DO [facility] - DYD   10/13/2022  1:00 PM Rolly Mathias MD [place] P/[facility]   12/19/2022 11:00 AM Carolyn Snyder DO [address]: [facility] [phone]   F: [phone]   Medically necessary services: [x] Skilled Nursing [] PT (Eval & Treat) [] OT (Eval & Treat) [] Social Work [] Dietician  [] Other:    Wound care instructions: If you smoke we ask that you refrain from smoking. Smoking inhibits wounds from healing. When taking antibiotics take the entire prescription as ordered. Do not stop taking until medication is all gone unless otherwise instructed. Exercise as tolerated. Keep weight off wounds and reposition every 2 hours if applicable. Do not get wounds wet in the bath or shower unless otherwise instructed by your physician. If your wound is on your foot or leg, you may purchase a cast bag. Please ask at the pharmacy. Wash hands with soap and water prior to and after every dressing change. [x]Wash wounds with: Vashe wash - Apply enough Vashe to soak a piece of gauze and place on wound bed for 5-10 minutes. No need to rinse after soaking. [x]Catrachita wound Topical Treatments: Do not apply lotions, creams, or ointments to the skin around the wound bed unless directed as followed:   Apply around the wound: Nothing         [x]Wound Location: right lower leg   Apply Primary Dressing to wound: Oly  Secondary Dressing: 4X4 gauze pad   Avoid contact of tape with skin if possible. When to change Dressing: DO NOT CHANGE      [x] Multilayer Compression Wrap:  Type: 2 Layer Lite Compression Wrap- right lower leg    Do not get leg(s) with wrap wet. If wraps become too tight call the center or completely remove the wrap. Elevate leg(s) above the level of the heart when sitting. Avoid prolonged standing in one place. Applied in Clinic on 8/29/2022  The Goal of this therapy is to reduce edema and get into long term compression garments to control venous insufficiency, lymphedema and reduce occurrence of venous ulcers    [x] Edema Control:  Apply: Spandagrip/Medigrip on Left; Medium compression 10-20 mm/Hg.  They should be applied to affected leg(s) from mid foot to knee making sure to cover the heel      Elevate leg(s) above the level of the heart for 30 minutes 4-5 times a day and/or when sitting. Avoid prolonged standing in one place. Dietary:  Important dietary reminders:  1. Increase Protein intake (i.e. Lean meats, fish, eggs, legumes, and yogurt)  2. No added salt  3. If diabetic, follow a diabetic diet and check glucose prior to meals or as instructed by your physician. Dietary Supplements(Take twice a day unless instructed otherwise):  [] Ardia Rutland  [] 30ml ProStat [] Comal Peeks [] Ensure Max/Premier [] Other:    Your nurse  is:  Melissa Grayson     Electronically signed by Minna Marin RN on 8/29/2022 at [facility]: Should you experience any significant changes in your wound(s) or have questions about your wound care, please contact the [address] [place] [address] at [phone]. We are open from 8:00am - 4:30p Monday, Thursday and Friday; 11:00am - 5pm on Tuesday and CLOSED Wednesday. Please give us 24-48 business hours to return your call. Call your doctor now or seek immediate medical care if:    You have symptoms of infection, such as: Increased pain, swelling, warmth, or redness. Red streaks leading from the area. Pus draining from the area. A fever.          [] Patient unable to sign Discharge Instructions given to ECF/Transportation/POA         Electronically signed by Rocio Jeter MD on 8/29/2022 at 8:19 AM

## 2022-08-30 ENCOUNTER — OFFICE VISIT (OUTPATIENT)
Dept: PRIMARY CARE CLINIC | Age: 72
End: 2022-08-30
Payer: MEDICARE

## 2022-08-30 ENCOUNTER — OFFICE VISIT (OUTPATIENT)
Dept: CARDIOLOGY CLINIC | Age: 72
End: 2022-08-30
Payer: MEDICARE

## 2022-08-30 VITALS
SYSTOLIC BLOOD PRESSURE: 138 MMHG | WEIGHT: 216 LBS | BODY MASS INDEX: 30.92 KG/M2 | HEIGHT: 70 IN | HEART RATE: 72 BPM | DIASTOLIC BLOOD PRESSURE: 70 MMHG

## 2022-08-30 VITALS
TEMPERATURE: 98.1 F | BODY MASS INDEX: 32.27 KG/M2 | HEIGHT: 70 IN | DIASTOLIC BLOOD PRESSURE: 63 MMHG | HEART RATE: 62 BPM | WEIGHT: 225.4 LBS | SYSTOLIC BLOOD PRESSURE: 122 MMHG

## 2022-08-30 DIAGNOSIS — E78.2 MIXED HYPERLIPIDEMIA: Chronic | ICD-10-CM

## 2022-08-30 DIAGNOSIS — I25.10 CORONARY ARTERY DISEASE INVOLVING NATIVE CORONARY ARTERY OF NATIVE HEART WITHOUT ANGINA PECTORIS: ICD-10-CM

## 2022-08-30 DIAGNOSIS — I25.5 ISCHEMIC CARDIOMYOPATHY: ICD-10-CM

## 2022-08-30 DIAGNOSIS — I50.32 DIASTOLIC DYSFUNCTION WITH CHRONIC HEART FAILURE (HCC): ICD-10-CM

## 2022-08-30 DIAGNOSIS — I10 ESSENTIAL HYPERTENSION: Primary | ICD-10-CM

## 2022-08-30 DIAGNOSIS — L97.912 NON-PRESSURE ULCER OF RIGHT LOWER EXTREMITY WITH FAT LAYER EXPOSED (HCC): ICD-10-CM

## 2022-08-30 DIAGNOSIS — I25.10 CORONARY ARTERY DISEASE INVOLVING NATIVE CORONARY ARTERY OF NATIVE HEART WITHOUT ANGINA PECTORIS: Primary | ICD-10-CM

## 2022-08-30 DIAGNOSIS — E78.2 MIXED HYPERLIPIDEMIA: ICD-10-CM

## 2022-08-30 PROBLEM — Z95.5 PRESENCE OF CORONARY ANGIOPLASTY IMPLANT AND GRAFT: Status: RESOLVED | Noted: 2021-09-22 | Resolved: 2022-08-30

## 2022-08-30 PROCEDURE — 99214 OFFICE O/P EST MOD 30 MIN: CPT | Performed by: STUDENT IN AN ORGANIZED HEALTH CARE EDUCATION/TRAINING PROGRAM

## 2022-08-30 PROCEDURE — 99214 OFFICE O/P EST MOD 30 MIN: CPT | Performed by: INTERNAL MEDICINE

## 2022-08-30 PROCEDURE — 1123F ACP DISCUSS/DSCN MKR DOCD: CPT | Performed by: STUDENT IN AN ORGANIZED HEALTH CARE EDUCATION/TRAINING PROGRAM

## 2022-08-30 PROCEDURE — 1123F ACP DISCUSS/DSCN MKR DOCD: CPT | Performed by: INTERNAL MEDICINE

## 2022-08-30 SDOH — ECONOMIC STABILITY: FOOD INSECURITY: WITHIN THE PAST 12 MONTHS, THE FOOD YOU BOUGHT JUST DIDN'T LAST AND YOU DIDN'T HAVE MONEY TO GET MORE.: NEVER TRUE

## 2022-08-30 SDOH — ECONOMIC STABILITY: FOOD INSECURITY: WITHIN THE PAST 12 MONTHS, YOU WORRIED THAT YOUR FOOD WOULD RUN OUT BEFORE YOU GOT MONEY TO BUY MORE.: NEVER TRUE

## 2022-08-30 ASSESSMENT — ENCOUNTER SYMPTOMS
CHEST TIGHTNESS: 0
COUGH: 0
SHORTNESS OF BREATH: 0

## 2022-08-30 ASSESSMENT — SOCIAL DETERMINANTS OF HEALTH (SDOH): HOW HARD IS IT FOR YOU TO PAY FOR THE VERY BASICS LIKE FOOD, HOUSING, MEDICAL CARE, AND HEATING?: NOT HARD AT ALL

## 2022-08-30 NOTE — PROGRESS NOTES
[address]   Dr Consuelo Lisa . Parisa Miramontes MD, [address] [facility]    Outpatient Follow Up Note    5/37/6900,0:49 AM  Subjective:   CHIEF COMPLAINT / HPI:  Follow Up secondary to CAD     Starla Jones. is 81 y.o. male who presents today for a routine follow up. Peripheral vascular disease and a nonhealing wound on his right foot and leg. Apparently this has been going on for several months now. Did have surgery by Dr. Eugenia Guerra about a month ago on his right leg thigh and into his lower leg and the wounds are very slowly healing. He has some type of   Unna boot on. He has been on Repatha and I am pleased that his LDL is down to 49. Apparently having trouble getting it through the pharmacy. Otherwise all doing well from a cardiac perspective. He is on Eliquis for pulmonary emboli and DVT and is on Plavix for his coronary and vascular disease along with Lipitor 80 mg. And Repatha. Ccoronavirus vaccine Roque Peter x2 and have booster    Last cardiac cath December 2020. Stable coronary anatomy. Previous stent stent was patent. No intervention was necessary. Per lipidemia LDL 84 on 1221  DVT and pulmonary emboli September 2021 still on Eliquis therapy along with Plavix 75 mg.    CAD with coronary stents June 2013  Obstructive sleep apnea on CPAP not tolerated the CPAP another BiPAP and currently does not use  PVD and status post right leg stenting and bypass September 2021 by Dr. Eugenia Guerra  Pulmonary nodules followed by Dr. Joanne Dias                                                       Past Medical History:    Past Medical History:   Diagnosis Date    CAD (coronary artery disease)     CHF (congestive heart failure) ([facility])     diastolic    COPD (chronic obstructive pulmonary disease) ([facility] [address]     Critical ischemia of lower extremity (HCC)     Depressive disorder, not elsewhere classified     GERD (gastroesophageal reflux disease)     History of colon polyps - 30 seen on colonoscopy 04/2021 04/24/2021    History of MI (myocardial infarction) 05/2013    History of skin ulcer of lower extremity     R anterior shin    History of transient ischemic attack (TIA)     Hx of blood clots     PE    Hyperlipidemia     Hypertension     Neuropathy     KAVITA (obstructive sleep apnea)     On CPAP    Personal history of DVT (deep vein thrombosis)     Prolonged emergence from general anesthesia     Pt reported being combative after surgery     PVD (peripheral vascular disease) ([address]     TIA (transient ischemic attack) 2013    Vertebral fracture      Past Surgical History  Past Surgical History:   Procedure Laterality Date    APPENDECTOMY      CHOLECYSTECTOMY, LAPAROSCOPIC      COLONOSCOPY  4/23/2021    COLONOSCOPY POLYPECTOMY SNARE/COLD BIOPSY performed by Mouna Navas MD at [facility]  4/23/2021    COLONOSCOPY POLYPECTOMY ABLATION performed by Mouna Navas MD at [facility]  4/23/2021    COLONOSCOPY CONTROL HEMORRHAGE performed by Mouna Navas MD at [address] [facility]  6/2013    EYE SURGERY Left     FEMORAL BYPASS Right 7/18/2022    RIGHT FEMORAL BELOW KNEE POPLITEAL ARTERY BYPASS WITH IN SITU SAPHENOUS VEIN performed by Marge Salas MD at [address] Right 11/17/2021    RIGHT FEMORAL ENDARTERECTOMY  RIGHT EXTERNAL ILIAC TO PROFUNDA FEMORAL BYPASS WITH 8MM PTFE GRAFT RIGHT LOWER EXTREMITY ARTERIOGRAM BALLOON ANGIOPLASTY RIGHT PROFUNDA BALLOON ANGIOPLASTY AND STENT OF RIGHT EXTERNAL ILIAC ARTERY performed by Marge Salas MD at [facility]  11/18/2015    Bilateral decompressive lumbar laminectomy L4-5   ; medial facetectomy L4-5 joints  bilaterally; foraminotomies l5   nerve roots bilaterally    LEG DEBRIDEMENT Right 7/23/2013    Dr. Nikia Salazar - pretibial wound    OTHER SURGICAL HISTORY Right 6/25/2013    Dr. Nikia Salazar - CFA Endarterectomy w/marsupialization; RLE wound excision & debridement     OTHER SURGICAL HISTORY Left 2013    Dr. Amanda Cheng - femoral & profunda femoris endarterectomy w/marsupialization patch angioplasty using SFA    SKIN SPLIT GRAFT Right 2013    Dr. Amanda Cheng - to non-healing R pretibial wound, 9 x 15 cm    TOTAL HIP ARTHROPLASTY Right 2016    Dr. Gio Hines Left 2015    Dr. Amanda Cheng - CFA exploration, thrombectomy of ileofemoral system, stenting of RAFAL in-stent stenosis w/8 x 37 mm Palmaz      Social History:       Social History     Tobacco Use   Smoking Status Former    Packs/day: 0.25    Years: 52.00    Pack years: 13.00    Types: Cigarettes    Start date: 1967    Quit date: 2022    Years since quittin.2   Smokeless Tobacco Never   Tobacco Comments    reports he quit circa 22     Current Medications:  Prior to Visit Medications    Medication Sig Taking? Authorizing Provider   fluticasone-umeclidin-vilant (TRELEGY ELLIPTA) 100-62.5-25 MCG/INH AEPB Inhale 1 puff into the lungs in the morning. Yes Pau Gibson, DO   potassium chloride (MICRO-K) 10 MEQ extended release capsule 1 tablet Yes Pau Gibson, DO   furosemide (LASIX) 40 MG tablet Take 1 tablet by mouth in the morning. Yes Pau Gibson DO   metoprolol tartrate (LOPRESSOR) 50 MG tablet Take 1.5 tablets by mouth in the morning and 1.5 tablets before bedtime. Yes Leilani Bynum, APRN - CNP   apixaban (ELIQUIS) 5 MG TABS tablet Take 1 tablet by mouth in the morning and 1 tablet before bedtime. Yes Mark Dudley DO   DULoxetine (CYMBALTA) 20 MG extended release capsule Take 1 capsule by mouth in the morning. Yes Mark Dudley DO   traZODone (DESYREL) 50 MG tablet Take 1 tablet by mouth nightly as needed for Sleep Yes Mark Dduley DO   losartan (COZAAR) 100 MG tablet Take 1 tablet by mouth in the morning. Yes Mark Dudley DO   docusate sodium (COLACE, DULCOLAX) 100 MG CAPS Take 100 mg by mouth in the morning and 100 mg before bedtime.  Yes Mark Dudley DO   albuterol sulfate HFA (PROVENTIL;VENTOLIN;PROAIR) 108 (90 Base) MCG/ACT inhaler Inhale 2 puffs into the lungs every 6 hours as needed for Wheezing Yes Brandon Glass DO   atorvastatin (LIPITOR) 80 MG tablet Take 1 tablet by mouth daily Yes Brandon Glass DO   clopidogrel (PLAVIX) 75 MG tablet Take 1 tablet by mouth daily Yes Brandon Glass DO   allopurinol (ZYLOPRIM) 100 MG tablet Take 1 tablet by mouth daily Yes Brandon Glass DO   omeprazole (PRILOSEC) 40 MG delayed release capsule TAKE 1 CAPSULE BY MOUTH DAILY Yes Brandon Glass DO   pregabalin (LYRICA) 150 MG capsule Take 1 capsule by mouth 2 times daily for 30 days. Brandon Glass DO   folic acid (FOLVITE) 1 MG tablet Take 1 tablet by mouth daily  Oscar Nieves MD   nitroGLYCERIN (NITROSTAT) 0.4 MG SL tablet Place 1 tablet under the tongue every 5 minutes as needed for Chest pain  Patient not taking: No sig reported  Brandon Glass DO   magnesium gluconate (MAGONATE) 500 MG tablet Take 500 mg by mouth daily. Historical Provider, MD     Family History  Family History   Problem Relation Age of Onset    Cancer Mother         Breast    Heart Disease Mother     Cancer Father         Bladder    Heart Disease Father     Cancer Paternal Grandmother         melanoma        Current Medications  Current Outpatient Medications   Medication Sig Dispense Refill    fluticasone-umeclidin-vilant (TRELEGY ELLIPTA) 100-62.5-25 MCG/INH AEPB Inhale 1 puff into the lungs in the morning. 28 each 11    potassium chloride (MICRO-K) 10 MEQ extended release capsule 1 tablet 60 capsule 1    furosemide (LASIX) 40 MG tablet Take 1 tablet by mouth in the morning. 90 tablet 3    metoprolol tartrate (LOPRESSOR) 50 MG tablet Take 1.5 tablets by mouth in the morning and 1.5 tablets before bedtime. 90 tablet 5    apixaban (ELIQUIS) 5 MG TABS tablet Take 1 tablet by mouth in the morning and 1 tablet before bedtime. 60 tablet 1    DULoxetine (CYMBALTA) 20 MG extended release capsule Take 1 capsule by mouth in the morning.  30 capsule 3    traZODone (DESYREL) 50 MG tablet Take 1 tablet by mouth nightly as needed for Sleep 60 tablet 1    losartan (COZAAR) 100 MG tablet Take 1 tablet by mouth in the morning. 30 tablet 3    docusate sodium (COLACE, DULCOLAX) 100 MG CAPS Take 100 mg by mouth in the morning and 100 mg before bedtime. 60 capsule 1    albuterol sulfate HFA (PROVENTIL;VENTOLIN;PROAIR) 108 (90 Base) MCG/ACT inhaler Inhale 2 puffs into the lungs every 6 hours as needed for Wheezing 18 g 2    atorvastatin (LIPITOR) 80 MG tablet Take 1 tablet by mouth daily 90 tablet 1    clopidogrel (PLAVIX) 75 MG tablet Take 1 tablet by mouth daily 30 tablet 3    allopurinol (ZYLOPRIM) 100 MG tablet Take 1 tablet by mouth daily 180 tablet 1    omeprazole (PRILOSEC) 40 MG delayed release capsule TAKE 1 CAPSULE BY MOUTH DAILY 30 capsule 3    pregabalin (LYRICA) 150 MG capsule Take 1 capsule by mouth 2 times daily for 30 days. 180 capsule 1     No current facility-administered medications for this visit. REVIEW OF SYSTEMS:    CONSTITUTIONAL: No major weight gain or loss, fatigue, weakness, night sweats or fever. HEENT: No new vision difficulties or ringing in the ears. RESPIRATORY: No new SOB, PND, orthopnea or cough. CARDIOVASCULAR: See HPI  GI: No nausea, vomiting, diarrhea, constipation, abdominal pain or changes in bowel habits. : No urinary frequency, urgency, incontinence hematuria or dysuria. SKIN: No cyanosis or skin lesions. MUSCULOSKELETAL: No new muscle or joint pain. NEUROLOGICAL: No syncope or TIA-like symptoms.   PSYCHIATRIC: No anxiety, pain, insomnia or depression    Objective:   PHYSICAL EXAM:        VITALS:    Wt Readings from Last 3 Encounters:   08/30/22 216 lb (98 kg)   08/09/22 221 lb (100.2 kg)   08/02/22 225 lb (102.1 kg)     BP Readings from Last 3 Encounters:   08/30/22 138/70   08/29/22 134/71   08/22/22 124/64     Pulse Readings from Last 3 Encounters:   08/30/22 72   08/29/22 79   08/22/22 76       CONSTITUTIONAL: Cooperative, no apparent distress, and appears well nourished / developed  NEUROLOGIC:  Awake and orientated to person, place and time. PSYCH: Calm affect. SKIN: Warm and dry. HEENT: Sclera non-icteric, normocephalic, neck supple, no elevation of JVP, normal carotid pulses with no bruits and thyroid normal size. LUNGS:  No increased work of breathing and clear to auscultation, no crackles or wheezing  CARDIOVASCULAR:  Regular rate and rhythm with no murmurs, gallops, rubs, or abnormal heart sounds, normal PMI. The apical impulses not displaced  Heart tones are crisp and normal  Cervical veins are not engorged  The carotid upstroke is normal in amplitude and contour without delay or bruit  JVP is not elevated  ABDOMEN:  Normal bowel sounds, non-distended and non-tender to palpation  EXT: No edema, no calf tenderness. Pulses are present bilaterally.     DATA:    Lab Results   Component Value Date    ALT 23 08/02/2022    AST 42 (H) 08/02/2022    ALKPHOS 97 08/02/2022    BILITOT 0.4 08/02/2022     Lab Results   Component Value Date    CREATININE 1.5 (H) 08/02/2022    BUN 38 (H) 08/02/2022     08/02/2022    K 5.1 08/02/2022     08/02/2022    CO2 21 08/02/2022     Lab Results   Component Value Date    TSH 1.57 12/13/2021     Lab Results   Component Value Date    WBC 11.6 (H) 08/02/2022    HGB 10.9 (L) 08/02/2022    HCT 34.5 (L) 08/02/2022    MCV 95.7 08/02/2022     08/02/2022     No components found for: CHLPL  Lab Results   Component Value Date    TRIG 294 (H) 08/02/2022    TRIG 198 (H) 12/14/2021    TRIG 128 12/06/2020     Lab Results   Component Value Date    HDL 20 (L) 08/02/2022    HDL 28 (L) 12/14/2021    HDL 30 (L) 12/06/2020     Lab Results   Component Value Date    LDLCALC 49 08/02/2022    LDLCALC 82 12/14/2021    LDLCALC 85 12/06/2020     Lab Results   Component Value Date    LABVLDL 59 08/02/2022    LABVLDL 40 12/14/2021    LABVLDL 26 12/06/2020     Radiology Review:  Pertinent images / reports were reviewed as a part of this visit and reveals the following:    Last Echo:   Summary 9/17/21   Normal left ventricle size. There is mild to moderate concentric left   ventricular hypertrophy. Overall left ventricular systolic function appears   normal with an ejection fraction of 55-60%. No regional wall motion   abnormalities are noted. Diastolic filling parameters suggests normal   diastolic function. Estimated pulmonary artery systolic pressure is 39 mmHg assuming a right   atrial pressure of 8 mmHg. Last Stress Test / Angiogram:  Impression      2/7/20 ct lungs    Continued stable appearance of previous seen noted stable nodules. The previously noted new larger nodules have resolved. Cardiomegaly with coronary calcification. No acute infiltrates seen. Coronary anatomy:  12/9/20  The left main coronary artery has significant calcification. No significant focal stenoses. Left anterior descending artery is has significant calcification. No significant focal stenoses identified. Previous stent is patent. Circumflex artery i significant calcification proximally. No significant stenoses. The right coronary artery is a dominant vessel and has calcification but no significant stenoses. Left ventriculogram shows ejection fraction of 55%. Normal wall motion. Impression:  Diffuse coronary artery calcification with previously patent stent. No significant focal stenoses. No opportunities or need for intervention. Last ECG: On 9/17/2021 sinus rhythm at 83/min. Nonspecific ST segment changes. EKG on 5/25/2022 sinus rhythm at 56/min. LVH by voltage. Otherwise normal.  This patient was educated using the patient point room wall mount device. Absence from smokers and smoking and diet and exercising are important. Assessment:   CAD is stable. Old stent in 2013. Heart cath December 2020 with stable anatomy and patent stent.   DVT and pulmonary emboli in September 2021 currently on anticoagulant therapy. Has sleep apnea and does not tolerate the CPAP or BiPAP. Plan:   Continue current therapy. This includes Repatha which has done a remarkable job for him so far. Hopefully his wounds on his leg and foot will continue to heal and will not become gangrenous. He is following with Dr. Sheldon Pena and we will plan to see him back in about 6 months or year. Please call if we can assist further [phone]. Bubba Rodriguez. Mercedes Stewart MD, [address] [facility]  Return in 1 year.     This note was likely completed using voice recognition technology and may contain unintended errors

## 2022-09-01 DIAGNOSIS — J44.9 OSA AND COPD OVERLAP SYNDROME (HCC): ICD-10-CM

## 2022-09-01 DIAGNOSIS — G47.33 OSA AND COPD OVERLAP SYNDROME (HCC): ICD-10-CM

## 2022-09-01 RX ORDER — ALBUTEROL SULFATE 90 UG/1
2 AEROSOL, METERED RESPIRATORY (INHALATION) EVERY 6 HOURS PRN
Qty: 18 G | Refills: 2 | Status: SHIPPED | OUTPATIENT
Start: 2022-09-01 | End: 2022-10-05 | Stop reason: SDUPTHER

## 2022-09-01 NOTE — TELEPHONE ENCOUNTER
Medication:   Requested Prescriptions     Pending Prescriptions Disp Refills    albuterol sulfate HFA (PROVENTIL;VENTOLIN;PROAIR) 108 (90 Base) MCG/ACT inhaler [Pharmacy Med Name: ALBUTEROL HFA INH(200 PUFFS)18GM] 18 g 2     Sig: INHALE 2 PUFFS INTO THE LUNGS EVERY 6 HOURS AS NEEDED FOR WHEEZING        Last Filled:  06/20/22    Patient Phone Number: [phone] (home)     Last appt: 8/30/2022   Next appt: 12/14/2022    Last OARRS:   RX Monitoring 8/14/2020   Attestation -   Periodic Controlled Substance Monitoring Possible medication side effects, risk of tolerance/dependence & alternative treatments discussed. ;Assessed functional status. ;Obtaining appropriate analgesic effect of treatment. Chronic Pain > 50 MEDD Obtained or confirmed \"Consent for Opioid Use\" on file.

## 2022-09-06 ENCOUNTER — CARE COORDINATION (OUTPATIENT)
Dept: CARE COORDINATION | Age: 72
End: 2022-09-06

## 2022-09-06 ENCOUNTER — HOSPITAL ENCOUNTER (OUTPATIENT)
Dept: WOUND CARE | Age: 72
Discharge: HOME | End: 2022-09-06
Payer: MEDICARE

## 2022-09-06 VITALS
TEMPERATURE: 96.4 F | RESPIRATION RATE: 22 BRPM | SYSTOLIC BLOOD PRESSURE: 132 MMHG | HEART RATE: 71 BPM | DIASTOLIC BLOOD PRESSURE: 87 MMHG

## 2022-09-06 DIAGNOSIS — I70.221 ATHEROSCLEROSIS OF NATIVE ARTERY OF RIGHT LOWER EXTREMITY WITH REST PAIN (HCC): ICD-10-CM

## 2022-09-06 DIAGNOSIS — S81.801D OPEN WOUND OF RIGHT LOWER LEG, SUBSEQUENT ENCOUNTER: ICD-10-CM

## 2022-09-06 DIAGNOSIS — I73.9 PVD (PERIPHERAL VASCULAR DISEASE) (HCC): ICD-10-CM

## 2022-09-06 DIAGNOSIS — L97.912 NON-PRESSURE ULCER OF RIGHT LOWER EXTREMITY WITH FAT LAYER EXPOSED (HCC): Primary | ICD-10-CM

## 2022-09-06 PROCEDURE — 29581 APPL MULTLAYER CMPRN SYS LEG: CPT

## 2022-09-06 PROCEDURE — 6370000000 HC RX 637 (ALT 250 FOR IP): Performed by: SPECIALIST

## 2022-09-06 PROCEDURE — 11042 DBRDMT SUBQ TIS 1ST 20SQCM/<: CPT

## 2022-09-06 PROCEDURE — 15271 SKIN SUB GRAFT TRNK/ARM/LEG: CPT | Performed by: SPECIALIST

## 2022-09-06 PROCEDURE — 15271 SKIN SUB GRAFT TRNK/ARM/LEG: CPT

## 2022-09-06 RX ORDER — GINSENG 100 MG
CAPSULE ORAL ONCE
Status: CANCELLED | OUTPATIENT
Start: 2022-09-06 | End: 2022-09-06

## 2022-09-06 RX ORDER — GENTAMICIN SULFATE 1 MG/G
OINTMENT TOPICAL ONCE
Status: CANCELLED | OUTPATIENT
Start: 2022-09-06 | End: 2022-09-06

## 2022-09-06 RX ORDER — BACITRACIN, NEOMYCIN, POLYMYXIN B 400; 3.5; 5 [USP'U]/G; MG/G; [USP'U]/G
OINTMENT TOPICAL ONCE
Status: CANCELLED | OUTPATIENT
Start: 2022-09-06 | End: 2022-09-06

## 2022-09-06 RX ORDER — LIDOCAINE HYDROCHLORIDE 40 MG/ML
SOLUTION TOPICAL ONCE
Status: COMPLETED | OUTPATIENT
Start: 2022-09-06 | End: 2022-09-06

## 2022-09-06 RX ORDER — BETAMETHASONE DIPROPIONATE 0.05 %
OINTMENT (GRAM) TOPICAL ONCE
Status: CANCELLED | OUTPATIENT
Start: 2022-09-06 | End: 2022-09-06

## 2022-09-06 RX ORDER — LIDOCAINE HYDROCHLORIDE 20 MG/ML
JELLY TOPICAL ONCE
Status: CANCELLED | OUTPATIENT
Start: 2022-09-06 | End: 2022-09-06

## 2022-09-06 RX ORDER — BACITRACIN ZINC AND POLYMYXIN B SULFATE 500; 1000 [USP'U]/G; [USP'U]/G
OINTMENT TOPICAL ONCE
Status: CANCELLED | OUTPATIENT
Start: 2022-09-06 | End: 2022-09-06

## 2022-09-06 RX ORDER — LIDOCAINE 40 MG/G
CREAM TOPICAL ONCE
Status: CANCELLED | OUTPATIENT
Start: 2022-09-06 | End: 2022-09-06

## 2022-09-06 RX ORDER — CLOBETASOL PROPIONATE 0.5 MG/G
OINTMENT TOPICAL ONCE
Status: CANCELLED | OUTPATIENT
Start: 2022-09-06 | End: 2022-09-06

## 2022-09-06 RX ORDER — LIDOCAINE 50 MG/G
OINTMENT TOPICAL ONCE
Status: CANCELLED | OUTPATIENT
Start: 2022-09-06 | End: 2022-09-06

## 2022-09-06 RX ORDER — LIDOCAINE HYDROCHLORIDE 40 MG/ML
SOLUTION TOPICAL ONCE
Status: CANCELLED | OUTPATIENT
Start: 2022-09-06 | End: 2022-09-06

## 2022-09-06 RX ADMIN — LIDOCAINE HYDROCHLORIDE: 40 SOLUTION TOPICAL at 08:04

## 2022-09-06 ASSESSMENT — PAIN DESCRIPTION - LOCATION: LOCATION: LEG

## 2022-09-06 ASSESSMENT — PAIN DESCRIPTION - FREQUENCY: FREQUENCY: CONTINUOUS

## 2022-09-06 ASSESSMENT — PAIN DESCRIPTION - DESCRIPTORS: DESCRIPTORS: SHARP

## 2022-09-06 ASSESSMENT — PAIN SCALES - GENERAL: PAINLEVEL_OUTOF10: 9

## 2022-09-06 ASSESSMENT — PAIN DESCRIPTION - ORIENTATION: ORIENTATION: RIGHT

## 2022-09-06 ASSESSMENT — PAIN DESCRIPTION - PAIN TYPE: TYPE: ACUTE PAIN

## 2022-09-06 NOTE — DISCHARGE INSTRUCTIONS
[facility] Physician Orders and Discharge Instructions  [address] [address] [address] Goncalves  Telephone: [phone]  NAME:  Sherron Mansfield. YOB: 1950  MEDICAL RECORD NUMBER:  [id number]  DATE: 9/6/22     Return Appointment:  Return Appointment: With Dr Olinda Puga  in  [facility])  [x] Return Appointment for a Wound Assessment with the nurse on: 09/09/22    Future Appointments   Date Time Provider [place] Colmenares   9/12/2022  1:00 PM Gus Champion MD [address] [facility]   10/13/2022  1:00 PM [facility] MD [facility]   12/14/2022  9:45 AM Laci Session, DO [facility] [place] - DYD   12/19/2022 11:00 AM Laci Session, DO [address]: [facility] [phone]   F: [phone]   Medically necessary services: [x] Skilled Nursing [] PT (Eval & Treat) [] OT (Eval & Treat) [] Social Work [] Dietician  [] Other:    Wound care instructions: If you smoke we ask that you refrain from smoking. Smoking inhibits wounds from healing. When taking antibiotics take the entire prescription as ordered. Do not stop taking until medication is all gone unless otherwise instructed. Exercise as tolerated. Keep weight off wounds and reposition every 2 hours if applicable. Do not get wounds wet in the bath or shower unless otherwise instructed by your physician. If your wound is on your foot or leg, you may purchase a cast bag. Please ask at the pharmacy. Wash hands with soap and water prior to and after every dressing change. [x]Wash wounds with: Vashe wash - Apply enough Vashe to soak a piece of gauze and place on wound bed for 5-10 minutes. No need to rinse after soaking.   [x]Catrachita wound Topical Treatments: Do not apply lotions, creams, or ointments to the skin around the wound bed unless directed as followed:   Apply around the wound: No-Sting barrier film [x]Application of a biologic skin substitute: Yes: Theraskin : Wound Location: right anterior lower leg, theraskin applied, secured with steri strips, mepitel, hydrogel, gauze  This is a highly specialized wound care dressing that is intended to enhance your own bodies ability to increase growth factors and other healing abilities. Please leave the dressing clean, dry and intact unless otherwise instructed by your physician. Leave steri-strips and non adherent in place if changing the dressing as instructed. []Instructions for [address] [place] [employer] [facility] if applicable:   .      [x] Multilayer Compression Wrap:  Type: 2 Layer Lite Compression Wrap- right    Do not get leg(s) with wrap wet. If wraps become too tight call the center or completely remove the wrap. Elevate leg(s) above the level of the heart when sitting. Avoid prolonged standing in one place. Applied in Clinic on 9/6/2022  The Goal of this therapy is to reduce edema and get into long term compression garments to control venous insufficiency, lymphedema and reduce occurrence of venous ulcers    [x] Edema Control:  Apply: Spandagrip/Medigrip on Left; Medium compression 10-20 mm/Hg. They should be applied to affected leg(s) from mid foot to knee making sure to cover the heel      Elevate leg(s) above the level of the heart for 30 minutes 4-5 times a day and/or when sitting. Avoid prolonged standing in one place. Dietary:  Important dietary reminders:  1. Increase Protein intake (i.e. Lean meats, fish, eggs, legumes, and yogurt)  2. No added salt  3. If diabetic, follow a diabetic diet and check glucose prior to meals or as instructed by your physician.     Dietary Supplements(Take twice a day unless instructed otherwise):  [] Veronika Rodriguez  [] 30ml ProStat [] Caretha Colleton [] Ensure Max/Premier [] Other:    Your nurse  is:  Rosy Knight     Electronically signed by Umm Delgado RN on 9/6/2022 at 8:37 AM     Ernest[address]: Should you

## 2022-09-06 NOTE — PROGRESS NOTES
[facility]  Progress Note and Procedure Note      Diana Montemayor. MEDICAL RECORD NUMBER:  [id number]  AGE: 81 y.o. GENDER: male  : 1950  EPISODE DATE:  2022    Subjective:     Chief Complaint   Patient presents with    Wound Check     Right lower leg         HISTORY of PRESENT ILLNESS HPI     Diana Montemayor. is a 81 y.o. male who presents today for wound/ulcer evaluation. History of Wound Context: Patient of Dr. Ayo Pelaez who is here for follow-up status post femoral popliteal bypass. He is being now treated for single wound overlying the right anterior knee. Surgical incision is healed. He states he cut off his Coban compression leg this past week because of drainage in spite of the lower extremity edema. Sherryle Moder He continues to claim that he is not smoking. Wound has failed to heal greater than 30%-50% at initial assessment.   Patient has received greater than 30 days of conservative treatment including debridements at each wound care visit and the following: Compression    Pain Assessment:  Wound/Ulcer Pain Timing/Severity: none  Quality of pain: N/A  Severity:  0 / 10   Modifying Factors: None  Associated Signs/Symptoms: edema    Ulcer Identification:  Ulcer Type: arterial  Contributing Factors: edema and arterial insufficiency    Last Y7H if applicable:   Hemoglobin A1C   Date Value Ref Range Status   2020 6.1 See comment % Final     Comment:     Comment:  Diagnosis of Diabetes: > or = 6.5%  Increased risk of diabetes (Prediabetes): 5.7-6.4%  Glycemic Control: Nonpregnant Adults: <7.0%                    Pregnant: <6.0%           Objective:      /87   Pulse 71   Temp (!) 96.4 °F (35.8 °C) (Temporal)   Resp 22     Wt Readings from Last 3 Encounters:   22 225 lb 6.4 oz (102.2 kg)   22 216 lb (98 kg)   22 221 lb (100.2 kg)       PHYSICAL EXAM    General Appearance: alert and oriented to person, place and time, well-developed and well-nourished, in no acute distress  Extremities: no cyanosis, no clubbing, 1 + edema-  right lower extremity, and full-thickness wound containing fibrin, minimal granulation tissue distal anterior knee designated as wound #1 Doppler signal heard exterior tibial and dorsalis pedis artery. Periwound dry with flaky skin      Assessment:      1. Non-pressure ulcer of right lower extremity with fat layer exposed ([address]    2. Open wound of right lower leg, subsequent encounter    3. Atherosclerosis of native artery of right lower extremity with rest pain ([address]    4. PVD (peripheral vascular disease) ([address]         Procedure Note  Indications:  Based on my examination of this patient's wound(s) today, sharp excision is required to promote healing and evaluate the extent healing. Performed by: Odalis Frazier MD    Consent obtained? Yes    Time out taken: Yes    Pain Control: Anesthetic: 4% Lidocaine Liquid Topical     Debridement:Excisional Debridement    Using curette the wound was sharply debrided    down through and including the removal of  epidermis, dermis, and subcutaneous tissue.     Devitalized Tissue Debrided:  fibrin and biofilm      Wound #: 1     Pre & Post  Debridement Measurements:  Wound 11/15/21 Leg Distal;Right #1 (Active)   Wound Image   09/06/22 0805   Wound Etiology Arterial 08/22/22 1433   Wound Cleansed Cleansed with saline 09/06/22 0805   Dressing/Treatment Other (comment) 09/06/22 0825   Wound Length (cm) 4.9 cm 09/06/22 0805   Wound Width (cm) 2.2 cm 09/06/22 0805   Wound Depth (cm) 0.1 cm 09/06/22 0805   Wound Surface Area (cm^2) 10.78 cm^2 09/06/22 0805   Change in Wound Size % (l*w) -349.17 09/06/22 0805   Wound Volume (cm^3) 1.078 cm^3 09/06/22 0805   Wound Healing % -349 09/06/22 0805   Post-Procedure Length (cm) 5 cm 09/06/22 0825   Post-Procedure Width (cm) 2.3 cm 09/06/22 0825   Post-Procedure Depth (cm) 0.3 cm 09/06/22 0825   Post-Procedure Surface Area (cm^2) 11.5 cm^2 09/06/22 0825 Post-Procedure Volume (cm^3) 3.45 cm^3 09/06/22 0825   Distance Tunneling (cm) 0 cm 09/06/22 0805   Tunneling Position ___ O'Clock 0 07/11/22 1310   Undermining Starts ___ O'Clock 0 07/11/22 1310   Undermining Ends___ O'Clock 0 07/11/22 1310   Undermining Maxium Distance (cm) 0 09/06/22 0805   Wound Assessment Slough 09/06/22 0805   Drainage Amount Moderate 09/06/22 0805   Drainage Description Brown 09/06/22 0805   Odor None 09/06/22 0805   Catrachita-wound Assessment Edematous 09/06/22 0805   Margins Defined edges 09/06/22 0805   Wound Thickness Description not for Pressure Injury Full thickness 09/06/22 0805   Number of days: 294          Percent of Wound Debrided: 100%    Total Surface Area Debrided:  11.5 sq cm    Diabetic/Pressure/Non Pressure Ulcers only:  Ulcer: Non-Pressure ulcer, fat layer exposed    Bleeding: Minimal    Hemostasis Achieved: by pressure    Procedural Pain: 2  / 10     Post Procedural Pain: 0 / 10     Response to treatment:  Well tolerated by patient. rocedure:  Skin Substitute Application    Performed by: Luis Bergeron MD    Ulcer Type: arterial    Consent obtained: Yes    Time out taken: Yes    Product Utilized:     TheraSkin 13 sq/cm     Fenestrated: No    Mesher Utilized: No    Instrument(s) curette    Skin Substitute was Applied to Ulcer Number(s):    Ulcer #: 1    Wound 11/15/21 Leg Distal;Right #1 (Active)   Wound Image   09/06/22 0805   Wound Etiology Arterial 08/22/22 1433   Wound Cleansed Cleansed with saline 09/06/22 0805   Dressing/Treatment Other (comment) 09/06/22 0825   Wound Length (cm) 4.9 cm 09/06/22 0805   Wound Width (cm) 2.2 cm 09/06/22 0805   Wound Depth (cm) 0.1 cm 09/06/22 0805   Wound Surface Area (cm^2) 10.78 cm^2 09/06/22 0805   Change in Wound Size % (l*w) -349.17 09/06/22 0805   Wound Volume (cm^3) 1.078 cm^3 09/06/22 0805   Wound Healing % -349 09/06/22 0805   Post-Procedure Length (cm) 5 cm 09/06/22 0825   Post-Procedure Width (cm) 2.3 cm 09/06/22 0825 Post-Procedure Depth (cm) 0.3 cm 09/06/22 0825   Post-Procedure Surface Area (cm^2) 11.5 cm^2 09/06/22 0825   Post-Procedure Volume (cm^3) 3.45 cm^3 09/06/22 0825   Distance Tunneling (cm) 0 cm 09/06/22 0805   Tunneling Position ___ O'Clock 0 07/11/22 1310   Undermining Starts ___ O'Clock 0 07/11/22 1310   Undermining Ends___ O'Clock 0 07/11/22 1310   Undermining Maxium Distance (cm) 0 09/06/22 0805   Wound Assessment Slough 09/06/22 0805   Drainage Amount Moderate 09/06/22 0805   Drainage Description Brown 09/06/22 0805   Odor None 09/06/22 0805   Catrachita-wound Assessment Edematous 09/06/22 0805   Margins Defined edges 09/06/22 0805   Wound Thickness Description not for Pressure Injury Full thickness 09/06/22 0805   Number of days: 294          Diabetic/Pressure/Non Pressure Ulcers:  Ulcer:   Non-Pressure ulcer, fat layer exposed      Total Surface Area of Ulcer(s) Covered 11.5 sq/cm    Was the Product Layered  No     Amount of Product Applied 11.5 sq/cm     Amount of Product Wasted 1.5 sq/cm     Reason for Waste: The size of the wound is smaller than the product utilized     Surgically Fixated: No:     Secured With: Steri Strips and Silicone Dressing     Procedural Pain: 0/10     Post Procedural Pain: 0 / 10    Response to Treatment: Well tolerated by patient. Application of TheraSkin allograft was placed without difficulty as medically necessary. Patient is to be seen in 72 hours follow-up as he is to be wrapped once again in a Coban light compression      Plan:     Treatment Note: Please see attached Discharge Instructions. These instructions were given and signed by the patient or POA    New Medication(s) at this visit:   New Prescriptions    No medications on file       Discharge Instructions          [facility] Physician Orders and Discharge Instructions  [address] [place] [address]. [address] [place] [address]  [place]  Telephone: [phone]  NAME:  Sandra Marc Simi Ghotra. YOB: 1950  MEDICAL RECORD NUMBER:  [id number]  DATE: 9/6/22     Return Appointment:  Return Appointment: With Dr Neftaly Solorzano  in  [facility])  [x] Return Appointment for a Wound Assessment with the nurse on: 09/09/22    Future Appointments   Date Time Provider Orlando Dedra   9/12/2022  1:00 PM Chelsi Abel MD [address] [facility]   10/13/2022  1:00 PM Janie Davis MD [facility]   12/14/2022  9:45 AM Eileen Youngblood DO [facility]   12/19/2022 11:00 AM Eileen Youngblood DO [address]: [facility] [phone]   F: [phone]   Medically necessary services: [x] Skilled Nursing [] PT (Eval & Treat) [] OT (Eval & Treat) [] Social Work [] Dietician  [] Other:    Wound care instructions: If you smoke we ask that you refrain from smoking. Smoking inhibits wounds from healing. When taking antibiotics take the entire prescription as ordered. Do not stop taking until medication is all gone unless otherwise instructed. Exercise as tolerated. Keep weight off wounds and reposition every 2 hours if applicable. Do not get wounds wet in the bath or shower unless otherwise instructed by your physician. If your wound is on your foot or leg, you may purchase a cast bag. Please ask at the pharmacy. Wash hands with soap and water prior to and after every dressing change. [x]Wash wounds with: Vashe wash - Apply enough Vashe to soak a piece of gauze and place on wound bed for 5-10 minutes. No need to rinse after soaking.   [x]Catrachita wound Topical Treatments: Do not apply lotions, creams, or ointments to the skin around the wound bed unless directed as followed:   Apply around the wound: No-Sting barrier film           [x]Application of a biologic skin substitute: Yes: Theraskin : Wound Location: right anterior lower leg, theraskin applied, secured with steri strips, mepitel, hydrogel, gauze  This is a highly specialized wound care dressing that is intended to enhance your own bodies ability to increase growth factors and other healing abilities. Please leave the dressing clean, dry and intact unless otherwise instructed by your physician. Leave steri-strips and non adherent in place if changing the dressing as instructed. []Instructions for [facility] if applicable:   .      [x] Multilayer Compression Wrap:  Type: 2 Layer Lite Compression Wrap- right    Do not get leg(s) with wrap wet. If wraps become too tight call the center or completely remove the wrap. Elevate leg(s) above the level of the heart when sitting. Avoid prolonged standing in one place. Applied in Clinic on 9/6/2022  The Goal of this therapy is to reduce edema and get into long term compression garments to control venous insufficiency, lymphedema and reduce occurrence of venous ulcers    [x] Edema Control:  Apply: Spandagrip/Medigrip on Left; Medium compression 10-20 mm/Hg. They should be applied to affected leg(s) from mid foot to knee making sure to cover the heel      Elevate leg(s) above the level of the heart for 30 minutes 4-5 times a day and/or when sitting. Avoid prolonged standing in one place. Dietary:  Important dietary reminders:  1. Increase Protein intake (i.e. Lean meats, fish, eggs, legumes, and yogurt)  2. No added salt  3. If diabetic, follow a diabetic diet and check glucose prior to meals or as instructed by your physician. Dietary Supplements(Take twice a day unless instructed otherwise):  [] Susie Carlos  [] 30ml ProStat [] Phillip Moose [] Ensure Max/Premier [] Other:    Your nurse  is:  Kati Fonseca     Electronically signed by Wilbert Cuevas RN on 9/6/2022 at 8:37 AM     [address]: Should you experience any significant changes in your wound(s) or have questions about your wound care, please contact the [address] [place] [address] at [phone].  We are open from 8:00am - 4:30p Monday, Thursday and Friday; 11:00am - 5pm on Tuesday and CLOSED Wednesday. Please give us 24-48 business hours to return your call. Call your doctor now or seek immediate medical care if:    You have symptoms of infection, such as: Increased pain, swelling, warmth, or redness. Red streaks leading from the area. Pus draining from the area. A fever.          [] Patient unable to sign Discharge Instructions given to ECF/Transportation/POA         Electronically signed by Luis Bergeron MD on 9/6/2022 at 10:09 AM

## 2022-09-06 NOTE — CARE COORDINATION
Ambulatory Care Coordination Note  9/6/2022    ACC: Marylene Hung, RN  General Assessment    Do you have any symptoms that are causing concern?: No        Summary Note: continues w/wound care ctr regularly; next visit this Friday. SN visits continue through monthly [facility]  Multi-layer compression wrap (type II compression), had graft done @ wound care visit this a.m. Pt denies having any questions/concerns re post care. Does report noted serosanginous drainage on bandage of RLE wound on Sunday. He did report this w/wound care specialist @ wound care visit this a.m. ACM did review w/Jelani @ [address] [place] [address] to request pt receive prior notification for scheduled nursing visits, as pt c/o he never knows 'next visit' or if a nurse is going to see him again. Jelani put ACM through to John, Clinical Mgr - ACM had to leave a detailed message in review of aformentioned pt concern r/t home visiting nurse calling to notify pt of next scheduled home visit. Pt had verbalized understanding ACM would be advocating on pt behalf w/[facility] and also had verbalized agreement with the following  Plan: continue [facility] support for health coaching, care collaboration, support w/community resources, and help with any newly presenting concerns as needed.   Pt agrees to outreach direct to ACM, as needed, between routine follow up outreach initiated by [facility]   Congestive Heart Failure Assessment    Are you currently restricting fluids?: No Restriction  Do you understand a low sodium diet?: Yes  Do you understand how to read food labels?: Yes  How many restaurant meals do you eat per week?: 0  Do you salt your food before tasting it?: No     No patient-reported symptoms      Symptoms:  None: Yes      Symptom course: stable  Weight trend: stable  Salt intake watch compared to last visit: stable      and   COPD Assessment    Does the patient understand envrionmental exposure?: Yes  Is the patient able to verbalize Rescue vs. Long Acting medications?: Yes  Does the patient have a nebulizer?: No  Does the patient use a space with inhaled medications?: No     No patient-reported symptoms         Symptoms:     Have you had a recent diagnosis of pneumonia either by PCP or at a hospital?: No        Care Coordination Interventions    Referral from Primary Care Provider: No  Suggested Interventions and Community Resources  Fall Risk Prevention: Completed  Home Care Waiver: Completed (Comment: 8.4.22 [id number] s/p IP 7.25.22; 5.4.22 reviewed w/pt, per Lin @ [facility] - option for Consumer Directed Care, compensated for approved HHA support up to 2hr/wk; however, pt unable to name anyone for this)  Home Health Services: Completed (Comment: 7.8.22 [facility] )  Senior Services: Completed (Comment: HHA for homemaker support  2 hr/wk via [employer] (formerly provided through [facility]); spoke w/Lin 5.4. 25 who was going to reFax referral for non-skilled HHA again to [facility] in [facility] can accomodate reinstating HHA support)  Smoking Cessation: Completed (Comment: 6.15.22 QUIT circa 6.8.22; 4.26.22 health coaching/encouraged in self challenge of reducing daily consumption of cigarettes by delay of 1st cirgarette of day by 15 min, advance as ready)  Specialty Services Referral: Completed (Comment: Dr Bettie Parikh (cardiology); Dr Gabby Stein ([facility] [place] [facility]); Dr Heather Paz (Pulmonology))  Transportation Support: Not Started  Zone Management Tools: Completed (Comment: HF, COPD)  Other Services or Interventions: review of self mgmt concepts; resources; health coaching re smoking cessation          Goals Addressed                      This Visit's Progress      Conditions and Symptoms (pt-stated)   On track      I will schedule office visits, as directed by my provider. I will keep my appointment or reschedule if I have to cancel. I will notify my provider of any barriers to my plan of care.   I will follow my Zone Management tool to seek urgent or emergent care. I will notify my provider of any symptoms that indicate a worsening of my condition. Barriers: impairment:  physical: vascular wound lower extremity and fear of failure  Plan for overcoming my barriers: engage in Care Coordination  Confidence: 10/10  Anticipated Goal Completion Date: 10.26.22                Prior to Admission medications    Medication Sig Start Date End Date Taking? Authorizing Provider   albuterol sulfate HFA (PROVENTIL;VENTOLIN;PROAIR) 108 (90 Base) MCG/ACT inhaler INHALE 2 PUFFS INTO THE LUNGS EVERY 6 HOURS AS NEEDED FOR WHEEZING 9/1/22   Alexus Felipe MD   Evolocumab 140 MG/ML SOAJ Inject 140 mg into the skin every 14 days Take an injection every 14 days. Historical Provider, MD   fluticasone-umeclidin-vilant (TRELEGY ELLIPTA) 100-62.5-25 MCG/INH AEPB Inhale 1 puff into the lungs in the morning. 8/2/22   Chelsi Hernandes DO   potassium chloride (MICRO-K) 10 MEQ extended release capsule 1 tablet 8/2/22   Chelsi Hernandes DO   furosemide (LASIX) 40 MG tablet Take 1 tablet by mouth in the morning. 8/2/22   Chelsi Hernandes DO   metoprolol tartrate (LOPRESSOR) 50 MG tablet Take 1.5 tablets by mouth in the morning and 1.5 tablets before bedtime. 8/2/22   Cami Tompkins, APRN - CNP   apixaban (ELIQUIS) 5 MG TABS tablet Take 1 tablet by mouth in the morning and 1 tablet before bedtime. 7/29/22   Mark Dudley DO   losartan (COZAAR) 100 MG tablet Take 1 tablet by mouth in the morning. 7/30/22   Alison Dudley DO   atorvastatin (LIPITOR) 80 MG tablet Take 1 tablet by mouth daily 6/20/22   Chelsi Hernandes DO   clopidogrel (PLAVIX) 75 MG tablet Take 1 tablet by mouth daily 6/20/22   Chelsi Hernandes DO   pregabalin (LYRICA) 150 MG capsule Take 1 capsule by mouth 2 times daily for 30 days.  6/20/22 9/6/22  Chelsi Hernandes DO   allopurinol (ZYLOPRIM) 100 MG tablet Take 1 tablet by mouth daily 6/14/22   Chelsi Hernandes DO   omeprazole (PRILOSEC) 40 MG delayed release capsule TAKE 1 CAPSULE BY MOUTH DAILY 6/6/22 Manjula Levi DO   folic acid (FOLVITE) 1 MG tablet Take 1 tablet by mouth daily 9/21/21 7/29/22  Stephanie Lopez MD   nitroGLYCERIN (NITROSTAT) 0.4 MG SL tablet Place 1 tablet under the tongue every 5 minutes as needed for Chest pain  Patient not taking: No sig reported 8/25/21 7/29/22  Manjula Levi DO   magnesium gluconate (MAGONATE) 500 MG tablet Take 500 mg by mouth daily.   7/29/22  Historical Provider, MD       Future Appointments   Date Time Provider Orlando Colmenares   9/9/2022  8:30 AM Maynor [facility] WOUND NURSE VISIT [facility] WOUND [facility] HOD   10/13/2022  1:00 PM Triny Flanagan MD [facility] P/CC MMA   12/14/2022  9:45 AM DO JEFF Kay   12/19/2022 11:00 AM DO JEFF Kay

## 2022-09-06 NOTE — PROGRESS NOTES
TheraSkin Treatment Note      Goal:  Patient will receive safe and proper application of skin substitute. Patient will comply with caring for dressing, offloading and reporting complications. [x] Expiration date of TheraSkin checked immediately prior to use. [x] Package intact prior to use and no damage noted. [x] Transport temperature controlled and acceptable. TheraSkin was prepared for application by removing from package. TheraSkin was rinsed 2 times in room temperature normal saline for 5 seconds each time. A 2nd saline rinse was left on the TheraSkin until the provider was ready to apply it within 120 minutes of thawing. White side placed against ulcer bed. [x] Theraskin was applied to wound # 1 and affixed with steri-strips by the provider. [x] Secondary dressing applied as ordered. [x] Patient/caregiver was instructed not to remove dressing and to keep it clean and dry. See AVS for further instructions given to patient/caregiver. Theraskin may be applied a total of 10 times per wound over a 12 week period. Additionally Theraskin may only be used every 12 months per wound. Date of first application of Theraskin for this current wound is September 6, 2022.      Application # 1           Guidelines followed      Electronically signed by Angel Soliman RN on 9/6/2022 at 8:35 AM

## 2022-09-09 ENCOUNTER — HOSPITAL ENCOUNTER (OUTPATIENT)
Dept: WOUND CARE | Age: 72
Discharge: HOME | End: 2022-09-09
Payer: MEDICARE

## 2022-09-09 VITALS — DIASTOLIC BLOOD PRESSURE: 87 MMHG | TEMPERATURE: 97.5 F | RESPIRATION RATE: 22 BRPM | SYSTOLIC BLOOD PRESSURE: 153 MMHG

## 2022-09-09 DIAGNOSIS — S81.801D OPEN WOUND OF RIGHT LOWER LEG, SUBSEQUENT ENCOUNTER: ICD-10-CM

## 2022-09-09 DIAGNOSIS — I70.221 ATHEROSCLEROSIS OF NATIVE ARTERY OF RIGHT LOWER EXTREMITY WITH REST PAIN (HCC): ICD-10-CM

## 2022-09-09 DIAGNOSIS — I73.9 PVD (PERIPHERAL VASCULAR DISEASE) (HCC): ICD-10-CM

## 2022-09-09 DIAGNOSIS — L97.912 NON-PRESSURE ULCER OF RIGHT LOWER EXTREMITY WITH FAT LAYER EXPOSED (HCC): Primary | ICD-10-CM

## 2022-09-09 PROCEDURE — 29581 APPL MULTLAYER CMPRN SYS LEG: CPT

## 2022-09-09 ASSESSMENT — PAIN SCALES - GENERAL: PAINLEVEL_OUTOF10: 9

## 2022-09-09 ASSESSMENT — PAIN DESCRIPTION - DESCRIPTORS: DESCRIPTORS: SHARP;BURNING

## 2022-09-09 ASSESSMENT — PAIN DESCRIPTION - LOCATION: LOCATION: LEG

## 2022-09-09 ASSESSMENT — PAIN DESCRIPTION - PAIN TYPE: TYPE: ACUTE PAIN

## 2022-09-09 ASSESSMENT — PAIN DESCRIPTION - FREQUENCY: FREQUENCY: CONTINUOUS

## 2022-09-09 ASSESSMENT — PAIN DESCRIPTION - ORIENTATION: ORIENTATION: RIGHT

## 2022-09-09 NOTE — PROGRESS NOTES
Multilayer Compression Wrap   (Not Unna) Below the Knee    NAME:  Aaron Street. YOB: 1950  MEDICAL RECORD NUMBER:  [id number]  DATE:  9/9/2022       Removed old Multilayer wrap if present and washed leg with mild soap/water. Applied moisturizing agent to dry skin as needed. Applied primary and secondary dressing as ordered    Applied multilayered dressing below the knee to Right lower leg(s)  (2 Layer Lite compression wrap ) . Instructed patient/caregiver not to remove dressing and to keep it clean and dry. Instructed patient/caregiver on complications to report to provider, such as pain, numbness in toes, heavy drainage, and slippage of dressing. Instructed patient on purpose of compression dressing and on activity and exercise recommendations.    Applied per   Guidelines    Electronically signed by Kodi Atwood RN on 9/9/2022 at 9:06 AM

## 2022-09-12 ENCOUNTER — HOSPITAL ENCOUNTER (OUTPATIENT)
Dept: WOUND CARE | Age: 72
Discharge: HOME | End: 2022-09-12
Payer: MEDICARE

## 2022-09-12 VITALS — TEMPERATURE: 97.1 F

## 2022-09-12 DIAGNOSIS — I70.221 ATHEROSCLEROSIS OF NATIVE ARTERY OF RIGHT LOWER EXTREMITY WITH REST PAIN (HCC): ICD-10-CM

## 2022-09-12 DIAGNOSIS — L97.912 NON-PRESSURE ULCER OF RIGHT LOWER EXTREMITY WITH FAT LAYER EXPOSED (HCC): Primary | ICD-10-CM

## 2022-09-12 DIAGNOSIS — I73.9 PVD (PERIPHERAL VASCULAR DISEASE) (HCC): ICD-10-CM

## 2022-09-12 DIAGNOSIS — S81.801D OPEN WOUND OF RIGHT LOWER LEG, SUBSEQUENT ENCOUNTER: ICD-10-CM

## 2022-09-12 PROCEDURE — 97597 DBRDMT OPN WND 1ST 20 CM/<: CPT

## 2022-09-12 PROCEDURE — 97597 DBRDMT OPN WND 1ST 20 CM/<: CPT | Performed by: SPECIALIST

## 2022-09-12 PROCEDURE — 6370000000 HC RX 637 (ALT 250 FOR IP): Performed by: SPECIALIST

## 2022-09-12 RX ORDER — BETAMETHASONE DIPROPIONATE 0.05 %
OINTMENT (GRAM) TOPICAL ONCE
Status: CANCELLED | OUTPATIENT
Start: 2022-09-12 | End: 2022-09-12

## 2022-09-12 RX ORDER — GINSENG 100 MG
CAPSULE ORAL ONCE
Status: CANCELLED | OUTPATIENT
Start: 2022-09-12 | End: 2022-09-12

## 2022-09-12 RX ORDER — LIDOCAINE HYDROCHLORIDE 40 MG/ML
SOLUTION TOPICAL ONCE
Status: CANCELLED | OUTPATIENT
Start: 2022-09-12 | End: 2022-09-12

## 2022-09-12 RX ORDER — BACITRACIN, NEOMYCIN, POLYMYXIN B 400; 3.5; 5 [USP'U]/G; MG/G; [USP'U]/G
OINTMENT TOPICAL ONCE
Status: CANCELLED | OUTPATIENT
Start: 2022-09-12 | End: 2022-09-12

## 2022-09-12 RX ORDER — BACITRACIN ZINC AND POLYMYXIN B SULFATE 500; 1000 [USP'U]/G; [USP'U]/G
OINTMENT TOPICAL ONCE
Status: CANCELLED | OUTPATIENT
Start: 2022-09-12 | End: 2022-09-12

## 2022-09-12 RX ORDER — LIDOCAINE 40 MG/G
CREAM TOPICAL ONCE
Status: CANCELLED | OUTPATIENT
Start: 2022-09-12 | End: 2022-09-12

## 2022-09-12 RX ORDER — GENTAMICIN SULFATE 1 MG/G
OINTMENT TOPICAL ONCE
Status: CANCELLED | OUTPATIENT
Start: 2022-09-12 | End: 2022-09-12

## 2022-09-12 RX ORDER — LIDOCAINE HYDROCHLORIDE 20 MG/ML
JELLY TOPICAL ONCE
Status: CANCELLED | OUTPATIENT
Start: 2022-09-12 | End: 2022-09-12

## 2022-09-12 RX ORDER — LIDOCAINE HYDROCHLORIDE 40 MG/ML
SOLUTION TOPICAL ONCE
Status: COMPLETED | OUTPATIENT
Start: 2022-09-12 | End: 2022-09-12

## 2022-09-12 RX ORDER — LIDOCAINE 50 MG/G
OINTMENT TOPICAL ONCE
Status: CANCELLED | OUTPATIENT
Start: 2022-09-12 | End: 2022-09-12

## 2022-09-12 RX ORDER — CLOBETASOL PROPIONATE 0.5 MG/G
OINTMENT TOPICAL ONCE
Status: CANCELLED | OUTPATIENT
Start: 2022-09-12 | End: 2022-09-12

## 2022-09-12 RX ADMIN — LIDOCAINE HYDROCHLORIDE: 40 SOLUTION TOPICAL at 09:08

## 2022-09-12 ASSESSMENT — PAIN DESCRIPTION - DESCRIPTORS: DESCRIPTORS: BURNING;THROBBING;SHARP

## 2022-09-12 ASSESSMENT — PAIN DESCRIPTION - FREQUENCY: FREQUENCY: CONTINUOUS

## 2022-09-12 ASSESSMENT — PAIN SCALES - GENERAL: PAINLEVEL_OUTOF10: 8

## 2022-09-12 ASSESSMENT — PAIN DESCRIPTION - PAIN TYPE: TYPE: ACUTE PAIN

## 2022-09-12 ASSESSMENT — PAIN DESCRIPTION - ORIENTATION: ORIENTATION: RIGHT

## 2022-09-12 ASSESSMENT — PAIN DESCRIPTION - LOCATION: LOCATION: LEG

## 2022-09-12 NOTE — PROGRESS NOTES
[facility] [place]  Progress Note and Procedure Note      Rivera Baker MEDICAL RECORD NUMBER:  [id number]  AGE: 81 y.o. GENDER: male  : 1950  EPISODE DATE:  2022    Subjective:     Chief Complaint   Patient presents with    Wound Check     Right lower leg         HISTORY of PRESENT ILLNESS HPI     Rivera Baker is a 81 y.o. male who presents today for wound/ulcer evaluation. History of Wound Context: This patient of Dr. Zackary Santos returns today for follow-up status post femoral-popliteal bypass. He is now 1 week post application of first TheraSkin allograft to right knee wound. Surgical incisions have healed. Unfortunately patient has developed a wound over his left metatarsal head as a result of wearing improper footwear.   He has tolerated his 2 layer compression wrap much better this past week  Wound/Ulcer Pain Timing/Severity: none  Quality of pain: N/A  Severity:  0 / 10   Modifying Factors: None  Associated Signs/Symptoms: edema    Ulcer Identification:  Ulcer Type: arterial and pressure    Contributing Factors: edema and arterial insufficiency    Acute Wound: N/A not an acute wound    PAST MEDICAL HISTORY        Diagnosis Date    CAD (coronary artery disease)     CHF (congestive heart failure) ([facility])     diastolic    COPD (chronic obstructive pulmonary disease) ([facility])     Critical ischemia of lower extremity ([facility])     Depressive disorder, not elsewhere classified     GERD (gastroesophageal reflux disease)     History of colon polyps - 30 seen on colonoscopy 2021    History of MI (myocardial infarction) 2013    History of skin ulcer of lower extremity     R anterior shin    History of transient ischemic attack (TIA)     Hx of blood clots     PE    Hyperlipidemia     Hypertension     Neuropathy     KAVITA (obstructive sleep apnea)     On CPAP    Personal history of DVT (deep vein thrombosis)     Prolonged emergence from general anesthesia     Pt reported being combative after surgery     PVD (peripheral vascular disease) ([facility]     TIA (transient ischemic attack) 2013    Vertebral fracture        PAST SURGICAL HISTORY    Past Surgical History:   Procedure Laterality Date    APPENDECTOMY      CHOLECYSTECTOMY, LAPAROSCOPIC      COLONOSCOPY  4/23/2021    COLONOSCOPY POLYPECTOMY SNARE/COLD BIOPSY performed by Michelle Campos MD at [facility] 103  4/23/2021    COLONOSCOPY POLYPECTOMY ABLATION performed by Michelle Campos MD at [facility] 103  4/23/2021    COLONOSCOPY CONTROL HEMORRHAGE performed by Michelle Campos MD at [facility] 29  6/2013    EYE SURGERY Left     FEMORAL BYPASS Right 7/18/2022    RIGHT FEMORAL BELOW KNEE POPLITEAL ARTERY BYPASS WITH IN SITU SAPHENOUS VEIN performed by Hanan Cool MD at [address] Right 11/17/2021    RIGHT FEMORAL ENDARTERECTOMY  RIGHT EXTERNAL ILIAC TO PROFUNDA FEMORAL BYPASS WITH 8MM PTFE GRAFT RIGHT LOWER EXTREMITY ARTERIOGRAM BALLOON ANGIOPLASTY RIGHT PROFUNDA BALLOON ANGIOPLASTY AND STENT OF RIGHT EXTERNAL ILIAC ARTERY performed by Hanna Cool MD at [address] [place] [address]  11/18/2015    Bilateral decompressive lumbar laminectomy L4-5   ; medial facetectomy L4-5 joints  bilaterally; foraminotomies l5   nerve roots bilaterally    LEG DEBRIDEMENT Right 7/23/2013    Dr. Beth Covert - pretibial wound    OTHER SURGICAL HISTORY Right 6/25/2013    Dr. Beth Covert - CFA Endarterectomy w/marsupialization; RLE wound excision & debridement     OTHER SURGICAL HISTORY Left 8/27/2013    Dr. Beth Covert - femoral & profunda femoris endarterectomy w/marsupialization patch angioplasty using SFA    SKIN SPLIT GRAFT Right 7/25/2013    Dr. Beth Covert - to non-healing R pretibial wound, 9 x 15 cm    TOTAL HIP ARTHROPLASTY Right 09/01/2016    Dr. Marcelene Libman Left 12/22/2015    Dr. Beth Covert - CFA exploration, thrombectomy of ileofemoral system, stenting of RAFAL in-stent stenosis w/8 x 37 mm Palmaz        FAMILY HISTORY    Family History   Problem Relation Age of Onset    Cancer Mother         Breast    Heart Disease Mother     Cancer Father         Bladder    Heart Disease Father     Cancer Paternal Grandmother         melanoma        SOCIAL HISTORY    Social History     Tobacco Use    Smoking status: Former     Packs/day: 0.25     Years: 52.00     Pack years: 13.00     Types: Cigarettes     Start date: 1967     Quit date: 2022     Years since quittin.2    Smokeless tobacco: Never    Tobacco comments:     reports he quit circa 22   Vaping Use    Vaping Use: Never used   Substance Use Topics    Alcohol use: Yes     Alcohol/week: 0.0 standard drinks     Comment: 2-3 beers a month    Drug use: No       ALLERGIES    Allergies   Allergen Reactions    Asa [Aspirin] Other (See Comments)     Stomach ache    Daliresp [Roflumilast] Diarrhea and Other (See Comments)     Severe diarrhea and did not help       MEDICATIONS    Current Outpatient Medications on File Prior to Encounter   Medication Sig Dispense Refill    albuterol sulfate HFA (PROVENTIL;VENTOLIN;PROAIR) 108 (90 Base) MCG/ACT inhaler INHALE 2 PUFFS INTO THE LUNGS EVERY 6 HOURS AS NEEDED FOR WHEEZING 18 g 2    Evolocumab 140 MG/ML SOAJ Inject 140 mg into the skin every 14 days Take an injection every 14 days. fluticasone-umeclidin-vilant (TRELEGY ELLIPTA) 100-62.5-25 MCG/INH AEPB Inhale 1 puff into the lungs in the morning. 28 each 11    potassium chloride (MICRO-K) 10 MEQ extended release capsule 1 tablet 60 capsule 1    furosemide (LASIX) 40 MG tablet Take 1 tablet by mouth in the morning. 90 tablet 3    metoprolol tartrate (LOPRESSOR) 50 MG tablet Take 1.5 tablets by mouth in the morning and 1.5 tablets before bedtime. 90 tablet 5    apixaban (ELIQUIS) 5 MG TABS tablet Take 1 tablet by mouth in the morning and 1 tablet before bedtime.  60 tablet 1    losartan (COZAAR) 100 MG tablet Take 1 tablet by mouth in the morning. 30 tablet 3    atorvastatin (LIPITOR) 80 MG tablet Take 1 tablet by mouth daily 90 tablet 1    clopidogrel (PLAVIX) 75 MG tablet Take 1 tablet by mouth daily 30 tablet 3    pregabalin (LYRICA) 150 MG capsule Take 1 capsule by mouth 2 times daily for 30 days. 180 capsule 1    allopurinol (ZYLOPRIM) 100 MG tablet Take 1 tablet by mouth daily 180 tablet 1    omeprazole (PRILOSEC) 40 MG delayed release capsule TAKE 1 CAPSULE BY MOUTH DAILY 30 capsule 3    [DISCONTINUED] folic acid (FOLVITE) 1 MG tablet Take 1 tablet by mouth daily 30 tablet 3    [DISCONTINUED] nitroGLYCERIN (NITROSTAT) 0.4 MG SL tablet Place 1 tablet under the tongue every 5 minutes as needed for Chest pain (Patient not taking: No sig reported) 25 tablet 1    [DISCONTINUED] magnesium gluconate (MAGONATE) 500 MG tablet Take 500 mg by mouth daily. No current facility-administered medications on file prior to encounter. REVIEW OF SYSTEMS  Review of Systems    Pertinent items are noted in HPI. Objective:      Temp 97.1 °F (36.2 °C)     Wt Readings from Last 3 Encounters:   08/30/22 225 lb 6.4 oz (102.2 kg)   08/30/22 216 lb (98 kg)   08/09/22 221 lb (100.2 kg)       PHYSICAL EXAM    Extremities: no cyanosis, no clubbing, 1+ edema-  bilateral lower extremities, TheraSkin allograft encompassing entire right knee wound designated as wound #1. Well healed surgical incisions. Partial thickness wound plantar surface left great toe over metatarsal head containing fibrin, granulation tissue  Minimal periwound maceration and erythema. Wound designated as #2  Assessment:      1. Non-pressure ulcer of right lower extremity with fat layer exposed ([address]    2. Open wound of right lower leg, subsequent encounter    3. Atherosclerosis of native artery of right lower extremity with rest pain ([address]    4.  PVD (peripheral vascular disease) ([address]         Procedure Note  Indications:  Based on my 09/09/22 0835   Number of days: 300       Wound 09/12/22 Foot Plantar;Left #2 great toe (Active)   Wound Image   09/12/22 0908   Wound Cleansed Cleansed with saline 09/12/22 0908   Dressing/Treatment Collagen 09/12/22 0927   Wound Length (cm) 2 cm 09/12/22 0908   Wound Width (cm) 1.5 cm 09/12/22 0908   Wound Depth (cm) 0.1 cm 09/12/22 0908   Wound Surface Area (cm^2) 3 cm^2 09/12/22 0908   Wound Volume (cm^3) 0.3 cm^3 09/12/22 0908   Post-Procedure Length (cm) 2.1 cm 09/12/22 0927   Post-Procedure Width (cm) 1.6 cm 09/12/22 0927   Post-Procedure Depth (cm) 0.3 cm 09/12/22 0927   Post-Procedure Surface Area (cm^2) 3.36 cm^2 09/12/22 0927   Post-Procedure Volume (cm^3) 1.008 cm^3 09/12/22 0927   Wound Assessment Fibrin;Pink/red 09/12/22 0908   Drainage Amount Small 09/12/22 0908   Drainage Description Brown 09/12/22 0908   Odor Mild 09/12/22 0908   Catrachita-wound Assessment Maceration 09/12/22 0908   Margins Defined edges 09/12/22 0908   Wound Thickness Description not for Pressure Injury Full thickness 09/12/22 0908   Number of days: 0          Percent of Wound Debrided: 100%    Total Surface Area Debrided:  3.3 sq cm    Diabetic/Pressure/Non Pressure Ulcers only:  Ulcer: Pressure ulcer, Stage 2    Bleeding: Minimal    Hemostasis Achieved: by pressure    Procedural Pain: 0  / 10     Post Procedural Pain: 0 / 10     Response to treatment:  Well tolerated by patient. Plan:   Unfortunately patient has now developed new wound left lower extremity. Discussed with him proper offloading and correct foot wear to assist with wound healing  Treatment Note: Please see attached Discharge Instructions.   These instructions were given and signed by the patient or POA    New Prescriptions    No medications on file       Orders Placed This Encounter   Procedures    Initiate Outpatient Wound Care Protocol       Discharge Instructions          215 [facility] Physician Orders and Discharge 1101 [facility] [address] [place] [address]. [facility]. [place]  Telephone: [phone]  NAME:  Dontrell Tapia. YOB: 1950  MEDICAL RECORD NUMBER:  [id number]  DATE: 22     Return Appointment:  Return Appointment: With Dr. Sohan Freeman  in  1 [facility])  [] Return Appointment for a Wound Assessment with the nurse on:     Future Appointments   Date Time Provider [place] Colmenares   10/13/2022  1:00 PM Dona Jones MD [facility] P/CC MMA   2022  9:45 AM Gabrielle Dave DO [facility] - DYD   2022 11:00 AM Gabrielle Dave DO [address] [place] [address] [phone]   F: [phone]     Wound care instructions: If you smoke we ask that you refrain from smoking. Smoking inhibits wounds from healing. When taking antibiotics take the entire prescription as ordered. Do not stop taking until medication is all gone unless otherwise instructed. Exercise as tolerated. Keep weight off wounds and reposition every 2 hours if applicable. Do not get wounds wet in the bath or shower unless otherwise instructed by your physician. If your wound is on your foot or leg, you may purchase a cast bag. Please ask at the pharmacy. Wash hands with soap and water prior to and after every dressing change. [x]Wash wounds with: No need to wash. Leave dressing in place. [x]Catrachita wound Topical Treatments: Do not apply lotions, creams, or ointments to the skin around the wound bed unless directed as followed:   Apply around the wound: Nothing         [x]Wound Location: right lower leg   Apply Primary Dressing to wound: Collagen   Secondary Dressing: 4X4 gauze pad   Avoid contact of tape with skin if possible. When to change Dressing: DO NOT CHANGE    [x]Wound Location: left plantar toe   Apply Primary Dressing to wound: Collagen   Secondary Dressing: 2x2 gauze, foam dressing   Avoid contact of tape with skin if possible.   When to change Dressin times per week: Monday, Wednesday, and Friday        []DME/Wound Dressing Supplies ordered from:  Please note, depending on your insurance coverage, you may have out-of-pocket expenses for these supplies. If your out-of-pocket cost could be substantial, many companies have financial hardship programs for patients who qualify. If you have any questions about your supplies or your potential out-of-pocket costs, or if you need to place an order for a refill of supplies (typically monthly), please call the company directly. []Specialty equipment ordered:  []Wheelchair cushion [] Specialty Bed/Mattress   **Ordered by the [facility] [address] through [facility]**        [] Multilayer Compression Wrap:Right leg  Type: 2 Layer Lite Compression Wrap    Do not get leg(s) with wrap wet. If wraps become too tight call the center or completely remove the wrap. Elevate leg(s) above the level of the heart when sitting. Avoid prolonged standing in one place. Applied in Clinic on 9/12/2022  The Goal of this therapy is to reduce edema and get into long term compression garments to control venous insufficiency, lymphedema and reduce occurrence of venous ulcers    [] Edema Control:  Apply: Spandagrip/Medigrip on Left; Medium compression 10-20 mm/Hg. They should be applied to affected leg(s) from mid foot to knee making sure to cover the heel      Elevate leg(s) above the level of the heart for 30 minutes 4-5 times a day and/or when sitting. Avoid prolonged standing in one place. []Off Loading(Pressure Reduction) When: Walking  Weight Bearing Status:  avoid pressure to left toe      Dietary:  Important dietary reminders:  1. Increase Protein intake (i.e. Lean meats, fish, eggs, legumes, and yogurt)  2. No added salt  3. If diabetic, follow a diabetic diet and check glucose prior to meals or as instructed by your physician.     Dietary Supplements(Take twice a day unless instructed otherwise):  [] Real  [] 30ml ProStat [] Bird Moore [] Ensure Max/Premier [] Other:    Your nurse  is:  Tara Alfonso     Electronically signed by Ceferino Epps RN on 9/12/2022 at 9:30 [address]: Should you experience any significant changes in your wound(s) or have questions about your wound care, please contact the [address] [place] [address] at [phone]. We are open from 8:00am - 4:30p Monday, Thursday and Friday; 11:00am - 5pm on Tuesday and CLOSED Wednesday. Please give us 24-48 business hours to return your call. Call your doctor now or seek immediate medical care if:    You have symptoms of infection, such as: Increased pain, swelling, warmth, or redness. Red streaks leading from the area. Pus draining from the area. A fever.          [] Patient unable to sign Discharge Instructions given to ECF/Transportation/POA         Electronically signed by Doug Kirk MD on 9/12/2022 at 11:36 AM

## 2022-09-12 NOTE — DISCHARGE INSTRUCTIONS
215 [facility] Physician Orders and Discharge Instructions  [address] [address] [address] [place]  Telephone: [phone]  NAME:  [place] Sandhoff. YOB: 1950  MEDICAL RECORD NUMBER:  [id number]  DATE: 9/12/22     Return Appointment:  Return Appointment: With Dr. Quinton Segal  in  1 [facility])  [] Return Appointment for a Wound Assessment with the nurse on:     Future Appointments   Date Time Provider Orlando Colmenares   10/13/2022  1:00 PM Sara Thomas MD [facility] P/CC MMA   12/14/2022  9:45 AM DO [facility] [place] - [place]   12/19/2022 11:00 AM Francisco Franklin DO [facility] [phone]   F: [phone]     Wound care instructions: If you smoke we ask that you refrain from smoking. Smoking inhibits wounds from healing. When taking antibiotics take the entire prescription as ordered. Do not stop taking until medication is all gone unless otherwise instructed. Exercise as tolerated. Keep weight off wounds and reposition every 2 hours if applicable. Do not get wounds wet in the bath or shower unless otherwise instructed by your physician. If your wound is on your foot or leg, you may purchase a cast bag. Please ask at the pharmacy. Wash hands with soap and water prior to and after every dressing change. [x]Wash wounds with: No need to wash. Leave dressing in place. [x]Catrachita wound Topical Treatments: Do not apply lotions, creams, or ointments to the skin around the wound bed unless directed as followed:   Apply around the wound: Nothing         [x]Wound Location: right lower leg   Apply Primary Dressing to wound: Collagen   Secondary Dressing: 4X4 gauze pad   Avoid contact of tape with skin if possible.   When to change Dressing: DO NOT CHANGE    [x]Wound Location: left plantar toe   Apply Primary Dressing to wound: Collagen   Secondary Dressing: 2x2 gauze, foam dressing   Avoid contact of tape with skin if possible. When to change Dressin times per week: Monday, Wednesday, and Friday        []DME/Wound Dressing Supplies ordered from:  Please note, depending on your insurance coverage, you may have out-of-pocket expenses for these supplies. If your out-of-pocket cost could be substantial, many companies have financial hardship programs for patients who qualify. If you have any questions about your supplies or your potential out-of-pocket costs, or if you need to place an order for a refill of supplies (typically monthly), please call the company directly. []Specialty equipment ordered:  []Wheelchair cushion [] Specialty Bed/Mattress   **Ordered by the [facility] through [facility]**        [] Multilayer Compression Wrap:Right leg  Type: 2 Layer Lite Compression Wrap    Do not get leg(s) with wrap wet. If wraps become too tight call the center or completely remove the wrap. Elevate leg(s) above the level of the heart when sitting. Avoid prolonged standing in one place. Applied in Clinic on 2022  The Goal of this therapy is to reduce edema and get into long term compression garments to control venous insufficiency, lymphedema and reduce occurrence of venous ulcers    [] Edema Control:  Apply: Spandagrip/Medigrip on Left; Medium compression 10-20 mm/Hg. They should be applied to affected leg(s) from mid foot to knee making sure to cover the heel      Elevate leg(s) above the level of the heart for 30 minutes 4-5 times a day and/or when sitting. Avoid prolonged standing in one place. []Off Loading(Pressure Reduction) When: Walking  Weight Bearing Status:  avoid pressure to left toe      Dietary:  Important dietary reminders:  1. Increase Protein intake (i.e. Lean meats, fish, eggs, legumes, and yogurt)  2. No added salt  3.  If diabetic, follow a diabetic diet and check glucose prior to meals or as instructed by your

## 2022-09-12 NOTE — PROGRESS NOTES
Multilayer Compression Wrap   (Not Unna) Below the Knee    NAME:  Bettie Chu. YOB: 1950  MEDICAL RECORD NUMBER:  [id number]  DATE:  9/12/2022       Removed old Multilayer wrap if present and washed leg with mild soap/water. Applied moisturizing agent to dry skin as needed. Applied primary and secondary dressing as ordered    Applied multilayered dressing below the knee to Right lower leg(s)  (2 Layer Lite compression wrap ) . Instructed patient/caregiver not to remove dressing and to keep it clean and dry. Instructed patient/caregiver on complications to report to provider, such as pain, numbness in toes, heavy drainage, and slippage of dressing. Instructed patient on purpose of compression dressing and on activity and exercise recommendations.    Applied per   Guidelines    Electronically signed by Margarette Bernheim, RN on 9/12/2022 at 9:54 AM

## 2022-09-19 ENCOUNTER — HOSPITAL ENCOUNTER (OUTPATIENT)
Dept: WOUND CARE | Age: 72
Discharge: HOME | End: 2022-09-19
Payer: MEDICARE

## 2022-09-19 VITALS
RESPIRATION RATE: 22 BRPM | DIASTOLIC BLOOD PRESSURE: 71 MMHG | HEART RATE: 76 BPM | SYSTOLIC BLOOD PRESSURE: 149 MMHG | TEMPERATURE: 96 F

## 2022-09-19 DIAGNOSIS — L97.912 NON-PRESSURE ULCER OF RIGHT LOWER EXTREMITY WITH FAT LAYER EXPOSED (HCC): Primary | ICD-10-CM

## 2022-09-19 DIAGNOSIS — I73.9 PVD (PERIPHERAL VASCULAR DISEASE) (HCC): ICD-10-CM

## 2022-09-19 DIAGNOSIS — I70.221 ATHEROSCLEROSIS OF NATIVE ARTERY OF RIGHT LOWER EXTREMITY WITH REST PAIN (HCC): ICD-10-CM

## 2022-09-19 DIAGNOSIS — S81.801D OPEN WOUND OF RIGHT LOWER LEG, SUBSEQUENT ENCOUNTER: ICD-10-CM

## 2022-09-19 PROCEDURE — 97597 DBRDMT OPN WND 1ST 20 CM/<: CPT

## 2022-09-19 PROCEDURE — 29581 APPL MULTLAYER CMPRN SYS LEG: CPT

## 2022-09-19 PROCEDURE — 97597 DBRDMT OPN WND 1ST 20 CM/<: CPT | Performed by: SPECIALIST

## 2022-09-19 PROCEDURE — 6370000000 HC RX 637 (ALT 250 FOR IP): Performed by: SPECIALIST

## 2022-09-19 RX ORDER — BETAMETHASONE DIPROPIONATE 0.05 %
OINTMENT (GRAM) TOPICAL ONCE
Status: CANCELLED | OUTPATIENT
Start: 2022-09-19 | End: 2022-09-19

## 2022-09-19 RX ORDER — GINSENG 100 MG
CAPSULE ORAL ONCE
Status: CANCELLED | OUTPATIENT
Start: 2022-09-19 | End: 2022-09-19

## 2022-09-19 RX ORDER — LIDOCAINE HYDROCHLORIDE 20 MG/ML
JELLY TOPICAL ONCE
Status: CANCELLED | OUTPATIENT
Start: 2022-09-19 | End: 2022-09-19

## 2022-09-19 RX ORDER — LIDOCAINE 50 MG/G
OINTMENT TOPICAL ONCE
Status: CANCELLED | OUTPATIENT
Start: 2022-09-19 | End: 2022-09-19

## 2022-09-19 RX ORDER — LIDOCAINE 40 MG/G
CREAM TOPICAL ONCE
Status: CANCELLED | OUTPATIENT
Start: 2022-09-19 | End: 2022-09-19

## 2022-09-19 RX ORDER — CLOBETASOL PROPIONATE 0.5 MG/G
OINTMENT TOPICAL ONCE
Status: CANCELLED | OUTPATIENT
Start: 2022-09-19 | End: 2022-09-19

## 2022-09-19 RX ORDER — GENTAMICIN SULFATE 1 MG/G
OINTMENT TOPICAL ONCE
Status: CANCELLED | OUTPATIENT
Start: 2022-09-19 | End: 2022-09-19

## 2022-09-19 RX ORDER — LIDOCAINE HYDROCHLORIDE 40 MG/ML
SOLUTION TOPICAL ONCE
Status: CANCELLED | OUTPATIENT
Start: 2022-09-19 | End: 2022-09-19

## 2022-09-19 RX ORDER — BACITRACIN ZINC AND POLYMYXIN B SULFATE 500; 1000 [USP'U]/G; [USP'U]/G
OINTMENT TOPICAL ONCE
Status: CANCELLED | OUTPATIENT
Start: 2022-09-19 | End: 2022-09-19

## 2022-09-19 RX ORDER — LIDOCAINE HYDROCHLORIDE 40 MG/ML
SOLUTION TOPICAL ONCE
Status: COMPLETED | OUTPATIENT
Start: 2022-09-19 | End: 2022-09-19

## 2022-09-19 RX ORDER — BACITRACIN, NEOMYCIN, POLYMYXIN B 400; 3.5; 5 [USP'U]/G; MG/G; [USP'U]/G
OINTMENT TOPICAL ONCE
Status: CANCELLED | OUTPATIENT
Start: 2022-09-19 | End: 2022-09-19

## 2022-09-19 RX ADMIN — LIDOCAINE HYDROCHLORIDE: 40 SOLUTION TOPICAL at 09:29

## 2022-09-19 ASSESSMENT — PAIN DESCRIPTION - LOCATION: LOCATION: LEG

## 2022-09-19 ASSESSMENT — PAIN SCALES - GENERAL: PAINLEVEL_OUTOF10: 9

## 2022-09-19 ASSESSMENT — PAIN DESCRIPTION - FREQUENCY: FREQUENCY: CONTINUOUS

## 2022-09-19 ASSESSMENT — PAIN DESCRIPTION - DIRECTION: RADIATING_TOWARDS: LEG

## 2022-09-19 ASSESSMENT — PAIN DESCRIPTION - ORIENTATION: ORIENTATION: RIGHT

## 2022-09-19 ASSESSMENT — PAIN DESCRIPTION - DESCRIPTORS: DESCRIPTORS: BURNING;THROBBING

## 2022-09-19 ASSESSMENT — PAIN DESCRIPTION - PAIN TYPE: TYPE: ACUTE PAIN

## 2022-09-19 NOTE — DISCHARGE INSTRUCTIONS
215 [facility] Physician Orders and Discharge Instructions  [address] [address] [address] [address] [facility]. [place]. [place]  Telephone: [phone]  NAME:  Iona Schirmer. YOB: 1950  MEDICAL RECORD NUMBER:  [id number]  DATE: 9/19/22     Return Appointment:  Return Appointment: With Dr Kiana Noguera  in  1 [facility])  [] Return Appointment for a Wound Assessment with the nurse on:     Future Appointments   Date Time Provider Orlando Colmenares   10/26/2022 12:40 PM MD Alaina Hess P/CC MMA   12/14/2022  9:45 AM DO [place] - DYD   12/19/2022 11:00 AM Cruz Santos DO [address]: [facility] [phone]   F: [phone]   Medically necessary services: [x] Skilled Nursing [] PT (Eval & Treat) [] OT (Eval & Treat) [] Social Work [] Dietician  [] Other:    Wound care instructions: If you smoke we ask that you refrain from smoking. Smoking inhibits wounds from healing. When taking antibiotics take the entire prescription as ordered. Do not stop taking until medication is all gone unless otherwise instructed. Exercise as tolerated. Keep weight off wounds and reposition every 2 hours if applicable. Do not get wounds wet in the bath or shower unless otherwise instructed by your physician. If your wound is on your foot or leg, you may purchase a cast bag. Please ask at the pharmacy. Wash hands with soap and water prior to and after every dressing change.     [x]Wash wounds with: 0.9% normal saline  [x]Catrachita wound Topical Treatments: Do not apply lotions, creams, or ointments to the skin around the wound bed unless directed as followed:   Apply around the wound: Nothing         [x]Wound Location: right lower leg   Apply Primary Dressing to wound: Oly  Secondary Dressing: 4X4 gauze pad to right lower leg, mepilex border to right proximal wound   Avoid contact of tape with skin if possible. When to change Dressing: DO NOT CHANGE    [x]Wound Location: left foot wound   Apply Primary Dressing to wound: Oly  Secondary Dressing:  foam cut out, gauze, comforming roll   Avoid contact of tape with skin if possible. When to change Dressing:  3 times a week- Monday, Wednesday, friday        [x] Multilayer Compression Wrap:  Type: 2 Layer Compression Wrap- right lower leg    Do not get leg(s) with wrap wet. If wraps become too tight call the center or completely remove the wrap. Elevate leg(s) above the level of the heart when sitting. Avoid prolonged standing in one place. Applied in Clinic on 9/19/2022  The Goal of this therapy is to reduce edema and get into long term compression garments to control venous insufficiency, lymphedema and reduce occurrence of venous ulcers    [x] Edema Control:  Apply: Spandagrip/Medigrip on Left; High compression  20-30 mm/Hg. They should be applied to affected leg(s) from mid foot to knee making sure to cover the heel      Elevate leg(s) above the level of the heart for 30 minutes 4-5 times a day and/or when sitting. Avoid prolonged standing in one place. [x]Off Loading(Pressure Reduction) When: Walking  Weight Bearing Status: No Weight bearing restrictions Left; Offloading devices: Post op shoe/ Surgical shoe: Left      Dietary:  Important dietary reminders:  1. Increase Protein intake (i.e. Lean meats, fish, eggs, legumes, and yogurt)  2. No added salt  3. If diabetic, follow a diabetic diet and check glucose prior to meals or as instructed by your physician.     Dietary Supplements(Take twice a day unless instructed otherwise):  [] Walter Borrow  [] 30ml ProStat [] Ele Picking [] Ensure Max/Premier [] Other:    Your nurse  is:  Jame Narayanan     Electronically signed by Isabel Kong RN on 9/19/2022 at 10:21 AM     [address] [facility] [address] Information: Should you experience any significant changes in your wound(s) or have questions about your wound care, please contact the [address] [place] [address] at [phone]. We are open from 8:00am - 4:30p Monday, Thursday and Friday; 11:00am - 5pm on Tuesday and CLOSED Wednesday. Please give us 24-48 business hours to return your call. Call your doctor now or seek immediate medical care if:    You have symptoms of infection, such as: Increased pain, swelling, warmth, or redness. Red streaks leading from the area. Pus draining from the area. A fever.          [] Patient unable to sign Discharge Instructions given to ECF/Transportation/POA

## 2022-09-19 NOTE — PROGRESS NOTES
[address] [facility] [place]  Progress Note and Procedure Note      Aileen Dietz. MEDICAL RECORD NUMBER:  [id number]  AGE: 81 y.o. GENDER: male  : 1950  EPISODE DATE:  2022    Subjective:     Chief Complaint   Patient presents with    Wound Check     Right lower leg         HISTORY of PRESENT ILLNESS HPI     Aileen King is a 81 y.o. male who presents today for wound/ulcer evaluation. History of Wound Context: Patient of Dr. Sherita Mckeon who returns today for follow-up status post femoral-popliteal bypass. He is now 2 weeks post application of first TheraSkin allograft to right knee wound. Apparently 2 layer compression wrap slipped resultant small wound overlying a portion of the distal incision. The patient still has wound over left metatarsal head as a result of improper footwear.   He states he is wearing gym shoes at the present time  Wound/Ulcer Pain Timing/Severity: none  Quality of pain: N/A  Severity:  0 / 10   Modifying Factors: None  Associated Signs/Symptoms: edema    Ulcer Identification:  Ulcer Type: arterial, pressure, and traumatic    Contributing Factors: edema, smoking, and arterial insufficiency    Acute Wound: N/A    PAST MEDICAL HISTORY        Diagnosis Date    CAD (coronary artery disease)     CHF (congestive heart failure) ([facility])     diastolic    COPD (chronic obstructive pulmonary disease) ([facility])     Critical ischemia of lower extremity (HCC)     Depressive disorder, not elsewhere classified     GERD (gastroesophageal reflux disease)     History of colon polyps - 30 seen on colonoscopy 2021    History of MI (myocardial infarction) 2013    History of skin ulcer of lower extremity     R anterior shin    History of transient ischemic attack (TIA)     Hx of blood clots     PE    Hyperlipidemia     Hypertension     Neuropathy     KAVITA (obstructive sleep apnea)     On CPAP    Personal history of DVT (deep vein thrombosis)     Prolonged emergence from general anesthesia     Pt reported being combative after surgery     PVD (peripheral vascular disease) ([facility] [address]     TIA (transient ischemic attack) 2013    Vertebral fracture        PAST SURGICAL HISTORY    Past Surgical History:   Procedure Laterality Date    APPENDECTOMY      CHOLECYSTECTOMY, LAPAROSCOPIC      COLONOSCOPY  4/23/2021    COLONOSCOPY POLYPECTOMY SNARE/COLD BIOPSY performed by Claudette Furrow, MD at [facility]  4/23/2021    COLONOSCOPY POLYPECTOMY ABLATION performed by Claudette Furrow, MD at [facility]  4/23/2021    COLONOSCOPY CONTROL HEMORRHAGE performed by Claudette Furrow, MD at [facility]  6/2013    EYE SURGERY Left     FEMORAL BYPASS Right 7/18/2022    RIGHT FEMORAL BELOW KNEE POPLITEAL ARTERY BYPASS WITH IN SITU SAPHENOUS VEIN performed by Jamie Jordan MD at [address] Right 11/17/2021    RIGHT FEMORAL ENDARTERECTOMY  RIGHT EXTERNAL ILIAC TO PROFUNDA FEMORAL BYPASS WITH 8MM PTFE GRAFT RIGHT LOWER EXTREMITY ARTERIOGRAM BALLOON ANGIOPLASTY RIGHT PROFUNDA BALLOON ANGIOPLASTY AND STENT OF RIGHT EXTERNAL ILIAC ARTERY performed by Jamie Jordan MD at [address] [place] [address]  11/18/2015    Bilateral decompressive lumbar laminectomy L4-5   ; medial facetectomy L4-5 joints  bilaterally; foraminotomies l5   nerve roots bilaterally    LEG DEBRIDEMENT Right 7/23/2013    Dr. Stoney Brown - pretibial wound    OTHER SURGICAL HISTORY Right 6/25/2013    Dr. Stoney Brown - CFA Endarterectomy w/marsupialization; RLE wound excision & debridement     OTHER SURGICAL HISTORY Left 8/27/2013    Dr. Stoney Brown - femoral & profunda femoris endarterectomy w/marsupialization patch angioplasty using SFA    SKIN SPLIT GRAFT Right 7/25/2013    Dr. Stoney Brown - to non-healing R pretibial wound, 9 x 15 cm    TOTAL HIP ARTHROPLASTY Right 09/01/2016    Dr. Tiny Grimm Left 12/22/2015    Dr. Stoney Brown - CFA exploration, thrombectomy of ileofemoral system, stenting of RAFAL in-stent stenosis w/8 x 37 mm Palmaz        FAMILY HISTORY    Family History   Problem Relation Age of Onset    Cancer Mother         Breast    Heart Disease Mother     Cancer Father         Bladder    Heart Disease Father     Cancer Paternal Grandmother         melanoma        SOCIAL HISTORY    Social History     Tobacco Use    Smoking status: Former     Packs/day: 0.25     Years: 52.00     Pack years: 13.00     Types: Cigarettes     Start date: 1967     Quit date: 2022     Years since quittin.2    Smokeless tobacco: Never    Tobacco comments:     reports he quit circa 22   Vaping Use    Vaping Use: Never used   Substance Use Topics    Alcohol use: Yes     Alcohol/week: 0.0 standard drinks     Comment: 2-3 beers a month    Drug use: No       ALLERGIES    Allergies   Allergen Reactions    Asa [Aspirin] Other (See Comments)     Stomach ache    Daliresp [Roflumilast] Diarrhea and Other (See Comments)     Severe diarrhea and did not help       MEDICATIONS    Current Outpatient Medications on File Prior to Encounter   Medication Sig Dispense Refill    albuterol sulfate HFA (PROVENTIL;VENTOLIN;PROAIR) 108 (90 Base) MCG/ACT inhaler INHALE 2 PUFFS INTO THE LUNGS EVERY 6 HOURS AS NEEDED FOR WHEEZING 18 g 2    Evolocumab 140 MG/ML SOAJ Inject 140 mg into the skin every 14 days Take an injection every 14 days. fluticasone-umeclidin-vilant (TRELEGY ELLIPTA) 100-62.5-25 MCG/INH AEPB Inhale 1 puff into the lungs in the morning. 28 each 11    potassium chloride (MICRO-K) 10 MEQ extended release capsule 1 tablet 60 capsule 1    furosemide (LASIX) 40 MG tablet Take 1 tablet by mouth in the morning. 90 tablet 3    metoprolol tartrate (LOPRESSOR) 50 MG tablet Take 1.5 tablets by mouth in the morning and 1.5 tablets before bedtime. 90 tablet 5    apixaban (ELIQUIS) 5 MG TABS tablet Take 1 tablet by mouth in the morning and 1 tablet before bedtime.  61 tablet 1    losartan (COZAAR) 100 MG tablet Take 1 tablet by mouth in the morning. 30 tablet 3    atorvastatin (LIPITOR) 80 MG tablet Take 1 tablet by mouth daily 90 tablet 1    clopidogrel (PLAVIX) 75 MG tablet Take 1 tablet by mouth daily 30 tablet 3    pregabalin (LYRICA) 150 MG capsule Take 1 capsule by mouth 2 times daily for 30 days. 180 capsule 1    allopurinol (ZYLOPRIM) 100 MG tablet Take 1 tablet by mouth daily 180 tablet 1    omeprazole (PRILOSEC) 40 MG delayed release capsule TAKE 1 CAPSULE BY MOUTH DAILY 30 capsule 3    [DISCONTINUED] folic acid (FOLVITE) 1 MG tablet Take 1 tablet by mouth daily 30 tablet 3    [DISCONTINUED] nitroGLYCERIN (NITROSTAT) 0.4 MG SL tablet Place 1 tablet under the tongue every 5 minutes as needed for Chest pain (Patient not taking: No sig reported) 25 tablet 1    [DISCONTINUED] magnesium gluconate (MAGONATE) 500 MG tablet Take 500 mg by mouth daily. No current facility-administered medications on file prior to encounter. REVIEW OF SYSTEMS  Review of Systems    Pertinent items are noted in HPI. Objective:      BP (!) 149/71   Pulse 76   Temp (!) 96 °F (35.6 °C) (Temporal)   Resp 22     Wt Readings from Last 3 Encounters:   08/30/22 225 lb 6.4 oz (102.2 kg)   08/30/22 216 lb (98 kg)   08/09/22 221 lb (100.2 kg)       PHYSICAL EXAM    Extremities: no cyanosis, no clubbing, 1 + edema-  bilateral lower extremities, and following wounds: Minimal residual TheraSkin allograft overlying right anterior knee wound with surrounding granulation tissue designated as wound #1. Partial-thickness wound overlying plantar first metatarsal head left toe with fibrin slough and minimal granulation tissue designated as wound #2. Monopha phasic signal from Doppler heard over posterior tibial and dorsalis pedis arteries left lower extremity. Partial-thickness wound right anterior knee containing fibrin and granulation tissue designated as wound #3    Assessment:      1. Non-pressure ulcer of right lower extremity with fat layer exposed ([address]    2. Open wound of right lower leg, subsequent encounter    3. Atherosclerosis of native artery of right lower extremity with rest pain ([address]    4. PVD (peripheral vascular disease) ([facility] [address]         Procedure Note  Indications:  Based on my examination of this patient's wound(s) today, sharp excision is required to promote healing and evaluate the extent healing. Performed by: Saeid Barrera MD    Consent obtained?  Yes    Time out taken: Yes    Pain Control: Anesthetic: 4% Lidocaine Liquid Topical     Debridement:Excisional Debridement    Using curette the wound was sharply debrided    down through and including the removal of epidermis and dermis    Devitalized Tissue Debrided:  fibrin and slough      Pre Debridement Measurements:  Are located in the Wound Documentation Flow Sheet   Wound #: 2 and 3     Post  Debridement Measurements:  Wound 11/15/21 Leg Distal;Right #1 (Active)   Wound Image   09/06/22 0805   Wound Etiology Arterial 08/22/22 1433   Wound Cleansed Cleansed with saline 09/19/22 0932   Dressing/Treatment Collagen with Ag 09/19/22 1042   Wound Length (cm) 4.9 cm 09/19/22 0932   Wound Width (cm) 2.2 cm 09/19/22 0932   Wound Depth (cm) 0.1 cm 09/19/22 0932   Wound Surface Area (cm^2) 10.78 cm^2 09/19/22 0932   Change in Wound Size % (l*w) -349.17 09/19/22 0932   Wound Volume (cm^3) 1.078 cm^3 09/19/22 0932   Wound Healing % -349 09/19/22 0932   Post-Procedure Length (cm) 4.9 cm 09/19/22 1016   Post-Procedure Width (cm) 2.2 cm 09/19/22 1016   Post-Procedure Depth (cm) 0.1 cm 09/19/22 1016   Post-Procedure Surface Area (cm^2) 10.78 cm^2 09/19/22 1016   Post-Procedure Volume (cm^3) 1.078 cm^3 09/19/22 1016   Distance Tunneling (cm) 0 cm 09/06/22 0805   Tunneling Position ___ O'Clock 0 07/11/22 1310   Undermining Starts ___ O'Clock 0 07/11/22 1310   Undermining Ends___ O'Clock 0 07/11/22 1310   Undermining Maxium Distance (cm) 0 09/06/22 0805   Wound Assessment Graft;Granulation tissue 09/19/22 0932   Drainage Amount Small 09/19/22 0932   Drainage Description Brown;Serosanguinous 09/19/22 0932   Odor Mild 09/19/22 0932   Catrachita-wound Assessment Edematous 09/19/22 0932   Margins Defined edges 09/19/22 0932   Wound Thickness Description not for Pressure Injury Full thickness 09/19/22 0932   Number of days: 307       Wound 09/12/22 Foot Plantar;Left #2 great toe (Active)   Wound Image   09/12/22 0908   Wound Etiology Traumatic 09/19/22 0932   Wound Cleansed Cleansed with saline 09/19/22 0932   Dressing/Treatment Collagen with Ag 09/19/22 1042   Wound Length (cm) 1.9 cm 09/19/22 0932   Wound Width (cm) 1 cm 09/19/22 0932   Wound Depth (cm) 0.1 cm 09/19/22 0932   Wound Surface Area (cm^2) 1.9 cm^2 09/19/22 0932   Change in Wound Size % (l*w) 36.67 09/19/22 0932   Wound Volume (cm^3) 0.19 cm^3 09/19/22 0932   Wound Healing % 37 09/19/22 0932   Post-Procedure Length (cm) 2 cm 09/19/22 1016   Post-Procedure Width (cm) 1.1 cm 09/19/22 1016   Post-Procedure Depth (cm) 0.3 cm 09/19/22 1016   Post-Procedure Surface Area (cm^2) 2.2 cm^2 09/19/22 1016   Post-Procedure Volume (cm^3) 0.66 cm^3 09/19/22 1016   Wound Assessment Fibrin;Pink/red 09/19/22 0932   Drainage Amount Small 09/19/22 0932   Drainage Description Brown 09/19/22 0932   Odor Mild 09/19/22 0932   Catrachita-wound Assessment Maceration 09/19/22 0932   Margins Defined edges 09/19/22 0932   Wound Thickness Description not for Pressure Injury Full thickness 09/19/22 0932   Number of days: 7       Wound 09/19/22 Leg Lower;Proximal;Right #3 (Active)   Wound Image   09/19/22 1016   Wound Etiology Traumatic 09/19/22 0932   Wound Cleansed Cleansed with saline 09/19/22 0932   Dressing/Treatment Collagen with Ag 09/19/22 1042   Wound Length (cm) 1.5 cm 09/19/22 0932   Wound Width (cm) 0.8 cm 09/19/22 0932   Wound Depth (cm) 0.1 cm 09/19/22 0932   Wound Surface Area (cm^2) 1.2 cm^2 09/19/22 0932   Wound Volume (cm^3) 0.12 cm^3 09/19/22 0932   Post-Procedure Length (cm) 1.6 cm 09/19/22 1016   Post-Procedure Width (cm) 0.9 cm 09/19/22 1016   Post-Procedure Depth (cm) 0.3 cm 09/19/22 1016   Post-Procedure Surface Area (cm^2) 1.44 cm^2 09/19/22 1016   Post-Procedure Volume (cm^3) 0.432 cm^3 09/19/22 1016   Wound Assessment Pink/red;Fibrin 09/19/22 0932   Drainage Amount Small 09/19/22 0932   Drainage Description Serosanguinous 09/19/22 0932   Odor Mild 09/19/22 0932   Catrachita-wound Assessment Fragile 09/19/22 0932   Margins Defined edges 09/19/22 0932   Wound Thickness Description not for Pressure Injury Full thickness 09/19/22 0932   Number of days: 0          Percent of Wound Debrided: 100%    Total Surface Area Debrided:  3.6 sq cm    Diabetic/Pressure/Non Pressure Ulcers only:  Ulcer: Pressure ulcer, Stage 2 and Non-Pressure ulcer, limited to breakdown of skin    Bleeding: Minimal    Hemostasis Achieved: by pressure    Procedural Pain: 0  / 10     Post Procedural Pain: 0 / 10     Response to treatment:  Well tolerated by patient. Patient's grafted wound peers to have more epithelialization at the margins. New wound proximal knee probably as a result slippage from Coban compression. We will try having patient wear surgical shoe for left plantar foot wound  Patient to see Dr. Caroline Villareal next week. Plan:     Treatment Note: Please see attached Discharge Instructions. These instructions were given and signed by the patient or POA    New Prescriptions    No medications on file       Orders Placed This Encounter   Procedures    Initiate Outpatient Wound Care Protocol       Discharge Instructions          [facility] Physician Orders and Discharge Instructions  [address] [place] [address] E. [address] [facility]. [place]  Telephone: [phone]  NAME:  Krista Early.   YOB: 1950  MEDICAL RECORD NUMBER:  [id number]  DATE: 9/19/22     Return Appointment:  Return Appointment: With Dr Cata Velazquez  in  [facility])  [] Return Appointment for a Wound Assessment with the nurse on:     Future Appointments   Date Time Provider Orlando Colmenares   10/26/2022 12:40 PM Raynold Lesches, MD [facility]   12/14/2022  9:45 AM DO JEFF Silverio  [place] - [facility]   12/19/2022 11:00 AM Rocky Norton DO [address]: [facility] [phone]   F: [phone]   Medically necessary services: [x] Skilled Nursing [] PT (Eval & Treat) [] OT (Eval & Treat) [] Social Work [] Dietician  [] Other:    Wound care instructions: If you smoke we ask that you refrain from smoking. Smoking inhibits wounds from healing. When taking antibiotics take the entire prescription as ordered. Do not stop taking until medication is all gone unless otherwise instructed. Exercise as tolerated. Keep weight off wounds and reposition every 2 hours if applicable. Do not get wounds wet in the bath or shower unless otherwise instructed by your physician. If your wound is on your foot or leg, you may purchase a cast bag. Please ask at the pharmacy. Wash hands with soap and water prior to and after every dressing change. [x]Wash wounds with: 0.9% normal saline  [x]Catrachita wound Topical Treatments: Do not apply lotions, creams, or ointments to the skin around the wound bed unless directed as followed:   Apply around the wound: Nothing         [x]Wound Location: right lower leg   Apply Primary Dressing to wound: Oly  Secondary Dressing: 4X4 gauze pad to right lower leg, mepilex border to right proximal wound   Avoid contact of tape with skin if possible. When to change Dressing: DO NOT CHANGE    [x]Wound Location: left foot wound   Apply Primary Dressing to wound: Oly  Secondary Dressing:  foam cut out, gauze, comforming roll   Avoid contact of tape with skin if possible.   When to change Dressing:  3 times a week- Monday, Wednesday, friday        [x] Multilayer Compression Wrap:  Type: 2 Layer Compression Wrap- right lower leg    Do not get leg(s) with wrap wet. If wraps become too tight call the center or completely remove the wrap. Elevate leg(s) above the level of the heart when sitting. Avoid prolonged standing in one place. Applied in Clinic on 9/19/2022  The Goal of this therapy is to reduce edema and get into long term compression garments to control venous insufficiency, lymphedema and reduce occurrence of venous ulcers    [x] Edema Control:  Apply: Spandagrip/Medigrip on Left; High compression  20-30 mm/Hg. They should be applied to affected leg(s) from mid foot to knee making sure to cover the heel      Elevate leg(s) above the level of the heart for 30 minutes 4-5 times a day and/or when sitting. Avoid prolonged standing in one place. [x]Off Loading(Pressure Reduction) When: Walking  Weight Bearing Status: No Weight bearing restrictions Left; Offloading devices: Post op shoe/ Surgical shoe: Left      Dietary:  Important dietary reminders:  1. Increase Protein intake (i.e. Lean meats, fish, eggs, legumes, and yogurt)  2. No added salt  3. If diabetic, follow a diabetic diet and check glucose prior to meals or as instructed by your physician. Dietary Supplements(Take twice a day unless instructed otherwise):  [] Russ Naqvi  [] 30ml ProStat [] Rosalio Hugger [] Ensure Max/Premier [] Other:    Your nurse  is:  Palomo Gomes     Electronically signed by Juana Stinson RN on 9/19/2022 at 10:21 ANASTASIIA Lopez 8 Information: Should you experience any significant changes in your wound(s) or have questions about your wound care, please contact the [address] [place] [address] at [phone]. We are open from 8:00am - 4:30p Monday, Thursday and Friday; 11:00am - 5pm on Tuesday and CLOSED Wednesday. Please give us 24-48 business hours to return your call.       Call your doctor now or seek immediate medical care if:    You have symptoms of infection, such as: Increased pain, swelling, warmth, or redness. Red streaks leading from the area. Pus draining from the area. A fever.          [] Patient unable to sign Discharge Instructions given to ECF/Transportation/POA         Electronically signed by Leonarda Dominguez MD on 9/19/2022 at 12:19 PM

## 2022-09-22 ENCOUNTER — TELEPHONE (OUTPATIENT)
Dept: WOUND CARE | Age: 72
End: 2022-09-22

## 2022-09-22 NOTE — TELEPHONE ENCOUNTER
Lubna from [facility] called and spoke to her regarding dressing changes to left foot 2xweek. Stated she would have nurses see patient on Wednesdays and Fridays.

## 2022-09-23 ENCOUNTER — CARE COORDINATION (OUTPATIENT)
Dept: CARE COORDINATION | Age: 72
End: 2022-09-23

## 2022-09-23 NOTE — CARE COORDINATION
Routine follow up outreach for review pt status/progress towards goals. Left vm requesting return callback.     Provided & repeated direct callback to reach ACM    Encouraged return callback at pt's earliest convenience i

## 2022-09-26 ENCOUNTER — HOSPITAL ENCOUNTER (OUTPATIENT)
Dept: WOUND CARE | Age: 72
Discharge: HOME | End: 2022-09-26
Payer: MEDICARE

## 2022-09-26 VITALS
DIASTOLIC BLOOD PRESSURE: 67 MMHG | HEART RATE: 71 BPM | SYSTOLIC BLOOD PRESSURE: 113 MMHG | TEMPERATURE: 96.9 F | RESPIRATION RATE: 22 BRPM

## 2022-09-26 DIAGNOSIS — S81.801D OPEN WOUND OF RIGHT LOWER LEG, SUBSEQUENT ENCOUNTER: ICD-10-CM

## 2022-09-26 DIAGNOSIS — L97.912 NON-PRESSURE ULCER OF RIGHT LOWER EXTREMITY WITH FAT LAYER EXPOSED (HCC): Primary | ICD-10-CM

## 2022-09-26 DIAGNOSIS — I70.221 ATHEROSCLEROSIS OF NATIVE ARTERY OF RIGHT LOWER EXTREMITY WITH REST PAIN (HCC): ICD-10-CM

## 2022-09-26 DIAGNOSIS — I73.9 PVD (PERIPHERAL VASCULAR DISEASE) (HCC): ICD-10-CM

## 2022-09-26 PROCEDURE — 6370000000 HC RX 637 (ALT 250 FOR IP): Performed by: SPECIALIST

## 2022-09-26 PROCEDURE — 11042 DBRDMT SUBQ TIS 1ST 20SQCM/<: CPT

## 2022-09-26 PROCEDURE — 29581 APPL MULTLAYER CMPRN SYS LEG: CPT

## 2022-09-26 PROCEDURE — 15271 SKIN SUB GRAFT TRNK/ARM/LEG: CPT | Performed by: SURGERY

## 2022-09-26 PROCEDURE — 15271 SKIN SUB GRAFT TRNK/ARM/LEG: CPT

## 2022-09-26 RX ORDER — LIDOCAINE 50 MG/G
OINTMENT TOPICAL ONCE
Status: CANCELLED | OUTPATIENT
Start: 2022-09-26 | End: 2022-09-26

## 2022-09-26 RX ORDER — LIDOCAINE HYDROCHLORIDE 20 MG/ML
JELLY TOPICAL ONCE
Status: CANCELLED | OUTPATIENT
Start: 2022-09-26 | End: 2022-09-26

## 2022-09-26 RX ORDER — BACITRACIN, NEOMYCIN, POLYMYXIN B 400; 3.5; 5 [USP'U]/G; MG/G; [USP'U]/G
OINTMENT TOPICAL ONCE
Status: CANCELLED | OUTPATIENT
Start: 2022-09-26 | End: 2022-09-26

## 2022-09-26 RX ORDER — CLOBETASOL PROPIONATE 0.5 MG/G
OINTMENT TOPICAL ONCE
Status: CANCELLED | OUTPATIENT
Start: 2022-09-26 | End: 2022-09-26

## 2022-09-26 RX ORDER — GENTAMICIN SULFATE 1 MG/G
OINTMENT TOPICAL ONCE
Status: CANCELLED | OUTPATIENT
Start: 2022-09-26 | End: 2022-09-26

## 2022-09-26 RX ORDER — LIDOCAINE HYDROCHLORIDE 40 MG/ML
SOLUTION TOPICAL ONCE
Status: COMPLETED | OUTPATIENT
Start: 2022-09-26 | End: 2022-09-26

## 2022-09-26 RX ORDER — GINSENG 100 MG
CAPSULE ORAL ONCE
Status: CANCELLED | OUTPATIENT
Start: 2022-09-26 | End: 2022-09-26

## 2022-09-26 RX ORDER — LIDOCAINE 40 MG/G
CREAM TOPICAL ONCE
Status: CANCELLED | OUTPATIENT
Start: 2022-09-26 | End: 2022-09-26

## 2022-09-26 RX ORDER — BETAMETHASONE DIPROPIONATE 0.05 %
OINTMENT (GRAM) TOPICAL ONCE
Status: CANCELLED | OUTPATIENT
Start: 2022-09-26 | End: 2022-09-26

## 2022-09-26 RX ORDER — LIDOCAINE HYDROCHLORIDE 40 MG/ML
SOLUTION TOPICAL ONCE
Status: CANCELLED | OUTPATIENT
Start: 2022-09-26 | End: 2022-09-26

## 2022-09-26 RX ORDER — BACITRACIN ZINC AND POLYMYXIN B SULFATE 500; 1000 [USP'U]/G; [USP'U]/G
OINTMENT TOPICAL ONCE
Status: CANCELLED | OUTPATIENT
Start: 2022-09-26 | End: 2022-09-26

## 2022-09-26 RX ADMIN — LIDOCAINE HYDROCHLORIDE: 40 SOLUTION TOPICAL at 12:42

## 2022-09-26 ASSESSMENT — PAIN DESCRIPTION - DESCRIPTORS: DESCRIPTORS: BURNING;SHOOTING

## 2022-09-26 ASSESSMENT — PAIN DESCRIPTION - FREQUENCY: FREQUENCY: CONTINUOUS

## 2022-09-26 ASSESSMENT — PAIN DESCRIPTION - ORIENTATION: ORIENTATION: RIGHT

## 2022-09-26 ASSESSMENT — PAIN DESCRIPTION - LOCATION: LOCATION: LEG

## 2022-09-26 ASSESSMENT — PAIN DESCRIPTION - PAIN TYPE: TYPE: ACUTE PAIN;CHRONIC PAIN

## 2022-09-26 ASSESSMENT — PAIN SCALES - GENERAL: PAINLEVEL_OUTOF10: 8

## 2022-09-26 NOTE — PROGRESS NOTES
TheraSkin Treatment Note      Goal:  Patient will receive safe and proper application of skin substitute. Patient will comply with caring for dressing, offloading and reporting complications. [x] Expiration date of TheraSkin checked immediately prior to use. [x] Package intact prior to use and no damage noted. [x] Transport temperature controlled and acceptable. TheraSkin was prepared for application by removing from package. TheraSkin was rinsed 2 times in room temperature normal saline for 5 seconds each time. A 2nd saline rinse was left on the TheraSkin until the provider was ready to apply it within 120 minutes of thawing. White side placed against ulcer bed. [x] Theraskin was applied to wound # 1 and affixed with steri-strips by the provider. [x] Secondary dressing applied as ordered. [x] Patient/caregiver was instructed not to remove dressing and to keep it clean and dry. See AVS for further instructions given to patient/caregiver. Theraskin may be applied a total of 10 times per wound over a 12 week period. Additionally Theraskin may only be used every 12 months per wound. Date of first application of Theraskin for this current wound is September 6, 2022.      Application # 2           Guidelines followed      Electronically signed by Ryne Walker RN on 9/26/2022 at 2:56 PM

## 2022-09-26 NOTE — PROGRESS NOTES
[facility] [place]  Progress Note and Procedure Note      Robert Ibrahim. MEDICAL RECORD NUMBER:  [id number]  AGE: 81 y.o. GENDER: male  : 1950  EPISODE DATE:  2022    Subjective:     Chief Complaint   Patient presents with    Wound Check     Right lower leg and left foot      HISTORY of PRESENT ILLNESS HPI   Robert Khan is a 81 y.o. male who presents today for wound/ulcer evaluation. History of Wound Context: No new complaints, tolerating compression. Unfortunately, he developed a wound on the left foot from an ill fitting shoe. This was approximately a month ago. Debrided last week by Dr. Bettie Zacarias with some improvement this week.   Ulcer Identification:  Ulcer Type: arterial and pressure    Contributing Factors: venous stasis, chronic pressure, decreased mobility, smoking, and arterial insufficiency    PAST MEDICAL HISTORY      Diagnosis Date    CAD (coronary artery disease)     CHF (congestive heart failure) ([facility])     diastolic    COPD (chronic obstructive pulmonary disease) ([facility])     Critical ischemia of lower extremity ([facility])     Depressive disorder, not elsewhere classified     GERD (gastroesophageal reflux disease)     History of colon polyps - 30 seen on colonoscopy 2021    History of MI (myocardial infarction) 2013    History of skin ulcer of lower extremity     R anterior shin    History of transient ischemic attack (TIA)     Hx of blood clots     PE    Hyperlipidemia     Hypertension     Neuropathy     KAVITA (obstructive sleep apnea)     On CPAP    Personal history of DVT (deep vein thrombosis)     Prolonged emergence from general anesthesia     Pt reported being combative after surgery     PVD (peripheral vascular disease) ([address]     TIA (transient ischemic attack)     Vertebral fracture      PAST SURGICAL HISTORY  Past Surgical History:   Procedure Laterality Date    APPENDECTOMY      CHOLECYSTECTOMY, LAPAROSCOPIC      COLONOSCOPY 2021    COLONOSCOPY POLYPECTOMY SNARE/COLD BIOPSY performed by Rachelle Rivers MD at [facility]  2021    COLONOSCOPY POLYPECTOMY ABLATION performed by Rachelle Rivers MD at [place] [address]  2021    COLONOSCOPY CONTROL HEMORRHAGE [facility]  2013    EYE SURGERY Left     FEMORAL BYPASS Right 2022    RIGHT FEMORAL BELOW KNEE POPLITEAL ARTERY BYPASS WITH IN SITU SAPHENOUS VEIN performed by Pao Walker MD at [address] [place] [address] Right 2021    RIGHT FEMORAL ENDARTERECTOMY  RIGHT EXTERNAL ILIAC TO PROFUNDA FEMORAL BYPASS WITH 8MM PTFE GRAFT RIGHT LOWER EXTREMITY ARTERIOGRAM BALLOON ANGIOPLASTY RIGHT PROFUNDA BALLOON ANGIOPLASTY AND STENT OF RIGHT EXTERNAL ILIAC ARTERY performed by Pao Walker MD at [address] [place] [address]  2015    Bilateral decompressive lumbar laminectomy L4-5   ; medial facetectomy L4-5 joints  bilaterally; foraminotomies l5   nerve roots bilaterally    LEG DEBRIDEMENT Right 2013    Dr. Rubin Mulligan - pretibial wound    OTHER SURGICAL HISTORY Right 2013    Dr. Rubin Mulligan - CFA Endarterectomy w/marsupialization; RLE wound excision & debridement     OTHER SURGICAL HISTORY Left 2013    Dr. Rubin Mulligan - femoral & profunda femoris endarterectomy w/marsupialization patch angioplasty using SFA    SKIN SPLIT GRAFT Right 2013    Dr. Rubin Mulligan - to non-healing R pretibial wound, 9 x 15 cm    TOTAL HIP ARTHROPLASTY Right 2016    Dr. Stacy Grigsby Left 2015    Dr. Rubin Mulligan - CFA exploration, thrombectomy of ileofemoral system, stenting of RAFAL in-stent stenosis w/8 x 37 mm Palmaz      SOCIAL HISTORY  Social History     Tobacco Use    Smoking status: Former     Packs/day: 0.25     Years: 52.00     Pack years: 13.00     Types: Cigarettes     Start date: 1967     Quit date: 2022     Years since quittin.3 Take 1 tablet by mouth daily 30 tablet 3    [DISCONTINUED] nitroGLYCERIN (NITROSTAT) 0.4 MG SL tablet Place 1 tablet under the tongue every 5 minutes as needed for Chest pain (Patient not taking: No sig reported) 25 tablet 1    [DISCONTINUED] magnesium gluconate (MAGONATE) 500 MG tablet Take 500 mg by mouth daily. No current facility-administered medications on file prior to encounter. REVIEW OF SYSTEMS  Pertinent items are noted in HPI. Last K3G if applicable:   Hemoglobin A1C   Date Value Ref Range Status   2020 6.1 See comment % Final     Comment:     Comment:  Diagnosis of Diabetes: > or = 6.5%  Increased risk of diabetes (Prediabetes): 5.7-6.4%  Glycemic Control: Nonpregnant Adults: <7.0%                    Pregnant: <6.0%           Objective:      /67   Pulse 71   Temp 96.9 °F (36.1 °C) (Temporal)   Resp 22     Wt Readings from Last 3 Encounters:   22 225 lb 6.4 oz (102.2 kg)   22 216 lb (98 kg)   22 221 lb (100.2 kg)       PHYSICAL EXAM  General:  AAo x 3. NAD. WD/WN  Lungs:  no increased WOB or audible wheezing  Right le+ edema--anterior wound with some irritation around periwound. Prior theraskin appears to have taken moderately well. Moderate exudate. Left le+ edema. No erythema. Left foot wound clean, no exudate. Measurements noted below. Vascular:  Right DP 2+. Left DP/PT monophasic    Assessment:      1. Non-pressure ulcer of right lower extremity with fat layer exposed ([address]    2. Open wound of right lower leg, subsequent encounter    3. Atherosclerosis of native artery of right lower extremity with rest pain ([address]    4. PVD (peripheral vascular disease) ([address]      Procedure Note  Indications:  Based on my examination of this patient's wound(s)/ulcer(s) today, debridement is required to promote healing and evaluate the wound base.   Performed by: Velma Mallory MD  Consent obtained:  Yes  Time out taken:  Yes  Pain Control: Anesthetic  Anesthetic: 4% Lidocaine Liquid Topical     Debridement: Excisional Debridement  Using curette the wound(s)/ulcer(s) was/were debrided down through and including the removal of subcutaneous tissue.       Devitalized Tissue Debrided:  exudate  Pre Debridement Measurements:  Are located in the [place]  Documentation Flow Sheet  Wound/Ulcer #: 1  Post Debridement Measurements:  Wound/Ulcer Descriptions are Pre Debridement except measurements:    Wound 11/15/21 Leg Distal;Right #1 (Active)   Wound Image   09/06/22 0805   Wound Etiology Arterial 08/22/22 1433   Wound Cleansed Cleansed with saline 09/26/22 1243   Dressing/Treatment Collagen with Ag 09/19/22 1042   Wound Length (cm) 4.9 cm 09/26/22 1243   Wound Width (cm) 1.6 cm 09/26/22 1243   Wound Depth (cm) 0.1 cm 09/26/22 1243   Wound Surface Area (cm^2) 7.84 cm^2 09/26/22 1243   Change in Wound Size % (l*w) -226.67 09/26/22 1243   Wound Volume (cm^3) 0.784 cm^3 09/26/22 1243   Wound Healing % -227 09/26/22 1243   Post-Procedure Length (cm) 5 cm 09/26/22 1303   Post-Procedure Width (cm) 1.7 cm 09/26/22 1303   Post-Procedure Depth (cm) 0.3 cm 09/26/22 1303   Post-Procedure Surface Area (cm^2) 8.5 cm^2 09/26/22 1303   Post-Procedure Volume (cm^3) 2.55 cm^3 09/26/22 1303   Distance Tunneling (cm) 0 cm 09/06/22 0805   Tunneling Position ___ O'Clock 0 07/11/22 1310   Undermining Starts ___ O'Clock 0 07/11/22 1310   Undermining Ends___ O'Clock 0 07/11/22 1310   Undermining Maxium Distance (cm) 0 09/06/22 0805   Wound Assessment Fibrin;Granulation tissue 09/26/22 1243   Drainage Amount Small 09/26/22 1243   Drainage Description Brown;Serosanguinous 09/26/22 1243   Odor Mild 09/26/22 1243   Catrachita-wound Assessment Dry/flaky 09/26/22 1243   Margins Defined edges 09/26/22 1243   Wound Thickness Description not for Pressure Injury Full thickness 09/26/22 1243   Number of days: 314       Wound 09/12/22 Foot Plantar;Left #2 great toe (Active)   Wound Image   09/12/22 7117   Wound Etiology Traumatic 09/19/22 0932   Wound Cleansed Cleansed with saline 09/26/22 1243   Dressing/Treatment Collagen with Ag 09/19/22 1042   Wound Length (cm) 1.4 cm 09/26/22 1243   Wound Width (cm) 0.5 cm 09/26/22 1243   Wound Depth (cm) 0.1 cm 09/26/22 1243   Wound Surface Area (cm^2) 0.7 cm^2 09/26/22 1243   Change in Wound Size % (l*w) 76.67 09/26/22 1243   Wound Volume (cm^3) 0.07 cm^3 09/26/22 1243   Wound Healing % 77 09/26/22 1243   Post-Procedure Length (cm) 1.4 cm 09/26/22 1303   Post-Procedure Width (cm) 0.5 cm 09/26/22 1303   Post-Procedure Depth (cm) 0.1 cm 09/26/22 1303   Post-Procedure Surface Area (cm^2) 0.7 cm^2 09/26/22 1303   Post-Procedure Volume (cm^3) 0.07 cm^3 09/26/22 1303   Wound Assessment Fibrin;Pink/red 09/26/22 1243   Drainage Amount Small 09/26/22 1243   Drainage Description Brown 09/26/22 1243   Odor Mild 09/26/22 1243   Catrachita-wound Assessment Intact 09/26/22 1243   Margins Defined edges 09/26/22 1243   Wound Thickness Description not for Pressure Injury Full thickness 09/26/22 1243   Number of days: 14       Wound 09/19/22 Leg Lower;Proximal;Right #3 (Active)   Wound Image   09/19/22 1016   Wound Etiology Traumatic 09/19/22 0932   Wound Cleansed Cleansed with saline 09/26/22 1243   Dressing/Treatment Collagen with Ag 09/19/22 1042   Wound Length (cm) 1.2 cm 09/26/22 1243   Wound Width (cm) 0.7 cm 09/26/22 1243   Wound Depth (cm) 0.1 cm 09/26/22 1243   Wound Surface Area (cm^2) 0.84 cm^2 09/26/22 1243   Change in Wound Size % (l*w) 30 09/26/22 1243   Wound Volume (cm^3) 0.084 cm^3 09/26/22 1243   Wound Healing % 30 09/26/22 1243   Post-Procedure Length (cm) 1.2 cm 09/26/22 1303   Post-Procedure Width (cm) 0.7 cm 09/26/22 1303   Post-Procedure Depth (cm) 0.1 cm 09/26/22 1303   Post-Procedure Surface Area (cm^2) 0.84 cm^2 09/26/22 1303   Post-Procedure Volume (cm^3) 0.084 cm^3 09/26/22 1303   Wound Assessment Pink/red;Fibrin 09/26/22 1243   Drainage Amount Small 09/26/22 1243 Drainage Description Lindsay Dorset 09/26/22 1243   Odor Mild 09/26/22 1243   Catrachita-wound Assessment Fragile 09/26/22 1243   Margins Defined edges 09/26/22 1243   Wound Thickness Description not for Pressure Injury Full thickness 09/26/22 1243   Number of days: 7          Percent of Wound(s)/Ulcer(s) Debrided: 100%    Total Surface Area Debrided:  8.5 sq cm     Diabetic/Pressure/Non Pressure Ulcers only:  Ulcer: Non-Pressure ulcer, fat layer exposed     Estimated Blood Loss:  Minimal    Hemostasis Achieved:  by pressure    Procedural Pain:  0  / 10     Post Procedural Pain:  0 / 10     Response to treatment:  Well tolerated by patient. Procedure:  Skin Substitute Application    Performed by: Riaz Armstrong MD    Product Utilized:   TheraSkin 13 sq/cm   Fenestrated: Yes  Mesher Utilized: No  Instrument(s) scissors and forceps    Skin Substitute was Applied to Ulcer Number(s):    Ulcer #: 1    Wound 11/15/21 Leg Distal;Right #1 (Active)   Wound Image   09/06/22 0805   Wound Etiology Arterial 08/22/22 1433   Wound Cleansed Cleansed with saline 09/26/22 1243   Dressing/Treatment Collagen with Ag 09/19/22 1042   Wound Length (cm) 4.9 cm 09/26/22 1243   Wound Width (cm) 1.6 cm 09/26/22 1243   Wound Depth (cm) 0.1 cm 09/26/22 1243   Wound Surface Area (cm^2) 7.84 cm^2 09/26/22 1243   Change in Wound Size % (l*w) -226.67 09/26/22 1243   Wound Volume (cm^3) 0.784 cm^3 09/26/22 1243   Wound Healing % -227 09/26/22 1243   Post-Procedure Length (cm) 5 cm 09/26/22 1303   Post-Procedure Width (cm) 1.7 cm 09/26/22 1303   Post-Procedure Depth (cm) 0.3 cm 09/26/22 1303   Post-Procedure Surface Area (cm^2) 8.5 cm^2 09/26/22 1303   Post-Procedure Volume (cm^3) 2.55 cm^3 09/26/22 1303   Distance Tunneling (cm) 0 cm 09/06/22 0805   Tunneling Position ___ O'Clock 0 07/11/22 1310   Undermining Starts ___ O'Clock 0 07/11/22 1310   Undermining Ends___ O'Clock 0 07/11/22 1310   Undermining Maxium Distance (cm) 0 09/06/22 0805   Wound Assessment Fibrin;Granulation tissue 09/26/22 1243   Drainage Amount Small 09/26/22 1243   Drainage Description Brown;Serosanguinous 09/26/22 1243   Odor Mild 09/26/22 1243   Catrachita-wound Assessment Dry/flaky 09/26/22 1243   Margins Defined edges 09/26/22 1243   Wound Thickness Description not for Pressure Injury Full thickness 09/26/22 1243   Number of days: 314       Wound 09/12/22 Foot Plantar;Left #2 great toe (Active)   Wound Image   09/12/22 0908   Wound Etiology Traumatic 09/19/22 0932   Wound Cleansed Cleansed with saline 09/26/22 1243   Dressing/Treatment Collagen with Ag 09/19/22 1042   Wound Length (cm) 1.4 cm 09/26/22 1243   Wound Width (cm) 0.5 cm 09/26/22 1243   Wound Depth (cm) 0.1 cm 09/26/22 1243   Wound Surface Area (cm^2) 0.7 cm^2 09/26/22 1243   Change in Wound Size % (l*w) 76.67 09/26/22 1243   Wound Volume (cm^3) 0.07 cm^3 09/26/22 1243   Wound Healing % 77 09/26/22 1243   Post-Procedure Length (cm) 1.4 cm 09/26/22 1303   Post-Procedure Width (cm) 0.5 cm 09/26/22 1303   Post-Procedure Depth (cm) 0.1 cm 09/26/22 1303   Post-Procedure Surface Area (cm^2) 0.7 cm^2 09/26/22 1303   Post-Procedure Volume (cm^3) 0.07 cm^3 09/26/22 1303   Wound Assessment Fibrin;Pink/red 09/26/22 1243   Drainage Amount Small 09/26/22 1243   Drainage Description Brown 09/26/22 1243   Odor Mild 09/26/22 1243   Catrachita-wound Assessment Intact 09/26/22 1243   Margins Defined edges 09/26/22 1243   Wound Thickness Description not for Pressure Injury Full thickness 09/26/22 1243   Number of days: 14       Wound 09/19/22 Leg Lower;Proximal;Right #3 (Active)   Wound Image   09/19/22 1016   Wound Etiology Traumatic 09/19/22 0932   Wound Cleansed Cleansed with saline 09/26/22 1243   Dressing/Treatment Collagen with Ag 09/19/22 1042   Wound Length (cm) 1.2 cm 09/26/22 1243   Wound Width (cm) 0.7 cm 09/26/22 1243   Wound Depth (cm) 0.1 cm 09/26/22 1243   Wound Surface Area (cm^2) 0.84 cm^2 09/26/22 1243   Change in Wound Size % (l*w) 30 09/26/22 1243   Wound Volume (cm^3) 0.084 cm^3 09/26/22 1243   Wound Healing % 30 09/26/22 1243   Post-Procedure Length (cm) 1.2 cm 09/26/22 1303   Post-Procedure Width (cm) 0.7 cm 09/26/22 1303   Post-Procedure Depth (cm) 0.1 cm 09/26/22 1303   Post-Procedure Surface Area (cm^2) 0.84 cm^2 09/26/22 1303   Post-Procedure Volume (cm^3) 0.084 cm^3 09/26/22 1303   Wound Assessment Pink/red;Fibrin 09/26/22 1243   Drainage Amount Small 09/26/22 1243   Drainage Description Brown 09/26/22 1243   Odor Mild 09/26/22 1243   Catrachita-wound Assessment Fragile 09/26/22 1243   Margins Defined edges 09/26/22 1243   Wound Thickness Description not for Pressure Injury Full thickness 09/26/22 1243   Number of days: 7          Diabetic/Pressure/Non Pressure Ulcers:  Ulcer:   Non-Pressure ulcer, fat layer exposed      Total Surface Area of Ulcer(s) Covered 8.5 sq/cm    Was the Product Layered  No     Amount of Product Applied 8.5 sq/cm     Amount of Product Wasted 4.8 sq/cm     Reason for Waste: The size of the wound is smaller than the product utilized     Surgically Fixated: Yes    Secured With: Steri Strips and Silicone Dressing     Procedural Pain: 0/10     Post Procedural Pain: 0 / 10    Response to Treatment: Well tolerated by patient. Plan:       Treatment Note please see attached Discharge Instructions    New Medication(s) at this visit:   New Prescriptions    No medications on file       Other orders at this visit:   Orders Placed This Encounter   Procedures    Initiate Outpatient Wound Care Protocol       Smoking Cessation: Counseling given: Not Answered  Tobacco comments: reports he quit circa 6.8.22      Written patient dismissal instructions given to patient and signed by patient or POA. Discharge [facility] Physician Orders and Discharge Instructions  [address] [place] [address]. [place] [address]  Telephone: [phone]  NAME: Stone Mcelroy. YOB: 1950  MEDICAL RECORD NUMBER:  [id number]  DATE: 9/26/22     Return Appointment:  Return Appointment: With Dr. Mattie Favre  in  [facility])  [x] Return Appointment for a Wound Assessment with the nurse on: 09/29/22    Future Appointments   Date Time Provider [place] Dedra   9/26/2022  1:00 PM Ming Bruce MD [address] [facility]   10/26/2022 12:40 PM Alesha العلي MD [facility] P/CC [facility]   12/14/2022  9:45 AM [facility]iera[facility]  [facility]   12/19/2022 11:00 AM Lisa Moreira,  [address]: [facility] [phone]   F: [phone]   Medically necessary services: [x] Skilled Nursing [] PT (Eval & Treat) [] OT (Eval & Treat) [] Social Work [] Dietician  [] Other:    Wound care instructions: If you smoke we ask that you refrain from smoking. Smoking inhibits wounds from healing. When taking antibiotics take the entire prescription as ordered. Do not stop taking until medication is all gone unless otherwise instructed. Exercise as tolerated. Keep weight off wounds and reposition every 2 hours if applicable. Do not get wounds wet in the bath or shower unless otherwise instructed by your physician. If your wound is on your foot or leg, you may purchase a cast bag. Please ask at the pharmacy. Wash hands with soap and water prior to and after every dressing change. [x]Wash wounds with: 0.9% normal saline  [x]Catrachita wound Topical Treatments: Do not apply lotions, creams, or ointments to the skin around the wound bed unless directed as followed:   Apply around the wound: zinc paste to right lower leg wound       [x]Wound Location: left foot   Apply Primary Dressing to wound: Oly  Secondary Dressing: 4X4 gauze pad   Avoid contact of tape with skin if possible.   When to change Dressing: 3 times per week:Monday/Wednesday/Friday- home care to change Wednesday and friday    [x]Wound Location: right lower proximal leg wound   Apply Primary Dressing to wound: Oly  Secondary Dressing: mepilex border   Avoid contact of tape with skin if possible. When to change Dressing: DO NOT CHANGE    [x]Application of a biologic skin substitute: Yes: Calderon : Wound Location: applied to right lower leg wound, covered with mepitel, secured with steri strips, hydrogel, gauze  This is a highly specialized wound care dressing that is intended to enhance your own bodies ability to increase growth factors and other healing abilities. Please leave the dressing clean, dry and intact unless otherwise instructed by your physician. Leave steri-strips and non adherent in place if changing the dressing as instructed. []Instructions for [facility] [employer] if applicable:         [x] Multilayer Compression Wrap:  Type: 2 Layer Lite Compression Wrap- right    Do not get leg(s) with wrap wet. If wraps become too tight call the center or completely remove the wrap. Elevate leg(s) above the level of the heart when sitting. Avoid prolonged standing in one place. Applied in Clinic on 9/26/2022  The Goal of this therapy is to reduce edema and get into long term compression garments to control venous insufficiency, lymphedema and reduce occurrence of venous ulcers    [x] Edema Control:  Apply: Spandagrip/Medigrip on Left; Medium compression 10-20 mm/Hg. They should be applied to affected leg(s) from mid foot to knee making sure to cover the heel      Elevate leg(s) above the level of the heart for 30 minutes 4-5 times a day and/or when sitting. Avoid prolonged standing in one place. [x]Off Loading(Pressure Reduction) When: Walking  Weight Bearing Status: No Weight bearing restrictions Left; Offloading devices: Post op shoe/ Surgical shoe: Left    Dietary:  Important dietary reminders:  1. Increase Protein intake (i.e. Lean meats, fish, eggs, legumes, and yogurt)  2. No added salt  3.  If diabetic, follow a diabetic diet and check glucose prior to

## 2022-09-26 NOTE — PROGRESS NOTES
Multilayer Compression Wrap   (Not Unna) Below the Knee    NAME:  Agustin Canada. YOB: 1950  MEDICAL RECORD NUMBER:  [id number]  DATE:  9/26/2022       Removed old Multilayer wrap if present and washed leg with mild soap/water. Applied moisturizing agent to dry skin as needed. Applied primary and secondary dressing as ordered    Applied multilayered dressing below the knee to Right lower leg(s)  (2 Layer Lite compression wrap ) . Instructed patient/caregiver not to remove dressing and to keep it clean and dry. Instructed patient/caregiver on complications to report to provider, such as pain, numbness in toes, heavy drainage, and slippage of dressing. Instructed patient on purpose of compression dressing and on activity and exercise recommendations.    Applied per   Guidelines    Electronically signed by Jayme Dong RN on 9/26/2022 at 1:18 PM

## 2022-09-26 NOTE — DISCHARGE INSTRUCTIONS
[facility] Physician Orders and Discharge Instructions  [address] [address] [address] [address] [facility]. [place]. [place]  Telephone: [phone]  NAME:  Iona Schirmer. YOB: 1950  MEDICAL RECORD NUMBER:  [id number]  DATE: 9/26/22     Return Appointment:  Return Appointment: With Dr. Bird Marsh  in  1 [facility])  [x] Return Appointment for a Wound Assessment with the nurse on: 09/29/22    Future Appointments   Date Time Provider Orlando Colmenares   9/26/2022  1:00 PM Zakiya Jarquin MD [address] [facility]   10/26/2022 12:40 PM Elvira Baires MD [facility] P/CC MMA   12/14/2022  9:45 AM Cruz Santos DO [facility] [place] - DYD   12/19/2022 11:00 AM Cruz Santos DO [address]: [facility] [phone]   F: [phone]   Medically necessary services: [x] Skilled Nursing [] PT (Eval & Treat) [] OT (Eval & Treat) [] Social Work [] Dietician  [] Other:    Wound care instructions: If you smoke we ask that you refrain from smoking. Smoking inhibits wounds from healing. When taking antibiotics take the entire prescription as ordered. Do not stop taking until medication is all gone unless otherwise instructed. Exercise as tolerated. Keep weight off wounds and reposition every 2 hours if applicable. Do not get wounds wet in the bath or shower unless otherwise instructed by your physician. If your wound is on your foot or leg, you may purchase a cast bag. Please ask at the pharmacy. Wash hands with soap and water prior to and after every dressing change.     [x]Wash wounds with: 0.9% normal saline  [x]Catrachita wound Topical Treatments: Do not apply lotions, creams, or ointments to the skin around the wound bed unless directed as followed:   Apply around the wound: zinc paste to right lower leg wound       [x]Wound Location: left foot   Apply Primary Dressing to wound: Oly  Secondary Dressing: 4X4 gauze pad   Avoid contact of tape with skin if possible. When to change Dressing: 3 times per week:Monday/Wednesday/Friday- home care to change Wednesday and friday    [x]Wound Location: right lower proximal leg wound   Apply Primary Dressing to wound: Oly  Secondary Dressing: mepilex border   Avoid contact of tape with skin if possible. When to change Dressing: DO NOT CHANGE    [x]Application of a biologic skin substitute: Yes: Theraskin : Wound Location: applied to right lower leg wound, covered with mepitel, secured with steri strips, hydrogel, gauze  This is a highly specialized wound care dressing that is intended to enhance your own bodies ability to increase growth factors and other healing abilities. Please leave the dressing clean, dry and intact unless otherwise instructed by your physician. Leave steri-strips and non adherent in place if changing the dressing as instructed. []Instructions for [facility] if applicable:         [x] Multilayer Compression Wrap:  Type: 2 Layer Lite Compression Wrap- right    Do not get leg(s) with wrap wet. If wraps become too tight call the center or completely remove the wrap. Elevate leg(s) above the level of the heart when sitting. Avoid prolonged standing in one place. Applied in Clinic on 9/26/2022  The Goal of this therapy is to reduce edema and get into long term compression garments to control venous insufficiency, lymphedema and reduce occurrence of venous ulcers    [x] Edema Control:  Apply: Spandagrip/Medigrip on Left; Medium compression 10-20 mm/Hg. They should be applied to affected leg(s) from mid foot to knee making sure to cover the heel      Elevate leg(s) above the level of the heart for 30 minutes 4-5 times a day and/or when sitting. Avoid prolonged standing in one place. [x]Off Loading(Pressure Reduction) When: Walking  Weight Bearing Status: No Weight bearing restrictions Left;   Offloading devices: Post op shoe/ Surgical shoe: Left    Dietary:  Important dietary reminders:  1. Increase Protein intake (i.e. Lean meats, fish, eggs, legumes, and yogurt)  2. No added salt  3. If diabetic, follow a diabetic diet and check glucose prior to meals or as instructed by your physician. Dietary Supplements(Take twice a day unless instructed otherwise):  [] Veronika Rodriguez  [] 30ml ProStat [] Caretha [place] [] Ensure Max/Premier [] Other:    Your nurse  is:  Rosy Knight     Electronically signed by Umm Delgado RN on 9/26/2022 at 12:41 PM     [address] [place] Information: Should you experience any significant changes in your wound(s) or have questions about your wound care, please contact the [address] [place] [address] at [phone]. We are open from 8:00am - 4:30p Monday, Thursday and Friday; 11:00am - 5pm on Tuesday and CLOSED Wednesday. Please give us 24-48 business hours to return your call. Call your doctor now or seek immediate medical care if:    You have symptoms of infection, such as: Increased pain, swelling, warmth, or redness. Red streaks leading from the area. Pus draining from the area. A fever.          [] Patient unable to sign Discharge Instructions given to ECF/Transportation/POA

## 2022-09-29 ENCOUNTER — HOSPITAL ENCOUNTER (OUTPATIENT)
Dept: WOUND CARE | Age: 72
Discharge: HOME | End: 2022-09-29
Payer: MEDICARE

## 2022-09-29 VITALS
RESPIRATION RATE: 20 BRPM | TEMPERATURE: 96.8 F | SYSTOLIC BLOOD PRESSURE: 121 MMHG | HEART RATE: 71 BPM | DIASTOLIC BLOOD PRESSURE: 68 MMHG

## 2022-09-29 DIAGNOSIS — I73.9 PVD (PERIPHERAL VASCULAR DISEASE) (HCC): ICD-10-CM

## 2022-09-29 DIAGNOSIS — I70.221 ATHEROSCLEROSIS OF NATIVE ARTERY OF RIGHT LOWER EXTREMITY WITH REST PAIN (HCC): ICD-10-CM

## 2022-09-29 DIAGNOSIS — L97.912 NON-PRESSURE ULCER OF RIGHT LOWER EXTREMITY WITH FAT LAYER EXPOSED (HCC): Primary | ICD-10-CM

## 2022-09-29 DIAGNOSIS — S81.801D OPEN WOUND OF RIGHT LOWER LEG, SUBSEQUENT ENCOUNTER: ICD-10-CM

## 2022-09-29 PROCEDURE — 29581 APPL MULTLAYER CMPRN SYS LEG: CPT

## 2022-09-29 ASSESSMENT — PAIN DESCRIPTION - DESCRIPTORS: DESCRIPTORS: THROBBING

## 2022-09-29 ASSESSMENT — PAIN DESCRIPTION - LOCATION: LOCATION: LEG

## 2022-09-29 ASSESSMENT — PAIN DESCRIPTION - ORIENTATION: ORIENTATION: RIGHT

## 2022-09-29 ASSESSMENT — PAIN SCALES - GENERAL: PAINLEVEL_OUTOF10: 8

## 2022-09-29 NOTE — PROGRESS NOTES
Multilayer Compression Wrap   (Not Unna) Below the Knee    NAME:  Aaron Street. YOB: 1950  MEDICAL RECORD NUMBER:  [id number]  DATE:  9/29/2022       Removed old Multilayer wrap if present and washed leg with mild soap/water. Applied moisturizing agent to dry skin as needed. Applied primary and secondary dressing as ordered    Applied multilayered dressing below the knee to Right lower leg(s)  (2 Layer compression wrap ) . Instructed patient/caregiver not to remove dressing and to keep it clean and dry. Instructed patient/caregiver on complications to report to provider, such as pain, numbness in toes, heavy drainage, and slippage of dressing. Instructed patient on purpose of compression dressing and on activity and exercise recommendations.    Applied per   Guidelines    Electronically signed by Kodi Atwood RN on 9/29/2022 at 9:25 AM

## 2022-09-29 NOTE — DISCHARGE INSTRUCTIONS
[address] [place] [address] Physician Orders and Discharge Instructions  The [address] [address] [address] [address], [address] [facility]  Telephone: [phone]    NAME:  Halle Rascon YOB: 1950  MEDICAL RECORD NUMBER:  [id number]  DATE:  9/29/2022      Wound care:  Continue to follow the instructions and recommendations from your last doctor visit. The dressing(s) applied is the same as your last visit. Please refer to your last discharge instruction for the information on your wound care. If there were any changes made, please follow the instructions as written here:   Future Appointments     Future Appointments   Date Time Provider Orlando Colmenares   9/29/2022 10:45 AM [facility] WOUND NURSE VISIT [facility] WOUND [place] [facility]   10/3/2022  9:15 AM Patricia Chavarria MD [facility]   10/26/2022 12:40 PM Janie Davis MD [facility] P/CC MMA   12/14/2022  9:45 AM DO [facility] - [facility]   12/19/2022 11:00 AM DO [facility] [place] - [facility]           Your nurse  is:  Ehsan Sloan     Electronically signed by Dharmesh Brewer RN on 9/29/2022 at 9:27 AM     [address] [place] [address] Information: Should you experience any significant changes in your wound(s) or have questions about your wound care, please contact the [address] [place] [address] at [phone]. Our hours vary so please leave a message. Please give us 24-48 hours to return your call. If you need help with your wounds and cannot wait until we are available, contact your PCP or go to the hospital emergency room. Physician orders by:  Dr Neftaly Solorzano        The information contained in the After Visit Summary has been reviewed with me, the patient and/or responsible adult, by my health care provider(s). I had the opportunity to ask questions regarding this information.   I have elected to receive;

## 2022-10-03 ENCOUNTER — HOSPITAL ENCOUNTER (OUTPATIENT)
Dept: WOUND CARE | Age: 72
Discharge: HOME | End: 2022-10-03
Payer: MEDICARE

## 2022-10-03 VITALS
RESPIRATION RATE: 20 BRPM | DIASTOLIC BLOOD PRESSURE: 70 MMHG | TEMPERATURE: 97.1 F | SYSTOLIC BLOOD PRESSURE: 129 MMHG | HEART RATE: 65 BPM

## 2022-10-03 DIAGNOSIS — I73.9 PVD (PERIPHERAL VASCULAR DISEASE) (HCC): ICD-10-CM

## 2022-10-03 DIAGNOSIS — I70.221 ATHEROSCLEROSIS OF NATIVE ARTERY OF RIGHT LOWER EXTREMITY WITH REST PAIN (HCC): ICD-10-CM

## 2022-10-03 DIAGNOSIS — L97.912 NON-PRESSURE ULCER OF RIGHT LOWER EXTREMITY WITH FAT LAYER EXPOSED (HCC): Primary | ICD-10-CM

## 2022-10-03 DIAGNOSIS — S81.801D OPEN WOUND OF RIGHT LOWER LEG, SUBSEQUENT ENCOUNTER: ICD-10-CM

## 2022-10-03 PROCEDURE — 11042 DBRDMT SUBQ TIS 1ST 20SQCM/<: CPT

## 2022-10-03 PROCEDURE — 6370000000 HC RX 637 (ALT 250 FOR IP): Performed by: SPECIALIST

## 2022-10-03 PROCEDURE — 11042 DBRDMT SUBQ TIS 1ST 20SQCM/<: CPT | Performed by: SPECIALIST

## 2022-10-03 PROCEDURE — 29581 APPL MULTLAYER CMPRN SYS LEG: CPT

## 2022-10-03 RX ORDER — LIDOCAINE 50 MG/G
OINTMENT TOPICAL ONCE
Status: CANCELLED | OUTPATIENT
Start: 2022-10-03 | End: 2022-10-03

## 2022-10-03 RX ORDER — BETAMETHASONE DIPROPIONATE 0.05 %
OINTMENT (GRAM) TOPICAL ONCE
Status: CANCELLED | OUTPATIENT
Start: 2022-10-03 | End: 2022-10-03

## 2022-10-03 RX ORDER — LIDOCAINE HYDROCHLORIDE 40 MG/ML
SOLUTION TOPICAL ONCE
Status: CANCELLED | OUTPATIENT
Start: 2022-10-03 | End: 2022-10-03

## 2022-10-03 RX ORDER — LIDOCAINE 40 MG/G
CREAM TOPICAL ONCE
Status: CANCELLED | OUTPATIENT
Start: 2022-10-03 | End: 2022-10-03

## 2022-10-03 RX ORDER — LIDOCAINE HYDROCHLORIDE 20 MG/ML
JELLY TOPICAL ONCE
Status: CANCELLED | OUTPATIENT
Start: 2022-10-03 | End: 2022-10-03

## 2022-10-03 RX ORDER — GINSENG 100 MG
CAPSULE ORAL ONCE
Status: CANCELLED | OUTPATIENT
Start: 2022-10-03 | End: 2022-10-03

## 2022-10-03 RX ORDER — BACITRACIN ZINC AND POLYMYXIN B SULFATE 500; 1000 [USP'U]/G; [USP'U]/G
OINTMENT TOPICAL ONCE
Status: CANCELLED | OUTPATIENT
Start: 2022-10-03 | End: 2022-10-03

## 2022-10-03 RX ORDER — GENTAMICIN SULFATE 1 MG/G
OINTMENT TOPICAL ONCE
Status: CANCELLED | OUTPATIENT
Start: 2022-10-03 | End: 2022-10-03

## 2022-10-03 RX ORDER — CLOBETASOL PROPIONATE 0.5 MG/G
OINTMENT TOPICAL ONCE
Status: CANCELLED | OUTPATIENT
Start: 2022-10-03 | End: 2022-10-03

## 2022-10-03 RX ORDER — LIDOCAINE HYDROCHLORIDE 40 MG/ML
SOLUTION TOPICAL ONCE
Status: COMPLETED | OUTPATIENT
Start: 2022-10-03 | End: 2022-10-03

## 2022-10-03 RX ORDER — BACITRACIN, NEOMYCIN, POLYMYXIN B 400; 3.5; 5 [USP'U]/G; MG/G; [USP'U]/G
OINTMENT TOPICAL ONCE
Status: CANCELLED | OUTPATIENT
Start: 2022-10-03 | End: 2022-10-03

## 2022-10-03 RX ADMIN — LIDOCAINE HYDROCHLORIDE: 40 SOLUTION TOPICAL at 08:51

## 2022-10-03 ASSESSMENT — PAIN DESCRIPTION - LOCATION: LOCATION: LEG

## 2022-10-03 ASSESSMENT — PAIN DESCRIPTION - PAIN TYPE: TYPE: CHRONIC PAIN

## 2022-10-03 ASSESSMENT — PAIN DESCRIPTION - DESCRIPTORS: DESCRIPTORS: ACHING

## 2022-10-03 ASSESSMENT — PAIN DESCRIPTION - FREQUENCY: FREQUENCY: CONTINUOUS

## 2022-10-03 ASSESSMENT — PAIN DESCRIPTION - ORIENTATION: ORIENTATION: RIGHT

## 2022-10-03 ASSESSMENT — PAIN SCALES - GENERAL: PAINLEVEL_OUTOF10: 8

## 2022-10-03 NOTE — DISCHARGE INSTRUCTIONS
[address] [facility] Physician Orders and Discharge Instructions  [address] [address] [address] [address] [facility]. [place] [place]  Telephone: [phone]  NAME:  Eamon Lazcano. YOB: 1950  MEDICAL RECORD NUMBER:  [id number]  DATE: 10/3/22     Return Appointment:  Return Appointment: With Dr. Jessi Jacinto  in  1 [facility])  [] Return Appointment for a Wound Assessment with the nurse on:     Future Appointments   Date Time Provider Orlando Colmenares   10/26/2022 12:40 PM Christina Gaitan MD [facility] P/CC MMA   12/14/2022  9:45 AM Barbra Garay DO [facility] [place] - DYD   12/19/2022 11:00 AM Barbra Garay DO [address]: [facility] CARE [phone]   F: [phone]   Medically necessary services: [x] Skilled Nursing [] PT (Eval & Treat) [] OT (Eval & Treat) [] Social Work [] Dietician  [] Other:    Wound care instructions: If you smoke we ask that you refrain from smoking. Smoking inhibits wounds from healing. When taking antibiotics take the entire prescription as ordered. Do not stop taking until medication is all gone unless otherwise instructed. Exercise as tolerated. Keep weight off wounds and reposition every 2 hours if applicable. Do not get wounds wet in the bath or shower unless otherwise instructed by your physician. If your wound is on your foot or leg, you may purchase a cast bag. Please ask at the pharmacy. Wash hands with soap and water prior to and after every dressing change. [x]Wash wounds with: 0.9% normal saline  [x]Catrachita wound Topical Treatments: Do not apply lotions, creams, or ointments to the skin around the wound bed unless directed as followed:   Apply around the wound: Moisturizing lotion         [x]Wound Location: right lower leg wounds   Apply Primary Dressing to wound: Oly  Secondary Dressing: 4X4 gauze pad   Avoid contact of tape with skin if possible.   When to change Dressing: DO NOT CHANGE    [x]Wound Location: left medial foot   Apply Primary Dressing to wound: Oly  Secondary Dressing: 4X4 gauze pad and Conforming roll gauze   Avoid contact of tape with skin if possible. When to change Dressing: 3 times per week:Monday/Wednesday/Friday- Wednesday and Friday by home care nurse        [x] Multilayer Compression Wrap:  Type: Applied on Right lower leg(s)  2 Layer Lite Compression Wrap    Do not get leg(s) with wrap wet. If wraps become too tight call the center or completely remove the wrap. Elevate leg(s) above the level of the heart when sitting. Avoid prolonged standing in one place. Applied in Clinic on 10/3/2022  The Goal of this therapy is to reduce edema and get into long term compression garments to control venous insufficiency, lymphedema and reduce occurrence of venous ulcers    [x] Edema Control:  Apply: Spandagrip/Medigrip on Left; Medium compression 10-20 mm/Hg. They should be applied to affected leg(s) from mid foot to knee making sure to cover the heel      Elevate leg(s) above the level of the heart for 30 minutes 4-5 times a day and/or when sitting. Avoid prolonged standing in one place. [x]Off Loading(Pressure Reduction) When: Walking  Weight Bearing Status: No Weight bearing restrictions Left; Offloading devices: Post op shoe/ Surgical shoe: Left      Dietary:  Important dietary reminders:  1. Increase Protein intake (i.e. Lean meats, fish, eggs, legumes, and yogurt)  2. No added salt  3. If diabetic, follow a diabetic diet and check glucose prior to meals or as instructed by your physician.     Dietary Supplements(Take twice a day unless instructed otherwise):  [] Lucas Shadia  [] 30ml ProStat [] Sidonie Maha [] Ensure Max/Premier [] Other:    Your nurse  is:  Rodrigo Nielsen     Electronically signed by Lynn Hastings RN on 10/3/2022 at 9:19 AM     [address] Information: Should you experience any significant changes in your wound(s) or have questions about your wound care, please contact the [address] [place] [address] at [phone]. We are open from 8:00am - 4:30p Monday, Thursday and Friday; 11:00am - 5pm on Tuesday and CLOSED Wednesday. Please give us 24-48 business hours to return your call. Call your doctor now or seek immediate medical care if:    You have symptoms of infection, such as: Increased pain, swelling, warmth, or redness. Red streaks leading from the area. Pus draining from the area. A fever.          [] Patient unable to sign Discharge Instructions given to ECF/Transportation/POA

## 2022-10-03 NOTE — PROGRESS NOTES
[facility]  Progress Note and Procedure Note      Inez Sever. MEDICAL RECORD NUMBER:  [id number]  AGE: 81 y.o. GENDER: male  : 1950  EPISODE DATE:  10/3/2022    Subjective:     Chief Complaint   Patient presents with    Wound Check     Bilateral lower legs         HISTORY of PRESENT ILLNESS HPI     Inez Sever. is a 81 y.o. male who presents today for wound/ulcer evaluation. History of Wound Context: Patient of Dr. Rolo Yanes being followed in wound care following revascularization of the right lower extremity with arterial ulcer right knee.   Patient also developed wound overlying left foot from improper shoe  Wound/Ulcer Pain Timing/Severity: none  Quality of pain: N/A  Severity:  0 / 10   Modifying Factors: None  Associated Signs/Symptoms: none    Ulcer Identification:  Ulcer Type: arterial and pressure    Contributing Factors: venous stasis, chronic pressure, decreased mobility, smoking, and arterial insufficiency    Acute Wound: N/A not an acute wound    PAST MEDICAL HISTORY        Diagnosis Date    CAD (coronary artery disease)     CHF (congestive heart failure) (HCC)     diastolic    COPD (chronic obstructive pulmonary disease) (HCC)     Critical ischemia of lower extremity (HCC)     Depressive disorder, not elsewhere classified     GERD (gastroesophageal reflux disease)     History of colon polyps - 30 seen on colonoscopy 2021    History of MI (myocardial infarction) 2013    History of skin ulcer of lower extremity     R anterior shin    History of transient ischemic attack (TIA)     Hx of blood clots     PE    Hyperlipidemia     Hypertension     Neuropathy     KAVITA (obstructive sleep apnea)     On CPAP    Personal history of DVT (deep vein thrombosis)     Prolonged emergence from general anesthesia     Pt reported being combative after surgery     PVD (peripheral vascular disease) ([address]     TIA (transient ischemic attack)  Vertebral fracture        PAST SURGICAL HISTORY    Past Surgical History:   Procedure Laterality Date    APPENDECTOMY      CHOLECYSTECTOMY, LAPAROSCOPIC      COLONOSCOPY  4/23/2021    COLONOSCOPY POLYPECTOMY SNARE/COLD BIOPSY performed by Liam Essex, MD at [facility]  4/23/2021    COLONOSCOPY POLYPECTOMY ABLATION performed by Liam Essex, MD at [facility]  4/23/2021    COLONOSCOPY CONTROL HEMORRHAGE performed by Liam Essex, MD at [address]  6/2013    EYE SURGERY Left     FEMORAL BYPASS Right 7/18/2022    RIGHT FEMORAL BELOW KNEE POPLITEAL ARTERY BYPASS WITH IN SITU SAPHENOUS VEIN performed by Red Hooper MD at [address] [facility] [facility] [address] Right 11/17/2021    RIGHT FEMORAL ENDARTERECTOMY  RIGHT EXTERNAL ILIAC TO PROFUNDA FEMORAL BYPASS WITH 8MM PTFE GRAFT RIGHT LOWER EXTREMITY ARTERIOGRAM BALLOON ANGIOPLASTY RIGHT PROFUNDA BALLOON ANGIOPLASTY AND STENT OF RIGHT EXTERNAL ILIAC ARTERY performed by Red Hooper MD at [address] [facility]  11/18/2015    Bilateral decompressive lumbar laminectomy L4-5   ; medial facetectomy L4-5 joints  bilaterally; foraminotomies l5   nerve roots bilaterally    LEG DEBRIDEMENT Right 7/23/2013    Dr. Alexandre Single - pretibial wound    OTHER SURGICAL HISTORY Right 6/25/2013    Dr. Alexandre Single - CFA Endarterectomy w/marsupialization; RLE wound excision & debridement     OTHER SURGICAL HISTORY Left 8/27/2013    Dr. Alexandre Single - femoral & profunda femoris endarterectomy w/marsupialization patch angioplasty using SFA    SKIN SPLIT GRAFT Right 7/25/2013    Dr. Alexandre Single - to non-healing R pretibial wound, 9 x 15 cm    TOTAL HIP ARTHROPLASTY Right 09/01/2016    Dr. Fe Funez Left 12/22/2015    Dr. Alexandre Single - CFA exploration, thrombectomy of ileofemoral system, stenting of RAFAL in-stent stenosis w/8 x 37 mm Palmaz        FAMILY HISTORY    Family History   Problem Relation Age of Onset    Cancer Mother         Breast    Heart Disease Mother     Cancer Father         Bladder    Heart Disease Father     Cancer Paternal Grandmother         melanoma        SOCIAL HISTORY    Social History     Tobacco Use    Smoking status: Former     Packs/day: 0.25     Years: 52.00     Pack years: 13.00     Types: Cigarettes     Start date: 1967     Quit date: 2022     Years since quittin.3    Smokeless tobacco: Never    Tobacco comments:     reports he quit circa 22   Vaping Use    Vaping Use: Never used   Substance Use Topics    Alcohol use: Yes     Alcohol/week: 0.0 standard drinks     Comment: 2-3 beers a month    Drug use: No       ALLERGIES    Allergies   Allergen Reactions    Asa [Aspirin] Other (See Comments)     Stomach ache    Daliresp [Roflumilast] Diarrhea and Other (See Comments)     Severe diarrhea and did not help       MEDICATIONS    Current Outpatient Medications on File Prior to Encounter   Medication Sig Dispense Refill    albuterol sulfate HFA (PROVENTIL;VENTOLIN;PROAIR) 108 (90 Base) MCG/ACT inhaler INHALE 2 PUFFS INTO THE LUNGS EVERY 6 HOURS AS NEEDED FOR WHEEZING 18 g 2    Evolocumab 140 MG/ML SOAJ Inject 140 mg into the skin every 14 days Take an injection every 14 days. fluticasone-umeclidin-vilant (TRELEGY ELLIPTA) 100-62.5-25 MCG/INH AEPB Inhale 1 puff into the lungs in the morning. 28 each 11    potassium chloride (MICRO-K) 10 MEQ extended release capsule 1 tablet 60 capsule 1    furosemide (LASIX) 40 MG tablet Take 1 tablet by mouth in the morning. 90 tablet 3    metoprolol tartrate (LOPRESSOR) 50 MG tablet Take 1.5 tablets by mouth in the morning and 1.5 tablets before bedtime. 90 tablet 5    apixaban (ELIQUIS) 5 MG TABS tablet Take 1 tablet by mouth in the morning and 1 tablet before bedtime. 60 tablet 1    losartan (COZAAR) 100 MG tablet Take 1 tablet by mouth in the morning.  30 tablet 3    atorvastatin (LIPITOR) 80 MG tablet Take 1 tablet by mouth daily 90 tablet 1    clopidogrel (PLAVIX) 75 MG tablet Take 1 tablet by mouth daily 30 tablet 3    pregabalin (LYRICA) 150 MG capsule Take 1 capsule by mouth 2 times daily for 30 days. 180 capsule 1    allopurinol (ZYLOPRIM) 100 MG tablet Take 1 tablet by mouth daily 180 tablet 1    omeprazole (PRILOSEC) 40 MG delayed release capsule TAKE 1 CAPSULE BY MOUTH DAILY 30 capsule 3    [DISCONTINUED] folic acid (FOLVITE) 1 MG tablet Take 1 tablet by mouth daily 30 tablet 3    [DISCONTINUED] nitroGLYCERIN (NITROSTAT) 0.4 MG SL tablet Place 1 tablet under the tongue every 5 minutes as needed for Chest pain (Patient not taking: No sig reported) 25 tablet 1    [DISCONTINUED] magnesium gluconate (MAGONATE) 500 MG tablet Take 500 mg by mouth daily. No current facility-administered medications on file prior to encounter. REVIEW OF SYSTEMS  Review of Systems    Pertinent items are noted in HPI. Objective:      /70   Pulse 65   Temp 97.1 °F (36.2 °C) (Temporal)   Resp 20     Wt Readings from Last 3 Encounters:   08/30/22 225 lb 6.4 oz (102.2 kg)   08/30/22 216 lb (98 kg)   08/09/22 221 lb (100.2 kg)       PHYSICAL EXAM    General Appearance: alert and oriented to person, place and time, well-developed and well-nourished, in no acute distress  Extremities: no cyanosis, no clubbing, minimal + edema-  bilateral lower extremities with residual TheraSkin allograft overlying right anterior knee designated as wound #1. Full-thickness wound overlying plantar left great toe with fibrin, slough, granulation tissue. Periwound hyperkeratotic skin designated as wound #2. Small full-thickness wound containing fibrin, slough, granulation tissue right proximal knee designated as wound #3              Assessment:      1. Non-pressure ulcer of right lower extremity with fat layer exposed ([address]    2. Open wound of right lower leg, subsequent encounter    3.  Atherosclerosis of native artery of right lower extremity with rest pain ([facility]    4. PVD (peripheral vascular disease) ([facility]         Procedure Note  Indications:  Based on my examination of this patient's wound(s) today, sharp excision is required to promote healing and evaluate the extent healing. Performed by: Jonathan Gamino MD    Consent obtained? Yes    Time out taken: Yes    Pain Control: Anesthetic: 4% Lidocaine Liquid Topical     Debridement:Excisional Debridement    Using curette the wound was sharply debrided    down through and including the removal of  epidermis, dermis, and subcutaneous tissue.     Devitalized Tissue Debrided:  fibrin and slough      Pre Debridement Measurements:  Are located in the Wound Documentation Flow Sheet   Wound #: 3     Post  Debridement Measurements:  Wound 11/15/21 Leg Distal;Right #1 (Active)   Wound Image   10/03/22 0840   Wound Etiology Arterial 08/22/22 1433   Wound Cleansed Cleansed with saline 10/03/22 0840   Dressing/Treatment Collagen with Ag 10/03/22 0916   Wound Length (cm) 4.9 cm 10/03/22 0840   Wound Width (cm) 1.5 cm 10/03/22 0840   Wound Depth (cm) 0.1 cm 10/03/22 0840   Wound Surface Area (cm^2) 7.35 cm^2 10/03/22 0840   Change in Wound Size % (l*w) -206.25 10/03/22 0840   Wound Volume (cm^3) 0.735 cm^3 10/03/22 0840   Wound Healing % -206 10/03/22 0840   Post-Procedure Length (cm) 4.9 cm 10/03/22 0916   Post-Procedure Width (cm) 1.5 cm 10/03/22 0916   Post-Procedure Depth (cm) 0.1 cm 10/03/22 0916   Post-Procedure Surface Area (cm^2) 7.35 cm^2 10/03/22 0916   Post-Procedure Volume (cm^3) 0.735 cm^3 10/03/22 0916   Distance Tunneling (cm) 0 cm 09/06/22 0805   Tunneling Position ___ O'Clock 0 07/11/22 1310   Undermining Starts ___ O'Clock 0 07/11/22 1310   Undermining Ends___ O'Clock 0 07/11/22 1310   Undermining Maxium Distance (cm) 0 09/06/22 0805   Wound Assessment Pink/red;Graft 10/03/22 0840   Drainage Amount Small 10/03/22 0840   Drainage Description Brown;Serosanguinous 10/03/22 0840   Odor Mild 10/03/22 0840   Catrachita-wound Assessment Dry/flaky 10/03/22 0840   Margins Defined edges 10/03/22 0840   Wound Thickness Description not for Pressure Injury Full thickness 10/03/22 0840   Number of days: 321       Wound 09/12/22 Foot Plantar;Left #2 great toe (Active)   Wound Image   10/03/22 0840   Wound Etiology Traumatic 09/19/22 0932   Wound Cleansed Cleansed with saline 10/03/22 0840   Dressing/Treatment Collagen with Ag 10/03/22 0916   Wound Length (cm) 1.4 cm 10/03/22 0840   Wound Width (cm) 0.4 cm 10/03/22 0840   Wound Depth (cm) 0.1 cm 10/03/22 0840   Wound Surface Area (cm^2) 0.56 cm^2 10/03/22 0840   Change in Wound Size % (l*w) 81.33 10/03/22 0840   Wound Volume (cm^3) 0.056 cm^3 10/03/22 0840   Wound Healing % 81 10/03/22 0840   Post-Procedure Length (cm) 1.4 cm 10/03/22 0916   Post-Procedure Width (cm) 0.4 cm 10/03/22 0916   Post-Procedure Depth (cm) 0.1 cm 10/03/22 0916   Post-Procedure Surface Area (cm^2) 0.56 cm^2 10/03/22 0916   Post-Procedure Volume (cm^3) 0.056 cm^3 10/03/22 0916   Wound Assessment Fibrin;Pink/red 10/03/22 0840   Drainage Amount Small 10/03/22 0840   Drainage Description Brown 10/03/22 0840   Odor Mild 10/03/22 0840   Catrachita-wound Assessment Maceration 10/03/22 0840   Margins Defined edges 10/03/22 0840   Wound Thickness Description not for Pressure Injury Full thickness 10/03/22 0840   Number of days: 21       Wound 09/19/22 Leg Lower;Proximal;Right #3 (Active)   Wound Image   10/03/22 0840   Wound Etiology Traumatic 09/19/22 0932   Wound Cleansed Cleansed with saline 10/03/22 0840   Dressing/Treatment Collagen with Ag 10/03/22 0916   Wound Length (cm) 1 cm 10/03/22 0840   Wound Width (cm) 0.5 cm 10/03/22 0840   Wound Depth (cm) 0.1 cm 10/03/22 0840   Wound Surface Area (cm^2) 0.5 cm^2 10/03/22 0840   Change in Wound Size % (l*w) 58.33 10/03/22 0840   Wound Volume (cm^3) 0.05 cm^3 10/03/22 0840   Wound Healing % 58 10/03/22 0840   Post-Procedure Length (cm) 1.1 cm 10/03/22 0916   Post-Procedure Width (cm) 0.6 cm 10/03/22 0916   Post-Procedure Depth (cm) 0.3 cm 10/03/22 0916   Post-Procedure Surface Area (cm^2) 0.66 cm^2 10/03/22 0916   Post-Procedure Volume (cm^3) 0.198 cm^3 10/03/22 0916   Wound Assessment Pink/red;Fibrin 10/03/22 0840   Drainage Amount Small 10/03/22 0840   Drainage Description Brown 10/03/22 0840   Odor Mild 10/03/22 0840   Catrachita-wound Assessment Fragile 10/03/22 0840   Margins Defined edges 10/03/22 0840   Wound Thickness Description not for Pressure Injury Full thickness 10/03/22 0840   Number of days: 14          Percent of Wound Debrided: 100%    Total Surface Area Debrided:  .66 sq cm    Diabetic/Pressure/Non Pressure Ulcers only:  Ulcer: Non-Pressure ulcer, fat layer exposed    Bleeding: Minimal    Hemostasis Achieved: by pressure    Procedural Pain: 0  / 10     Post Procedural Pain: 0 / 10     Response to treatment:  Poorly tolerated by patient. Patient did not tolerate debridement of left toe wound to pain        Plan:     Treatment Note: Please see attached Discharge Instructions. These instructions were given and signed by the patient or POA    New Prescriptions    No medications on file       Orders Placed This Encounter   Procedures    Initiate Outpatient Wound Care Protocol       Discharge Instructions          [facility] Physician Orders and Discharge Instructions  [address]. [address] [place] [address]. [place]  Telephone: [phone]  NAME:  Ehsan Tena.   YOB: 1950  MEDICAL RECORD NUMBER:  [id number]  DATE: 10/3/22     Return Appointment:  Return Appointment: With Dr. Gerard Ruiz  in  [facility])  [] Return Appointment for a Wound Assessment with the nurse on:     Future Appointments   Date Time Provider Orlando Colmenares   10/26/2022 12:40 PM Ericka Pardo MD [facility] P/CC MMA   12/14/2022  9:45 AM DO JEFF Briscoe   12/19/2022 11:00 AM DO JEFF Briscoe [address]: [facility] [phone]   F: [phone]   Medically necessary services: [x] Skilled Nursing [] PT (Eval & Treat) [] OT (Eval & Treat) [] Social Work [] Dietician  [] Other:    Wound care instructions: If you smoke we ask that you refrain from smoking. Smoking inhibits wounds from healing. When taking antibiotics take the entire prescription as ordered. Do not stop taking until medication is all gone unless otherwise instructed. Exercise as tolerated. Keep weight off wounds and reposition every 2 hours if applicable. Do not get wounds wet in the bath or shower unless otherwise instructed by your physician. If your wound is on your foot or leg, you may purchase a cast bag. Please ask at the pharmacy. Wash hands with soap and water prior to and after every dressing change. [x]Wash wounds with: 0.9% normal saline  [x]Catrachita wound Topical Treatments: Do not apply lotions, creams, or ointments to the skin around the wound bed unless directed as followed:   Apply around the wound: Moisturizing lotion         [x]Wound Location: right lower leg wounds   Apply Primary Dressing to wound: Oly  Secondary Dressing: 4X4 gauze pad   Avoid contact of tape with skin if possible. When to change Dressing: DO NOT CHANGE    [x]Wound Location: left medial foot   Apply Primary Dressing to wound: Oly  Secondary Dressing: 4X4 gauze pad and Conforming roll gauze   Avoid contact of tape with skin if possible. When to change Dressing: 3 times per week:Monday/Wednesday/Friday- Wednesday and Friday by home care nurse        [x] Multilayer Compression Wrap:  Type: Applied on Right lower leg(s)  2 Layer Lite Compression Wrap    Do not get leg(s) with wrap wet. If wraps become too tight call the center or completely remove the wrap. Elevate leg(s) above the level of the heart when sitting. Avoid prolonged standing in one place.    Applied in Clinic on 10/3/2022  The Goal of this therapy is to reduce edema and get into long term compression garments to control venous insufficiency, lymphedema and reduce occurrence of venous ulcers    [x] Edema Control:  Apply: Spandagrip/Medigrip on Left; Medium compression 10-20 mm/Hg. They should be applied to affected leg(s) from mid foot to knee making sure to cover the heel      Elevate leg(s) above the level of the heart for 30 minutes 4-5 times a day and/or when sitting. Avoid prolonged standing in one place. [x]Off Loading(Pressure Reduction) When: Walking  Weight Bearing Status: No Weight bearing restrictions Left; Offloading devices: Post op shoe/ Surgical shoe: Left      Dietary:  Important dietary reminders:  1. Increase Protein intake (i.e. Lean meats, fish, eggs, legumes, and yogurt)  2. No added salt  3. If diabetic, follow a diabetic diet and check glucose prior to meals or as instructed by your physician. Dietary Supplements(Take twice a day unless instructed otherwise):  [] [place] Claw  [] 30ml ProStat [] Daya Kast [] Ensure Max/Premier [] Other:    Your nurse  is:  Riccardo Ordonez     Electronically signed by Modesto Hudson RN on 10/3/2022 at 9:19 AM     [address] [facility] [address] Information: Should you experience any significant changes in your wound(s) or have questions about your wound care, please contact the [address] [place] [address] at [phone]. We are open from 8:00am - 4:30p Monday, Thursday and Friday; 11:00am - 5pm on Tuesday and CLOSED Wednesday. Please give us 24-48 business hours to return your call. Call your doctor now or seek immediate medical care if:    You have symptoms of infection, such as: Increased pain, swelling, warmth, or redness. Red streaks leading from the area. Pus draining from the area. A fever.          [] Patient unable to sign Discharge Instructions given to ECF/Transportation/POA         Electronically signed by David Burnham MD on 10/3/2022 at 10:21 AM

## 2022-10-03 NOTE — PLAN OF CARE
Multilayer Compression Wrap   (Not Unna) Below the Knee    NAME:  Ron Baird. YOB: 1950  MEDICAL RECORD NUMBER:  [id number]  DATE:  10/3/2022       Removed old Multilayer wrap if present and washed leg with mild soap/water. Applied moisturizing agent to dry skin as needed. Applied primary and secondary dressing as ordered    Applied multilayered dressing below the knee to Right lower leg(s)  (2 Layer Lite compression wrap ) . Instructed patient/caregiver not to remove dressing and to keep it clean and dry. Instructed patient/caregiver on complications to report to provider, such as pain, numbness in toes, heavy drainage, and slippage of dressing. Instructed patient on purpose of compression dressing and on activity and exercise recommendations.    Applied per   Guidelines    Electronically signed by Estella Howell RN on 10/3/2022 at 9:42 AM

## 2022-10-04 RX ORDER — OMEPRAZOLE 40 MG/1
40 CAPSULE, DELAYED RELEASE ORAL DAILY
Qty: 30 CAPSULE | Refills: 3 | Status: SHIPPED | OUTPATIENT
Start: 2022-10-04

## 2022-10-05 DIAGNOSIS — G47.33 OSA AND COPD OVERLAP SYNDROME (HCC): ICD-10-CM

## 2022-10-05 DIAGNOSIS — J44.9 OSA AND COPD OVERLAP SYNDROME (HCC): ICD-10-CM

## 2022-10-05 RX ORDER — ALBUTEROL SULFATE 90 UG/1
2 AEROSOL, METERED RESPIRATORY (INHALATION) EVERY 6 HOURS PRN
Qty: 18 G | Refills: 2 | Status: SHIPPED | OUTPATIENT
Start: 2022-10-05 | End: 2022-10-20 | Stop reason: SDUPTHER

## 2022-10-05 NOTE — TELEPHONE ENCOUNTER
albuterol (PROVENTIL) nebulizer solution 2.5 mg         [id number] [address] [address]   Phone:  [phone]  Fax:  [phone]      Pt needs a refill.

## 2022-10-10 ENCOUNTER — HOSPITAL ENCOUNTER (OUTPATIENT)
Dept: WOUND CARE | Age: 72
Discharge: HOME | End: 2022-10-10
Payer: MEDICARE

## 2022-10-10 VITALS
SYSTOLIC BLOOD PRESSURE: 141 MMHG | TEMPERATURE: 97.1 F | DIASTOLIC BLOOD PRESSURE: 73 MMHG | HEART RATE: 73 BPM | RESPIRATION RATE: 20 BRPM

## 2022-10-10 DIAGNOSIS — L97.912 NON-PRESSURE ULCER OF RIGHT LOWER EXTREMITY WITH FAT LAYER EXPOSED (HCC): Primary | ICD-10-CM

## 2022-10-10 DIAGNOSIS — I70.221 ATHEROSCLEROSIS OF NATIVE ARTERY OF RIGHT LOWER EXTREMITY WITH REST PAIN (HCC): ICD-10-CM

## 2022-10-10 DIAGNOSIS — S81.801D OPEN WOUND OF RIGHT LOWER LEG, SUBSEQUENT ENCOUNTER: ICD-10-CM

## 2022-10-10 DIAGNOSIS — I73.9 PVD (PERIPHERAL VASCULAR DISEASE) (HCC): ICD-10-CM

## 2022-10-10 PROCEDURE — 15271 SKIN SUB GRAFT TRNK/ARM/LEG: CPT

## 2022-10-10 PROCEDURE — 11042 DBRDMT SUBQ TIS 1ST 20SQCM/<: CPT | Performed by: SPECIALIST

## 2022-10-10 PROCEDURE — 15271 SKIN SUB GRAFT TRNK/ARM/LEG: CPT | Performed by: SPECIALIST

## 2022-10-10 PROCEDURE — 11042 DBRDMT SUBQ TIS 1ST 20SQCM/<: CPT

## 2022-10-10 PROCEDURE — 6370000000 HC RX 637 (ALT 250 FOR IP): Performed by: SPECIALIST

## 2022-10-10 RX ORDER — BACITRACIN, NEOMYCIN, POLYMYXIN B 400; 3.5; 5 [USP'U]/G; MG/G; [USP'U]/G
OINTMENT TOPICAL ONCE
Status: CANCELLED | OUTPATIENT
Start: 2022-10-10 | End: 2022-10-10

## 2022-10-10 RX ORDER — GINSENG 100 MG
CAPSULE ORAL ONCE
Status: CANCELLED | OUTPATIENT
Start: 2022-10-10 | End: 2022-10-10

## 2022-10-10 RX ORDER — CLOBETASOL PROPIONATE 0.5 MG/G
OINTMENT TOPICAL ONCE
Status: CANCELLED | OUTPATIENT
Start: 2022-10-10 | End: 2022-10-10

## 2022-10-10 RX ORDER — LIDOCAINE HYDROCHLORIDE 20 MG/ML
JELLY TOPICAL ONCE
Status: CANCELLED | OUTPATIENT
Start: 2022-10-10 | End: 2022-10-10

## 2022-10-10 RX ORDER — LIDOCAINE 50 MG/G
OINTMENT TOPICAL ONCE
Status: CANCELLED | OUTPATIENT
Start: 2022-10-10 | End: 2022-10-10

## 2022-10-10 RX ORDER — GENTAMICIN SULFATE 1 MG/G
OINTMENT TOPICAL ONCE
Status: CANCELLED | OUTPATIENT
Start: 2022-10-10 | End: 2022-10-10

## 2022-10-10 RX ORDER — LIDOCAINE 40 MG/G
CREAM TOPICAL ONCE
Status: CANCELLED | OUTPATIENT
Start: 2022-10-10 | End: 2022-10-10

## 2022-10-10 RX ORDER — BETAMETHASONE DIPROPIONATE 0.05 %
OINTMENT (GRAM) TOPICAL ONCE
Status: CANCELLED | OUTPATIENT
Start: 2022-10-10 | End: 2022-10-10

## 2022-10-10 RX ORDER — LIDOCAINE HYDROCHLORIDE 40 MG/ML
SOLUTION TOPICAL ONCE
Status: COMPLETED | OUTPATIENT
Start: 2022-10-10 | End: 2022-10-10

## 2022-10-10 RX ORDER — LIDOCAINE HYDROCHLORIDE 40 MG/ML
SOLUTION TOPICAL ONCE
Status: CANCELLED | OUTPATIENT
Start: 2022-10-10 | End: 2022-10-10

## 2022-10-10 RX ORDER — BACITRACIN ZINC AND POLYMYXIN B SULFATE 500; 1000 [USP'U]/G; [USP'U]/G
OINTMENT TOPICAL ONCE
Status: CANCELLED | OUTPATIENT
Start: 2022-10-10 | End: 2022-10-10

## 2022-10-10 RX ADMIN — LIDOCAINE HYDROCHLORIDE: 40 SOLUTION TOPICAL at 08:59

## 2022-10-10 ASSESSMENT — PAIN DESCRIPTION - PAIN TYPE: TYPE: CHRONIC PAIN

## 2022-10-10 ASSESSMENT — PAIN DESCRIPTION - LOCATION: LOCATION: LEG

## 2022-10-10 ASSESSMENT — PAIN DESCRIPTION - ORIENTATION: ORIENTATION: RIGHT;LEFT

## 2022-10-10 ASSESSMENT — PAIN SCALES - GENERAL: PAINLEVEL_OUTOF10: 8

## 2022-10-10 ASSESSMENT — PAIN DESCRIPTION - DESCRIPTORS: DESCRIPTORS: BURNING;SHOOTING

## 2022-10-10 NOTE — DISCHARGE INSTRUCTIONS
215 [facility] Physician Orders and Discharge Instructions  [address] [place] [address] [address] [address] [facility]. [place]. [place]  Telephone: [phone]  NAME:  Sahra Nix. YOB: 1950  MEDICAL RECORD NUMBER:  [id number]  DATE: 10/10/22     Return Appointment:  Return Appointment: With Dr Bharat Moran  in  1 [facility])  [x] Return Appointment for a Wound Assessment with the nurse on: 10/13/2022    Future Appointments   Date Time Provider Orlando Colmenares   10/26/2022 12:40 PM Malik Amato MD [facility] P/CC MMA   12/14/2022  9:45 AM Clyde Vincent DO [facility]   12/19/2022 11:00 AM Clyde Vincent DO [address]: [facility] [phone]   F: [phone]   Medically necessary services: [x] Skilled Nursing [] PT (Eval & Treat) [] OT (Eval & Treat) [] Social Work [] Dietician  [] Other:    Wound care instructions: If you smoke we ask that you refrain from smoking. Smoking inhibits wounds from healing. When taking antibiotics take the entire prescription as ordered. Do not stop taking until medication is all gone unless otherwise instructed. Exercise as tolerated. Keep weight off wounds and reposition every 2 hours if applicable. Do not get wounds wet in the bath or shower unless otherwise instructed by your physician. If your wound is on your foot or leg, you may purchase a cast bag. Please ask at the pharmacy. Wash hands with soap and water prior to and after every dressing change. [x]Wash wounds with: 0.9% normal saline  [x]Catrachita wound Topical Treatments: Do not apply lotions, creams, or ointments to the skin around the wound bed unless directed as followed:   Apply around the wound: No-Sting barrier film         [x]Wound Location: right proximal lower leg wound   Apply Primary Dressing to wound: Oly  Secondary Dressing:  mepilex border   Avoid contact of tape with skin if possible.   When to change Dressing: DO NOT CHANGE    [x]Wound Location: left foot   Apply Primary Dressing to wound: Oly  Secondary Dressing: 4X4 gauze pad and Conforming roll gauze   Avoid contact of tape with skin if possible. When to change Dressing: 3 times per week:Monday/Wednesday/Friday    [x]Application of a biologic skin substitute: Yes: Calderon : Wound Location: right lower leg wound- distal, covered with mepitel, secured with steri strips, hydrogel applied, gauze  This is a highly specialized wound care dressing that is intended to enhance your own bodies ability to increase growth factors and other healing abilities. Please leave the dressing clean, dry and intact unless otherwise instructed by your physician. Leave steri-strips and non adherent in place if changing the dressing as instructed. [x]Instructions for 2003 [employer] if applicable: do not change        [x] Multilayer Compression Wrap:  Type: Applied on Right lower leg(s)- coflex 2 lite    Do not get leg(s) with wrap wet. If wraps become too tight call the center or completely remove the wrap. Elevate leg(s) above the level of the heart when sitting. Avoid prolonged standing in one place. Applied in Clinic on 10/10/2022  The Goal of this therapy is to reduce edema and get into long term compression garments to control venous insufficiency, lymphedema and reduce occurrence of venous ulcers    [x] Edema Control:  Apply: Spandagrip/Medigrip on Left; Medium compression 10-20 mm/Hg. They should be applied to affected leg(s) from mid foot to knee making sure to cover the heel      Elevate leg(s) above the level of the heart for 30 minutes 4-5 times a day and/or when sitting. Avoid prolonged standing in one place. Dietary:  Important dietary reminders:  1. Increase Protein intake (i.e. Lean meats, fish, eggs, legumes, and yogurt)  2. No added salt  3.  If diabetic, follow a diabetic diet and check glucose prior to meals or as instructed by your physician. Dietary Supplements(Take twice a day unless instructed otherwise):  [] Shawna Ground  [] 30ml ProStat [] [address] [place] [] Ensure Max/Premier [] Other:    Your nurse  is:  Lanny Myers     Electronically signed by Jesus Kyle RN on 10/10/2022 at 9:19 AM     [address] [facility] [address] Information: Should you experience any significant changes in your wound(s) or have questions about your wound care, please contact the [address] [place] [address] at [phone]. We are open from 8:00am - 4:30p Monday, Thursday and Friday; 11:00am - 5pm on Tuesday and CLOSED Wednesday. Please give us 24-48 business hours to return your call. Call your doctor now or seek immediate medical care if:    You have symptoms of infection, such as: Increased pain, swelling, warmth, or redness. Red streaks leading from the area. Pus draining from the area. A fever.          [] Patient unable to sign Discharge Instructions given to ECF/Transportation/POA

## 2022-10-10 NOTE — PROGRESS NOTES
[facility]  Progress Note and Procedure Note      Bailey Rasmussen. MEDICAL RECORD NUMBER:  [id number]  AGE: 81 y.o. GENDER: male  : 1950  EPISODE DATE:  10/10/2022    Subjective:     Chief Complaint   Patient presents with    Wound Check     Bilateral lower extremities         HISTORY of PRESENT ILLNESS HPI     Bailey Gaitan is a 81 y.o. male who presents today for wound/ulcer evaluation. History of Wound Context:  Patient of Dr. Dulce Deal being followed in wound care following revascularization of the right lower extremity with arterial ulcer right knee. Patient also developed wound overlying left foot from improper shoe    Wound has failed to heal greater than 30%-50% at initial assessment.   Patient has received greater than 30 days of conservative treatment including debridements at each wound care visit and the following: Compression    Pain Assessment:  Wound/Ulcer Pain Timing/Severity: none  Quality of pain: N/A  Severity:  0 / 10   Modifying Factors: None  Associated Signs/Symptoms: none    Ulcer Identification:  Ulcer Type: arterial and pressure  Contributing Factors: venous stasis, chronic pressure, decreased mobility, smoking, and arterial insufficiency    Last M9T if applicable:   Hemoglobin A1C   Date Value Ref Range Status   2020 6.1 See comment % Final     Comment:     Comment:  Diagnosis of Diabetes: > or = 6.5%  Increased risk of diabetes (Prediabetes): 5.7-6.4%  Glycemic Control: Nonpregnant Adults: <7.0%                    Pregnant: <6.0%           Objective:      BP (!) 141/73   Pulse 73   Temp 97.1 °F (36.2 °C) (Temporal)   Resp 20     Wt Readings from Last 3 Encounters:   22 225 lb 6.4 oz (102.2 kg)   22 216 lb (98 kg)   22 221 lb (100.2 kg)       PHYSICAL EXAM    General Appearance: alert and oriented to person, place and time, well-developed and well-nourished, in no acute distress  Extremities: no cyanosis, no clubbing, minimal + edema-  bilateral lower extremities, and full-thickness wound right anterior knee with minimal residual TheraSkin allograft and granulation tissue with epithelialization at the margins designated as wound #1 periwound is dry and intact. Full-thickness wound overlying plantar left great toe with fibrin, slough, granulation tissue periwound hyperkeratotic skin designated as wound #2 small thickness wound right proximal knee containing fibrin and granulation tissue designated as wound #3    Assessment:      1. Non-pressure ulcer of right lower extremity with fat layer exposed ([address]    2. Open wound of right lower leg, subsequent encounter    3. Atherosclerosis of native artery of right lower extremity with rest pain ([address]    4. PVD (peripheral vascular disease) ([address]         Procedure Note  Indications:  Based on my examination of this patient's wound(s) today, sharp excision is required to promote healing and evaluate the extent healing. Performed by: Nieves Fisher MD    Consent obtained? Yes    Time out taken: Yes    Pain Control: Anesthetic: 4% Lidocaine Liquid Topical     Debridement:Excisional Debridement    Using curette the wound was sharply debrided    down through and including the removal of  epidermis, dermis, and subcutaneous tissue.     Devitalized Tissue Debrided:  fibrin and slough      Wound #: 2 and 3,1     Pre & Post  Debridement Measurements:  Wound 11/15/21 Leg Distal;Right #1 (Active)   Wound Image   10/03/22 0840   Wound Etiology Arterial 08/22/22 1433   Wound Cleansed Cleansed with saline 10/10/22 0848   Dressing/Treatment Collagen with Ag 10/03/22 0916   Wound Length (cm) 5 cm 10/10/22 0848   Wound Width (cm) 2 cm 10/10/22 0848   Wound Depth (cm) 0.1 cm 10/10/22 0848   Wound Surface Area (cm^2) 10 cm^2 10/10/22 0848   Change in Wound Size % (l*w) -316.67 10/10/22 0848   Wound Volume (cm^3) 1 cm^3 10/10/22 0848   Wound Healing % -317 10/10/22 0848   Post-Procedure Length (cm) 4.9 cm 10/03/22 0916   Post-Procedure Width (cm) 1.5 cm 10/03/22 0916   Post-Procedure Depth (cm) 0.1 cm 10/03/22 0916   Post-Procedure Surface Area (cm^2) 7.35 cm^2 10/03/22 0916   Post-Procedure Volume (cm^3) 0.735 cm^3 10/03/22 0916   Distance Tunneling (cm) 0 cm 10/10/22 0848   Tunneling Position ___ O'Clock 0 10/10/22 0848   Undermining Starts ___ O'Clock 0 10/10/22 0848   Undermining Ends___ O'Clock 0 10/10/22 0848   Undermining Maxium Distance (cm) 0 10/10/22 0848   Wound Assessment Graft;Pink/red;Slough 10/10/22 0848   Drainage Amount Small 10/10/22 0848   Drainage Description Brown 10/10/22 0848   Odor Mild 10/10/22 0848   Catrachita-wound Assessment Intact 10/10/22 0848   Margins Defined edges 10/10/22 0848   Wound Thickness Description not for Pressure Injury Full thickness 10/10/22 0848   Number of days: 328       Wound 09/12/22 Foot Plantar;Left #2 great toe (Active)   Wound Image   10/03/22 0840   Wound Etiology Traumatic 09/19/22 0932   Wound Cleansed Cleansed with saline 10/10/22 0848   Dressing/Treatment Collagen with Ag 10/03/22 0916   Wound Length (cm) 1.4 cm 10/10/22 0848   Wound Width (cm) 0.4 cm 10/10/22 0848   Wound Depth (cm) 0.1 cm 10/10/22 0848   Wound Surface Area (cm^2) 0.56 cm^2 10/10/22 0848   Change in Wound Size % (l*w) 81.33 10/10/22 0848   Wound Volume (cm^3) 0.056 cm^3 10/10/22 0848   Wound Healing % 81 10/10/22 0848   Post-Procedure Length (cm) 1.4 cm 10/03/22 0916   Post-Procedure Width (cm) 0.4 cm 10/03/22 0916   Post-Procedure Depth (cm) 0.1 cm 10/03/22 0916   Post-Procedure Surface Area (cm^2) 0.56 cm^2 10/03/22 0916   Post-Procedure Volume (cm^3) 0.056 cm^3 10/03/22 0916   Distance Tunneling (cm) 0 cm 10/10/22 0848   Tunneling Position ___ O'Clock 0 10/10/22 0848   Undermining Starts ___ O'Clock 0 10/10/22 0848   Undermining Ends___ O'Clock 0 10/10/22 0848   Undermining Maxium Distance (cm) 0 10/10/22 0848   Wound Assessment Pale granulation tissue 10/10/22 0848   Drainage Amount None 10/10/22 0848   Drainage Description Brown 10/03/22 0840   Odor Mild 10/03/22 0840   Catrachita-wound Assessment Intact 10/10/22 0848   Margins Defined edges 10/10/22 0848   Wound Thickness Description not for Pressure Injury Full thickness 10/10/22 0848   Number of days: 28       Wound 09/19/22 Leg Lower;Proximal;Right #3 (Active)   Wound Image   10/03/22 0840   Wound Etiology Traumatic 09/19/22 0932   Wound Cleansed Cleansed with saline 10/10/22 0848   Dressing/Treatment Collagen with Ag 10/03/22 0916   Wound Length (cm) 1 cm 10/10/22 0848   Wound Width (cm) 0.4 cm 10/10/22 0848   Wound Depth (cm) 0.1 cm 10/10/22 0848   Wound Surface Area (cm^2) 0.4 cm^2 10/10/22 0848   Change in Wound Size % (l*w) 66.67 10/10/22 0848   Wound Volume (cm^3) 0.04 cm^3 10/10/22 0848   Wound Healing % 67 10/10/22 0848   Post-Procedure Length (cm) 1.1 cm 10/03/22 0916   Post-Procedure Width (cm) 0.6 cm 10/03/22 0916   Post-Procedure Depth (cm) 0.3 cm 10/03/22 0916   Post-Procedure Surface Area (cm^2) 0.66 cm^2 10/03/22 0916   Post-Procedure Volume (cm^3) 0.198 cm^3 10/03/22 0916   Distance Tunneling (cm) 0 cm 10/10/22 0848   Tunneling Position ___ O'Clock 0 10/10/22 0848   Undermining Starts ___ O'Clock 0 10/10/22 0848   Undermining Ends___ O'Clock 0 10/10/22 0848   Undermining Maxium Distance (cm) 0 10/10/22 0848   Wound Assessment Fibrin;Pink/red 10/10/22 0848   Drainage Amount Small 10/10/22 0848   Drainage Description Brown 10/10/22 0848   Odor None 10/10/22 0848   Catrachita-wound Assessment Intact 10/10/22 0848   Margins Defined edges 10/10/22 0848   Wound Thickness Description not for Pressure Injury Full thickness 10/10/22 0848   Number of days: 20          Percent of Wound Debrided: 100%    Total Surface Area Debrided:  8 sq cm    Diabetic/Pressure/Non Pressure Ulcers only:  Ulcer: Non-Pressure ulcer, fat layer exposed    Bleeding: Minimal    Hemostasis Achieved: by pressure    Procedural Pain: 0  / 10     Post Procedural Pain: 0 / 10     Response to treatment:  Well tolerated by patient. rocedure:  Skin Substitute Application    Performed by: Anderson Paulino MD    Ulcer Type: arterial    Consent obtained: Yes    Time out taken: Yes    Product Utilized:     TheraSkin 13 sq/cm     Fenestrated: No    Mesher Utilized: No    Instrument(s) curette    Skin Substitute was Applied to Ulcer Number(s):    Ulcer #: 1    Wound 11/15/21 Leg Distal;Right #1 (Active)   Wound Image   10/03/22 0840   Wound Etiology Arterial 08/22/22 1433   Wound Cleansed Cleansed with saline 10/10/22 0848   Dressing/Treatment Collagen with Ag 10/03/22 0916   Wound Length (cm) 5 cm 10/10/22 0848   Wound Width (cm) 2 cm 10/10/22 0848   Wound Depth (cm) 0.1 cm 10/10/22 0848   Wound Surface Area (cm^2) 10 cm^2 10/10/22 0848   Change in Wound Size % (l*w) -316.67 10/10/22 0848   Wound Volume (cm^3) 1 cm^3 10/10/22 0848   Wound Healing % -317 10/10/22 0848   Post-Procedure Length (cm) 4.9 cm 10/03/22 0916   Post-Procedure Width (cm) 1.5 cm 10/03/22 0916   Post-Procedure Depth (cm) 0.1 cm 10/03/22 0916   Post-Procedure Surface Area (cm^2) 7.35 cm^2 10/03/22 0916   Post-Procedure Volume (cm^3) 0.735 cm^3 10/03/22 0916   Distance Tunneling (cm) 0 cm 10/10/22 0848   Tunneling Position ___ O'Clock 0 10/10/22 0848   Undermining Starts ___ O'Clock 0 10/10/22 0848   Undermining Ends___ O'Clock 0 10/10/22 0848   Undermining Maxium Distance (cm) 0 10/10/22 0848   Wound Assessment Graft;Pink/red;Slough 10/10/22 0848   Drainage Amount Small 10/10/22 0848   Drainage Description Brown 10/10/22 0848   Odor Mild 10/10/22 0848   Catrachita-wound Assessment Intact 10/10/22 0848   Margins Defined edges 10/10/22 0848   Wound Thickness Description not for Pressure Injury Full thickness 10/10/22 0848   Number of days: 328       Wound 09/12/22 Foot Plantar;Left #2 great toe (Active)   Wound Image   10/03/22 0840   Wound Etiology Traumatic 09/19/22 0932   Wound Cleansed Cleansed with saline 10/10/22 0848   Dressing/Treatment Collagen with Ag 10/03/22 0916   Wound Length (cm) 1.4 cm 10/10/22 0848   Wound Width (cm) 0.4 cm 10/10/22 0848   Wound Depth (cm) 0.1 cm 10/10/22 0848   Wound Surface Area (cm^2) 0.56 cm^2 10/10/22 0848   Change in Wound Size % (l*w) 81.33 10/10/22 0848   Wound Volume (cm^3) 0.056 cm^3 10/10/22 0848   Wound Healing % 81 10/10/22 0848   Post-Procedure Length (cm) 1.4 cm 10/03/22 0916   Post-Procedure Width (cm) 0.4 cm 10/03/22 0916   Post-Procedure Depth (cm) 0.1 cm 10/03/22 0916   Post-Procedure Surface Area (cm^2) 0.56 cm^2 10/03/22 0916   Post-Procedure Volume (cm^3) 0.056 cm^3 10/03/22 0916   Distance Tunneling (cm) 0 cm 10/10/22 0848   Tunneling Position ___ O'Clock 0 10/10/22 0848   Undermining Starts ___ O'Clock 0 10/10/22 0848   Undermining Ends___ O'Clock 0 10/10/22 0848   Undermining Maxium Distance (cm) 0 10/10/22 0848   Wound Assessment Pale granulation tissue 10/10/22 0848   Drainage Amount None 10/10/22 0848   Drainage Description Brown 10/03/22 0840   Odor Mild 10/03/22 0840   Catrachita-wound Assessment Intact 10/10/22 0848   Margins Defined edges 10/10/22 0848   Wound Thickness Description not for Pressure Injury Full thickness 10/10/22 0848   Number of days: 28       Wound 09/19/22 Leg Lower;Proximal;Right #3 (Active)   Wound Image   10/03/22 0840   Wound Etiology Traumatic 09/19/22 0932   Wound Cleansed Cleansed with saline 10/10/22 0848   Dressing/Treatment Collagen with Ag 10/03/22 0916   Wound Length (cm) 1 cm 10/10/22 0848   Wound Width (cm) 0.4 cm 10/10/22 0848   Wound Depth (cm) 0.1 cm 10/10/22 0848   Wound Surface Area (cm^2) 0.4 cm^2 10/10/22 0848   Change in Wound Size % (l*w) 66.67 10/10/22 0848   Wound Volume (cm^3) 0.04 cm^3 10/10/22 0848   Wound Healing % 67 10/10/22 0848   Post-Procedure Length (cm) 1.1 cm 10/03/22 0916   Post-Procedure Width (cm) 0.6 cm 10/03/22 0916   Post-Procedure Depth (cm) 0.3 cm 10/03/22 0916   Post-Procedure Surface Area (cm^2) 0.66 cm^2 10/03/22 0916   Post-Procedure Volume (cm^3) 0.198 cm^3 10/03/22 0916   Distance Tunneling (cm) 0 cm 10/10/22 0848   Tunneling Position ___ O'Clock 0 10/10/22 0848   Undermining Starts ___ O'Clock 0 10/10/22 0848   Undermining Ends___ O'Clock 0 10/10/22 0848   Undermining Maxium Distance (cm) 0 10/10/22 0848   Wound Assessment Fibrin;Pink/red 10/10/22 0848   Drainage Amount Small 10/10/22 0848   Drainage Description Brown 10/10/22 0848   Odor None 10/10/22 0848   Catrachita-wound Assessment Intact 10/10/22 0848   Margins Defined edges 10/10/22 0848   Wound Thickness Description not for Pressure Injury Full thickness 10/10/22 0848   Number of days: 20          Diabetic/Pressure/Non Pressure Ulcers:  Ulcer:   Non-Pressure ulcer, fat layer exposed      Total Surface Area of Ulcer(s) Covered 7.3 sq/cm    Was the Product Layered  No     Amount of Product Applied 7.3 sq/cm     Amount of Product Wasted 5.7 sq/cm     Reason for Waste: The size of the wound is smaller than the product utilized     Surgically Fixated: No:    Secured With: Steri Strips and Silicone Dressing     Procedural Pain: 0/10     Post Procedural Pain: 0 / 10    Response to Treatment: Well tolerated by patient. Plan:   A third application of TheraSkin allograft was applied today without difficulty. Treatment Note: Please see attached Discharge Instructions. These instructions were given and signed by the patient or POA    New Medication(s) at this visit:   New Prescriptions    No medications on file       Discharge Instructions          [facility] Physician Orders and Discharge Instructions  [address] [address] [address] [address] [facility]. [place] [place]  Telephone: [phone]  NAME:  Prieto Tapia.   YOB: 1950  MEDICAL RECORD NUMBER:  [id number]  DATE: 10/10/22     Return Appointment:  Return Appointment: With Dr Eneida Pennington  in  [facility])  [x] Return Appointment for a Wound Assessment with the nurse on: 10/13/2022    Future Appointments   Date Time Provider Orlando Colmenares   10/26/2022 12:40 PM MD Alaina Garcia P/CC [facility]   12/14/2022  9:45 AM DO JEFF Bolaños [place] - DYD   12/19/2022 11:00 AM Nicole Floyd DO [address]: [facility] CARE [phone]   F: [phone]   Medically necessary services: [x] Skilled Nursing [] PT (Eval & Treat) [] OT (Eval & Treat) [] Social Work [] Dietician  [] Other:    Wound care instructions: If you smoke we ask that you refrain from smoking. Smoking inhibits wounds from healing. When taking antibiotics take the entire prescription as ordered. Do not stop taking until medication is all gone unless otherwise instructed. Exercise as tolerated. Keep weight off wounds and reposition every 2 hours if applicable. Do not get wounds wet in the bath or shower unless otherwise instructed by your physician. If your wound is on your foot or leg, you may purchase a cast bag. Please ask at the pharmacy. Wash hands with soap and water prior to and after every dressing change. [x]Wash wounds with: 0.9% normal saline  [x]Catrachita wound Topical Treatments: Do not apply lotions, creams, or ointments to the skin around the wound bed unless directed as followed:   Apply around the wound: No-Sting barrier film         [x]Wound Location: right proximal lower leg wound   Apply Primary Dressing to wound: Oly  Secondary Dressing:  mepilex border   Avoid contact of tape with skin if possible. When to change Dressing: DO NOT CHANGE    [x]Wound Location: left foot   Apply Primary Dressing to wound: Oly  Secondary Dressing: 4X4 gauze pad and Conforming roll gauze   Avoid contact of tape with skin if possible.   When to change Dressing: 3 times per week:Monday/Wednesday/Friday    [x]Application of a biologic skin substitute: Yes: Theraskin : Wound Location: right lower leg wound- distal, covered with mepitel, secured with steri strips, hydrogel applied, gauze  This is a highly specialized wound care dressing that is intended to enhance your own bodies ability to increase growth factors and other healing abilities. Please leave the dressing clean, dry and intact unless otherwise instructed by your physician. Leave steri-strips and non adherent in place if changing the dressing as instructed. [x]Instructions for [address] [place] [email or web address] [facility] if applicable: do not change        [x] Multilayer Compression Wrap:  Type: Applied on Right lower leg(s)- coflex 2 lite    Do not get leg(s) with wrap wet. If wraps become too tight call the center or completely remove the wrap. Elevate leg(s) above the level of the heart when sitting. Avoid prolonged standing in one place. Applied in Clinic on 10/10/2022  The Goal of this therapy is to reduce edema and get into long term compression garments to control venous insufficiency, lymphedema and reduce occurrence of venous ulcers    [x] Edema Control:  Apply: Spandagrip/Medigrip on Left; Medium compression 10-20 mm/Hg. They should be applied to affected leg(s) from mid foot to knee making sure to cover the heel      Elevate leg(s) above the level of the heart for 30 minutes 4-5 times a day and/or when sitting. Avoid prolonged standing in one place. Dietary:  Important dietary reminders:  1. Increase Protein intake (i.e. Lean meats, fish, eggs, legumes, and yogurt)  2. No added salt  3. If diabetic, follow a diabetic diet and check glucose prior to meals or as instructed by your physician.     Dietary Supplements(Take twice a day unless instructed otherwise):  [] Rhetta Dayhoff  [] 30ml ProStat [] Joann Guillermina [] Ensure Max/Premier [] Other:    Your nurse  is:  Idris Pruitt     Electronically signed by Carlo Kemp RN on 10/10/2022 at 9:19 AM     [address] Information: Should you experience any significant changes in your wound(s) or have questions about your wound care, please contact the [address] [place] [address] at [phone]. We are open from 8:00am - 4:30p Monday, Thursday and Friday; 11:00am - 5pm on Tuesday and CLOSED Wednesday. Please give us 24-48 business hours to return your call. Call your doctor now or seek immediate medical care if:    You have symptoms of infection, such as: Increased pain, swelling, warmth, or redness. Red streaks leading from the area. Pus draining from the area. A fever.          [] Patient unable to sign Discharge Instructions given to ECF/Transportation/POA         Electronically signed by Marlee Lin MD on 10/10/2022 at 9:30 AM

## 2022-10-10 NOTE — PROGRESS NOTES
TheraSkin Treatment Note      Goal:  Patient will receive safe and proper application of skin substitute. Patient will comply with caring for dressing, offloading and reporting complications. [x] Expiration date of TheraSkin checked immediately prior to use. [x] Package intact prior to use and no damage noted. [x] Transport temperature controlled and acceptable. TheraSkin was prepared for application by removing from package. TheraSkin was rinsed 2 times in room temperature normal saline for 5 seconds each time. A 2nd saline rinse was left on the TheraSkin until the provider was ready to apply it within 120 minutes of thawing. White side placed against ulcer bed. [x] Theraskin was applied to wound # 1 and affixed with steri-strips by the provider. [x] Secondary dressing applied as ordered. [x] Patient/caregiver was instructed not to remove dressing and to keep it clean and dry. See AVS for further instructions given to patient/caregiver. Theraskin may be applied a total of 10 times per wound over a 12 week period. Additionally Theraskin may only be used every 12 months per wound. Date of first application of Theraskin for this current wound is September 9, 2022.      Application # 3           Guidelines followed      Electronically signed by Lynda Ledezma RN on 10/10/2022 at 9:25 AM

## 2022-10-10 NOTE — PROGRESS NOTES
Multilayer Compression Wrap   (Not Unna) Below the Knee    NAME:  Kb Gonzalez. YOB: 1950  MEDICAL RECORD NUMBER:  [id number]  DATE:  10/10/2022       Removed old Multilayer wrap if present and washed leg with mild soap/water. Applied moisturizing agent to dry skin as needed. Applied primary and secondary dressing as ordered    Applied multilayered dressing below the knee to Right lower leg(s)  (2 Layer Lite compression wrap ) . Instructed patient/caregiver not to remove dressing and to keep it clean and dry. Instructed patient/caregiver on complications to report to provider, such as pain, numbness in toes, heavy drainage, and slippage of dressing. Instructed patient on purpose of compression dressing and on activity and exercise recommendations.    Applied per   Guidelines    Electronically signed by Logan Crews RN on 10/10/2022 at 9:37 AM

## 2022-10-13 ENCOUNTER — HOSPITAL ENCOUNTER (OUTPATIENT)
Dept: WOUND CARE | Age: 72
Discharge: HOME | End: 2022-10-13
Payer: MEDICARE

## 2022-10-13 VITALS
DIASTOLIC BLOOD PRESSURE: 76 MMHG | RESPIRATION RATE: 22 BRPM | TEMPERATURE: 97.1 F | SYSTOLIC BLOOD PRESSURE: 156 MMHG | HEART RATE: 76 BPM

## 2022-10-13 DIAGNOSIS — S81.801D OPEN WOUND OF RIGHT LOWER LEG, SUBSEQUENT ENCOUNTER: ICD-10-CM

## 2022-10-13 DIAGNOSIS — I70.221 ATHEROSCLEROSIS OF NATIVE ARTERY OF RIGHT LOWER EXTREMITY WITH REST PAIN (HCC): ICD-10-CM

## 2022-10-13 DIAGNOSIS — L97.912 NON-PRESSURE ULCER OF RIGHT LOWER EXTREMITY WITH FAT LAYER EXPOSED (HCC): Primary | ICD-10-CM

## 2022-10-13 DIAGNOSIS — I73.9 PVD (PERIPHERAL VASCULAR DISEASE) (HCC): ICD-10-CM

## 2022-10-13 PROCEDURE — 29581 APPL MULTLAYER CMPRN SYS LEG: CPT

## 2022-10-13 ASSESSMENT — PAIN DESCRIPTION - DESCRIPTORS: DESCRIPTORS: BURNING;SHOOTING

## 2022-10-13 ASSESSMENT — PAIN DESCRIPTION - LOCATION: LOCATION: LEG

## 2022-10-13 ASSESSMENT — PAIN SCALES - GENERAL: PAINLEVEL_OUTOF10: 9

## 2022-10-13 ASSESSMENT — PAIN DESCRIPTION - PAIN TYPE: TYPE: CHRONIC PAIN

## 2022-10-13 ASSESSMENT — PAIN DESCRIPTION - ORIENTATION: ORIENTATION: RIGHT

## 2022-10-13 NOTE — PROGRESS NOTES
Multilayer Compression Wrap   (Not Unna) Below the Knee    NAME:  Noah Sweeney YOB: 1950  MEDICAL RECORD NUMBER:  [id number]  DATE:  10/13/2022       Removed old Multilayer wrap if present and washed leg with mild soap/water. Applied moisturizing agent to dry skin as needed. Applied primary and secondary dressing as ordered    Applied multilayered dressing below the knee to Right lower leg(s)  (2 Layer compression wrap ) . Instructed patient/caregiver not to remove dressing and to keep it clean and dry. Instructed patient/caregiver on complications to report to provider, such as pain, numbness in toes, heavy drainage, and slippage of dressing. Instructed patient on purpose of compression dressing and on activity and exercise recommendations.    Applied per   Guidelines    Electronically signed by Guille Bowie RN on 10/13/2022 at 8:47 AM

## 2022-10-17 ENCOUNTER — HOSPITAL ENCOUNTER (OUTPATIENT)
Dept: WOUND CARE | Age: 72
Discharge: HOME | End: 2022-10-17

## 2022-10-17 ENCOUNTER — TELEPHONE (OUTPATIENT)
Dept: WOUND CARE | Age: 72
End: 2022-10-17

## 2022-10-17 NOTE — TELEPHONE ENCOUNTER
Call patient and have him come in tomorrow at 12:45pm for nurse visit and make him appt for Dr. Juan Luis Kumar here on Monday October 24th.

## 2022-10-18 ENCOUNTER — HOSPITAL ENCOUNTER (OUTPATIENT)
Dept: WOUND CARE | Age: 72
Discharge: HOME | End: 2022-10-18
Payer: MEDICARE

## 2022-10-18 VITALS
HEART RATE: 70 BPM | RESPIRATION RATE: 22 BRPM | TEMPERATURE: 97.1 F | DIASTOLIC BLOOD PRESSURE: 97 MMHG | SYSTOLIC BLOOD PRESSURE: 153 MMHG

## 2022-10-18 PROCEDURE — 29581 APPL MULTLAYER CMPRN SYS LEG: CPT

## 2022-10-18 ASSESSMENT — PAIN DESCRIPTION - ORIENTATION: ORIENTATION: RIGHT;LEFT

## 2022-10-18 ASSESSMENT — PAIN DESCRIPTION - PAIN TYPE: TYPE: CHRONIC PAIN

## 2022-10-18 ASSESSMENT — PAIN DESCRIPTION - DESCRIPTORS: DESCRIPTORS: BURNING;SHOOTING

## 2022-10-18 ASSESSMENT — PAIN DESCRIPTION - LOCATION: LOCATION: LEG

## 2022-10-18 ASSESSMENT — PAIN SCALES - GENERAL: PAINLEVEL_OUTOF10: 7

## 2022-10-18 NOTE — DISCHARGE INSTRUCTIONS
[address] [place] [address] Physician Orders and Discharge Instructions  The [address] [address] [address] [address] [address] [place], [place] [address]way [facility]  Telephone: [phone]    NAME:  Ehsan Tena. YOB: 1950  MEDICAL RECORD NUMBER:  [id number]  DATE:  10/18/2022      Wound care:  Continue to follow the instructions and recommendations from your last doctor visit. The dressing(s) applied is the same as your last visit. Please refer to your last discharge instruction for the information on your wound care. If there were any changes made, please follow the instructions as written here: rayo applied to left lower leg wound    Future Appointments     Future Appointments   Date Time Provider Orlando Colmenares   10/18/2022  1:15 PM Augustine Mcguiremokassy [facility]   10/24/2022  1:30 PM Lora Orellana  [address]    10/26/2022 12:40 PM Ericka Pardo MD [facility] P/CC [facility]   12/14/2022  9:45 AM DO JEFF Briscoe [place] - DYD   12/19/2022 11:00 AM DO JEFF Briscoe [place] - DYD           Your nurse  is:  Phares Snellen     Electronically signed by Angel Harris RN on 10/18/2022 at 1:00 PM     [address] [place] [address] Information: Should you experience any significant changes in your wound(s) or have questions about your wound care, please contact the [address] [place] [address] at [phone]. Our hours vary so please leave a message. Please give us 24-48 hours to return your call. If you need help with your wounds and cannot wait until we are available, contact your PCP or go to the hospital emergency room. Physician orders by:  Dr. Gerard Ruiz        The information contained in the After Visit Summary has been reviewed with me, the patient and/or responsible adult, by my health care provider(s). I had the opportunity to ask questions regarding this information.   I have elected to receive;      [] Patient unable to sign Discharge Instructions.  Given to ECF/Transportation/POA

## 2022-10-18 NOTE — PROGRESS NOTES
Multilayer Compression Wrap   (Not Unna) Below the Knee    NAME:  Sahra Nix. YOB: 1950  MEDICAL RECORD NUMBER:  [id number]  DATE:  10/18/2022       Removed old Multilayer wrap if present and washed leg with mild soap/water. Applied moisturizing agent to dry skin as needed. Applied primary and secondary dressing as ordered    Applied multilayered dressing below the knee to Right lower leg(s)  (2 Layer Lite compression wrap ) . Instructed patient/caregiver not to remove dressing and to keep it clean and dry. Instructed patient/caregiver on complications to report to provider, such as pain, numbness in toes, heavy drainage, and slippage of dressing. Instructed patient on purpose of compression dressing and on activity and exercise recommendations.    Applied per   Guidelines    Electronically signed by Laurie Sanchez RN on 10/18/2022 at 1:03 PM

## 2022-10-20 ENCOUNTER — APPOINTMENT (OUTPATIENT)
Dept: GENERAL RADIOLOGY | Age: 72
End: 2022-10-20
Payer: MEDICARE

## 2022-10-20 ENCOUNTER — APPOINTMENT (OUTPATIENT)
Dept: CT IMAGING | Age: 72
End: 2022-10-20
Payer: MEDICARE

## 2022-10-20 ENCOUNTER — HOSPITAL ENCOUNTER (EMERGENCY)
Age: 72
Discharge: HOME | End: 2022-10-20
Attending: EMERGENCY MEDICINE
Payer: MEDICARE

## 2022-10-20 VITALS
DIASTOLIC BLOOD PRESSURE: 64 MMHG | TEMPERATURE: 97.8 F | WEIGHT: 237 LBS | SYSTOLIC BLOOD PRESSURE: 131 MMHG | BODY MASS INDEX: 33.93 KG/M2 | HEART RATE: 90 BPM | HEIGHT: 70 IN | RESPIRATION RATE: 16 BRPM | OXYGEN SATURATION: 98 %

## 2022-10-20 DIAGNOSIS — J44.9 OSA AND COPD OVERLAP SYNDROME (HCC): ICD-10-CM

## 2022-10-20 DIAGNOSIS — G47.30 SLEEP APNEA, UNSPECIFIED TYPE: ICD-10-CM

## 2022-10-20 DIAGNOSIS — G47.33 OSA AND COPD OVERLAP SYNDROME (HCC): ICD-10-CM

## 2022-10-20 DIAGNOSIS — R06.00 DYSPNEA, UNSPECIFIED TYPE: ICD-10-CM

## 2022-10-20 DIAGNOSIS — J44.1 COPD EXACERBATION (HCC): Primary | ICD-10-CM

## 2022-10-20 LAB
A/G RATIO: 1.1 (ref 1.1–2.2)
ALBUMIN SERPL-MCNC: 3.9 G/DL (ref 3.4–5)
ALP BLD-CCNC: 100 U/L (ref 40–129)
ALT SERPL-CCNC: 14 U/L (ref 10–40)
ANION GAP SERPL CALCULATED.3IONS-SCNC: 12 MMOL/L (ref 3–16)
AST SERPL-CCNC: 17 U/L (ref 15–37)
BASOPHILS ABSOLUTE: 0.1 K/UL (ref 0–0.2)
BASOPHILS RELATIVE PERCENT: 0.7 %
BILIRUB SERPL-MCNC: 0.3 MG/DL (ref 0–1)
BUN BLDV-MCNC: 16 MG/DL (ref 7–20)
CALCIUM SERPL-MCNC: 9.5 MG/DL (ref 8.3–10.6)
CHLORIDE BLD-SCNC: 106 MMOL/L (ref 99–110)
CO2: 25 MMOL/L (ref 21–32)
CREAT SERPL-MCNC: 1.1 MG/DL (ref 0.8–1.3)
EKG ATRIAL RATE: 91 BPM
EKG DIAGNOSIS: NORMAL
EKG P AXIS: 78 DEGREES
EKG P-R INTERVAL: 188 MS
EKG Q-T INTERVAL: 374 MS
EKG QRS DURATION: 90 MS
EKG QTC CALCULATION (BAZETT): 460 MS
EKG R AXIS: 58 DEGREES
EKG T AXIS: 61 DEGREES
EKG VENTRICULAR RATE: 91 BPM
EOSINOPHILS ABSOLUTE: 0.4 K/UL (ref 0–0.6)
EOSINOPHILS RELATIVE PERCENT: 4.8 %
GFR SERPL CREATININE-BSD FRML MDRD: >60 ML/MIN/{1.73_M2}
GLUCOSE BLD-MCNC: 213 MG/DL (ref 70–99)
HCT VFR BLD CALC: 30.7 % (ref 40.5–52.5)
HEMOGLOBIN: 10 G/DL (ref 13.5–17.5)
LYMPHOCYTES ABSOLUTE: 1.6 K/UL (ref 1–5.1)
LYMPHOCYTES RELATIVE PERCENT: 21.8 %
MCH RBC QN AUTO: 27.6 PG (ref 26–34)
MCHC RBC AUTO-ENTMCNC: 32.5 G/DL (ref 31–36)
MCV RBC AUTO: 84.7 FL (ref 80–100)
MONOCYTES ABSOLUTE: 0.6 K/UL (ref 0–1.3)
MONOCYTES RELATIVE PERCENT: 7.4 %
NEUTROPHILS ABSOLUTE: 4.9 K/UL (ref 1.7–7.7)
NEUTROPHILS RELATIVE PERCENT: 65.3 %
PDW BLD-RTO: 16.9 % (ref 12.4–15.4)
PLATELET # BLD: 147 K/UL (ref 135–450)
PMV BLD AUTO: 8.9 FL (ref 5–10.5)
POTASSIUM REFLEX MAGNESIUM: 3.7 MMOL/L (ref 3.5–5.1)
PRO-BNP: 365 PG/ML (ref 0–124)
RBC # BLD: 3.62 M/UL (ref 4.2–5.9)
SODIUM BLD-SCNC: 143 MMOL/L (ref 136–145)
TOTAL PROTEIN: 7.4 G/DL (ref 6.4–8.2)
TROPONIN: <0.01 NG/ML
WBC # BLD: 7.5 K/UL (ref 4–11)

## 2022-10-20 PROCEDURE — 94640 AIRWAY INHALATION TREATMENT: CPT

## 2022-10-20 PROCEDURE — 84484 ASSAY OF TROPONIN QUANT: CPT

## 2022-10-20 PROCEDURE — 94761 N-INVAS EAR/PLS OXIMETRY MLT: CPT

## 2022-10-20 PROCEDURE — 93005 ELECTROCARDIOGRAM TRACING: CPT | Performed by: EMERGENCY MEDICINE

## 2022-10-20 PROCEDURE — 99285 EMERGENCY DEPT VISIT HI MDM: CPT

## 2022-10-20 PROCEDURE — 6360000004 HC RX CONTRAST MEDICATION: Performed by: EMERGENCY MEDICINE

## 2022-10-20 PROCEDURE — 71045 X-RAY EXAM CHEST 1 VIEW: CPT

## 2022-10-20 PROCEDURE — 93010 ELECTROCARDIOGRAM REPORT: CPT | Performed by: INTERNAL MEDICINE

## 2022-10-20 PROCEDURE — 6370000000 HC RX 637 (ALT 250 FOR IP)

## 2022-10-20 PROCEDURE — 83880 ASSAY OF NATRIURETIC PEPTIDE: CPT

## 2022-10-20 PROCEDURE — 71260 CT THORAX DX C+: CPT | Performed by: EMERGENCY MEDICINE

## 2022-10-20 PROCEDURE — 80053 COMPREHEN METABOLIC PANEL: CPT

## 2022-10-20 PROCEDURE — 85025 COMPLETE CBC W/AUTO DIFF WBC: CPT

## 2022-10-20 RX ORDER — IPRATROPIUM BROMIDE AND ALBUTEROL SULFATE 2.5; .5 MG/3ML; MG/3ML
1 SOLUTION RESPIRATORY (INHALATION)
Status: DISCONTINUED | OUTPATIENT
Start: 2022-10-20 | End: 2022-10-20

## 2022-10-20 RX ORDER — IPRATROPIUM BROMIDE AND ALBUTEROL SULFATE 2.5; .5 MG/3ML; MG/3ML
SOLUTION RESPIRATORY (INHALATION)
Status: COMPLETED
Start: 2022-10-20 | End: 2022-10-20

## 2022-10-20 RX ORDER — IPRATROPIUM BROMIDE AND ALBUTEROL SULFATE 2.5; .5 MG/3ML; MG/3ML
1 SOLUTION RESPIRATORY (INHALATION) ONCE
Status: COMPLETED | OUTPATIENT
Start: 2022-10-20 | End: 2022-10-20

## 2022-10-20 RX ORDER — ALBUTEROL SULFATE 90 UG/1
2 AEROSOL, METERED RESPIRATORY (INHALATION) EVERY 6 HOURS PRN
Qty: 18 G | Refills: 2 | Status: SHIPPED | OUTPATIENT
Start: 2022-10-20

## 2022-10-20 RX ADMIN — IPRATROPIUM BROMIDE AND ALBUTEROL SULFATE 1 AMPULE: 2.5; .5 SOLUTION RESPIRATORY (INHALATION) at 02:12

## 2022-10-20 RX ADMIN — IOPAMIDOL 75 ML: 755 INJECTION, SOLUTION INTRAVENOUS at 01:52

## 2022-10-20 NOTE — ED NOTES
Pt dc/d with instructions and rx in stable condition, ambulatory to [facility], home per ramos.       Graciela Zelaya RN  10/20/22 5093

## 2022-10-20 NOTE — ED PROVIDER NOTES
[facility]   eMERGENCY dEPARTMENT eNCOUnter      Pt Name: Mega Taylor MRN: [id number]  Birthdate 1950  Date of evaluation: 10/20/2022  Provider: Sulma Wang MD  PCP: Lenny Garrett DO      CHIEF COMPLAINT       Chief Complaint   Patient presents with    Shortness of Breath     Tonight was smoking       HISTORY OFPRESENT ILLNESS   (Location/Symptom, Timing/Onset, Context/Setting, Quality, Duration, Modifying Factors,Severity)  Note limiting factors. Mega Taylor is a 81 y.o. male presents with complaints of shortness of breath that he says has had for a couple of days he admits that he is not been using his nebulizers as much as he should because he does not have as much of the medicine he denies any chest pain with that he has multiple medical problems including congestive heart failure COPD sleep apnea coronary artery disease and is also had blood clots and is on Eliquis he admits to going outside because he said it was easier to breathe outside he also admits that he does not like using his CPAP    Nursing Notes were all reviewed and agreed with or any disagreements were addressed  in the HPI. REVIEW OF SYSTEMS    (2-9 systems for level 4, 10 or more for level 5)     Review of Systems    Positives and Pertinent negatives as per HPI. Except as noted above in the ROS, all other systems were reviewed andnegative.        PASTMEDICAL HISTORY     Past Medical History:   Diagnosis Date    CAD (coronary artery disease)     CHF (congestive heart failure) (HCC)     diastolic    COPD (chronic obstructive pulmonary disease) (HCC)     Critical ischemia of lower extremity (HCC)     Depressive disorder, not elsewhere classified     GERD (gastroesophageal reflux disease)     History of colon polyps - 30 seen on colonoscopy 04/2021 04/24/2021    History of MI (myocardial infarction) 05/2013    History of skin ulcer of lower extremity     R anterior shin    History of transient ischemic attack (TIA)     Hx of blood clots     PE    Hyperlipidemia     Hypertension     Neuropathy     KAVITA (obstructive sleep apnea)     On CPAP    Personal history of DVT (deep vein thrombosis)     Prolonged emergence from general anesthesia     Pt reported being combative after surgery     PVD (peripheral vascular disease) ([facility] [address]     TIA (transient ischemic attack) 2013    Vertebral fracture          SURGICAL HISTORY       Past Surgical History:   Procedure Laterality Date    APPENDECTOMY      CHOLECYSTECTOMY, LAPAROSCOPIC      COLONOSCOPY  4/23/2021    COLONOSCOPY POLYPECTOMY SNARE/COLD BIOPSY performed by Santa Prado MD at [facility]  4/23/2021    COLONOSCOPY POLYPECTOMY ABLATION performed by Santa Prado MD at [facility]  4/23/2021    COLONOSCOPY CONTROL HEMORRHAGE performed by Santa Prado MD at [address]  6/2013    EYE SURGERY Left     FEMORAL BYPASS Right 7/18/2022    RIGHT FEMORAL BELOW KNEE POPLITEAL ARTERY BYPASS WITH IN SITU SAPHENOUS VEIN performed by Estuardo Pettit MD at [address] Right 11/17/2021    RIGHT FEMORAL ENDARTERECTOMY  RIGHT EXTERNAL ILIAC TO PROFUNDA FEMORAL BYPASS WITH 8MM PTFE GRAFT RIGHT LOWER EXTREMITY ARTERIOGRAM BALLOON ANGIOPLASTY RIGHT PROFUNDA BALLOON ANGIOPLASTY AND STENT OF RIGHT EXTERNAL ILIAC ARTERY performed by Estuardo Pettit MD at [facility]  11/18/2015    Bilateral decompressive lumbar laminectomy L4-5   ; medial facetectomy L4-5 joints  bilaterally; foraminotomies l5   nerve roots bilaterally    LEG DEBRIDEMENT Right 7/23/2013    Dr. Olivia Naqvi - pretibial wound    OTHER SURGICAL HISTORY Right 6/25/2013    Dr. Olivia Naqvi - CFA Endarterectomy w/marsupialization; RLE wound excision & debridement     OTHER SURGICAL HISTORY Left 8/27/2013    Dr. Olivia Naqvi - femoral & profunda femoris endarterectomy w/marsupialization patch angioplasty using SFA    SKIN SPLIT GRAFT Right 7/25/2013    Dr. Karley Carver - to non-healing R pretibial wound, 9 x 15 cm    TOTAL HIP ARTHROPLASTY Right 09/01/2016    Dr. Cruz Hanks Left 12/22/2015    Dr. Karley Carver - CFA exploration, thrombectomy of ileofemoral system, stenting of RAFAL in-stent stenosis w/8 x 37 mm Palmaz          CURRENT MEDICATIONS       Current Discharge Medication List        CONTINUE these medications which have NOT CHANGED    Details   omeprazole (PRILOSEC) 40 MG delayed release capsule TAKE 1 CAPSULE BY MOUTH DAILY  Qty: 30 capsule, Refills: 3      Evolocumab 140 MG/ML SOAJ Inject 140 mg into the skin every 14 days Take an injection every 14 days. fluticasone-umeclidin-vilant (TRELEGY ELLIPTA) 100-62.5-25 MCG/INH AEPB Inhale 1 puff into the lungs in the morning. Qty: 28 each, Refills: 11    Associated Diagnoses: KAVITA and COPD overlap syndrome (HCC)      potassium chloride (MICRO-K) 10 MEQ extended release capsule 1 tablet  Qty: 60 capsule, Refills: 1    Associated Diagnoses: Chronic diastolic heart failure (HCC)      furosemide (LASIX) 40 MG tablet Take 1 tablet by mouth in the morning. Qty: 90 tablet, Refills: 3    Associated Diagnoses: Chronic diastolic heart failure (HCC)      metoprolol tartrate (LOPRESSOR) 50 MG tablet Take 1.5 tablets by mouth in the morning and 1.5 tablets before bedtime. Qty: 90 tablet, Refills: 5      apixaban (ELIQUIS) 5 MG TABS tablet Take 1 tablet by mouth in the morning and 1 tablet before bedtime. Qty: 60 tablet, Refills: 1      losartan (COZAAR) 100 MG tablet Take 1 tablet by mouth in the morning. Qty: 30 tablet, Refills: 3      atorvastatin (LIPITOR) 80 MG tablet Take 1 tablet by mouth daily  Qty: 90 tablet, Refills: 1    Associated Diagnoses: Coronary artery disease involving native coronary artery of native heart without angina pectoris;  Mixed hyperlipidemia      clopidogrel (PLAVIX) 75 MG tablet Take 1 tablet by mouth daily  Qty: 30 tablet, Refills: 3      pregabalin (LYRICA) 150 MG capsule Take 1 capsule by mouth 2 times daily for 30 days. Qty: 180 capsule, Refills: 1    Associated Diagnoses: Idiopathic peripheral neuropathy      allopurinol (ZYLOPRIM) 100 MG tablet Take 1 tablet by mouth daily  Qty: 180 tablet, Refills: 1    Associated Diagnoses: Chronic idiopathic gout involving toe of right foot without tophus             ALLERGIES     Asa [aspirin] and Daliresp [roflumilast]    FAMILY HISTORY       Family History   Problem Relation Age of Onset    Cancer Mother         Breast    Heart Disease Mother     Cancer Father         Bladder    Heart Disease Father     Cancer Paternal Grandmother         melanoma           SOCIAL HISTORY       Social History     Socioeconomic History    Marital status:      Spouse name: None    Number of children: 3    Years of education: None    Highest education level: None   Occupational History    Occupation: Retired      Comment:    Tobacco Use    Smoking status: Some Days     Packs/day: 0.25     Years: 52.00     Pack years: 13.00     Types: Cigarettes     Start date: 1967     Last attempt to quit: 2022     Years since quittin.3    Smokeless tobacco: Never    Tobacco comments:     reports he quit circa 22   Vaping Use    Vaping Use: Never used   Substance and Sexual Activity    Alcohol use: Yes     Alcohol/week: 0.0 standard drinks     Comment: 2-3 beers a month    Drug use: No    Sexual activity: Never     Social Determinants of Health     Financial Resource Strain: Low Risk     Difficulty of Paying Living Expenses: Not hard at all   Food Insecurity: No Food Insecurity    Worried About [address] [place] [address] in the Last Year: Never true    Ran Out of Food in the Last Year: Never true   Transportation Needs: No Transportation Needs    Lack of Transportation (Medical): No    Lack of Transportation (Non-Medical):  No       SCREENINGS    Romi Coma Scale  Eye Opening: Spontaneous  Best Verbal Response: Oriented  Best Motor Response: Obeys commands  Romi Coma Scale Score: 15 @FLOW([id number])@      PHYSICAL EXAM    (up to 7 for level 4, 8 or more for level 5)     ED Triage Vitals [10/20/22 0032]   BP Temp Temp Source Heart Rate Resp SpO2 Height Weight   (!) 138/55 97.8 °F (36.6 °C) Oral 92 18 95 % 5' 10\" (1.778 m) 237 lb (107.5 kg)       Physical Exam      General Appearance:  Alert, cooperative, no distress, appears stated age. Head:  Normocephalic, without obviousabnormality, atraumatic. Eyes:  conjunctiva/corneas clear, EOM's intact. Sclera anicteric. ENT: Mucous membranes moist.   Neck: Supple, symmetrical, trachea midline, no adenopathy. No jugular venous distention. Lungs:   Clear to auscultation bilaterally, respirationsunlabored. No rales, rhonchi or wheezes. Chest Wall:  No tenderness. Heart:  Regular rate and rhythm, S1 and S2 normal, no murmur, rub or gallop. Abdomen:   Soft, non-tender, bowel sounds active,   no masses, no organomegaly. Extremities: No edema, cords or calf tenderness. Full range of motion. Pulses: 2+ and symmetric   Skin: Turgor is normal, no rashes or lesions. Neurologic: Alert and oriented X 3. No focal findings.   Motor grossly normal.  Speech clear, no drift, CN III-XII grossly intact,        DIAGNOSTIC RESULTS   LABS:    Labs Reviewed   CBC WITH AUTO DIFFERENTIAL - Abnormal; Notable for the following components:       Result Value    RBC 3.62 (*)     Hemoglobin 10.0 (*)     Hematocrit 30.7 (*)     RDW 16.9 (*)     All other components within normal limits   COMPREHENSIVE METABOLIC PANEL W/ REFLEX TO MG FOR LOW K - Abnormal; Notable for the following components:    Glucose 213 (*)     All other components within normal limits   BRAIN NATRIURETIC PEPTIDE - Abnormal; Notable for the following components:    Pro- (*)     All other components within normal limits   TROPONIN   URINALYSIS WITH REFLEX TO CULTURE       All other labs were within normal range or not returned as of this dictation. EKG: All EKG's are interpreted by the Emergency Department Physician who eithersigns or Co-signs this chart in the absence of a cardiologist.    The Ekg interpreted by me in the absence of a cardiologist shows. Normal Sinus rhythm   Rate of   91  Axis is   Normal  QTc is  normal  Intervals and Durations are unremarkable. Nonspecific ST-T wave changes appreciated. No evidence of acute ischemia. RADIOLOGY:   Non-plain film images such as CT, Ultrasound and MRI are read by the radiologist. Plain radiographic images are visualized by myself. *    Interpretation per the Radiologist below, if available at the time of this note:    CT CHEST PULMONARY EMBOLISM W CONTRAST   Final Result      1. No evidence of any pulmonary embolism. 2. Enlargement of the central pulmonary arteries. This may reflect underlying pulmonary hypertension. 3. Mild-moderate centrilobular emphysema. 4. No evidence of any acute pulmonary disease. 5. Extensive coronary artery calcification. XR CHEST PORTABLE   Final Result   1. Bibasilar groundglass airspace disease. This may reflect atelectasis or pneumonitis. PROCEDURES   Unless otherwise noted below, none     Procedures    *    CRITICAL CARE TIME   N/A      EMERGENCY DEPARTMENT COURSE and DIFFERENTIALDIAGNOSIS/MDM:   Vitals:    Vitals:    10/20/22 0032 10/20/22 0214 10/20/22 0218   BP: (!) 138/55     Pulse: 92 86 90   Resp: 18 16 16   Temp: 97.8 °F (36.6 °C)     TempSrc: Oral     SpO2: 95% 95% 98%   Weight: 237 lb (107.5 kg)     Height: 5' 10\" (1.778 m)         Patient was given thefollowing medications:  Medications   iopamidol (ISOVUE-370) 76 % injection 75 mL (75 mLs IntraVENous Given 10/20/22 0152)   ipratropium-albuterol (DUONEB) nebulizer solution 1 ampule (1 ampule Inhalation Given 10/20/22 0212)           The patient tolerated their visit well.    The patient and / or the familywere informed of the results of any tests, a time was given to answer questions. FINAL IMPRESSION      1. COPD exacerbation ([address]    2. Sleep apnea, unspecified type    3. Dyspnea, unspecified type    4.  KAVITA and COPD overlap syndrome [facility])          DISPOSITION/PLAN   DISPOSITION Decision To Discharge 10/20/2022 02:22:20 AM  Patient presents with dyspnea has a normal EKG normal heart enzymes no signs of congestive heart failure on his CT scan no signs of any new blood clots he does not have any exertional dyspnea and he sounds like he is a little bit noncompliant with his medical regimens I do not have an adequate reason to admit the patient he is pulse oxing 96% on his night oxygen amount which is 2 L plan will be for discharge I will give him a prescription for his inhaler and have him follow-up with his family doctor should be noted that he was also smoking outside when the [address] [place] [address] Dr came to pick him up    PATIENT REFERRED TO:  Mortimer Smiling, 2003 [address] [place], [place] [address]  [phone]    In 2 days        DISCHARGE MEDICATIONS:  Current Discharge Medication List          DISCONTINUED MEDICATIONS:  Current Discharge Medication List        STOP taking these medications       folic acid (FOLVITE) 1 MG tablet Comments:   Reason for Stopping:         nitroGLYCERIN (NITROSTAT) 0.4 MG SL tablet Comments:   Reason for Stopping:         magnesium gluconate (MAGONATE) 500 MG tablet Comments:   Reason for Stopping:                      (Please note that portions of this note were completed with a voice recognition program.  Efforts were made to edit the dictations but occasionally words are mis-transcribed.)    Albert Rosas MD (electronically signed)       Albert Rosas MD  10/20/22 9114

## 2022-10-20 NOTE — DISCHARGE INSTRUCTIONS
Discharge home  Continue to use your inhalers  Continue to take your other medications  Use your CPAP  Follow-up with your family doctor

## 2022-10-21 ENCOUNTER — CARE COORDINATION (OUTPATIENT)
Dept: CARE COORDINATION | Age: 72
End: 2022-10-21

## 2022-10-21 NOTE — CARE COORDINATION
Ambulatory Care Coordination Note  10/21/2022    ACC: Jose Ward, RN    s/p ED visit forec COPD. Pt acknowledged missed call he received via vm left by this writer last planned [facility] outreach, but shares he doesn't frequently check his vm. He verbalizes belief of readiness, & acceptance, in proceeding with graduating current ACC episode, noting ongoing [facility] w/NP visits as well. He continues receiving RN visits for wound care and has his follow up visits scheduled as appropriate. Pt verbalizes acceptance to end current ACC episode, noting he will follow w/Dr Zamora May on Wednesday. Offered patient enrollment in the Remote Patient Monitoring (RPM) program for in-home monitoring: Patient is not eligible for RPM program.    Invited pt to keep ACM contact information, welcomed pt self outreach for support in future as needed. Care Coordination Interventions    Referral from Primary Care Provider: No  Suggested Interventions and Community Resources  Fall Risk Prevention: Completed  Home Care Waiver: Completed (Comment: 8.4.22 [id number] Robert Garcia s/p IP 7.25.22; 5.4.22 reviewed w/pt, per Lin @ [employer] - option for Consumer Directed Care, compensated for approved HHA support up to 2hr/wk; however, pt unable to name anyone for this)  Home Health Services: Completed (Comment: 10.21.22 continues for wound care weekly via Dr Dulce Deal MD [facility])  Senior Services: Completed (Comment: HHA for homemaker support  2 hr/wk via [employer] (formerly provided through [employer]); spoke w/Lin 5.4. 25 who was going to reFax referral for non-skilled HHA again to [employer] in hope [facility] can accomodate reinstating HHA support)  Smoking Cessation: Completed (Comment: 6.15.22 QUIT circa 6.8.22; 4.26.22 health coaching/encouraged in self challenge of reducing daily consumption of cigarettes by delay of 1st cirgarette of day by 15 min, advance as ready)  Specialty Services Referral: Completed (Comment: Dr Armando Goyal (cardiology); Dr Dulce Deal (Vasc); Dr Mahsa Mackenzie (Pulmonology))  Transportation Support: Not Started  Zone Management Tools: Completed (Comment: HF, COPD)  Other Services or Interventions: review of self mgmt concepts; resources; health coaching re smoking cessation          Goals Addressed                      This Visit's Progress      COMPLETED: Conditions and Symptoms (pt-stated)   On track      I will schedule office visits, as directed by my provider. I will keep my appointment or reschedule if I have to cancel. I will notify my provider of any barriers to my plan of care. I will follow my Zone Management tool to seek urgent or emergent care. I will notify my provider of any symptoms that indicate a worsening of my condition. Barriers: impairment:  physical: vascular wound lower extremity and fear of failure  Plan for overcoming my barriers: engage in Care Coordination  Confidence: 10/10  Anticipated Goal Completion Date: 10.26.22          COMPLETED: Wellness Goal (pt-stated)   On track      Patient Self-Management Goal for Health Maintenance  Goal: I will chose a goal related to tobacco cessation:  I will think about my triggers for smoking, I will think about reasons why I should quit smoking, and I will learn more about the harmful effects of tobacco.  Barriers: impairment:  physical: nicotine dependence and fear of failure  Plan for overcoming my barriers: engage in Care Coordination  Confidence: 10/10  Anticipated Goal Completion Date: 10.26.22              Prior to Admission medications    Medication Sig Start Date End Date Taking?  Authorizing Provider   albuterol sulfate HFA (PROVENTIL;VENTOLIN;PROAIR) 108 (90 Base) MCG/ACT inhaler Inhale 2 puffs into the lungs every 6 hours as needed for Wheezing 10/20/22   Chad Solomon MD   omeprazole (PRILOSEC) 40 MG delayed release capsule TAKE 1 CAPSULE BY MOUTH DAILY 10/4/22   Justin Dinh DO   Evolocumab 140 MG/ML SOAJ Inject 140 mg into the skin every 14 days Take an injection every 14 days. Historical Provider, MD   fluticasone-umeclidin-vilant (TRELEGY ELLIPTA) 100-62.5-25 MCG/INH AEPB Inhale 1 puff into the lungs in the morning. 8/2/22   Zollie Flaquita, DO   potassium chloride (MICRO-K) 10 MEQ extended release capsule 1 tablet 8/2/22   Zollie Flaquita, DO   furosemide (LASIX) 40 MG tablet Take 1 tablet by mouth in the morning. 8/2/22   Zollie Flaquita, DO   metoprolol tartrate (LOPRESSOR) 50 MG tablet Take 1.5 tablets by mouth in the morning and 1.5 tablets before bedtime. 8/2/22   Bart Harman, APRN - CNP   apixaban (ELIQUIS) 5 MG TABS tablet Take 1 tablet by mouth in the morning and 1 tablet before bedtime. 7/29/22   Mark Dudley, DO   losartan (COZAAR) 100 MG tablet Take 1 tablet by mouth in the morning. 7/30/22   Ottoniel Dudley, DO   atorvastatin (LIPITOR) 80 MG tablet Take 1 tablet by mouth daily 6/20/22   Zoljanee Sams, DO   clopidogrel (PLAVIX) 75 MG tablet Take 1 tablet by mouth daily 6/20/22   Zoljanee Sams, DO   pregabalin (LYRICA) 150 MG capsule Take 1 capsule by mouth 2 times daily for 30 days. 6/20/22 10/18/22  Zoljanee Sams, DO   allopurinol (ZYLOPRIM) 100 MG tablet Take 1 tablet by mouth daily 6/14/22   Zoljanee Sams, DO   folic acid (FOLVITE) 1 MG tablet Take 1 tablet by mouth daily 9/21/21 7/29/22  Sandra Wills MD   nitroGLYCERIN (NITROSTAT) 0.4 MG SL tablet Place 1 tablet under the tongue every 5 minutes as needed for Chest pain  Patient not taking: No sig reported 8/25/21 7/29/22  Zoljanee Sams, DO   magnesium gluconate (MAGONATE) 500 MG tablet Take 500 mg by mouth daily.   7/29/22  Historical Provider, MD       Future Appointments   Date Time Provider Orlando Colmenares   10/24/2022  1:30 PM Adriana Evans MD [address] [place] [address]   10/26/2022 12:40 PM Hakeem Duncan MD [facility]   12/14/2022  9:45 AM DO JEFF Reese [place] - DYD   12/19/2022 11:00 AM DO JEFF Reese [address]

## 2022-10-24 ENCOUNTER — HOSPITAL ENCOUNTER (OUTPATIENT)
Dept: WOUND CARE | Age: 72
Discharge: HOME | End: 2022-10-24
Payer: MEDICARE

## 2022-10-24 VITALS
HEART RATE: 68 BPM | DIASTOLIC BLOOD PRESSURE: 67 MMHG | RESPIRATION RATE: 18 BRPM | SYSTOLIC BLOOD PRESSURE: 121 MMHG | TEMPERATURE: 97.7 F

## 2022-10-24 DIAGNOSIS — L97.912 NON-PRESSURE ULCER OF RIGHT LOWER EXTREMITY WITH FAT LAYER EXPOSED (HCC): Primary | ICD-10-CM

## 2022-10-24 DIAGNOSIS — S81.801D OPEN WOUND OF RIGHT LOWER LEG, SUBSEQUENT ENCOUNTER: ICD-10-CM

## 2022-10-24 DIAGNOSIS — I73.9 PVD (PERIPHERAL VASCULAR DISEASE) (HCC): ICD-10-CM

## 2022-10-24 DIAGNOSIS — I70.221 ATHEROSCLEROSIS OF NATIVE ARTERY OF RIGHT LOWER EXTREMITY WITH REST PAIN (HCC): ICD-10-CM

## 2022-10-24 PROCEDURE — 11042 DBRDMT SUBQ TIS 1ST 20SQCM/<: CPT

## 2022-10-24 PROCEDURE — 29581 APPL MULTLAYER CMPRN SYS LEG: CPT

## 2022-10-24 PROCEDURE — 11042 DBRDMT SUBQ TIS 1ST 20SQCM/<: CPT | Performed by: SPECIALIST

## 2022-10-24 PROCEDURE — 6370000000 HC RX 637 (ALT 250 FOR IP): Performed by: SPECIALIST

## 2022-10-24 RX ORDER — LIDOCAINE HYDROCHLORIDE 20 MG/ML
JELLY TOPICAL ONCE
Status: CANCELLED | OUTPATIENT
Start: 2022-10-24 | End: 2022-10-24

## 2022-10-24 RX ORDER — BETAMETHASONE DIPROPIONATE 0.05 %
OINTMENT (GRAM) TOPICAL ONCE
Status: CANCELLED | OUTPATIENT
Start: 2022-10-24 | End: 2022-10-24

## 2022-10-24 RX ORDER — BACITRACIN ZINC AND POLYMYXIN B SULFATE 500; 1000 [USP'U]/G; [USP'U]/G
OINTMENT TOPICAL ONCE
Status: CANCELLED | OUTPATIENT
Start: 2022-10-24 | End: 2022-10-24

## 2022-10-24 RX ORDER — LIDOCAINE 50 MG/G
OINTMENT TOPICAL ONCE
Status: CANCELLED | OUTPATIENT
Start: 2022-10-24 | End: 2022-10-24

## 2022-10-24 RX ORDER — CLOBETASOL PROPIONATE 0.5 MG/G
OINTMENT TOPICAL ONCE
Status: CANCELLED | OUTPATIENT
Start: 2022-10-24 | End: 2022-10-24

## 2022-10-24 RX ORDER — GINSENG 100 MG
CAPSULE ORAL ONCE
Status: CANCELLED | OUTPATIENT
Start: 2022-10-24 | End: 2022-10-24

## 2022-10-24 RX ORDER — BACITRACIN, NEOMYCIN, POLYMYXIN B 400; 3.5; 5 [USP'U]/G; MG/G; [USP'U]/G
OINTMENT TOPICAL ONCE
Status: CANCELLED | OUTPATIENT
Start: 2022-10-24 | End: 2022-10-24

## 2022-10-24 RX ORDER — LIDOCAINE 40 MG/G
CREAM TOPICAL ONCE
Status: CANCELLED | OUTPATIENT
Start: 2022-10-24 | End: 2022-10-24

## 2022-10-24 RX ORDER — LIDOCAINE HYDROCHLORIDE 40 MG/ML
SOLUTION TOPICAL ONCE
Status: CANCELLED | OUTPATIENT
Start: 2022-10-24 | End: 2022-10-24

## 2022-10-24 RX ORDER — LIDOCAINE HYDROCHLORIDE 40 MG/ML
SOLUTION TOPICAL ONCE
Status: COMPLETED | OUTPATIENT
Start: 2022-10-24 | End: 2022-10-24

## 2022-10-24 RX ORDER — GENTAMICIN SULFATE 1 MG/G
OINTMENT TOPICAL ONCE
Status: CANCELLED | OUTPATIENT
Start: 2022-10-24 | End: 2022-10-24

## 2022-10-24 RX ADMIN — LIDOCAINE HYDROCHLORIDE: 40 SOLUTION TOPICAL at 08:49

## 2022-10-24 ASSESSMENT — PAIN SCALES - GENERAL: PAINLEVEL_OUTOF10: 9

## 2022-10-24 ASSESSMENT — PAIN DESCRIPTION - ONSET: ONSET: ON-GOING

## 2022-10-24 ASSESSMENT — PAIN DESCRIPTION - LOCATION: LOCATION: LEG

## 2022-10-24 ASSESSMENT — PAIN DESCRIPTION - DESCRIPTORS: DESCRIPTORS: BURNING;SHOOTING

## 2022-10-24 ASSESSMENT — PAIN DESCRIPTION - FREQUENCY: FREQUENCY: CONTINUOUS

## 2022-10-24 ASSESSMENT — PAIN DESCRIPTION - ORIENTATION: ORIENTATION: RIGHT

## 2022-10-24 ASSESSMENT — PAIN DESCRIPTION - PAIN TYPE: TYPE: CHRONIC PAIN

## 2022-10-24 NOTE — DISCHARGE INSTRUCTIONS
[facility] Physician Orders and Discharge Instructions  [address] [address] [address] [address] [facility]. [place]. [place]  Telephone: [phone]  NAME:  Radha Wallace. YOB: 1950  MEDICAL RECORD NUMBER:  [id number]  DATE: 10/24/22     Return Appointment:  Return Appointment: With Dr. Smiley Young  in  [facility])  [] Return Appointment for a Wound Assessment with the nurse on:     Future Appointments   Date Time Provider Orlando Colmenares   10/26/2022 12:40 PM Ken Solorzano MD [facility] P/CC MMA   12/14/2022  9:45 AM DO JEFF Paula [place] - DYD   12/19/2022 11:00 AM Nisa Beckett DO [address]: [facility] [phone]   F: [phone]   Medically necessary services: [x] Skilled Nursing [] PT (Eval & Treat) [] OT (Eval & Treat) [] Social Work [] Dietician  [] Other:    Wound care instructions: If you smoke we ask that you refrain from smoking. Smoking inhibits wounds from healing. When taking antibiotics take the entire prescription as ordered. Do not stop taking until medication is all gone unless otherwise instructed. Exercise as tolerated. Keep weight off wounds and reposition every 2 hours if applicable. Do not get wounds wet in the bath or shower unless otherwise instructed by your physician. If your wound is on your foot or leg, you may purchase a cast bag. Please ask at the pharmacy. Wash hands with soap and water prior to and after every dressing change. [x]Wash wounds with: 0.9% normal saline  [x]Catrachita wound Topical Treatments: Do not apply lotions, creams, or ointments to the skin around the wound bed unless directed as followed:   Apply around the wound: Nothing         [x]Wound Location: right proximal and distal lower leg wound   Apply Primary Dressing to wound: Oly  Secondary Dressing: 4X4 gauze pad   Avoid contact of tape with skin if possible.   When to change Dressing: DO NOT CHANGE    [x]Wound Location: left medial foot   Apply Primary Dressing to wound: Oly  Secondary Dressing: 4X4 gauze pad and Conforming roll gauze   Avoid contact of tape with skin if possible. When to change Dressing: 3 times per week:Monday/Wednesday/Friday- home care to change Wednesday and friday      [x] Multilayer Compression Wrap:  Type: Applied on Right lower leg(s)  2 Layer Lite Compression Wrap    Do not get leg(s) with wrap wet. If wraps become too tight call the center or completely remove the wrap. Elevate leg(s) above the level of the heart when sitting. Avoid prolonged standing in one place. Applied in Clinic on 10/24/2022  The Goal of this therapy is to reduce edema and get into long term compression garments to control venous insufficiency, lymphedema and reduce occurrence of venous ulcers    [x] Edema Control:  Apply: Spandagrip/Medigrip on Left; Medium compression 10-20 mm/Hg. They should be applied to affected leg(s) from mid foot to knee making sure to cover the heel      Elevate leg(s) above the level of the heart for 30 minutes 4-5 times a day and/or when sitting. Avoid prolonged standing in one place. [x]Off Loading(Pressure Reduction) When: Walking  Weight Bearing Status: No Weight bearing restrictions Left; Offloading devices: Post op shoe/ Surgical shoe: Left      Dietary:  Important dietary reminders:  1. Increase Protein intake (i.e. Lean meats, fish, eggs, legumes, and yogurt)  2. No added salt  3. If diabetic, follow a diabetic diet and check glucose prior to meals or as instructed by your physician.     Dietary Supplements(Take twice a day unless instructed otherwise):  [] Migdalia Reasoner  [] 30ml ProStat [] Dallis Hands [] Ensure Max/Premier [] Other:    Your nurse  is:  Phares Snellen     Electronically signed by Angel Harris RN on 10/24/2022 at [address] Information: Should you experience any significant changes in your wound(s) or have questions about your wound care, please contact the [address] [place] [address] at [phone]. We are open from 8:00am - 4:30p Monday, Thursday and Friday; 11:00am - 5pm on Tuesday and CLOSED Wednesday. Please give us 24-48 business hours to return your call. Call your doctor now or seek immediate medical care if:    You have symptoms of infection, such as: Increased pain, swelling, warmth, or redness. Red streaks leading from the area. Pus draining from the area. A fever.          [] Patient unable to sign Discharge Instructions given to ECF/Transportation/POA

## 2022-10-24 NOTE — PROGRESS NOTES
Multilayer Compression Wrap   (Not Unna) Below the Knee    NAME:  Ron Baird. YOB: 1950  MEDICAL RECORD NUMBER:  [id number]  DATE:  10/24/2022       Removed old Multilayer wrap if present and washed leg with mild soap/water. Applied moisturizing agent to dry skin as needed. Applied primary and secondary dressing as ordered    Applied multilayered dressing below the knee to Right lower leg(s)  (2 Layer Lite compression wrap ) . Instructed patient/caregiver not to remove dressing and to keep it clean and dry. Instructed patient/caregiver on complications to report to provider, such as pain, numbness in toes, heavy drainage, and slippage of dressing. Instructed patient on purpose of compression dressing and on activity and exercise recommendations.    Applied per   Guidelines    Electronically signed by Jeff Jackson RN on 10/24/2022 at 9:28 AM

## 2022-10-24 NOTE — PROGRESS NOTES
[facility]  Progress Note and Procedure Note      Shey Parks. MEDICAL RECORD NUMBER:  [id number]  AGE: 81 y.o. GENDER: male  : 1950  EPISODE DATE:  10/24/2022    Subjective:     Chief Complaint   Patient presents with    Wound Check         HISTORY of PRESENT ILLNESS HPI     Shey Jason is a 81 y.o. male who presents today for wound/ulcer evaluation. History of Wound Context: Patient of Dr. Jose Manuel Ramires being followed in wound care following revascularization of the right lower extremity with arterial ulcer right knee. Patient also developed wound overlying left foot from improper shoe. Is presently wearing a surgical shoe left foot. Missed his appointment last week. He is now 2 weeks post TheraSkin allograft application to right lower extremity wound. He was scheduled to see Dr. Jose Manuel Ramires today.   Recently seen in the emergency room for shortness of breath  Wound/Ulcer Pain Timing/Severity: none  Quality of pain: N/A  Severity:  0 / 10   Modifying Factors: None  Associated Signs/Symptoms: none    Ulcer Identification:  Ulcer Type: arterial and pressure    Contributing Factors: edema, venous stasis, chronic pressure, decreased mobility, smoking, and arterial insufficiency    Acute Wound: N/A not an acute wound    PAST MEDICAL HISTORY        Diagnosis Date    CAD (coronary artery disease)     CHF (congestive heart failure) ([facility])     diastolic    COPD (chronic obstructive pulmonary disease) ([facility])     Critical ischemia of lower extremity (HCC)     Depressive disorder, not elsewhere classified     GERD (gastroesophageal reflux disease)     History of colon polyps - 30 seen on colonoscopy 2021    History of MI (myocardial infarction) 2013    History of skin ulcer of lower extremity     R anterior shin    History of transient ischemic attack (TIA)     Hx of blood clots     PE    Hyperlipidemia     Hypertension     Neuropathy     KAVITA (obstructive sleep 2016    Dr. Cat Older Left 2015    Dr. Terrance Coles - CFA exploration, thrombectomy of ileofemoral system, stenting of RAFAL in-stent stenosis w/8 x 37 mm Palmaz        FAMILY HISTORY    Family History   Problem Relation Age of Onset    Cancer Mother         Breast    Heart Disease Mother     Cancer Father         Bladder    Heart Disease Father     Cancer Paternal Grandmother         melanoma        SOCIAL HISTORY    Social History     Tobacco Use    Smoking status: Former     Packs/day: 0.25     Years: 52.00     Pack years: 13.00     Types: Cigarettes     Start date: 1967     Quit date: 2022     Years since quittin.3    Smokeless tobacco: Never    Tobacco comments:     reports he quit circa 22   Vaping Use    Vaping Use: Never used   Substance Use Topics    Alcohol use: Yes     Alcohol/week: 0.0 standard drinks     Comment: 2-3 beers a month    Drug use: No       ALLERGIES    Allergies   Allergen Reactions    Asa [Aspirin] Other (See Comments)     Stomach ache    Daliresp [Roflumilast] Diarrhea and Other (See Comments)     Severe diarrhea and did not help       MEDICATIONS    Current Outpatient Medications on File Prior to Encounter   Medication Sig Dispense Refill    albuterol sulfate HFA (PROVENTIL;VENTOLIN;PROAIR) 108 (90 Base) MCG/ACT inhaler Inhale 2 puffs into the lungs every 6 hours as needed for Wheezing 18 g 2    omeprazole (PRILOSEC) 40 MG delayed release capsule TAKE 1 CAPSULE BY MOUTH DAILY 30 capsule 3    Evolocumab 140 MG/ML SOAJ Inject 140 mg into the skin every 14 days Take an injection every 14 days. fluticasone-umeclidin-vilant (TRELEGY ELLIPTA) 100-62.5-25 MCG/INH AEPB Inhale 1 puff into the lungs in the morning. 28 each 11    potassium chloride (MICRO-K) 10 MEQ extended release capsule 1 tablet 60 capsule 1    furosemide (LASIX) 40 MG tablet Take 1 tablet by mouth in the morning.  90 tablet 3    metoprolol tartrate (LOPRESSOR) 50 MG tablet Take 1.5 tablets by mouth in the morning and 1.5 tablets before bedtime. 90 tablet 5    apixaban (ELIQUIS) 5 MG TABS tablet Take 1 tablet by mouth in the morning and 1 tablet before bedtime. 60 tablet 1    losartan (COZAAR) 100 MG tablet Take 1 tablet by mouth in the morning. 30 tablet 3    atorvastatin (LIPITOR) 80 MG tablet Take 1 tablet by mouth daily 90 tablet 1    clopidogrel (PLAVIX) 75 MG tablet Take 1 tablet by mouth daily 30 tablet 3    pregabalin (LYRICA) 150 MG capsule Take 1 capsule by mouth 2 times daily for 30 days. 180 capsule 1    allopurinol (ZYLOPRIM) 100 MG tablet Take 1 tablet by mouth daily 180 tablet 1    [DISCONTINUED] folic acid (FOLVITE) 1 MG tablet Take 1 tablet by mouth daily 30 tablet 3    [DISCONTINUED] nitroGLYCERIN (NITROSTAT) 0.4 MG SL tablet Place 1 tablet under the tongue every 5 minutes as needed for Chest pain (Patient not taking: No sig reported) 25 tablet 1    [DISCONTINUED] magnesium gluconate (MAGONATE) 500 MG tablet Take 500 mg by mouth daily. No current facility-administered medications on file prior to encounter. REVIEW OF SYSTEMS  Review of Systems    Pertinent items are noted in HPI. Objective:      /67   Pulse 68   Temp 97.7 °F (36.5 °C) (Temporal)   Resp 18     Wt Readings from Last 3 Encounters:   10/20/22 237 lb (107.5 kg)   08/30/22 225 lb 6.4 oz (102.2 kg)   08/30/22 216 lb (98 kg)       PHYSICAL EXAM    General Appearance: in no acute distress  Extremities: no cyanosis, no clubbing, minimal + edema-  right lower extremity, and 1+ edema left foot and ankle. Wound #1 right anterior knee with residual TheraSkin allograft and fibrin. Presence of granulation tissue and epithelialization along the margins of the wound. Periwound is dry. Wound #3 proximal right knee at distal margin of old surgical incision containing fibrin and granulation tissue. Periwound is dry.   Full-thickness wound plantar left great toe with fibrin minimal granulation tissue designated as wound #2. Periwound hyperkeratotic skin    Assessment:      1. Non-pressure ulcer of right lower extremity with fat layer exposed ([address]    2. Open wound of right lower leg, subsequent encounter    3. Atherosclerosis of native artery of right lower extremity with rest pain ([address]    4. PVD (peripheral vascular disease) ([address]         Procedure Note  Indications:  Based on my examination of this patient's wound(s) today, sharp excision is required to promote healing and evaluate the extent healing. Performed by: Kath Dent MD    Consent obtained? Yes    Time out taken: Yes    Pain Control: Anesthetic: 4% Lidocaine Liquid Topical     Debridement:Excisional Debridement    Using curette the wound was sharply debrided    down through and including the removal of  epidermis, dermis, and subcutaneous tissue.     Devitalized Tissue Debrided:  fibrin and callus      Pre Debridement Measurements:  Are located in the Wound Documentation Flow Sheet   Wound #: 1, 2, and 3     Post  Debridement Measurements:  Wound 11/15/21 Leg Distal;Right #1 (Active)   Wound Image   10/03/22 0840   Wound Etiology Arterial 08/22/22 1433   Wound Cleansed Cleansed with saline 10/24/22 0834   Dressing/Treatment Collagen with Ag 10/24/22 0908   Wound Length (cm) 4.5 cm 10/24/22 0834   Wound Width (cm) 1.2 cm 10/24/22 0834   Wound Depth (cm) 0.1 cm 10/24/22 0834   Wound Surface Area (cm^2) 5.4 cm^2 10/24/22 0834   Change in Wound Size % (l*w) -125 10/24/22 0834   Wound Volume (cm^3) 0.54 cm^3 10/24/22 0834   Wound Healing % -125 10/24/22 0834   Post-Procedure Length (cm) 4.6 cm 10/24/22 0908   Post-Procedure Width (cm) 1.3 cm 10/24/22 0908   Post-Procedure Depth (cm) 0.1 cm 10/24/22 0908   Post-Procedure Surface Area (cm^2) 5.98 cm^2 10/24/22 0908   Post-Procedure Volume (cm^3) 0.598 cm^3 10/24/22 0908   Distance Tunneling (cm) 0 cm 10/24/22 0834   Tunneling Position ___ O'Clock 0 10/18/22 1303   Undermining Starts ___ O'Clock 0 10/18/22 1303   Undermining Ends___ O'Clock 0 10/18/22 1303   Undermining Maxium Distance (cm) 0 10/24/22 0834   Wound Assessment Devitalized tissue;Fibrin;Pink/red;Slough 10/24/22 0834   Drainage Amount Small 10/24/22 0834   Drainage Description Yellow;Brown 10/24/22 0834   Odor Mild 10/24/22 0834   Catrachita-wound Assessment Dry/flaky 10/24/22 0834   Margins Defined edges 10/24/22 0834   Wound Thickness Description not for Pressure Injury Full thickness 10/24/22 0834   Number of days: 342       Wound 09/12/22 Foot Plantar;Left #2 great toe (Active)   Wound Image   10/03/22 0840   Wound Etiology Traumatic 09/19/22 0932   Wound Cleansed Cleansed with saline 10/24/22 0834   Dressing/Treatment Collagen with Ag 10/24/22 0908   Wound Length (cm) 1.7 cm 10/24/22 0834   Wound Width (cm) 0.7 cm 10/24/22 0834   Wound Depth (cm) 0.1 cm 10/24/22 0834   Wound Surface Area (cm^2) 1.19 cm^2 10/24/22 0834   Change in Wound Size % (l*w) 60.33 10/24/22 0834   Wound Volume (cm^3) 0.119 cm^3 10/24/22 0834   Wound Healing % 60 10/24/22 0834   Post-Procedure Length (cm) 1.8 cm 10/24/22 0908   Post-Procedure Width (cm) 0.8 cm 10/24/22 0908   Post-Procedure Depth (cm) 0.3 cm 10/24/22 0908   Post-Procedure Surface Area (cm^2) 1.44 cm^2 10/24/22 0908   Post-Procedure Volume (cm^3) 0.432 cm^3 10/24/22 0908   Distance Tunneling (cm) 0 cm 10/24/22 0834   Tunneling Position ___ O'Clock 0 10/18/22 1303   Undermining Starts ___ O'Clock 0 10/18/22 1303   Undermining Ends___ O'Clock 0 10/18/22 1303   Undermining Maxium Distance (cm) 0 10/24/22 0834   Wound Assessment Fibrin;Slough 10/24/22 0834   Drainage Amount Small 10/24/22 0834   Drainage Description Brown 10/24/22 0834   Odor Mild 10/24/22 0834   Catrachita-wound Assessment Maceration 10/24/22 0834   Margins Defined edges 10/24/22 0834   Wound Thickness Description not for Pressure Injury Full thickness 10/24/22 0834   Number of days: 42       Wound 09/19/22 Leg Lower;Proximal;Right #3 (Active)   Wound Image   10/03/22 0840   Wound Etiology Traumatic 09/19/22 0932   Wound Cleansed Cleansed with saline 10/24/22 0834   Dressing/Treatment Collagen with Ag 10/24/22 0908   Wound Length (cm) 1.1 cm 10/24/22 0834   Wound Width (cm) 0.7 cm 10/24/22 0834   Wound Depth (cm) 0.1 cm 10/24/22 0834   Wound Surface Area (cm^2) 0.77 cm^2 10/24/22 0834   Change in Wound Size % (l*w) 35.83 10/24/22 0834   Wound Volume (cm^3) 0.077 cm^3 10/24/22 0834   Wound Healing % 36 10/24/22 0834   Post-Procedure Length (cm) 1.2 cm 10/24/22 0908   Post-Procedure Width (cm) 0.8 cm 10/24/22 0908   Post-Procedure Depth (cm) 0.3 cm 10/24/22 0908   Post-Procedure Surface Area (cm^2) 0.96 cm^2 10/24/22 0908   Post-Procedure Volume (cm^3) 0.288 cm^3 10/24/22 0908   Distance Tunneling (cm) 0 cm 10/24/22 0834   Tunneling Position ___ O'Clock 0 10/18/22 1303   Undermining Starts ___ O'Clock 0 10/18/22 1303   Undermining Ends___ O'Clock 0 10/18/22 1303   Undermining Maxium Distance (cm) 0 10/24/22 0834   Wound Assessment Devitalized tissue;Pink/red;Slough 10/24/22 0834   Drainage Amount Small 10/24/22 0834   Drainage Description Brown;Yellow 10/24/22 0834   Odor None 10/24/22 0834   Catrachita-wound Assessment Intact 10/24/22 0834   Margins Defined edges 10/24/22 0834   Wound Thickness Description not for Pressure Injury Full thickness 10/24/22 0834   Number of days: 35          Percent of Wound Debrided: 100%    Total Surface Area Debrided:  8 sq cm    Diabetic/Pressure/Non Pressure Ulcers only:  Ulcer: Non-Pressure ulcer, fat layer exposed    Bleeding: Minimal    Hemostasis Achieved: by pressure    Procedural Pain: 0  / 10     Post Procedural Pain: 0 / 10     Response to treatment:  Well tolerated by patient. All wounds show slight improvement. Will continue to wrap right lower extremity. Again have instructed patient to make arrangements to see Dr. Wilmer Tamayo:     Treatment Note: Please see attached Discharge Instructions. These instructions were given and signed by the patient or POA    New Prescriptions    No medications on file       Orders Placed This Encounter   Procedures    Initiate Outpatient Wound Care Protocol       Discharge Instructions          [facility] Physician Orders and Discharge Instructions  [address] [address] [address] [address] [facility]. [place]. [place]  Telephone: [phone]  NAME:  Namrata Arnold. YOB: 1950  MEDICAL RECORD NUMBER:  [id number]  DATE: 10/24/22     Return Appointment:  Return Appointment: With Dr. Jeanette Koehler  in  [facility])  [] Return Appointment for a Wound Assessment with the nurse on:     Future Appointments   Date Time Provider Orlando Colmenares   10/26/2022 12:40 PM Abebe Sarmiento MD [facility] P/CC MMA   12/14/2022  9:45 AM Juvenal Aguilar DO [facility] - DYD   12/19/2022 11:00 AM Juvenal Aguilar DO [address]: [facility] [phone]   F: [phone]   Medically necessary services: [x] Skilled Nursing [] PT (Eval & Treat) [] OT (Eval & Treat) [] Social Work [] Dietician  [] Other:    Wound care instructions: If you smoke we ask that you refrain from smoking. Smoking inhibits wounds from healing. When taking antibiotics take the entire prescription as ordered. Do not stop taking until medication is all gone unless otherwise instructed. Exercise as tolerated. Keep weight off wounds and reposition every 2 hours if applicable. Do not get wounds wet in the bath or shower unless otherwise instructed by your physician. If your wound is on your foot or leg, you may purchase a cast bag. Please ask at the pharmacy. Wash hands with soap and water prior to and after every dressing change.     [x]Wash wounds with: 0.9% normal saline  [x]Catrachita wound Topical Treatments: Do not apply lotions, creams, or ointments to the skin around the wound bed unless directed as followed:   Apply around the wound: Nothing         [x]Wound Location: right proximal and distal lower leg wound   Apply Primary Dressing to wound: Oly  Secondary Dressing: 4X4 gauze pad   Avoid contact of tape with skin if possible. When to change Dressing: DO NOT CHANGE    [x]Wound Location: left medial foot   Apply Primary Dressing to wound: Oly  Secondary Dressing: 4X4 gauze pad and Conforming roll gauze   Avoid contact of tape with skin if possible. When to change Dressing: 3 times per week:Monday/Wednesday/Friday- home care to change Wednesday and friday      [x] Multilayer Compression Wrap:  Type: Applied on Right lower leg(s)  2 Layer Lite Compression Wrap    Do not get leg(s) with wrap wet. If wraps become too tight call the center or completely remove the wrap. Elevate leg(s) above the level of the heart when sitting. Avoid prolonged standing in one place. Applied in Clinic on 10/24/2022  The Goal of this therapy is to reduce edema and get into long term compression garments to control venous insufficiency, lymphedema and reduce occurrence of venous ulcers    [x] Edema Control:  Apply: Spandagrip/Medigrip on Left; Medium compression 10-20 mm/Hg. They should be applied to affected leg(s) from mid foot to knee making sure to cover the heel      Elevate leg(s) above the level of the heart for 30 minutes 4-5 times a day and/or when sitting. Avoid prolonged standing in one place. [x]Off Loading(Pressure Reduction) When: Walking  Weight Bearing Status: No Weight bearing restrictions Left; Offloading devices: Post op shoe/ Surgical shoe: Left      Dietary:  Important dietary reminders:  1. Increase Protein intake (i.e. Lean meats, fish, eggs, legumes, and yogurt)  2. No added salt  3. If diabetic, follow a diabetic diet and check glucose prior to meals or as instructed by your physician.     Dietary Supplements(Take twice a day unless instructed otherwise):  [] Real  [] 30ml ProStat [] EnsureEnlive [] Ensure Max/Premier [] Other:    Your nurse  is:  Aretta Shone     Electronically signed by Daylin Ku RN on 10/24/2022 at [address]: Should you experience any significant changes in your wound(s) or have questions about your wound care, please contact the [address] [place] [address] at [phone]. We are open from 8:00am - 4:30p Monday, Thursday and Friday; 11:00am - 5pm on Tuesday and CLOSED Wednesday. Please give us 24-48 business hours to return your call. Call your doctor now or seek immediate medical care if:    You have symptoms of infection, such as: Increased pain, swelling, warmth, or redness. Red streaks leading from the area. Pus draining from the area. A fever.          [] Patient unable to sign Discharge Instructions given to ECF/Transportation/POA         Electronically signed by Tiff Mariscal MD on 10/24/2022 at 9:49 AM

## 2022-10-26 ENCOUNTER — OFFICE VISIT (OUTPATIENT)
Dept: PULMONOLOGY | Age: 72
End: 2022-10-26
Payer: MEDICAID

## 2022-10-26 VITALS
OXYGEN SATURATION: 95 % | WEIGHT: 235 LBS | HEART RATE: 62 BPM | DIASTOLIC BLOOD PRESSURE: 75 MMHG | HEIGHT: 70 IN | SYSTOLIC BLOOD PRESSURE: 154 MMHG | BODY MASS INDEX: 33.64 KG/M2

## 2022-10-26 DIAGNOSIS — R06.02 SOB (SHORTNESS OF BREATH): ICD-10-CM

## 2022-10-26 DIAGNOSIS — G47.33 OSA ON CPAP: Primary | ICD-10-CM

## 2022-10-26 DIAGNOSIS — Z99.89 OSA ON CPAP: Primary | ICD-10-CM

## 2022-10-26 DIAGNOSIS — J44.9 CHRONIC OBSTRUCTIVE PULMONARY DISEASE, UNSPECIFIED COPD TYPE (HCC): ICD-10-CM

## 2022-10-26 PROCEDURE — 3074F SYST BP LT 130 MM HG: CPT | Performed by: INTERNAL MEDICINE

## 2022-10-26 PROCEDURE — 1123F ACP DISCUSS/DSCN MKR DOCD: CPT | Performed by: INTERNAL MEDICINE

## 2022-10-26 PROCEDURE — 99214 OFFICE O/P EST MOD 30 MIN: CPT | Performed by: INTERNAL MEDICINE

## 2022-10-26 PROCEDURE — 3078F DIAST BP <80 MM HG: CPT | Performed by: INTERNAL MEDICINE

## 2022-10-26 PROCEDURE — 90694 VACC AIIV4 NO PRSRV 0.5ML IM: CPT | Performed by: INTERNAL MEDICINE

## 2022-10-26 PROCEDURE — 90471 IMMUNIZATION ADMIN: CPT | Performed by: INTERNAL MEDICINE

## 2022-10-26 RX ORDER — DULOXETIN HYDROCHLORIDE 20 MG/1
CAPSULE, DELAYED RELEASE ORAL
COMMUNITY
Start: 2022-10-26

## 2022-10-26 RX ORDER — IPRATROPIUM BROMIDE AND ALBUTEROL SULFATE 2.5; .5 MG/3ML; MG/3ML
1 SOLUTION RESPIRATORY (INHALATION) EVERY 4 HOURS PRN
Qty: 360 ML | Refills: 4 | Status: SHIPPED | OUTPATIENT
Start: 2022-10-26

## 2022-10-26 NOTE — PROGRESS NOTES
[facility]monary and Critical Care    Outpatient Follow Up Note    Subjective:   CHIEF COMPLAINT / HPI:     The patient is 81 y.o. male who presents today for follow up of COPD, CHF and KAVITA. Almas Stark comes in today complaining that he still has some shortness of breath with exertion but mostly that he has nonhealing wounds on his legs and pain. He had his last bypass surgery in July and he still has not healed up fully from that. He tries to be compliant with his CPAP and oxygen but he has to try to wear them both at the same time as opposed to having oxygen go through the CPAP mask. Often says wakes up feeling short of breath and hot so he has to get up and go walk of a colder air outside. Interval history:  Mr. Shabnam Rowland has had a difficult past few months. He had his surgery to relieve pain in his leg and improved blood flow but it was complicated by a pulmonary embolism and gangrene at the wound site. He does not currently appear to be infected but he is still having pain in his right lower extremity which he says is as bad if not worse than it was prior to the surgery. He does have better blood flow to the leg however. His inhalers were switched from the combination of Breo and Spiriva to Trelegy which she says seems to be doing the job as well. He has not been wearing his CPAP for the past several months because instead he wears oxygen at night. He has trouble sleeping but mostly because of hypersensitivity in the leg. He states that if the sheets even touch his calf and will wake him up. Interval history :  isidoro was seen in the hospital a month ago. He was admitted for leg pain and actually transferred to the ICU at one point because he had a pulmonary embolism and possible pneumonia. He was treated for both and is currently on Eliquis. He had a CT of the lungs that showed multifocal airspace disease, but it was in the region of the clots.   He also had a procalcitonin that was 0.17.  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 He is being followed actively by vascular surgery. Today he says his breathing feels normal but his leg is causing him considerable pain. He states that he cannot even lay down in bed because the pain from becoming horizontal is intense. He has to sleep in a recliner if he can sleep at all. He has significant pain in the toes on his right foot such that he cut a hole in his shoe so that they do not have to touch anything. Last visit:  Darylene Barr comes in with his granddaughter today. Says his breathing gets difficult at times. He mentioned that sometimes it happens all of a sudden when he's just sitting in a chair and then it stops. He thinks it might be his heart, because it feels like it's racing. He also had a lot of complaints about his neuropathy. He recently had to get new tubing for his auto CPAP because the tubing broke. Incruse is no longer going to be covered by his insurance. Previous visit:  Phone visit today because of the pandemic and Inder's risk factors. Darylene Barr notes that he has shortness of breath and cough which are a bit worse than baseline. Darylene Barr continues to roll his own cigarettes and smoke. He gets his supplies from the \"[employer]\" down the street. It is a convenient store that is owned by [employer]. He reports his weight is 223 pounds that he has some swelling by the end of the day in his ankles but it is gone by morning. He is compliant with his Breo and Incruse as well as his new auto titrating CPAP. He rarely uses his albuterol inhaler and in reviewing his med list the prescription has . Previous visit:  Darylene Barr comes in today complaining of being both sleepy and tired. He states that his CPAP \"shorted out\" and melted the water reservoir so he had to throw it out.   He's using his breo and incruse that cost $8.50/month. He's still smoking. He also said he needed a refill on his nitroglycerin since what he has left is . Previous visit:  Narcisa Anton comes in today saying he feels about the same as usual.  He did point out he had to be admitted to the hospital in August for heart failure. He still smokes about 4-5 cigarettes a day. He is using his CPAP of 9 but it's difficult to sleep. He is currently on breo and incruse and tolerating well. Says his breathing is about the same as always and is limiting. He's also limited by joint pain which impedes mobility and his ability to sleep. He weighed 210 lbs about a week ago and weighed in at 220 today. He's not been weighing himself at home, but did say that his left leg is swollen and tender. Previous history: Back to wearing his CPAP. Has some dyspnea at times, without a clear trigger. He had an issue with his new pharmacy, [facility], and went without symbicort for 3 weeks. He noticed a difference. His hip is recovering well and he's more mobile. He's still smoking anywhere from a half pack to a quarter of a pack per week.     Past Medical History:    Past Medical History:   Diagnosis Date    CAD (coronary artery disease)     CHF (congestive heart failure) ([facility])     diastolic    COPD (chronic obstructive pulmonary disease) ([facility])     Critical ischemia of lower extremity ([facility])     Depressive disorder, not elsewhere classified     GERD (gastroesophageal reflux disease)     History of colon polyps - 30 seen on colonoscopy 2021    History of MI (myocardial infarction) 2013    History of skin ulcer of lower extremity     R anterior shin    History of transient ischemic attack (TIA)     Hx of blood clots     PE    Hyperlipidemia     Hypertension     Neuropathy     KAVITA (obstructive sleep apnea)     On CPAP    Personal history of DVT (deep vein thrombosis)     Prolonged emergence from general anesthesia     Pt reported being combative after surgery     PVD (peripheral vascular disease) ([facility] [address]     TIA (transient ischemic attack) 2013    Vertebral fracture        Social History:    Social History     Tobacco Use   Smoking Status Former    Packs/day: 0.25    Years: 52.00    Pack years: 13.00    Types: Cigarettes    Start date: 1967    Quit date: 2022    Years since quittin.3   Smokeless Tobacco Never   Tobacco Comments    reports he quit circa 6.8.22       Current Medications:  Current Outpatient Medications on File Prior to Visit   Medication Sig Dispense Refill    albuterol sulfate HFA (PROVENTIL;VENTOLIN;PROAIR) 108 (90 Base) MCG/ACT inhaler Inhale 2 puffs into the lungs every 6 hours as needed for Wheezing 18 g 2    omeprazole (PRILOSEC) 40 MG delayed release capsule TAKE 1 CAPSULE BY MOUTH DAILY 30 capsule 3    Evolocumab 140 MG/ML SOAJ Inject 140 mg into the skin every 14 days Take an injection every 14 days. fluticasone-umeclidin-vilant (TRELEGY ELLIPTA) 100-62.5-25 MCG/INH AEPB Inhale 1 puff into the lungs in the morning. 28 each 11    potassium chloride (MICRO-K) 10 MEQ extended release capsule 1 tablet 60 capsule 1    furosemide (LASIX) 40 MG tablet Take 1 tablet by mouth in the morning. 90 tablet 3    metoprolol tartrate (LOPRESSOR) 50 MG tablet Take 1.5 tablets by mouth in the morning and 1.5 tablets before bedtime. 90 tablet 5    apixaban (ELIQUIS) 5 MG TABS tablet Take 1 tablet by mouth in the morning and 1 tablet before bedtime. 60 tablet 1    losartan (COZAAR) 100 MG tablet Take 1 tablet by mouth in the morning. 30 tablet 3    atorvastatin (LIPITOR) 80 MG tablet Take 1 tablet by mouth daily 90 tablet 1    clopidogrel (PLAVIX) 75 MG tablet Take 1 tablet by mouth daily 30 tablet 3    allopurinol (ZYLOPRIM) 100 MG tablet Take 1 tablet by mouth daily 180 tablet 1    DULoxetine (CYMBALTA) 20 MG extended release capsule       pregabalin (LYRICA) 150 MG capsule Take 1 capsule by mouth 2 times daily for 30 days.  [address] [address] capsule 1    [DISCONTINUED] folic acid (FOLVITE) 1 MG tablet Take 1 tablet by mouth daily 30 tablet 3    [DISCONTINUED] nitroGLYCERIN (NITROSTAT) 0.4 MG SL tablet Place 1 tablet under the tongue every 5 minutes as needed for Chest pain (Patient not taking: No sig reported) 25 tablet 1    [DISCONTINUED] magnesium gluconate (MAGONATE) 500 MG tablet Take 500 mg by mouth daily. No current facility-administered medications on file prior to visit. REVIEW OF SYSTEMS:    CONSTITUTIONAL: Negative for fevers and chills  HEENT: Negative for oropharyngeal exudate, post nasal drip, sinus pain / pressure, nasal congestion, ear pain  RESPIRATORY:  See HPI  CARDIOVASCULAR: Negative for palpitations. No edema  GASTROINTESTINAL: Negative for nausea, vomiting, diarrhea, constipation and abdominal pain  HEMATOLOGICAL: Negative for adenopathy  SKIN: Negative for clubbing, cyanosis, skin lesions  EXTREMITIES: Negative for weakness, decreased ROM  NEUROLOGICAL: Negative for unilateral weakness, speech or gait abnormalities  PSYCH: Negative for anxiety, depression    Objective:   PHYSICAL EXAM:        VITALS:   Vitals:    10/26/22 1233   BP: (!) 154/75   Site: Left Upper Arm   Position: Sitting   Cuff Size: Medium Adult   Pulse: 62   SpO2: 95%   Weight: 235 lb (106.6 kg)   Height: 5' 10\" (1.778 m)       CONSTITUTIONAL:  Awake, alert, cooperative, no apparent distress, and appears stated age  HEENT: No oropharyngeal exudate, PERRL, no cervical adenopathy, no tracheal deviation, thyroid size normal  LUNGS:  No increased work of breathing and clear to auscultation, no crackles or wheezing  CARDIOVASCULAR:  normal S1 and S2 and no JVD  ABDOMEN:  Normal bowel sounds, non-distended and non-tender to palpation  EXT: Marked erythema of bilateral lower extremities with diminished pulses bilaterally. NEUROLOGIC:  Mental Status Exam:  Level of Alertness:   awake  Orientation:   person, place, time.   SKIN:  normal skin color, texture, turgor, no redness, warmth, or swelling     DATA:    Last PFTs:  mild obstructive defect without  bronchodilator response and a moderately reduced diffusing capacity. Ct screenin2017  Lung RADS category 2 benign findings. Small pulmonary nodule right   lung as described unchanged. Recommendation: Continued annual screening low dose CT       Lung RADS category S significant findings-marked coronary artery   calcification which is a risk factor for acute coronary syndrome. Marked fatty filtration liver. Emphysema. 2018     Lung RADS category 2-positive. Specifically, 4 mm and 3 mm stable pulmonary nodules in the right hemithorax       Lung RADS category S-negative, no new or unknown potentially significant incidental findings requiring urgent additional evaluation       [id number] low-dose CT lung screen in one year     2020      Impression       Continued stable appearance of previous seen noted stable nodules. The previously noted new larger nodules have resolved. Cardiomegaly with coronary calcification. No acute infiltrates seen. Assessment: This is a 81 y.o. male with COPD, CHF and KAVITA    Plan:     COPD: remains short of breath despite full treatment regiment with Trelegy, symptoms are similar to when he was on divided medications. Continue Trelegy at current dose and rescue inhaler. Patient previously attempted pulmonary rehab, but he was in too much pain and stopped going. Tried and failed daliresp in the past 2/2 GI side effects    Pulmonary embolism: Completed eliquis. Lesions associated with pulmonary infarcts resolved. He is eligible for screening CTs, but just had a CT earlier this month, which was reviewed by me, and did not show any nodules or any acute changes    CHF:  Not in acute overload.       KAVITA:  AHI 34 and Controlled with auto pap, with range of 8-15 cmH2O. have surprised to hear that he is trying to wear nasal cannula with his AutoPap as opposed to putting it into his AutoPap. He says he could not find any adapter that allows for the oxygen to go directly into it. We will be filling out orders to his supplier, [facility], to assist him in getting oxygen into his device. PAD: Still with considerable pain and slow healing in the right lower extremity, also now having issues with his left lower extremity    The patient is finally quit smoking over the summer. RTC 6 months w/ MD. Call or RTC sooner if symptoms persist or worsen acutely.         Ericka Pardo

## 2022-10-28 ENCOUNTER — TELEPHONE (OUTPATIENT)
Dept: PULMONOLOGY | Age: 72
End: 2022-10-28

## 2022-10-28 DIAGNOSIS — Z99.89 OSA ON CPAP: Primary | ICD-10-CM

## 2022-10-28 DIAGNOSIS — G47.33 OSA ON CPAP: Primary | ICD-10-CM

## 2022-10-28 NOTE — TELEPHONE ENCOUNTER
Patient needs a new sleep study for insurance to cover order for Auto pap o2 bleed into pap device.   Patient has not has sleep study since 2014  He would like to go to [facility] [phone]

## 2022-10-31 ENCOUNTER — HOSPITAL ENCOUNTER (OUTPATIENT)
Dept: WOUND CARE | Age: 72
Discharge: HOME | End: 2022-10-31
Payer: MEDICARE

## 2022-10-31 ENCOUNTER — TELEPHONE (OUTPATIENT)
Dept: WOUND CARE | Age: 72
End: 2022-10-31

## 2022-10-31 VITALS
DIASTOLIC BLOOD PRESSURE: 89 MMHG | HEART RATE: 74 BPM | SYSTOLIC BLOOD PRESSURE: 152 MMHG | TEMPERATURE: 97.4 F | RESPIRATION RATE: 20 BRPM

## 2022-10-31 DIAGNOSIS — S81.801D OPEN WOUND OF RIGHT LOWER LEG, SUBSEQUENT ENCOUNTER: ICD-10-CM

## 2022-10-31 DIAGNOSIS — I70.221 ATHEROSCLEROSIS OF NATIVE ARTERY OF RIGHT LOWER EXTREMITY WITH REST PAIN (HCC): ICD-10-CM

## 2022-10-31 DIAGNOSIS — I73.9 PVD (PERIPHERAL VASCULAR DISEASE) (HCC): ICD-10-CM

## 2022-10-31 DIAGNOSIS — L97.912 NON-PRESSURE ULCER OF RIGHT LOWER EXTREMITY WITH FAT LAYER EXPOSED (HCC): Primary | ICD-10-CM

## 2022-10-31 PROCEDURE — 11042 DBRDMT SUBQ TIS 1ST 20SQCM/<: CPT

## 2022-10-31 PROCEDURE — 29581 APPL MULTLAYER CMPRN SYS LEG: CPT

## 2022-10-31 PROCEDURE — 6370000000 HC RX 637 (ALT 250 FOR IP): Performed by: SPECIALIST

## 2022-10-31 PROCEDURE — 11042 DBRDMT SUBQ TIS 1ST 20SQCM/<: CPT | Performed by: SPECIALIST

## 2022-10-31 RX ORDER — LIDOCAINE HYDROCHLORIDE 20 MG/ML
JELLY TOPICAL ONCE
Status: CANCELLED | OUTPATIENT
Start: 2022-10-31 | End: 2022-10-31

## 2022-10-31 RX ORDER — BACITRACIN ZINC AND POLYMYXIN B SULFATE 500; 1000 [USP'U]/G; [USP'U]/G
OINTMENT TOPICAL ONCE
Status: CANCELLED | OUTPATIENT
Start: 2022-10-31 | End: 2022-10-31

## 2022-10-31 RX ORDER — LIDOCAINE 50 MG/G
OINTMENT TOPICAL ONCE
Status: CANCELLED | OUTPATIENT
Start: 2022-10-31 | End: 2022-10-31

## 2022-10-31 RX ORDER — BETAMETHASONE DIPROPIONATE 0.05 %
OINTMENT (GRAM) TOPICAL ONCE
Status: CANCELLED | OUTPATIENT
Start: 2022-10-31 | End: 2022-10-31

## 2022-10-31 RX ORDER — LIDOCAINE 40 MG/G
CREAM TOPICAL ONCE
Status: CANCELLED | OUTPATIENT
Start: 2022-10-31 | End: 2022-10-31

## 2022-10-31 RX ORDER — GENTAMICIN SULFATE 1 MG/G
OINTMENT TOPICAL ONCE
Status: CANCELLED | OUTPATIENT
Start: 2022-10-31 | End: 2022-10-31

## 2022-10-31 RX ORDER — CLOBETASOL PROPIONATE 0.5 MG/G
OINTMENT TOPICAL ONCE
Status: CANCELLED | OUTPATIENT
Start: 2022-10-31 | End: 2022-10-31

## 2022-10-31 RX ORDER — LIDOCAINE HYDROCHLORIDE 40 MG/ML
SOLUTION TOPICAL ONCE
Status: COMPLETED | OUTPATIENT
Start: 2022-10-31 | End: 2022-10-31

## 2022-10-31 RX ORDER — LIDOCAINE HYDROCHLORIDE 40 MG/ML
SOLUTION TOPICAL ONCE
Status: CANCELLED | OUTPATIENT
Start: 2022-10-31 | End: 2022-10-31

## 2022-10-31 RX ORDER — BACITRACIN, NEOMYCIN, POLYMYXIN B 400; 3.5; 5 [USP'U]/G; MG/G; [USP'U]/G
OINTMENT TOPICAL ONCE
Status: CANCELLED | OUTPATIENT
Start: 2022-10-31 | End: 2022-10-31

## 2022-10-31 RX ORDER — GINSENG 100 MG
CAPSULE ORAL ONCE
Status: CANCELLED | OUTPATIENT
Start: 2022-10-31 | End: 2022-10-31

## 2022-10-31 RX ADMIN — LIDOCAINE HYDROCHLORIDE: 40 SOLUTION TOPICAL at 09:13

## 2022-10-31 ASSESSMENT — PAIN DESCRIPTION - ORIENTATION: ORIENTATION: RIGHT

## 2022-10-31 ASSESSMENT — PAIN DESCRIPTION - LOCATION: LOCATION: LEG

## 2022-10-31 ASSESSMENT — PAIN - FUNCTIONAL ASSESSMENT: PAIN_FUNCTIONAL_ASSESSMENT: PREVENTS OR INTERFERES SOME ACTIVE ACTIVITIES AND ADLS

## 2022-10-31 ASSESSMENT — PAIN SCALES - GENERAL: PAINLEVEL_OUTOF10: 9

## 2022-10-31 ASSESSMENT — PAIN DESCRIPTION - DESCRIPTORS: DESCRIPTORS: BURNING

## 2022-10-31 NOTE — PROGRESS NOTES
Multilayer Compression Wrap   (Not Unna) Below the Knee    NAME:  Amber Amezcua. YOB: 1950  MEDICAL RECORD NUMBER:  [id number]  DATE:  10/31/2022       Removed old Multilayer wrap if present and washed leg with mild soap/water. Applied moisturizing agent to dry skin as needed. Applied primary and secondary dressing as ordered    Applied multilayered dressing below the knee to Right lower leg(s)  (2 Layer compression wrap ) . Instructed patient/caregiver not to remove dressing and to keep it clean and dry. Instructed patient/caregiver on complications to report to provider, such as pain, numbness in toes, heavy drainage, and slippage of dressing. Instructed patient on purpose of compression dressing and on activity and exercise recommendations.    Applied per   Guidelines    Electronically signed by Emma Núñez RN on 10/31/2022 at 9:53 AM

## 2022-10-31 NOTE — LETTER
[facility] Physician Orders and Discharge Instructions  [address] [address] [address] [address] [facility]. [place]. [place]  Telephone: [phone]  NAME:  Blair Schultz. YOB: 1950  MEDICAL RECORD NUMBER:  [id number]  DATE: 10/31/22     Return Appointment:  Yair Castañeda Return Appointment: With Dr Stephen Cuevas  in  1 [facility])  o [] Return Appointment for a Wound Assessment with the nurse on:     Future Appointments   Date Time Provider Orlando Colmenares   12/14/2022  9:45 AM DO Wilton Haile [address]199   12/19/2022 11:00 AM DO JEFF Haile [place] - [facility]   1/16/2023  1:00 PM Luis Alberto Duncan MD [address] [place] [address] Street: [facility] [phone]   F: [phone]   Medically necessary services: [x] Skilled Nursing [] PT (Eval & Treat) [] OT (Eval & Treat) [] Social Work [] Dietician  [] Other:    Wound care instructions:  1. If you smoke we ask that you refrain from smoking. Smoking inhibits wounds from healing. 2. When taking antibiotics take the entire prescription as ordered. Do not stop taking until medication is all gone unless otherwise instructed. 3. Exercise as tolerated. 4. Keep weight off wounds and reposition every 2 hours if applicable. 5. Do not get wounds wet in the bath or shower unless otherwise instructed by your physician. If your wound is on your foot or leg, you may purchase a cast bag. Please ask at the pharmacy. 6. Wash hands with soap and water prior to and after every dressing change.     [x]Wash wounds with: 0.9% normal saline  [x]Catrachita wound Topical Treatments: Do not apply lotions, creams, or ointments to the skin around the wound bed unless directed as followed:   o Apply around the wound: Nothing         [x]Wound Location: right lower leg wounds    Apply Primary Dressing to wound: Oly   Secondary Dressing: 4X4 gauze pad, to proximal wounds cover with silicone border dressings  o  Avoid contact of tape with skin if possible.  When to change Dressin times per week: Monday and Friday- home care to change friday    [x]Wound Location: left foot wound    Apply Primary Dressing to wound: Oly   Secondary Dressing: 4X4 gauze pad and Conforming roll gauze  o  Avoid contact of tape with skin if possible.  When to change Dressing: 3 times per week:Monday/Wednesday/Friday- home care to change Wednesday and friday        [x] Multilayer Compression Wrap:  Type: Applied on Right lower leg(s)  2 Layer Compression Wrap- home care to change on Friday     Do not get leg(s) with wrap wet.  If wraps become too tight call the center or completely remove the wrap.  Elevate leg(s) above the level of the heart when sitting.  Avoid prolonged standing in one place.  Applied in Clinic on 10/31/2022   The Goal of this therapy is to reduce edema and get into long term compression garments to control venous insufficiency, lymphedema and reduce occurrence of venous ulcers    [x] Edema Control:  Apply: Medigrip on the Left; (Double medium). They should be applied to affected leg(s) from mid foot to knee making sure to cover the heel      · Elevate leg(s) above the level of the heart for 30 minutes 4-5 times a day and/or when sitting. · Avoid prolonged standing in one place. Dietary:  Important dietary reminders:  1. Increase Protein intake (i.e. Lean meats, fish, eggs, legumes, and yogurt)  2. No added salt  3. If diabetic, follow a diabetic diet and check glucose prior to meals or as instructed by your physician.     Dietary Supplements(Take twice a day unless instructed otherwise):  [] [place]  [] 30ml ProStat [] [place] Dose [] Ensure Max/Premier [] Other:    Your nurse  is:  Mahi Brower     Electronically signed by Diana Monteiro RN on 10/31/2022 at 9:[address] [address] Information: Should you experience any significant changes in your wound(s) or have questions about your wound care, please contact the [address] [place] [address] at [phone]. We are open from 8:00am - 4:30p Monday, Thursday and Friday; 11:00am - 5pm on Tuesday and CLOSED Wednesday. Please give us 24-48 business hours to return your call. Call your doctor now or seek immediate medical care if:    · You have symptoms of infection, such as:  ? Increased pain, swelling, warmth, or redness. ? Red streaks leading from the area. ? Pus draining from the area. ? A fever.          [] Patient unable to sign Discharge Instructions given to ECF/Transportation/POA

## 2022-10-31 NOTE — DISCHARGE INSTRUCTIONS
[address] [facility] Physician Orders and Discharge Instructions  [address] [address] [address] [place] [place]  Telephone: [phone]  NAME:  Sisi Gaviria. YOB: 1950  MEDICAL RECORD NUMBER:  [id number]  DATE: 10/31/22     Return Appointment:  Return Appointment: With Dr Edd Cason  in  1 [facility])  [] Return Appointment for a Wound Assessment with the nurse on:     Future Appointments   Date Time Provider Orlando Colmenares   12/14/2022  9:45 AM DO Wilton Ramachandran [address]   12/19/2022 11:00 AM DO JEFF Ramachandran  [place] - [facility]   1/16/2023  1:00 PM Stefani Blood MD [address] [place] [address] Street: [facility] [phone]   F: [phone]   Medically necessary services: [x] Skilled Nursing [] PT (Eval & Treat) [] OT (Eval & Treat) [] Social Work [] Dietician  [] Other:    Wound care instructions: If you smoke we ask that you refrain from smoking. Smoking inhibits wounds from healing. When taking antibiotics take the entire prescription as ordered. Do not stop taking until medication is all gone unless otherwise instructed. Exercise as tolerated. Keep weight off wounds and reposition every 2 hours if applicable. Do not get wounds wet in the bath or shower unless otherwise instructed by your physician. If your wound is on your foot or leg, you may purchase a cast bag. Please ask at the pharmacy. Wash hands with soap and water prior to and after every dressing change.     [x]Wash wounds with: 0.9% normal saline  [x]Catrachita wound Topical Treatments: Do not apply lotions, creams, or ointments to the skin around the wound bed unless directed as followed:   Apply around the wound: Nothing         [x]Wound Location: right lower leg wounds   Apply Primary Dressing to wound: Oly  Secondary Dressing: 4X4 gauze pad, to proximal wounds cover with silicone border dressings   Avoid contact of tape with skin if possible. When to change Dressin times per week: Monday and Friday- home care to change friday    [x]Wound Location: left foot wound   Apply Primary Dressing to wound: Oly  Secondary Dressing: 4X4 gauze pad and Conforming roll gauze   Avoid contact of tape with skin if possible. When to change Dressing: 3 times per week:Monday/Wednesday/Friday- home care to change Wednesday and friday        [x] Multilayer Compression Wrap:  Type: Applied on Right lower leg(s)  2 Layer Compression Wrap- home care to change on Friday    Do not get leg(s) with wrap wet. If wraps become too tight call the center or completely remove the wrap. Elevate leg(s) above the level of the heart when sitting. Avoid prolonged standing in one place. Applied in Clinic on 10/31/2022  The Goal of this therapy is to reduce edema and get into long term compression garments to control venous insufficiency, lymphedema and reduce occurrence of venous ulcers    [x] Edema Control:  Apply: Medigrip on the Left; (Double medium). They should be applied to affected leg(s) from mid foot to knee making sure to cover the heel      Elevate leg(s) above the level of the heart for 30 minutes 4-5 times a day and/or when sitting. Avoid prolonged standing in one place. Dietary:  Important dietary reminders:  1. Increase Protein intake (i.e. Lean meats, fish, eggs, legumes, and yogurt)  2. No added salt  3. If diabetic, follow a diabetic diet and check glucose prior to meals or as instructed by your physician.     Dietary Supplements(Take twice a day unless instructed otherwise):  [] [place]  [] 30ml ProStat [] [place] Dose [] Ensure Max/Premier [] Other:    Your nurse  is:  Mahi Brower     Electronically signed by Diana Monteiro RN on 10/31/2022 at 9:27 [address]: Should you experience any significant changes in your wound(s) or have questions about your wound care, please contact the [address] [place] at [phone]. We are open from 8:00am - 4:30p Monday, Thursday and Friday; 11:00am - 5pm on Tuesday and CLOSED Wednesday. Please give us 24-48 business hours to return your call. Call your doctor now or seek immediate medical care if:    You have symptoms of infection, such as: Increased pain, swelling, warmth, or redness. Red streaks leading from the area. Pus draining from the area. A fever.          [] Patient unable to sign Discharge Instructions given to ECF/Transportation/POA

## 2022-10-31 NOTE — PROGRESS NOTES
[address] [facility]  Progress Note and Procedure Note      Radha Stanley MEDICAL RECORD NUMBER:  [id number]  AGE: 81 y.o. GENDER: male  : 1950  EPISODE DATE:  10/31/2022    Subjective:     Chief Complaint   Patient presents with    Wound Check     Right lower leg           HISTORY of PRESENT ILLNESS HPI     Radha Stanley is a 81 y.o. male who presents today for wound/ulcer evaluation. History of Wound Context: Patient of Dr. Maxwell Habermann being followed in wound care following revascularization of the right lower extremity with arterial ulcer right knee. Patient also developed wound overlying left foot from improper shoe. Is presently wearing a surgical shoe left foot.   Patient states that the wrap slipped this past week resulting in increased swelling  Wound/Ulcer Pain Timing/Severity: none  Quality of pain: N/A  Severity:  0 / 10   Modifying Factors: None  Associated Signs/Symptoms: edema    Ulcer Identification:  Ulcer Type: arterial and pressure    Contributing Factors: edema, venous stasis, chronic pressure, decreased mobility, and arterial insufficiency    Acute Wound: N/A not an acute wound    PAST MEDICAL HISTORY        Diagnosis Date    CAD (coronary artery disease)     CHF (congestive heart failure) ([facility])     diastolic    COPD (chronic obstructive pulmonary disease) (HCC)     Critical ischemia of lower extremity (HCC)     Depressive disorder, not elsewhere classified     GERD (gastroesophageal reflux disease)     History of colon polyps - 30 seen on colonoscopy 2021    History of MI (myocardial infarction) 2013    History of skin ulcer of lower extremity     R anterior shin    History of transient ischemic attack (TIA)     Hx of blood clots     PE    Hyperlipidemia     Hypertension     Neuropathy     KAVITA (obstructive sleep apnea)     On CPAP    Personal history of DVT (deep vein thrombosis)     Prolonged emergence from general anesthesia     Pt reported being combative after surgery     PVD (peripheral vascular disease) ([facility]     TIA (transient ischemic attack) 2013    Vertebral fracture        PAST SURGICAL HISTORY    Past Surgical History:   Procedure Laterality Date    APPENDECTOMY      CHOLECYSTECTOMY, LAPAROSCOPIC      COLONOSCOPY  4/23/2021    COLONOSCOPY POLYPECTOMY SNARE/COLD BIOPSY performed by Pop Wilson MD at [facility]  4/23/2021    COLONOSCOPY POLYPECTOMY ABLATION performed by Pop Wilson MD at [place] [address]  4/23/2021    COLONOSCOPY CONTROL HEMORRHAGE performed by Pop Wilson MD at [address] [facility]  6/2013    EYE SURGERY Left     FEMORAL BYPASS Right 7/18/2022    RIGHT FEMORAL BELOW KNEE POPLITEAL ARTERY BYPASS WITH IN SITU SAPHENOUS VEIN performed by Adriana Evans MD at [address] Right 11/17/2021    RIGHT FEMORAL ENDARTERECTOMY  RIGHT EXTERNAL ILIAC TO PROFUNDA FEMORAL BYPASS WITH 8MM PTFE GRAFT RIGHT LOWER EXTREMITY ARTERIOGRAM BALLOON ANGIOPLASTY RIGHT PROFUNDA BALLOON ANGIOPLASTY AND STENT OF RIGHT EXTERNAL ILIAC ARTERY performed by Adriana Evans MD at [address] [facility]  11/18/2015    Bilateral decompressive lumbar laminectomy L4-5   ; medial facetectomy L4-5 joints  bilaterally; foraminotomies l5   nerve roots bilaterally    LEG DEBRIDEMENT Right 7/23/2013    Dr. Gabrielle Christiansen - pretibial wound    OTHER SURGICAL HISTORY Right 6/25/2013    Dr. Gabrielle Christiansen - CFA Endarterectomy w/marsupialization; RLE wound excision & debridement     OTHER SURGICAL HISTORY Left 8/27/2013    Dr. Gabrielle Christiansen - femoral & profunda femoris endarterectomy w/marsupialization patch angioplasty using SFA    SKIN SPLIT GRAFT Right 7/25/2013    Dr. Gabrielle Christiansen - to non-healing R pretibial wound, 9 x 15 cm    TOTAL HIP ARTHROPLASTY Right 09/01/2016    Dr. Rohit Chacon Left 12/22/2015    Dr. Gabrielle Christiansen - CFA exploration, thrombectomy of ileofemoral system, stenting of RAFAL in-stent stenosis w/8 x 37 mm Palmaz        FAMILY HISTORY    Family History   Problem Relation Age of Onset    Cancer Mother         Breast    Heart Disease Mother     Cancer Father         Bladder    Heart Disease Father     Cancer Paternal Grandmother         melanoma        SOCIAL HISTORY    Social History     Tobacco Use    Smoking status: Former     Packs/day: 0.25     Years: 52.00     Pack years: 13.00     Types: Cigarettes     Start date: 1967     Quit date: 2022     Years since quittin.3    Smokeless tobacco: Never    Tobacco comments:     reports he quit circa 22   Vaping Use    Vaping Use: Never used   Substance Use Topics    Alcohol use: Yes     Alcohol/week: 0.0 standard drinks     Comment: 2-3 beers a month    Drug use: No       ALLERGIES    Allergies   Allergen Reactions    Asa [Aspirin] Other (See Comments)     Stomach ache    Daliresp [Roflumilast] Diarrhea and Other (See Comments)     Severe diarrhea and did not help       MEDICATIONS    Current Outpatient Medications on File Prior to Encounter   Medication Sig Dispense Refill    DULoxetine (CYMBALTA) 20 MG extended release capsule       ipratropium-albuterol (DUONEB) 0.5-2.5 (3) MG/3ML SOLN nebulizer solution Inhale 3 mLs into the lungs every 4 hours as needed for Shortness of Breath 360 mL 4    albuterol sulfate HFA (PROVENTIL;VENTOLIN;PROAIR) 108 (90 Base) MCG/ACT inhaler Inhale 2 puffs into the lungs every 6 hours as needed for Wheezing 18 g 2    omeprazole (PRILOSEC) 40 MG delayed release capsule TAKE 1 CAPSULE BY MOUTH DAILY 30 capsule 3    Evolocumab 140 MG/ML SOAJ Inject 140 mg into the skin every 14 days Take an injection every 14 days. fluticasone-umeclidin-vilant (TRELEGY ELLIPTA) 100-62.5-25 MCG/INH AEPB Inhale 1 puff into the lungs in the morning.  28 each 11    potassium chloride (MICRO-K) 10 MEQ extended release capsule 1 tablet 60 capsule 1    furosemide (LASIX) 40 MG tablet Take 1 tablet by mouth in the morning. 90 tablet 3    metoprolol tartrate (LOPRESSOR) 50 MG tablet Take 1.5 tablets by mouth in the morning and 1.5 tablets before bedtime. 90 tablet 5    apixaban (ELIQUIS) 5 MG TABS tablet Take 1 tablet by mouth in the morning and 1 tablet before bedtime. 60 tablet 1    losartan (COZAAR) 100 MG tablet Take 1 tablet by mouth in the morning. 30 tablet 3    atorvastatin (LIPITOR) 80 MG tablet Take 1 tablet by mouth daily 90 tablet 1    clopidogrel (PLAVIX) 75 MG tablet Take 1 tablet by mouth daily 30 tablet 3    pregabalin (LYRICA) 150 MG capsule Take 1 capsule by mouth 2 times daily for 30 days. 180 capsule 1    allopurinol (ZYLOPRIM) 100 MG tablet Take 1 tablet by mouth daily 180 tablet 1    [DISCONTINUED] folic acid (FOLVITE) 1 MG tablet Take 1 tablet by mouth daily 30 tablet 3    [DISCONTINUED] nitroGLYCERIN (NITROSTAT) 0.4 MG SL tablet Place 1 tablet under the tongue every 5 minutes as needed for Chest pain (Patient not taking: No sig reported) 25 tablet 1    [DISCONTINUED] magnesium gluconate (MAGONATE) 500 MG tablet Take 500 mg by mouth daily. No current facility-administered medications on file prior to encounter. REVIEW OF SYSTEMS  Review of Systems    Pertinent items are noted in HPI. Objective:      BP (!) 152/89   Pulse 74   Temp 97.4 °F (36.3 °C) (Temporal)   Resp 20     Wt Readings from Last 3 Encounters:   10/26/22 235 lb (106.6 kg)   10/20/22 237 lb (107.5 kg)   08/30/22 225 lb 6.4 oz (102.2 kg)       PHYSICAL EXAM    General Appearance: in no acute distress  Extremities: no cyanosis, no clubbing, 2+ edema-  right lower extremity, and 1+ edema left lower extremity. Full-thickness wound right anterior knee with fibrin and slough and granulation tissue designated as wound #1. New full-thickness wound right lateral knee containing granulation tissue and fibrin with minimal slough designated as wound #4.   Small right proximal full-thickness wound right knee with fibrin and granulation tissue designated as wound #3. Full-thickness wound overlying left plantar great toe with fibrin and granulation tissue. Epithelialization around the margins designated as wound #2    Assessment:      1. Non-pressure ulcer of right lower extremity with fat layer exposed ([address]    2. Open wound of right lower leg, subsequent encounter    3. Atherosclerosis of native artery of right lower extremity with rest pain ([address]    4. PVD (peripheral vascular disease) ([address]         Procedure Note  Indications:  Based on my examination of this patient's wound(s) today, sharp excision is required to promote healing and evaluate the extent healing. Performed by: Arlin Merrill MD    Consent obtained? Yes    Time out taken: Yes    Pain Control: Anesthetic: 4% Lidocaine Liquid Topical     Debridement:Excisional Debridement    Using curette the wound was sharply debrided    down through and including the removal of  epidermis, dermis, and subcutaneous tissue.     Devitalized Tissue Debrided:  fibrin and slough      Pre Debridement Measurements:  Are located in the Wound Documentation Flow Sheet   Wound #: 1, 2, 3, and 4     Post  Debridement Measurements:  Wound 11/15/21 Leg Distal;Right #1 (Active)   Wound Image   10/31/22 0904   Wound Etiology Arterial 08/22/22 1433   Wound Cleansed Cleansed with saline 10/31/22 0842   Dressing/Treatment Collagen with Ag 10/24/22 0908   Wound Length (cm) 5.4 cm 10/31/22 0842   Wound Width (cm) 1.7 cm 10/31/22 0842   Wound Depth (cm) 0.1 cm 10/31/22 0842   Wound Surface Area (cm^2) 9.18 cm^2 10/31/22 0842   Change in Wound Size % (l*w) -282.5 10/31/22 0842   Wound Volume (cm^3) 0.918 cm^3 10/31/22 0842   Wound Healing % -283 10/31/22 0842   Post-Procedure Length (cm) 5.5 cm 10/31/22 0904   Post-Procedure Width (cm) 1.8 cm 10/31/22 0904   Post-Procedure Depth (cm) 0.3 cm 10/31/22 0904   Post-Procedure Surface Area (cm^2) 9.9 cm^2 10/31/22 0904   Post-Procedure Volume (cm^3) 2.97 cm^3 10/31/22 0904   Distance Tunneling (cm) 0 cm 10/31/22 0842   Tunneling Position ___ O'Clock 0 10/18/22 1303   Undermining Starts ___ O'Clock 0 10/18/22 1303   Undermining Ends___ O'Clock 0 10/18/22 1303   Undermining Maxium Distance (cm) 0 10/31/22 0842   Wound Assessment Devitalized tissue;Fibrin;Pink/red;Slough 10/31/22 0842   Drainage Amount Small 10/31/22 0842   Drainage Description Yellow;Brown 10/31/22 0842   Odor None 10/31/22 0842   Catrachita-wound Assessment Edematous; Blanchable erythema 10/31/22 0842   Margins Defined edges 10/31/22 0842   Wound Thickness Description not for Pressure Injury Full thickness 10/31/22 0842   Number of days: 349       Wound 09/12/22 Foot Plantar;Left #2 great toe (Active)   Wound Image   10/31/22 0904   Wound Etiology Traumatic 09/19/22 0932   Wound Cleansed Cleansed with saline 10/31/22 0842   Dressing/Treatment Collagen with Ag 10/24/22 0908   Wound Length (cm) 1.4 cm 10/31/22 0842   Wound Width (cm) 0.4 cm 10/31/22 0842   Wound Depth (cm) 0.1 cm 10/31/22 0842   Wound Surface Area (cm^2) 0.56 cm^2 10/31/22 0842   Change in Wound Size % (l*w) 81.33 10/31/22 0842   Wound Volume (cm^3) 0.056 cm^3 10/31/22 0842   Wound Healing % 81 10/31/22 0842   Post-Procedure Length (cm) 1.5 cm 10/31/22 0904   Post-Procedure Width (cm) 0.5 cm 10/31/22 0904   Post-Procedure Depth (cm) 0.3 cm 10/31/22 0904   Post-Procedure Surface Area (cm^2) 0.75 cm^2 10/31/22 0904   Post-Procedure Volume (cm^3) 0.225 cm^3 10/31/22 0904   Distance Tunneling (cm) 0 cm 10/31/22 0842   Tunneling Position ___ O'Clock 0 10/18/22 1303   Undermining Starts ___ O'Clock 0 10/18/22 1303   Undermining Ends___ O'Clock 0 10/18/22 1303   Undermining Maxium Distance (cm) 0 10/31/22 0842   Wound Assessment Fibrin;Pink/red 10/31/22 0842   Drainage Amount Small 10/31/22 0842   Drainage Description Brown 10/31/22 0842   Odor Mild 10/31/22 0842   Catrachita-wound Assessment Maceration 10/31/22 0842   Margins Defined edges 10/31/22 0842   Wound Thickness Description not for Pressure Injury Full thickness 10/31/22 0842   Number of days: 49       Wound 09/19/22 Leg Lower;Proximal;Right #3 (Active)   Wound Image   10/31/22 0842   Wound Etiology Traumatic 09/19/22 0932   Wound Cleansed Cleansed with saline 10/31/22 0842   Dressing/Treatment Collagen with Ag 10/24/22 0908   Wound Length (cm) 1 cm 10/31/22 0842   Wound Width (cm) 0.5 cm 10/31/22 0842   Wound Depth (cm) 0.1 cm 10/31/22 0842   Wound Surface Area (cm^2) 0.5 cm^2 10/31/22 0842   Change in Wound Size % (l*w) 58.33 10/31/22 0842   Wound Volume (cm^3) 0.05 cm^3 10/31/22 0842   Wound Healing % 58 10/31/22 0842   Post-Procedure Length (cm) 1.1 cm 10/31/22 0904   Post-Procedure Width (cm) 0.6 cm 10/31/22 0904   Post-Procedure Depth (cm) 0.3 cm 10/31/22 0904   Post-Procedure Surface Area (cm^2) 0.66 cm^2 10/31/22 0904   Post-Procedure Volume (cm^3) 0.198 cm^3 10/31/22 0904   Distance Tunneling (cm) 0 cm 10/31/22 0842   Tunneling Position ___ O'Clock 0 10/18/22 1303   Undermining Starts ___ O'Clock 0 10/18/22 1303   Undermining Ends___ O'Clock 0 10/18/22 1303   Undermining Maxium Distance (cm) 0 10/31/22 0842   Wound Assessment Devitalized tissue;Pink/red;Slough 10/31/22 0842   Drainage Amount Small 10/31/22 0842   Drainage Description Brown;Yellow 10/31/22 0842   Odor None 10/31/22 0842   Catrachita-wound Assessment Intact 10/31/22 0842   Margins Defined edges 10/31/22 0842   Wound Thickness Description not for Pressure Injury Full thickness 10/31/22 0842   Number of days: 42       Wound 10/31/22 Leg Right; Lower; Lateral #4 (Active)   Wound Image   10/31/22 0904   Wound Etiology Venous 10/31/22 0842   Wound Cleansed Cleansed with saline 10/31/22 0842   Wound Length (cm) 2 cm 10/31/22 0842   Wound Width (cm) 1 cm 10/31/22 0842   Wound Depth (cm) 0.1 cm 10/31/22 0842   Wound Surface Area (cm^2) 2 cm^2 10/31/22 0842   Wound Volume (cm^3) 0.2 cm^3 10/31/22 0842   Post-Procedure Length (cm) 2.1 cm 10/31/22 0904   Post-Procedure Width (cm) 1.1 cm 10/31/22 0904   Post-Procedure Depth (cm) 0.3 cm 10/31/22 0904   Post-Procedure Surface Area (cm^2) 2.31 cm^2 10/31/22 0904   Post-Procedure Volume (cm^3) 0.693 cm^3 10/31/22 0904   Distance Tunneling (cm) 0 cm 10/31/22 0842   Undermining Maxium Distance (cm) 0 10/31/22 0842   Wound Assessment Fibrin;Pink/red 10/31/22 0842   Drainage Amount Small 10/31/22 0842   Drainage Description Serosanguinous 10/31/22 0842   Odor None 10/31/22 0842   Catrachita-wound Assessment Edematous 10/31/22 0842   Margins Defined edges 10/31/22 0842   Wound Thickness Description not for Pressure Injury Full thickness 10/31/22 0842   Number of days: 0          Percent of Wound Debrided: 100%    Total Surface Area Debrided:  13 sq cm    Diabetic/Pressure/Non Pressure Ulcers only:  Ulcer: Non-Pressure ulcer, fat layer exposed    Bleeding: Minimal    Hemostasis Achieved: by pressure    Procedural Pain: 0  / 10     Post Procedural Pain: 0 / 10     Response to treatment:  Well tolerated by patient. Left plantar foot wound improved. New wound right lower extremity result of increased edema as his compression wrap slipped. Original wound right anterior knee has gotten worse since his last appointment        Plan:   Arrangements will be made for patient to see Dr. Susy Zambrano next week  Treatment Note: Please see attached Discharge Instructions. These instructions were given and signed by the patient or POA    New Prescriptions    No medications on file       Orders Placed This Encounter   Procedures    Initiate Outpatient Wound Care Protocol       Discharge Instructions          [facility] Physician Orders and Discharge Instructions  [address] [place] [address]. [facility]. [place]  Telephone: [phone]  NAME:  Cynthia Fong.   YOB: 1950  MEDICAL RECORD NUMBER:  [id number]  DATE: 10/31/22     Return Appointment:  Return Appointment: With Dr Sabiha Rios  in  [facility])  [] Return Appointment for a Wound Assessment with the nurse on:     Future Appointments   Date Time Provider Orlando Wileyi   2022  9:45 AM Sedrick Hawkins DO [address]   2022 11:00 AM Sedrick Hawkins DO [facility] [place] [facility]   2023  1:00 PM Alen Angelucci, MD [address] [place] [address]: [facility] [phone]   F: [phone]   Medically necessary services: [x] Skilled Nursing [] PT (Eval & Treat) [] OT (Eval & Treat) [] Social Work [] Dietician  [] Other:    Wound care instructions: If you smoke we ask that you refrain from smoking. Smoking inhibits wounds from healing. When taking antibiotics take the entire prescription as ordered. Do not stop taking until medication is all gone unless otherwise instructed. Exercise as tolerated. Keep weight off wounds and reposition every 2 hours if applicable. Do not get wounds wet in the bath or shower unless otherwise instructed by your physician. If your wound is on your foot or leg, you may purchase a cast bag. Please ask at the pharmacy. Wash hands with soap and water prior to and after every dressing change. [x]Wash wounds with: 0.9% normal saline  [x]Catrachita wound Topical Treatments: Do not apply lotions, creams, or ointments to the skin around the wound bed unless directed as followed:   Apply around the wound: Nothing         [x]Wound Location: right lower leg wounds   Apply Primary Dressing to wound: Oly  Secondary Dressing: 4X4 gauze pad, to proximal wounds cover with silicone border dressings   Avoid contact of tape with skin if possible. When to change Dressin times per week: Monday and Friday- home care to change friday    [x]Wound Location: left foot wound   Apply Primary Dressing to wound: Oly  Secondary Dressing: 4X4 gauze pad and Conforming roll gauze   Avoid contact of tape with skin if possible.   When to change Dressing: 3 times per week:Monday/Wednesday/Friday- home care to change Wednesday and friday        [x] Multilayer Compression Wrap:  Type: Applied on Right lower leg(s)  2 Layer Compression Wrap    Do not get leg(s) with wrap wet. If wraps become too tight call the center or completely remove the wrap. Elevate leg(s) above the level of the heart when sitting. Avoid prolonged standing in one place. Applied in Clinic on 10/31/2022  The Goal of this therapy is to reduce edema and get into long term compression garments to control venous insufficiency, lymphedema and reduce occurrence of venous ulcers    [x] Edema Control:  Apply: Medigrip on the Left; (Double medium). They should be applied to affected leg(s) from mid foot to knee making sure to cover the heel      Elevate leg(s) above the level of the heart for 30 minutes 4-5 times a day and/or when sitting. Avoid prolonged standing in one place. Dietary:  Important dietary reminders:  1. Increase Protein intake (i.e. Lean meats, fish, eggs, legumes, and yogurt)  2. No added salt  3. If diabetic, follow a diabetic diet and check glucose prior to meals or as instructed by your physician. Dietary Supplements(Take twice a day unless instructed otherwise):  [] Hari Stare  [] 30ml ProStat [] Lawson Servin [] Ensure Max/Premier [] Other:    Your nurse  is:  Paco     Electronically signed by Oren Byrne RN on 10/31/2022 at 9:27 A[address] Information: Should you experience any significant changes in your wound(s) or have questions about your wound care, please contact the [address] [place] [address] at [phone]. We are open from 8:00am - 4:30p Monday, Thursday and Friday; 11:00am - 5pm on Tuesday and CLOSED Wednesday. Please give us 24-48 business hours to return your call. Call your doctor now or seek immediate medical care if:    You have symptoms of infection, such as:   Increased pain, swelling, warmth, or redness. Red streaks leading from the area. Pus draining from the area. A fever.          [] Patient unable to sign Discharge Instructions given to ECF/Transportation/POA         Electronically signed by True Dandy, MD on 10/31/2022 at 9:51 AM

## 2022-10-31 NOTE — TELEPHONE ENCOUNTER
Called [facility] and spoke to Nadine Gay (nurse) and reviewed new wound care orders, and she will change compression wrap on Friday if she is able to get supplies and if not she will have patient come to wound care on Thursday.

## 2022-10-31 NOTE — TELEPHONE ENCOUNTER
I signed an order for a new titration study. Diagnosis Orders   1.  KAVITA on CPAP  Sleep Study with PAP Titration

## 2022-11-03 RX ORDER — CLOPIDOGREL BISULFATE 75 MG/1
75 TABLET ORAL DAILY
Qty: 30 TABLET | Refills: 3 | Status: SHIPPED | OUTPATIENT
Start: 2022-11-03

## 2022-11-07 ENCOUNTER — HOSPITAL ENCOUNTER (OUTPATIENT)
Dept: WOUND CARE | Age: 72
Discharge: HOME | End: 2022-11-07
Payer: MEDICARE

## 2022-11-07 VITALS
DIASTOLIC BLOOD PRESSURE: 72 MMHG | TEMPERATURE: 96.9 F | SYSTOLIC BLOOD PRESSURE: 129 MMHG | RESPIRATION RATE: 18 BRPM | HEART RATE: 80 BPM

## 2022-11-07 DIAGNOSIS — I70.221 ATHEROSCLEROSIS OF NATIVE ARTERY OF RIGHT LOWER EXTREMITY WITH REST PAIN (HCC): ICD-10-CM

## 2022-11-07 DIAGNOSIS — I73.9 PVD (PERIPHERAL VASCULAR DISEASE) (HCC): ICD-10-CM

## 2022-11-07 DIAGNOSIS — L97.912 NON-PRESSURE ULCER OF RIGHT LOWER EXTREMITY WITH FAT LAYER EXPOSED (HCC): Primary | ICD-10-CM

## 2022-11-07 DIAGNOSIS — S81.801D OPEN WOUND OF RIGHT LOWER LEG, SUBSEQUENT ENCOUNTER: ICD-10-CM

## 2022-11-07 PROCEDURE — 29581 APPL MULTLAYER CMPRN SYS LEG: CPT

## 2022-11-07 PROCEDURE — 99213 OFFICE O/P EST LOW 20 MIN: CPT | Performed by: SURGERY

## 2022-11-07 PROCEDURE — 6370000000 HC RX 637 (ALT 250 FOR IP): Performed by: SPECIALIST

## 2022-11-07 RX ORDER — BACITRACIN ZINC AND POLYMYXIN B SULFATE 500; 1000 [USP'U]/G; [USP'U]/G
OINTMENT TOPICAL ONCE
Status: CANCELLED | OUTPATIENT
Start: 2022-11-07 | End: 2022-11-07

## 2022-11-07 RX ORDER — GINSENG 100 MG
CAPSULE ORAL ONCE
Status: CANCELLED | OUTPATIENT
Start: 2022-11-07 | End: 2022-11-07

## 2022-11-07 RX ORDER — BETAMETHASONE DIPROPIONATE 0.05 %
OINTMENT (GRAM) TOPICAL ONCE
Status: CANCELLED | OUTPATIENT
Start: 2022-11-07 | End: 2022-11-07

## 2022-11-07 RX ORDER — BACITRACIN, NEOMYCIN, POLYMYXIN B 400; 3.5; 5 [USP'U]/G; MG/G; [USP'U]/G
OINTMENT TOPICAL ONCE
Status: CANCELLED | OUTPATIENT
Start: 2022-11-07 | End: 2022-11-07

## 2022-11-07 RX ORDER — LIDOCAINE HYDROCHLORIDE 20 MG/ML
JELLY TOPICAL ONCE
Status: CANCELLED | OUTPATIENT
Start: 2022-11-07 | End: 2022-11-07

## 2022-11-07 RX ORDER — LIDOCAINE 40 MG/G
CREAM TOPICAL ONCE
Status: CANCELLED | OUTPATIENT
Start: 2022-11-07 | End: 2022-11-07

## 2022-11-07 RX ORDER — CLOBETASOL PROPIONATE 0.5 MG/G
OINTMENT TOPICAL ONCE
Status: CANCELLED | OUTPATIENT
Start: 2022-11-07 | End: 2022-11-07

## 2022-11-07 RX ORDER — LIDOCAINE HYDROCHLORIDE 40 MG/ML
SOLUTION TOPICAL ONCE
Status: COMPLETED | OUTPATIENT
Start: 2022-11-07 | End: 2022-11-07

## 2022-11-07 RX ORDER — GENTAMICIN SULFATE 1 MG/G
OINTMENT TOPICAL ONCE
Status: CANCELLED | OUTPATIENT
Start: 2022-11-07 | End: 2022-11-07

## 2022-11-07 RX ORDER — LIDOCAINE 50 MG/G
OINTMENT TOPICAL ONCE
Status: CANCELLED | OUTPATIENT
Start: 2022-11-07 | End: 2022-11-07

## 2022-11-07 RX ORDER — LIDOCAINE HYDROCHLORIDE 40 MG/ML
SOLUTION TOPICAL ONCE
Status: CANCELLED | OUTPATIENT
Start: 2022-11-07 | End: 2022-11-07

## 2022-11-07 RX ADMIN — LIDOCAINE HYDROCHLORIDE: 40 SOLUTION TOPICAL at 14:19

## 2022-11-07 ASSESSMENT — PAIN DESCRIPTION - DESCRIPTORS: DESCRIPTORS: BURNING

## 2022-11-07 ASSESSMENT — PAIN DESCRIPTION - PAIN TYPE: TYPE: CHRONIC PAIN

## 2022-11-07 ASSESSMENT — PAIN SCALES - GENERAL: PAINLEVEL_OUTOF10: 9

## 2022-11-07 ASSESSMENT — PAIN DESCRIPTION - ORIENTATION: ORIENTATION: RIGHT

## 2022-11-07 ASSESSMENT — PAIN DESCRIPTION - LOCATION: LOCATION: LEG

## 2022-11-07 NOTE — PROGRESS NOTES
[address]  Progress Note and Procedure Note      Bailey Rasmussen. MEDICAL RECORD NUMBER:  [id number]  AGE: 81 y.o. GENDER: male  : 1950  EPISODE DATE:  2022    Subjective:     Chief Complaint   Patient presents with    Wound Check     Bilateral lower leg      HISTORY of PRESENT ILLNESS HPI   Bailey Gaitan is a 81 y.o. male who presents today for wound/ulcer evaluation. History of Wound Context: No new complaints, tolerating compression. Left foot wound better.     Ulcer Identification:  Ulcer Type: arterial and pressure    Contributing Factors: venous stasis, chronic pressure, decreased mobility, smoking, and arterial insufficiency    PAST MEDICAL HISTORY      Diagnosis Date    CAD (coronary artery disease)     CHF (congestive heart failure) (HCC)     diastolic    COPD (chronic obstructive pulmonary disease) (HCC)     Critical ischemia of lower extremity (HCC)     Depressive disorder, not elsewhere classified     GERD (gastroesophageal reflux disease)     History of colon polyps - 30 seen on colonoscopy 2021    History of MI (myocardial infarction) 2013    History of skin ulcer of lower extremity     R anterior shin    History of transient ischemic attack (TIA)     Hx of blood clots     PE    Hyperlipidemia     Hypertension     Neuropathy     KAVITA (obstructive sleep apnea)     On CPAP    Personal history of DVT (deep vein thrombosis)     Prolonged emergence from general anesthesia     Pt reported being combative after surgery     PVD (peripheral vascular disease) ([address]     TIA (transient ischemic attack)     Vertebral fracture      PAST SURGICAL HISTORY  Past Surgical History:   Procedure Laterality Date    APPENDECTOMY      CHOLECYSTECTOMY, LAPAROSCOPIC      COLONOSCOPY  2021    COLONOSCOPY POLYPECTOMY SNARE/COLD BIOPSY [facility] [address] [facility]  2021    COLONOSCOPY POLYPECTOMY ABLATION performed by Hitesh Bourne MD at [facility]  2021    COLONOSCOPY CONTROL HEMORRHAGE performed by Hitesh Bourne MD at [address]  2013    EYE SURGERY Left     FEMORAL BYPASS Right 2022    RIGHT FEMORAL BELOW KNEE POPLITEAL ARTERY BYPASS WITH IN SITU SAPHENOUS VEIN performed by Michela Louis MD at [address] Right 2021    RIGHT FEMORAL ENDARTERECTOMY  RIGHT EXTERNAL ILIAC TO PROFUNDA FEMORAL BYPASS WITH 8MM PTFE GRAFT RIGHT LOWER EXTREMITY ARTERIOGRAM BALLOON ANGIOPLASTY RIGHT PROFUNDA BALLOON ANGIOPLASTY AND STENT OF RIGHT EXTERNAL ILIAC ARTERY performed by Michela Louis MD at [address] [facility]  2015    Bilateral decompressive lumbar laminectomy L4-5   ; medial facetectomy L4-5 joints  bilaterally; foraminotomies l5   nerve roots bilaterally    LEG DEBRIDEMENT Right 2013    Dr. Erin Forte - pretibial wound    OTHER SURGICAL HISTORY Right 2013    Dr. Erin Forte - CFA Endarterectomy w/marsupialization; RLE wound excision & debridement     OTHER SURGICAL HISTORY Left 2013    Dr. Erin Forte - femoral & profunda femoris endarterectomy w/marsupialization patch angioplasty using SFA    SKIN SPLIT GRAFT Right 2013    Dr. Erin Forte - to non-healing R pretibial wound, 9 x 15 cm    TOTAL HIP ARTHROPLASTY Right 2016    Dr. Will Vasquez Left 2015    Dr. Erin Forte - CFA exploration, thrombectomy of ileofemoral system, stenting of RAFAL in-stent stenosis w/8 x 37 mm Palmaz      SOCIAL HISTORY  Social History     Tobacco Use    Smoking status: Former     Packs/day: 0.25     Years: 52.00     Pack years: 13.00     Types: Cigarettes     Start date: 1967     Quit date: 2022     Years since quittin.4    Smokeless tobacco: Never    Tobacco comments:     reports he quit circa 22   Vaping Use    Vaping Use: Never used   Substance Use Topics    Alcohol use:  Yes Alcohol/week: 0.0 standard drinks     Comment: 2-3 beers a month    Drug use: No     ALLERGIES  Allergies   Allergen Reactions    Asa [Aspirin] Other (See Comments)     Stomach ache    Daliresp [Roflumilast] Diarrhea and Other (See Comments)     Severe diarrhea and did not help     MEDICATIONS  Current Outpatient Medications on File Prior to Encounter   Medication Sig Dispense Refill    clopidogrel (PLAVIX) 75 MG tablet TAKE 1 TABLET BY MOUTH DAILY 30 tablet 3    DULoxetine (CYMBALTA) 20 MG extended release capsule       ipratropium-albuterol (DUONEB) 0.5-2.5 (3) MG/3ML SOLN nebulizer solution Inhale 3 mLs into the lungs every 4 hours as needed for Shortness of Breath 360 mL 4    albuterol sulfate HFA (PROVENTIL;VENTOLIN;PROAIR) 108 (90 Base) MCG/ACT inhaler Inhale 2 puffs into the lungs every 6 hours as needed for Wheezing 18 g 2    omeprazole (PRILOSEC) 40 MG delayed release capsule TAKE 1 CAPSULE BY MOUTH DAILY 30 capsule 3    Evolocumab 140 MG/ML SOAJ Inject 140 mg into the skin every 14 days Take an injection every 14 days. fluticasone-umeclidin-vilant (TRELEGY ELLIPTA) 100-62.5-25 MCG/INH AEPB Inhale 1 puff into the lungs in the morning. 28 each 11    potassium chloride (MICRO-K) 10 MEQ extended release capsule 1 tablet 60 capsule 1    furosemide (LASIX) 40 MG tablet Take 1 tablet by mouth in the morning. 90 tablet 3    metoprolol tartrate (LOPRESSOR) 50 MG tablet Take 1.5 tablets by mouth in the morning and 1.5 tablets before bedtime. 90 tablet 5    apixaban (ELIQUIS) 5 MG TABS tablet Take 1 tablet by mouth in the morning and 1 tablet before bedtime. 60 tablet 1    losartan (COZAAR) 100 MG tablet Take 1 tablet by mouth in the morning. 30 tablet 3    atorvastatin (LIPITOR) 80 MG tablet Take 1 tablet by mouth daily 90 tablet 1    pregabalin (LYRICA) 150 MG capsule Take 1 capsule by mouth 2 times daily for 30 days.  180 capsule 1    allopurinol (ZYLOPRIM) 100 MG tablet Take 1 tablet by mouth daily 180 tablet 1    [DISCONTINUED] folic acid (FOLVITE) 1 MG tablet Take 1 tablet by mouth daily 30 tablet 3    [DISCONTINUED] nitroGLYCERIN (NITROSTAT) 0.4 MG SL tablet Place 1 tablet under the tongue every 5 minutes as needed for Chest pain (Patient not taking: No sig reported) 25 tablet 1    [DISCONTINUED] magnesium gluconate (MAGONATE) 500 MG tablet Take 500 mg by mouth daily. No current facility-administered medications on file prior to encounter. REVIEW OF SYSTEMS  Pertinent items are noted in HPI. Last F6O if applicable:   Hemoglobin A1C   Date Value Ref Range Status   2020 6.1 See comment % Final     Comment:     Comment:  Diagnosis of Diabetes: > or = 6.5%  Increased risk of diabetes (Prediabetes): 5.7-6.4%  Glycemic Control: Nonpregnant Adults: <7.0%                    Pregnant: <6.0%           Objective:      /72   Pulse 80   Temp 96.9 °F (36.1 °C) (Temporal)   Resp 18     Wt Readings from Last 3 Encounters:   10/26/22 235 lb (106.6 kg)   10/20/22 237 lb (107.5 kg)   22 225 lb 6.4 oz (102.2 kg)       PHYSICAL EXAM  General:  AAo x 3. NAD. WD/WN Appears chronically ill appearing and appears older than stated age. Lungs:  pursed lip breathing. No use of accessory muscles. Right leg:  Multiple small circular wounds, appear to have been blisters from swelling and wrap application combination. All are shallow with minimal exudate and do not require debridement. Original anterior lower leg wound is shallow, about the same. Left le+ edema. No erythema. Left foot wound clean, no exudate. Measurements noted below. Vascular:  Right DP 2+. Left DP/PT monophasic    Assessment:      1. Non-pressure ulcer of right lower extremity with fat layer exposed ([address]    2. Open wound of right lower leg, subsequent encounter    3. Atherosclerosis of native artery of right lower extremity with rest pain ([address]    4.  PVD (peripheral vascular disease) ([address]      Procedure Note  Indications: Based on my examination of this patient's wound(s)/ulcer(s) today, debridement is not required to promote healing and evaluate the wound base. Debridement: Excisional Debridement  Using curette the wound(s)/ulcer(s) was/were debrided down through and including the removal of subcutaneous tissue.       Devitalized Tissue Debrided:  exudate  Pre Debridement Measurements:  Are located in the [facility]  Documentation Flow Sheet  Wound/Ulcer #: 1  Post Debridement Measurements:  Wound/Ulcer Descriptions are Pre Debridement except measurements:    Wound 11/15/21 Leg Distal;Right #1 (Active)   Wound Image   10/31/22 0904   Wound Etiology Arterial 08/22/22 1433   Wound Cleansed Cleansed with saline 11/07/22 1419   Dressing/Treatment Collagen with Ag 10/31/22 0952   Wound Length (cm) 5 cm 11/07/22 1419   Wound Width (cm) 1.5 cm 11/07/22 1419   Wound Depth (cm) 0.1 cm 11/07/22 1419   Wound Surface Area (cm^2) 7.5 cm^2 11/07/22 1419   Change in Wound Size % (l*w) -212.5 11/07/22 1419   Wound Volume (cm^3) 0.75 cm^3 11/07/22 1419   Wound Healing % -213 11/07/22 1419   Post-Procedure Length (cm) 5 cm 11/07/22 1433   Post-Procedure Width (cm) 1.5 cm 11/07/22 1433   Post-Procedure Depth (cm) 0.1 cm 11/07/22 1433   Post-Procedure Surface Area (cm^2) 7.5 cm^2 11/07/22 1433   Post-Procedure Volume (cm^3) 0.75 cm^3 11/07/22 1433   Distance Tunneling (cm) 0 cm 10/31/22 0842   Tunneling Position ___ O'Clock 0 10/18/22 1303   Undermining Starts ___ O'Clock 0 10/18/22 1303   Undermining Ends___ O'Clock 0 10/18/22 1303   Undermining Maxium Distance (cm) 0 10/31/22 0842   Wound Assessment Pink/red;Fibrin 11/07/22 1419   Drainage Amount Small 11/07/22 1419   Drainage Description Yellow;Brown 11/07/22 1419   Odor None 11/07/22 1419   Catrachita-wound Assessment Edematous;Dry/flaky 11/07/22 1419   Margins Defined edges 11/07/22 1419   Wound Thickness Description not for Pressure Injury Full thickness 11/07/22 1419   Number of days: 356 Wound 09/12/22 Foot Plantar;Left #2 great toe (Active)   Wound Image   10/31/22 0904   Wound Etiology Traumatic 09/19/22 0932   Wound Cleansed Cleansed with saline 11/07/22 1419   Dressing/Treatment Collagen with Ag 10/31/22 0952   Wound Length (cm) 0.5 cm 11/07/22 1419   Wound Width (cm) 0.3 cm 11/07/22 1419   Wound Depth (cm) 0.1 cm 11/07/22 1419   Wound Surface Area (cm^2) 0.15 cm^2 11/07/22 1419   Change in Wound Size % (l*w) 95 11/07/22 1419   Wound Volume (cm^3) 0.015 cm^3 11/07/22 1419   Wound Healing % 95 11/07/22 1419   Post-Procedure Length (cm) 0.5 cm 11/07/22 1433   Post-Procedure Width (cm) 0.3 cm 11/07/22 1433   Post-Procedure Depth (cm) 0.1 cm 11/07/22 1433   Post-Procedure Surface Area (cm^2) 0.15 cm^2 11/07/22 1433   Post-Procedure Volume (cm^3) 0.015 cm^3 11/07/22 1433   Distance Tunneling (cm) 0 cm 10/31/22 0842   Tunneling Position ___ O'Clock 0 10/18/22 1303   Undermining Starts ___ O'Clock 0 10/18/22 1303   Undermining Ends___ O'Clock 0 10/18/22 1303   Undermining Maxium Distance (cm) 0 10/31/22 0842   Wound Assessment Fibrin 11/07/22 1419   Drainage Amount Small 11/07/22 1419   Drainage Description Brown 11/07/22 1419   Odor Mild 11/07/22 1419   Catrachita-wound Assessment Maceration 11/07/22 1419   Margins Defined edges 11/07/22 1419   Wound Thickness Description not for Pressure Injury Full thickness 11/07/22 1419   Number of days: 56       Wound 09/19/22 Leg Lower;Proximal;Right #3 (Active)   Wound Image   10/31/22 0842   Wound Etiology Traumatic 09/19/22 0932   Wound Cleansed Cleansed with saline 11/07/22 1419   Dressing/Treatment Collagen with Ag 10/31/22 0952   Wound Length (cm) 1 cm 11/07/22 1419   Wound Width (cm) 0.6 cm 11/07/22 1419   Wound Depth (cm) 0.1 cm 11/07/22 1419   Wound Surface Area (cm^2) 0.6 cm^2 11/07/22 1419   Change in Wound Size % (l*w) 50 11/07/22 1419   Wound Volume (cm^3) 0.06 cm^3 11/07/22 1419   Wound Healing % 50 11/07/22 1419   Post-Procedure Length (cm) 1 cm 11/07/22 1433   Post-Procedure Width (cm) 0.6 cm 11/07/22 1433   Post-Procedure Depth (cm) 0.1 cm 11/07/22 1433   Post-Procedure Surface Area (cm^2) 0.6 cm^2 11/07/22 1433   Post-Procedure Volume (cm^3) 0.06 cm^3 11/07/22 1433   Distance Tunneling (cm) 0 cm 10/31/22 0842   Tunneling Position ___ O'Clock 0 10/18/22 1303   Undermining Starts ___ O'Clock 0 10/18/22 1303   Undermining Ends___ O'Clock 0 10/18/22 1303   Undermining Maxium Distance (cm) 0 10/31/22 0842   Wound Assessment Pink/red 11/07/22 1419   Drainage Amount Small 11/07/22 1419   Drainage Description Serosanguinous 11/07/22 1419   Odor None 11/07/22 1419   Catrachita-wound Assessment Intact 11/07/22 1419   Margins Defined edges 11/07/22 1419   Wound Thickness Description not for Pressure Injury Full thickness 11/07/22 1419   Number of days: 49       Wound 10/31/22 Leg Right; Lower; Lateral #4 (Active)   Wound Image   10/31/22 0904   Wound Etiology Venous 10/31/22 0842   Wound Cleansed Cleansed with saline 11/07/22 1419   Dressing/Treatment Collagen with Ag 10/31/22 0952   Wound Length (cm) 1.2 cm 11/07/22 1419   Wound Width (cm) 0.7 cm 11/07/22 1419   Wound Depth (cm) 0.1 cm 11/07/22 1419   Wound Surface Area (cm^2) 0.84 cm^2 11/07/22 1419   Change in Wound Size % (l*w) 58 11/07/22 1419   Wound Volume (cm^3) 0.084 cm^3 11/07/22 1419   Wound Healing % 58 11/07/22 1419   Post-Procedure Length (cm) 1.2 cm 11/07/22 1433   Post-Procedure Width (cm) 0.7 cm 11/07/22 1433   Post-Procedure Depth (cm) 0.1 cm 11/07/22 1433   Post-Procedure Surface Area (cm^2) 0.84 cm^2 11/07/22 1433   Post-Procedure Volume (cm^3) 0.084 cm^3 11/07/22 1433   Distance Tunneling (cm) 0 cm 10/31/22 0842   Undermining Maxium Distance (cm) 0 10/31/22 0842   Wound Assessment Pink/red 11/07/22 1419   Drainage Amount Small 11/07/22 1419   Drainage Description Serosanguinous 11/07/22 1419   Odor None 11/07/22 1419   Catrachita-wound Assessment Edematous 11/07/22 1419   Margins Defined edges 11/07/22 1419 Wound Thickness Description not for Pressure Injury Full thickness 11/07/22 1419   Number of days: 7               Plan:       Treatment Note please see attached Discharge Instructions    New Medication(s) at this visit:   New Prescriptions    No medications on file       Other orders at this visit:   Orders Placed This Encounter   Procedures    Initiate Outpatient Wound Care Protocol       Smoking Cessation: Counseling given: Not Answered  Tobacco comments: reports he quit circa 6.8.22      Written patient dismissal instructions given to patient and signed by patient or POA. Discharge [facility] Physician Orders and Discharge Instructions  [address] [place]. [place]  Telephone: [phone]  NAME:  Carrie Anthony. YOB: 1950  MEDICAL RECORD NUMBER:  [id number]  DATE: 11/7/22     Return Appointment:  Return Appointment: With Dr. Berlin Worrell  in  [facility])  [] Return Appointment for a Wound Assessment with the nurse on:     Future Appointments   Date Time Provider Orlando Colmenares   12/14/2022  9:45 AM DO Wilton Early 6199   1/9/2023  8:00 PM [facility] SLEEP LAB ROOM 3 [facility]   1/16/2023  1:00 PM Jaiden Myers MD [address] [place] [address] Street: [address] [place] [address] Drive   F: [phone]   Medically necessary services: [x] Skilled Nursing [] PT (Eval & Treat) [] OT (Eval & Treat) [] Social Work [] Dietician  [] Other:    Wound care instructions: If you smoke we ask that you refrain from smoking. Smoking inhibits wounds from healing. When taking antibiotics take the entire prescription as ordered. Do not stop taking until medication is all gone unless otherwise instructed. Exercise as tolerated. Keep weight off wounds and reposition every 2 hours if applicable.   Do not get wounds wet in the bath or shower unless otherwise instructed by your physician. If your wound is on your foot or leg, you may purchase a cast bag. Please ask at the pharmacy. Wash hands with soap and water prior to and after every dressing change. [x]Wash wounds with: 0.9% normal saline  [x]Catrachita wound Topical Treatments: Do not apply lotions, creams, or ointments to the skin around the wound bed unless directed as followed:   Apply around the wound: Nothing         [x]Wound Location: right lower leg wounds   Apply Primary Dressing to wound: Oly  Secondary Dressing: 4X4 gauze pad   Avoid contact of tape with skin if possible. When to change Dressin times per week: Monday and Friday- Friday by home care nurse    [x]Wound Location: left plantar foot   Apply Primary Dressing to wound: Oly  Secondary Dressing: 4X4 gauze pad and Conforming roll gauze   Avoid contact of tape with skin if possible. When to change Dressing: 3 times per week:Monday/Wednesday/Friday- Wednesday and Friday by home care      [x] Multilayer Compression Wrap:  Type: Applied on Right lower leg(s)  2 Layer Compression Wrap - home care to change on     Do not get leg(s) with wrap wet. If wraps become too tight call the center or completely remove the wrap. Elevate leg(s) above the level of the heart when sitting. Avoid prolonged standing in one place. Applied in Clinic on 2022  The Goal of this therapy is to reduce edema and get into long term compression garments to control venous insufficiency, lymphedema and reduce occurrence of venous ulcers    [x] Edema Control:  Apply: Medigrip on the Left; (Double medium). They should be applied to affected leg(s) from mid foot to knee making sure to cover the heel      Elevate leg(s) above the level of the heart for 30 minutes 4-5 times a day and/or when sitting. Avoid prolonged standing in one place. [x]Off Loading(Pressure Reduction) When: Walking  Weight Bearing Status: No Weight bearing restrictions Left;   Offloading devices: Post op shoe/ Surgical shoe: Left      Dietary:  Important dietary reminders:  1. Increase Protein intake (i.e. Lean meats, fish, eggs, legumes, and yogurt)  2. No added salt  3. If diabetic, follow a diabetic diet and check glucose prior to meals or as instructed by your physician. Dietary Supplements(Take twice a day unless instructed otherwise):  [] Cally Cueva  [] 30ml ProStat [] Bri Bidding [] Ensure Max/Premier [] Other:    Your nurse  is:  Pedro Robles     Electronically signed by Rose Remy RN on 11/7/2022 at 2:43 PM     [facility] Information: Should you experience any significant changes in your wound(s) or have questions about your wound care, please contact the [address] [place] [address] at [phone]. We are open from 8:00am - 4:30p Monday, Thursday and Friday; 11:00am - 5pm on Tuesday and CLOSED Wednesday. Please give us 24-48 business hours to return your call. Call your doctor now or seek immediate medical care if:    You have symptoms of infection, such as: Increased pain, swelling, warmth, or redness. Red streaks leading from the area. Pus draining from the area. A fever.          [] Patient unable to sign Discharge Instructions given to ECF/Transportation/POA           Electronically signed by Michael Beatty MD on 11/7/2022 at 2:53 PM actual/standing

## 2022-11-07 NOTE — DISCHARGE INSTRUCTIONS
215 [facility] Physician Orders and Discharge Instructions  [address] [place] [address] [address] [address] [facility]  Telephone: [phone]  NAME:  Sahra Nix. YOB: 1950  MEDICAL RECORD NUMBER:  [id number]  DATE: 22     Return Appointment:  Return Appointment: With Dr. Qi Mujica  in  [facility])  [] Return Appointment for a Wound Assessment with the nurse on:     Future Appointments   Date Time Provider Orlando Colmenares   2022  9:45 AM DO Wilton Richmond 6199   2023  8:00 PM [facility] LAB ROOM [facility]   2023  1:00 PM Malik Amato MD [address] [place] [address] Street: [address] [place] [address] Drive   F: [phone]   Medically necessary services: [x] Skilled Nursing [] PT (Eval & Treat) [] OT (Eval & Treat) [] Social Work [] Dietician  [] Other:    Wound care instructions: If you smoke we ask that you refrain from smoking. Smoking inhibits wounds from healing. When taking antibiotics take the entire prescription as ordered. Do not stop taking until medication is all gone unless otherwise instructed. Exercise as tolerated. Keep weight off wounds and reposition every 2 hours if applicable. Do not get wounds wet in the bath or shower unless otherwise instructed by your physician. If your wound is on your foot or leg, you may purchase a cast bag. Please ask at the pharmacy. Wash hands with soap and water prior to and after every dressing change. [x]Wash wounds with: 0.9% normal saline  [x]Catrachita wound Topical Treatments: Do not apply lotions, creams, or ointments to the skin around the wound bed unless directed as followed:   Apply around the wound: Nothing         [x]Wound Location: right lower leg wounds   Apply Primary Dressing to wound: Oly  Secondary Dressing: 4X4 gauze pad   Avoid contact of tape with skin if possible.   When to change Dressin times per week: Monday and Friday- Friday by home care nurse    [x]Wound Location: left plantar foot   Apply Primary Dressing to wound: Oly  Secondary Dressing: 4X4 gauze pad and Conforming roll gauze   Avoid contact of tape with skin if possible. When to change Dressing: 3 times per week:Monday/Wednesday/Friday- Wednesday and Friday by home care      [x] Multilayer Compression Wrap:  Type: Applied on Right lower leg(s)  2 Layer Compression Wrap - home care to change on Fridays    Do not get leg(s) with wrap wet. If wraps become too tight call the center or completely remove the wrap. Elevate leg(s) above the level of the heart when sitting. Avoid prolonged standing in one place. Applied in Clinic on 11/7/2022  The Goal of this therapy is to reduce edema and get into long term compression garments to control venous insufficiency, lymphedema and reduce occurrence of venous ulcers    [x] Edema Control:  Apply: Medigrip on the Left; (Double medium). They should be applied to affected leg(s) from mid foot to knee making sure to cover the heel      Elevate leg(s) above the level of the heart for 30 minutes 4-5 times a day and/or when sitting. Avoid prolonged standing in one place. [x]Off Loading(Pressure Reduction) When: Walking  Weight Bearing Status: No Weight bearing restrictions Left; Offloading devices: Post op shoe/ Surgical shoe: Left      Dietary:  Important dietary reminders:  1. Increase Protein intake (i.e. Lean meats, fish, eggs, legumes, and yogurt)  2. No added salt  3. If diabetic, follow a diabetic diet and check glucose prior to meals or as instructed by your physician.     Dietary Supplements(Take twice a day unless instructed otherwise):  [] Gaynelle Sven  [] 30ml ProStat [] Indira Grave [] Ensure Max/Premier [] Other:    Your nurse  is:  Miguel Monaco     Electronically signed by Jeff Jackson RN on 11/7/2022 at 2:43 PM     215 [facility] Information: Should you experience any significant changes in your wound(s) or have questions about your wound care, please contact the [address] [place] [address] at [phone]. We are open from 8:00am - 4:30p Monday, Thursday and Friday; 11:00am - 5pm on Tuesday and CLOSED Wednesday. Please give us 24-48 business hours to return your call. Call your doctor now or seek immediate medical care if:    You have symptoms of infection, such as: Increased pain, swelling, warmth, or redness. Red streaks leading from the area. Pus draining from the area. A fever.          [] Patient unable to sign Discharge Instructions given to ECF/Transportation/POA

## 2022-11-08 ENCOUNTER — TELEPHONE (OUTPATIENT)
Dept: PRIMARY CARE CLINIC | Age: 72
End: 2022-11-08

## 2022-11-08 NOTE — TELEPHONE ENCOUNTER
Della Fees from [employer] call that the member [facility] has been [facility]. The care plan can be view thru the provider portal and the bring the member round every 2 weeks. Email [email or web address]      Any question call [phone]

## 2022-11-14 ENCOUNTER — HOSPITAL ENCOUNTER (OUTPATIENT)
Dept: GENERAL RADIOLOGY | Age: 72
Discharge: HOME | End: 2022-11-14
Payer: MEDICARE

## 2022-11-14 ENCOUNTER — HOSPITAL ENCOUNTER (OUTPATIENT)
Dept: WOUND CARE | Age: 72
Discharge: HOME | End: 2022-11-14
Payer: MEDICARE

## 2022-11-14 VITALS
TEMPERATURE: 97 F | RESPIRATION RATE: 20 BRPM | DIASTOLIC BLOOD PRESSURE: 81 MMHG | HEART RATE: 83 BPM | SYSTOLIC BLOOD PRESSURE: 153 MMHG

## 2022-11-14 DIAGNOSIS — I73.9 PVD (PERIPHERAL VASCULAR DISEASE) (HCC): ICD-10-CM

## 2022-11-14 DIAGNOSIS — S91.302D OPEN WOUND OF LEFT FOOT, SUBSEQUENT ENCOUNTER: ICD-10-CM

## 2022-11-14 DIAGNOSIS — L97.912 NON-PRESSURE ULCER OF RIGHT LOWER EXTREMITY WITH FAT LAYER EXPOSED (HCC): Primary | ICD-10-CM

## 2022-11-14 DIAGNOSIS — I70.221 ATHEROSCLEROSIS OF NATIVE ARTERY OF RIGHT LOWER EXTREMITY WITH REST PAIN (HCC): ICD-10-CM

## 2022-11-14 DIAGNOSIS — S81.801D OPEN WOUND OF RIGHT LOWER LEG, SUBSEQUENT ENCOUNTER: ICD-10-CM

## 2022-11-14 PROCEDURE — 87070 CULTURE OTHR SPECIMN AEROBIC: CPT

## 2022-11-14 PROCEDURE — 11042 DBRDMT SUBQ TIS 1ST 20SQCM/<: CPT | Performed by: SPECIALIST

## 2022-11-14 PROCEDURE — 73630 X-RAY EXAM OF FOOT: CPT

## 2022-11-14 PROCEDURE — 6370000000 HC RX 637 (ALT 250 FOR IP): Performed by: SPECIALIST

## 2022-11-14 PROCEDURE — 87205 SMEAR GRAM STAIN: CPT

## 2022-11-14 PROCEDURE — 87077 CULTURE AEROBIC IDENTIFY: CPT

## 2022-11-14 PROCEDURE — 29581 APPL MULTLAYER CMPRN SYS LEG: CPT

## 2022-11-14 PROCEDURE — 11042 DBRDMT SUBQ TIS 1ST 20SQCM/<: CPT

## 2022-11-14 PROCEDURE — 87186 SC STD MICRODIL/AGAR DIL: CPT

## 2022-11-14 RX ORDER — GINSENG 100 MG
CAPSULE ORAL ONCE
Status: CANCELLED | OUTPATIENT
Start: 2022-11-14 | End: 2022-11-14

## 2022-11-14 RX ORDER — LIDOCAINE HYDROCHLORIDE 20 MG/ML
JELLY TOPICAL ONCE
Status: CANCELLED | OUTPATIENT
Start: 2022-11-14 | End: 2022-11-14

## 2022-11-14 RX ORDER — CLOBETASOL PROPIONATE 0.5 MG/G
OINTMENT TOPICAL ONCE
Status: CANCELLED | OUTPATIENT
Start: 2022-11-14 | End: 2022-11-14

## 2022-11-14 RX ORDER — LIDOCAINE HYDROCHLORIDE 40 MG/ML
SOLUTION TOPICAL ONCE
Status: CANCELLED | OUTPATIENT
Start: 2022-11-14 | End: 2022-11-14

## 2022-11-14 RX ORDER — BACITRACIN, NEOMYCIN, POLYMYXIN B 400; 3.5; 5 [USP'U]/G; MG/G; [USP'U]/G
OINTMENT TOPICAL ONCE
Status: CANCELLED | OUTPATIENT
Start: 2022-11-14 | End: 2022-11-14

## 2022-11-14 RX ORDER — LIDOCAINE 40 MG/G
CREAM TOPICAL ONCE
Status: CANCELLED | OUTPATIENT
Start: 2022-11-14 | End: 2022-11-14

## 2022-11-14 RX ORDER — GENTAMICIN SULFATE 1 MG/G
OINTMENT TOPICAL ONCE
Status: CANCELLED | OUTPATIENT
Start: 2022-11-14 | End: 2022-11-14

## 2022-11-14 RX ORDER — BETAMETHASONE DIPROPIONATE 0.05 %
OINTMENT (GRAM) TOPICAL ONCE
Status: CANCELLED | OUTPATIENT
Start: 2022-11-14 | End: 2022-11-14

## 2022-11-14 RX ORDER — BACITRACIN ZINC AND POLYMYXIN B SULFATE 500; 1000 [USP'U]/G; [USP'U]/G
OINTMENT TOPICAL ONCE
Status: CANCELLED | OUTPATIENT
Start: 2022-11-14 | End: 2022-11-14

## 2022-11-14 RX ORDER — SENNOSIDES 8.6 MG
1 TABLET ORAL NIGHTLY PRN
COMMUNITY
Start: 2022-11-03

## 2022-11-14 RX ORDER — LIDOCAINE 50 MG/G
OINTMENT TOPICAL ONCE
Status: CANCELLED | OUTPATIENT
Start: 2022-11-14 | End: 2022-11-14

## 2022-11-14 RX ORDER — LIDOCAINE HYDROCHLORIDE 40 MG/ML
SOLUTION TOPICAL ONCE
Status: COMPLETED | OUTPATIENT
Start: 2022-11-14 | End: 2022-11-14

## 2022-11-14 RX ADMIN — LIDOCAINE HYDROCHLORIDE: 40 SOLUTION TOPICAL at 08:43

## 2022-11-14 ASSESSMENT — PAIN - FUNCTIONAL ASSESSMENT: PAIN_FUNCTIONAL_ASSESSMENT: PREVENTS OR INTERFERES SOME ACTIVE ACTIVITIES AND ADLS

## 2022-11-14 ASSESSMENT — PAIN DESCRIPTION - PAIN TYPE: TYPE: CHRONIC PAIN

## 2022-11-14 ASSESSMENT — PAIN DESCRIPTION - ORIENTATION: ORIENTATION: RIGHT

## 2022-11-14 ASSESSMENT — PAIN DESCRIPTION - FREQUENCY: FREQUENCY: CONTINUOUS

## 2022-11-14 ASSESSMENT — PAIN DESCRIPTION - LOCATION: LOCATION: LEG

## 2022-11-14 ASSESSMENT — PAIN SCALES - GENERAL: PAINLEVEL_OUTOF10: 9

## 2022-11-14 NOTE — DISCHARGE INSTRUCTIONS
[address] [facility] Physician Orders and Discharge Instructions  [address] [address] [address] [address] [facility]. [place]. [place]  Telephone: [phone]  NAME:  Valeria Dover. YOB: 1950  MEDICAL RECORD NUMBER:  [id number]  DATE: 22     Return Appointment:  Return Appointment: With Dr Claribel Valencia  in  1 [facility])  [] Return Appointment for a Wound Assessment with the nurse on:     Future Appointments   Date Time Provider Orlando Colmenares   2022  9:45 AM DO Wilton Bhatt 6199   2023  8:00 PM [facility]    2023  1:00 PM Racheal Henriquez MD [address] [place] [address] Street: [facility] [phone]   F: [phone]   Medically necessary services: [x] Skilled Nursing [] PT (Eval & Treat) [] OT (Eval & Treat) [] Social Work [] Dietician  [] Other:    Wound care instructions: If you smoke we ask that you refrain from smoking. Smoking inhibits wounds from healing. When taking antibiotics take the entire prescription as ordered. Do not stop taking until medication is all gone unless otherwise instructed. Exercise as tolerated. Keep weight off wounds and reposition every 2 hours if applicable. Do not get wounds wet in the bath or shower unless otherwise instructed by your physician. If your wound is on your foot or leg, you may purchase a cast bag. Please ask at the pharmacy. Wash hands with soap and water prior to and after every dressing change. [x]Wash wounds with: 0.9% normal saline  [x]Catrachita wound Topical Treatments: Do not apply lotions, creams, or ointments to the skin around the wound bed unless directed as followed:   Apply around the wound: Nothing         [x]Wound Location: right lower leg wounds   Apply Primary Dressing to wound: Oly  Secondary Dressing: 4X4 gauze pad   Avoid contact of tape with skin if possible.   When to change Dressin times per week: Monday and Friday and Home Care to do: Friday    [x]Wound Location: left foot wound   Apply Primary Dressing to wound: Oly  Secondary Dressing: 4X4 gauze pad and Conforming roll gauze   Avoid contact of tape with skin if possible. When to change Dressing: 3 times per week:Monday/Wednesday/Friday and Home Care to do: Wednesday and Friday        [x] Multilayer Compression Wrap:  Type: Applied on Right lower leg(s)  2 Layer Compression Wrap  HOME CARE: Wash lower legs with mild soap and water with every dressing change. HOME CARE: Change compression dressings every: friday  HOME CARE: Apply moisturizer to affected lower legs avoiding the wound bed(s)     Do not get leg(s) with wrap wet. If wraps become too tight call the center or completely remove the wrap. Elevate leg(s) above the level of the heart when sitting. Avoid prolonged standing in one place. Applied in Clinic on 11/14/2022  The Goal of this therapy is to reduce edema and get into long term compression garments to control venous insufficiency, lymphedema and reduce occurrence of venous ulcers    [x] Edema Control:  Apply: Medigrip on the Left; (Double medium). They should be applied to affected leg(s) from mid foot to knee making sure to cover the heel   Apply every morning immediately when getting up. Remove every night before going to bed unless instructed otherwise     Elevate leg(s) above the level of the heart for 30 minutes 4-5 times a day and/or when sitting. Avoid prolonged standing in one place. Dietary:  Important dietary reminders:  1. Increase Protein intake (i.e. Lean meats, fish, eggs, legumes, and yogurt)  2. No added salt  3. If diabetic, follow a diabetic diet and check glucose prior to meals or as instructed by your physician.     Dietary Supplements(Take twice a day unless instructed otherwise):  [] Shoaib Purdue  [] 30ml ProStat [] Kirstin Rodas [] Ensure Max/Premier [] Other:    Your nurse  is:  Leesa Luevano Electronically signed by Padmaja Burnett RN on 11/14/2022 at 9:07 AM     [address] Information: Should you experience any significant changes in your wound(s) or have questions about your wound care, please contact the [facility] at [phone]. We are open from 8:00am - 4:30p Monday, Thursday and Friday; 11:00am - 5pm on Tuesday and CLOSED Wednesday. Please give us 24-48 business hours to return your call. Call your doctor now or seek immediate medical care if:    You have symptoms of infection, such as: Increased pain, swelling, warmth, or redness. Red streaks leading from the area. Pus draining from the area. A fever.          [] Patient unable to sign Discharge Instructions given to ECF/Transportation/POA

## 2022-11-14 NOTE — PROGRESS NOTES
[address] [facility]  Progress Note and Procedure Note      ole. MEDICAL RECORD NUMBER:  [id number]  AGE: 81 y.o. GENDER: male  : 1950  EPISODE DATE:  2022    Subjective:     Chief Complaint   Patient presents with    Wound Check     Right lower leg           HISTORY of PRESENT ILLNESS HPI     Sahra Merced. is a 81 y.o. male who presents today for wound/ulcer evaluation. History of Wound Context: Patient of Dr. Nadya Silverio with continued follow-up for right lower extremity wound having had revascularization in the past.  Patient complains of pain left foot.   Wound/Ulcer Pain Timing/Severity: intermittent  Quality of pain: sharp  Severity:  4 / 10   Modifying Factors: None  Associated Signs/Symptoms: drainage and pain    Ulcer Identification:  Ulcer Type: arterial and pressure    Contributing Factors: edema, venous stasis, chronic pressure, decreased mobility, and arterial insufficiency    Acute Wound: N/A not an acute wound    PAST MEDICAL HISTORY        Diagnosis Date    CAD (coronary artery disease)     CHF (congestive heart failure) ([facility])     diastolic    COPD (chronic obstructive pulmonary disease) ([facility])     Critical ischemia of lower extremity ([facility])     Depressive disorder, not elsewhere classified     GERD (gastroesophageal reflux disease)     History of colon polyps - 30 seen on colonoscopy 2021    History of MI (myocardial infarction) 2013    History of skin ulcer of lower extremity     R anterior shin    History of transient ischemic attack (TIA)     Hx of blood clots     PE    Hyperlipidemia     Hypertension     Neuropathy     KAVITA (obstructive sleep apnea)     On CPAP    Personal history of DVT (deep vein thrombosis)     Prolonged emergence from general anesthesia     Pt reported being combative after surgery     PVD (peripheral vascular disease) ([address]     TIA (transient ischemic attack) 2013    Vertebral fracture        PAST SURGICAL HISTORY    Past Surgical History:   Procedure Laterality Date    APPENDECTOMY      CHOLECYSTECTOMY, LAPAROSCOPIC      COLONOSCOPY  4/23/2021    COLONOSCOPY POLYPECTOMY SNARE/COLD BIOPSY performed by Jose Guerra MD at [facility]  4/23/2021    COLONOSCOPY POLYPECTOMY ABLATION performed by Jose Guerra MD at [facility] [address]  4/23/2021    COLONOSCOPY CONTROL HEMORRHAGE performed by Jose Guerra MD at [address]  6/2013    EYE SURGERY Left     FEMORAL BYPASS Right 7/18/2022    RIGHT FEMORAL BELOW KNEE POPLITEAL ARTERY BYPASS WITH IN SITU SAPHENOUS VEIN performed by Chevy Regan MD at [address] [facility] [facility] [address] Right 11/17/2021    RIGHT FEMORAL ENDARTERECTOMY  RIGHT EXTERNAL ILIAC TO PROFUNDA FEMORAL BYPASS WITH 8MM PTFE GRAFT RIGHT LOWER EXTREMITY ARTERIOGRAM BALLOON ANGIOPLASTY RIGHT PROFUNDA BALLOON ANGIOPLASTY AND STENT OF RIGHT EXTERNAL ILIAC ARTERY performed by Chevy Regan MD at [address] [facility]  11/18/2015    Bilateral decompressive lumbar laminectomy L4-5   ; medial facetectomy L4-5 joints  bilaterally; foraminotomies l5   nerve roots bilaterally    LEG DEBRIDEMENT Right 7/23/2013    Dr. Thelma Holloway - pretibial wound    OTHER SURGICAL HISTORY Right 6/25/2013    Dr. Thelma Holloway - CFA Endarterectomy w/marsupialization; RLE wound excision & debridement     OTHER SURGICAL HISTORY Left 8/27/2013    Dr. Thelma Holloway - femoral & profunda femoris endarterectomy w/marsupialization patch angioplasty using SFA    SKIN SPLIT GRAFT Right 7/25/2013    Dr. Thelma Holloway - to non-healing R pretibial wound, 9 x 15 cm    TOTAL HIP ARTHROPLASTY Right 09/01/2016    Dr. Luis Knutson Left 12/22/2015    Dr. Thelma Holloway - CFA exploration, thrombectomy of ileofemoral system, stenting of RAFAL in-stent stenosis w/8 x 37 mm Palmaz        FAMILY HISTORY    Family History   Problem Relation Age of Onset    Cancer Mother Breast    Heart Disease Mother     Cancer Father         Bladder    Heart Disease Father     Cancer Paternal Grandmother         melanoma        SOCIAL HISTORY    Social History     Tobacco Use    Smoking status: Former     Packs/day: 0.25     Years: 52.00     Pack years: 13.00     Types: Cigarettes     Start date: 1967     Quit date: 2022     Years since quittin.4    Smokeless tobacco: Never    Tobacco comments:     reports he quit circa 22   Vaping Use    Vaping Use: Never used   Substance Use Topics    Alcohol use: Yes     Alcohol/week: 0.0 standard drinks     Comment: 2-3 beers a month    Drug use: No       ALLERGIES    Allergies   Allergen Reactions    Asa [Aspirin] Other (See Comments)     Stomach ache    Daliresp [Roflumilast] Diarrhea and Other (See Comments)     Severe diarrhea and did not help       MEDICATIONS    Current Outpatient Medications on File Prior to Encounter   Medication Sig Dispense Refill    senna (SENOKOT) 8.6 MG TABS tablet Take 1 tablet by mouth nightly as needed      clopidogrel (PLAVIX) 75 MG tablet TAKE 1 TABLET BY MOUTH DAILY 30 tablet 3    DULoxetine (CYMBALTA) 20 MG extended release capsule       ipratropium-albuterol (DUONEB) 0.5-2.5 (3) MG/3ML SOLN nebulizer solution Inhale 3 mLs into the lungs every 4 hours as needed for Shortness of Breath 360 mL 4    albuterol sulfate HFA (PROVENTIL;VENTOLIN;PROAIR) 108 (90 Base) MCG/ACT inhaler Inhale 2 puffs into the lungs every 6 hours as needed for Wheezing 18 g 2    omeprazole (PRILOSEC) 40 MG delayed release capsule TAKE 1 CAPSULE BY MOUTH DAILY 30 capsule 3    Evolocumab 140 MG/ML SOAJ Inject 140 mg into the skin every 14 days Take an injection every 14 days. fluticasone-umeclidin-vilant (TRELEGY ELLIPTA) 100-62.5-25 MCG/INH AEPB Inhale 1 puff into the lungs in the morning.  28 each 11    potassium chloride (MICRO-K) 10 MEQ extended release capsule 1 tablet 60 capsule 1    furosemide (LASIX) 40 MG tablet Take 1 tablet by mouth in the morning. 90 tablet 3    metoprolol tartrate (LOPRESSOR) 50 MG tablet Take 1.5 tablets by mouth in the morning and 1.5 tablets before bedtime. 90 tablet 5    apixaban (ELIQUIS) 5 MG TABS tablet Take 1 tablet by mouth in the morning and 1 tablet before bedtime. 60 tablet 1    losartan (COZAAR) 100 MG tablet Take 1 tablet by mouth in the morning. 30 tablet 3    atorvastatin (LIPITOR) 80 MG tablet Take 1 tablet by mouth daily 90 tablet 1    pregabalin (LYRICA) 150 MG capsule Take 1 capsule by mouth 2 times daily for 30 days. 180 capsule 1    allopurinol (ZYLOPRIM) 100 MG tablet Take 1 tablet by mouth daily 180 tablet 1    [DISCONTINUED] folic acid (FOLVITE) 1 MG tablet Take 1 tablet by mouth daily 30 tablet 3    [DISCONTINUED] nitroGLYCERIN (NITROSTAT) 0.4 MG SL tablet Place 1 tablet under the tongue every 5 minutes as needed for Chest pain (Patient not taking: No sig reported) 25 tablet 1    [DISCONTINUED] magnesium gluconate (MAGONATE) 500 MG tablet Take 500 mg by mouth daily. No current facility-administered medications on file prior to encounter. REVIEW OF SYSTEMS  Review of Systems    Pertinent items are noted in HPI. Objective:      BP (!) 153/81   Pulse 83   Temp 97 °F (36.1 °C) (Temporal)   Resp 20     Wt Readings from Last 3 Encounters:   10/26/22 235 lb (106.6 kg)   10/20/22 237 lb (107.5 kg)   08/30/22 225 lb 6.4 oz (102.2 kg)       PHYSICAL EXAM    General Appearance: alert and oriented to person, place and time and in no acute distress  Extremities: no cyanosis, no clubbing, 1 + edema-  bilateral lower extremities, and full thickness wound anterior right leg containing fibrin designated as wound #1. Small partial-thickness wounds with granulation tissue present right distal proximal right knee  Full-thickness wound containing fibrin and purulent drainage overlying left plantar great toe. Designated as wound #2 . periwound callus   Assessment:      1.  Non-pressure ulcer of right lower extremity with fat layer exposed ([address]    2. Open wound of right lower leg, subsequent encounter    3. Atherosclerosis of native artery of right lower extremity with rest pain ([address]    4. PVD (peripheral vascular disease) ([address]    5. Open wound of left foot, subsequent encounter         Procedure Note  Indications:  Based on my examination of this patient's wound(s) today, sharp excision is required to promote healing and evaluate the extent healing. Performed by: Familia Beatty MD    Consent obtained? Yes    Time out taken: Yes    Pain Control: Anesthetic: 4% Lidocaine Liquid Topical     Debridement:Excisional Debridement    Using curette the wound was sharply debrided    down through and including the removal of  epidermis, dermis, and subcutaneous tissue.     Devitalized Tissue Debrided:  fibrin      Pre Debridement Measurements:  Are located in the Wound Documentation Flow Sheet   Wound #: 1 and 2     Post  Debridement Measurements:  Wound 11/15/21 Leg Distal;Right #1 (Active)   Wound Image   10/31/22 0904   Wound Etiology Arterial 08/22/22 1433   Wound Cleansed Cleansed with saline 11/14/22 0822   Dressing/Treatment Collagen with Ag 11/07/22 1456   Wound Length (cm) 4.8 cm 11/14/22 0822   Wound Width (cm) 1.4 cm 11/14/22 0822   Wound Depth (cm) 0.1 cm 11/14/22 0822   Wound Surface Area (cm^2) 6.72 cm^2 11/14/22 0822   Change in Wound Size % (l*w) -180 11/14/22 0822   Wound Volume (cm^3) 0.672 cm^3 11/14/22 0822   Wound Healing % -180 11/14/22 0822   Post-Procedure Length (cm) 4.9 cm 11/14/22 0853   Post-Procedure Width (cm) 1.5 cm 11/14/22 0853   Post-Procedure Depth (cm) 0.3 cm 11/14/22 0853   Post-Procedure Surface Area (cm^2) 7.35 cm^2 11/14/22 0853   Post-Procedure Volume (cm^3) 2.205 cm^3 11/14/22 0853   Distance Tunneling (cm) 0 cm 11/14/22 0822   Tunneling Position ___ O'Clock 0 10/18/22 1303   Undermining Starts ___ O'Clock 0 10/18/22 1303   Undermining Ends___ O'Clock 0 10/18/22 1303 Undermining Maxium Distance (cm) 0 11/14/22 0822   Wound Assessment Pink/red;Fibrin 11/14/22 0822   Drainage Amount Small 11/14/22 0822   Drainage Description Yellow;Brown 11/14/22 0822   Odor None 11/14/22 0822   Catrachita-wound Assessment Edematous;Dry/flaky 11/14/22 0822   Margins Defined edges 11/14/22 0822   Wound Thickness Description not for Pressure Injury Full thickness 11/14/22 0822   Number of days: 363       Wound 09/12/22 Foot Plantar;Left #2 great toe (Active)   Wound Image   10/31/22 0904   Wound Etiology Traumatic 09/19/22 0932   Wound Cleansed Cleansed with saline 11/14/22 0822   Dressing/Treatment Collagen with Ag 11/07/22 1456   Wound Length (cm) 0.5 cm 11/14/22 0822   Wound Width (cm) 0.3 cm 11/14/22 0822   Wound Depth (cm) 0.2 cm 11/14/22 0822   Wound Surface Area (cm^2) 0.15 cm^2 11/14/22 0822   Change in Wound Size % (l*w) 95 11/14/22 0822   Wound Volume (cm^3) 0.03 cm^3 11/14/22 0822   Wound Healing % 90 11/14/22 0822   Post-Procedure Length (cm) 0.6 cm 11/14/22 0853   Post-Procedure Width (cm) 0.4 cm 11/14/22 0853   Post-Procedure Depth (cm) 0.4 cm 11/14/22 0853   Post-Procedure Surface Area (cm^2) 0.24 cm^2 11/14/22 0853   Post-Procedure Volume (cm^3) 0.096 cm^3 11/14/22 0853   Distance Tunneling (cm) 0.5 cm 11/14/22 0853   Tunneling Position ___ O'Clock 0900 11/14/22 0853   Undermining Starts ___ O'Clock 0 10/18/22 1303   Undermining Ends___ O'Clock 0 10/18/22 1303   Undermining Maxium Distance (cm) 0 11/14/22 0822   Wound Assessment Devitalized tissue 11/14/22 0822   Drainage Amount Small 11/14/22 0822   Drainage Description Emory Ort 11/14/22 0822   Odor None 11/14/22 0822   Catrachita-wound Assessment Hyperkeratosis (callous) 11/14/22 0822   Margins Defined edges 11/14/22 0822   Wound Thickness Description not for Pressure Injury Full thickness 11/14/22 0822   Number of days: 63       Wound 09/19/22 Leg Lower;Proximal;Right #3 (Active)   Wound Image   10/31/22 0842   Wound Etiology Traumatic 09/19/22 0932   Wound Cleansed Cleansed with saline 11/14/22 0822   Dressing/Treatment Collagen with Ag 11/07/22 1456   Wound Length (cm) 0.5 cm 11/14/22 0822   Wound Width (cm) 0.5 cm 11/14/22 0822   Wound Depth (cm) 0.1 cm 11/14/22 0822   Wound Surface Area (cm^2) 0.25 cm^2 11/14/22 0822   Change in Wound Size % (l*w) 79.17 11/14/22 0822   Wound Volume (cm^3) 0.025 cm^3 11/14/22 0822   Wound Healing % 79 11/14/22 0822   Post-Procedure Length (cm) 0.5 cm 11/14/22 0853   Post-Procedure Width (cm) 0.5 cm 11/14/22 0853   Post-Procedure Depth (cm) 0.1 cm 11/14/22 0853   Post-Procedure Surface Area (cm^2) 0.25 cm^2 11/14/22 0853   Post-Procedure Volume (cm^3) 0.025 cm^3 11/14/22 0853   Distance Tunneling (cm) 0 cm 11/14/22 0822   Tunneling Position ___ O'Clock 0 10/18/22 1303   Undermining Starts ___ O'Clock 0 10/18/22 1303   Undermining Ends___ O'Clock 0 10/18/22 1303   Undermining Maxium Distance (cm) 0 11/14/22 0822   Wound Assessment Pink/red 11/14/22 0822   Drainage Amount Small 11/14/22 0822   Drainage Description Serosanguinous 11/14/22 0822   Odor None 11/14/22 0822   Catrachita-wound Assessment Intact 11/14/22 0822   Margins Defined edges 11/14/22 0822   Wound Thickness Description not for Pressure Injury Full thickness 11/14/22 0822   Number of days: 56       Wound 10/31/22 Leg Right; Lower; Lateral #4 (Active)   Wound Image   10/31/22 0904   Wound Etiology Venous 10/31/22 0842   Wound Cleansed Cleansed with saline 11/14/22 0822   Dressing/Treatment Collagen with Ag 11/07/22 1456   Wound Length (cm) 1 cm 11/14/22 0822   Wound Width (cm) 1 cm 11/14/22 0822   Wound Depth (cm) 0.1 cm 11/14/22 0822   Wound Surface Area (cm^2) 1 cm^2 11/14/22 0822   Change in Wound Size % (l*w) 50 11/14/22 0822   Wound Volume (cm^3) 0.1 cm^3 11/14/22 0822   Wound Healing % 50 11/14/22 0822   Post-Procedure Length (cm) 1 cm 11/14/22 0853   Post-Procedure Width (cm) 1 cm 11/14/22 0853   Post-Procedure Depth (cm) 0.1 cm 11/14/22 0853 Post-Procedure Surface Area (cm^2) 1 cm^2 11/14/22 0853   Post-Procedure Volume (cm^3) 0.1 cm^3 11/14/22 0853   Distance Tunneling (cm) 0 cm 11/14/22 0822   Undermining Maxium Distance (cm) 0 11/14/22 0822   Wound Assessment Fibrin;Pink/red 11/14/22 0822   Drainage Amount Small 11/14/22 0822   Drainage Description Serosanguinous 11/14/22 0822   Odor None 11/14/22 0822   Catrachita-wound Assessment Edematous 11/14/22 0822   Margins Defined edges 11/14/22 5294   Wound Thickness Description not for Pressure Injury Full thickness 11/14/22 0822   Number of days: 14          Percent of Wound Debrided: 100%    Total Surface Area Debrided:  7.5 sq cm    Diabetic/Pressure/Non Pressure Ulcers only:  Ulcer: Non-Pressure ulcer, fat layer exposed    Bleeding: Minimal    Hemostasis Achieved: by pressure    Procedural Pain: 3  / 10     Post Procedural Pain: 0 / 10     Response to treatment:  With complaints of pain. Left lower extremity wounds appear slightly improved with epithelialization present. Left plantar foot wound now draining purulent material.  A culture was taken today. Also will get x-ray of left foot to rule out osteo-. Patient to see Dr. Ian Swanson next week        Plan:     Treatment Note: Please see attached Discharge Instructions.   These instructions were given and signed by the patient or POA    New Prescriptions    No medications on file       Orders Placed This Encounter   Procedures    Initiate Outpatient Wound Care Protocol    Culture, Wound Aerobic Only    XR FOOT LEFT (MIN 3 VIEWS)           Electronically signed by Carole Wray MD on 11/14/2022 at 9:08 AM

## 2022-11-14 NOTE — LETTER
[address] [facility] Physician Orders and Discharge Instructions  [address] [address] [address] [address] [facility]. [place]. [place]  Telephone: [phone]  NAME:  Noah Sweeney YOB: 1950  MEDICAL RECORD NUMBER:  [id number]  DATE: 11/14/22     Return Appointment:  Jorge Norton Return Appointment: With Dr Puneet Oliver  in  1 [facility])  o [] Return Appointment for a Wound Assessment with the nurse on:     Future Appointments   Date Time Provider Orlando Colmenares   12/14/2022  9:45 AM Mortimer Smiling, DO [place] [address]   1/9/2023  8:00 PM [facility] SLEEP LAB ROOM 3 23 [facility] [place]    1/16/2023  1:00 PM Wilfrido Valdes MD [address] [place] [address] Street: [facility] [phone]   F: [phone]   Medically necessary services: [x] Skilled Nursing [] PT (Eval & Treat) [] OT (Eval & Treat) [] Social Work [] Dietician  [] Other:    Wound care instructions:  1. If you smoke we ask that you refrain from smoking. Smoking inhibits wounds from healing. 2. When taking antibiotics take the entire prescription as ordered. Do not stop taking until medication is all gone unless otherwise instructed. 3. Exercise as tolerated. 4. Keep weight off wounds and reposition every 2 hours if applicable. 5. Do not get wounds wet in the bath or shower unless otherwise instructed by your physician. If your wound is on your foot or leg, you may purchase a cast bag. Please ask at the pharmacy. 6. Wash hands with soap and water prior to and after every dressing change.     [x]Wash wounds with: 0.9% normal saline  [x]Catrachita wound Topical Treatments: Do not apply lotions, creams, or ointments to the skin around the wound bed unless directed as followed:   o Apply around the wound: Nothing         [x]Wound Location: right lower leg wounds    Apply Primary Dressing to wound: Oly   Secondary Dressing: 4X4 gauze pad  o  Avoid contact of tape with skin if possible.  When to change Dressin times per week: Monday and Friday and Home Care to do: Friday    [x]Wound Location: left foot wound    Apply Primary Dressing to wound: Oly   Secondary Dressing: 4X4 gauze pad and Conforming roll gauze  o  Avoid contact of tape with skin if possible.  When to change Dressing: 3 times per week:Monday/Wednesday/Friday and Home Care to do: Wednesday and Friday        [x] Multilayer Compression Wrap:  Type: Applied on Right lower leg(s)  2 Layer Compression Wrap  HOME CARE: Wash lower legs with mild soap and water with every dressing change. HOME CARE: Change compression dressings every: friday  HOME CARE: Apply moisturizer to affected lower legs avoiding the wound bed(s)      Do not get leg(s) with wrap wet.  If wraps become too tight call the center or completely remove the wrap.  Elevate leg(s) above the level of the heart when sitting.  Avoid prolonged standing in one place.  Applied in Clinic on 2022   The Goal of this therapy is to reduce edema and get into long term compression garments to control venous insufficiency, lymphedema and reduce occurrence of venous ulcers    [x] Edema Control:  Apply: Medigrip on the Left; (Double medium). They should be applied to affected leg(s) from mid foot to knee making sure to cover the heel   Apply every morning immediately when getting up. Remove every night before going to bed unless instructed otherwise     · Elevate leg(s) above the level of the heart for 30 minutes 4-5 times a day and/or when sitting. · Avoid prolonged standing in one place. Dietary:  Important dietary reminders:  1. Increase Protein intake (i.e. Lean meats, fish, eggs, legumes, and yogurt)  2. No added salt  3. If diabetic, follow a diabetic diet and check glucose prior to meals or as instructed by your physician.     Dietary Supplements(Take twice a day unless instructed otherwise):  [] Real  [] 30ml ProStat [] EnsureEnlive [] Ensure Max/Premier [] Other:    Your nurse  is:  Chante Josue     Electronically signed by Padmaja Burnett RN on 11/14/2022 at 9:07 AM     [address] Information: Should you experience any significant changes in your wound(s) or have questions about your wound care, please contact the [address] [place] [address] at [phone]. We are open from 8:00am - 4:30p Monday, Thursday and Friday; 11:00am - 5pm on Tuesday and CLOSED Wednesday. Please give us 24-48 business hours to return your call. Call your doctor now or seek immediate medical care if:    · You have symptoms of infection, such as:  ? Increased pain, swelling, warmth, or redness. ? Red streaks leading from the area. ? Pus draining from the area. ? A fever.          [] Patient unable to sign Discharge Instructions given to ECF/Transportation/POA

## 2022-11-16 RX ORDER — APIXABAN 5 MG/1
TABLET, FILM COATED ORAL
Qty: 60 TABLET | Refills: 1 | Status: SHIPPED | OUTPATIENT
Start: 2022-11-16

## 2022-11-16 NOTE — TELEPHONE ENCOUNTER
Medication:   Requested Prescriptions     Pending Prescriptions Disp Refills    ELIQUIS 5 MG TABS tablet [Pharmacy Med Name: ELIQUIS 5MG TABLETS] 60 tablet 1     Sig: TAKE 1 TABLET BY MOUTH TWICE DAILY        Last Filled:  07/29/22    Patient Phone Number: [phone] (home)     Last appt: 8/30/2022   Next appt: 12/14/2022    Last OARRS:   RX Monitoring 8/14/2020   Attestation -   Periodic Controlled Substance Monitoring Possible medication side effects, risk of tolerance/dependence & alternative treatments discussed. ;Assessed functional status. ;Obtaining appropriate analgesic effect of treatment. Chronic Pain > 50 MEDD Obtained or confirmed \"Consent for Opioid Use\" on file.

## 2022-11-17 ENCOUNTER — TELEPHONE (OUTPATIENT)
Dept: WOUND CARE | Age: 72
End: 2022-11-17

## 2022-11-17 LAB
GRAM STAIN RESULT: ABNORMAL
ORGANISM: ABNORMAL
WOUND/ABSCESS: ABNORMAL

## 2022-11-17 RX ORDER — CLINDAMYCIN HYDROCHLORIDE 300 MG/1
300 CAPSULE ORAL 3 TIMES DAILY
Qty: 30 CAPSULE | Refills: 0 | Status: SHIPPED | OUTPATIENT
Start: 2022-11-17 | End: 2022-11-27

## 2022-11-17 NOTE — TELEPHONE ENCOUNTER
Called patient and discussed xray results and wound culture results of left foot wound. Escribed clindamycin to pharmacy listed in chart, instructed patient to start today. Patient verbalized understanding and reminded of wound care appt on Monday with Dr. Edd Cason at 1400.

## 2022-11-18 ENCOUNTER — TELEPHONE (OUTPATIENT)
Dept: WOUND CARE | Age: 72
End: 2022-11-18

## 2022-11-18 NOTE — TELEPHONE ENCOUNTER
Returned Susan's phone call  from [facility] care, and she stated she instructed to patient to watch for fever, increased pain to right lower leg and go to ED if needed over weekend. Tyler Martinez also stated patient smoking 1/2 pack a day. Discussed patient having to pay out of pocket for supplies (compression wraps) and Tyler Martinez will speak to Janet Martinez with [facility] about financial assist program.  Patient will be seen in wound care center on Monday.

## 2022-11-21 ENCOUNTER — HOSPITAL ENCOUNTER (OUTPATIENT)
Dept: WOUND CARE | Age: 72
Discharge: HOME | End: 2022-11-21
Payer: MEDICARE

## 2022-11-21 ENCOUNTER — TELEPHONE (OUTPATIENT)
Dept: WOUND CARE | Age: 72
End: 2022-11-21

## 2022-11-21 VITALS
SYSTOLIC BLOOD PRESSURE: 111 MMHG | TEMPERATURE: 97.6 F | HEART RATE: 80 BPM | RESPIRATION RATE: 20 BRPM | DIASTOLIC BLOOD PRESSURE: 60 MMHG

## 2022-11-21 DIAGNOSIS — I73.9 PVD (PERIPHERAL VASCULAR DISEASE) (HCC): ICD-10-CM

## 2022-11-21 DIAGNOSIS — I70.221 ATHEROSCLEROSIS OF NATIVE ARTERY OF RIGHT LOWER EXTREMITY WITH REST PAIN (HCC): ICD-10-CM

## 2022-11-21 DIAGNOSIS — L97.912 NON-PRESSURE ULCER OF RIGHT LOWER EXTREMITY WITH FAT LAYER EXPOSED (HCC): Primary | ICD-10-CM

## 2022-11-21 DIAGNOSIS — S81.801D OPEN WOUND OF RIGHT LOWER LEG, SUBSEQUENT ENCOUNTER: ICD-10-CM

## 2022-11-21 PROCEDURE — 29581 APPL MULTLAYER CMPRN SYS LEG: CPT

## 2022-11-21 PROCEDURE — 6370000000 HC RX 637 (ALT 250 FOR IP): Performed by: SPECIALIST

## 2022-11-21 RX ORDER — BETAMETHASONE DIPROPIONATE 0.05 %
OINTMENT (GRAM) TOPICAL ONCE
OUTPATIENT
Start: 2022-11-21 | End: 2022-11-21

## 2022-11-21 RX ORDER — GENTAMICIN SULFATE 1 MG/G
OINTMENT TOPICAL ONCE
OUTPATIENT
Start: 2022-11-21 | End: 2022-11-21

## 2022-11-21 RX ORDER — LIDOCAINE 40 MG/G
CREAM TOPICAL ONCE
OUTPATIENT
Start: 2022-11-21 | End: 2022-11-21

## 2022-11-21 RX ORDER — LIDOCAINE HYDROCHLORIDE 20 MG/ML
JELLY TOPICAL ONCE
OUTPATIENT
Start: 2022-11-21 | End: 2022-11-21

## 2022-11-21 RX ORDER — BACITRACIN, NEOMYCIN, POLYMYXIN B 400; 3.5; 5 [USP'U]/G; MG/G; [USP'U]/G
OINTMENT TOPICAL ONCE
OUTPATIENT
Start: 2022-11-21 | End: 2022-11-21

## 2022-11-21 RX ORDER — LIDOCAINE HYDROCHLORIDE 40 MG/ML
SOLUTION TOPICAL ONCE
Status: CANCELLED | OUTPATIENT
Start: 2022-11-21 | End: 2022-11-21

## 2022-11-21 RX ORDER — CLOBETASOL PROPIONATE 0.5 MG/G
OINTMENT TOPICAL ONCE
OUTPATIENT
Start: 2022-11-21 | End: 2022-11-21

## 2022-11-21 RX ORDER — LIDOCAINE 50 MG/G
OINTMENT TOPICAL ONCE
OUTPATIENT
Start: 2022-11-21 | End: 2022-11-21

## 2022-11-21 RX ORDER — BACITRACIN ZINC AND POLYMYXIN B SULFATE 500; 1000 [USP'U]/G; [USP'U]/G
OINTMENT TOPICAL ONCE
OUTPATIENT
Start: 2022-11-21 | End: 2022-11-21

## 2022-11-21 RX ORDER — GINSENG 100 MG
CAPSULE ORAL ONCE
OUTPATIENT
Start: 2022-11-21 | End: 2022-11-21

## 2022-11-21 RX ORDER — LIDOCAINE HYDROCHLORIDE 40 MG/ML
SOLUTION TOPICAL ONCE
Status: COMPLETED | OUTPATIENT
Start: 2022-11-21 | End: 2022-11-21

## 2022-11-21 RX ADMIN — LIDOCAINE HYDROCHLORIDE: 40 SOLUTION TOPICAL at 13:54

## 2022-11-21 ASSESSMENT — PAIN DESCRIPTION - LOCATION: LOCATION: LEG

## 2022-11-21 ASSESSMENT — PAIN DESCRIPTION - ORIENTATION: ORIENTATION: RIGHT

## 2022-11-21 ASSESSMENT — PAIN DESCRIPTION - FREQUENCY: FREQUENCY: CONTINUOUS

## 2022-11-21 ASSESSMENT — PAIN DESCRIPTION - DESCRIPTORS: DESCRIPTORS: BURNING

## 2022-11-21 ASSESSMENT — PAIN DESCRIPTION - PAIN TYPE: TYPE: CHRONIC PAIN

## 2022-11-21 ASSESSMENT — PAIN SCALES - GENERAL: PAINLEVEL_OUTOF10: 8

## 2022-11-21 NOTE — PROGRESS NOTES
Multilayer Compression Wrap   (Not Unna) Below the Knee    NAME:  Kayleen Cramer. YOB: 1950  MEDICAL RECORD NUMBER:  [id number]  DATE:  11/21/2022       Removed old Multilayer wrap if present and washed leg with mild soap/water. Applied moisturizing agent to dry skin as needed. Applied primary and secondary dressing as ordered    Applied multilayered dressing below the knee to Right lower leg(s)  (2 Layer compression wrap ) . Instructed patient/caregiver not to remove dressing and to keep it clean and dry. Instructed patient/caregiver on complications to report to provider, such as pain, numbness in toes, heavy drainage, and slippage of dressing. Instructed patient on purpose of compression dressing and on activity and exercise recommendations.    Applied per   Guidelines    Electronically signed by Arturo Romero RN on 11/21/2022 at 2:48 PM

## 2022-11-21 NOTE — DISCHARGE INSTRUCTIONS
[address] [place] [address] Physician Orders and Discharge Instructions  The [address] [address] [address] [address] [address] [facility]  Telephone: [phone]    NAME:  Gerri Linda. YOB: 1950  MEDICAL RECORD NUMBER:  [id number]  DATE:  11/21/2022      Wound care:  Continue to follow the instructions and recommendations from your last doctor visit. The dressing(s) applied is the same as your last visit. Please refer to your last discharge instruction for the information on your wound care. If there were any changes made, please follow the instructions as written here: Follow up next Monday with Dr Paris Jackson   Date Time Provider Orlando Colmenares   12/14/2022  9:45 AM DO JEFF Cook [place] - D[facility]D   1/9/2023  8:00 PM [facility] SLEEP LAB ROOM 3 [facility]   1/16/2023  1:00 PM Charlene Potter MD [facility] P/CC MMA           Your nurse  is:  Uma Humphries     Electronically signed by Khushboo Stewart RN on 11/21/2022 at 2:19 PM     [address] [place] [address] Information: Should you experience any significant changes in your wound(s) or have questions about your wound care, please contact the [address] [place] [address] at [phone]. Our hours vary so please leave a message. Please give us 24-48 hours to return your call. If you need help with your wounds and cannot wait until we are available, contact your PCP or go to the hospital emergency room. Physician orders by:  Dr Juan Luis Kumar        The information contained in the After Visit Summary has been reviewed with me, the patient and/or responsible adult, by my health care provider(s). I had the opportunity to ask questions regarding this information. I have elected to receive;      [] Patient unable to sign Discharge Instructions.  Given to ECF/Transportation/POA

## 2022-11-21 NOTE — TELEPHONE ENCOUNTER
Called and spoke to Castro carranza (nurse ) with [facility] regarding wound care dressing changes. Stated to continue same wound care protocol for the next week and patient will folllow up in wound care next week with Dr. Facundo Arroyo. Susan verbalized understanding.

## 2022-11-28 ENCOUNTER — HOSPITAL ENCOUNTER (OUTPATIENT)
Dept: WOUND CARE | Age: 72
Discharge: HOME | End: 2022-11-28
Payer: MEDICARE

## 2022-11-28 VITALS
DIASTOLIC BLOOD PRESSURE: 90 MMHG | RESPIRATION RATE: 18 BRPM | SYSTOLIC BLOOD PRESSURE: 160 MMHG | HEART RATE: 85 BPM | TEMPERATURE: 97.1 F

## 2022-11-28 DIAGNOSIS — I70.221 ATHEROSCLEROSIS OF NATIVE ARTERY OF RIGHT LOWER EXTREMITY WITH REST PAIN (HCC): ICD-10-CM

## 2022-11-28 DIAGNOSIS — I73.9 PVD (PERIPHERAL VASCULAR DISEASE) (HCC): ICD-10-CM

## 2022-11-28 DIAGNOSIS — S81.801D OPEN WOUND OF RIGHT LOWER LEG, SUBSEQUENT ENCOUNTER: ICD-10-CM

## 2022-11-28 DIAGNOSIS — L97.912 NON-PRESSURE ULCER OF RIGHT LOWER EXTREMITY WITH FAT LAYER EXPOSED (HCC): Primary | ICD-10-CM

## 2022-11-28 PROCEDURE — 11042 DBRDMT SUBQ TIS 1ST 20SQCM/<: CPT | Performed by: SPECIALIST

## 2022-11-28 PROCEDURE — 6370000000 HC RX 637 (ALT 250 FOR IP): Performed by: SPECIALIST

## 2022-11-28 PROCEDURE — 11042 DBRDMT SUBQ TIS 1ST 20SQCM/<: CPT

## 2022-11-28 PROCEDURE — 29581 APPL MULTLAYER CMPRN SYS LEG: CPT

## 2022-11-28 RX ORDER — BETAMETHASONE DIPROPIONATE 0.05 %
OINTMENT (GRAM) TOPICAL ONCE
OUTPATIENT
Start: 2022-11-28 | End: 2022-11-28

## 2022-11-28 RX ORDER — GINSENG 100 MG
CAPSULE ORAL ONCE
OUTPATIENT
Start: 2022-11-28 | End: 2022-11-28

## 2022-11-28 RX ORDER — LIDOCAINE 40 MG/G
CREAM TOPICAL ONCE
OUTPATIENT
Start: 2022-11-28 | End: 2022-11-28

## 2022-11-28 RX ORDER — BACITRACIN, NEOMYCIN, POLYMYXIN B 400; 3.5; 5 [USP'U]/G; MG/G; [USP'U]/G
OINTMENT TOPICAL ONCE
OUTPATIENT
Start: 2022-11-28 | End: 2022-11-28

## 2022-11-28 RX ORDER — LIDOCAINE HYDROCHLORIDE 20 MG/ML
JELLY TOPICAL ONCE
OUTPATIENT
Start: 2022-11-28 | End: 2022-11-28

## 2022-11-28 RX ORDER — LIDOCAINE HYDROCHLORIDE 40 MG/ML
SOLUTION TOPICAL ONCE
Status: COMPLETED | OUTPATIENT
Start: 2022-11-28 | End: 2022-11-28

## 2022-11-28 RX ORDER — LIDOCAINE 50 MG/G
OINTMENT TOPICAL ONCE
OUTPATIENT
Start: 2022-11-28 | End: 2022-11-28

## 2022-11-28 RX ORDER — LIDOCAINE HYDROCHLORIDE 40 MG/ML
SOLUTION TOPICAL ONCE
OUTPATIENT
Start: 2022-11-28 | End: 2022-11-28

## 2022-11-28 RX ORDER — BACITRACIN ZINC AND POLYMYXIN B SULFATE 500; 1000 [USP'U]/G; [USP'U]/G
OINTMENT TOPICAL ONCE
OUTPATIENT
Start: 2022-11-28 | End: 2022-11-28

## 2022-11-28 RX ORDER — CLOBETASOL PROPIONATE 0.5 MG/G
OINTMENT TOPICAL ONCE
OUTPATIENT
Start: 2022-11-28 | End: 2022-11-28

## 2022-11-28 RX ORDER — GENTAMICIN SULFATE 1 MG/G
OINTMENT TOPICAL ONCE
OUTPATIENT
Start: 2022-11-28 | End: 2022-11-28

## 2022-11-28 RX ADMIN — LIDOCAINE HYDROCHLORIDE: 40 SOLUTION TOPICAL at 11:48

## 2022-11-28 ASSESSMENT — PAIN DESCRIPTION - DESCRIPTORS: DESCRIPTORS: ACHING;BURNING

## 2022-11-28 ASSESSMENT — PAIN DESCRIPTION - ORIENTATION: ORIENTATION: RIGHT

## 2022-11-28 ASSESSMENT — PAIN SCALES - GENERAL: PAINLEVEL_OUTOF10: 9

## 2022-11-28 ASSESSMENT — PAIN DESCRIPTION - LOCATION: LOCATION: LEG

## 2022-11-28 NOTE — PROGRESS NOTES
Multilayer Compression Wrap   (Not Unna) Below the Knee    NAME:  Isabelle Olmos. YOB: 1950  MEDICAL RECORD NUMBER:  [id number]  DATE:  11/28/2022       Removed old Multilayer wrap if present and washed leg with mild soap/water. Applied moisturizing agent to dry skin as needed. Applied primary and secondary dressing as ordered    Applied multilayered dressing below the knee to Right lower leg(s)  (2 Layer compression wrap ) . Instructed patient/caregiver not to remove dressing and to keep it clean and dry. Instructed patient/caregiver on complications to report to provider, such as pain, numbness in toes, heavy drainage, and slippage of dressing. Instructed patient on purpose of compression dressing and on activity and exercise recommendations.    Applied per   Guidelines    Electronically signed by Vipul Escobar RN on 11/28/2022 at 12:46 PM

## 2022-11-28 NOTE — DISCHARGE INSTRUCTIONS
[address] [facility] Physician Orders and Discharge Instructions  [address] [address] [address] [place]  Telephone: [phone]  NAME:  Isabelle Olmos. YOB: 1950  MEDICAL RECORD NUMBER:  [id number]  DATE: 11/28/22     Return Appointment:  Return Appointment: With Dr Payton Hunter  in  1 [facility])  [] Return Appointment for a Wound Assessment with the nurse on:     Future Appointments   Date Time Provider Orlando Colmenares   12/5/2022  1:45 PM Estuardo Pettit  [address] WOUND [facility]   12/14/2022  9:45 AM [facility] [place] [facility]   1/9/2023  8:00 PM [facility] SLEEP LAB ROOM 3 23 [facility]   1/16/2023  1:00 PM Francine Weaver  [address]: [facility] [phone]   F: [phone]   Medically necessary services: [x] Skilled Nursing [] PT (Eval & Treat) [] OT (Eval & Treat) [] Social Work [] Dietician  [] Other:    Wound care instructions: If you smoke we ask that you refrain from smoking. Smoking inhibits wounds from healing. When taking antibiotics take the entire prescription as ordered. Do not stop taking until medication is all gone unless otherwise instructed. Exercise as tolerated. Keep weight off wounds and reposition every 2 hours if applicable. Do not get wounds wet in the bath or shower unless otherwise instructed by your physician. If your wound is on your foot or leg, you may purchase a cast bag. Please ask at the pharmacy. Wash hands with soap and water prior to and after every dressing change.     [x]Wash wounds with: 0.9% normal saline  [x]Catrachita wound Topical Treatments: Do not apply lotions, creams, or ointments to the skin around the wound bed unless directed as followed:   Apply around the wound: Moisturizing lotion         [x]Wound Location: left foot   Apply Primary Dressing to wound: Oly  Secondary Dressing: 4X4 gauze pad and Conforming roll gauze   Avoid contact of tape with skin if possible. When to change Dressin times per week: Monday, Wednesday, and Friday- home care to change Wednesday and friday    [x]Wound Location: right lower leg wounds   Apply Primary Dressing to wound: Oly  Secondary Dressing: 4X4 gauze pad,    Avoid contact of tape with skin if possible. When to change Dressing: Monday and Friday- home care to change friday        [x] Multilayer Compression Wrap:  Type: Applied on Right lower leg(s)  2 Layer Compression Wrap  HOME CARE: Wash lower legs with mild soap and water with every dressing change. HOME CARE: Change compression dressings every: friday  HOME CARE: Apply moisturizer to affected lower legs avoiding the wound bed(s)     Do not get leg(s) with wrap wet. If wraps become too tight call the center or completely remove the wrap. Elevate leg(s) above the level of the heart when sitting. Avoid prolonged standing in one place. Applied in Clinic on 2022  The Goal of this therapy is to reduce edema and get into long term compression garments to control venous insufficiency, lymphedema and reduce occurrence of venous ulcers    [x] Edema Control:  Apply: Medigrip on the Left; (Double medium). They should be applied to affected leg(s) from mid foot to knee making sure to cover the heel      Elevate leg(s) above the level of the heart for 30 minutes 4-5 times a day and/or when sitting. Avoid prolonged standing in one place. [x]Off Loading(Pressure Reduction) When: Walking  Weight Bearing Status: No Weight bearing restrictions Left; Offloading devices: Post op shoe/ Surgical shoe: Left  Dietary:  Important dietary reminders:  1. Increase Protein intake (i.e. Lean meats, fish, eggs, legumes, and yogurt)  2. No added salt  3. If diabetic, follow a diabetic diet and check glucose prior to meals or as instructed by your physician.     Dietary Supplements(Take twice a day unless instructed otherwise):  [] Jessica Zarco  [] 30ml ProStat [] Olivia Sensing [] Ensure Max/Premier [] Other:    Your nurse  is:  Juancarlos Lopez     Electronically signed by Mena Ramirez RN on 11/28/2022 at 12:37 PM     [address] [facility] [address] [facility] [address] Information: Should you experience any significant changes in your wound(s) or have questions about your wound care, please contact the [address] [place] [address] at [phone]. We are open from 8:00am - 4:30p Monday, Thursday and Friday; 11:00am - 5pm on Tuesday and CLOSED Wednesday. Please give us 24-48 business hours to return your call. Call your doctor now or seek immediate medical care if:    You have symptoms of infection, such as: Increased pain, swelling, warmth, or redness. Red streaks leading from the area. Pus draining from the area. A fever.          [] Patient unable to sign Discharge Instructions given to ECF/Transportation/POA

## 2022-11-28 NOTE — PROGRESS NOTES
[facility]  Progress Note and Procedure Note      Amber Amezcua. MEDICAL RECORD NUMBER:  [id number]  AGE: 81 y.o. GENDER: male  : 1950  EPISODE DATE:  2022    Subjective:     Chief Complaint   Patient presents with    Wound Check     BILATERAL LOWER LEGS         HISTORY of PRESENT ILLNESS HPI     Amber Valverde is a 81 y.o. male who presents today for wound/ulcer evaluation. History of Wound Context: Patient of Dr. Joshua Angulo with continued follow-up for right lower extremity wound having had revascularization in the past.  Patient complains of assistant pain left toe. He describes drainage from his left toe. Continues to wear his surgical shoe on the left foot and is wrapped in compression right lower extremity. Patient almost completed course of antibiotics for draining toe wound.   Patient completed x-ray of toe shows no evidence of osteo-  Wound/Ulcer Pain Timing/Severity: intermittent  Quality of pain: sharp  Severity:  4 / 10   Modifying Factors: None  Associated Signs/Symptoms: pain    Ulcer Identification:  Ulcer Type: arterial and pressure    Contributing Factors: edema, venous stasis, chronic pressure, decreased mobility, and arterial insufficiency    Acute Wound: N/A not an acute wound    PAST MEDICAL HISTORY        Diagnosis Date    CAD (coronary artery disease)     CHF (congestive heart failure) ([facility])     diastolic    COPD (chronic obstructive pulmonary disease) ([facility])     Critical ischemia of lower extremity ([facility])     Depressive disorder, not elsewhere classified     GERD (gastroesophageal reflux disease)     History of colon polyps - 30 seen on colonoscopy 2021    History of MI (myocardial infarction) 2013    History of skin ulcer of lower extremity     R anterior shin    History of transient ischemic attack (TIA)     Hx of blood clots     PE    Hyperlipidemia     Hypertension     Neuropathy     KAVITA (obstructive sleep apnea) On CPAP    Personal history of DVT (deep vein thrombosis)     Prolonged emergence from general anesthesia     Pt reported being combative after surgery     PVD (peripheral vascular disease) ([address]     TIA (transient ischemic attack) 2013    Vertebral fracture        PAST SURGICAL HISTORY    Past Surgical History:   Procedure Laterality Date    APPENDECTOMY      CHOLECYSTECTOMY, LAPAROSCOPIC      COLONOSCOPY  4/23/2021    COLONOSCOPY POLYPECTOMY SNARE/COLD BIOPSY performed by [facility] Watson MD at [facility]  4/23/2021    COLONOSCOPY POLYPECTOMY ABLATION performed by [facility] MD at [facility]  4/23/2021    COLONOSCOPY CONTROL HEMORRHAGE performed by Anya Watson MD at [address]  6/2013    EYE SURGERY Left     FEMORAL BYPASS Right 7/18/2022    RIGHT FEMORAL BELOW KNEE POPLITEAL ARTERY BYPASS WITH IN SITU SAPHENOUS VEIN performed by Alf Molina MD at [address] Right 11/17/2021    RIGHT FEMORAL ENDARTERECTOMY  RIGHT EXTERNAL ILIAC TO PROFUNDA FEMORAL BYPASS WITH 8MM PTFE GRAFT RIGHT LOWER EXTREMITY ARTERIOGRAM BALLOON ANGIOPLASTY RIGHT PROFUNDA BALLOON ANGIOPLASTY AND STENT OF RIGHT EXTERNAL ILIAC ARTERY performed by Alf Molina MD at [facility]  11/18/2015    Bilateral decompressive lumbar laminectomy L4-5   ; medial facetectomy L4-5 joints  bilaterally; foraminotomies l5   nerve roots bilaterally    LEG DEBRIDEMENT Right 7/23/2013    Dr. aKia Gates - pretibial wound    OTHER SURGICAL HISTORY Right 6/25/2013    Dr. Kaia Gates - CFA Endarterectomy w/marsupialization; RLE wound excision & debridement     OTHER SURGICAL HISTORY Left 8/27/2013    Dr. Kaia Gates - femoral & profunda femoris endarterectomy w/marsupialization patch angioplasty using SFA    SKIN SPLIT GRAFT Right 7/25/2013    Dr. Kaia Gates - to non-healing R pretibial wound, 9 x 15 cm    TOTAL HIP ARTHROPLASTY Right 09/01/2016     Luis Fanson Left 2015    Dr. Thelma Holloway - [facility] exploration, thrombectomy of ileofemoral system, stenting of RAFAL in-stent stenosis w/8 x 37 mm Palmaz        FAMILY HISTORY    Family History   Problem Relation Age of Onset    Cancer Mother         Breast    Heart Disease Mother     Cancer Father         Bladder    Heart Disease Father     Cancer Paternal Grandmother         melanoma        SOCIAL HISTORY    Social History     Tobacco Use    Smoking status: Former     Packs/day: 0.25     Years: 52.00     Pack years: 13.00     Types: Cigarettes     Start date: 1967     Quit date: 2022     Years since quittin.4    Smokeless tobacco: Never    Tobacco comments:     reports he quit circa 22   Vaping Use    Vaping Use: Never used   Substance Use Topics    Alcohol use:  Yes     Alcohol/week: 0.0 standard drinks     Comment: 2-3 beers a month    Drug use: No       ALLERGIES    Allergies   Allergen Reactions    Asa [Aspirin] Other (See Comments)     Stomach ache    Daliresp [Roflumilast] Diarrhea and Other (See Comments)     Severe diarrhea and did not help       MEDICATIONS    Current Outpatient Medications on File Prior to Encounter   Medication Sig Dispense Refill    ELIQUIS 5 MG TABS tablet TAKE 1 TABLET BY MOUTH TWICE DAILY 60 tablet 1    senna (SENOKOT) 8.6 MG TABS tablet Take 1 tablet by mouth nightly as needed      clopidogrel (PLAVIX) 75 MG tablet TAKE 1 TABLET BY MOUTH DAILY 30 tablet 3    DULoxetine (CYMBALTA) 20 MG extended release capsule       ipratropium-albuterol (DUONEB) 0.5-2.5 (3) MG/3ML SOLN nebulizer solution Inhale 3 mLs into the lungs every 4 hours as needed for Shortness of Breath 360 mL 4    albuterol sulfate HFA (PROVENTIL;VENTOLIN;PROAIR) 108 (90 Base) MCG/ACT inhaler Inhale 2 puffs into the lungs every 6 hours as needed for Wheezing 18 g 2    omeprazole (PRILOSEC) 40 MG delayed release capsule TAKE 1 CAPSULE BY MOUTH DAILY 30 capsule 3    Evolocumab 140 MG/ML SOAJ Inject 140 mg into the skin every 14 days Take an injection every 14 days. fluticasone-umeclidin-vilant (TRELEGY ELLIPTA) 100-62.5-25 MCG/INH AEPB Inhale 1 puff into the lungs in the morning. 28 each 11    potassium chloride (MICRO-K) 10 MEQ extended release capsule 1 tablet 60 capsule 1    furosemide (LASIX) 40 MG tablet Take 1 tablet by mouth in the morning. 90 tablet 3    metoprolol tartrate (LOPRESSOR) 50 MG tablet Take 1.5 tablets by mouth in the morning and 1.5 tablets before bedtime. 90 tablet 5    losartan (COZAAR) 100 MG tablet Take 1 tablet by mouth in the morning. 30 tablet 3    atorvastatin (LIPITOR) 80 MG tablet Take 1 tablet by mouth daily 90 tablet 1    pregabalin (LYRICA) 150 MG capsule Take 1 capsule by mouth 2 times daily for 30 days. 180 capsule 1    allopurinol (ZYLOPRIM) 100 MG tablet Take 1 tablet by mouth daily 180 tablet 1    [DISCONTINUED] folic acid (FOLVITE) 1 MG tablet Take 1 tablet by mouth daily 30 tablet 3    [DISCONTINUED] nitroGLYCERIN (NITROSTAT) 0.4 MG SL tablet Place 1 tablet under the tongue every 5 minutes as needed for Chest pain (Patient not taking: No sig reported) 25 tablet 1    [DISCONTINUED] magnesium gluconate (MAGONATE) 500 MG tablet Take 500 mg by mouth daily. No current facility-administered medications on file prior to encounter. REVIEW OF SYSTEMS  Review of Systems    Pertinent items are noted in HPI.     Objective:      BP (!) 160/90   Pulse 85   Temp 97.1 °F (36.2 °C) (Temporal)   Resp 18     Wt Readings from Last 3 Encounters:   10/26/22 235 lb (106.6 kg)   10/20/22 237 lb (107.5 kg)   08/30/22 225 lb 6.4 oz (102.2 kg)       PHYSICAL EXAM    General Appearance: alert and oriented to person, place and time, well-developed and well-nourished, in no acute distress  Extremities: no cyanosis, no clubbing, minimal + edema-  bilateral extremities, and full-thickness wound anterior right knee containing fibrin granulation tissue and epithelialization at the margins of the wound designated as wound #1  2 small partial-thickness wounds with granulation tissue right distal and proximal right knee designated as wounds 3 and 4. Full-thickness wound left plantar great toe with fibrin granulation tissue dictated as wound #2 periwound callus  Assessment:      1. Non-pressure ulcer of right lower extremity with fat layer exposed ([address]    2. Open wound of right lower leg, subsequent encounter    3. Atherosclerosis of native artery of right lower extremity with rest pain ([address]    4. PVD (peripheral vascular disease) ([address]         Procedure Note  Indications:  Based on my examination of this patient's wound(s) today, sharp excision is required to promote healing and evaluate the extent healing. Performed by: Cecil Mcknight MD    Consent obtained? Yes    Time out taken: Yes    Pain Control: Anesthetic: 4% Lidocaine Liquid Topical     Debridement:Excisional Debridement    Using curette and scissors the wound was sharply debrided    down through and including the removal of  epidermis, dermis, and subcutaneous tissue. Devitalized Tissue Debrided:  fibrin      Pre Debridement Measurements:  Are located in the Wound Documentation Flow Sheet   Wound #: 1 and 2     Post  Debridement Measurements:  Wound 11/15/21 Leg Distal;Right; Lower #1 (Active)   Wound Image   10/31/22 0904   Wound Etiology Arterial 08/22/22 1433   Wound Cleansed Cleansed with saline 11/28/22 1149   Dressing/Treatment Collagen with Ag 11/28/22 1244   Wound Length (cm) 1.5 cm 11/28/22 1149   Wound Width (cm) 1 cm 11/28/22 1149   Wound Depth (cm) 0.1 cm 11/28/22 1149   Wound Surface Area (cm^2) 1.5 cm^2 11/28/22 1149   Change in Wound Size % (l*w) 37.5 11/28/22 1149   Wound Volume (cm^3) 0.15 cm^3 11/28/22 1149   Wound Healing % 38 11/28/22 1149   Post-Procedure Length (cm) 1.6 cm 11/28/22 1230   Post-Procedure Width (cm) 1.1 cm 11/28/22 1230   Post-Procedure Depth (cm) 0.3 cm 11/28/22 1230   Post-Procedure Surface Area (cm^2) 1.76 cm^2 11/28/22 1230   Post-Procedure Volume (cm^3) 0.528 cm^3 11/28/22 1230   Distance Tunneling (cm) 0 cm 11/14/22 0822   Tunneling Position ___ O'Clock 0 10/18/22 1303   Undermining Starts ___ O'Clock 0 10/18/22 1303   Undermining Ends___ O'Clock 0 10/18/22 1303   Undermining Maxium Distance (cm) 0 11/14/22 0822   Wound Assessment Pink/red;Fibrin 11/28/22 1149   Drainage Amount Small 11/28/22 1149   Drainage Description Yellow;Brown 11/28/22 1149   Odor None 11/28/22 1149   Catrachita-wound Assessment Edematous 11/28/22 1149   Margins Defined edges 11/28/22 1149   Wound Thickness Description not for Pressure Injury Full thickness 11/28/22 1149   Number of days: 377       Wound 09/12/22 Foot Plantar;Left #2 great toe (Active)   Wound Image   10/31/22 0904   Wound Etiology Traumatic 09/19/22 0932   Wound Cleansed Cleansed with saline 11/28/22 1149   Dressing/Treatment Collagen with Ag 11/28/22 1244   Wound Length (cm) 0.9 cm 11/28/22 1149   Wound Width (cm) 0.9 cm 11/28/22 1149   Wound Depth (cm) 0.1 cm 11/28/22 1149   Wound Surface Area (cm^2) 0.81 cm^2 11/28/22 1149   Change in Wound Size % (l*w) 73 11/28/22 1149   Wound Volume (cm^3) 0.081 cm^3 11/28/22 1149   Wound Healing % 73 11/28/22 1149   Post-Procedure Length (cm) 1 cm 11/28/22 1230   Post-Procedure Width (cm) 1 cm 11/28/22 1230   Post-Procedure Depth (cm) 0.3 cm 11/28/22 1230   Post-Procedure Surface Area (cm^2) 1 cm^2 11/28/22 1230   Post-Procedure Volume (cm^3) 0.3 cm^3 11/28/22 1230   Distance Tunneling (cm) 0.5 cm 11/14/22 0853   Tunneling Position ___ O'Clock 0900 11/14/22 0853   Undermining Starts ___ O'Clock 0 10/18/22 1303   Undermining Ends___ O'Clock 0 10/18/22 1303   Undermining Maxium Distance (cm) 0 11/14/22 0822   Wound Assessment Dry 11/28/22 1149   Drainage Amount Small 11/28/22 1149   Drainage Description Brown 11/28/22 1149   Odor None 11/28/22 1149   Catrachita-wound Assessment Hyperkeratosis (callous) 11/28/22 1149   Margins Defined edges 11/28/22 1149   Wound Thickness Description not for Pressure Injury Full thickness 11/28/22 1149   Number of days: 77       Wound 09/19/22 Leg Lower;Proximal;Right #3 (Active)   Wound Image   10/31/22 0842   Wound Etiology Traumatic 09/19/22 0932   Wound Cleansed Cleansed with saline 11/28/22 1149   Dressing/Treatment Collagen with Ag 11/28/22 1244   Wound Length (cm) 1.4 cm 11/28/22 1149   Wound Width (cm) 1 cm 11/28/22 1149   Wound Depth (cm) 0.1 cm 11/28/22 1149   Wound Surface Area (cm^2) 1.4 cm^2 11/28/22 1149   Change in Wound Size % (l*w) -16.67 11/28/22 1149   Wound Volume (cm^3) 0.14 cm^3 11/28/22 1149   Wound Healing % -17 11/28/22 1149   Post-Procedure Length (cm) 1.4 cm 11/28/22 1230   Post-Procedure Width (cm) 1 cm 11/28/22 1230   Post-Procedure Depth (cm) 0.1 cm 11/28/22 1230   Post-Procedure Surface Area (cm^2) 1.4 cm^2 11/28/22 1230   Post-Procedure Volume (cm^3) 0.14 cm^3 11/28/22 1230   Distance Tunneling (cm) 0 cm 11/14/22 0822   Tunneling Position ___ O'Clock 0 10/18/22 1303   Undermining Starts ___ O'Clock 0 10/18/22 1303   Undermining Ends___ O'Clock 0 10/18/22 1303   Undermining Maxium Distance (cm) 0 11/14/22 0822   Wound Assessment Pink/red 11/28/22 1149   Drainage Amount Small 11/28/22 1149   Drainage Description Brown 11/28/22 1149   Odor None 11/28/22 1149   Catrachita-wound Assessment Intact 11/28/22 1149   Margins Defined edges 11/28/22 1149   Wound Thickness Description not for Pressure Injury Full thickness 11/28/22 1149   Number of days: 70       Wound 10/31/22 Leg Right; Lower; Lateral #4 (Active)   Wound Image   10/31/22 0904   Wound Etiology Venous 10/31/22 0842   Wound Cleansed Cleansed with saline 11/28/22 1149   Dressing/Treatment Collagen with Ag 11/28/22 1244   Wound Length (cm) 1 cm 11/28/22 1149   Wound Width (cm) 1 cm 11/28/22 1149   Wound Depth (cm) 0.1 cm 11/28/22 1149   Wound Surface Area (cm^2) 1 cm^2 11/28/22 1149   Change in Wound Size % (l*w) 50 11/28/22 1149   Wound Volume (cm^3) 0.1 cm^3 11/28/22 1149   Wound Healing % 50 11/28/22 1149   Post-Procedure Length (cm) 1 cm 11/28/22 1230   Post-Procedure Width (cm) 1 cm 11/28/22 1230   Post-Procedure Depth (cm) 0.1 cm 11/28/22 1230   Post-Procedure Surface Area (cm^2) 1 cm^2 11/28/22 1230   Post-Procedure Volume (cm^3) 0.1 cm^3 11/28/22 1230   Distance Tunneling (cm) 0 cm 11/14/22 0822   Undermining Maxium Distance (cm) 0 11/14/22 0822   Wound Assessment Fibrin;Pink/red 11/28/22 1149   Drainage Amount Small 11/28/22 1149   Drainage Description Brown 11/28/22 1149   Odor None 11/28/22 1149   Catrachita-wound Assessment Edematous 11/28/22 1149   Margins Undefined edges 11/28/22 1149   Wound Thickness Description not for Pressure Injury Full thickness 11/28/22 1149   Number of days: 28          Percent of Wound Debrided: 100%    Total Surface Area Debrided:  2.7 sq cm    Diabetic/Pressure/Non Pressure Ulcers only:  Ulcer: Non-Pressure ulcer, fat layer exposed    Bleeding: Minimal    Hemostasis Achieved: by pressure    Procedural Pain: 0  / 10     Post Procedural Pain: 0 / 10     Response to treatment:  Well tolerated by patient. Marked improvement in wound measurements. No further drainage from left toe wound        Plan:   Patient to see Dr. Susy Zambrano next week  Treatment Note: Please see attached Discharge Instructions. These instructions were given and signed by the patient or POA    New Prescriptions    No medications on file       Orders Placed This Encounter   Procedures    Initiate Outpatient Wound Care Protocol       Discharge Instructions          [facility] Physician Orders and Discharge Instructions  [address] [address] [address] [place] [address]. [place]. [place]  Telephone: [phone]  NAME:  Cynthia Fong.   YOB: 1950  MEDICAL RECORD NUMBER:  [id number]  DATE: 11/28/22     Return Appointment:  Return Appointment: With Dr Susy Zambrano in  [facility])  [] Return Appointment for a Wound Assessment with the nurse on:     Future Appointments   Date Time Provider Orlando Wileyi   2022  1:45 PM Vazquez Wells  [address] WOUND [facility]   2022  9:45 AM Keysha Nick DO JEFF MEREDITH [place] - DYD   2023  8:00 PM [facility] SLEEP LAB ROOM [facility] SLEEP [facility]   2023  1:00 PM Stefani Blood  [address]: [facility] [phone]   F: [phone]   Medically necessary services: [x] Skilled Nursing [] PT (Eval & Treat) [] OT (Eval & Treat) [] Social Work [] Dietician  [] Other:    Wound care instructions: If you smoke we ask that you refrain from smoking. Smoking inhibits wounds from healing. When taking antibiotics take the entire prescription as ordered. Do not stop taking until medication is all gone unless otherwise instructed. Exercise as tolerated. Keep weight off wounds and reposition every 2 hours if applicable. Do not get wounds wet in the bath or shower unless otherwise instructed by your physician. If your wound is on your foot or leg, you may purchase a cast bag. Please ask at the pharmacy. Wash hands with soap and water prior to and after every dressing change. [x]Wash wounds with: 0.9% normal saline  [x]Catrachita wound Topical Treatments: Do not apply lotions, creams, or ointments to the skin around the wound bed unless directed as followed:   Apply around the wound: Moisturizing lotion         [x]Wound Location: left foot   Apply Primary Dressing to wound: Oly  Secondary Dressing: 4X4 gauze pad and Conforming roll gauze   Avoid contact of tape with skin if possible. When to change Dressin times per week: Monday, Wednesday, and Friday- home care to change Wednesday and friday    [x]Wound Location: right lower leg wounds   Apply Primary Dressing to wound: Oly  Secondary Dressing: 4X4 gauze pad,    Avoid contact of tape with skin if possible.   When to change Dressing: Monday and Friday- home care to change friday        [x] Multilayer Compression Wrap:  Type: Applied on Right lower leg(s)  2 Layer Compression Wrap  HOME CARE: Wash lower legs with mild soap and water with every dressing change. HOME CARE: Change compression dressings every: friday  HOME CARE: Apply moisturizer to affected lower legs avoiding the wound bed(s)     Do not get leg(s) with wrap wet. If wraps become too tight call the center or completely remove the wrap. Elevate leg(s) above the level of the heart when sitting. Avoid prolonged standing in one place. Applied in Clinic on 11/28/2022  The Goal of this therapy is to reduce edema and get into long term compression garments to control venous insufficiency, lymphedema and reduce occurrence of venous ulcers    [x] Edema Control:  Apply: Medigrip on the Left; (Double medium). They should be applied to affected leg(s) from mid foot to knee making sure to cover the heel      Elevate leg(s) above the level of the heart for 30 minutes 4-5 times a day and/or when sitting. Avoid prolonged standing in one place. [x]Off Loading(Pressure Reduction) When: Walking  Weight Bearing Status: No Weight bearing restrictions Left; Offloading devices: Post op shoe/ Surgical shoe: Left  Dietary:  Important dietary reminders:  1. Increase Protein intake (i.e. Lean meats, fish, eggs, legumes, and yogurt)  2. No added salt  3. If diabetic, follow a diabetic diet and check glucose prior to meals or as instructed by your physician.     Dietary Supplements(Take twice a day unless instructed otherwise):  [] Shoaib Purdue  [] 30ml ProStat [] Kirstin Clark [] Ensure Max/Premier [] Other:    Your nurse  is:  Leesa Luevano     Electronically signed by Marilia Angulo RN on 11/28/2022 at 12:37 PM     [address] [facility] [address] Information: Should you experience any significant changes in your wound(s) or have questions about your wound care, please contact the [phone] [facility] at [phone]. We are open from 8:00am - 4:30p Monday, Thursday and Friday; 11:00am - 5pm on Tuesday and CLOSED Wednesday. Please give us 24-48 business hours to return your call. Call your doctor now or seek immediate medical care if:    You have symptoms of infection, such as: Increased pain, swelling, warmth, or redness. Red streaks leading from the area. Pus draining from the area. A fever.          [] Patient unable to sign Discharge Instructions given to ECF/Transportation/POA         Electronically signed by Franky Evans MD on 11/28/2022 at 1:03 PM

## 2022-11-29 ENCOUNTER — TELEPHONE (OUTPATIENT)
Dept: PRIMARY CARE CLINIC | Age: 72
End: 2022-11-29

## 2022-11-29 RX ORDER — PREDNISONE 20 MG/1
40 TABLET ORAL DAILY
Qty: 10 TABLET | Refills: 0 | Status: SHIPPED | OUTPATIENT
Start: 2022-11-29 | End: 2022-11-30 | Stop reason: SDUPTHER

## 2022-11-29 NOTE — TELEPHONE ENCOUNTER
Brenton Cardenas,   [facility] Clinical Staff 16 minutes ago (1:14 PM)     Paramjit Simon  I am worried this patient could have an exacerbation of his COPD. I am sending prednisone to his pharmacy and I would like him to go fill it and start it as soon as possible. If he is severely short of breath, then he needs to go to the emergency department. If he can get the prednisone and started today I would like to see him on Thursday.

## 2022-11-29 NOTE — TELEPHONE ENCOUNTER
Lubna from [facility] home health [facility] called- pt called her complaining of Shortness of breath. Room air is at 87 % pulse is 76 /80  temp 97.4. She follows for management of wounds. He is telling her he cant get any rest. Cant walk from room to room with out breathing issues. . Has increased thirst. Doesn't see any increased swelling. Does have crackles in both lungs which is normal for him. He is spending more outside due to being easier to breath. He is using inhalers. Would like advise on what to do.

## 2022-11-29 NOTE — TELEPHONE ENCOUNTER
Tried to call pt but phone went to . Left message to call. Called gilles and informed. She voiced understanding. She will also reach out to pt and let him know.

## 2022-11-30 ENCOUNTER — HOSPITAL ENCOUNTER (EMERGENCY)
Age: 72
Discharge: HOME | End: 2022-11-30
Attending: EMERGENCY MEDICINE
Payer: MEDICARE

## 2022-11-30 ENCOUNTER — TELEPHONE (OUTPATIENT)
Dept: PRIMARY CARE CLINIC | Age: 72
End: 2022-11-30

## 2022-11-30 VITALS
HEART RATE: 77 BPM | WEIGHT: 235 LBS | OXYGEN SATURATION: 97 % | BODY MASS INDEX: 33.64 KG/M2 | HEIGHT: 70 IN | SYSTOLIC BLOOD PRESSURE: 146 MMHG | DIASTOLIC BLOOD PRESSURE: 60 MMHG | TEMPERATURE: 98 F | RESPIRATION RATE: 12 BRPM

## 2022-11-30 DIAGNOSIS — J44.1 COPD EXACERBATION (HCC): Primary | ICD-10-CM

## 2022-11-30 PROBLEM — I48.11 LONGSTANDING PERSISTENT ATRIAL FIBRILLATION (HCC): Status: ACTIVE | Noted: 2022-11-30

## 2022-11-30 PROBLEM — I20.1 ANGINA PECTORIS WITH DOCUMENTED SPASM (HCC): Status: ACTIVE | Noted: 2022-11-30

## 2022-11-30 PROBLEM — I20.1 ANGINA PECTORIS WITH DOCUMENTED SPASM (HCC): Status: RESOLVED | Noted: 2022-11-30 | Resolved: 2022-11-30

## 2022-11-30 LAB
EKG ATRIAL RATE: 73 BPM
EKG DIAGNOSIS: NORMAL
EKG P AXIS: 78 DEGREES
EKG P-R INTERVAL: 190 MS
EKG Q-T INTERVAL: 434 MS
EKG QRS DURATION: 102 MS
EKG QTC CALCULATION (BAZETT): 478 MS
EKG R AXIS: 61 DEGREES
EKG T AXIS: 67 DEGREES
EKG VENTRICULAR RATE: 73 BPM

## 2022-11-30 PROCEDURE — 6370000000 HC RX 637 (ALT 250 FOR IP): Performed by: EMERGENCY MEDICINE

## 2022-11-30 PROCEDURE — 93005 ELECTROCARDIOGRAM TRACING: CPT | Performed by: EMERGENCY MEDICINE

## 2022-11-30 PROCEDURE — 99283 EMERGENCY DEPT VISIT LOW MDM: CPT

## 2022-11-30 PROCEDURE — 93010 ELECTROCARDIOGRAM REPORT: CPT | Performed by: INTERNAL MEDICINE

## 2022-11-30 RX ORDER — PREDNISONE 20 MG/1
40 TABLET ORAL DAILY
Qty: 10 TABLET | Refills: 0 | Status: SHIPPED | OUTPATIENT
Start: 2022-11-30 | End: 2022-11-30 | Stop reason: SDUPTHER

## 2022-11-30 RX ORDER — PREDNISONE 20 MG/1
60 TABLET ORAL ONCE
Status: COMPLETED | OUTPATIENT
Start: 2022-11-30 | End: 2022-11-30

## 2022-11-30 RX ORDER — PREDNISONE 20 MG/1
40 TABLET ORAL DAILY
Qty: 10 TABLET | Refills: 0 | Status: SHIPPED | OUTPATIENT
Start: 2022-11-30 | End: 2022-12-01

## 2022-11-30 RX ADMIN — PREDNISONE 60 MG: 20 TABLET ORAL at 15:11

## 2022-11-30 ASSESSMENT — PAIN DESCRIPTION - LOCATION: LOCATION: CHEST

## 2022-11-30 ASSESSMENT — PAIN DESCRIPTION - ORIENTATION: ORIENTATION: MID

## 2022-11-30 ASSESSMENT — PAIN - FUNCTIONAL ASSESSMENT: PAIN_FUNCTIONAL_ASSESSMENT: 0-10

## 2022-11-30 ASSESSMENT — PAIN SCALES - GENERAL: PAINLEVEL_OUTOF10: 8

## 2022-11-30 NOTE — ED NOTES
Patient prepared for and ready to be discharged. Patient discharged at this time in no acute distress after verbalizing understanding of discharge instructions. Patient left after receiving After Visit Summary instructions.         Geetha Ellison RN  11/30/22 [id number]

## 2022-11-30 NOTE — DISCHARGE INSTRUCTIONS
Take your prednisone prescription with food as directed until gone. Start this medication tomorrow morning. Continue with your regular nebulizer medication and with your rescue inhaler as needed. Follow-up with your primary care doctor tomorrow afternoon as scheduled.

## 2022-11-30 NOTE — ED PROVIDER NOTES
TRIAGE CHIEF COMPLAINT:   Chief Complaint   Patient presents with    Shortness of Breath     Pt states shortness of breath x past couple days. Called Dr. Fabricio Griffin and was told an Rx for prednisone was sent to pharmacy but patient was unable to fill because wrong pharmacy. Using inhalers at home. H/o COPD. On 6 L O2 at night. HPI: Namrata Edmondson is a 81 y.o. male who presents to the emergency department with complaint of shortness of breath that started 2 days ago. He feels like his COPD is flaring up. He denies fever. No chest pain. He has a very slight cough with some greenish sputum. He states he called his PCP who called in a prescription of prednisone to the pharmacy but he states he was not able to get it because it went to the wrong pharmacy. Patient admits that he became very anxious and because he was worried about his breathing he called EMS to bring him to the emergency room. He now states that he has checked with the correct pharmacy and the medicine is they are waiting for him to pick it up. He is a previous smoker. REVIEW OF SYSTEMS:   10 systems reviewed. Pertinent positives per HPI. Otherwise noted to be negative. I have reviewed the triage/nursing documentation and agree unless otherwise noted below.     PAST MEDICAL HISTORY:   Past Medical History:   Diagnosis Date    CAD (coronary artery disease)     CHF (congestive heart failure) ([facility])     diastolic    COPD (chronic obstructive pulmonary disease) (HCC)     Critical ischemia of lower extremity (HCC)     Depressive disorder, not elsewhere classified     GERD (gastroesophageal reflux disease)     History of colon polyps - 30 seen on colonoscopy 04/2021 04/24/2021    History of MI (myocardial infarction) 05/2013    History of skin ulcer of lower extremity     R anterior shin    History of transient ischemic attack (TIA)     Hx of blood clots     PE    Hyperlipidemia     Hypertension     Neuropathy     KAVITA (obstructive sleep apnea) On CPAP    Personal history of DVT (deep vein thrombosis)     Prolonged emergence from general anesthesia     Pt reported being combative after surgery     PVD (peripheral vascular disease) ([facility] [address]     TIA (transient ischemic attack) 2013    Vertebral fracture         CURRENT MEDICATIONS:   Patient's Medications   New Prescriptions    No medications on file   Previous Medications    ALBUTEROL SULFATE HFA (PROVENTIL;VENTOLIN;PROAIR) 108 (90 BASE) MCG/ACT INHALER    Inhale 2 puffs into the lungs every 6 hours as needed for Wheezing    ALLOPURINOL (ZYLOPRIM) 100 MG TABLET    Take 1 tablet by mouth daily    ATORVASTATIN (LIPITOR) 80 MG TABLET    Take 1 tablet by mouth daily    CLOPIDOGREL (PLAVIX) 75 MG TABLET    TAKE 1 TABLET BY MOUTH DAILY    DULOXETINE (CYMBALTA) 20 MG EXTENDED RELEASE CAPSULE        ELIQUIS 5 MG TABS TABLET    TAKE 1 TABLET BY MOUTH TWICE DAILY    EVOLOCUMAB 140 MG/ML SOAJ    Inject 140 mg into the skin every 14 days Take an injection every 14 days. FLUTICASONE-UMECLIDIN-VILANT (TRELEGY ELLIPTA) 100-62.5-25 MCG/INH AEPB    Inhale 1 puff into the lungs in the morning. FUROSEMIDE (LASIX) 40 MG TABLET    Take 1 tablet by mouth in the morning. IPRATROPIUM-ALBUTEROL (DUONEB) 0.5-2.5 (3) MG/3ML SOLN NEBULIZER SOLUTION    Inhale 3 mLs into the lungs every 4 hours as needed for Shortness of Breath    LOSARTAN (COZAAR) 100 MG TABLET    Take 1 tablet by mouth in the morning. METOPROLOL TARTRATE (LOPRESSOR) 50 MG TABLET    Take 1.5 tablets by mouth in the morning and 1.5 tablets before bedtime. OMEPRAZOLE (PRILOSEC) 40 MG DELAYED RELEASE CAPSULE    TAKE 1 CAPSULE BY MOUTH DAILY    POTASSIUM CHLORIDE (MICRO-K) 10 MEQ EXTENDED RELEASE CAPSULE    1 tablet    PREGABALIN (LYRICA) 150 MG CAPSULE    Take 1 capsule by mouth 2 times daily for 30 days.     SENNA (SENOKOT) 8.6 MG TABS TABLET    Take 1 tablet by mouth nightly as needed   Modified Medications    Modified Medication Previous Medication PREDNISONE (DELTASONE) 20 MG TABLET predniSONE (DELTASONE) 20 MG tablet       Take 2 tablets by mouth daily for 5 days    Take 2 tablets by mouth daily for 5 days   Discontinued Medications    No medications on file        SURGICAL HISTORY:       Procedure Laterality Date    APPENDECTOMY      CHOLECYSTECTOMY, LAPAROSCOPIC      COLONOSCOPY  4/23/2021    COLONOSCOPY POLYPECTOMY SNARE/COLD BIOPSY performed by Saad Elias MD at [address] [facility]  4/23/2021    COLONOSCOPY POLYPECTOMY ABLATION performed by Saad Elias MD at [address]  4/23/2021    COLONOSCOPY CONTROL HEMORRHAGE performed by Saad Elias MD at [facility]  6/2013    EYE SURGERY Left     FEMORAL BYPASS Right 7/18/2022    RIGHT FEMORAL BELOW KNEE POPLITEAL ARTERY BYPASS WITH IN SITU SAPHENOUS VEIN performed by Genesis Griffin MD at [address] [facility] [address] Right 11/17/2021    RIGHT FEMORAL ENDARTERECTOMY  RIGHT EXTERNAL ILIAC TO PROFUNDA FEMORAL BYPASS WITH 8MM PTFE GRAFT RIGHT LOWER EXTREMITY ARTERIOGRAM BALLOON ANGIOPLASTY RIGHT PROFUNDA BALLOON ANGIOPLASTY AND STENT OF RIGHT EXTERNAL ILIAC ARTERY performed by Genesis Griffin MD at [facility]  11/18/2015    Bilateral decompressive lumbar laminectomy L4-5   ; medial facetectomy L4-5 joints  bilaterally; foraminotomies l5   nerve roots bilaterally    LEG DEBRIDEMENT Right 7/23/2013    Dr. Cortez Lopez - pretibial wound    OTHER SURGICAL HISTORY Right 6/25/2013    Dr. Cortez Lopez - CFA Endarterectomy w/marsupialization; RLE wound excision & debridement     OTHER SURGICAL HISTORY Left 8/27/2013    Dr. Cortez Lopez - femoral & profunda femoris endarterectomy w/marsupialization patch angioplasty using SFA    SKIN SPLIT GRAFT Right 7/25/2013    Dr. Cortez Lopez - to non-healing R pretibial wound, 9 x 15 cm    TOTAL HIP ARTHROPLASTY Right 09/01/2016    Dr. Rani Keith Left 12/22/2015    Dr. Cortez Lopez - CFA exploration, thrombectomy of ileofemoral system, stenting of RAFAL in-stent stenosis w/8 x 37 mm Fito         FAMILY HISTORY:       Problem Relation Age of Onset    Cancer Mother         Breast    Heart Disease Mother     Cancer Father         Bladder    Heart Disease Father     Cancer Paternal Grandmother         melanoma         SOCIAL HISTORY:    reports that he quit smoking about 5 months ago. His smoking use included cigarettes. He started smoking about 55 years ago. He has a 13.00 pack-year smoking history. He has never used smokeless tobacco. He reports current alcohol use. He reports that he does not use drugs. ALLERGIES:   Allergies   Allergen Reactions    Asa [Aspirin] Other (See Comments)     Stomach ache    Daliresp [Roflumilast] Diarrhea and Other (See Comments)     Severe diarrhea and did not help       PHYSICAL EXAM:  VITAL SIGNS: BP (!) 146/60   Pulse 82   Temp 98 °F (36.7 °C) (Oral)   Resp 20   Ht 5' 10\" (1.778 m)   Wt 235 lb (106.6 kg)   SpO2 100%   BMI 33.72 kg/m²   Constitutional:  No acute distress, Non-toxic appearance. Recheck room air O2 sat is 96%. HENT: Normocephalic, Atraumatic Oropharynx moist, No oral exudates. TMs are normal.  Eyes:  PERRL, EOMI, Conjunctiva normal, No discharge. Neck: No tenderness, Supple, No lymphadenopathy, No stridor. Cardiovascular:  Normal heart rate, Normal rhythm, No murmurs, No rubs, No gallops. Pulmonary/Chest: Breath sounds are slightly decreased bilaterally. , No respiratory distress, No wheezing or rales. No cough. Abdomen:   Soft, No tenderness, No masses, No pulsatile masses  Back:  No tenderness, No CVA tenderness  Extremities:  Normal range of motion, Intact distal pulses, No edema, No tenderness  Neurologic:  Alert & oriented x 3, Speech is clear and appropriate, No upper extremity drift or lower extremity weakness,  Normal sensory function, No facial asymmetry, no truncal or extremity ataxia. Normal gait.   Skin:  Warm, Dry, No erythema, No rash  Psychiatric:  Affect normal, Mood normal      EKG:    EKG interpreted by myself. Normal sinus rhythm at a rate of 73. Axis is 61. There is no ischemia. No ectopy noted. Intervals are normal.  QTC is 478. Radiology:      LAB      ED COURSE & MEDICAL DECISION MAKING:  Pertinent Labs & Imaging studies reviewed. (See chart for details)  82-year-old male with history of COPD and KAVITA, history of coronary disease status post MI and history of CHF with complaints of 2-day history of shortness of breath associated with mild cough but no fever. He called his primary care doctor who called in a prescription of prednisone but he was not able to pick it up because he states it went to the wrong pharmacy. He decided to come here by EMS. On arrival he is afebrile with room air sat 96%. Breath sounds are slightly decreased bilaterally but no wheezes or rales heard. He denies chest pain. EKG shows normal sinus rhythm with no ischemia or arrhythmia. Patient was given a dose of prednisone 60 mg by mouth here. I recommended he go to the pharmacy and get his prescription of prednisone and start taking it in the morning. He is to continue with his regular medications and follow-up with his primary care doctor tomorrow as scheduled. I discussed with Mega Foster. the results of the evaluation in the Emergency Department, diagnosis, care, prognosis and the importance of follow-up. The patient is stable for discharge. The patient and/or family are in agreement with the plan and all questions have been answered. Specific discharge instructions were explained, including reasons to return to the emergency department.           (Please note that portions of this note may have been completed with a voice recognition program.  Efforts were made to edit the dictation but occasionally words are mis-transcribed)      FINAL IMPRESSION:  1 --COPD exacerbation                   Krista Astudillo MD  12/01/22 1440

## 2022-11-30 NOTE — TELEPHONE ENCOUNTER
[facility] #[id number] - [address] [facility] [place] [phone]   [address] [address]   Phone:  [phone]  Fax:  [phone]      predniSONE (DELTASONE) 20 MG tablet     Pt call in and this medication was sent to the wrong pharmacy. Can we please send this to the pharmacy above. Not this pharmacy     [address] [place] [address], [address] [phone] [place] [phone] [address], [address] [place] [address]   Phone:  [phone]  Fax:  [phone]      Pt also states that he is having some difficulties breathing.

## 2022-12-01 ENCOUNTER — OFFICE VISIT (OUTPATIENT)
Dept: PRIMARY CARE CLINIC | Age: 72
End: 2022-12-01
Payer: MEDICARE

## 2022-12-01 ENCOUNTER — CARE COORDINATION (OUTPATIENT)
Dept: CARE COORDINATION | Age: 72
End: 2022-12-01

## 2022-12-01 VITALS
OXYGEN SATURATION: 93 % | BODY MASS INDEX: 34.65 KG/M2 | TEMPERATURE: 97.2 F | HEART RATE: 69 BPM | DIASTOLIC BLOOD PRESSURE: 60 MMHG | HEIGHT: 70 IN | SYSTOLIC BLOOD PRESSURE: 124 MMHG | WEIGHT: 242 LBS

## 2022-12-01 DIAGNOSIS — H91.93 DIFFICULTY HEARING, BILATERAL: ICD-10-CM

## 2022-12-01 DIAGNOSIS — Z12.11 SCREENING FOR COLON CANCER: ICD-10-CM

## 2022-12-01 DIAGNOSIS — I48.11 LONGSTANDING PERSISTENT ATRIAL FIBRILLATION (HCC): ICD-10-CM

## 2022-12-01 DIAGNOSIS — J44.1 COPD EXACERBATION (HCC): Primary | ICD-10-CM

## 2022-12-01 PROCEDURE — 99214 OFFICE O/P EST MOD 30 MIN: CPT | Performed by: STUDENT IN AN ORGANIZED HEALTH CARE EDUCATION/TRAINING PROGRAM

## 2022-12-01 PROCEDURE — 3074F SYST BP LT 130 MM HG: CPT | Performed by: STUDENT IN AN ORGANIZED HEALTH CARE EDUCATION/TRAINING PROGRAM

## 2022-12-01 PROCEDURE — 3078F DIAST BP <80 MM HG: CPT | Performed by: STUDENT IN AN ORGANIZED HEALTH CARE EDUCATION/TRAINING PROGRAM

## 2022-12-01 PROCEDURE — 1123F ACP DISCUSS/DSCN MKR DOCD: CPT | Performed by: STUDENT IN AN ORGANIZED HEALTH CARE EDUCATION/TRAINING PROGRAM

## 2022-12-01 RX ORDER — DOXYCYCLINE HYCLATE 100 MG
100 TABLET ORAL 2 TIMES DAILY
Qty: 14 TABLET | Refills: 0 | Status: SHIPPED | OUTPATIENT
Start: 2022-12-01 | End: 2022-12-08

## 2022-12-01 RX ORDER — PREDNISONE 10 MG/1
10 TABLET ORAL DAILY
Qty: 5 TABLET | Refills: 0 | Status: SHIPPED | OUTPATIENT
Start: 2022-12-01 | End: 2022-12-06

## 2022-12-01 RX ORDER — PREDNISONE 20 MG/1
20 TABLET ORAL DAILY
Qty: 5 TABLET | Refills: 0 | Status: SHIPPED | OUTPATIENT
Start: 2022-12-01 | End: 2022-12-06

## 2022-12-01 ASSESSMENT — PATIENT HEALTH QUESTIONNAIRE - PHQ9
SUM OF ALL RESPONSES TO PHQ QUESTIONS 1-9: 0
2. FEELING DOWN, DEPRESSED OR HOPELESS: 0
1. LITTLE INTEREST OR PLEASURE IN DOING THINGS: 0
SUM OF ALL RESPONSES TO PHQ QUESTIONS 1-9: 0
SUM OF ALL RESPONSES TO PHQ QUESTIONS 1-9: 0
SUM OF ALL RESPONSES TO PHQ9 QUESTIONS 1 & 2: 0
SUM OF ALL RESPONSES TO PHQ QUESTIONS 1-9: 0

## 2022-12-01 ASSESSMENT — ENCOUNTER SYMPTOMS
SHORTNESS OF BREATH: 1
RHINORRHEA: 0
COUGH: 1
WHEEZING: 0
CHEST TIGHTNESS: 1
SORE THROAT: 0

## 2022-12-01 ASSESSMENT — LIFESTYLE VARIABLES
HOW OFTEN DO YOU HAVE A DRINK CONTAINING ALCOHOL: MONTHLY OR LESS
HOW MANY STANDARD DRINKS CONTAINING ALCOHOL DO YOU HAVE ON A TYPICAL DAY: 1 OR 2

## 2022-12-01 NOTE — PROGRESS NOTES
2022     Yesika Napoles 11 (:  1950) is a 81 y.o. male, here for evaluation of the following medical concerns:    HPI  Shortness of breath  Min Middleton presents today for evaluation of cough, shortness of breath and sputum production. Patient made a call to the office about 2 days ago complaining about the above. Clinic was full that day and he was unable to be seen, so the concern was COPD exacerbation and he was given prednisone. There was an error through the pharmacy and he was not able to get this medication, so he became concerned and presented to the emergency department yesterday. They agreed that the diagnosis was COPD and gave him 60 mg of prednisone. He was able to  his prescription from our clinic yesterday evening and took the first 40 mg dose today. He is still having some shortness of breath, dry cough and greenish sputum. He does however report that his breathing is improved today. He has not had fevers, chills, nausea, vomiting, diarrhea, muscle aches or headaches    Review of Systems   Constitutional:  Negative for chills and fever. HENT:  Negative for congestion, ear discharge, ear pain, rhinorrhea and sore throat. Respiratory:  Positive for cough, chest tightness and shortness of breath. Negative for wheezing. Cardiovascular:  Negative for chest pain. Neurological:  Negative for dizziness, weakness and light-headedness. Hematological:  Negative for adenopathy. Prior to Visit Medications    Medication Sig Taking?  Authorizing Provider   predniSONE (DELTASONE) 20 MG tablet Take 1 tablet by mouth daily for 5 days Yes Geraldine Loaiza DO   predniSONE (DELTASONE) 10 MG tablet Take 1 tablet by mouth daily for 5 days Yes Geraldine Loaiza DO   doxycycline hyclate (VIBRA-TABS) 100 MG tablet Take 1 tablet by mouth 2 times daily for 7 days Yes Geraldine Loaiza DO   ELIQUIS 5 MG TABS tablet TAKE 1 TABLET BY MOUTH TWICE DAILY Yes Geraldine Loaiza DO   senna (SENOKOT) 8.6 MG TABS tablet Take 1 tablet by mouth nightly as needed Yes Historical Provider, MD   clopidogrel (PLAVIX) 75 MG tablet TAKE 1 TABLET BY MOUTH DAILY Yes Sedrick Hawkins DO   DULoxetine (CYMBALTA) 20 MG extended release capsule  Yes Historical Provider, MD   ipratropium-albuterol (DUONEB) 0.5-2.5 (3) MG/3ML SOLN nebulizer solution Inhale 3 mLs into the lungs every 4 hours as needed for Shortness of Breath Yes Alen Angelucci, MD   albuterol sulfate HFA (PROVENTIL;VENTOLIN;PROAIR) 108 (90 Base) MCG/ACT inhaler Inhale 2 puffs into the lungs every 6 hours as needed for Wheezing Yes Umm Kline MD   omeprazole (PRILOSEC) 40 MG delayed release capsule TAKE 1 CAPSULE BY MOUTH DAILY Yes Sedrick Hawkins DO   Evolocumab 140 MG/ML SOAJ Inject 140 mg into the skin every 14 days Take an injection every 14 days. Yes Historical Provider, MD   fluticasone-umeclidin-vilant (TRELEGY ELLIPTA) 100-62.5-25 MCG/INH AEPB Inhale 1 puff into the lungs in the morning. Yes Sedrick Hawkins, DO   potassium chloride (MICRO-K) 10 MEQ extended release capsule 1 tablet Yes Sedrick Hawkins DO   furosemide (LASIX) 40 MG tablet Take 1 tablet by mouth in the morning. Yes Sedrick Hawkins, DO   metoprolol tartrate (LOPRESSOR) 50 MG tablet Take 1.5 tablets by mouth in the morning and 1.5 tablets before bedtime. Yes BRANDI Horne - CNP   losartan (COZAAR) 100 MG tablet Take 1 tablet by mouth in the morning. Yes Mark Dudley DO   atorvastatin (LIPITOR) 80 MG tablet Take 1 tablet by mouth daily Yes Sedrick Hawkins DO   allopurinol (ZYLOPRIM) 100 MG tablet Take 1 tablet by mouth daily Yes Sedrick Hawkins DO   pregabalin (LYRICA) 150 MG capsule Take 1 capsule by mouth 2 times daily for 30 days.   Sedrick Hawkins DO   folic acid (FOLVITE) 1 MG tablet Take 1 tablet by mouth daily  Marcio Maza MD   nitroGLYCERIN (NITROSTAT) 0.4 MG SL tablet Place 1 tablet under the tongue every 5 minutes as needed for Chest pain  Patient not taking: No sig reported  Sedrick Caprice, DO   magnesium gluconate (MAGONATE) 500 MG tablet Take 500 mg by mouth daily. Historical Provider, MD        Social History     Tobacco Use    Smoking status: Former     Packs/day: 0.25     Years: 52.00     Pack years: 13.00     Types: Cigarettes     Start date: 1967     Quit date: 2022     Years since quittin.4    Smokeless tobacco: Never    Tobacco comments:     reports he quit circa 22   Substance Use Topics    Alcohol use: Yes     Alcohol/week: 0.0 standard drinks     Comment: 2-3 beers a month        Vitals:    22 1302   BP: 124/60   Pulse: 69   Temp: 97.2 °F (36.2 °C)   SpO2: 93%   Weight: 242 lb (109.8 kg)   Height: 5' 10\" (1.778 m)     Estimated body mass index is 34.72 kg/m² as calculated from the following:    Height as of this encounter: 5' 10\" (1.778 m). Weight as of this encounter: 242 lb (109.8 kg). Physical Exam  Constitutional:       Appearance: Normal appearance. He is obese. HENT:      Head: Normocephalic and atraumatic. Right Ear: Tympanic membrane, ear canal and external ear normal.      Left Ear: Tympanic membrane, ear canal and external ear normal.      Nose: Nose normal.      Mouth/Throat:      Mouth: Mucous membranes are moist.      Pharynx: Oropharynx is clear. Cardiovascular:      Rate and Rhythm: Normal rate and regular rhythm. Pulmonary:      Effort: Pulmonary effort is normal.      Breath sounds: Wheezing (faint, scattered wheezes) present. Lymphadenopathy:      Cervical: No cervical adenopathy. Neurological:      Mental Status: He is alert. Mental status is at baseline. Psychiatric:         Mood and Affect: Mood normal.         Behavior: Behavior normal.         Thought Content: Thought content normal.       ASSESSMENT/PLAN:  1. COPD exacerbation ([facility] [address]): COPD exacerbation with gold criteria met. We will prolong steroid taper as he has had issues with coming off steroids and shortness of breath in the past.  Adding doxycycline.   Would like to see patient before he comes completely off the taper in 2 weeks. - predniSONE (DELTASONE) 20 MG tablet; Take 1 tablet by mouth daily for 5 days  Dispense: 5 tablet; Refill: 0  - predniSONE (DELTASONE) 10 MG tablet; Take 1 tablet by mouth daily for 5 days  Dispense: 5 tablet; Refill: 0    Return in about 2 weeks (around 12/15/2022). An electronic signature was used to authenticate this note.     --Bjorn Quinonez DO on 12/1/2022 at 2:35 PM

## 2022-12-01 NOTE — CARE COORDINATION
Situation: Ambulatory Care Mgr (ACM) nurse assigned for Care Transition outreach s/p ED visit 11.30.22 and eligibility for Ambulatory Care Coordination [facility]) support     Left HIPAA compliant vm requesting return callback. Provided & repeated direct callback to reach this ACM.

## 2022-12-02 ENCOUNTER — CARE COORDINATION (OUTPATIENT)
Dept: CARE COORDINATION | Age: 72
End: 2022-12-02

## 2022-12-02 ASSESSMENT — PATIENT HEALTH QUESTIONNAIRE - PHQ9
SUM OF ALL RESPONSES TO PHQ QUESTIONS 1-9: 0

## 2022-12-02 NOTE — CARE COORDINATION
Ambulatory Care Coordination Note  12/2/2022    ACC: Boston Lim RN    care transition outreach s/p ED visit 11.30.22. Pt continuing to self monitor oxygenation, sats remaining 89-97%, more often ranging 94-97%. He seen PCP yesterday for ED follow up visit. Denies any SOB, but endorses noting more SOB beyond baseline in recent days leading to ED visit   Taking newly prescribed antibiotic & prednisone as directed. Reports delay of Rx to [facility] in recent days d/t Rx sent to his mail order - but this concern has since been resolved. He was able to  Rx antibiotic last night & took first dose late yesterday. Reviewed Zone Mgmt per HF, COPD, use of nebulizer as appropriate. Reviewed rationale to push fluids to thin secretions and stay well hydrated. Pt states he is drinking water regularly    Offered patient enrollment in the Remote Patient Monitoring (RPM) program for in-home monitoring: Patient declined. Self monitors and promptly reports to providers using his own pulse oximeter, scale, etc.  Reviewed ACM availability, appropriate use of provider line after hours, as needed. Pt verbalized understanding of above and agreement with the following  Plan: Initiate [facility] support for health coaching, care collaboration, support w/community resources, and help with any newly presenting concerns as needed. Pt agrees to outreach direct to AC, as needed, between routine follow up outreach initiated by [place]     Ambulatory Care Coordination Assessment    Care Coordination Protocol  Referral from Primary Care Provider: No  Week 1 - Initial Assessment     Do you have all of your prescriptions and are they filled?: Yes (Comment: reviewed 12.2.22)  Barriers to medication adherence: None  Are you able to afford your medications?: Yes  How often do you have trouble taking your medications the way you have been told to take them?: I always take them as prescribed.      Do you have Home O2 Therapy?: Yes   Oxygen Regimen: At night/Sleep Flow - Enter rate/FIO2: 5   Method of Delivery: Nasal Cannula   CPAP Use: CPAP      Ability to seek help/take action for Emergent Urgent situations i.e. fire, crime, inclement weather or health crisis. : Independent  Ability to ambulate to restroom: Independent  Ability handle personal hygeine needs (bathing/dressing/grooming): Independent  Ability to manage Medications: Independent  Ability to prepare Food Preparation: Independent  Ability to maintain home (clean home, laundry): Independent  Ability to drive and/or has transportation: Dependent  Ability to do shopping: Independent  Ability to manage finances: Independent  Is patient able to live independently?: Yes     Current Housing: Apartment        Per the Fall Risk Screening, did the patient have 2 or more falls or 1 fall with injury in the past year?: No     Frequent urination at night?: No  Do you use rails/bars?: Yes  Do you have a non-slip tub mat?: Yes     Are you experiencing loss of meaning?: No  Are you experiencing loss of hope and peace?: No     Thinking about your patient's physical health needs, are there any symptoms or problems (risk indicators) you are unsure about that require further investigation?: No identified areas of uncertainly or problems already being investigated   Are the patients physical health problems impacting on their mental well-being?: No identified areas of concern   Are there any problems with your patients lifestyle behaviors (alcohol, drugs, diet, exercise) that are impacting on physical or mental well-being?: No identified areas of concern   Do you have any other concerns about your patients mental well-being?  How would you rate their severity and impact on the patient?: No identified areas of concern   How would you rate their home environment in terms of safety and stability (including domestic violence, insecure housing, neighbor harassment)?: Consistently safe, supportive, stable, no identified problems   How do daily activities impact on the patient's well-being? (include current or anticipated unemployment, work, caregiving, access to transportation or other): No identified problems or perceived positive benefits   How would you rate their social network (family, work, friends)?: Good participation with social networks   How would you rate their financial resources (including ability to afford all required medical care)?: Financially secure, resources adequate, no identified problems   How wells does the patient now understand their health and well-being (symptoms, signs or risk factors) and what they need to do to manage their health?: Reasonable to good understanding and already engages in managing health or is willing to undertake better management   How well do you think your patient can engage in healthcare discussions? (Barriers include language, deafness, aphasia, alcohol or drug problems, learning difficulties, concentration): Clear and open communication, no identified barriers   Do other services need to be involved to help this patient?: Other care/services not required at this time   Are current services involved with this patient well-coordinated? (Include coordination with other services you are now recommendation): All required care/services in place and well-coordinated   Suggested Interventions and Community Resources                  Prior to Admission medications    Medication Sig Start Date End Date Taking?  Authorizing Provider   predniSONE (DELTASONE) 20 MG tablet Take 1 tablet by mouth daily for 5 days 12/1/22 12/6/22 Yes Juvenal Aguilar DO   predniSONE (DELTASONE) 10 MG tablet Take 1 tablet by mouth daily for 5 days 12/1/22 12/6/22 Yes Juvenal Aguilar DO   doxycycline hyclate (VIBRA-TABS) 100 MG tablet Take 1 tablet by mouth 2 times daily for 7 days 12/1/22 12/8/22 Yes Juvenal Aguilar DO   ELIQUIS 5 MG TABS tablet TAKE 1 TABLET BY MOUTH TWICE DAILY 11/16/22  Yes Juvenal Aguilar DO   senna (SENOKOT) 8.6 MG TABS tablet Take 1 tablet by mouth nightly as needed 11/3/22  Yes Historical Provider, MD   clopidogrel (PLAVIX) 75 MG tablet TAKE 1 TABLET BY MOUTH DAILY 11/3/22  Yes Jennifer Hicks DO   DULoxetine (CYMBALTA) 20 MG extended release capsule  10/26/22  Yes Historical Provider, MD   ipratropium-albuterol (DUONEB) 0.5-2.5 (3) MG/3ML SOLN nebulizer solution Inhale 3 mLs into the lungs every 4 hours as needed for Shortness of Breath 10/26/22  Yes Villa Judd MD   albuterol sulfate HFA (PROVENTIL;VENTOLIN;PROAIR) 108 (90 Base) MCG/ACT inhaler Inhale 2 puffs into the lungs every 6 hours as needed for Wheezing 10/20/22  Yes Aurora Rangel MD   omeprazole (PRILOSEC) 40 MG delayed release capsule TAKE 1 CAPSULE BY MOUTH DAILY 10/4/22  Yes Jennifer Hicks DO   Evolocumab 140 MG/ML SOAJ Inject 140 mg into the skin every 14 days Take an injection every 14 days. Yes Historical Provider, MD   fluticasone-umeclidin-vilant (TRELEGY ELLIPTA) 100-62.5-25 MCG/INH AEPB Inhale 1 puff into the lungs in the morning. 8/2/22  Yes Jennifer Hicks DO   potassium chloride (MICRO-K) 10 MEQ extended release capsule 1 tablet 8/2/22  Yes Jennifer Hicks DO   furosemide (LASIX) 40 MG tablet Take 1 tablet by mouth in the morning. 8/2/22  Yes Jennifer Hicks DO   metoprolol tartrate (LOPRESSOR) 50 MG tablet Take 1.5 tablets by mouth in the morning and 1.5 tablets before bedtime. 8/2/22  Yes Elli Clark, APRN - CNP   losartan (COZAAR) 100 MG tablet Take 1 tablet by mouth in the morning. 7/30/22  Yes Mark Dudley,    atorvastatin (LIPITOR) 80 MG tablet Take 1 tablet by mouth daily 6/20/22  Yes Jennifer Hicks DO   allopurinol (ZYLOPRIM) 100 MG tablet Take 1 tablet by mouth daily 6/14/22  Yes Jennifer Hicks DO   pregabalin (LYRICA) 150 MG capsule Take 1 capsule by mouth 2 times daily for 30 days.  6/20/22 11/28/22  Jennifer Hicks DO   folic acid (FOLVITE) 1 MG tablet Take 1 tablet by mouth daily 9/21/21 7/29/22  Mary Jimenez MD nitroGLYCERIN (NITROSTAT) 0.4 MG SL tablet Place 1 tablet under the tongue every 5 minutes as needed for Chest pain  Patient not taking: No sig reported 8/25/21 7/29/22  Peter Soriano DO   magnesium gluconate (MAGONATE) 500 MG tablet Take 500 mg by mouth daily.   7/29/22  Historical Provider, MD       Future Appointments   Date Time Provider Orlando Colmenares   12/5/2022  1:45 PM Lorraine Ramirez MD [facility] WOUND [place] HOD   12/9/2022 10:00 AM SCHEDULE, [facility] JEFF HARPRE AWV LPN [facility] [place] - [place]   12/14/2022  9:45 AM DO JEFF Mccollum [facility] [place] - [place]   1/9/2023  8:00 PM [facility] SLEEP LAB ROOM 3 [address] [place] [address]   1/16/2023  1:00 PM MD Brice Xavier P/CC [facility]      Congestive Heart Failure Assessment    Are you currently restricting fluids?: No Restriction  Do you understand a low sodium diet?: Yes  Do you understand how to read food labels?: Yes  How many restaurant meals do you eat per week?: 0  Do you salt your food before tasting it?: No     No patient-reported symptoms      Symptoms:     Symptom course: stable  Weight trend: stable  Salt intake watch compared to last visit: stable      and   COPD Assessment    Does the patient understand envrionmental exposure?: Yes  Is the patient able to verbalize Rescue vs. Long Acting medications?: Yes  Does the patient have a nebulizer?: No  Does the patient use a space with inhaled medications?: No     No patient-reported symptoms         Symptoms:     Have you had a recent diagnosis of pneumonia either by PCP or at a hospital?: No     I agree with the Care Coordinator's Plan of Care

## 2022-12-05 ENCOUNTER — HOSPITAL ENCOUNTER (OUTPATIENT)
Dept: WOUND CARE | Age: 72
Discharge: HOME | End: 2022-12-05
Payer: MEDICARE

## 2022-12-05 DIAGNOSIS — S81.801D OPEN WOUND OF RIGHT LOWER LEG, SUBSEQUENT ENCOUNTER: ICD-10-CM

## 2022-12-05 DIAGNOSIS — I73.9 PVD (PERIPHERAL VASCULAR DISEASE) (HCC): ICD-10-CM

## 2022-12-05 DIAGNOSIS — L97.912 NON-PRESSURE ULCER OF RIGHT LOWER EXTREMITY WITH FAT LAYER EXPOSED (HCC): Primary | ICD-10-CM

## 2022-12-05 DIAGNOSIS — I73.9 PVD (PERIPHERAL VASCULAR DISEASE) (HCC): Primary | ICD-10-CM

## 2022-12-05 DIAGNOSIS — I70.221 ATHEROSCLEROSIS OF NATIVE ARTERY OF RIGHT LOWER EXTREMITY WITH REST PAIN (HCC): ICD-10-CM

## 2022-12-05 PROCEDURE — 99212 OFFICE O/P EST SF 10 MIN: CPT

## 2022-12-05 RX ORDER — GINSENG 100 MG
CAPSULE ORAL ONCE
Status: CANCELLED | OUTPATIENT
Start: 2022-12-05 | End: 2022-12-05

## 2022-12-05 RX ORDER — GENTAMICIN SULFATE 1 MG/G
OINTMENT TOPICAL ONCE
Status: CANCELLED | OUTPATIENT
Start: 2022-12-05 | End: 2022-12-05

## 2022-12-05 RX ORDER — LIDOCAINE 50 MG/G
OINTMENT TOPICAL ONCE
Status: CANCELLED | OUTPATIENT
Start: 2022-12-05 | End: 2022-12-05

## 2022-12-05 RX ORDER — LIDOCAINE HYDROCHLORIDE 40 MG/ML
SOLUTION TOPICAL ONCE
Status: DISCONTINUED | OUTPATIENT
Start: 2022-12-05 | End: 2022-12-05 | Stop reason: ALTCHOICE

## 2022-12-05 RX ORDER — CLOBETASOL PROPIONATE 0.5 MG/G
OINTMENT TOPICAL ONCE
Status: CANCELLED | OUTPATIENT
Start: 2022-12-05 | End: 2022-12-05

## 2022-12-05 RX ORDER — LIDOCAINE HYDROCHLORIDE 20 MG/ML
JELLY TOPICAL ONCE
Status: CANCELLED | OUTPATIENT
Start: 2022-12-05 | End: 2022-12-05

## 2022-12-05 RX ORDER — BACITRACIN, NEOMYCIN, POLYMYXIN B 400; 3.5; 5 [USP'U]/G; MG/G; [USP'U]/G
OINTMENT TOPICAL ONCE
Status: CANCELLED | OUTPATIENT
Start: 2022-12-05 | End: 2022-12-05

## 2022-12-05 RX ORDER — LIDOCAINE HYDROCHLORIDE 40 MG/ML
SOLUTION TOPICAL ONCE
Status: CANCELLED | OUTPATIENT
Start: 2022-12-05 | End: 2022-12-05

## 2022-12-05 RX ORDER — BACITRACIN ZINC AND POLYMYXIN B SULFATE 500; 1000 [USP'U]/G; [USP'U]/G
OINTMENT TOPICAL ONCE
Status: CANCELLED | OUTPATIENT
Start: 2022-12-05 | End: 2022-12-05

## 2022-12-05 RX ORDER — LIDOCAINE 40 MG/G
CREAM TOPICAL ONCE
Status: CANCELLED | OUTPATIENT
Start: 2022-12-05 | End: 2022-12-05

## 2022-12-05 RX ORDER — BETAMETHASONE DIPROPIONATE 0.05 %
OINTMENT (GRAM) TOPICAL ONCE
Status: CANCELLED | OUTPATIENT
Start: 2022-12-05 | End: 2022-12-05

## 2022-12-05 NOTE — LETTER
[address] [address] [address] [address] [facility]. [place]. [place]  Telephone: [phone]    NAME:  Amber Amezcua. YOB: 1950  MEDICAL RECORD NUMBER:  [id number]  DATE:  12/5/2022    **Home care may discontinue wound care**  [address]   F: [phone]    Congratulations! You have completed your treatment program!    To prevent Venous Wounds from recurring again:  · Elevate your legs at least parallel to the ground while sitting. · Replace your stockings every 3-6 months so that your legs continue to get the support they need. · Inspect your legs and feet daily and report any increased swelling, unusual redness or new wounds to your physician. · Wash your legs and feet regularly with mild soap and water and moisturize daily. · Continue to use compression pumps if prescribed by your physician. · Avoid overeating. Extra weight adds pressure on the veins in your legs. · Limit salty foods as they promote swelling or fluid retention in your legs. Additional instructions for healed wound/skin care: Please purchase light strength (15 - 20 mmHg) compression socks/stockings to help with the edema. Wear the Medigrips until you can purchase the stockings. Dr Shree Worley vascular office will call you to schedule your follow up vascular test.    Call the [address] [place] [address] Road if your wound reopens, if new wounds occur, or if you have any other questions about your follow up care [phone].        Electronically signed by Kayla Cason RN on 12/5/2022 at 1:49 PM

## 2022-12-05 NOTE — DISCHARGE INSTRUCTIONS
[address] [address] [address] [address] [facility]. [facility]. [place]  Telephone: [phone]    NAME:  Laureano Arevalo. YOB: 1950  MEDICAL RECORD NUMBER:  [id number]  DATE:  12/5/2022    **Home care may discontinue wound care**  [address]   F: [phone]      Congratulations! You have completed your treatment program!    To prevent Venous Wounds from recurring again:  Elevate your legs at least parallel to the ground while sitting. Replace your stockings every 3-6 months so that your legs continue to get the support they need. Inspect your legs and feet daily and report any increased swelling, unusual redness or new wounds to your physician. Wash your legs and feet regularly with mild soap and water and moisturize daily. Continue to use compression pumps if prescribed by your physician. Avoid overeating. Extra weight adds pressure on the veins in your legs. Limit salty foods as they promote swelling or fluid retention in your legs. Additional instructions for healed wound/skin care: Please purchase light strength (15 - 20 mmHg) compression socks/stockings to help with the edema. Wear the Medigrips until you can purchase the stockings. Dr Leonela White vascular office will call you to schedule your follow up vascular test.      Call the [address] [place] [address] Road if your wound reopens, if new wounds occur, or if you have any other questions about your follow up care [phone].      [] Patient unable to sign Discharge Instructions given to ECF/Transportation/POA    Electronically signed by Ahmet Bourgeois RN on 12/5/2022 at 1:49 PM

## 2022-12-07 ENCOUNTER — TELEPHONE (OUTPATIENT)
Dept: VASCULAR SURGERY | Age: 72
End: 2022-12-07

## 2022-12-07 NOTE — TELEPHONE ENCOUNTER
----- Message from Andrez Gómez MD sent at 12/5/2022  2:13 PM EST -----  Mr. Merle Walters is due for arterial duplex of both legs. Can you call him and set him up to do it with Nina Goldstein. I will call him with results. I placed the order. Thanks.

## 2022-12-09 ENCOUNTER — TELEMEDICINE (OUTPATIENT)
Dept: PRIMARY CARE CLINIC | Age: 72
End: 2022-12-09
Payer: MEDICARE

## 2022-12-09 DIAGNOSIS — Z00.00 MEDICARE ANNUAL WELLNESS VISIT, SUBSEQUENT: Primary | ICD-10-CM

## 2022-12-09 PROCEDURE — G0439 PPPS, SUBSEQ VISIT: HCPCS | Performed by: STUDENT IN AN ORGANIZED HEALTH CARE EDUCATION/TRAINING PROGRAM

## 2022-12-09 PROCEDURE — 1123F ACP DISCUSS/DSCN MKR DOCD: CPT | Performed by: STUDENT IN AN ORGANIZED HEALTH CARE EDUCATION/TRAINING PROGRAM

## 2022-12-09 ASSESSMENT — PATIENT HEALTH QUESTIONNAIRE - PHQ9
1. LITTLE INTEREST OR PLEASURE IN DOING THINGS: 0
SUM OF ALL RESPONSES TO PHQ QUESTIONS 1-9: 0
2. FEELING DOWN, DEPRESSED OR HOPELESS: 0
SUM OF ALL RESPONSES TO PHQ QUESTIONS 1-9: 0
SUM OF ALL RESPONSES TO PHQ QUESTIONS 1-9: 0
SUM OF ALL RESPONSES TO PHQ9 QUESTIONS 1 & 2: 0
SUM OF ALL RESPONSES TO PHQ QUESTIONS 1-9: 0

## 2022-12-09 ASSESSMENT — LIFESTYLE VARIABLES
HOW MANY STANDARD DRINKS CONTAINING ALCOHOL DO YOU HAVE ON A TYPICAL DAY: PATIENT DOES NOT DRINK
HOW OFTEN DO YOU HAVE A DRINK CONTAINING ALCOHOL: NEVER

## 2022-12-09 NOTE — PATIENT INSTRUCTIONS
Personalized Preventive Plan for Armand Galarza 12/9/2022  Medicare offers a range of preventive health benefits. Some of the tests and screenings are paid in full while other may be subject to a deductible, co-insurance, and/or copay. Some of these benefits include a comprehensive review of your medical history including lifestyle, illnesses that may run in your family, and various assessments and screenings as appropriate. After reviewing your medical record and screening and assessments performed today your provider may have ordered immunizations, labs, imaging, and/or referrals for you. A list of these orders (if applicable) as well as your Preventive Care list are included within your After Visit Summary for your review. Other Preventive Recommendations:    A preventive eye exam performed by an eye specialist is recommended every 1-2 years to screen for glaucoma; cataracts, macular degeneration, and other eye disorders. A preventive dental visit is recommended every 6 months. Try to get at least 150 minutes of exercise per week or 10,000 steps per day on a pedometer . Order or download the FREE \"Exercise & Physical Activity: Your Everyday Guide\" from [employer]. Call 8-792.976.9148 or search [employer] online. You need 5978-1990 mg of calcium and 7878-7335 IU of vitamin D per day. It is possible to meet your calcium requirement with diet alone, but a vitamin D supplement is usually necessary to meet this goal.  When exposed to the sun, use a sunscreen that protects against both UVA and UVB radiation with an SPF of 30 or greater. Reapply every 2 to 3 hours or after sweating, drying off with a towel, or swimming. Always wear a seat belt when traveling in a car. Always wear a helmet when riding a bicycle or motorcycle.

## 2022-12-09 NOTE — PROGRESS NOTES
Medicare Annual Wellness Visit    Esvin George. is here for Medicare AWV    Assessment & Plan   Medicare annual wellness visit, subsequent      Recommendations for Preventive Services Due: see orders and patient instructions/AVS.  Recommended screening schedule for the next 5-10 years is provided to the patient in written form: see Patient Instructions/AVS.     No follow-ups on file. Subjective       Patient's complete Health Risk Assessment and screening values have been reviewed and are found in Flowsheets. The following problems were reviewed today and where indicated follow up appointments were made and/or referrals ordered. Positive Risk Factor Screenings with Interventions:                 Weight and Activity:  Physical Activity: Inactive    Days of Exercise per Week: 0 days    Minutes of Exercise per Session: 0 min     On average, how many days per week do you engage in moderate to strenuous exercise (like a brisk walk)?: 0 days  Have you lost any weight without trying in the past 3 months?: No         Inactivity Interventions:  Patient declined any further interventions or treatment    Unintentional Weight Loss Interventions:  Patient declined any further interventions or treatment  Obesity Interventions:  Patient declines any further evaluation or treatment          Dentist Screen:  Have you seen the dentist within the past year?: (!) No    Intervention:  Encouraged Patient to make dental appt  See AVS for additional education material  See A/P for any pertinent orders     Vision Screen:  Do you have difficulty driving, watching TV, or doing any of your daily activities because of your eyesight?: (!) Yes  Have you had an eye exam within the past year?: (!) No  No results found.     Interventions:   Patient encouraged to make appointment with their eye specialist    See AVS for additional education material  See A/P for any pertinent orders                      Objective      Patient-Reported Vitals  Patient-Reported Systolic (Top): 683 mmHg  Patient-Reported Diastolic (Bottom): 61 mmHg  Patient-Reported Weight: 242lb  Patient-Reported Height: 5'10       Physical Exam         Allergies   Allergen Reactions    Asa [Aspirin] Other (See Comments)     Stomach ache    Daliresp [Roflumilast] Diarrhea and Other (See Comments)     Severe diarrhea and did not help     Prior to Visit Medications    Medication Sig Taking? Authorizing Provider   ELIQUIS 5 MG TABS tablet TAKE 1 TABLET BY MOUTH TWICE DAILY Yes Peter Soriano DO   senna (SENOKOT) 8.6 MG TABS tablet Take 1 tablet by mouth nightly as needed Yes Historical Provider, MD   clopidogrel (PLAVIX) 75 MG tablet TAKE 1 TABLET BY MOUTH DAILY Yes Peter Soriano DO   DULoxetine (CYMBALTA) 20 MG extended release capsule  Yes Historical Provider, MD   ipratropium-albuterol (DUONEB) 0.5-2.5 (3) MG/3ML SOLN nebulizer solution Inhale 3 mLs into the lungs every 4 hours as needed for Shortness of Breath Yes Suze Geronimo MD   albuterol sulfate HFA (PROVENTIL;VENTOLIN;PROAIR) 108 (90 Base) MCG/ACT inhaler Inhale 2 puffs into the lungs every 6 hours as needed for Wheezing Yes Kimberli Barnes MD   omeprazole (PRILOSEC) 40 MG delayed release capsule TAKE 1 CAPSULE BY MOUTH DAILY Yes Peter Soriano DO   Evolocumab 140 MG/ML SOAJ Inject 140 mg into the skin every 14 days Take an injection every 14 days. Yes Historical Provider, MD   fluticasone-umeclidin-vilant (TRELEGY ELLIPTA) 100-62.5-25 MCG/INH AEPB Inhale 1 puff into the lungs in the morning. Yes Peter Soriano DO   potassium chloride (MICRO-K) 10 MEQ extended release capsule 1 tablet Yes Peter Soriano DO   furosemide (LASIX) 40 MG tablet Take 1 tablet by mouth in the morning. Yes Peter Soriano DO   metoprolol tartrate (LOPRESSOR) 50 MG tablet Take 1.5 tablets by mouth in the morning and 1.5 tablets before bedtime. Yes Teryl Folds, APRN - CNP   losartan (COZAAR) 100 MG tablet Take 1 tablet by mouth in the morning.  Yes Ed Martínez ARNOLDO Dudley DO   atorvastatin (LIPITOR) 80 MG tablet Take 1 tablet by mouth daily Yes John Osman    allopurinol (ZYLOPRIM) 100 MG tablet Take 1 tablet by mouth daily Yes John OsmanDO   pregabalin (LYRICA) 150 MG capsule Take 1 capsule by mouth 2 times daily for 30 days. Johnclay OsmanDO   folic acid (FOLVITE) 1 MG tablet Take 1 tablet by mouth daily  Sally Porter MD   nitroGLYCERIN (NITROSTAT) 0.4 MG SL tablet Place 1 tablet under the tongue every 5 minutes as needed for Chest pain  Patient not taking: No sig reported  John Osman    magnesium gluconate (MAGONATE) 500 MG tablet Take 500 mg by mouth daily. Historical Provider, MD [place] (Including outside providers/suppliers regularly involved in providing care):   Patient Care Team:  John Osman DO as PCP - General (Family Medicine)  John JacquiDO as PCP - [facility] Empaneled Provider  Citlaly Meneses MD as Consulting Physician (Pulmonology)  Kerline Davenport MD as Consulting Physician (Pain Management)  Gia Hassan MD as Consulting Physician (Interventional Cardiology)  Christina Leslie MD as Consulting Physician (Gastroenterology)  Rosalva Gaines RN as Ambulatory Care Manager     Reviewed and updated this visit:  Allergies  Meds         I, Gustavo Bear LPN, 43/9/6475, performed the documented evaluation under the direct supervision of the attending physician. Joe Glover, was evaluated through a synchronous (real-time) audio-video encounter. The patient (or guardian if applicable) is aware that this is a billable service, which includes applicable co-pays. This Virtual Visit was conducted with patient's (and/or legal guardian's) consent. The visit was conducted pursuant to the emergency declaration under the [facility], [address] [place] [address] authority and the [employer] General Act.   Patient identification was verified, and a caregiver was present when appropriate. The patient was located at Home: [address]. Provider was located at [facility] (Appt Dept): [address] [place] [address]  [address] [address].

## 2022-12-13 ENCOUNTER — PROCEDURE VISIT (OUTPATIENT)
Dept: VASCULAR SURGERY | Age: 72
End: 2022-12-13

## 2022-12-13 DIAGNOSIS — I73.9 PVD (PERIPHERAL VASCULAR DISEASE) (HCC): ICD-10-CM

## 2022-12-13 NOTE — PATIENT INSTRUCTIONS
Patient Education        Chronic Obstructive Pulmonary Disease (COPD): Care Instructions  Overview     Chronic obstructive pulmonary disease (COPD) is a lung disease that makes it hard to breathe. With COPD, the airways that lead to the lungs are narrowed, and the tiny air sacs in the lungs are damaged and lose their stretch. People with COPD have decreased airflow in and out of the lungs, which makes it hard to breathe. The airways also can get clogged with thick mucus. Cigarette smoking is a major cause of COPD. Although there is no cure for COPD, you can slow its progress. Following your treatment plan and taking care of yourself can help you feel better and live longer. Follow-up care is a key part of your treatment and safety. Be sure to make and go to all appointments, and call your doctor if you are having problems. It's also a good idea to know your test results and keep a list of the medicines you take. How can you care for yourself at home? Staying healthy    Do not smoke. This is the most important step you can take to prevent more damage to your lungs. If you need help quitting, talk to your doctor about stop-smoking programs and medicines. These can increase your chances of quitting for good. Avoid infections such as COVID-19, colds, and the flu. Wash your hands often. Get the pneumococcal and whooping cough (pertussis) vaccines. If you have had these vaccines before, ask your doctor if you need another dose. Get the flu vaccine every fall. Stay up to date on your COVID-19 vaccines. Avoid secondhand smoke and air pollution. Try to stay inside with your windows closed when air pollution is bad. Medicines and oxygen therapy    Take your medicines exactly as prescribed. Call your doctor if you think you are having a problem with your medicine. You may be taking medicines such as:  Bronchodilators. These help open your airways and make breathing easier.  They are either short-acting (work for 4 to 9 hours) or long-acting (work for 12 to 24 hours). You inhale most bronchodilators, so they start to act quickly. Always carry your quick-relief inhaler with you in case you need it. Corticosteroids (prednisone, budesonide). These reduce airway inflammation. They come in inhaled or pill form. Ask your doctor or pharmacist if you are using each type of inhaler correctly. With correct use, the medicine is more likely to get to your lungs. See if a spacer is right for you. A spacer may also help you get more inhaled medicine to your lungs. If you use one, ask how to use it properly. Do not take any vitamins, over-the-counter medicine, or herbal products without talking to your doctor first.     If your doctor prescribed antibiotics, take them as directed. Do not stop taking them just because you feel better. You need to take the full course of antibiotics. If you use oxygen therapy, use the flow rate your doctor has recommended. Don't change it without talking to your doctor first. Oxygen therapy boosts the amount of oxygen in your blood and helps you breathe easier. Activity    Get regular exercise. Walking is an easy way to get exercise. Start out slowly, and walk a little more each day. Pay attention to your breathing. You are exercising too hard if you can't talk while you exercise. Take short rest breaks when doing household chores and other activities. Learn breathing methods--such as breathing through pursed lips--to help you become less short of breath. If your doctor has not set you up with a pulmonary rehabilitation program, ask if rehab is right for you. Rehab includes exercise programs, education about your disease and how to manage it, help with diet and other changes, and emotional support. Diet    Eat regular, healthy meals. Use bronchodilators about 1 hour before you eat to make it easier to eat.      Eat several smaller, frequent meals to prevent getting too full. A full stomach can push on the muscle that helps you breathe (your diaphragm) and make it harder to breathe. Drink beverages at the end of the meal.     Avoid foods that are hard to chew. Eat foods that contain protein so you don't lose muscle mass. Talk with your doctor if you gain too much weight or if you lose weight without trying. Mental health    Talk to your family, friends, or a therapist about your feelings. Some people feel frightened, angry, hopeless, helpless, and even guilty. Talking openly about feelings may help you cope. If these feelings last, talk to your doctor. When should you call for help? Call 911 anytime you think you may need emergency care. For example, call if:    You have severe trouble breathing. You have severe chest pain. Call your doctor now or seek immediate medical care if:    You have new or worse trouble breathing. You have new or worse chest pain. You cough up blood. You have a fever. Watch closely for changes in your health, and be sure to contact your doctor if:    You cough more deeply or more often, especially if you notice more mucus or a change in the color of your mucus. You have new or worse swelling in your legs or belly. You have feelings of anxiety or depression. You need to use your antibiotic or steroid pills. You are not getting better as expected. Where can you learn more? Go to http://www.woods.com/ and enter X915 to learn more about \"Chronic Obstructive Pulmonary Disease (COPD): Care Instructions. \"  Current as of: March 9, 2022               Content Version: 13.5  © 2006-2022 [employer]. Care instructions adapted under license by [facility] ([facility]). If you have questions about a medical condition or this instruction, always ask your healthcare professional. [address] any warranty or liability for your use of this information.

## 2022-12-13 NOTE — PROGRESS NOTES
2022     Yesika Cintron (:  1950) is a 81 y.o. male, here for evaluation of the following medical concerns:    HPI  COPD  Ike Roque presents today for evaluation of a COPD exacerbation. Patient was seen in clinic about 10 days ago complaining of productive cough, shortness of breath, wheezing and exertional dyspnea. He was diagnosed with a COPD exacerbation and started on antibiotics and prednisone. He was placed on a prednisone taper he currently has 2 days of 10 mg prednisone remaining. He has completed the antibiotics. He has noted significant improvement in his breathing. He had been sitting out on his porch at night, because breathing the cool air was much easier. He has been able to sleep and side normally. He has not had fevers, chills, nausea, vomiting or diarrhea. Review of Systems   Constitutional:  Negative for chills and fever. HENT:  Negative for congestion, ear discharge, ear pain, rhinorrhea and sore throat. Respiratory:  Negative for cough, chest tightness, shortness of breath and wheezing. Cardiovascular:  Negative for chest pain. Neurological:  Negative for dizziness, weakness and light-headedness. Hematological:  Negative for adenopathy. Prior to Visit Medications    Medication Sig Taking? Authorizing Provider   pregabalin (LYRICA) 150 MG capsule Take 1 capsule by mouth 2 times daily for 30 days.  Yes Nisa Beckett, DO   predniSONE (DELTASONE) 2.5 MG tablet Take 1 tablet by mouth daily for 5 days Yes Nisa Beckett, DO   predniSONE (DELTASONE) 5 MG tablet Take 1 tablet by mouth daily for 5 days Yes Nisa Beckett, DO   ELIQUIS 5 MG TABS tablet TAKE 1 TABLET BY MOUTH TWICE DAILY Yes Nisa Beckett DO   senna (SENOKOT) 8.6 MG TABS tablet Take 1 tablet by mouth nightly as needed Yes Historical Provider, MD   clopidogrel (PLAVIX) 75 MG tablet TAKE 1 TABLET BY MOUTH DAILY Yes Nisa Beckett, DO   DULoxetine (CYMBALTA) 20 MG extended release capsule  Yes Historical Provider, MD   ipratropium-albuterol (DUONEB) 0.5-2.5 (3) MG/3ML SOLN nebulizer solution Inhale 3 mLs into the lungs every 4 hours as needed for Shortness of Breath Yes Charlene Potter MD   albuterol sulfate HFA (PROVENTIL;VENTOLIN;PROAIR) 108 (90 Base) MCG/ACT inhaler Inhale 2 puffs into the lungs every 6 hours as needed for Wheezing Yes Sofy Pitt MD   omeprazole (PRILOSEC) 40 MG delayed release capsule TAKE 1 CAPSULE BY MOUTH DAILY Yes Geraldine Loaiza, DO   Evolocumab 140 MG/ML SOAJ Inject 140 mg into the skin every 14 days Take an injection every 14 days. Yes Historical Provider, MD   fluticasone-umeclidin-vilant (TRELEGY ELLIPTA) 100-62.5-25 MCG/INH AEPB Inhale 1 puff into the lungs in the morning. Yes Geraldine Loaiza, DO   potassium chloride (MICRO-K) 10 MEQ extended release capsule 1 tablet Yes Geraldine Loaiza, DO   furosemide (LASIX) 40 MG tablet Take 1 tablet by mouth in the morning. Yes Geraldine Loaiza DO   metoprolol tartrate (LOPRESSOR) 50 MG tablet Take 1.5 tablets by mouth in the morning and 1.5 tablets before bedtime. Yes BRANDI Montalvo - CNP   losartan (COZAAR) 100 MG tablet Take 1 tablet by mouth in the morning. Yes Mark Dudley,    atorvastatin (LIPITOR) 80 MG tablet Take 1 tablet by mouth daily Yes Geraldine Loaiza DO   allopurinol (ZYLOPRIM) 100 MG tablet Take 1 tablet by mouth daily Yes Geraldine Loaiza DO   folic acid (FOLVITE) 1 MG tablet Take 1 tablet by mouth daily  Ammon Ames MD   nitroGLYCERIN (NITROSTAT) 0.4 MG SL tablet Place 1 tablet under the tongue every 5 minutes as needed for Chest pain  Patient not taking: No sig reported  Geraldine Loaiza DO   magnesium gluconate (MAGONATE) 500 MG tablet Take 500 mg by mouth daily.   Historical Provider, MD        Social History     Tobacco Use    Smoking status: Former     Packs/day: 0.25     Years: 52.00     Pack years: 13.00     Types: Cigarettes     Start date: 1967     Quit date: 2022     Years since quittin.5    Smokeless tobacco: Never    Tobacco comments:     reports he quit circa 6.8.22   Substance Use Topics    Alcohol use: Not Currently     Comment: 2-3 beers a month        Vitals:    12/14/22 0938   BP: 115/60   Pulse: 68   Temp: 97.3 °F (36.3 °C)   SpO2: 98%   Weight: 242 lb (109.8 kg)   Height: 5' 10\" (1.778 m)     Estimated body mass index is 34.72 kg/m² as calculated from the following:    Height as of this encounter: 5' 10\" (1.778 m). Weight as of this encounter: 242 lb (109.8 kg). Physical Exam  Constitutional:       Appearance: Normal appearance. He is obese. HENT:      Head: Normocephalic and atraumatic. Right Ear: Tympanic membrane, ear canal and external ear normal.      Left Ear: Tympanic membrane, ear canal and external ear normal.      Nose: Nose normal.      Mouth/Throat:      Mouth: Mucous membranes are moist.      Pharynx: Oropharynx is clear. Cardiovascular:      Rate and Rhythm: Normal rate and regular rhythm. Pulmonary:      Effort: Pulmonary effort is normal.      Breath sounds: Normal breath sounds. No wheezing. Lymphadenopathy:      Cervical: No cervical adenopathy. Neurological:      Mental Status: He is alert. Mental status is at baseline. Psychiatric:         Mood and Affect: Mood normal.         Behavior: Behavior normal.         Thought Content: Thought content normal.       ASSESSMENT/PLAN:  1. COPD exacerbation ([address]): Respiratory status has improved significantly today. No additional antibiotics warranted at this time. Will extend prednisone taper after he completes the 10 to 5 days of 5 mg and 5 days of 2.5. Call if symptoms recur. Otherwise we will follow-up as needed. - predniSONE (DELTASONE) 2.5 MG tablet; Take 1 tablet by mouth daily for 5 days  Dispense: 5 tablet; Refill: 0  - predniSONE (DELTASONE) 5 MG tablet; Take 1 tablet by mouth daily for 5 days  Dispense: 5 tablet; Refill: 0    Return if symptoms worsen or fail to improve.     An electronic signature was used to authenticate this note.     --Keysha Nick DO on 12/14/2022 at 10:07 AM

## 2022-12-14 ENCOUNTER — OFFICE VISIT (OUTPATIENT)
Dept: PRIMARY CARE CLINIC | Age: 72
End: 2022-12-14
Payer: MEDICARE

## 2022-12-14 VITALS
WEIGHT: 242 LBS | HEIGHT: 70 IN | SYSTOLIC BLOOD PRESSURE: 115 MMHG | HEART RATE: 68 BPM | BODY MASS INDEX: 34.65 KG/M2 | DIASTOLIC BLOOD PRESSURE: 60 MMHG | TEMPERATURE: 97.3 F | OXYGEN SATURATION: 98 %

## 2022-12-14 DIAGNOSIS — G60.9 IDIOPATHIC PERIPHERAL NEUROPATHY: ICD-10-CM

## 2022-12-14 DIAGNOSIS — G89.29 OTHER CHRONIC PAIN: ICD-10-CM

## 2022-12-14 DIAGNOSIS — J44.1 COPD EXACERBATION (HCC): Primary | ICD-10-CM

## 2022-12-14 PROCEDURE — 3074F SYST BP LT 130 MM HG: CPT | Performed by: STUDENT IN AN ORGANIZED HEALTH CARE EDUCATION/TRAINING PROGRAM

## 2022-12-14 PROCEDURE — 3078F DIAST BP <80 MM HG: CPT | Performed by: STUDENT IN AN ORGANIZED HEALTH CARE EDUCATION/TRAINING PROGRAM

## 2022-12-14 PROCEDURE — 99214 OFFICE O/P EST MOD 30 MIN: CPT | Performed by: STUDENT IN AN ORGANIZED HEALTH CARE EDUCATION/TRAINING PROGRAM

## 2022-12-14 PROCEDURE — 1123F ACP DISCUSS/DSCN MKR DOCD: CPT | Performed by: STUDENT IN AN ORGANIZED HEALTH CARE EDUCATION/TRAINING PROGRAM

## 2022-12-14 RX ORDER — PREDNISONE 2.5 MG
2.5 TABLET ORAL DAILY
Qty: 5 TABLET | Refills: 0 | Status: SHIPPED | OUTPATIENT
Start: 2022-12-14 | End: 2022-12-14

## 2022-12-14 RX ORDER — PREDNISONE 2.5 MG
2.5 TABLET ORAL DAILY
Qty: 5 TABLET | Refills: 0 | Status: SHIPPED | OUTPATIENT
Start: 2022-12-14 | End: 2022-12-19

## 2022-12-14 RX ORDER — PREDNISONE 1 MG/1
5 TABLET ORAL DAILY
Qty: 5 TABLET | Refills: 0 | Status: SHIPPED | OUTPATIENT
Start: 2022-12-14 | End: 2022-12-19

## 2022-12-14 RX ORDER — PREGABALIN 150 MG/1
150 CAPSULE ORAL 2 TIMES DAILY
Qty: 180 CAPSULE | Refills: 1 | Status: SHIPPED | OUTPATIENT
Start: 2022-12-14 | End: 2023-01-13

## 2022-12-14 RX ORDER — PREDNISONE 1 MG/1
5 TABLET ORAL DAILY
Qty: 5 TABLET | Refills: 0 | Status: SHIPPED | OUTPATIENT
Start: 2022-12-14 | End: 2022-12-14

## 2022-12-14 ASSESSMENT — ENCOUNTER SYMPTOMS
CHEST TIGHTNESS: 0
COUGH: 0
SHORTNESS OF BREATH: 0
RHINORRHEA: 0
SORE THROAT: 0
WHEEZING: 0

## 2022-12-20 DIAGNOSIS — G60.9 IDIOPATHIC PERIPHERAL NEUROPATHY: ICD-10-CM

## 2022-12-20 RX ORDER — PREGABALIN 150 MG/1
150 CAPSULE ORAL 2 TIMES DAILY
Qty: 180 CAPSULE | Refills: 1 | Status: SHIPPED | OUTPATIENT
Start: 2022-12-20 | End: 2023-01-19

## 2022-12-20 NOTE — TELEPHONE ENCOUNTER
pregabalin (LYRICA) 150 MG capsule     [id number] [address], [address] [facility] [address] Amaral [phone]   [address] [facility], [facility] [facility]   Phone:  [phone]  Fax:  [phone]     Pt call in and needs this medication to go to the pharmacy above. Pt states that he does not know why it went to this pharmacy. Below .     [address] [place] [address], [address] [phone] - F [phone]   [address] [facility] [address] [facility]   Phone:  [phone]  Fax:  [phone]

## 2022-12-20 NOTE — TELEPHONE ENCOUNTER
Medication:   Requested Prescriptions     Pending Prescriptions Disp Refills    pregabalin (LYRICA) 150 MG capsule 180 capsule 1     Sig: Take 1 capsule by mouth 2 times daily for 30 days. Last Filled:  12/14/22 sent in to wrong pharmacy     Patient Phone Number: [phone] (home)     Last appt: 12/14/2022   Next appt: 2/14/2023    Last OARRS:   RX Monitoring 8/14/2020   Attestation -   Periodic Controlled Substance Monitoring Possible medication side effects, risk of tolerance/dependence & alternative treatments discussed. ;Assessed functional status. ;Obtaining appropriate analgesic effect of treatment. Chronic Pain > 50 MEDD Obtained or confirmed \"Consent for Opioid Use\" on file.

## 2022-12-23 ENCOUNTER — CARE COORDINATION (OUTPATIENT)
Dept: CARE COORDINATION | Age: 72
End: 2022-12-23

## 2022-12-23 NOTE — CARE COORDINATION
Ambulatory Care Coordination Note  2022    ACC: Audrey Snyder RN  General Assessment    Do you have any symptoms that are causing concern?: No          Reviewed precautions as r/t extreme temps, self mgmt concepts and appropriate use of provider line after hours as needed over holiday weekend. Pt reports he has all provisions needed. Heat is operating well. No foreseen concerns. Pt reports feeling well - baseline and verbalizes understanding of s/s to report, when to notify physician. Offered patient enrollment in the Remote Patient Monitoring (RPM) program for in-home monitoring: Patient declined. Heart Failure Education outreach Date/Time: 2022 10:12 AM    Ambulatory Care Manager (ACM) contacted the patient by telephone to perform Ambulatory Care Coordination. Verified name and  with patient as identifiers. Provided introduction to self, and explanation of the Ambulatory Care Manager's role. ACM reviewed that a Health Healthy tips for the Holiday packet has been mailed to the them. ACM reviewed CHF zones, daily weights, and healthy tips packet with the patient. Instructed patient to call their PCP if they have a weight gain of 3 lbs in 2 days or 5 lbs in a week. Patient reminded that there is a physician on call 24 hours a day / 7 days a week should the patient have questions or concerns. The patient verbalized understanding.    Congestive Heart Failure Assessment    Are you currently restricting fluids?: No Restriction  Do you understand a low sodium diet?: Yes  Do you understand how to read food labels?: Yes  How many restaurant meals do you eat per week?: 0  Do you salt your food before tasting it?: No     No patient-reported symptoms      Symptoms:  None: Yes      Symptom course: stable  Weight trend: stable  Salt intake watch compared to last visit: stable      and   COPD Assessment    Does the patient understand envrionmental exposure?: Yes  Is the patient able to verbalize Rescue vs. Long Acting medications?: Yes  Does the patient have a nebulizer?: No  Does the patient use a space with inhaled medications?: No     No patient-reported symptoms         Symptoms:     Have you had a recent diagnosis of pneumonia either by PCP or at a hospital?: No      Pt verbalized understanding of above and agreement with the following (which has not changed)  Plan: continue [facility] support for health coaching, care collaboration, support w/community resources, and help with any newly presenting concerns as needed. Pt agrees to outreach direct to [facility], as needed, between routine follow up outreach initiated by [facility]       Care Coordination Interventions    Referral from Primary Care Provider: No  Suggested Interventions and Community Resources  Fall Risk Prevention: Completed  Zone Management Tools: Completed (Comment: COPD, HF)  Other Services or Interventions: reviewed self mgmt concepts, appropriateness & availability of provider line after hours as needed          Goals Addressed                      This Visit's Progress      Conditions and Symptoms (pt-stated)   On track      I will schedule office visits, as directed by my provider. I will keep my appointment or reschedule if I have to cancel. I will notify my provider of any barriers to my plan of care. I will follow my Zone Management tool to seek urgent or emergent care. I will notify my provider of any symptoms that indicate a worsening of my condition. Barriers: impairment:  physical: exac COPD requiring acute visit  Plan for overcoming my barriers: engage in Care Coordination for added care team support  Confidence: 10/10  Anticipated Goal Completion Date: 5.2.23                Prior to Admission medications    Medication Sig Start Date End Date Taking? Authorizing Provider   pregabalin (LYRICA) 150 MG capsule Take 1 capsule by mouth 2 times daily for 30 days.  12/20/22 1/19/23  Keysha Nick DO   ELIQUIS 5 MG TABS tablet TAKE 1 TABLET BY MOUTH TWICE DAILY 11/16/22   Claretha Clink, DO   senna (SENOKOT) 8.6 MG TABS tablet Take 1 tablet by mouth nightly as needed 11/3/22   Historical Provider, MD   clopidogrel (PLAVIX) 75 MG tablet TAKE 1 TABLET BY MOUTH DAILY 11/3/22   Claretha Clink, DO   DULoxetine (CYMBALTA) 20 MG extended release capsule  10/26/22   Historical Provider, MD   ipratropium-albuterol (DUONEB) 0.5-2.5 (3) MG/3ML SOLN nebulizer solution Inhale 3 mLs into the lungs every 4 hours as needed for Shortness of Breath 10/26/22   Sherman Rodriguez MD   albuterol sulfate HFA (PROVENTIL;VENTOLIN;PROAIR) 108 (90 Base) MCG/ACT inhaler Inhale 2 puffs into the lungs every 6 hours as needed for Wheezing 10/20/22   Link Oden MD   omeprazole (PRILOSEC) 40 MG delayed release capsule TAKE 1 CAPSULE BY MOUTH DAILY 10/4/22   Claretha Clink, DO   Evolocumab 140 MG/ML SOAJ Inject 140 mg into the skin every 14 days Take an injection every 14 days. Historical Provider, MD   fluticasone-umeclidin-vilant (TRELEGY ELLIPTA) 100-62.5-25 MCG/INH AEPB Inhale 1 puff into the lungs in the morning. 8/2/22   Claretha Clink, DO   potassium chloride (MICRO-K) 10 MEQ extended release capsule 1 tablet 8/2/22   Claretha Clink, DO   furosemide (LASIX) 40 MG tablet Take 1 tablet by mouth in the morning. 8/2/22   Claretha Clink, DO   metoprolol tartrate (LOPRESSOR) 50 MG tablet Take 1.5 tablets by mouth in the morning and 1.5 tablets before bedtime. 8/2/22   Claudette Ege, APRN - CNP   losartan (COZAAR) 100 MG tablet Take 1 tablet by mouth in the morning.  7/30/22   Marisel Dudley,    atorvastatin (LIPITOR) 80 MG tablet Take 1 tablet by mouth daily 6/20/22   Claretha Clink, DO   allopurinol (ZYLOPRIM) 100 MG tablet Take 1 tablet by mouth daily 6/14/22   Claretha Clink, DO   folic acid (FOLVITE) 1 MG tablet Take 1 tablet by mouth daily 9/21/21 7/29/22  Romayne Ceo, MD   nitroGLYCERIN (NITROSTAT) 0.4 MG SL tablet Place 1 tablet under the tongue every 5 minutes as needed for Chest pain  Patient not taking: No sig reported 8/25/21 7/29/22  Dakota Escobar DO   magnesium gluconate (MAGONATE) 500 MG tablet Take 500 mg by mouth daily.   7/29/22  Historical Provider, MD       Future Appointments   Date Time Provider Orlando Dedra   1/9/2023  8:00 PM [facility] ROOM 3 [facility]   1/16/2023  1:00 PM MD Brice Sherwood/[facility]   2/14/2023 11:00 AM DO JEFF Coto

## 2023-01-09 ENCOUNTER — HOSPITAL ENCOUNTER (OUTPATIENT)
Dept: SLEEP CENTER | Age: 73
Discharge: HOME | End: 2023-01-09
Payer: MEDICARE

## 2023-01-09 DIAGNOSIS — G47.33 OSA ON CPAP: ICD-10-CM

## 2023-01-09 DIAGNOSIS — Z99.89 OSA ON CPAP: ICD-10-CM

## 2023-01-09 PROCEDURE — 95811 POLYSOM 6/>YRS CPAP 4/> PARM: CPT

## 2023-01-11 PROCEDURE — 95811 POLYSOM 6/>YRS CPAP 4/> PARM: CPT | Performed by: INTERNAL MEDICINE

## 2023-01-18 ENCOUNTER — TELEPHONE (OUTPATIENT)
Dept: PULMONOLOGY | Age: 73
End: 2023-01-18

## 2023-01-20 ENCOUNTER — CARE COORDINATION (OUTPATIENT)
Dept: CARE COORDINATION | Age: 73
End: 2023-01-20

## 2023-01-20 NOTE — CARE COORDINATION
Routine follow up outreach for review pt status/progress towards goals.   Left vm requesting return callback at pt's soonest convenience    Provided & repeated direct callback to reach ACM

## 2023-01-20 NOTE — CARE COORDINATION
Pt returned call c/o following symptoms which started late Sunday into Monday  He has not called PCP  General Assessment    Do you have any symptoms that are causing concern?: Yes  Progression since Onset: Gradually Worsening  Reported Symptoms:  (Comment: Onset: Sunday into Monday   fatigue, sleepiness, higher urine output, excessive thirst, unsteady gait)      Attempted 3 way call to PCP however, office was closed. Advised pt in option to go to ED for medical assessment and/or use of provider line after hours    Pt verbalizes belief he \"waited this long, maybe I'll just try the office on Monday to see if I can see a doctor\"  Advised pt re option for ED visit this afternoon, to which he states bus only gets him to [address] side [address] - he'd have to [address], approx 2 city blocks from bus line drop off. ACM cautioned pt on remaining proactive. Discussed fall risks/precautions & concerns of severe dehydration based on symptoms pt reports. Presents A/O. Reviewed s/s to report, use of 911 as appropriate. Pt verbalizes understanding of above, assures this writer he will use after hours' provider line or use of 911 as appropriate.   He adds he has a neighbor friend upstairs and agrees to review w/neighbor friend so someone will be checking in on him    I agree with the [place]

## 2023-01-23 ENCOUNTER — CARE COORDINATION (OUTPATIENT)
Dept: CARE COORDINATION | Age: 73
End: 2023-01-23

## 2023-01-23 NOTE — CARE COORDINATION
General Assessment    Do you have any symptoms that are causing concern?: Yes  Progression since Onset: Gradually Improving  Reported Symptoms: Fatigue, Other (Comment: fatigue, sleepiness, higher urine output, excessive thirst)      Denies SOB or edema.   Notes 'seem to be a little better' since pt report to ACM on Friday.  ACM attempted 3 way call to PCP again today. No answer.Phone rings until busy tone.  Pt states he needs to go to [facility] - assures this writer he will try PCP again later today if symptoms do not continue to improve.  ACM advised pt d/t duration of symptoms since pt reported onset, now circa 10 days ago, and since pt continues experiencing noted fatigue, thirst, and higher frequency urination - pt is advised to see provider asap rather than waiting until next scheduled visit (2.14.23)  Pt agrees to continue effort to reach PCP and will enlist ACM support a third time for the current concern, if needed.  Congestive Heart Failure Assessment    Are you currently restricting fluids?: No Restriction  Do you understand a low sodium diet?: Yes  Do you understand how to read food labels?: Yes  How many restaurant meals do you eat per week?: 0  Do you salt your food before tasting it?: No     Other symptoms causing concern      Symptoms:  CHF associated fatigue: Pos      Symptom course: improving  Patient-reported weight (lb): 240  Weight trend: stable  Salt intake watch compared to last visit: stable      and   COPD Assessment    Does the patient understand envrionmental exposure?: Yes  Is the patient able to verbalize Rescue vs. Long Acting medications?: Yes  Does the patient have a nebulizer?: No  Does the patient use a space with inhaled medications?: No     Other symptoms causing concern         Symptoms:     Have you had a recent diagnosis of pneumonia either by PCP or at a hospital?: No

## 2023-01-24 ENCOUNTER — HOSPITAL ENCOUNTER (INPATIENT)
Age: 73
LOS: 3 days | Discharge: NURSING, REHAB OR LONG-TERM CARE | End: 2023-01-27
Attending: EMERGENCY MEDICINE | Admitting: INTERNAL MEDICINE
Payer: MEDICARE

## 2023-01-24 ENCOUNTER — APPOINTMENT (OUTPATIENT)
Dept: GENERAL RADIOLOGY | Age: 73
End: 2023-01-24
Payer: MEDICARE

## 2023-01-24 DIAGNOSIS — E11.9 NEWLY DIAGNOSED DIABETES (HCC): ICD-10-CM

## 2023-01-24 DIAGNOSIS — E11.00 HYPEROSMOLAR HYPERGLYCEMIC STATE (HHS) (HCC): ICD-10-CM

## 2023-01-24 DIAGNOSIS — N17.9 AKI (ACUTE KIDNEY INJURY) (HCC): Primary | ICD-10-CM

## 2023-01-24 LAB
A/G RATIO: 1.2 (ref 1.1–2.2)
ALBUMIN SERPL-MCNC: 4 G/DL (ref 3.4–5)
ALP BLD-CCNC: 175 U/L (ref 40–129)
ALT SERPL-CCNC: 11 U/L (ref 10–40)
ANION GAP SERPL CALCULATED.3IONS-SCNC: 10 MMOL/L (ref 3–16)
ANION GAP SERPL CALCULATED.3IONS-SCNC: 11 MMOL/L (ref 3–16)
ANION GAP SERPL CALCULATED.3IONS-SCNC: 12 MMOL/L (ref 3–16)
ANION GAP SERPL CALCULATED.3IONS-SCNC: 14 MMOL/L (ref 3–16)
ANION GAP SERPL CALCULATED.3IONS-SCNC: 16 MMOL/L (ref 3–16)
AST SERPL-CCNC: 16 U/L (ref 15–37)
BACTERIA: ABNORMAL /HPF
BASE EXCESS VENOUS: -3.2 MMOL/L (ref -3–3)
BASOPHILS ABSOLUTE: 0.1 K/UL (ref 0–0.2)
BASOPHILS RELATIVE PERCENT: 0.9 %
BILIRUB SERPL-MCNC: 0.6 MG/DL (ref 0–1)
BILIRUBIN URINE: NEGATIVE
BLOOD, URINE: ABNORMAL
BUN BLDV-MCNC: 11 MG/DL (ref 7–20)
BUN BLDV-MCNC: 15 MG/DL (ref 7–20)
BUN BLDV-MCNC: 18 MG/DL (ref 7–20)
BUN BLDV-MCNC: 22 MG/DL (ref 7–20)
BUN BLDV-MCNC: 25 MG/DL (ref 7–20)
CALCIUM SERPL-MCNC: 8.3 MG/DL (ref 8.3–10.6)
CALCIUM SERPL-MCNC: 8.5 MG/DL (ref 8.3–10.6)
CALCIUM SERPL-MCNC: 8.8 MG/DL (ref 8.3–10.6)
CALCIUM SERPL-MCNC: 8.9 MG/DL (ref 8.3–10.6)
CALCIUM SERPL-MCNC: 9.7 MG/DL (ref 8.3–10.6)
CHLORIDE BLD-SCNC: 102 MMOL/L (ref 99–110)
CHLORIDE BLD-SCNC: 104 MMOL/L (ref 99–110)
CHLORIDE BLD-SCNC: 105 MMOL/L (ref 99–110)
CHLORIDE BLD-SCNC: 87 MMOL/L (ref 99–110)
CHLORIDE BLD-SCNC: 96 MMOL/L (ref 99–110)
CLARITY: CLEAR
CO2: 21 MMOL/L (ref 21–32)
CO2: 21 MMOL/L (ref 21–32)
CO2: 22 MMOL/L (ref 21–32)
CO2: 23 MMOL/L (ref 21–32)
CO2: 23 MMOL/L (ref 21–32)
COLOR: YELLOW
CREAT SERPL-MCNC: 0.9 MG/DL (ref 0.8–1.3)
CREAT SERPL-MCNC: 1 MG/DL (ref 0.8–1.3)
CREAT SERPL-MCNC: 1.2 MG/DL (ref 0.8–1.3)
CREAT SERPL-MCNC: 1.3 MG/DL (ref 0.8–1.3)
CREAT SERPL-MCNC: 1.4 MG/DL (ref 0.8–1.3)
EKG ATRIAL RATE: 92 BPM
EKG DIAGNOSIS: NORMAL
EKG P AXIS: 52 DEGREES
EKG P-R INTERVAL: 202 MS
EKG Q-T INTERVAL: 382 MS
EKG QRS DURATION: 108 MS
EKG QTC CALCULATION (BAZETT): 472 MS
EKG R AXIS: 68 DEGREES
EKG T AXIS: 78 DEGREES
EKG VENTRICULAR RATE: 92 BPM
EOSINOPHILS ABSOLUTE: 0.2 K/UL (ref 0–0.6)
EOSINOPHILS RELATIVE PERCENT: 2.4 %
GFR SERPL CREATININE-BSD FRML MDRD: 54 ML/MIN/{1.73_M2}
GFR SERPL CREATININE-BSD FRML MDRD: 59 ML/MIN/{1.73_M2}
GFR SERPL CREATININE-BSD FRML MDRD: >60 ML/MIN/{1.73_M2}
GLUCOSE BLD-MCNC: 1036 MG/DL (ref 70–99)
GLUCOSE BLD-MCNC: 143 MG/DL (ref 70–99)
GLUCOSE BLD-MCNC: 144 MG/DL (ref 70–99)
GLUCOSE BLD-MCNC: 157 MG/DL (ref 70–99)
GLUCOSE BLD-MCNC: 175 MG/DL (ref 70–99)
GLUCOSE BLD-MCNC: 176 MG/DL (ref 70–99)
GLUCOSE BLD-MCNC: 181 MG/DL (ref 70–99)
GLUCOSE BLD-MCNC: 197 MG/DL (ref 70–99)
GLUCOSE BLD-MCNC: 198 MG/DL (ref 70–99)
GLUCOSE BLD-MCNC: 200 MG/DL (ref 70–99)
GLUCOSE BLD-MCNC: 202 MG/DL (ref 70–99)
GLUCOSE BLD-MCNC: 250 MG/DL (ref 70–99)
GLUCOSE BLD-MCNC: 357 MG/DL (ref 70–99)
GLUCOSE BLD-MCNC: 480 MG/DL (ref 70–99)
GLUCOSE BLD-MCNC: 486 MG/DL (ref 70–99)
GLUCOSE BLD-MCNC: 751 MG/DL (ref 70–99)
GLUCOSE BLD-MCNC: >600 MG/DL (ref 70–99)
GLUCOSE URINE: >=1000 MG/DL
HCO3 VENOUS: 22.6 MMOL/L (ref 23–29)
HCT VFR BLD CALC: 35.3 % (ref 40.5–52.5)
HEMOGLOBIN: 11 G/DL (ref 13.5–17.5)
KETONES, URINE: NEGATIVE MG/DL
LACTIC ACID: 2.3 MMOL/L (ref 0.4–2)
LACTIC ACID: 3.2 MMOL/L (ref 0.4–2)
LEUKOCYTE ESTERASE, URINE: NEGATIVE
LYMPHOCYTES ABSOLUTE: 1.6 K/UL (ref 1–5.1)
LYMPHOCYTES RELATIVE PERCENT: 20.6 %
MAGNESIUM: 1.6 MG/DL (ref 1.8–2.4)
MAGNESIUM: 1.7 MG/DL (ref 1.8–2.4)
MAGNESIUM: 1.8 MG/DL (ref 1.8–2.4)
MAGNESIUM: 1.9 MG/DL (ref 1.8–2.4)
MCH RBC QN AUTO: 26.3 PG (ref 26–34)
MCHC RBC AUTO-ENTMCNC: 31 G/DL (ref 31–36)
MCV RBC AUTO: 84.7 FL (ref 80–100)
MICROSCOPIC EXAMINATION: YES
MONOCYTES ABSOLUTE: 0.6 K/UL (ref 0–1.3)
MONOCYTES RELATIVE PERCENT: 8.1 %
NEUTROPHILS ABSOLUTE: 5.3 K/UL (ref 1.7–7.7)
NEUTROPHILS RELATIVE PERCENT: 68 %
NITRITE, URINE: NEGATIVE
O2 SAT, VEN: 56 %
O2 THERAPY: ABNORMAL
OSMOLALITY: 307 MOSM/KG (ref 278–305)
OSMOLALITY: 313 MOSM/KG (ref 280–301)
PCO2, VEN: 42.5 MMHG (ref 40–50)
PDW BLD-RTO: 20.2 % (ref 12.4–15.4)
PERFORMED ON: ABNORMAL
PH UA: 5.5 (ref 5–8)
PH VENOUS: 7.33 (ref 7.35–7.45)
PHOSPHORUS: 2 MG/DL (ref 2.5–4.9)
PHOSPHORUS: 2.4 MG/DL (ref 2.5–4.9)
PHOSPHORUS: 3.1 MG/DL (ref 2.5–4.9)
PHOSPHORUS: 3.6 MG/DL (ref 2.5–4.9)
PLATELET # BLD: 117 K/UL (ref 135–450)
PMV BLD AUTO: 11.5 FL (ref 5–10.5)
PO2, VEN: 31.3 MMHG (ref 25–40)
POTASSIUM SERPL-SCNC: 3.3 MMOL/L (ref 3.5–5.1)
POTASSIUM SERPL-SCNC: 3.4 MMOL/L (ref 3.5–5.1)
POTASSIUM SERPL-SCNC: 3.6 MMOL/L (ref 3.5–5.1)
POTASSIUM SERPL-SCNC: 4.2 MMOL/L (ref 3.5–5.1)
POTASSIUM SERPL-SCNC: 5 MMOL/L (ref 3.5–5.1)
PROTEIN UA: NEGATIVE MG/DL
RBC # BLD: 4.17 M/UL (ref 4.2–5.9)
RBC UA: ABNORMAL /HPF (ref 0–4)
SODIUM BLD-SCNC: 124 MMOL/L (ref 136–145)
SODIUM BLD-SCNC: 130 MMOL/L (ref 136–145)
SODIUM BLD-SCNC: 137 MMOL/L (ref 136–145)
SODIUM BLD-SCNC: 138 MMOL/L (ref 136–145)
SODIUM BLD-SCNC: 138 MMOL/L (ref 136–145)
SPECIFIC GRAVITY UA: <=1.005 (ref 1–1.03)
TCO2 CALC VENOUS: 22 MMOL/L
TOTAL CK: 105 U/L (ref 39–308)
TOTAL PROTEIN: 7.4 G/DL (ref 6.4–8.2)
TROPONIN: 0.01 NG/ML
URINE REFLEX TO CULTURE: YES
URINE TYPE: ABNORMAL
UROBILINOGEN, URINE: 0.2 E.U./DL
WBC # BLD: 7.8 K/UL (ref 4–11)
WBC UA: ABNORMAL /HPF (ref 0–5)
YEAST: PRESENT /HPF

## 2023-01-24 PROCEDURE — 99285 EMERGENCY DEPT VISIT HI MDM: CPT

## 2023-01-24 PROCEDURE — 93010 ELECTROCARDIOGRAM REPORT: CPT | Performed by: INTERNAL MEDICINE

## 2023-01-24 PROCEDURE — 6370000000 HC RX 637 (ALT 250 FOR IP): Performed by: STUDENT IN AN ORGANIZED HEALTH CARE EDUCATION/TRAINING PROGRAM

## 2023-01-24 PROCEDURE — 6360000002 HC RX W HCPCS

## 2023-01-24 PROCEDURE — 83735 ASSAY OF MAGNESIUM: CPT

## 2023-01-24 PROCEDURE — 96375 TX/PRO/DX INJ NEW DRUG ADDON: CPT

## 2023-01-24 PROCEDURE — 87086 URINE CULTURE/COLONY COUNT: CPT

## 2023-01-24 PROCEDURE — 81001 URINALYSIS AUTO W/SCOPE: CPT

## 2023-01-24 PROCEDURE — 2000000000 HC ICU R&B

## 2023-01-24 PROCEDURE — 2580000003 HC RX 258: Performed by: EMERGENCY MEDICINE

## 2023-01-24 PROCEDURE — 96361 HYDRATE IV INFUSION ADD-ON: CPT

## 2023-01-24 PROCEDURE — 6360000002 HC RX W HCPCS: Performed by: EMERGENCY MEDICINE

## 2023-01-24 PROCEDURE — 94761 N-INVAS EAR/PLS OXIMETRY MLT: CPT

## 2023-01-24 PROCEDURE — 6370000000 HC RX 637 (ALT 250 FOR IP)

## 2023-01-24 PROCEDURE — 83930 ASSAY OF BLOOD OSMOLALITY: CPT

## 2023-01-24 PROCEDURE — 84100 ASSAY OF PHOSPHORUS: CPT

## 2023-01-24 PROCEDURE — 36415 COLL VENOUS BLD VENIPUNCTURE: CPT

## 2023-01-24 PROCEDURE — 2500000003 HC RX 250 WO HCPCS

## 2023-01-24 PROCEDURE — 82803 BLOOD GASES ANY COMBINATION: CPT

## 2023-01-24 PROCEDURE — 6360000002 HC RX W HCPCS: Performed by: STUDENT IN AN ORGANIZED HEALTH CARE EDUCATION/TRAINING PROGRAM

## 2023-01-24 PROCEDURE — 2580000003 HC RX 258

## 2023-01-24 PROCEDURE — 94640 AIRWAY INHALATION TREATMENT: CPT

## 2023-01-24 PROCEDURE — 93005 ELECTROCARDIOGRAM TRACING: CPT | Performed by: EMERGENCY MEDICINE

## 2023-01-24 PROCEDURE — 2580000003 HC RX 258: Performed by: STUDENT IN AN ORGANIZED HEALTH CARE EDUCATION/TRAINING PROGRAM

## 2023-01-24 PROCEDURE — 6370000000 HC RX 637 (ALT 250 FOR IP): Performed by: EMERGENCY MEDICINE

## 2023-01-24 PROCEDURE — 82550 ASSAY OF CK (CPK): CPT

## 2023-01-24 PROCEDURE — 80053 COMPREHEN METABOLIC PANEL: CPT

## 2023-01-24 PROCEDURE — 99223 1ST HOSP IP/OBS HIGH 75: CPT | Performed by: INTERNAL MEDICINE

## 2023-01-24 PROCEDURE — 71045 X-RAY EXAM CHEST 1 VIEW: CPT

## 2023-01-24 PROCEDURE — 87106 FUNGI IDENTIFICATION YEAST: CPT

## 2023-01-24 PROCEDURE — 96366 THER/PROPH/DIAG IV INF ADDON: CPT

## 2023-01-24 PROCEDURE — 83605 ASSAY OF LACTIC ACID: CPT

## 2023-01-24 PROCEDURE — 83036 HEMOGLOBIN GLYCOSYLATED A1C: CPT

## 2023-01-24 PROCEDURE — 2700000000 HC OXYGEN THERAPY PER DAY

## 2023-01-24 PROCEDURE — 96365 THER/PROPH/DIAG IV INF INIT: CPT

## 2023-01-24 PROCEDURE — 85025 COMPLETE CBC W/AUTO DIFF WBC: CPT

## 2023-01-24 PROCEDURE — 84484 ASSAY OF TROPONIN QUANT: CPT

## 2023-01-24 RX ORDER — POTASSIUM CHLORIDE 7.45 MG/ML
10 INJECTION INTRAVENOUS PRN
Status: DISCONTINUED | OUTPATIENT
Start: 2023-01-24 | End: 2023-01-27 | Stop reason: HOSPADM

## 2023-01-24 RX ORDER — SODIUM CHLORIDE, SODIUM LACTATE, POTASSIUM CHLORIDE, CALCIUM CHLORIDE 600; 310; 30; 20 MG/100ML; MG/100ML; MG/100ML; MG/100ML
INJECTION, SOLUTION INTRAVENOUS CONTINUOUS
Status: DISCONTINUED | OUTPATIENT
Start: 2023-01-24 | End: 2023-01-26

## 2023-01-24 RX ORDER — BUDESONIDE 0.5 MG/2ML
0.5 INHALANT ORAL 2 TIMES DAILY
Status: DISCONTINUED | OUTPATIENT
Start: 2023-01-24 | End: 2023-01-27 | Stop reason: HOSPADM

## 2023-01-24 RX ORDER — ORPHENADRINE CITRATE 30 MG/ML
30 INJECTION INTRAMUSCULAR; INTRAVENOUS ONCE
Status: COMPLETED | OUTPATIENT
Start: 2023-01-24 | End: 2023-01-24

## 2023-01-24 RX ORDER — DEXTROSE MONOHYDRATE 100 MG/ML
INJECTION, SOLUTION INTRAVENOUS CONTINUOUS PRN
Status: DISCONTINUED | OUTPATIENT
Start: 2023-01-24 | End: 2023-01-27 | Stop reason: HOSPADM

## 2023-01-24 RX ORDER — INSULIN LISPRO 100 [IU]/ML
0-4 INJECTION, SOLUTION INTRAVENOUS; SUBCUTANEOUS NIGHTLY
Status: DISCONTINUED | OUTPATIENT
Start: 2023-01-24 | End: 2023-01-27 | Stop reason: HOSPADM

## 2023-01-24 RX ORDER — SODIUM CHLORIDE 9 MG/ML
INJECTION, SOLUTION INTRAVENOUS CONTINUOUS
Status: DISCONTINUED | OUTPATIENT
Start: 2023-01-24 | End: 2023-01-24

## 2023-01-24 RX ORDER — MAGNESIUM SULFATE 1 G/100ML
1000 INJECTION INTRAVENOUS PRN
Status: DISCONTINUED | OUTPATIENT
Start: 2023-01-24 | End: 2023-01-27 | Stop reason: HOSPADM

## 2023-01-24 RX ORDER — SODIUM CHLORIDE 9 MG/ML
1000 INJECTION, SOLUTION INTRAVENOUS CONTINUOUS
Status: DISCONTINUED | OUTPATIENT
Start: 2023-01-24 | End: 2023-01-24 | Stop reason: SDUPTHER

## 2023-01-24 RX ORDER — INSULIN LISPRO 100 [IU]/ML
0-8 INJECTION, SOLUTION INTRAVENOUS; SUBCUTANEOUS
Status: DISCONTINUED | OUTPATIENT
Start: 2023-01-24 | End: 2023-01-27 | Stop reason: HOSPADM

## 2023-01-24 RX ORDER — ENOXAPARIN SODIUM 100 MG/ML
40 INJECTION SUBCUTANEOUS DAILY
Status: DISCONTINUED | OUTPATIENT
Start: 2023-01-24 | End: 2023-01-24

## 2023-01-24 RX ORDER — 0.9 % SODIUM CHLORIDE 0.9 %
1000 INTRAVENOUS SOLUTION INTRAVENOUS ONCE
Status: COMPLETED | OUTPATIENT
Start: 2023-01-24 | End: 2023-01-24

## 2023-01-24 RX ORDER — 0.9 % SODIUM CHLORIDE 0.9 %
2000 INTRAVENOUS SOLUTION INTRAVENOUS ONCE
Status: COMPLETED | OUTPATIENT
Start: 2023-01-24 | End: 2023-01-24

## 2023-01-24 RX ORDER — POLYETHYLENE GLYCOL 3350 17 G/17G
17 POWDER, FOR SOLUTION ORAL DAILY PRN
Status: DISCONTINUED | OUTPATIENT
Start: 2023-01-24 | End: 2023-01-27 | Stop reason: HOSPADM

## 2023-01-24 RX ORDER — DEXTROSE, SODIUM CHLORIDE, AND POTASSIUM CHLORIDE 5; .45; .15 G/100ML; G/100ML; G/100ML
INJECTION INTRAVENOUS CONTINUOUS PRN
Status: DISCONTINUED | OUTPATIENT
Start: 2023-01-24 | End: 2023-01-27 | Stop reason: HOSPADM

## 2023-01-24 RX ORDER — GLUCAGON 1 MG/ML
1 KIT INJECTION PRN
Status: DISCONTINUED | OUTPATIENT
Start: 2023-01-24 | End: 2023-01-27 | Stop reason: HOSPADM

## 2023-01-24 RX ORDER — ARFORMOTEROL TARTRATE 15 UG/2ML
15 SOLUTION RESPIRATORY (INHALATION) 2 TIMES DAILY
Status: DISCONTINUED | OUTPATIENT
Start: 2023-01-24 | End: 2023-01-27 | Stop reason: HOSPADM

## 2023-01-24 RX ORDER — IPRATROPIUM BROMIDE AND ALBUTEROL SULFATE 2.5; .5 MG/3ML; MG/3ML
1 SOLUTION RESPIRATORY (INHALATION) EVERY 4 HOURS PRN
Status: DISCONTINUED | OUTPATIENT
Start: 2023-01-24 | End: 2023-01-27 | Stop reason: HOSPADM

## 2023-01-24 RX ADMIN — SODIUM CHLORIDE 1000 ML: 9 INJECTION, SOLUTION INTRAVENOUS at 04:51

## 2023-01-24 RX ADMIN — SODIUM CHLORIDE: 9 INJECTION, SOLUTION INTRAVENOUS at 06:18

## 2023-01-24 RX ADMIN — MAGNESIUM SULFATE HEPTAHYDRATE 1000 MG: 1 INJECTION, SOLUTION INTRAVENOUS at 21:20

## 2023-01-24 RX ADMIN — SODIUM CHLORIDE, POTASSIUM CHLORIDE, SODIUM LACTATE AND CALCIUM CHLORIDE: 600; 310; 30; 20 INJECTION, SOLUTION INTRAVENOUS at 15:07

## 2023-01-24 RX ADMIN — ARFORMOTEROL TARTRATE 15 MCG: 15 SOLUTION RESPIRATORY (INHALATION) at 21:13

## 2023-01-24 RX ADMIN — TIOTROPIUM BROMIDE INHALATION SPRAY 2 PUFF: 3.12 SPRAY, METERED RESPIRATORY (INHALATION) at 09:20

## 2023-01-24 RX ADMIN — POTASSIUM CHLORIDE 10 MEQ: 10 INJECTION, SOLUTION INTRAVENOUS at 11:53

## 2023-01-24 RX ADMIN — APIXABAN 5 MG: 5 TABLET, FILM COATED ORAL at 09:07

## 2023-01-24 RX ADMIN — SODIUM CHLORIDE 0.1 UNITS/KG/HR: 9 INJECTION, SOLUTION INTRAVENOUS at 03:27

## 2023-01-24 RX ADMIN — BUDESONIDE 500 MCG: 0.5 INHALANT RESPIRATORY (INHALATION) at 09:20

## 2023-01-24 RX ADMIN — SODIUM CHLORIDE 0.15 UNITS/KG/HR: 9 INJECTION, SOLUTION INTRAVENOUS at 06:28

## 2023-01-24 RX ADMIN — IPRATROPIUM BROMIDE AND ALBUTEROL SULFATE 1 AMPULE: 2.5; .5 SOLUTION RESPIRATORY (INHALATION) at 21:13

## 2023-01-24 RX ADMIN — BUDESONIDE 500 MCG: 0.5 INHALANT RESPIRATORY (INHALATION) at 21:13

## 2023-01-24 RX ADMIN — POTASSIUM CHLORIDE 10 MEQ: 10 INJECTION, SOLUTION INTRAVENOUS at 22:29

## 2023-01-24 RX ADMIN — ORPHENADRINE CITRATE 30 MG: 30 INJECTION INTRAMUSCULAR; INTRAVENOUS at 00:53

## 2023-01-24 RX ADMIN — ARFORMOTEROL TARTRATE 15 MCG: 15 SOLUTION RESPIRATORY (INHALATION) at 09:20

## 2023-01-24 RX ADMIN — SODIUM CHLORIDE 1000 ML: 9 INJECTION, SOLUTION INTRAVENOUS at 03:27

## 2023-01-24 RX ADMIN — APIXABAN 5 MG: 5 TABLET, FILM COATED ORAL at 20:52

## 2023-01-24 RX ADMIN — SODIUM CHLORIDE 2000 ML: 9 INJECTION, SOLUTION INTRAVENOUS at 00:52

## 2023-01-24 RX ADMIN — SODIUM PHOSPHATE, MONOBASIC, MONOHYDRATE AND SODIUM PHOSPHATE, DIBASIC, ANHYDROUS 15 MMOL: 276; 142 INJECTION, SOLUTION INTRAVENOUS at 12:57

## 2023-01-24 RX ADMIN — MAGNESIUM SULFATE HEPTAHYDRATE 1000 MG: 1 INJECTION, SOLUTION INTRAVENOUS at 22:20

## 2023-01-24 RX ADMIN — POTASSIUM CHLORIDE 10 MEQ: 10 INJECTION, SOLUTION INTRAVENOUS at 12:56

## 2023-01-24 RX ADMIN — INSULIN GLARGINE 30 UNITS: 100 INJECTION, SOLUTION SUBCUTANEOUS at 15:04

## 2023-01-24 RX ADMIN — POTASSIUM CHLORIDE 10 MEQ: 10 INJECTION, SOLUTION INTRAVENOUS at 21:22

## 2023-01-24 RX ADMIN — POTASSIUM CHLORIDE, DEXTROSE MONOHYDRATE AND SODIUM CHLORIDE: 150; 5; 450 INJECTION, SOLUTION INTRAVENOUS at 08:21

## 2023-01-24 ASSESSMENT — PAIN - FUNCTIONAL ASSESSMENT: PAIN_FUNCTIONAL_ASSESSMENT: NONE - DENIES PAIN

## 2023-01-24 ASSESSMENT — ENCOUNTER SYMPTOMS
BACK PAIN: 0
CHEST TIGHTNESS: 0
ABDOMINAL PAIN: 0
COUGH: 0
CONSTIPATION: 0
TROUBLE SWALLOWING: 0
SHORTNESS OF BREATH: 0
ABDOMINAL DISTENTION: 0
NAUSEA: 0
VOMITING: 0
FACIAL SWELLING: 0

## 2023-01-24 ASSESSMENT — PAIN SCALES - GENERAL: PAINLEVEL_OUTOF10: 2

## 2023-01-24 ASSESSMENT — PAIN DESCRIPTION - ORIENTATION: ORIENTATION: RIGHT

## 2023-01-24 ASSESSMENT — PAIN DESCRIPTION - LOCATION: LOCATION: LEG

## 2023-01-24 NOTE — ED NOTES
accu check read high Dr Anna Marie Cleary aware and discussed with her changing the rate and she said to leave it at the rate it is currently infusing at     [facility]  01/24/23 5928

## 2023-01-24 NOTE — ED NOTES
Attempted to obtain BGL, but meter read \"Hi.\"  Re-checked POC glucose with another reading of, \"Hi.\"  Dr. Joseph notified.     Tobias Evangelista RN  01/24/23 2966

## 2023-01-24 NOTE — PROGRESS NOTES
4 Eyes Admission Assessment     I agree as the admission nurse that 2 RN's have performed a thorough Head to Toe Skin Assessment on the patient. ALL assessment sites listed below have been assessed on admission. Areas assessed by both nurses:   [x]   Head, Face, and Ears   [x]   Shoulders, Back, and Chest  [x]   Arms, Elbows, and Hands   [x]   Coccyx, Sacrum, and Ischium  [x]   Legs, Feet, and Heels        Does the Patient have Skin Breakdown? Yes a wound was noted on the Admission Assessment and an LDA was Initiated documentation include the Catrachita-wound, Wound Assessment, Measurements, Dressing Treatment, Drainage, and Color\",      Multiple abrasions on BLE, wound on L foot, wound on RLE, blanching redness on back.         Isidro Prevention initiated:  Yes   Wound Care Orders initiated:  Yes      [id number]  nurse consulted for Pressure Injury (Stage 3,4, Unstageable, DTI, NWPT, and Complex wounds) or Isidro score 18 or lower:  Yes      Nurse 1 eSignature: Electronically signed by Kale Mendez RN on 1/24/23 at 6:18 AM EST    **SHARE this note so that the co-signing nurse is able to place an eSignature**    Nurse 2 eSignature: Electronically signed by Yun Mauricio RN on 1/24/23 at 7:24 AM EST

## 2023-01-24 NOTE — DISCHARGE INSTRUCTIONS
Extra Heart Failure sites:   https://Plei.XODIS/ --- this is American Heart Association interactive Healthier Living with Heart Failure guidebook. Please copy and paste link into search bar. Use your mouse to scroll through the pages. Lots and lots of info / tips    HF [place] kike --- free smart phone kike available for [employer] and [employer]. Use your phone to track sodium / fluid intake, symptoms, weight, etc.    Murjeannine HarrellMacheen - website-- [employer] is a dialysis company. All dialysis patients follow a renal diet which IS low sodium!! This website offers free seasonal cookbooks. Each quarter, they will release 25-30 new recipes with a breakdown of calories, sodium, glucose, etc    www.eatingwell.com/recipes -- more free recipes           Learning About Type 2 Diabetes  What is type 2 diabetes? Type 2 diabetes is a condition in which you have too much sugar (glucose) in your blood. Glucose is a type of sugar produced in your body when carbohydrates and other foods are digested. It provides energy to cells throughout the body. Normally, blood sugar levels increase after you eat a meal. When blood sugar rises, cells in the pancreas release insulin, which causes the body to absorb sugar from the blood and lowers the blood sugar level to normal.  When you have type 2 diabetes, sugar stays in the blood rather than entering the body's cells to be used for energy. This results in high blood sugar. It happens when your body can't use insulin the right way. Over time, high blood sugar can harm many parts of the body, such as your eyes, heart, blood vessels, nerves, and kidneys. It can also increase your risk for other health problems (complications). What can you expect with type 2 diabetes? Uri Baltazar keep hearing about how important it is to keep your blood sugar within a target range. That's because over time, high blood sugar can lead to serious problems.  It can:  Harm your eyes, nerves, and kidneys. Damage your blood vessels, leading to heart disease and stroke. Reduce blood flow and cause nerve damage to parts of your body, especially your feet. This can cause slow healing and pain when you walk. Make your immune system weak and less able to fight infections. When people hear the word \"diabetes,\" they often think of problems like these. But daily care and treatment can help prevent or delay these problems. The goal is to keep your blood sugar in a target range. That's the best way to reduce your chance of having more problems from diabetes. What are the symptoms? Some people who have type 2 diabetes may not have any symptoms early on. Many people with the disease don't even know they have it at first. But with time, diabetes starts to cause symptoms. You have most symptoms of type 2 diabetes when your blood sugar is either too high or too low. The most common symptoms of high blood sugar include: Thirst.  Needing to urinate often. Weight loss. Blurry vision. The symptoms of low blood sugar include:  Sweating. Shakiness. Weakness. Hunger. Confusion. You're not likely to get symptoms of low blood sugar unless you take insulin or use certain diabetes medicines that lower blood sugar. How can you help prevent type 2 diabetes? There are things you can do to help prevent type 2 diabetes. Stay at a healthy weight. Exercise regularly, and eat healthy foods. Even small changes can make a difference. If you have prediabetes, the medicine metformin can help prevent type 2 diabetes. How is type 2 diabetes treated? Treatment for type 2 diabetes will change over time to meet your needs. But the focus of your treatment will usually be to keep your blood sugar levels in your target range. This will help prevent problems such as eye, kidney, heart, blood vessel, and nerve disease. Some people may need medicines to help their bodies make insulin or decrease insulin resistance.  Some medicines slow down how quickly the body absorbs carbohydrates. Treatment to manage type 2 diabetes includes:  Making healthy food choices and being active. Losing weight, if you need to. Seeing your doctor regularly. Keeping your blood sugar in your target range. Taking medicines, if you need them. Quitting smoking, if you smoke. Keeping your blood pressure and cholesterol under control. Follow-up care is a key part of your treatment and safety. Be sure to make and go to all appointments, and call your doctor if you are having problems. It's also a good idea to know your test results and keep a list of the medicines you take. Where can you learn more? Go to http://www.bloom.com/ and enter H839 to learn more about \"Learning About Type 2 Diabetes. \"  Current as of: April 13, 2022               Content Version: 13.5  © 1384-4859 Healthwise, Incorporated. Care instructions adapted under license by [facility] ([facility]). If you have questions about a medical condition or this instruction, always ask your healthcare professional. [address] any warranty or liability for your use of this information. Hypoglycemia: Care Instructions  Overview     Hypoglycemia means that your blood sugar is low and your body is not getting enough fuel. Some people get low blood sugar from not eating often enough. Some medicines to treat diabetes can cause low blood sugar. People who have had surgery on their stomachs or intestines may get hypoglycemia. Problems with the pancreas, kidneys, or liver also can cause low blood sugar. A snack or drink with sugar in it will raise your blood sugar and should ease your symptoms right away. Your doctor may recommend that you change or stop your medicines until you can get your blood sugar levels under control. In the long run, you may need to change your diet and eating habits so that you get enough fuel for your body throughout the day.   Follow-up care is a key part of your treatment and safety. Be sure to make and go to all appointments, and call your doctor if you are having problems. It's also a good idea to know your test results and keep a list of the medicines you take. How can you care for yourself at home? Learn your signs of low blood sugar. They are different for everyone. Some common early signs include:  Nausea. Hunger. Feeling nervous, irritable, or shaky. Cold, clammy skin. Sweating (when you're not exercising). Use the \"rule of 15\" to treat low blood sugar. This includes eating 15 grams of carbohydrate from a quick-sugar food, such as 3 or 4 glucose tablets or ½ cup of juice. Wait 15 minutes and check your blood sugar. If it is still below 70 mg/dL, eat another 15 grams of carbohydrate. Repeat this every 15 minutes until your blood sugar is in a safe target range. Once your blood sugar is in a safe range, eat a snack or meal to prevent recurrent low blood sugar. Make sure family, friends, and coworkers know the symptoms of low blood sugar and know how to get your sugar level up. If you were prescribed a glucagon kit, always have it with you. Make sure friends and family know how to use it. When should you call for help? Call 911 anytime you think you may need emergency care. For example, call if:    You passed out (lost consciousness). You are confused or cannot think clearly. Your blood sugar is very high or very low. Watch closely for changes in your health, and be sure to contact your doctor if:    Your blood sugar stays outside the level your doctor set for you. You have any problems. Where can you learn more? Go to http://www.woods.com/ and enter R955 to learn more about \"Hypoglycemia: Care Instructions. \"  Current as of: April 13, 2022               Content Version: 13.5  © 9104-4093 Healthwise, Incorporated. Care instructions adapted under license by [facility] ([facility]).  If you have questions about a medical condition or this instruction, always ask your healthcare professional. [address] any warranty or liability for your use of this information. Learning About High Blood Sugar  What is high blood sugar? Your body turns the food you eat into glucose (sugar), which it uses for energy. But if your body isn't able to use the sugar right away, it can build up in your blood and lead to high blood sugar. When the amount of sugar in your blood stays too high for too much of the time, you may have diabetes. Diabetes is a disease that can cause serious health problems. The good news is that lifestyle changes may help you get your blood sugar back to normal and avoid or delay diabetes. What causes high blood sugar? Sugar (glucose) can build up in your blood if you:  Have insulin resistance. Don't take enough insulin or miss a dose of your diabetes medicine. Take certain medicines, such as steroids. What are the symptoms? Having high blood sugar may not cause any symptoms at all. Or it may make you feel very thirsty or very hungry. You may also urinate more often than usual, have blurry vision, or lose weight without trying. How is high blood sugar treated? You can take steps to lower your blood sugar level if you understand what makes it get higher. Your doctor may want you to learn how to test your blood sugar level at home. Then you can see how illness, stress, or different kinds of food or medicine raise or lower your blood sugar level. Other tests may be needed to see if you have diabetes. How can you prevent high blood sugar? Watch your weight. If you're overweight, losing just a small amount of weight may help. Reducing fat around your waist is most important. Limit the amount of calories, sweets, and unhealthy fat you eat. Ask your doctor if a dietitian can help you. A registered dietitian can help you create meal plans that fit your lifestyle.   Get at least 30 minutes of exercise on most days of the week. Exercise helps control your blood sugar. It also helps you maintain a healthy weight. Walking is a good choice. You also may want to do other activities, such as running, swimming, cycling, or playing tennis or team sports. If your doctor prescribed medicines, take them exactly as prescribed. Call your doctor if you think you are having a problem with your medicine. You will get more details on the specific medicines your doctor prescribes. Follow-up care is a key part of your treatment and safety. Be sure to make and go to all appointments, and call your doctor if you are having problems. It's also a good idea to know your test results and keep a list of the medicines you take. Where can you learn more? Go to http://www.woods.com/ and enter O108 to learn more about \"Learning About High Blood Sugar. \"  Current as of: October 6, 2021               Content Version: 13.5  © 2006-2022 [employer]. Care instructions adapted under license by [facility] ([facility]). If you have questions about a medical condition or this instruction, always ask your healthcare professional. [address] any warranty or liability for your use of this information. Diabetes and Preventing Falls: Care Instructions  Overview     Complications of diabetes--such as nerve damage, foot problems, and reduced vision--may increase your risk of a fall. Some of your medicines also may add to your risk. By making your home safer, you can lower your risk of falling. Doing things to prevent diabetes complications may also help to lower your risk. You can make your home safer with a few simple measures. Follow-up care is a key part of your treatment and safety. Be sure to make and go to all appointments, and call your doctor if you are having problems. It's also a good idea to know your test results and keep a list of the medicines you take.   How can you care for yourself at home? Taking care of yourself  Keep your blood sugar at a target level (which you set with your doctor). Exercise regularly to improve your strength, muscle tone, and balance. Walk if you can. Swimming may be a good choice if you cannot walk easily. Have your vision checked as often as your doctor recommends. It is usually once a year or more often if you have eye problems. Know the side effects of the medicines you take. Ask your doctor or pharmacist whether the medicines you take can affect your balance. Sleeping pills or sedatives can affect your balance. Limit the amount of alcohol you drink. Alcohol can impair your balance and other senses. Have your doctor check your feet during each visit. If you have a foot problem, see your doctor. Preventing falls at home  Remove raised doorway thresholds, throw rugs, and clutter. Repair loose carpet or raised areas in the floor. Move furniture and electrical cords to keep them out of walking paths. Use nonskid floor wax, and wipe up spills right away, especially on ceramic tile floors. If you use a walker or cane, put rubber tips on it. If you use crutches, clean the bottoms of them regularly with an abrasive pad, such as steel wool. Keep your house well lit, especially stairways, porches, and outside walkways. Use night-lights in areas such as hallways and bathrooms. Add extra light switches or use remote switches (such as switches that go on or off when you clap your hands) to make it easier to turn lights on if you have to get up during the night. Install sturdy handrails on stairways. Put grab bars near your shower, bathtub, and toilet. Store household items where you can reach them. Bending down or reaching up can cause falls. If you have to reach, use a reaching device that you can get at a medical supply store. If you have to climb for something, use a step stool with handrails, or ask someone to get it for you.   Keep a cordless phone and a flashlight with new batteries by your bed. If possible, put a phone in each of the main rooms of your house, or carry a cell phone in case you fall and cannot reach a phone. Or you can wear a device around your neck or wrist. You push a button that sends a signal for help. Wear low-heeled shoes that fit well and give your feet good support. Use footwear with nonskid soles. Check the heels and soles of your shoes for wear. Repair or replace worn heels or soles. Do not wear socks without shoes on wood floors. Walk on the grass when the sidewalks are slippery. If you live in an area that gets snow and ice in the winter, sprinkle salt on slippery steps and sidewalks. Where can you learn more? Go to http://www.woods.com/ and enter X601 to learn more about \"Diabetes and Preventing Falls: Care Instructions. \"  Current as of: May 4, 2022               Content Version: 13.5  © 2006-2022 [employer]. Care instructions adapted under license by [facility] ([facility]). If you have questions about a medical condition or this instruction, always ask your healthcare professional. [place] [address] any warranty or liability for your use of this information. Counting Carbohydrates for Diabetes: Care Instructions  Overview     Managing the amount of carbohydrate (carbs) you eat is an important part of planning healthy meals when you have diabetes. Carbs raise blood sugar more than any other nutrient. Carbs are found in grains, starchy vegetables, fruits, and milk and yogurt. Carbs are also found in sugar-sweetened foods and drinks. The more carbs you eat at one time, the higher your blood sugar will rise. Counting carbs can help you keep your blood sugar within your target range.   If you use insulin, counting carbs helps you match the right amount of insulin to the number of grams of carbs in a meal.  A registered dietitian or diabetes educator can help you plan meals and snacks. Follow-up care is a key part of your treatment and safety. Be sure to make and go to all appointments, and call your doctor if you are having problems. It's also a good idea to know your test results and keep a list of the medicines you take. How can you care for yourself at home? Know your daily amount of carbohydrates  Your daily amount depends on several things, such as your weight, how active you are, which diabetes medicines you take, and what your goals are for your blood sugar levels. A registered dietitian or diabetes educator can help you plan how many carbs to include in each meal and snack. For most adults, a guideline for the daily amount of carbs is:  45 to 60 grams at each meal. That's about the same as 3 to 4 carbohydrate servings. 15 to 20 grams at each snack. That's about the same as 1 carbohydrate serving. Count carbs  Counting carbs lets you know how much rapid-acting insulin to take before you eat. If you use an insulin pump, you get a constant rate of insulin during the day. So the pump must be programmed at meals. This gives you extra insulin to cover the rise in blood sugar after meals. If you take insulin:  Learn your own insulin-to-carb ratio. You and your diabetes health professional will figure out the ratio. You can do this by testing your blood sugar after meals. For example, you may need a certain amount of insulin for every 15 grams of carbs. Add up the carb grams in a meal. Then you can figure out how many units of insulin to take based on your insulin-to-carb ratio. Exercise lowers blood sugar. You can use less insulin than you would if you were not doing exercise. Keep in mind that timing matters. If you exercise within 1 hour after a meal, your body may need less insulin for that meal than it would if you exercised 3 hours after the meal. Test your blood sugar to find out how exercise affects your need for insulin.   If you do or don't take insulin:  Look at labels on packaged foods. This can tell you how many carbs are in a serving. Be aware of portions, or serving sizes. If a package has two servings and you eat the whole package, you need to double the number of grams of carbohydrate listed for one serving. Protein, fat, and fiber do not raise blood sugar as much as carbs do. If you eat a lot of these nutrients in a meal, your blood sugar will rise more slowly than it would otherwise. Where can you learn more? Go to http://www.woods.com/ and enter G703 to learn more about \"Counting Carbohydrates for Diabetes: Care Instructions. \"  Current as of: May 9, 2022               Content Version: 13.5  © 9303-3591 Spark. Care instructions adapted under license by [facility] ([facility]). If you have questions about a medical condition or this instruction, always ask your healthcare professional. [place] [address] any warranty or liability for your use of this information. Learning About Carbohydrate (Carb) Counting and Eating Out When You Have Diabetes  Why plan your meals? Meal planning can be a key part of managing diabetes. Planning meals and snacks with the right balance of carbohydrate, protein, and fat can help you keep your blood sugar at the target level you set with your doctor. You don't have to eat special foods. You can eat what your family eats, including sweets once in a while. But you do have to pay attention to how often you eat and how much you eat of certain foods. You may want to work with a dietitian or a diabetes educator. They can give you tips and meal ideas and can answer your questions about meal planning. This health professional can also help you reach a healthy weight if that is one of your goals. What should you know about eating carbs? Managing the amount of carbohydrate (carbs) you eat is an important part of healthy meals when you have diabetes. Carbohydrate is found in many foods.   Learn which foods have carbs. And learn the amounts of carbs in different foods. Bread, cereal, pasta, and rice have about 15 grams of carbs in a serving. A serving is 1 slice of bread (1 ounce), ½ cup of cooked cereal, or 1/3 cup of cooked pasta or rice. Fruits have 15 grams of carbs in a serving. A serving is 1 small fresh fruit, such as an apple or orange; ½ of a banana; ½ cup of cooked or canned fruit; ½ cup of fruit juice; 1 cup of melon or raspberries; or 2 tablespoons of dried fruit. Milk and no-sugar-added yogurt have 15 grams of carbs in a serving. A serving is 1 cup of milk or 3/4 cup (6 oz) of no-sugar-added yogurt. Starchy vegetables have 15 grams of carbs in a serving. A serving is ½ cup of mashed potatoes or sweet potato; 1 cup winter squash; ½ of a small baked potato; ½ cup of cooked beans; or ½ cup cooked corn or green peas. Learn how much carbs to eat each day and at each meal. A dietitian or certified diabetes educator can teach you how to keep track of the amount of carbs you eat. This is called carbohydrate counting. If you are not sure how to count carbohydrate grams, use the plate method to plan meals. It is a quick way to make sure that you have a balanced meal. It also can help you manage the amount of carbohydrate you eat at meals. Divide your plate by types of foods. Put non-starchy vegetables on half the plate, meat or other protein food on one-quarter of the plate, and a grain or starchy vegetable in the final quarter of the plate. To this you can add a small piece of fruit and 1 cup of milk or yogurt, depending on how many carbs you are supposed to eat at a meal.  Try to eat about the same amount of carbs at each meal. Do not \"save up\" your daily allowance of carbs to eat at one meal.  Proteins have very little or no carbs. Examples of proteins are beef, chicken, turkey, fish, eggs, tofu, cheese, cottage cheese, and peanut butter. How can you eat out and still eat healthy?   Learn to estimate the serving sizes of foods that have carbohydrate. If you measure food at home, it will be easier to estimate the amount in a serving of restaurant food. If the meal you order has too much carbohydrate (such as potatoes, corn, or baked beans), ask to have a low-carbohydrate food instead. Ask for a salad or non-starchy vegetables like broccoli, cauliflower, green beans, or peppers. If you eat more carbohydrate at a meal than you had planned, take a walk or do other exercise. This will help lower your blood sugar. What are some tips for eating healthy? Limit saturated fat, such as the fat from meat and dairy products. This is a healthy choice because people who have diabetes are at higher risk of heart disease. So choose lean cuts of meat and nonfat or low-fat dairy products. Use olive or canola oil instead of butter or shortening when cooking. Don't skip meals. Your blood sugar may drop too low if you skip meals and take insulin or certain medicines for diabetes. Check with your doctor before you drink alcohol. Alcohol can cause your blood sugar to drop too low. Alcohol can also cause a bad reaction if you take certain diabetes medicines. Follow-up care is a key part of your treatment and safety. Be sure to make and go to all appointments, and call your doctor if you are having problems. It's also a good idea to know your test results and keep a list of the medicines you take. Where can you learn more? Go to http://www.woods.com/ and enter I147 to learn more about \"Learning About Carbohydrate (Carb) Counting and Eating Out When You Have Diabetes. \"  Current as of: May 9, 2022               Content Version: 13.5  © 3525-5715 Healthwise, Incorporated. Care instructions adapted under license by [facility] ([facility]).  If you have questions about a medical condition or this instruction, always ask your healthcare professional. [address]  any warranty or liability for your use of this information. Diabetes Blood Sugar Emergencies: Your Action Plan  How can you prevent a blood sugar emergency? An important part of living with diabetes is keeping your blood sugar in your target range. You'll need to know what to do if it's too high or too low. Managing your blood sugar levels helps you avoid emergencies. This care sheet will teach you about the signs of high and low blood sugar. It will help you make an action plan with your doctor for when these signs occur. Low blood sugar is more likely to happen if you take certain medicines for diabetes. It can also happen if you skip a meal, drink alcohol, or exercise more than usual.  You may get high blood sugar if you eat differently than you normally do. One example is eating more carbohydrate than usual. Having a cold, the flu, or other sudden illness can also cause high blood sugar levels. Levels can also rise if you miss a dose of medicine. Any change in how you take your medicine may affect your blood sugar level. So it's important to work with your doctor before you make any changes. Track your blood sugar  Work with your doctor to fill in the blank spaces below that apply to you. Track your levels, know your target range, and write down ways you can get your blood sugar back in your target range. A log book can help you track your levels. Take the book to all of your medical appointments. Check your blood sugar _____ times a day, at these times:________________________________________________. (For example: Before meals, at bedtime, before exercise, during exercise, other.)  Your blood sugar target range before a meal is ___________________. Your blood sugar target range after a meal is _______________________. Do this--___________________________________________________--to get your blood sugar back within your safe range if your blood sugar results are _________________________________________.  (For example: Less than 70 or above 250 mg/dL.)  Call your doctor when your blood sugar results are ___________________________________. (For example: Less than 70 or above 250 mg/dL.)  What are the symptoms of low and high blood sugar? Common symptoms of low blood sugar are sweating and feeling shaky, weak, hungry, or confused. Symptoms can start quickly. Common symptoms of high blood sugar are feeling very thirsty or very hungry. You may also pass urine more often than usual. You may have blurry vision and may lose weight without trying. But some people may have high or low blood sugar without having any symptoms. That's a good reason to check your blood sugar on a regular schedule. What should you do if you have symptoms? Work with your doctor to fill in the blank spaces below that apply to you. Low blood sugar and \"the rule of 15\"  If you have symptoms of low blood sugar, check your blood sugar. If it's below _____ ( for example, below 70), eat or drink a quick-sugar food that has about 15 grams of carbohydrate. Your goal is to get your level back to your safe range. Check your blood sugar again 15 minutes later. If it's still not in your target range, take another 15 grams of carbohydrate and check your blood sugar again in 15 minutes. Repeat this until you reach your target. Then go back to your regular testing schedule. Children usually need less than 15 grams of carbohydrate. Check with your doctor or diabetes educator for the amount that is right for your child. When you have low blood sugar, it's best to stop or reduce any physical activity until your blood sugar is back in your target range and is stable. If you must stay active, eat or drink 30 grams of carbohydrate. Then check your blood sugar again in 15 minutes. If it's not in your target range, take another 30 grams of carbohydrates. Check your blood sugar again in 15 minutes. Keep doing this until you reach your target.  You can then go back to your regular testing schedule. If your symptoms or blood sugar levels are getting worse or have not improved after 15 minutes, seek medical care right away. If you take insulin, always carry a glucagon emergency kit. Be sure your family, friends, and coworkers know how to give glucagon. Here are some examples of quick-sugar foods with 15 grams of carbohydrate:  3 or 4 glucose tablets  1 tablespoon (3 teaspoons) table sugar  ½ cup to ¾ cup (4 to 6 ounces) of fruit juice or regular (not diet) soda  Hard candy (such as 6 Life Savers)  High blood sugar  If you have symptoms of high blood sugar, check your blood sugar. Your goal is to get your level back to your target range. If it's above ______ ( for example, above 250), follow these steps: If you missed a dose of your diabetes medicine, take it now. Take only the amount of medicine that you have been prescribed. Do not take more or less medicine. Give yourself insulin if your doctor has prescribed it for high blood sugar. Test for ketones, if the doctor told you to do so. If the results of the ketone test show a moderate-to-large amount of ketones, call the doctor for advice. Wait 30 minutes after you take the extra insulin or the missed medicine. Check your blood sugar again. If your symptoms or blood sugar levels are getting worse or have not improved after taking these steps, seek medical care right away. Follow-up care is a key part of your treatment and safety. Be sure to make and go to all appointments, and call your doctor if you are having problems. It's also a good idea to know your test results and keep a list of the medicines you take. Where can you learn more? Go to http://www.woods.com/ and enter V403 to learn more about \"Diabetes Blood Sugar Emergencies: Your Action Plan. \"  Current as of: April 13, 2022               Content Version: 13.5  © 2917-7850 Healthwise, [employer]. Care instructions adapted under license by [facility] ([facility]).  If you have questions about a medical condition or this instruction, always ask your healthcare professional. [address] any warranty or liability for your use of this information. Kidney Disease and Diabetes: Care Instructions  Overview     When you have diabetes, your body cannot make enough insulin or use it the way it should. Your body needs insulin to help sugar move from the blood to the cells. Without it, your blood sugar gets too high. High blood sugar damages your kidneys and makes it hard for them to filter blood. This causes fluid and waste to build up in your blood. If you have diabetes, it is very important to keep your blood sugar in your target range. There are many steps you can take to do this. If you can control your blood sugar, you will have the best chance to slow or stop damage to your kidneys. Follow-up care is a key part of your treatment and safety. Be sure to make and go to all appointments, and call your doctor if you are having problems. It's also a good idea to know your test results and keep a list of the medicines you take. How can you care for yourself at home? To manage your diabetes and slow or stop damage to your kidneys  Keep your blood sugar in your target range. The American Diabetes Association recommends a hemoglobin A1c (Hb A1c) target level of less than 7%. Talk to your doctor about your target. The lower your A1c, the better your chance of stopping kidney damage. Keep your blood pressure in your target range. Doctors recommend specific types of blood pressure medicines for people who have diabetes and kidney disease. Examples include ACE inhibitors and angiotensin II receptor blockers (ARBs). Your doctor may have you take one of these even if you don't have high blood pressure. Take all of your medicines. You may have several. For example, you may take medicines for diabetes, cholesterol, and blood pressure.  It's very important to take all of them just as your doctor tells you to and to keep taking them. Make good food choices. Follow an eating plan that is best for your diabetes and your kidneys. You may want to work with a dietitian to make a plan. This will help you know how much carbohydrate to have for meals and snacks. It will also make sure that you get the right amount of salt (sodium), fluids, and protein. Stay at a healthy weight. If you need help to lose weight, talk to your doctor or dietitian. Even small changes can make a difference. Try to be aware of your portion sizes, eat more fruits and vegetables, and add some activity to your daily routine. Exercise. Get at least 30 minutes of activity on most days of the week. Walking is a great exercise that most people can do. Being more active can help you manage your blood sugar and stay at a healthy weight. It also can help you lower cholesterol and blood pressure. To improve your kidney health  Follow your treatment plan. Check your blood sugar as many times a day as your doctor recommends. Go to all of your follow-up appointments, and be sure to have all the tests your doctor orders. Call your doctor if you think you are having a problem with your medicines. Avoid certain medicines. Nonsteroidal anti-inflammatory drugs (NSAIDs), such as ibuprofen and naproxen, can damage your kidneys. It is important to talk to your doctor about all medicine that you take. Avoid tobacco. Do not smoke or use other tobacco products. If you need help quitting, talk to your doctor about stop-smoking programs and medicines. These can increase your chances of quitting for good. When should you call for help? Call 911 anytime you think you may need emergency care. For example, call if:    You passed out (lost consciousness). Call your doctor now or seek immediate medical care if:    You have new or worse nausea and vomiting. You have much less urine than normal, or you have no urine.      You are feeling confused or cannot think clearly. You have new or more blood in your urine. You have new swelling. You are dizzy or lightheaded, or you feel like you may faint. Watch closely for changes in your health, and be sure to contact your doctor if:    You do not get better as expected. Where can you learn more? Go to http://www.woods.com/ and enter C726 to learn more about \"Kidney Disease and Diabetes: Care Instructions. \"  Current as of: April 13, 2022               Content Version: 13.5  © 1948-1571 Healthwise, [employer]. Care instructions adapted under license by [facility] ([facility]). If you have questions about a medical condition or this instruction, always ask your healthcare professional. [address] any warranty or liability for your use of this information. Learning About Insulin Pens  What is an insulin pen? An insulin pen is a device for giving insulin shots. It looks like a pen. You can set the dose of insulin with a dial on the outside of the pen. You use the pen to give the insulin shot (injection). Both disposable and reusable insulin pens are available. With a disposable pen, a set amount of insulin comes in the pen ready to use. When the insulin is used up, you throw the pen away. You use a new pen the next time you need insulin. With a reusable pen, you don't throw the pen away. Instead, you reload the pen with a pre-measured cartridge of insulin. When the insulin is used up, you insert a new cartridge into the pen. Disposable and reusable pens both need a new needle with each shot. The needles come in different lengths and widths. [place] needles will prevent injecting into the muscle, especially in children or people who are lean. Thinner-width needles reduce the pricking sensation. Width is measured by gauge. The higher the number, the thinner the needle. Why do some people prefer pens?   Most people find that insulin pens are easier to use than a bottle and syringe. Many people feel less pain (or no pain) with the smaller insulin pen needle, compared to a syringe needle. Insulin pens may help you give yourself more accurate doses. When you draw insulin into a syringe, you must carefully measure so that you don't get too much or too little. But with a pen, you set a dial for the amount of insulin you want, and then you push the button. Insulin pens may work better than syringes for people who don't see well or who have problems like arthritis that make it harder to use a syringe. Using an insulin pen draws less attention from others. You can give yourself insulin with fewer people noticing. You don't need to carry insulin bottles and syringes everywhere you go. An insulin pen fits into a pocket or purse. What should you know about insulin pens? Each pen delivers a different brand and type (or types) of insulin. Some deliver rapid-acting insulin. Others deliver long-acting insulin. And some pens deliver a mixture of both in one shot. Pens have different colored labels, cartridge holders, or dosing knobs. Many pens have special features. For example, some pens have springs so that it takes less force to deliver a dose of insulin. Other pens have signals you can hear that let you know the insulin has been delivered. Some have memory to show the amount and time of the last dose. How do you use an insulin pen? For a reusable pen, put the insulin cartridge into the pen. Disposable pens already have an insulin cartridge. Follow the directions for how to screw a new needle onto your pen. Before using cloudy insulin, such as NPH and premixed insulin, gently roll the pen between your palms 10 times, then tip the pen up and down 10 times. Do not shake the pen. The insulin should look milky white. Remove the outer cap from the needle. Keep this cap to use later. Remove the inner cover from the needle.  Be careful not to prick yourself. Before each shot, prime the needle. Priming removes air from the needle and helps make sure you get the right dose. Turn the dose knob to 2 units or to the amount that your pen's  recommends. Hold your pen with the needle pointing up. Tap the cartridge shultz gently to move any air bubbles to the top. Push the injection button all the way in. Watch for a stream or drop of insulin to come out of the needle. If it does not, repeat this step again. Make sure the area of skin where you will give the shot is clean. If you use alcohol to clean the skin, let it dry. Use a different spot each time you inject insulin. That's because using the same spot every time can cause bumps or pits to form in the skin. For example, inject your insulin above your belly button, then the next time use your upper thigh, and then the next time inject below your belly button. Turn the dose knob to the number of units of insulin you need to inject. Push the needle into your skin. Most people can inject using a 90-degree angle and without pinching the skin. Adults and children who are very lean and people who use longer needles may need to pinch the skin to avoid injecting into muscle. Put your thumb on the injection button and push it in until it stops. Keep the pen in your skin. Hold the dose knob in for 10 seconds (or to the number that the  recommends). Then pull the needle out of your skin. Do not rub the area. Put only the outer cap back over the needle. The thin inner cover is harder to put back on, and you could stick yourself. After covering the needle with the outer cap, unscrew the needle and throw it away in a sharps container or other solid plastic container. You can get a sharps container at your drugstore. Always read the insulin package information that tells the best way to store your insulin pen and insulin cartridges.  In general, unopened insulin for pens will last longer if it is kept in the refrigerator. After insulin is opened, most manufacturers say to store it at room temperature. Don't share insulin pens with anyone else who uses insulin. Even when the needle is changed, an insulin pen can carry bacteria or blood that can make another person sick. Where can you learn more? Go to http://www.woods.com/ and enter M910 to learn more about \"Learning About Insulin Pens. \"  Current as of: April 13, 2022               Content Version: 13.5  © 3155-9296 [employer]. Care instructions adapted under license by [facility] ([facility]). If you have questions about a medical condition or this instruction, always ask your healthcare professional. [address] any warranty or liability for your use of this information. metformin  Pronunciation:  met FOR min  Brand:  Fortamet, Glucophage, Glucophage XR, Glumetza, MetFORMIN (Eqv-Fortamet), MetFORMIN (Eqv-Glucophage XR), MetFORMIN (Eqv-Glumetza), Riomet, Riomet ER  What is the most important information I should know about metformin? You should not use this medicine if you have severe kidney disease, metabolic acidosis, or diabetic ketoacidosis (call your doctor for treatment). If you need to have any type of x-ray or CT scan using a dye that is injected into your veins, you may need to temporarily stop taking metformin. You may develop lactic acidosis, a dangerous build-up of lactic acid in your blood. Call your doctor or get emergency medical help if you have unusual muscle pain, trouble breathing, stomach pain, dizziness, feeling cold, or feeling very weak or tired. What is metformin? Metformin is used together with diet and exercise to improve blood sugar control in adults with type 2 diabetes mellitus. Metformin is sometimes used together with insulin or other medications, but metformin is not for treating type 1 diabetes.   Metformin may also be used for purposes not listed in this medication guide.  What should I discuss with my healthcare provider before taking metformin? You should not use metformin if you are allergic to it, or if you have:  severe kidney disease; or  metabolic acidosis or diabetic ketoacidosis (call your doctor for treatment). If you need to have surgery or any type of x-ray or CT scan using a dye that is injected into your veins, you may need to temporarily stop taking metformin. Be sure your caregivers know ahead of time that you are using this medication. Tell your doctor if you have ever had:  kidney disease (your kidney function may need to be checked before you take this medicine);  high ketone levels in your blood or urine;  heart disease, congestive heart failure;  liver disease; or  if you also use insulin, or other oral diabetes medications. You may develop lactic acidosis, a dangerous build-up of lactic acid in your blood. This may be more likely if you have other medical conditions, a severe infection, chronic alcoholism, or if you are 72 or older. Ask your doctor about your risk. Follow your doctor's instructions about using this medicine if you are pregnant or you become pregnant. Controlling diabetes is very important during pregnancy, and having high blood sugar may cause complications in both the mother and the baby. Metformin may stimulate ovulation in a premenopausal woman and may increase the risk of unintended pregnancy. Talk to your doctor about your risk. You should not breastfeed while using this medicine. Metformin should not be given to a child younger than 8years old. Some forms of metformin are not approved for use by anyone younger than 25years old. How should I take metformin? Follow all directions on your prescription label and read all medication guides or instruction sheets. Your doctor may occasionally change your dose. Use the medicine exactly as directed. Take metformin with a meal, unless your doctor tells you otherwise.  Some forms of metformin are taken only once daily with the evening meal. Follow your doctor's instructions. Do not crush, chew, or break an extended-release tablet. Swallow it whole. Measure liquid medicine carefully. Use the dosing syringe provided, or use a medicine dose-measuring device (not a kitchen spoon). Shake the oral suspension (liquid) before you measure a dose. Use the dosing syringe provided, or use a medicine dose-measuring device (not a kitchen spoon). Some tablets are made with a shell that is not absorbed or melted in the body. Part of this shell may appear in your stool. This is normal and will not make the medicine less effective. You may have low blood sugar (hypoglycemia) and feel very hungry, dizzy, irritable, confused, anxious, or shaky. To quickly treat hypoglycemia, eat or drink a fast-acting source of sugar (fruit juice, hard candy, crackers, raisins, or non-diet soda). Your doctor may prescribe a glucagon injection kit in case you have severe hypoglycemia. Be sure your family or close friends know how to give you this injection in an emergency. Blood sugar levels can be affected by stress, illness, surgery, exercise, alcohol use, or skipping meals. Ask your doctor before changing your dose or medication schedule. Metformin is only part of a complete treatment program that may also include diet, exercise, weight control, blood sugar testing, and special medical care. Follow your doctor's instructions very closely. Store at room temperature away from moisture, heat, and light. Your doctor may have you take extra vitamin B12 while you are taking metformin. Take only the amount of vitamin B12 that your doctor has prescribed. What happens if I miss a dose? Take the medicine as soon as you can, but skip the missed dose if it is almost time for your next dose. Do not take two doses at one time. What happens if I overdose?   Seek emergency medical attention or call the Poison Help line at 1-810.884.8078. An overdose can cause severe hypoglycemia or lactic acidosis. What should I avoid while taking metformin? Avoid drinking alcohol. It lowers blood sugar and may increase your risk of lactic acidosis. What are the possible side effects of metformin? Get emergency medical help if you have signs of an allergic reaction:  hives; difficult breathing; swelling of your face, lips, tongue, or throat. Some people using metformin develop lactic acidosis, which can be fatal. Get emergency medical help if you have even mild symptoms such as:  unusual muscle pain;  feeling cold;  trouble breathing;  feeling dizzy, light-headed, tired, or very weak;  stomach pain, vomiting; or  slow or irregular heart rate. Common side effects may include:  low blood sugar;  nausea, upset stomach; or  diarrhea. This is not a complete list of side effects and others may occur. Call your doctor for medical advice about side effects. You may report side effects to FDA at 4-187-FPL-5419. What other drugs will affect metformin? Many drugs can affect metformin, making this medicine less effective or increasing your risk of lactic acidosis. This includes prescription and over-the-counter medicines, vitamins, and herbal products. Not all possible interactions are listed here. Tell your doctor about all your current medicines and any medicine you start or stop using. Where can I get more information? Your pharmacist can provide more information about metformin. Remember, keep this and all other medicines out of the reach of children, never share your medicines with others, and use this medication only for the indication prescribed. Every effort has been made to ensure that the information provided by [address] is accurate, up-to-date, and complete, but no guarantee is made to that effect. Drug information contained herein may be time sensitive.  [facility] information has been compiled for use by healthcare practitioners and consumers in the [facility] and therefore [facility] does not warrant that uses outside of the [facility] are appropriate, unless specifically indicated otherwise. [facility] drug information does not endorse drugs, diagnose patients or recommend therapy. [facility] drug information is an informational resource designed to assist licensed healthcare practitioners in caring for their patients and/or to serve consumers viewing this service as a supplement to, and not a substitute for, the expertise, skill, knowledge and judgment of healthcare practitioners. The absence of a warning for a given drug or drug combination in no way should be construed to indicate that the drug or drug combination is safe, effective or appropriate for any given patient. [facility] does not assume any responsibility for any aspect of healthcare administered with the aid of information [facility] provides. The information contained herein is not intended to cover all possible uses, directions, precautions, warnings, drug interactions, allergic reactions, or adverse effects. If you have questions about the drugs you are taking, check with your doctor, nurse or pharmacist.  Copyright 1221-8586 [address] [place] [address] Dr BHATT. Version: 17.02. Revision date: 4/9/2020. Care instructions adapted under license by [facility] [address] . If you have questions about a medical condition or this instruction, always ask your healthcare professional. [place] [address] any warranty or liability for your use of this information. glipizide  Pronunciation:  GLIYAMILE moffett zide  Brand:  Glucotrol  What is the most important information I should know about glipizide? You should not use glipizide if you have diabetic ketoacidosis (call your doctor for treatment). What is glipizide? Glipizide is used together with diet and exercise to improve blood sugar control in adults with type 2 diabetes mellitus.  Glipizide is not for treating type 1 diabetes. Glipizide may also be used for purposes not listed in this medication guide. What should I discuss with my healthcare provider before taking glipizide? You should not use this medicine if you are allergic to glipizide, or if you have diabetic ketoacidosis (call your doctor for treatment). Tell your doctor if you have ever had:  liver or kidney disease;  chronic diarrhea, or a blockage in your intestines; or  an enzyme deficiency called glucose-6-phosphate dehydrogenase deficiency (G6PD). Follow your doctor's instructions about using this medicine if you are pregnant or you become pregnant. Controlling diabetes is very important during pregnancy, and having high blood sugar may cause complications in both the mother and the baby. However, you may need to stop taking glipizide for a short time just before your due date. It may not be safe to breastfeed while using this medicine. Ask your doctor about any risk. How should I take glipizide? Follow all directions on your prescription label and read all medication guides or instruction sheets. Your doctor may occasionally change your dose. Use the medicine exactly as directed. Take the glipizide regular tablet 30 minutes before your first meal of the day. Take the glipizide extended-release tablet with your first meal of the day. Swallow the tablet whole and do not crush, chew, or break it. Your blood sugar will need to be checked often, and you may need other blood tests at your doctor's office. You may have low blood sugar (hypoglycemia) and feel very hungry, dizzy, irritable, confused, anxious, or shaky. To quickly treat hypoglycemia, eat or drink a fast-acting source of sugar (fruit juice, hard candy, crackers, raisins, or non-diet soda). Your doctor may prescribe a glucagon injection kit in case you have severe hypoglycemia. Be sure your family or close friends know how to give you this injection in an emergency.   Also watch for signs of high blood sugar (hyperglycemia) such as increased thirst or urination. Blood sugar levels can be affected by stress, illness, surgery, exercise, alcohol use, or skipping meals. Ask your doctor before changing your dose or medication schedule. Some forms of glipizide are made with a shell that is not absorbed or melted in the body. Part of the tablet shell may appear in your stool. This is a normal side effect and will not make the medication less effective. Store at room temperature away from moisture, heat, and light. What happens if I miss a dose? Take your dose as soon as you can, but only if you are getting ready to eat a meal.  If you skip a meal, skip the missed dose and wait until your next meal. Do not take two doses at one time. Get your prescription refilled before you run out of medicine completely. What happens if I overdose? Seek emergency medical attention or call the Poison Help line at 1-372.322.5946. A glipizide overdose can cause life-threatening hypoglycemia. Symptoms of severe hypoglycemia include extreme weakness, blurred vision, sweating, trouble speaking, tremors, stomach pain, confusion, and seizure (convulsions). What should I avoid while taking glipizide? Avoid drinking alcohol. It lowers blood sugar and can cause side effects. Avoid driving or operating machinery until you know how this medicine will affect you. What are the possible side effects of glipizide? Get emergency medical help if you have signs of an allergic reaction: hives; difficulty breathing; swelling of your face, lips, tongue, or throat. Call your doctor at once if you have symptoms of low blood sugar:  headache, irritability  sweating, fast heart rate;  dizziness, nausea; or  hunger, feeling anxious or shaky. Common side effects may include:  diarrhea, constipation, gas;  dizziness, drowsiness;  tremors; or  skin rash, redness, or itching. This is not a complete list of side effects and others may occur.  Call your doctor for medical advice about side effects. You may report side effects to FDA at 9-420-BKD-4073. What other drugs will affect glipizide? Sometimes it is not safe to use certain medications at the same time. Some drugs can affect your blood levels of other drugs you take, which may increase side effects or make the medications less effective. Many drugs can affect glipizide. This includes prescription and over-the-counter medicines, vitamins, and herbal products. Not all possible interactions are listed here. Tell your doctor about all your current medicines and any medicine you start or stop using. Where can I get more information? Your pharmacist can provide more information about glipizide. Remember, keep this and all other medicines out of the reach of children, never share your medicines with others, and use this medication only for the indication prescribed. Every effort has been made to ensure that the information provided by [address] is accurate, up-to-date, and complete, but no guarantee is made to that effect. Drug information contained herein may be time sensitive. [facility] information has been compiled for use by healthcare practitioners and consumers in the [place] and therefore [employer] does not warrant that uses outside of the [place] are appropriate, unless specifically indicated otherwise. [facility]'s drug information does not endorse drugs, diagnose patients or recommend therapy. [facility] drug information is an informational resource designed to assist licensed healthcare practitioners in caring for their patients and/or to serve consumers viewing this service as a supplement to, and not a substitute for, the expertise, skill, knowledge and judgment of healthcare practitioners.  The absence of a warning for a given drug or drug combination in no way should be construed to indicate that the drug or drug combination is safe, effective or appropriate for any given patient. [facility] does not assume any responsibility for any aspect of healthcare administered with the aid of information [facility] provides. The information contained herein is not intended to cover all possible uses, directions, precautions, warnings, drug interactions, allergic reactions, or adverse effects. If you have questions about the drugs you are taking, check with your doctor, nurse or pharmacist.  Copyright 7036-1724 [address] [place] [address] Dr BHATT. Version: 11.02. Revision date: 3/31/2020. Care instructions adapted under license by [facility] ([facility]). If you have questions about a medical condition or this instruction, always ask your healthcare professional. [place] [address] any warranty or liability for your use of this information. insulin glargine  Pronunciation:  IN mcknight dilia ALARCON gine  Brand:  Basaglar KwikPen, Lantus, Lantus Solostar Pen, Semglee, Toujeo SoloStar  What is the most important information I should know about insulin glargine? Never share an injection pen, even if you changed the needle. What is insulin glargine? Insulin glargine is a long-acting insulin that starts to work several hours after injection and keeps working evenly for 24 hours. Insulin glargine is used to improve blood sugar control in people with diabetes mellitus. [place] Adrianne is for use in adults with type 1 or type 2 diabetes. Basaglar, Lantus, and Semglee are for use in adults with type 1 or 2 diabetes and in children at least 10years old with type 1 diabetes (not type 2). For type 1 diabetes, insulin glargine is used together with a short-acting insulin given before meals. Insulin glargine may also be used for purposes not listed in this medication guide. What should I discuss with my healthcare provider before using insulin glargine?   You should not use this medicine if you are allergic to insulin, or if you are having an episode of hypoglycemia (low blood sugar) or diabetic ketoacidosis (call your doctor for treatment). Insulin glargine is not approved for use by anyone younger than 10years old, and some brands are for use only in adults. Do not use this medicine to treat type 2 diabetes in a child of any age. Tell your doctor if you have ever had:  liver or kidney disease; or  heart failure or other heart problems. Tell your doctor if you also take pioglitazone or rosiglitazone (sometimes contained in combinations with glimepiride or metformin). Taking certain oral diabetes medicines while you are using insulin may increase your risk of serious heart problems. Tell your doctor if you are pregnant or breastfeeding. Follow your doctor's instructions about using this medicine if you are pregnant or you become pregnant. Controlling diabetes is very important during pregnancy. How should I use insulin glargine? Follow all directions on your prescription label and read all medication guides or instruction sheets. Use the medicine exactly as directed. Insulin glargine is injected under the skin, usually once per day at the same time of day. When treating type 1 diabetes, use your short-acting insulin before meals as directed by your doctor. Insulin glargine must not be given with an insulin pump, or mixed with other insulins. Do not inject insulin glargine into a vein or a muscle. Ask your doctor or pharmacist if you don't understand how to use an injection. Prepare an injection only when you are ready to give it. Call your pharmacist if the medicine looks cloudy, has changed colors, or has particles in it. Your healthcare provider will show you where to inject insulin glargine. Do not inject into the same place two times in a row. Avoid injecting into skin that is damaged, tender, bruised, pitted, thickened, scaly, or has a scar or hard lump. Toujeo contains 300 units of insulin glargine per milliliter (mL), which is 3 times stronger than brands that contain 100 units per mL.   Your dose needs may change if you switch to a different brand, strength, or form of this medicine. Avoid medication errors by using only the medicine your doctor prescribes. If you use an injection pen, use only the injection pen that comes with insulin glargine. Attach a new needle before each use. Do not transfer the insulin from the pen into a syringe. Never share an injection pen, even if you changed the needle. Sharing these devices can pass infections from person to person. Blood sugar can be affected by stress, illness, surgery, exercise, alcohol use, or skipping meals. Low blood sugar (hypoglycemia) can make you feel very hungry, dizzy, irritable, or shaky. To quickly treat hypoglycemia, eat or drink hard candy, crackers, raisins, fruit juice, or non-diet soda. Your doctor may prescribe glucagon injection in case of severe hypoglycemia. Tell your doctor if you have frequent symptoms of high blood sugar (hyperglycemia) such as increased thirst or urination. Ask your doctor before changing your medication dosage. Keep this medicine in its original container protected from heat and light. Do not freeze insulin or store it near the cooling element in a refrigerator. Throw away any insulin that has been frozen. Storing unopened (not in use) insulin glargine:   Refrigerate and use until expiration date; or  (Basaglar, Lantus, or Semglee) Store at room temperature (below 86 degrees Fahrenheit) and use within 28 days. Storing opened (in use) insulin glargine:   Store the vial in a refrigerator or at room temperature and use within 28 days. Store the injection pen at room temperature (do not refrigerate) and use within 28 days. Store Toujeo  at room temperature below 86 F (do not refrigerate) and use within 56 days. Do not store an injection pen with the needle attached. Do not reuse a needle or syringe. Place them in a puncture-proof \"sharps\" container and dispose of it following state or local laws.  Keep out of the reach of children and pets.  Wear a medical alert tag or carry an ID card to let others know you have diabetes.  What happens if I miss a dose?  Call your doctor for instructions if you miss a dose. Do not use more than one dose in a 24-hour period unless your doctor tells you to.  Get your prescription refilled before you run out of medicine completely.  What happens if I overdose?  Seek emergency medical attention or call the Poison Help line at 1-129.101.4236. Insulin overdose can cause severe hypoglycemia. Symptoms include drowsiness, confusion, blurred vision, numbness or tingling in your mouth, trouble speaking, muscle weakness, clumsy or jerky movements, seizure (convulsions), or loss of consciousness.  What should I avoid while using insulin glargine?  Avoid driving or hazardous activity until you know how this medicine will affect you. Your reactions could be impaired.  Avoid medication errors by always checking the medicine label before injecting your insulin.  Avoid drinking alcohol or using medicines that contain alcohol. It may interfere with your diabetes treatment.  What are the possible side effects of insulin glargine?  Get emergency medical help if you have signs of insulin allergy: redness or swelling where an injection was given, itchy skin rash over the entire body, trouble breathing, fast heartbeats, feeling like you might pass out, or swelling in your tongue or throat.  Call your doctor at once if you have:  rapid weight gain, swelling in your feet or ankles;  shortness of breath; or  low blood potassium --leg cramps, constipation, irregular heartbeats, fluttering in your chest, increased thirst or urination, numbness or tingling, muscle weakness or limp feeling.  Common side effects may include:  low blood sugar;  swelling, weight gain;  allergic reaction, itching, rash; or  thickening or hollowing of the skin where you injected the medicine.  This is not a complete list of side effects and others may  occur. Call your doctor for medical advice about side effects. You may report side effects to FDA at 1-640-FDA-7217. What other drugs will affect insulin glargine? Many drugs can affect your blood sugar and may also affect insulin glargine. This includes prescription and over-the-counter medicines, vitamins, and herbal products. Tell your doctor about all other medicines you use. Not all possible interactions are listed here. Where can I get more information? Your pharmacist can provide more information about insulin glargine. Remember, keep this and all other medicines out of the reach of children, never share your medicines with others, and use this medication only for the indication prescribed. Every effort has been made to ensure that the information provided by [address] is accurate, up-to-date, and complete, but no guarantee is made to that effect. Drug information contained herein may be time sensitive. [facility] information has been compiled for use by healthcare practitioners and consumers in the [facility] and therefore [facility] does not warrant that uses outside of the [facility] are appropriate, unless specifically indicated otherwise. [facility]'s drug information does not endorse drugs, diagnose patients or recommend therapy. [facility] drug information is an informational resource designed to assist licensed healthcare practitioners in caring for their patients and/or to serve consumers viewing this service as a supplement to, and not a substitute for, the expertise, skill, knowledge and judgment of healthcare practitioners. The absence of a warning for a given drug or drug combination in no way should be construed to indicate that the drug or drug combination is safe, effective or appropriate for any given patient. [facility] does not assume any responsibility for any aspect of healthcare administered with the aid of information [facility] provides.  The information contained herein is not intended to cover all possible uses, directions, precautions, warnings, drug interactions, allergic reactions, or adverse effects. If you have questions about the drugs you are taking, check with your doctor, nurse or pharmacist.  Copyright 0840-7030 [address]. Version: 15.02. Revision date: 2/18/2022. Care instructions adapted under license by [facility] ([facility]). If you have questions about a medical condition or this instruction, always ask your healthcare professional. [place] [address] any warranty or liability for your use of this information.

## 2023-01-24 NOTE — CONSULTS
Clinical Pharmacy Progress Note  Medication History    Admit Date: 1/24/2023  Consulting Provider: Dr. Hawkins Found     List of current medications patient is taking is complete. Home medication list in EPIC updated to reflect changes noted below. Source of information: outpatient fill history and Care Everywhere    All of patients home medications were verified and correct, so there were no changes made to the medication list.    Please call pharmacy if you have any questions or concerns!   Aylin Quevedo, PharmD  PGY-1 Pharmacy Resident  [facility]  [id number]  1/24/2023   12:07 PM

## 2023-01-24 NOTE — ED NOTES
Report called to Monico Perez RN and  Medical transport here to transport to [facility] room 4510     Teresa Diez RN  01/24/23 [id number]

## 2023-01-24 NOTE — CARE COORDINATION
Case management will follow for discharge planning needs. The chart was reviewed. Mr. Jaime Sequeira is from home in his own apartment. He is independent with self care. He spent time at [facility] last year and has been active with [address] [place] [address] in the past. Will reassess for disposition and service needs once he is more medically stable.     Electronically signed by VALERIE Mooney RN-[facility]  Case Management  [phone]

## 2023-01-24 NOTE — ED PROVIDER NOTES
[facility]  EMERGENCY DEPT VISIT      Patient Identification  Landon Piedra is a 81 y.o. male. Chief Complaint   Fatigue and Hyperglycemia      History of Present Illness:    History was obtained from patient. This is a  81 y.o. male who presents via ambulance to the ED with complaints of generalized fatigue and weakness for the last week. Patient reports constant thirst and frequent urination. He has been drinking pop, tea, Gatorade, and water but states that his mouth is still dry despite this. He denies any fever. No runny nose, sore throat, cough. No vomiting or diarrhea. Today he started having severe charley horses in his legs particularly on the right side to the point where he has been unable to walk. EMS checked his blood sugar and it was high. Patient is not treated for diabetes. .     Past Medical History:   Diagnosis Date    CAD (coronary artery disease)     CHF (congestive heart failure) ([facility])     diastolic    COPD (chronic obstructive pulmonary disease) ([facility])     Critical ischemia of lower extremity ([facility])     Depressive disorder, not elsewhere classified     GERD (gastroesophageal reflux disease)     History of colon polyps - 30 seen on colonoscopy 04/2021 04/24/2021    History of MI (myocardial infarction) 05/2013    History of skin ulcer of lower extremity     R anterior shin    History of transient ischemic attack (TIA)     Hx of blood clots     PE    Hyperlipidemia     Hypertension     Neuropathy     KAVITA (obstructive sleep apnea)     On CPAP    Personal history of DVT (deep vein thrombosis)     Prolonged emergence from general anesthesia     Pt reported being combative after surgery     PVD (peripheral vascular disease) ([facility] [address]     TIA (transient ischemic attack) 2013    Vertebral fracture        Past Surgical History:   Procedure Laterality Date    APPENDECTOMY      CHOLECYSTECTOMY, LAPAROSCOPIC      COLONOSCOPY  4/23/2021    COLONOSCOPY POLYPECTOMY SNARE/COLD BIOPSY performed by Regulo Warner MD at [facility] ENDOSCOPY    COLONOSCOPY  4/23/2021    COLONOSCOPY POLYPECTOMY ABLATION performed by Regulo Warner MD at [facility] ENDOSCOPY    COLONOSCOPY  4/23/2021    COLONOSCOPY CONTROL HEMORRHAGE performed by Regulo Warner MD at [facility] ENDOSCOPY    CORONARY ANGIOPLASTY WITH STENT PLACEMENT  6/2013    EYE SURGERY Left     FEMORAL BYPASS Right 7/18/2022    RIGHT FEMORAL BELOW KNEE POPLITEAL ARTERY BYPASS WITH IN SITU SAPHENOUS VEIN performed by Darshana Richard MD at [facility] OR    FEMORAL ENDARTERECTOMY Right 11/17/2021    RIGHT FEMORAL ENDARTERECTOMY  RIGHT EXTERNAL ILIAC TO PROFUNDA FEMORAL BYPASS WITH 8MM PTFE GRAFT RIGHT LOWER EXTREMITY ARTERIOGRAM BALLOON ANGIOPLASTY RIGHT PROFUNDA BALLOON ANGIOPLASTY AND STENT OF RIGHT EXTERNAL ILIAC ARTERY performed by Darshana Richard MD at [facility] OR    LAMINECTOMY  11/18/2015    Bilateral decompressive lumbar laminectomy L4-5   ; medial facetectomy L4-5 joints  bilaterally; foraminotomies l5   nerve roots bilaterally    LEG DEBRIDEMENT Right 7/23/2013    Dr. DIXONMesh - pretibial wound    OTHER SURGICAL HISTORY Right 6/25/2013    Dr. DIXONMesh - CFA Endarterectomy w/marsupialization; RLE wound excision & debridement     OTHER SURGICAL HISTORY Left 8/27/2013    Dr. DIXONMesh - femoral & profunda femoris endarterectomy w/marsupialization patch angioplasty using SFA    SKIN SPLIT GRAFT Right 7/25/2013    Dr. DIXONMesh - to non-healing R pretibial wound, 9 x 15 cm    TOTAL HIP ARTHROPLASTY Right 09/01/2016    Dr. Everett    TRANSLUMINAL ANGIOPLASTY Left 12/22/2015    Dr. DIXONMesh - CFA exploration, thrombectomy of ileofemoral system, stenting of RAFAL in-stent stenosis w/8 x 37 mm Palmaz          Current Facility-Administered Medications:     glucose chewable tablet 16 g, 4 tablet, Oral, PRN, Madisyn Joseph MD    dextrose bolus 10% 125 mL, 125 mL, IntraVENous, PRN **OR** dextrose bolus 10% 250 mL, 250 mL, IntraVENous, PRN, Madisyn Joseph MD    glucagon injection 1 mg, 1 mg,  SubCUTAneous, PRN, Karen Pete MD    dextrose 10 % infusion, , IntraVENous, Continuous PRN, Karen Pete MD    insulin regular (HUMULIN R;NOVOLIN R) 100 Units in sodium chloride 0.9 % 100 mL infusion, 0.01-0.5 Units/kg/hr, IntraVENous, Continuous, Karen Pete MD, Last Rate: 9.5 mL/hr at 01/24/23 0327, 0.1 Units/kg/hr at 01/24/23 0327    0.9 % sodium chloride infusion, 1,000 mL, IntraVENous, Continuous, Karen Pete MD    Current Outpatient Medications:     pregabalin (LYRICA) 150 MG capsule, Take 1 capsule by mouth 2 times daily for 30 days. , Disp: 180 capsule, Rfl: 1    ELIQUIS 5 MG TABS tablet, TAKE 1 TABLET BY MOUTH TWICE DAILY, Disp: 60 tablet, Rfl: 1    senna (SENOKOT) 8.6 MG TABS tablet, Take 1 tablet by mouth nightly as needed, Disp: , Rfl:     clopidogrel (PLAVIX) 75 MG tablet, TAKE 1 TABLET BY MOUTH DAILY, Disp: 30 tablet, Rfl: 3    DULoxetine (CYMBALTA) 20 MG extended release capsule, , Disp: , Rfl:     ipratropium-albuterol (DUONEB) 0.5-2.5 (3) MG/3ML SOLN nebulizer solution, Inhale 3 mLs into the lungs every 4 hours as needed for Shortness of Breath, Disp: 360 mL, Rfl: 4    albuterol sulfate HFA (PROVENTIL;VENTOLIN;PROAIR) 108 (90 Base) MCG/ACT inhaler, Inhale 2 puffs into the lungs every 6 hours as needed for Wheezing, Disp: 18 g, Rfl: 2    omeprazole (PRILOSEC) 40 MG delayed release capsule, TAKE 1 CAPSULE BY MOUTH DAILY, Disp: 30 capsule, Rfl: 3    Evolocumab 140 MG/ML SOAJ, Inject 140 mg into the skin every 14 days Take an injection every 14 days. , Disp: , Rfl:     fluticasone-umeclidin-vilant (TRELEGY ELLIPTA) 100-62.5-25 MCG/INH AEPB, Inhale 1 puff into the lungs in the morning., Disp: 28 each, Rfl: 11    potassium chloride (MICRO-K) 10 MEQ extended release capsule, 1 tablet, Disp: 60 capsule, Rfl: 1    furosemide (LASIX) 40 MG tablet, Take 1 tablet by mouth in the morning., Disp: 90 tablet, Rfl: 3    metoprolol tartrate (LOPRESSOR) 50 MG tablet, Take 1.5 tablets by mouth in the morning and 1.5 tablets before bedtime. , Disp: 90 tablet, Rfl: 5    losartan (COZAAR) 100 MG tablet, Take 1 tablet by mouth in the morning., Disp: 30 tablet, Rfl: 3    atorvastatin (LIPITOR) 80 MG tablet, Take 1 tablet by mouth daily, Disp: 90 tablet, Rfl: 1    allopurinol (ZYLOPRIM) 100 MG tablet, Take 1 tablet by mouth daily, Disp: 180 tablet, Rfl: 1    Allergies   Allergen Reactions    Asa [Aspirin] Other (See Comments)     Stomach ache    Daliresp [Roflumilast] Diarrhea and Other (See Comments)     Severe diarrhea and did not help       Social History     Socioeconomic History    Marital status:      Spouse name: Not on file    Number of children: 3    Years of education: Not on file    Highest education level: Not on file   Occupational History    Occupation: Retired      Comment:    Tobacco Use    Smoking status: Some Days     Packs/day: 0.25     Years: 52.00     Pack years: 13.00     Types: Cigarettes     Start date: 1967     Last attempt to quit: 2022     Years since quittin.6    Smokeless tobacco: Never    Tobacco comments:     reports he quit circa 22   Vaping Use    Vaping Use: Never used   Substance and Sexual Activity    Alcohol use: Not Currently     Comment: 2-3 beers a month    Drug use: No    Sexual activity: Not Currently     Partners: Female   Other Topics Concern    Not on file   Social History Narrative    Not on file     Social Determinants of Health     Financial Resource Strain: Low Risk     Difficulty of Paying Living Expenses: Not hard at all   Food Insecurity: No Food Insecurity    Worried About Running Out of Food in the Last Year: Never true    Ran Out of Food in the Last Year: Never true   Transportation Needs: No Transportation Needs    Lack of Transportation (Medical): No    Lack of Transportation (Non-Medical):  No   Physical Activity: Inactive    Days of Exercise per Week: 0 days    Minutes of Exercise per Session: 0 min   Stress: Not on file Social Connections: Not on file   Intimate Partner Violence: Not on file   Housing Stability: Not on file       Nursing Notes Reviewed      ROS:  GENERAL:  No fever, no chills, no diaphoresis, no appetite changes  EYES: no eye discharge, no eye redness, no visual changes  ENT: no nasal congestion, no sore throat  CARDIAC: no chest pain, no palpitations, + leg swelling  PULM: no cough, + shortness of breath  ABD: no abdominal pain, no nausea, no vomiting, no diarrhea, no melena, no hematochezia  : no dysuria, no hematuria, no urgency, no frequency. No flank pain  MUSCULOSKELETAL: no back pain, no arthralgias, + myalgias  NEURO: no headache, no lightheadedness, no dizziness, no numbness, + weakness, no syncope, no confusion, no speech difficulty  SKIN: no rashes, no erythema, + wounds, no ecchymosis        PHYSICAL EXAM:  GENERAL APPEARANCE: Sigifredo Lei. is in no acute respiratory distress. Awake and alert. VITAL SIGNS:   ED Triage Vitals [01/24/23 0015]   Enc Vitals Group      /60      Heart Rate 95      Resp 14      Temp 97.9 °F (36.6 °C)      Temp Source Oral      SpO2 90 %      Weight 208 lb 8 oz (94.6 kg)      Height 5' 10\" (1.778 m)      Head Circumference       Peak Flow       Pain Score       Pain Loc       Pain Edu? Excl. in [address]? HEAD: Normocephalic, atraumatic. EYES:  Extraocular muscles are intact. Pupils equal round and reactive to light. Conjunctivas are pink. Negative scleral icterus. ENT:  Mucous membranes are dry  Pharynx without erythema or exudates. NECK: Nontender and supple. No cervical adenopathy. CHEST:  Clear to auscultation bilaterally. No rales, rhonchi, or wheezing. diminished  HEART:  Regular rate and regular rhythm. No murmurs. Strong and equal pulses in the upper and lower extremities. ABDOMEN: Soft,  nondistended, positive bowel sounds. abdomen is nontender. No rebound. no guarding. MUSCULOSKELETAL: The calves are nontender to palpation.  Active range of motion of the upper and lower extremities. 1 plus bilateral edema. NEUROLOGICAL: Awake, alert and oriented x 3. Power intact in the upper and lower extremities. Sensation is intact to light touch in the upper and lower extremities. Cranial Nerves 2-12 are intact. DERMATOLOGIC: No petechiae, rashes, or ecchymoses. Mild erythema right shin surrounding scab  PSYCH: normal mood and affect. Normal thought content. ED COURSE AND MEDICAL DECISION MAKING:    Record Review:  I have reviewed Camille Fisherlacey Esparza's old records which reveal the following pertinent information:   none      EKG as interpreted by myself:  normal sinus rhythm with a rate of 92  Axis is   Normal  QTc is   472ms  Intervals and Durations are unremarkable. No specific ST-T wave changes appreciated. No evidence of acute ischemia. Compared to prior EKG dated 1/30/22 no significant change    Radiology:  Films have been read by radiologist as noted in chart unless otherwise stated. Other radiologic studies (i.e. CT, MRI, ultrasounds, etc ) have been interpreted by radiologist.       XR CHEST PORTABLE   Final Result      1. Hyperinflated lungs without focal opacity   2.  Chronic tortuous aorta and borderline heart size                   Labs:  Results for orders placed or performed during the hospital encounter of 01/24/23   CBC with Auto Differential   Result Value Ref Range    WBC 7.8 4.0 - 11.0 K/uL    RBC 4.17 (L) 4.20 - 5.90 M/uL    Hemoglobin 11.0 (L) 13.5 - 17.5 g/dL    Hematocrit 35.3 (L) 40.5 - 52.5 %    MCV 84.7 80.0 - 100.0 fL    MCH 26.3 26.0 - 34.0 pg    MCHC 31.0 31.0 - 36.0 g/dL    RDW 20.2 (H) 12.4 - 15.4 %    Platelets 533 (L) 374 - 450 K/uL    MPV 11.5 (H) 5.0 - 10.5 fL    Neutrophils % 68.0 %    Lymphocytes % 20.6 %    Monocytes % 8.1 %    Eosinophils % 2.4 %    Basophils % 0.9 %    Neutrophils Absolute 5.3 1.7 - 7.7 K/uL    Lymphocytes Absolute 1.6 1.0 - 5.1 K/uL    Monocytes Absolute 0.6 0.0 - 1.3 K/uL    Eosinophils Absolute 0.2 0.0 - 0.6 K/uL    Basophils Absolute 0.1 0.0 - 0.2 K/uL   Comprehensive Metabolic Panel   Result Value Ref Range    Sodium 124 (L) 136 - 145 mmol/L    Potassium 5.0 3.5 - 5.1 mmol/L    Chloride 87 (L) 99 - 110 mmol/L    CO2 21 21 - 32 mmol/L    Anion Gap 16 3 - 16    Glucose 1,036 (HH) 70 - 99 mg/dL    BUN 25 (H) 7 - 20 mg/dL    Creatinine 1.4 (H) 0.8 - 1.3 mg/dL    Est, Glom Filt Rate 54 (A) >60    Calcium 9.7 8.3 - 10.6 mg/dL    Total Protein 7.4 6.4 - 8.2 g/dL    Albumin 4.0 3.4 - 5.0 g/dL    Albumin/Globulin Ratio 1.2 1.1 - 2.2    Total Bilirubin 0.6 0.0 - 1.0 mg/dL    Alkaline Phosphatase 175 (H) 40 - 129 U/L    ALT 11 10 - 40 U/L    AST 16 15 - 37 U/L   Blood Gas, Venous   Result Value Ref Range    pH, Kan 7.334 (L) 7.350 - 7.450    pCO2, Kan 42.5 40.0 - 50.0 mmHg    pO2, Kan 31.3 25.0 - 40.0 mmHg    HCO3, Venous 22.6 (L) 23.0 - 29.0 mmol/L    Base Excess, Kan -3.2 (L) -3.0 - 3.0 mmol/L    O2 Sat, Kan 56 Not Established %    TC02 (Calc), Kan 22 Not Established mmol/L    O2 Therapy Unknown    Magnesium   Result Value Ref Range    Magnesium 1.90 1.80 - 2.40 mg/dL   Urinalysis with Reflex to Culture    Specimen: Urine   Result Value Ref Range    Color, UA Yellow Straw/Yellow    Clarity, UA Clear Clear    Glucose, Ur >=1000 (A) Negative mg/dL    Bilirubin Urine Negative Negative    Ketones, Urine Negative Negative mg/dL    Specific Gravity, UA <=1.005 1.005 - 1.030    Blood, Urine TRACE-LYSED (A) Negative    pH, UA 5.5 5.0 - 8.0    Protein, UA Negative Negative mg/dL    Urobilinogen, Urine 0.2 <2.0 E.U./dL    Nitrite, Urine Negative Negative    Leukocyte Esterase, Urine Negative Negative    Microscopic Examination YES     Urine Type NotGiven     Urine Reflex to Culture Yes    Lactic Acid   Result Value Ref Range    Lactic Acid 3.2 (H) 0.4 - 2.0 mmol/L   Troponin   Result Value Ref Range    Troponin 0.01 <0.01 ng/mL   CK   Result Value Ref Range    Total  39 - 308 U/L   Microscopic Urinalysis Result Value Ref Range    WBC, UA 10-20 (A) 0 - 5 /HPF    RBC, UA 5-10 (A) 0 - 4 /HPF    Bacteria, UA Rare (A) None Seen /HPF    Yeast, UA Present (A) None Seen /HPF   Basic Metabolic Panel   Result Value Ref Range    CO2 22 21 - 32 mmol/L    Glucose 751 (HH) 70 - 99 mg/dL    BUN 22 (H) 7 - 20 mg/dL    Creatinine 1.3 0.8 - 1.3 mg/dL    Est, Glom Filt Rate 59 (A) >60    Calcium 8.9 8.3 - 10.6 mg/dL   POCT Glucose   Result Value Ref Range    POC Glucose >600 (AA) 70 - 99 mg/dl    Performed on ACCU-CHEK    POCT Glucose   Result Value Ref Range    POC Glucose >600 (AA) 70 - 99 mg/dl    Performed on ACCU-CHEK    POCT Glucose   Result Value Ref Range    POC Glucose >600 (AA) 70 - 99 mg/dl    Performed on ACCU-CHEK    POCT Glucose   Result Value Ref Range    POC Glucose >600 (AA) 70 - 99 mg/dl    Performed on ACCU-CHEK        Treatment in the department:  Patient received the following while in the ED.     Medications   glucose chewable tablet 16 g (has no administration in time range)   dextrose bolus 10% 125 mL (has no administration in time range)     Or   dextrose bolus 10% 250 mL (has no administration in time range)   glucagon injection 1 mg (has no administration in time range)   dextrose 10 % infusion (has no administration in time range)   insulin regular (HUMULIN R;NOVOLIN R) 100 Units in sodium chloride 0.9 % 100 mL infusion (0.1 Units/kg/hr × 94.6 kg IntraVENous New Bag 1/24/23 0327)   0.9 % sodium chloride infusion (has no administration in time range)   0.9 % sodium chloride bolus (0 mLs IntraVENous Stopped 1/24/23 0250)   orphenadrine (NORFLEX) injection 30 mg (30 mg IntraVENous Given 1/24/23 0053)   0.9 % sodium chloride bolus (0 mLs IntraVENous Stopped 1/24/23 0440)       Repeat exam shows muscle cramps improving    Medical decision making with differential diagnosis:  Social determinants affecting care: none  Chronic conditions affecting care: none    Presents emergency department with generalized weakness, muscle cramps, dry mouth, polyuria and polydipsia progressive over the last week. He has no prior history of diabetes but had an elevated blood sugar per EMS. Patient appears clinically very dry but he is alert and oriented and not altered. Accu-Chek confirmed a blood sugar greater than 600. An IV was established and he was started on aggressive IV fluid hydration and received his initial 30 mL/kg bolus wide open. Laboratory studies were remarkable for a blood sugar over 1000 and a mild LYDIA with a creatinine of 1.4. Patient was hyponatremic however this corrects to a normal level considering his glucose elevation. He had a normal anion gap and normal bicarb and was not acidotic on his venous blood gas. Therefore he does not appear to be in DKA. In fact he had no ketonuria either. Urinalysis did not show evidence of UTI although yeast were present. EKG was nonischemic with an essentially normal troponin. Chest x-ray showed no acute pneumonia as a cause of her decompensation and hyperglycemia. Lactic acid was elevated however I believe that this is more due to dehydration rather than infection as no infectious process was able to be found. When the patient remained over 700 after the first 2 L he was started on an insulin drip. He had no change in mental status while here. I spoke with Dr. Palmer Rodriguez. We thoroughly discussed the history, physical exam, laboratory and imaging studies, as well as, emergency department course. Based upon that discussion, we've decided to admit Stone Atkins for further observation and evaluation of Cindi Murrell 's hyperglcyemic hyperosmolar state. As I have deemed necessary from their history, physical, and studies, I have considered and evaluated Stone Atkins for the following diagnoses: sepsis, DKA, pneumonia, uti, LYDIA, electrolyte disturbance, ACS         Clinical Impression:  1. LYDIA (acute kidney injury) ([facility] [address]    2.  Hyperosmolar hyperglycemic state ([facility]) [facility])        Dispo:  Patient will be admitted at this time. Patient was informed of this decision and agrees with plan. I have discussed lab and xray findings with patient and they understand. Questions were answered to the best of my ability. Followup plan:  No follow-up provider specified. Discharge vitals:  Blood pressure 123/63, pulse 86, temperature 97.9 °F (36.6 °C), temperature source Oral, resp. rate 14, height 5' 10\" (1.778 m), weight 208 lb 8 oz (94.6 kg), SpO2 98 %. Prescriptions given:   New Prescriptions    No medications on file       I personally saw the patient and independently provided 40 minutes of non-concurrent critical care out of the total shared critical care time provided. Critical care time: 40 minutes excluding procedures. There was concern for severe hyperglycemia. This was managed by IV insulin infusion and fluid resuscitation    This chart was created using Dragon voice recognition software.         Sara Hines MD  01/24/23 1965

## 2023-01-24 NOTE — H&P
ICU HISTORY AND PHYSICAL       Hospital Day:   ICU Day:                                                          Code:Full Code  Admit Date: 1/24/2023  PCP: Mary Evans DO                                  CC: Weakness, generalized fatigue    HISTORY OF PRESENT ILLNESS:   Aracely Douglas is a 81 y.o. male with a PMH of COPD, CHF, KAVITA, Hx of PE, CAD, peripheral vascular disease who [facility]. He presented to [facility] with generalized weakness, dry mouth, frequent urination and thirst progressive over the last week. For the two days prior to presentation, he began having severe muscle cramps in his calves, which prompted him to present to the ED. He has been drinking Pepsi, mountain dew, gatorade, milk and water but states that his mouth is still dry despite this. He has had poor appetite over the last week. He denies any fever. He endorses a small amount of diarrhea the first couple days of his week of weakness and fatigue, but denies any sick contacts.     PAST HISTORY:     Past Medical History:   Diagnosis Date    CAD (coronary artery disease)     CHF (congestive heart failure) ([facility])     diastolic    COPD (chronic obstructive pulmonary disease) ([facility])     Critical ischemia of lower extremity ([facility])     Depressive disorder, not elsewhere classified     GERD (gastroesophageal reflux disease)     History of colon polyps - 30 seen on colonoscopy 04/2021 04/24/2021    History of MI (myocardial infarction) 05/2013    History of skin ulcer of lower extremity     R anterior shin    History of transient ischemic attack (TIA)     Hx of blood clots     PE    Hyperlipidemia     Hypertension     Neuropathy     KAVITA (obstructive sleep apnea)     On CPAP    Personal history of DVT (deep vein thrombosis)     Prolonged emergence from general anesthesia     Pt reported being combative after surgery     PVD (peripheral vascular disease) ([facility])     TIA (transient ischemic attack) 2013    Vertebral fracture        Past Surgical History:   Procedure Laterality Date    APPENDECTOMY      CHOLECYSTECTOMY, LAPAROSCOPIC      COLONOSCOPY  4/23/2021    COLONOSCOPY POLYPECTOMY SNARE/COLD BIOPSY performed by Cary Mcqueen MD at [place] [address]  4/23/2021    COLONOSCOPY POLYPECTOMY ABLATION performed by Cary Mcqueen MD at [facility]  4/23/2021    COLONOSCOPY CONTROL HEMORRHAGE performed by Cary Mcqueen MD at [address]  6/2013    EYE SURGERY Left     FEMORAL BYPASS Right 7/18/2022    RIGHT FEMORAL BELOW KNEE POPLITEAL ARTERY BYPASS WITH IN SITU SAPHENOUS VEIN performed by Justen Gregg MD at [address] Right 11/17/2021    RIGHT FEMORAL ENDARTERECTOMY  RIGHT EXTERNAL ILIAC TO PROFUNDA FEMORAL BYPASS WITH 8MM PTFE GRAFT RIGHT LOWER EXTREMITY ARTERIOGRAM BALLOON ANGIOPLASTY RIGHT PROFUNDA BALLOON ANGIOPLASTY AND STENT OF RIGHT EXTERNAL ILIAC ARTERY performed by Justen Gregg MD at [address] [facility]  11/18/2015    Bilateral decompressive lumbar laminectomy L4-5   ; medial facetectomy L4-5 joints  bilaterally; foraminotomies l5   nerve roots bilaterally    LEG DEBRIDEMENT Right 7/23/2013    Dr. Chapo Card - pretibial wound    OTHER SURGICAL HISTORY Right 6/25/2013    Dr. Chapo Card - CFA Endarterectomy w/marsupialization; RLE wound excision & debridement     OTHER SURGICAL HISTORY Left 8/27/2013    Dr. Chapo Card - femoral & profunda femoris endarterectomy w/marsupialization patch angioplasty using SFA    SKIN SPLIT GRAFT Right 7/25/2013    Dr. Chapo Card - to non-healing R pretibial wound, 9 x 15 cm    TOTAL HIP ARTHROPLASTY Right 09/01/2016    Dr. Domenico Cortes Left 12/22/2015    Dr. Chapo Card - CFA exploration, thrombectomy of ileofemoral system, stenting of RAFAL in-stent stenosis w/8 x 37 mm Palmaz        SocialHistory:   The patient lives at home.     Alcohol: Occasionally, most recently over a month ago. Illicit drugs: no use  Tobacco:  A few cigarettes a week. Family History:  Family History   Problem Relation Age of Onset    Cancer Mother         Breast    Heart Disease Mother     Cancer Father         Bladder    Heart Disease Father     Cancer Paternal Grandmother         melanoma        MEDICATIONS:     No current facility-administered medications on file prior to encounter. Current Outpatient Medications on File Prior to Encounter   Medication Sig Dispense Refill    pregabalin (LYRICA) 150 MG capsule Take 1 capsule by mouth 2 times daily for 30 days. 180 capsule 1    ELIQUIS 5 MG TABS tablet TAKE 1 TABLET BY MOUTH TWICE DAILY 60 tablet 1    senna (SENOKOT) 8.6 MG TABS tablet Take 1 tablet by mouth nightly as needed      clopidogrel (PLAVIX) 75 MG tablet TAKE 1 TABLET BY MOUTH DAILY 30 tablet 3    DULoxetine (CYMBALTA) 20 MG extended release capsule       ipratropium-albuterol (DUONEB) 0.5-2.5 (3) MG/3ML SOLN nebulizer solution Inhale 3 mLs into the lungs every 4 hours as needed for Shortness of Breath 360 mL 4    albuterol sulfate HFA (PROVENTIL;VENTOLIN;PROAIR) 108 (90 Base) MCG/ACT inhaler Inhale 2 puffs into the lungs every 6 hours as needed for Wheezing 18 g 2    omeprazole (PRILOSEC) 40 MG delayed release capsule TAKE 1 CAPSULE BY MOUTH DAILY 30 capsule 3    Evolocumab 140 MG/ML SOAJ Inject 140 mg into the skin every 14 days Take an injection every 14 days. fluticasone-umeclidin-vilant (TRELEGY ELLIPTA) 100-62.5-25 MCG/INH AEPB Inhale 1 puff into the lungs in the morning. 28 each 11    potassium chloride (MICRO-K) 10 MEQ extended release capsule 1 tablet 60 capsule 1    furosemide (LASIX) 40 MG tablet Take 1 tablet by mouth in the morning. 90 tablet 3    metoprolol tartrate (LOPRESSOR) 50 MG tablet Take 1.5 tablets by mouth in the morning and 1.5 tablets before bedtime. 90 tablet 5    losartan (COZAAR) 100 MG tablet Take 1 tablet by mouth in the morning.  27 tablet 3    atorvastatin (LIPITOR) 80 MG tablet Take 1 tablet by mouth daily 90 tablet 1    allopurinol (ZYLOPRIM) 100 MG tablet Take 1 tablet by mouth daily 180 tablet 1    [DISCONTINUED] folic acid (FOLVITE) 1 MG tablet Take 1 tablet by mouth daily 30 tablet 3    [DISCONTINUED] nitroGLYCERIN (NITROSTAT) 0.4 MG SL tablet Place 1 tablet under the tongue every 5 minutes as needed for Chest pain (Patient not taking: No sig reported) 25 tablet 1    [DISCONTINUED] magnesium gluconate (MAGONATE) 500 MG tablet Take 500 mg by mouth daily. Scheduled Meds:   Continuous Infusions:   dextrose      insulin 0.1 Units/kg/hr (01/24/23 2730)    sodium chloride 1,000 mL (01/24/23 6298)     PRN Meds:glucose, dextrose bolus **OR** dextrose bolus, glucagon (rDNA), dextrose    Allergies: Allergies   Allergen Reactions    Asa [Aspirin] Other (See Comments)     Stomach ache    Daliresp [Roflumilast] Diarrhea and Other (See Comments)     Severe diarrhea and did not help       REVIEW OF SYSTEMS:       History obtained from chart review and the patient    Review of Systems   Constitutional:  Positive for activity change, appetite change and fatigue. Negative for chills and fever. HENT:  Negative for congestion, nosebleeds and trouble swallowing. Eyes:  Negative for visual disturbance. Respiratory:  Negative for cough and shortness of breath. Cardiovascular:  Negative for chest pain, palpitations and leg swelling. Gastrointestinal:  Negative for abdominal distention, abdominal pain, nausea and vomiting. Endocrine: Positive for polydipsia and polyuria. Genitourinary:  Negative for dysuria, hematuria and penile pain. Musculoskeletal:  Positive for myalgias. Negative for back pain and joint swelling. Neurological:  Positive for weakness. Negative for light-headedness and numbness.      PHYSICAL EXAM:       Vitals: /63   Pulse 86   Temp 97.9 °F (36.6 °C) (Oral)   Resp 14   Ht 5' 10\" (1.778 m)   Wt 208 lb 8 oz (94.6 kg)   SpO2 98%   BMI 29.92 kg/m²     I/O:  No intake or output data in the 24 hours ending 01/24/23 0454  No intake/output data recorded. No intake/output data recorded. Physical Examination:     Physical Exam  HENT:      Head: Normocephalic and atraumatic. Right Ear: Tympanic membrane normal.      Nose: Nose normal.      Mouth/Throat:      Mouth: Mucous membranes are dry. Pharynx: Oropharynx is clear. Eyes:      Conjunctiva/sclera: Conjunctivae normal.      Pupils: Pupils are equal, round, and reactive to light. Cardiovascular:      Rate and Rhythm: Normal rate and regular rhythm. Pulses: Normal pulses. Heart sounds: Normal heart sounds. Pulmonary:      Effort: Pulmonary effort is normal.      Breath sounds: Normal breath sounds. Abdominal:      General: There is no distension. Musculoskeletal:         General: No swelling. Skin:     General: Skin is warm. Findings: Bruising present. Neurological:      Mental Status: He is alert. Access:                               -Peripheral Access Day#:1                                                                    Vent Settings:    / / /     No results for input(s): PHART, WHR7QSG, PO2ART in the last 72 hours. DATA:       Labs:  CBC:   Recent Labs     01/24/23 0033   WBC 7.8   HGB 11.0*   HCT 35.3*   *       BMP:   Recent Labs     01/24/23 0033 01/24/23  0328   *  --    K 5.0  --    CL 87*  --    CO2 21 22   BUN 25* 22*   CREATININE 1.4* 1.3   GLUCOSE 1,036* 751*     LFT's:   Recent Labs     01/24/23 0033   AST 16   ALT 11   BILITOT 0.6   ALKPHOS 175*     Troponin:   Recent Labs     01/24/23 0033   TROPONINI 0.01     BNP:No results for input(s): BNP in the last 72 hours. ABGs: No results for input(s): PHART, ZZQ4BEC, PO2ART in the last 72 hours. INR: No results for input(s): INR in the last 72 hours.     U/A:  Recent Labs     01/24/23 0033   COLORU Yellow   PHUR 5.5   WBCUA 10-20*   RBCUA 5-10*   YEAST Present*   BACTERIA Rare*   CLARITYU Clear   SPECGRAV <=1.005   LEUKOCYTESUR Negative   UROBILINOGEN 0.2   BILIRUBINUR Negative   BLOODU TRACE-LYSED*   GLUCOSEU >=1000*     XR CHEST PORTABLE   Final Result      1. Hyperinflated lungs without focal opacity   2. Chronic tortuous aorta and borderline heart size                 EKG: Normal sinus rhythm  Echo:  Limited study. Left ventricular cavity size is normal. There is mild concentric left ventricular hypertrophy. Overall left ventricular systolic function appears normal with an ejection fraction of 55-60%. No regional wall motion abnormalities are noted. Micro:     ASSESSMENT AND PLAN:   Adilene Rehman is a 81 y.o. male with a PMH of COPD, CHF, KAVITA, Hx of PE, CAD, peripheral vascular disease who was transferred to [facility] from [facility] for [facility]. Hyperglycemia, severe  Hyperosmolar Hyperglycemic State  Glucose 1,036 on presentation to [facility]. Normal anion gap, normal bicarb. No ketones in urine. -IV fluids with normal saline, DKA protocol  -Q1H glucose checks  -Q4H BMP, Mg, Phosphate  -Serum osmolarity ordered    LYDIA  Creatinine of 1.4, suspect secondary to pre-renal dehydration. -IV fluids as above  -Avoid nephrotoxic medications    Chronic conditions:  Peripheral vascular disease, s/p vascular surgeries  -Continue Eliquis, Plavix  Pre-diabetes  Last A1c 6.1 on 7/22/2020  -Hgb A1c  CHF  -Hold home furosemide  Hypertension  Hold home antihypertensives in context of LYDIA. KAVITA  COPD  Patient of Dr. Michelle Danielson. Has worn CPAP in past, old machine recalled, waiting for new one. Code Status:Full Code  FEN: Diet NPO  PPX:  On Eliquis  DISPO: ICU    This patient has been staffed and discussed with Cassidy Green DO.   -----------------------------  Gladis Bergman MD, PGY-1  1/24/2023  4:54 AM  I saw the patient independently from the resident . I discussed the care with the resident.  I personally reviewed the HPI, PH, FH, SH, ROS and medications. I repeated pertinent portions of the examination and reviewed the relevant imaging and laboratory data. I agree with the findings, assessment and plan as documented. addition to: Plan as above patient is admitted for uncontrolled type 2 diabetes with hyperglycemia, patient is now comatose currently but given the amount of elevated glucose, will order an OSM to rule out hyperosmolar state. Continue insulin Accu-Cheks IVF.

## 2023-01-24 NOTE — PROGRESS NOTES
Consulted for R Lower Leg. Pt has had chronic wound for over a year. Currently the wound is dry and intact, no drainage noted. Monitor R Lower Leg, if any changes or drainage contact Wound Care. Wound Care to sign off.      R Lower Leg:

## 2023-01-24 NOTE — CONSULTS
ICU Consult Note       Hospital Day: 1  ICU Day: 1                                                         Code:Full Code  Admit Date: 1/24/2023  PCP: Ji Howard DO                                  CC: Weakness, generalized fatigue    HISTORY OF PRESENT ILLNESS:   Sundeep Corona is a 81 y.o. male with a PMH of COPD, CHF, KAVITA, Hx of PE, CAD, peripheral vascular disease who was transferred to [facility] from [facility] for [facility]. He presented to [facility] with generalized weakness, dry mouth, frequent urination and thirst progressive over the last week. For the two days prior to presentation, he began having severe muscle cramps in his calves, which prompted him to present to the ED. He has been drinking Pepsi, mountain dew, gatorade, milk and water but states that his mouth is still dry despite this. He has had poor appetite over the last week. He denies any fever. He endorses a small amount of diarrhea the first couple days of his week of weakness and fatigue, but denies any sick contacts. Pt. Denies any recent sickness and his last dose of steroids from COPD exacerbation was mid-December. In the ED: Pt. Vitals were within normal limits. Pt. Was given 2 Liters of fluid and remained above 700 glucose so started on insulin gtt. He had no change of mental status during the visit.        PAST HISTORY:     Past Medical History:   Diagnosis Date    CAD (coronary artery disease)     CHF (congestive heart failure) ([facility])     diastolic    COPD (chronic obstructive pulmonary disease) (HCC)     Critical ischemia of lower extremity (HCC)     Depressive disorder, not elsewhere classified     GERD (gastroesophageal reflux disease)     History of colon polyps - 30 seen on colonoscopy 04/2021 04/24/2021    History of MI (myocardial infarction) 05/2013    History of skin ulcer of lower extremity     R anterior shin    History of transient ischemic attack (TIA)     Hx of blood clots     PE    Hyperlipidemia Hypertension     Neuropathy     KAVITA (obstructive sleep apnea)     On CPAP    Personal history of DVT (deep vein thrombosis)     Prolonged emergence from general anesthesia     Pt reported being combative after surgery     PVD (peripheral vascular disease) ([address]     TIA (transient ischemic attack) 2013    Vertebral fracture        Past Surgical History:   Procedure Laterality Date    APPENDECTOMY      CHOLECYSTECTOMY, LAPAROSCOPIC      COLONOSCOPY  4/23/2021    COLONOSCOPY POLYPECTOMY SNARE/COLD BIOPSY performed by Remedios Brower MD at [place] [address]  4/23/2021    COLONOSCOPY POLYPECTOMY ABLATION performed by Remedios Brower MD at [place] [address]  4/23/2021    COLONOSCOPY CONTROL HEMORRHAGE performed by Remedios Brower MD at [facility]  6/2013    EYE SURGERY Left     FEMORAL BYPASS Right 7/18/2022    RIGHT FEMORAL BELOW KNEE POPLITEAL ARTERY BYPASS WITH IN SITU SAPHENOUS VEIN performed by Jose D Lizarraga MD at [facility] Right 11/17/2021    RIGHT FEMORAL ENDARTERECTOMY  RIGHT EXTERNAL ILIAC TO PROFUNDA FEMORAL BYPASS WITH 8MM PTFE GRAFT RIGHT LOWER EXTREMITY ARTERIOGRAM BALLOON ANGIOPLASTY RIGHT PROFUNDA BALLOON ANGIOPLASTY AND STENT OF RIGHT EXTERNAL ILIAC ARTERY performed by Jose D Lizarraga MD at [address] [place] [address]  11/18/2015    Bilateral decompressive lumbar laminectomy L4-5   ; medial facetectomy L4-5 joints  bilaterally; foraminotomies l5   nerve roots bilaterally    LEG DEBRIDEMENT Right 7/23/2013    Dr. Ayla Perdomo - pretibial wound    OTHER SURGICAL HISTORY Right 6/25/2013    Dr. Ayla Perdomo - CFA Endarterectomy w/marsupialization; RLE wound excision & debridement     OTHER SURGICAL HISTORY Left 8/27/2013    Dr. Ayla Perdomo - femoral & profunda femoris endarterectomy w/marsupialization patch angioplasty using SFA    SKIN SPLIT GRAFT Right 7/25/2013    Dr. Ayla Perdomo - to non-healing R pretibial wound, 9 x 15 cm    TOTAL HIP ARTHROPLASTY Right 09/01/2016    Dr. Cosmo Travis Left 12/22/2015    Dr. Erick Worley - [facility] exploration, thrombectomy of ileofemoral system, stenting of RAFAL in-stent stenosis w/8 x 37 mm [facility]        SocialHistory:   The patient lives at    Alcohol:  Illicit drugs: no use  Tobacco:      Family History:  Family History   Problem Relation Age of Onset    Cancer Mother         Breast    Heart Disease Mother     Cancer Father         Bladder    Heart Disease Father     Cancer Paternal Grandmother         melanoma        MEDICATIONS:     No current facility-administered medications on file prior to encounter. Current Outpatient Medications on File Prior to Encounter   Medication Sig Dispense Refill    pregabalin (LYRICA) 150 MG capsule Take 1 capsule by mouth 2 times daily for 30 days. 180 capsule 1    ELIQUIS 5 MG TABS tablet TAKE 1 TABLET BY MOUTH TWICE DAILY 60 tablet 1    senna (SENOKOT) 8.6 MG TABS tablet Take 1 tablet by mouth nightly as needed      clopidogrel (PLAVIX) 75 MG tablet TAKE 1 TABLET BY MOUTH DAILY 30 tablet 3    DULoxetine (CYMBALTA) 20 MG extended release capsule       ipratropium-albuterol (DUONEB) 0.5-2.5 (3) MG/3ML SOLN nebulizer solution Inhale 3 mLs into the lungs every 4 hours as needed for Shortness of Breath 360 mL 4    albuterol sulfate HFA (PROVENTIL;VENTOLIN;PROAIR) 108 (90 Base) MCG/ACT inhaler Inhale 2 puffs into the lungs every 6 hours as needed for Wheezing 18 g 2    omeprazole (PRILOSEC) 40 MG delayed release capsule TAKE 1 CAPSULE BY MOUTH DAILY 30 capsule 3    Evolocumab 140 MG/ML SOAJ Inject 140 mg into the skin every 14 days Take an injection every 14 days. fluticasone-umeclidin-vilant (TRELEGY ELLIPTA) 100-62.5-25 MCG/INH AEPB Inhale 1 puff into the lungs in the morning. 28 each 11    potassium chloride (MICRO-K) 10 MEQ extended release capsule 1 tablet 60 capsule 1    furosemide (LASIX) 40 MG tablet Take 1 tablet by mouth in the morning.  80 tablet 3    metoprolol tartrate (LOPRESSOR) 50 MG tablet Take 1.5 tablets by mouth in the morning and 1.5 tablets before bedtime. 90 tablet 5    losartan (COZAAR) 100 MG tablet Take 1 tablet by mouth in the morning. 30 tablet 3    atorvastatin (LIPITOR) 80 MG tablet Take 1 tablet by mouth daily 90 tablet 1    allopurinol (ZYLOPRIM) 100 MG tablet Take 1 tablet by mouth daily 180 tablet 1    [DISCONTINUED] folic acid (FOLVITE) 1 MG tablet Take 1 tablet by mouth daily 30 tablet 3    [DISCONTINUED] nitroGLYCERIN (NITROSTAT) 0.4 MG SL tablet Place 1 tablet under the tongue every 5 minutes as needed for Chest pain (Patient not taking: No sig reported) 25 tablet 1    [DISCONTINUED] magnesium gluconate (MAGONATE) 500 MG tablet Take 500 mg by mouth daily. Scheduled Meds:   apixaban  5 mg Oral BID    tiotropium  2 puff Inhalation Daily    budesonide  0.5 mg Nebulization BID    arformoterol tartrate  15 mcg Nebulization BID      Continuous Infusions:   dextrose      sodium chloride 250 mL/hr at 01/24/23 0618    dextrose 5% and 0.45% NaCl with KCl 20 mEq      insulin 0.151 Units/kg/hr (01/24/23 0628)     PRN Meds:glucose, glucagon (rDNA), dextrose, dextrose bolus **OR** dextrose bolus, potassium chloride, magnesium sulfate, sodium phosphate IVPB **OR** sodium phosphate IVPB **OR** sodium phosphate IVPB, polyethylene glycol, dextrose 5% and 0.45% NaCl with KCl 20 mEq    Allergies: Allergies   Allergen Reactions    Asa [Aspirin] Other (See Comments)     Stomach ache    Daliresp [Roflumilast] Diarrhea and Other (See Comments)     Severe diarrhea and did not help       REVIEW OF SYSTEMS:       History obtained from chart review    Review of Systems   HENT:  Negative for facial swelling. Respiratory:  Negative for chest tightness and shortness of breath. Cardiovascular:  Negative for chest pain. Gastrointestinal:  Negative for constipation. Neurological:  Negative for tremors and weakness. Psychiatric/Behavioral:  Negative for agitation. PHYSICAL EXAM:       Vitals: /72   Pulse 84   Temp 97.5 °F (36.4 °C) (Oral)   Resp 21   Ht 5' 10\" (1.778 m)   Wt 208 lb 8 oz (94.6 kg)   SpO2 98%   BMI 29.92 kg/m²     I/O:  No intake or output data in the 24 hours ending 01/24/23 0758  No intake/output data recorded. No intake/output data recorded. Physical Examination:     Physical Exam  Constitutional:       General: He is not in acute distress. Appearance: Normal appearance. He is not toxic-appearing. HENT:      Head: Normocephalic and atraumatic. Right Ear: External ear normal.      Left Ear: External ear normal.      Nose: Nose normal.      Mouth/Throat:      Mouth: Mucous membranes are dry. Cardiovascular:      Rate and Rhythm: Normal rate. Pulses: Normal pulses. Pulmonary:      Effort: Pulmonary effort is normal.   Abdominal:      Palpations: Abdomen is soft. Tenderness: There is no abdominal tenderness. Neurological:      General: No focal deficit present. Mental Status: He is alert and oriented to person, place, and time. Access:   -Central Access Day #:  0                                   -Peripheral Access Day#:1  -Arterial line Day#: 0                                   Sampson Day#: 0  NGT Day#:  0                                             ETT Day#: 0  Vent Settings:    / / /     No results for input(s): PHART, DVT2GOA, PO2ART in the last 72 hours. DATA:       Labs:  CBC:   Recent Labs     01/24/23 0033   WBC 7.8   HGB 11.0*   HCT 35.3*   *       BMP:   Recent Labs     01/24/23 0033 01/24/23  0328   *  --    K 5.0  --    CL 87*  --    CO2 21 22   BUN 25* 22*   CREATININE 1.4* 1.3   GLUCOSE 1,036* 751*     LFT's:   Recent Labs     01/24/23  0033   AST 16   ALT 11   BILITOT 0.6   ALKPHOS 175*     Troponin:   Recent Labs     01/24/23  0033   TROPONINI 0.01     BNP:No results for input(s): BNP in the last 72 hours.   ABGs: No results for input(s): PHART, ZFW7FLL, PO2ART in the last 72 hours. INR: No results for input(s): INR in the last 72 hours. U/A:  Recent Labs     01/24/23  0033   COLORU Yellow   PHUR 5.5   WBCUA 10-20*   RBCUA 5-10*   YEAST Present*   BACTERIA Rare*   CLARITYU Clear   SPECGRAV <=1.005   LEUKOCYTESUR Negative   UROBILINOGEN 0.2   BILIRUBINUR Negative   BLOODU TRACE-LYSED*   GLUCOSEU >=1000*       XR CHEST PORTABLE   Final Result      1. Hyperinflated lungs without focal opacity   2. Chronic tortuous aorta and borderline heart size                   EKG:   Echo:  Micro:     ASSESSMENT AND PLAN:   Serena Sandhoff. is a 81 y.o. male with a PMH of COPD, CHF, KAVITA, Hx of PE, CAD, peripheral vascular disease who was transferred to [facility] from [facility] for [facility]. Hyperglycemia, severe  Hyperosmolar Hyperglycemic State  Glucose 1,036 on presentation to [facility] ED. Normal anion gap, normal bicarb. No ketones in urine.  - IV fluids with normal saline, DKA protocol  - IV insulin gtt and add SubQ insulin when glucose reached the 200's for 3 hours then stop the gtt. - Q1H glucose checks  - Q4H BMP, Mg, Phosphate  - Serum osmolarity 307  - Monitor pseudohyponatremia (corrected Na is 139)     LYDIA  Creatinine of 1.4 on admission, suspect secondary to pre-renal dehydration.  - IV fluids as above  - Avoid nephrotoxic medications     Chronic conditions:  Peripheral vascular disease, s/p vascular surgeries  -Continue Eliquis, Plavix  Pre-diabetes  Last A1c 6.1 on 7/22/2020  -Hgb A1c  CHF  -Hold home furosemide  Hypertension  Hold home antihypertensives in context of LYDIA. KAVITA  COPD  Patient of Dr. Salome Man. Has worn CPAP in past, old machine recalled, waiting for new one.      Code Status:Full Code  FEN: Diet NPO  PPX:  On Eliquis  DISPO: ICU    This patient has been staffed and discussed with Jordon Guzman MD  -----------------------------  Aleida Gutierrez MD, PGY-1  1/24/2023  7:58 AM

## 2023-01-24 NOTE — PROGRESS NOTES
Admission: Patient received to room 4510 from [facility]. Patient admitted with Dx Hyperosmolar hyperglycemic state. Patient A&Ox4 upon arrival.   Tele monitor applied, rate and rhythm verified with monitor reader. VSS. Patient oriented to room, staff, and call system. Educated on fall protocol and hourly rounding. Assessment as documented. Admission navigator complete. Normal saline IVF infusing @ 250mL/hr. Insulin gtt infusing at 9.5mL/hr. 1 PIV in RUE. Bed locked in low position. Patient informed to utilize call light with any needs. Pt verbalized understanding. Call light within reach. Hourly rounding in place.

## 2023-01-25 LAB
ANION GAP SERPL CALCULATED.3IONS-SCNC: 8 MMOL/L (ref 3–16)
ANION GAP SERPL CALCULATED.3IONS-SCNC: 9 MMOL/L (ref 3–16)
ANION GAP SERPL CALCULATED.3IONS-SCNC: 9 MMOL/L (ref 3–16)
BASOPHILS ABSOLUTE: 0 K/UL (ref 0–0.2)
BASOPHILS RELATIVE PERCENT: 0.4 %
BUN BLDV-MCNC: 5 MG/DL (ref 7–20)
BUN BLDV-MCNC: 7 MG/DL (ref 7–20)
BUN BLDV-MCNC: 8 MG/DL (ref 7–20)
CALCIUM SERPL-MCNC: 8.5 MG/DL (ref 8.3–10.6)
CALCIUM SERPL-MCNC: 8.8 MG/DL (ref 8.3–10.6)
CALCIUM SERPL-MCNC: 8.8 MG/DL (ref 8.3–10.6)
CHLORIDE BLD-SCNC: 105 MMOL/L (ref 99–110)
CHLORIDE BLD-SCNC: 106 MMOL/L (ref 99–110)
CHLORIDE BLD-SCNC: 108 MMOL/L (ref 99–110)
CO2: 21 MMOL/L (ref 21–32)
CO2: 22 MMOL/L (ref 21–32)
CO2: 23 MMOL/L (ref 21–32)
CREAT SERPL-MCNC: 0.7 MG/DL (ref 0.8–1.3)
CREAT SERPL-MCNC: 0.8 MG/DL (ref 0.8–1.3)
CREAT SERPL-MCNC: 0.8 MG/DL (ref 0.8–1.3)
EOSINOPHILS ABSOLUTE: 0.1 K/UL (ref 0–0.6)
EOSINOPHILS RELATIVE PERCENT: 1.6 %
ESTIMATED AVERAGE GLUCOSE: 343.6 MG/DL
GFR SERPL CREATININE-BSD FRML MDRD: >60 ML/MIN/{1.73_M2}
GLUCOSE BLD-MCNC: 160 MG/DL (ref 70–99)
GLUCOSE BLD-MCNC: 160 MG/DL (ref 70–99)
GLUCOSE BLD-MCNC: 182 MG/DL (ref 70–99)
GLUCOSE BLD-MCNC: 196 MG/DL (ref 70–99)
GLUCOSE BLD-MCNC: 200 MG/DL (ref 70–99)
GLUCOSE BLD-MCNC: 247 MG/DL (ref 70–99)
GLUCOSE BLD-MCNC: 275 MG/DL (ref 70–99)
GLUCOSE BLD-MCNC: 282 MG/DL (ref 70–99)
HBA1C MFR BLD: 13.6 %
HCT VFR BLD CALC: 31.9 % (ref 40.5–52.5)
HEMOGLOBIN: 10.3 G/DL (ref 13.5–17.5)
LYMPHOCYTES ABSOLUTE: 1.2 K/UL (ref 1–5.1)
LYMPHOCYTES RELATIVE PERCENT: 16.5 %
MAGNESIUM: 1.7 MG/DL (ref 1.8–2.4)
MAGNESIUM: 1.8 MG/DL (ref 1.8–2.4)
MAGNESIUM: 1.9 MG/DL (ref 1.8–2.4)
MCH RBC QN AUTO: 26.7 PG (ref 26–34)
MCHC RBC AUTO-ENTMCNC: 32.2 G/DL (ref 31–36)
MCV RBC AUTO: 82.9 FL (ref 80–100)
MONOCYTES ABSOLUTE: 0.4 K/UL (ref 0–1.3)
MONOCYTES RELATIVE PERCENT: 5.3 %
NEUTROPHILS ABSOLUTE: 5.7 K/UL (ref 1.7–7.7)
NEUTROPHILS RELATIVE PERCENT: 76.2 %
ORGANISM: ABNORMAL
PDW BLD-RTO: 21.2 % (ref 12.4–15.4)
PERFORMED ON: ABNORMAL
PHOSPHORUS: 2.3 MG/DL (ref 2.5–4.9)
PHOSPHORUS: 2.5 MG/DL (ref 2.5–4.9)
PHOSPHORUS: 2.7 MG/DL (ref 2.5–4.9)
PLATELET # BLD: 83 K/UL (ref 135–450)
PMV BLD AUTO: 9.8 FL (ref 5–10.5)
POTASSIUM SERPL-SCNC: 3.7 MMOL/L (ref 3.5–5.1)
POTASSIUM SERPL-SCNC: 3.9 MMOL/L (ref 3.5–5.1)
POTASSIUM SERPL-SCNC: 4.2 MMOL/L (ref 3.5–5.1)
RBC # BLD: 3.86 M/UL (ref 4.2–5.9)
SODIUM BLD-SCNC: 135 MMOL/L (ref 136–145)
SODIUM BLD-SCNC: 136 MMOL/L (ref 136–145)
SODIUM BLD-SCNC: 140 MMOL/L (ref 136–145)
URINE CULTURE, ROUTINE: ABNORMAL
WBC # BLD: 7.4 K/UL (ref 4–11)

## 2023-01-25 PROCEDURE — 2580000003 HC RX 258

## 2023-01-25 PROCEDURE — 36415 COLL VENOUS BLD VENIPUNCTURE: CPT

## 2023-01-25 PROCEDURE — 2500000003 HC RX 250 WO HCPCS

## 2023-01-25 PROCEDURE — 6360000002 HC RX W HCPCS

## 2023-01-25 PROCEDURE — 85025 COMPLETE CBC W/AUTO DIFF WBC: CPT

## 2023-01-25 PROCEDURE — 1200000000 HC SEMI PRIVATE

## 2023-01-25 PROCEDURE — 84100 ASSAY OF PHOSPHORUS: CPT

## 2023-01-25 PROCEDURE — 80048 BASIC METABOLIC PNL TOTAL CA: CPT

## 2023-01-25 PROCEDURE — 6370000000 HC RX 637 (ALT 250 FOR IP): Performed by: STUDENT IN AN ORGANIZED HEALTH CARE EDUCATION/TRAINING PROGRAM

## 2023-01-25 PROCEDURE — 2580000003 HC RX 258: Performed by: STUDENT IN AN ORGANIZED HEALTH CARE EDUCATION/TRAINING PROGRAM

## 2023-01-25 PROCEDURE — 6360000002 HC RX W HCPCS: Performed by: STUDENT IN AN ORGANIZED HEALTH CARE EDUCATION/TRAINING PROGRAM

## 2023-01-25 PROCEDURE — 6370000000 HC RX 637 (ALT 250 FOR IP)

## 2023-01-25 PROCEDURE — 2700000000 HC OXYGEN THERAPY PER DAY

## 2023-01-25 PROCEDURE — 94761 N-INVAS EAR/PLS OXIMETRY MLT: CPT

## 2023-01-25 PROCEDURE — 94640 AIRWAY INHALATION TREATMENT: CPT

## 2023-01-25 PROCEDURE — 83735 ASSAY OF MAGNESIUM: CPT

## 2023-01-25 RX ORDER — PREGABALIN 75 MG/1
75 CAPSULE ORAL 2 TIMES DAILY
Status: DISCONTINUED | OUTPATIENT
Start: 2023-01-25 | End: 2023-01-26

## 2023-01-25 RX ORDER — OXYCODONE HYDROCHLORIDE 5 MG/1
5 TABLET ORAL EVERY 4 HOURS PRN
Status: DISCONTINUED | OUTPATIENT
Start: 2023-01-25 | End: 2023-01-27 | Stop reason: HOSPADM

## 2023-01-25 RX ORDER — OXYCODONE HYDROCHLORIDE 5 MG/1
10 TABLET ORAL EVERY 4 HOURS PRN
Status: DISCONTINUED | OUTPATIENT
Start: 2023-01-25 | End: 2023-01-27 | Stop reason: HOSPADM

## 2023-01-25 RX ADMIN — POTASSIUM CHLORIDE 10 MEQ: 10 INJECTION, SOLUTION INTRAVENOUS at 00:17

## 2023-01-25 RX ADMIN — BUDESONIDE 500 MCG: 0.5 INHALANT RESPIRATORY (INHALATION) at 21:08

## 2023-01-25 RX ADMIN — SODIUM PHOSPHATE, MONOBASIC, MONOHYDRATE AND SODIUM PHOSPHATE, DIBASIC, ANHYDROUS 10 MMOL: 276; 142 INJECTION, SOLUTION INTRAVENOUS at 13:40

## 2023-01-25 RX ADMIN — OXYCODONE HYDROCHLORIDE 10 MG: 5 TABLET ORAL at 18:28

## 2023-01-25 RX ADMIN — ARFORMOTEROL TARTRATE 15 MCG: 15 SOLUTION RESPIRATORY (INHALATION) at 10:22

## 2023-01-25 RX ADMIN — SODIUM CHLORIDE, POTASSIUM CHLORIDE, SODIUM LACTATE AND CALCIUM CHLORIDE 100 ML: 600; 310; 30; 20 INJECTION, SOLUTION INTRAVENOUS at 00:13

## 2023-01-25 RX ADMIN — OXYCODONE HYDROCHLORIDE 10 MG: 5 TABLET ORAL at 13:18

## 2023-01-25 RX ADMIN — INSULIN LISPRO 2 UNITS: 100 INJECTION, SOLUTION INTRAVENOUS; SUBCUTANEOUS at 10:13

## 2023-01-25 RX ADMIN — OXYCODONE HYDROCHLORIDE 10 MG: 5 TABLET ORAL at 23:45

## 2023-01-25 RX ADMIN — PREGABALIN 75 MG: 75 CAPSULE ORAL at 22:00

## 2023-01-25 RX ADMIN — POTASSIUM CHLORIDE 10 MEQ: 10 INJECTION, SOLUTION INTRAVENOUS at 14:51

## 2023-01-25 RX ADMIN — INSULIN LISPRO 4 UNITS: 100 INJECTION, SOLUTION INTRAVENOUS; SUBCUTANEOUS at 13:44

## 2023-01-25 RX ADMIN — TIOTROPIUM BROMIDE INHALATION SPRAY 2 PUFF: 3.12 SPRAY, METERED RESPIRATORY (INHALATION) at 10:23

## 2023-01-25 RX ADMIN — IPRATROPIUM BROMIDE AND ALBUTEROL SULFATE 1 AMPULE: 2.5; .5 SOLUTION RESPIRATORY (INHALATION) at 02:35

## 2023-01-25 RX ADMIN — ARFORMOTEROL TARTRATE 15 MCG: 15 SOLUTION RESPIRATORY (INHALATION) at 21:08

## 2023-01-25 RX ADMIN — APIXABAN 5 MG: 5 TABLET, FILM COATED ORAL at 09:11

## 2023-01-25 RX ADMIN — SODIUM CHLORIDE, POTASSIUM CHLORIDE, SODIUM LACTATE AND CALCIUM CHLORIDE: 600; 310; 30; 20 INJECTION, SOLUTION INTRAVENOUS at 12:31

## 2023-01-25 RX ADMIN — POTASSIUM CHLORIDE 10 MEQ: 10 INJECTION, SOLUTION INTRAVENOUS at 11:55

## 2023-01-25 RX ADMIN — BUDESONIDE 500 MCG: 0.5 INHALANT RESPIRATORY (INHALATION) at 10:22

## 2023-01-25 RX ADMIN — PREGABALIN 75 MG: 75 CAPSULE ORAL at 13:18

## 2023-01-25 RX ADMIN — APIXABAN 5 MG: 5 TABLET, FILM COATED ORAL at 22:00

## 2023-01-25 RX ADMIN — POTASSIUM CHLORIDE 10 MEQ: 10 INJECTION, SOLUTION INTRAVENOUS at 13:36

## 2023-01-25 ASSESSMENT — PAIN DESCRIPTION - LOCATION
LOCATION: FOOT;LEG
LOCATION: FOOT
LOCATION: LEG
LOCATION: FOOT;LEG
LOCATION: FOOT

## 2023-01-25 ASSESSMENT — PAIN DESCRIPTION - DESCRIPTORS
DESCRIPTORS: ACHING
DESCRIPTORS: BURNING
DESCRIPTORS: BURNING;TINGLING

## 2023-01-25 ASSESSMENT — PAIN DESCRIPTION - FREQUENCY
FREQUENCY: CONTINUOUS
FREQUENCY: INTERMITTENT
FREQUENCY: CONTINUOUS

## 2023-01-25 ASSESSMENT — PAIN SCALES - GENERAL
PAINLEVEL_OUTOF10: 10
PAINLEVEL_OUTOF10: 0
PAINLEVEL_OUTOF10: 9
PAINLEVEL_OUTOF10: 9
PAINLEVEL_OUTOF10: 10
PAINLEVEL_OUTOF10: 2
PAINLEVEL_OUTOF10: 0

## 2023-01-25 ASSESSMENT — PAIN - FUNCTIONAL ASSESSMENT
PAIN_FUNCTIONAL_ASSESSMENT: PREVENTS OR INTERFERES SOME ACTIVE ACTIVITIES AND ADLS
PAIN_FUNCTIONAL_ASSESSMENT: PREVENTS OR INTERFERES SOME ACTIVE ACTIVITIES AND ADLS
PAIN_FUNCTIONAL_ASSESSMENT: ACTIVITIES ARE NOT PREVENTED

## 2023-01-25 ASSESSMENT — PAIN DESCRIPTION - ORIENTATION
ORIENTATION: RIGHT;LEFT
ORIENTATION: RIGHT;LEFT
ORIENTATION: POSTERIOR
ORIENTATION: RIGHT;LEFT

## 2023-01-25 ASSESSMENT — PAIN DESCRIPTION - ONSET
ONSET: ON-GOING

## 2023-01-25 ASSESSMENT — PAIN DESCRIPTION - PAIN TYPE
TYPE: NEUROPATHIC PAIN
TYPE: ACUTE PAIN;CHRONIC PAIN
TYPE: NEUROPATHIC PAIN

## 2023-01-25 NOTE — PROGRESS NOTES
Perfect Serve sent to Dr. Janine Fam informed him that the Pt c/o lower leg pain, neuropathy like. He states it has gotten much worse over the last week. His BS was high on admission, he is now transitioned to sub q. He is rating the pain a 10/10 right now. He has no PRN pain medication. Can he get something? I am currently replacing electrolytes potassium and phos. I will send another BMP once done. Awaiting response.

## 2023-01-25 NOTE — PROGRESS NOTES
Hospitalist Progress Note      PCP: Janes Fang DO    Date of Admission: 1/24/2023    Chief Complaint: Feet pain    Hospital Course: 81 y.o. male with a PMH of COPD, CHF, KAVITA, Hx of PE, CAD, peripheral vascular disease who presented to [facility] with generalized weakness, dry mouth, frequent urination and thirst progressive over the last week. Was found to have HHS and transferred to [facility] for further care. Started on insulin drip and transitioned to subq insulin by 01/25 and transferred out of ICU     Subjective: Converted to subq insulin overnight. Hemodynamics stable. Alert this am and tolerating PO. Complaining of burning-type pain in both feet that is chronic as primary concern.        Medications:  Reviewed    Infusion Medications    dextrose      dextrose 5% and 0.45% NaCl with KCl 20 mEq Stopped (01/24/23 6759)    lactated ringers IV soln 100 mL (01/25/23 0013)     Scheduled Medications    apixaban  5 mg Oral BID    tiotropium  2 puff Inhalation Daily    budesonide  0.5 mg Nebulization BID    arformoterol tartrate  15 mcg Nebulization BID    insulin lispro  0-8 Units SubCUTAneous TID WC    insulin lispro  0-4 Units SubCUTAneous Nightly    insulin glargine  30 Units SubCUTAneous Nightly     PRN Meds: glucose, glucagon (rDNA), dextrose, dextrose bolus **OR** dextrose bolus, potassium chloride, magnesium sulfate, sodium phosphate IVPB **OR** sodium phosphate IVPB **OR** sodium phosphate IVPB, polyethylene glycol, dextrose 5% and 0.45% NaCl with KCl 20 mEq, ipratropium-albuterol      Intake/Output Summary (Last 24 hours) at 1/25/2023 0752  Last data filed at 1/25/2023 2482  Gross per 24 hour   Intake 4154.24 ml   Output 1975 ml   Net 2179.24 ml       Physical Exam Performed:    /62   Pulse 95   Temp 97.5 °F (36.4 °C) (Axillary)   Resp 20   Ht 5' 10\" (1.778 m)   Wt 212 lb 4.9 oz (96.3 kg)   SpO2 94%   BMI 30.46 kg/m²     General appearance: No apparent distress, appears stated age and cooperative. HEENT: Pupils equal, round, and reactive to light. Conjunctivae/corneas clear. Neck: Supple, with full range of motion. No jugular venous distention. Trachea midline. Respiratory:  Normal respiratory effort. Clear to auscultation, bilaterally without Rales/Wheezes/Rhonchi. Cardiovascular: Regular rate and rhythm with normal S1/S2 without murmurs, rubs or gallops. Abdomen: Soft, non-tender, non-distended with normal bowel sounds. Musculoskeletal: No clubbing, cyanosis or edema bilaterally. Full range of motion without deformity. Feet tender to light palpation  Skin: Skin color, texture, turgor normal.  No rashes or lesions. Neurologic:  Neurovascularly intact without any focal sensory/motor deficits. Cranial nerves: II-XII intact, grossly non-focal.  Psychiatric: Alert and oriented, thought content appropriate, normal insight  Capillary Refill: Brisk, 3 seconds, normal   Peripheral Pulses: +2 palpable, equal bilaterally       Labs:   Recent Labs     01/24/23  0033 01/25/23  0546   WBC 7.8 7.4   HGB 11.0* 10.3*   HCT 35.3* 31.9*   * 83*     Recent Labs     01/24/23  1717 01/25/23  0143 01/25/23  0546    135* 136   K 3.4* 3.7 4.2    106 105   CO2 23 21 22   BUN 11 8 7   CREATININE 0.9 0.8 0.8   CALCIUM 8.3 8.5 8.8   PHOS 3.1 2.5 2.3*     Recent Labs     01/24/23  0033   AST 16   ALT 11   BILITOT 0.6   ALKPHOS 175*     No results for input(s): INR in the last 72 hours. Recent Labs     01/24/23  0033   CKTOTAL 105   TROPONINI 0.01       Urinalysis:      Lab Results   Component Value Date/Time    NITRU Negative 01/24/2023 12:33 AM    WBCUA 10-20 01/24/2023 12:33 AM    BACTERIA Rare 01/24/2023 12:33 AM    RBCUA 5-10 01/24/2023 12:33 AM    RBCUA NEGATIVE 08/10/2016 01:45 PM    BLOODU TRACE-LYSED 01/24/2023 12:33 AM    SPECGRAV <=1.005 01/24/2023 12:33 AM    GLUCOSEU >=1000 01/24/2023 12:33 AM       Radiology:  XR CHEST PORTABLE   Final Result      1.  Hyperinflated lungs without focal opacity   2. Chronic tortuous aorta and borderline heart size                   IP CONSULT TO DIABETES EDUCATOR  IP CONSULT TO CRITICAL CARE  IP CONSULT TO PHARMACY    Assessment/Plan:    Active Hospital Problems    Diagnosis     Hyperosmolar hyperglycemic state (HHS) ([facility] [address] [E11.00]      Priority: Medium     Hyperosmolar Hyperglycemic State  Hx of Pre-DM w/ new diagnosis of Type II DM, A1c 13.6%  Glucose 1,036 on presentation to [facility] ED. Normal anion gap, normal bicarb. No ketones in urine.  - Basal/bolus insulin: increase Lantus to 35 U qhs + medium dose SSI  - titrate short-acting insulin as needed  - routine BG checks and hypoglycemia protocol     LYDIA, improved, pre-renal due to dehydration from HHS  - IV fluids today, can stop tomorrow if good PO  - Avoid nephrotoxic medications  - trend BMP    Neuropathic Pain, suspect 2/2 DM  - Prn analgesia  - Start Pregabalin today as patient reports has tried gabapentin in past without relief: titrate dose as needed     Chronic conditions:  Peripheral vascular disease, s/p vascular surgeries  -Continue Eliquis, Plavix  HfpEF  -Hold home furosemide for now, can potentially restart at discharge  Hypertension  Hold home antihypertensives in context of LYDIA. Restart as needed  Hx of DVT/PE  - Continue home Eliquis   KAVITA  COPD  Patient of Dr. Joanne Dias. Has worn CPAP in past, old machine recalled, waiting for new one. DVT Prophylaxis: Apixaban (home med)  Diet: ADULT DIET; Regular; 4 carb choices (60 gm/meal); Low Sodium (2 gm)  Code Status: Full Code  PT/OT Eval Status: as needed    Dispo - Anticipate can be medically ready for discharge early as tomorrow if BG remains controlled today. Will need insulin as new med on discharge.       Justin Moss MD

## 2023-01-25 NOTE — PROGRESS NOTES
Dr. Kavita Gonzalez at bedside. Discussed pain medication, changing lab orders, and restarting home medications. He spoke with the Pt. He stated he would place new orders. Awaiting new orders.

## 2023-01-25 NOTE — PLAN OF CARE
Problem: Respiratory - Adult  Goal: Achieves optimal ventilation and oxygenation  1/25/2023 0428 by Facundo Jett RN  Outcome: Not Progressing  1/25/2023 0427 by Facundo Jett RN  Flowsheets (Taken 1/25/2023 8305)  Achieves optimal ventilation and oxygenation:   Assess for changes in respiratory status   Assess for changes in mentation and behavior   Position to facilitate oxygenation and minimize respiratory effort   Oxygen supplementation based on oxygen saturation or arterial blood gases   Assess and instruct to report shortness of breath or any respiratory difficulty   Respiratory therapy support as indicated     Problem: Chronic Conditions and Co-morbidities  Goal: Patient's chronic conditions and co-morbidity symptoms are monitored and maintained or improved  Outcome: Progressing  Flowsheets (Taken 1/24/2023 2000)  Care Plan - Patient's Chronic Conditions and Co-Morbidity Symptoms are Monitored and Maintained or Improved: Monitor and assess patient's chronic conditions and comorbid symptoms for stability, deterioration, or improvement     Problem: Skin/Tissue Integrity  Goal: Absence of new skin breakdown  Description: 1. Monitor for areas of redness and/or skin breakdown  2. Assess vascular access sites hourly  3. Every 4-6 hours minimum:  Change oxygen saturation probe site  4. Every 4-6 hours:  If on nasal continuous positive airway pressure, respiratory therapy assess nares and determine need for appliance change or resting period.   Outcome: Progressing     Problem: Safety - Adult  Goal: Free from fall injury  Outcome: Progressing     Problem: Metabolic/Fluid and Electrolytes - Adult  Goal: Electrolytes maintained within normal limits  Outcome: Progressing  Flowsheets (Taken 1/25/2023 0427)  Electrolytes maintained within normal limits:   Monitor labs and assess patient for signs and symptoms of electrolyte imbalances   Administer electrolyte replacement as ordered   Monitor response to electrolyte replacements, including repeat lab results as appropriate     Problem: Metabolic/Fluid and Electrolytes - Adult  Goal: Glucose maintained within prescribed range  Outcome: Progressing  Flowsheets (Taken 1/25/2023 0427)  Glucose maintained within prescribed range:   Monitor blood glucose as ordered   Assess for signs and symptoms of hyperglycemia and hypoglycemia   Administer ordered medications to maintain glucose within target range   Instruct patient on self management of diabetes and initiate consult as needed   Assess barriers to adequate nutritional intake and initiate nutrition consult as needed     Problem: Metabolic/Fluid and Electrolytes - Adult  Goal: Hemodynamic stability and optimal renal function maintained  Flowsheets (Taken 1/25/2023 0427)  Hemodynamic stability and optimal renal function maintained:   Monitor labs and assess for signs and symptoms of volume excess or deficit   Monitor urine specific gravity, serum osmolarity and serum sodium as indicated or ordered   Monitor response to interventions for patient's volume status, including labs, urine output, blood pressure (other measures as available)     Problem: Respiratory - Adult  Goal: Achieves optimal ventilation and oxygenation  1/25/2023 0428 by Juan Daniel Weems RN  Outcome: Not Progressing  1/25/2023 0427 by Juan Daniel Weems RN  Flowsheets (Taken 1/25/2023 2351)  Achieves optimal ventilation and oxygenation:   Assess for changes in respiratory status   Assess for changes in mentation and behavior   Position to facilitate oxygenation and minimize respiratory effort   Oxygen supplementation based on oxygen saturation or arterial blood gases   Assess and instruct to report shortness of breath or any respiratory difficulty   Respiratory therapy support as indicated

## 2023-01-25 NOTE — PLAN OF CARE
Problem: Discharge Planning  Goal: Discharge to home or other facility with appropriate resources  1/25/2023 1435 by Gissel Qureshi RN  Outcome: Progressing  1/25/2023 0426 by Facundo Jett RN  Outcome: Progressing  Flowsheets (Taken 1/24/2023 2000)  Discharge to home or other facility with appropriate resources: Identify barriers to discharge with patient and caregiver     Problem: Chronic Conditions and Co-morbidities  Goal: Patient's chronic conditions and co-morbidity symptoms are monitored and maintained or improved  1/25/2023 1435 by Gissel Qureshi RN  Outcome: Progressing  1/25/2023 0426 by Facundo Jett RN  Outcome: Progressing  Flowsheets (Taken 1/24/2023 2000)  Care Plan - Patient's Chronic Conditions and Co-Morbidity Symptoms are Monitored and Maintained or Improved: Monitor and assess patient's chronic conditions and comorbid symptoms for stability, deterioration, or improvement     Problem: Skin/Tissue Integrity  Goal: Absence of new skin breakdown  Description: 1. Monitor for areas of redness and/or skin breakdown  2. Assess vascular access sites hourly  3. Every 4-6 hours minimum:  Change oxygen saturation probe site  4. Every 4-6 hours:  If on nasal continuous positive airway pressure, respiratory therapy assess nares and determine need for appliance change or resting period.   1/25/2023 1435 by Gissel Qureshi RN  Outcome: Progressing  1/25/2023 0426 by Facundo Jett RN  Outcome: Progressing     Problem: Safety - Adult  Goal: Free from fall injury  1/25/2023 1435 by Gissel Qureshi RN  Outcome: Progressing  Flowsheets (Taken 1/25/2023 1433)  Free From Fall Injury: Kishor Kirby family/caregiver on patient safety  1/25/2023 0426 by Facundo Jett RN  Outcome: Progressing     Problem: Respiratory - Adult  Goal: Achieves optimal ventilation and oxygenation  1/25/2023 1435 by Gissel Qureshi RN  Outcome: Progressing  Flowsheets (Taken 1/25/2023 0800)  Achieves optimal ventilation and oxygenation:   Assess for changes in respiratory status   Assess for changes in mentation and behavior   Oxygen supplementation based on oxygen saturation or arterial blood gases   Respiratory therapy support as indicated  2023 by Gricel Wallace RN  Outcome: Not Progressing  2023 by Gricel Wallace RN  Flowsheets (Taken 2023 0956)  Achieves optimal ventilation and oxygenation:   Assess for changes in respiratory status   Assess for changes in mentation and behavior   Position to facilitate oxygenation and minimize respiratory effort   Oxygen supplementation based on oxygen saturation or arterial blood gases   Assess and instruct to report shortness of breath or any respiratory difficulty   Respiratory therapy support as indicated     Problem: Metabolic/Fluid and Electrolytes - Adult  Goal: Electrolytes maintained within normal limits  2023 1435 by Zoraida Taylor RN  Outcome: Progressing  2023 by Gricel Wallace RN  Outcome: Progressing  Flowsheets (Taken 2023 042)  Electrolytes maintained within normal limits:   Monitor labs and assess patient for signs and symptoms of electrolyte imbalances   Administer electrolyte replacement as ordered   Monitor response to electrolyte replacements, including repeat lab results as appropriate     Problem: Metabolic/Fluid and Electrolytes - Adult  Goal: Hemodynamic stability and optimal renal function maintained  2023 1435 by Zoraida Taylor RN  Outcome: Progressing  2023 by Gricel Wallace RN  Flowsheets (Taken 2023 2993)  Hemodynamic stability and optimal renal function maintained:   Monitor labs and assess for signs and symptoms of volume excess or deficit   Monitor urine specific gravity, serum osmolarity and serum sodium as indicated or ordered   Monitor response to interventions for patient's volume status, including labs, urine output, blood pressure (other measures as available)     Problem: Metabolic/Fluid and Electrolytes - Adult  Goal: Glucose maintained within prescribed range  1/25/2023 1435 by Candi Ernandez RN  Outcome: Progressing  1/25/2023 0427 by Garima Capone RN  Outcome: Progressing  Flowsheets (Taken 1/25/2023 0427)  Glucose maintained within prescribed range:   Monitor blood glucose as ordered   Assess for signs and symptoms of hyperglycemia and hypoglycemia   Administer ordered medications to maintain glucose within target range   Instruct patient on self management of diabetes and initiate consult as needed   Assess barriers to adequate nutritional intake and initiate nutrition consult as needed     Problem: Pain  Goal: Verbalizes/displays adequate comfort level or baseline comfort level  Outcome: Progressing     Problem: Respiratory - Adult  Goal: Achieves optimal ventilation and oxygenation  1/25/2023 1435 by Candi Ernandez RN  Outcome: Progressing  Flowsheets (Taken 1/25/2023 0800)  Achieves optimal ventilation and oxygenation:   Assess for changes in respiratory status   Assess for changes in mentation and behavior   Oxygen supplementation based on oxygen saturation or arterial blood gases   Respiratory therapy support as indicated  1/25/2023 0428 by Garima Capone RN  Outcome: Not Progressing  1/25/2023 0427 by Garima Capone RN  Flowsheets (Taken 1/25/2023 4767)  Achieves optimal ventilation and oxygenation:   Assess for changes in respiratory status   Assess for changes in mentation and behavior   Position to facilitate oxygenation and minimize respiratory effort   Oxygen supplementation based on oxygen saturation or arterial blood gases   Assess and instruct to report shortness of breath or any respiratory difficulty   Respiratory therapy support as indicated

## 2023-01-25 NOTE — CONSULTS
ICU Consult Note        Hospital Day: 1  ICU Day: 1                                                                                    Code:Full Code  Admit Date: 1/24/2023                       PCP: Manjula Levi DO                                   CC: Weakness, generalized fatigue     HISTORY OF PRESENT ILLNESS:   Kylee Villarreal is a 81 y.o. male with a PMH of COPD, CHF, KAVITA, Hx of PE, CAD, peripheral vascular disease who was transferred to [facility] [place] from [facility] for [facility]. He presented to [facility] with generalized weakness, dry mouth, frequent urination and thirst progressive over the last week. For the two days prior to presentation, he began having severe muscle cramps in his calves, which prompted him to present to the ED. He has been drinking Pepsi, mountain dew, gatorade, milk and water but states that his mouth is still dry despite this. He has had poor appetite over the last week. He denies any fever. He endorses a small amount of diarrhea the first couple days of his week of weakness and fatigue, but denies any sick contacts. Pt. Denies any recent sickness and his last dose of steroids from COPD exacerbation was mid-December. In the ED: Pt. Vitals were within normal limits. Pt. Was given 2 Liters of fluid and remained above 700 glucose so started on insulin gtt. He had no change of mental status during the visit.          PAST HISTORY:      Past Medical History        Past Medical History:   Diagnosis Date    CAD (coronary artery disease)      CHF (congestive heart failure) (HCC)       diastolic    COPD (chronic obstructive pulmonary disease) (HCC)      Critical ischemia of lower extremity (HCC)      Depressive disorder, not elsewhere classified      GERD (gastroesophageal reflux disease)      History of colon polyps - 30 seen on colonoscopy 04/2021 04/24/2021    History of MI (myocardial infarction) 05/2013    History of skin ulcer of lower extremity       R anterior shin    History of transient ischemic attack (TIA)      Hx of blood clots       PE    Hyperlipidemia      Hypertension      Neuropathy      KAVITA (obstructive sleep apnea)       On CPAP    Personal history of DVT (deep vein thrombosis)      Prolonged emergence from general anesthesia       Pt reported being combative after surgery     PVD (peripheral vascular disease) ([facility] [address]      TIA (transient ischemic attack) 2013    Vertebral fracture              Past Surgical History         Past Surgical History:   Procedure Laterality Date    APPENDECTOMY        CHOLECYSTECTOMY, LAPAROSCOPIC        COLONOSCOPY   4/23/2021     COLONOSCOPY POLYPECTOMY SNARE/COLD BIOPSY performed by Jessie Parks MD at [place] [id number]   4/23/2021     COLONOSCOPY POLYPECTOMY ABLATION performed by Jessie Parks MD at [place] [address]   4/23/2021     COLONOSCOPY CONTROL HEMORRHAGE performed by Jessie Parks MD at [address] [facility]   6/2013    EYE SURGERY Left      FEMORAL BYPASS Right 7/18/2022     RIGHT FEMORAL BELOW KNEE POPLITEAL ARTERY BYPASS WITH IN SITU SAPHENOUS VEIN performed by Vasile Sr MD at [address] Right 11/17/2021     RIGHT FEMORAL ENDARTERECTOMY  RIGHT EXTERNAL ILIAC TO PROFUNDA FEMORAL BYPASS WITH 8MM PTFE GRAFT RIGHT LOWER EXTREMITY ARTERIOGRAM BALLOON ANGIOPLASTY RIGHT PROFUNDA BALLOON ANGIOPLASTY AND STENT OF RIGHT EXTERNAL ILIAC ARTERY performed by Jose Larose MD at [address]   11/18/2015     Bilateral decompressive lumbar laminectomy L4-5   ; medial facetectomy L4-5 joints  bilaterally; foraminotomies l5   nerve roots bilaterally    LEG DEBRIDEMENT Right 7/23/2013     Dr. Sarai Reeves - pretibial wound    OTHER SURGICAL HISTORY Right 6/25/2013     Dr. Sarai Reeves - CFA Endarterectomy w/marsupialization; RLE wound excision & debridement     OTHER SURGICAL HISTORY Left 8/27/2013     Dr. Sarai Reeves - femoral & profunda femoris endarterectomy w/marsupialization patch angioplasty using SFA    SKIN SPLIT GRAFT Right 7/25/2013     Dr. Sarai Reeves - to non-healing R pretibial wound, 9 x 15 cm    TOTAL HIP ARTHROPLASTY Right 09/01/2016     Dr. Rosamaria Martínez Left 12/22/2015     Dr. Sarai Reeves - CFA exploration, thrombectomy of ileofemoral system, stenting of RAFAL in-stent stenosis w/8 x 37 mm Palmaz             SocialHistory:   The patient lives at     Alcohol:  Illicit drugs: no use  Tobacco:       Family History:  Family History         Family History   Problem Relation Age of Onset    Cancer Mother           Breast    Heart Disease Mother      Cancer Father           Bladder    Heart Disease Father      Cancer Paternal Grandmother           melanoma             MEDICATIONS:      No current facility-administered medications on file prior to encounter. Current Outpatient Medications on File Prior to Encounter   Medication Sig Dispense Refill    pregabalin (LYRICA) 150 MG capsule Take 1 capsule by mouth 2 times daily for 30 days.  180 capsule 1    ELIQUIS 5 MG TABS tablet TAKE 1 TABLET BY MOUTH TWICE DAILY 60 tablet 1    senna (SENOKOT) 8.6 MG TABS tablet Take 1 tablet by mouth nightly as needed        clopidogrel (PLAVIX) 75 MG tablet TAKE 1 TABLET BY MOUTH DAILY 30 tablet 3    DULoxetine (CYMBALTA) 20 MG extended release capsule          ipratropium-albuterol (DUONEB) 0.5-2.5 (3) MG/3ML SOLN nebulizer solution Inhale 3 mLs into the lungs every 4 hours as needed for Shortness of Breath 360 mL 4    albuterol sulfate HFA (PROVENTIL;VENTOLIN;PROAIR) 108 (90 Base) MCG/ACT inhaler Inhale 2 puffs into the lungs every 6 hours as needed for Wheezing 18 g 2    omeprazole (PRILOSEC) 40 MG delayed release capsule TAKE 1 CAPSULE BY MOUTH DAILY 30 capsule 3    Evolocumab 140 MG/ML SOAJ Inject 140 mg into the skin every 14 days Take an injection every 14 days. fluticasone-umeclidin-vilant (TRELEGY ELLIPTA) 100-62.5-25 MCG/INH AEPB Inhale 1 puff into the lungs in the morning. 28 each 11    potassium chloride (MICRO-K) 10 MEQ extended release capsule 1 tablet 60 capsule 1    furosemide (LASIX) 40 MG tablet Take 1 tablet by mouth in the morning. 90 tablet 3    metoprolol tartrate (LOPRESSOR) 50 MG tablet Take 1.5 tablets by mouth in the morning and 1.5 tablets before bedtime. 90 tablet 5    losartan (COZAAR) 100 MG tablet Take 1 tablet by mouth in the morning. 30 tablet 3    atorvastatin (LIPITOR) 80 MG tablet Take 1 tablet by mouth daily 90 tablet 1    allopurinol (ZYLOPRIM) 100 MG tablet Take 1 tablet by mouth daily 180 tablet 1    [DISCONTINUED] folic acid (FOLVITE) 1 MG tablet Take 1 tablet by mouth daily 30 tablet 3    [DISCONTINUED] nitroGLYCERIN (NITROSTAT) 0.4 MG SL tablet Place 1 tablet under the tongue every 5 minutes as needed for Chest pain (Patient not taking: No sig reported) 25 tablet 1    [DISCONTINUED] magnesium gluconate (MAGONATE) 500 MG tablet Take 500 mg by mouth daily.                 Scheduled Meds:  Scheduled Medications    apixaban  5 mg Oral BID    tiotropium  2 puff Inhalation Daily    budesonide  0.5 mg Nebulization BID    arformoterol tartrate  15 mcg Nebulization BID         Continuous Infusions:  Infusions Meds    dextrose      sodium chloride 250 mL/hr at 01/24/23 0618    dextrose 5% and 0.45% NaCl with KCl 20 mEq      insulin 0.151 Units/kg/hr (01/24/23 0628)         PRN Meds:  PRN Medications   glucose, glucagon (rDNA), dextrose, dextrose bolus **OR** dextrose bolus, potassium chloride, magnesium sulfate, sodium phosphate IVPB **OR** sodium phosphate IVPB **OR** sodium phosphate IVPB, polyethylene glycol, dextrose 5% and 0.45% NaCl with KCl 20 mEq        Allergies: Allergies   Allergen Reactions    Asa [Aspirin] Other (See Comments)       Stomach ache    Daliresp [Roflumilast] Diarrhea and Other (See Comments)       Severe diarrhea and did not help         REVIEW OF SYSTEMS:         History obtained from chart review     Review of Systems   HENT:  Negative for facial swelling. Respiratory:  Negative for chest tightness and shortness of breath. Cardiovascular:  Negative for chest pain. Gastrointestinal:  Negative for constipation. Neurological:  Negative for tremors and weakness. Psychiatric/Behavioral:  Negative for agitation. PHYSICAL EXAM:         Vitals: /72   Pulse 84   Temp 97.5 °F (36.4 °C) (Oral)   Resp 21   Ht 5' 10\" (1.778 m)   Wt 208 lb 8 oz (94.6 kg)   SpO2 98%   BMI 29.92 kg/m²      I/O:  No intake or output data in the 24 hours ending 01/24/23 0758  No intake/output data recorded. No intake/output data recorded. Physical Examination:      Physical Exam  Constitutional:       General: He is not in acute distress. Appearance: Normal appearance. He is not toxic-appearing. HENT:      Head: Normocephalic and atraumatic. Right Ear: External ear normal.      Left Ear: External ear normal.      Nose: Nose normal.      Mouth/Throat:      Mouth: Mucous membranes are dry. Cardiovascular:      Rate and Rhythm: Normal rate. Pulses: Normal pulses. Pulmonary:      Effort: Pulmonary effort is normal.   Abdominal:      Palpations: Abdomen is soft. Tenderness: There is no abdominal tenderness. Neurological:      General: No focal deficit present. Mental Status: He is alert and oriented to person, place, and time. Access:   -Central Access Day #:  0                                   -Peripheral Access Day#:1  -Arterial line Day#: 0                                   Sampson Day#: 0  NGT Day#:  0                                             ETT Day#: 0  Vent Settings:    / / /      No results for input(s): PHART, ZHY2FNO, PO2ART in the last 72 hours. DATA:         Labs:  CBC:       Recent Labs     01/24/23 0033   WBC 7.8   HGB 11.0*   HCT 35.3*   *       BMP:        Recent Labs     01/24/23 0033 01/24/23  0328   *  --    K 5.0  --    CL 87*  --    CO2 21 22   BUN 25* 22*   CREATININE 1.4* 1.3   GLUCOSE 1,036* 751*      LFT's:       Recent Labs     01/24/23 0033   AST 16   ALT 11   BILITOT 0.6   ALKPHOS 175*      Troponin:       Recent Labs     01/24/23 0033   TROPONINI 0.01      BNP:No results for input(s): BNP in the last 72 hours. ABGs: No results for input(s): PHART, OFS2UJQ, PO2ART in the last 72 hours. INR: No results for input(s): INR in the last 72 hours. U/A:      Recent Labs     01/24/23 0033   COLORU Yellow   PHUR 5.5   WBCUA 10-20*   RBCUA 5-10*   YEAST Present*   BACTERIA Rare*   CLARITYU Clear   SPECGRAV <=1.005   LEUKOCYTESUR Negative   UROBILINOGEN 0.2   BILIRUBINUR Negative   BLOODU TRACE-LYSED*   GLUCOSEU >=1000*         XR CHEST PORTABLE   Final Result       1. Hyperinflated lungs without focal opacity   2. Chronic tortuous aorta and borderline heart size                         EKG:   Echo:  Micro:      ASSESSMENT AND PLAN:   Aracely Douglas is a 81 y.o. male with a PMH of COPD, CHF, KAVITA, Hx of PE, CAD, peripheral vascular disease who was transferred to [facility] from [facility] for [facility]. Hyperglycemia, severe  Hyperosmolar Hyperglycemic State  Glucose 1,036 on presentation to [facility] ED. Normal anion gap, normal bicarb.  No ketones in urine.  - IV fluids with normal saline, DKA protocol  - IV insulin gtt and add SubQ insulin when glucose reached the 200's for 3 hours then stop the gtt. - Q1H glucose checks  - Q4H BMP, Mg, Phosphate  - Serum osmolarity 307  - Monitor pseudohyponatremia (corrected Na is 139)     LYDIA  Creatinine of 1.4 on admission, suspect secondary to pre-renal dehydration.  - IV fluids as above  - Avoid nephrotoxic medications     Chronic conditions:  Peripheral vascular disease, s/p vascular surgeries  -Continue Eliquis, Plavix  Pre-diabetes  Last A1c 6.1 on 7/22/2020  -Hgb A1c  CHF  -Hold home furosemide  Hypertension  Hold home antihypertensives in context of LYDIA. KAVITA  COPD  Patient of Dr. Veronica Camarena. Has worn CPAP in past, old machine recalled, waiting for new one. Code Status:Full Code  FEN: Diet NPO  PPX:  On Eliquis  DISPO: ICU     This patient has been staffed and discussed with Alec Galan MD  -----------------------------  Ernesto Redding MD, PGY-1  1/24/2023  7:58 AM    Pulmonary & Critical Care    Patient seen and examined. I agree with Dr. Toñito Momin history, physical, lab findings, assessment and plan.     New onset diabetes with HHS at presentation    HHS resolved  Start Lantus 30 units subcutaneous then turn insulin drip off after an hour  Moderate SSI  Start diet  [place] [employer] mL an hour  Check hemoglobin A1c    Okay to transfer to floor    Ayo Pinon MD

## 2023-01-26 PROBLEM — E11.9 NEWLY DIAGNOSED DIABETES (HCC): Status: ACTIVE | Noted: 2023-01-26

## 2023-01-26 LAB
ANION GAP SERPL CALCULATED.3IONS-SCNC: 10 MMOL/L (ref 3–16)
ANION GAP SERPL CALCULATED.3IONS-SCNC: 8 MMOL/L (ref 3–16)
BASOPHILS ABSOLUTE: 0 K/UL (ref 0–0.2)
BASOPHILS RELATIVE PERCENT: 0.7 %
BUN BLDV-MCNC: 5 MG/DL (ref 7–20)
BUN BLDV-MCNC: 6 MG/DL (ref 7–20)
CALCIUM SERPL-MCNC: 8.8 MG/DL (ref 8.3–10.6)
CALCIUM SERPL-MCNC: 9 MG/DL (ref 8.3–10.6)
CHLORIDE BLD-SCNC: 103 MMOL/L (ref 99–110)
CHLORIDE BLD-SCNC: 108 MMOL/L (ref 99–110)
CO2: 23 MMOL/L (ref 21–32)
CO2: 23 MMOL/L (ref 21–32)
CREAT SERPL-MCNC: 0.8 MG/DL (ref 0.8–1.3)
CREAT SERPL-MCNC: 0.8 MG/DL (ref 0.8–1.3)
EOSINOPHILS ABSOLUTE: 0.2 K/UL (ref 0–0.6)
EOSINOPHILS RELATIVE PERCENT: 4.2 %
GFR SERPL CREATININE-BSD FRML MDRD: >60 ML/MIN/{1.73_M2}
GFR SERPL CREATININE-BSD FRML MDRD: >60 ML/MIN/{1.73_M2}
GLUCOSE BLD-MCNC: 134 MG/DL (ref 70–99)
GLUCOSE BLD-MCNC: 139 MG/DL (ref 70–99)
GLUCOSE BLD-MCNC: 163 MG/DL (ref 70–99)
GLUCOSE BLD-MCNC: 172 MG/DL (ref 70–99)
GLUCOSE BLD-MCNC: 177 MG/DL (ref 70–99)
GLUCOSE BLD-MCNC: 187 MG/DL (ref 70–99)
HCT VFR BLD CALC: 32.8 % (ref 40.5–52.5)
HEMOGLOBIN: 10.3 G/DL (ref 13.5–17.5)
LYMPHOCYTES ABSOLUTE: 1.2 K/UL (ref 1–5.1)
LYMPHOCYTES RELATIVE PERCENT: 22.4 %
MAGNESIUM: 1.6 MG/DL (ref 1.8–2.4)
MAGNESIUM: 2.1 MG/DL (ref 1.8–2.4)
MCH RBC QN AUTO: 26.2 PG (ref 26–34)
MCHC RBC AUTO-ENTMCNC: 31.4 G/DL (ref 31–36)
MCV RBC AUTO: 83.5 FL (ref 80–100)
MONOCYTES ABSOLUTE: 0.4 K/UL (ref 0–1.3)
MONOCYTES RELATIVE PERCENT: 7 %
NEUTROPHILS ABSOLUTE: 3.4 K/UL (ref 1.7–7.7)
NEUTROPHILS RELATIVE PERCENT: 65.7 %
PDW BLD-RTO: 21.6 % (ref 12.4–15.4)
PERFORMED ON: ABNORMAL
PHOSPHORUS: 2.4 MG/DL (ref 2.5–4.9)
PHOSPHORUS: 3.1 MG/DL (ref 2.5–4.9)
PLATELET # BLD: 78 K/UL (ref 135–450)
PMV BLD AUTO: 9.8 FL (ref 5–10.5)
POTASSIUM SERPL-SCNC: 3.9 MMOL/L (ref 3.5–5.1)
POTASSIUM SERPL-SCNC: 4.3 MMOL/L (ref 3.5–5.1)
RBC # BLD: 3.93 M/UL (ref 4.2–5.9)
SODIUM BLD-SCNC: 136 MMOL/L (ref 136–145)
SODIUM BLD-SCNC: 139 MMOL/L (ref 136–145)
WBC # BLD: 5.2 K/UL (ref 4–11)

## 2023-01-26 PROCEDURE — 6360000002 HC RX W HCPCS: Performed by: INTERNAL MEDICINE

## 2023-01-26 PROCEDURE — 97166 OT EVAL MOD COMPLEX 45 MIN: CPT

## 2023-01-26 PROCEDURE — 94640 AIRWAY INHALATION TREATMENT: CPT

## 2023-01-26 PROCEDURE — 85025 COMPLETE CBC W/AUTO DIFF WBC: CPT

## 2023-01-26 PROCEDURE — 97535 SELF CARE MNGMENT TRAINING: CPT

## 2023-01-26 PROCEDURE — 6360000002 HC RX W HCPCS

## 2023-01-26 PROCEDURE — 36415 COLL VENOUS BLD VENIPUNCTURE: CPT

## 2023-01-26 PROCEDURE — 6370000000 HC RX 637 (ALT 250 FOR IP)

## 2023-01-26 PROCEDURE — 94761 N-INVAS EAR/PLS OXIMETRY MLT: CPT

## 2023-01-26 PROCEDURE — 97161 PT EVAL LOW COMPLEX 20 MIN: CPT

## 2023-01-26 PROCEDURE — 6370000000 HC RX 637 (ALT 250 FOR IP): Performed by: STUDENT IN AN ORGANIZED HEALTH CARE EDUCATION/TRAINING PROGRAM

## 2023-01-26 PROCEDURE — 97530 THERAPEUTIC ACTIVITIES: CPT

## 2023-01-26 PROCEDURE — 83735 ASSAY OF MAGNESIUM: CPT

## 2023-01-26 PROCEDURE — 80048 BASIC METABOLIC PNL TOTAL CA: CPT

## 2023-01-26 PROCEDURE — 84100 ASSAY OF PHOSPHORUS: CPT

## 2023-01-26 PROCEDURE — 2580000003 HC RX 258

## 2023-01-26 PROCEDURE — 2580000003 HC RX 258: Performed by: STUDENT IN AN ORGANIZED HEALTH CARE EDUCATION/TRAINING PROGRAM

## 2023-01-26 PROCEDURE — 97116 GAIT TRAINING THERAPY: CPT

## 2023-01-26 PROCEDURE — 2500000003 HC RX 250 WO HCPCS

## 2023-01-26 PROCEDURE — 2700000000 HC OXYGEN THERAPY PER DAY

## 2023-01-26 PROCEDURE — 6370000000 HC RX 637 (ALT 250 FOR IP): Performed by: INTERNAL MEDICINE

## 2023-01-26 PROCEDURE — 1200000000 HC SEMI PRIVATE

## 2023-01-26 RX ORDER — BLOOD-GLUCOSE METER
1 KIT MISCELLANEOUS DAILY
Qty: 1 KIT | Refills: 0 | Status: SHIPPED | OUTPATIENT
Start: 2023-01-26

## 2023-01-26 RX ORDER — SODIUM CHLORIDE 9 MG/ML
INJECTION, SOLUTION INTRAVENOUS PRN
Status: ACTIVE | OUTPATIENT
Start: 2023-01-26 | End: 2023-01-27

## 2023-01-26 RX ORDER — GLIPIZIDE 5 MG/1
10 TABLET ORAL
Status: DISCONTINUED | OUTPATIENT
Start: 2023-01-26 | End: 2023-01-27 | Stop reason: HOSPADM

## 2023-01-26 RX ORDER — METFORMIN HYDROCHLORIDE 500 MG/1
1000 TABLET, EXTENDED RELEASE ORAL
Status: DISCONTINUED | OUTPATIENT
Start: 2023-01-26 | End: 2023-01-27 | Stop reason: HOSPADM

## 2023-01-26 RX ORDER — METFORMIN HYDROCHLORIDE 500 MG/1
1000 TABLET, EXTENDED RELEASE ORAL
Qty: 30 TABLET | Refills: 3 | Status: SHIPPED | OUTPATIENT
Start: 2023-01-26

## 2023-01-26 RX ORDER — ALLOPURINOL 100 MG/1
100 TABLET ORAL DAILY
Status: DISCONTINUED | OUTPATIENT
Start: 2023-01-26 | End: 2023-01-27 | Stop reason: HOSPADM

## 2023-01-26 RX ORDER — PEN NEEDLE, DIABETIC 29 GAUGE
1 NEEDLE, DISPOSABLE MISCELLANEOUS DAILY
Qty: 100 EACH | Refills: 3 | Status: SHIPPED | OUTPATIENT
Start: 2023-01-26

## 2023-01-26 RX ORDER — PREGABALIN 150 MG/1
150 CAPSULE ORAL 2 TIMES DAILY
Status: DISCONTINUED | OUTPATIENT
Start: 2023-01-26 | End: 2023-01-27 | Stop reason: HOSPADM

## 2023-01-26 RX ORDER — GLIPIZIDE 10 MG/1
10 TABLET ORAL
Qty: 60 TABLET | Refills: 3 | Status: SHIPPED | OUTPATIENT
Start: 2023-01-26

## 2023-01-26 RX ORDER — LOSARTAN POTASSIUM 100 MG/1
100 TABLET ORAL DAILY
Status: DISCONTINUED | OUTPATIENT
Start: 2023-01-26 | End: 2023-01-27 | Stop reason: HOSPADM

## 2023-01-26 RX ORDER — MAGNESIUM SULFATE IN WATER 40 MG/ML
2000 INJECTION, SOLUTION INTRAVENOUS ONCE
Status: COMPLETED | OUTPATIENT
Start: 2023-01-26 | End: 2023-01-26

## 2023-01-26 RX ORDER — CLOPIDOGREL BISULFATE 75 MG/1
75 TABLET ORAL DAILY
Status: DISCONTINUED | OUTPATIENT
Start: 2023-01-26 | End: 2023-01-27 | Stop reason: HOSPADM

## 2023-01-26 RX ORDER — PANTOPRAZOLE SODIUM 40 MG/1
40 TABLET, DELAYED RELEASE ORAL
Status: DISCONTINUED | OUTPATIENT
Start: 2023-01-26 | End: 2023-01-27 | Stop reason: HOSPADM

## 2023-01-26 RX ORDER — ATORVASTATIN CALCIUM 80 MG/1
80 TABLET, FILM COATED ORAL DAILY
Status: DISCONTINUED | OUTPATIENT
Start: 2023-01-26 | End: 2023-01-27 | Stop reason: HOSPADM

## 2023-01-26 RX ORDER — GLUCOSAMINE HCL/CHONDROITIN SU 500-400 MG
CAPSULE ORAL
Qty: 200 STRIP | Refills: 3 | Status: SHIPPED | OUTPATIENT
Start: 2023-01-26

## 2023-01-26 RX ADMIN — GLIPIZIDE 10 MG: 5 TABLET ORAL at 09:50

## 2023-01-26 RX ADMIN — PREGABALIN 75 MG: 75 CAPSULE ORAL at 09:27

## 2023-01-26 RX ADMIN — BUDESONIDE 500 MCG: 0.5 INHALANT RESPIRATORY (INHALATION) at 08:48

## 2023-01-26 RX ADMIN — MAGNESIUM SULFATE HEPTAHYDRATE 2000 MG: 40 INJECTION, SOLUTION INTRAVENOUS at 09:31

## 2023-01-26 RX ADMIN — CLOPIDOGREL BISULFATE 75 MG: 75 TABLET ORAL at 09:50

## 2023-01-26 RX ADMIN — METOPROLOL TARTRATE 75 MG: 50 TABLET, FILM COATED ORAL at 20:58

## 2023-01-26 RX ADMIN — BUDESONIDE 500 MCG: 0.5 INHALANT RESPIRATORY (INHALATION) at 20:03

## 2023-01-26 RX ADMIN — APIXABAN 5 MG: 5 TABLET, FILM COATED ORAL at 09:27

## 2023-01-26 RX ADMIN — PANTOPRAZOLE SODIUM 40 MG: 40 TABLET, DELAYED RELEASE ORAL at 09:49

## 2023-01-26 RX ADMIN — ALLOPURINOL 100 MG: 100 TABLET ORAL at 09:50

## 2023-01-26 RX ADMIN — METOPROLOL TARTRATE 75 MG: 50 TABLET, FILM COATED ORAL at 09:50

## 2023-01-26 RX ADMIN — OXYCODONE HYDROCHLORIDE 10 MG: 5 TABLET ORAL at 20:58

## 2023-01-26 RX ADMIN — APIXABAN 5 MG: 5 TABLET, FILM COATED ORAL at 21:00

## 2023-01-26 RX ADMIN — LOSARTAN POTASSIUM 100 MG: 100 TABLET, FILM COATED ORAL at 09:50

## 2023-01-26 RX ADMIN — SODIUM CHLORIDE 25 ML: 9 INJECTION, SOLUTION INTRAVENOUS at 21:15

## 2023-01-26 RX ADMIN — ATORVASTATIN CALCIUM 80 MG: 80 TABLET, FILM COATED ORAL at 09:50

## 2023-01-26 RX ADMIN — MAGNESIUM SULFATE HEPTAHYDRATE 1000 MG: 1 INJECTION, SOLUTION INTRAVENOUS at 06:21

## 2023-01-26 RX ADMIN — TIOTROPIUM BROMIDE INHALATION SPRAY 2 PUFF: 3.12 SPRAY, METERED RESPIRATORY (INHALATION) at 08:47

## 2023-01-26 RX ADMIN — ARFORMOTEROL TARTRATE 15 MCG: 15 SOLUTION RESPIRATORY (INHALATION) at 20:03

## 2023-01-26 RX ADMIN — METFORMIN HYDROCHLORIDE 1000 MG: 500 TABLET, EXTENDED RELEASE ORAL at 11:10

## 2023-01-26 RX ADMIN — ARFORMOTEROL TARTRATE 15 MCG: 15 SOLUTION RESPIRATORY (INHALATION) at 08:47

## 2023-01-26 RX ADMIN — PREGABALIN 150 MG: 150 CAPSULE ORAL at 20:58

## 2023-01-26 RX ADMIN — SODIUM PHOSPHATE, MONOBASIC, MONOHYDRATE AND SODIUM PHOSPHATE, DIBASIC, ANHYDROUS 10 MMOL: 276; 142 INJECTION, SOLUTION INTRAVENOUS at 21:16

## 2023-01-26 RX ADMIN — OXYCODONE HYDROCHLORIDE 5 MG: 5 TABLET ORAL at 09:51

## 2023-01-26 RX ADMIN — SODIUM CHLORIDE 25 ML: 9 INJECTION, SOLUTION INTRAVENOUS at 06:20

## 2023-01-26 ASSESSMENT — PAIN SCALES - GENERAL
PAINLEVEL_OUTOF10: 9
PAINLEVEL_OUTOF10: 3
PAINLEVEL_OUTOF10: 3
PAINLEVEL_OUTOF10: 9
PAINLEVEL_OUTOF10: 3
PAINLEVEL_OUTOF10: 3
PAINLEVEL_OUTOF10: 6
PAINLEVEL_OUTOF10: 9
PAINLEVEL_OUTOF10: 4

## 2023-01-26 ASSESSMENT — PAIN SCALES - WONG BAKER
WONGBAKER_NUMERICALRESPONSE: 0
WONGBAKER_NUMERICALRESPONSE: 4
WONGBAKER_NUMERICALRESPONSE: 0
WONGBAKER_NUMERICALRESPONSE: 2
WONGBAKER_NUMERICALRESPONSE: 2
WONGBAKER_NUMERICALRESPONSE: 0
WONGBAKER_NUMERICALRESPONSE: 2
WONGBAKER_NUMERICALRESPONSE: 0
WONGBAKER_NUMERICALRESPONSE: 0

## 2023-01-26 ASSESSMENT — PAIN - FUNCTIONAL ASSESSMENT
PAIN_FUNCTIONAL_ASSESSMENT: PREVENTS OR INTERFERES SOME ACTIVE ACTIVITIES AND ADLS

## 2023-01-26 ASSESSMENT — PAIN DESCRIPTION - ONSET
ONSET: ON-GOING

## 2023-01-26 ASSESSMENT — PAIN DESCRIPTION - FREQUENCY
FREQUENCY: INTERMITTENT
FREQUENCY: CONTINUOUS
FREQUENCY: INTERMITTENT
FREQUENCY: INTERMITTENT

## 2023-01-26 ASSESSMENT — PAIN DESCRIPTION - DESCRIPTORS
DESCRIPTORS: PINS AND NEEDLES
DESCRIPTORS: BURNING
DESCRIPTORS: BURNING
DESCRIPTORS: PINS AND NEEDLES
DESCRIPTORS: BURNING
DESCRIPTORS: BURNING
DESCRIPTORS: PINS AND NEEDLES
DESCRIPTORS: BURNING

## 2023-01-26 ASSESSMENT — PAIN DESCRIPTION - PAIN TYPE
TYPE: NEUROPATHIC PAIN

## 2023-01-26 ASSESSMENT — PAIN DESCRIPTION - LOCATION
LOCATION: LEG
LOCATION: FOOT;LEG
LOCATION: LEG

## 2023-01-26 ASSESSMENT — PAIN DESCRIPTION - ORIENTATION
ORIENTATION: RIGHT;LEFT;LOWER
ORIENTATION: LEFT;RIGHT
ORIENTATION: RIGHT;LEFT;LOWER
ORIENTATION: RIGHT;LEFT
ORIENTATION: RIGHT;LEFT

## 2023-01-26 NOTE — CARE COORDINATION
CM following. Per Attending, pt is now agreeable to SNF placement. CM met with pt at bedside to get SNF choices. Will need precert.       [address]    [facility]          Electronically signed by STEPHANIE Holm on 1/26/2023 at 3:02 PM  [phone]

## 2023-01-26 NOTE — PLAN OF CARE
Problem: Skin/Tissue Integrity  Goal: Absence of new skin breakdown  Description: 1. Monitor for areas of redness and/or skin breakdown  2. Assess vascular access sites hourly  3. Every 4-6 hours minimum:  Change oxygen saturation probe site  4. Every 4-6 hours:  If on nasal continuous positive airway pressure, respiratory therapy assess nares and determine need for appliance change or resting period. Outcome: Progressing  Note: Encouraging patient to turn every 2 hours to prevent skin breakdown. Utilizing air cushion while up in chair.       Problem: Safety - Adult  Goal: Free from fall injury  Outcome: Progressing  Flowsheets  Taken 1/26/2023 1528  Free From Fall Injury: Instruct family/caregiver on patient safety  Taken 1/26/2023 1105  Free From Fall Injury: Instruct family/caregiver on patient safety  Note: Discussed falls precautions and interventions used to promote patient safety      Problem: Pain  Goal: Verbalizes/displays adequate comfort level or baseline comfort level  Outcome: Progressing  Flowsheets  Taken 1/26/2023 1528  Verbalizes/displays adequate comfort level or baseline comfort level:   Encourage patient to monitor pain and request assistance   Administer analgesics based on type and severity of pain and evaluate response   Consider cultural and social influences on pain and pain management   Assess pain using appropriate pain scale   Implement non-pharmacological measures as appropriate and evaluate response   Notify Licensed Independent Practitioner if interventions unsuccessful or patient reports new pain  Taken 1/26/2023 0915  Verbalizes/displays adequate comfort level or baseline comfort level: Encourage patient to monitor pain and request assistance  Note: Discussed pharmacological and non-pharmacological interventions to assist with pain

## 2023-01-26 NOTE — PROGRESS NOTES
Hospitalist Progress Note      PCP: Gabrielle Dave DO    Date of Admission: 1/24/2023    Chief Complaint: Feet pain    Hospital Course: 81 y.o. male with a PMH of COPD, CHF, KAVITA, Hx of PE, CAD, peripheral vascular disease who presented to [facility] with generalized weakness, dry mouth, frequent urination and thirst progressive over the last week. Was found to have HHS and transferred to [facility], [facility] for further care. Started on insulin drip and transitioned to subq insulin by 01/25 and transferred out of ICU     Subjective: He is out of bed in chair, doing well denies chest pain or shortness of breath,  States that for the last 1 week to 10 days he was having increased thirst and was going to the restroom multiple times to urinate.   Now feeling much better      Medications:  Reviewed    Infusion Medications    sodium chloride 25 mL (01/26/23 0620)    dextrose      dextrose 5% and 0.45% NaCl with KCl 20 mEq Stopped (01/24/23 6829)     Scheduled Medications    metFORMIN  1,000 mg Oral Daily with breakfast    glipiZIDE  10 mg Oral QAM AC    allopurinol  100 mg Oral Daily    atorvastatin  80 mg Oral Daily    losartan  100 mg Oral Daily    metoprolol tartrate  75 mg Oral BID    pantoprazole  40 mg Oral QAM AC    clopidogrel  75 mg Oral Daily    pregabalin  75 mg Oral BID    insulin glargine  35 Units SubCUTAneous Nightly    apixaban  5 mg Oral BID    tiotropium  2 puff Inhalation Daily    budesonide  0.5 mg Nebulization BID    arformoterol tartrate  15 mcg Nebulization BID    insulin lispro  0-8 Units SubCUTAneous TID WC    insulin lispro  0-4 Units SubCUTAneous Nightly     PRN Meds: sodium chloride, oxyCODONE **OR** oxyCODONE, glucose, glucagon (rDNA), dextrose, dextrose bolus **OR** dextrose bolus, potassium chloride, magnesium sulfate, sodium phosphate IVPB **OR** sodium phosphate IVPB **OR** sodium phosphate IVPB, polyethylene glycol, dextrose 5% and 0.45% NaCl with KCl 20 mEq, ipratropium-albuterol      Intake/Output Summary (Last 24 hours) at 1/26/2023 1825  Last data filed at 1/26/2023 1312  Gross per 24 hour   Intake 2430 ml   Output 1475 ml   Net 955 ml       Physical Exam Performed:    /74   Pulse 70   Temp 97.9 °F (36.6 °C) (Oral)   Resp 18   Ht 5' 10\" (1.778 m)   Wt 219 lb 5.7 oz (99.5 kg)   SpO2 99%   BMI 31.47 kg/m²     General appearance: No apparent distress, appears stated age and cooperative. HEENT: Pupils equal, round, and reactive to light. Conjunctivae/corneas clear. Neck: Supple, with full range of motion. No jugular venous distention. Trachea midline. Respiratory:  Normal respiratory effort. Clear to auscultation, bilaterally without Rales/Wheezes/Rhonchi. Cardiovascular: Regular rate and rhythm with normal S1/S2 without murmurs, rubs or gallops. Abdomen: Soft, non-tender, non-distended with normal bowel sounds. Musculoskeletal: No clubbing, cyanosis or edema bilaterally. Full range of motion without deformity. Feet tender to light palpation  Skin: Skin color, texture, turgor normal.  No rashes or lesions. Neurologic:  Neurovascularly intact without any focal sensory/motor deficits. Cranial nerves: II-XII intact, grossly non-focal.  Psychiatric: Alert and oriented, thought content appropriate, normal insight  Capillary Refill: Brisk, 3 seconds, normal   Peripheral Pulses: +2 palpable, equal bilaterally       Labs:   Recent Labs     01/24/23  0033 01/25/23  0546 01/26/23 0433   WBC 7.8 7.4 5.2   HGB 11.0* 10.3* 10.3*   HCT 35.3* 31.9* 32.8*   * 83* 78*     Recent Labs     01/25/23  1830 01/26/23  0433 01/26/23  1541    139 136   K 3.9 4.3 3.9    108 103   CO2 23 23 23   BUN 5* 5* 6*   CREATININE 0.7* 0.8 0.8   CALCIUM 8.8 8.8 9.0   PHOS 2.7 3.1 2.4*     Recent Labs     01/24/23 0033   AST 16   ALT 11   BILITOT 0.6   ALKPHOS 175*     No results for input(s): INR in the last 72 hours.   Recent Labs     01/24/23  0033   CKTOTAL 105 TROPONINI 0.01       Urinalysis:      Lab Results   Component Value Date/Time    NITRU Negative 01/24/2023 12:33 AM    WBCUA 10-20 01/24/2023 12:33 AM    BACTERIA Rare 01/24/2023 12:33 AM    RBCUA 5-10 01/24/2023 12:33 AM    RBCUA NEGATIVE 08/10/2016 01:45 PM    BLOODU TRACE-LYSED 01/24/2023 12:33 AM    SPECGRAV <=1.005 01/24/2023 12:33 AM    GLUCOSEU >=1000 01/24/2023 12:33 AM       Radiology:  XR CHEST PORTABLE   Final Result      1. Hyperinflated lungs without focal opacity   2. Chronic tortuous aorta and borderline heart size                   IP CONSULT TO DIABETES EDUCATOR  IP CONSULT TO CRITICAL CARE  IP CONSULT TO PHARMACY  IP CONSULT TO HOME CARE NEEDS    Assessment/Plan:    Active Hospital Problems    Diagnosis     Newly diagnosed diabetes ([facility] [E11.9]      Priority: Medium    Hyperosmolar hyperglycemic state (HHS) ([facility] [E11.00]      Priority: Medium     Hyperosmolar Hyperglycemic State  Hx of Pre-DM w/ new diagnosis of Type II DM, A1c 13.6%  Glucose 1,036 on presentation to [facility] ED. Normal anion gap, normal bicarb. No ketones in urine.  -Patient still hyperglycemic, will add metformin as well as glipizide  Drop Lantus dose to 10 units twice daily with hold for blood glucose less than 140  Carb controlled diet, hypoglycemia protocol    LYDIA, improved, pre-renal due to dehydration from HHS  -Stop IV fluids, LYDIA resolved, Avoid NSAIDs (Ibuprofen, Aleve, Motrin, Naproxen, Toradol etc), Fleet enemas, IV contrast Dye and other nephrotoxins!     Neuropathic Pain, suspect 2/2 DM  -Increase pregabalin to from 150 twice daily which is patient's home dose, and as needed oxy for now but this is not a long-term good medication for neuropathy pain    Hypertension: Restart home metoprolol and losartan, holding Lasix for now but probably continue tomorrow if still here    Chronic conditions:  Peripheral vascular disease, s/p vascular surgeries  -Continue Eliquis, Plavix  HfpEF  -Hold home furosemide for now, can potentially restart at discharge    Hx of DVT/PE  - Continue home Eliquis   KAVITA  COPD  Patient of Dr. Heather Paz. Has worn CPAP in past, old machine recalled, waiting for new one. DVT Prophylaxis: Apixaban (home med)  Diet: ADULT DIET; Regular; 4 carb choices (60 gm/meal);  Low Sodium (2 gm)  Code Status: Full Code  PT/OT Eval Status: as needed    Dispo -medically optimized for discharge, initially was refusing to go to skilled nursing facility but after discussion with the patient as well as case management patient is agreeable, pre-CERT has been started by case management      Phil Rios MD  Internal medicine hospitalist

## 2023-01-26 NOTE — PROGRESS NOTES
Physical Therapy  Facility/Department: [place] [address] [facility]  Physical Therapy Initial Assessment and Treatment    Name: Ish Murillo : 1950  MRN: [id number]  Date of Service: 2023    Discharge Recommendations:    Ish Murillo scored a 17/24 on the AM-PAC short mobility form. Current research shows that an AM-PAC score of 17 or less is typically not associated with a discharge to the patient's home setting. Based on the patient's AM-PAC score and their current functional mobility deficits, it is recommended that the patient have 3-5 sessions per week of Physical Therapy at d/c to increase the patient's independence. Please see assessment section for further patient specific details. If patient discharges prior to next session this note will serve as a discharge summary. Please see below for the latest assessment towards goals. PT Equipment Recommendations  Equipment Needed: Yes (RW if home, defer if to SNF)      Patient Diagnosis(es): The primary encounter diagnosis was LYDIA (acute kidney injury) ([address] A diagnosis of Hyperosmolar hyperglycemic state (HHS) ([address] was also pertinent to this visit. Past Medical History:  has a past medical history of CAD (coronary artery disease), CHF (congestive heart failure) ([address] COPD (chronic obstructive pulmonary disease) ([address] Critical ischemia of lower extremity ([address] Depressive disorder, not elsewhere classified, GERD (gastroesophageal reflux disease), History of colon polyps - 30 seen on colonoscopy 2021, History of MI (myocardial infarction), History of skin ulcer of lower extremity, History of transient ischemic attack (TIA), Hx of blood clots, Hyperlipidemia, Hypertension, Neuropathy, KAVITA (obstructive sleep apnea), Personal history of DVT (deep vein thrombosis), Prolonged emergence from general anesthesia, PVD (peripheral vascular disease) ([address] TIA (transient ischemic attack), and Vertebral fracture.   Past Surgical History:  has a past surgical history that includes Appendectomy; Coronary angioplasty with stent (6/2013); other surgical history (Right, 6/25/2013); Leg Debridement (Right, 7/23/2013); skin split graft (Right, 7/25/2013); other surgical history (Left, 8/27/2013); laminectomy (11/18/2015); transluminal angioplasty (Left, 12/22/2015); Total hip arthroplasty (Right, 09/01/2016); Cholecystectomy, laparoscopic; eye surgery (Left); Colonoscopy (4/23/2021); Colonoscopy (4/23/2021); Colonoscopy (4/23/2021); Femoral Endarterectomy (Right, 11/17/2021); and femoral bypass (Right, 7/18/2022). Assessment   Assessment: Pt lives alone in basement apartment and is normally independent with use of cane in the home and independent in self care. Family assists with grocery shopping/errands. Pt currently is limited by LE pain and needs A x 1 with transfers and gait. Pt would benefit from continue IP therapies to maximize safety and independence. If he choses to return home recommend RW, HHPT, initial 24 hr A  and Home nursing for monitoring due to new diabetes dx. Treatment Diagnosis: Decreased functional mobility 2/2 admission for hyperglycemia  Activity Tolerance  Activity Tolerance: Patient limited by fatigue;Patient limited by pain; Patient limited by endurance  Activity Tolerance Comments: limited by LE pain     Plan   Physcial Therapy Plan  General Plan:  (2-5)  Current Treatment Recommendations: Strengthening, Functional mobility training, Transfer training, Gait training, Stair training, Pain management, Safety education & training  Safety Devices  Type of Devices: Chair alarm in place, Nurse notified, Gait belt, Call light within reach, Left in chair     Restrictions  Position Activity Restriction  Other position/activity restrictions: up with assist     Subjective   General  Additional Pertinent Hx: Adm 1/24 to ICU with weakness and fatigue. Critical glucose  1036.   Transferred to floor on 1/25  Referring Practitioner: Jose Antonio  Diagnosis: Hyperglycemia  Subjective  Subjective: Pt in bed upon PT entry,  agreeable to working with PT. Notes leg pain from neuropathy 8-9/10 meds just given         Social/Functional History  Social/Functional History  Lives With: Alone  Type of Home: Apartment  Home Layout: One level (laundry across the johnson)  Home Access: Stairs to enter with rails  Entrance Stairs - Number of Steps: two up onto the bulding, then four down to his level  Bathroom Shower/Tub: Tub/Shower unit  Bathroom Toilet: Standard (can push up from arms on toilet raiser)  Bathroom Equipment: Grab bars in shower, Shower chair, Hand-held shower, Toilet raiser  Bathroom Accessibility: Accessible  Home Equipment: Walker, standard, Walker, 4 wheeled, Oxygen (2 l at night)  Has the patient had two or more falls in the past year or any fall with injury in the past year?: No  ADL Assistance: Independent  Homemaking Assistance: Independent  Ambulation Assistance: Independent (does not use walker in apartment , just the cane)  Transfer Assistance: Independent  Active : No  Patient's  Info: son, grandson, bus or medical transport  Additional Comments: family can shop for him - pt uses medical transportation for appts.   Vision/Hearing  Vision  Vision: Impaired  Vision Exceptions: Wears glasses for reading  Hearing  Hearing: Within functional limits    Cognition   Orientation  Overall Orientation Status: Within Functional Limits  Cognition  Overall Cognitive Status: WFL     Objective   Heart Rate: 91  Heart Rate Source: Telemetry  BP: (!) 163/78  BP Location: Right upper arm  BP Method: Automatic  Patient Position: Semi fowlers  MAP (Calculated): 106  Resp: 18  SpO2: 96 %  O2 Device: Nasal cannula     Observation/Palpation  Edema: min LE edema noted        AROM RLE (degrees)  RLE AROM: WFL  AROM LLE (degrees)  LLE AROM : WFL  Strength RLE  Comment: grossly 4/5  Strength LLE  Comment: grossly 4/5           Bed mobility  Supine to Sit: Stand by assistance (slow and effortful, HOb elevated, use of side rail, increased time to complete task)  Transfers  Sit to Stand: Contact guard assistance (from eob, and chair)  Stand to Sit: Contact guard assistance (to chair)  Ambulation  Surface: Level tile  Device: Rolling Walker  Assistance: Contact guard assistance  Quality of Gait: slow and effortful, cues to stay within walker and to maneuver in tight spaces. Gait Deviations: Increased IVONE; Decreased step length;Decreased step height     Balance  Comments: CG for stability with functional mobility and gait   Pt stood to urinate with CG, able to stand at sink with same to wash hands. Pt did urinate on the floor - once back to bed, was able to doff wet socks, wash feet with set up and don new dry socks.       AM-PAC Score  AM-PAC Inpatient Mobility Raw Score : 17 (01/26/23 1058)  AM-PAC Inpatient T-Scale Score : 42.13 (01/26/23 1058)  Mobility Inpatient CMS 0-100% Score: 50.57 (01/26/23 1058)  Mobility Inpatient CMS G-Code Modifier : CK (01/26/23 1058)     Goals  Short Term Goals  Time Frame for Short Term Goals: Discharge  Short Term Goal 1: Mod I bed mobility  Short Term Goal 2: Transfers with supervision  Short Term Goal 3: Ambulate 100 ft with RW and SBA  Short Term Goal 4: Up and down 4 steps with rail and CG  Patient Goals   Patient Goals : to return home       Education role of PT, safety, bed mobility, transfers, gait         Therapy Time   Individual Concurrent Group Co-treatment   Time In 0955         Time Out 1050         Minutes 55             Timed Code Treatment Minutes:   40    Total Treatment Minutes:  [address] [place] [address]

## 2023-01-26 NOTE — PROGRESS NOTES
4 Eyes Admission Assessment     I agree as the admission nurse that 2 RN's have performed a thorough Head to Toe Skin Assessment on the patient. ALL assessment sites listed below have been assessed on admission. Areas assessed by both nurses: Chayito Jasmine RN and Carlos Ku RN   [x]   Head, Face, and Ears   [x]   Shoulders, Back, and Chest  [x]   Arms, Elbows, and Hands   [x]   Coccyx, Sacrum, and Ischium  [x]   Legs, Feet, and Heels    Redness right lower leg. Scabs scattered on both lower legs and feet. Surgical scars with redness on right inner leg. Redness in scrotum  Skin tear on bilateral corners of lips. Does the Patient have Skin Breakdown?   No         Isidro Prevention initiated:  Yes   Wound Care Orders initiated:  No. Monitor R Lower Leg, if any changes or drainage contact Wound Care according to Hermelinda Davenport RN ( Wound Care RN)      [address]  nurse consulted for Pressure Injury (Stage 3,4, Unstageable, DTI, NWPT, and Complex wounds) or Isidro score 18 or lower:  Yes  ( Already assessed by wound care RN in ICU, signed off)    Nurse 1 eSignature: Electronically signed by Tessa River RN on 1/25/23 at 11:32 PM EST    **SHARE this note so that the co-signing nurse is able to place an eSignature**    Nurse 2 eSignature: Electronically signed by Anna Burton RN on 1/25/23 at 11:22 PM EST

## 2023-01-26 NOTE — DISCHARGE INSTR - COC
Continuity of Care Form    Patient Name: Halle Rascon    :  1950  MRN:  [id number]    Admit date:  2023  Discharge date:  2023    Code Status Order: Full Code   Advance Directives:     Admitting Physician:  Luz Gutierrez DO  PCP: Eileen Youngblood DO    Discharging Nurse: [facility]/Room#: 7743/6198-69  Discharging Unit Phone Number: [phone]    Emergency Contact:   Extended Emergency Contact Information  Primary Emergency Contact: Aaron Guzman   [address] Phone: [phone]  Relation: Child    Past Surgical History:  Past Surgical History:   Procedure Laterality Date    APPENDECTOMY      CHOLECYSTECTOMY, LAPAROSCOPIC      COLONOSCOPY  2021    COLONOSCOPY POLYPECTOMY SNARE/COLD BIOPSY performed by Lisa Bradley MD at [place] [address]  2021    COLONOSCOPY POLYPECTOMY ABLATION performed by Lisa Bradley MD at [place] [address]  2021    COLONOSCOPY CONTROL HEMORRHAGE [place] MD [place] [facility]  [place]    EYE SURGERY Left     FEMORAL BYPASS Right 2022    RIGHT FEMORAL BELOW KNEE POPLITEAL ARTERY BYPASS WITH IN SITU SAPHENOUS VEIN performed by Chelsi Abel MD at [address] Right 2021    RIGHT FEMORAL ENDARTERECTOMY  RIGHT EXTERNAL ILIAC TO PROFUNDA FEMORAL BYPASS WITH 8MM PTFE GRAFT RIGHT LOWER EXTREMITY ARTERIOGRAM BALLOON ANGIOPLASTY RIGHT PROFUNDA BALLOON ANGIOPLASTY AND STENT OF RIGHT EXTERNAL ILIAC ARTERY performed by Chelsi Abel MD at [address]  2015    Bilateral decompressive lumbar laminectomy L4-5   ; medial facetectomy L4-5 joints  bilaterally; foraminotomies l5   nerve roots bilaterally    LEG DEBRIDEMENT Right 2013    Dr. Mitra Hall - pretibial wound    OTHER SURGICAL HISTORY Right 2013    Dr. Mitra Hall - CFA Endarterectomy w/marsupialization; RLE wound excision & debridement OTHER SURGICAL HISTORY Left 8/27/2013    Dr. Scar Craven - femoral & profunda femoris endarterectomy w/marsupialization patch angioplasty using SFA    SKIN SPLIT GRAFT Right 7/25/2013    Dr. Scar Craven - to non-healing R pretibial wound, 9 x 15 cm    TOTAL HIP ARTHROPLASTY Right 09/01/2016    Dr. Booker Vega Left 12/22/2015    Dr. Scar Craven - CFA exploration, thrombectomy of ileofemoral system, stenting of RAFAL in-stent stenosis w/8 x 37 mm Palmaz        Immunization History:   Immunization History   Administered Date(s) Administered    COVID-19, PFIZER PURPLE top, DILUTE for use, (age 15 y+), 30mcg/0.3mL 03/17/2021, 04/07/2021, 11/01/2021    Influenza 10/14/2013    Influenza Vaccine, unspecified formulation 10/14/2013, 10/22/2015    Influenza Virus Vaccine 10/14/2013, 10/14/2014, 10/22/2015    Influenza, FLUAD, (age 72 y+), Adjuvanted, 0.5mL 09/24/2021, 10/26/2022    Influenza, High Dose (Fluzone 65 yrs and older) 10/22/2015, 11/18/2016, 08/17/2017, 08/30/2018, 10/17/2019, 09/14/2020    Pneumococcal Conjugate 13-valent ([id number]) 10/22/2015    Pneumococcal Conjugate 7-valent (Prevnar7) 03/01/2013    Pneumococcal Polysaccharide ([id number]) 01/03/2018    Tdap (Boostrix, Adacel) 10/14/2013    Zoster Live (Zostavax) 06/07/2014    Zoster Recombinant (Shingrix) 05/08/2018       Active Problems:  Patient Active Problem List   Diagnosis Code    Essential hypertension I10    Hyperlipemia E78.5    Degeneration of intervertebral disc of lumbar region M51.36    KAVITA and COPD overlap syndrome ([facility]) G47.33, J44.9    PVD (peripheral vascular disease) ([facility]) I73.9    Coronary artery disease involving native coronary artery of native heart without angina pectoris I25.10    Idiopathic peripheral neuropathy G60.9    Gastroesophageal reflux disease K21.9    Other spondylosis, lumbosacral region M47.897    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    Pulmonary embolism without acute cor pulmonale (HCC) I26.99 Diastolic dysfunction with chronic heart failure ([facility]) I50.32    Long term (current) use of anticoagulants Z79.01    Atherosclerosis of native artery of right lower extremity with rest pain ([facility]) I70.221    Popliteal artery stenosis, left ([facility]) I70.202    Non-pressure ulcer of right lower extremity with fat layer exposed ([facility] L97.912    Open wound of right lower leg S81.801A    Debility R53.81    Longstanding persistent atrial fibrillation ([facility]) I48.11    Hyperosmolar hyperglycemic state (HHS) ([facility] E11.00    Newly diagnosed diabetes ([facility] E11.9       Isolation/Infection:   Isolation            No Isolation          Patient Infection Status       Infection Onset Added Last Indicated Last Indicated By Review Planned Expiration Resolved Resolved By    None active    Resolved    COVID-19 (Rule Out) 12/05/20 12/05/20 12/05/20 COVID-19 (Ordered)   12/06/20 Rule-Out Test Resulted            Nurse Assessment:  Last Vital Signs: BP (!) 148/76   Pulse 68   Temp 98.2 °F (36.8 °C) (Oral)   Resp 18   Ht 5' 10\" (1.778 m)   Wt 219 lb 5.7 oz (99.5 kg)   SpO2 95%   BMI 31.47 kg/m²     Last documented pain score (0-10 scale): Pain Level: 3  Last Weight:   Wt Readings from Last 1 Encounters:   01/26/23 219 lb 5.7 oz (99.5 kg)     Mental Status:  oriented    IV Access:  - None    Nursing Mobility/ADLs:  Walking   Assisted  Transfer  Assisted  Bathing  Assisted  Dressing  Assisted  Toileting  Assisted  Feeding  Independent  Med Admin  Independent  Med Delivery   whole    Wound Care Documentation and Therapy:        Elimination:  Continence: Bowel: Yes  Bladder: Yes  Urinary Catheter: None   Colostomy/Ileostomy/Ileal Conduit: No       Date of Last BM: 1/27/2023      Intake/Output Summary (Last 24 hours) at 1/26/2023 1343  Last data filed at 1/26/2023 1232  Gross per 24 hour   Intake 3473.12 ml   Output 1925 ml   Net 1548.12 ml     I/O last 3 completed shifts: In: 7965.1 [P.O.:1000;  I.V.:6223; IV Piggyback:742.1]  Out: 3450 [[id number]]    Safety Concerns:     History of Falls (last 30 days)    Impairments/Disabilities:      None    Nutrition Therapy:  Current Nutrition Therapy:   - Oral Diet:  General    Routes of Feeding: Oral  Liquids: Thin Liquids  Daily Fluid Restriction: no  Last Modified Barium Swallow with Video (Video Swallowing Test): not done    Treatments at the Time of Hospital Discharge:   Respiratory Treatments:   Oxygen Therapy:  is on oxygen at 2 L/min per nasal cannula. Ventilator:    - No ventilator support    Rehab Therapies: Physical Therapy and Occupational Therapy  Weight Bearing Status/Restrictions: No weight bearing restrictions  Other Medical Equipment (for information only, NOT a DME order):  walker  Other Treatments:     Patient's personal belongings (please select all that are sent with patient):  alex Blue, clothing    RN SIGNATURE:  Electronically signed by Krista Rogers on 1/27/23 at 4:18 PM EST    CASE MANAGEMENT/SOCIAL WORK SECTION    Inpatient Status Date: 1/24/23    Readmission Risk Assessment Score:  Readmission Risk              Risk of Unplanned Readmission:  21           Discharging to Facility/ [address] ([facility]   Phone: [phone] Fax: [phone]    / signature: Electronically signed by CAROLINE Ryan LSW on 1/27/23 at 2:48 PM EST    PHYSICIAN SECTION    Prognosis: Good    Condition at Discharge: Stable    Rehab Potential (if transferring to Rehab): Good    Recommended Labs or Other Treatments After Discharge:   Physical and occupational therapy  Newly diagnosed diabetes  Monitor blood glucose closely  Lantus start, hold for BG < 606    Physician Certification: I certify the above information and transfer of Landon CentenoJhoan  is necessary for the continuing treatment of the diagnosis listed and that he requires [facility] for less 30 days.      Update Admission H&P: No change in H&P    PHYSICIAN SIGNATURE:  Electronically signed by Diego Rabago MD on 1/26/23 at 1:44 PM EST

## 2023-01-26 NOTE — PLAN OF CARE
Problem: Discharge Planning  Goal: Discharge to home or other facility with appropriate resources  1/26/2023 0051 by Howard Tidwell RN  Outcome: Progressing  Flowsheets (Taken 1/26/2023 0047)  Discharge to home or other facility with appropriate resources: Identify barriers to discharge with patient and caregiver  Note: D/C Plans: TBD, needs pt/ot according to CM. Possible d/c jamar if BG is controlled. Problem: Chronic Conditions and Co-morbidities  Goal: Patient's chronic conditions and co-morbidity symptoms are monitored and maintained or improved  1/26/2023 0051 by Howard Tidwell RN  Outcome: Progressing  Flowsheets (Taken 1/26/2023 0047)  Care Plan - Patient's Chronic Conditions and Co-Morbidity Symptoms are Monitored and Maintained or Improved: Monitor and assess patient's chronic conditions and comorbid symptoms for stability, deterioration, or improvement     Problem: Skin/Tissue Integrity  Goal: Absence of new skin breakdown  Description: 1. Monitor for areas of redness and/or skin breakdown  2. Assess vascular access sites hourly  3. Every 4-6 hours minimum:  Change oxygen saturation probe site  4. Every 4-6 hours:  If on nasal continuous positive airway pressure, respiratory therapy assess nares and determine need for appliance change or resting period. 1/26/2023 0051 by Howard Tidwell RN  Outcome: Progressing  Note: Assessed skin with Edin Ro RN. Turns self on sides and repositioned with assistance PRN. Attended needs. Problem: Safety - Adult  Goal: Free from fall injury  1/26/2023 0051 by Howard Tidwell RN  Outcome: Progressing  Note: Pt is a Fall Risk. See Mathews Schaumann Fall Risk Score. Pt bed in low position and side rails up. Call light and belongings in reach. Pt encouraged to call for assistance. Will continue with hourly rounds for PO intake, pain needs, toileting, and repositioning as needed. Fall precaution placed.       Problem: Respiratory - Adult  Goal: Achieves optimal ventilation and oxygenation  1/26/2023 0051 by Merry Potter RN  Outcome: Progressing  Flowsheets (Taken 1/26/2023 0048)  Achieves optimal ventilation and oxygenation:   Assess for changes in respiratory status   Assess for changes in mentation and behavior   Position to facilitate oxygenation and minimize respiratory effort   Oxygen supplementation based on oxygen saturation or arterial blood gases   Encourage broncho-pulmonary hygiene including cough, deep breathe, incentive spirometry   Assess and instruct to report shortness of breath or any respiratory difficulty  Note: On 2lpm NC.       Problem: Metabolic/Fluid and Electrolytes - Adult  Goal: Electrolytes maintained within normal limits  1/26/2023 0051 by Merry Potter RN  Outcome: Progressing  Flowsheets (Taken 1/26/2023 0051)  Electrolytes maintained within normal limits:   Monitor labs and assess patient for signs and symptoms of electrolyte imbalances   Administer electrolyte replacement as ordered     Problem: Metabolic/Fluid and Electrolytes - Adult  Goal: Hemodynamic stability and optimal renal function maintained  1/25/2023 1435 by Tiffany Garicas RN  Outcome: Progressing  Flowsheets (Taken 1/25/2023 0800)  Hemodynamic stability and optimal renal function maintained:   Monitor labs and assess for signs and symptoms of volume excess or deficit   Monitor intake, output and patient weight   Monitor urine specific gravity, serum osmolarity and serum sodium as indicated or ordered   Monitor response to interventions for patient's volume status, including labs, urine output, blood pressure (other measures as available)   Encourage oral intake as appropriate     Problem: Metabolic/Fluid and Electrolytes - Adult  Goal: Glucose maintained within prescribed range  1/26/2023 0051 by Merry Potter RN  Flowsheets (Taken 1/26/2023 0051)  Glucose maintained within prescribed range:   Monitor blood glucose as ordered   Assess for signs and symptoms of hyperglycemia and hypoglycemia     Problem: Pain  Goal: Verbalizes/displays adequate comfort level or baseline comfort level  1/26/2023 0051 by Sidney Vasquez RN  Outcome: Progressing  Flowsheets (Taken 1/26/2023 0051)  Verbalizes/displays adequate comfort level or baseline comfort level:   Encourage patient to monitor pain and request assistance   Assess pain using appropriate pain scale   Administer analgesics based on type and severity of pain and evaluate response   Implement non-pharmacological measures as appropriate and evaluate response   Notify Licensed Independent Practitioner if interventions unsuccessful or patient reports new pain   Consider cultural and social influences on pain and pain management     Problem: ABCDS Injury Assessment  Goal: Absence of physical injury  Outcome: Progressing  Flowsheets (Taken 1/26/2023 0051)  Absence of Physical Injury: Implement safety measures based on patient assessment

## 2023-01-26 NOTE — DISCHARGE SUMMARY
Hospital Medicine Discharge Summary    Patient ID: Agustin Canada. Patient's PCP: Janes Fang DO    Admit Date: 1/24/2023     Discharge Date:   ***    Admitting Physician: Aleks Aguilar DO     Discharge Physician: Phil Rios MD     Discharge Diagnoses: Active Hospital Problems    Diagnosis Date Noted    Newly diagnosed diabetes ([facility] [address] [E11.9] 01/26/2023     Priority: Medium    Hyperosmolar hyperglycemic state (HHS) ([facility] [address] [E11.00] 01/24/2023     Priority: Medium       The patient was seen and examined on day of discharge and this discharge summary is in conjunction with any daily progress note from day of discharge. Hospital Course: ***        Physical Exam Performed:     BP (!) 150/73   Pulse 72   Temp (!) 96.6 °F (35.9 °C) (Oral) Comment: will reassess  Resp 18   Ht 5' 10\" (1.778 m)   Wt 219 lb 5.7 oz (99.5 kg)   SpO2 94%   BMI 31.47 kg/m²       General appearance:  No apparent distress, appears stated age and cooperative. HEENT:  Normal cephalic, atraumatic without obvious deformity. Pupils equal, round, and reactive to light. Extra ocular muscles intact. Conjunctivae/corneas clear. Neck: Supple, with full range of motion. No jugular venous distention. Trachea midline. Respiratory:  Normal respiratory effort. Clear to auscultation, bilaterally without Rales/Wheezes/Rhonchi. Cardiovascular:  Regular rate and rhythm with normal S1/S2 without murmurs, rubs or gallops. Abdomen: Soft, non-tender, non-distended with normal bowel sounds. Musculoskeletal:  No clubbing, cyanosis or edema bilaterally. Full range of motion without deformity. Skin: Skin color, texture, turgor normal.  No rashes or lesions. Neurologic:  Neurovascularly intact without any focal sensory/motor deficits.  Cranial nerves: II-XII intact, grossly non-focal.  Psychiatric:  Alert and oriented, thought content appropriate, normal insight  Capillary Refill: Brisk,< 3 seconds   Peripheral Pulses: +2 palpable, equal bilaterally       Labs: For convenience and continuity at follow-up the following most recent labs are provided:      CBC:    Lab Results   Component Value Date/Time    WBC 5.3 01/27/2023 05:14 AM    HGB 10.6 01/27/2023 05:14 AM    HCT 33.5 01/27/2023 05:14 AM    PLT 83 01/27/2023 05:14 AM       Renal:    Lab Results   Component Value Date/Time     01/27/2023 05:14 AM    K 3.8 01/27/2023 05:14 AM    K 3.7 10/20/2022 12:37 AM     01/27/2023 05:14 AM    CO2 24 01/27/2023 05:14 AM    BUN 7 01/27/2023 05:14 AM    CREATININE 0.9 01/27/2023 05:14 AM    CALCIUM 9.0 01/27/2023 05:14 AM    PHOS 3.4 01/27/2023 05:14 AM         Significant Diagnostic Studies    Radiology:   XR CHEST PORTABLE   Final Result      1. Hyperinflated lungs without focal opacity   2.  Chronic tortuous aorta and borderline heart size                      Consults:     IP CONSULT TO DIABETES EDUCATOR  IP CONSULT TO CRITICAL CARE  IP CONSULT TO PHARMACY  IP CONSULT TO HOME CARE NEEDS    Disposition:  ***     Condition at Discharge: [facility] Patient Condition:[id number]}    Discharge Instructions/Follow-up:    Physical and occupational therapy  Newly diagnosed diabetes  Monitor blood glucose closely  Lantus start, hold for BG < 120    Code Status:  Full Code    Activity: activity as tolerated    Diet: cardiac diet and diabetic diet      Discharge Medications:     Current Discharge Medication List             Details   insulin glargine (LANTUS;BASAGLAR) 100 UNIT/ML injection pen Inject 5 Units into the skin nightly Hold for BG < 120  Qty: 5 Adjustable Dose Pre-filled Pen Syringe, Refills: 3      glipiZIDE (GLUCOTROL) 10 MG tablet Take 1 tablet by mouth every morning (before breakfast)  Qty: 60 tablet, Refills: 3      metFORMIN (GLUCOPHAGE-XR) 500 MG extended release tablet Take 2 tablets by mouth daily (with breakfast)  Qty: 30 tablet, Refills: 3      glucose monitoring (FREESTYLE FREEDOM) kit 1 kit by Does not apply route daily  Qty: 1 kit, Refills: 0      Insulin Pen Needle ([employer] PEN NEEDLES 29G) 29G X 12MM MISC 1 each by Does not apply route daily  Qty: 100 each, Refills: 3      blood glucose monitor strips Test 4 times a day & as needed for symptoms of irregular blood glucose. Dispense sufficient amount for indicated testing frequency plus additional to accommodate PRN testing needs. Qty: 200 strip, Refills: 3    Comments: Brand per patient preference. May round up to next available package size. Details   metoprolol tartrate (LOPRESSOR) 50 MG tablet Take 1 tablet by mouth 2 times daily  Qty: 90 tablet, Refills: 5                Details   pregabalin (LYRICA) 150 MG capsule Take 1 capsule by mouth 2 times daily for 30 days. Qty: 180 capsule, Refills: 1    Associated Diagnoses: Idiopathic peripheral neuropathy      ELIQUIS 5 MG TABS tablet TAKE 1 TABLET BY MOUTH TWICE DAILY  Qty: 60 tablet, Refills: 1      senna (SENOKOT) 8.6 MG TABS tablet Take 1 tablet by mouth nightly as needed      clopidogrel (PLAVIX) 75 MG tablet TAKE 1 TABLET BY MOUTH DAILY  Qty: 30 tablet, Refills: 3      ipratropium-albuterol (DUONEB) 0.5-2.5 (3) MG/3ML SOLN nebulizer solution Inhale 3 mLs into the lungs every 4 hours as needed for Shortness of Breath  Qty: 360 mL, Refills: 4      albuterol sulfate HFA (PROVENTIL;VENTOLIN;PROAIR) 108 (90 Base) MCG/ACT inhaler Inhale 2 puffs into the lungs every 6 hours as needed for Wheezing  Qty: 18 g, Refills: 2    Associated Diagnoses: KAVITA and COPD overlap syndrome (HCC)      omeprazole (PRILOSEC) 40 MG delayed release capsule TAKE 1 CAPSULE BY MOUTH DAILY  Qty: 30 capsule, Refills: 3      Evolocumab 140 MG/ML SOAJ Inject 140 mg into the skin every 14 days Take an injection every 14 days. fluticasone-umeclidin-vilant (TRELEGY ELLIPTA) 100-62.5-25 MCG/INH AEPB Inhale 1 puff into the lungs in the morning.   Qty: 28 each, Refills: 11    Associated Diagnoses: KAVITA and COPD overlap syndrome (HCC)      potassium chloride (MICRO-K) 10 MEQ extended release capsule 1 tablet  Qty: 60 capsule, Refills: 1    Associated Diagnoses: Chronic diastolic heart failure (HCC)      furosemide (LASIX) 40 MG tablet Take 1 tablet by mouth in the morning. Qty: 90 tablet, Refills: 3    Associated Diagnoses: Chronic diastolic heart failure (HCC)      losartan (COZAAR) 100 MG tablet Take 1 tablet by mouth in the morning. Qty: 30 tablet, Refills: 3      atorvastatin (LIPITOR) 80 MG tablet Take 1 tablet by mouth daily  Qty: 90 tablet, Refills: 1    Associated Diagnoses: Coronary artery disease involving native coronary artery of native heart without angina pectoris; Mixed hyperlipidemia      allopurinol (ZYLOPRIM) 100 MG tablet Take 1 tablet by mouth daily  Qty: 180 tablet, Refills: 1    Associated Diagnoses: Chronic idiopathic gout involving toe of right foot without tophus             Time Spent on discharge is more than 30 minutes in the examination, evaluation, counseling and review of medications and discharge plan. Signed:    Ottoniel Clarke MD   1/27/2023      Thank you Leni Maharaj DO for the opportunity to be involved in this patient's care. If you have any questions or concerns please feel free to contact me at [phone]. morning. Qty: 30 tablet, Refills: 3      atorvastatin (LIPITOR) 80 MG tablet Take 1 tablet by mouth daily  Qty: 90 tablet, Refills: 1    Associated Diagnoses: Coronary artery disease involving native coronary artery of native heart without angina pectoris; Mixed hyperlipidemia      allopurinol (ZYLOPRIM) 100 MG tablet Take 1 tablet by mouth daily  Qty: 180 tablet, Refills: 1    Associated Diagnoses: Chronic idiopathic gout involving toe of right foot without tophus             Time Spent on discharge is more than 30 minutes in the examination, evaluation, counseling and review of medications and discharge plan. Signed:    Alvin Abdi MD   1/27/2023      Thank you Dakota Escobar DO for the opportunity to be involved in this patient's care. If you have any questions or concerns please feel free to contact me at [phone].

## 2023-01-26 NOTE — PROGRESS NOTES
Occupational Therapy  Facility/Department: [place] [address] [facility]  Occupational Therapy Initial Assessment    Name: Halle Rascon : 1950  MRN: [id number]  Date of Service: 2023    Discharge Recommendations: Halle Rascon scored a 17/24 on the AM-PAC ADL Inpatient form. Current research shows that an AM-PAC score of 17 or less is typically not associated with a discharge to the patient's home setting. Based on the patient's AM-PAC score and their current ADL deficits, it is recommended that the patient have 3-5 sessions per week of Occupational Therapy at d/c to increase the patient's independence. Please see assessment section for further patient specific details. If patient discharges prior to next session this note will serve as a discharge summary. Please see below for the latest assessment towards goals. OT Equipment Recommendations  Equipment Needed: No  Other: defer       Patient Diagnosis(es): The primary encounter diagnosis was LYDIA (acute kidney injury) ([facility] [address] Diagnoses of Hyperosmolar hyperglycemic state (HHS) ([address] and Newly diagnosed diabetes ([address] were also pertinent to this visit. Past Medical History:  has a past medical history of CAD (coronary artery disease), CHF (congestive heart failure) ([address] COPD (chronic obstructive pulmonary disease) ([address] Critical ischemia of lower extremity ([address] Depressive disorder, not elsewhere classified, GERD (gastroesophageal reflux disease), History of colon polyps - 30 seen on colonoscopy 2021, History of MI (myocardial infarction), History of skin ulcer of lower extremity, History of transient ischemic attack (TIA), Hx of blood clots, Hyperlipidemia, Hypertension, Neuropathy, KAVITA (obstructive sleep apnea), Personal history of DVT (deep vein thrombosis), Prolonged emergence from general anesthesia, PVD (peripheral vascular disease) ([address] TIA (transient ischemic attack), and Vertebral fracture.   Past Surgical History:  has a past surgical history that includes Appendectomy; Coronary angioplasty with stent (6/2013); other surgical history (Right, 6/25/2013); Leg Debridement (Right, 7/23/2013); skin split graft (Right, 7/25/2013); other surgical history (Left, 8/27/2013); laminectomy (11/18/2015); transluminal angioplasty (Left, 12/22/2015); Total hip arthroplasty (Right, 09/01/2016); Cholecystectomy, laparoscopic; eye surgery (Left); Colonoscopy (4/23/2021); Colonoscopy (4/23/2021); Colonoscopy (4/23/2021); Femoral Endarterectomy (Right, 11/17/2021); and femoral bypass (Right, 7/18/2022). Treatment Diagnosis: decreased independence with ADLs and fx mobility      Assessment   Performance deficits / Impairments: Decreased functional mobility ; Decreased endurance;Decreased ADL status  Assessment: Pt is from home alone and typically independent at baseline and uses cane in home. Pt now below this level of independence and needing CGA for all transfers and mobility with 2WW. Pt needed mod A for LB dressing and has \"burning\" feeling in his feet. Pt on RA throughout session with spo2 >90% throughout session but pt with weezing noted after mobility to/from bathroom. Pt would benefit from ongoing inpatient therapy services at discharge to maximize functional independence and endurance for ADLs and fx mobility. Pt does not have 24hr assistance that would be needed for safe d/c home. Will continue to follow on acute OT POC.   Treatment Diagnosis: decreased independence with ADLs and fx mobility  Prognosis: Good  Decision Making: Medium Complexity  Activity Tolerance  Activity Tolerance: Patient Tolerated treatment well  Activity Tolerance Comments: pt with some weezing on RA after mobility with spo2 94% and up to 97%        Plan   Occupational Therapy Plan  Times Per Week: 2-5     Restrictions  Position Activity Restriction  Other position/activity restrictions: up with assist    Subjective   General  Chart Reviewed: Yes  Patient assessed for rehabilitation services?: Yes  Additional Pertinent Hx: Adm 1/24 to ICU with weakness and fatigue. Critical glucose  1036. Transferred to floor on 1/25  Family / Caregiver Present: No  Referring Practitioner: Alisson Warner MD  Diagnosis: hyperosmolar hyperglycemic  Subjective  Subjective: Pt reclined in chair upon OT arrival and agreeable to OT evaluation. Pt stating, \"I was going to go home but the doctor talked to me about how my concrete floors in my house wouldn't be safe with how I am doing right now\". Social/Functional History  Social/Functional History  Lives With: Alone  Type of Home: Apartment  Home Layout: One level (laundry across the johnson)  Home Access: Stairs to enter with rails  Entrance Stairs - Number of Steps: two up onto the bulding, then four down to his level  Bathroom Shower/Tub: Tub/Shower unit  Bathroom Toilet: Standard (can push up from arms on toilet raiser)  Bathroom Equipment: Grab bars in shower, Shower chair, Hand-held shower, Toilet raiser  Bathroom Accessibility: Accessible  Home Equipment: Walker, standard, Walker, 4 wheeled, Oxygen (2 l at night)  Has the patient had two or more falls in the past year or any fall with injury in the past year?: No  ADL Assistance: Independent  Homemaking Assistance: Independent  Ambulation Assistance: Independent (does not use walker in apartment , just the cane)  Transfer Assistance: Independent  Active : No  Patient's  Info: son, grandson, bus or medical transport  Additional Comments: family can shop for him - pt uses medical transportation for appts.        Objective   Heart Rate: 68  Heart Rate Source: Telemetry  BP: (!) 148/76  BP Location: Right upper arm  BP Method: Automatic  Patient Position: Semi fowlers  MAP (Calculated): 100  Resp: 18  SpO2: 95 %  O2 Device: None (Room air)          Observation/Palpation  Edema: min LE edema noted  Safety Devices  Type of Devices: Chair alarm in place;Nurse notified;Gait belt;Call light within reach; Left in chair  Balance  Sitting: Intact  Standing: Impaired (pt required CGA standing at 2WW)  Gait  Overall Level of Assistance: Contact-guard assistance (pt used 2WW to ambulate to/from bathroom ~20 feet)  Toilet Transfers  Toilet - Technique: Ambulating  Equipment Used: Standard toilet  Toilet Transfer: Contact guard assistance  Toilet Transfers Comments: use of grab bar on L side  AROM: Within functional limits  Strength: Generally decreased, functional  Coordination: Within functional limits  Tone: Normal  Sensation: Intact  ADL  Feeding: Setup; Beverage management  Grooming: Contact guard assistance;Setup  Grooming Skilled Clinical Factors: to stand at sink and perform face washing, hand washing, mouthwash and hair combing  UE Dressing: Minimal assistance  UE Dressing Skilled Clinical Factors: to exchange gowns  LE Dressing: Moderate assistance  LE Dressing Skilled Clinical Factors: to doff sock s and don new socks and PJ pants  Toileting: Contact guard assistance;Setup  Toileting Skilled Clinical Factors: pt urinated seated on commode; has been using HH urinal in chair; pt able to perform kelly care with warm bath wipes standing at grab bar with setup and CGA     Activity Tolerance  Activity Tolerance: Patient limited by fatigue;Patient limited by pain; Patient limited by endurance  Activity Tolerance Comments: limited by LE pain     Transfers  Sit to stand: Contact guard assistance  Stand to sit: Contact guard assistance  Vision  Vision: Impaired  Vision Exceptions: Wears glasses for reading  Hearing  Hearing: Within functional limits  Cognition  Overall Cognitive Status: WFL  Orientation  Overall Orientation Status: Within Functional Limits                  Education Given To: Patient  Education Provided: Role of Therapy;Plan of Care;Transfer Training  Education Method: Verbal  Barriers to Learning: None  Education Outcome: Verbalized understanding                        G-Code     OutComes Score AM-PAC Score        AM-PAC Inpatient Daily Activity Raw Score: 17 (01/26/23 1541)  AM-PAC Inpatient ADL T-Scale Score : 37.26 (01/26/23 1541)  ADL Inpatient CMS 0-100% Score: 50.11 (01/26/23 1541)  ADL Inpatient CMS G-Code Modifier : CK (01/26/23 1541)    Tinneti Score       Goals  Short Term Goals  Time Frame for Short Term Goals: by discharge  Short Term Goal 1: Pt will perform transfers with supervision  Short Term Goal 2: Pt will perform LB dressing with CGA  Short Term Goal 3: Pt will perform toileting with supervision  Patient Goals   Patient goals : not stated       Therapy Time   Individual Concurrent Group Co-treatment   Time In 1452         Time Out 1535         Minutes 43         Timed Code Treatment Minutes: 28 Minutes (+ 15 min OT eval)       Sunni Crews, OT

## 2023-01-26 NOTE — PROGRESS NOTES
Mg 1.60 mg/dL. Mg Level Mg Replacement Action  1.4 to 1.6 1 gram IVPB x 2 doses (2 grams total) in MAR.     5763 ongoing 1st dose. Mg lab draw ordered q12h. Next is at 1600. Will pass  next dose administration to day shift RN.

## 2023-01-26 NOTE — CARE COORDINATION
CTN contacted Diana with [facility] [phone]. They have accepted this patient and will pull referral from [facility].  They will contact patient and make arrangements for [facility] by 1/28  Electronically signed by Marc Green LPN on 1/33/8508 at 1:33 PM

## 2023-01-26 NOTE — PROGRESS NOTES
Patient intermittently complaining of leg pain that does not prevent activities. Pain meds offered. On 02 at 2lpm; baseline 2lpm  at night. Vitally stable.     Transferred to PCU; all belongings handed over to [place];

## 2023-01-26 NOTE — PROGRESS NOTES
Received pt through bed transfer with Becki Rea RN accompany. IVF LRS running 100ml/hr. A&O x4. Skin assessment done with Lorraine Harris RN. Vital signs and the rest of assessment done. Introduced pt to room, safety and fall contract. Will continue to monitor.

## 2023-01-26 NOTE — PROGRESS NOTES
Diabetes education: Time spent 75 minutes. Educated patient on DM type 2, carb controlled diet, hypo/hyperglycemia symptoms, diabetes medications ordered, and how to administer insulin. Patient was able to perform teach back method. Diabetes education material on related topics provided to patient. Patient verbalized understanding for this session.

## 2023-01-27 VITALS
DIASTOLIC BLOOD PRESSURE: 68 MMHG | WEIGHT: 219.36 LBS | HEIGHT: 70 IN | BODY MASS INDEX: 31.4 KG/M2 | OXYGEN SATURATION: 94 % | SYSTOLIC BLOOD PRESSURE: 124 MMHG | RESPIRATION RATE: 18 BRPM | TEMPERATURE: 97.7 F | HEART RATE: 67 BPM

## 2023-01-27 LAB
ANION GAP SERPL CALCULATED.3IONS-SCNC: 11 MMOL/L (ref 3–16)
ANION GAP SERPL CALCULATED.3IONS-SCNC: 7 MMOL/L (ref 3–16)
BASOPHILS ABSOLUTE: 0 K/UL (ref 0–0.2)
BASOPHILS RELATIVE PERCENT: 0.9 %
BUN BLDV-MCNC: 7 MG/DL (ref 7–20)
BUN BLDV-MCNC: 8 MG/DL (ref 7–20)
CALCIUM SERPL-MCNC: 8.9 MG/DL (ref 8.3–10.6)
CALCIUM SERPL-MCNC: 9 MG/DL (ref 8.3–10.6)
CHLORIDE BLD-SCNC: 103 MMOL/L (ref 99–110)
CHLORIDE BLD-SCNC: 108 MMOL/L (ref 99–110)
CO2: 23 MMOL/L (ref 21–32)
CO2: 24 MMOL/L (ref 21–32)
CREAT SERPL-MCNC: 0.9 MG/DL (ref 0.8–1.3)
CREAT SERPL-MCNC: 0.9 MG/DL (ref 0.8–1.3)
EOSINOPHILS ABSOLUTE: 0.3 K/UL (ref 0–0.6)
EOSINOPHILS RELATIVE PERCENT: 5.1 %
GFR SERPL CREATININE-BSD FRML MDRD: >60 ML/MIN/{1.73_M2}
GFR SERPL CREATININE-BSD FRML MDRD: >60 ML/MIN/{1.73_M2}
GLUCOSE BLD-MCNC: 116 MG/DL (ref 70–99)
GLUCOSE BLD-MCNC: 119 MG/DL (ref 70–99)
GLUCOSE BLD-MCNC: 125 MG/DL (ref 70–99)
GLUCOSE BLD-MCNC: 130 MG/DL (ref 70–99)
GLUCOSE BLD-MCNC: 139 MG/DL (ref 70–99)
GLUCOSE BLD-MCNC: 143 MG/DL (ref 70–99)
HCT VFR BLD CALC: 33.5 % (ref 40.5–52.5)
HEMOGLOBIN: 10.6 G/DL (ref 13.5–17.5)
LYMPHOCYTES ABSOLUTE: 1.4 K/UL (ref 1–5.1)
LYMPHOCYTES RELATIVE PERCENT: 25.7 %
MAGNESIUM: 1.6 MG/DL (ref 1.8–2.4)
MAGNESIUM: 1.9 MG/DL (ref 1.8–2.4)
MCH RBC QN AUTO: 26.1 PG (ref 26–34)
MCHC RBC AUTO-ENTMCNC: 31.5 G/DL (ref 31–36)
MCV RBC AUTO: 82.9 FL (ref 80–100)
MONOCYTES ABSOLUTE: 0.5 K/UL (ref 0–1.3)
MONOCYTES RELATIVE PERCENT: 8.8 %
NEUTROPHILS ABSOLUTE: 3.2 K/UL (ref 1.7–7.7)
NEUTROPHILS RELATIVE PERCENT: 59.5 %
PDW BLD-RTO: 21.7 % (ref 12.4–15.4)
PERFORMED ON: ABNORMAL
PHOSPHORUS: 2.8 MG/DL (ref 2.5–4.9)
PHOSPHORUS: 3.4 MG/DL (ref 2.5–4.9)
PLATELET # BLD: 83 K/UL (ref 135–450)
PMV BLD AUTO: 9.9 FL (ref 5–10.5)
POTASSIUM SERPL-SCNC: 3.8 MMOL/L (ref 3.5–5.1)
POTASSIUM SERPL-SCNC: 4.1 MMOL/L (ref 3.5–5.1)
RBC # BLD: 4.04 M/UL (ref 4.2–5.9)
SARS-COV-2, NAAT: NOT DETECTED
SODIUM BLD-SCNC: 137 MMOL/L (ref 136–145)
SODIUM BLD-SCNC: 139 MMOL/L (ref 136–145)
WBC # BLD: 5.3 K/UL (ref 4–11)

## 2023-01-27 PROCEDURE — 6370000000 HC RX 637 (ALT 250 FOR IP): Performed by: INTERNAL MEDICINE

## 2023-01-27 PROCEDURE — 36415 COLL VENOUS BLD VENIPUNCTURE: CPT

## 2023-01-27 PROCEDURE — 83735 ASSAY OF MAGNESIUM: CPT

## 2023-01-27 PROCEDURE — 87635 SARS-COV-2 COVID-19 AMP PRB: CPT

## 2023-01-27 PROCEDURE — 94761 N-INVAS EAR/PLS OXIMETRY MLT: CPT

## 2023-01-27 PROCEDURE — 6370000000 HC RX 637 (ALT 250 FOR IP)

## 2023-01-27 PROCEDURE — 84100 ASSAY OF PHOSPHORUS: CPT

## 2023-01-27 PROCEDURE — 97530 THERAPEUTIC ACTIVITIES: CPT

## 2023-01-27 PROCEDURE — 80048 BASIC METABOLIC PNL TOTAL CA: CPT

## 2023-01-27 PROCEDURE — 85025 COMPLETE CBC W/AUTO DIFF WBC: CPT

## 2023-01-27 PROCEDURE — 94640 AIRWAY INHALATION TREATMENT: CPT

## 2023-01-27 PROCEDURE — 97116 GAIT TRAINING THERAPY: CPT

## 2023-01-27 PROCEDURE — 2700000000 HC OXYGEN THERAPY PER DAY

## 2023-01-27 PROCEDURE — 6360000002 HC RX W HCPCS

## 2023-01-27 RX ORDER — METOPROLOL TARTRATE 50 MG/1
50 TABLET, FILM COATED ORAL 2 TIMES DAILY
Qty: 90 TABLET | Refills: 5 | Status: SHIPPED | OUTPATIENT
Start: 2023-01-27

## 2023-01-27 RX ORDER — METOPROLOL TARTRATE 50 MG/1
50 TABLET, FILM COATED ORAL 2 TIMES DAILY
Status: DISCONTINUED | OUTPATIENT
Start: 2023-01-27 | End: 2023-01-27 | Stop reason: HOSPADM

## 2023-01-27 RX ADMIN — ALLOPURINOL 100 MG: 100 TABLET ORAL at 08:11

## 2023-01-27 RX ADMIN — PANTOPRAZOLE SODIUM 40 MG: 40 TABLET, DELAYED RELEASE ORAL at 06:56

## 2023-01-27 RX ADMIN — GLIPIZIDE 10 MG: 5 TABLET ORAL at 08:11

## 2023-01-27 RX ADMIN — METFORMIN HYDROCHLORIDE 1000 MG: 500 TABLET, EXTENDED RELEASE ORAL at 08:12

## 2023-01-27 RX ADMIN — TIOTROPIUM BROMIDE INHALATION SPRAY 2 PUFF: 3.12 SPRAY, METERED RESPIRATORY (INHALATION) at 08:28

## 2023-01-27 RX ADMIN — BUDESONIDE 500 MCG: 0.5 INHALANT RESPIRATORY (INHALATION) at 08:28

## 2023-01-27 RX ADMIN — PREGABALIN 150 MG: 150 CAPSULE ORAL at 08:11

## 2023-01-27 RX ADMIN — CLOPIDOGREL BISULFATE 75 MG: 75 TABLET ORAL at 08:11

## 2023-01-27 RX ADMIN — LOSARTAN POTASSIUM 100 MG: 100 TABLET, FILM COATED ORAL at 08:12

## 2023-01-27 RX ADMIN — METOPROLOL TARTRATE 75 MG: 50 TABLET, FILM COATED ORAL at 08:11

## 2023-01-27 RX ADMIN — APIXABAN 5 MG: 5 TABLET, FILM COATED ORAL at 08:11

## 2023-01-27 RX ADMIN — ARFORMOTEROL TARTRATE 15 MCG: 15 SOLUTION RESPIRATORY (INHALATION) at 08:28

## 2023-01-27 RX ADMIN — ATORVASTATIN CALCIUM 80 MG: 80 TABLET, FILM COATED ORAL at 08:11

## 2023-01-27 ASSESSMENT — PAIN SCALES - WONG BAKER
WONGBAKER_NUMERICALRESPONSE: 0

## 2023-01-27 ASSESSMENT — PAIN SCALES - GENERAL
PAINLEVEL_OUTOF10: 4
PAINLEVEL_OUTOF10: 0
PAINLEVEL_OUTOF10: 0

## 2023-01-27 ASSESSMENT — PAIN - FUNCTIONAL ASSESSMENT: PAIN_FUNCTIONAL_ASSESSMENT: PREVENTS OR INTERFERES SOME ACTIVE ACTIVITIES AND ADLS

## 2023-01-27 ASSESSMENT — PAIN DESCRIPTION - ONSET: ONSET: ON-GOING

## 2023-01-27 ASSESSMENT — PAIN DESCRIPTION - ORIENTATION: ORIENTATION: RIGHT;LEFT;LOWER

## 2023-01-27 ASSESSMENT — PAIN DESCRIPTION - DESCRIPTORS: DESCRIPTORS: BURNING

## 2023-01-27 ASSESSMENT — PAIN DESCRIPTION - LOCATION
LOCATION: LEG
LOCATION: LEG

## 2023-01-27 ASSESSMENT — PAIN DESCRIPTION - FREQUENCY: FREQUENCY: CONTINUOUS

## 2023-01-27 ASSESSMENT — PAIN DESCRIPTION - PAIN TYPE: TYPE: NEUROPATHIC PAIN

## 2023-01-27 NOTE — PROGRESS NOTES
Phos 2.4 mg/dL    Phos level Phosphorus Replacement Action  2.3 to 2.7 mg/dL 10 mmol IVPB over 4 hours     2116 Administered 10 mmol IVPB Na Phosphate x 4hrs. Next lab draw is 0400. Will continue to monitor.

## 2023-01-27 NOTE — PLAN OF CARE
Problem: Discharge Planning  Goal: Discharge to home or other facility with appropriate resources  1/26/2023 2220 by Chris Schulte RN  Outcome: Progressing  Flowsheets (Taken 1/26/2023 2220)  Discharge to home or other facility with appropriate resources: Identify barriers to discharge with patient and caregiver  Note: D/C Plans: Pt agreeable to SNF - referrals ongoing  Precert needed      Problem: Chronic Conditions and Co-morbidities  Goal: Patient's chronic conditions and co-morbidity symptoms are monitored and maintained or improved  1/26/2023 2220 by Chris Schulte RN  Outcome: Progressing  Flowsheets (Taken 1/26/2023 2220)  Care Plan - Patient's Chronic Conditions and Co-Morbidity Symptoms are Monitored and Maintained or Improved:   Monitor and assess patient's chronic conditions and comorbid symptoms for stability, deterioration, or improvement   Collaborate with multidisciplinary team to address chronic and comorbid conditions and prevent exacerbation or deterioration   Update acute care plan with appropriate goals if chronic or comorbid symptoms are exacerbated and prevent overall improvement and discharge     Problem: Skin/Tissue Integrity  Goal: Absence of new skin breakdown  Description: 1. Monitor for areas of redness and/or skin breakdown  2. Assess vascular access sites hourly  3. Every 4-6 hours minimum:  Change oxygen saturation probe site  1/26/2023 2220 by Chris Schulte RN  Outcome: Progressing  Note: Assessed skin. Turn self and repositioned with assistance PRN. Attended needs. Problem: Safety - Adult  Goal: Free from fall injury  1/26/2023 2220 by Chris Schulte RN  Outcome: Progressing  Note: Pt is a Fall Risk. See Lenget Doheny Fall Risk Score. Pt bed in low position and side rails up. Call light and belongings in reach. Pt encouraged to call for assistance. Will continue with hourly rounds for PO intake, pain needs, toileting, and repositioning as needed.    Fall precautions placed. Problem: Respiratory - Adult  Goal: Achieves optimal ventilation and oxygenation  1/26/2023 2220 by Cooper Castellanos RN  Outcome: Progressing  Flowsheets (Taken 1/26/2023 2220)  Achieves optimal ventilation and oxygenation:   Assess for changes in respiratory status   Assess for changes in mentation and behavior   Position to facilitate oxygenation and minimize respiratory effort   Oxygen supplementation based on oxygen saturation or arterial blood gases   Encourage broncho-pulmonary hygiene including cough, deep breathe, incentive spirometry   Assess the need for suctioning and aspirate as needed   Assess and instruct to report shortness of breath or any respiratory difficulty   Respiratory therapy support as indicated  Note: SOB on exertion even with >95% O2sat. COPD. Breathing treatments. On 2lpm NC. Will monitor.       Problem: Metabolic/Fluid and Electrolytes - Adult  Goal: Electrolytes maintained within normal limits  1/26/2023 2220 by Cooper Castellanos RN  Outcome: Progressing  Flowsheets (Taken 1/26/2023 2220)  Electrolytes maintained within normal limits:   Monitor labs and assess patient for signs and symptoms of electrolyte imbalances   Administer electrolyte replacement as ordered   Monitor response to electrolyte replacements, including repeat lab results as appropriate     Problem: Metabolic/Fluid and Electrolytes - Adult  Goal: Glucose maintained within prescribed range  1/26/2023 2220 by Cooper Castellanos RN  Flowsheets (Taken 1/26/2023 2220)  Glucose maintained within prescribed range:   Monitor blood glucose as ordered   Assess for signs and symptoms of hyperglycemia and hypoglycemia   Administer ordered medications to maintain glucose within target range   Instruct patient on self management of diabetes and initiate consult as needed   Assess barriers to adequate nutritional intake and initiate nutrition consult as needed     Problem: Pain  Goal: Verbalizes/displays adequate comfort level or baseline comfort level  1/26/2023 2220 by Davina Fischer RN  Outcome: Progressing  Flowsheets  Taken 1/26/2023 2220 by Davina Fischer RN  Verbalizes/displays adequate comfort level or baseline comfort level:   Encourage patient to monitor pain and request assistance   Assess pain using appropriate pain scale   Administer analgesics based on type and severity of pain and evaluate response   Implement non-pharmacological measures as appropriate and evaluate response   Consider cultural and social influences on pain and pain management   Notify Licensed Independent Practitioner if interventions unsuccessful or patient reports new pain

## 2023-01-27 NOTE — PLAN OF CARE
Problem: Safety - Adult  Goal: Free from fall injury  Outcome: Progressing  Note: Pt free from falls/ injury during duration of shift. Problem: Pain  Goal: Verbalizes/displays adequate comfort level or baseline comfort level  Outcome: Progressing  Note: No complaints of pain. Will continue to monitor.

## 2023-01-27 NOTE — PROGRESS NOTES
Physical Therapy  Daily Treatment Note    Discharge Recommendations: Krista Montenegrojames. scored a 17/24 on the AM-PAC short mobility form. Current research shows that an AM-PAC score of 17 or less is typically not associated with a discharge to the patient's home setting. Based on the patient's AM-PAC score and their current functional mobility deficits, it is recommended that the patient have 3-5 sessions per week of Physical Therapy at d/c to increase the patient's independence. Please see assessment section for further patient specific details. Assessment:  Mobility slow, weak and effortful. Pt with limited activity tolerance due to fatigue and FRANCO. Pt from home alone. Lives in an apartment and has 6 steps to get into his unit. Prior to admission, pt was independent with ambulation (using a cane) and with ADLs. Now needing CGA with a wheeled walker. Pt would benefit from continued IP PT at D/C to maximize strength and independence prior to going home. Plan is for SNF. Equipment Needs: Defer to next level of care      Other position/activity restrictions: up with assist   Additional Pertinent Hx: Adm 1/24 to ICU with weakness and fatigue. Critical glucose  1036. Transferred to floor on 1/25      Diagnosis: Hyperglycemia   Treatment Diagnosis: Decreased functional mobility 2/2 admission for hyperglycemia    Subjective: Pt in bed initially. Sleeping, but easily awakened. Agreeable to working with PT. Hopes to be D/Sanj[facility] to [facility] soon. Pain: c/o discomfort bottom of B feet. \"It's neuropathy. \"    Objective:    Bed mobility  Supine to sit: SBA, HOB up with use of rail. Slow and effortful. Scooting: SBA to EOB    Transfers  Sit to stand: CGA from bed  Stand to sit: CGA into chair. Decreased eccentric control noted.      Ambulation  Assistance Level: CGA  Assistive device: Wheeled walker  Distance: ~100 ft   Quality of gait: Step-through pattern; decreased step height/length; decreased pace; weak, but no overt LOB    Balance  Sat EOB ~ 5 minutes with SBA  Static stance with wheeled walker CGA  Ambulation with wheeled walker CGA    Vitals  Pt on 2L oxygen throughout session. SaO2 = 90% once sitting in chair. Improved to 94% with continued rest break. Pt reporting mild FRANCO with activity, but also reported recovering quickly with seated rest.     Patient Education  Role of PT. Calling for assist with needs. Expressed understanding. Safety Devices  Pt left with needs in reach. In chair (reclined) with chair alarm on. RN updated. AM-PAC score  AM-PAC Inpatient Mobility Raw Score : 17  AM-PAC Inpatient T-Scale Score : 42.13  Mobility Inpatient CMS 0-100% Score: 50.57  Mobility Inpatient CMS G-Code Modifier : CK    Goals: (as determined and assessed by primary PT)  Time Frame for Short Term Goals: Discharge  Short Term Goal 1: Mod I bed mobility   Short Term Goal 2: Transfers with supervision   Short Term Goal 3: Ambulate 100 ft with RW and SBA  Short Term Goal 4: Up and down 4 steps with rail and CG       Plan:  Plan: 2-5x/weej  Current Treatment Recommendations: Strengthening, Functional mobility training, Transfer training, Gait training, Stair training, Pain management, Safety education & training    Therapy Time    Individual  Concurrent  Group  Co-treatment    Time In  1200            Time Out  1225            Minutes  25              Timed Code Treatment Minutes: 25  Total Treatment Minutes: 25    Will continue per plan of care. If patient is discharged prior to next treatment, this note will serve as the discharge summary.     Gibran [place] #[id number]

## 2023-01-27 NOTE — PROGRESS NOTES
Pt hr dropped in 40's x2 this morning. Pt asymptomatic. Dr. Nilam Du informed.      Bernardino Dudley RN

## 2023-01-27 NOTE — PROGRESS NOTES
Diabetes Education: Time spent 30 minutes. Educated pt on how to use glucometer, prick himself with the lancet, how to administer insulin and return demonstrate. Medication education on Lantus and handed printed materials from [employer] as a guide. Pt verbalized understanding during session.

## 2023-01-27 NOTE — CARE COORDINATION
Case Management Assessment            Discharge Note                    Date / Time of Note: 1/27/2023 2:28 PM                  Discharge Note Completed by: CAROLINE Tompkins, ROBW    Patient Name: Bettie Chu. YOB: 1950  Diagnosis: LYDIA (acute kidney injury) ([facility] [N17.9]  Hyperosmolar hyperglycemic state (HHS) ([facility] [E11.00]   Date / Time: 1/24/2023 12:06 AM    Current PCP: Jaci Bland DO  Clinic patient: No    Hospitalization in the last 30 days: No    Advance Directives:  Code Status: Full Code  [place] DNR form completed and on chart: Yes    Financial:  Payor: [employer] Card / Plan: Elisa Hadley / Product Type: *No Product type* /      Pharmacy:    [address] [phone] - F [phone]  [place] [id number]  Phone: [phone] Fax: [phone]      Assistance purchasing medications?:    Assistance provided by Case Management: None at this time    Does patient want to participate in local refill/ meds to beds program?: Yes    Meds To Beds General Rules:  1. Can ONLY be done Monday- Friday between 8:30am-5pm  2. Prescription(s) must be in pharmacy by 3pm to be filled same day  3. Copy of patient's insurance/ prescription drug card and patient face sheet must be sent along with the prescription(s)  4. Cost of Rx cannot be added to hospital bill. If financial assistance is needed, please contact unit  or ;  or  CANNOT provide pharmacy voucher for patients co-pays  5.  Patients can then  the prescription on their way out of the hospital at discharge, or pharmacy can deliver to the bedside if staff is available. (payment due at time of pick-up or delivery - cash, check, or card accepted)     Able to afford home medications/ co-pay costs: Yes    ADLS:  Current PT AM-PAC Score: 17 /24  Current OT AM-PAC Score: 17 /24      DISCHARGE Disposition: [facility] (SNF): [facility] Phone: [phone] Fax: [phone]    LOC at discharge: Skilled  [address] Completed: Yes    Notification completed in HENS/PAS?:  Yes : CM has completed HENS online through secure website for SNF admission at [facility] [place]. Document ID #: [id number]    IMM Completed:   Yes, Case management has presented and reviewed IMM letter #2 to the patient and/or family/ POA. Patient and/or family/POA verbalized understanding of their medicare rights and appeal process if needed. Patient and/or family/POA has signed, initialed and placed today's date (23) and time (.) on IMM letter #2 on the the appropriate lines. Patient and/or family/POA, copy of letter offered and they are aware that this original copy of IMM letter #2 is available prior to discharge from the paper chart on the unit. Electronic documentation has been entered into epic for IMM letter #2 and original paper copy has been added to the paper chart at the nurses station. Transportation:  Transportation PLAN for discharge: EMS transportation   Mode of Transport: Ambulance stretcher - S  Reason for medical transport: Bed confined: Meets the following criteria 1) unable to get out of bed without assistance or ambulate, 2) unable to safely sit up in a wheelchair, 3) unable to maintain erect seating position in a chair for time needed for transport  Name of [address]:  [facility]   Phone: [phone]  Time of Transport: 7:45pm    Transport form completed: Yes    Home Oxygen and Respiratory Equipment:  Oxygen needed at discharge?: Yes - 2L    Dialysis:  Dialysis patient: No    Referrals made at [facility] for outpatient continued care:  Not Applicable    Additional CM Notes:  Pt will DC to [facility] today, precert was approved. CM notified Coby at [facility] [place] and pt's RN of transport time. No other needs at this time.     Report: [phone]  Fax: [phone]        The Plan for Transition of Care is related to the following treatment goals of LYDIA (acute kidney injury) ([facility] [N17.9]  Hyperosmolar hyperglycemic state (HHS) ([facility] [facility] [E11.00]    The Patient and/or patient representative Shawna Cooper and his family were provided with a choice of provider and agrees with the discharge plan Yes    Freedom of choice list was provided with basic dialogue that supports the patient's individualized plan of care/goals and shares the quality data associated with the providers.  Yes    Care Transitions patient: Yes    CAROLINE Aguilera, [facility]  Case Management Department  Ph: [phone]  Fax: [phone]

## 2023-01-28 ASSESSMENT — ENCOUNTER SYMPTOMS
CONSTIPATION: 0
SHORTNESS OF BREATH: 0
CHEST TIGHTNESS: 0
FACIAL SWELLING: 0

## 2023-01-30 ENCOUNTER — CARE COORDINATION (OUTPATIENT)
Dept: CARE COORDINATION | Age: 73
End: 2023-01-30

## 2023-01-30 NOTE — CARE COORDINATION
Routine follow up outreach for review s/p hosp DC over the weekend. Left HIPAA compliant vm requesting return callback.     Provided & repeated direct callback to reach ACM

## 2023-01-31 ENCOUNTER — CARE COORDINATION (OUTPATIENT)
Dept: CARE COORDINATION | Age: 73
End: 2023-01-31

## 2023-01-31 ENCOUNTER — TELEPHONE (OUTPATIENT)
Dept: PRIMARY CARE CLINIC | Age: 73
End: 2023-01-31

## 2023-01-31 NOTE — CARE COORDINATION
Pt made outreach to [employer] verbalizing concerns in reported delay response to his call light. He shared one example (yesterday)-  At 8:30 a.m. pt activated call light after waiting 10 min s/p delivery of his bkfst tray. (He shares he is supposed to have his blood sugar checked ac)  By 9:20 a.m. someone walked in & said she would come back  10:05 a.m.worker returned. Pt did not eat bkfst & reports he was highly frustrated over this & other call light delays - he did not eat. Today, he ate but bkfst & coffee were cold by the time his blood sugar was checked - pt adds he understands the need to get ac blood glucose reading. ACM assured outreach would be made to [employer] unit in effort to review pt concern w/unit supervisor or member of pt's care team @ [facility]. ACM made outreach to [facility]. Spoke w/Aracelis. Explained pt grievance as pt reported. Jesika Grissom offers she will go meet w/pt and review this concern. Aracelis further offered appreciation for bringing this to Supervisor's awareness in order to improve on quality initiatives. Invited outreach to [facility] as needed, but also explained ACM typically engages pt s/p DC/return home from SNF.

## 2023-01-31 NOTE — TELEPHONE ENCOUNTER
Good Afternoon,     My name is Navidne Oneal am the Nurse here at [facility]. So we can keep giving our best we just wanted to remind you to make sure to schedule your Colonoscopy this year and if you need help let me know. I can refer you or if you have the Cologuard kit, if you could follow the instructions and send back, that will help us in continuing with your best care. Have A [employer] Year!         Abimbola Weeks LPN  [facility]  [phone] # [phone]

## 2023-02-01 RX ORDER — OMEPRAZOLE 40 MG/1
40 CAPSULE, DELAYED RELEASE ORAL DAILY
Qty: 30 CAPSULE | Refills: 3 | Status: SHIPPED | OUTPATIENT
Start: 2023-02-01

## 2023-02-01 RX ORDER — APIXABAN 5 MG/1
TABLET, FILM COATED ORAL
Qty: 60 TABLET | Refills: 1 | Status: SHIPPED | OUTPATIENT
Start: 2023-02-01

## 2023-02-01 NOTE — TELEPHONE ENCOUNTER
Medication:   Requested Prescriptions     Pending Prescriptions Disp Refills    ELIQUIS 5 MG TABS tablet [Pharmacy Med Name: ELIQUIS 5MG TABLETS] 60 tablet 1     Sig: TAKE 1 TABLET BY MOUTH TWICE DAILY    omeprazole (PRILOSEC) 40 MG delayed release capsule [Pharmacy Med Name: OMEPRAZOLE 40MG CAPSULES] 30 capsule 3     Sig: TAKE 1 CAPSULE BY MOUTH DAILY        Last Filled:  11/16/22, 10/4/22    Patient Phone Number: [phone] (home)     Last appt: 12/14/2022   Next appt: 2/14/2023    Last OARRS:   RX Monitoring 8/14/2020   Attestation -   Periodic Controlled Substance Monitoring Possible medication side effects, risk of tolerance/dependence & alternative treatments discussed. ;Assessed functional status. ;Obtaining appropriate analgesic effect of treatment. Chronic Pain > 50 MEDD Obtained or confirmed \"Consent for Opioid Use\" on file.

## 2023-02-03 ASSESSMENT — ENCOUNTER SYMPTOMS
FACIAL SWELLING: 0
CONSTIPATION: 0
SHORTNESS OF BREATH: 0
CHEST TIGHTNESS: 0

## 2023-02-13 ENCOUNTER — TELEPHONE (OUTPATIENT)
Dept: PRIMARY CARE CLINIC | Age: 73
End: 2023-02-13

## 2023-02-13 NOTE — TELEPHONE ENCOUNTER
Patient has appt on 2/14/23  Received call from Gilson Clarke with [facility] wanting to advise provider that patient has not had his potassium chloride (MICRO-K) 10 MEQ extended release capsule or his furosemide (LASIX) 40 MG tablet. She also said he doesn't have \"coverage insulin\" and has a hard time with his insulin pen.     If you would have any questions or would like to discuss with her further, she can be reached at [phone]

## 2023-02-14 ENCOUNTER — OFFICE VISIT (OUTPATIENT)
Dept: PRIMARY CARE CLINIC | Age: 73
End: 2023-02-14
Payer: MEDICARE

## 2023-02-14 VITALS
SYSTOLIC BLOOD PRESSURE: 133 MMHG | DIASTOLIC BLOOD PRESSURE: 75 MMHG | BODY MASS INDEX: 31.28 KG/M2 | WEIGHT: 218 LBS | HEART RATE: 59 BPM | TEMPERATURE: 96.8 F

## 2023-02-14 DIAGNOSIS — E78.5 HYPERLIPIDEMIA ASSOCIATED WITH TYPE 2 DIABETES MELLITUS (HCC): ICD-10-CM

## 2023-02-14 DIAGNOSIS — I25.10 CORONARY ARTERY DISEASE INVOLVING NATIVE CORONARY ARTERY OF NATIVE HEART WITHOUT ANGINA PECTORIS: ICD-10-CM

## 2023-02-14 DIAGNOSIS — E11.69 HYPERLIPIDEMIA ASSOCIATED WITH TYPE 2 DIABETES MELLITUS (HCC): ICD-10-CM

## 2023-02-14 DIAGNOSIS — L97.912 NON-PRESSURE ULCER OF RIGHT LOWER EXTREMITY WITH FAT LAYER EXPOSED (HCC): ICD-10-CM

## 2023-02-14 DIAGNOSIS — E11.51 TYPE 2 DIABETES MELLITUS WITH DIABETIC PERIPHERAL ANGIOPATHY WITHOUT GANGRENE, WITHOUT LONG-TERM CURRENT USE OF INSULIN (HCC): ICD-10-CM

## 2023-02-14 DIAGNOSIS — I50.32 DIASTOLIC DYSFUNCTION WITH CHRONIC HEART FAILURE (HCC): ICD-10-CM

## 2023-02-14 DIAGNOSIS — E11.65 TYPE 2 DIABETES MELLITUS WITH HYPERGLYCEMIA, WITHOUT LONG-TERM CURRENT USE OF INSULIN (HCC): Primary | ICD-10-CM

## 2023-02-14 DIAGNOSIS — E11.59 HYPERTENSION ASSOCIATED WITH DIABETES (HCC): ICD-10-CM

## 2023-02-14 DIAGNOSIS — I48.11 LONGSTANDING PERSISTENT ATRIAL FIBRILLATION (HCC): ICD-10-CM

## 2023-02-14 DIAGNOSIS — I15.2 HYPERTENSION ASSOCIATED WITH DIABETES (HCC): ICD-10-CM

## 2023-02-14 PROBLEM — I26.99 PULMONARY EMBOLISM WITHOUT ACUTE COR PULMONALE (HCC): Status: RESOLVED | Noted: 2021-09-17 | Resolved: 2023-02-14

## 2023-02-14 PROCEDURE — 3078F DIAST BP <80 MM HG: CPT | Performed by: STUDENT IN AN ORGANIZED HEALTH CARE EDUCATION/TRAINING PROGRAM

## 2023-02-14 PROCEDURE — 3046F HEMOGLOBIN A1C LEVEL >9.0%: CPT | Performed by: STUDENT IN AN ORGANIZED HEALTH CARE EDUCATION/TRAINING PROGRAM

## 2023-02-14 PROCEDURE — 99215 OFFICE O/P EST HI 40 MIN: CPT | Performed by: STUDENT IN AN ORGANIZED HEALTH CARE EDUCATION/TRAINING PROGRAM

## 2023-02-14 PROCEDURE — 1123F ACP DISCUSS/DSCN MKR DOCD: CPT | Performed by: STUDENT IN AN ORGANIZED HEALTH CARE EDUCATION/TRAINING PROGRAM

## 2023-02-14 PROCEDURE — 3075F SYST BP GE 130 - 139MM HG: CPT | Performed by: STUDENT IN AN ORGANIZED HEALTH CARE EDUCATION/TRAINING PROGRAM

## 2023-02-14 RX ORDER — LANCETS 30 GAUGE
EACH MISCELLANEOUS
COMMUNITY
Start: 2023-01-26

## 2023-02-14 RX ORDER — INCLISIRAN 284 MG/1.5ML
284 INJECTION, SOLUTION SUBCUTANEOUS ONCE
Qty: 1.5 ML | Refills: 0 | Status: SHIPPED | OUTPATIENT
Start: 2023-02-14 | End: 2023-02-14

## 2023-02-14 RX ORDER — BLOOD-GLUCOSE METER
KIT MISCELLANEOUS
COMMUNITY
Start: 2023-01-26

## 2023-02-14 ASSESSMENT — ENCOUNTER SYMPTOMS
ABDOMINAL PAIN: 0
NAUSEA: 0
CHEST TIGHTNESS: 0
WHEEZING: 0
SHORTNESS OF BREATH: 1
DIARRHEA: 0
COUGH: 1
ABDOMINAL DISTENTION: 0

## 2023-02-14 NOTE — PROGRESS NOTES
2023     Yesika Cintron (:  1950) is a 81 y.o. male, here for evaluation of the following medical concerns:    HPI  Treatment Adherence:   Medication compliance:  compliant most of the time  Diet compliance:  noncompliant: no restriction to his diet  Weight trend: stable  Current exercise: no regular exercise  Barriers: none    Diabetes Mellitus Type 2: Current symptoms/problems include none. Home blood sugar records: fasting range: 160-180  Any episodes of hypoglycemia? no  Eye exam current (within one year): no  Tobacco history: He  reports that he has been smoking cigarettes. He started smoking about 56 years ago. He has a 13.00 pack-year smoking history. He has never used smokeless tobacco.   Daily Aspirin? Yes    Hypertension:  Home blood pressure monitoring: No.  He is not adherent to a low sodium diet. Patient denies chest pain, lightheadedness, blurred vision, palpitations, dry cough, and fatigue. Antihypertensive medication side effects: no medication side effects noted. Use of agents associated with hypertension: none. Hyperlipidemia:  No new myalgias or GI upset on atorvastatin (Lipitor). On Repatha biweekly, but misses many of his doses because he forgets. Lab Results   Component Value Date    LABA1C 13.6 2023    LABA1C 6.1 2020    LABA1C 5.4 2018     Lab Results   Component Value Date    LABMICR YES 2023    CREATININE 0.9 2023     Lab Results   Component Value Date    ALT 11 2023    AST 16 2023     Lab Results   Component Value Date    CHOL 128 2022    TRIG 294 (H) 2022    HDL 20 (L) 2022    LDLCALC 49 2022    LDLDIRECT 96 2018          Review of Systems   Constitutional:  Negative for activity change, appetite change, fatigue and unexpected weight change. Eyes:  Negative for visual disturbance. Respiratory:  Positive for cough and shortness of breath (Exertional, this is baseline).  Negative for chest tightness and wheezing. Gastrointestinal:  Negative for abdominal distention, abdominal pain, diarrhea and nausea. Endocrine: Negative for polydipsia, polyphagia and polyuria. Genitourinary:  Negative for decreased urine volume, dysuria and frequency. Musculoskeletal:  Negative for gait problem and myalgias. Skin:  Positive for wound (Signed off by wound care, best its been in months). Negative for rash. Neurological:  Negative for dizziness, weakness, light-headedness and numbness. Hematological:  Does not bruise/bleed easily. Prior to Visit Medications    Medication Sig Taking? Authorizing Provider   Lancets (Antonio Yanes) [address] [facility]  Yes Historical Provider, MD   blood glucose test strips (ASCENSIA AUTODISC VI;ONE TOUCH ULTRA TEST VI) strip  Yes Historical Provider, MD   Blood Glucose Monitoring Suppl (Raj SQUIRES) w/Device KIT  Yes Historical Provider, MD   empagliflozin (JARDIANCE) 25 MG tablet Take 1 tablet by mouth daily Yes Rafi Freeman DO   Inclisiran Sodium (LEQVIO) 284 MG/1.5ML Inject 1.5 mLs into the skin once for 1 dose Yes Rafi Freeman DO   sacubitril-valsartan (ENTRESTO) 24-26 MG per tablet Take 1 tablet by mouth 2 times daily Yes Rafi Freeman DO   ELIQUIS 5 MG TABS tablet TAKE 1 TABLET BY MOUTH TWICE DAILY Yes Rafi Freeman DO   omeprazole (PRILOSEC) 40 MG delayed release capsule TAKE 1 CAPSULE BY MOUTH DAILY Yes Rafi Freeman DO   metoprolol tartrate (LOPRESSOR) 50 MG tablet Take 1 tablet by mouth 2 times daily Yes Nora Humphreys MD   metFORMIN (GLUCOPHAGE-XR) 500 MG extended release tablet Take 2 tablets by mouth daily (with breakfast) Yes Nora Humphreys MD   glucose monitoring (FREESTYLE FREEDOM) kit 1 kit by Does not apply route daily Yes Nora Humphreys MD   blood glucose monitor strips Test 4 times a day & as needed for symptoms of irregular blood glucose.  Dispense sufficient amount for indicated testing frequency plus additional to accommodate PRN testing needs. Yes Daniel Schafer MD   senna (SENOKOT) 8.6 MG TABS tablet Take 1 tablet by mouth nightly as needed Yes Historical Provider, MD   clopidogrel (PLAVIX) 75 MG tablet TAKE 1 TABLET BY MOUTH DAILY Yes Kavya Funes DO   ipratropium-albuterol (DUONEB) 0.5-2.5 (3) MG/3ML SOLN nebulizer solution Inhale 3 mLs into the lungs every 4 hours as needed for Shortness of Breath Yes Camron Tretn MD   albuterol sulfate HFA (PROVENTIL;VENTOLIN;PROAIR) 108 (90 Base) MCG/ACT inhaler Inhale 2 puffs into the lungs every 6 hours as needed for Wheezing Yes Colin Yanez MD   Evolocumab 140 MG/ML SOAJ Inject 140 mg into the skin every 14 days Take an injection every 14 days. Yes Historical Provider, MD   fluticasone-umeclidin-vilant (TRELEGY ELLIPTA) 100-62.5-25 MCG/INH AEPB Inhale 1 puff into the lungs in the morning. Yes Kavya Funes DO   potassium chloride (MICRO-K) 10 MEQ extended release capsule 1 tablet Yes Kavya Funes DO   furosemide (LASIX) 40 MG tablet Take 1 tablet by mouth in the morning. Yes Kavya Funes DO   atorvastatin (LIPITOR) 80 MG tablet Take 1 tablet by mouth daily Yes Kavya Funes DO   allopurinol (ZYLOPRIM) 100 MG tablet Take 1 tablet by mouth daily Yes Kavya Funes DO   pregabalin (LYRICA) 150 MG capsule Take 1 capsule by mouth 2 times daily for 30 days. Patient taking differently: Take 450 mg by mouth 2 times daily. Kavya Funes DO   folic acid (FOLVITE) 1 MG tablet Take 1 tablet by mouth daily  Luis Fernando Ramires MD   nitroGLYCERIN (NITROSTAT) 0.4 MG SL tablet Place 1 tablet under the tongue every 5 minutes as needed for Chest pain  Patient not taking: No sig reported  Kavya Funes DO   magnesium gluconate (MAGONATE) 500 MG tablet Take 500 mg by mouth daily.   Historical Provider, MD        Social History     Tobacco Use    Smoking status: Some Days     Packs/day: 0.25     Years: 52.00     Pack years: 13.00     Types: Cigarettes     Start date: 1/1/1967     Last attempt to quit: 6/8/2022 Years since quittin.6    Smokeless tobacco: Never    Tobacco comments:     reports he quit circa 6.8.22   Substance Use Topics    Alcohol use: Not Currently     Comment: 2-3 beers a month        Vitals:    23 1026   BP: 133/75   Pulse: 59   Temp: 96.8 °F (36 °C)   TempSrc: Temporal   Weight: 218 lb (98.9 kg)     Estimated body mass index is 31.28 kg/m² as calculated from the following:    Height as of 23: 5' 10\" (1.778 m). Weight as of this encounter: 218 lb (98.9 kg). Physical Exam  Constitutional:       Appearance: Normal appearance. He is obese. HENT:      Head: Normocephalic and atraumatic. Right Ear: Tympanic membrane, ear canal and external ear normal.      Left Ear: Tympanic membrane, ear canal and external ear normal.      Nose: Nose normal.      Mouth/Throat:      Mouth: Mucous membranes are moist.      Pharynx: Oropharynx is clear. Cardiovascular:      Rate and Rhythm: Normal rate and regular rhythm. Pulmonary:      Effort: Pulmonary effort is normal.      Breath sounds: Normal breath sounds. No wheezing. Lymphadenopathy:      Cervical: No cervical adenopathy. Skin:     General: Skin is warm and dry. Comments: Well-healed ulcer right lower extremity   Neurological:      Mental Status: He is alert. Mental status is at baseline. Gait: Gait abnormal (Uses walker). Psychiatric:         Mood and Affect: Mood normal.         Behavior: Behavior normal.         Thought Content: Thought content normal.       ASSESSMENT/PLAN:  1. Type 2 diabetes mellitus with hyperglycemia, without long-term current use of insulin ([address]): Acute onset diabetes, which likely occurred secondary to the prednisone needed for his persistent COPD exacerbations. Fingersticks have been better. We will stop his 5 units nightly insulin and switch him to [place]. Follow-up 1 month. - empagliflozin (JARDIANCE) 25 MG tablet; Take 1 tablet by mouth daily  Dispense: 30 tablet; Refill: 5    2. Hypertension associated with diabetes ([facility]): BP at goal today, but switching him to [facility] from losartan for his heart failure. 3. Longstanding persistent atrial fibrillation ([facility]): Rate controlled today and asymptomatic. Compliant with his Eliquis. 4. Hyperlipidemia associated with type 2 diabetes mellitus ([facility]): Compliant with his statin, but misses his doses of Repatha. We will see if we can get him Leqvio, because it is every 6 months rather than every 2 weeks. - Inclisiran Sodium (LEQVIO) 284 MG/1.5ML; Inject 1.5 mLs into the skin once for 1 dose  Dispense: 1.5 mL; Refill: 0    5. Type 2 diabetes mellitus with diabetic peripheral angiopathy without gangrene, without long-term current use of insulin ([facility]): Compliant with statin. Switching Repatha to Leqvio. Compliant with aspirin and Plavix. - Inclisiran Sodium (LEQVIO) 284 MG/1.5ML; Inject 1.5 mLs into the skin once for 1 dose  Dispense: 1.5 mL; Refill: 0    6. Non-pressure ulcer of right lower extremity with fat layer exposed ([facility]): Wound care has signed off. Looks much better today than previous visit. 7. Coronary artery disease involving native coronary artery of native heart without angina pectoris: Denies chest pain today. Regimen appropriate in the setting of CAD. - Inclisiran Sodium (LEQVIO) 284 MG/1.5ML; Inject 1.5 mLs into the skin once for 1 dose  Dispense: 1.5 mL; Refill: 0    8. Diastolic dysfunction with chronic heart failure [facility]): Starting Jardiance and switching his losartan to [facility]. Euvolemic today. - empagliflozin (JARDIANCE) 25 MG tablet; Take 1 tablet by mouth daily  Dispense: 30 tablet; Refill: 5    > 40 min    Return in about 4 weeks (around 3/14/2023) for Blood Pressure, Diabetes. An electronic signature was used to authenticate this note.     --Manjula Levi DO on 2/14/2023 at 11:26 AM

## 2023-02-15 ENCOUNTER — TELEPHONE (OUTPATIENT)
Dept: PRIMARY CARE CLINIC | Age: 73
End: 2023-02-15

## 2023-02-15 NOTE — TELEPHONE ENCOUNTER
Rafaela from [facility] called and they ar DC ing PT as patient is doing well and no longer needs

## 2023-02-16 ENCOUNTER — TELEPHONE (OUTPATIENT)
Dept: PRIMARY CARE CLINIC | Age: 73
End: 2023-02-16

## 2023-02-16 NOTE — TELEPHONE ENCOUNTER
Antwon Breen from [facility] called- she saw him for OT eval, she said he is independent with ADL's and has equipment he needs so they wont be picking him up due to him being at baseline.

## 2023-02-19 DIAGNOSIS — I50.32 CHRONIC DIASTOLIC HEART FAILURE (HCC): ICD-10-CM

## 2023-02-20 RX ORDER — POTASSIUM CHLORIDE 750 MG/1
CAPSULE, EXTENDED RELEASE ORAL
Qty: 60 CAPSULE | Refills: 1 | Status: SHIPPED | OUTPATIENT
Start: 2023-02-20

## 2023-02-20 NOTE — TELEPHONE ENCOUNTER
Medication:   Requested Prescriptions     Pending Prescriptions Disp Refills    potassium chloride (MICRO-K) 10 MEQ extended release capsule [Pharmacy Med Name: POTASSIUM CL 10MEQ ER CAPSULES] 60 capsule 1     Sig: TAKE 1 CAPSULE BY MOUTH DAILY        Last Filled:  08/02/22    Patient Phone Number: [phone] (home)     Last appt: 2/14/2023   Next appt: 3/14/2023    Last OARRS:   RX Monitoring 8/14/2020   Attestation -   Periodic Controlled Substance Monitoring Possible medication side effects, risk of tolerance/dependence & alternative treatments discussed. ;Assessed functional status. ;Obtaining appropriate analgesic effect of treatment. Chronic Pain > 50 MEDD Obtained or confirmed \"Consent for Opioid Use\" on file.

## 2023-02-22 ENCOUNTER — TELEPHONE (OUTPATIENT)
Dept: PULMONOLOGY | Age: 73
End: 2023-02-22

## 2023-02-22 NOTE — TELEPHONE ENCOUNTER
I tried to call [employer], but when the phone was answered all I could hear was the sound of wind and cars. I wanted to ask him what the ramp setting on his device was, because it shouldn't be blowing him away right from the start. Lowering his settings would under treat his sleep apnea, so I don't think it would be wise to lower the settings without him giving it more time to acclimate. A longer ramp (if he has a short one) would give him more time to fall asleep before he reaches treatment pressures.

## 2023-02-22 NOTE — TELEPHONE ENCOUNTER
Shauna Barroso, home health care nurse, left  on office phone re: CPAP settings. Attempt to call her back - no answer, mailbox full. [place], who states that he used his new machine last night, with O2 bleed in, and that he felt that his lungs were fiiling up too much from the air going into his nose, and that he had to pen his mouth to let the air out. [facility] was called ([phone]) , spoke to Alex, who confirms that machine settings are set exactly to what is ordered. Encounter sent to Dr. Cassandra Tucker. If change in setting is needed, an updated order can be faxed to [facility] and settings can be changed remotely.

## 2023-02-24 ENCOUNTER — TELEPHONE (OUTPATIENT)
Dept: PRIMARY CARE CLINIC | Age: 73
End: 2023-02-24

## 2023-02-27 ENCOUNTER — TELEPHONE (OUTPATIENT)
Dept: PRIMARY CARE CLINIC | Age: 73
End: 2023-02-27

## 2023-02-27 RX ORDER — METFORMIN HYDROCHLORIDE 500 MG/1
1000 TABLET, EXTENDED RELEASE ORAL
Qty: 90 TABLET | Refills: 3 | Status: SHIPPED | OUTPATIENT
Start: 2023-02-27

## 2023-02-27 RX ORDER — INCLISIRAN 284 MG/1.5ML
284 INJECTION, SOLUTION SUBCUTANEOUS ONCE
Qty: 1.5 ML | Refills: 0 | Status: SHIPPED | OUTPATIENT
Start: 2023-02-27 | End: 2023-02-27

## 2023-02-27 NOTE — TELEPHONE ENCOUNTER
Pt called and said he prescribe Metformin while in the hospital but he said there is no refill left on it and he didn't know if he was to continue taking them    Metformin 500mg take 2 with breakfast

## 2023-02-28 ENCOUNTER — CARE COORDINATION (OUTPATIENT)
Dept: CARE COORDINATION | Age: 73
End: 2023-02-28

## 2023-02-28 NOTE — CARE COORDINATION
Ambulatory Care Coordination Note  2023    Patient Current Location:  Home: [facility]  [address] [address]     ACM contacted the patient by telephone. Verified name and  with patient as identifiers. Provided introduction to self, and explanation of the ACM role. Challenges to be reviewed by the provider   Additional needs identified to be addressed with provider: No  none    ACM reviewed w/pt re metformin, which provider sent refill to pharmacy. Pt continuing to take as prescribed. Answered pt questions as r/t how to take (eg pt curious about if ok to take between meals) - ACM carefully reviewed pt is to take with meal    Pt picked up refill of Rx metformin from pharmacy today     Next visit w/PCP scheduled 3.14.23  Method of communication with provider: routed note to provider for review of updated [address] Plan of Care    ACM: Theresa Vyas RN     2 weeks s/p return home from [facility] for subacute stay following hospital where pt has been newly diagnosed with DM. Pt reports he resumed Tx @ wound clinic and now associates potential slow healing of wound d/t blood sugars, which he now monitors routinely. Pt accurately recites desired ranges and preliminary review of carb-controlled diet suggests pt has a clear understanding of carb-controlled diet. Review of self mgmt concepts per Zone Mgmt guidance. Reviewed ACM availability for support as needed    Offered patient enrollment in the Remote Patient Monitoring (RPM) program for in-home monitoring: Patient declined. 22, has not changed his mind about this  Pt is scheduled for follow up w/PCP in 2 weeks.   Pt verbalized understanding of above and agreement with the following  Plan: pt will review DM Zone Mgmt tool and add'l DM education materials ACM to mail to pt and will call ACM w/any questions  Continue ACC support for health coaching, care collaboration, support w/community resources, and help with any newly presenting concerns as needed. Pt agrees to outreach direct to ACM, as needed, between routine follow up outreach initiated by [facility]   Diabetes Assessment    Medic Alert ID: No  Meal Planning: None   How often do you test your blood sugar?: Daily (Comment: A1c 13.6)   Do you have barriers with adherence to non-pharmacologic self-management interventions?  (Nutrition/Exercise/Self-Monitoring): No   Have you ever had to go to the ED for symptoms of low blood sugar?: No       No patient-reported symptoms   Do you have hyperglycemia symptoms?: No   Do you have hypoglycemia symptoms?: No   Last Blood Sugar Value: 117   Blood Sugar Trends: Fluctuating (Comment: A1c 13.6   117-151  w/one 1x outlier of 191)        ,   Congestive Heart Failure Assessment    Are you currently restricting fluids?: No Restriction  Do you understand a low sodium diet?: Yes  Do you understand how to read food labels?: Yes  How many restaurant meals do you eat per week?: 0  Do you salt your food before tasting it?: No     No patient-reported symptoms      Symptoms:  None: Yes      Symptom course: stable  Weight trend: stable  Salt intake watch compared to last visit: stable     , and   COPD Assessment    Does the patient understand envrionmental exposure?: Yes  Is the patient able to verbalize Rescue vs. Long Acting medications?: Yes  Does the patient have a nebulizer?: No  Does the patient use a space with inhaled medications?: No     No patient-reported symptoms         Symptoms:     Have you had a recent diagnosis of pneumonia either by PCP or at a hospital?: No        Care Coordination Interventions    Referral from Primary Care Provider: No  Suggested Interventions and Community Resources  Fall Risk Prevention: Completed  Zone Management Tools: Completed (Comment: COPD, HF, DM)  Other Services or Interventions: reviewed self mgmt concepts, appropriateness & availability of provider line after hours as needed          Goals Addressed                      This Visit's Progress      Conditions and Symptoms (pt-stated)   On track      I will schedule office visits, as directed by my provider. I will keep my appointment or reschedule if I have to cancel. I will notify my provider of any barriers to my plan of care. I will follow my Zone Management tool to seek urgent or emergent care. I will notify my provider of any symptoms that indicate a worsening of my condition. Barriers: impairment:  physical: exac COPD requiring acute visit  Plan for overcoming my barriers: engage in Care Coordination for added care team support  Confidence: 10/10  Anticipated Goal Completion Date: 5.2.23          Self Monitoring (pt-stated)         Self-Monitored Blood Glucose - I will check my blood sugar Fasting blood sugar and random blood sugars  I will notify my provider of any trends of increasing or decreasing blood sugars over a 1 month period. I will notify my provider if I have any blood sugar readings less than 70 more than 2 times a month. Daily Weights - I will weight myself as directed - Daily and write down weights  I will notify my provider of any increase in weight by 3 or more pounds in 2 days OR 5 or more pounds in a week.       Barriers: none  Plan for overcoming my barriers: denies belief of any barriers, but willing to engage in Care Coordination for added support   Confidence: 10/10  Anticipated Goal Completion Date: 5.31.23                Future Appointments   Date Time Provider Orlando Colmenares   3/14/2023 10:00 AM DO Wilton Enriquez [address]   12/14/2023  3:00 PM SCHEDULE, [facility] [place] [facility]

## 2023-03-03 RX ORDER — CLOPIDOGREL BISULFATE 75 MG/1
75 TABLET ORAL DAILY
Qty: 30 TABLET | Refills: 3 | Status: SHIPPED | OUTPATIENT
Start: 2023-03-03

## 2023-03-03 NOTE — TELEPHONE ENCOUNTER
Medication:   Requested Prescriptions     Pending Prescriptions Disp Refills    clopidogrel (PLAVIX) 75 MG tablet [Pharmacy Med Name: CLOPIDOGREL 75MG TABLETS] 30 tablet 3     Sig: TAKE 1 TABLET BY MOUTH DAILY        Last Filled:  11/3/22    Patient Phone Number: [phone] (home)     Last appt: 2/14/2023   Next appt: 3/14/2023    Last OARRS:   RX Monitoring 8/14/2020   Attestation -   Periodic Controlled Substance Monitoring Possible medication side effects, risk of tolerance/dependence & alternative treatments discussed. ;Assessed functional status. ;Obtaining appropriate analgesic effect of treatment. Chronic Pain > 50 MEDD Obtained or confirmed \"Consent for Opioid Use\" on file.

## 2023-03-07 ENCOUNTER — TELEPHONE (OUTPATIENT)
Dept: PRIMARY CARE CLINIC | Age: 73
End: 2023-03-07

## 2023-03-08 ENCOUNTER — CARE COORDINATION (OUTPATIENT)
Dept: CARE COORDINATION | Age: 73
End: 2023-03-08

## 2023-03-08 ENCOUNTER — TELEPHONE (OUTPATIENT)
Dept: PHARMACY | Facility: CLINIC | Age: 73
End: 2023-03-08

## 2023-03-08 NOTE — CARE COORDINATION
Ambulatory Care Coordination Note  3/8/2023    Patient Current Location:  Home: [address]  [address] [place] [address] [address] [id number]     ACM contacted the patient by telephone. Verified name and  with patient as identifiers. Provided introduction to self, and explanation of the ACM role. Challenges to be reviewed by the provider   Additional needs identified to be addressed with provider: No  none               Method of communication with provider: none. ACM: Ami Szymanski RN    [facility] active, nurse due to visit this week; pt awaiting call from nurse now about day/time for home visit. Presently, pt is working on apt (has vacant rental unit on site and is working to fill vacancy). Assures he is not doing anything requiring signficant exersion and is self pacing. He is also currently awaiting repairman arrival to fix his liftchair - so pt assures he is home. Noted homebound status remains evident per this phone review. No noted change of daily weight; holding steadily at 218 lbs, with only tenths of variation in lb which fluctuates up/down, but holding circa 218 lbs. Denies any SOB or having any other concern. Reviewed self mgmt DM. Pt keeping log of daily blood sugars. He is checking at least twice daily 7:30/8 am and again around 7:30/8 pm. Range remains 121-150 over past week. Reviewed technique for finger stick d/t pt reports challenges getting drop of blood for test strip. There have been multiple occasions when he has had to try up to 3x. Reviewed purpose and techniques to obtain sufficient gtt of blood on demand/1st try for test strip vs massaging hand/finger after the fact. This was discussed at length. Reviewed ACM availability, including mention ACM will be on PTO 3/28, 3/29, & 3/30. Pt self notes follow up appt w/PCP scheduled next week. Mutually agree ACM will plan attempt to follow w/pt prior to time off and that pt is encouraged to outreach to [facility] [employer] sooner, if needed.  Next [facility] outreach in 2-4 weeks  Pt verbalized understanding of above and agreement with the following (which has not changed)  Plan: continue [facility] support for health coaching, care collaboration, support w/community resources, and help with any newly presenting concerns as needed. Pt agrees to outreach direct to [employer], as needed, between routine follow up outreach initiated by [employer] patient enrollment in the Remote Patient Monitoring (RPM) program for in-home monitoring: Patient declined. 12.23.22 has not changed his mind. States he continues self monitoring and prompt direct reporting to PCP for any newly presenting concern. Diabetes Assessment    Medic Alert ID: No  Meal Planning: None   How often do you test your blood sugar?: Daily (Comment: A1c 13.6)   Do you have barriers with adherence to non-pharmacologic self-management interventions?  (Nutrition/Exercise/Self-Monitoring): No   Have you ever had to go to the ED for symptoms of low blood sugar?: No       No patient-reported symptoms   Do you have hyperglycemia symptoms?: No   Do you have hypoglycemia symptoms?: No   Last Blood Sugar Value: 121   Blood Sugar Monitoring Regimen: Morning Fasting   Blood Sugar Trends: No Change (Comment: 121-150)        ,   Congestive Heart Failure Assessment    Are you currently restricting fluids?: No Restriction  Do you understand a low sodium diet?: Yes  Do you understand how to read food labels?: Yes  How many restaurant meals do you eat per week?: 0  Do you salt your food before tasting it?: No     No patient-reported symptoms      Symptoms:  None: Yes      Symptom course: stable  Patient-reported weight (lb): 218  Weight trend: stable  Salt intake watch compared to last visit: stable     , and   COPD Assessment    Does the patient understand envrionmental exposure?: Yes  Is the patient able to verbalize Rescue vs. Long Acting medications?: Yes  Does the patient have a nebulizer?: No  Does the patient use a space with inhaled medications?: No     No patient-reported symptoms         Symptoms:     Have you had a recent diagnosis of pneumonia either by PCP or at a hospital?: No            Care Coordination Interventions    Referral from Primary Care Provider: No  Suggested Interventions and Community Resources  Diabetes Education: Completed  Fall Risk Prevention: Completed  Zone Management Tools: Completed (Comment: COPD, HF, DM)  Other Services or Interventions: reviewed self mgmt concepts, appropriateness & availability of provider line after hours as needed          Goals Addressed                      This Visit's Progress      Conditions and Symptoms (pt-stated)   On track      I will schedule office visits, as directed by my provider. I will keep my appointment or reschedule if I have to cancel. I will notify my provider of any barriers to my plan of care. I will follow my Zone Management tool to seek urgent or emergent care. I will notify my provider of any symptoms that indicate a worsening of my condition. Barriers: impairment:  physical: exac COPD requiring acute visit  Plan for overcoming my barriers: engage in Care Coordination for added care team support  Confidence: 10/10  Anticipated Goal Completion Date: 5.2.23          Self Monitoring (pt-stated)   On track      Self-Monitored Blood Glucose - I will check my blood sugar Fasting blood sugar and random blood sugars  I will notify my provider of any trends of increasing or decreasing blood sugars over a 1 month period. I will notify my provider if I have any blood sugar readings less than 70 more than 2 times a month. Daily Weights - I will weight myself as directed - Daily and write down weights  I will notify my provider of any increase in weight by 3 or more pounds in 2 days OR 5 or more pounds in a week.       Barriers: none  Plan for overcoming my barriers: denies belief of any barriers, but willing to engage in Care Coordination for added support   Confidence: 10/10  Anticipated Goal Completion Date: 5.31.23                Future Appointments   Date Time Provider Orlando Colmenares   3/14/2023 10:00 AM DO JEFF Donahue   12/14/2023  3:00 PM SCHEDULE, [facility] [place] - [place]

## 2023-03-08 NOTE — TELEPHONE ENCOUNTER
[facility] CLINICAL PHARMACY: ADHERENCE REVIEW  Identified care gap per [place]: fills at [facility] : ACE/ARB and Diabetes adherence    Last Visit: 02/14/23    ASSESSMENT  ACE/ARB ADHERENCE    Insurance Records claims through 03/03/23 fail date 05/01/23   LOSARTAN POTASSIUM 100 MG TAB last filled on 1/22/23 for 30 day supply. Next refill due: 2/21/23    Unable to reach pharmacy     Losartan was switched to sacubitril-valsartan (ENTRESTO) 24-26 MG per tablet    BP Readings from Last 3 Encounters:   02/14/23 133/75   01/27/23 124/68   12/14/22 115/60     Estimated Creatinine Clearance: 90 mL/min (based on SCr of 0.9 mg/dL). DIABETES ADHERENCE    Insurance Records claims through 03/3/23 fail date uknown   METFORMIN HCL  MG TABLET last filled on 02/09/23 for 15 day supply. Next refill due: 02/24/23    Unable to reach pharmacy     Filled at the hospital for a 15 ds. PCP called in a refill and p/u a 90ds 1 week      Lab Results   Component Value Date    LABA1C 13.6 01/24/2023    LABA1C 6.1 07/22/2020    LABA1C 5.4 08/30/2018     NOTE A1c >9%    PLAN    Reached patient to review. Pt stated he is taking his medication daily and only reason why he got a 15ds of Metformin is because he filled at the hospital. Once he ran out his pcp called in a refill for a 90ds. Patient is currently adherent at this time.        Will sign off    Future Appointments   Date Time Provider Orlando Colmenares   3/14/2023 10:00 AM DO JEFF [place]ura [place] - [place]   12/14/2023  3:00 PM SCHEDULE, MARIZOL HARPER AWV  [facility]   [address] [place] [address]  // Department, toll free [phone], Option 2    For Pharmacy Admin Tracking Only    Program: [address] in place:  No  Gap Closed?: No   Time Spent (min): 20

## 2023-03-13 ENCOUNTER — TELEPHONE (OUTPATIENT)
Dept: PRIMARY CARE CLINIC | Age: 73
End: 2023-03-13

## 2023-03-13 NOTE — TELEPHONE ENCOUNTER
Pt needs test strips for one touch verio meter sent into [facility] #[id number] Mag Gordillo, [address] [place] [address] [phone]

## 2023-03-14 ENCOUNTER — OFFICE VISIT (OUTPATIENT)
Dept: PRIMARY CARE CLINIC | Age: 73
End: 2023-03-14

## 2023-03-14 VITALS
BODY MASS INDEX: 31.28 KG/M2 | DIASTOLIC BLOOD PRESSURE: 56 MMHG | WEIGHT: 218 LBS | HEART RATE: 77 BPM | SYSTOLIC BLOOD PRESSURE: 117 MMHG | TEMPERATURE: 98.7 F

## 2023-03-14 DIAGNOSIS — I50.32 DIASTOLIC DYSFUNCTION WITH CHRONIC HEART FAILURE (HCC): ICD-10-CM

## 2023-03-14 DIAGNOSIS — E11.59 HYPERTENSION ASSOCIATED WITH DIABETES (HCC): ICD-10-CM

## 2023-03-14 DIAGNOSIS — E11.69 HYPERLIPIDEMIA ASSOCIATED WITH TYPE 2 DIABETES MELLITUS (HCC): ICD-10-CM

## 2023-03-14 DIAGNOSIS — E11.65 TYPE 2 DIABETES MELLITUS WITH HYPERGLYCEMIA, WITHOUT LONG-TERM CURRENT USE OF INSULIN (HCC): ICD-10-CM

## 2023-03-14 DIAGNOSIS — I15.2 HYPERTENSION ASSOCIATED WITH DIABETES (HCC): ICD-10-CM

## 2023-03-14 DIAGNOSIS — E78.5 HYPERLIPIDEMIA ASSOCIATED WITH TYPE 2 DIABETES MELLITUS (HCC): ICD-10-CM

## 2023-03-14 DIAGNOSIS — E11.51 TYPE 2 DIABETES MELLITUS WITH DIABETIC PERIPHERAL ANGIOPATHY WITHOUT GANGRENE, WITHOUT LONG-TERM CURRENT USE OF INSULIN (HCC): Primary | ICD-10-CM

## 2023-03-14 PROBLEM — L97.912 NON-PRESSURE ULCER OF RIGHT LOWER EXTREMITY WITH FAT LAYER EXPOSED (HCC): Status: RESOLVED | Noted: 2022-05-03 | Resolved: 2023-03-14

## 2023-03-14 LAB — HBA1C MFR BLD: 8.5 %

## 2023-03-14 RX ORDER — METOPROLOL TARTRATE 50 MG/1
50 TABLET, FILM COATED ORAL 2 TIMES DAILY
Qty: 90 TABLET | Refills: 5 | Status: SHIPPED | OUTPATIENT
Start: 2023-03-14

## 2023-03-14 RX ORDER — METFORMIN HYDROCHLORIDE 500 MG/1
1000 TABLET, EXTENDED RELEASE ORAL
Qty: 90 TABLET | Refills: 1 | Status: SHIPPED | OUTPATIENT
Start: 2023-03-14

## 2023-03-14 SDOH — ECONOMIC STABILITY: FOOD INSECURITY: WITHIN THE PAST 12 MONTHS, THE FOOD YOU BOUGHT JUST DIDN'T LAST AND YOU DIDN'T HAVE MONEY TO GET MORE.: NEVER TRUE

## 2023-03-14 SDOH — ECONOMIC STABILITY: HOUSING INSECURITY
IN THE LAST 12 MONTHS, WAS THERE A TIME WHEN YOU DID NOT HAVE A STEADY PLACE TO SLEEP OR SLEPT IN A SHELTER (INCLUDING NOW)?: NO

## 2023-03-14 SDOH — ECONOMIC STABILITY: FOOD INSECURITY: WITHIN THE PAST 12 MONTHS, YOU WORRIED THAT YOUR FOOD WOULD RUN OUT BEFORE YOU GOT MONEY TO BUY MORE.: NEVER TRUE

## 2023-03-14 SDOH — ECONOMIC STABILITY: INCOME INSECURITY: HOW HARD IS IT FOR YOU TO PAY FOR THE VERY BASICS LIKE FOOD, HOUSING, MEDICAL CARE, AND HEATING?: NOT HARD AT ALL

## 2023-03-14 ASSESSMENT — PATIENT HEALTH QUESTIONNAIRE - PHQ9
SUM OF ALL RESPONSES TO PHQ QUESTIONS 1-9: 0
SUM OF ALL RESPONSES TO PHQ9 QUESTIONS 1 & 2: 0
SUM OF ALL RESPONSES TO PHQ QUESTIONS 1-9: 0
2. FEELING DOWN, DEPRESSED OR HOPELESS: 0
1. LITTLE INTEREST OR PLEASURE IN DOING THINGS: 0

## 2023-03-14 ASSESSMENT — ENCOUNTER SYMPTOMS
ABDOMINAL PAIN: 0
NAUSEA: 0
ABDOMINAL DISTENTION: 0
DIARRHEA: 0
CHEST TIGHTNESS: 0
SHORTNESS OF BREATH: 0

## 2023-03-14 NOTE — PATIENT INSTRUCTIONS
Learning About Diabetes and Exercise  Can you exercise if you have diabetes? When you have diabetes, it's important to get regular exercise. It can help you manage your blood sugar level. You can still play sports, run, ride a bike, swim, and do other activities when you have diabetes. How does exercise help when you have diabetes? Getting regular exercise can help control your blood sugar. Your body turns the food you eat into glucose, a type of sugar. You need this sugar for energy. When you have diabetes, the sugar builds up in your blood. But when you exercise, your body uses sugar. This helps keep it from building up in your blood and results in lower blood sugar and better control of diabetes. Exercise may help you in other ways too. It can help you reach and stay at a healthy weight. It also helps improve blood pressure and cholesterol, which can reduce the risk of heart disease. Exercise can make you feel stronger and happier. It can help you relax and sleep better. And it can give you confidence in other things you do. Exercising safely when you have diabetes  Before you start a new exercise program, talk to your doctor about how and when to exercise. Some types of exercise can be harmful if your diabetes is causing other problems, such as problems with your feet. Your doctor can tell you what types of exercise are good choices for you. Here are some general safety tips. Take steps to avoid blood sugar problems. Check your blood sugar before and after you exercise. Ask your doctor what blood sugar range is safe for you when you exercise. If you take medicine or insulin that lowers blood sugar, check your blood sugar before you exercise. If your blood sugar is less than 90 mg/dL, you may need to eat a carbohydrate snack first.  Be careful when you exercise if your blood sugar is too high. Make sure to drink plenty of water. Try to exercise at about the same time each day.    This may help keep your blood sugar steady. If you want to exercise more, slowly increase how hard or long you exercise. Have someone with you when you exercise. Or exercise at a gym. You may need help if your blood sugar drops too low. Keep some quick-sugar food with you. You may get symptoms of low blood sugar during exercise or up to 24 hours later. Use proper footwear and the right equipment. Pay attention to your body. If you are used to exercising and notice that you cannot do as much as usual, talk to your doctor. Follow-up care is a key part of your treatment and safety. Be sure to make and go to all appointments, and call your doctor if you are having problems. It's also a good idea to know your test results and keep a list of the medicines you take. Where can you learn more? Go to [email or web address].[email or web address]. org and sign in to your [employer] account. Enter A514 in the [employer] box to learn more about \"Learning About Diabetes and Exercise. \"     If you do not have an account, please click on the \"Sign Up Now\" link. Current as of: April 13, 2022               Content Version: 13.4  © 8881-7433 Healthwise, Incorporated. Care instructions adapted under license by [facility] ([facility]). If you have questions about a medical condition or this instruction, always ask your healthcare professional. [address] any warranty or liability for your use of this information.

## 2023-03-14 NOTE — PROGRESS NOTES
3/14/2023     Yesika Cintron (:  1950) is a 81 y.o. male, here for evaluation of the following medical concerns:    HPI  Treatment Adherence:   Medication compliance:  compliant most of the time  Diet compliance:  noncompliant: does not follow any particular diet  Weight trend: stable  Current exercise: no regular exercise  Barriers: lack of motivation and debility    Diabetes Mellitus Type 2: Current symptoms/problems include none. Home blood sugar records: fasting range: 140-160  Any episodes of hypoglycemia? no  Eye exam current (within one year): no  Tobacco history: He  reports that he has been smoking cigarettes. He started smoking about 56 years ago. He has a 13.00 pack-year smoking history. He has never used smokeless tobacco.   Daily Aspirin? Yes    Hypertension:  Home blood pressure monitoring: No.  He is not adherent to a low sodium diet. Patient denies chest pain, headache, lightheadedness, blurred vision, palpitations, and fatigue. Antihypertensive medication side effects: no medication side effects noted. Use of agents associated with hypertension: none. Hyperlipidemia:  No new myalgias or GI upset on Lipitor and Repatha. Lab Results   Component Value Date    LABA1C 8.5 2023    LABA1C 13.6 2023    LABA1C 6.1 2020     Lab Results   Component Value Date    LABMICR YES 2023    CREATININE 0.9 2023     Lab Results   Component Value Date    ALT 11 2023    AST 16 2023     Lab Results   Component Value Date    CHOL 128 2022    TRIG 294 (H) 2022    HDL 20 (L) 2022    LDLCALC 49 2022    LDLDIRECT 96 2018          Review of Systems   Constitutional:  Negative for activity change, appetite change, fatigue and unexpected weight change. Eyes:  Negative for visual disturbance. Respiratory:  Negative for chest tightness and shortness of breath.     Gastrointestinal:  Negative for abdominal distention, abdominal pain, diarrhea and nausea. Endocrine: Negative for polydipsia, polyphagia and polyuria. Genitourinary:  Negative for decreased urine volume, dysuria and frequency. Musculoskeletal:  Negative for gait problem and myalgias. Skin:  Negative for rash and wound. Neurological:  Negative for dizziness, weakness, light-headedness and numbness. Hematological:  Does not bruise/bleed easily. Prior to Visit Medications    Medication Sig Taking? Authorizing Provider   sacubitril-valsartan (ENTRESTO) 24-26 MG per tablet Take 1 tablet by mouth 2 times daily Yes Lenny Cam, DO   empagliflozin (JARDIANCE) 25 MG tablet Take 1 tablet by mouth daily Yes Lenny Cam, DO   metFORMIN (GLUCOPHAGE-XR) 500 MG extended release tablet Take 2 tablets by mouth daily (with breakfast) Yes Lenny Cam, DO   metoprolol tartrate (LOPRESSOR) 50 MG tablet Take 1 tablet by mouth 2 times daily Yes Lenny Garrett, DO   blood glucose test strips (ASCENSIA AUTODISC VI;ONE TOUCH ULTRA TEST VI) strip Inject 1 each into the skin daily Yes Lenny Cam, DO   clopidogrel (PLAVIX) 75 MG tablet TAKE 1 TABLET BY MOUTH DAILY Yes Lenny Cam, DO   potassium chloride (MICRO-K) 10 MEQ extended release capsule TAKE 1 CAPSULE BY MOUTH DAILY Yes Lenny Garrett, DO   Lancets (Bushra Horde) [address] [facility]  Yes Historical Provider, MD   Blood Glucose Monitoring Suppl (Kylah SQUIRES) w/Device KIT  Yes Historical Provider, MD   ELIQUIS 5 MG TABS tablet TAKE 1 TABLET BY MOUTH TWICE DAILY Yes Lenny Gerry, DO   omeprazole (PRILOSEC) 40 MG delayed release capsule TAKE 1 CAPSULE BY MOUTH DAILY Yes Lenny Garrett, DO   glucose monitoring (FREESTYLE FREEDOM) kit 1 kit by Does not apply route daily Yes Cheryl Meza MD   blood glucose monitor strips Test 4 times a day & as needed for symptoms of irregular blood glucose. Dispense sufficient amount for indicated testing frequency plus additional to accommodate PRN testing needs.  Yes MD evens Forte (SENOKOT) 8.6 MG TABS tablet Take 1 tablet by mouth nightly as needed Yes Historical Provider, MD   ipratropium-albuterol (DUONEB) 0.5-2.5 (3) MG/3ML SOLN nebulizer solution Inhale 3 mLs into the lungs every 4 hours as needed for Shortness of Breath Yes Stefani Blood MD   albuterol sulfate HFA (PROVENTIL;VENTOLIN;PROAIR) 108 (90 Base) MCG/ACT inhaler Inhale 2 puffs into the lungs every 6 hours as needed for Wheezing Yes Nba Nova MD   Evolocumab 140 MG/ML SOAJ Inject 140 mg into the skin every 14 days Take an injection every 14 days. Yes Historical Provider, MD   fluticasone-umeclidin-vilant (TRELEGY ELLIPTA) 100-62.5-25 MCG/INH AEPB Inhale 1 puff into the lungs in the morning. Yes Keysha Nick DO   furosemide (LASIX) 40 MG tablet Take 1 tablet by mouth in the morning. Yes Keysha Nick DO   atorvastatin (LIPITOR) 80 MG tablet Take 1 tablet by mouth daily Yes Keysha Nick DO   allopurinol (ZYLOPRIM) 100 MG tablet Take 1 tablet by mouth daily Yes Keysha Nick DO   pregabalin (LYRICA) 150 MG capsule Take 1 capsule by mouth 2 times daily for 30 days. Patient taking differently: Take 450 mg by mouth 2 times daily. Keysha Nick DO   folic acid (FOLVITE) 1 MG tablet Take 1 tablet by mouth daily  Nick Montague MD   nitroGLYCERIN (NITROSTAT) 0.4 MG SL tablet Place 1 tablet under the tongue every 5 minutes as needed for Chest pain  Patient not taking: No sig reported  Keysha Nick DO   magnesium gluconate (MAGONATE) 500 MG tablet Take 500 mg by mouth daily.   Historical Provider, MD        Social History     Tobacco Use    Smoking status: Some Days     Packs/day: 0.25     Years: 52.00     Pack years: 13.00     Types: Cigarettes     Start date: 1967     Last attempt to quit: 2022     Years since quittin.7    Smokeless tobacco: Never    Tobacco comments:     reports he quit circa 22   Substance Use Topics    Alcohol use: Not Currently     Comment: 2-3 beers a month        Vitals:    23 0947 BP: (!) 117/56   Pulse: 77   Temp: 98.7 °F (37.1 °C)   TempSrc: Temporal   Weight: 218 lb (98.9 kg)     Estimated body mass index is 31.28 kg/m² as calculated from the following:    Height as of 1/24/23: 5' 10\" (1.778 m). Weight as of this encounter: 218 lb (98.9 kg). Physical Exam  Constitutional:       Appearance: Normal appearance. He is obese. HENT:      Head: Normocephalic and atraumatic. Nose: Nose normal.      Mouth/Throat:      Mouth: Mucous membranes are moist.      Pharynx: Oropharynx is clear. Cardiovascular:      Rate and Rhythm: Normal rate and regular rhythm. Pulmonary:      Effort: Pulmonary effort is normal.      Breath sounds: Normal breath sounds. No wheezing. Lymphadenopathy:      Cervical: No cervical adenopathy. Skin:     General: Skin is warm and dry. Neurological:      Mental Status: He is alert. Mental status is at baseline. Gait: Gait abnormal (Uses walker). Psychiatric:         Mood and Affect: Mood normal.         Behavior: Behavior normal.         Thought Content: Thought content normal.       ASSESSMENT/PLAN:  1. Type 2 diabetes mellitus with diabetic peripheral angiopathy without gangrene, without long-term current use of insulin ([facility] [address]): A1c much improved since initial diagnosis. Recently switched from 5 units basal insulin to metformin and Jardiance. Sugars have been much improved since. Continue current regimen follow-up 3 months.  - POCT glycosylated hemoglobin (Hb A1C)    2. Hypertension associated with diabetes ([facility] [address]): Blood pressure at goal switching from lisinopril to [facility]. No orthostatic symptoms. 3. Diastolic dysfunction with chronic heart failure ([address]): Euvolemic today. Tolerating Jardiance and Entresto well without side effects.  - empagliflozin (JARDIANCE) 25 MG tablet; Take 1 tablet by mouth daily  Dispense: 90 tablet; Refill: 1    4. Hyperlipidemia associated with type 2 diabetes mellitus ([address]): Was unable to get patient Sondra Matta. We will continue Lipitor and Repatha. Return in about 3 months (around 6/14/2023) for Diabetes. An electronic signature was used to authenticate this note.     --Clyde Vincent,  on 3/14/2023 at 10:25 AM

## 2023-04-06 ENCOUNTER — CARE COORDINATION (OUTPATIENT)
Dept: CARE COORDINATION | Age: 73
End: 2023-04-06

## 2023-04-06 NOTE — CARE COORDINATION
Routine follow up outreach for review pt status/progress towards goals. Left vm inquiring pt status & requesting return callback at pt's soonest convenience.     Provided & repeated direct callback to reach ACM      Note pt did follow w/PCP 3.14.23 as scheduled; returns for 3 mo follow up scheduled 6.14.23  Plan: in absence of any newly presenting concerns - consider readiness to transition to surveillance to conclusion of episode within next 30-60 days

## 2023-04-07 ENCOUNTER — TELEPHONE (OUTPATIENT)
Dept: PRIMARY CARE CLINIC | Age: 73
End: 2023-04-07

## 2023-04-24 ENCOUNTER — TELEPHONE (OUTPATIENT)
Dept: PRIMARY CARE CLINIC | Age: 73
End: 2023-04-24

## 2023-04-24 DIAGNOSIS — E78.2 MIXED HYPERLIPIDEMIA: Chronic | ICD-10-CM

## 2023-04-24 DIAGNOSIS — I25.10 CORONARY ARTERY DISEASE INVOLVING NATIVE CORONARY ARTERY OF NATIVE HEART WITHOUT ANGINA PECTORIS: ICD-10-CM

## 2023-04-24 RX ORDER — ATORVASTATIN CALCIUM 80 MG/1
80 TABLET, FILM COATED ORAL DAILY
Qty: 90 TABLET | Refills: 1 | Status: SHIPPED | OUTPATIENT
Start: 2023-04-24

## 2023-04-24 NOTE — TELEPHONE ENCOUNTER
Medication:   Requested Prescriptions     Pending Prescriptions Disp Refills    atorvastatin (LIPITOR) 80 MG tablet [Pharmacy Med Name: ATORVASTATIN 80MG TABLETS] 90 tablet 1     Sig: TAKE 1 TABLET BY MOUTH DAILY        Last Filled:  06/20/22    Patient Phone Number: [phone] (home)     Last appt: 3/14/2023   Next appt: 6/14/2023    Last OARRS:   RX Monitoring 8/14/2020   Attestation -   Periodic Controlled Substance Monitoring Possible medication side effects, risk of tolerance/dependence & alternative treatments discussed. ;Assessed functional status. ;Obtaining appropriate analgesic effect of treatment. Chronic Pain > 50 MEDD Obtained or confirmed \"Consent for Opioid Use\" on file.

## 2023-04-24 NOTE — TELEPHONE ENCOUNTER
Nilesh Browne from [facility] called and wasnted to inform you that PT has been discharged from [facility] with all goals met

## 2023-05-02 ENCOUNTER — CARE COORDINATION (OUTPATIENT)
Dept: CARE COORDINATION | Age: 73
End: 2023-05-02

## 2023-05-02 NOTE — CARE COORDINATION
In absence of response from pt over past 30 days - and following previous message to pt  Left detailed vm extending open invitation for pt to outreach ACM in future, for support as needed -   Adding, that at this time - in absence of any newly identified concerns requiring extending [facility] outreach -  Plan: current ACC episode will now conclude d/t goals met  Open invitation to pt to outreach ACM in future, as needed.     Provided & repeated ACM callback number if pt has any questions or concerns about this message

## 2023-05-07 DIAGNOSIS — M1A.0710 CHRONIC IDIOPATHIC GOUT INVOLVING TOE OF RIGHT FOOT WITHOUT TOPHUS: ICD-10-CM

## 2023-05-08 RX ORDER — ALLOPURINOL 100 MG/1
100 TABLET ORAL DAILY
Qty: 180 TABLET | Refills: 1 | Status: SHIPPED | OUTPATIENT
Start: 2023-05-08

## 2023-05-08 NOTE — TELEPHONE ENCOUNTER
Medication:   Requested Prescriptions     Pending Prescriptions Disp Refills    allopurinol (ZYLOPRIM) 100 MG tablet [Pharmacy Med Name: ALLOPURINOL 100MG TABLETS] 180 tablet 1     Sig: TAKE 1 TABLET BY MOUTH DAILY        Last Filled:  6/14/22    Patient Phone Number: [phone] (home)     Last appt: 3/14/2023   Next appt: 6/14/2023    Last OARRS:   RX Monitoring 8/14/2020   Attestation -   Periodic Controlled Substance Monitoring Possible medication side effects, risk of tolerance/dependence & alternative treatments discussed. ;Assessed functional status. ;Obtaining appropriate analgesic effect of treatment. Chronic Pain > 50 MEDD Obtained or confirmed \"Consent for Opioid Use\" on file.

## 2023-05-26 ENCOUNTER — TELEPHONE (OUTPATIENT)
Dept: CARDIOLOGY CLINIC | Age: 73
End: 2023-05-26

## 2023-06-01 RX ORDER — APIXABAN 5 MG/1
TABLET, FILM COATED ORAL
Qty: 60 TABLET | Refills: 1 | Status: SHIPPED | OUTPATIENT
Start: 2023-06-01

## 2023-06-01 RX ORDER — OMEPRAZOLE 40 MG/1
40 CAPSULE, DELAYED RELEASE ORAL DAILY
Qty: 30 CAPSULE | Refills: 3 | Status: SHIPPED | OUTPATIENT
Start: 2023-06-01

## 2023-06-01 NOTE — TELEPHONE ENCOUNTER
Medication:   Requested Prescriptions     Pending Prescriptions Disp Refills    omeprazole (PRILOSEC) 40 MG delayed release capsule [Pharmacy Med Name: OMEPRAZOLE 40MG CAPSULES] 30 capsule 3     Sig: TAKE 1 CAPSULE BY MOUTH DAILY    ELIQUIS 5 MG TABS tablet [Pharmacy Med Name: ELIQUIS 5MG TABLETS] 60 tablet 1     Sig: TAKE 1 TABLET BY MOUTH TWICE DAILY        Last Filled:  5/2/23, 5/6/23    Patient Phone Number: [phone] (home)     Last appt: 3/14/2023   Next appt: 6/14/2023    Last OARRS:   RX Monitoring 8/14/2020   Attestation -   Periodic Controlled Substance Monitoring Possible medication side effects, risk of tolerance/dependence & alternative treatments discussed. ;Assessed functional status. ;Obtaining appropriate analgesic effect of treatment. Chronic Pain > 50 MEDD Obtained or confirmed \"Consent for Opioid Use\" on file.

## 2023-06-14 DIAGNOSIS — E11.51 TYPE 2 DIABETES MELLITUS WITH DIABETIC PERIPHERAL ANGIOPATHY WITHOUT GANGRENE, WITHOUT LONG-TERM CURRENT USE OF INSULIN (HCC): ICD-10-CM

## 2023-06-14 PROBLEM — E11.69 DIABETES MELLITUS TYPE 2 IN OBESE (HCC): Status: ACTIVE | Noted: 2023-06-14

## 2023-06-14 PROBLEM — E66.9 DIABETES MELLITUS TYPE 2 IN OBESE (HCC): Status: ACTIVE | Noted: 2023-06-14

## 2023-06-14 PROBLEM — J43.2 CENTRILOBULAR EMPHYSEMA (HCC): Status: ACTIVE | Noted: 2023-06-14

## 2023-06-15 LAB
EST. AVERAGE GLUCOSE BLD GHB EST-MCNC: 131.2 MG/DL
HBA1C MFR BLD: 6.2 %

## 2023-06-29 DIAGNOSIS — I50.32 CHRONIC DIASTOLIC HEART FAILURE (HCC): ICD-10-CM

## 2023-06-30 ENCOUNTER — TELEPHONE (OUTPATIENT)
Dept: PRIMARY CARE CLINIC | Age: 73
End: 2023-06-30

## 2023-06-30 RX ORDER — POTASSIUM CHLORIDE 750 MG/1
CAPSULE, EXTENDED RELEASE ORAL
Qty: 60 CAPSULE | Refills: 1 | Status: SHIPPED | OUTPATIENT
Start: 2023-06-30

## 2023-07-03 RX ORDER — CLOPIDOGREL BISULFATE 75 MG/1
75 TABLET ORAL DAILY
Qty: 30 TABLET | Refills: 3 | Status: SHIPPED | OUTPATIENT
Start: 2023-07-03

## 2023-07-31 RX ORDER — DULOXETIN HYDROCHLORIDE 20 MG/1
20 CAPSULE, DELAYED RELEASE ORAL DAILY
Qty: 30 CAPSULE | Refills: 3 | Status: SHIPPED | OUTPATIENT
Start: 2023-07-31

## 2023-07-31 NOTE — PROGRESS NOTES
Medication:   Requested Prescriptions     Pending Prescriptions Disp Refills    DULoxetine (CYMBALTA) 20 MG extended release capsule 30 capsule 3     Sig: Take 1 capsule by mouth daily        Last Filled:  07/2022 D/C'd    Patient Phone Number: [phone] (home)     Last appt: 6/14/2023   Next appt: 8/2/2023    Last OARRS:   RX Monitoring 8/14/2020   Attestation -   Periodic Controlled Substance Monitoring Possible medication side effects, risk of tolerance/dependence & alternative treatments discussed. ;Assessed functional status. ;Obtaining appropriate analgesic effect of treatment. Chronic Pain > 50 MEDD Obtained or confirmed \"Consent for Opioid Use\" on file.

## 2023-08-01 DIAGNOSIS — G60.9 IDIOPATHIC PERIPHERAL NEUROPATHY: ICD-10-CM

## 2023-08-01 NOTE — TELEPHONE ENCOUNTER
Medication:   Requested Prescriptions     Pending Prescriptions Disp Refills    pregabalin (LYRICA) 150 MG capsule [Pharmacy Med Name: PREGABALIN 150MG CAPSULES] 180 capsule      Sig: TAKE 3 CAPSULES BY MOUTH TWICE DAILY. MAX DAILY AMOUNT: 900 MG        Last Filled:  04/13/23    Patient Phone Number: [phone] (home)     Last appt: 6/14/2023   Next appt: 8/2/2023    Last OARRS:   RX Monitoring 8/14/2020   Attestation -   Periodic Controlled Substance Monitoring Possible medication side effects, risk of tolerance/dependence & alternative treatments discussed. ;Assessed functional status. ;Obtaining appropriate analgesic effect of treatment. Chronic Pain > 50 MEDD Obtained or confirmed \"Consent for Opioid Use\" on file.

## 2023-08-02 ENCOUNTER — OFFICE VISIT (OUTPATIENT)
Dept: PRIMARY CARE CLINIC | Age: 73
End: 2023-08-02
Payer: MEDICARE

## 2023-08-02 VITALS
BODY MASS INDEX: 32.73 KG/M2 | SYSTOLIC BLOOD PRESSURE: 110 MMHG | DIASTOLIC BLOOD PRESSURE: 59 MMHG | WEIGHT: 228.6 LBS | TEMPERATURE: 97.8 F | HEIGHT: 70 IN | HEART RATE: 80 BPM

## 2023-08-02 DIAGNOSIS — D68.9 COAGULATION DEFECT (HCC): ICD-10-CM

## 2023-08-02 DIAGNOSIS — L97.912 NON-PRESSURE ULCER OF RIGHT LOWER EXTREMITY WITH FAT LAYER EXPOSED (HCC): Primary | ICD-10-CM

## 2023-08-02 PROCEDURE — 3074F SYST BP LT 130 MM HG: CPT | Performed by: STUDENT IN AN ORGANIZED HEALTH CARE EDUCATION/TRAINING PROGRAM

## 2023-08-02 PROCEDURE — 99214 OFFICE O/P EST MOD 30 MIN: CPT | Performed by: STUDENT IN AN ORGANIZED HEALTH CARE EDUCATION/TRAINING PROGRAM

## 2023-08-02 PROCEDURE — 3078F DIAST BP <80 MM HG: CPT | Performed by: STUDENT IN AN ORGANIZED HEALTH CARE EDUCATION/TRAINING PROGRAM

## 2023-08-02 PROCEDURE — 1123F ACP DISCUSS/DSCN MKR DOCD: CPT | Performed by: STUDENT IN AN ORGANIZED HEALTH CARE EDUCATION/TRAINING PROGRAM

## 2023-08-02 RX ORDER — PREGABALIN 150 MG/1
CAPSULE ORAL
Qty: 180 CAPSULE | Refills: 1 | Status: SHIPPED | OUTPATIENT
Start: 2023-08-02 | End: 2024-01-29

## 2023-08-02 RX ORDER — CLINDAMYCIN HYDROCHLORIDE 300 MG/1
300 CAPSULE ORAL 3 TIMES DAILY
Qty: 30 CAPSULE | Refills: 0 | Status: SHIPPED | OUTPATIENT
Start: 2023-08-02 | End: 2023-08-12

## 2023-08-02 ASSESSMENT — ENCOUNTER SYMPTOMS: COLOR CHANGE: 1

## 2023-08-02 NOTE — PROGRESS NOTES
2023     [address] [place] [address] (:  1950) is a 72 y.o. male, here for evaluation of the following medical concerns:    HPI  Leg wound  Yunior Merida presents today for evaluation of a wound on the front of his right lower leg. He has a longstanding history of vascular problems. 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 He had seen vascular surgery last year and had a femoropopliteal bypass. He was following with vascular surgery and wound care for several months until the wound has shrunk to about the size of a nickel. Unfortunately, he was walking through his house last week when he knocked his leg against something. This resulted in the scab being forcefully removed. He did not think anything of it, until over the last 3 weeks ago the wound has been slowly growing in size. It is about 3 times the size it was before knocking the scab off. He does complain of pain, but denies fevers, chills, nausea, vomiting or diarrhea. The wound is draining clear material.  He has not noticed any pus. Review of Systems   Constitutional:  Negative for activity change, chills and fever. Skin:  Positive for color change and wound. Hematological:  Negative for adenopathy. Prior to Visit Medications    Medication Sig Taking? Authorizing Provider   pregabalin (LYRICA) 150 MG capsule TAKE 3 CAPSULES BY MOUTH TWICE DAILY.  MAX DAILY AMOUNT: 900 MG Yes Tex Butt DO   clindamycin (CLEOCIN) 300 MG capsule Take 1 capsule by mouth 3 times daily for 10 days Yes Tex Butt DO   DULoxetine (CYMBALTA) 20 MG extended release capsule Take 1 capsule by mouth daily Yes Tex Butt DO   clopidogrel (PLAVIX) 75 MG tablet TAKE 1 TABLET BY MOUTH DAILY Yes Yessi Adam, DO   potassium chloride (MICRO-K) 10 MEQ extended release capsule TAKE 1 CAPSULE BY MOUTH DAILY Yes Edmundo Limon MD   dulaglutide (TRULICITY) 1.5 MY/2.9VJ SC injection Inject 0.5 mLs

## 2023-08-08 DIAGNOSIS — I25.10 CORONARY ARTERY DISEASE INVOLVING NATIVE CORONARY ARTERY OF NATIVE HEART WITHOUT ANGINA PECTORIS: Primary | ICD-10-CM

## 2023-08-08 RX ORDER — APIXABAN 5 MG/1
TABLET, FILM COATED ORAL
Qty: 60 TABLET | Refills: 1 | Status: SHIPPED | OUTPATIENT
Start: 2023-08-08

## 2023-08-08 RX ORDER — OMEPRAZOLE 40 MG/1
40 CAPSULE, DELAYED RELEASE ORAL DAILY
Qty: 90 CAPSULE | Refills: 1 | Status: SHIPPED | OUTPATIENT
Start: 2023-08-08

## 2023-08-08 NOTE — TELEPHONE ENCOUNTER
Medication:   Requested Prescriptions     Pending Prescriptions Disp Refills    omeprazole (PRILOSEC) 40 MG delayed release capsule [Pharmacy Med Name: OMEPRAZOLE 40MG CAPSULES] 90 capsule      Sig: TAKE 1 CAPSULE BY MOUTH DAILY        Last Filled:  06/01/23    Patient Phone Number: [phone] (home)     Last appt: 8/2/2023   Next appt: 9/14/2023    Last OARRS:   RX Monitoring 8/14/2020   Attestation -   Periodic Controlled Substance Monitoring Possible medication side effects, risk of tolerance/dependence & alternative treatments discussed. ;Assessed functional status. ;Obtaining appropriate analgesic effect of treatment. Chronic Pain > 50 MEDD Obtained or confirmed \"Consent for Opioid Use\" on file.

## 2023-08-08 NOTE — TELEPHONE ENCOUNTER
Medication:   Requested Prescriptions     Pending Prescriptions Disp Refills    ELIQUIS 5 MG TABS tablet [Pharmacy Med Name: ELIQUIS 5MG TABLETS] 60 tablet 1     Sig: TAKE 1 TABLET BY MOUTH TWICE DAILY        Last Filled:  06/01/23    Patient Phone Number: [phone] (home)     Last appt: 8/2/2023   Next appt: 9/14/2023    Last OARRS:   RX Monitoring 8/14/2020   Attestation -   Periodic Controlled Substance Monitoring Possible medication side effects, risk of tolerance/dependence & alternative treatments discussed. ;Assessed functional status. ;Obtaining appropriate analgesic effect of treatment. Chronic Pain > 50 MEDD Obtained or confirmed \"Consent for Opioid Use\" on file.

## 2023-08-10 ENCOUNTER — HOSPITAL ENCOUNTER (OUTPATIENT)
Dept: WOUND CARE | Age: 73
Discharge: HOME | End: 2023-08-10
Payer: MEDICARE

## 2023-08-10 VITALS
WEIGHT: 228 LBS | TEMPERATURE: 96.8 F | RESPIRATION RATE: 16 BRPM | BODY MASS INDEX: 32.64 KG/M2 | DIASTOLIC BLOOD PRESSURE: 75 MMHG | HEART RATE: 73 BPM | HEIGHT: 70 IN | SYSTOLIC BLOOD PRESSURE: 127 MMHG

## 2023-08-10 DIAGNOSIS — I73.9 PVD (PERIPHERAL VASCULAR DISEASE) (HCC): Primary | ICD-10-CM

## 2023-08-10 DIAGNOSIS — L97.912 LOWER EXTREMITY ULCERATION, RIGHT, WITH FAT LAYER EXPOSED (HCC): ICD-10-CM

## 2023-08-10 PROCEDURE — 6370000000 HC RX 637 (ALT 250 FOR IP): Performed by: SPECIALIST

## 2023-08-10 PROCEDURE — 99213 OFFICE O/P EST LOW 20 MIN: CPT

## 2023-08-10 PROCEDURE — 11042 DBRDMT SUBQ TIS 1ST 20SQCM/<: CPT

## 2023-08-10 RX ORDER — LIDOCAINE HYDROCHLORIDE 40 MG/ML
SOLUTION TOPICAL ONCE
Status: COMPLETED | OUTPATIENT
Start: 2023-08-10 | End: 2023-08-10

## 2023-08-10 RX ADMIN — LIDOCAINE HYDROCHLORIDE: 40 SOLUTION TOPICAL at 10:46

## 2023-08-10 ASSESSMENT — PAIN DESCRIPTION - ORIENTATION: ORIENTATION: RIGHT

## 2023-08-10 ASSESSMENT — PAIN DESCRIPTION - DESCRIPTORS: DESCRIPTORS: ACHING;BURNING

## 2023-08-10 ASSESSMENT — PAIN DESCRIPTION - LOCATION: LOCATION: LEG

## 2023-08-10 ASSESSMENT — PAIN DESCRIPTION - PAIN TYPE: TYPE: ACUTE PAIN

## 2023-08-10 ASSESSMENT — PAIN SCALES - GENERAL: PAINLEVEL_OUTOF10: 9

## 2023-08-10 NOTE — PROGRESS NOTES
Multilayer Compression Wrap   (Not Unna) Below the Knee    NAME:  Hayes Rain. YOB: 1950  MEDICAL RECORD NUMBER:  [id number]  DATE:  8/10/2023       Removed old Multilayer wrap if present and washed leg with mild soap/water. Applied moisturizing agent to dry skin as needed. Applied primary and secondary dressing as ordered    Applied multilayered dressing below the knee to Right lower leg(s)  (2 Layer Lite compression wrap ) . Instructed patient/caregiver not to remove dressing and to keep it clean and dry. Instructed patient/caregiver on complications to report to provider, such as pain, numbness in toes, heavy drainage, and slippage of dressing. Instructed patient on purpose of compression dressing and on activity and exercise recommendations.    Applied per   Guidelines    Electronically signed by Karen Mathews RN on 8/10/2023 at 11:12 AM
capsule TAKE 1 CAPSULE BY MOUTH DAILY 60 capsule 1    dulaglutide (TRULICITY) 1.5 BE/4.8OD SC injection Inject 0.5 mLs into the skin once a week 0.5 mL 5    fluticasone-umeclidin-vilant (TRELEGY ELLIPTA) 100-62.5-25 MCG/ACT AEPB inhaler Inhale 1 puff into the lungs daily 60 each 5    Evolocumab 140 MG/ML SOAJ Inject 140 mg into the skin every 14 days Take an injection every 14 days. 140 mL 4    allopurinol (ZYLOPRIM) 100 MG tablet TAKE 1 TABLET BY MOUTH DAILY 180 tablet 1    atorvastatin (LIPITOR) 80 MG tablet TAKE 1 TABLET BY MOUTH DAILY 90 tablet 1    sacubitril-valsartan (ENTRESTO) 24-26 MG per tablet Take 1 tablet by mouth 2 times daily 60 tablet 3    empagliflozin (JARDIANCE) 25 MG tablet Take 1 tablet by mouth daily 90 tablet 1    metoprolol tartrate (LOPRESSOR) 50 MG tablet Take 1 tablet by mouth 2 times daily 90 tablet 5    blood glucose test strips (ASCENSIA AUTODISC VI;ONE TOUCH ULTRA TEST VI) strip Inject 1 each into the skin daily 100 each 1    Lancets (ONETOUCH DELICA PLUS [id number]) MISC       Blood Glucose Monitoring Suppl (ONETOUCH VERIO REFLECT) w/Device KIT       glucose monitoring (FREESTYLE FREEDOM) kit 1 kit by Does not apply route daily 1 kit 0    blood glucose monitor strips Test 4 times a day & as needed for symptoms of irregular blood glucose. Dispense sufficient amount for indicated testing frequency plus additional to accommodate PRN testing needs.  200 strip 3    senna (SENOKOT) 8.6 MG TABS tablet Take 1 tablet by mouth nightly as needed (Patient not taking: Reported on 8/10/2023)      ipratropium-albuterol (DUONEB) 0.5-2.5 (3) MG/3ML SOLN nebulizer solution Inhale 3 mLs into the lungs every 4 hours as needed for Shortness of Breath (Patient not taking: Reported on 8/10/2023) 360 mL 4    albuterol sulfate HFA (PROVENTIL;VENTOLIN;PROAIR) 108 (90 Base) MCG/ACT inhaler Inhale 2 puffs into the lungs every 6 hours as needed for Wheezing 18 g 2    furosemide (LASIX) 40 MG tablet Take 1 tablet by

## 2023-08-10 NOTE — DISCHARGE INSTRUCTIONS
411 [facility] Physician Orders and Discharge Instructions  [address] [place] [address]. [place] [address]  Telephone: [phone]  NAME:  Simon Garcia. YOB: 1950  MEDICAL RECORD NUMBER:  [id number]  DATE: 8/10/23     Return Appointment:  Return Appointment: With Waleska Lynn MD  in  1 [facility])  [] Return Appointment for a Wound Assessment with the nurse on:     Future Appointments   Date Time Provider [facility] [address]   9/14/2023 11:00 AM Yayo Albrecht, DO [facility] [place] - [place]   12/14/2023  3:00 PM SCHEDULE, MHC[facility] [place] [facility] [place]     Please call [facility] ([phone]) to schedule the ordered testing if you do not hear from the scheduling department within 1-2 days. Please be sure to bring wound care supplies so you are able to replace dressing after testing is completed. Wound care instructions: If you smoke we ask that you refrain from smoking. Smoking inhibits wounds from healing. When taking antibiotics take the entire prescription as ordered. Do not stop taking until medication is all gone unless otherwise instructed. Exercise as tolerated. Keep weight off wounds and reposition every 2 hours if applicable. Do not get wounds wet in the bath or shower unless otherwise instructed by your physician. If your wound is on your foot or leg, you may purchase a cast bag. Please ask at the pharmacy. Wash hands with soap and water prior to and after every dressing change. [x]Wash wounds with: No need to wash. Leave dressing in place. [x]Catrachita wound Topical Treatments: Do not apply lotions, creams, or ointments to the skin around the wound bed unless directed as followed:   Apply around the wound:  - Done in clinic only             [x]Wound Location: right lower leg   Apply Primary Dressing to wound:  Foam with silver (I.e. Polymem Ag)  Secondary Dressing: 4X4 gauze pad   Avoid contact of tape with

## 2023-08-17 ENCOUNTER — HOSPITAL ENCOUNTER (OUTPATIENT)
Dept: WOUND CARE | Age: 73
Discharge: HOME | End: 2023-08-17
Payer: MEDICARE

## 2023-08-17 ENCOUNTER — TELEPHONE (OUTPATIENT)
Dept: WOUND CARE | Age: 73
End: 2023-08-17

## 2023-08-17 ENCOUNTER — TELEPHONE (OUTPATIENT)
Dept: VASCULAR SURGERY | Age: 73
End: 2023-08-17

## 2023-08-17 ENCOUNTER — HOSPITAL ENCOUNTER (OUTPATIENT)
Dept: VASCULAR LAB | Age: 73
Discharge: HOME | End: 2023-08-17
Attending: SPECIALIST
Payer: MEDICARE

## 2023-08-17 VITALS
DIASTOLIC BLOOD PRESSURE: 75 MMHG | TEMPERATURE: 97.1 F | SYSTOLIC BLOOD PRESSURE: 140 MMHG | RESPIRATION RATE: 16 BRPM | HEART RATE: 69 BPM

## 2023-08-17 DIAGNOSIS — L97.912 LOWER EXTREMITY ULCERATION, RIGHT, WITH FAT LAYER EXPOSED (HCC): Primary | ICD-10-CM

## 2023-08-17 DIAGNOSIS — I73.9 PVD (PERIPHERAL VASCULAR DISEASE) (HCC): ICD-10-CM

## 2023-08-17 PROCEDURE — 93926 LOWER EXTREMITY STUDY: CPT

## 2023-08-17 PROCEDURE — 6370000000 HC RX 637 (ALT 250 FOR IP): Performed by: SPECIALIST

## 2023-08-17 PROCEDURE — 11042 DBRDMT SUBQ TIS 1ST 20SQCM/<: CPT

## 2023-08-17 RX ORDER — LIDOCAINE HYDROCHLORIDE 20 MG/ML
JELLY TOPICAL ONCE
OUTPATIENT
Start: 2023-08-17 | End: 2023-08-17

## 2023-08-17 RX ORDER — BETAMETHASONE DIPROPIONATE 0.05 %
OINTMENT (GRAM) TOPICAL ONCE
OUTPATIENT
Start: 2023-08-17 | End: 2023-08-17

## 2023-08-17 RX ORDER — LIDOCAINE HYDROCHLORIDE 40 MG/ML
SOLUTION TOPICAL ONCE
Status: COMPLETED | OUTPATIENT
Start: 2023-08-17 | End: 2023-08-17

## 2023-08-17 RX ORDER — IBUPROFEN 200 MG
TABLET ORAL ONCE
OUTPATIENT
Start: 2023-08-17 | End: 2023-08-17

## 2023-08-17 RX ORDER — LIDOCAINE 50 MG/G
OINTMENT TOPICAL ONCE
OUTPATIENT
Start: 2023-08-17 | End: 2023-08-17

## 2023-08-17 RX ORDER — GINSENG 100 MG
CAPSULE ORAL ONCE
OUTPATIENT
Start: 2023-08-17 | End: 2023-08-17

## 2023-08-17 RX ORDER — SODIUM CHLOR/HYPOCHLOROUS ACID 0.033 %
SOLUTION, IRRIGATION IRRIGATION ONCE
OUTPATIENT
Start: 2023-08-17 | End: 2023-08-17

## 2023-08-17 RX ORDER — GENTAMICIN SULFATE 1 MG/G
OINTMENT TOPICAL ONCE
OUTPATIENT
Start: 2023-08-17 | End: 2023-08-17

## 2023-08-17 RX ORDER — LIDOCAINE HYDROCHLORIDE 40 MG/ML
SOLUTION TOPICAL ONCE
OUTPATIENT
Start: 2023-08-17 | End: 2023-08-17

## 2023-08-17 RX ORDER — CLOBETASOL PROPIONATE 0.5 MG/G
OINTMENT TOPICAL ONCE
OUTPATIENT
Start: 2023-08-17 | End: 2023-08-17

## 2023-08-17 RX ORDER — LIDOCAINE 40 MG/G
CREAM TOPICAL ONCE
OUTPATIENT
Start: 2023-08-17 | End: 2023-08-17

## 2023-08-17 RX ORDER — BACITRACIN ZINC AND POLYMYXIN B SULFATE 500; 1000 [USP'U]/G; [USP'U]/G
OINTMENT TOPICAL ONCE
OUTPATIENT
Start: 2023-08-17 | End: 2023-08-17

## 2023-08-17 RX ADMIN — LIDOCAINE HYDROCHLORIDE: 40 SOLUTION TOPICAL at 10:14

## 2023-08-17 ASSESSMENT — PAIN SCALES - GENERAL: PAINLEVEL_OUTOF10: 8

## 2023-08-17 ASSESSMENT — PAIN DESCRIPTION - ORIENTATION: ORIENTATION: RIGHT

## 2023-08-17 ASSESSMENT — PAIN DESCRIPTION - FREQUENCY: FREQUENCY: CONTINUOUS

## 2023-08-17 ASSESSMENT — PAIN DESCRIPTION - LOCATION: LOCATION: LEG

## 2023-08-17 ASSESSMENT — PAIN DESCRIPTION - PAIN TYPE: TYPE: ACUTE PAIN

## 2023-08-17 ASSESSMENT — PAIN DESCRIPTION - DESCRIPTORS: DESCRIPTORS: ACHING

## 2023-08-17 NOTE — TELEPHONE ENCOUNTER
M for Mr. Guzman to call office for appt. He has already had scans done and only needs appt with Dr. Donald Elaine.

## 2023-08-17 NOTE — DISCHARGE INSTRUCTIONS
411 [facility] Physician Orders and Discharge Instructions  [id number] [place] [address]. [facility]  Telephone: [phone]  NAME:  Simon Garcia. YOB: 1950  MEDICAL RECORD NUMBER:  [id number]  DATE: 8/17/23     Return Appointment:  Return Appointment: With Waleska Lynn MD  in  1 [facility])  [] Return Appointment for a Wound Assessment with the nurse on:     Future Appointments   Date Time Provider [address]   9/14/2023 11:00 AM Yayo Trena, DO [facility] [place] - [place]   12/14/2023  3:00 PM SCHEDULE, MHCX JEFF HARPER AWV LPN JEFF [address]: none    Medically necessary services for evaluation and treatment: []Skilled Nursing (using clean technique) []PT (Eval & Treat) []OT (Eval & Treat) []Social Work []Dietician []Other:      Wound care instructions: If you smoke we ask that you refrain from smoking. Smoking inhibits wounds from healing. When taking antibiotics take the entire prescription as ordered. Do not stop taking until medication is all gone unless otherwise instructed. Exercise as tolerated. Keep weight off wounds and reposition every 2 hours if applicable. Do not get wounds wet in the bath or shower unless otherwise instructed by your physician. If your wound is on your foot or leg, you may purchase a cast bag. Please ask at the pharmacy. Wash hands with soap and water prior to and after every dressing change. [x]Wash wounds with: 0.9% normal saline  [x]Catrachita wound Topical Treatments: Do not apply lotions, creams, or ointments to the skin around the wound bed unless directed as followed:   Apply around the wound: Nothing         [x]Wound Location: right lower leg   Apply Primary Dressing to wound: Foam with silver (I.e. Polymem Ag)  Secondary Dressing: 4X4 gauze pad and Bulky roll gauze   Avoid contact of tape with skin if possible.   When to change Dressing: DO NOT CHANGE-

## 2023-08-17 NOTE — PROGRESS NOTES
Jude De  Progress Note and Procedure Note      Tiff Mendenhall. MEDICAL RECORD NUMBER:  [id number]  AGE: 72 y.o. GENDER: male  : 1950  EPISODE DATE:  2023    Subjective:     Chief Complaint   Patient presents with    Wound Check     Right lower leg         HISTORY of PRESENT ILLNESS HPI  Patient referred by primary care doctor for approximate 1 month history of ulcer right anterior knee. Patient is well-known to wound care after having been treated by Dr. Luigi Caal for peripheral vascular disease and undergoing a right femoropopliteal bypass in May of last year. Patient states he had been doing reasonably well. He had a small scab over the residual wound which opened up after striking his knee. His ulcer has gotten progressively larger and drains fluid intermittently. He was placed on clindamycin by his primary care physician. Mr. Elden Gosselin relates to me he has been putting Neosporin over the ulcer. He states his diabetes is under good control but continues to smoke.   He did get his arterial duplex study today  Wound/Ulcer Pain Timing/Severity: mild  Quality of pain: sharp, aching  Severity:  3 / 10   Modifying Factors: None  Associated Signs/Symptoms: edema and drainage    Ulcer Identification:  Ulcer Type: arterial and traumatic    Contributing Factors: edema, diabetes, smoking, and arterial insufficiency    Acute Wound: N/A not an acute wound    PAST MEDICAL HISTORY        Diagnosis Date    CAD (coronary artery disease)     CHF (congestive heart failure) ([facility])     diastolic    COPD (chronic obstructive pulmonary disease) ([facility])     Critical ischemia of lower extremity ([facility])     Depressive disorder, not elsewhere classified     GERD (gastroesophageal reflux disease)     History of colon polyps - 30 seen on colonoscopy 2021    History of MI (myocardial infarction) 2013    History of skin ulcer of lower extremity     R anterior shin    History of

## 2023-08-18 ENCOUNTER — TELEPHONE (OUTPATIENT)
Dept: VASCULAR SURGERY | Age: 73
End: 2023-08-18

## 2023-08-18 NOTE — TELEPHONE ENCOUNTER
Spoke with Mr. Jimenez Piper and I have him scheduled with Dr. Nallely Tipton on [address] 8/22 for PVD and right lower extremity ulceration, with fat layer exposed.

## 2023-08-18 NOTE — TELEPHONE ENCOUNTER
Patient called in to schedule an appointment with Dr. Allen Foot.  Last seen in 2022    Patient DX:  M91.367 (ICD-10-CM) - Lower extremity ulceration, right, with fat layer exposed ([address])  I73.9 (ICD-10-CM) - PVD (peripheral vascular disease) ([address])    Please contact the patient at [phone]

## 2023-08-22 ENCOUNTER — OFFICE VISIT (OUTPATIENT)
Dept: VASCULAR SURGERY | Age: 73
End: 2023-08-22
Payer: MEDICARE

## 2023-08-22 VITALS
DIASTOLIC BLOOD PRESSURE: 66 MMHG | SYSTOLIC BLOOD PRESSURE: 113 MMHG | WEIGHT: 226 LBS | BODY MASS INDEX: 32.43 KG/M2 | HEART RATE: 82 BPM

## 2023-08-22 DIAGNOSIS — I73.9 PVD (PERIPHERAL VASCULAR DISEASE) (HCC): Primary | ICD-10-CM

## 2023-08-22 PROCEDURE — 1123F ACP DISCUSS/DSCN MKR DOCD: CPT | Performed by: SURGERY

## 2023-08-22 PROCEDURE — 99215 OFFICE O/P EST HI 40 MIN: CPT | Performed by: SURGERY

## 2023-08-22 PROCEDURE — 3074F SYST BP LT 130 MM HG: CPT | Performed by: SURGERY

## 2023-08-22 PROCEDURE — 3078F DIAST BP <80 MM HG: CPT | Performed by: SURGERY

## 2023-08-22 RX ORDER — DULOXETIN HYDROCHLORIDE 30 MG/1
30 CAPSULE, DELAYED RELEASE ORAL DAILY
COMMUNITY
Start: 2023-08-09

## 2023-08-22 RX ORDER — DOXYCYCLINE HYCLATE 100 MG
100 TABLET ORAL 2 TIMES DAILY
Qty: 28 TABLET | Refills: 0 | Status: SHIPPED | OUTPATIENT
Start: 2023-08-22 | End: 2023-09-05

## 2023-08-22 NOTE — PROGRESS NOTES
2023     [address] [place] [address] Zach (:  1950) is a 72 y.o. male,Established patient, here for evaluation of the following chief complaint(s):  New Patient (WC for lower extremity ulceration, right with fat lat layer exposed )        ASSESSMENT/PLAN:  1. PVD (peripheral vascular disease) ([address])  -     CTA LOWER EXTREMITY RIGHT W CONTRAST; Future  Check CTA as bypass appears patent on duplex, but low ROD is incongruent, unless tibial artery disease has progressed. Will make further recommendations upon reviewing CTA. Light compression was applied today--f/u on Thursday with wound care as scheduled. Rx Doxycycline 100 bid x 14 days sent to pharmacy. No follow-ups on file. SUBJECTIVE/OBJECTIVE:  Chayito Mirza is >1 year s/p R femoral to BK popliteal artery bypass with in situ SVG 22 and Right femoral endarterectomy with EIA to PFA bypass 8mm PTFE 11/15/21. I last saw him in 2022 for right leg ulcerations that subsequently healed and then recurred recently after trauma to anterior lower leg. Leg is swollen and compression held due to results of duplex. This showed a patent right leg bypass, but flow velocities were reduced and ROD was <0.4. He now presents for further evaluation and mgmt as indicated. Continues to smoke periodically. I personally reviewed images of the following study:  VL DUP LOWER EXTREMITY ARTERIES RIGHT 2023   Summary      The right ROD of 0.38 is in the range of severe arterial insufficiency. The right external iliac to deep femoral artery PTFE bypass is widely   patent. The femoral-popliteal insitu graft is widely patent; however distal graft   velocities are <45 cm/sec and indicate a high risk of impending graft   failure. There are no obvious stenoses throughout the bypass grafts. An external iliac artery stent is widely patent.     Physical Exam  Vitals:    23 1109   BP: 113/66   Pulse: 82   Weight: 226 lb (102.5 kg)     Right lower leg warm and

## 2023-08-24 ENCOUNTER — HOSPITAL ENCOUNTER (OUTPATIENT)
Dept: WOUND CARE | Age: 73
Discharge: HOME | End: 2023-08-24
Payer: MEDICARE

## 2023-08-24 VITALS
RESPIRATION RATE: 16 BRPM | TEMPERATURE: 97.5 F | HEART RATE: 81 BPM | DIASTOLIC BLOOD PRESSURE: 72 MMHG | SYSTOLIC BLOOD PRESSURE: 137 MMHG

## 2023-08-24 DIAGNOSIS — L97.912 LOWER EXTREMITY ULCERATION, RIGHT, WITH FAT LAYER EXPOSED (HCC): Primary | ICD-10-CM

## 2023-08-24 PROCEDURE — 29581 APPL MULTLAYER CMPRN SYS LEG: CPT

## 2023-08-24 PROCEDURE — 11042 DBRDMT SUBQ TIS 1ST 20SQCM/<: CPT

## 2023-08-24 PROCEDURE — 6370000000 HC RX 637 (ALT 250 FOR IP): Performed by: SPECIALIST

## 2023-08-24 PROCEDURE — 11045 DBRDMT SUBQ TISS EACH ADDL: CPT

## 2023-08-24 RX ORDER — LIDOCAINE HYDROCHLORIDE 40 MG/ML
SOLUTION TOPICAL ONCE
Status: COMPLETED | OUTPATIENT
Start: 2023-08-24 | End: 2023-08-24

## 2023-08-24 RX ORDER — CLOBETASOL PROPIONATE 0.5 MG/G
OINTMENT TOPICAL ONCE
OUTPATIENT
Start: 2023-08-24 | End: 2023-08-24

## 2023-08-24 RX ORDER — LIDOCAINE 40 MG/G
CREAM TOPICAL ONCE
OUTPATIENT
Start: 2023-08-24 | End: 2023-08-24

## 2023-08-24 RX ORDER — IBUPROFEN 200 MG
TABLET ORAL ONCE
OUTPATIENT
Start: 2023-08-24 | End: 2023-08-24

## 2023-08-24 RX ORDER — LIDOCAINE 50 MG/G
OINTMENT TOPICAL ONCE
OUTPATIENT
Start: 2023-08-24 | End: 2023-08-24

## 2023-08-24 RX ORDER — BETAMETHASONE DIPROPIONATE 0.05 %
OINTMENT (GRAM) TOPICAL ONCE
OUTPATIENT
Start: 2023-08-24 | End: 2023-08-24

## 2023-08-24 RX ORDER — GINSENG 100 MG
CAPSULE ORAL ONCE
OUTPATIENT
Start: 2023-08-24 | End: 2023-08-24

## 2023-08-24 RX ORDER — LIDOCAINE HYDROCHLORIDE 20 MG/ML
JELLY TOPICAL ONCE
OUTPATIENT
Start: 2023-08-24 | End: 2023-08-24

## 2023-08-24 RX ORDER — SODIUM CHLOR/HYPOCHLOROUS ACID 0.033 %
SOLUTION, IRRIGATION IRRIGATION ONCE
Status: COMPLETED | OUTPATIENT
Start: 2023-08-24 | End: 2023-08-24

## 2023-08-24 RX ORDER — GENTAMICIN SULFATE 1 MG/G
OINTMENT TOPICAL ONCE
OUTPATIENT
Start: 2023-08-24 | End: 2023-08-24

## 2023-08-24 RX ORDER — SODIUM CHLOR/HYPOCHLOROUS ACID 0.033 %
SOLUTION, IRRIGATION IRRIGATION ONCE
OUTPATIENT
Start: 2023-08-24 | End: 2023-08-24

## 2023-08-24 RX ORDER — LIDOCAINE HYDROCHLORIDE 40 MG/ML
SOLUTION TOPICAL ONCE
OUTPATIENT
Start: 2023-08-24 | End: 2023-08-24

## 2023-08-24 RX ORDER — BACITRACIN ZINC AND POLYMYXIN B SULFATE 500; 1000 [USP'U]/G; [USP'U]/G
OINTMENT TOPICAL ONCE
OUTPATIENT
Start: 2023-08-24 | End: 2023-08-24

## 2023-08-24 RX ADMIN — Medication: at 10:00

## 2023-08-24 RX ADMIN — LIDOCAINE HYDROCHLORIDE: 40 SOLUTION TOPICAL at 09:30

## 2023-08-24 ASSESSMENT — PAIN SCALES - GENERAL: PAINLEVEL_OUTOF10: 8

## 2023-08-24 ASSESSMENT — PAIN DESCRIPTION - FREQUENCY: FREQUENCY: CONTINUOUS

## 2023-08-24 ASSESSMENT — PAIN DESCRIPTION - ORIENTATION: ORIENTATION: RIGHT

## 2023-08-24 ASSESSMENT — PAIN DESCRIPTION - LOCATION: LOCATION: LEG

## 2023-08-24 ASSESSMENT — PAIN DESCRIPTION - DESCRIPTORS: DESCRIPTORS: ACHING;BURNING

## 2023-08-24 ASSESSMENT — PAIN DESCRIPTION - PAIN TYPE: TYPE: ACUTE PAIN

## 2023-08-24 NOTE — DISCHARGE INSTRUCTIONS
411 [facility] Physician Orders and Discharge Instructions  1800 [facility] [place]   [facility] [address] [place] [address]. [facility]  Telephone: [phone]  NAME:  Félix Bradley. YOB: 1950  MEDICAL RECORD NUMBER:  [id number]  DATE: 8/24/23     Return Appointment:  Return Appointment: With Keely Kelley MD  in  1 [facility])  [] Return Appointment for a Wound Assessment with the nurse on:     Future Appointments   Date Time Provider [address]   8/28/2023  3:00 PM [facility] CT RM 2 [facility] CT [facility]   9/14/2023 11:00 AM [facility] [place] [facility]   12/14/2023  3:00 PM [facility] [address]: none    Medically necessary services for evaluation and treatment: []Skilled Nursing (using clean technique) []PT (Eval & Treat) []OT (Eval & Treat) []Social Work []Dietician []Other:      Wound care instructions: If you smoke we ask that you refrain from smoking. Smoking inhibits wounds from healing. When taking antibiotics take the entire prescription as ordered. Do not stop taking until medication is all gone unless otherwise instructed. Exercise as tolerated. Keep weight off wounds and reposition every 2 hours if applicable. Do not get wounds wet in the bath or shower unless otherwise instructed by your physician. If your wound is on your foot or leg, you may purchase a cast bag. Please ask at the pharmacy. Wash hands with soap and water prior to and after every dressing change. [x]Wash wounds with: Vashe wash - Apply enough Vashe to soak a piece of gauze and place on wound bed for 5-10 minutes. No need to rinse after soaking.   [x]Catrachita wound Topical Treatments: Do not apply lotions, creams, or ointments to the skin around the wound bed unless directed as followed:   Apply around the wound: Moisturizing lotion         [x]Wound Location: right lower leg   Apply Primary Dressing to wound:

## 2023-08-24 NOTE — PROGRESS NOTES
Multilayer Compression Wrap   (Not Unna) Below the Knee    NAME:  Noemi Syed. YOB: 1950  MEDICAL RECORD NUMBER:  [id number]  DATE:  8/24/2023       Removed old Multilayer wrap if present and washed leg with mild soap/water. Applied moisturizing agent to dry skin as needed. Applied primary and secondary dressing as ordered    Applied multilayered dressing below the knee to Right lower leg(s)  (2 Layer Lite compression wrap ) . Instructed patient/caregiver not to remove dressing and to keep it clean and dry. Instructed patient/caregiver on complications to report to provider, such as pain, numbness in toes, heavy drainage, and slippage of dressing. Instructed patient on purpose of compression dressing and on activity and exercise recommendations.    Applied per   Guidelines    Electronically signed by Gerson Grant RN on 8/24/2023 at 10:03 AM
This Encounter   Procedures    Initiate Outpatient Wound Care Protocol       Discharge Instructions          411 [facility] Physician Orders and Discharge Instructions  [address] [address] [address] [place] [address]. [facility]  Telephone: [phone]  NAME:  Mehul Jovel. YOB: 1950  MEDICAL RECORD NUMBER:  [id number]  DATE: 8/24/23     Return Appointment:  Return Appointment: With Daisy Noble MD  in  1 [facility])  [] Return Appointment for a Wound Assessment with the nurse on:     Future Appointments   Date Time Provider [address]   8/28/2023  3:00 PM [facility] RM [facility]   9/14/2023 11:00 AM [facility] [place] [facility]   12/14/2023  3:00 PM SCHEDULE, MHCX [facility]  AWV LPN [facility] [address]: none    Medically necessary services for evaluation and treatment: []Skilled Nursing (using clean technique) []PT (Eval & Treat) []OT (Eval & Treat) []Social Work []Dietician []Other:      Wound care instructions: If you smoke we ask that you refrain from smoking. Smoking inhibits wounds from healing. When taking antibiotics take the entire prescription as ordered. Do not stop taking until medication is all gone unless otherwise instructed. Exercise as tolerated. Keep weight off wounds and reposition every 2 hours if applicable. Do not get wounds wet in the bath or shower unless otherwise instructed by your physician. If your wound is on your foot or leg, you may purchase a cast bag. Please ask at the pharmacy. Wash hands with soap and water prior to and after every dressing change. [x]Wash wounds with: Vashe wash - Apply enough Vashe to soak a piece of gauze and place on wound bed for 5-10 minutes. No need to rinse after soaking.   [x]Catrachita wound Topical Treatments: Do not apply lotions, creams, or ointments to the skin around the wound bed unless directed as followed:   Apply around

## 2023-08-28 ENCOUNTER — HOSPITAL ENCOUNTER (OUTPATIENT)
Dept: CT IMAGING | Age: 73
Discharge: HOME | End: 2023-08-28
Attending: SURGERY
Payer: MEDICARE

## 2023-08-28 DIAGNOSIS — I73.9 PVD (PERIPHERAL VASCULAR DISEASE) (HCC): ICD-10-CM

## 2023-08-28 LAB
PERFORMED ON: NORMAL
POC CREATININE: 1.1 MG/DL (ref 0.8–1.3)
POC SAMPLE TYPE: NORMAL

## 2023-08-28 PROCEDURE — 73706 CT ANGIO LWR EXTR W/O&W/DYE: CPT

## 2023-08-28 PROCEDURE — 82565 ASSAY OF CREATININE: CPT

## 2023-08-28 PROCEDURE — 6360000004 HC RX CONTRAST MEDICATION: Performed by: SURGERY

## 2023-08-28 RX ADMIN — IOPAMIDOL 100 ML: 755 INJECTION, SOLUTION INTRAVENOUS at 15:36

## 2023-08-31 ENCOUNTER — HOSPITAL ENCOUNTER (OUTPATIENT)
Dept: WOUND CARE | Age: 73
Discharge: HOME | End: 2023-08-31
Payer: MEDICARE

## 2023-08-31 ENCOUNTER — TELEPHONE (OUTPATIENT)
Dept: VASCULAR SURGERY | Age: 73
End: 2023-08-31

## 2023-08-31 VITALS
HEART RATE: 78 BPM | SYSTOLIC BLOOD PRESSURE: 128 MMHG | RESPIRATION RATE: 16 BRPM | TEMPERATURE: 96.8 F | DIASTOLIC BLOOD PRESSURE: 72 MMHG

## 2023-08-31 DIAGNOSIS — L97.912 LOWER EXTREMITY ULCERATION, RIGHT, WITH FAT LAYER EXPOSED (HCC): Primary | ICD-10-CM

## 2023-08-31 PROCEDURE — 11042 DBRDMT SUBQ TIS 1ST 20SQCM/<: CPT | Performed by: SPECIALIST

## 2023-08-31 PROCEDURE — 11042 DBRDMT SUBQ TIS 1ST 20SQCM/<: CPT

## 2023-08-31 PROCEDURE — 11045 DBRDMT SUBQ TISS EACH ADDL: CPT

## 2023-08-31 PROCEDURE — 29581 APPL MULTLAYER CMPRN SYS LEG: CPT

## 2023-08-31 PROCEDURE — 11045 DBRDMT SUBQ TISS EACH ADDL: CPT | Performed by: SPECIALIST

## 2023-08-31 PROCEDURE — 6370000000 HC RX 637 (ALT 250 FOR IP): Performed by: SPECIALIST

## 2023-08-31 RX ORDER — SODIUM CHLOR/HYPOCHLOROUS ACID 0.033 %
SOLUTION, IRRIGATION IRRIGATION ONCE
Status: COMPLETED | OUTPATIENT
Start: 2023-08-31 | End: 2023-08-31

## 2023-08-31 RX ORDER — LIDOCAINE HYDROCHLORIDE 40 MG/ML
SOLUTION TOPICAL ONCE
Status: COMPLETED | OUTPATIENT
Start: 2023-08-31 | End: 2023-08-31

## 2023-08-31 RX ORDER — LIDOCAINE 50 MG/G
OINTMENT TOPICAL ONCE
OUTPATIENT
Start: 2023-08-31 | End: 2023-08-31

## 2023-08-31 RX ORDER — GINSENG 100 MG
CAPSULE ORAL ONCE
OUTPATIENT
Start: 2023-08-31 | End: 2023-08-31

## 2023-08-31 RX ORDER — LIDOCAINE HYDROCHLORIDE 20 MG/ML
JELLY TOPICAL ONCE
OUTPATIENT
Start: 2023-08-31 | End: 2023-08-31

## 2023-08-31 RX ORDER — GENTAMICIN SULFATE 1 MG/G
OINTMENT TOPICAL ONCE
OUTPATIENT
Start: 2023-08-31 | End: 2023-08-31

## 2023-08-31 RX ORDER — LIDOCAINE 40 MG/G
CREAM TOPICAL ONCE
OUTPATIENT
Start: 2023-08-31 | End: 2023-08-31

## 2023-08-31 RX ORDER — CLOBETASOL PROPIONATE 0.5 MG/G
OINTMENT TOPICAL ONCE
OUTPATIENT
Start: 2023-08-31 | End: 2023-08-31

## 2023-08-31 RX ORDER — IBUPROFEN 200 MG
TABLET ORAL ONCE
OUTPATIENT
Start: 2023-08-31 | End: 2023-08-31

## 2023-08-31 RX ORDER — SODIUM CHLOR/HYPOCHLOROUS ACID 0.033 %
SOLUTION, IRRIGATION IRRIGATION ONCE
OUTPATIENT
Start: 2023-08-31 | End: 2023-08-31

## 2023-08-31 RX ORDER — BETAMETHASONE DIPROPIONATE 0.05 %
OINTMENT (GRAM) TOPICAL ONCE
OUTPATIENT
Start: 2023-08-31 | End: 2023-08-31

## 2023-08-31 RX ORDER — LIDOCAINE HYDROCHLORIDE 40 MG/ML
SOLUTION TOPICAL ONCE
OUTPATIENT
Start: 2023-08-31 | End: 2023-08-31

## 2023-08-31 RX ORDER — BACITRACIN ZINC AND POLYMYXIN B SULFATE 500; 1000 [USP'U]/G; [USP'U]/G
OINTMENT TOPICAL ONCE
OUTPATIENT
Start: 2023-08-31 | End: 2023-08-31

## 2023-08-31 RX ADMIN — LIDOCAINE HYDROCHLORIDE: 40 SOLUTION TOPICAL at 09:27

## 2023-08-31 RX ADMIN — Medication: at 12:12

## 2023-08-31 ASSESSMENT — PAIN SCALES - GENERAL: PAINLEVEL_OUTOF10: 9

## 2023-08-31 ASSESSMENT — PAIN DESCRIPTION - DESCRIPTORS: DESCRIPTORS: ACHING;SHARP

## 2023-08-31 ASSESSMENT — PAIN DESCRIPTION - ORIENTATION: ORIENTATION: RIGHT

## 2023-08-31 ASSESSMENT — PAIN DESCRIPTION - FREQUENCY: FREQUENCY: CONTINUOUS

## 2023-08-31 ASSESSMENT — PAIN DESCRIPTION - LOCATION: LOCATION: LEG

## 2023-08-31 ASSESSMENT — PAIN DESCRIPTION - PAIN TYPE: TYPE: ACUTE PAIN

## 2023-08-31 NOTE — TELEPHONE ENCOUNTER
----- Message from Rosaura Flores RN sent at 8/31/2023 10:13 AM EDT -----  Prabha Avila, can you schedule patient with Dr. Alejandro Mcdaniels next week to discuss results of Right lower extremity CTA? Ulcer to right lower extremity is measuring larger. Thank you.

## 2023-08-31 NOTE — PROGRESS NOTES
Multilayer Compression Wrap   (Not Unna) Below the Knee    NAME:  Torri House. YOB: 1950  MEDICAL RECORD NUMBER:  [id number]  DATE:  8/31/2023       Removed old Multilayer wrap if present and washed leg with mild soap/water. Applied moisturizing agent to dry skin as needed. Applied primary and secondary dressing as ordered    Applied multilayered dressing below the knee to Right lower leg(s)  (2 Layer Lite compression wrap ) . Instructed patient/caregiver not to remove dressing and to keep it clean and dry. Instructed patient/caregiver on complications to report to provider, such as pain, numbness in toes, heavy drainage, and slippage of dressing. Instructed patient on purpose of compression dressing and on activity and exercise recommendations.    Applied per   Guidelines    Electronically signed by Chalino Kunz RN on 8/31/2023 at 10:20 AM
With Nikki Goodpasture, MD  in  1 [facility])  [] Return Appointment for a Wound Assessment with the nurse on:     Future Appointments   Date Time Provider [address]   9/14/2023 11:00 AM DO JEFF [place] - DYD   12/14/2023  3:00 PM SCHEDULE, MHCX JEFF HARPER AWV  [facility] [address]: none    Medically necessary services for evaluation and treatment: []Skilled Nursing (using clean technique) []PT (Eval & Treat) []OT (Eval & Treat) []Social Work []Dietician []Other:      Wound care instructions: If you smoke we ask that you refrain from smoking. Smoking inhibits wounds from healing. When taking antibiotics take the entire prescription as ordered. Do not stop taking until medication is all gone unless otherwise instructed. Exercise as tolerated. Keep weight off wounds and reposition every 2 hours if applicable. Do not get wounds wet in the bath or shower unless otherwise instructed by your physician. If your wound is on your foot or leg, you may purchase a cast bag. Please ask at the pharmacy. Wash hands with soap and water prior to and after every dressing change. [x]Wash wounds with: Vashe wash - Apply enough Vashe to soak a piece of gauze and place on wound bed for 5-10 minutes. No need to rinse after soaking. [x]Catrachita wound Topical Treatments: Do not apply lotions, creams, or ointments to the skin around the wound bed unless directed as followed:   Apply around the wound: Moisturizing lotion         [x]Wound Location: right lower leg   Apply Primary Dressing to wound: Foam with silver (I.e. Polymem Ag)  Secondary Dressing: 4X4 gauze pad   Avoid contact of tape with skin if possible. When to change Dressing: DO NOT CHANGE        [x] Multilayer Compression Wrap:  Type: Applied on Right lower leg(s)  2 Layer Lite Compression Wrap    Do not get leg(s) with wrap wet. If wraps become too tight call the center or completely remove the wrap.    Elevate leg(s)

## 2023-08-31 NOTE — DISCHARGE INSTRUCTIONS
[facility] Physician Orders and Discharge Instructions  [address] [address] [address] [place] [address]. [facility]  Telephone: [phone]  NAME:  Noemi Syed. YOB: 1950  MEDICAL RECORD NUMBER:  [id number]  DATE: 8/31/23     Return Appointment:  Return Appointment: With Mariano Trent MD  in  1 [facility])  [] Return Appointment for a Wound Assessment with the nurse on:     Future Appointments   Date Time Provider [address]   9/14/2023 11:00 AM Ros Henderson, DO JEFF HARPER [place] - DYD   12/14/2023  3:00 PM SCHEDULE, MHCX JEFF HARPER AWV LPN JEFF [address]: none    Medically necessary services for evaluation and treatment: []Skilled Nursing (using clean technique) []PT (Eval & Treat) []OT (Eval & Treat) []Social Work []Dietician []Other:      Wound care instructions: If you smoke we ask that you refrain from smoking. Smoking inhibits wounds from healing. When taking antibiotics take the entire prescription as ordered. Do not stop taking until medication is all gone unless otherwise instructed. Exercise as tolerated. Keep weight off wounds and reposition every 2 hours if applicable. Do not get wounds wet in the bath or shower unless otherwise instructed by your physician. If your wound is on your foot or leg, you may purchase a cast bag. Please ask at the pharmacy. Wash hands with soap and water prior to and after every dressing change. [x]Wash wounds with: Vashe wash - Apply enough Vashe to soak a piece of gauze and place on wound bed for 5-10 minutes. No need to rinse after soaking. [x]Catrachita wound Topical Treatments: Do not apply lotions, creams, or ointments to the skin around the wound bed unless directed as followed:   Apply around the wound: Moisturizing lotion         [x]Wound Location: right lower leg   Apply Primary Dressing to wound:  Foam with silver (I.e. Polymem Ag)  Secondary Dressing:

## 2023-09-05 NOTE — PRE-PROCEDURE INSTRUCTIONS
Called patient about procedure. Told to be here at 0730 for procedure at 0900. NPO after midnight, but can take morning medication with sips of water, patient stated they took Eliquis this morning. Dr. Princess Lipscomb aware and patient needs to hold Eliquis until after the procedure. To have a responsible adult be with patient take them home and stay with them afterwards, if they do not get admitted to [facility]. And if available bring current list of medications. No other questions or concerns.

## 2023-09-06 ENCOUNTER — HOSPITAL ENCOUNTER (INPATIENT)
Dept: INTERVENTIONAL RADIOLOGY/VASCULAR | Age: 73
LOS: 1 days | Discharge: HOME | End: 2023-09-07
Attending: SURGERY | Admitting: SURGERY
Payer: MEDICARE

## 2023-09-06 DIAGNOSIS — I73.9 PVD (PERIPHERAL VASCULAR DISEASE) (HCC): ICD-10-CM

## 2023-09-06 DIAGNOSIS — I25.10 CORONARY ARTERY DISEASE INVOLVING NATIVE CORONARY ARTERY OF NATIVE HEART WITHOUT ANGINA PECTORIS: ICD-10-CM

## 2023-09-06 PROBLEM — L97.919 LEG ULCER, RIGHT, WITH UNSPECIFIED SEVERITY (HCC): Status: ACTIVE | Noted: 2023-09-06

## 2023-09-06 LAB
ANION GAP SERPL CALCULATED.3IONS-SCNC: 13 MMOL/L (ref 3–16)
BUN SERPL-MCNC: 26 MG/DL (ref 7–20)
CALCIUM SERPL-MCNC: 9.6 MG/DL (ref 8.3–10.6)
CHLORIDE SERPL-SCNC: 107 MMOL/L (ref 99–110)
CO2 SERPL-SCNC: 21 MMOL/L (ref 21–32)
CREAT SERPL-MCNC: 1.4 MG/DL (ref 0.8–1.3)
DEPRECATED RDW RBC AUTO: 20.3 % (ref 12.4–15.4)
GFR SERPLBLD CREATININE-BSD FMLA CKD-EPI: 54 ML/MIN/{1.73_M2}
GLUCOSE SERPL-MCNC: 113 MG/DL (ref 70–99)
HCT VFR BLD AUTO: 35.7 % (ref 40.5–52.5)
HGB BLD-MCNC: 11.2 G/DL (ref 13.5–17.5)
INR PPP: 1.24 (ref 0.84–1.16)
MCH RBC QN AUTO: 22.7 PG (ref 26–34)
MCHC RBC AUTO-ENTMCNC: 31.3 G/DL (ref 31–36)
MCV RBC AUTO: 72.8 FL (ref 80–100)
PLATELET # BLD AUTO: 176 K/UL (ref 135–450)
PMV BLD AUTO: 8.6 FL (ref 5–10.5)
POTASSIUM SERPL-SCNC: 4.7 MMOL/L (ref 3.5–5.1)
PROTHROMBIN TIME: 15.6 SEC (ref 11.5–14.8)
RBC # BLD AUTO: 4.91 M/UL (ref 4.2–5.9)
SODIUM SERPL-SCNC: 141 MMOL/L (ref 136–145)
WBC # BLD AUTO: 6.8 K/UL (ref 4–11)

## 2023-09-06 PROCEDURE — 37220 HC ILIAC TERRITORY PLASTY: CPT

## 2023-09-06 PROCEDURE — 047J3ZZ DILATION OF LEFT EXTERNAL ILIAC ARTERY, PERCUTANEOUS APPROACH: ICD-10-PCS | Performed by: SURGERY

## 2023-09-06 PROCEDURE — 2580000003 HC RX 258

## 2023-09-06 PROCEDURE — 75710 ARTERY X-RAYS ARM/LEG: CPT | Performed by: SURGERY

## 2023-09-06 PROCEDURE — 6370000000 HC RX 637 (ALT 250 FOR IP)

## 2023-09-06 PROCEDURE — 99152 MOD SED SAME PHYS/QHP 5/>YRS: CPT

## 2023-09-06 PROCEDURE — 37222 HC ILIAC TERRITORY ADDL PLASTY: CPT

## 2023-09-06 PROCEDURE — 99153 MOD SED SAME PHYS/QHP EA: CPT

## 2023-09-06 PROCEDURE — 37224 PR REVSC OPN/PRG FEM/POP W/ANGIOPLASTY UNI: CPT | Performed by: SURGERY

## 2023-09-06 PROCEDURE — C1887 CATHETER, GUIDING: HCPCS

## 2023-09-06 PROCEDURE — 75716 ARTERY X-RAYS ARMS/LEGS: CPT

## 2023-09-06 PROCEDURE — C1894 INTRO/SHEATH, NON-LASER: HCPCS

## 2023-09-06 PROCEDURE — C1760 CLOSURE DEV, VASC: HCPCS

## 2023-09-06 PROCEDURE — 6360000004 HC RX CONTRAST MEDICATION: Performed by: SURGERY

## 2023-09-06 PROCEDURE — 37224 HC FEM POP TERRITORY PLASTY: CPT

## 2023-09-06 PROCEDURE — 80048 BASIC METABOLIC PNL TOTAL CA: CPT

## 2023-09-06 PROCEDURE — 37220 PR REVASCULARIZATION ILIAC ARTERY ANGIOP 1ST VSL: CPT | Performed by: SURGERY

## 2023-09-06 PROCEDURE — 85027 COMPLETE CBC AUTOMATED: CPT

## 2023-09-06 PROCEDURE — 85610 PROTHROMBIN TIME: CPT

## 2023-09-06 PROCEDURE — 2060000000 HC ICU INTERMEDIATE R&B

## 2023-09-06 PROCEDURE — C1725 CATH, TRANSLUMIN NON-LASER: HCPCS

## 2023-09-06 PROCEDURE — C1769 GUIDE WIRE: HCPCS

## 2023-09-06 PROCEDURE — 75774 ARTERY X-RAY EACH VESSEL: CPT

## 2023-09-06 PROCEDURE — 047K3ZZ DILATION OF RIGHT FEMORAL ARTERY, PERCUTANEOUS APPROACH: ICD-10-PCS | Performed by: SURGERY

## 2023-09-06 PROCEDURE — 2709999900 HC NON-CHARGEABLE SUPPLY

## 2023-09-06 PROCEDURE — B41F1ZZ FLUOROSCOPY OF RIGHT LOWER EXTREMITY ARTERIES USING LOW OSMOLAR CONTRAST: ICD-10-PCS | Performed by: SURGERY

## 2023-09-06 RX ORDER — SODIUM CHLORIDE 9 MG/ML
INJECTION, SOLUTION INTRAVENOUS PRN
Status: DISCONTINUED | OUTPATIENT
Start: 2023-09-06 | End: 2023-09-07 | Stop reason: HOSPADM

## 2023-09-06 RX ORDER — SODIUM CHLORIDE 0.9 % (FLUSH) 0.9 %
10 SYRINGE (ML) INJECTION EVERY 12 HOURS SCHEDULED
Status: DISCONTINUED | OUTPATIENT
Start: 2023-09-06 | End: 2023-09-07 | Stop reason: HOSPADM

## 2023-09-06 RX ORDER — SODIUM CHLORIDE 0.9 % (FLUSH) 0.9 %
10 SYRINGE (ML) INJECTION PRN
Status: DISCONTINUED | OUTPATIENT
Start: 2023-09-06 | End: 2023-09-07 | Stop reason: HOSPADM

## 2023-09-06 RX ORDER — METOPROLOL TARTRATE 50 MG/1
50 TABLET, FILM COATED ORAL 2 TIMES DAILY
Status: DISCONTINUED | OUTPATIENT
Start: 2023-09-06 | End: 2023-09-07 | Stop reason: HOSPADM

## 2023-09-06 RX ORDER — ENOXAPARIN SODIUM 100 MG/ML
30 INJECTION SUBCUTANEOUS 2 TIMES DAILY
Status: DISCONTINUED | OUTPATIENT
Start: 2023-09-06 | End: 2023-09-06

## 2023-09-06 RX ORDER — PANTOPRAZOLE SODIUM 40 MG/1
40 TABLET, DELAYED RELEASE ORAL
Status: DISCONTINUED | OUTPATIENT
Start: 2023-09-07 | End: 2023-09-07 | Stop reason: HOSPADM

## 2023-09-06 RX ORDER — ACETAMINOPHEN 325 MG/1
650 TABLET ORAL EVERY 6 HOURS SCHEDULED
Status: DISCONTINUED | OUTPATIENT
Start: 2023-09-06 | End: 2023-09-07 | Stop reason: HOSPADM

## 2023-09-06 RX ORDER — ONDANSETRON 4 MG/1
4 TABLET, ORALLY DISINTEGRATING ORAL EVERY 8 HOURS PRN
Status: DISCONTINUED | OUTPATIENT
Start: 2023-09-06 | End: 2023-09-07 | Stop reason: HOSPADM

## 2023-09-06 RX ORDER — OXYCODONE HYDROCHLORIDE 5 MG/1
5 TABLET ORAL EVERY 4 HOURS PRN
Status: DISCONTINUED | OUTPATIENT
Start: 2023-09-06 | End: 2023-09-07 | Stop reason: HOSPADM

## 2023-09-06 RX ORDER — ONDANSETRON 2 MG/ML
4 INJECTION INTRAMUSCULAR; INTRAVENOUS EVERY 6 HOURS PRN
Status: DISCONTINUED | OUTPATIENT
Start: 2023-09-06 | End: 2023-09-07 | Stop reason: HOSPADM

## 2023-09-06 RX ORDER — IODIXANOL 320 MG/ML
150 INJECTION, SOLUTION INTRAVASCULAR
Status: COMPLETED | OUTPATIENT
Start: 2023-09-06 | End: 2023-09-06

## 2023-09-06 RX ORDER — ALBUTEROL SULFATE 2.5 MG/3ML
2.5 SOLUTION RESPIRATORY (INHALATION) EVERY 6 HOURS PRN
Status: DISCONTINUED | OUTPATIENT
Start: 2023-09-06 | End: 2023-09-07 | Stop reason: HOSPADM

## 2023-09-06 RX ORDER — ATORVASTATIN CALCIUM 80 MG/1
80 TABLET, FILM COATED ORAL DAILY
Status: DISCONTINUED | OUTPATIENT
Start: 2023-09-07 | End: 2023-09-07 | Stop reason: HOSPADM

## 2023-09-06 RX ORDER — SODIUM CHLORIDE 9 MG/ML
INJECTION, SOLUTION INTRAVENOUS CONTINUOUS
Status: DISCONTINUED | OUTPATIENT
Start: 2023-09-06 | End: 2023-09-07 | Stop reason: HOSPADM

## 2023-09-06 RX ORDER — OXYCODONE HYDROCHLORIDE 5 MG/1
2.5 TABLET ORAL EVERY 4 HOURS PRN
Status: DISCONTINUED | OUTPATIENT
Start: 2023-09-06 | End: 2023-09-07 | Stop reason: HOSPADM

## 2023-09-06 RX ADMIN — ACETAMINOPHEN 650 MG: 325 TABLET ORAL at 23:57

## 2023-09-06 RX ADMIN — ACETAMINOPHEN 650 MG: 325 TABLET ORAL at 17:15

## 2023-09-06 RX ADMIN — METOPROLOL TARTRATE 50 MG: 50 TABLET, FILM COATED ORAL at 19:27

## 2023-09-06 RX ADMIN — SODIUM CHLORIDE: 9 INJECTION, SOLUTION INTRAVENOUS at 15:36

## 2023-09-06 RX ADMIN — IODIXANOL 150 ML: 320 INJECTION, SOLUTION INTRAVASCULAR at 09:59

## 2023-09-06 ASSESSMENT — PAIN DESCRIPTION - LOCATION: LOCATION: LEG

## 2023-09-06 ASSESSMENT — PAIN - FUNCTIONAL ASSESSMENT: PAIN_FUNCTIONAL_ASSESSMENT: PREVENTS OR INTERFERES SOME ACTIVE ACTIVITIES AND ADLS

## 2023-09-06 ASSESSMENT — PAIN DESCRIPTION - ORIENTATION: ORIENTATION: RIGHT

## 2023-09-06 ASSESSMENT — PAIN SCALES - GENERAL
PAINLEVEL_OUTOF10: 0
PAINLEVEL_OUTOF10: 2
PAINLEVEL_OUTOF10: 9
PAINLEVEL_OUTOF10: 0

## 2023-09-06 ASSESSMENT — PAIN DESCRIPTION - FREQUENCY: FREQUENCY: CONTINUOUS

## 2023-09-06 ASSESSMENT — PAIN SCALES - WONG BAKER: WONGBAKER_NUMERICALRESPONSE: 0

## 2023-09-06 ASSESSMENT — PAIN DESCRIPTION - PAIN TYPE: TYPE: CHRONIC PAIN

## 2023-09-06 ASSESSMENT — PAIN DESCRIPTION - ONSET: ONSET: ON-GOING

## 2023-09-06 NOTE — BRIEF OP NOTE
Brief Postoperative Note      Patient: James Benson.   YOB: 1950  MRN: [id number]    Date of Procedure: 9/6/2023    Pre-Op Diagnosis:  Right leg ulceration    Post-Op Diagnosis: Right leg ulceration; right femoral-popliteal bypass stenosis; left external iliac artery stent stenosis       Procedure:  Ultrasound guided access left common femoral artery with Vascade closure  Right lower extremity arteriogram  Right femoral-popliteal bypass balloon angioplasty 6mm x 220mm Kirby  Left external iliac artery angioplasty 7mm x 60mm Kirby    Surgeon:  Nimco Marie MD    Monitored sedation    Estimated Blood Loss (mL): Minimal    Complications: None    Findings: see op note      Electronically signed by Nimco Marie MD on 9/6/2023 at 10:25 AM

## 2023-09-06 NOTE — OP NOTE
Operative Note      Patient: Dwayne Davis. YOB: 1950  MRN: [id number]    Date of Procedure: 9/6/2023     Pre-Op Diagnosis:  Right leg ulceration     Post-Op Diagnosis: Right leg ulceration; right femoral-popliteal bypass stenosis; left external iliac artery stent stenosis       Procedure:  Ultrasound guided access left common femoral artery with Vascade closure  Right lower extremity arteriogram  Right femoral-popliteal bypass balloon angioplasty 6mm x 220mm Kirby  Left external iliac artery angioplasty 7mm x 60mm Kirby     Surgeon:  Joseph Song MD     Monitored sedation     Estimated Blood Loss (mL): Minimal     Complications: None     Findings: see op note      Indication for Procedure:  Mr. Rashawn Cha is a 72year old gentleman s/p R femoral to BK popliteal artery bypass with in situ SVG 7/18/22 and Right femoral endarterectomy with EIA to PFA bypass 8mm PTFE 11/15/21. Presented to the wound care recently following trauma to the right lower leg. Duplex showed a patent bypass but flow velocities were reduced and ROD was less than 0.4. He now presents for arteriogram.    Details of Procedure: The patient was taken to the cardiac cath lab and placed in supine position. IV sedation was administered by the nursing team. The operative area was clipped, prepared and draped in sterile fashion. A dose of intravenous antibiotics was administered. A brief time out was performed per hospital protocol. The Left common femoral artery was accessed under ultrasound guidance with a micropuncture system. Micropuncture sheath was exchanged over a [place] wire for a 4 [place] sheath. An omni-flush catheter was inserted and positioned just above the aortoiliac bifurcation and iliac arteriogram was performed in the GREWAL and TERRY positions. The catheter was then directed over the wire into the contra-lateral Right iliac system and guided down to the Right femoral head.   Right lower extremity runoff was

## 2023-09-07 ENCOUNTER — HOSPITAL ENCOUNTER (OUTPATIENT)
Dept: WOUND CARE | Age: 73
Discharge: HOME | End: 2023-09-07

## 2023-09-07 VITALS
HEART RATE: 60 BPM | TEMPERATURE: 96.9 F | DIASTOLIC BLOOD PRESSURE: 69 MMHG | BODY MASS INDEX: 31.5 KG/M2 | HEIGHT: 70 IN | OXYGEN SATURATION: 95 % | WEIGHT: 220.02 LBS | RESPIRATION RATE: 16 BRPM | SYSTOLIC BLOOD PRESSURE: 114 MMHG

## 2023-09-07 LAB
ALBUMIN SERPL-MCNC: 3.3 G/DL (ref 3.4–5)
ANION GAP SERPL CALCULATED.3IONS-SCNC: 8 MMOL/L (ref 3–16)
BASOPHILS # BLD: 0 K/UL (ref 0–0.2)
BASOPHILS NFR BLD: 0.5 %
BUN SERPL-MCNC: 22 MG/DL (ref 7–20)
CALCIUM SERPL-MCNC: 8.7 MG/DL (ref 8.3–10.6)
CHLORIDE SERPL-SCNC: 112 MMOL/L (ref 99–110)
CO2 SERPL-SCNC: 23 MMOL/L (ref 21–32)
CREAT SERPL-MCNC: 1 MG/DL (ref 0.8–1.3)
DEPRECATED RDW RBC AUTO: 19.8 % (ref 12.4–15.4)
EOSINOPHIL # BLD: 0.3 K/UL (ref 0–0.6)
EOSINOPHIL NFR BLD: 4.2 %
GFR SERPLBLD CREATININE-BSD FMLA CKD-EPI: >60 ML/MIN/{1.73_M2}
GLUCOSE SERPL-MCNC: 94 MG/DL (ref 70–99)
HCT VFR BLD AUTO: 33.2 % (ref 40.5–52.5)
HGB BLD-MCNC: 10.3 G/DL (ref 13.5–17.5)
LYMPHOCYTES # BLD: 1.1 K/UL (ref 1–5.1)
LYMPHOCYTES NFR BLD: 14.8 %
MAGNESIUM SERPL-MCNC: 1.9 MG/DL (ref 1.8–2.4)
MCH RBC QN AUTO: 22.8 PG (ref 26–34)
MCHC RBC AUTO-ENTMCNC: 31 G/DL (ref 31–36)
MCV RBC AUTO: 73.4 FL (ref 80–100)
MONOCYTES # BLD: 0.5 K/UL (ref 0–1.3)
MONOCYTES NFR BLD: 7 %
NEUTROPHILS # BLD: 5.3 K/UL (ref 1.7–7.7)
NEUTROPHILS NFR BLD: 73.5 %
PHOSPHATE SERPL-MCNC: 3.4 MG/DL (ref 2.5–4.9)
PLATELET # BLD AUTO: 138 K/UL (ref 135–450)
PMV BLD AUTO: 9.2 FL (ref 5–10.5)
POTASSIUM SERPL-SCNC: 4.2 MMOL/L (ref 3.5–5.1)
RBC # BLD AUTO: 4.53 M/UL (ref 4.2–5.9)
SODIUM SERPL-SCNC: 143 MMOL/L (ref 136–145)
WBC # BLD AUTO: 7.3 K/UL (ref 4–11)

## 2023-09-07 PROCEDURE — 6370000000 HC RX 637 (ALT 250 FOR IP)

## 2023-09-07 PROCEDURE — 80069 RENAL FUNCTION PANEL: CPT

## 2023-09-07 PROCEDURE — 36415 COLL VENOUS BLD VENIPUNCTURE: CPT

## 2023-09-07 PROCEDURE — 94761 N-INVAS EAR/PLS OXIMETRY MLT: CPT

## 2023-09-07 PROCEDURE — 85025 COMPLETE CBC W/AUTO DIFF WBC: CPT

## 2023-09-07 PROCEDURE — 99233 SBSQ HOSP IP/OBS HIGH 50: CPT | Performed by: SURGERY

## 2023-09-07 PROCEDURE — 83735 ASSAY OF MAGNESIUM: CPT

## 2023-09-07 PROCEDURE — 94150 VITAL CAPACITY TEST: CPT

## 2023-09-07 RX ORDER — ALLOPURINOL 100 MG/1
100 TABLET ORAL DAILY
Status: CANCELLED | OUTPATIENT
Start: 2023-09-07

## 2023-09-07 RX ORDER — DULOXETIN HYDROCHLORIDE 20 MG/1
20 CAPSULE, DELAYED RELEASE ORAL DAILY
Status: CANCELLED | OUTPATIENT
Start: 2023-09-07

## 2023-09-07 RX ORDER — CLOPIDOGREL BISULFATE 75 MG/1
75 TABLET ORAL DAILY
Status: CANCELLED | OUTPATIENT
Start: 2023-09-07

## 2023-09-07 RX ORDER — PREGABALIN 150 MG/1
150 CAPSULE ORAL 3 TIMES DAILY
Status: CANCELLED | OUTPATIENT
Start: 2023-09-07

## 2023-09-07 RX ORDER — FUROSEMIDE 40 MG/1
40 TABLET ORAL DAILY
Status: CANCELLED | OUTPATIENT
Start: 2023-09-07

## 2023-09-07 RX ADMIN — ATORVASTATIN CALCIUM 80 MG: 80 TABLET, FILM COATED ORAL at 10:18

## 2023-09-07 RX ADMIN — METOPROLOL TARTRATE 50 MG: 50 TABLET, FILM COATED ORAL at 10:18

## 2023-09-07 RX ADMIN — ACETAMINOPHEN 650 MG: 325 TABLET ORAL at 06:50

## 2023-09-07 RX ADMIN — PANTOPRAZOLE SODIUM 40 MG: 40 TABLET, DELAYED RELEASE ORAL at 06:50

## 2023-09-07 NOTE — PLAN OF CARE
Problem: Chronic Conditions and Co-morbidities  Goal: Patient's chronic conditions and co-morbidity symptoms are monitored and maintained or improved  Outcome: Progressing     Problem: Pain  Goal: Verbalizes/displays adequate comfort level or baseline comfort level  Outcome: Progressing  Flowsheets (Taken 9/6/2023 1524 by Patricia Castillo RN)  Verbalizes/displays adequate comfort level or baseline comfort level:   Encourage patient to monitor pain and request assistance   Assess pain using appropriate pain scale     Problem: Safety - Adult  Goal: Free from fall injury  Outcome: Progressing  Flowsheets (Taken 9/6/2023 2234)  Free From Fall Injury:   Instruct family/caregiver on patient safety   Based on caregiver fall risk screen, instruct family/caregiver to ask for assistance with transferring infant if caregiver noted to have fall risk factors  Note: Prepare patient for d/c and access for  potential barriers

## 2023-09-07 NOTE — DISCHARGE INSTRUCTIONS
- No lifting >8lbs or a gallon of milk for 2 weeks. - May shower, let water run over incision sites. No soaking in tub, hot tub, or pool. Do not submerge incision site in water.   - Notify MD immediately if experierencing fevers/chills, nausea/vomiting, pus-like discharge from incision sites, redness, swelling, or warmth around incisions.     -regular diet  - Follow up with Dr. Tank May on Monday at 1 PM - call [phone] to confirm      Extra Heart Failure sites:     [email or web address]   --- this is American Heart Association interactive Healthier Living with Heart Failure guidebook. Please click hyperlink or copy / paste link into search bar. Use your mouse to scroll through the pages. Lots of information about weight monitoring, diet tips, activity, meds, etc    HF C[place]reek susy  -- this is a free smart phone susy available for iPhone and Android download. Use your phone to track sodium / fluid intake, zone tool symptom tracking, weights, medications, etc. Click on this hyperlink  HF [place] Susy   for QR code for easy download. DASH (Dietary Approach to Stop Hypertension) diet --  SeekAlumni.no -- this diet is a flexible eating plan that promotes heart healthy eating style. Click on hyperlink or copy / paste link into search bar. Lots of low sodium recipes and tips.     [email or web address].[email or web address]  -- more free recipes

## 2023-09-07 NOTE — DISCHARGE INSTR - COC
HISTORY Left 8/27/2013    Dr. Shannan Pérez - femoral & profunda femoris endarterectomy w/marsupialization patch angioplasty using SFA    SKIN SPLIT GRAFT Right 7/25/2013    Dr. Shannan Pérez - to non-healing R pretibial wound, 9 x 15 cm    TOTAL HIP ARTHROPLASTY Right 09/01/2016    Dr. Isabelle Gonsalez Left 12/22/2015    Dr. Shannan Pérez - CFA exploration, thrombectomy of ileofemoral system, stenting of RAFAL in-stent stenosis w/8 x 37 mm Palmaz        Immunization History:   Immunization History   Administered Date(s) Administered    COVID-19, PFIZER PURPLE top, DILUTE for use, (age 15 y+), 30mcg/0.3mL 03/17/2021, 04/07/2021, 11/01/2021    Influenza 10/14/2013    Influenza Vaccine, unspecified formulation 10/14/2013, 10/22/2015    Influenza Virus Vaccine 10/14/2013, 10/14/2014, 10/22/2015    Influenza, FLUAD, (age 72 y+), Adjuvanted, 0.5mL 09/24/2021, 10/26/2022    Influenza, High Dose (Fluzone 65 yrs and older) 10/22/2015, 11/18/2016, 08/17/2017, 08/30/2018, 10/17/2019, 09/14/2020    PPD Test 01/28/2023, 02/07/2023    Pneumococcal Conjugate 7-valent (George Garay) 03/01/2013    Pneumococcal, PCV-13, PREVNAR 15, (age 6w+), IM, 0.5mL 10/22/2015    Pneumococcal, PPSV23, PNEUMOVAX 21, (age 2y+), SC/IM, 0.5mL 01/03/2018    TDaP, ADACEL (age 6y-58y), BOOSTRIX (age 10y+), IM, 0.5mL 10/14/2013    Zoster Live (Zostavax) 06/07/2014    Zoster Recombinant (Shingrix) 05/08/2018       Active Problems:  Patient Active Problem List   Diagnosis Code    Hypertension associated with diabetes ([address]) E11.59, I15.2    Hyperlipidemia associated with type 2 diabetes mellitus ([address]) E11.69, E78.5    Degeneration of intervertebral disc of lumbar region M51.36    PVD (peripheral vascular disease) ([address]) I73.9    Coronary artery disease involving native coronary artery of native heart without angina pectoris I25.10    Idiopathic peripheral neuropathy G60.9    Gastroesophageal reflux disease K21.9    Other spondylosis, lumbosacral region M47.897 Image   08/31/23 0927   Wound Etiology Traumatic 08/10/23 1113   Wound Cleansed Cleansed with saline 08/31/23 0927   Dressing/Treatment Foam impregnated with Ag 08/31/23 1008   Wound Length (cm) 6.5 cm 08/31/23 0927   Wound Width (cm) 3.7 cm 08/31/23 0927   Wound Depth (cm) 0.1 cm 08/31/23 0927   Wound Surface Area (cm^2) 24.05 cm^2 08/31/23 0927   Change in Wound Size % (l*w) -109.13 08/31/23 0927   Wound Volume (cm^3) 2.405 cm^3 08/31/23 0927   Wound Healing % -109 08/31/23 0927   Post-Procedure Length (cm) 6.6 cm 08/31/23 1008   Post-Procedure Width (cm) 3.8 cm 08/31/23 1008   Post-Procedure Depth (cm) 0.3 cm 08/31/23 1008   Post-Procedure Surface Area (cm^2) 25.08 cm^2 08/31/23 1008   Post-Procedure Volume (cm^3) 7.524 cm^3 08/31/23 1008   Distance Tunneling (cm) 0 cm 08/31/23 0927   Undermining Maxium Distance (cm) 0 08/31/23 0927   Wound Assessment Pink/red;Fibrin 08/31/23 0927   Drainage Amount Moderate (25-50%) 08/31/23 0927   Drainage Description Armando Brine 08/31/23 0927   Odor Mild 08/31/23 0927   Catrachita-wound Assessment Blanchable erythema; Maceration 08/31/23 0927   Margins Defined edges 08/31/23 0927   Wound Thickness Description not for Pressure Injury Full thickness 08/24/23 0934   Number of days: 28        Elimination:  Continence: Bowel: {YES / UK:15565}  Bladder: {YES / PE:45406}  Urinary Catheter: {Urinary Catheter:[id number]}   Colostomy/Ileostomy/Ileal Conduit: {YES / RJ:44591}       Date of Last BM: ***    Intake/Output Summary (Last 24 hours) at 9/7/2023 1038  Last data filed at 9/7/2023 1016  Gross per 24 hour   Intake 684 ml   Output 1775 ml   Net -1091 ml     I/O last 3 completed shifts:   In: 444 [P.O.:444]  Out: 1075 [Urine:1075]    Safety Concerns:     [address] Concerns:[id number]}    Impairments/Disabilities:      [facility] Impairments/Disabilities:[id number]}    Nutrition Therapy:  Current Nutrition Therapy:   [facility] Diet List:[id number]}    Routes of Feeding: {[facility] DME Other

## 2023-09-07 NOTE — CARE COORDINATION
CM noted orders for DC, CM spoke with patient at bedside, he is agreeable to going home via [address]. Patient typically takes bus as his transportation mode regularly. Patient has home O2 for nightly use at home already, has rollator at bedside to take home with him at DC. CM has scheduled lyft for DC per nursing request and patient to be picked up at Main entrance for DC to home.     Thank you,  Emmanuel MOSCOSON, RN,   4th Floor Progressive Care Unit  [phone]

## 2023-09-08 ENCOUNTER — TELEPHONE (OUTPATIENT)
Dept: VASCULAR SURGERY | Age: 73
End: 2023-09-08

## 2023-09-08 ENCOUNTER — CARE COORDINATION (OUTPATIENT)
Dept: CASE MANAGEMENT | Age: 73
End: 2023-09-08

## 2023-09-08 DIAGNOSIS — R53.81 DEBILITY: Primary | ICD-10-CM

## 2023-09-08 PROCEDURE — 1111F DSCHRG MED/CURRENT MED MERGE: CPT

## 2023-09-08 NOTE — H&P
Clarence Nobles MD at [address]  4/23/2021    COLONOSCOPY POLYPECTOMY ABLATION performed by Clarence Nobles MD at [address]  4/23/2021    COLONOSCOPY CONTROL HEMORRHAGE performed by Clarence Nobles MD at [address] [facility] [address]  6/2013    EYE SURGERY Left     FEMORAL BYPASS Right 7/18/2022    RIGHT FEMORAL BELOW KNEE POPLITEAL ARTERY BYPASS WITH IN SITU SAPHENOUS VEIN performed by Olga Rangel MD at [facility] Right 11/17/2021    RIGHT FEMORAL ENDARTERECTOMY  RIGHT EXTERNAL ILIAC TO PROFUNDA FEMORAL BYPASS WITH 8MM PTFE GRAFT RIGHT LOWER EXTREMITY ARTERIOGRAM BALLOON ANGIOPLASTY RIGHT PROFUNDA BALLOON ANGIOPLASTY AND STENT OF RIGHT EXTERNAL ILIAC ARTERY performed by Olga Rangel MD at [address] [place] [address]  11/18/2015    Bilateral decompressive lumbar laminectomy L4-5   ; medial facetectomy L4-5 joints  bilaterally; foraminotomies l5   nerve roots bilaterally    LEG DEBRIDEMENT Right 7/23/2013    Dr. Tashia Nelson - pretibial wound    OTHER SURGICAL HISTORY Right 6/25/2013    Dr. Tashia Nelson - CFA Endarterectomy w/marsupialization; RLE wound excision & debridement     OTHER SURGICAL HISTORY Left 8/27/2013    Dr. Tashia Nelson - femoral & profunda femoris endarterectomy w/marsupialization patch angioplasty using SFA    SKIN SPLIT GRAFT Right 7/25/2013    Dr. Tashia Nelson - to non-healing R pretibial wound, 9 x 15 cm    TOTAL HIP ARTHROPLASTY Right 09/01/2016    Dr. Bonita Romero Left 12/22/2015    Dr. Tashia Nelson - CFA exploration, thrombectomy of ileofemoral system, stenting of RAFAL in-stent stenosis w/8 x 37 mm Palmaz         Medications Prior to Admission     Prior to Admission medications    Medication Sig Start Date End Date Taking?  Authorizing Provider   DULoxetine (CYMBALTA) 30 MG extended release capsule Take 1 capsule by mouth daily 8/9/23   Historical Provider, MD DAWKINS 5 MG TABS tablet TAKE 1 TABLET BY MOUTH route daily 1/26/23   Steffi Oden MD   blood glucose monitor strips Test 4 times a day & as needed for symptoms of irregular blood glucose. Dispense sufficient amount for indicated testing frequency plus additional to accommodate PRN testing needs. 1/26/23   Steffi Oden MD   senna (SENOKOT) 8.6 MG TABS tablet Take 1 tablet by mouth nightly as needed 11/3/22   Historical Provider, MD   ipratropium-albuterol (DUONEB) 0.5-2.5 (3) MG/3ML SOLN nebulizer solution Inhale 3 mLs into the lungs every 4 hours as needed for Shortness of Breath 10/26/22   Luis Daniel White MD   albuterol sulfate HFA (PROVENTIL;VENTOLIN;PROAIR) 108 (90 Base) MCG/ACT inhaler Inhale 2 puffs into the lungs every 6 hours as needed for Wheezing 10/20/22   Gracie Boone MD   furosemide (LASIX) 40 MG tablet Take 1 tablet by mouth in the morning. 8/2/22   Jn Myers DO   folic acid (FOLVITE) 1 MG tablet Take 1 tablet by mouth daily 9/21/21 7/29/22  Alina Vegas MD   nitroGLYCERIN (NITROSTAT) 0.4 MG SL tablet Place 1 tablet under the tongue every 5 minutes as needed for Chest pain  Patient not taking: No sig reported 8/25/21 7/29/22  Jn Sizer,    magnesium gluconate (MAGONATE) 500 MG tablet Take 500 mg by mouth daily.   7/29/22  Historical Provider, MD        Allergies   Asa [aspirin] and Roflumilast    Social History     Social History       Tobacco History       Smoking Status  Some Days Smoking Start Date  1/1/1967 Smoking Frequency  0.25 packs/day for 52.00 years (13.00 pk-yrs) Smoking Tobacco Type  Cigarettes since 1/1/1967      Smokeless Tobacco Use  Never      Tobacco Comments  Smokes 2-3 cigarettes a week              Alcohol History       Alcohol Use Status  Yes Drinks/Week  0 Standard drinks or equivalent per week Comment  2-3 beers a month              Drug Use       Drug Use Status  No              Sexual Activity       Sexually Active  Not Currently Partners  Female                    Family History     Family

## 2023-09-08 NOTE — TELEPHONE ENCOUNTER
Spoke with  Sabra Traceys for post-op follow up call. He states he is only having some soreness at site rating it a 2 on a 0-10 pain scale, stated he had a small little spot that had a little bleed but it stopped and there is no more, he denies any fevers or any other issues. He has a follow up appt in wound care on Monday 9/11 at 1:30p. He is aware of the appt and time. Informed him if he had any questions or concerns to feel free to call the office.

## 2023-09-08 NOTE — CARE COORDINATION
days based on severity of symptoms and risk factors.   Plan for next call: symptom management-   self management-   follow-up appointment-   medication management-     Cb Perez RN

## 2023-09-11 ENCOUNTER — HOSPITAL ENCOUNTER (OUTPATIENT)
Dept: WOUND CARE | Age: 73
Discharge: HOME | End: 2023-09-11
Payer: MEDICARE

## 2023-09-11 VITALS
DIASTOLIC BLOOD PRESSURE: 73 MMHG | TEMPERATURE: 96.8 F | HEART RATE: 81 BPM | SYSTOLIC BLOOD PRESSURE: 112 MMHG | RESPIRATION RATE: 16 BRPM

## 2023-09-11 DIAGNOSIS — L97.912 LOWER EXTREMITY ULCERATION, RIGHT, WITH FAT LAYER EXPOSED (HCC): Primary | ICD-10-CM

## 2023-09-11 PROCEDURE — 11045 DBRDMT SUBQ TISS EACH ADDL: CPT

## 2023-09-11 PROCEDURE — 11045 DBRDMT SUBQ TISS EACH ADDL: CPT | Performed by: SPECIALIST

## 2023-09-11 PROCEDURE — 11042 DBRDMT SUBQ TIS 1ST 20SQCM/<: CPT | Performed by: SPECIALIST

## 2023-09-11 PROCEDURE — 29581 APPL MULTLAYER CMPRN SYS LEG: CPT

## 2023-09-11 PROCEDURE — 6370000000 HC RX 637 (ALT 250 FOR IP): Performed by: SPECIALIST

## 2023-09-11 PROCEDURE — 11042 DBRDMT SUBQ TIS 1ST 20SQCM/<: CPT

## 2023-09-11 RX ORDER — LIDOCAINE 40 MG/G
CREAM TOPICAL ONCE
OUTPATIENT
Start: 2023-09-11 | End: 2023-09-11

## 2023-09-11 RX ORDER — BETAMETHASONE DIPROPIONATE 0.05 %
OINTMENT (GRAM) TOPICAL ONCE
OUTPATIENT
Start: 2023-09-11 | End: 2023-09-11

## 2023-09-11 RX ORDER — SODIUM CHLOR/HYPOCHLOROUS ACID 0.033 %
SOLUTION, IRRIGATION IRRIGATION ONCE
OUTPATIENT
Start: 2023-09-11 | End: 2023-09-11

## 2023-09-11 RX ORDER — LIDOCAINE HYDROCHLORIDE 40 MG/ML
SOLUTION TOPICAL ONCE
OUTPATIENT
Start: 2023-09-11 | End: 2023-09-11

## 2023-09-11 RX ORDER — GINSENG 100 MG
CAPSULE ORAL ONCE
OUTPATIENT
Start: 2023-09-11 | End: 2023-09-11

## 2023-09-11 RX ORDER — CLOBETASOL PROPIONATE 0.5 MG/G
OINTMENT TOPICAL ONCE
OUTPATIENT
Start: 2023-09-11 | End: 2023-09-11

## 2023-09-11 RX ORDER — LIDOCAINE HYDROCHLORIDE 40 MG/ML
SOLUTION TOPICAL ONCE
Status: COMPLETED | OUTPATIENT
Start: 2023-09-11 | End: 2023-09-11

## 2023-09-11 RX ORDER — BACITRACIN ZINC AND POLYMYXIN B SULFATE 500; 1000 [USP'U]/G; [USP'U]/G
OINTMENT TOPICAL ONCE
OUTPATIENT
Start: 2023-09-11 | End: 2023-09-11

## 2023-09-11 RX ORDER — IBUPROFEN 200 MG
TABLET ORAL ONCE
OUTPATIENT
Start: 2023-09-11 | End: 2023-09-11

## 2023-09-11 RX ORDER — LIDOCAINE HYDROCHLORIDE 20 MG/ML
JELLY TOPICAL ONCE
OUTPATIENT
Start: 2023-09-11 | End: 2023-09-11

## 2023-09-11 RX ORDER — GENTAMICIN SULFATE 1 MG/G
OINTMENT TOPICAL ONCE
OUTPATIENT
Start: 2023-09-11 | End: 2023-09-11

## 2023-09-11 RX ORDER — LIDOCAINE 50 MG/G
OINTMENT TOPICAL ONCE
OUTPATIENT
Start: 2023-09-11 | End: 2023-09-11

## 2023-09-11 RX ADMIN — LIDOCAINE HYDROCHLORIDE: 40 SOLUTION TOPICAL at 13:23

## 2023-09-11 ASSESSMENT — PAIN DESCRIPTION - FREQUENCY: FREQUENCY: CONTINUOUS

## 2023-09-11 ASSESSMENT — PAIN DESCRIPTION - ORIENTATION: ORIENTATION: RIGHT

## 2023-09-11 ASSESSMENT — PAIN DESCRIPTION - LOCATION: LOCATION: LEG

## 2023-09-11 ASSESSMENT — PAIN SCALES - GENERAL: PAINLEVEL_OUTOF10: 8

## 2023-09-11 ASSESSMENT — PAIN DESCRIPTION - PAIN TYPE: TYPE: CHRONIC PAIN

## 2023-09-11 ASSESSMENT — PAIN DESCRIPTION - DESCRIPTORS: DESCRIPTORS: BURNING

## 2023-09-11 NOTE — DISCHARGE INSTRUCTIONS
doctor now or seek immediate medical care if:    You have symptoms of infection, such as: Increased pain, swelling, warmth, or redness. Red streaks leading from the area. Pus draining from the area. A fever.          [] Patient unable to sign Discharge Instructions given to ECF/Transportation/POA

## 2023-09-11 NOTE — PROGRESS NOTES
Insurance Verification Request      Ordering Center: [facility]  [address] [facility]. [address] [facility] Phone Number: [phone]  Fax Number: [phone]    Facility NPI: [id number]  Facility Tax ID [id number]    Provider Information:      Provider's Name: Dr. Gris Brown NPI: Keshia Vyas MD,  NPI: [id number]     Patient Information:     [place] [phone]   : 1950  AGE: 72 y.o. GENDER: male   TODAYS DATE:  2023    Insurance:     PRIMARY INSURANCE:  Plan: Barbara Open-Xchangedick DUAL ADVANTAGE  Coverage: BCBS MEDICARE  Effective Date: 10/1/2021  Group Number: [unfilled]  Subscriber Number: [id number] - (Medicare Managed)    Payer/Plan Subscr  Sex Relation Sub. Ins. ID Effective Group Num   1. BCBS MEDICARE* LENNIE GUARDADO L * 1950 Male Self [id number] [id number]                                   [address]   2.  [place] -* DEBBY,LENNIE L * 1950 Male Self [id number]                                    [address]       Application/product Information:     Benefit Verification Request:    Reason for Request: New Wound    [employer] ([address] [facility]) FAX# [phone]    Trunk/Arms/Legs [address] (1st 25 sq cm)    Theraskin    Has patient been treated with any other Skin Substitute on this Wound:     No        WOUND #: 1    Total Square CM: 27.6    Procedure setting: Hospital Outpatient Department    Is the Patient currently in a skilled nursing facility: No     Is the wound work related: No    Procedure date: 23    Patient Information:     Additional Dx Codes: H10.514, L97.912    Problem List Items Addressed This Visit          Other    Lower extremity ulceration, right, with fat layer exposed ([address]) - Primary    Relevant Orders    Initiate Outpatient Wound Care Protocol            Is the Patient a Diabetic: Yes    HgBA1c:    Lab Results   Component Value
Multilayer Compression Wrap   (Not Unna) Below the Knee    NAME:  Torri House. YOB: 1950  MEDICAL RECORD NUMBER:  [id number]  DATE:  9/11/2023       Removed old Multilayer wrap if present and washed leg with mild soap/water. Applied moisturizing agent to dry skin as needed. Applied primary and secondary dressing as ordered    Applied multilayered dressing below the knee to Right lower leg(s)  (3 Layer Compression Wrap ) . Instructed patient/caregiver not to remove dressing and to keep it clean and dry. Instructed patient/caregiver on complications to report to provider, such as pain, numbness in toes, heavy drainage, and slippage of dressing. Instructed patient on purpose of compression dressing and on activity and exercise recommendations.    Applied per   Guidelines    Electronically signed by Chalino Kunz RN on 9/11/2023 at 1:44 PM
Appointment:  Return Appointment: With Dorma Mohs, MD  in  1 [facility])  [] Return Appointment for a Wound Assessment with the nurse on:     Future Appointments   Date Time Provider [address]   9/14/2023 11:00 AM Price DO Wayne JEFF HARPER [place] - DYD   12/14/2023  3:00 PM [facility]EDULE, [facility] JEFF  [address]: none    Medically necessary services for evaluation and treatment: []Skilled Nursing (using clean technique) []PT (Eval & Treat) []OT (Eval & Treat) []Social Work []Dietician []Other:      Wound care instructions: If you smoke we ask that you refrain from smoking. Smoking inhibits wounds from healing. When taking antibiotics take the entire prescription as ordered. Do not stop taking until medication is all gone unless otherwise instructed. Exercise as tolerated. Keep weight off wounds and reposition every 2 hours if applicable. Do not get wounds wet in the bath or shower unless otherwise instructed by your physician. If your wound is on your foot or leg, you may purchase a cast bag. Please ask at the pharmacy. Wash hands with soap and water prior to and after every dressing change. [x]Wash wounds with: Vashe wash - Apply enough Vashe to soak a piece of gauze and place on wound bed for 5-10 minutes. No need to rinse after soaking. [x]Catrachita wound Topical Treatments: Do not apply lotions, creams, or ointments to the skin around the wound bed unless directed as followed:   Apply around the wound: Moisturizing lotion         [x]Wound Location: right lower leg   Apply Primary Dressing to wound: Hydrofera Blue Ready  Secondary Dressing: 4X4 gauze pad   Avoid contact of tape with skin if possible. When to change Dressing: DO NOT CHANGE  ding but limited to; increased drainage, dressing falling or slipping off, the dressing becomes wet, or severe pain or tingling in the toes/extremity.    []Instructions for [address] [facility] if applicable:       [x]

## 2023-09-12 ENCOUNTER — CARE COORDINATION (OUTPATIENT)
Dept: CASE MANAGEMENT | Age: 73
End: 2023-09-12

## 2023-09-12 NOTE — CARE COORDINATION
[facility] Transitions Initial Follow Up Call    Call within 2 business days of discharge: No    Patient:  Beatriz Adams. Patient :  1950  MRN:  [id number]    Reason for Admission:  Rt leg ulcer  Discharge Date:  23   RARS: 15      Transitions of Care Initial Call    Was this an external facility discharge? no    Discharge Facility: [facility]      Challenges to be reviewed by the provider   Additional needs identified to be addressed with provider:    no    AMB CC Provider Discharge Needs: none            1ST CTC attempt to reach Pt regarding recent hospital discharge. CTC left voice recording with call back number requesting a call back. Follow up appointments:    Future Appointments   Date Time Provider [address]   2023 11:00 AM DO JEFF [place] - [place]   2023 10:15 AM Lindsey Purdy MD [facility]   2023  3:00 PM SCHEDULE, MHCX JEFF HARPER AWV LPN JEFF [place] - DYMICHAEL Dupree, RN  Care Transition Coordinator  Contact Number:  [phone]

## 2023-09-12 NOTE — DISCHARGE SUMMARY
Physician Discharge Summary     Patient ID:  Simon Garcia  [id number]  72 y.o.  1950    Admit date: 9/6/2023    Discharge date and time: 9/7/2023 11:26 AM     Admitting Physician: Fe Cohn MD     Discharge Physician: same    Admission Diagnoses: Leg ulcer, right, with unspecified severity ([address]) [L97.919]    Discharge Diagnoses: same    Admission Condition: stable    Discharged Condition: stable    Indication for Admission: Post op from Left common fem access and rigth arteriogram, right fem-pop bypass balloon angioplasty, left external iliac artery angioplasty. Hospital Course: Pt was admitted after the procedure above. He was monitored for the night and discharged home the follow day. His pain was controlled and he was hemodynamically stable. He was given follow up information. Treatments: surgery: as above    Discharge Exam:  General appearance: alert, no acute distress, grooming appropriate  Eyes: No scleral icterus, EOM grossly intact  Neck: trachea midline, no JVD, no lymphadenopathy, neck supple  Chest/Lungs: Normal effort with no accessory muscle use on RA  Cardiovascular: RRR  Abdomen: Soft, non-tender, non-distended, no rebound, guarding, or rigidity. Skin: warm and dry, no rashes  Extremities: no edema, no cyanosis, lower extremitites with doppler PT/DP, dressing in place to left groin with minimal strike-through  Neuro: A&Ox3, no focal deficits, sensation intact    Disposition: home    In process/preliminary results:  Outstanding Order Results       Date and Time Order Name Status Description    9/6/2023  9:59 AM IR ANGIOGRAM EXTREMITY RIGHT In process             Patient Instructions:   Discharge Medication List as of 9/7/2023 10:41 AM        CONTINUE these medications which have NOT CHANGED    Details   !!  DULoxetine (CYMBALTA) 30 MG extended release capsule Take 1 capsule by mouth dailyHistorical Med      ELIQUIS 5 MG TABS tablet TAKE 1 TABLET BY MOUTH TWICE DAILY, Disp-60

## 2023-09-14 ENCOUNTER — OFFICE VISIT (OUTPATIENT)
Dept: PRIMARY CARE CLINIC | Age: 73
End: 2023-09-14
Payer: MEDICARE

## 2023-09-14 VITALS
HEIGHT: 70 IN | WEIGHT: 226.4 LBS | DIASTOLIC BLOOD PRESSURE: 61 MMHG | HEART RATE: 77 BPM | TEMPERATURE: 97.9 F | SYSTOLIC BLOOD PRESSURE: 114 MMHG | BODY MASS INDEX: 32.41 KG/M2

## 2023-09-14 DIAGNOSIS — Z23 NEED FOR VACCINATION FOR H FLU TYPE B: ICD-10-CM

## 2023-09-14 DIAGNOSIS — E11.51 TYPE 2 DIABETES MELLITUS WITH DIABETIC PERIPHERAL ANGIOPATHY WITHOUT GANGRENE, WITHOUT LONG-TERM CURRENT USE OF INSULIN (HCC): Primary | ICD-10-CM

## 2023-09-14 DIAGNOSIS — L97.912 NON-PRESSURE ULCER OF RIGHT LOWER EXTREMITY WITH FAT LAYER EXPOSED (HCC): ICD-10-CM

## 2023-09-14 DIAGNOSIS — I15.2 HYPERTENSION ASSOCIATED WITH DIABETES (HCC): ICD-10-CM

## 2023-09-14 DIAGNOSIS — E11.69 DIABETES MELLITUS TYPE 2 IN OBESE (HCC): ICD-10-CM

## 2023-09-14 DIAGNOSIS — E66.9 DIABETES MELLITUS TYPE 2 IN OBESE (HCC): ICD-10-CM

## 2023-09-14 DIAGNOSIS — E11.69 HYPERLIPIDEMIA ASSOCIATED WITH TYPE 2 DIABETES MELLITUS (HCC): ICD-10-CM

## 2023-09-14 DIAGNOSIS — E78.5 HYPERLIPIDEMIA ASSOCIATED WITH TYPE 2 DIABETES MELLITUS (HCC): ICD-10-CM

## 2023-09-14 DIAGNOSIS — I50.32 DIASTOLIC DYSFUNCTION WITH CHRONIC HEART FAILURE (HCC): ICD-10-CM

## 2023-09-14 DIAGNOSIS — I25.10 CORONARY ARTERY DISEASE INVOLVING NATIVE CORONARY ARTERY OF NATIVE HEART WITHOUT ANGINA PECTORIS: ICD-10-CM

## 2023-09-14 DIAGNOSIS — E11.59 HYPERTENSION ASSOCIATED WITH DIABETES (HCC): ICD-10-CM

## 2023-09-14 PROBLEM — I70.202 POPLITEAL ARTERY STENOSIS, LEFT (HCC): Status: RESOLVED | Noted: 2022-01-13 | Resolved: 2023-09-14

## 2023-09-14 LAB — HBA1C MFR BLD: 5.5 %

## 2023-09-14 PROCEDURE — 83036 HEMOGLOBIN GLYCOSYLATED A1C: CPT | Performed by: STUDENT IN AN ORGANIZED HEALTH CARE EDUCATION/TRAINING PROGRAM

## 2023-09-14 PROCEDURE — 3044F HG A1C LEVEL LT 7.0%: CPT | Performed by: STUDENT IN AN ORGANIZED HEALTH CARE EDUCATION/TRAINING PROGRAM

## 2023-09-14 PROCEDURE — 90694 VACC AIIV4 NO PRSRV 0.5ML IM: CPT | Performed by: STUDENT IN AN ORGANIZED HEALTH CARE EDUCATION/TRAINING PROGRAM

## 2023-09-14 PROCEDURE — 99214 OFFICE O/P EST MOD 30 MIN: CPT | Performed by: STUDENT IN AN ORGANIZED HEALTH CARE EDUCATION/TRAINING PROGRAM

## 2023-09-14 PROCEDURE — 3074F SYST BP LT 130 MM HG: CPT | Performed by: STUDENT IN AN ORGANIZED HEALTH CARE EDUCATION/TRAINING PROGRAM

## 2023-09-14 PROCEDURE — 3078F DIAST BP <80 MM HG: CPT | Performed by: STUDENT IN AN ORGANIZED HEALTH CARE EDUCATION/TRAINING PROGRAM

## 2023-09-14 PROCEDURE — G0008 ADMIN INFLUENZA VIRUS VAC: HCPCS | Performed by: STUDENT IN AN ORGANIZED HEALTH CARE EDUCATION/TRAINING PROGRAM

## 2023-09-14 PROCEDURE — 1123F ACP DISCUSS/DSCN MKR DOCD: CPT | Performed by: STUDENT IN AN ORGANIZED HEALTH CARE EDUCATION/TRAINING PROGRAM

## 2023-09-14 RX ORDER — IPRATROPIUM BROMIDE AND ALBUTEROL SULFATE 2.5; .5 MG/3ML; MG/3ML
1 SOLUTION RESPIRATORY (INHALATION) EVERY 4 HOURS PRN
Qty: 360 ML | Refills: 4 | Status: SHIPPED | OUTPATIENT
Start: 2023-09-14

## 2023-09-14 RX ORDER — OXYCODONE HYDROCHLORIDE 5 MG/1
5 TABLET ORAL EVERY 6 HOURS PRN
Qty: 28 TABLET | Refills: 0 | Status: SHIPPED | OUTPATIENT
Start: 2023-09-14 | End: 2023-09-21

## 2023-09-14 RX ORDER — DULAGLUTIDE 3 MG/.5ML
3 INJECTION, SOLUTION SUBCUTANEOUS WEEKLY
Qty: 2 ML | Refills: 2 | Status: SHIPPED | OUTPATIENT
Start: 2023-09-14

## 2023-09-14 ASSESSMENT — ENCOUNTER SYMPTOMS
NAUSEA: 0
DIARRHEA: 0
ABDOMINAL DISTENTION: 0
SHORTNESS OF BREATH: 0
CHEST TIGHTNESS: 0
COLOR CHANGE: 1
ABDOMINAL PAIN: 0

## 2023-09-14 NOTE — PROGRESS NOTES
ELIQUIS 5 MG TABS tablet TAKE 1 TABLET BY MOUTH TWICE DAILY Yes Yolo Number, DO   omeprazole (PRILOSEC) 40 MG delayed release capsule TAKE 1 CAPSULE BY MOUTH DAILY Yes Yolo Number, DO   pregabalin (LYRICA) 150 MG capsule TAKE 3 CAPSULES BY MOUTH TWICE DAILY. MAX DAILY AMOUNT: 900 MG Yes Yolo Number, DO   DULoxetine (CYMBALTA) 20 MG extended release capsule Take 1 capsule by mouth daily Yes Yolo Number, DO   clopidogrel (PLAVIX) 75 MG tablet TAKE 1 TABLET BY MOUTH DAILY Yes Leopold Flake, DO   potassium chloride (MICRO-K) 10 MEQ extended release capsule TAKE 1 CAPSULE BY MOUTH DAILY Yes James Cee MD   fluticasone-umeclidin-vilant (TRELEGY ELLIPTA) 871-22.7-01 MCG/ACT AEPB inhaler Inhale 1 puff into the lungs daily Yes Yolo Number, DO   Evolocumab 140 MG/ML SOAJ Inject 140 mg into the skin every 14 days Take an injection every 14 days. Yes BRANDI Bowling - CNP   allopurinol (ZYLOPRIM) 100 MG tablet TAKE 1 TABLET BY MOUTH DAILY Yes Yolo Number, DO   atorvastatin (LIPITOR) 80 MG tablet TAKE 1 TABLET BY MOUTH DAILY Yes Patricia Number, DO   sacubitril-valsartan (ENTRESTO) 24-26 MG per tablet Take 1 tablet by mouth 2 times daily Yes Patricia Number, DO   empagliflozin (JARDIANCE) 25 MG tablet Take 1 tablet by mouth daily Yes Patricia Number, DO   metoprolol tartrate (LOPRESSOR) 50 MG tablet Take 1 tablet by mouth 2 times daily Yes Patricia Number, DO   blood glucose test strips (ASCENSIA AUTODISC VI;ONE TOUCH ULTRA TEST VI) strip Inject 1 each into the skin daily Yes Yolo Number, DO   Lancets (Leonore Day) [place]  Yes Historical Provider, MD   Blood Glucose Monitoring Suppl (Hamida SQUIRES) w/Device KIT  Yes Historical Provider, MD   glucose monitoring (FREESTYLE FREEDOM) kit 1 kit by Does not apply route daily Yes Jose Shaw MD   blood glucose monitor strips Test 4 times a day & as needed for symptoms of irregular blood glucose.  Dispense sufficient amount for indicated testing frequency plus

## 2023-09-15 ENCOUNTER — CARE COORDINATION (OUTPATIENT)
Dept: CARE COORDINATION | Age: 73
End: 2023-09-15

## 2023-09-15 NOTE — CARE COORDINATION
Care Transitions Outreach Attempt    Attempted to reach patient for transitions of care follow up. Unable to reach patient. Patient: Félix Bradley. Patient : 1950 MRN: [id number]    Last Discharge [address]       Date Complaint Diagnosis Description Type Department Provider    23  PVD (peripheral vascular disease) ([address] [facility]) . ..  Admission (Discharged) from [facility] IR [address] Dixie Portillo MD          Noted following upcoming appointments from discharge chart review:   [facility] follow up appointment(s):   Future Appointments   Date Time Provider [address]   2023 10:15 AM Benson Mcfadden MD [address] [facility]   2023  3:00 PM SCHEDULE, EDDIEX JEFF HARPER AWV LPN [facility] [place] - RUPA   1/15/2024 10:30 AM DO JEFF Gonzalez - RUPA     [facility] follow up appointment(s): NA

## 2023-09-18 ENCOUNTER — HOSPITAL ENCOUNTER (OUTPATIENT)
Dept: WOUND CARE | Age: 73
Discharge: HOME | End: 2023-09-18
Payer: MEDICARE

## 2023-09-18 VITALS
RESPIRATION RATE: 16 BRPM | HEART RATE: 74 BPM | SYSTOLIC BLOOD PRESSURE: 122 MMHG | DIASTOLIC BLOOD PRESSURE: 79 MMHG | TEMPERATURE: 96.8 F

## 2023-09-18 DIAGNOSIS — L97.912 LOWER EXTREMITY ULCERATION, RIGHT, WITH FAT LAYER EXPOSED (HCC): Primary | ICD-10-CM

## 2023-09-18 PROCEDURE — 15272 SKIN SUB GRAFT T/A/L ADD-ON: CPT

## 2023-09-18 PROCEDURE — 6370000000 HC RX 637 (ALT 250 FOR IP): Performed by: SPECIALIST

## 2023-09-18 PROCEDURE — C5272 LOW COST SKIN SUBSTITUTE APP: HCPCS

## 2023-09-18 PROCEDURE — 11042 DBRDMT SUBQ TIS 1ST 20SQCM/<: CPT

## 2023-09-18 PROCEDURE — 29581 APPL MULTLAYER CMPRN SYS LEG: CPT

## 2023-09-18 PROCEDURE — 15271 SKIN SUB GRAFT TRNK/ARM/LEG: CPT

## 2023-09-18 RX ORDER — IBUPROFEN 200 MG
TABLET ORAL ONCE
OUTPATIENT
Start: 2023-09-18 | End: 2023-09-18

## 2023-09-18 RX ORDER — BETAMETHASONE DIPROPIONATE 0.05 %
OINTMENT (GRAM) TOPICAL ONCE
OUTPATIENT
Start: 2023-09-18 | End: 2023-09-18

## 2023-09-18 RX ORDER — CLOBETASOL PROPIONATE 0.5 MG/G
OINTMENT TOPICAL ONCE
OUTPATIENT
Start: 2023-09-18 | End: 2023-09-18

## 2023-09-18 RX ORDER — BACITRACIN ZINC AND POLYMYXIN B SULFATE 500; 1000 [USP'U]/G; [USP'U]/G
OINTMENT TOPICAL ONCE
OUTPATIENT
Start: 2023-09-18 | End: 2023-09-18

## 2023-09-18 RX ORDER — GENTAMICIN SULFATE 1 MG/G
OINTMENT TOPICAL ONCE
OUTPATIENT
Start: 2023-09-18 | End: 2023-09-18

## 2023-09-18 RX ORDER — TRIAMCINOLONE ACETONIDE 1 MG/G
OINTMENT TOPICAL ONCE
OUTPATIENT
Start: 2023-09-18 | End: 2023-09-18

## 2023-09-18 RX ORDER — LIDOCAINE 40 MG/G
CREAM TOPICAL ONCE
OUTPATIENT
Start: 2023-09-18 | End: 2023-09-18

## 2023-09-18 RX ORDER — LIDOCAINE HYDROCHLORIDE 40 MG/ML
SOLUTION TOPICAL ONCE
Status: COMPLETED | OUTPATIENT
Start: 2023-09-18 | End: 2023-09-18

## 2023-09-18 RX ORDER — GINSENG 100 MG
CAPSULE ORAL ONCE
OUTPATIENT
Start: 2023-09-18 | End: 2023-09-18

## 2023-09-18 RX ORDER — LIDOCAINE 50 MG/G
OINTMENT TOPICAL ONCE
OUTPATIENT
Start: 2023-09-18 | End: 2023-09-18

## 2023-09-18 RX ORDER — LIDOCAINE HYDROCHLORIDE 20 MG/ML
JELLY TOPICAL ONCE
OUTPATIENT
Start: 2023-09-18 | End: 2023-09-18

## 2023-09-18 RX ORDER — LIDOCAINE HYDROCHLORIDE 40 MG/ML
SOLUTION TOPICAL ONCE
OUTPATIENT
Start: 2023-09-18 | End: 2023-09-18

## 2023-09-18 RX ORDER — SODIUM CHLOR/HYPOCHLOROUS ACID 0.033 %
SOLUTION, IRRIGATION IRRIGATION ONCE
OUTPATIENT
Start: 2023-09-18 | End: 2023-09-18

## 2023-09-18 RX ADMIN — LIDOCAINE HYDROCHLORIDE: 40 SOLUTION TOPICAL at 10:15

## 2023-09-18 ASSESSMENT — PAIN SCALES - GENERAL: PAINLEVEL_OUTOF10: 8

## 2023-09-18 ASSESSMENT — PAIN DESCRIPTION - DESCRIPTORS: DESCRIPTORS: BURNING

## 2023-09-18 ASSESSMENT — PAIN DESCRIPTION - LOCATION: LOCATION: LEG

## 2023-09-18 ASSESSMENT — PAIN DESCRIPTION - ORIENTATION: ORIENTATION: RIGHT

## 2023-09-18 NOTE — PROGRESS NOTES
TheraSkin Treatment Note      Goal:  Patient will receive safe and proper application of skin substitute. Patient will comply with caring for dressing, offloading and reporting complications. Expiration date of TheraSkin checked immediately prior to use. Package intact prior to use and no damage noted. Transport temperature controlled and acceptable. TheraSkin was prepared for application by removing from package. TheraSkin was rinsed 2 times in room temperature normal saline for 5 seconds each time. A 2nd saline rinse was left on the TheraSkin until the provider was ready to apply it within 120 minutes of thawing. White side placed against ulcer bed. Theraskin was applied to wound # 1 and affixed with steri-strips by the provider. Secondary dressing applied by nursing as ordered (See AVS for details). Patient/caregiver was instructed not to remove dressing and to keep it clean and dry. Educated on complications to report back to the wound care center. Theraskin may only be used every 12 months per wound. Date of first application of Theraskin for this current wound is September 18, 2023.      Application # 1           Guidelines followed      Electronically signed by Ivis Shi RN on 9/18/2023 at 11:01 AM

## 2023-09-18 NOTE — DISCHARGE INSTRUCTIONS
[facility] Physician Orders and Discharge Instructions  [address] [place] [address] [address] [place] [address]. [facility]  Telephone: [phone]  NAME:  Carlos Gilliland. YOB: 1950  MEDICAL RECORD NUMBER:  [id number]  DATE: 9/18/23     Return Appointment:  Return Appointment: With Bird Sapp MD  in  1 [facility])  [] Return Appointment for a Wound Assessment with the nurse on:     Future Appointments   Date Time Provider [address]   12/14/2023  3:00 PM SCHEDULE, [facility] [place] - DYD   1/15/2024 10:30 AM DO SONIYA [address]: none    Medically necessary services for evaluation and treatment: []Skilled Nursing (using clean technique) []PT (Eval & Treat) []OT (Eval & Treat) []Social Work []Dietician []Other:      Wound care instructions: If you smoke we ask that you refrain from smoking. Smoking inhibits wounds from healing. When taking antibiotics take the entire prescription as ordered. Do not stop taking until medication is all gone unless otherwise instructed. Exercise as tolerated. Keep weight off wounds and reposition every 2 hours if applicable. Do not get wounds wet in the bath or shower unless otherwise instructed by your physician. If your wound is on your foot or leg, you may purchase a cast bag. Please ask at the pharmacy. Wash hands with soap and water prior to and after every dressing change. [x]Wash wounds with: Vashe wash - Apply enough Vashe to soak a piece of gauze and place on wound bed for 5-10 minutes. No need to rinse after soaking.   [x]Catrachita wound Topical Treatments: Do not apply lotions, creams, or ointments to the skin around the wound bed unless directed as followed:   Apply around the wound: No-Sting barrier film           [x]Application of a biologic skin substitute: Yes: Theraskin : Wound Location: right lower leg wound, mepitel, steri

## 2023-09-18 NOTE — PROGRESS NOTES
Multilayer Compression Wrap   (Not Unna) Below the Knee    NAME:  Kang Pritchett. YOB: 1950  MEDICAL RECORD NUMBER:  [id number]  DATE:  9/18/2023       Removed old Multilayer wrap if present and washed leg with mild soap/water. Applied moisturizing agent to dry skin as needed. Applied primary and secondary dressing as ordered    Applied multilayered dressing below the knee to Right lower leg(s)  (2 Layer compression wrap ) . Instructed patient/caregiver not to remove dressing and to keep it clean and dry. Instructed patient/caregiver on complications to report to provider, such as pain, numbness in toes, heavy drainage, and slippage of dressing. Instructed patient on purpose of compression dressing and on activity and exercise recommendations.    Applied per   Guidelines    Electronically signed by Kenny Haynes RN on 9/18/2023 at 11:10 AM

## 2023-09-25 ENCOUNTER — HOSPITAL ENCOUNTER (OUTPATIENT)
Dept: WOUND CARE | Age: 73
Discharge: HOME | End: 2023-09-25
Payer: MEDICARE

## 2023-09-25 VITALS
SYSTOLIC BLOOD PRESSURE: 124 MMHG | TEMPERATURE: 97.7 F | HEART RATE: 86 BPM | DIASTOLIC BLOOD PRESSURE: 63 MMHG | RESPIRATION RATE: 16 BRPM

## 2023-09-25 DIAGNOSIS — L97.912 LOWER EXTREMITY ULCERATION, RIGHT, WITH FAT LAYER EXPOSED (HCC): Primary | ICD-10-CM

## 2023-09-25 PROCEDURE — 29581 APPL MULTLAYER CMPRN SYS LEG: CPT

## 2023-09-25 PROCEDURE — 6370000000 HC RX 637 (ALT 250 FOR IP): Performed by: SPECIALIST

## 2023-09-25 RX ORDER — SODIUM CHLOR/HYPOCHLOROUS ACID 0.033 %
SOLUTION, IRRIGATION IRRIGATION ONCE
OUTPATIENT
Start: 2023-09-25 | End: 2023-09-25

## 2023-09-25 RX ORDER — LIDOCAINE HYDROCHLORIDE 40 MG/ML
SOLUTION TOPICAL ONCE
Status: COMPLETED | OUTPATIENT
Start: 2023-09-25 | End: 2023-09-25

## 2023-09-25 RX ORDER — IBUPROFEN 200 MG
TABLET ORAL ONCE
OUTPATIENT
Start: 2023-09-25 | End: 2023-09-25

## 2023-09-25 RX ORDER — BACITRACIN ZINC AND POLYMYXIN B SULFATE 500; 1000 [USP'U]/G; [USP'U]/G
OINTMENT TOPICAL ONCE
OUTPATIENT
Start: 2023-09-25 | End: 2023-09-25

## 2023-09-25 RX ORDER — LIDOCAINE HYDROCHLORIDE 40 MG/ML
SOLUTION TOPICAL ONCE
OUTPATIENT
Start: 2023-09-25 | End: 2023-09-25

## 2023-09-25 RX ORDER — LIDOCAINE 50 MG/G
OINTMENT TOPICAL ONCE
OUTPATIENT
Start: 2023-09-25 | End: 2023-09-25

## 2023-09-25 RX ORDER — GENTAMICIN SULFATE 1 MG/G
OINTMENT TOPICAL ONCE
OUTPATIENT
Start: 2023-09-25 | End: 2023-09-25

## 2023-09-25 RX ORDER — GINSENG 100 MG
CAPSULE ORAL ONCE
OUTPATIENT
Start: 2023-09-25 | End: 2023-09-25

## 2023-09-25 RX ORDER — BETAMETHASONE DIPROPIONATE 0.05 %
OINTMENT (GRAM) TOPICAL ONCE
OUTPATIENT
Start: 2023-09-25 | End: 2023-09-25

## 2023-09-25 RX ORDER — LIDOCAINE 40 MG/G
CREAM TOPICAL ONCE
OUTPATIENT
Start: 2023-09-25 | End: 2023-09-25

## 2023-09-25 RX ORDER — LIDOCAINE HYDROCHLORIDE 20 MG/ML
JELLY TOPICAL ONCE
OUTPATIENT
Start: 2023-09-25 | End: 2023-09-25

## 2023-09-25 RX ORDER — CLOBETASOL PROPIONATE 0.5 MG/G
OINTMENT TOPICAL ONCE
OUTPATIENT
Start: 2023-09-25 | End: 2023-09-25

## 2023-09-25 RX ORDER — TRIAMCINOLONE ACETONIDE 1 MG/G
OINTMENT TOPICAL ONCE
OUTPATIENT
Start: 2023-09-25 | End: 2023-09-25

## 2023-09-25 RX ADMIN — LIDOCAINE HYDROCHLORIDE: 40 SOLUTION TOPICAL at 09:54

## 2023-09-25 ASSESSMENT — PAIN DESCRIPTION - ONSET: ONSET: ON-GOING

## 2023-09-25 ASSESSMENT — PAIN DESCRIPTION - PAIN TYPE: TYPE: CHRONIC PAIN

## 2023-09-25 ASSESSMENT — PAIN DESCRIPTION - FREQUENCY: FREQUENCY: CONTINUOUS

## 2023-09-25 ASSESSMENT — PAIN DESCRIPTION - ORIENTATION: ORIENTATION: RIGHT

## 2023-09-25 ASSESSMENT — PAIN DESCRIPTION - LOCATION: LOCATION: LEG

## 2023-09-25 ASSESSMENT — PAIN DESCRIPTION - DESCRIPTORS: DESCRIPTORS: BURNING

## 2023-09-25 ASSESSMENT — PAIN SCALES - GENERAL: PAINLEVEL_OUTOF10: 8

## 2023-09-25 NOTE — PATIENT INSTRUCTIONS
[facility] Physician Orders and Discharge Instructions  [address] [address] [address] [place] [address]. [facility]  Telephone: [phone]  NAME:  General Stallworth. YOB: 1950  MEDICAL RECORD NUMBER:  [id number]  DATE: 9/25/23     Return Appointment:  Return Appointment: With Mary Jo Hannah MD  in  [facility])  [] Return Appointment for a Wound Assessment with the nurse on:     Future Appointments   Date Time Provider [address]   12/14/2023  3:00 PM [facility]FF  [place] - [place]   1/15/2024 10:30 AM DO SONIYA [address]: none    Medically necessary services for evaluation and treatment: []Skilled Nursing (using clean technique) []PT (Eval & Treat) []OT (Eval & Treat) []Social Work []Dietician []Other:      Wound care instructions: If you smoke we ask that you refrain from smoking. Smoking inhibits wounds from healing. When taking antibiotics take the entire prescription as ordered. Do not stop taking until medication is all gone unless otherwise instructed. Exercise as tolerated. Keep weight off wounds and reposition every 2 hours if applicable. Do not get wounds wet in the bath or shower unless otherwise instructed by your physician. If your wound is on your foot or leg, you may purchase a cast bag. Please ask at the pharmacy. Wash hands with soap and water prior to and after every dressing change. [x]Wash wounds with: 0.9% normal saline  [x]Catrachita wound Topical Treatments: Do not apply lotions, creams, or ointments to the skin around the wound bed unless directed as followed:   Apply around the wound: Zinc paste         [x]Wound Location: right lower leg   Apply Primary Dressing to wound: Oly  Secondary Dressing: 4X4 gauze pad   Avoid contact of tape with skin if possible.   When to change Dressing: DO NOT CHANGE      [x] Multilayer Compression Wrap:  Type:

## 2023-09-25 NOTE — PROGRESS NOTES
Multilayer Compression Wrap   (Not Unna) Below the Knee    NAME:  Eduar Torres. YOB: 1950  MEDICAL RECORD NUMBER:  [id number]  DATE:  9/25/2023       Removed old Multilayer wrap if present and washed leg with mild soap/water. Applied moisturizing agent to dry skin as needed. Applied primary and secondary dressing as ordered    Applied multilayered dressing below the knee to Right lower leg(s)  (2 Layer compression wrap ) . Instructed patient/caregiver not to remove dressing and to keep it clean and dry. Instructed patient/caregiver on complications to report to provider, such as pain, numbness in toes, heavy drainage, and slippage of dressing. Instructed patient on purpose of compression dressing and on activity and exercise recommendations.    Applied per   Guidelines    Electronically signed by Ariel Gómez RN on 9/25/2023 at 10:30 AM
COLONOSCOPY  4/23/2021    COLONOSCOPY POLYPECTOMY SNARE/COLD BIOPSY performed by Jorge Alberto Barrientos MD at [address]  4/23/2021    COLONOSCOPY POLYPECTOMY ABLATION performed by Jorge Alberto Barrientos MD at [address]  4/23/2021    COLONOSCOPY CONTROL HEMORRHAGE performed by Jorge Alberto Barrientos MD at [address] [facility] [address]  6/2013    EYE SURGERY Left     FEMORAL BYPASS Right 7/18/2022    RIGHT FEMORAL BELOW KNEE POPLITEAL ARTERY BYPASS WITH IN SITU SAPHENOUS VEIN performed by Jessika Perez MD at [address] [facility] Right 11/17/2021    RIGHT FEMORAL ENDARTERECTOMY  RIGHT EXTERNAL ILIAC TO PROFUNDA FEMORAL BYPASS WITH 8MM PTFE GRAFT RIGHT LOWER EXTREMITY ARTERIOGRAM BALLOON ANGIOPLASTY RIGHT PROFUNDA BALLOON ANGIOPLASTY AND STENT OF RIGHT EXTERNAL ILIAC ARTERY performed by Jessika Perez MD at [address] [facility]  11/18/2015    Bilateral decompressive lumbar laminectomy L4-5   ; medial facetectomy L4-5 joints  bilaterally; foraminotomies l5   nerve roots bilaterally    LEG DEBRIDEMENT Right 7/23/2013    Dr. Enrico Pinedo - pretibial wound    OTHER SURGICAL HISTORY Right 6/25/2013    Dr. Enrico Pinedo - CFA Endarterectomy w/marsupialization; RLE wound excision & debridement     OTHER SURGICAL HISTORY Left 8/27/2013    Dr. Enrico Pinedo - femoral & profunda femoris endarterectomy w/marsupialization patch angioplasty using SFA    SKIN SPLIT GRAFT Right 7/25/2013    Dr. Enrico Pinedo - to non-healing R pretibial wound, 9 x 15 cm    TOTAL HIP ARTHROPLASTY Right 09/01/2016    Dr. Hiwot Mcrae Left 12/22/2015    Dr. Enrico Pinedo - CFA exploration, thrombectomy of ileofemoral system, stenting of RAFAL in-stent stenosis w/8 x 37 mm [facility]        FAMILY HISTORY    Family History   Problem Relation Age of Onset    Cancer Mother         Breast    Heart Disease Mother     Cancer Father         Bladder    Heart Disease Father     Cancer Paternal Grandmother

## 2023-09-29 DIAGNOSIS — I25.10 CORONARY ARTERY DISEASE INVOLVING NATIVE CORONARY ARTERY OF NATIVE HEART WITHOUT ANGINA PECTORIS: ICD-10-CM

## 2023-09-29 DIAGNOSIS — I50.32 CHRONIC DIASTOLIC HEART FAILURE (HCC): ICD-10-CM

## 2023-09-29 DIAGNOSIS — E78.2 MIXED HYPERLIPIDEMIA: Chronic | ICD-10-CM

## 2023-09-29 RX ORDER — FUROSEMIDE 40 MG/1
40 TABLET ORAL DAILY
Qty: 90 TABLET | Refills: 3 | Status: SHIPPED | OUTPATIENT
Start: 2023-09-29

## 2023-09-29 RX ORDER — ATORVASTATIN CALCIUM 80 MG/1
80 TABLET, FILM COATED ORAL DAILY
Qty: 90 TABLET | Refills: 1 | Status: SHIPPED | OUTPATIENT
Start: 2023-09-29

## 2023-09-29 RX ORDER — CLOPIDOGREL BISULFATE 75 MG/1
75 TABLET ORAL DAILY
Qty: 30 TABLET | Refills: 3 | Status: SHIPPED | OUTPATIENT
Start: 2023-09-29

## 2023-09-29 NOTE — TELEPHONE ENCOUNTER
Medication:   Requested Prescriptions     Pending Prescriptions Disp Refills    clopidogrel (PLAVIX) 75 MG tablet [Pharmacy Med Name: CLOPIDOGREL 75MG TABLETS] 30 tablet 3     Sig: TAKE 1 TABLET BY MOUTH DAILY        Last Filled:  07/3/23    Patient Phone Number: [phone] (home)     Last appt: 9/14/2023   Next appt: 12/14/2023    Last OARRS:       8/14/2020     9:16 AM   RX Monitoring   Periodic Controlled Substance Monitoring Possible medication side effects, risk of tolerance/dependence & alternative treatments discussed. ;Assessed functional status. ;Obtaining appropriate analgesic effect of treatment. Chronic Pain > 50 MEDD Obtained or confirmed \"Consent for Opioid Use\" on file.

## 2023-09-29 NOTE — TELEPHONE ENCOUNTER
Medication:   Requested Prescriptions     Pending Prescriptions Disp Refills    furosemide (LASIX) 40 MG tablet [Pharmacy Med Name: FUROSEMIDE 40MG TABLETS] 90 tablet 3     Sig: TAKE 1 TABLET BY MOUTH IN THE MORNING    atorvastatin (LIPITOR) 80 MG tablet [Pharmacy Med Name: ATORVASTATIN 80MG TABLETS] 90 tablet 1     Sig: TAKE 1 TABLET BY MOUTH DAILY        Last Filled:  04/24/23,08/02/22    Patient Phone Number: [phone] (home)     Last appt: 9/14/2023   Next appt: 9/29/2023    Last OARRS:       8/14/2020     9:16 AM   RX Monitoring   Periodic Controlled Substance Monitoring Possible medication side effects, risk of tolerance/dependence & alternative treatments discussed. ;Assessed functional status. ;Obtaining appropriate analgesic effect of treatment. Chronic Pain > 50 MEDD Obtained or confirmed \"Consent for Opioid Use\" on file.

## 2023-10-02 ENCOUNTER — HOSPITAL ENCOUNTER (OUTPATIENT)
Dept: WOUND CARE | Age: 73
Discharge: HOME | End: 2023-10-02

## 2023-10-05 ENCOUNTER — HOSPITAL ENCOUNTER (OUTPATIENT)
Dept: WOUND CARE | Age: 73
Discharge: HOME | End: 2023-10-05
Attending: SPECIALIST
Payer: COMMERCIAL

## 2023-10-05 VITALS
TEMPERATURE: 97.5 F | DIASTOLIC BLOOD PRESSURE: 77 MMHG | SYSTOLIC BLOOD PRESSURE: 142 MMHG | RESPIRATION RATE: 16 BRPM | HEART RATE: 75 BPM

## 2023-10-05 PROCEDURE — 29581 APPL MULTLAYER CMPRN SYS LEG: CPT

## 2023-10-05 ASSESSMENT — PAIN DESCRIPTION - DESCRIPTORS: DESCRIPTORS: BURNING

## 2023-10-05 ASSESSMENT — PAIN SCALES - GENERAL: PAINLEVEL_OUTOF10: 6

## 2023-10-05 ASSESSMENT — PAIN DESCRIPTION - LOCATION: LOCATION: LEG

## 2023-10-05 ASSESSMENT — PAIN DESCRIPTION - ORIENTATION: ORIENTATION: RIGHT

## 2023-10-05 NOTE — PATIENT INSTRUCTIONS
[facility] Physician Orders and Discharge Instructions  The [address] [place] [address] [address], [address] [place] [address] East,[address] Floor  Telephone: [phone]    NAME:  Tawnya Mcknight. YOB: 1950  MEDICAL RECORD NUMBER:  [id number]  DATE:  10/5/2023      Wound care:  Continue to follow the instructions and recommendations from your last doctor visit. The dressing(s) applied is the same as your last visit. Please refer to your last discharge instruction for the information on your wound care. If there were any changes made, please follow the instructions as written here:     Future Appointments     Future Appointments   Date Time Provider [address]   12/14/2023  3:00 PM SCHEDULE, Vasquez HARPER AWV LPRAGHAVENDRA HARPER [place] - DYD   1/15/2024 10:30 AM Idris Narayanan,  [address]           Your nurse  is:  Libra Thomas     Electronically signed by Amaris Sanabria RN on 10/5/2023 at 4:28 PM     [address] [place] [address] Information: Should you experience any significant changes in your wound(s) or have questions about your wound care, please contact the [place] at [phone]. Our hours vary so please leave a message. Please give us 24-48 hours to return your call. If you need help with your wounds and cannot wait until we are available, contact your PCP or go to the hospital emergency room. Physician orders by:  Dr. Maureen Cardoso        The information contained in the After Visit Summary has been reviewed with me, the patient and/or responsible adult, by my health care provider(s). I had the opportunity to ask questions regarding this information. I have elected to receive;      [] Patient unable to sign Discharge Instructions.  Given to ECF/Transportation/POA

## 2023-10-07 DIAGNOSIS — I25.10 CORONARY ARTERY DISEASE INVOLVING NATIVE CORONARY ARTERY OF NATIVE HEART WITHOUT ANGINA PECTORIS: ICD-10-CM

## 2023-10-09 NOTE — TELEPHONE ENCOUNTER
Medication:   Requested Prescriptions     Pending Prescriptions Disp Refills    ELIQUIS 5 MG TABS tablet [Pharmacy Med Name: ELIQUIS 5MG TABLETS] 60 tablet 1     Sig: TAKE 1 TABLET BY MOUTH TWICE DAILY        Last Filled:  08/08/23    Patient Phone Number: [phone] (home)     Last appt: 9/14/2023   Next appt: 12/14/2023    Last OARRS:       8/14/2020     9:16 AM   RX Monitoring   Periodic Controlled Substance Monitoring Possible medication side effects, risk of tolerance/dependence & alternative treatments discussed. ;Assessed functional status. ;Obtaining appropriate analgesic effect of treatment. Chronic Pain > 50 MEDD Obtained or confirmed \"Consent for Opioid Use\" on file.

## 2023-10-10 RX ORDER — APIXABAN 5 MG/1
TABLET, FILM COATED ORAL
Qty: 60 TABLET | Refills: 1 | Status: SHIPPED | OUTPATIENT
Start: 2023-10-10

## 2023-10-12 ENCOUNTER — HOSPITAL ENCOUNTER (OUTPATIENT)
Dept: WOUND CARE | Age: 73
Discharge: HOME | End: 2023-10-12
Attending: SPECIALIST
Payer: COMMERCIAL

## 2023-10-12 VITALS
DIASTOLIC BLOOD PRESSURE: 73 MMHG | TEMPERATURE: 97.8 F | RESPIRATION RATE: 16 BRPM | HEART RATE: 82 BPM | SYSTOLIC BLOOD PRESSURE: 110 MMHG

## 2023-10-12 DIAGNOSIS — L97.912 LOWER EXTREMITY ULCERATION, RIGHT, WITH FAT LAYER EXPOSED (HCC): Primary | ICD-10-CM

## 2023-10-12 PROCEDURE — 11045 DBRDMT SUBQ TISS EACH ADDL: CPT

## 2023-10-12 PROCEDURE — 29581 APPL MULTLAYER CMPRN SYS LEG: CPT

## 2023-10-12 PROCEDURE — 11045 DBRDMT SUBQ TISS EACH ADDL: CPT | Performed by: SPECIALIST

## 2023-10-12 PROCEDURE — 6370000000 HC RX 637 (ALT 250 FOR IP): Performed by: SPECIALIST

## 2023-10-12 PROCEDURE — 11042 DBRDMT SUBQ TIS 1ST 20SQCM/<: CPT

## 2023-10-12 PROCEDURE — 11042 DBRDMT SUBQ TIS 1ST 20SQCM/<: CPT | Performed by: SPECIALIST

## 2023-10-12 RX ORDER — BETAMETHASONE DIPROPIONATE 0.05 %
OINTMENT (GRAM) TOPICAL ONCE
OUTPATIENT
Start: 2023-10-12 | End: 2023-10-12

## 2023-10-12 RX ORDER — LIDOCAINE HYDROCHLORIDE 20 MG/ML
JELLY TOPICAL ONCE
OUTPATIENT
Start: 2023-10-12 | End: 2023-10-12

## 2023-10-12 RX ORDER — GINSENG 100 MG
CAPSULE ORAL ONCE
OUTPATIENT
Start: 2023-10-12 | End: 2023-10-12

## 2023-10-12 RX ORDER — IBUPROFEN 200 MG
TABLET ORAL ONCE
OUTPATIENT
Start: 2023-10-12 | End: 2023-10-12

## 2023-10-12 RX ORDER — LIDOCAINE 50 MG/G
OINTMENT TOPICAL ONCE
OUTPATIENT
Start: 2023-10-12 | End: 2023-10-12

## 2023-10-12 RX ORDER — TRIAMCINOLONE ACETONIDE 1 MG/G
OINTMENT TOPICAL ONCE
OUTPATIENT
Start: 2023-10-12 | End: 2023-10-12

## 2023-10-12 RX ORDER — SODIUM CHLOR/HYPOCHLOROUS ACID 0.033 %
SOLUTION, IRRIGATION IRRIGATION ONCE
OUTPATIENT
Start: 2023-10-12 | End: 2023-10-12

## 2023-10-12 RX ORDER — LIDOCAINE HYDROCHLORIDE 40 MG/ML
SOLUTION TOPICAL ONCE
OUTPATIENT
Start: 2023-10-12 | End: 2023-10-12

## 2023-10-12 RX ORDER — LIDOCAINE 40 MG/G
CREAM TOPICAL ONCE
OUTPATIENT
Start: 2023-10-12 | End: 2023-10-12

## 2023-10-12 RX ORDER — CLOBETASOL PROPIONATE 0.5 MG/G
OINTMENT TOPICAL ONCE
OUTPATIENT
Start: 2023-10-12 | End: 2023-10-12

## 2023-10-12 RX ORDER — LIDOCAINE HYDROCHLORIDE 40 MG/ML
SOLUTION TOPICAL ONCE
Status: COMPLETED | OUTPATIENT
Start: 2023-10-12 | End: 2023-10-12

## 2023-10-12 RX ORDER — BACITRACIN ZINC AND POLYMYXIN B SULFATE 500; 1000 [USP'U]/G; [USP'U]/G
OINTMENT TOPICAL ONCE
OUTPATIENT
Start: 2023-10-12 | End: 2023-10-12

## 2023-10-12 RX ORDER — GENTAMICIN SULFATE 1 MG/G
OINTMENT TOPICAL ONCE
OUTPATIENT
Start: 2023-10-12 | End: 2023-10-12

## 2023-10-12 RX ADMIN — LIDOCAINE HYDROCHLORIDE: 40 SOLUTION TOPICAL at 10:52

## 2023-10-12 ASSESSMENT — PAIN DESCRIPTION - LOCATION: LOCATION: LEG

## 2023-10-12 ASSESSMENT — PAIN SCALES - GENERAL: PAINLEVEL_OUTOF10: 9

## 2023-10-12 ASSESSMENT — PAIN DESCRIPTION - ONSET: ONSET: PROGRESSIVE

## 2023-10-12 ASSESSMENT — PAIN DESCRIPTION - ORIENTATION: ORIENTATION: RIGHT

## 2023-10-12 ASSESSMENT — PAIN DESCRIPTION - DESCRIPTORS: DESCRIPTORS: BURNING;THROBBING

## 2023-10-12 NOTE — PATIENT INSTRUCTIONS
[address] [facility] Physician Orders and Discharge Instructions  [address] [place] [address]. [facility]  Telephone: [phone]  NAME:  Tiff Mendenhall. YOB: 1950  MEDICAL RECORD NUMBER:  [id number]  DATE: 10/12/23     Return Appointment:  Return Appointment: With Isis Fine MD  in  1 [facility])  [] Return Appointment for a Wound Assessment with the nurse on:     Future Appointments   Date Time Provider [address]   12/14/2023  3:00 PM SCHEDULE, David HARPER AWV LPRAGHAVENDRA HARPER [place] - DYD   1/15/2024 10:30 AM Mili Cramer  [facility] [address]: none    Medically necessary services for evaluation and treatment: []Skilled Nursing (using clean technique) []PT (Eval & Treat) []OT (Eval & Treat) []Social Work []Dietician []Other:      Wound care instructions: If you smoke we ask that you refrain from smoking. Smoking inhibits wounds from healing. When taking antibiotics take the entire prescription as ordered. Do not stop taking until medication is all gone unless otherwise instructed. Exercise as tolerated. Keep weight off wounds and reposition every 2 hours if applicable. Do not get wounds wet in the bath or shower unless otherwise instructed by your physician. If your wound is on your foot or leg, you may purchase a cast bag. Please ask at the pharmacy. Wash hands with soap and water prior to and after every dressing change. [x]Wash wounds with: Vashe wash - Apply enough Vashe to soak a piece of gauze and place on wound bed for 5-10 minutes. No need to rinse after soaking.   [x]Catrachita wound Topical Treatments: Do not apply lotions, creams, or ointments to the skin around the wound bed unless directed as followed:   Apply around the wound: Moisturizing lotion         [x]Wound Location: right lower leg   Apply Primary Dressing to wound: Oly  Secondary Dressing: 4X4 gauze pad   Avoid

## 2023-10-12 NOTE — PROGRESS NOTES
Insurance Verification Request      Ordering Center: [facility]  [address] [facility] [address]ha Crystal. [address] [facility] Phone Number: [phone]  Fax Number: [phone]    Facility NPI: [id number]  Facility Tax ID [id number]    Provider Information:      Provider's Name: Dr. Jeremy Chew NPI: Ronnie Arce MD,  NPI: [id number]     Patient Information:     Jessica Daniels  [address] [place] [id number]   : 1950  AGE: 72 y.o. GENDER: male   TODAYS DATE:  10/12/2023    Insurance:     PRIMARY INSURANCE:  Plan: Shayy Goss DUAL  Coverage: Shayy Goss DUAL BENEFITS  Effective Date: 10/1/2023  Group Number: [unfilled]  Subscriber Number: [id number] - (Medicare Managed)    Payer/Plan Subscr  Sex Relation Sub. Ins. ID Effective Group Num   1. BUCKEYE MYCAR* LENNIE GUARDADO L * 1950 Male Self [id number] 10/1/23 [id number]                                   [address]   2.  [place]ICA[place] -* MELISSA GUARDADOORD L * 1950 Male Self [id number]                                    [address]       Application/product Information:     Benefit Verification Request:    Reason for Request: [facility] ([address] [place] [address]) FAX# [phone]    Trunk/Arms/Legs [id number] (1st 25 sq cm)    Theraskin    Has patient been treated with any other Skin Substitute on this Wound:     Yes Name of the product or produces theraskin, Number of previous applications 1, Wound size 19.8 sq/cm, Number of wounds 1,  Location of wound right lower leg, Duration of wound 08/10/2023, Estimated number of applications 5     If yes, How many previous applications: 1    WOUND #: 1    Total Square CM: 19.8    Procedure setting: Hospital Outpatient Department    Is the Patient currently in a skilled nursing facility: No     Is the wound work related: No    Procedure date: 10/19/2023    Patient Information:     Additional Dx Codes: B29.292, S32.105    Problem List

## 2023-10-12 NOTE — PROGRESS NOTES
Jude De  Progress Note and Procedure Note      Yun Dobbs MEDICAL RECORD NUMBER:  [id number]  AGE: 72 y.o. GENDER: male  : 1950  EPISODE DATE:  10/12/2023    Subjective:     Chief Complaint   Patient presents with    Wound Check         HISTORY of PRESENT ILLNESS HPI     Yun Dobbs is a 72 y.o. male who presents today for wound/ulcer evaluation. History of Wound Context: Patient now post right femoral popliteal balloon angioplasty and left external iliac artery angioplasty by Dr. Judy Pritchett. He returns today almost 3 weeks post first application of TheraSkin allograft. .  He states there is less pain.   Wound/Ulcer Pain Timing/Severity: mild  Quality of pain: sharp  Severity:  1 / 10   Modifying Factors: None  Associated Signs/Symptoms: edema    Ulcer Identification:  Ulcer Type: arterial and traumatic    Contributing Factors: edema, diabetes, smoking, and arterial insufficiency    Acute Wound: N/A not an acute wound    PAST MEDICAL HISTORY        Diagnosis Date    CAD (coronary artery disease)     CHF (congestive heart failure) ([facility])     diastolic    COPD (chronic obstructive pulmonary disease) ([facility])     Critical ischemia of lower extremity ([facility])     Depressive disorder, not elsewhere classified     GERD (gastroesophageal reflux disease)     History of colon polyps - 30 seen on colonoscopy 2021    History of MI (myocardial infarction) 2013    History of skin ulcer of lower extremity     R anterior shin    History of transient ischemic attack (TIA)     Hx of blood clots     PE    Hyperlipidemia     Hypertension     Neuropathy     KAVITA (obstructive sleep apnea)     On CPAP    Personal history of DVT (deep vein thrombosis)     Prolonged emergence from general anesthesia     Pt reported being combative after surgery     PVD (peripheral vascular disease) ([address])     TIA (transient ischemic attack) 2013    Vertebral fracture        PAST

## 2023-10-12 NOTE — PROGRESS NOTES
Multilayer Compression Wrap   (Not Unna) Below the Knee    NAME:  Bette Britton YOB: 1950  MEDICAL RECORD NUMBER:  [id number]  DATE:  10/12/2023       Removed old Multilayer wrap if present and washed leg with mild soap/water. Applied moisturizing agent to dry skin as needed. Applied primary and secondary dressing as ordered    Applied multilayered dressing below the knee to Right lower leg(s)  (2 Layer compression wrap ) . Instructed patient/caregiver not to remove dressing and to keep it clean and dry. Instructed patient/caregiver on complications to report to provider, such as pain, numbness in toes, heavy drainage, and slippage of dressing. Instructed patient on purpose of compression dressing and on activity and exercise recommendations.    Applied per   Guidelines    Electronically signed by Odette Yin RN on 10/12/2023 at 11:35 AM

## 2023-10-19 ENCOUNTER — HOSPITAL ENCOUNTER (OUTPATIENT)
Dept: WOUND CARE | Age: 73
Discharge: HOME | End: 2023-10-19
Attending: SPECIALIST
Payer: COMMERCIAL

## 2023-10-19 VITALS
SYSTOLIC BLOOD PRESSURE: 113 MMHG | RESPIRATION RATE: 16 BRPM | TEMPERATURE: 96.9 F | HEART RATE: 83 BPM | DIASTOLIC BLOOD PRESSURE: 66 MMHG

## 2023-10-19 DIAGNOSIS — L97.912 LOWER EXTREMITY ULCERATION, RIGHT, WITH FAT LAYER EXPOSED (HCC): Primary | ICD-10-CM

## 2023-10-19 PROCEDURE — 11042 DBRDMT SUBQ TIS 1ST 20SQCM/<: CPT

## 2023-10-19 PROCEDURE — 6370000000 HC RX 637 (ALT 250 FOR IP): Performed by: SPECIALIST

## 2023-10-19 PROCEDURE — 29581 APPL MULTLAYER CMPRN SYS LEG: CPT

## 2023-10-19 RX ORDER — LIDOCAINE HYDROCHLORIDE 40 MG/ML
SOLUTION TOPICAL ONCE
Status: COMPLETED | OUTPATIENT
Start: 2023-10-19 | End: 2023-10-19

## 2023-10-19 RX ORDER — GINSENG 100 MG
CAPSULE ORAL ONCE
OUTPATIENT
Start: 2023-10-19 | End: 2023-10-19

## 2023-10-19 RX ORDER — LIDOCAINE 40 MG/G
CREAM TOPICAL ONCE
OUTPATIENT
Start: 2023-10-19 | End: 2023-10-19

## 2023-10-19 RX ORDER — LIDOCAINE 50 MG/G
OINTMENT TOPICAL ONCE
OUTPATIENT
Start: 2023-10-19 | End: 2023-10-19

## 2023-10-19 RX ORDER — SODIUM CHLOR/HYPOCHLOROUS ACID 0.033 %
SOLUTION, IRRIGATION IRRIGATION ONCE
Status: COMPLETED | OUTPATIENT
Start: 2023-10-19 | End: 2023-10-19

## 2023-10-19 RX ORDER — LIDOCAINE HYDROCHLORIDE 20 MG/ML
JELLY TOPICAL ONCE
OUTPATIENT
Start: 2023-10-19 | End: 2023-10-19

## 2023-10-19 RX ORDER — LIDOCAINE HYDROCHLORIDE 40 MG/ML
SOLUTION TOPICAL ONCE
OUTPATIENT
Start: 2023-10-19 | End: 2023-10-19

## 2023-10-19 RX ORDER — DULOXETIN HYDROCHLORIDE 20 MG/1
20 CAPSULE, DELAYED RELEASE ORAL DAILY
COMMUNITY
Start: 2023-10-11

## 2023-10-19 RX ORDER — GENTAMICIN SULFATE 1 MG/G
OINTMENT TOPICAL ONCE
OUTPATIENT
Start: 2023-10-19 | End: 2023-10-19

## 2023-10-19 RX ORDER — SODIUM CHLOR/HYPOCHLOROUS ACID 0.033 %
SOLUTION, IRRIGATION IRRIGATION ONCE
OUTPATIENT
Start: 2023-10-19 | End: 2023-10-19

## 2023-10-19 RX ORDER — CLOBETASOL PROPIONATE 0.5 MG/G
OINTMENT TOPICAL ONCE
OUTPATIENT
Start: 2023-10-19 | End: 2023-10-19

## 2023-10-19 RX ORDER — TRIAMCINOLONE ACETONIDE 1 MG/G
OINTMENT TOPICAL ONCE
OUTPATIENT
Start: 2023-10-19 | End: 2023-10-19

## 2023-10-19 RX ORDER — BACITRACIN ZINC AND POLYMYXIN B SULFATE 500; 1000 [USP'U]/G; [USP'U]/G
OINTMENT TOPICAL ONCE
OUTPATIENT
Start: 2023-10-19 | End: 2023-10-19

## 2023-10-19 RX ORDER — IBUPROFEN 200 MG
TABLET ORAL ONCE
OUTPATIENT
Start: 2023-10-19 | End: 2023-10-19

## 2023-10-19 RX ORDER — BETAMETHASONE DIPROPIONATE 0.05 %
OINTMENT (GRAM) TOPICAL ONCE
OUTPATIENT
Start: 2023-10-19 | End: 2023-10-19

## 2023-10-19 RX ADMIN — LIDOCAINE HYDROCHLORIDE: 40 SOLUTION TOPICAL at 10:46

## 2023-10-19 RX ADMIN — Medication: at 11:34

## 2023-10-19 ASSESSMENT — PAIN SCALES - GENERAL: PAINLEVEL_OUTOF10: 0

## 2023-10-19 NOTE — PATIENT INSTRUCTIONS
[address] [facility] Physician Orders and Discharge Instructions  [address] [facility]  Telephone: [phone]  NAME:  Marcella Mejia. YOB: 1950  MEDICAL RECORD NUMBER:  [id number]  DATE: 10/19/23     Return Appointment:  Return Appointment: With Mel Velez MD  in  1 [facility])  [] Return Appointment for a Wound Assessment with the nurse on:     Future Appointments   Date Time Provider [address]  [address]   12/14/2023  3:00 PM SCHEDULE, Mliss Foot [facility] AWV LPN [facility] [place] - DYD   1/15/2024 10:30 AM Tamiko Rajput  [address] [facility] [address]: none    Medically necessary services for evaluation and treatment: []Skilled Nursing (using clean technique) []PT (Eval & Treat) []OT (Eval & Treat) []Social Work []Dietician []Other:      Wound care instructions: If you smoke we ask that you refrain from smoking. Smoking inhibits wounds from healing. When taking antibiotics take the entire prescription as ordered. Do not stop taking until medication is all gone unless otherwise instructed. Exercise as tolerated. Keep weight off wounds and reposition every 2 hours if applicable. Do not get wounds wet in the bath or shower unless otherwise instructed by your physician. If your wound is on your foot or leg, you may purchase a cast bag. Please ask at the pharmacy. Wash hands with soap and water prior to and after every dressing change. [x]Wash wounds with: Vashe wash - Apply enough Vashe to soak a piece of gauze and place on wound bed for 5-10 minutes. No need to rinse after soaking.   [x]Catrachita wound Topical Treatments: Do not apply lotions, creams, or ointments to the skin around the wound bed unless directed as followed:   Apply around the wound: Moisturizing lotion         [x]Wound Location: right lower leg   Apply Primary Dressing to wound: Oly  Secondary Dressing: 4X4 gauze pad   Avoid

## 2023-10-19 NOTE — PROGRESS NOTES
Multilayer Compression Wrap   (Not Unna) Below the Knee    NAME:  Yun Dobbs YOB: 1950  MEDICAL RECORD NUMBER:  [id number]  DATE:  10/19/2023       Removed old Multilayer wrap if present and washed leg with mild soap/water. Applied moisturizing agent to dry skin as needed. Applied primary and secondary dressing as ordered    Applied multilayered dressing below the knee to Right lower leg(s)  (2 Layer compression wrap ) . Instructed patient/caregiver not to remove dressing and to keep it clean and dry. Instructed patient/caregiver on complications to report to provider, such as pain, numbness in toes, heavy drainage, and slippage of dressing. Instructed patient on purpose of compression dressing and on activity and exercise recommendations.    Applied per   Guidelines    Electronically signed by Garcia Antonio RN on 10/19/2023 at 12:11 PM

## 2023-10-19 NOTE — PROGRESS NOTES
Jude De  Progress Note and Procedure Note      Veronica Herring MEDICAL RECORD NUMBER:  [id number]  AGE: 72 y.o. GENDER: male  : 1950  EPISODE DATE:  10/19/2023    Subjective:     Chief Complaint   Patient presents with    Wound Check     Right leg         HISTORY of PRESENT ILLNESS HPI     Veronica Herring is a 72 y.o. male who presents today for wound/ulcer evaluation. History of Wound Context: Patient now post right femoral popliteal balloon angioplasty and left external iliac artery angioplasty by Dr. Mikal Qiu. He returns today almost 3 weeks post first application of TheraSkin allograft. .  He states there is less pain.   He tolerates a 2 layer Coban compression  Wound/Ulcer Pain Timing/Severity: mild  Quality of pain: sharp  Severity:  1 / 10   Modifying Factors: None  Associated Signs/Symptoms: edema    Ulcer Identification:  Ulcer Type: arterial and traumatic    Contributing Factors: edema, poor glucose control, smoking, and arterial insufficiency    Acute Wound: N/A not an acute wound    PAST MEDICAL HISTORY        Diagnosis Date    CAD (coronary artery disease)     CHF (congestive heart failure) ([facility])     diastolic    COPD (chronic obstructive pulmonary disease) ([facility])     Critical ischemia of lower extremity ([facility])     Depressive disorder, not elsewhere classified     GERD (gastroesophageal reflux disease)     History of colon polyps - 30 seen on colonoscopy 2021    History of MI (myocardial infarction) 2013    History of skin ulcer of lower extremity     R anterior shin    History of transient ischemic attack (TIA)     Hx of blood clots     PE    Hyperlipidemia     Hypertension     Neuropathy     KAVITA (obstructive sleep apnea)     On CPAP    Personal history of DVT (deep vein thrombosis)     Prolonged emergence from general anesthesia     Pt reported being combative after surgery     PVD (peripheral vascular disease) ([facility])     TIA

## 2023-10-23 RX ORDER — SACUBITRIL AND VALSARTAN 24; 26 MG/1; MG/1
1 TABLET, FILM COATED ORAL 2 TIMES DAILY
Qty: 60 TABLET | Refills: 3 | Status: SHIPPED | OUTPATIENT
Start: 2023-10-23

## 2023-10-23 NOTE — TELEPHONE ENCOUNTER
Medication:   Requested Prescriptions     Pending Prescriptions Disp Refills    ENTRESTO 24-26 MG per tablet [Pharmacy Med Name: ENTRESTO 24-26MG TABLETS] 60 tablet 3     Sig: TAKE 1 TABLET BY MOUTH TWICE DAILY        Last Filled:  03/14/23    Patient Phone Number: [phone] (home)     Last appt: 9/14/2023   Next appt: 12/14/2023    Last OARRS:       8/14/2020     9:16 AM   RX Monitoring   Periodic Controlled Substance Monitoring Possible medication side effects, risk of tolerance/dependence & alternative treatments discussed. ;Assessed functional status. ;Obtaining appropriate analgesic effect of treatment. Chronic Pain > 50 MEDD Obtained or confirmed \"Consent for Opioid Use\" on file.

## 2023-10-27 ENCOUNTER — TELEPHONE (OUTPATIENT)
Dept: WOUND CARE | Age: 73
End: 2023-10-27

## 2023-10-27 NOTE — TELEPHONE ENCOUNTER
Called patient to rescheduled missed appointment from yesterday and he stated he was out of town. Patient states he is not returning to wound care because he would like to move to [place] in the next couple of days. Offered appointment at wound care clinic until he moves, but patient refusing. Instructed him to cut of compression wrap and follow up with wound care clinic in [place]. Patient verbalized understanding.

## 2023-10-31 ENCOUNTER — TELEPHONE (OUTPATIENT)
Dept: CARDIOLOGY CLINIC | Age: 73
End: 2023-10-31

## 2023-10-31 NOTE — TELEPHONE ENCOUNTER
Submitted PA for REPATHA  [facility] Key: [id number]  STATUS: PENDING. Follow up done daily; if no response in three days we will refax for status check. If another three days goes by with no response we will call the insurance for status.

## 2023-11-06 RX ORDER — EVOLOCUMAB 140 MG/ML
INJECTION, SOLUTION SUBCUTANEOUS
Qty: 6 ADJUSTABLE DOSE PRE-FILLED PEN SYRINGE | Refills: 0 | Status: SHIPPED | OUTPATIENT
Start: 2023-11-06

## 2023-11-17 ENCOUNTER — TELEPHONE (OUTPATIENT)
Dept: PRIMARY CARE CLINIC | Age: 73
End: 2023-11-17

## 2023-11-17 ENCOUNTER — OFFICE VISIT (OUTPATIENT)
Dept: CARDIOLOGY CLINIC | Age: 73
End: 2023-11-17
Payer: COMMERCIAL

## 2023-11-17 VITALS
WEIGHT: 226 LBS | HEIGHT: 70 IN | HEART RATE: 77 BPM | SYSTOLIC BLOOD PRESSURE: 124 MMHG | BODY MASS INDEX: 32.35 KG/M2 | DIASTOLIC BLOOD PRESSURE: 68 MMHG

## 2023-11-17 DIAGNOSIS — R00.2 HEART PALPITATIONS: ICD-10-CM

## 2023-11-17 DIAGNOSIS — I25.10 CORONARY ARTERY DISEASE INVOLVING NATIVE CORONARY ARTERY OF NATIVE HEART WITHOUT ANGINA PECTORIS: ICD-10-CM

## 2023-11-17 DIAGNOSIS — R07.9 CHEST PAIN, UNSPECIFIED TYPE: Primary | ICD-10-CM

## 2023-11-17 DIAGNOSIS — E78.00 PURE HYPERCHOLESTEROLEMIA: ICD-10-CM

## 2023-11-17 PROCEDURE — 3017F COLORECTAL CA SCREEN DOC REV: CPT | Performed by: NURSE PRACTITIONER

## 2023-11-17 PROCEDURE — G8417 CALC BMI ABV UP PARAM F/U: HCPCS | Performed by: NURSE PRACTITIONER

## 2023-11-17 PROCEDURE — 1123F ACP DISCUSS/DSCN MKR DOCD: CPT | Performed by: NURSE PRACTITIONER

## 2023-11-17 PROCEDURE — G8484 FLU IMMUNIZE NO ADMIN: HCPCS | Performed by: NURSE PRACTITIONER

## 2023-11-17 PROCEDURE — 3074F SYST BP LT 130 MM HG: CPT | Performed by: NURSE PRACTITIONER

## 2023-11-17 PROCEDURE — 3078F DIAST BP <80 MM HG: CPT | Performed by: NURSE PRACTITIONER

## 2023-11-17 PROCEDURE — 93000 ELECTROCARDIOGRAM COMPLETE: CPT | Performed by: NURSE PRACTITIONER

## 2023-11-17 PROCEDURE — 93246 EXT ECG>7D<15D RECORDING: CPT | Performed by: INTERNAL MEDICINE

## 2023-11-17 PROCEDURE — 99214 OFFICE O/P EST MOD 30 MIN: CPT | Performed by: NURSE PRACTITIONER

## 2023-11-17 PROCEDURE — 4004F PT TOBACCO SCREEN RCVD TLK: CPT | Performed by: NURSE PRACTITIONER

## 2023-11-17 PROCEDURE — G8427 DOCREV CUR MEDS BY ELIG CLIN: HCPCS | Performed by: NURSE PRACTITIONER

## 2023-11-17 NOTE — TELEPHONE ENCOUNTER
[employer] sent over a statement stating  pregabalin (LYRICA) 150 MG capsule is not covered by pt plan they supplied pt with a Temporary supply of the medication that was only a 30 days supplies script last sent in in August. Alt's ar not listed

## 2023-11-17 NOTE — PATIENT INSTRUCTIONS
Recommend TTE lexiscan Holter monitor blood work   Do blood work while on Lipitor and see if needs PCSK9   Fu in 3-4 weeks

## 2023-11-21 ENCOUNTER — TELEPHONE (OUTPATIENT)
Dept: CARDIOLOGY CLINIC | Age: 73
End: 2023-11-21

## 2023-11-21 DIAGNOSIS — J44.9 OSA AND COPD OVERLAP SYNDROME (HCC): ICD-10-CM

## 2023-11-21 DIAGNOSIS — G47.33 OSA AND COPD OVERLAP SYNDROME (HCC): ICD-10-CM

## 2023-11-21 RX ORDER — ALBUTEROL SULFATE 90 UG/1
2 AEROSOL, METERED RESPIRATORY (INHALATION) EVERY 6 HOURS PRN
Qty: 18 G | Refills: 2 | Status: SHIPPED | OUTPATIENT
Start: 2023-11-21

## 2023-11-21 NOTE — TELEPHONE ENCOUNTER
Medication:   Requested Prescriptions     Pending Prescriptions Disp Refills    albuterol sulfate HFA (PROVENTIL;VENTOLIN;PROAIR) 108 (90 Base) MCG/ACT inhaler [Pharmacy Med Name: ALBUTEROL HFA INH(200 PUFFS) 18GM] 18 g 2     Sig: INHALE 2 PUFFS INTO THE LUNGS EVERY 6 HOURS AS NEEDED FOR WHEEZING        Last Filled:    10/20/22  Patient Phone Number: [phone] (home)     Last appt: 9/14/2023   Next appt: 12/14/2023    Last OARRS:       8/14/2020     9:16 AM   RX Monitoring   Periodic Controlled Substance Monitoring Possible medication side effects, risk of tolerance/dependence & alternative treatments discussed. ;Assessed functional status. ;Obtaining appropriate analgesic effect of treatment. Chronic Pain > 50 MEDD Obtained or confirmed \"Consent for Opioid Use\" on file.

## 2023-12-01 DIAGNOSIS — G60.9 IDIOPATHIC PERIPHERAL NEUROPATHY: ICD-10-CM

## 2023-12-04 RX ORDER — PREGABALIN 150 MG/1
CAPSULE ORAL
Qty: 180 CAPSULE | Refills: 1 | Status: SHIPPED | OUTPATIENT
Start: 2023-12-04 | End: 2024-06-01

## 2023-12-04 NOTE — TELEPHONE ENCOUNTER
Medication:   Requested Prescriptions     Pending Prescriptions Disp Refills    pregabalin (LYRICA) 150 MG capsule [Pharmacy Med Name: PREGABALIN 150MG CAPSULES] 180 capsule      Sig: TAKE 3 CAPSULES BY MOUTH TWICE DAILY. MAX DAILY AMOUNT: 900 MG        Last Filled:  08/02/23    Patient Phone Number: [phone] (home)     Last appt: 9/14/2023   Next appt: 12/14/2023    Last OARRS:       8/14/2020     9:16 AM   RX Monitoring   Periodic Controlled Substance Monitoring Possible medication side effects, risk of tolerance/dependence & alternative treatments discussed. ;Assessed functional status. ;Obtaining appropriate analgesic effect of treatment. Chronic Pain > 50 MEDD Obtained or confirmed \"Consent for Opioid Use\" on file.

## 2023-12-05 ENCOUNTER — TELEPHONE (OUTPATIENT)
Dept: PRIMARY CARE CLINIC | Age: 73
End: 2023-12-05

## 2023-12-05 NOTE — TELEPHONE ENCOUNTER
pregabalin (LYRICA) 150 MG capsule [[id number]]    Order Details  Dose, Route, Frequency: As Directed   Dispense Quantity: 180 capsule Refills: 1          Sig: TAKE 3 CAPSULES BY MOUTH TWICE DAILY. MAX DAILY AMOUNT: 900 MG         Start Date: 12/04/23 End Date: 06/01/24   Written Date: 12/04/23 Expiration Date: 12/03/24       Associated Diagnoses: Idiopathic peripheral neuropathy [G60.9]   Original Order: pregabalin (LYRICA) 150 MG capsule [[id number]]   Providers    Authorizing Provider: Rickie Holguin DO NPI: [id number]   Ordering User: Rickie Holguin DO          Pharmacy    [id number] [place], [place] - [address] - P [phone] - F [phone]  [facility] [address] [place] [address]  Phone: [phone]  Fax: [phone]     Order Class:    Order Class

## 2023-12-06 DIAGNOSIS — I25.10 CORONARY ARTERY DISEASE INVOLVING NATIVE CORONARY ARTERY OF NATIVE HEART WITHOUT ANGINA PECTORIS: ICD-10-CM

## 2023-12-06 RX ORDER — APIXABAN 5 MG/1
TABLET, FILM COATED ORAL
Qty: 60 TABLET | Refills: 1 | Status: SHIPPED | OUTPATIENT
Start: 2023-12-06

## 2023-12-06 NOTE — TELEPHONE ENCOUNTER
Medication:   Requested Prescriptions     Pending Prescriptions Disp Refills    ELIQUIS 5 MG TABS tablet [Pharmacy Med Name: ELIQUIS 5MG TABLETS] 60 tablet 1     Sig: TAKE 1 TABLET BY MOUTH TWICE DAILY        Last Filled:  10/10/23    Patient Phone Number: [phone] (home)     Last appt: 9/14/2023   Next appt: 12/14/2023    Last OARRS:       8/14/2020     9:16 AM   RX Monitoring   Periodic Controlled Substance Monitoring Possible medication side effects, risk of tolerance/dependence & alternative treatments discussed. ;Assessed functional status. ;Obtaining appropriate analgesic effect of treatment. Chronic Pain > 50 MEDD Obtained or confirmed \"Consent for Opioid Use\" on file.

## 2023-12-06 NOTE — TELEPHONE ENCOUNTER
Submitted PA for Pregabalin 150MG capsules   [facility]ia [facility] Key: [id number] STATUS: PENDING.    Follow up done daily; if no response in three days we will refax for status check.  If another three days goes by with no response we will call the insurance for status.

## 2023-12-07 NOTE — TELEPHONE ENCOUNTER
The medication is APPROVED. Approval letter in media.     Approval for 12/4/2023 until further notice.     \"Approved. This drug has been approved under the Member's Medicare Part D benefit for PREGABALIN Capsule 150MG. Approved quantity: 180 per 30 day(s). You may fill up to a 90 day supply except for those on Specialty Tier 5, which can be filled up to a 30 day supply. Please call the pharmacy to process the prescription claim.\"    If this requires a response please respond to the pool ( P MHCX PSC MEDICATION PRE-AUTH).      Thank you please advise patient.     none

## 2023-12-11 PROCEDURE — 93248 EXT ECG>7D<15D REV&INTERPJ: CPT | Performed by: INTERNAL MEDICINE

## 2023-12-12 DIAGNOSIS — R00.2 HEART PALPITATIONS: Primary | ICD-10-CM

## 2023-12-15 ENCOUNTER — TELEMEDICINE (OUTPATIENT)
Dept: PRIMARY CARE CLINIC | Age: 73
End: 2023-12-15
Payer: MEDICARE

## 2023-12-15 DIAGNOSIS — H91.90 HEARING DIFFICULTY, UNSPECIFIED LATERALITY: Primary | ICD-10-CM

## 2023-12-15 DIAGNOSIS — Z00.00 MEDICARE ANNUAL WELLNESS VISIT, SUBSEQUENT: ICD-10-CM

## 2023-12-15 DIAGNOSIS — Z12.11 SCREENING FOR COLON CANCER: ICD-10-CM

## 2023-12-15 PROCEDURE — 1123F ACP DISCUSS/DSCN MKR DOCD: CPT | Performed by: STUDENT IN AN ORGANIZED HEALTH CARE EDUCATION/TRAINING PROGRAM

## 2023-12-15 PROCEDURE — G0439 PPPS, SUBSEQ VISIT: HCPCS | Performed by: STUDENT IN AN ORGANIZED HEALTH CARE EDUCATION/TRAINING PROGRAM

## 2023-12-15 ASSESSMENT — PATIENT HEALTH QUESTIONNAIRE - PHQ9
2. FEELING DOWN, DEPRESSED OR HOPELESS: 0
SUM OF ALL RESPONSES TO PHQ QUESTIONS 1-9: 0
SUM OF ALL RESPONSES TO PHQ9 QUESTIONS 1 & 2: 0
1. LITTLE INTEREST OR PLEASURE IN DOING THINGS: 0
SUM OF ALL RESPONSES TO PHQ QUESTIONS 1-9: 0

## 2023-12-15 ASSESSMENT — LIFESTYLE VARIABLES
HOW MANY STANDARD DRINKS CONTAINING ALCOHOL DO YOU HAVE ON A TYPICAL DAY: 1 OR 2
HOW OFTEN DO YOU HAVE A DRINK CONTAINING ALCOHOL: 2-3 TIMES A WEEK

## 2023-12-15 NOTE — PROGRESS NOTES
release capsule TAKE 1 CAPSULE BY MOUTH DAILY Yes Patricia Number, DO   potassium chloride (MICRO-K) 10 MEQ extended release capsule TAKE 1 CAPSULE BY MOUTH DAILY Yes James Cee MD   fluticasone-umeclidin-vilant (TRELEGY ELLIPTA) 413-44.5-45 MCG/ACT AEPB inhaler Inhale 1 puff into the lungs daily Yes Patricia Number, DO   allopurinol (ZYLOPRIM) 100 MG tablet TAKE 1 TABLET BY MOUTH DAILY Yes Patricia Number, DO   empagliflozin (JARDIANCE) 25 MG tablet Take 1 tablet by mouth daily Yes Patricia Number, DO   metoprolol tartrate (LOPRESSOR) 50 MG tablet Take 1 tablet by mouth 2 times daily Yes Patricia Number, DO   blood glucose test strips (ASCENSIA AUTODISC VI;ONE TOUCH ULTRA TEST VI) strip Inject 1 each into the skin daily Yes Patricia Number, DO   Lancets (Nicolette Day) MISC  Yes Annita Evans MD   Blood Glucose Monitoring Suppl (ONETOUCH VERIO REFLECT) w/Device KIT  Yes Annita Evans MD   glucose monitoring (FREESTYLE FREEDOM) kit 1 kit by Does not apply route daily Yes Jose Shaw MD   blood glucose monitor strips Test 4 times a day & as needed for symptoms of irregular blood glucose. Dispense sufficient amount for indicated testing frequency plus additional to accommodate PRN testing needs. Yes Jose Shaw MD   folic acid (FOLVITE) 1 MG tablet Take 1 tablet by mouth daily  Alfred Pearson MD   nitroGLYCERIN (NITROSTAT) 0.4 MG SL tablet Place 1 tablet under the tongue every 5 minutes as needed for Chest pain  Patient not taking: No sig reported  Patricia Matson,    magnesium gluconate (MAGONATE) 500 MG tablet Take 500 mg by mouth daily.   ProviderAnnita MD CareTeam (Including outside providers/suppliers regularly involved in providing care):   Patient Care Team:  Patricia Matson DO as PCP - General (Family Medicine)  Patricia Matson DO as PCP - Empaneled Provider  Ritesh Cameron MD as Consulting Physician (Pulmonology)  Carolyne Cope MD as Consulting Physician (Pain

## 2023-12-15 NOTE — PATIENT INSTRUCTIONS
preventive dental visit is recommended every 6 months. Try to get at least 150 minutes of exercise per week or 10,000 steps per day on a pedometer . Order or download the FREE \"Exercise & Physical Activity: Your Everyday Guide\" from [employer]. Call 4-130.234.5317 or search [employer] online. You need 8884-4583 mg of calcium and 2964-7495 IU of vitamin D per day. It is possible to meet your calcium requirement with diet alone, but a vitamin D supplement is usually necessary to meet this goal.  When exposed to the sun, use a sunscreen that protects against both UVA and UVB radiation with an SPF of 30 or greater. Reapply every 2 to 3 hours or after sweating, drying off with a towel, or swimming. Always wear a seat belt when traveling in a car. Always wear a helmet when riding a bicycle or motorcycle.

## 2023-12-28 ENCOUNTER — HOSPITAL ENCOUNTER (OUTPATIENT)
Dept: NON INVASIVE DIAGNOSTICS | Age: 73
Discharge: HOME | End: 2023-12-28
Payer: COMMERCIAL

## 2023-12-28 DIAGNOSIS — R00.2 HEART PALPITATIONS: ICD-10-CM

## 2023-12-28 DIAGNOSIS — E78.00 PURE HYPERCHOLESTEROLEMIA: ICD-10-CM

## 2023-12-28 DIAGNOSIS — J43.2 CENTRILOBULAR EMPHYSEMA (HCC): ICD-10-CM

## 2023-12-28 DIAGNOSIS — I25.10 CORONARY ARTERY DISEASE INVOLVING NATIVE CORONARY ARTERY OF NATIVE HEART WITHOUT ANGINA PECTORIS: ICD-10-CM

## 2023-12-28 DIAGNOSIS — R07.9 CHEST PAIN, UNSPECIFIED TYPE: ICD-10-CM

## 2023-12-28 PROCEDURE — A9502 TC99M TETROFOSMIN: HCPCS | Performed by: INTERNAL MEDICINE

## 2023-12-28 PROCEDURE — 93017 CV STRESS TEST TRACING ONLY: CPT

## 2023-12-28 PROCEDURE — 3430000000 HC RX DIAGNOSTIC RADIOPHARMACEUTICAL: Performed by: INTERNAL MEDICINE

## 2023-12-28 PROCEDURE — C8929 TTE W OR WO FOL WCON,DOPPLER: HCPCS

## 2023-12-28 PROCEDURE — 6360000002 HC RX W HCPCS: Performed by: INTERNAL MEDICINE

## 2023-12-28 PROCEDURE — 78452 HT MUSCLE IMAGE SPECT MULT: CPT

## 2023-12-28 RX ORDER — CLOPIDOGREL BISULFATE 75 MG/1
75 TABLET ORAL DAILY
Qty: 30 TABLET | Refills: 3 | Status: SHIPPED | OUTPATIENT
Start: 2023-12-28

## 2023-12-28 RX ORDER — CLOPIDOGREL BISULFATE 75 MG/1
75 TABLET ORAL DAILY
Qty: 30 TABLET | Refills: 3 | OUTPATIENT
Start: 2023-12-28

## 2023-12-28 RX ORDER — REGADENOSON 0.08 MG/ML
0.4 INJECTION, SOLUTION INTRAVENOUS
Status: COMPLETED | OUTPATIENT
Start: 2023-12-28 | End: 2023-12-28

## 2023-12-28 RX ORDER — FLUTICASONE FUROATE, UMECLIDINIUM BROMIDE AND VILANTEROL TRIFENATATE 100; 62.5; 25 UG/1; UG/1; UG/1
1 POWDER RESPIRATORY (INHALATION) DAILY
Qty: 60 EACH | Refills: 5 | Status: SHIPPED | OUTPATIENT
Start: 2023-12-28

## 2023-12-28 RX ADMIN — REGADENOSON 0.4 MG: 0.08 INJECTION, SOLUTION INTRAVENOUS at 10:02

## 2023-12-28 RX ADMIN — TETROFOSMIN 10 MILLICURIE: 1.38 INJECTION, POWDER, LYOPHILIZED, FOR SOLUTION INTRAVENOUS at 08:26

## 2023-12-28 RX ADMIN — TETROFOSMIN 30 MILLICURIE: 1.38 INJECTION, POWDER, LYOPHILIZED, FOR SOLUTION INTRAVENOUS at 09:51

## 2023-12-28 NOTE — TELEPHONE ENCOUNTER
Medication:   Requested Prescriptions     Pending Prescriptions Disp Refills    clopidogrel (PLAVIX) 75 MG tablet [Pharmacy Med Name: CLOPIDOGREL 75MG TABLETS] 30 tablet 3     Sig: TAKE 1 TABLET BY MOUTH DAILY        Last Filled:  09/29/23    Patient Phone Number: [phone] (home)     Last appt: 12/15/2023   Next appt: 1/15/2024    Last OARRS:       8/14/2020     9:16 AM   RX Monitoring   Periodic Controlled Substance Monitoring Possible medication side effects, risk of tolerance/dependence & alternative treatments discussed. ;Assessed functional status. ;Obtaining appropriate analgesic effect of treatment. Chronic Pain > 50 MEDD Obtained or confirmed \"Consent for Opioid Use\" on file.

## 2023-12-28 NOTE — TELEPHONE ENCOUNTER
Medication:   Requested Prescriptions     Pending Prescriptions Disp Refills    clopidogrel (PLAVIX) 75 MG tablet [Pharmacy Med Name: CLOPIDOGREL 75MG TABLETS] 30 tablet 3     Sig: TAKE 1 TABLET BY MOUTH DAILY        Last Filled:      Patient Phone Number: [phone] (home)     Last appt: 12/15/2023   Next appt: 1/15/2024    Last OARRS:       8/14/2020     9:16 AM   RX Monitoring   Periodic Controlled Substance Monitoring Possible medication side effects, risk of tolerance/dependence & alternative treatments discussed. ;Assessed functional status. ;Obtaining appropriate analgesic effect of treatment. Chronic Pain > 50 MEDD Obtained or confirmed \"Consent for Opioid Use\" on file.

## 2023-12-28 NOTE — TELEPHONE ENCOUNTER
Medication:   Requested Prescriptions     Pending Prescriptions Disp Refills    [address] [address].5-25 MCG/ACT AEPB inhaler [Pharmacy Med Name: [address] [address]. 5MCG INH 30P] 60 each 5     Sig: INHALE 1 PUFF INTO THE LUNGS DAILY        Last Filled:  06/14/23    Patient Phone Number: [phone] (home)     Last appt: 12/15/2023   Next appt: 1/15/2024    Last OARRS:       8/14/2020     9:16 AM   RX Monitoring   Periodic Controlled Substance Monitoring Possible medication side effects, risk of tolerance/dependence & alternative treatments discussed. ;Assessed functional status. ;Obtaining appropriate analgesic effect of treatment. Chronic Pain > 50 MEDD Obtained or confirmed \"Consent for Opioid Use\" on file.

## 2023-12-30 DIAGNOSIS — E11.59 HYPERTENSION ASSOCIATED WITH DIABETES (HCC): Primary | ICD-10-CM

## 2023-12-30 DIAGNOSIS — I15.2 HYPERTENSION ASSOCIATED WITH DIABETES (HCC): Primary | ICD-10-CM

## 2024-01-02 DIAGNOSIS — R00.2 HEART PALPITATIONS: ICD-10-CM

## 2024-01-02 DIAGNOSIS — E78.00 PURE HYPERCHOLESTEROLEMIA: ICD-10-CM

## 2024-01-02 DIAGNOSIS — R07.9 CHEST PAIN, UNSPECIFIED TYPE: ICD-10-CM

## 2024-01-02 DIAGNOSIS — I25.10 CORONARY ARTERY DISEASE INVOLVING NATIVE CORONARY ARTERY OF NATIVE HEART WITHOUT ANGINA PECTORIS: ICD-10-CM

## 2024-01-02 RX ORDER — METOPROLOL TARTRATE 50 MG/1
50 TABLET, FILM COATED ORAL 2 TIMES DAILY
Qty: 90 TABLET | Refills: 5 | Status: SHIPPED | OUTPATIENT
Start: 2024-01-02

## 2024-01-02 NOTE — TELEPHONE ENCOUNTER
Medication:   Requested Prescriptions     Pending Prescriptions Disp Refills    metoprolol tartrate (LOPRESSOR) 50 MG tablet [Pharmacy Med Name: METOPROLOL TARTRATE 50MG TABLETS] 90 tablet 5     Sig: TAKE 1 TABLET BY MOUTH TWICE DAILY        Last Filled:    03/14/23  Patient Phone Number: [phone] (home)     Last appt: 12/15/2023   Next appt: 1/15/2024    Last OARRS:       8/14/2020     9:16 AM   RX Monitoring   Periodic Controlled Substance Monitoring Possible medication side effects, risk of tolerance/dependence & alternative treatments discussed.;Assessed functional status.;Obtaining appropriate analgesic effect of treatment.   Chronic Pain > 50 MEDD Obtained or confirmed \"Consent for Opioid Use\" on file.

## 2024-01-03 LAB
25(OH)D3 SERPL-MCNC: 29 NG/ML
ALBUMIN SERPL-MCNC: 3.8 G/DL (ref 3.4–5)
ALBUMIN/GLOB SERPL: 1.2 {RATIO} (ref 1.1–2.2)
ALP SERPL-CCNC: 103 U/L (ref 40–129)
ALT SERPL-CCNC: 12 U/L (ref 10–40)
ANION GAP SERPL CALCULATED.3IONS-SCNC: 10 MMOL/L (ref 3–16)
AST SERPL-CCNC: 13 U/L (ref 15–37)
BILIRUB DIRECT SERPL-MCNC: <0.2 MG/DL (ref 0–0.3)
BILIRUB INDIRECT SERPL-MCNC: ABNORMAL MG/DL (ref 0–1)
BILIRUB SERPL-MCNC: 0.4 MG/DL (ref 0–1)
BUN SERPL-MCNC: 12 MG/DL (ref 7–20)
CHLORIDE SERPL-SCNC: 102 MMOL/L (ref 99–110)
CHOLEST SERPL-MCNC: 55 MG/DL (ref 0–199)
CO2 SERPL-SCNC: 28 MMOL/L (ref 21–32)
CREAT SERPL-MCNC: 1 MG/DL (ref 0.8–1.3)
DEPRECATED RDW RBC AUTO: 20.2 % (ref 12.4–15.4)
EST. AVERAGE GLUCOSE BLD GHB EST-MCNC: 128.4 MG/DL
GFR SERPLBLD CREATININE-BSD FMLA CKD-EPI: >60 ML/MIN/{1.73_M2}
GLUCOSE SERPL-MCNC: 111 MG/DL (ref 70–99)
HBA1C MFR BLD: 6.1 %
HCT VFR BLD AUTO: 37.2 % (ref 40.5–52.5)
HDLC SERPL-MCNC: 27 MG/DL (ref 40–60)
HGB BLD-MCNC: 11.3 G/DL (ref 13.5–17.5)
LDLC SERPL CALC-MCNC: 4 MG/DL
MAGNESIUM SERPL-MCNC: 1.9 MG/DL (ref 1.8–2.4)
MCH RBC QN AUTO: 22.9 PG (ref 26–34)
MCHC RBC AUTO-ENTMCNC: 30.4 G/DL (ref 31–36)
MCV RBC AUTO: 75.2 FL (ref 80–100)
NT-PROBNP SERPL-MCNC: 320 PG/ML (ref 0–124)
PLATELET # BLD AUTO: 177 K/UL (ref 135–450)
PMV BLD AUTO: 9.1 FL (ref 5–10.5)
POTASSIUM SERPL-SCNC: 4.3 MMOL/L (ref 3.5–5.1)
PROT SERPL-MCNC: 7 G/DL (ref 6.4–8.2)
RBC # BLD AUTO: 4.95 M/UL (ref 4.2–5.9)
SODIUM SERPL-SCNC: 140 MMOL/L (ref 136–145)
TRIGL SERPL-MCNC: 120 MG/DL (ref 0–150)
TSH SERPL DL<=0.005 MIU/L-ACNC: 2.09 UIU/ML (ref 0.27–4.2)
VLDLC SERPL CALC-MCNC: 24 MG/DL
WBC # BLD AUTO: 8.6 K/UL (ref 4–11)

## 2024-01-08 ENCOUNTER — OFFICE VISIT (OUTPATIENT)
Dept: CARDIOLOGY CLINIC | Age: 74
End: 2024-01-08
Payer: COMMERCIAL

## 2024-01-08 VITALS
WEIGHT: 229 LBS | BODY MASS INDEX: 32.78 KG/M2 | SYSTOLIC BLOOD PRESSURE: 90 MMHG | DIASTOLIC BLOOD PRESSURE: 56 MMHG | HEIGHT: 70 IN | HEART RATE: 66 BPM

## 2024-01-08 DIAGNOSIS — I70.221 ATHEROSCLEROSIS OF NATIVE ARTERY OF RIGHT LOWER EXTREMITY WITH REST PAIN (HCC): ICD-10-CM

## 2024-01-08 DIAGNOSIS — I50.32 DIASTOLIC DYSFUNCTION WITH CHRONIC HEART FAILURE (HCC): ICD-10-CM

## 2024-01-08 DIAGNOSIS — I25.10 CORONARY ARTERY DISEASE INVOLVING NATIVE CORONARY ARTERY OF NATIVE HEART WITHOUT ANGINA PECTORIS: ICD-10-CM

## 2024-01-08 DIAGNOSIS — E11.51 TYPE 2 DIABETES MELLITUS WITH DIABETIC PERIPHERAL ANGIOPATHY WITHOUT GANGRENE, WITHOUT LONG-TERM CURRENT USE OF INSULIN (HCC): ICD-10-CM

## 2024-01-08 DIAGNOSIS — I50.23 ACUTE ON CHRONIC SYSTOLIC CONGESTIVE HEART FAILURE (HCC): ICD-10-CM

## 2024-01-08 DIAGNOSIS — I48.11 LONGSTANDING PERSISTENT ATRIAL FIBRILLATION (HCC): ICD-10-CM

## 2024-01-08 DIAGNOSIS — L97.912 LOWER EXTREMITY ULCERATION, RIGHT, WITH FAT LAYER EXPOSED (HCC): ICD-10-CM

## 2024-01-08 DIAGNOSIS — I73.9 PVD (PERIPHERAL VASCULAR DISEASE) (HCC): Primary | ICD-10-CM

## 2024-01-08 LAB — NONINV COLON CA DNA+OCC BLD SCRN STL QL: NORMAL

## 2024-01-08 PROCEDURE — 4004F PT TOBACCO SCREEN RCVD TLK: CPT | Performed by: NURSE PRACTITIONER

## 2024-01-08 PROCEDURE — 2022F DILAT RTA XM EVC RTNOPTHY: CPT | Performed by: NURSE PRACTITIONER

## 2024-01-08 PROCEDURE — 3044F HG A1C LEVEL LT 7.0%: CPT | Performed by: NURSE PRACTITIONER

## 2024-01-08 PROCEDURE — 99214 OFFICE O/P EST MOD 30 MIN: CPT | Performed by: NURSE PRACTITIONER

## 2024-01-08 PROCEDURE — G8417 CALC BMI ABV UP PARAM F/U: HCPCS | Performed by: NURSE PRACTITIONER

## 2024-01-08 PROCEDURE — 1123F ACP DISCUSS/DSCN MKR DOCD: CPT | Performed by: NURSE PRACTITIONER

## 2024-01-08 PROCEDURE — 3078F DIAST BP <80 MM HG: CPT | Performed by: NURSE PRACTITIONER

## 2024-01-08 PROCEDURE — 3074F SYST BP LT 130 MM HG: CPT | Performed by: NURSE PRACTITIONER

## 2024-01-08 PROCEDURE — G8427 DOCREV CUR MEDS BY ELIG CLIN: HCPCS | Performed by: NURSE PRACTITIONER

## 2024-01-08 PROCEDURE — G8484 FLU IMMUNIZE NO ADMIN: HCPCS | Performed by: NURSE PRACTITIONER

## 2024-01-08 PROCEDURE — 3017F COLORECTAL CA SCREEN DOC REV: CPT | Performed by: NURSE PRACTITIONER

## 2024-01-08 RX ORDER — EVOLOCUMAB 140 MG/ML
INJECTION, SOLUTION SUBCUTANEOUS
COMMUNITY
Start: 2023-12-16

## 2024-01-08 NOTE — PROGRESS NOTES
[facility]     Outpatient Follow Up Note  Dr Remigio Ellis MD,  FACC   Emeli Stewart RN, APRN,CNP CVNP      CHIEF COMPLAINT / HPI:  Kong Guzman Jr. is 73 y.o. male who presents today with a history of   HTN KAVITA COPD HLD tobacco use hx PE CAD stent  dHF PVD and recent surgery right leg  DMT2  13>> 5.5 much improved   Per lipidemia LDL 82 > 49 8/22 8/2022   DVT and pulmonary emboli September 2021 still on Eliquis therapy along with Plavix 75 mg.       CAD with coronary stents June 2013  Last cardiac cath December 2020.  Stable coronary anatomy.  Previous stent  was patent. No intervention was necessary.   Obstructive sleep apnea on CPAP not tolerated the CPAP another BiPAP and currently does not use  PVD and status post right leg stenting and bypass September 2021 by Dr. Richard  Pulmonary nodules followed by Dr. Boateng           Stress test neg 12/2023    TTE unremarkable 12/2023   Holter no malignant arrhthymias 12/2023     Interval history:  last visit 11/2023 Recommended TTE lexiscan Holter monitor blood work  EKG wnl   Today we discussed TTE stress madalyn blood work  Holter occas. PAC/ rare PVC / short run SVT read my Dr Ellis   no c/o cp has fatigued as usually states wears his CPAP  On lasix 40 mg daily   On GDMT for dHF   B/p soft no c/o of cp usual SOB 1+ edema   Discussed diet activity meds     12/28/2023 stress test There is patchy but symmetric isotope uptake at stress and rest. There is no evidence of myocardial ischemia or scar. Normal LV function. This is a low risk cardiac scan.     12/28/23 TTE Normal left ventricle size, wall thickness, and systolic function with an estimated ejection fraction of 55-60%. No regional wall motion abnormalities are seen.Grade II diastolic dysfunction with elevated LV filling pressures.Definity contrast agent was used to help visualize endocardial borders. The right ventricle is normal in size and function. No pericardial effusion noted.     Limited

## 2024-01-11 ENCOUNTER — PROCEDURE VISIT (OUTPATIENT)
Dept: AUDIOLOGY | Age: 74
End: 2024-01-11
Payer: COMMERCIAL

## 2024-01-11 ENCOUNTER — OFFICE VISIT (OUTPATIENT)
Dept: ENT CLINIC | Age: 74
End: 2024-01-11
Payer: COMMERCIAL

## 2024-01-11 VITALS
DIASTOLIC BLOOD PRESSURE: 70 MMHG | HEART RATE: 83 BPM | HEIGHT: 70 IN | WEIGHT: 229 LBS | SYSTOLIC BLOOD PRESSURE: 120 MMHG | OXYGEN SATURATION: 93 % | BODY MASS INDEX: 32.78 KG/M2

## 2024-01-11 DIAGNOSIS — H93.13 TINNITUS OF BOTH EARS: ICD-10-CM

## 2024-01-11 DIAGNOSIS — H90.3 SENSORINEURAL HEARING LOSS (SNHL) OF BOTH EARS: Primary | ICD-10-CM

## 2024-01-11 DIAGNOSIS — M27.2 ODONTOGENIC INFECTION OF JAW: ICD-10-CM

## 2024-01-11 PROCEDURE — 3074F SYST BP LT 130 MM HG: CPT | Performed by: OTOLARYNGOLOGY

## 2024-01-11 PROCEDURE — G8417 CALC BMI ABV UP PARAM F/U: HCPCS | Performed by: OTOLARYNGOLOGY

## 2024-01-11 PROCEDURE — G8427 DOCREV CUR MEDS BY ELIG CLIN: HCPCS | Performed by: OTOLARYNGOLOGY

## 2024-01-11 PROCEDURE — 99203 OFFICE O/P NEW LOW 30 MIN: CPT | Performed by: OTOLARYNGOLOGY

## 2024-01-11 PROCEDURE — 92557 COMPREHENSIVE HEARING TEST: CPT | Performed by: AUDIOLOGIST

## 2024-01-11 PROCEDURE — 4004F PT TOBACCO SCREEN RCVD TLK: CPT | Performed by: OTOLARYNGOLOGY

## 2024-01-11 PROCEDURE — 3017F COLORECTAL CA SCREEN DOC REV: CPT | Performed by: OTOLARYNGOLOGY

## 2024-01-11 PROCEDURE — 1123F ACP DISCUSS/DSCN MKR DOCD: CPT | Performed by: OTOLARYNGOLOGY

## 2024-01-11 PROCEDURE — G8484 FLU IMMUNIZE NO ADMIN: HCPCS | Performed by: OTOLARYNGOLOGY

## 2024-01-11 PROCEDURE — 3078F DIAST BP <80 MM HG: CPT | Performed by: OTOLARYNGOLOGY

## 2024-01-11 PROCEDURE — 92567 TYMPANOMETRY: CPT | Performed by: AUDIOLOGIST

## 2024-01-11 RX ORDER — DULOXETIN HYDROCHLORIDE 20 MG/1
20 CAPSULE, DELAYED RELEASE ORAL DAILY
Qty: 30 CAPSULE | Refills: 5 | Status: SHIPPED | OUTPATIENT
Start: 2024-01-11

## 2024-01-11 NOTE — PATIENT INSTRUCTIONS
Medicaid Hearing Aid Providers - [place]    These offices are known to work with some Medicaid plans for hearing aids. They may not work with ALL Medicaid plans.  You should contact your insurance provider to find an audiologist for hearing aids near you.      Main Office  [address]  [address]  [place] [address]    Voice: [phone]  Video Phone: [phone]  Fax [phone]  Hours:  8:30-5:00  Monday through Friday [facility]  [address]  [address]  [place] [address]    Voice [phone]  Fax [phone]    Hours: 8:30-noon and 1:00-5:00  Monday through Friday   [place] Office  [address] [place] [address]    Voice [phone]  Fax [phone]    Hours:  8:30-noon and 1:00-5:00  Monday through Friday         [facility] [place]  [address]  [address]  [place], [place] [address]  Phone: [phone]  Hours:  8:00-5:00  Monday through Friday [facility] [place] ([place])  [address]  [address]  [place], [place] [address]  Phone: [phone]  Hours:  8:00-5:00  Monday through Friday

## 2024-01-11 NOTE — TELEPHONE ENCOUNTER
Medication:   Requested Prescriptions     Pending Prescriptions Disp Refills    DULoxetine (CYMBALTA) 20 MG extended release capsule [Pharmacy Med Name: DULOXETINE DR 20MG CAPSULES] 30 capsule      Sig: TAKE 1 CAPSULE BY MOUTH DAILY        Last Filled:  10/11/23    Patient Phone Number: [phone] (home)     Last appt: 12/15/2023   Next appt: 1/15/2024    Last OARRS:       8/14/2020     9:16 AM   RX Monitoring   Periodic Controlled Substance Monitoring Possible medication side effects, risk of tolerance/dependence & alternative treatments discussed.;Assessed functional status.;Obtaining appropriate analgesic effect of treatment.   Chronic Pain > 50 MEDD Obtained or confirmed \"Consent for Opioid Use\" on file.

## 2024-01-11 NOTE — PROGRESS NOTES
[facility]  DIVISION OF OTOLARYNGOLOGY- HEAD & NECK SURGERY  CONSULT      Patient Name: Kong Guzman Jr.  Medical Record Number:  [id number]  Primary Care Physician:  Rickie Holguin DO  Date of Consultation: 1/11/2024    Chief Complaint: Hearing problems        HISTORY OF PRESENT ILLNESS  Kong is a(n) 73 y.o. male who presents for evaluation of hearing problems.  The patient says he is noticed a decline in his hearing throughout the years.  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  He otherwise does not have any ear pain and denies any significant ear history.            REVIEW OF SYSTEMS  As above    PHYSICAL EXAM  GENERAL: No Acute Distress, Alert and Oriented, no Hoarseness, strong voice  EYES: EOMI, Anti-icteric  HENT:   Head: Normocephalic and atraumatic.   Face:  Symmetric, facial nerve intact, no sinus tenderness  Right Ear: Normal external ear, normal external auditory canal, intact tympanic membrane with normal mobility and aerated middle ear  Left Ear: Normal external ear, normal external auditory canal, intact tympanic membrane with normal mobility and aerated middle ear  Mouth/Oral Cavity: Very poor dentition with multiple fractured teeth with tooth roots still in place  Oropharynx/Larynx:  normal oropharynx,  Nose:Normal external nasal appearance.    NECK: Normal range of motion, no thyromegaly, trachea is midline, no lymphadenopathy, no neck masses, no crepitus        The patient had an audiogram that shows moderate sloping to profound sensorineural hearing loss on the right with moderate sloping to profound sensorineural hearing loss on the left.  Word recognition scores better on the right        ASSESSMENT/PLAN  1. 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  He certainly needs to look into hearing aids.  Recommend 1 year follow-up with

## 2024-01-11 NOTE — PROGRESS NOTES
Kong Guzman Jr.   1950, 73 y.o. male   [id number]       Referring Provider: Rickie Holguin DO  Referral Type: In an order in Epic    Reason for Visit: Evaluation of suspected change in hearing, tinnitus, or balance.    ADULT AUDIOLOGIC EVALUATION      Kong Guzman Jr. is a 73 y.o. male seen today, 1/11/2024 , for an initial audiologic evaluation.  Patient was seen by Lior Higginbotham MD following today's evaluation.     AUDIOLOGIC AND OTHER PERTINENT MEDICAL HISTORY:      Kong Guzman Jr. noted aural fullness, tinnitus, and history of occupational noise exposure.    Patient reports a gradual decrease in hearing, bilaterally. He notes bilateral tinnitus that is constant in nature. 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     Kong Guzman Jr. denied otalgia, otorrhea, dizziness, imbalance, history of falls, history of head trauma, history of ear surgery, and family history of hearing loss.    Date: 1/11/2024     IMPRESSIONS:      AD:Moderate sloping to Profound SNHL, Good WRS, Type A tymp  AS:Moderately-Severe sloping to Profound SNHL, Poor WRS, Type A tymp    Test results consistent with bilateral Sensorineural hearing loss. Hearing loss significant enough to create hearing difficulty in most listening situations. Discussed hearing loss, tinnitus, hearing aids, and NIHL with patient. Patient has a medicaid insurance plan. We are not in network with medicaid for hearing aid services. I provided the patient with contact information to the [facility] and [facility] to pursue amplification.   Patient to follow medical recommendations per Lior Higginbotham MD .    ASSESSMENT AND FINDINGS:     Otoscopy revealed: Clear ear canals bilaterally    RIGHT EAR:  Hearing Sensitivity: Moderate to Profound Sensorineural hearing loss  Speech Recognition Threshold: 65 dB HL  Word Recognition:Good (80%), based on NU-6 25-word list at 100m dBHL using recorded speech stimuli.

## 2024-01-18 ENCOUNTER — TELEPHONE (OUTPATIENT)
Dept: PRIMARY CARE CLINIC | Age: 74
End: 2024-01-18

## 2024-01-18 ENCOUNTER — OFFICE VISIT (OUTPATIENT)
Dept: PRIMARY CARE CLINIC | Age: 74
End: 2024-01-18

## 2024-01-18 VITALS
HEART RATE: 86 BPM | BODY MASS INDEX: 33 KG/M2 | DIASTOLIC BLOOD PRESSURE: 75 MMHG | SYSTOLIC BLOOD PRESSURE: 138 MMHG | WEIGHT: 230 LBS

## 2024-01-18 DIAGNOSIS — E11.69 HYPERLIPIDEMIA ASSOCIATED WITH TYPE 2 DIABETES MELLITUS (HCC): ICD-10-CM

## 2024-01-18 DIAGNOSIS — I48.11 LONGSTANDING PERSISTENT ATRIAL FIBRILLATION (HCC): ICD-10-CM

## 2024-01-18 DIAGNOSIS — E11.69 DIABETES MELLITUS TYPE 2 IN OBESE (HCC): ICD-10-CM

## 2024-01-18 DIAGNOSIS — E11.51 TYPE 2 DIABETES MELLITUS WITH DIABETIC PERIPHERAL ANGIOPATHY WITHOUT GANGRENE, WITHOUT LONG-TERM CURRENT USE OF INSULIN (HCC): Primary | ICD-10-CM

## 2024-01-18 DIAGNOSIS — E78.5 HYPERLIPIDEMIA ASSOCIATED WITH TYPE 2 DIABETES MELLITUS (HCC): ICD-10-CM

## 2024-01-18 DIAGNOSIS — L97.919 LEG ULCER, RIGHT, WITH UNSPECIFIED SEVERITY (HCC): ICD-10-CM

## 2024-01-18 DIAGNOSIS — I25.10 CORONARY ARTERY DISEASE INVOLVING NATIVE CORONARY ARTERY OF NATIVE HEART WITHOUT ANGINA PECTORIS: ICD-10-CM

## 2024-01-18 DIAGNOSIS — I15.2 HYPERTENSION ASSOCIATED WITH DIABETES (HCC): ICD-10-CM

## 2024-01-18 DIAGNOSIS — E66.9 DIABETES MELLITUS TYPE 2 IN OBESE (HCC): ICD-10-CM

## 2024-01-18 DIAGNOSIS — I50.32 DIASTOLIC DYSFUNCTION WITH CHRONIC HEART FAILURE (HCC): ICD-10-CM

## 2024-01-18 DIAGNOSIS — J43.2 CENTRILOBULAR EMPHYSEMA (HCC): ICD-10-CM

## 2024-01-18 DIAGNOSIS — Z13.89 SCREENING FOR NEPHROPATHY: ICD-10-CM

## 2024-01-18 DIAGNOSIS — D68.69 SECONDARY HYPERCOAGULABLE STATE (HCC): ICD-10-CM

## 2024-01-18 DIAGNOSIS — E11.59 HYPERTENSION ASSOCIATED WITH DIABETES (HCC): ICD-10-CM

## 2024-01-18 LAB
CREATININE URINE POCT: 100
HBA1C MFR BLD: 6.7 %
MICROALBUMIN/CREAT 24H UR: 150 MG/G{CREAT}
MICROALBUMIN/CREAT UR-RTO: NORMAL

## 2024-01-18 RX ORDER — SACUBITRIL AND VALSARTAN 24; 26 MG/1; MG/1
1 TABLET, FILM COATED ORAL 2 TIMES DAILY
Qty: 60 TABLET | Refills: 3 | Status: SHIPPED | OUTPATIENT
Start: 2024-01-18

## 2024-01-18 RX ORDER — FLUTICASONE FUROATE, UMECLIDINIUM BROMIDE AND VILANTEROL TRIFENATATE 100; 62.5; 25 UG/1; UG/1; UG/1
1 POWDER RESPIRATORY (INHALATION) DAILY
Qty: 60 EACH | Refills: 5 | Status: SHIPPED | OUTPATIENT
Start: 2024-01-18

## 2024-01-18 RX ORDER — CLOPIDOGREL BISULFATE 75 MG/1
75 TABLET ORAL DAILY
Qty: 30 TABLET | Refills: 3 | Status: SHIPPED | OUTPATIENT
Start: 2024-01-18

## 2024-01-18 RX ORDER — EVOLOCUMAB 140 MG/ML
INJECTION, SOLUTION SUBCUTANEOUS
Qty: 1 ML | Refills: 5 | Status: SHIPPED | OUTPATIENT
Start: 2024-01-18

## 2024-01-18 RX ORDER — METOPROLOL TARTRATE 50 MG/1
50 TABLET, FILM COATED ORAL 2 TIMES DAILY
Qty: 90 TABLET | Refills: 5 | Status: SHIPPED | OUTPATIENT
Start: 2024-01-18

## 2024-01-18 RX ORDER — DULAGLUTIDE 3 MG/.5ML
3 INJECTION, SOLUTION SUBCUTANEOUS WEEKLY
Qty: 2 ML | Refills: 2 | Status: SHIPPED | OUTPATIENT
Start: 2024-01-18

## 2024-01-18 RX ORDER — ATORVASTATIN CALCIUM 80 MG/1
80 TABLET, FILM COATED ORAL DAILY
Qty: 90 TABLET | Refills: 1 | Status: SHIPPED | OUTPATIENT
Start: 2024-01-18

## 2024-01-18 ASSESSMENT — PATIENT HEALTH QUESTIONNAIRE - PHQ9
SUM OF ALL RESPONSES TO PHQ QUESTIONS 1-9: 0
SUM OF ALL RESPONSES TO PHQ QUESTIONS 1-9: 0
2. FEELING DOWN, DEPRESSED OR HOPELESS: 0
SUM OF ALL RESPONSES TO PHQ QUESTIONS 1-9: 0
SUM OF ALL RESPONSES TO PHQ9 QUESTIONS 1 & 2: 0
SUM OF ALL RESPONSES TO PHQ QUESTIONS 1-9: 0
1. LITTLE INTEREST OR PLEASURE IN DOING THINGS: 0

## 2024-01-18 ASSESSMENT — ENCOUNTER SYMPTOMS
CHEST TIGHTNESS: 0
SHORTNESS OF BREATH: 0
COUGH: 0

## 2024-01-18 NOTE — PATIENT INSTRUCTIONS
Learning About Diabetes and Exercise  Can you exercise if you have diabetes?  When you have diabetes, it's important to get regular exercise. It can help you manage your blood sugar level. You can still play sports, run, ride a bike, swim, and do other activities when you have diabetes.  How does exercise help when you have diabetes?  Getting regular exercise can help control your blood sugar.  Your body turns the food you eat into glucose, a type of sugar. You need this sugar for energy. When you have diabetes, the sugar builds up in your blood. But when you exercise, your body uses sugar. This helps keep it from building up in your blood and results in lower blood sugar and better control of diabetes.  Exercise may help you in other ways too. It can help you reach and stay at a healthy weight. It also helps improve blood pressure and cholesterol, which can reduce the risk of heart disease.  Exercise can make you feel stronger and happier. It can help you relax and sleep better. And it can give you confidence in other things you do.  Exercising safely when you have diabetes  Before you start a new exercise program, talk to your doctor about how and when to exercise. Some types of exercise can be harmful if your diabetes is causing other problems, such as problems with your feet. Your doctor can tell you what types of exercise are good choices for you.  Here are some general safety tips.  Take steps to avoid blood sugar problems.  Check your blood sugar before and after you exercise.  Ask your doctor what blood sugar range is safe for you when you exercise.  If you take medicine or insulin that lowers blood sugar, check your blood sugar before you exercise.  If your blood sugar is less than 90 mg/dL, you may need to eat a carbohydrate snack first.  Be careful when you exercise if your blood sugar is too high. Make sure to drink plenty of water.  Try to exercise at about the same time each day.   This may help keep

## 2024-01-18 NOTE — PROGRESS NOTES
today for hypertension, diabetes, hyperlipidemia and copd.    Diagnoses and all orders for this visit:    Type 2 diabetes mellitus with diabetic peripheral angiopathy without gangrene, without long-term current use of insulin (HCC): A1c at goal today.  Tolerating current regimen well without side effects.  Refills given.  Reiterated appropriate lifestyle modifications with patient.  Follow-up 3 months.    Hemoglobin A1C   Date Value Ref Range Status   01/18/2024 6.7 % Final     -     POCT glycosylated hemoglobin (Hb A1C)  -     Dulaglutide (TRULICITY) 3 MG/0.5ML SOPN; Inject 3 mg into the skin once a week  -     empagliflozin (JARDIANCE) 25 MG tablet; Take 1 tablet by mouth daily    Hypertension associated with diabetes (HCC): Blood pressure at goal today. Tolerating current regimen well without side effects. Refills given. Routine follow up in 6 months.    BP Readings from Last 3 Encounters:   01/18/24 138/75   01/11/24 120/70   01/08/24 (!) 90/56     -     metoprolol tartrate (LOPRESSOR) 50 MG tablet; Take 1 tablet by mouth 2 times daily    Diastolic dysfunction with chronic heart failure (HCC): Euvolemic today.  Regimen appropriate in the setting of CHF.  Gave information on low-sodium diet.  Okay for refill and routine follow-up in 6 months.    -     sacubitril-valsartan (ENTRESTO) 24-26 MG per tablet; Take 1 tablet by mouth 2 times daily    Longstanding persistent atrial fibrillation (HCC): Asymptomatic.  Compliant with rate controlling medication and anticoagulation.    [id number] Score for Atrial Fibrillation Stroke Risk   Risk   Factors  Component Value   C CHF Yes 1   H HTN Yes 1   A2 Age >= 75 No,  (73 y.o.) 0   D DM Yes 1   S2 Prior Stroke/TIA No 0   V Vascular Disease Yes 1   A Age 65-74 Yes,  (73 y.o.) 1   Sc Sex male 0    [id number]  Score  5   Score last updated 1/18/24 10:45 AM EST    Secondary hypercoagulable state (HCC): On eliquis for Afib.    Hyperlipidemia associated with type 2 diabetes

## 2024-01-19 NOTE — TELEPHONE ENCOUNTER
I called and spoke with the pharmacy they stated that they are only getting low dosages if Ozempic and 2 mg dosages of it. They have both in stock right now

## 2024-02-05 RX ORDER — OMEPRAZOLE 40 MG/1
40 CAPSULE, DELAYED RELEASE ORAL DAILY
Qty: 90 CAPSULE | Refills: 1 | Status: SHIPPED | OUTPATIENT
Start: 2024-02-05

## 2024-02-05 NOTE — TELEPHONE ENCOUNTER
Medication:   Requested Prescriptions     Pending Prescriptions Disp Refills    omeprazole (PRILOSEC) 40 MG delayed release capsule [Pharmacy Med Name: OMEPRAZOLE 40MG CAPSULES] 90 capsule 1     Sig: TAKE 1 CAPSULE BY MOUTH DAILY        Last Filled:  08/08/23    Patient Phone Number: [phone] (home)     Last appt: 1/18/2024   Next appt: 5/16/2024    Last OARRS:       8/14/2020     9:16 AM   RX Monitoring   Periodic Controlled Substance Monitoring Possible medication side effects, risk of tolerance/dependence & alternative treatments discussed.;Assessed functional status.;Obtaining appropriate analgesic effect of treatment.   Chronic Pain > 50 MEDD Obtained or confirmed \"Consent for Opioid Use\" on file.

## 2024-03-20 ENCOUNTER — TELEPHONE (OUTPATIENT)
Dept: CARDIOLOGY CLINIC | Age: 74
End: 2024-03-20

## 2024-03-20 DIAGNOSIS — I25.10 CORONARY ARTERY DISEASE INVOLVING NATIVE CORONARY ARTERY OF NATIVE HEART WITHOUT ANGINA PECTORIS: Primary | ICD-10-CM

## 2024-03-20 NOTE — TELEPHONE ENCOUNTER
Madisyn from [facility] called requesting a pre-authorization for Praluent. They said Rx was sent yesterday by Emeli STEVENS but I do not see anything in patient's chart.   Madisyn phone # [phone]

## 2024-03-21 ENCOUNTER — TELEMEDICINE (OUTPATIENT)
Dept: PRIMARY CARE CLINIC | Age: 74
End: 2024-03-21
Payer: COMMERCIAL

## 2024-03-21 DIAGNOSIS — Z00.00 MEDICARE ANNUAL WELLNESS VISIT, SUBSEQUENT: Primary | ICD-10-CM

## 2024-03-21 PROCEDURE — G0439 PPPS, SUBSEQ VISIT: HCPCS | Performed by: STUDENT IN AN ORGANIZED HEALTH CARE EDUCATION/TRAINING PROGRAM

## 2024-03-21 PROCEDURE — 3017F COLORECTAL CA SCREEN DOC REV: CPT | Performed by: STUDENT IN AN ORGANIZED HEALTH CARE EDUCATION/TRAINING PROGRAM

## 2024-03-21 PROCEDURE — 1123F ACP DISCUSS/DSCN MKR DOCD: CPT | Performed by: STUDENT IN AN ORGANIZED HEALTH CARE EDUCATION/TRAINING PROGRAM

## 2024-03-21 PROCEDURE — G8484 FLU IMMUNIZE NO ADMIN: HCPCS | Performed by: STUDENT IN AN ORGANIZED HEALTH CARE EDUCATION/TRAINING PROGRAM

## 2024-03-21 ASSESSMENT — PATIENT HEALTH QUESTIONNAIRE - PHQ9
SUM OF ALL RESPONSES TO PHQ QUESTIONS 1-9: 0
SUM OF ALL RESPONSES TO PHQ QUESTIONS 1-9: 0
SUM OF ALL RESPONSES TO PHQ9 QUESTIONS 1 & 2: 0
2. FEELING DOWN, DEPRESSED OR HOPELESS: NOT AT ALL
SUM OF ALL RESPONSES TO PHQ QUESTIONS 1-9: 0
1. LITTLE INTEREST OR PLEASURE IN DOING THINGS: NOT AT ALL
SUM OF ALL RESPONSES TO PHQ QUESTIONS 1-9: 0

## 2024-03-21 ASSESSMENT — LIFESTYLE VARIABLES
HOW MANY STANDARD DRINKS CONTAINING ALCOHOL DO YOU HAVE ON A TYPICAL DAY: 1 OR 2
HOW OFTEN DO YOU HAVE A DRINK CONTAINING ALCOHOL: 2-4 TIMES A MONTH

## 2024-03-21 NOTE — PATIENT INSTRUCTIONS
Learning About Dental Care for Older Adults  Dental care for older adults: Overview  Dental care for older people is much the same as for younger adults. But older adults do have concerns that younger adults do not. Older adults may have problems with gum disease and decay on the roots of their teeth. They may need missing teeth replaced or broken fillings fixed. Or they may have dentures that need to be cared for. Some older adults may have trouble holding a toothbrush.  You can help remind the person you are caring for to brush and floss their teeth or to clean their dentures. In some cases, you may need to do the brushing and other dental care tasks. People who have trouble using their hands or who have dementia may need this extra help.  How can you help with dental care?  Normal dental care  To keep the teeth and gums healthy:  Brush the teeth with fluoride toothpaste twice a day--in the morning and at night--and floss at least once a day. Plaque can quickly build up on the teeth of older adults.  Watch for the signs of gum disease. These signs include gums that bleed after brushing or after eating hard foods, such as apples.  See a dentist regularly. Many experts recommend checkups every 6 months.  Keep the dentist up to date on any new medications the person is taking.  Encourage a balanced diet that includes whole grains, vegetables, and fruits, and that is low in saturated fat and sodium.  Encourage the person you're caring for not to use tobacco products. They can affect dental and general health.  Many older adults have a fixed income and feel that they can't afford dental care. But most [facility] and [facility] have programs in which dentists help older adults by lowering fees. Contact your area's public health offices or  for information about dental care in your area.  Using a toothbrush  Older adults with arthritis sometimes have trouble brushing their teeth because they can't easily hold

## 2024-03-21 NOTE — PROGRESS NOTES
Cardiology)  Regulo Warner MD as Consulting Physician (Gastroenterology)     Reviewed and updated this visit:  Allergies  Meds  Med Hx  Surg Hx  Soc Hx  Fam Hx      I, Daisy Shipman LPN, 3/21/2024, performed the documented evaluation under the direct supervision of the attending physician.    Kong Guzman Jr., was evaluated through a synchronous (real-time) audio-video encounter. The patient (or guardian if applicable) is aware that this is a billable service, which includes applicable co-pays. This Virtual Visit was conducted with patient's (and/or legal guardian's) consent. Patient identification was verified, and a caregiver was present when appropriate.   The patient was located at Home: [address]  [address]  [place] [address]  Provider was located at Facility (Appt Dept): [address]  [address] [place] [address]

## 2024-03-22 ENCOUNTER — TELEPHONE (OUTPATIENT)
Dept: CARDIOLOGY CLINIC | Age: 74
End: 2024-03-22

## 2024-03-22 NOTE — TELEPHONE ENCOUNTER
[phone]    [facility] called for prior authorization information for     alirocumab (PRALUENT) 75 MG/ML injection pen 75 mg   [[id number]]  Order DetailsOrdered Dose: 75 mg Route: SubCUTAneous Frequency: EVERY 14 DAYS   Admin Dose: 75 mg      Scheduled Start Date/Time: 03/20/24 1545 End Date/Time: 06/12/24 0859 after 6 doses First Dose: As Scheduled          Associated Diagnoses: Coronary artery disease involving native coronary artery of native heart without angina pectoris [I25.10]

## 2024-03-27 DIAGNOSIS — I25.10 CORONARY ARTERY DISEASE INVOLVING NATIVE CORONARY ARTERY OF NATIVE HEART WITHOUT ANGINA PECTORIS: ICD-10-CM

## 2024-03-27 RX ORDER — CLOPIDOGREL BISULFATE 75 MG/1
75 TABLET ORAL DAILY
Qty: 30 TABLET | Refills: 3 | Status: SHIPPED | OUTPATIENT
Start: 2024-03-27

## 2024-03-27 NOTE — TELEPHONE ENCOUNTER
Medication:   Requested Prescriptions     Pending Prescriptions Disp Refills    clopidogrel (PLAVIX) 75 MG tablet [Pharmacy Med Name: CLOPIDOGREL 75MG TABLETS] 30 tablet 3     Sig: TAKE 1 TABLET BY MOUTH DAILY        Last Filled:  1/18/24    Patient Phone Number: [phone] (home)     Last appt: 3/21/2024   Next appt: 5/16/2024    Last OARRS:       8/14/2020     9:16 AM   RX Monitoring   Periodic Controlled Substance Monitoring Possible medication side effects, risk of tolerance/dependence & alternative treatments discussed.;Assessed functional status.;Obtaining appropriate analgesic effect of treatment.   Chronic Pain > 50 MEDD Obtained or confirmed \"Consent for Opioid Use\" on file.

## 2024-04-15 ENCOUNTER — OFFICE VISIT (OUTPATIENT)
Dept: CARDIOLOGY CLINIC | Age: 74
End: 2024-04-15
Payer: COMMERCIAL

## 2024-04-15 VITALS
SYSTOLIC BLOOD PRESSURE: 122 MMHG | DIASTOLIC BLOOD PRESSURE: 70 MMHG | HEIGHT: 70 IN | WEIGHT: 234 LBS | BODY MASS INDEX: 33.5 KG/M2 | HEART RATE: 72 BPM

## 2024-04-15 DIAGNOSIS — Z79.01 LONG TERM (CURRENT) USE OF ANTICOAGULANTS: ICD-10-CM

## 2024-04-15 DIAGNOSIS — I48.11 LONGSTANDING PERSISTENT ATRIAL FIBRILLATION (HCC): ICD-10-CM

## 2024-04-15 DIAGNOSIS — E11.59 HYPERTENSION ASSOCIATED WITH DIABETES (HCC): ICD-10-CM

## 2024-04-15 DIAGNOSIS — I15.2 HYPERTENSION ASSOCIATED WITH DIABETES (HCC): ICD-10-CM

## 2024-04-15 DIAGNOSIS — G60.9 IDIOPATHIC PERIPHERAL NEUROPATHY: ICD-10-CM

## 2024-04-15 DIAGNOSIS — I73.9 PVD (PERIPHERAL VASCULAR DISEASE) (HCC): ICD-10-CM

## 2024-04-15 DIAGNOSIS — I70.221 ATHEROSCLEROSIS OF NATIVE ARTERY OF RIGHT LOWER EXTREMITY WITH REST PAIN (HCC): ICD-10-CM

## 2024-04-15 DIAGNOSIS — E11.51 TYPE 2 DIABETES MELLITUS WITH DIABETIC PERIPHERAL ANGIOPATHY WITHOUT GANGRENE, WITHOUT LONG-TERM CURRENT USE OF INSULIN (HCC): Primary | ICD-10-CM

## 2024-04-15 DIAGNOSIS — I50.32 CHRONIC DIASTOLIC HEART FAILURE (HCC): ICD-10-CM

## 2024-04-15 DIAGNOSIS — I50.32 DIASTOLIC DYSFUNCTION WITH CHRONIC HEART FAILURE (HCC): ICD-10-CM

## 2024-04-15 DIAGNOSIS — D68.69 SECONDARY HYPERCOAGULABLE STATE (HCC): ICD-10-CM

## 2024-04-15 PROCEDURE — 3078F DIAST BP <80 MM HG: CPT | Performed by: NURSE PRACTITIONER

## 2024-04-15 PROCEDURE — 99215 OFFICE O/P EST HI 40 MIN: CPT | Performed by: NURSE PRACTITIONER

## 2024-04-15 PROCEDURE — 3074F SYST BP LT 130 MM HG: CPT | Performed by: NURSE PRACTITIONER

## 2024-04-15 PROCEDURE — 3044F HG A1C LEVEL LT 7.0%: CPT | Performed by: NURSE PRACTITIONER

## 2024-04-15 PROCEDURE — 4004F PT TOBACCO SCREEN RCVD TLK: CPT | Performed by: NURSE PRACTITIONER

## 2024-04-15 PROCEDURE — G8417 CALC BMI ABV UP PARAM F/U: HCPCS | Performed by: NURSE PRACTITIONER

## 2024-04-15 PROCEDURE — 2022F DILAT RTA XM EVC RTNOPTHY: CPT | Performed by: NURSE PRACTITIONER

## 2024-04-15 PROCEDURE — 3017F COLORECTAL CA SCREEN DOC REV: CPT | Performed by: NURSE PRACTITIONER

## 2024-04-15 PROCEDURE — 1123F ACP DISCUSS/DSCN MKR DOCD: CPT | Performed by: NURSE PRACTITIONER

## 2024-04-15 PROCEDURE — G8427 DOCREV CUR MEDS BY ELIG CLIN: HCPCS | Performed by: NURSE PRACTITIONER

## 2024-04-15 RX ORDER — POTASSIUM CHLORIDE 750 MG/1
10 CAPSULE, EXTENDED RELEASE ORAL DAILY
Qty: 30 CAPSULE | Refills: 5 | Status: SHIPPED | OUTPATIENT
Start: 2024-04-15

## 2024-04-15 RX ORDER — ALIROCUMAB 75 MG/ML
75 INJECTION, SOLUTION SUBCUTANEOUS
COMMUNITY
Start: 2024-03-28

## 2024-04-15 NOTE — PROGRESS NOTES
[facility]     Outpatient Follow Up Note  Dr Remigio Ellis MD,  FACC   Emeli Stewart RN, APRN,CNP CVNP      CHIEF COMPLAINT / HPI:  Kong Guzman Jr. is 73 y.o. male who presents today with a history of   HTN KAVITA COPD HLD tobacco use hx PE CAD stent  dHF PVD and recent surgery right leg  DMT2  13>> 5.5  > 6.7    Per lipidemia LDL 82 > 49 8/22 8/2022   DVT and pulmonary emboli September 2021 still on Eliquis therapy along with Plavix 75 mg.       CAD with coronary stents June 2013  Last cardiac cath December 2020.  Stable coronary anatomy.  Previous stent  was patent. No intervention was necessary.   Obesity Body mass index is 33.58 kg/m².  Obstructive sleep apnea on CPAP   PVD and status post right leg stenting and bypass September 2021 by Dr. Richard  Pulmonary nodules followed by Dr. Boateng        Low vit D  / replace       Stress test neg 12/2023    TTE unremarkable 12/2023   Holter no malignant arrhthymias 12/2023  / Holter occas. PAC/ rare PVC / short run SVT read my Dr Ellis     Interval history:  no c/o cp has fatigued as usual states wears his CPAP  On lasix 40 mg daily   On GDMT for dHF   Discussed diet activity meds     12/28/2023 stress test There is patchy but symmetric isotope uptake at stress and rest. There is no evidence of myocardial ischemia or scar. Normal LV function. This is a low risk cardiac scan.     12/28/23 TTE Normal left ventricle size, wall thickness, and systolic function with an estimated ejection fraction of 55-60%. No regional wall motion abnormalities are seen.Grade II diastolic dysfunction with elevated LV filling pressures.Definity contrast agent was used to help visualize endocardial borders. The right ventricle is normal in size and function. No pericardial effusion noted.     Limited TTE 11/2021   Left ventricular cavity size is normal. There is mild concentric left ventricular hypertrophy. Overall left ventricular systolic  function appears normal with an

## 2024-04-17 DIAGNOSIS — G60.9 IDIOPATHIC PERIPHERAL NEUROPATHY: ICD-10-CM

## 2024-04-17 RX ORDER — PREGABALIN 150 MG/1
CAPSULE ORAL
Qty: 180 CAPSULE | Refills: 0 | Status: SHIPPED | OUTPATIENT
Start: 2024-04-17 | End: 2024-10-14

## 2024-04-17 NOTE — TELEPHONE ENCOUNTER
Medication:   Requested Prescriptions     Pending Prescriptions Disp Refills    pregabalin (LYRICA) 150 MG capsule [Pharmacy Med Name: PREGABALIN 150MG CAPSULES] 180 capsule      Sig: TAKE 3 CAPSULES BY MOUTH TWICE DAILY. MAX DAILY AMOUNT: 900 MG.        Last Filled:  12/04/23    Patient Phone Number: [phone] (home)     Last appt: 3/21/2024 Return in about 4 months (around 5/18/2024   Next appt: 5/16/2024    Last OARRS:       8/14/2020     9:16 AM   RX Monitoring   Periodic Controlled Substance Monitoring Possible medication side effects, risk of tolerance/dependence & alternative treatments discussed.;Assessed functional status.;Obtaining appropriate analgesic effect of treatment.   Chronic Pain > 50 MEDD Obtained or confirmed \"Consent for Opioid Use\" on file.

## 2024-04-23 ENCOUNTER — TELEPHONE (OUTPATIENT)
Age: 74
End: 2024-04-23

## 2024-04-23 ENCOUNTER — OFFICE VISIT (OUTPATIENT)
Age: 74
End: 2024-04-23
Payer: COMMERCIAL

## 2024-04-23 VITALS
SYSTOLIC BLOOD PRESSURE: 150 MMHG | HEIGHT: 70 IN | WEIGHT: 234 LBS | OXYGEN SATURATION: 98 % | HEART RATE: 72 BPM | BODY MASS INDEX: 33.5 KG/M2 | DIASTOLIC BLOOD PRESSURE: 77 MMHG

## 2024-04-23 DIAGNOSIS — J44.9 CHRONIC OBSTRUCTIVE PULMONARY DISEASE, UNSPECIFIED COPD TYPE (HCC): Primary | ICD-10-CM

## 2024-04-23 DIAGNOSIS — G47.33 OSA ON CPAP: ICD-10-CM

## 2024-04-23 DIAGNOSIS — R06.02 SOB (SHORTNESS OF BREATH): ICD-10-CM

## 2024-04-23 DIAGNOSIS — Z72.0 TOBACCO ABUSE DISORDER: ICD-10-CM

## 2024-04-23 PROCEDURE — 1123F ACP DISCUSS/DSCN MKR DOCD: CPT | Performed by: INTERNAL MEDICINE

## 2024-04-23 PROCEDURE — 3023F SPIROM DOC REV: CPT | Performed by: INTERNAL MEDICINE

## 2024-04-23 PROCEDURE — 99214 OFFICE O/P EST MOD 30 MIN: CPT | Performed by: INTERNAL MEDICINE

## 2024-04-23 PROCEDURE — G8417 CALC BMI ABV UP PARAM F/U: HCPCS | Performed by: INTERNAL MEDICINE

## 2024-04-23 PROCEDURE — 3077F SYST BP >= 140 MM HG: CPT | Performed by: INTERNAL MEDICINE

## 2024-04-23 PROCEDURE — G8427 DOCREV CUR MEDS BY ELIG CLIN: HCPCS | Performed by: INTERNAL MEDICINE

## 2024-04-23 PROCEDURE — 3078F DIAST BP <80 MM HG: CPT | Performed by: INTERNAL MEDICINE

## 2024-04-23 PROCEDURE — 4004F PT TOBACCO SCREEN RCVD TLK: CPT | Performed by: INTERNAL MEDICINE

## 2024-04-23 PROCEDURE — 3017F COLORECTAL CA SCREEN DOC REV: CPT | Performed by: INTERNAL MEDICINE

## 2024-04-23 NOTE — PROGRESS NOTES
CT       Lung RADS category S significant findings-marked coronary artery   calcification which is a risk factor for acute coronary syndrome.   Marked fatty filtration liver. Emphysema.     8/2018     Lung RADS category 2-positive. Specifically, 4 mm and 3 mm stable pulmonary nodules in the right hemithorax       Lung RADS category S-negative, no new or unknown potentially significant incidental findings requiring urgent additional evaluation       Cejrsjlsepevlj-[id number] low-dose CT lung screen in one year     2/2020      Impression       Continued stable appearance of previous seen noted stable nodules. The previously noted new larger nodules have resolved.       Cardiomegaly with coronary calcification.       No acute infiltrates seen.         Assessment:   This is a 73 y.o. male with COPD, CHF and KAVITA    Plan:     COPD: remains short of breath despite full treatment regiment with Trelegy and rescue medications. Symptoms remain similar to when he was on divided medications.  Continue Trelegy at current dose and rescue inhaler.     Patient previously attempted pulmonary rehab, but he was in too much pain and stopped going.  Tried and failed daliresp in the past 2/2 GI side effects    Pulmonary embolism: Completed eliquis back in 2022, but is currently on it and plavix.  It appears he may be on the Eliquis because of an episode of A-fib with RVR noted in 2021, months after his pulmonary embolism.  He is confused by this.    CHF:  Not in acute overload.      KAVITA:  AHI 34 and Controlled with auto pap, with range of 8-15 cmH2O. [facility] is a supplier, according to my note from 2022.  I encouraged Inder to get back on his auto PAP.  I told him to call us if he is not able to get new supplies.    PAD: Still with considerable pain and slow healing in the right lower extremity despite multiple interventions.    Smoking about 3 cigarettes/day, and get encouraged him to quit as he has done in the past.  I

## 2024-04-24 ENCOUNTER — TELEPHONE (OUTPATIENT)
Dept: PRIMARY CARE CLINIC | Age: 74
End: 2024-04-24

## 2024-04-24 DIAGNOSIS — G60.9 IDIOPATHIC PERIPHERAL NEUROPATHY: Primary | ICD-10-CM

## 2024-04-24 RX ORDER — PREGABALIN 150 MG/1
CAPSULE ORAL
Qty: 180 CAPSULE | Refills: 0 | Status: SHIPPED | OUTPATIENT
Start: 2024-04-24 | End: 2024-06-20

## 2024-05-06 DIAGNOSIS — E11.51 TYPE 2 DIABETES MELLITUS WITH DIABETIC PERIPHERAL ANGIOPATHY WITHOUT GANGRENE, WITHOUT LONG-TERM CURRENT USE OF INSULIN (HCC): ICD-10-CM

## 2024-05-06 RX ORDER — SEMAGLUTIDE 0.68 MG/ML
INJECTION, SOLUTION SUBCUTANEOUS
Qty: 3 ML | Refills: 5 | Status: SHIPPED | OUTPATIENT
Start: 2024-05-06

## 2024-05-06 NOTE — TELEPHONE ENCOUNTER
Medication:   Requested Prescriptions     Signed Prescriptions Disp Refills    Semaglutide,0.25 or 0.5MG/DOS, (OZEMPIC, 0.25 OR 0.5 MG/DOSE,) 2 MG/3ML SOPN 3 mL 5     Sig: INJECT 0.5 MG UNDER THE SKIN EVERY 7 DAYS     Authorizing Provider: ADA VILA     Ordering User: WILLIE CHIN        Last Filled:      Patient Phone Number: [phone] (home)     Last appt: 3/21/2024   Next appt: 5/16/2024    Last OARRS:       8/14/2020     9:16 AM   RX Monitoring   Periodic Controlled Substance Monitoring Possible medication side effects, risk of tolerance/dependence & alternative treatments discussed.;Assessed functional status.;Obtaining appropriate analgesic effect of treatment.   Chronic Pain > 50 MEDD Obtained or confirmed \"Consent for Opioid Use\" on file.

## 2024-05-16 ENCOUNTER — OFFICE VISIT (OUTPATIENT)
Dept: PRIMARY CARE CLINIC | Age: 74
End: 2024-05-16
Payer: COMMERCIAL

## 2024-05-16 VITALS
DIASTOLIC BLOOD PRESSURE: 63 MMHG | WEIGHT: 230 LBS | HEIGHT: 70 IN | HEART RATE: 78 BPM | BODY MASS INDEX: 32.93 KG/M2 | TEMPERATURE: 97 F | SYSTOLIC BLOOD PRESSURE: 133 MMHG

## 2024-05-16 DIAGNOSIS — I73.9 PVD (PERIPHERAL VASCULAR DISEASE) (HCC): ICD-10-CM

## 2024-05-16 DIAGNOSIS — E11.59 HYPERTENSION ASSOCIATED WITH DIABETES (HCC): ICD-10-CM

## 2024-05-16 DIAGNOSIS — G89.29 CHRONIC PAIN OF MULTIPLE SITES: ICD-10-CM

## 2024-05-16 DIAGNOSIS — I70.221 ATHEROSCLEROSIS OF NATIVE ARTERY OF RIGHT LOWER EXTREMITY WITH REST PAIN (HCC): ICD-10-CM

## 2024-05-16 DIAGNOSIS — I15.2 HYPERTENSION ASSOCIATED WITH DIABETES (HCC): ICD-10-CM

## 2024-05-16 DIAGNOSIS — E11.51 TYPE 2 DIABETES MELLITUS WITH DIABETIC PERIPHERAL ANGIOPATHY WITHOUT GANGRENE, WITHOUT LONG-TERM CURRENT USE OF INSULIN (HCC): ICD-10-CM

## 2024-05-16 DIAGNOSIS — I50.32 DIASTOLIC DYSFUNCTION WITH CHRONIC HEART FAILURE (HCC): ICD-10-CM

## 2024-05-16 DIAGNOSIS — I48.11 LONGSTANDING PERSISTENT ATRIAL FIBRILLATION (HCC): ICD-10-CM

## 2024-05-16 DIAGNOSIS — E66.09 CLASS 1 OBESITY DUE TO EXCESS CALORIES WITH SERIOUS COMORBIDITY AND BODY MASS INDEX (BMI) OF 33.0 TO 33.9 IN ADULT: ICD-10-CM

## 2024-05-16 DIAGNOSIS — Z79.01 LONG TERM (CURRENT) USE OF ANTICOAGULANTS: ICD-10-CM

## 2024-05-16 DIAGNOSIS — D68.69 SECONDARY HYPERCOAGULABLE STATE (HCC): ICD-10-CM

## 2024-05-16 DIAGNOSIS — E78.5 HYPERLIPIDEMIA ASSOCIATED WITH TYPE 2 DIABETES MELLITUS (HCC): ICD-10-CM

## 2024-05-16 DIAGNOSIS — E11.69 HYPERLIPIDEMIA ASSOCIATED WITH TYPE 2 DIABETES MELLITUS (HCC): ICD-10-CM

## 2024-05-16 DIAGNOSIS — R52 CHRONIC PAIN OF MULTIPLE SITES: ICD-10-CM

## 2024-05-16 DIAGNOSIS — I25.10 CORONARY ARTERY DISEASE INVOLVING NATIVE CORONARY ARTERY OF NATIVE HEART WITHOUT ANGINA PECTORIS: ICD-10-CM

## 2024-05-16 DIAGNOSIS — E11.51 TYPE 2 DIABETES MELLITUS WITH DIABETIC PERIPHERAL ANGIOPATHY WITHOUT GANGRENE, WITHOUT LONG-TERM CURRENT USE OF INSULIN (HCC): Primary | ICD-10-CM

## 2024-05-16 DIAGNOSIS — G60.9 IDIOPATHIC PERIPHERAL NEUROPATHY: ICD-10-CM

## 2024-05-16 LAB — HBA1C MFR BLD: 7 %

## 2024-05-16 PROCEDURE — 1123F ACP DISCUSS/DSCN MKR DOCD: CPT | Performed by: STUDENT IN AN ORGANIZED HEALTH CARE EDUCATION/TRAINING PROGRAM

## 2024-05-16 PROCEDURE — 83036 HEMOGLOBIN GLYCOSYLATED A1C: CPT | Performed by: STUDENT IN AN ORGANIZED HEALTH CARE EDUCATION/TRAINING PROGRAM

## 2024-05-16 PROCEDURE — 3078F DIAST BP <80 MM HG: CPT | Performed by: STUDENT IN AN ORGANIZED HEALTH CARE EDUCATION/TRAINING PROGRAM

## 2024-05-16 PROCEDURE — 4004F PT TOBACCO SCREEN RCVD TLK: CPT | Performed by: STUDENT IN AN ORGANIZED HEALTH CARE EDUCATION/TRAINING PROGRAM

## 2024-05-16 PROCEDURE — 3017F COLORECTAL CA SCREEN DOC REV: CPT | Performed by: STUDENT IN AN ORGANIZED HEALTH CARE EDUCATION/TRAINING PROGRAM

## 2024-05-16 PROCEDURE — 3051F HG A1C>EQUAL 7.0%<8.0%: CPT | Performed by: STUDENT IN AN ORGANIZED HEALTH CARE EDUCATION/TRAINING PROGRAM

## 2024-05-16 PROCEDURE — 2022F DILAT RTA XM EVC RTNOPTHY: CPT | Performed by: STUDENT IN AN ORGANIZED HEALTH CARE EDUCATION/TRAINING PROGRAM

## 2024-05-16 PROCEDURE — 3075F SYST BP GE 130 - 139MM HG: CPT | Performed by: STUDENT IN AN ORGANIZED HEALTH CARE EDUCATION/TRAINING PROGRAM

## 2024-05-16 PROCEDURE — G8417 CALC BMI ABV UP PARAM F/U: HCPCS | Performed by: STUDENT IN AN ORGANIZED HEALTH CARE EDUCATION/TRAINING PROGRAM

## 2024-05-16 PROCEDURE — 99214 OFFICE O/P EST MOD 30 MIN: CPT | Performed by: STUDENT IN AN ORGANIZED HEALTH CARE EDUCATION/TRAINING PROGRAM

## 2024-05-16 PROCEDURE — G8427 DOCREV CUR MEDS BY ELIG CLIN: HCPCS | Performed by: STUDENT IN AN ORGANIZED HEALTH CARE EDUCATION/TRAINING PROGRAM

## 2024-05-16 RX ORDER — METOPROLOL TARTRATE 50 MG/1
50 TABLET, FILM COATED ORAL 2 TIMES DAILY
Qty: 90 TABLET | Refills: 5 | Status: SHIPPED | OUTPATIENT
Start: 2024-05-16

## 2024-05-16 RX ORDER — SACUBITRIL AND VALSARTAN 24; 26 MG/1; MG/1
1 TABLET, FILM COATED ORAL 2 TIMES DAILY
Qty: 60 TABLET | Refills: 3 | Status: SHIPPED | OUTPATIENT
Start: 2024-05-16

## 2024-05-16 RX ORDER — SEMAGLUTIDE 1.34 MG/ML
1 INJECTION, SOLUTION SUBCUTANEOUS
Qty: 3 ML | Refills: 2 | Status: SHIPPED | OUTPATIENT
Start: 2024-05-16

## 2024-05-16 RX ORDER — ATORVASTATIN CALCIUM 80 MG/1
80 TABLET, FILM COATED ORAL DAILY
Qty: 90 TABLET | Refills: 1 | Status: SHIPPED | OUTPATIENT
Start: 2024-05-16

## 2024-05-16 SDOH — ECONOMIC STABILITY: FOOD INSECURITY: WITHIN THE PAST 12 MONTHS, THE FOOD YOU BOUGHT JUST DIDN'T LAST AND YOU DIDN'T HAVE MONEY TO GET MORE.: NEVER TRUE

## 2024-05-16 SDOH — ECONOMIC STABILITY: FOOD INSECURITY: WITHIN THE PAST 12 MONTHS, YOU WORRIED THAT YOUR FOOD WOULD RUN OUT BEFORE YOU GOT MONEY TO BUY MORE.: NEVER TRUE

## 2024-05-16 SDOH — ECONOMIC STABILITY: INCOME INSECURITY: HOW HARD IS IT FOR YOU TO PAY FOR THE VERY BASICS LIKE FOOD, HOUSING, MEDICAL CARE, AND HEATING?: NOT HARD AT ALL

## 2024-05-16 ASSESSMENT — ENCOUNTER SYMPTOMS
CHEST TIGHTNESS: 0
COUGH: 0
SHORTNESS OF BREATH: 0

## 2024-05-16 NOTE — PATIENT INSTRUCTIONS
your blood sugar steady. If you want to exercise more, slowly increase how hard or long you exercise.  Have someone with you when you exercise.   Or exercise at a gym. You may need help if your blood sugar drops too low.  Keep some quick-sugar food with you.   You may get symptoms of low blood sugar during exercise or up to 24 hours later.  Use proper footwear and the right equipment.  Pay attention to your body.   If you are used to exercising and notice that you cannot do as much as usual, talk to your doctor.  Follow-up care is a key part of your treatment and safety. Be sure to make and go to all appointments, and call your doctor if you are having problems. It's also a good idea to know your test results and keep a list of the medicines you take.  Where can you learn more?  Go to [email or web address] and sign in to your [employer] account. Enter C492 in the Search Health Information box to learn more about \"Learning About Diabetes and Exercise.\"     If you do not have an account, please click on the \"Sign Up Now\" link.  Current as of: April 13, 2022               Content Version: 13.4  © 0440-4486 [employer].   Care instructions adapted under license by [employer]. If you have questions about a medical condition or this instruction, always ask your healthcare professional. [employer] disclaims any warranty or liability for your use of this information.

## 2024-05-16 NOTE — PROGRESS NOTES
medication side effects noted.  Use of agents associated with hypertension: none.                                        Sodium (mmol/L)   Date Value   01/02/2024 140    BUN (mg/dL)   Date Value   01/02/2024 12    Glucose (mg/dL)   Date Value   01/02/2024 111 (H)      Potassium (mmol/L)   Date Value   01/02/2024 4.3     Potassium reflex Magnesium (mmol/L)   Date Value   10/20/2022 3.7    Creatinine (mg/dL)   Date Value   01/02/2024 1.0         COPD:  Current treatment includes long-acting beta agonist inhaler, inhaled steroid, anticholinergic inhaler.  Residual symptoms: non-productive cough. Does get short of breath walking, but not does walk much.  He denies any other symptoms. He requires his rescue inhaler 3 time(s) per week.    Review of Systems   Constitutional:  Negative for fatigue.   Eyes:  Negative for visual disturbance.   Respiratory:  Negative for cough, chest tightness and shortness of breath.    Cardiovascular:  Negative for chest pain, palpitations and leg swelling.   Endocrine: Negative for polydipsia, polyphagia and polyuria.   Genitourinary:  Negative for frequency.   Skin:  Negative for rash.   Neurological:  Negative for dizziness, syncope, weakness and light-headedness.       Prior to Visit Medications    Medication Sig Taking? Authorizing Provider   empagliflozin (JARDIANCE) 25 MG tablet Take 1 tablet by mouth daily Yes Rickie Holguin DO   atorvastatin (LIPITOR) 80 MG tablet Take 1 tablet by mouth daily Yes Rickie Holguin DO   metoprolol tartrate (LOPRESSOR) 50 MG tablet Take 1 tablet by mouth 2 times daily Yes Rickie Holguin DO   sacubitril-valsartan (ENTRESTO) 24-26 MG per tablet Take 1 tablet by mouth 2 times daily Yes Rickie Holguin DO   Semaglutide, 1 MG/DOSE, (OZEMPIC, 1 MG/DOSE,) 4 MG/3ML SOPN sc injection Inject 1 mg into the skin every 7 days Yes Rickie Holguin DO   pregabalin (LYRICA) 150 MG capsule TAKE 3 CAPSULES BY MOUTH TWICE DAILY. MAX DAILY AMOUNT: 900 MG. Yes Juana Yarbrough, BRANDI -

## 2024-05-17 ENCOUNTER — TELEPHONE (OUTPATIENT)
Dept: CARDIOLOGY CLINIC | Age: 74
End: 2024-05-17

## 2024-05-17 LAB
25(OH)D3 SERPL-MCNC: 26.9 NG/ML
ALBUMIN SERPL-MCNC: 3.9 G/DL (ref 3.4–5)
ALBUMIN/GLOB SERPL: 1.3 {RATIO} (ref 1.1–2.2)
ALP SERPL-CCNC: 95 U/L (ref 40–129)
ALT SERPL-CCNC: 17 U/L (ref 10–40)
ANION GAP SERPL CALCULATED.3IONS-SCNC: 12 MMOL/L (ref 3–16)
AST SERPL-CCNC: 21 U/L (ref 15–37)
BILIRUB DIRECT SERPL-MCNC: <0.2 MG/DL (ref 0–0.3)
BILIRUB INDIRECT SERPL-MCNC: NORMAL MG/DL (ref 0–1)
BILIRUB SERPL-MCNC: 0.4 MG/DL (ref 0–1)
BUN SERPL-MCNC: 20 MG/DL (ref 7–20)
CALCIUM SERPL-MCNC: 9.6 MG/DL (ref 8.3–10.6)
CHLORIDE SERPL-SCNC: 104 MMOL/L (ref 99–110)
CHOLEST SERPL-MCNC: 78 MG/DL (ref 0–199)
CO2 SERPL-SCNC: 24 MMOL/L (ref 21–32)
CREAT SERPL-MCNC: 1 MG/DL (ref 0.8–1.3)
DEPRECATED RDW RBC AUTO: 20.8 % (ref 12.4–15.4)
EST. AVERAGE GLUCOSE BLD GHB EST-MCNC: 162.8 MG/DL
GFR SERPLBLD CREATININE-BSD FMLA CKD-EPI: 80 ML/MIN/{1.73_M2}
GLUCOSE SERPL-MCNC: 141 MG/DL (ref 70–99)
HBA1C MFR BLD: 7.3 %
HCT VFR BLD AUTO: 34.3 % (ref 40.5–52.5)
HDLC SERPL-MCNC: 27 MG/DL (ref 40–60)
HGB BLD-MCNC: 10.1 G/DL (ref 13.5–17.5)
LDLC SERPL CALC-MCNC: 9 MG/DL
MAGNESIUM SERPL-MCNC: 2 MG/DL (ref 1.8–2.4)
MCH RBC QN AUTO: 22.6 PG (ref 26–34)
MCHC RBC AUTO-ENTMCNC: 29.5 G/DL (ref 31–36)
MCV RBC AUTO: 76.6 FL (ref 80–100)
PLATELET # BLD AUTO: 163 K/UL (ref 135–450)
PMV BLD AUTO: 9.2 FL (ref 5–10.5)
POTASSIUM SERPL-SCNC: 4.4 MMOL/L (ref 3.5–5.1)
PROT SERPL-MCNC: 7 G/DL (ref 6.4–8.2)
RBC # BLD AUTO: 4.47 M/UL (ref 4.2–5.9)
SODIUM SERPL-SCNC: 140 MMOL/L (ref 136–145)
TRIGL SERPL-MCNC: 211 MG/DL (ref 0–150)
TSH SERPL DL<=0.005 MIU/L-ACNC: 2.4 UIU/ML (ref 0.27–4.2)
VLDLC SERPL CALC-MCNC: 42 MG/DL
WBC # BLD AUTO: 9.6 K/UL (ref 4–11)

## 2024-05-17 NOTE — TELEPHONE ENCOUNTER
Called patient to let him know that Emeli SPRING would recommend him start Vit D supplement at least 800 units per day  OTC

## 2024-05-20 DIAGNOSIS — M1A.0710 CHRONIC IDIOPATHIC GOUT INVOLVING TOE OF RIGHT FOOT WITHOUT TOPHUS: ICD-10-CM

## 2024-05-20 RX ORDER — ALLOPURINOL 100 MG/1
100 TABLET ORAL DAILY
Qty: 180 TABLET | Refills: 1 | OUTPATIENT
Start: 2024-05-20

## 2024-05-20 NOTE — TELEPHONE ENCOUNTER
Medication:   Requested Prescriptions     Pending Prescriptions Disp Refills    allopurinol (ZYLOPRIM) 100 MG tablet [Pharmacy Med Name: ALLOPURINOL 100MG TABLETS] 180 tablet 1     Sig: TAKE 1 TABLET BY MOUTH ONCE DAILY        Last Filled:  05/08/23 refill on file also    Patient Phone Number: [phone] (home)     Last appt: 5/16/2024   Next appt: 9/19/2024    Last OARRS:       8/14/2020     9:16 AM   RX Monitoring   Periodic Controlled Substance Monitoring Possible medication side effects, risk of tolerance/dependence & alternative treatments discussed.;Assessed functional status.;Obtaining appropriate analgesic effect of treatment.   Chronic Pain > 50 MEDD Obtained or confirmed \"Consent for Opioid Use\" on file.

## 2024-05-23 ENCOUNTER — HOSPITAL ENCOUNTER (OUTPATIENT)
Dept: CT IMAGING | Age: 74
Discharge: HOME | End: 2024-05-23
Attending: INTERNAL MEDICINE
Payer: COMMERCIAL

## 2024-05-23 DIAGNOSIS — Z72.0 TOBACCO ABUSE DISORDER: ICD-10-CM

## 2024-05-23 PROCEDURE — 71271 CT THORAX LUNG CANCER SCR C-: CPT

## 2024-05-28 DIAGNOSIS — G60.9 IDIOPATHIC PERIPHERAL NEUROPATHY: ICD-10-CM

## 2024-05-28 RX ORDER — PREGABALIN 150 MG/1
CAPSULE ORAL
Qty: 180 CAPSULE | Refills: 0 | Status: SHIPPED | OUTPATIENT
Start: 2024-05-28 | End: 2024-08-28

## 2024-05-28 NOTE — TELEPHONE ENCOUNTER
Medication:   Requested Prescriptions     Pending Prescriptions Disp Refills    pregabalin (LYRICA) 150 MG capsule [Pharmacy Med Name: PREGABALIN 150MG CAPSULES] 180 capsule      Sig: TAKE 3 CAPSULES BY MOUTH TWICE DAILY. MAX DAILY AMOUNT: 900 MG        Last Filled:  4/24/24    Patient Phone Number: [phone] (home)     Last appt: 5/16/2024   Next appt: 9/19/2024    Last OARRS:       8/14/2020     9:16 AM   RX Monitoring   Periodic Controlled Substance Monitoring Possible medication side effects, risk of tolerance/dependence & alternative treatments discussed.;Assessed functional status.;Obtaining appropriate analgesic effect of treatment.   Chronic Pain > 50 MEDD Obtained or confirmed \"Consent for Opioid Use\" on file.

## 2024-05-29 DIAGNOSIS — M1A.0710 CHRONIC IDIOPATHIC GOUT INVOLVING TOE OF RIGHT FOOT WITHOUT TOPHUS: ICD-10-CM

## 2024-05-29 RX ORDER — ALLOPURINOL 100 MG/1
100 TABLET ORAL DAILY
Qty: 180 TABLET | Refills: 1 | Status: SHIPPED | OUTPATIENT
Start: 2024-05-29

## 2024-05-29 NOTE — TELEPHONE ENCOUNTER
Medication:   Requested Prescriptions     Pending Prescriptions Disp Refills    allopurinol (ZYLOPRIM) 100 MG tablet 180 tablet 1     Sig: Take 1 tablet by mouth daily        Last Filled:  5/8/23    Patient Phone Number: [phone] (home)     Last appt: 5/16/2024   Next appt: 9/19/2024    Last OARRS:       8/14/2020     9:16 AM   RX Monitoring   Periodic Controlled Substance Monitoring Possible medication side effects, risk of tolerance/dependence & alternative treatments discussed.;Assessed functional status.;Obtaining appropriate analgesic effect of treatment.   Chronic Pain > 50 MEDD Obtained or confirmed \"Consent for Opioid Use\" on file.

## 2024-05-29 NOTE — TELEPHONE ENCOUNTER
allopurinol (ZYLOPRIM) 100 MG tablet [     [facility] #[id number] - [place], [place] - [address] - P [phone] - F [phone]  [facility] [address] [facility] [address]  Phone: [phone]  Fax: [phone]       Pt would like a refill.

## 2024-06-05 ENCOUNTER — TELEPHONE (OUTPATIENT)
Dept: PULMONOLOGY | Age: 74
End: 2024-06-05

## 2024-06-25 DIAGNOSIS — I25.10 CORONARY ARTERY DISEASE INVOLVING NATIVE CORONARY ARTERY OF NATIVE HEART WITHOUT ANGINA PECTORIS: ICD-10-CM

## 2024-06-25 RX ORDER — CLOPIDOGREL BISULFATE 75 MG/1
75 TABLET ORAL DAILY
Qty: 30 TABLET | Refills: 3 | Status: SHIPPED | OUTPATIENT
Start: 2024-06-25

## 2024-06-25 NOTE — TELEPHONE ENCOUNTER
Medication:   Requested Prescriptions     Pending Prescriptions Disp Refills    clopidogrel (PLAVIX) 75 MG tablet [Pharmacy Med Name: CLOPIDOGREL 75MG TABLETS] 30 tablet 3     Sig: TAKE 1 TABLET BY MOUTH DAILY        Last Filled:  03/27/24    Patient Phone Number: [phone] (home)     Last appt: 5/16/2024 Return in about 4 months (around 9/16/2024) for Diabetes, Blood Pressure, High Cholesterol.   Next appt: 9/19/2024    Last OARRS:       8/14/2020     9:16 AM   RX Monitoring   Periodic Controlled Substance Monitoring Possible medication side effects, risk of tolerance/dependence & alternative treatments discussed.;Assessed functional status.;Obtaining appropriate analgesic effect of treatment.   Chronic Pain > 50 MEDD Obtained or confirmed \"Consent for Opioid Use\" on file.

## 2024-07-06 DIAGNOSIS — J43.2 CENTRILOBULAR EMPHYSEMA (HCC): ICD-10-CM

## 2024-07-08 RX ORDER — FLUTICASONE FUROATE, UMECLIDINIUM BROMIDE AND VILANTEROL TRIFENATATE 100; 62.5; 25 UG/1; UG/1; UG/1
POWDER RESPIRATORY (INHALATION)
Qty: 60 EACH | Refills: 5 | Status: SHIPPED | OUTPATIENT
Start: 2024-07-08

## 2024-07-08 NOTE — TELEPHONE ENCOUNTER
Medication:   Requested Prescriptions     Pending Prescriptions Disp Refills    TRELEGY ELLIPTA 100-62.5-25 MCG/ACT AEPB inhaler [Pharmacy Med Name: TRELEGY ELLIPTA 100-62.5MCG INH 30P] 60 each 5     Sig: INHALE 1 PUFF INTO THE LUNGS IN THE MORNING        Last Filled:  01/18/24    Patient Phone Number: [phone] (home)     Last appt: 5/16/2024 Return in about 4 months (around 9/16/2024) for Diabetes, Blood Pressure, High Cholesterol.   Next appt: 9/19/2024    Last OARRS:       8/14/2020     9:16 AM   RX Monitoring   Periodic Controlled Substance Monitoring Possible medication side effects, risk of tolerance/dependence & alternative treatments discussed.;Assessed functional status.;Obtaining appropriate analgesic effect of treatment.   Chronic Pain > 50 MEDD Obtained or confirmed \"Consent for Opioid Use\" on file.

## 2024-07-31 ENCOUNTER — HOSPITAL ENCOUNTER (OUTPATIENT)
Dept: WOUND CARE | Age: 74
Discharge: HOME | End: 2024-07-31
Attending: SURGERY
Payer: COMMERCIAL

## 2024-07-31 VITALS
HEART RATE: 64 BPM | HEIGHT: 70 IN | TEMPERATURE: 97.7 F | BODY MASS INDEX: 30.74 KG/M2 | WEIGHT: 214.73 LBS | RESPIRATION RATE: 18 BRPM | SYSTOLIC BLOOD PRESSURE: 128 MMHG | DIASTOLIC BLOOD PRESSURE: 72 MMHG

## 2024-07-31 DIAGNOSIS — D68.69 SECONDARY HYPERCOAGULABLE STATE (HCC): ICD-10-CM

## 2024-07-31 DIAGNOSIS — I15.2 HYPERTENSION ASSOCIATED WITH DIABETES (HCC): ICD-10-CM

## 2024-07-31 DIAGNOSIS — I27.82 OTHER CHRONIC PULMONARY EMBOLISM WITHOUT ACUTE COR PULMONALE (HCC): ICD-10-CM

## 2024-07-31 DIAGNOSIS — E66.9 TYPE 2 DIABETES MELLITUS WITH OBESITY (HCC): ICD-10-CM

## 2024-07-31 DIAGNOSIS — E11.69 TYPE 2 DIABETES MELLITUS WITH OBESITY (HCC): ICD-10-CM

## 2024-07-31 DIAGNOSIS — E11.51 TYPE 2 DIABETES MELLITUS WITH DIABETIC PERIPHERAL ANGIOPATHY WITHOUT GANGRENE, WITHOUT LONG-TERM CURRENT USE OF INSULIN (HCC): ICD-10-CM

## 2024-07-31 DIAGNOSIS — L97.912 LOWER EXTREMITY ULCERATION, RIGHT, WITH FAT LAYER EXPOSED (HCC): Primary | ICD-10-CM

## 2024-07-31 DIAGNOSIS — I48.11 LONGSTANDING PERSISTENT ATRIAL FIBRILLATION (HCC): ICD-10-CM

## 2024-07-31 DIAGNOSIS — E11.69 HYPERLIPIDEMIA ASSOCIATED WITH TYPE 2 DIABETES MELLITUS (HCC): ICD-10-CM

## 2024-07-31 DIAGNOSIS — I70.221 ATHEROSCLEROSIS OF NATIVE ARTERY OF RIGHT LOWER EXTREMITY WITH REST PAIN (HCC): ICD-10-CM

## 2024-07-31 DIAGNOSIS — R00.2 HEART PALPITATIONS: ICD-10-CM

## 2024-07-31 DIAGNOSIS — Z72.0 TOBACCO ABUSE: ICD-10-CM

## 2024-07-31 DIAGNOSIS — I73.9 PVD (PERIPHERAL VASCULAR DISEASE) (HCC): ICD-10-CM

## 2024-07-31 DIAGNOSIS — E78.5 HYPERLIPIDEMIA ASSOCIATED WITH TYPE 2 DIABETES MELLITUS (HCC): ICD-10-CM

## 2024-07-31 DIAGNOSIS — L97.919 LEG ULCER, RIGHT, WITH UNSPECIFIED SEVERITY (HCC): ICD-10-CM

## 2024-07-31 DIAGNOSIS — E11.59 HYPERTENSION ASSOCIATED WITH DIABETES (HCC): ICD-10-CM

## 2024-07-31 DIAGNOSIS — S81.801A OPEN WOUND OF RIGHT LOWER LEG, INITIAL ENCOUNTER: ICD-10-CM

## 2024-07-31 DIAGNOSIS — R07.9 CHEST PAIN, UNSPECIFIED TYPE: ICD-10-CM

## 2024-07-31 DIAGNOSIS — Z79.01 LONG TERM (CURRENT) USE OF ANTICOAGULANTS: ICD-10-CM

## 2024-07-31 DIAGNOSIS — I25.10 CORONARY ARTERY DISEASE INVOLVING NATIVE CORONARY ARTERY OF NATIVE HEART WITHOUT ANGINA PECTORIS: ICD-10-CM

## 2024-07-31 DIAGNOSIS — E78.00 PURE HYPERCHOLESTEROLEMIA: ICD-10-CM

## 2024-07-31 PROCEDURE — 11042 DBRDMT SUBQ TIS 1ST 20SQCM/<: CPT | Performed by: SURGERY

## 2024-07-31 PROCEDURE — 29581 APPL MULTLAYER CMPRN SYS LEG: CPT

## 2024-07-31 PROCEDURE — 99203 OFFICE O/P NEW LOW 30 MIN: CPT

## 2024-07-31 PROCEDURE — 99204 OFFICE O/P NEW MOD 45 MIN: CPT | Performed by: SURGERY

## 2024-07-31 PROCEDURE — 11045 DBRDMT SUBQ TISS EACH ADDL: CPT | Performed by: SURGERY

## 2024-07-31 PROCEDURE — 11045 DBRDMT SUBQ TISS EACH ADDL: CPT

## 2024-07-31 PROCEDURE — 11042 DBRDMT SUBQ TIS 1ST 20SQCM/<: CPT

## 2024-07-31 RX ORDER — IBUPROFEN 200 MG
TABLET ORAL ONCE
OUTPATIENT
Start: 2024-07-31 | End: 2024-07-31

## 2024-07-31 RX ORDER — LIDOCAINE HYDROCHLORIDE 40 MG/ML
SOLUTION TOPICAL ONCE
OUTPATIENT
Start: 2024-07-31 | End: 2024-07-31

## 2024-07-31 RX ORDER — GINSENG 100 MG
CAPSULE ORAL ONCE
OUTPATIENT
Start: 2024-07-31 | End: 2024-07-31

## 2024-07-31 RX ORDER — LIDOCAINE 40 MG/G
CREAM TOPICAL ONCE
OUTPATIENT
Start: 2024-07-31 | End: 2024-07-31

## 2024-07-31 RX ORDER — LIDOCAINE HYDROCHLORIDE 20 MG/ML
JELLY TOPICAL ONCE
OUTPATIENT
Start: 2024-07-31 | End: 2024-07-31

## 2024-07-31 RX ORDER — SODIUM CHLOR/HYPOCHLOROUS ACID 0.033 %
SOLUTION, IRRIGATION IRRIGATION ONCE
OUTPATIENT
Start: 2024-07-31 | End: 2024-07-31

## 2024-07-31 RX ORDER — TRIAMCINOLONE ACETONIDE 1 MG/G
OINTMENT TOPICAL ONCE
OUTPATIENT
Start: 2024-07-31 | End: 2024-07-31

## 2024-07-31 RX ORDER — BACITRACIN ZINC AND POLYMYXIN B SULFATE 500; 1000 [USP'U]/G; [USP'U]/G
OINTMENT TOPICAL ONCE
OUTPATIENT
Start: 2024-07-31 | End: 2024-07-31

## 2024-07-31 RX ORDER — LIDOCAINE 50 MG/G
OINTMENT TOPICAL ONCE
OUTPATIENT
Start: 2024-07-31 | End: 2024-07-31

## 2024-07-31 RX ORDER — BETAMETHASONE DIPROPIONATE 0.5 MG/G
CREAM TOPICAL ONCE
OUTPATIENT
Start: 2024-07-31 | End: 2024-07-31

## 2024-07-31 RX ORDER — CLOBETASOL PROPIONATE 0.5 MG/G
OINTMENT TOPICAL ONCE
OUTPATIENT
Start: 2024-07-31 | End: 2024-07-31

## 2024-07-31 RX ORDER — GENTAMICIN SULFATE 1 MG/G
OINTMENT TOPICAL ONCE
OUTPATIENT
Start: 2024-07-31 | End: 2024-07-31

## 2024-07-31 RX ORDER — LIDOCAINE HYDROCHLORIDE 40 MG/ML
SOLUTION TOPICAL ONCE
Status: COMPLETED | OUTPATIENT
Start: 2024-07-31 | End: 2024-07-31

## 2024-07-31 RX ADMIN — LIDOCAINE HYDROCHLORIDE: 40 SOLUTION TOPICAL at 10:09

## 2024-07-31 ASSESSMENT — PAIN DESCRIPTION - PAIN TYPE
TYPE: ACUTE PAIN;CHRONIC PAIN
TYPE: CHRONIC PAIN

## 2024-07-31 ASSESSMENT — PAIN SCALES - GENERAL
PAINLEVEL_OUTOF10: 8
PAINLEVEL_OUTOF10: 9

## 2024-07-31 ASSESSMENT — PAIN DESCRIPTION - DESCRIPTORS
DESCRIPTORS: BURNING
DESCRIPTORS: BURNING;THROBBING

## 2024-07-31 ASSESSMENT — PAIN DESCRIPTION - ORIENTATION
ORIENTATION: RIGHT
ORIENTATION: RIGHT

## 2024-07-31 ASSESSMENT — PAIN DESCRIPTION - FREQUENCY
FREQUENCY: CONTINUOUS
FREQUENCY: CONTINUOUS

## 2024-07-31 ASSESSMENT — PAIN DESCRIPTION - LOCATION
LOCATION: LEG
LOCATION: LEG

## 2024-07-31 ASSESSMENT — PAIN - FUNCTIONAL ASSESSMENT: PAIN_FUNCTIONAL_ASSESSMENT: PREVENTS OR INTERFERES SOME ACTIVE ACTIVITIES AND ADLS

## 2024-07-31 ASSESSMENT — PAIN DESCRIPTION - ONSET
ONSET: ON-GOING
ONSET: ON-GOING

## 2024-07-31 NOTE — PATIENT INSTRUCTIONS
[facility] Wound Care Physician Orders and Discharge Instructions  [facility]  [address], [address]  [place], [place] [address]  Telephone: [phone]      FAX [phone]  MONDAY - THURSDAY 8:00 am - 4:30 pm and Friday 8:00 am - 12:00 pm.        NAME:  Kong Guzman Jr.  YOB: 1950  MEDICAL RECORD NUMBER:  [id number]  DATE:  7/31/2024      Return Appointment:  [] Return Appointment: With Dr Demetris Caal   in  1 Week(s)  [] Wound and dressing supply provider:   [] ECF or Home Healthcare:  [] Wound Assessment: [] Physician or NP scheduled for Wound Assessment:   [x] Orders placed during your visit: CT ANGIOGRAM RIGHT AND LEFT LEG  - PLEASE CALL [phone] TO SCHEDULE      Important Reminders:   Please wash hands with soap and water before and after every dressing change.  Do not scrub wounds.  Keep wounds dry in shower unless otherwise instructed by the physician.  SMOKING can slow would healing. Stop smoking as soon as possible to improve healing and prevent further complications associated with smoking.      Catrachita-Wound Topical Treatments:  Do not apply lotions, creams, or ointments to wound bed unless directed.   [] Apply moisturizing lotion to skin surrounding the wound prior to dressing change.  [] Apply antifungal ointment to skin surrounding the wound prior to dressing change.  [] Apply thin film of no sting moisture barrier ointment to skin immediately around      wound.  [] Other:       Wound Location: RIGHT LOWER LEG    Wound Cleansing: Normal Saline    Primary Dressing:  [x] SILVER ALGINATE  []     Secondary Dressing:  [x] DRAWTEX THEN OPTILOCK  []       Dressing Frequency:  []   [x] Do Not Change Dressing        Compression and Edema Control: COBAN 2 LITE TO RIGHT LEG  [] Wear Home Compression Stockings   [] Spandagrip to:    Size: []Low compression 5-10 mm/Hg      []Medium compression 10-20 mm/Hg           []High compression  20-30 mm/Hg  [] Ace

## 2024-07-31 NOTE — CONSULTS
[facility]   Progress Note and Procedure Note      Kong Guzman Jr.  MEDICAL RECORD NUMBER:  [id number]  AGE: 74 y.o.   GENDER: male  : 1950  EPISODE DATE:  2024    Subjective:     Chief Complaint   Patient presents with    Wound Check     Initial visit right lower leg wound         HISTORY of PRESENT ILLNESS HPI     Kong Guzman Jr. is a 74 y.o. male who presents today for wound/ulcer evaluation.   History of Wound Context: Had an ulcer of his right leg intermittently for years he has significant arterial disease had stents two bypasses and the most recent angiogram and intervention was done by Dr. Richard and 23.  Sounds like he was lost to follow-up does not want to see her again despite the multiple operations and limb saving results.  Was also at the [facility] wound care [place] that a couple skin grafts and then he stopped going  Wound/Ulcer Pain Timing/Severity: intermittent  Quality of pain: burning, throbbing  Severity:  7 / 10   Modifying Factors: Pain worsens with laying in bed  Associated Signs/Symptoms: edema, erythema, drainage, and pain    Ulcer Identification:  Ulcer Type: venous, arterial, and diabetic    Contributing Factors: edema, venous stasis, diabetes, poor glucose control, obesity, smoking, arterial insufficiency, and decreased tissue oxygenation    Acute Wound: N/A not an acute wound    PAST MEDICAL HISTORY        Diagnosis Date    CAD (coronary artery disease)     CHF (congestive heart failure) ([facility])     diastolic    COPD (chronic obstructive pulmonary disease) ([facility])     Critical ischemia of lower extremity ([facility])     Depressive disorder, not elsewhere classified     GERD (gastroesophageal reflux disease)     History of colon polyps - 30 seen on colonoscopy 2021    History of MI (myocardial infarction) 2013    History of skin ulcer of lower extremity     R anterior shin    History of transient ischemic attack (TIA)     Hx of

## 2024-07-31 NOTE — PLAN OF CARE
Multilayer Compression Wrap   (Not Unna) Below the Knee    NAME:  Kong Guzman Jr.  YOB: 1950  MEDICAL RECORD NUMBER:  [id number]  DATE:  7/31/2024    Multilayer compression wrap: Applied moisturizing agent to dry skin as needed.   Applied primary and secondary dressing as ordered.  Applied multilayered dressing below the knee to right lower leg.  Instructed patient/caregiver not to remove dressing and to keep it clean and dry.   Instructed patient/caregiver on complications to report to provider, such as pain, numbness in toes, heavy drainage, and slippage of dressing.  Instructed patient on purpose of compression dressing and on activity and exercise recommendations.      Electronically signed by Monisha Calderon RN on 7/31/2024 at 11:58 AM

## 2024-08-02 RX ORDER — OMEPRAZOLE 40 MG/1
40 CAPSULE, DELAYED RELEASE ORAL DAILY
Qty: 90 CAPSULE | Refills: 1 | Status: SHIPPED | OUTPATIENT
Start: 2024-08-02

## 2024-08-02 NOTE — TELEPHONE ENCOUNTER
Medication:   Requested Prescriptions     Pending Prescriptions Disp Refills    omeprazole (PRILOSEC) 40 MG delayed release capsule [Pharmacy Med Name: OMEPRAZOLE 40MG CAPSULES] 90 capsule 1     Sig: TAKE 1 CAPSULE BY MOUTH DAILY        Last Filled:  2/5/24    Patient Phone Number: [phone] (home)     Last appt: 5/16/2024   Next appt: 9/19/2024    Last OARRS:       8/14/2020     9:16 AM   RX Monitoring   Periodic Controlled Substance Monitoring Possible medication side effects, risk of tolerance/dependence & alternative treatments discussed.;Assessed functional status.;Obtaining appropriate analgesic effect of treatment.   Chronic Pain > 50 MEDD Obtained or confirmed \"Consent for Opioid Use\" on file.

## 2024-08-08 ENCOUNTER — TELEPHONE (OUTPATIENT)
Dept: WOUND CARE | Age: 74
End: 2024-08-08

## 2024-08-08 NOTE — TELEPHONE ENCOUNTER
Patient seen at [facility] wound care center on 7/31/2024 and had 2 layer compression wrap applied.  Was instructed on need to follow up in 1 week for re-evaluation of wound and to have compression wrap removed and possibly re-applied.  Appointment was scheduled for 8/7/2024 but did not show up.  Attempted to call patient x 2 on 8/6/2024 to reschedule and have compression wrap removed.  Attempted to call patient again today - still with no answer.  Voice mail message left for patient to remove 2 layer wrap by 8/9/2024 at the latest and patient encouraged to make follow-up appointment for leg wound ASAP.

## 2024-10-15 DIAGNOSIS — E78.5 HYPERLIPIDEMIA ASSOCIATED WITH TYPE 2 DIABETES MELLITUS (HCC): ICD-10-CM

## 2024-10-15 DIAGNOSIS — I25.10 CORONARY ARTERY DISEASE INVOLVING NATIVE CORONARY ARTERY OF NATIVE HEART WITHOUT ANGINA PECTORIS: ICD-10-CM

## 2024-10-15 DIAGNOSIS — E11.69 HYPERLIPIDEMIA ASSOCIATED WITH TYPE 2 DIABETES MELLITUS (HCC): ICD-10-CM

## 2024-10-15 NOTE — TELEPHONE ENCOUNTER
Medication:   Requested Prescriptions     Pending Prescriptions Disp Refills    atorvastatin (LIPITOR) 80 MG tablet [Pharmacy Med Name: ATORVASTATIN 80MG TABLETS] 90 tablet 1     Sig: TAKE 1 TABLET BY MOUTH DAILY        Last Filled:  5/16/24    Patient Phone Number: [phone] (home)     Last appt: 5/16/2024   Next appt: 3/25/2025    Last OARRS:       8/14/2020     9:16 AM   RX Monitoring   Periodic Controlled Substance Monitoring Possible medication side effects, risk of tolerance/dependence & alternative treatments discussed.;Assessed functional status.;Obtaining appropriate analgesic effect of treatment.   Chronic Pain > 50 MEDD Obtained or confirmed \"Consent for Opioid Use\" on file.

## 2024-10-16 RX ORDER — ATORVASTATIN CALCIUM 80 MG/1
80 TABLET, FILM COATED ORAL DAILY
Qty: 90 TABLET | Refills: 1 | Status: SHIPPED | OUTPATIENT
Start: 2024-10-16

## 2024-10-16 NOTE — TELEPHONE ENCOUNTER
Refill given but needs appointment in november , per Dr. Holguin.    Called pt and left a voicemail to call in and schedule.

## 2024-11-15 ENCOUNTER — TELEPHONE (OUTPATIENT)
Dept: CARDIOLOGY CLINIC | Age: 74
End: 2024-11-15

## 2024-11-15 NOTE — TELEPHONE ENCOUNTER
Called and left patient a voicemail letting him know that he will need an appointment with Emeli for cardiac clearance.

## 2024-11-15 NOTE — TELEPHONE ENCOUNTER
Patient Name: Kong Guzman     :1950      Procedure:Colonoscopy     Date of Service: TBD    Place of service:[facility]     Anesthesia: not stated      Surgeon : not schedule      Requesting to hold OAC: Plavix and Eliquis     Last OV:04/15/2024 with Emeli   No follow up     Phone [phone] Elvia  Fax :[phone]

## 2024-11-27 ENCOUNTER — OFFICE VISIT (OUTPATIENT)
Dept: CARDIOLOGY CLINIC | Age: 74
End: 2024-11-27
Payer: COMMERCIAL

## 2024-11-27 VITALS
SYSTOLIC BLOOD PRESSURE: 148 MMHG | BODY MASS INDEX: 32.21 KG/M2 | WEIGHT: 225 LBS | HEART RATE: 72 BPM | DIASTOLIC BLOOD PRESSURE: 78 MMHG | HEIGHT: 70 IN

## 2024-11-27 DIAGNOSIS — I15.2 HYPERTENSION ASSOCIATED WITH DIABETES (HCC): ICD-10-CM

## 2024-11-27 DIAGNOSIS — I25.10 CORONARY ARTERY DISEASE INVOLVING NATIVE CORONARY ARTERY OF NATIVE HEART WITHOUT ANGINA PECTORIS: ICD-10-CM

## 2024-11-27 DIAGNOSIS — E66.9 TYPE 2 DIABETES MELLITUS WITH OBESITY (HCC): ICD-10-CM

## 2024-11-27 DIAGNOSIS — E11.51 TYPE 2 DIABETES MELLITUS WITH DIABETIC PERIPHERAL ANGIOPATHY WITHOUT GANGRENE, WITHOUT LONG-TERM CURRENT USE OF INSULIN (HCC): ICD-10-CM

## 2024-11-27 DIAGNOSIS — Z01.818 PRE-OP TESTING: Primary | ICD-10-CM

## 2024-11-27 DIAGNOSIS — Z01.818 PRE-OP TESTING: ICD-10-CM

## 2024-11-27 DIAGNOSIS — E78.5 HYPERLIPIDEMIA ASSOCIATED WITH TYPE 2 DIABETES MELLITUS (HCC): ICD-10-CM

## 2024-11-27 DIAGNOSIS — E11.59 HYPERTENSION ASSOCIATED WITH DIABETES (HCC): ICD-10-CM

## 2024-11-27 DIAGNOSIS — E11.69 TYPE 2 DIABETES MELLITUS WITH OBESITY (HCC): ICD-10-CM

## 2024-11-27 DIAGNOSIS — E11.69 HYPERLIPIDEMIA ASSOCIATED WITH TYPE 2 DIABETES MELLITUS (HCC): ICD-10-CM

## 2024-11-27 DIAGNOSIS — I70.221 ATHEROSCLEROSIS OF NATIVE ARTERY OF RIGHT LOWER EXTREMITY WITH REST PAIN (HCC): ICD-10-CM

## 2024-11-27 PROCEDURE — G8417 CALC BMI ABV UP PARAM F/U: HCPCS | Performed by: NURSE PRACTITIONER

## 2024-11-27 PROCEDURE — 93000 ELECTROCARDIOGRAM COMPLETE: CPT | Performed by: NURSE PRACTITIONER

## 2024-11-27 PROCEDURE — 1159F MED LIST DOCD IN RCRD: CPT | Performed by: NURSE PRACTITIONER

## 2024-11-27 PROCEDURE — 3078F DIAST BP <80 MM HG: CPT | Performed by: NURSE PRACTITIONER

## 2024-11-27 PROCEDURE — 3017F COLORECTAL CA SCREEN DOC REV: CPT | Performed by: NURSE PRACTITIONER

## 2024-11-27 PROCEDURE — 2022F DILAT RTA XM EVC RTNOPTHY: CPT | Performed by: NURSE PRACTITIONER

## 2024-11-27 PROCEDURE — 99215 OFFICE O/P EST HI 40 MIN: CPT | Performed by: NURSE PRACTITIONER

## 2024-11-27 PROCEDURE — G8484 FLU IMMUNIZE NO ADMIN: HCPCS | Performed by: NURSE PRACTITIONER

## 2024-11-27 PROCEDURE — 4004F PT TOBACCO SCREEN RCVD TLK: CPT | Performed by: NURSE PRACTITIONER

## 2024-11-27 PROCEDURE — 3077F SYST BP >= 140 MM HG: CPT | Performed by: NURSE PRACTITIONER

## 2024-11-27 PROCEDURE — 1123F ACP DISCUSS/DSCN MKR DOCD: CPT | Performed by: NURSE PRACTITIONER

## 2024-11-27 PROCEDURE — G8427 DOCREV CUR MEDS BY ELIG CLIN: HCPCS | Performed by: NURSE PRACTITIONER

## 2024-11-27 PROCEDURE — 3051F HG A1C>EQUAL 7.0%<8.0%: CPT | Performed by: NURSE PRACTITIONER

## 2024-11-27 RX ORDER — FERROUS SULFATE 325(65) MG
325 TABLET, DELAYED RELEASE (ENTERIC COATED) ORAL
COMMUNITY

## 2024-11-27 RX ORDER — PREGABALIN 150 MG/1
150 CAPSULE ORAL 2 TIMES DAILY
COMMUNITY

## 2024-11-27 RX ORDER — ALIROCUMAB 75 MG/ML
75 INJECTION, SOLUTION SUBCUTANEOUS
COMMUNITY
Start: 2024-09-15

## 2024-11-27 RX ORDER — DULOXETINE 40 MG/1
1 CAPSULE, DELAYED RELEASE ORAL DAILY
COMMUNITY
Start: 2024-09-23

## 2024-11-27 NOTE — PROGRESS NOTES
55-60%. No regional wall motion abnormalities are seen.Grade II diastolic dysfunction with elevated LV filling pressures.Definity contrast agent was used to help visualize endocardial borders. The right ventricle is normal in size and function. No pericardial effusion noted.       HTN   WNL      KAVITA  States wears CPAP      COPD   Stable      hx  tobacco use   Cessation recommended       dHF   appears near compensated     HLD   LDL  optimal     DVT and pulmonary emboli September 2021 still on Eliquis therapy along with Plavix 75 mg.       Discussed no NSAIDS only tylenol       PVD   status post right leg stenting and bypass September 2021 by Dr. Richard     Pulmonary nodules  followed by Dr. Boateng              Vit D low  Recommend supplement      Obesity  Discussed diet activity      Plan:   on Eliquis and Plavix no NSIADS tylenol prn  / discussed to be careful with falls  & note any bleeding  VSS wt stable, wt down a little on home scale   No c/o cp no usual SOB   Has had some diarrhea and having GI work up   EKG today NSR no acute changes  Ok per cardiology for GI work up   / may hold Plavix and Eliquis as planned & restart ASAP   Fu in approx 6 months with blood work    Patient seen and examined. Clinical notes reviewed. Telemetry reviewed / testing reviewed    Pertinent labs, diagnostic, device, and imaging results reviewed as a part of this visit  EKG TTE stress test: any LHC/RHC reviewed   Today I spent  35 inutes of face to face time discussing cardiac testing, meds diet and cardiology complaints     non care face to face in care of this pt. 6 minutes   Cardiac clearance  completed   If you haven't had a visit with a Cardiologist in a year please make your next visit with him.        Emeli Stewart APRN FNP CVNP   [facility]

## 2024-11-28 LAB
25(OH)D3 SERPL-MCNC: 18 NG/ML
ALBUMIN SERPL-MCNC: 3.9 G/DL (ref 3.4–5)
ALBUMIN/GLOB SERPL: 1.3 {RATIO} (ref 1.1–2.2)
ALP SERPL-CCNC: 89 U/L (ref 40–129)
ALT SERPL-CCNC: 19 U/L (ref 10–40)
ANION GAP SERPL CALCULATED.3IONS-SCNC: 13 MMOL/L (ref 3–16)
AST SERPL-CCNC: 20 U/L (ref 15–37)
BILIRUB DIRECT SERPL-MCNC: 0.3 MG/DL (ref 0–0.3)
BILIRUB INDIRECT SERPL-MCNC: 0.3 MG/DL (ref 0–1)
BILIRUB SERPL-MCNC: 0.6 MG/DL (ref 0–1)
BUN SERPL-MCNC: 13 MG/DL (ref 7–20)
CALCIUM SERPL-MCNC: 9.7 MG/DL (ref 8.3–10.6)
CHLORIDE SERPL-SCNC: 104 MMOL/L (ref 99–110)
CHOLEST SERPL-MCNC: 121 MG/DL (ref 0–199)
CO2 SERPL-SCNC: 26 MMOL/L (ref 21–32)
CREAT SERPL-MCNC: 0.8 MG/DL (ref 0.8–1.3)
DEPRECATED RDW RBC AUTO: 19.7 % (ref 12.4–15.4)
EST. AVERAGE GLUCOSE BLD GHB EST-MCNC: 122.6 MG/DL
GFR SERPLBLD CREATININE-BSD FMLA CKD-EPI: >90 ML/MIN/{1.73_M2}
GLUCOSE SERPL-MCNC: 85 MG/DL (ref 70–99)
HBA1C MFR BLD: 5.9 %
HCT VFR BLD AUTO: 57.1 % (ref 40.5–52.5)
HDLC SERPL-MCNC: 30 MG/DL (ref 40–60)
HGB BLD-MCNC: 18.4 G/DL (ref 13.5–17.5)
LDLC SERPL CALC-MCNC: 57 MG/DL
MAGNESIUM SERPL-MCNC: 1.75 MG/DL (ref 1.8–2.4)
MCH RBC QN AUTO: 32.7 PG (ref 26–34)
MCHC RBC AUTO-ENTMCNC: 32.2 G/DL (ref 31–36)
MCV RBC AUTO: 101.6 FL (ref 80–100)
PLATELET # BLD AUTO: 129 K/UL (ref 135–450)
PMV BLD AUTO: 9.4 FL (ref 5–10.5)
POTASSIUM SERPL-SCNC: 4.9 MMOL/L (ref 3.5–5.1)
PROT SERPL-MCNC: 6.8 G/DL (ref 6.4–8.2)
RBC # BLD AUTO: 5.62 M/UL (ref 4.2–5.9)
SODIUM SERPL-SCNC: 143 MMOL/L (ref 136–145)
TRIGL SERPL-MCNC: 169 MG/DL (ref 0–150)
TSH SERPL DL<=0.005 MIU/L-ACNC: 2.56 UIU/ML (ref 0.27–4.2)
VLDLC SERPL CALC-MCNC: 34 MG/DL
WBC # BLD AUTO: 7.3 K/UL (ref 4–11)

## 2024-12-04 ENCOUNTER — TELEPHONE (OUTPATIENT)
Dept: CARDIOLOGY CLINIC | Age: 74
End: 2024-12-04

## 2024-12-04 NOTE — TELEPHONE ENCOUNTER
----- Message from BRANDI Crump CNP sent at 12/2/2024  3:31 PM EST -----  Recommend Vit D and magnesium supplements OTV

## 2024-12-04 NOTE — TELEPHONE ENCOUNTER
Called and left patient a voicemail letting him know that Emeli would  Recommend Vit D and magnesium supplements OTV

## 2024-12-10 RX ORDER — ALIROCUMAB 75 MG/ML
75 INJECTION, SOLUTION SUBCUTANEOUS
Qty: 6 ADJUSTABLE DOSE PRE-FILLED PEN SYRINGE | Refills: 3 | Status: SHIPPED | OUTPATIENT
Start: 2024-12-10

## 2024-12-15 DIAGNOSIS — J44.9 OSA AND COPD OVERLAP SYNDROME (HCC): ICD-10-CM

## 2024-12-15 DIAGNOSIS — G47.33 OSA AND COPD OVERLAP SYNDROME (HCC): ICD-10-CM

## 2024-12-16 RX ORDER — ALBUTEROL SULFATE 90 UG/1
2 INHALANT RESPIRATORY (INHALATION) EVERY 6 HOURS PRN
Qty: 18 G | Refills: 2 | Status: SHIPPED | OUTPATIENT
Start: 2024-12-16

## 2024-12-16 NOTE — TELEPHONE ENCOUNTER
Medication:   Requested Prescriptions     Pending Prescriptions Disp Refills    albuterol sulfate HFA (PROVENTIL;VENTOLIN;PROAIR) 108 (90 Base) MCG/ACT inhaler [Pharmacy Med Name: ALBUTEROL HFA INH (200 PUFFS) 18GM] 18 g 2     Sig: INHALE 2 PUFFS INTO THE LUNGS EVERY 6 HOURS AS NEEDED FOR WHEEZING        Last Filled:  11/21/2023    Patient Phone Number: [phone] (home)     Last appt: 5/16/2024   Next appt: 3/25/2025    Last OARRS:       8/14/2020     9:16 AM   RX Monitoring   Periodic Controlled Substance Monitoring Possible medication side effects, risk of tolerance/dependence & alternative treatments discussed.;Assessed functional status.;Obtaining appropriate analgesic effect of treatment.   Chronic Pain > 50 MEDD Obtained or confirmed \"Consent for Opioid Use\" on file.          General

## 2024-12-30 ENCOUNTER — OFFICE VISIT (OUTPATIENT)
Dept: PULMONOLOGY | Age: 74
End: 2024-12-30
Payer: COMMERCIAL

## 2024-12-30 VITALS
DIASTOLIC BLOOD PRESSURE: 78 MMHG | SYSTOLIC BLOOD PRESSURE: 130 MMHG | BODY MASS INDEX: 29.68 KG/M2 | RESPIRATION RATE: 16 BRPM | HEART RATE: 79 BPM | OXYGEN SATURATION: 94 % | HEIGHT: 71 IN | WEIGHT: 212 LBS

## 2024-12-30 DIAGNOSIS — J44.9 CHRONIC OBSTRUCTIVE PULMONARY DISEASE, UNSPECIFIED COPD TYPE (HCC): ICD-10-CM

## 2024-12-30 DIAGNOSIS — Z87.891 PERSONAL HISTORY OF TOBACCO USE: Primary | ICD-10-CM

## 2024-12-30 DIAGNOSIS — G47.33 OSA ON CPAP: ICD-10-CM

## 2024-12-30 PROCEDURE — G8427 DOCREV CUR MEDS BY ELIG CLIN: HCPCS | Performed by: INTERNAL MEDICINE

## 2024-12-30 PROCEDURE — G8417 CALC BMI ABV UP PARAM F/U: HCPCS | Performed by: INTERNAL MEDICINE

## 2024-12-30 PROCEDURE — G8484 FLU IMMUNIZE NO ADMIN: HCPCS | Performed by: INTERNAL MEDICINE

## 2024-12-30 PROCEDURE — G2211 COMPLEX E/M VISIT ADD ON: HCPCS | Performed by: INTERNAL MEDICINE

## 2024-12-30 PROCEDURE — 4004F PT TOBACCO SCREEN RCVD TLK: CPT | Performed by: INTERNAL MEDICINE

## 2024-12-30 PROCEDURE — G0296 VISIT TO DETERM LDCT ELIG: HCPCS | Performed by: INTERNAL MEDICINE

## 2024-12-30 PROCEDURE — 3023F SPIROM DOC REV: CPT | Performed by: INTERNAL MEDICINE

## 2024-12-30 PROCEDURE — 3078F DIAST BP <80 MM HG: CPT | Performed by: INTERNAL MEDICINE

## 2024-12-30 PROCEDURE — 99214 OFFICE O/P EST MOD 30 MIN: CPT | Performed by: INTERNAL MEDICINE

## 2024-12-30 PROCEDURE — 3017F COLORECTAL CA SCREEN DOC REV: CPT | Performed by: INTERNAL MEDICINE

## 2024-12-30 PROCEDURE — 3075F SYST BP GE 130 - 139MM HG: CPT | Performed by: INTERNAL MEDICINE

## 2024-12-30 PROCEDURE — 1123F ACP DISCUSS/DSCN MKR DOCD: CPT | Performed by: INTERNAL MEDICINE

## 2024-12-30 PROCEDURE — 1159F MED LIST DOCD IN RCRD: CPT | Performed by: INTERNAL MEDICINE

## 2024-12-30 RX ORDER — FLUTICASONE FUROATE, UMECLIDINIUM BROMIDE AND VILANTEROL TRIFENATATE 100; 62.5; 25 UG/1; UG/1; UG/1
1 POWDER RESPIRATORY (INHALATION) DAILY
Qty: 60 EACH | Refills: 3 | Status: SHIPPED | OUTPATIENT
Start: 2024-12-30

## 2024-12-30 NOTE — PROGRESS NOTES
edema  GASTROINTESTINAL: Negative for nausea, vomiting, diarrhea, constipation and abdominal pain  HEMATOLOGICAL: Negative for adenopathy  SKIN: Negative for clubbing, cyanosis, skin lesions  EXTREMITIES: Negative for weakness, decreased ROM  NEUROLOGICAL: Negative for unilateral weakness, speech or gait abnormalities  PSYCH: Negative for anxiety, depression    Objective:   PHYSICAL EXAM:        VITALS:   Vitals:    24 1448   BP: 130/78   Site: Right Upper Arm   Position: Sitting   Cuff Size: Large Adult   Pulse: 79   Resp: 16   SpO2: 94%   Weight: 96.2 kg (212 lb)   Height: 1.803 m (5' 11\")       CONSTITUTIONAL:  Awake, alert, cooperative, no apparent distress, and appears stated age  HEENT: No oropharyngeal exudate, PERRL, no cervical adenopathy, no tracheal deviation, thyroid size normal  LUNGS:  No increased work of breathing and clear to auscultation, no crackles or wheezing  CARDIOVASCULAR:  normal S1 and S2 and no JVD  ABDOMEN:  Normal bowel sounds, non-distended and non-tender to palpation  EXT: Marked erythema of bilateral lower extremities with diminished pulses bilaterally.  NEUROLOGIC:  Mental Status Exam:  Level of Alertness:   awake  Orientation:   person, place, time.  SKIN:  normal skin color, texture, turgor, no redness, warmth, or swelling     DATA:    Last PFTs:  mild obstructive defect without  bronchodilator response and a moderately reduced diffusing capacity.    Ct screenin2017  Lung RADS category 2 benign findings. Small pulmonary nodule right   lung as described unchanged.       Recommendation: Continued annual screening low dose CT       Lung RADS category S significant findings-marked coronary artery   calcification which is a risk factor for acute coronary syndrome.   Marked fatty filtration liver. Emphysema.     2018     Lung RADS category 2-positive. Specifically, 4 mm and 3 mm stable pulmonary nodules in the right hemithorax       Lung RADS category S-negative, no new or

## 2025-01-27 RX ORDER — OMEPRAZOLE 40 MG/1
40 CAPSULE, DELAYED RELEASE ORAL DAILY
Qty: 90 CAPSULE | Refills: 1 | Status: SHIPPED | OUTPATIENT
Start: 2025-01-27

## 2025-01-27 NOTE — TELEPHONE ENCOUNTER
Medication:   Requested Prescriptions     Pending Prescriptions Disp Refills    omeprazole (PRILOSEC) 40 MG delayed release capsule [Pharmacy Med Name: OMEPRAZOLE 40MG CAPSULES] 90 capsule 1     Sig: TAKE 1 CAPSULE BY MOUTH DAILY      8/2/24  Last Filled:      Patient Phone Number: [phone] (home)     Last appt: 5/16/2024   Next appt: 3/25/2025    Last OARRS:       8/14/2020     9:16 AM   RX Monitoring   Periodic Controlled Substance Monitoring Possible medication side effects, risk of tolerance/dependence & alternative treatments discussed.;Assessed functional status.;Obtaining appropriate analgesic effect of treatment.   Chronic Pain > 50 MEDD Obtained or confirmed \"Consent for Opioid Use\" on file.

## 2025-03-12 DIAGNOSIS — M1A.0710 CHRONIC IDIOPATHIC GOUT INVOLVING TOE OF RIGHT FOOT WITHOUT TOPHUS: ICD-10-CM

## 2025-03-13 RX ORDER — ALLOPURINOL 100 MG/1
100 TABLET ORAL DAILY
Qty: 30 TABLET | Refills: 10 | OUTPATIENT
Start: 2025-03-13

## 2025-03-13 NOTE — TELEPHONE ENCOUNTER
Medication:   Requested Prescriptions     Pending Prescriptions Disp Refills    allopurinol (ZYLOPRIM) 100 MG tablet [Pharmacy Med Name: ALLOPURINOL 100 MG TABS 100 Tablet] 30 tablet 10     Sig: TAKE 1 TABLET BY MOUTH DAILY        Last Filled:    5/29/24  Patient Phone Number: [phone] (home)     Last appt: 5/16/2024   Next appt: 6/5/2025    Last OARRS:       8/14/2020     9:16 AM   RX Monitoring   Periodic Controlled Substance Monitoring Possible medication side effects, risk of tolerance/dependence & alternative treatments discussed.;Assessed functional status.;Obtaining appropriate analgesic effect of treatment.   Chronic Pain > 50 MEDD Obtained or confirmed \"Consent for Opioid Use\" on file.

## 2025-03-18 DIAGNOSIS — M1A.0710 CHRONIC IDIOPATHIC GOUT INVOLVING TOE OF RIGHT FOOT WITHOUT TOPHUS: ICD-10-CM

## 2025-03-18 RX ORDER — ALLOPURINOL 100 MG/1
100 TABLET ORAL DAILY
Qty: 30 TABLET | Refills: 10 | OUTPATIENT
Start: 2025-03-18

## 2025-04-09 NOTE — TELEPHONE ENCOUNTER
potassium chloride (MICRO-K) 10 MEQ extended release capsule     allopurinol (ZYLOPRIM) 100 MG tablet     [id number] [address], [address] [place] [address] [phone]
see dictation

## 2025-05-28 ENCOUNTER — OFFICE VISIT (OUTPATIENT)
Dept: CARDIOLOGY CLINIC | Age: 75
End: 2025-05-28
Payer: COMMERCIAL

## 2025-05-28 VITALS
SYSTOLIC BLOOD PRESSURE: 118 MMHG | WEIGHT: 208 LBS | DIASTOLIC BLOOD PRESSURE: 82 MMHG | HEART RATE: 74 BPM | BODY MASS INDEX: 29.78 KG/M2 | HEIGHT: 70 IN

## 2025-05-28 DIAGNOSIS — I73.9 PVD (PERIPHERAL VASCULAR DISEASE): ICD-10-CM

## 2025-05-28 DIAGNOSIS — L97.919 LEG ULCER, RIGHT, WITH UNSPECIFIED SEVERITY (HCC): ICD-10-CM

## 2025-05-28 DIAGNOSIS — E11.59 HYPERTENSION ASSOCIATED WITH DIABETES (HCC): ICD-10-CM

## 2025-05-28 DIAGNOSIS — M1A.00X0 IDIOPATHIC CHRONIC GOUT WITHOUT TOPHUS, UNSPECIFIED SITE: ICD-10-CM

## 2025-05-28 DIAGNOSIS — E78.5 HYPERLIPIDEMIA ASSOCIATED WITH TYPE 2 DIABETES MELLITUS (HCC): ICD-10-CM

## 2025-05-28 DIAGNOSIS — J43.2 CENTRILOBULAR EMPHYSEMA (HCC): ICD-10-CM

## 2025-05-28 DIAGNOSIS — I50.32 DIASTOLIC DYSFUNCTION WITH CHRONIC HEART FAILURE (HCC): ICD-10-CM

## 2025-05-28 DIAGNOSIS — I25.10 CORONARY ARTERY DISEASE INVOLVING NATIVE CORONARY ARTERY OF NATIVE HEART WITHOUT ANGINA PECTORIS: ICD-10-CM

## 2025-05-28 DIAGNOSIS — I15.2 HYPERTENSION ASSOCIATED WITH DIABETES (HCC): ICD-10-CM

## 2025-05-28 DIAGNOSIS — K21.00 GASTROESOPHAGEAL REFLUX DISEASE WITH ESOPHAGITIS WITHOUT HEMORRHAGE: ICD-10-CM

## 2025-05-28 DIAGNOSIS — E11.69 HYPERLIPIDEMIA ASSOCIATED WITH TYPE 2 DIABETES MELLITUS (HCC): ICD-10-CM

## 2025-05-28 DIAGNOSIS — I70.221 ATHEROSCLEROSIS OF NATIVE ARTERY OF RIGHT LOWER EXTREMITY WITH REST PAIN (HCC): ICD-10-CM

## 2025-05-28 DIAGNOSIS — Z72.0 TOBACCO ABUSE: ICD-10-CM

## 2025-05-28 DIAGNOSIS — Z72.0 TOBACCO ABUSE: Primary | ICD-10-CM

## 2025-05-28 DIAGNOSIS — D68.69 SECONDARY HYPERCOAGULABLE STATE: ICD-10-CM

## 2025-05-28 PROCEDURE — 3074F SYST BP LT 130 MM HG: CPT | Performed by: NURSE PRACTITIONER

## 2025-05-28 PROCEDURE — 3046F HEMOGLOBIN A1C LEVEL >9.0%: CPT | Performed by: NURSE PRACTITIONER

## 2025-05-28 PROCEDURE — G8417 CALC BMI ABV UP PARAM F/U: HCPCS | Performed by: NURSE PRACTITIONER

## 2025-05-28 PROCEDURE — 99215 OFFICE O/P EST HI 40 MIN: CPT | Performed by: NURSE PRACTITIONER

## 2025-05-28 PROCEDURE — 3079F DIAST BP 80-89 MM HG: CPT | Performed by: NURSE PRACTITIONER

## 2025-05-28 PROCEDURE — 4004F PT TOBACCO SCREEN RCVD TLK: CPT | Performed by: NURSE PRACTITIONER

## 2025-05-28 PROCEDURE — 93000 ELECTROCARDIOGRAM COMPLETE: CPT | Performed by: NURSE PRACTITIONER

## 2025-05-28 PROCEDURE — 3023F SPIROM DOC REV: CPT | Performed by: NURSE PRACTITIONER

## 2025-05-28 PROCEDURE — 2022F DILAT RTA XM EVC RTNOPTHY: CPT | Performed by: NURSE PRACTITIONER

## 2025-05-28 PROCEDURE — G8427 DOCREV CUR MEDS BY ELIG CLIN: HCPCS | Performed by: NURSE PRACTITIONER

## 2025-05-28 PROCEDURE — 1160F RVW MEDS BY RX/DR IN RCRD: CPT | Performed by: NURSE PRACTITIONER

## 2025-05-28 PROCEDURE — 3017F COLORECTAL CA SCREEN DOC REV: CPT | Performed by: NURSE PRACTITIONER

## 2025-05-28 PROCEDURE — 1159F MED LIST DOCD IN RCRD: CPT | Performed by: NURSE PRACTITIONER

## 2025-05-28 PROCEDURE — 1123F ACP DISCUSS/DSCN MKR DOCD: CPT | Performed by: NURSE PRACTITIONER

## 2025-05-28 RX ORDER — NITROGLYCERIN 0.4 MG/1
TABLET SUBLINGUAL
COMMUNITY
Start: 2025-05-16

## 2025-05-28 NOTE — PROGRESS NOTES
for this visit.     REVIEW OF SYSTEMS:    CONSTITUTIONAL: No major weight gain or loss, night sweats, fever, fatigue, or weakness.  RESPIRATORY: No new SOB, PND, orthopnea or cough.   CARDIOVASCULAR: See HPI  PSYCHIATRIC: No anxiety, pain, insomnia or depression    PHYSICAL EXAM:        Vitals:    05/28/25 1246   BP: 118/82   Pulse: 74   Weight: 94.3 kg (208 lb)   Height: 1.778 m (5' 10\")      VITALS:  /82   Pulse 74   Ht 1.778 m (5' 10\")   Wt 94.3 kg (208 lb)   BMI 29.84 kg/m²   CONSTITUTIONAL: Cooperative, no apparent distress, and appears well nourished / developed  PSYCH: Calm affect.  SKIN: Warm and dry.  RESPIRATORY:  few crackles in bases and decreased breath sounds alec long time tobacco use   CARDIOVASCULAR:  Regular rate and rhythm without murmur. Normal S1 and S2. No gallops or rubs. Normal PMI. No elevation of JVP. Normal carotid pulses with no bruits.      Wt Readings from Last 3 Encounters:   05/28/25 94.3 kg (208 lb)   12/30/24 96.2 kg (212 lb)   11/27/24 102.1 kg (225 lb)      Pulse Readings from Last 3 Encounters:   05/28/25 74   12/30/24 79   11/27/24 72     BP Readings from Last 3 Encounters:   05/28/25 118/82   12/30/24 130/78   11/27/24 (!) 148/78        DATA:    Lab Results   Component Value Date    ALT 19 11/27/2024    AST 20 11/27/2024    ALKPHOS 89 11/27/2024    BILITOT 0.6 11/27/2024     Lab Results   Component Value Date    CREATININE 0.8 11/27/2024    BUN 13 11/27/2024     11/27/2024    K 4.9 11/27/2024     11/27/2024    CO2 26 11/27/2024     Lab Results   Component Value Date    TSH 1.57 12/13/2021     Lab Results   Component Value Date    WBC 7.3 11/27/2024    HGB 18.4 (H) 11/27/2024    HCT 57.1 (H) 11/27/2024    .6 (H) 11/27/2024     (L) 11/27/2024     Lab Results   Component Value Date    TRIG 169 (H) 11/27/2024    TRIG 211 (H) 05/16/2024    TRIG 120 01/02/2024     Lab Results   Component Value Date    HDL 30 (L) 11/27/2024    HDL 27 (L) 05/16/2024

## 2025-05-29 ENCOUNTER — RESULTS FOLLOW-UP (OUTPATIENT)
Dept: CARDIOLOGY CLINIC | Age: 75
End: 2025-05-29

## 2025-05-29 DIAGNOSIS — Z79.01 LONG TERM (CURRENT) USE OF ANTICOAGULANTS: ICD-10-CM

## 2025-05-29 DIAGNOSIS — E66.9 TYPE 2 DIABETES MELLITUS WITH OBESITY (HCC): Primary | ICD-10-CM

## 2025-05-29 DIAGNOSIS — I73.9 PVD (PERIPHERAL VASCULAR DISEASE): ICD-10-CM

## 2025-05-29 DIAGNOSIS — I15.2 HYPERTENSION ASSOCIATED WITH DIABETES (HCC): ICD-10-CM

## 2025-05-29 DIAGNOSIS — E11.59 HYPERTENSION ASSOCIATED WITH DIABETES (HCC): ICD-10-CM

## 2025-05-29 DIAGNOSIS — E11.69 TYPE 2 DIABETES MELLITUS WITH OBESITY (HCC): Primary | ICD-10-CM

## 2025-05-29 DIAGNOSIS — I25.10 CORONARY ARTERY DISEASE INVOLVING NATIVE CORONARY ARTERY OF NATIVE HEART WITHOUT ANGINA PECTORIS: ICD-10-CM

## 2025-05-29 DIAGNOSIS — R79.89 ABNORMAL CBC: ICD-10-CM

## 2025-05-29 DIAGNOSIS — J43.2 CENTRILOBULAR EMPHYSEMA (HCC): ICD-10-CM

## 2025-05-29 LAB
25(OH)D3 SERPL-MCNC: 20.1 NG/ML
ALBUMIN SERPL-MCNC: 3.9 G/DL (ref 3.4–5)
ALBUMIN/GLOB SERPL: 1.4 {RATIO} (ref 1.1–2.2)
ALP SERPL-CCNC: 79 U/L (ref 40–129)
ALT SERPL-CCNC: 37 U/L (ref 10–40)
ANION GAP SERPL CALCULATED.3IONS-SCNC: 10 MMOL/L (ref 3–16)
AST SERPL-CCNC: 36 U/L (ref 15–37)
BILIRUB DIRECT SERPL-MCNC: 0.2 MG/DL (ref 0–0.3)
BILIRUB INDIRECT SERPL-MCNC: 0.4 MG/DL (ref 0–1)
BILIRUB SERPL-MCNC: 0.6 MG/DL (ref 0–1)
BUN SERPL-MCNC: 11 MG/DL (ref 7–20)
CALCIUM SERPL-MCNC: 9.2 MG/DL (ref 8.3–10.6)
CHLORIDE SERPL-SCNC: 106 MMOL/L (ref 99–110)
CHOLEST SERPL-MCNC: 225 MG/DL (ref 0–199)
CO2 SERPL-SCNC: 26 MMOL/L (ref 21–32)
CREAT SERPL-MCNC: 0.8 MG/DL (ref 0.8–1.3)
DEPRECATED RDW RBC AUTO: 16.2 % (ref 12.4–15.4)
FOLATE SERPL-MCNC: 2.97 NG/ML (ref 4.78–24.2)
GFR SERPLBLD CREATININE-BSD FMLA CKD-EPI: >90 ML/MIN/{1.73_M2}
GLUCOSE SERPL-MCNC: 99 MG/DL (ref 70–99)
HCT VFR BLD AUTO: 55.4 % (ref 40.5–52.5)
HDLC SERPL-MCNC: 33 MG/DL (ref 40–60)
HGB BLD-MCNC: 18.8 G/DL (ref 13.5–17.5)
LDLC SERPL CALC-MCNC: 153 MG/DL
MAGNESIUM SERPL-MCNC: 1.75 MG/DL (ref 1.8–2.4)
MCH RBC QN AUTO: 36.4 PG (ref 26–34)
MCHC RBC AUTO-ENTMCNC: 33.9 G/DL (ref 31–36)
MCV RBC AUTO: 107.6 FL (ref 80–100)
NT-PROBNP SERPL-MCNC: 981 PG/ML (ref 0–124)
PLATELET # BLD AUTO: 110 K/UL (ref 135–450)
PMV BLD AUTO: 9.5 FL (ref 5–10.5)
POTASSIUM SERPL-SCNC: 5.1 MMOL/L (ref 3.5–5.1)
PROT SERPL-MCNC: 6.6 G/DL (ref 6.4–8.2)
RBC # BLD AUTO: 5.15 M/UL (ref 4.2–5.9)
SODIUM SERPL-SCNC: 142 MMOL/L (ref 136–145)
TRIGL SERPL-MCNC: 197 MG/DL (ref 0–150)
TSH SERPL DL<=0.005 MIU/L-ACNC: 2.28 UIU/ML (ref 0.27–4.2)
VIT B12 SERPL-MCNC: 312 PG/ML (ref 211–911)
VLDLC SERPL CALC-MCNC: 39 MG/DL
WBC # BLD AUTO: 7.2 K/UL (ref 4–11)

## 2025-06-10 DIAGNOSIS — G47.33 OSA AND COPD OVERLAP SYNDROME (HCC): ICD-10-CM

## 2025-06-10 DIAGNOSIS — J44.9 OSA AND COPD OVERLAP SYNDROME (HCC): ICD-10-CM

## 2025-06-11 RX ORDER — ALBUTEROL SULFATE 90 UG/1
AEROSOL, METERED RESPIRATORY (INHALATION)
Qty: 18 G | Refills: 11 | OUTPATIENT
Start: 2025-06-11

## 2025-06-17 ENCOUNTER — TELEPHONE (OUTPATIENT)
Dept: CASE MANAGEMENT | Age: 75
End: 2025-06-17

## 2025-06-25 ENCOUNTER — OFFICE VISIT (OUTPATIENT)
Dept: CARDIOLOGY CLINIC | Age: 75
End: 2025-06-25
Payer: COMMERCIAL

## 2025-06-25 VITALS
HEART RATE: 78 BPM | WEIGHT: 208 LBS | HEIGHT: 70 IN | DIASTOLIC BLOOD PRESSURE: 72 MMHG | BODY MASS INDEX: 29.78 KG/M2 | SYSTOLIC BLOOD PRESSURE: 130 MMHG

## 2025-06-25 DIAGNOSIS — E11.51 TYPE 2 DIABETES MELLITUS WITH DIABETIC PERIPHERAL ANGIOPATHY WITHOUT GANGRENE, WITHOUT LONG-TERM CURRENT USE OF INSULIN (HCC): ICD-10-CM

## 2025-06-25 DIAGNOSIS — I50.32 CHRONIC DIASTOLIC HEART FAILURE (HCC): ICD-10-CM

## 2025-06-25 DIAGNOSIS — E78.5 HYPERLIPIDEMIA ASSOCIATED WITH TYPE 2 DIABETES MELLITUS (HCC): ICD-10-CM

## 2025-06-25 DIAGNOSIS — E11.69 HYPERLIPIDEMIA ASSOCIATED WITH TYPE 2 DIABETES MELLITUS (HCC): ICD-10-CM

## 2025-06-25 DIAGNOSIS — I25.10 CORONARY ARTERY DISEASE INVOLVING NATIVE CORONARY ARTERY OF NATIVE HEART WITHOUT ANGINA PECTORIS: ICD-10-CM

## 2025-06-25 PROCEDURE — 1160F RVW MEDS BY RX/DR IN RCRD: CPT | Performed by: NURSE PRACTITIONER

## 2025-06-25 PROCEDURE — 2022F DILAT RTA XM EVC RTNOPTHY: CPT | Performed by: NURSE PRACTITIONER

## 2025-06-25 PROCEDURE — G8427 DOCREV CUR MEDS BY ELIG CLIN: HCPCS | Performed by: NURSE PRACTITIONER

## 2025-06-25 PROCEDURE — 4004F PT TOBACCO SCREEN RCVD TLK: CPT | Performed by: NURSE PRACTITIONER

## 2025-06-25 PROCEDURE — 3075F SYST BP GE 130 - 139MM HG: CPT | Performed by: NURSE PRACTITIONER

## 2025-06-25 PROCEDURE — 3078F DIAST BP <80 MM HG: CPT | Performed by: NURSE PRACTITIONER

## 2025-06-25 PROCEDURE — 3046F HEMOGLOBIN A1C LEVEL >9.0%: CPT | Performed by: NURSE PRACTITIONER

## 2025-06-25 PROCEDURE — G8417 CALC BMI ABV UP PARAM F/U: HCPCS | Performed by: NURSE PRACTITIONER

## 2025-06-25 PROCEDURE — 99215 OFFICE O/P EST HI 40 MIN: CPT | Performed by: NURSE PRACTITIONER

## 2025-06-25 PROCEDURE — 1159F MED LIST DOCD IN RCRD: CPT | Performed by: NURSE PRACTITIONER

## 2025-06-25 PROCEDURE — 3017F COLORECTAL CA SCREEN DOC REV: CPT | Performed by: NURSE PRACTITIONER

## 2025-06-25 PROCEDURE — 1123F ACP DISCUSS/DSCN MKR DOCD: CPT | Performed by: NURSE PRACTITIONER

## 2025-06-25 RX ORDER — PSEUDOEPHED/ACETAMINOPH/DIPHEN 30MG-500MG
TABLET ORAL
COMMUNITY
Start: 2025-06-09

## 2025-06-25 RX ORDER — ATORVASTATIN CALCIUM 80 MG/1
80 TABLET, FILM COATED ORAL DAILY
Qty: 90 TABLET | Refills: 3 | Status: SHIPPED | OUTPATIENT
Start: 2025-06-25

## 2025-06-25 RX ORDER — FUROSEMIDE 40 MG/1
40 TABLET ORAL DAILY
Qty: 90 TABLET | Refills: 3 | Status: SHIPPED | OUTPATIENT
Start: 2025-06-25

## 2025-06-25 RX ORDER — CLOPIDOGREL BISULFATE 75 MG/1
75 TABLET ORAL DAILY
Qty: 90 TABLET | Refills: 3 | Status: SHIPPED | OUTPATIENT
Start: 2025-06-25

## 2025-06-25 RX ORDER — ALIROCUMAB 75 MG/ML
INJECTION, SOLUTION SUBCUTANEOUS
COMMUNITY
Start: 2025-06-11

## 2025-06-25 NOTE — PROGRESS NOTES
30 capsule 5    apixaban (ELIQUIS) 5 MG TABS tablet Take 1 tablet by mouth 2 times daily 60 tablet 5    ipratropium 0.5 mg-albuterol 2.5 mg (DUONEB) 0.5-2.5 (3) MG/3ML SOLN nebulizer solution Inhale 3 mLs into the lungs every 4 hours as needed for Shortness of Breath 360 mL 4    blood glucose test strips (ASCENSIA AUTODISC VI;ONE TOUCH ULTRA TEST VI) strip Inject 1 each into the skin daily 100 each 1    Lancets (ONETOUCH DELICA PLUS [id number]) MISC       Blood Glucose Monitoring Suppl (ONETOUCH VERIO REFLECT) w/Device KIT       glucose monitoring (FREESTYLE FREEDOM) kit 1 kit by Does not apply route daily 1 kit 0    blood glucose monitor strips Test 4 times a day & as needed for symptoms of irregular blood glucose. Dispense sufficient amount for indicated testing frequency plus additional to accommodate PRN testing needs. 200 strip 3     No current facility-administered medications for this visit.     REVIEW OF SYSTEMS:    CONSTITUTIONAL: No major weight gain or loss, night sweats, fever, fatigue, or weakness.  RESPIRATORY: No new SOB, PND, orthopnea or cough.   CARDIOVASCULAR: See HPI  PSYCHIATRIC: No anxiety, pain, insomnia or depression    PHYSICAL EXAM:        Vitals:    06/25/25 1323   BP: 130/72   Pulse: 78   Weight: 94.3 kg (208 lb)   Height: 1.778 m (5' 10\")      VITALS:  /72   Pulse 78   Ht 1.778 m (5' 10\")   Wt 94.3 kg (208 lb)   BMI 29.84 kg/m²   CONSTITUTIONAL: Cooperative, no apparent distress, and appears well nourished / developed  PSYCH: Calm affect.  SKIN: Warm and dry.  RESPIRATORY:  No increased work of breathing and clear to auscultation, no crackles or wheezing.  CARDIOVASCULAR:  Regular rate and rhythm without murmur. Normal S1 and S2. No gallops or rubs. Normal PMI. No elevation of JVP. Normal carotid pulses with no bruits.      Wt Readings from Last 3 Encounters:   06/25/25 94.3 kg (208 lb)   05/28/25 94.3 kg (208 lb)   12/30/24 96.2 kg (212 lb)      Pulse Readings from Last 3

## 2025-06-30 ENCOUNTER — OFFICE VISIT (OUTPATIENT)
Dept: PULMONOLOGY | Age: 75
End: 2025-06-30
Payer: COMMERCIAL

## 2025-06-30 VITALS
RESPIRATION RATE: 16 BRPM | HEIGHT: 71 IN | WEIGHT: 212 LBS | HEART RATE: 73 BPM | DIASTOLIC BLOOD PRESSURE: 72 MMHG | BODY MASS INDEX: 29.68 KG/M2 | OXYGEN SATURATION: 97 % | SYSTOLIC BLOOD PRESSURE: 130 MMHG

## 2025-06-30 DIAGNOSIS — Z72.0 TOBACCO ABUSE DISORDER: ICD-10-CM

## 2025-06-30 DIAGNOSIS — G47.33 OSA ON CPAP: ICD-10-CM

## 2025-06-30 DIAGNOSIS — Z87.891 PERSONAL HISTORY OF TOBACCO USE: Primary | ICD-10-CM

## 2025-06-30 DIAGNOSIS — J44.9 CHRONIC OBSTRUCTIVE PULMONARY DISEASE, UNSPECIFIED COPD TYPE (HCC): ICD-10-CM

## 2025-06-30 PROCEDURE — G8417 CALC BMI ABV UP PARAM F/U: HCPCS | Performed by: INTERNAL MEDICINE

## 2025-06-30 PROCEDURE — 3075F SYST BP GE 130 - 139MM HG: CPT | Performed by: INTERNAL MEDICINE

## 2025-06-30 PROCEDURE — G8427 DOCREV CUR MEDS BY ELIG CLIN: HCPCS | Performed by: INTERNAL MEDICINE

## 2025-06-30 PROCEDURE — 3017F COLORECTAL CA SCREEN DOC REV: CPT | Performed by: INTERNAL MEDICINE

## 2025-06-30 PROCEDURE — 99214 OFFICE O/P EST MOD 30 MIN: CPT | Performed by: INTERNAL MEDICINE

## 2025-06-30 PROCEDURE — 1160F RVW MEDS BY RX/DR IN RCRD: CPT | Performed by: INTERNAL MEDICINE

## 2025-06-30 PROCEDURE — 4004F PT TOBACCO SCREEN RCVD TLK: CPT | Performed by: INTERNAL MEDICINE

## 2025-06-30 PROCEDURE — 3078F DIAST BP <80 MM HG: CPT | Performed by: INTERNAL MEDICINE

## 2025-06-30 PROCEDURE — 1159F MED LIST DOCD IN RCRD: CPT | Performed by: INTERNAL MEDICINE

## 2025-06-30 PROCEDURE — 1123F ACP DISCUSS/DSCN MKR DOCD: CPT | Performed by: INTERNAL MEDICINE

## 2025-06-30 PROCEDURE — 3023F SPIROM DOC REV: CPT | Performed by: INTERNAL MEDICINE

## 2025-06-30 PROCEDURE — G2211 COMPLEX E/M VISIT ADD ON: HCPCS | Performed by: INTERNAL MEDICINE

## 2025-06-30 NOTE — PROGRESS NOTES
[facility]    Outpatient Follow Up Note    Subjective:   CHIEF COMPLAINT / HPI:    History of Present Illness  The patient came in for a check-up on his sleep apnea, COPD, CHF, and smoking habits.    He mentioned having trouble staying asleep, waking up at 1:30 AM and not being able to sleep well after that. He continues to use his CPAP machine and Trelegy for his breathing, and he makes sure to rinse his mouth after using Trelegy. He sometimes needs to use his rescue inhaler, especially when the weather changes, but he doesn't need any refills for his inhalers right now.    He's been dealing with problems with his legs for about 3 years, and he manages them with bandages.    He smokes about 2 to 3 cigarettes a day and isn't planning to quit at this time.    He shared that his brother passed away on May 14, 2025, which has been very hard for him. He had a screening CT scan scheduled, but his insurance didn't cover it. He's planning to change his insurance provider as soon as he can.    SOCIAL HISTORY  Tobacco: The patient smokes about 2 to 3 cigarettes a day, approximately a pack a week.    FAMILY HISTORY  - Brother: Cancer (throat, lymph nodes, liver),  on 2025  - Sister: ,  on patient's 42nd birthday      Past Medical History:    Past Medical History:   Diagnosis Date    CAD (coronary artery disease)     CHF (congestive heart failure) (HCC)     diastolic    COPD (chronic obstructive pulmonary disease) (HCC)     Critical ischemia of lower extremity (HCC)     Depressive disorder, not elsewhere classified     GERD (gastroesophageal reflux disease)     History of colon polyps - 32 seen on colonoscopy 2021    History of MI (myocardial infarction) 2013    History of skin ulcer of lower extremity     R anterior shin    History of transient ischemic attack (TIA)     Hx of blood clots     PE    Hyperlipidemia     Hypertension     Neuropathy

## 2025-08-04 RX ORDER — OMEPRAZOLE 40 MG/1
40 CAPSULE, DELAYED RELEASE ORAL DAILY
Qty: 90 CAPSULE | Refills: 1 | OUTPATIENT
Start: 2025-08-04

## (undated) DEVICE — PTA BALLOON DILATATION CATHETER: Brand: MUSTANG™

## (undated) DEVICE — ELECTRODE PT RET AD L9FT HI MOIST COND ADH HYDRGEL CORDED

## (undated) DEVICE — AGENT HEMSTAT W2XL14IN OXIDIZED REGENERATED CELOS ABSRB FOR

## (undated) DEVICE — NEEDLE HYPO 16GA L1.5IN PUR POLYPR HUB S STL REG BVL STR

## (undated) DEVICE — SYRINGE MED 3ML CLR PLAS STD N CTRL LUERLOCK TIP DISP

## (undated) DEVICE — DECANTER BAG 9": Brand: MEDLINE INDUSTRIES, INC.

## (undated) DEVICE — HM34SPU @ HOVERMATT, SNGL USE, 34X78: Brand: MEDLINE

## (undated) DEVICE — CAUTERY TIP POLISHER: Brand: DEVON

## (undated) DEVICE — SPONGE,LAP,18"X18",DLX,XR,ST,5/PK,40/PK: Brand: MEDLINE

## (undated) DEVICE — CATHETER ANGIO 5FR L65CM 0.038IN VIS OMNI FLUSH-0 SFT TIP

## (undated) DEVICE — FOAM BUMP, LARGE: Brand: MEDLINE INDUSTRIES, INC.

## (undated) DEVICE — PIN DISPNS IV ADD FOR RUB STOPPERED MD VI REUSE MINI-SPIKE

## (undated) DEVICE — SCANLAN® SURG-I-LOOP® SILICONE LOOPS - RED MINI, 1.5X.88 MM, 16"/40 CM LONG (2/PKG): Brand: SCANLAN® SURG-I-LOOP® SILICONE LOOPS

## (undated) DEVICE — FOGARTY ARTERIAL EMBOLECTOMY CATHETER 4F 80CM: Brand: FOGARTY

## (undated) DEVICE — SUTURE VCRL SZ 3-0 L27IN ABSRB UD L26MM SH 1/2 CIR J416H

## (undated) DEVICE — SUTURE NONABSORBABLE MONOFILAMENT 4-0 RB-1 36 IN BLU PROLENE 8557H

## (undated) DEVICE — AGENT HEMSTAT FIBRIN SEAL HUM AIRLESS SPR ACC EVICEL

## (undated) DEVICE — LARGE BORE STOPCOCK WITH ROTATING MALE LUER LOCK

## (undated) DEVICE — PROVE COVER: Brand: UNBRANDED

## (undated) DEVICE — ELECTRODE ES AD CRD L15FT DISP FOR PT BELOW 30LB REM

## (undated) DEVICE — CLIP SM RED INTERN HMOCLP TITAN LIGATING

## (undated) DEVICE — SUTURE PERMAHAND SZ 4-0 L12X30IN NONABSORBABLE BLK SILK A303H

## (undated) DEVICE — SNARE ENDOSCP POLYP 2.4 MM 240 CM 10 MM 2.8 MM CAPTIVATOR

## (undated) DEVICE — FOGARTY - HYDRAGRIP SURGICAL - CLAMP INSERTS: Brand: FOGARTY SOFTJAW

## (undated) DEVICE — SUTURE PERMAHAND SZ 3-0 L30IN NONABSORBABLE BLK SILK BRAID A304H

## (undated) DEVICE — Z DUP USE 2701075 SYSTEM SKIN CLSR 42CM DERMBND PRINEO

## (undated) DEVICE — DESTINATION RENAL GUIDING SHEATH: Brand: DESTINATION

## (undated) DEVICE — GLOVE SURG SZ 6 L11.2IN FNGR THK9.8MIL STRW LTX POLYMER

## (undated) DEVICE — 1.5MM HYDRO LEMAITRE VALVULOTOME (98 CM): Brand: HYDRO LEMAITRE VALVULOTOME

## (undated) DEVICE — SUTURE N ABSRB MONOFILAMENT 6-0 BV1 30 IN BLU PROLENE EPM8709

## (undated) DEVICE — CLIP INT SM WIDE RED TI TRNSVRS GRV CHEVRON SHP W PRECIS

## (undated) DEVICE — PATCH HEMOSTATIC 4X2 IN SEAL FIBRIN EVARREST

## (undated) DEVICE — ERBE NESSY® OMEGA PLATE USA (85+23)CM² , WITH CABLE 3 M: Brand: ERBE

## (undated) DEVICE — SUTURE NONABSORBABLE MONOFILAMENT 5-0 C-1 1X24 IN PROLENE 8725H

## (undated) DEVICE — BANDAGE COMPR W4INXL5YD TAN BRTH SELF ADH WRP W/ HND TEAR

## (undated) DEVICE — E-Z CLEAN, NON-STICK, PTFE COATED, ELECTROSURGICAL BLADE ELECTRODE, 2.5 INCH (6.35 CM): Brand: EZ CLEAN

## (undated) DEVICE — ELECTROSURGICAL PENCIL ROCKER SWITCH NON COATED BLADE ELECTRODE 10 FT (3 M) CORD HOLSTER: Brand: MEGADYNE

## (undated) DEVICE — GENERAL: Brand: MEDLINE INDUSTRIES, INC.

## (undated) DEVICE — INTENDED FOR TISSUE SEPARATION, AND OTHER PROCEDURES THAT REQUIRE A SHARP SURGICAL BLADE TO PUNCTURE OR CUT.: Brand: BARD-PARKER ® CARBON RIB-BACK BLADES

## (undated) DEVICE — GLOVE SURG SZ 65 L12IN FNGR THK79MIL GRN LTX FREE

## (undated) DEVICE — GEL US 20GM NONIRRITATING OVERWRAPPED FILE PCH TRNSMIT

## (undated) DEVICE — SUTURE PERMAHAND SZ 2-0 L30IN NONABSORBABLE BLK SILK W/O A305H

## (undated) DEVICE — RADIFOCUS GLIDEWIRE: Brand: GLIDEWIRE

## (undated) DEVICE — PINNACLE R/O II INTRODUCER SHEATH WITH RADIOPAQUE MARKER: Brand: PINNACLE

## (undated) DEVICE — TOTAL TRAY, 16FR 10ML SIL FOLEY, URN: Brand: MEDLINE

## (undated) DEVICE — 3M™ IOBAN™ 2 ANTIMICROBIAL INCISE DRAPE 6648EZ: Brand: IOBAN™ 2

## (undated) DEVICE — SYRINGE MED 10ML LUERLOCK TIP W/O SFTY DISP

## (undated) DEVICE — GUIDEWIRE: Brand: AMPLATZ SUPER STIFF™

## (undated) DEVICE — TOWEL,STOP FLAG GOLD N-W: Brand: MEDLINE

## (undated) DEVICE — FOGARTY ARTERIAL EMBOLECTOMY CATHETER 3F 80CM: Brand: FOGARTY

## (undated) DEVICE — Device

## (undated) DEVICE — PACK,UNIVERSAL,NO GOWNS: Brand: MEDLINE

## (undated) DEVICE — TRAP SPEC RETRV CLR PLAS POLYP IN LN SUCT QUIK CTCH

## (undated) DEVICE — LOOP,VESSEL,MAXI,BLUE,2/PK,STERILE: Brand: MEDLINE

## (undated) DEVICE — AGENT HEMSTAT W2XL4IN FIBRIN SEAL PTCH DSG EVARREST

## (undated) DEVICE — SUTURE VCRL SZ 2-0 L27IN ABSRB UD L26MM SH 1/2 CIR J417H

## (undated) DEVICE — SYSTEM SKIN CLSR 22CM DERMBND PRINEO

## (undated) DEVICE — CLIP LIG M BLU TI HRT SHP WIRE HORZ 600 PER BX

## (undated) DEVICE — TOWEL,OR,DSP,ST,BLUE,DLX,8/PK,10PK/CS: Brand: MEDLINE

## (undated) DEVICE — APPLICATOR PREP 26ML 0.7% IOD POVACRYLEX 74% ISO ALC ST

## (undated) DEVICE — INFLATION DEVICE KIT: Brand: ENCORE™ 26 ADVANTAGE KIT

## (undated) DEVICE — SCANLAN® SUTURE BOOT™ INSTRUMENT JAW COVERS - ORIGINAL YELLOW, STANDARD PKG (5 PAIR/CARTRIDGE, 1 CARTRIDGE/PKG): Brand: SCANLAN® SUTURE BOOT™ INSTRUMENT JAW COVERS

## (undated) DEVICE — Z DISCONTINUED USE 2272117 DRAPE SURG 3 QTR N INVASIVE 2 LAYR DISP

## (undated) DEVICE — 6 ML SYRINGE LUER-LOCK TIP: Brand: MONOJECT

## (undated) DEVICE — SYRINGE 20ML LL S/C 50

## (undated) DEVICE — PERCLOSE PROGLIDE™ SUTURE-MEDIATED CLOSURE SYSTEM: Brand: PERCLOSE PROGLIDE™

## (undated) DEVICE — SOLUTION IV 500ML 0.9% SOD CHL PH 5 INJ USP VIAFLX PLAS

## (undated) DEVICE — FORCEPS BX L240CM JAW DIA2.8MM L CAP W/ NDL MIC MESH TOOTH

## (undated) DEVICE — SET EXTN PRIMING 4.9ML L30IN INCL SLDE CLMP SPIN M LUERLOCK

## (undated) DEVICE — SNARE ENDOSCP POLYP 2.4 MM 2.8 CM 20 MM 240 MM CAPTIVATOR II

## (undated) DEVICE — SUTURE MCRYL SZ 4-0 L27IN ABSRB UD L19MM PS-2 1/2 CIR PRIM Y426H

## (undated) DEVICE — GOWN,SIRUS,POLYRNF,BRTHSLV,LG,30/CS: Brand: MEDLINE

## (undated) DEVICE — PROBE DOPPLER PENCIL

## (undated) DEVICE — DRAPE SURG W82XL124IN SMS INTVENT FEM ANGIO PCH POLY CIR FEN

## (undated) DEVICE — TWIST & GO  SYRINGE KIT, 150 ML(ANGIO - ARTERION, TWIST & GO150ML SYR W/QFT, MC)(60766817): Brand: MEDRAD® TWIST & GO DISPOSABLE SYRINGE 150 ML WITH QUICK FILL TUBE

## (undated) DEVICE — SUTURE PERMAHAND SZ 3-0 L18IN NONABSORBABLE BLK L26MM SH C013D

## (undated) DEVICE — GAUZE,SPONGE,4"X4",16PLY,XRAY,STRL,LF: Brand: MEDLINE

## (undated) DEVICE — SYRINGE WITH HYPODERMIC SAFETY NEEDLE: Brand: MAGELLAN

## (undated) DEVICE — BLANKET WRM W29.9XL79.1IN UP BODY FORC AIR MISTRAL-AIR

## (undated) DEVICE — BANDAGE COMPR COHESIVE 5 YDX4 IN SELF ADH TAN NS COBAN

## (undated) DEVICE — CLAMP TRANSDUCER POLE MT HLD DT PRECEPTOR PLAS

## (undated) DEVICE — 3M™ STERI-DRAPE™ ISOLATION BAG, 10 PER CARTON / 4 CARTONS PER CASE, 1003: Brand: 3M™ STERI-DRAPE™

## (undated) DEVICE — FOGARTY EMBOLECTOMY CATHETER: Brand: FOGARTY

## (undated) DEVICE — STAPLER SKIN H3.9MM WIRE DIA0.58MM CRWN 6.9MM 35 STPL ROT

## (undated) DEVICE — CLIP LIG L235CM RESOL 360 BX/20

## (undated) DEVICE — SUTURE PROL SZ 5-0 L36IN NONABSORBABLE BLU L13MM C-1 3/8 8720H

## (undated) DEVICE — SYRINGE TB 1ML NDL 26GA L0.375IN PLAS SLIP TIP CONVENTIONAL

## (undated) DEVICE — TWIST & GO, HPCT, COEX, 60''(ANGIO - ART, HPCT, TWIST & GO, CO-EX, 60")(60766876): Brand: MEDRAD® TWIST & GO STERILE HIGH PRESSURE CONNECTOR TUBING, 150CM (60IN)

## (undated) DEVICE — KIT MICROINTRODUCER 4FR ECHOGENIC NDL L7CM 21GA COAX NIT

## (undated) DEVICE — LOOP VES W13MM THK09MM MINI RED SIL FLD REPELLENT

## (undated) DEVICE — SNAP KAP: Brand: UNBRANDED